# Patient Record
Sex: FEMALE | Race: WHITE | NOT HISPANIC OR LATINO | Employment: OTHER | URBAN - METROPOLITAN AREA
[De-identification: names, ages, dates, MRNs, and addresses within clinical notes are randomized per-mention and may not be internally consistent; named-entity substitution may affect disease eponyms.]

---

## 2017-03-08 ENCOUNTER — TELEPHONE (OUTPATIENT)
Dept: FAMILY MEDICINE | Facility: CLINIC | Age: 82
End: 2017-03-08

## 2017-03-08 NOTE — TELEPHONE ENCOUNTER
"Reason for call: med question   Patient called regarding (reason for call): prescription-Patient is saying [facility] does not carry rostuvastatin calcium-crestor- \"not on their formula?\"  Additional comments: please call so she can still get her crestor    Phone Number Pt can be reached at: Other phone number:  [phone]*  Best Time: am   Can we leave a detailed message on this number? YES  "

## 2017-03-08 NOTE — TELEPHONE ENCOUNTER
MA please contact the pharmacy to get the insurance information. This most likely will be Medicare Part D. This is not same as in the chart.     Remedios Pinto,

## 2017-03-10 ENCOUNTER — TELEPHONE (OUTPATIENT)
Dept: FAMILY MEDICINE | Facility: CLINIC | Age: 82
End: 2017-03-10

## 2017-03-10 NOTE — TELEPHONE ENCOUNTER
See telephone encounter dated 3/8/2017. Patient informed of PA being started. Please update patient when information becomes available.  Janette Worley, CMA

## 2017-03-10 NOTE — TELEPHONE ENCOUNTER
Reason for Call:  Other prescription    Detailed comments: Patient states her insurance informed her that rosuvastatin is not on their medication list. Patient request that provider please let her know next steps. Patient states she's presently in [place].    Phone Number Patient can be reached at: Cell number on file:    Telephone Information:   Mobile [phone]       Best Time: anytime    Can we leave a detailed message on this number? YES    Call taken on 3/10/2017 at 2:58 PM by nAyi Sandy

## 2017-03-10 NOTE — TELEPHONE ENCOUNTER
Received fax from pharmacy stating patient requires Prior Authorization for rosuvastatin (CRESTOR) 40 MG tablet.     Insurance information:   Name: Medicare Part D  Phone number: [phone]  ID number: [id number]    Initiate prior authorization or change medication?    If a prior authorization is to be initiated, please list the following:    -any medications the patient has tried and failed or any contraindications.  Call pharmacy: she needs generic crestor 40. I am sure they carry it.   -is the patient currently on this medication, or has tried before?  Yes, she has been on generic crestor for some time  -What is the diagnosis?  Patient has severe coronary artery disease and high cholesterol and needs a high dose statin   -Justification or other information that may be helpful. Patient needs generic crestor     DESMOND ESPITIA M.D.

## 2017-03-15 NOTE — TELEPHONE ENCOUNTER
Reason for Call:  Other prescription    Detailed comments: Patient called to check status of PA and would like to know result as soon as available.    Phone Number Patient can be reached at: Cell number on file:    Telephone Information:   Mobile [phone]       Best Time: anytime    Can we leave a detailed message on this number? YES    Call taken on 3/15/2017 at 2:48 PM by Anyi Sandy

## 2017-03-16 NOTE — TELEPHONE ENCOUNTER
Called patient. Patient is not available to talk. Patient will call us back.    Janessa Herrera MA

## 2017-03-28 ENCOUNTER — TELEPHONE (OUTPATIENT)
Dept: FAMILY MEDICINE | Facility: CLINIC | Age: 82
End: 2017-03-28

## 2017-03-28 DIAGNOSIS — J45.30 MILD PERSISTENT ASTHMA WITHOUT COMPLICATION: Primary | ICD-10-CM

## 2017-03-28 NOTE — TELEPHONE ENCOUNTER
Received fax from pharmacy stating patient requires Prior Authorization for Advair    Insurance information:  Name: PETE  Phone number: [phone]  ID number: [id number]    * or can change medication to Dulera or Symbicort per insurance    Provider to address. Message route to Dr. Jha. Initiate prior authorization or change medication?  Please advise.      **If a prior authorization is to be initiated, please list the following:    -Any medications the patient has tried and failed or any contraindications. None, she has severe asthma and has had it all her life. She needs that advair to control her asthma     -Is the patient currently on this medication, or has tried before? No, her asthma has been severe at times and advair has kept her asthma under good control     -What is the diagnosis? Severe constant asthma    - Justification or other information that me by helpful. She needs it to keep her out of the hospital.    DESMOND JHA M.D.

## 2017-03-28 NOTE — TELEPHONE ENCOUNTER
Looked at encounter 03/08/2017 and per RN who Spoke with pharmacy, this is covered, please disregard prior auth. Patient would have received this information from the Pharmacy.  Lala Hassan,

## 2017-03-29 NOTE — TELEPHONE ENCOUNTER
Call patient. I am substituting Dulera for her Advair due to cost. Dulera requires 2 puffs twice a day.     DESMOND ESPITIA M.D.

## 2017-03-29 NOTE — TELEPHONE ENCOUNTER
Received message from [employer]eds that PA not required. What was received from Pharmacy that Advair copay cost to much and the Dulera or Symbicort are what insurance preferred.   Lala Hassan,

## 2017-03-30 NOTE — TELEPHONE ENCOUNTER
Patient notified of Provider's message as written.  Patient verbalized understanding.  Raúl Trejo RN

## 2017-07-14 ENCOUNTER — OFFICE VISIT (OUTPATIENT)
Dept: FAMILY MEDICINE | Facility: CLINIC | Age: 82
End: 2017-07-14
Payer: COMMERCIAL

## 2017-07-14 VITALS
DIASTOLIC BLOOD PRESSURE: 86 MMHG | BODY MASS INDEX: 30.43 KG/M2 | HEIGHT: 61 IN | WEIGHT: 161.2 LBS | TEMPERATURE: 97.1 F | OXYGEN SATURATION: 98 % | HEART RATE: 60 BPM | SYSTOLIC BLOOD PRESSURE: 136 MMHG

## 2017-07-14 DIAGNOSIS — M19.079 ARTHRITIS OF FOOT: ICD-10-CM

## 2017-07-14 DIAGNOSIS — K59.00 CONSTIPATION, UNSPECIFIED CONSTIPATION TYPE: ICD-10-CM

## 2017-07-14 DIAGNOSIS — I10 BENIGN ESSENTIAL HYPERTENSION: ICD-10-CM

## 2017-07-14 DIAGNOSIS — N28.9 RENAL INSUFFICIENCY: ICD-10-CM

## 2017-07-14 DIAGNOSIS — K90.0 CELIAC DISEASE: ICD-10-CM

## 2017-07-14 DIAGNOSIS — F32.0 MILD MAJOR DEPRESSION (H): ICD-10-CM

## 2017-07-14 DIAGNOSIS — R53.83 FATIGUE, UNSPECIFIED TYPE: Primary | ICD-10-CM

## 2017-07-14 DIAGNOSIS — E03.9 HYPOTHYROIDISM, UNSPECIFIED TYPE: ICD-10-CM

## 2017-07-14 LAB
ALBUMIN SERPL-MCNC: 3.7 G/DL (ref 3.4–5)
ALP SERPL-CCNC: 54 U/L (ref 40–150)
ALT SERPL W P-5'-P-CCNC: 34 U/L (ref 0–50)
ANION GAP SERPL CALCULATED.3IONS-SCNC: 10 MMOL/L (ref 3–14)
AST SERPL W P-5'-P-CCNC: 21 U/L (ref 0–45)
BASOPHILS # BLD AUTO: 0 10E9/L (ref 0–0.2)
BASOPHILS NFR BLD AUTO: 0.2 %
BILIRUB SERPL-MCNC: 0.4 MG/DL (ref 0.2–1.3)
BUN SERPL-MCNC: 42 MG/DL (ref 7–30)
CALCIUM SERPL-MCNC: 10.9 MG/DL (ref 8.5–10.1)
CHLORIDE SERPL-SCNC: 100 MMOL/L (ref 94–109)
CO2 SERPL-SCNC: 29 MMOL/L (ref 20–32)
CREAT SERPL-MCNC: 1.5 MG/DL (ref 0.52–1.04)
DIFFERENTIAL METHOD BLD: NORMAL
EOSINOPHIL # BLD AUTO: 0.1 10E9/L (ref 0–0.7)
EOSINOPHIL NFR BLD AUTO: 2.1 %
ERYTHROCYTE [DISTWIDTH] IN BLOOD BY AUTOMATED COUNT: 12.1 % (ref 10–15)
GFR SERPL CREATININE-BSD FRML MDRD: 33 ML/MIN/1.7M2
GLUCOSE SERPL-MCNC: 101 MG/DL (ref 70–99)
HCT VFR BLD AUTO: 42.2 % (ref 35–47)
HGB BLD-MCNC: 14.1 G/DL (ref 11.7–15.7)
LYMPHOCYTES # BLD AUTO: 1.5 10E9/L (ref 0.8–5.3)
LYMPHOCYTES NFR BLD AUTO: 28.5 %
MCH RBC QN AUTO: 32.8 PG (ref 26.5–33)
MCHC RBC AUTO-ENTMCNC: 33.4 G/DL (ref 31.5–36.5)
MCV RBC AUTO: 98 FL (ref 78–100)
MONOCYTES # BLD AUTO: 0.5 10E9/L (ref 0–1.3)
MONOCYTES NFR BLD AUTO: 10.5 %
NEUTROPHILS # BLD AUTO: 3 10E9/L (ref 1.6–8.3)
NEUTROPHILS NFR BLD AUTO: 58.7 %
PLATELET # BLD AUTO: 187 10E9/L (ref 150–450)
POTASSIUM SERPL-SCNC: 3.9 MMOL/L (ref 3.4–5.3)
PROT SERPL-MCNC: 7.6 G/DL (ref 6.8–8.8)
RBC # BLD AUTO: 4.3 10E12/L (ref 3.8–5.2)
SODIUM SERPL-SCNC: 139 MMOL/L (ref 133–144)
TSH SERPL DL<=0.005 MIU/L-ACNC: 2.4 MU/L (ref 0.4–4)
WBC # BLD AUTO: 5.1 10E9/L (ref 4–11)

## 2017-07-14 PROCEDURE — 80050 GENERAL HEALTH PANEL: CPT | Performed by: FAMILY MEDICINE

## 2017-07-14 PROCEDURE — 99214 OFFICE O/P EST MOD 30 MIN: CPT | Performed by: FAMILY MEDICINE

## 2017-07-14 PROCEDURE — 36415 COLL VENOUS BLD VENIPUNCTURE: CPT | Performed by: FAMILY MEDICINE

## 2017-07-14 NOTE — PROGRESS NOTES
SUBJECTIVE:                                                    Kamryn Miller is a 81 year old female who presents to clinic today for the following health issues:      Musculoskeletal problem/pain      Duration: few months    Description  Location: Right foot    Intensity:  moderate    Accompanying signs and symptoms: radiation of pain to ankle    History  Previous similar problem: no   Previous evaluation:  none    Precipitating or alleviating factors:  Trauma or overuse: no   Aggravating factors include: standing, walking and climbing stairs    Therapies tried and outcome: rest/inactivity, heat and ice    Bowel movement too firm  2-3 years  Every bowel moevement  Prune juice, prunes, and stool softener          Problem list and histories reviewed & adjusted, as indicated.  Additional history: as documented    Patient Active Problem List   Diagnosis     CAD (coronary artery disease)     Hyperlipidemia with target LDL less than 100     Mild major depression (H)     Pulmonary hypertension (H)     Mild persistent asthma     Seasonal allergic rhinitis     Mitral insufficiency     Tricuspid insufficiency     Bipolar disorder (H)     Renal insufficiency     Tremors     Advanced directives, counseling/discussion     Family history of diabetes mellitus     Family history of celiac disease     Celiac disease     History of colonic polyps     Benign essential hypertension     Hypothyroidism, unspecified type     Past Surgical History:   Procedure Laterality Date     ANGIOGRAM  6/26/2009     ANGIOPLASTY  9/96    for angina     ANGIOPLASTY  8/03 - 9/03    X -2 - with stenst in coronary car.     APPENDECTOMY       BREAST BIOPSY, RT/LT      Breat Biopsy RT/LT, benign     BUNIONECTOMY  11/8/2011    Procedure:BUNIONECTOMY; Left donna bunionectomy; Surgeon:FREDY LITTLEJOHN; Location:[facility]     BUNIONECTOMY RT/LT  5/2007    right bunion and right 2nd hammertoe     C ANESTH,BLEPHAROPLASTY      (R) for drooping eyelid     C  APPENDECTOMY       CATARACT IOL, RT/LT  6/12 and 7/12    bilateral     COLONOSCOPY  2007, 2012    every 3 years for polyps     JOINT REPLACEMENT, HIP RT/LT  4/2008    right hip replaced     JOINT REPLACEMENT, HIP RT/LT  4/2009    left hip replaced     REPAIR HAMMER TOE  11/8/2011    Procedure:REPAIR HAMMER TOE; left 2nd hammertoe repair; Surgeon:FREDY LITTLEJOHN; Location:[facility]     SURGICAL HISTORY OF -   11/11    left bunion and 2nd hammertoe repair     TUBAL LIGATION         Social History   Substance Use Topics     Smoking status: Never Smoker     Smokeless tobacco: Never Used     Alcohol use No     Family History   Problem Relation Age of Onset     Asthma Mother      CEREBROVASCULAR DISEASE Mother      Arthritis Mother      Depression Mother      Lipids Sister      Hypertension Sister      HEART DISEASE Sister      Lipids Sister      Hypertension Sister      Lipids Sister      Breast Cancer Sister          Current Outpatient Prescriptions   Medication Sig Dispense Refill     mometasone-formoterol (DULERA) 200-5 MCG/ACT oral inhaler Inhale 2 puffs into the lungs 2 times daily 39 g 11     metoprolol (LOPRESSOR) 50 MG tablet One in the morning and 1/2 in the evening 135 tablet 4     verapamil (CALAN-SR) 240 MG CR tablet Take 1 tablet (240 mg) by mouth daily 90 tablet 4     losartan (COZAAR) 100 MG tablet Take 1 tablet (100 mg) by mouth daily 90 tablet 4     Azelastine HCl (ASTEPRO) 0.15 % SOLN Spray 1 spray into both nostrils daily 30 mL 11     hydrochlorothiazide (HYDRODIURIL) 25 MG tablet Take 1 tablet (25 mg) by mouth daily 90 tablet 4     rosuvastatin (CRESTOR) 40 MG tablet Take 1 tablet (40 mg) by mouth daily 90 tablet 4     FLUoxetine 20 MG tablet Take 20 mg by mouth daily       levothyroxine (SYNTHROID) 50 MCG tablet Take 1 tablet by mouth daily.       aspirin 81 MG tablet Take 1 tablet by mouth daily.       Omega-3 Fatty Acids (FISH OIL PO) Take 1 tablet by mouth daily.       Multiple Vitamin  (MULTI-VITAMIN) per tablet Take 1 tablet by mouth daily.       Calcium Citrate (CITRACAL OR) Take 1 tablet by mouth daily.       LAMOTRIGINE 200 MG PO TABS 1 TABLET TWICE DAILY       desvenlafaxine (PRISTIQ) 50 MG 24 hr tablet Take 100 mg by mouth daily        CHOLECALCIFEROL 78511 UNIT PO CAPS 1 tablet weekly       Allergies   Allergen Reactions     Ace Inhibitors Cough     Diclofenac Other (See Comments)     Balance problems     BP Readings from Last 3 Encounters:   07/14/17 136/86   12/22/16 128/72   09/22/15 134/76    Wt Readings from Last 3 Encounters:   07/14/17 161 lb 3.2 oz (73.1 kg)   12/22/16 164 lb (74.4 kg)   09/22/15 166 lb (75.3 kg)                  Labs reviewed in EPIC    Reviewed and updated as needed this visit by clinical staff  Tobacco  Allergies  Meds       Reviewed and updated as needed this visit by Provider       Immunization History   Administered Date(s) Administered     Hepatitis A Vac Ped/Adol-2 Dose 01/05/1996, 09/26/1996     Influenza (High Dose) 3 valent vaccine 09/24/2012, 10/03/2016     Influenza (IIV3) 10/20/2010     Pneumococcal (PCV 13) 09/22/2015     Pneumococcal 23 valent 02/26/2001, 07/01/2010     TD (ADULT, 7+) 03/04/2004     TDAP Vaccine (Adacel) 06/19/2012     Zoster vaccine, live 05/17/2007      ROS:  This 81 year old female is here today because she has been feeling more fatigued lately. She sees an endocrinologist for her thyroid but can't get in to see him for awhile. She wonders if her synthroid of 50 mcg should be raised. She has depression that has been known to get severe at times. She wonders if her depression is getting worse. She wakes up in the morning and feels so very tired she can hardly get out of bed. Not sure if she snores or has sleep apnea.   She had toast and OJ this morning and is not fasting.   Her right foot has been hurting off and on for the past 7 months. No known trauma. No pain at rest, but hurts when she walks or if she twists her foot. It  "hurts on either side of her foot. She spends the winter in [place] so she tends to wear sandals a lot year round but tries to get good supportive sandals.   She struggles with hard bowel movements: they come out like small marbles. Will have one solid formed bowel movement daily, but then will have 3-4 more that are just small hard bowel movements. Sometimes a small bowel movement slides right out of her rectum and soils her underwear. It is very embarrassing.   All other review of systems are negative  Personal, family, and social history reviewed with patient and revised.         OBJECTIVE:     /86  Pulse 60  Temp 97.1  F (36.2  C) (Oral)  Ht 5' 0.75\" (1.543 m)  Wt 161 lb 3.2 oz (73.1 kg)  SpO2 98%  BMI 30.71 kg/m2  Body mass index is 30.71 kg/(m^2).  GENERAL: healthy, alert and no distress  NECK: no adenopathy, no asymmetry, masses, or scars and thyroid normal to palpation  RESP: lungs clear to auscultation - no rales, rhonchi or wheezes  CV: regular rate and rhythm, normal S1 S2, no S3 or S4, no murmur, click or rub, no peripheral edema and peripheral pulses strong  ABDOMEN: soft, nontender, but has large truncal obesity   MS: no gross musculoskeletal defects noted, no edema  She has wide based gait with eversion of her feet.   Her right foot appears normal and is not tender when I examine it. Does have pain in metatarsal bones with forced eversion or inversion. Has grossly arthritic changes in her foot.   Well hydrated  Well nourished  Well groomed  Alert and oriented X 3  Good spirits    Diagnostic Test Results:  none     ASSESSMENT/PLAN:              1. Fatigue, unspecified type  As above   - TSH with free T4 reflex  - CBC with platelets differential  - Comprehensive metabolic panel    2. Celiac disease  Good control   - CBC with platelets differential    3. Mild major depression (H)  PHQ-9 score:    PHQ-9 SCORE 12/22/2016   Total Score -   Total Score 0       4. Arthritis of foot  As above, recommend " topical Aspercreme or take Ibuprofen or Aleve prior to any distance walking     5. Constipation, unspecified constipation type  Discussed. Needs to bulk her stools more. Recommend metamucil or citrucil     6. Renal insufficiency  Good control   - Comprehensive metabolic panel    7. Benign essential hypertension  Good control   - Comprehensive metabolic panel    8. Hypothyroidism, unspecified type  As above   - TSH with free T4 reflex    Return to clinic if no improvement   Call me if labs are all normal and she still wakes up tired. Then I will order a sleep study.     DESMOND ESPITIA MD  [facility]

## 2017-07-14 NOTE — LETTER
[address] [place] [address]    July 17, 2017    Kamryn Miller  [address]   [address]  [place] [address]          Dear Kamryn,      Your lab tests are looking OK, but your creatinine has gone up again, which is a measure of your kidney function. I note that you were dehydrated that day, with a urea nitrogen of 42. That could explain some of the rise. Please try hard to drink plenty of water to keep your kidneys healthy    Enclosed is a copy of your results.     Results for orders placed or performed in visit on 07/14/17   TSH with free T4 reflex   Result Value Ref Range    TSH 2.40 0.40 - 4.00 mU/L   CBC with platelets differential   Result Value Ref Range    WBC 5.1 4.0 - 11.0 10e9/L    RBC Count 4.30 3.8 - 5.2 10e12/L    Hemoglobin 14.1 11.7 - 15.7 g/dL    Hematocrit 42.2 35.0 - 47.0 %    MCV 98 78 - 100 fl    MCH 32.8 26.5 - 33.0 pg    MCHC 33.4 31.5 - 36.5 g/dL    RDW 12.1 10.0 - 15.0 %    Platelet Count 187 150 - 450 10e9/L    Diff Method Automated Method     % Neutrophils 58.7 %    % Lymphocytes 28.5 %    % Monocytes 10.5 %    % Eosinophils 2.1 %    % Basophils 0.2 %    Absolute Neutrophil 3.0 1.6 - 8.3 10e9/L    Absolute Lymphocytes 1.5 0.8 - 5.3 10e9/L    Absolute Monocytes 0.5 0.0 - 1.3 10e9/L    Absolute Eosinophils 0.1 0.0 - 0.7 10e9/L    Absolute Basophils 0.0 0.0 - 0.2 10e9/L   Comprehensive metabolic panel   Result Value Ref Range    Sodium 139 133 - 144 mmol/L    Potassium 3.9 3.4 - 5.3 mmol/L    Chloride 100 94 - 109 mmol/L    Carbon Dioxide 29 20 - 32 mmol/L    Anion Gap 10 3 - 14 mmol/L    Glucose 101 (H) 70 - 99 mg/dL    Urea Nitrogen 42 (H) 7 - 30 mg/dL    Creatinine 1.50 (H) 0.52 - 1.04 mg/dL    GFR Estimate 33 (L) >60 mL/min/1.7m2    GFR Estimate If Black 40 (L) >60 mL/min/1.7m2    Calcium 10.9 (H) 8.5 - 10.1 mg/dL    Bilirubin Total 0.4 0.2 - 1.3 mg/dL    Albumin 3.7 3.4 - 5.0 g/dL     Protein Total 7.6 6.8 - 8.8 g/dL    Alkaline Phosphatase 54 40 - 150 U/L    ALT 34 0 - 50 U/L    AST 21 0 - 45 U/L       If you have any questions or concerns, please call myself or my nurse at [phone].      Sincerely,        Kamryn Jha MD/ERIN

## 2017-07-14 NOTE — PATIENT INSTRUCTIONS
[facility]    If you have any questions regarding to your visit please contact your care team:       Team Purple:   Clinic Hours Telephone Number   Dr. Kamryn Maldonado     7am-7pm  Monday - Thursday   7am-5pm  Fridays  [phone]  (Appointment scheduling available 24/7)    Questions about your Visit?   Team Line:  [phone]   Urgent Care - [place] and [facility] - 11am-9pm Monday-Friday Saturday-Sunday- 9am-5pm   [place] - 5pm-9pm Monday-Friday Saturday-Sunday- 9am-5pm  [phone] - [facility]  [phone] - [place]       What options do I have for visits at the clinic other than the traditional office visit?  To expand how we care for you, many of our providers are utilizing electronic visits (e-visits) and telephone visits, when medically appropriate, for interactions with their patients rather than a visit in the clinic.   We also offer nurse visits for many medical concerns. Just like any other service, we will bill your insurance company for this type of visit based on time spent on the phone with your provider. Not all insurance companies cover these visits. Please check with your medical insurance if this type of visit is covered. You will be responsible for any charges that are not paid by your insurance.      E-visits via [employer]:  generally incur a $35.00 fee.  Telephone visits:  Time spent on the phone: *charged based on time that is spent on the phone in increments of 10 minutes. Estimated cost:   5-10 mins $30.00   11-20 mins. $59.00   21-30 mins. $85.00     Use [employer] (secure email communication and access to your chart) to send your primary care provider a message or make an appointment. Ask someone on your Team how to sign up for [employer].  For a Price Quote for your services, please call our Consumer Price Line at [phone].  As always, Thank you for trusting us with your health care needs!

## 2017-07-14 NOTE — MR AVS SNAPSHOT
After Visit Summary   7/14/2017    Kamryn Miller    MRN: [id number]           Patient Information     Date Of Birth          1935        Visit Information        Provider Department      7/14/2017 11:15 AM Kamryn Jha MD [facility]        Today's Diagnoses     Fatigue, unspecified type    -  1    Benign essential hypertension        Celiac disease        Mild major depression (H)        Renal insufficiency        Arthritis of foot        Hypothyroidism, unspecified type          Care Instructions    [facility]    If you have any questions regarding to your visit please contact your care team:       Team Purple:   Clinic Hours Telephone Number   Dr. Kamryn Maldonado     7am-7pm  Monday - Thursday   7am-5pm  Fridays  [phone]  (Appointment scheduling available 24/7)    Questions about your Visit?   Team Line:  [phone]   Urgent Care - [place] and [facility] - 11am-9pm Monday-Friday Saturday-Sunday- 9am-5pm   [place] - 5pm-9pm Monday-Friday Saturday-Sunday- 9am-5pm  [phone] - [facility]  [phone] - [place]       What options do I have for visits at the clinic other than the traditional office visit?  To expand how we care for you, many of our providers are utilizing electronic visits (e-visits) and telephone visits, when medically appropriate, for interactions with their patients rather than a visit in the clinic.   We also offer nurse visits for many medical concerns. Just like any other service, we will bill your insurance company for this type of visit based on time spent on the phone with your provider. Not all insurance companies cover these visits. Please check with your medical insurance if this type of visit is covered. You will be responsible for any charges that are not paid by your insurance.      E-visits via [employer]:  generally incur a $35.00 fee.  Telephone visits:  Time  "spent on the phone: *charged based on time that is spent on the phone in increments of 10 minutes. Estimated cost:   5-10 mins $30.00   11-20 mins. $59.00   21-30 mins. $85.00     Use Rezzcardt (secure email communication and access to your chart) to send your primary care provider a message or make an appointment. Ask someone on your Team how to sign up for Rezzcardt.  For a Price Quote for your services, please call our [employer] Line at [phone].  As always, Thank you for trusting us with your health care needs!              Follow-ups after your visit        Who to contact     If you have questions or need follow up information about today's clinic visit or your schedule please contact [facility] directly at [phone].  Normal or non-critical lab and imaging results will be communicated to you by [employer], letter or phone within 4 business days after the clinic has received the results. If you do not hear from us within 7 days, please contact the clinic through Rezzcardt or phone. If you have a critical or abnormal lab result, we will notify you by phone as soon as possible.  Submit refill requests through [employer] or call your pharmacy and they will forward the refill request to us. Please allow 3 business days for your refill to be completed.          Additional Information About Your Visit        Yatango Information     Yatango lets you send messages to your doctor, view your test results, renew your prescriptions, schedule appointments and more. To sign up, go to [email or web address] . Click on \"Log in\" on the left side of the screen, which will take you to the Welcome page. Then click on \"Sign up Now\" on the right side of the page.     You will be asked to enter the access code listed below, as well as some personal information. Please follow the directions to create your username and password.     Your access code is: [email or web address]  Expires: 10/12/2017 11:40 AM     Your access code will " " in 90 days. If you need help or a new code, please call your [place] clinic or [phone].        Care EveryWhere ID     This is your Care EveryWhere ID. This could be used by other organizations to access your [place] medical records  [id number]        Your Vitals Were     Pulse Temperature Height Pulse Oximetry BMI (Body Mass Index)       60 97.1  F (36.2  C) (Oral) 5' 0.75\" (1.543 m) 98% 30.71 kg/m2        Blood Pressure from Last 3 Encounters:   17 136/86   16 128/72   09/22/15 134/76    Weight from Last 3 Encounters:   17 161 lb 3.2 oz (73.1 kg)   16 164 lb (74.4 kg)   09/22/15 166 lb (75.3 kg)              We Performed the Following     CBC with platelets differential     Comprehensive metabolic panel     TSH with free T4 reflex        Primary Care Provider Office Phone # Fax #    Kamryn Jha [phone]       [address] [address]        Equal Access to Services     TAQUERIA OBANDO AH: Hadii aad ku hadasho Soomaali, waaxda luqadaha, qaybta kaalmada adeegyada, shubham palm . So [facility] [phone].    ATENCIÓN: Si habla español, tiene a corcoran disposición servicios gratuitos de asistencia lingüística. Llame al [phone].    We comply with applicable federal civil rights laws and [place] laws. We do not discriminate on the basis of race, color, national origin, age, disability sex, sexual orientation or gender identity.            Thank you!     Thank you for choosing [facility]  for your care. Our goal is always to provide you with excellent care. Hearing back from our patients is one way we can continue to improve our services. Please take a few minutes to complete the written survey that you may receive in the mail after your visit with us. Thank you!             Your Updated Medication List - Protect others around you: Learn how to safely use, store and throw " away your medicines at www.disposemymeds.org.          This list is accurate as of: 7/14/17 11:40 AM.  Always use your most recent med list.                   Brand Name Dispense Instructions for use Diagnosis    aspirin 81 MG tablet      Take 1 tablet by mouth daily.        Azelastine HCl 0.15 % Soln    ASTEPRO    30 mL    Spray 1 spray into both nostrils daily    Seasonal allergic rhinitis, unspecified allergic rhinitis trigger       cholecalciferol 77880 UNITS capsule    VITAMIN D3     1 tablet weekly        CITRACAL OR      Take 1 tablet by mouth daily.        FISH OIL PO      Take 1 tablet by mouth daily.        FLUoxetine 20 MG tablet      Take 20 mg by mouth daily        hydrochlorothiazide 25 MG tablet    HYDRODIURIL    90 tablet    Take 1 tablet (25 mg) by mouth daily    Benign essential hypertension       lamoTRIgine 200 MG tablet    LaMICtal     1 TABLET TWICE DAILY        losartan 100 MG tablet    COZAAR    90 tablet    Take 1 tablet (100 mg) by mouth daily    Benign essential hypertension       metoprolol 50 MG tablet    LOPRESSOR    135 tablet    One in the morning and 1/2 in the evening    Benign essential hypertension       mometasone-formoterol 200-5 MCG/ACT oral inhaler    DULERA    39 g    Inhale 2 puffs into the lungs 2 times daily    Mild persistent asthma without complication       Multi-vitamin Tabs tablet   Generic drug:  multivitamin, therapeutic with minerals      Take 1 tablet by mouth daily.        PRISTIQ 50 MG 24 hr tablet   Generic drug:  desvenlafaxine succinate      Take 100 mg by mouth daily        rosuvastatin 40 MG tablet    CRESTOR    90 tablet    Take 1 tablet (40 mg) by mouth daily    Hyperlipidemia with target LDL less than 100       SYNTHROID 50 MCG tablet   Generic drug:  levothyroxine      Take 1 tablet by mouth daily.        verapamil 240 MG CR tablet    CALAN-SR    90 tablet    Take 1 tablet (240 mg) by mouth daily    Benign essential hypertension

## 2017-07-14 NOTE — NURSING NOTE
"Chief Complaint   Patient presents with     Musculoskeletal Problem     right foot     other     bowel movement too firm       Initial /86  Pulse 60  Temp 97.1  F (36.2  C) (Oral)  Ht 5' 0.75\" (1.543 m)  Wt 161 lb 3.2 oz (73.1 kg)  SpO2 98%  BMI 30.71 kg/m2 Estimated body mass index is 30.71 kg/(m^2) as calculated from the following:    Height as of this encounter: 5' 0.75\" (1.543 m).    Weight as of this encounter: 161 lb 3.2 oz (73.1 kg).  Medication Reconciliation: complete     Penny Galicia MA    "

## 2017-07-17 ENCOUNTER — TELEPHONE (OUTPATIENT)
Dept: FAMILY MEDICINE | Facility: CLINIC | Age: 82
End: 2017-07-17

## 2017-07-17 NOTE — TELEPHONE ENCOUNTER
Patient contacted to complete PHQ-9 that was missed at office visit on 07/04/17 score of 20.     Dr. Jha do you recommend follow up visit? It appears this was discuss at visit.     Beatriz Celestin MA

## 2017-07-17 NOTE — TELEPHONE ENCOUNTER
Patient was in to see Dr. Jha and has the diagnosis of Depression and was due for a PHQ-9. Please complete, thank you.

## 2017-07-18 ASSESSMENT — PATIENT HEALTH QUESTIONNAIRE - PHQ9: SUM OF ALL RESPONSES TO PHQ QUESTIONS 1-9: 20

## 2017-11-15 DIAGNOSIS — E78.5 HYPERLIPIDEMIA WITH TARGET LDL LESS THAN 100: ICD-10-CM

## 2017-11-15 DIAGNOSIS — I10 BENIGN ESSENTIAL HYPERTENSION: ICD-10-CM

## 2017-11-17 RX ORDER — ROSUVASTATIN CALCIUM 40 MG/1
TABLET, FILM COATED ORAL
Qty: 30 TABLET | Refills: 1 | Status: SHIPPED | OUTPATIENT
Start: 2017-11-17 | End: 2018-01-25

## 2017-11-17 RX ORDER — HYDROCHLOROTHIAZIDE 25 MG/1
TABLET ORAL
Qty: 30 TABLET | Refills: 1 | Status: SHIPPED | OUTPATIENT
Start: 2017-11-17 | End: 2018-01-14

## 2017-11-17 NOTE — TELEPHONE ENCOUNTER
Prescription approved per [facility] Refill Protocol.    Verona Blackwell RN  [facility]

## 2017-12-08 ENCOUNTER — TELEPHONE (OUTPATIENT)
Dept: FAMILY MEDICINE | Facility: CLINIC | Age: 82
End: 2017-12-08

## 2017-12-08 DIAGNOSIS — R30.0 DYSURIA: Primary | ICD-10-CM

## 2017-12-08 DIAGNOSIS — R30.0 DYSURIA: ICD-10-CM

## 2017-12-08 LAB
ALBUMIN UR-MCNC: NEGATIVE MG/DL
APPEARANCE UR: CLEAR
BILIRUB UR QL STRIP: NEGATIVE
COLOR UR AUTO: YELLOW
GLUCOSE UR STRIP-MCNC: NEGATIVE MG/DL
HGB UR QL STRIP: ABNORMAL
KETONES UR STRIP-MCNC: NEGATIVE MG/DL
LEUKOCYTE ESTERASE UR QL STRIP: NEGATIVE
NITRATE UR QL: NEGATIVE
NON-SQ EPI CELLS #/AREA URNS LPF: NORMAL /LPF
PH UR STRIP: 6 PH (ref 5–7)
RBC #/AREA URNS AUTO: NORMAL /HPF
SOURCE: ABNORMAL
SP GR UR STRIP: <=1.005 (ref 1–1.03)
UROBILINOGEN UR STRIP-ACNC: 0.2 EU/DL (ref 0.2–1)
WBC #/AREA URNS AUTO: NORMAL /HPF

## 2017-12-08 PROCEDURE — 81001 URINALYSIS AUTO W/SCOPE: CPT | Performed by: FAMILY MEDICINE

## 2017-12-08 NOTE — TELEPHONE ENCOUNTER
Spoke with patient she was seen on [facility] for UTI on 11/30/17, patient was given prescription for Cipro BID for 3 days.  Patients symptoms returned on 12/7/17, urine is cloudy, slight burning with urination, and she feels pressure/mild cramping in lower abdomen.  Patient wondering if she should have more antibiotics?  please advise   Teresa Tobin RN

## 2017-12-08 NOTE — TELEPHONE ENCOUNTER
Patient notified of providers message as written.  Patient scheduled for lab only appointment today.   Teresa Tobin RN

## 2017-12-08 NOTE — TELEPHONE ENCOUNTER
Call her. It is possible that she has an infection from a bacteria that is resitant to cipro. She needs to make a lab appointment and come in and leave a urine analysis     DESMOND ESPITIA M.D.

## 2017-12-08 NOTE — TELEPHONE ENCOUNTER
Reason for Call:  Other call back    Detailed comments:  Patient calling. She went to a urgent care for a UTI. She took the cipro that was given. She still have the symptoms. Please call and advise.     Phone Number Patient can be reached at: Home number on file [phone] (home)    Best Time:  Any     Can we leave a detailed message on this number? YES    Call taken on 12/8/2017 at 10:59 AM by Jennifer Hendrix

## 2017-12-11 ENCOUNTER — TELEPHONE (OUTPATIENT)
Dept: FAMILY MEDICINE | Facility: CLINIC | Age: 82
End: 2017-12-11

## 2017-12-11 NOTE — TELEPHONE ENCOUNTER
Reason for Call:  Other call back    Detailed comments: patient calling and stating she had a urine culture last week. No one has called her to let her know of the results. Patient is still having symptoms of a UTI such as cloudy urine, burning and frequency. Patient asking for a return call.    Phone Number Patient can be reached at: Home number on file [phone] (home)    Best Time: any time    Can we leave a detailed message on this number? YES    Call taken on 12/11/2017 at 4:53 PM by Jessie Ponce

## 2017-12-11 NOTE — TELEPHONE ENCOUNTER
Gave patient normal urine results.  Patient will f/u in clinic if symptoms continue.   Teresa Tobin RN

## 2017-12-13 ENCOUNTER — OFFICE VISIT (OUTPATIENT)
Dept: FAMILY MEDICINE | Facility: CLINIC | Age: 82
End: 2017-12-13
Payer: COMMERCIAL

## 2017-12-13 VITALS
HEART RATE: 63 BPM | TEMPERATURE: 97.1 F | OXYGEN SATURATION: 94 % | HEIGHT: 61 IN | BODY MASS INDEX: 30.11 KG/M2 | WEIGHT: 159.5 LBS | DIASTOLIC BLOOD PRESSURE: 70 MMHG | SYSTOLIC BLOOD PRESSURE: 126 MMHG

## 2017-12-13 DIAGNOSIS — B07.0 PLANTAR WARTS: ICD-10-CM

## 2017-12-13 DIAGNOSIS — Z91.81 AT RISK FOR FALLING: ICD-10-CM

## 2017-12-13 DIAGNOSIS — M79.671 RIGHT FOOT PAIN: Primary | ICD-10-CM

## 2017-12-13 DIAGNOSIS — F32.0 MILD MAJOR DEPRESSION (H): ICD-10-CM

## 2017-12-13 PROCEDURE — 99214 OFFICE O/P EST MOD 30 MIN: CPT | Performed by: FAMILY MEDICINE

## 2017-12-13 NOTE — PROGRESS NOTES
SUBJECTIVE:   Kamryn Miller is a 82 year old female who presents to clinic today for the following health issues:      Patient presents with:  RECHECK: right  foot pain   Callouses: on the left big toe              Problem list and histories reviewed & adjusted, as indicated.  Additional history: as documented    Patient Active Problem List   Diagnosis     CAD (coronary artery disease)     Hyperlipidemia with target LDL less than 100     Mild major depression (H)     Pulmonary hypertension     Mild persistent asthma     Seasonal allergic rhinitis     Mitral insufficiency     Tricuspid insufficiency     Bipolar disorder (H)     Renal insufficiency     Tremors     Advanced directives, counseling/discussion     Family history of diabetes mellitus     Family history of celiac disease     Celiac disease     History of colonic polyps     Benign essential hypertension     Hypothyroidism, unspecified type     Past Surgical History:   Procedure Laterality Date     ANGIOGRAM  6/26/2009     ANGIOPLASTY  9/96    for angina     ANGIOPLASTY  8/03 - 9/03    X -2 - with stenst in coronary car.     APPENDECTOMY       BREAST BIOPSY, RT/LT      Breat Biopsy RT/LT, benign     BUNIONECTOMY  11/8/2011    Procedure:BUNIONECTOMY; Left donna bunionectomy; Surgeon:FREDY LITTLEJOHN; Location:[facility] OR     BUNIONECTOMY RT/LT  5/2007    right bunion and right 2nd hammertoe     C ANESTH,BLEPHAROPLASTY      (R) for drooping eyelid     C APPENDECTOMY       CATARACT IOL, RT/LT  6/12 and 7/12    bilateral     COLONOSCOPY  2007, 2012    every 3 years for polyps     JOINT REPLACEMENT, HIP RT/LT  4/2008    right hip replaced     JOINT REPLACEMENT, HIP RT/LT  4/2009    left hip replaced     REPAIR HAMMER TOE  11/8/2011    Procedure:REPAIR HAMMER TOE; left 2nd hammertoe repair; Surgeon:FREDY LITTLEJOHN; Location:[facility] OR     SURGICAL HISTORY OF -   11/11    left bunion and 2nd hammertoe repair     TUBAL LIGATION         Social History   Substance Use  Topics     Smoking status: Never Smoker     Smokeless tobacco: Never Used     Alcohol use No     Family History   Problem Relation Age of Onset     Asthma Mother      CEREBROVASCULAR DISEASE Mother      Arthritis Mother      Depression Mother      Lipids Sister      Hypertension Sister      HEART DISEASE Sister      Lipids Sister      Hypertension Sister      Lipids Sister      Breast Cancer Sister          Current Outpatient Prescriptions   Medication Sig Dispense Refill     Vortioxetine HBr (TRINTELLIX PO)        CRESTOR 40 MG tablet TAKE 1 TABLET (40 MG) BY MOUTH DAILY 30 tablet 1     hydrochlorothiazide (HYDRODIURIL) 25 MG tablet TAKE 1 TABLET (25 MG) BY MOUTH DAILY 30 tablet 1     mometasone-formoterol (DULERA) 200-5 MCG/ACT oral inhaler Inhale 2 puffs into the lungs 2 times daily 39 g 11     metoprolol (LOPRESSOR) 50 MG tablet One in the morning and 1/2 in the evening 135 tablet 4     verapamil (CALAN-SR) 240 MG CR tablet Take 1 tablet (240 mg) by mouth daily 90 tablet 4     losartan (COZAAR) 100 MG tablet Take 1 tablet (100 mg) by mouth daily 90 tablet 4     Azelastine HCl (ASTEPRO) 0.15 % SOLN Spray 1 spray into both nostrils daily 30 mL 11     FLUoxetine 20 MG tablet Take 20 mg by mouth daily       levothyroxine (SYNTHROID) 50 MCG tablet Take 1 tablet by mouth daily.       aspirin 81 MG tablet Take 1 tablet by mouth daily.       Omega-3 Fatty Acids (FISH OIL PO) Take 1 tablet by mouth daily.       Multiple Vitamin (MULTI-VITAMIN) per tablet Take 1 tablet by mouth daily.       Calcium Citrate (CITRACAL OR) Take 1 tablet by mouth daily.       LAMOTRIGINE 200 MG PO TABS 1 TABLET TWICE DAILY       desvenlafaxine (PRISTIQ) 50 MG 24 hr tablet Take 100 mg by mouth daily        CHOLECALCIFEROL 71737 UNIT PO CAPS 1 tablet weekly       Allergies   Allergen Reactions     Ace Inhibitors Cough     Diclofenac Other (See Comments)     Balance problems     BP Readings from Last 3 Encounters:   12/13/17 126/70   07/14/17  "136/86   12/22/16 128/72    Wt Readings from Last 3 Encounters:   12/13/17 159 lb 8 oz (72.3 kg)   07/14/17 161 lb 3.2 oz (73.1 kg)   12/22/16 164 lb (74.4 kg)                  Labs reviewed in EPIC        Reviewed and updated as needed this visit by clinical staffTobacco  Allergies  Meds  Problems  Med Hx  Surg Hx  Fam Hx  Soc Hx        Reviewed and updated as needed this visit by Provider  Allergies  Meds  Problems         ROS:  This 82 year old female is here today for 2 reasons:  1. She has developed a thick callus on the outer aspect of her left great toenail. She can't see it. She has been able to file it down and keep is soft with aquaphor such that it at least hasn't gotten worse, but it hasn't gone away.   2. She has a pain in the dorsum of her right foot. It hurts most of the time no matter if she has a good tennis shoes on or not. She wonders if she needs an x-ray. Has no history of trauma.   She is leaving in 3 weeks to spend the winter in [place] again.   All other review of systems are negative  Personal, family, and social history reviewed with patient and revised.         OBJECTIVE:     /70  Pulse 63  Temp 97.1  F (36.2  C) (Oral)  Ht 5' 0.75\" (1.543 m)  Wt 159 lb 8 oz (72.3 kg)  SpO2 94%  BMI 30.39 kg/m2  Body mass index is 30.39 kg/(m^2).  GENERAL: healthy, alert and no distress  NECK: no adenopathy, no asymmetry, masses, or scars and thyroid normal to palpation  RESP: lungs clear to auscultation - no rales, rhonchi or wheezes  CV: regular rate and rhythm, normal S1 S2, no S3 or S4, no murmur, click or rub, no peripheral edema and peripheral pulses strong  MS: no gross musculoskeletal defects noted, no edema  Patient has a large thickened area of plantar warts on the lateral aspect of her left great toenail and is already growing under the nail. It is not tender and there is no open skin exposed.   Patient has arthritic toes on both feet  She has mild tenderness over the dorsum of " her right foot. No evidence for trauma.   Has very good supportive tennis shoes  Well hydrated  Well nourished  Well groomed  Alert and oriented X 3  Good spirits      Diagnostic Test Results:  none     ASSESSMENT/PLAN:              1. Right foot pain  As above, she may benefit from a steroid injection where her pain is   - PODIATRY/FOOT & ANKLE SURGERY REFERRAL    2. Plantar warts  As above, discussed options: the wart is too large for liquid nitrogen treatment. She would need an acid injection by a dermatologist, but she is leaving for [place] in just a few weeks. I would recommend that she continue with her present home remedies until she returns in the spring or she can try occlusion with duct tape     3. At risk for falling  At risk     4. Mild major depression (H)  DEPRESSION ACTION PLAN (DAP)  PHQ-9 score:    PHQ-9 SCORE 7/17/2017   Total Score -   Total Score 20   my MA will call her and do PHQ-9 over the phone.     Return to clinic in the spring for further evaluation.     DESMOND ESPITIA MD  [facility]

## 2017-12-13 NOTE — NURSING NOTE
"Chief Complaint   Patient presents with     RECHECK     right  foot pain      Callouses     on the left big toe        Initial /70  Pulse 63  Temp 97.1  F (36.2  C) (Oral)  Ht 5' 0.75\" (1.543 m)  Wt 159 lb 8 oz (72.3 kg)  SpO2 94%  BMI 30.39 kg/m2 Estimated body mass index is 30.39 kg/(m^2) as calculated from the following:    Height as of this encounter: 5' 0.75\" (1.543 m).    Weight as of this encounter: 159 lb 8 oz (72.3 kg).  Medication Reconciliation: complete     An ROZ Herrera    "

## 2017-12-13 NOTE — MR AVS SNAPSHOT
After Visit Summary   12/13/2017    Kamryn Miller    MRN: [id number]           Patient Information     Date Of Birth          1935        Visit Information        Provider Department      12/13/2017 3:00 PM Kamryn Jha MD [facility]        Today's Diagnoses     Right foot pain    -  1    Plantar warts        At risk for falling          Care Instructions    [facility]    If you have any questions regarding to your visit please contact your care team:       Team Purple:   Clinic Hours Telephone Number   Dr. Kamryn Lopez   7am-7pm  Monday - Thursday   7am-5pm  Fridays  [phone]  (Appointment scheduling available 24/7)    Questions about your Visit?   Team Line:  [phone]   Urgent Care - [place] and [facility] - 11am-9pm Monday-Friday Saturday-Sunday- 9am-5pm   [place] - 5pm-9pm Monday-Friday Saturday-Sunday- 9am-5pm  [phone] - [facility]  [phone] - [place]       What options do I have for visits at the clinic other than the traditional office visit?  To expand how we care for you, many of our providers are utilizing electronic visits (e-visits) and telephone visits, when medically appropriate, for interactions with their patients rather than a visit in the clinic.   We also offer nurse visits for many medical concerns. Just like any other service, we will bill your insurance company for this type of visit based on time spent on the phone with your provider. Not all insurance companies cover these visits. Please check with your medical insurance if this type of visit is covered. You will be responsible for any charges that are not paid by your insurance.      E-visits via [employer]:  generally incur a $35.00 fee.  Telephone visits:  Time spent on the phone: *charged based on time that is spent on the phone in increments of 10 minutes. Estimated cost:   5-10 mins  $30.00   11-20 mins. $59.00   21-30 mins. $85.00     Use VPIsystemshart (secure email communication and access to your chart) to send your primary care provider a message or make an appointment. Ask someone on your Team how to sign up for LiveMinutes.  For a Price Quote for your services, please call our [employer] Price Line at [phone].  As always, Thank you for trusting us with your health care needs!              Follow-ups after your visit        Additional Services     PODIATRY/FOOT & ANKLE SURGERY REFERRAL       Your provider has referred you to: FMG: [facility] [phone]   [email or web address]  FMG: [facility] [phone]   [email or web address]    Please be aware that coverage of these services is subject to the terms and limitations of your health insurance plan.  Call member services at your health plan with any benefit or coverage questions.      Please bring the following to your appointment:  >>   Any x-rays, CTs or MRIs which have been performed.  Contact the facility where they were done to arrange for  prior to your scheduled appointment.    >>   List of current medications   >>   This referral request   >>   Any documents/labs given to you for this referral                  Who to contact     If you have questions or need follow up information about today's clinic visit or your schedule please contact [facility] directly at [phone].  Normal or non-critical lab and imaging results will be communicated to you by VPIsystemshart, letter or phone within 4 business days after the clinic has received the results. If you do not hear from us within 7 days, please contact the clinic through VPIsystemshart or phone. If you have a critical or abnormal lab result, we will notify you by phone as soon as possible.  Submit refill requests through LiveMinutes or call your pharmacy and they will  "forward the refill request to us. Please allow 3 business days for your refill to be completed.          Additional Information About Your Visit        MyChart Information     Edaixi lets you send messages to your doctor, view your test results, renew your prescriptions, schedule appointments and more. To sign up, go to [email or web address] . Click on \"Log in\" on the left side of the screen, which will take you to the Welcome page. Then click on \"Sign up Now\" on the right side of the page.     You will be asked to enter the access code listed below, as well as some personal information. Please follow the directions to create your username and password.     Your access code is: [email or web address]  Expires: 3/13/2018  3:19 PM     Your access code will  in 90 days. If you need help or a new code, please call your [place] clinic or [phone].        Care EveryWhere ID     This is your [facility] EveryWhere ID. This could be used by other organizations to access your [place] medical records  [id number]        Your Vitals Were     Pulse Temperature Height Pulse Oximetry BMI (Body Mass Index)       63 97.1  F (36.2  C) (Oral) 5' 0.75\" (1.543 m) 94% 30.39 kg/m2        Blood Pressure from Last 3 Encounters:   17 126/70   17 136/86   16 128/72    Weight from Last 3 Encounters:   17 159 lb 8 oz (72.3 kg)   17 161 lb 3.2 oz (73.1 kg)   16 164 lb (74.4 kg)              We Performed the Following     DEPRESSION ACTION PLAN (DAP)     PODIATRY/FOOT & ANKLE SURGERY REFERRAL        Primary Care Provider Office Phone # Fax #    Kamryn Jha [phone]       [address] [address]        Equal Access to Services     TAQUERIA OBANDO AH: Filemon Aguirre, priti olmos, shubham alvarado. [facility] [phone].    ATENCIÓN: Si habla espmyrtle, tiene a corcoran disposición " servicios gratuitos de asistencia lingüística. Odilon perera [phone].    We comply with applicable federal civil rights laws and [place] laws. We do not discriminate on the basis of race, color, national origin, age, disability, sex, sexual orientation, or gender identity.            Thank you!     Thank you for choosing [facility]  for your care. Our goal is always to provide you with excellent care. Hearing back from our patients is one way we can continue to improve our services. Please take a few minutes to complete the written survey that you may receive in the mail after your visit with us. Thank you!             Your Updated Medication List - Protect others around you: Learn how to safely use, store and throw away your medicines at www.disposemymeds.org.          This list is accurate as of: 12/13/17  3:19 PM.  Always use your most recent med list.                   Brand Name Dispense Instructions for use Diagnosis    aspirin 81 MG tablet      Take 1 tablet by mouth daily.        Azelastine HCl 0.15 % Soln    ASTEPRO    30 mL    Spray 1 spray into both nostrils daily    Seasonal allergic rhinitis, unspecified allergic rhinitis trigger       cholecalciferol 90410 UNITS capsule    VITAMIN D3     1 tablet weekly        CITRACAL OR      Take 1 tablet by mouth daily.        CRESTOR 40 MG tablet   Generic drug:  rosuvastatin     30 tablet    TAKE 1 TABLET (40 MG) BY MOUTH DAILY    Hyperlipidemia with target LDL less than 100       FISH OIL PO      Take 1 tablet by mouth daily.        FLUoxetine 20 MG tablet      Take 20 mg by mouth daily        hydrochlorothiazide 25 MG tablet    HYDRODIURIL    30 tablet    TAKE 1 TABLET (25 MG) BY MOUTH DAILY    Benign essential hypertension       lamoTRIgine 200 MG tablet    LaMICtal     1 TABLET TWICE DAILY        losartan 100 MG tablet    COZAAR    90 tablet    Take 1 tablet (100 mg) by mouth daily    Benign essential hypertension       metoprolol 50 MG tablet     LOPRESSOR    135 tablet    One in the morning and 1/2 in the evening    Benign essential hypertension       mometasone-formoterol 200-5 MCG/ACT oral inhaler    DULERA    39 g    Inhale 2 puffs into the lungs 2 times daily    Mild persistent asthma without complication       Multi-vitamin Tabs tablet   Generic drug:  multivitamin, therapeutic with minerals      Take 1 tablet by mouth daily.        PRISTIQ 50 MG 24 hr tablet   Generic drug:  desvenlafaxine succinate      Take 100 mg by mouth daily        SYNTHROID 50 MCG tablet   Generic drug:  levothyroxine      Take 1 tablet by mouth daily.        TRINTELLIX PO           verapamil 240 MG CR tablet    CALAN-SR    90 tablet    Take 1 tablet (240 mg) by mouth daily    Benign essential hypertension          Patient/Caregiver provided printed discharge information.

## 2017-12-13 NOTE — PATIENT INSTRUCTIONS
[facility]    If you have any questions regarding to your visit please contact your care team:       Team Purple:   Clinic Hours Telephone Number   Dr. Kamryn Lopez   7am-7pm  Monday - Thursday   7am-5pm  Fridays  [phone]  (Appointment scheduling available 24/7)    Questions about your Visit?   Team Line:  [phone]   Urgent Care - [place] and [facility] - 11am-9pm Monday-Friday Saturday-Sunday- 9am-5pm   [place] - 5pm-9pm Monday-Friday Saturday-Sunday- 9am-5pm  [phone] - [facility]  [phone] - [place]       What options do I have for visits at the clinic other than the traditional office visit?  To expand how we care for you, many of our providers are utilizing electronic visits (e-visits) and telephone visits, when medically appropriate, for interactions with their patients rather than a visit in the clinic.   We also offer nurse visits for many medical concerns. Just like any other service, we will bill your insurance company for this type of visit based on time spent on the phone with your provider. Not all insurance companies cover these visits. Please check with your medical insurance if this type of visit is covered. You will be responsible for any charges that are not paid by your insurance.      E-visits via [email or web address]:  generally incur a $35.00 fee.  Telephone visits:  Time spent on the phone: *charged based on time that is spent on the phone in increments of 10 minutes. Estimated cost:   5-10 mins $30.00   11-20 mins. $59.00   21-30 mins. $85.00     Use [email or web address] (secure email communication and access to your chart) to send your primary care provider a message or make an appointment. Ask someone on your Team how to sign up for [email or web address].  For a Price Quote for your services, please call our Consumer Price Line at [phone].  As always, Thank you for trusting us with your health care needs!

## 2017-12-13 NOTE — LETTER
My Depression Action Plan  Name: Kamryn Miller   Date of Birth 1935  Date: 12/13/2017    My doctor: Kamryn Jha   My clinic: [address]  [place] [address]  [phone]          GREEN    ZONE   Good Control    What it looks like:     Things are going generally well. You have normal up s and down s. You may even feel depressed from time to time, but bad moods usually last less than a day.   What you need to do:  1. Continue to care for yourself (see self care plan)  2. Check your depression survival kit and update it as needed  3. Follow your physician s recommendations including any medication.  4. Do not stop taking medication unless you consult with your physician first.           YELLOW         ZONE Getting Worse    What it looks like:     Depression is starting to interfere with your life.     It may be hard to get out of bed; you may be starting to isolate yourself from others.    Symptoms of depression are starting to last most all day and this has happened for several days.     You may have suicidal thoughts but they are not constant.   What you need to do:     1. Call your care team, your response to treatment will improve if you keep your care team informed of your progress. Yellow periods are signs an adjustment may need to be made.     2. Continue your self-care, even if you have to fake it!    3. Talk to someone in your support network    4. Open up your depression survival kit           RED    ZONE Medical Alert - Get Help    What it looks like:     Depression is seriously interfering with your life.     You may experience these or other symptoms: You can t get out of bed most days, can t work or engage in other necessary activities, you have trouble taking care of basic hygiene, or basic responsibilities, thoughts of suicide or death that will not go away, self-injurious behavior.     What you need to do:  1. Call your care team and  request a same-day appointment. If they are not available (weekends or after hours) call your local crisis line, emergency room or 911.      Electronically signed by: DESMOND ESPITIA, December 13, 2017    Depression Self Care Plan / Survival Kit    Self-Care for Depression  Here s the deal. Your body and mind are really not as separate as most people think.  What you do and think affects how you feel and how you feel influences what you do and think. This means if you do things that people who feel good do, it will help you feel better.  Sometimes this is all it takes.  There is also a place for medication and therapy depending on how severe your depression is, so be sure to consult with your medical provider and/ or Behavioral Health Consultant if your symptoms are worsening or not improving.     In order to better manage my stress, I will:    Exercise  Get some form of exercise, every day. This will help reduce pain and release endorphins, the  feel good  chemicals in your brain. This is almost as good as taking antidepressants!  This is not the same as joining a gym and then never going! (they count on that by the way ) It can be as simple as just going for a walk or doing some gardening, anything that will get you moving.      Hygiene   Maintain good hygiene (Get out of bed in the morning, Make your bed, Brush your teeth, Take a shower, and Get dressed like you were going to work, even if you are unemployed).  If your clothes don't fit try to get ones that do.    Diet  I will strive to eat foods that are good for me, drink plenty of water, and avoid excessive sugar, caffeine, alcohol, and other mood-altering substances.  Some foods that are helpful in depression are: complex carbohydrates, B vitamins, flaxseed, fish or fish oil, fresh fruits and vegetables.    Psychotherapy  I agree to participate in Individual Therapy (if recommended).    Medication  If prescribed medications, I agree to take them.  Missing  doses can result in serious side effects.  I understand that drinking alcohol, or other illicit drug use, may cause potential side effects.  I will not stop my medication abruptly without first discussing it with my provider.    Staying Connected With Others  I will stay in touch with my friends, family members, and my primary care provider/team.    Use your imagination  Be creative.  We all have a creative side; it doesn t matter if it s oil painting, sand castles, or mud pies! This will also kick up the endorphins.    Witness Beauty  (AKA stop and smell the roses) Take a look outside, even in mid-winter. Notice colors, textures. Watch the squirrels and birds.     Service to others  Be of service to others.  There is always someone else in need.  By helping others we can  get out of ourselves  and remember the really important things.  This also provides opportunities for practicing all the other parts of the program.    Humor  Laugh and be silly!  Adjust your TV habits for less news and crime-drama and more comedy.    Control your stress  Try breathing deep, massage therapy, biofeedback, and meditation. Find time to relax each day.     My support system    Clinic Contact:  Phone number:    Contact 1:  Phone number:    Contact 2:  Phone number:    Protestant/:  Phone number:    Therapist:  Phone number:    Local crisis center:    Phone number:    Other community support:  Phone number:

## 2017-12-14 ASSESSMENT — ASTHMA QUESTIONNAIRES: ACT_TOTALSCORE: 22

## 2017-12-15 ENCOUNTER — OFFICE VISIT (OUTPATIENT)
Dept: PODIATRY | Facility: CLINIC | Age: 82
End: 2017-12-15
Payer: COMMERCIAL

## 2017-12-15 ENCOUNTER — RADIANT APPOINTMENT (OUTPATIENT)
Dept: GENERAL RADIOLOGY | Facility: CLINIC | Age: 82
End: 2017-12-15
Attending: PODIATRIST
Payer: COMMERCIAL

## 2017-12-15 VITALS — WEIGHT: 159 LBS | OXYGEN SATURATION: 98 % | BODY MASS INDEX: 30.29 KG/M2 | HEART RATE: 70 BPM

## 2017-12-15 DIAGNOSIS — M79.671 RIGHT FOOT PAIN: Primary | ICD-10-CM

## 2017-12-15 DIAGNOSIS — M79.671 RIGHT FOOT PAIN: ICD-10-CM

## 2017-12-15 DIAGNOSIS — M19.071 PRIMARY LOCALIZED OSTEOARTHROSIS OF RIGHT ANKLE AND FOOT: ICD-10-CM

## 2017-12-15 PROCEDURE — 99203 OFFICE O/P NEW LOW 30 MIN: CPT | Performed by: PODIATRIST

## 2017-12-15 PROCEDURE — 73630 X-RAY EXAM OF FOOT: CPT | Mod: RT

## 2017-12-15 NOTE — PATIENT INSTRUCTIONS
We wish you continued good healing. If you have any questions or concerns, please do not hesitate to contact us at [phone]      Please remember to call and schedule a follow up appointment if one was recommended at your earliest convenience.   PODIATRY CLINIC HOURS  TELEPHONE NUMBER    Dr. Carlos Carlson D.P.M [facility]    Clinics:  [facility]        Stefany Gasca MA  Medical Assistant  Tuesday 1PM-6PM  [facility]  Wednesday 7AM-2PM  [place]  Thursday 10AM-6PM  [place] Friday 7AM-345PM  [place]  Specialty schedulers:   [phone] to make an appointment with any Specialty Provider.        Urgent Care locations:    [facility] Monday-Friday 5 pm - 9 pm. Saturday-Sunday 9 am -5pm    Monday-Friday 11 am - 9 pm Saturday 9 am - 5 pm     Monday-Sunday 12 noon-8PM [phone]     If you need a medication refill, please contact us you may need lab work and/or a follow up visit prior to your refill (i.e. Antifungal medications).    [email or web address] (secure e-mail communication and access to your chart) to send a message or to make an appointment.    If MRI needed please call Tyrese Lopez at [phone]        Weight management plan: Patient was referred to their PCP to discuss a diet and exercise plan.

## 2017-12-15 NOTE — PROGRESS NOTES
S:  Patient seen in consult from Dr. Jha and complains of right foot pain.  Points to dorsum of right lateral tarsometatarsal and midtarsal joint.  Has had this for 1 years.  Describes it as a burning pain.  Aggrevated by activity and relieved by rest.  Positive history of post static dyskinesia.  Pain same if wearing good supportive shoes or not.      ROS:  A 10-point review of systems was performed and is positive for that noted in the HPI and as seen below.  All other areas are negative.        Allergies   Allergen Reactions     Ace Inhibitors Cough     Diclofenac Other (See Comments)     Balance problems       Current Outpatient Prescriptions   Medication Sig Dispense Refill     Vortioxetine HBr (TRINTELLIX PO)        CRESTOR 40 MG tablet TAKE 1 TABLET (40 MG) BY MOUTH DAILY 30 tablet 1     hydrochlorothiazide (HYDRODIURIL) 25 MG tablet TAKE 1 TABLET (25 MG) BY MOUTH DAILY 30 tablet 1     mometasone-formoterol (DULERA) 200-5 MCG/ACT oral inhaler Inhale 2 puffs into the lungs 2 times daily 39 g 11     metoprolol (LOPRESSOR) 50 MG tablet One in the morning and 1/2 in the evening 135 tablet 4     verapamil (CALAN-SR) 240 MG CR tablet Take 1 tablet (240 mg) by mouth daily 90 tablet 4     losartan (COZAAR) 100 MG tablet Take 1 tablet (100 mg) by mouth daily 90 tablet 4     Azelastine HCl (ASTEPRO) 0.15 % SOLN Spray 1 spray into both nostrils daily 30 mL 11     FLUoxetine 20 MG tablet Take 20 mg by mouth daily       levothyroxine (SYNTHROID) 50 MCG tablet Take 1 tablet by mouth daily.       aspirin 81 MG tablet Take 1 tablet by mouth daily.       Omega-3 Fatty Acids (FISH OIL PO) Take 1 tablet by mouth daily.       Multiple Vitamin (MULTI-VITAMIN) per tablet Take 1 tablet by mouth daily.       Calcium Citrate (CITRACAL OR) Take 1 tablet by mouth daily.       LAMOTRIGINE 200 MG PO TABS 1 TABLET TWICE DAILY       desvenlafaxine (PRISTIQ) 50 MG 24 hr tablet Take 100 mg by mouth daily        CHOLECALCIFEROL 21897  UNIT PO CAPS 1 tablet weekly         Patient Active Problem List   Diagnosis     CAD (coronary artery disease)     Hyperlipidemia with target LDL less than 100     Mild major depression (H)     Pulmonary hypertension     Mild persistent asthma     Seasonal allergic rhinitis     Mitral insufficiency     Tricuspid insufficiency     Bipolar disorder (H)     Renal insufficiency     Tremors     Advanced directives, counseling/discussion     Family history of diabetes mellitus     Family history of celiac disease     Celiac disease     History of colonic polyps     Benign essential hypertension     Hypothyroidism, unspecified type       Past Medical History:   Diagnosis Date     Aortic valvar stenosis 7/2010    mild     Asthma      Bipolar disorder (H)      CAD (coronary artery disease)     s/p angioplasty     Celiac disease      Colon polyps      Colon polyps 2012    every 3 year colonoscopy      Depression      High cholesterol      HTN      Mitral insufficiency      Pulmonary HTN     mild     Tremors 7/10    drug induced from antidepressants     Tricuspid insufficiency        Past Surgical History:   Procedure Laterality Date     ANGIOGRAM  6/26/2009     ANGIOPLASTY  9/96    for angina     ANGIOPLASTY  8/03 - 9/03    X -2 - with stenst in coronary car.     APPENDECTOMY       BREAST BIOPSY, RT/LT      Breat Biopsy RT/LT, benign     BUNIONECTOMY  11/8/2011    Procedure:BUNIONECTOMY; Left donna bunionectomy; Surgeon:FREDY LITTLEJOHN; Location:[facility] OR     BUNIONECTOMY RT/LT  5/2007    right bunion and right 2nd hammertoe     C ANESTH,BLEPHAROPLASTY      (R) for drooping eyelid     C APPENDECTOMY       CATARACT IOL, RT/LT  6/12 and 7/12    bilateral     COLONOSCOPY  2007, 2012    every 3 years for polyps     JOINT REPLACEMENT, HIP RT/LT  4/2008    right hip replaced     JOINT REPLACEMENT, HIP RT/LT  4/2009    left hip replaced     REPAIR HAMMER TOE  11/8/2011    Procedure:REPAIR HAMMER TOE; left 2nd hammertoe repair;  Surgeon:FREDY LITTLEJOHN; Location:[facility] OR     SURGICAL HISTORY OF -   11/11    left bunion and 2nd hammertoe repair     TUBAL LIGATION         Family History   Problem Relation Age of Onset     Asthma Mother      CEREBROVASCULAR DISEASE Mother      Arthritis Mother      Depression Mother      Lipids Sister      Hypertension Sister      HEART DISEASE Sister      Lipids Sister      Hypertension Sister      Lipids Sister      Breast Cancer Sister        Social History   Substance Use Topics     Smoking status: Never Smoker     Smokeless tobacco: Never Used     Alcohol use No         O: Pulse 70  Wt 159 lb (72.1 kg)  SpO2 98%  BMI 30.29 kg/m2.      Constitutional/ general:  Pt is in no apparent distress, appears well-nourished.  Cooperative with history and physical exam.     Psych:  The patient answered questions appropriately.  Normal affect.  Seems to have reasonable expectations, in terms of treatment.     Eyes:  Visual scanning/ tracking without deficit.     Ears:  Response to auditory stimuli is normal.  negative hearing aid devices.  Auricles in proper alignment.     Lymphatic:  Popliteal lymph nodes not enlarged.     Lungs:  Non labored breathing, non labored speech. No cough.  No audible wheezing. Even, quiet breathing.       Vascular:  positive pedal pulses bilaterally for both the DP and PT arteries.  CFT < 3 sec.  positive ankle edema.  positive pedal hair growth.    Neuro:  Alert and oriented x 3. Coordinated gait.  Light touch sensation is intact to the L4, L5, S1 distributions. No obvious deficits.  No evidence of neurological-based weakness, spasticity, or contracture in the lower extremities.      Derm: skin thin, shiny atrophic with scant hair growth.     Musculoskeletal:  Pronated arch with weightbearing left>R.  No forefoot or rear foot deformities noted.  MS 5/5 all compartments.  Normal ROM all fore foot and rearfoot joints.  No equinus.  Pain with palpation and ROM of the right lateral  tarsometatarsal and midtarsal joint.  No pain with stressing any muscle compartments.  No erythema or ecchymosis or masses noted.  Xrays show decreased joint and subchondral sclerosis of 2/3/4/5 tarsometatarsal joint, TN joint, and CC joint.  There is dorsal bossing.    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    P:  X-rays taken today.  Explained to patient she has arthritis in numerous rearfoot joints.   Discussed importance of wearing a good stiff shoe at all times to decrease symptoms.  Patient will get good house shoes.  Avoid activities that bother this.  unfortunately injection will not help since arthritis so diffuse.  Discussed orthotics but she declines.  Will try tylenol for pain.  RETURN TO CLINIC PRN.  Thank you for allowing me participate in the care of this patient.        Carlos Carlson DPM, FACFAS

## 2017-12-15 NOTE — LETTER
12/15/2017         RE: Kamryn Miller  [address]   [address]  [place] [address]        Dear Colleague,    Thank you for referring your patient, Kamryn Miller, to the [facility]. Please see a copy of my visit note below.    S:  Patient seen in consult from Dr. Jha and complains of right foot pain.  Points to dorsum of right lateral tarsometatarsal and midtarsal joint.  Has had this for 1 years.  Describes it as a burning pain.  Aggrevated by activity and relieved by rest.  Positive history of post static dyskinesia.  Pain same if wearing good supportive shoes or not.      ROS:  A 10-point review of systems was performed and is positive for that noted in the HPI and as seen below.  All other areas are negative.        Allergies   Allergen Reactions     Ace Inhibitors Cough     Diclofenac Other (See Comments)     Balance problems       Current Outpatient Prescriptions   Medication Sig Dispense Refill     Vortioxetine HBr (TRINTELLIX PO)        CRESTOR 40 MG tablet TAKE 1 TABLET (40 MG) BY MOUTH DAILY 30 tablet 1     hydrochlorothiazide (HYDRODIURIL) 25 MG tablet TAKE 1 TABLET (25 MG) BY MOUTH DAILY 30 tablet 1     mometasone-formoterol (DULERA) 200-5 MCG/ACT oral inhaler Inhale 2 puffs into the lungs 2 times daily 39 g 11     metoprolol (LOPRESSOR) 50 MG tablet One in the morning and 1/2 in the evening 135 tablet 4     verapamil (CALAN-SR) 240 MG CR tablet Take 1 tablet (240 mg) by mouth daily 90 tablet 4     losartan (COZAAR) 100 MG tablet Take 1 tablet (100 mg) by mouth daily 90 tablet 4     Azelastine HCl (ASTEPRO) 0.15 % SOLN Spray 1 spray into both nostrils daily 30 mL 11     FLUoxetine 20 MG tablet Take 20 mg by mouth daily       levothyroxine (SYNTHROID) 50 MCG tablet Take 1 tablet by mouth daily.       aspirin 81 MG tablet Take 1 tablet by mouth daily.       Omega-3 Fatty Acids (FISH OIL PO) Take 1 tablet by mouth daily.       Multiple Vitamin (MULTI-VITAMIN) per  tablet Take 1 tablet by mouth daily.       Calcium Citrate (CITRACAL OR) Take 1 tablet by mouth daily.       LAMOTRIGINE 200 MG PO TABS 1 TABLET TWICE DAILY       desvenlafaxine (PRISTIQ) 50 MG 24 hr tablet Take 100 mg by mouth daily        CHOLECALCIFEROL 47743 UNIT PO CAPS 1 tablet weekly         Patient Active Problem List   Diagnosis     CAD (coronary artery disease)     Hyperlipidemia with target LDL less than 100     Mild major depression (H)     Pulmonary hypertension     Mild persistent asthma     Seasonal allergic rhinitis     Mitral insufficiency     Tricuspid insufficiency     Bipolar disorder (H)     Renal insufficiency     Tremors     Advanced directives, counseling/discussion     Family history of diabetes mellitus     Family history of celiac disease     Celiac disease     History of colonic polyps     Benign essential hypertension     Hypothyroidism, unspecified type       Past Medical History:   Diagnosis Date     Aortic valvar stenosis 7/2010    mild     Asthma      Bipolar disorder (H)      CAD (coronary artery disease)     s/p angioplasty     Celiac disease      Colon polyps      Colon polyps 2012    every 3 year colonoscopy      Depression      High cholesterol      HTN      Mitral insufficiency      Pulmonary HTN     mild     Tremors 7/10    drug induced from antidepressants     Tricuspid insufficiency        Past Surgical History:   Procedure Laterality Date     ANGIOGRAM  6/26/2009     ANGIOPLASTY  9/96    for angina     ANGIOPLASTY  8/03 - 9/03    X -2 - with stenst in coronary car.     APPENDECTOMY       BREAST BIOPSY, RT/LT      Breat Biopsy RT/LT, benign     BUNIONECTOMY  11/8/2011    Procedure:BUNIONECTOMY; Left donna bunionectomy; Surgeon:FREDY LITTLEJOHN; Location:[facility]     BUNIONECTOMY RT/LT  5/2007    right bunion and right 2nd hammertoe     C ANESTH,BLEPHAROPLASTY      (R) for drooping eyelid     C APPENDECTOMY       CATARACT IOL, RT/LT  6/12 and 7/12    bilateral     COLONOSCOPY   2007, 2012    every 3 years for polyps     JOINT REPLACEMENT, HIP RT/LT  4/2008    right hip replaced     JOINT REPLACEMENT, HIP RT/LT  4/2009    left hip replaced     REPAIR HAMMER TOE  11/8/2011    Procedure:REPAIR HAMMER TOE; left 2nd hammertoe repair; Surgeon:FREDY LITTLEJOHN; Location:[facility] OR     SURGICAL HISTORY OF -   11/11    left bunion and 2nd hammertoe repair     TUBAL LIGATION         Family History   Problem Relation Age of Onset     Asthma Mother      CEREBROVASCULAR DISEASE Mother      Arthritis Mother      Depression Mother      Lipids Sister      Hypertension Sister      HEART DISEASE Sister      Lipids Sister      Hypertension Sister      Lipids Sister      Breast Cancer Sister        Social History   Substance Use Topics     Smoking status: Never Smoker     Smokeless tobacco: Never Used     Alcohol use No         O: Pulse 70  Wt 159 lb (72.1 kg)  SpO2 98%  BMI 30.29 kg/m2.      Constitutional/ general:  Pt is in no apparent distress, appears well-nourished.  Cooperative with history and physical exam.     Psych:  The patient answered questions appropriately.  Normal affect.  Seems to have reasonable expectations, in terms of treatment.     Eyes:  Visual scanning/ tracking without deficit.     Ears:  Response to auditory stimuli is normal.  negative hearing aid devices.  Auricles in proper alignment.     Lymphatic:  Popliteal lymph nodes not enlarged.     Lungs:  Non labored breathing, non labored speech. No cough.  No audible wheezing. Even, quiet breathing.       Vascular:  positive pedal pulses bilaterally for both the DP and PT arteries.  CFT < 3 sec.  positive ankle edema.  positive pedal hair growth.    Neuro:  Alert and oriented x 3. Coordinated gait.  Light touch sensation is intact to the L4, L5, S1 distributions. No obvious deficits.  No evidence of neurological-based weakness, spasticity, or contracture in the lower extremities.      Derm: skin thin, shiny atrophic with scant hair  growth.     Musculoskeletal:  Pronated arch with weightbearing left>R.  No forefoot or rear foot deformities noted.  MS 5/5 all compartments.  Normal ROM all fore foot and rearfoot joints.  No equinus.  Pain with palpation and ROM of the right lateral tarsometatarsal and midtarsal joint.  No pain with stressing any muscle compartments.  No erythema or ecchymosis or masses noted.  Xrays show decreased joint and subchondral sclerosis of 2/3/4/5 tarsometatarsal joint, TN joint, and CC joint.  There is dorsal bossing.    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    P:  X-rays taken today.  Explained to patient she has arthritis in numerous rearfoot joints.   Discussed importance of wearing a good stiff shoe at all times to decrease symptoms.  Patient will get good house shoes.  Avoid activities that bother this.  unfortunately injection will not help since arthritis so diffuse.  Discussed orthotics but she declines.  Will try tylenol for pain.  RETURN TO CLINIC PRN.  Thank you for allowing me participate in the care of this patient.        Carlos Carlson DPM, FACFAS         Again, thank you for allowing me to participate in the care of your patient.        Sincerely,        Carlos Carlson DPM

## 2017-12-15 NOTE — MR AVS SNAPSHOT
After Visit Summary   12/15/2017    Kamryn Miller    MRN: [id number]           Patient Information     Date Of Birth          1935        Visit Information        Provider Department      12/15/2017 9:15 AM Carlos Carlson DPM [facility]        Care Instructions    We wish you continued good healing. If you have any questions or concerns, please do not hesitate to contact us at [phone]      Please remember to call and schedule a follow up appointment if one was recommended at your earliest convenience.   PODIATRY CLINIC HOURS  TELEPHONE NUMBER    Dr. Carlos WILLIAMPSOLOMON FAC FAS    Clinics:  [facility]        Stefany Gasca MA  Medical Assistant  Tuesday 1PM-6PM  [facility]  Wednesday 7AM-2PM  [place]  Thursday 10AM-6PM  [place] Friday 7AM-345PM  [place]  Specialty schedulers:   [phone] to make an appointment with any Specialty Provider.        Urgent Care locations:    [facility] Monday-Friday 5 pm - 9 pm. Saturday-Sunday 9 am -5pm    Monday-Friday 11 am - 9 pm Saturday 9 am - 5 pm     Monday-Sunday 12 noon-8PM [phone]     If you need a medication refill, please contact us you may need lab work and/or a follow up visit prior to your refill (i.e. Antifungal medications).    [place] [facility] (secure e-mail communication and access to your chart) to send a message or to make an appointment.    If MRI needed please call Tyrese Lopez at [phone]        Weight management plan: Patient was referred to their PCP to discuss a diet and exercise plan.            Follow-ups after your visit        Your next 10 appointments already scheduled     Dec 15, 2017  9:15 AM CST   New Visit with Carlos Carlson DPM   [facility] ([facility])    [address] [place] [address]  "[facility] [id number] [phone]              Who to contact     If you have questions or need follow up information about today's clinic visit or your schedule please contact [facility] directly at [phone].  Normal or non-critical lab and imaging results will be communicated to you by MyChart, letter or phone within 4 business days after the clinic has received the results. If you do not hear from us within 7 days, please contact the clinic through MyChart or phone. If you have a critical or abnormal lab result, we will notify you by phone as soon as possible.  Submit refill requests through [employer] or call your pharmacy and they will forward the refill request to us. Please allow 3 business days for your refill to be completed.          Additional Information About Your Visit        [employer] Information     [employer] lets you send messages to your doctor, view your test results, renew your prescriptions, schedule appointments and more. To sign up, go to [email or web address] . Click on \"Log in\" on the left side of the screen, which will take you to the Welcome page. Then click on \"Sign up Now\" on the right side of the page.     You will be asked to enter the access code listed below, as well as some personal information. Please follow the directions to create your username and password.     Your access code is: [email or web address]  Expires: 3/13/2018  3:19 PM     Your access code will  in 90 days. If you need help or a new code, please call your [facility] or [phone].        Care EveryWhere ID     This is your Care EveryWhere ID. This could be used by other organizations to access your [place] medical records  [id number]        Your Vitals Were     Pulse Pulse Oximetry BMI (Body Mass Index)             70 98% 30.29 kg/m2          Blood Pressure from Last 3 Encounters:   17 126/70   17 136/86   16 128/72    Weight from Last 3 Encounters:   12/15/17 159 lb " (72.1 kg)   12/13/17 159 lb 8 oz (72.3 kg)   07/14/17 161 lb 3.2 oz (73.1 kg)              Today, you had the following     No orders found for display       Primary Care Provider Office Phone # Fax #    Kamryn Jha [phone]       [address] [address]        Equal Access to Services     TAQUERIA OBANDO : Hadii aad ku hadasho Soomaali, waaxda luqadaha, qaybta kaalmada adeegyada, waxay idiin hayaan adeeg kharash la'aan . So [facility] [phone].    ATENCIÓN: Si habla español, tiene a corcoran disposición servicios gratuitos de asistencia lingüística. [facility] [phone].    We comply with applicable federal civil rights laws and [place] laws. We do not discriminate on the basis of race, color, national origin, age, disability, sex, sexual orientation, or gender identity.            Thank you!     Thank you for choosing [facility]  for your care. Our goal is always to provide you with excellent care. Hearing back from our patients is one way we can continue to improve our services. Please take a few minutes to complete the written survey that you may receive in the mail after your visit with us. Thank you!             Your Updated Medication List - Protect others around you: Learn how to safely use, store and throw away your medicines at www.disposemymeds.org.          This list is accurate as of: 12/15/17  9:14 AM.  Always use your most recent med list.                   Brand Name Dispense Instructions for use Diagnosis    aspirin 81 MG tablet      Take 1 tablet by mouth daily.        Azelastine HCl 0.15 % Soln    ASTEPRO    30 mL    Spray 1 spray into both nostrils daily    Seasonal allergic rhinitis, unspecified allergic rhinitis trigger       cholecalciferol 22074 UNITS capsule    VITAMIN D3     1 tablet weekly        CITRACAL OR      Take 1 tablet by mouth daily.        CRESTOR 40 MG tablet   Generic drug:   rosuvastatin     30 tablet    TAKE 1 TABLET (40 MG) BY MOUTH DAILY    Hyperlipidemia with target LDL less than 100       FISH OIL PO      Take 1 tablet by mouth daily.        FLUoxetine 20 MG tablet      Take 20 mg by mouth daily        hydrochlorothiazide 25 MG tablet    HYDRODIURIL    30 tablet    TAKE 1 TABLET (25 MG) BY MOUTH DAILY    Benign essential hypertension       lamoTRIgine 200 MG tablet    LaMICtal     1 TABLET TWICE DAILY        losartan 100 MG tablet    COZAAR    90 tablet    Take 1 tablet (100 mg) by mouth daily    Benign essential hypertension       metoprolol 50 MG tablet    LOPRESSOR    135 tablet    One in the morning and 1/2 in the evening    Benign essential hypertension       mometasone-formoterol 200-5 MCG/ACT oral inhaler    DULERA    39 g    Inhale 2 puffs into the lungs 2 times daily    Mild persistent asthma without complication       Multi-vitamin Tabs tablet   Generic drug:  multivitamin, therapeutic with minerals      Take 1 tablet by mouth daily.        PRISTIQ 50 MG 24 hr tablet   Generic drug:  desvenlafaxine succinate      Take 100 mg by mouth daily        SYNTHROID 50 MCG tablet   Generic drug:  levothyroxine      Take 1 tablet by mouth daily.        TRINTELLIX PO           verapamil 240 MG CR tablet    CALAN-SR    90 tablet    Take 1 tablet (240 mg) by mouth daily    Benign essential hypertension

## 2017-12-15 NOTE — NURSING NOTE
"Chief Complaint   Patient presents with     Foot Pain     right foot     Plantar Wart     left foot       Initial Pulse 70  Wt 159 lb (72.1 kg)  SpO2 98%  BMI 30.29 kg/m2 Estimated body mass index is 30.29 kg/(m^2) as calculated from the following:    Height as of 12/13/17: 5' 0.75\" (1.543 m).    Weight as of this encounter: 159 lb (72.1 kg).  Medication Reconciliation: complete  "

## 2017-12-21 ENCOUNTER — TELEPHONE (OUTPATIENT)
Dept: FAMILY MEDICINE | Facility: CLINIC | Age: 82
End: 2017-12-21

## 2017-12-21 ASSESSMENT — PATIENT HEALTH QUESTIONNAIRE - PHQ9: SUM OF ALL RESPONSES TO PHQ QUESTIONS 1-9: 0

## 2017-12-21 NOTE — TELEPHONE ENCOUNTER
Called and spoke with patient. Completed PHQ9 over the phone per requested verbally by Dr. Jha.     PHQ-9 SCORE 12/21/2017   Total Score -   Total Score 0     Janessa Herrera MA

## 2018-01-24 DIAGNOSIS — J30.2 SEASONAL ALLERGIC RHINITIS, UNSPECIFIED CHRONICITY, UNSPECIFIED TRIGGER: Primary | ICD-10-CM

## 2018-01-24 DIAGNOSIS — J45.30 MILD PERSISTENT ASTHMA WITHOUT COMPLICATION: ICD-10-CM

## 2018-01-24 DIAGNOSIS — I10 BENIGN ESSENTIAL HYPERTENSION: ICD-10-CM

## 2018-01-24 DIAGNOSIS — E78.5 HYPERLIPIDEMIA WITH TARGET LDL LESS THAN 100: ICD-10-CM

## 2018-01-24 NOTE — TELEPHONE ENCOUNTER
Reason for call:  Medication   If this is a refill request, has the caller requested the refill from the pharmacy already? Yes  Will the patient be using a [facility]? No  Name of the pharmacy and phone number for the current request: [facility]/pharmacy #[id number] - [place], [place] - [address] AT [address]    Name of the medication requested: Please contact to confirm which Rx's are needed.    Other request: Refills in [place].    Phone number to reach patient:  Cell number on file:    Telephone Information:   Mobile [phone]       Best Time:  ASP    Can we leave a detailed message on this number?  YES

## 2018-01-25 RX ORDER — AZELASTINE HCL 205.5 UG/1
1 SPRAY NASAL DAILY
Qty: 30 ML | Refills: 1 | Status: SHIPPED | OUTPATIENT
Start: 2018-01-25 | End: 2018-05-03

## 2018-01-25 RX ORDER — METOPROLOL TARTRATE 50 MG
TABLET ORAL
Qty: 135 TABLET | Refills: 1 | Status: SHIPPED | OUTPATIENT
Start: 2018-01-25 | End: 2018-05-03

## 2018-01-25 RX ORDER — VERAPAMIL HYDROCHLORIDE 240 MG/1
240 TABLET, FILM COATED, EXTENDED RELEASE ORAL DAILY
Qty: 90 TABLET | Refills: 4 | Status: SHIPPED | OUTPATIENT
Start: 2018-01-25 | End: 2018-05-03

## 2018-01-25 RX ORDER — LOSARTAN POTASSIUM 100 MG/1
100 TABLET ORAL DAILY
Qty: 90 TABLET | Refills: 4 | Status: SHIPPED | OUTPATIENT
Start: 2018-01-25 | End: 2018-05-03

## 2018-01-25 RX ORDER — ROSUVASTATIN CALCIUM 40 MG/1
TABLET, COATED ORAL
Qty: 90 TABLET | Refills: 3 | Status: SHIPPED | OUTPATIENT
Start: 2018-01-25 | End: 2018-05-03

## 2018-01-25 NOTE — TELEPHONE ENCOUNTER
Called and spoke to patient. Patient will be out of medications and she is in [place] and will not be returning to [place] until April. Patient needs refills on: Crestor, Dulera, Metoprolol, Verapamil, Losartan, Azelastine.    Janessa Herrera MA

## 2018-01-25 NOTE — TELEPHONE ENCOUNTER
"Routing refill request to provider for review/approval because:  Earlene given for crestor x1 and patient did not follow up, please advise  Failed [facility] refill protocol, see below:        Requested Prescriptions   Pending Prescriptions Disp Refills     rosuvastatin (CRESTOR) 40 MG tablet 30 tablet 1    Statins Protocol Failed    1/25/2018 12:27 PM       Failed - LDL on file in past 12 months    Recent Labs   Lab Test  12/22/16   1236   LDL  102*            Passed - No abnormal creatine kinase in past 12 months    No lab results found.         Passed - Recent or future visit with authorizing provider    Patient had office visit in the last year or has a visit in the next 30 days with authorizing provider.  See \"Patient Info\" tab in inbasket, or \"Choose Columns\" in Meds & Orders section of the refill encounter.            Passed - Patient is age 18 or older       Passed - No active pregnancy on record       Passed - No positive pregnancy test in past 12 months        verapamil (CALAN-SR) 240 MG CR tablet 90 tablet 4     Sig: Take 1 tablet (240 mg) by mouth daily    Calcium Channel Blockers Protocol  Failed    1/25/2018 12:27 PM       Failed - Normal serum creatinine on file in past 12 months    Recent Labs   Lab Test  07/14/17   1148   CR  1.50*            Passed - Blood pressure under 140/90    BP Readings from Last 3 Encounters:   12/13/17 126/70   07/14/17 136/86   12/22/16 128/72                Passed - Normal ALT in past 12 months    Recent Labs   Lab Test  07/14/17   1148   ALT  34            Passed - Recent or future visit with authorizing provider    Patient had office visit in the last year or has a visit in the next 30 days with authorizing provider.  See \"Patient Info\" tab in inbasket, or \"Choose Columns\" in Meds & Orders section of the refill encounter.            Passed - Patient is age 18 or older       Passed - No active pregnancy on record       Passed - No positive pregnancy test in past 12 months        " "losartan (COZAAR) 100 MG tablet 90 tablet 4     Sig: Take 1 tablet (100 mg) by mouth daily    Angiotensin-II Receptors Failed    1/25/2018 12:27 PM       Failed - Normal serum creatinine on file in past 12 months    Recent Labs   Lab Test  07/14/17   1148   CR  1.50*            Passed - Blood pressure under 140/90 in past 12 months.    BP Readings from Last 3 Encounters:   12/13/17 126/70   07/14/17 136/86   12/22/16 128/72                Passed - Recent or future visit with authorizing provider's specialty    Patient had office visit in the last year or has a visit in the next 30 days with authorizing provider.  See \"Patient Info\" tab in inbasket, or \"Choose Columns\" in Meds & Orders section of the refill encounter.            Passed - Patient is age 18 or older       Passed - No active pregnancy on record       Passed - Normal serum potassium on file in past 12 months    Recent Labs   Lab Test  07/14/17   1148   POTASSIUM  3.9                   Passed - No positive pregnancy test in past 12 months     Haley Mills RN - BC      "

## 2018-04-23 DIAGNOSIS — I10 BENIGN ESSENTIAL HYPERTENSION: ICD-10-CM

## 2018-04-25 RX ORDER — HYDROCHLOROTHIAZIDE 25 MG/1
TABLET ORAL
Qty: 90 TABLET | Refills: 1 | Status: SHIPPED | OUTPATIENT
Start: 2018-04-25 | End: 2018-05-03 | Stop reason: DRUGHIGH

## 2018-04-25 NOTE — TELEPHONE ENCOUNTER
"Routing refill request to provider for review/approval because:  Labs out of range:        Requested Prescriptions   Pending Prescriptions Disp Refills     hydrochlorothiazide (HYDRODIURIL) 25 MG tablet [Pharmacy Med Name: HYDROCHLOROTHIAZIDE 25 MG TAB]  Last Written Prescription Date:  1/17/18  Last Fill Quantity: 90,  # refills: 0   Last office visit: 12/13/2017 with prescribing provider:     Future Office Visit:     30 tablet 1     Sig: TAKE 1 TABLET (25 MG) BY MOUTH DAILY *NEED APPT FOR FURTHER REFILLS*    Diuretics (Including Combos) Protocol Failed    4/23/2018  3:25 PM       Failed - Normal serum creatinine on file in past 12 months    Recent Labs   Lab Test  07/14/17   1148   CR  1.50*             Passed - Blood pressure under 140/90 in past 12 months    BP Readings from Last 3 Encounters:   12/13/17 126/70   07/14/17 136/86   12/22/16 128/72                Passed - Recent (12 mo) or future (30 days) visit within the authorizing provider's specialty    Patient had office visit in the last 12 months or has a visit in the next 30 days with authorizing provider or within the authorizing provider's specialty.  See \"Patient Info\" tab in inbasket, or \"Choose Columns\" in Meds & Orders section of the refill encounter.           Passed - Patient is age 18 or older       Passed - No active pregancy on record       Passed - Normal serum potassium on file in past 12 months    Recent Labs   Lab Test  07/14/17   1148   POTASSIUM  3.9                   Passed - Normal serum sodium on file in past 12 months    Recent Labs   Lab Test  07/14/17   1148   NA  139             Passed - No positive pregnancy test in past 12 months        Haley Mills RN - BC      "

## 2018-04-30 NOTE — PATIENT INSTRUCTIONS
Preventive Health Recommendations  Female Ages 65 +    Yearly exam:     See your health care provider every year in order to  o Review health changes.   o Discuss preventive care.    o Review your medicines if your doctor has prescribed any.      You no longer need a yearly Pap test unless you've had an abnormal Pap test in the past 10 years. If you have vaginal symptoms, such as bleeding or discharge, be sure to talk with your provider about a Pap test.      Every 1 to 2 years, have a mammogram.  If you are over 69, talk with your health care provider about whether or not you want to continue having screening mammograms.      Every 10 years, have a colonoscopy. Or, have a yearly FIT test (stool test). These exams will check for colon cancer.       Have a cholesterol test every 5 years, or more often if your doctor advises it.       Have a diabetes test (fasting glucose) every three years. If you are at risk for diabetes, you should have this test more often.       At age 65, have a bone density scan (DEXA) to check for osteoporosis (brittle bone disease).    Shots:    Get a flu shot each year.    Get a tetanus shot every 10 years.    Talk to your doctor about your pneumonia vaccines. There are now two you should receive - Pneumovax (PPSV 23) and Prevnar (PCV 13).    Talk to your doctor about the shingles vaccine.    Talk to your doctor about the hepatitis B vaccine.    Nutrition:     Eat at least 5 servings of fruits and vegetables each day.      Eat whole-grain bread, whole-wheat pasta and brown rice instead of white grains and rice.      Talk to your provider about Calcium and Vitamin D.     Lifestyle    Exercise at least 150 minutes a week (30 minutes a day, 5 days a week). This will help you control your weight and prevent disease.      Limit alcohol to one drink per day.      No smoking.       Wear sunscreen to prevent skin cancer.       See your dentist twice a year for an exam and cleaning.      See your  eye doctor every 1 to 2 years to screen for conditions such as glaucoma, macular degeneration, cataracts, etc     Preventive Health Recommendations    Female Ages 65 +    Yearly exam:     See your health care provider every year in order to  o Review health changes.   o Discuss preventive care.    o Review your medicines if your doctor has prescribed any.      You no longer need a yearly Pap test unless you've had an abnormal Pap test in the past 10 years. If you have vaginal symptoms, such as bleeding or discharge, be sure to talk with your provider about a Pap test.      Every 1 to 2 years, have a mammogram.  If you are over 69, talk with your health care provider about whether or not you want to continue having screening mammograms.      Every 10 years, have a colonoscopy. Or, have a yearly FIT test (stool test). These exams will check for colon cancer.       Have a cholesterol test every 5 years, or more often if your doctor advises it.       Have a diabetes test (fasting glucose) every three years. If you are at risk for diabetes, you should have this test more often.       At age 65, have a bone density scan (DEXA) to check for osteoporosis (brittle bone disease).    Shots:    Get a flu shot each year.    Get a tetanus shot every 10 years.    Talk to your doctor about your pneumonia vaccines. There are now two you should receive - Pneumovax (PPSV 23) and Prevnar (PCV 13).    Talk to your doctor about the shingles vaccine.    Talk to your doctor about the hepatitis B vaccine.    Nutrition:     Eat at least 5 servings of fruits and vegetables each day.      Eat whole-grain bread, whole-wheat pasta and brown rice instead of white grains and rice.      Talk to your provider about Calcium and Vitamin D.     Lifestyle    Exercise at least 150 minutes a week (30 minutes a day, 5 days a week). This will help you control your weight and prevent disease.      Limit alcohol to one drink per day.      No smoking.        Wear sunscreen to prevent skin cancer.       See your dentist twice a year for an exam and cleaning.      See your eye doctor every 1 to 2 years to screen for conditions such as glaucoma, macular degeneration and cataracts.    [facility]    If you have any questions regarding to your visit please contact your care team:       Team Purple:   Clinic Hours Telephone Number   Dr. Kamryn Lopez   7am-7pm  Monday - Thursday   7am-5pm  Fridays  [phone]  (Appointment scheduling available 24/7)    Questions about your recent visit?   Team Line:  [phone]   Urgent Care - [place] and [facility] - 11am-9pm Monday-Friday Saturday-Sunday- 9am-5pm   [place] - 5pm-9pm Monday-Friday Saturday-Sunday- 9am-5pm  [phone] - [place]  [phone] - [place]       What options do I have for a visit other than an office visit? We offer electronic visits (e-visits) and telephone visits, when medically appropriate.  Please check with your medical insurance to see if these types of visits are covered, as you will be responsible for any charges that are not paid by your insurance.      You can use [employer] (secure electronic communication) to access to your chart, send your primary care provider a message, or make an appointment. Ask a team member how to get started.     For a price quote for your services, please call our Consumer Price Line at [phone] or our Imaging Cost estimation line at [phone] (for imaging tests).

## 2018-05-03 ENCOUNTER — OFFICE VISIT (OUTPATIENT)
Dept: FAMILY MEDICINE | Facility: CLINIC | Age: 83
End: 2018-05-03
Payer: COMMERCIAL

## 2018-05-03 ENCOUNTER — RADIANT APPOINTMENT (OUTPATIENT)
Dept: MAMMOGRAPHY | Facility: CLINIC | Age: 83
End: 2018-05-03
Payer: COMMERCIAL

## 2018-05-03 VITALS
HEART RATE: 58 BPM | HEIGHT: 60 IN | BODY MASS INDEX: 31.02 KG/M2 | OXYGEN SATURATION: 98 % | DIASTOLIC BLOOD PRESSURE: 80 MMHG | SYSTOLIC BLOOD PRESSURE: 118 MMHG | RESPIRATION RATE: 22 BRPM | WEIGHT: 158 LBS | TEMPERATURE: 96.5 F

## 2018-05-03 DIAGNOSIS — Z12.31 VISIT FOR SCREENING MAMMOGRAM: ICD-10-CM

## 2018-05-03 DIAGNOSIS — F31.70 BIPOLAR DISORDER IN PARTIAL REMISSION, MOST RECENT EPISODE UNSPECIFIED TYPE (H): ICD-10-CM

## 2018-05-03 DIAGNOSIS — E03.9 HYPOTHYROIDISM, UNSPECIFIED TYPE: ICD-10-CM

## 2018-05-03 DIAGNOSIS — F32.0 MILD MAJOR DEPRESSION (H): ICD-10-CM

## 2018-05-03 DIAGNOSIS — I27.20 PULMONARY HYPERTENSION (H): ICD-10-CM

## 2018-05-03 DIAGNOSIS — J30.2 SEASONAL ALLERGIC RHINITIS, UNSPECIFIED CHRONICITY, UNSPECIFIED TRIGGER: ICD-10-CM

## 2018-05-03 DIAGNOSIS — Z00.00 ENCOUNTER FOR ROUTINE ADULT HEALTH EXAMINATION WITHOUT ABNORMAL FINDINGS: Primary | ICD-10-CM

## 2018-05-03 DIAGNOSIS — I10 BENIGN ESSENTIAL HYPERTENSION: ICD-10-CM

## 2018-05-03 DIAGNOSIS — N28.9 RENAL INSUFFICIENCY: ICD-10-CM

## 2018-05-03 DIAGNOSIS — J45.30 MILD PERSISTENT ASTHMA WITHOUT COMPLICATION: ICD-10-CM

## 2018-05-03 DIAGNOSIS — E78.5 HYPERLIPIDEMIA WITH TARGET LDL LESS THAN 100: ICD-10-CM

## 2018-05-03 PROCEDURE — 99397 PER PM REEVAL EST PAT 65+ YR: CPT | Performed by: FAMILY MEDICINE

## 2018-05-03 PROCEDURE — 77067 SCR MAMMO BI INCL CAD: CPT | Mod: TC

## 2018-05-03 RX ORDER — HYDROCHLOROTHIAZIDE 25 MG/1
TABLET ORAL
Qty: 90 TABLET | Refills: 4 | Status: SHIPPED | OUTPATIENT
Start: 2018-05-03 | End: 2019-06-25

## 2018-05-03 RX ORDER — METOPROLOL TARTRATE 50 MG
TABLET ORAL
Qty: 135 TABLET | Refills: 4 | Status: SHIPPED | OUTPATIENT
Start: 2018-05-03 | End: 2019-05-21

## 2018-05-03 RX ORDER — LOSARTAN POTASSIUM 100 MG/1
100 TABLET ORAL DAILY
Qty: 90 TABLET | Refills: 4 | Status: SHIPPED | OUTPATIENT
Start: 2018-05-03 | End: 2019-05-10

## 2018-05-03 RX ORDER — VERAPAMIL HYDROCHLORIDE 240 MG/1
240 TABLET, FILM COATED, EXTENDED RELEASE ORAL DAILY
Qty: 90 TABLET | Refills: 4 | Status: SHIPPED | OUTPATIENT
Start: 2018-05-03 | End: 2019-06-25

## 2018-05-03 RX ORDER — CLONAZEPAM 0.5 MG/1
TABLET ORAL
COMMUNITY
Start: 2018-01-20 | End: 2018-05-03

## 2018-05-03 RX ORDER — ROSUVASTATIN CALCIUM 40 MG/1
TABLET, COATED ORAL
Qty: 90 TABLET | Refills: 3 | Status: SHIPPED | OUTPATIENT
Start: 2018-05-03 | End: 2019-02-19

## 2018-05-03 RX ORDER — AZELASTINE HCL 205.5 UG/1
1 SPRAY NASAL DAILY
Qty: 30 ML | Refills: 4 | Status: SHIPPED | OUTPATIENT
Start: 2018-05-03 | End: 2018-11-01

## 2018-05-03 ASSESSMENT — PAIN SCALES - GENERAL: PAINLEVEL: NO PAIN (0)

## 2018-05-03 NOTE — PROGRESS NOTES
SUBJECTIVE:   Kamryn Miller is a 82 year old female who presents for Preventive Visit.  Are you in the first 12 months of your Medicare Part B coverage?  No    Healthy Habits:    Do you get at least three servings of calcium containing foods daily (dairy, green leafy vegetables, etc.)? yes    Amount of exercise or daily activities, outside of work: 3 day(s) per week    Problems taking medications regularly No    Medication side effects: No    Have you had an eye exam in the past two years? yes    Do you see a dentist twice per year? yes    Do you have sleep apnea, excessive snoring or daytime drowsiness?no      Ability to successfully perform activities of daily living: Yes, no assistance needed    Home safety:  none identified     Hearing impairment: No    Fall risk:  Fallen 2 or more times in the past year?: No  Any fall with injury in the past year?: No      COGNITIVE SCREEN  1) Repeat 3 items (Banana, Sunrise, Chair)    2) Clock draw: NORMAL  3) 3 item recall: Recalls 2 objects   Results: NORMAL clock, 1-2 items recalled: COGNITIVE IMPAIRMENT LESS LIKELY    Mini-CogTM Copyright TIMOTHY Flores. Licensed by the author for use in [facility]; reprinted with permission ([email or web address]). All rights reserved.                 Reviewed and updated as needed this visit by clinical staff  Tobacco  Allergies  Meds  Problems  Med Hx  Surg Hx  Fam Hx  Soc Hx          Reviewed and updated as needed this visit by Provider  Allergies  Meds  Problems        Social History   Substance Use Topics     Smoking status: Never Smoker     Smokeless tobacco: Never Used     Alcohol use No       If you drink alcohol do you typically have >3 drinks per day or >7 drinks per week? Not Applicable                        Today's PHQ-2 Score:   PHQ-2 ( 1999 Pfizer) 5/3/2018 12/22/2016   Q1: Little interest or pleasure in doing things 0 0   Q2: Feeling down, depressed or hopeless 0 0   PHQ-2 Score 0 0       Do you feel safe in  your environment - Yes    Do you have a Health Care Directive?: Yes: Advance Directive has been received and scanned.    Current providers sharing in care for this patient include:   Patient Care Team:  Kamryn Jha MD as PCP - General (Family Practice)    The following health maintenance items are reviewed in Epic and correct as of today:  Health Maintenance   Topic Date Due     MICROALBUMIN Q1 YEAR  08/27/2014     ASTHMA ACTION PLAN Q1 YR  12/22/2017     LIPID MONITORING Q1 YEAR  12/22/2017     ASTHMA CONTROL TEST Q6 MOS  06/13/2018     PHQ-9 Q6 MONTHS  06/21/2018     CREATININE Q1 YEAR  07/14/2018     BMP Q1 YR  07/14/2018     HEMOGLOBIN Q1 YR  07/14/2018     COLONOSCOPY Q3 YR  12/11/2018     FALL RISK ASSESSMENT  12/13/2018     DEPRESSION ACTION PLAN Q1 YR  12/13/2018     ADVANCE DIRECTIVE PLANNING Q5 YRS  10/06/2020     TETANUS IMMUNIZATION (SYSTEM ASSIGNED)  06/19/2022     DEXA SCAN SCREENING (SYSTEM ASSIGNED)  Completed     PNEUMOCOCCAL  Completed     INFLUENZA VACCINE  Completed     Labs reviewed in EPIC  BP Readings from Last 3 Encounters:   05/03/18 118/80   12/13/17 126/70   07/14/17 136/86    Wt Readings from Last 3 Encounters:   05/03/18 158 lb (71.7 kg)   12/15/17 159 lb (72.1 kg)   12/13/17 159 lb 8 oz (72.3 kg)                  Patient Active Problem List   Diagnosis     CAD (coronary artery disease)     Hyperlipidemia with target LDL less than 100     Mild major depression (H)     Pulmonary hypertension     Mild persistent asthma     Seasonal allergic rhinitis     Mitral insufficiency     Tricuspid insufficiency     Bipolar disorder (H)     Renal insufficiency     Tremors     Advanced directives, counseling/discussion     Family history of diabetes mellitus     Family history of celiac disease     Celiac disease     History of colonic polyps     Benign essential hypertension     Hypothyroidism, unspecified type     Past Surgical History:   Procedure Laterality Date     ANGIOGRAM  6/26/2009      ANGIOPLASTY  9/96    for angina     ANGIOPLASTY  8/03 - 9/03    X -2 - with stenst in coronary car.     APPENDECTOMY       BREAST BIOPSY, RT/LT      Breat Biopsy RT/LT, benign     BUNIONECTOMY  11/8/2011    Procedure:BUNIONECTOMY; Left donna bunionectomy; Surgeon:FREDY LITTLEJOHN; Location:[facility] OR     BUNIONECTOMY RT/LT  5/2007    right bunion and right 2nd hammertoe     C ANESTH,BLEPHAROPLASTY      (R) for drooping eyelid     C APPENDECTOMY       CATARACT IOL, RT/LT  6/12 and 7/12    bilateral     COLONOSCOPY  2007, 2012    every 3 years for polyps     JOINT REPLACEMENT, HIP RT/LT  4/2008    right hip replaced     JOINT REPLACEMENT, HIP RT/LT  4/2009    left hip replaced     REPAIR HAMMER TOE  11/8/2011    Procedure:REPAIR HAMMER TOE; left 2nd hammertoe repair; Surgeon:FREDY LITTLEJOHN; Location:[facility] OR     SURGICAL HISTORY OF -   11/11    left bunion and 2nd hammertoe repair     TUBAL LIGATION         Social History   Substance Use Topics     Smoking status: Never Smoker     Smokeless tobacco: Never Used     Alcohol use No     Family History   Problem Relation Age of Onset     Asthma Mother      CEREBROVASCULAR DISEASE Mother      Arthritis Mother      Depression Mother      Lipids Sister      Hypertension Sister      HEART DISEASE Sister      Lipids Sister      Hypertension Sister      Lipids Sister      Breast Cancer Sister          Current Outpatient Prescriptions   Medication Sig Dispense Refill     aspirin 81 MG tablet Take 1 tablet by mouth daily.       Azelastine HCl (ASTEPRO) 0.15 % SOLN Spray 1 spray into both nostrils daily 30 mL 4     Calcium Citrate (CITRACAL OR) Take 1 tablet by mouth daily.       CHOLECALCIFEROL 56761 UNIT PO CAPS 1 tablet weekly       desvenlafaxine (PRISTIQ) 50 MG 24 hr tablet Take 100 mg by mouth daily        FLUoxetine 20 MG tablet Take 20 mg by mouth daily       hydrochlorothiazide (HYDRODIURIL) 25 MG tablet TAKE 1 TABLET (25 MG) BY MOUTH DAILY 90 tablet 4     LAMOTRIGINE 200  MG PO TABS 1 TABLET TWICE DAILY       levothyroxine (SYNTHROID) 50 MCG tablet Take 1 tablet by mouth daily.       losartan (COZAAR) 100 MG tablet Take 1 tablet (100 mg) by mouth daily 90 tablet 4     metoprolol tartrate (LOPRESSOR) 50 MG tablet One in the morning and 1/2 in the evening 135 tablet 4     mometasone-formoterol (DULERA) 200-5 MCG/ACT oral inhaler Inhale 2 puffs into the lungs 2 times daily 39 g 4     Multiple Vitamin (MULTI-VITAMIN) per tablet Take 1 tablet by mouth daily.       Omega-3 Fatty Acids (FISH OIL PO) Take 1 tablet by mouth daily.       rosuvastatin (CRESTOR) 40 MG tablet TAKE 1 TABLET (40 MG) BY MOUTH DAILY generic rosuvastatin please 90 tablet 3     verapamil (CALAN-SR) 240 MG CR tablet Take 1 tablet (240 mg) by mouth daily 90 tablet 4     Vortioxetine HBr (TRINTELLIX PO)        [DISCONTINUED] hydrochlorothiazide (HYDRODIURIL) 25 MG tablet TAKE 1 TABLET (25 MG) BY MOUTH DAILY 90 tablet 0     [DISCONTINUED] hydrochlorothiazide (HYDRODIURIL) 25 MG tablet TAKE 1 TABLET (25 MG) BY MOUTH DAILY *NEED APPT FOR FURTHER REFILLS* (Patient not taking: Reported on 5/3/2018) 90 tablet 1     [DISCONTINUED] losartan (COZAAR) 100 MG tablet Take 1 tablet (100 mg) by mouth daily 90 tablet 4     [DISCONTINUED] metoprolol tartrate (LOPRESSOR) 50 MG tablet One in the morning and 1/2 in the evening 135 tablet 1     [DISCONTINUED] mometasone-formoterol (DULERA) 200-5 MCG/ACT oral inhaler Inhale 2 puffs into the lungs 2 times daily 39 g 1     [DISCONTINUED] rosuvastatin (CRESTOR) 40 MG tablet TAKE 1 TABLET (40 MG) BY MOUTH DAILY 90 tablet 3     [DISCONTINUED] verapamil (CALAN-SR) 240 MG CR tablet Take 1 tablet (240 mg) by mouth daily 90 tablet 4     Allergies   Allergen Reactions     Ace Inhibitors Cough     Diclofenac Other (See Comments)     Balance problems          Immunization History   Administered Date(s) Administered     HEPA 01/05/1996, 09/26/1996     Influenza (High Dose) 3 valent vaccine 09/24/2012,  "10/03/2016, 11/13/2017     Influenza (IIV3) PF 10/20/2010     Pneumo Conj 13-V (2010&after) 09/22/2015     Pneumococcal 23 valent 02/26/2001, 07/01/2010     TD (ADULT, 7+) 03/04/2004     TDAP Vaccine (Adacel) 06/19/2012     Zoster vaccine, live 05/17/2007        ROS:  This 82 year old female is here today for annual female exam. She and her  winter in [place]. They just drove back via [place] to see her grade school girlfriend who has MS. They were on the road for several weeks. Her asthma and depression are both under good control. She has no new concerns. She ate today. All other review of systems are negative  Personal, family, and social history reviewed with patient and revised.    CONSTITUTIONAL: NEGATIVE for fever, chills, change in weight  INTEGUMENTARY/SKIN: NEGATIVE for worrisome rashes, moles or lesions  EYES: NEGATIVE for vision changes or irritation  ENT/MOUTH: NEGATIVE for ear, mouth and throat problems  RESP: NEGATIVE for significant cough or SOB  BREAST: NEGATIVE for masses, tenderness or discharge  CV: NEGATIVE for chest pain, palpitations or peripheral edema  GI: NEGATIVE for nausea, abdominal pain, heartburn, or change in bowel habits  : NEGATIVE for frequency, dysuria, or hematuria  MUSCULOSKELETAL: NEGATIVE for significant arthralgias or myalgia  NEURO: NEGATIVE for weakness, dizziness or paresthesias  ENDOCRINE: NEGATIVE for temperature intolerance, skin/hair changes  HEME: NEGATIVE for bleeding problems  PSYCHIATRIC: NEGATIVE for changes in mood or affect    OBJECTIVE:   /80  Pulse 58  Temp 96.5  F (35.8  C) (Oral)  Resp 22  Ht 5' 0.24\" (1.53 m)  Wt 158 lb (71.7 kg)  SpO2 98%  BMI 30.62 kg/m2 Estimated body mass index is 30.62 kg/(m^2) as calculated from the following:    Height as of this encounter: 5' 0.24\" (1.53 m).    Weight as of this encounter: 158 lb (71.7 kg).  EXAM:   GENERAL APPEARANCE: healthy, alert and no distress  EYES: Eyes grossly normal to inspection, PERRL and " conjunctivae and sclerae normal  HENT: ear canals and TM's normal, nose and mouth without ulcers or lesions, oropharynx clear and oral mucous membranes moist  NECK: no adenopathy, no asymmetry, masses, or scars and thyroid normal to palpation  RESP: lungs clear to auscultation - no rales, rhonchi or wheezes  BREAST: normal without masses, tenderness or nipple discharge and no palpable axillary masses or adenopathy  CV: regular rate and rhythm, normal S1 S2, no S3 or S4, no murmur, click or rub, no peripheral edema and peripheral pulses strong  ABDOMEN: soft, nontender, no hepatosplenomegaly, no masses and bowel sounds normal  MS: no musculoskeletal defects are noted and gait is age appropriate without ataxia  SKIN: no suspicious lesions or rashes  NEURO: Normal strength and tone, sensory exam grossly normal, mentation intact and speech normal  PSYCH: mentation appears normal and affect normal/bright    ASSESSMENT / PLAN:   1. Encounter for routine adult health examination without abnormal findings  Healthy lady     2. Mild major depression (H)  PHQ-9 = 0     3. Bipolar disorder in partial remission, most recent episode unspecified type (H)  Stable. Sees psychiatry regularly     4. Pulmonary hypertension  Good control     5. Renal insufficiency  Good control   - Comprehensive metabolic panel; Future    6. Hyperlipidemia with target LDL less than 100  Good control   - Lipid panel reflex to direct LDL Fasting; Future  - rosuvastatin (CRESTOR) 40 MG tablet; TAKE 1 TABLET (40 MG) BY MOUTH DAILY generic rosuvastatin please  Dispense: 90 tablet; Refill: 3    7. Hypothyroidism, unspecified type  Good control. Managed by endocrinology   - TSH with free T4 reflex; Future    8. Benign essential hypertension  Good control   - Comprehensive metabolic panel; Future  - hydrochlorothiazide (HYDRODIURIL) 25 MG tablet; TAKE 1 TABLET (25 MG) BY MOUTH DAILY  Dispense: 90 tablet; Refill: 4  - losartan (COZAAR) 100 MG tablet; Take 1  "tablet (100 mg) by mouth daily  Dispense: 90 tablet; Refill: 4  - metoprolol tartrate (LOPRESSOR) 50 MG tablet; One in the morning and 1/2 in the evening  Dispense: 135 tablet; Refill: 4  - verapamil (CALAN-SR) 240 MG CR tablet; Take 1 tablet (240 mg) by mouth daily  Dispense: 90 tablet; Refill: 4    9. Mild persistent asthma without complication  ACT = 22  - mometasone-formoterol (DULERA) 200-5 MCG/ACT oral inhaler; Inhale 2 puffs into the lungs 2 times daily  Dispense: 39 g; Refill: 4    10. Seasonal allergic rhinitis, unspecified chronicity, unspecified trigger  Good control   - Azelastine HCl (ASTEPRO) 0.15 % SOLN; Spray 1 spray into both nostrils daily  Dispense: 30 mL; Refill: 4    End of Life Planning:  Patient currently has an advanced directive: No.  I have verified the patient's ablity to prepare an advanced directive/make health care decisions.  Literature was provided to assist patient in preparing an advanced directive.    COUNSELING:  Reviewed preventive health counseling, as reflected in patient instructions       Regular exercise       Healthy diet/nutrition        Estimated body mass index is 30.62 kg/(m^2) as calculated from the following:    Height as of this encounter: 5' 0.24\" (1.53 m).    Weight as of this encounter: 158 lb (71.7 kg).       reports that she has never smoked. She has never used smokeless tobacco.      Appropriate preventive services were discussed with this patient, including applicable screening as appropriate for cardiovascular disease, diabetes, osteopenia/osteoporosis, and glaucoma.  As appropriate for age/gender, discussed screening for colorectal cancer, prostate cancer, breast cancer, and cervical cancer. Checklist reviewing preventive services available has been given to the patient.    Reviewed patients plan of care and provided an AVS. The Basic Care Plan (routine screening as documented in Health Maintenance) for Kamryn meets the Care Plan requirement. This Care Plan " has been established and reviewed with the Patient.    Counseling Resources:  ATP IV Guidelines  Pooled Cohorts Equation Calculator  Breast Cancer Risk Calculator  FRAX Risk Assessment  ICSI Preventive Guidelines  Dietary Guidelines for Americans, 2010  USDA's MyPlate  ASA Prophylaxis  Lung CA Screening    DESMOND ESPITIA MD  [facility]

## 2018-05-03 NOTE — MR AVS SNAPSHOT
After Visit Summary   5/3/2018    Kamryn Miller    MRN: [id number]           Patient Information     Date Of Birth          1935        Visit Information        Provider Department      5/3/2018 10:00 AM Kamryn Jha MD [facility]        Today's Diagnoses     Encounter for routine adult health examination without abnormal findings    -  1    Mild major depression (H)        Bipolar disorder in partial remission, most recent episode unspecified type (H)        Pulmonary hypertension        Renal insufficiency        Hyperlipidemia with target LDL less than 100        Hypothyroidism, unspecified type        Benign essential hypertension        Mild persistent asthma without complication        Seasonal allergic rhinitis, unspecified chronicity, unspecified trigger          Care Instructions      Preventive Health Recommendations  Female Ages 65 +    Yearly exam:     See your health care provider every year in order to  o Review health changes.   o Discuss preventive care.    o Review your medicines if your doctor has prescribed any.      You no longer need a yearly Pap test unless you've had an abnormal Pap test in the past 10 years. If you have vaginal symptoms, such as bleeding or discharge, be sure to talk with your provider about a Pap test.      Every 1 to 2 years, have a mammogram.  If you are over 69, talk with your health care provider about whether or not you want to continue having screening mammograms.      Every 10 years, have a colonoscopy. Or, have a yearly FIT test (stool test). These exams will check for colon cancer.       Have a cholesterol test every 5 years, or more often if your doctor advises it.       Have a diabetes test (fasting glucose) every three years. If you are at risk for diabetes, you should have this test more often.       At age 65, have a bone density scan (DEXA) to check for osteoporosis (brittle bone disease).    Shots:    Get a flu shot  each year.    Get a tetanus shot every 10 years.    Talk to your doctor about your pneumonia vaccines. There are now two you should receive - Pneumovax (PPSV 23) and Prevnar (PCV 13).    Talk to your doctor about the shingles vaccine.    Talk to your doctor about the hepatitis B vaccine.    Nutrition:     Eat at least 5 servings of fruits and vegetables each day.      Eat whole-grain bread, whole-wheat pasta and brown rice instead of white grains and rice.      Talk to your provider about Calcium and Vitamin D.     Lifestyle    Exercise at least 150 minutes a week (30 minutes a day, 5 days a week). This will help you control your weight and prevent disease.      Limit alcohol to one drink per day.      No smoking.       Wear sunscreen to prevent skin cancer.       See your dentist twice a year for an exam and cleaning.      See your eye doctor every 1 to 2 years to screen for conditions such as glaucoma, macular degeneration, cataracts, etc     Preventive Health Recommendations    Female Ages 65 +    Yearly exam:     See your health care provider every year in order to  o Review health changes.   o Discuss preventive care.    o Review your medicines if your doctor has prescribed any.      You no longer need a yearly Pap test unless you've had an abnormal Pap test in the past 10 years. If you have vaginal symptoms, such as bleeding or discharge, be sure to talk with your provider about a Pap test.      Every 1 to 2 years, have a mammogram.  If you are over 69, talk with your health care provider about whether or not you want to continue having screening mammograms.      Every 10 years, have a colonoscopy. Or, have a yearly FIT test (stool test). These exams will check for colon cancer.       Have a cholesterol test every 5 years, or more often if your doctor advises it.       Have a diabetes test (fasting glucose) every three years. If you are at risk for diabetes, you should have this test more often.       At age  65, have a bone density scan (DEXA) to check for osteoporosis (brittle bone disease).    Shots:    Get a flu shot each year.    Get a tetanus shot every 10 years.    Talk to your doctor about your pneumonia vaccines. There are now two you should receive - Pneumovax (PPSV 23) and Prevnar (PCV 13).    Talk to your doctor about the shingles vaccine.    Talk to your doctor about the hepatitis B vaccine.    Nutrition:     Eat at least 5 servings of fruits and vegetables each day.      Eat whole-grain bread, whole-wheat pasta and brown rice instead of white grains and rice.      Talk to your provider about Calcium and Vitamin D.     Lifestyle    Exercise at least 150 minutes a week (30 minutes a day, 5 days a week). This will help you control your weight and prevent disease.      Limit alcohol to one drink per day.      No smoking.       Wear sunscreen to prevent skin cancer.       See your dentist twice a year for an exam and cleaning.      See your eye doctor every 1 to 2 years to screen for conditions such as glaucoma, macular degeneration and cataracts.    [facility]    If you have any questions regarding to your visit please contact your care team:       Team Purple:   Clinic Hours Telephone Number   Dr. Kamryn Lopez   7am-7pm  Monday - Thursday   7am-5pm  Fridays  [phone]  (Appointment scheduling available 24/7)    Questions about your recent visit?   Team Line:  [phone]   [facility] [place] [facility] - 11am-9pm Monday-Friday Saturday-Sunday- 9am-5pm   [place] - 5pm-9pm Monday-Friday Saturday-Sunday- 9am-5pm  [phone] - [place]  [phone] - [place]       What options do I have for a visit other than an office visit? We offer electronic visits (e-visits) and telephone visits, when medically appropriate.  Please check with your medical insurance to see if these types of visits are covered, as you will be  "responsible for any charges that are not paid by your insurance.      You can use [employer] (secure electronic communication) to access to your chart, send your primary care provider a message, or make an appointment. Ask a team member how to get started.     For a price quote for your services, please call our Consumer Price Line at [phone] or our Imaging Cost estimation line at [phone] (for imaging tests).              Follow-ups after your visit        Future tests that were ordered for you today     Open Future Orders        Priority Expected Expires Ordered    Lipid panel reflex to direct LDL Fasting Routine  6/3/2018 5/3/2018    Comprehensive metabolic panel Routine  6/3/2018 5/3/2018    TSH with free T4 reflex Routine  6/3/2018 5/3/2018            Who to contact     If you have questions or need follow up information about today's clinic visit or your schedule please contact [facility] directly at [phone].  Normal or non-critical lab and imaging results will be communicated to you by [employer], letter or phone within 4 business days after the clinic has received the results. If you do not hear from us within 7 days, please contact the clinic through [employer] or phone. If you have a critical or abnormal lab result, we will notify you by phone as soon as possible.  Submit refill requests through [employer] or call your pharmacy and they will forward the refill request to us. Please allow 3 business days for your refill to be completed.          Additional Information About Your Visit        [employer] Information     [employer] lets you send messages to your doctor, view your test results, renew your prescriptions, schedule appointments and more. To sign up, go to [email or web address] . Click on \"Log in\" on the left side of the screen, which will take you to the Welcome page. Then click on \"Sign up Now\" on the right side of the page.     You will be asked to enter the access code listed " "below, as well as some personal information. Please follow the directions to create your username and password.     Your access code is: [email or web address]  Expires: 2018 10:33 AM     Your access code will  in 90 days. If you need help or a new code, please call your [facility] or [phone].        Care EveryWhere ID     This is your Care EveryWhere ID. This could be used by other organizations to access your [place] medical records  [id number]        Your Vitals Were     Pulse Temperature Respirations Height Pulse Oximetry BMI (Body Mass Index)    58 96.5  F (35.8  C) (Oral) 22 5' 0.24\" (1.53 m) 98% 30.62 kg/m2       Blood Pressure from Last 3 Encounters:   18 118/80   17 126/70   17 136/86    Weight from Last 3 Encounters:   18 158 lb (71.7 kg)   12/15/17 159 lb (72.1 kg)   17 159 lb 8 oz (72.3 kg)                 Today's Medication Changes          These changes are accurate as of 5/3/18 10:33 AM.  If you have any questions, ask your nurse or doctor.               These medicines have changed or have updated prescriptions.        Dose/Directions    hydrochlorothiazide 25 MG tablet   Commonly known as:  HYDRODIURIL   This may have changed:  See the new instructions.   Used for:  Benign essential hypertension   Changed by:  Kamryn Jha MD        TAKE 1 TABLET (25 MG) BY MOUTH DAILY   Quantity:  90 tablet   Refills:  4       rosuvastatin 40 MG tablet   Commonly known as:  CRESTOR   This may have changed:  additional instructions   Used for:  Hyperlipidemia with target LDL less than 100   Changed by:  Kamryn Jha MD        TAKE 1 TABLET (40 MG) BY MOUTH DAILY generic rosuvastatin please   Quantity:  90 tablet   Refills:  3            Where to get your medicines      These medications were sent to [id number] - [place], [place] - [address] [id number] [facility] [address]     Phone:  [phone]     Azelastine HCl 0.15 " % Soln    hydrochlorothiazide 25 MG tablet    losartan 100 MG tablet    metoprolol tartrate 50 MG tablet    mometasone-formoterol 200-5 MCG/ACT oral inhaler    rosuvastatin 40 MG tablet    verapamil 240 MG CR tablet                Primary Care Provider Office Phone # Fax #    Kamryn Jha [phone]       [address] [address]        Equal Access to Services     TAQUERIA OBANDO : Hadii aad ku hadasho Soomaali, waaxda luqadaha, qaybta kaalmada adeegyada, waxay idiin hayaan adeeg kharash la'aan . So [facility] [phone].    ATENCIÓN: Si habla español, tiene a corcoran disposición servicios gratuitos de asistencia lingüística. [facility] [phone].    We comply with applicable federal civil rights laws and [place] laws. We do not discriminate on the basis of race, color, national origin, age, disability, sex, sexual orientation, or gender identity.            Thank you!     Thank you for choosing [facility]  for your care. Our goal is always to provide you with excellent care. Hearing back from our patients is one way we can continue to improve our services. Please take a few minutes to complete the written survey that you may receive in the mail after your visit with us. Thank you!             Your Updated Medication List - Protect others around you: Learn how to safely use, store and throw away your medicines at www.disposemymeds.org.          This list is accurate as of 5/3/18 10:33 AM.  Always use your most recent med list.                   Brand Name Dispense Instructions for use Diagnosis    aspirin 81 MG tablet      Take 1 tablet by mouth daily.        Azelastine HCl 0.15 % Soln    ASTEPRO    30 mL    Spray 1 spray into both nostrils daily    Seasonal allergic rhinitis, unspecified chronicity, unspecified trigger       cholecalciferol 18800 units capsule    VITAMIN D3     1 tablet weekly        CITRACAL OR      Take 1 tablet by  mouth daily.        FISH OIL PO      Take 1 tablet by mouth daily.        FLUoxetine 20 MG tablet      Take 20 mg by mouth daily        hydrochlorothiazide 25 MG tablet    HYDRODIURIL    90 tablet    TAKE 1 TABLET (25 MG) BY MOUTH DAILY    Benign essential hypertension       lamoTRIgine 200 MG tablet    LaMICtal     1 TABLET TWICE DAILY        losartan 100 MG tablet    COZAAR    90 tablet    Take 1 tablet (100 mg) by mouth daily    Benign essential hypertension       metoprolol tartrate 50 MG tablet    LOPRESSOR    135 tablet    One in the morning and 1/2 in the evening    Benign essential hypertension       mometasone-formoterol 200-5 MCG/ACT oral inhaler    DULERA    39 g    Inhale 2 puffs into the lungs 2 times daily    Mild persistent asthma without complication       Multi-vitamin Tabs tablet   Generic drug:  multivitamin, therapeutic with minerals      Take 1 tablet by mouth daily.        PRISTIQ 50 MG 24 hr tablet   Generic drug:  desvenlafaxine succinate      Take 100 mg by mouth daily        rosuvastatin 40 MG tablet    CRESTOR    90 tablet    TAKE 1 TABLET (40 MG) BY MOUTH DAILY generic rosuvastatin please    Hyperlipidemia with target LDL less than 100       SYNTHROID 50 MCG tablet   Generic drug:  levothyroxine      Take 1 tablet by mouth daily.        TRINTELLIX PO           verapamil 240 MG CR tablet    CALAN-SR    90 tablet    Take 1 tablet (240 mg) by mouth daily    Benign essential hypertension

## 2018-07-12 ENCOUNTER — OFFICE VISIT (OUTPATIENT)
Dept: PODIATRY | Facility: CLINIC | Age: 83
End: 2018-07-12
Payer: COMMERCIAL

## 2018-07-12 VITALS
DIASTOLIC BLOOD PRESSURE: 72 MMHG | BODY MASS INDEX: 31.58 KG/M2 | SYSTOLIC BLOOD PRESSURE: 117 MMHG | HEART RATE: 74 BPM | WEIGHT: 163 LBS

## 2018-07-12 DIAGNOSIS — B07.0 VERRUCA PLANTARIS: Primary | ICD-10-CM

## 2018-07-12 PROCEDURE — 17110 DESTRUCTION B9 LES UP TO 14: CPT | Performed by: PODIATRIST

## 2018-07-12 PROCEDURE — 99213 OFFICE O/P EST LOW 20 MIN: CPT | Mod: 25 | Performed by: PODIATRIST

## 2018-07-12 NOTE — PATIENT INSTRUCTIONS
We wish you continued good healing. If you have any questions or concerns, please do not hesitate to contact us at [phone]    Please remember to call and schedule a follow up appointment if one was recommended at your earliest convenience.   PODIATRY CLINIC HOURS  TELEPHONE NUMBER    Dr. Carlos Carlson D.P.M [facility]    Clinics:  [facility]    [facility]   Tuesday 1PM-6PM  [place]  Wednesday 7AM-2PM  [facility]  Thursday 10AM-6PM  [place]  Friday 7AM-3PM  [place]  Specialty schedulers:   [phone] to make an appointment with any Specialty Provider.        Urgent Care locations:    [facility] Monday-Friday 5 pm - 9 pm. Saturday-Sunday 9 am -5pm    Monday-Friday 11 am - 9 pm Saturday 9 am - 5 pm     Monday-Sunday 12 noon-8PM [phone]     If you need a medication refill, please contact us you may need lab work and/or a follow up visit prior to your refill (i.e. Antifungal medications).    [employer] (secure e-mail communication and access to your chart) to send a message or to make an appointment.    If MRI needed please call [place] Lopez at [phone]        Weight management plan: Patient was referred to their PCP to discuss a diet and exercise plan.   Patient to follow up with Primary Care provider regarding elevated blood pressure.

## 2018-07-12 NOTE — LETTER
7/12/2018         RE: Kamryn Miller  [address]   [address]  [place] [address]        Dear Colleague,    Thank you for referring your patient, Kamryn Miller, to the [facility]. Please see a copy of my visit note below.    Subjective:    Pt is seen todaywith the c/c of painful lesions on the left hallux, 4th toe and right third toe.  This has been problematic for 4 month(s).  Pain is aggrevated by activity and relieved by rest.  Describes as burning pain and this is slowly getting worse.  Has tried OTC products without alleviation of lesion/symptoms.  Patient is requesting more definitive treatment.    ROS: denies drainage, denies trauma, denies numbness, denies erythema or edema        Allergies   Allergen Reactions     Ace Inhibitors Cough     Diclofenac Other (See Comments)     Balance problems       Current Outpatient Prescriptions   Medication Sig Dispense Refill     aspirin 81 MG tablet Take 1 tablet by mouth daily.       Azelastine HCl (ASTEPRO) 0.15 % SOLN Spray 1 spray into both nostrils daily 30 mL 4     Calcium Citrate (CITRACAL OR) Take 1 tablet by mouth daily.       CHOLECALCIFEROL 04348 UNIT PO CAPS 1 tablet weekly       desvenlafaxine (PRISTIQ) 50 MG 24 hr tablet Take 100 mg by mouth daily        FLUoxetine 20 MG tablet Take 20 mg by mouth daily       hydrochlorothiazide (HYDRODIURIL) 25 MG tablet TAKE 1 TABLET (25 MG) BY MOUTH DAILY 90 tablet 4     LAMOTRIGINE 200 MG PO TABS 1 TABLET TWICE DAILY       levothyroxine (SYNTHROID) 50 MCG tablet Take 1 tablet by mouth daily.       losartan (COZAAR) 100 MG tablet Take 1 tablet (100 mg) by mouth daily 90 tablet 4     metoprolol tartrate (LOPRESSOR) 50 MG tablet One in the morning and 1/2 in the evening 135 tablet 4     mometasone-formoterol (DULERA) 200-5 MCG/ACT oral inhaler Inhale 2 puffs into the lungs 2 times daily 39 g 4     Multiple Vitamin (MULTI-VITAMIN) per tablet Take 1 tablet by mouth daily.       Omega-3 Fatty  Acids (FISH OIL PO) Take 1 tablet by mouth daily.       rosuvastatin (CRESTOR) 40 MG tablet TAKE 1 TABLET (40 MG) BY MOUTH DAILY generic rosuvastatin please 90 tablet 3     verapamil (CALAN-SR) 240 MG CR tablet Take 1 tablet (240 mg) by mouth daily 90 tablet 4     Vortioxetine HBr (TRINTELLIX PO)          Patient Active Problem List   Diagnosis     CAD (coronary artery disease)     Hyperlipidemia with target LDL less than 100     Mild major depression (H)     Pulmonary hypertension     Mild persistent asthma     Seasonal allergic rhinitis     Mitral insufficiency     Tricuspid insufficiency     Bipolar disorder (H)     Renal insufficiency     Tremors     Advanced directives, counseling/discussion     Family history of diabetes mellitus     Family history of celiac disease     Celiac disease     History of colonic polyps     Benign essential hypertension     Hypothyroidism, unspecified type       Past Medical History:   Diagnosis Date     Aortic valvar stenosis 7/2010    mild     Asthma      Bipolar disorder (H)      CAD (coronary artery disease)     s/p angioplasty     Celiac disease      Colon polyps      Colon polyps 2012    every 3 year colonoscopy      Depression      High cholesterol      HTN      Mitral insufficiency      Pulmonary HTN     mild     Tremors 7/10    drug induced from antidepressants     Tricuspid insufficiency        Past Surgical History:   Procedure Laterality Date     ANGIOGRAM  6/26/2009     ANGIOPLASTY  9/96    for angina     ANGIOPLASTY  8/03 - 9/03    X -2 - with stenst in coronary car.     APPENDECTOMY       BREAST BIOPSY, RT/LT      Breat Biopsy RT/LT, benign     BUNIONECTOMY  11/8/2011    Procedure:BUNIONECTOMY; Left donna bunionectomy; Surgeon:FREDY LITTLEJOHN; Location:[facility] OR     BUNIONECTOMY RT/LT  5/2007    right bunion and right 2nd hammertoe     C ANESTH,BLEPHAROPLASTY      (R) for drooping eyelid     C APPENDECTOMY       CATARACT IOL, RT/LT  6/12 and 7/12    bilateral      COLONOSCOPY  2007, 2012    every 3 years for polyps     JOINT REPLACEMENT, HIP RT/LT  4/2008    right hip replaced     JOINT REPLACEMENT, HIP RT/LT  4/2009    left hip replaced     REPAIR HAMMER TOE  11/8/2011    Procedure:REPAIR HAMMER TOE; left 2nd hammertoe repair; Surgeon:FREDY LITTLEJOHN; Location:[facility] OR     SURGICAL HISTORY OF -   11/11    left bunion and 2nd hammertoe repair     TUBAL LIGATION         Family History   Problem Relation Age of Onset     Asthma Mother      Cerebrovascular Disease Mother      Arthritis Mother      Depression Mother      Lipids Sister      Hypertension Sister      HEART DISEASE Sister      Lipids Sister      Hypertension Sister      Lipids Sister      Breast Cancer Sister        Social History   Substance Use Topics     Smoking status: Never Smoker     Smokeless tobacco: Never Used     Alcohol use No         Exam:    /72  Pulse 74  Wt 163 lb (73.9 kg)  BMI 31.58 kg/m2.  Good historian.  A&O X 3.  Pulses DP, PT 2/4 b/l.  CRT < 3 seconds X 10 digits.  No edema or varicosities noted.  Sensation to light touch intact b/l.  Reflexes 2/4 b/l.  Skin has normal texture and turgor b/l.  pronated arch with weightbearing.  No forefoot or rear foot deformities noted.  MS 5/5 all compartments.  Normal ROM all fore foot and rearfoot joints.  No equinus.   Lesions are located on left toes 1/4 and right third toe and have cauliflower appearance with obliterated skin lines and pain with lateral compression.  No subcutaneus masses noted.  Petechia capillary impingement noted within the lesions.   No sign of infections or open wounds.  No drainage noted.    A/P    Verrucous lesions X 3 right and left feet.    Discussed treatment options and etiology of verrucous lesions at length.  Discussed various treatment options.  Would like to try cryotherapy.  Risks, complications and efficacy were discussed.  Patient gave verbal understanding and knows this could blister up and will be limping for a  few days.  No guarantees for outcome given.  Cryotherapy times 3.  RETURN TO CLINIC in 2-3 weeks.     Carlos Carlson DPM, FACFAS                Again, thank you for allowing me to participate in the care of your patient.        Sincerely,        Carlos Carlson DPM

## 2018-07-12 NOTE — MR AVS SNAPSHOT
After Visit Summary   7/12/2018    Kamryn Miller    MRN: [id number]           Patient Information     Date Of Birth          1935        Visit Information        Provider Department      7/12/2018 12:00 PM Carlos Carlson DPM [facility]        Care Instructions    We wish you continued good healing. If you have any questions or concerns, please do not hesitate to contact us at [phone]    Please remember to call and schedule a follow up appointment if one was recommended at your earliest convenience.   PODIATRY CLINIC HOURS  TELEPHONE NUMBER    Dr. Carlos WILLIAMPSOLOMON FAC FAS    Clinics:  [facility]    Stefany Gasca [facility]   Tuesday 1PM-6PM  [place]  Wednesday 7AM-2PM  [place]  Thursday 10AM-6PM  [place]  Friday 7AM-3PM  [place]  Specialty schedulers:   [phone] to make an appointment with any Specialty Provider.        Urgent Care locations:    [facility] Monday-Friday 5 pm - 9 pm. Saturday-Sunday 9 am -5pm    Monday-Friday 11 am - 9 pm Saturday 9 am - 5 pm     Monday-Sunday 12 noon-8PM [phone]     If you need a medication refill, please contact us you may need lab work and/or a follow up visit prior to your refill (i.e. Antifungal medications).    ENDOGENXhart (secure e-mail communication and access to your chart) to send a message or to make an appointment.    If MRI needed please call Tyrese Lopez at [phone]        Weight management plan: Patient was referred to their PCP to discuss a diet and exercise plan.   Patient to follow up with Primary Care provider regarding elevated blood pressure.                Follow-ups after your visit        Your next 10 appointments already scheduled     Jul 12, 2018 12:00 PM CDT   [facility]     [address] [place] [address]   [phone]            Jul 13, 2018  9:00 AM CDT   LAB with [facility]   [facility] [place] ([facility])    [address]  [place] [address]   [phone]           Please do not eat 10-12 hours before your appointment if you are coming in fasting for labs on lipids, cholesterol, or glucose (sugar). This does not apply to pregnant women. Water, hot tea and black coffee (with nothing added) are okay. Do not drink other fluids, diet soda or chew gum.              Who to contact     If you have questions or need follow up information about today's clinic visit or your schedule please contact [facility] directly at [phone].  Normal or non-critical lab and imaging results will be communicated to you by MyChart, letter or phone within 4 business days after the clinic has received the results. If you do not hear from us within 7 days, please contact the clinic through MyChart or phone. If you have a critical or abnormal lab result, we will notify you by phone as soon as possible.  Submit refill requests through MAYKOR or call your pharmacy and they will forward the refill request to us. Please allow 3 business days for your refill to be completed.          Additional Information About Your Visit        Care EveryWhere ID     This is your Care EveryWhere ID. This could be used by other organizations to access your [facility] medical records  [id number]        Your Vitals Were     Pulse BMI (Body Mass Index)                74 31.58 kg/m2           Blood Pressure from Last 3 Encounters:   07/12/18 117/72   05/03/18 118/80   12/13/17 126/70    Weight from Last 3 Encounters:   07/12/18 163 lb (73.9 kg)   05/03/18 158 lb (71.7 kg)   12/15/17 159 lb (72.1 kg)              Today, you had the following     No orders found for display       Primary Care Provider Office Phone # Fax #    Kamryn Jha [phone]        [address] [address]        Equal Access to Services     CODYSHONNA TOPHER : Hadii aad ku hadaugstinrishi Aguirre, wamarilynda nickolas, qakingta chandachristianoshubham agarwaljenncolleen mcdonough. So [facility] [phone].    ATENCIÓN: Si habla español, tiene a corcoran disposición servicios gratuitos de asistencia lingüística. Llame al [phone].    We comply with applicable federal civil rights laws and [place] laws. We do not discriminate on the basis of race, color, national origin, age, disability, sex, sexual orientation, or gender identity.            Thank you!     Thank you for choosing [facility]  for your care. Our goal is always to provide you with excellent care. Hearing back from our patients is one way we can continue to improve our services. Please take a few minutes to complete the written survey that you may receive in the mail after your visit with us. Thank you!             Your Updated Medication List - Protect others around you: Learn how to safely use, store and throw away your medicines at www.disposemymeds.org.          This list is accurate as of 7/12/18 11:58 AM.  Always use your most recent med list.                   Brand Name Dispense Instructions for use Diagnosis    aspirin 81 MG tablet      Take 1 tablet by mouth daily.        Azelastine HCl 0.15 % Soln    ASTEPRO    30 mL    Spray 1 spray into both nostrils daily    Seasonal allergic rhinitis, unspecified chronicity, unspecified trigger       cholecalciferol 30635 units capsule    VITAMIN D3     1 tablet weekly        CITRACAL OR      Take 1 tablet by mouth daily.        FISH OIL PO      Take 1 tablet by mouth daily.        FLUoxetine 20 MG tablet      Take 20 mg by mouth daily        hydrochlorothiazide 25 MG tablet    HYDRODIURIL    90 tablet    TAKE 1 TABLET (25 MG) BY MOUTH DAILY    Benign essential hypertension       lamoTRIgine 200 MG tablet    LaMICtal     1 TABLET TWICE DAILY        losartan  100 MG tablet    COZAAR    90 tablet    Take 1 tablet (100 mg) by mouth daily    Benign essential hypertension       metoprolol tartrate 50 MG tablet    LOPRESSOR    135 tablet    One in the morning and 1/2 in the evening    Benign essential hypertension       mometasone-formoterol 200-5 MCG/ACT oral inhaler    DULERA    39 g    Inhale 2 puffs into the lungs 2 times daily    Mild persistent asthma without complication       Multi-vitamin Tabs tablet   Generic drug:  multivitamin, therapeutic with minerals      Take 1 tablet by mouth daily.        PRISTIQ 50 MG 24 hr tablet   Generic drug:  desvenlafaxine succinate      Take 100 mg by mouth daily        rosuvastatin 40 MG tablet    CRESTOR    90 tablet    TAKE 1 TABLET (40 MG) BY MOUTH DAILY generic rosuvastatin please    Hyperlipidemia with target LDL less than 100       SYNTHROID 50 MCG tablet   Generic drug:  levothyroxine      Take 1 tablet by mouth daily.        TRINTELLIX PO           verapamil 240 MG CR tablet    CALAN-SR    90 tablet    Take 1 tablet (240 mg) by mouth daily    Benign essential hypertension

## 2018-07-12 NOTE — PROGRESS NOTES
Subjective:    Pt is seen todaywith the c/c of painful lesions on the left hallux, 4th toe and right third toe.  This has been problematic for 4 month(s).  Pain is aggrevated by activity and relieved by rest.  Describes as burning pain and this is slowly getting worse.  Has tried OTC products without alleviation of lesion/symptoms.  Patient is requesting more definitive treatment.    ROS: denies drainage, denies trauma, denies numbness, denies erythema or edema        Allergies   Allergen Reactions     Ace Inhibitors Cough     Diclofenac Other (See Comments)     Balance problems       Current Outpatient Prescriptions   Medication Sig Dispense Refill     aspirin 81 MG tablet Take 1 tablet by mouth daily.       Azelastine HCl (ASTEPRO) 0.15 % SOLN Spray 1 spray into both nostrils daily 30 mL 4     Calcium Citrate (CITRACAL OR) Take 1 tablet by mouth daily.       CHOLECALCIFEROL 63425 UNIT PO CAPS 1 tablet weekly       desvenlafaxine (PRISTIQ) 50 MG 24 hr tablet Take 100 mg by mouth daily        FLUoxetine 20 MG tablet Take 20 mg by mouth daily       hydrochlorothiazide (HYDRODIURIL) 25 MG tablet TAKE 1 TABLET (25 MG) BY MOUTH DAILY 90 tablet 4     LAMOTRIGINE 200 MG PO TABS 1 TABLET TWICE DAILY       levothyroxine (SYNTHROID) 50 MCG tablet Take 1 tablet by mouth daily.       losartan (COZAAR) 100 MG tablet Take 1 tablet (100 mg) by mouth daily 90 tablet 4     metoprolol tartrate (LOPRESSOR) 50 MG tablet One in the morning and 1/2 in the evening 135 tablet 4     mometasone-formoterol (DULERA) 200-5 MCG/ACT oral inhaler Inhale 2 puffs into the lungs 2 times daily 39 g 4     Multiple Vitamin (MULTI-VITAMIN) per tablet Take 1 tablet by mouth daily.       Omega-3 Fatty Acids (FISH OIL PO) Take 1 tablet by mouth daily.       rosuvastatin (CRESTOR) 40 MG tablet TAKE 1 TABLET (40 MG) BY MOUTH DAILY generic rosuvastatin please 90 tablet 3     verapamil (CALAN-SR) 240 MG CR tablet Take 1 tablet (240 mg) by mouth daily 90 tablet  4     Vortioxetine HBr (TRINTELLIX PO)          Patient Active Problem List   Diagnosis     CAD (coronary artery disease)     Hyperlipidemia with target LDL less than 100     Mild major depression (H)     Pulmonary hypertension     Mild persistent asthma     Seasonal allergic rhinitis     Mitral insufficiency     Tricuspid insufficiency     Bipolar disorder (H)     Renal insufficiency     Tremors     Advanced directives, counseling/discussion     Family history of diabetes mellitus     Family history of celiac disease     Celiac disease     History of colonic polyps     Benign essential hypertension     Hypothyroidism, unspecified type       Past Medical History:   Diagnosis Date     Aortic valvar stenosis 7/2010    mild     Asthma      Bipolar disorder (H)      CAD (coronary artery disease)     s/p angioplasty     Celiac disease      Colon polyps      Colon polyps 2012    every 3 year colonoscopy      Depression      High cholesterol      HTN      Mitral insufficiency      Pulmonary HTN     mild     Tremors 7/10    drug induced from antidepressants     Tricuspid insufficiency        Past Surgical History:   Procedure Laterality Date     ANGIOGRAM  6/26/2009     ANGIOPLASTY  9/96    for angina     ANGIOPLASTY  8/03 - 9/03    X -2 - with stenst in coronary car.     APPENDECTOMY       BREAST BIOPSY, RT/LT      Breat Biopsy RT/LT, benign     BUNIONECTOMY  11/8/2011    Procedure:BUNIONECTOMY; Left donna bunionectomy; Surgeon:FREDY LITTLEJOHN; Location:[facility] OR     BUNIONECTOMY RT/LT  5/2007    right bunion and right 2nd hammertoe     C ANESTH,BLEPHAROPLASTY      (R) for drooping eyelid     C APPENDECTOMY       CATARACT IOL, RT/LT  6/12 and 7/12    bilateral     COLONOSCOPY  2007, 2012    every 3 years for polyps     JOINT REPLACEMENT, HIP RT/LT  4/2008    right hip replaced     JOINT REPLACEMENT, HIP RT/LT  4/2009    left hip replaced     REPAIR HAMMER TOE  11/8/2011    Procedure:REPAIR HAMMER TOE; left 2nd hammertoe  repair; Surgeon:FREDY LITTLEJOHN; Location:[facility] OR     SURGICAL HISTORY OF -   11/11    left bunion and 2nd hammertoe repair     TUBAL LIGATION         Family History   Problem Relation Age of Onset     Asthma Mother      Cerebrovascular Disease Mother      Arthritis Mother      Depression Mother      Lipids Sister      Hypertension Sister      HEART DISEASE Sister      Lipids Sister      Hypertension Sister      Lipids Sister      Breast Cancer Sister        Social History   Substance Use Topics     Smoking status: Never Smoker     Smokeless tobacco: Never Used     Alcohol use No         Exam:    /72  Pulse 74  Wt 163 lb (73.9 kg)  BMI 31.58 kg/m2.  Good historian.  A&O X 3.  Pulses DP, PT 2/4 b/l.  CRT < 3 seconds X 10 digits.  No edema or varicosities noted.  Sensation to light touch intact b/l.  Reflexes 2/4 b/l.  Skin has normal texture and turgor b/l.  pronated arch with weightbearing.  No forefoot or rear foot deformities noted.  MS 5/5 all compartments.  Normal ROM all fore foot and rearfoot joints.  No equinus.   Lesions are located on left toes 1/4 and right third toe and have cauliflower appearance with obliterated skin lines and pain with lateral compression.  No subcutaneus masses noted.  Petechia capillary impingement noted within the lesions.   No sign of infections or open wounds.  No drainage noted.    A/P    Verrucous lesions X 3 right and left feet.    Discussed treatment options and etiology of verrucous lesions at length.  Discussed various treatment options.  Would like to try cryotherapy.  Risks, complications and efficacy were discussed.  Patient gave verbal understanding and knows this could blister up and will be limping for a few days.  No guarantees for outcome given.  Cryotherapy times 3.  RETURN TO CLINIC in 2-3 weeks.     Fredy HURTM, FACFAS

## 2018-07-13 DIAGNOSIS — N28.9 RENAL INSUFFICIENCY: ICD-10-CM

## 2018-07-13 DIAGNOSIS — E03.9 HYPOTHYROIDISM, UNSPECIFIED TYPE: ICD-10-CM

## 2018-07-13 DIAGNOSIS — E78.5 HYPERLIPIDEMIA WITH TARGET LDL LESS THAN 100: ICD-10-CM

## 2018-07-13 DIAGNOSIS — I10 BENIGN ESSENTIAL HYPERTENSION: ICD-10-CM

## 2018-07-13 LAB
ALBUMIN SERPL-MCNC: 3.5 G/DL (ref 3.4–5)
ALP SERPL-CCNC: 48 U/L (ref 40–150)
ALT SERPL W P-5'-P-CCNC: 32 U/L (ref 0–50)
ANION GAP SERPL CALCULATED.3IONS-SCNC: 7 MMOL/L (ref 3–14)
AST SERPL W P-5'-P-CCNC: 23 U/L (ref 0–45)
BILIRUB SERPL-MCNC: 0.3 MG/DL (ref 0.2–1.3)
BUN SERPL-MCNC: 38 MG/DL (ref 7–30)
CALCIUM SERPL-MCNC: 8.9 MG/DL (ref 8.5–10.1)
CHLORIDE SERPL-SCNC: 104 MMOL/L (ref 94–109)
CHOLEST SERPL-MCNC: 180 MG/DL
CO2 SERPL-SCNC: 30 MMOL/L (ref 20–32)
CREAT SERPL-MCNC: 1.51 MG/DL (ref 0.52–1.04)
GFR SERPL CREATININE-BSD FRML MDRD: 33 ML/MIN/1.7M2
GLUCOSE SERPL-MCNC: 98 MG/DL (ref 70–99)
HDLC SERPL-MCNC: 56 MG/DL
LDLC SERPL CALC-MCNC: 95 MG/DL
NONHDLC SERPL-MCNC: 124 MG/DL
POTASSIUM SERPL-SCNC: 3.7 MMOL/L (ref 3.4–5.3)
PROT SERPL-MCNC: 7.1 G/DL (ref 6.8–8.8)
SODIUM SERPL-SCNC: 141 MMOL/L (ref 133–144)
TRIGL SERPL-MCNC: 143 MG/DL
TSH SERPL DL<=0.005 MIU/L-ACNC: 3.83 MU/L (ref 0.4–4)

## 2018-07-13 PROCEDURE — 84443 ASSAY THYROID STIM HORMONE: CPT | Performed by: FAMILY MEDICINE

## 2018-07-13 PROCEDURE — 36415 COLL VENOUS BLD VENIPUNCTURE: CPT | Performed by: FAMILY MEDICINE

## 2018-07-13 PROCEDURE — 80061 LIPID PANEL: CPT | Performed by: FAMILY MEDICINE

## 2018-07-13 PROCEDURE — 80053 COMPREHEN METABOLIC PANEL: CPT | Performed by: FAMILY MEDICINE

## 2018-07-13 NOTE — LETTER
[facility]  [address] [place] [address]    July 17, 2018    Kamryn Miller  [address]   [address]  [place] [address]          Dear Kamryn,    Stable renal function   Normal thyroid.   Good cholesterol.     If you have any questions please feel free to contact [phone] or myself via [employer]    Enclosed is a copy of your results.     Results for orders placed or performed in visit on 07/13/18   Lipid panel reflex to direct LDL Fasting   Result Value Ref Range    Cholesterol 180 <200 mg/dL    Triglycerides 143 <150 mg/dL    HDL Cholesterol 56 >49 mg/dL    LDL Cholesterol Calculated 95 <100 mg/dL    Non HDL Cholesterol 124 <130 mg/dL   Comprehensive metabolic panel   Result Value Ref Range    Sodium 141 133 - 144 mmol/L    Potassium 3.7 3.4 - 5.3 mmol/L    Chloride 104 94 - 109 mmol/L    Carbon Dioxide 30 20 - 32 mmol/L    Anion Gap 7 3 - 14 mmol/L    Glucose 98 70 - 99 mg/dL    Urea Nitrogen 38 (H) 7 - 30 mg/dL    Creatinine 1.51 (H) 0.52 - 1.04 mg/dL    GFR Estimate 33 (L) >60 mL/min/1.7m2    GFR Estimate If Black 40 (L) >60 mL/min/1.7m2    Calcium 8.9 8.5 - 10.1 mg/dL    Bilirubin Total 0.3 0.2 - 1.3 mg/dL    Albumin 3.5 3.4 - 5.0 g/dL    Protein Total 7.1 6.8 - 8.8 g/dL    Alkaline Phosphatase 48 40 - 150 U/L    ALT 32 0 - 50 U/L    AST 23 0 - 45 U/L   TSH with free T4 reflex   Result Value Ref Range    TSH 3.83 0.40 - 4.00 mU/L       If you have any questions or concerns, please call myself or my nurse at [phone].      Sincerely,        Tequila Walters, TAMMY/mishra

## 2018-07-17 NOTE — PROGRESS NOTES
Dear Kamryn,    Your recent test results are attached.      Stable renal function  Normal thyroid.  Good cholesterol.    If you have any questions please feel free to contact [phone] or myself via TE[employer]t.    Sincerely,  Tequila Walters, CNP

## 2018-07-26 ENCOUNTER — OFFICE VISIT (OUTPATIENT)
Dept: PODIATRY | Facility: CLINIC | Age: 83
End: 2018-07-26
Payer: COMMERCIAL

## 2018-07-26 VITALS
SYSTOLIC BLOOD PRESSURE: 98 MMHG | WEIGHT: 163 LBS | HEART RATE: 60 BPM | DIASTOLIC BLOOD PRESSURE: 58 MMHG | BODY MASS INDEX: 31.58 KG/M2

## 2018-07-26 DIAGNOSIS — B07.0 VERRUCA PLANTARIS: Primary | ICD-10-CM

## 2018-07-26 PROCEDURE — 17110 DESTRUCTION B9 LES UP TO 14: CPT | Performed by: PODIATRIST

## 2018-07-26 NOTE — PATIENT INSTRUCTIONS
We wish you continued good healing. If you have any questions or concerns, please do not hesitate to contact us at [phone]    Please remember to call and schedule a follow up appointment if one was recommended at your earliest convenience.   PODIATRY CLINIC HOURS  TELEPHONE NUMBER    Dr. Carlos Carlson D.P.M [facility]    Clinics:  [facility]    Stefany Gasca [facility]   Tuesday 1PM-6PM  [place]  Wednesday 7AM-2PM  [facility]  Thursday 10AM-6PM  [place]  Friday 7AM-3PM  [place]  Specialty schedulers:   [phone] to make an appointment with any Specialty Provider.        Urgent Care locations:    [facility] Monday-Friday 5 pm - 9 pm. Saturday-Sunday 9 am -5pm    Monday-Friday 11 am - 9 pm Saturday 9 am - 5 pm     Monday-Sunday 12 noon-8PM [phone]     If you need a medication refill, please contact us you may need lab work and/or a follow up visit prior to your refill (i.e. Antifungal medications).    LinguaNextt (secure e-mail communication and access to your chart) to send a message or to make an appointment.    If MRI needed please call Tyrese Lopez at [phone]        Weight management plan: Patient was referred to their PCP to discuss a diet and exercise plan.

## 2018-07-26 NOTE — MR AVS SNAPSHOT
After Visit Summary   7/26/2018    Kamryn Miller    MRN: [id number]           Patient Information     Date Of Birth          1935        Visit Information        Provider Department      7/26/2018 10:15 AM Carlos Carlson, HYACINTH [facility]        Care Instructions    We wish you continued good healing. If you have any questions or concerns, please do not hesitate to contact us at [phone]    Please remember to call and schedule a follow up appointment if one was recommended at your earliest convenience.   PODIATRY CLINIC HOURS  TELEPHONE NUMBER    Dr. Carlos Carlson D.P.M FAC FAS    Clinics:  [facility]    Stefany Gasca [facility]   Tuesday 1PM-6PM  [facility]  Wednesday 7AM-2PM  [place]  Thursday 10AM-6PM  [place]  Friday 7AM-3PM  [place]  Specialty schedulers:   [phone] to make an appointment with any Specialty Provider.        Urgent Care locations:    [facility] Monday-Friday 5 pm - 9 pm. Saturday-Sunday 9 am -5pm    Monday-Friday 11 am - 9 pm Saturday 9 am - 5 pm     Monday-Sunday 12 noon-8PM [phone]     If you need a medication refill, please contact us you may need lab work and/or a follow up visit prior to your refill (i.e. Antifungal medications).    [employer] (secure e-mail communication and access to your chart) to send a message or to make an appointment.    If MRI needed please call Tyrese Lopez at [phone]        Weight management plan: Patient was referred to their PCP to discuss a diet and exercise plan.               Follow-ups after your visit        Who to contact     If you have questions or need follow up information about today's clinic visit or your schedule please contact [facility] directly at [phone].  Normal or non-critical lab and imaging results will be communicated to you  by MyChart, letter or phone within 4 business days after the clinic has received the results. If you do not hear from us within 7 days, please contact the clinic through MyChart or phone. If you have a critical or abnormal lab result, we will notify you by phone as soon as possible.  Submit refill requests through Vinobo or call your pharmacy and they will forward the refill request to us. Please allow 3 business days for your refill to be completed.          Additional Information About Your Visit        Care EveryWhere ID     This is your Care EveryWhere ID. This could be used by other organizations to access your [facility] medical records  [id number]        Your Vitals Were     Pulse BMI (Body Mass Index)                60 31.58 kg/m2           Blood Pressure from Last 3 Encounters:   07/26/18 98/58   07/12/18 117/72   05/03/18 118/80    Weight from Last 3 Encounters:   07/26/18 163 lb (73.9 kg)   07/12/18 163 lb (73.9 kg)   05/03/18 158 lb (71.7 kg)              Today, you had the following     No orders found for display       Primary Care Provider Office Phone # Fax #    Kamryn Jha [phone] [address] [address]        Equal Access to Services     TAQUERIA OBANDO : Hadii avril ku hadasho Soomaali, waaxda luqadaha, qaybta kaalmada adeegyada, shubham mcdonough. So [facility] [phone].    ATENCIÓN: Si habla español, tiene a corcoran disposición servicios gratuitos de asistencia lingüística. Llame al [phone].    We comply with applicable federal civil rights laws and [place] laws. We do not discriminate on the basis of race, color, national origin, age, disability, sex, sexual orientation, or gender identity.            Thank you!     Thank you for choosing [facility]  for your care. Our goal is always to provide you with excellent care. Hearing back from our patients is one way we can continue to improve our services.  Please take a few minutes to complete the written survey that you may receive in the mail after your visit with us. Thank you!             Your Updated Medication List - Protect others around you: Learn how to safely use, store and throw away your medicines at www.disposemymeds.org.          This list is accurate as of 7/26/18 10:18 AM.  Always use your most recent med list.                   Brand Name Dispense Instructions for use Diagnosis    aspirin 81 MG tablet      Take 1 tablet by mouth daily.        Azelastine HCl 0.15 % Soln    ASTEPRO    30 mL    Spray 1 spray into both nostrils daily    Seasonal allergic rhinitis, unspecified chronicity, unspecified trigger       cholecalciferol 12737 units capsule    VITAMIN D3     1 tablet weekly        CITRACAL OR      Take 1 tablet by mouth daily.        FISH OIL PO      Take 1 tablet by mouth daily.        FLUoxetine 20 MG tablet      Take 20 mg by mouth daily        hydrochlorothiazide 25 MG tablet    HYDRODIURIL    90 tablet    TAKE 1 TABLET (25 MG) BY MOUTH DAILY    Benign essential hypertension       lamoTRIgine 200 MG tablet    LaMICtal     1 TABLET TWICE DAILY        losartan 100 MG tablet    COZAAR    90 tablet    Take 1 tablet (100 mg) by mouth daily    Benign essential hypertension       metoprolol tartrate 50 MG tablet    LOPRESSOR    135 tablet    One in the morning and 1/2 in the evening    Benign essential hypertension       mometasone-formoterol 200-5 MCG/ACT oral inhaler    DULERA    39 g    Inhale 2 puffs into the lungs 2 times daily    Mild persistent asthma without complication       Multi-vitamin Tabs tablet   Generic drug:  multivitamin, therapeutic with minerals      Take 1 tablet by mouth daily.        PRISTIQ 50 MG 24 hr tablet   Generic drug:  desvenlafaxine succinate      Take 100 mg by mouth daily        rosuvastatin 40 MG tablet    CRESTOR    90 tablet    TAKE 1 TABLET (40 MG) BY MOUTH DAILY generic rosuvastatin please    Hyperlipidemia  with target LDL less than 100       SYNTHROID 50 MCG tablet   Generic drug:  levothyroxine      Take 1 tablet by mouth daily.        TRINTELLIX PO           verapamil 240 MG CR tablet    CALAN-SR    90 tablet    Take 1 tablet (240 mg) by mouth daily    Benign essential hypertension

## 2018-07-26 NOTE — LETTER
7/26/2018         RE: Kamryn Miller  [address] [facility] [address]   [address]  [facility] [address]        Dear Colleague,    Thank you for referring your patient, Kamryn Miller, to the [facility]. Please see a copy of my visit note below.    Subjective:    7/12/18  Pt is seen todaywith the c/c of painful lesions on the left hallux, 4th toe and right third toe.  This has been problematic for 4 month(s).  Pain is aggrevated by activity and relieved by rest.  Describes as burning pain and this is slowly getting worse.  Has tried OTC products without alleviation of lesion/symptoms.  Patient is requesting more definitive treatment.    7/26/18 some pain for few days after cryotherapy and now fine.    ROS: denies drainage, denies numbness, denies erythema or edema        Allergies   Allergen Reactions     Ace Inhibitors Cough     Diclofenac Other (See Comments)     Balance problems       Current Outpatient Prescriptions   Medication Sig Dispense Refill     aspirin 81 MG tablet Take 1 tablet by mouth daily.       Azelastine HCl (ASTEPRO) 0.15 % SOLN Spray 1 spray into both nostrils daily 30 mL 4     Calcium Citrate (CITRACAL OR) Take 1 tablet by mouth daily.       CHOLECALCIFEROL 95411 UNIT PO CAPS 1 tablet weekly       desvenlafaxine (PRISTIQ) 50 MG 24 hr tablet Take 100 mg by mouth daily        FLUoxetine 20 MG tablet Take 20 mg by mouth daily       hydrochlorothiazide (HYDRODIURIL) 25 MG tablet TAKE 1 TABLET (25 MG) BY MOUTH DAILY 90 tablet 4     LAMOTRIGINE 200 MG PO TABS 1 TABLET TWICE DAILY       levothyroxine (SYNTHROID) 50 MCG tablet Take 1 tablet by mouth daily.       losartan (COZAAR) 100 MG tablet Take 1 tablet (100 mg) by mouth daily 90 tablet 4     metoprolol tartrate (LOPRESSOR) 50 MG tablet One in the morning and 1/2 in the evening 135 tablet 4     mometasone-formoterol (DULERA) 200-5 MCG/ACT oral inhaler Inhale 2 puffs into the lungs 2 times daily 39 g 4     Multiple Vitamin (MULTI-VITAMIN) per  tablet Take 1 tablet by mouth daily.       Omega-3 Fatty Acids (FISH OIL PO) Take 1 tablet by mouth daily.       rosuvastatin (CRESTOR) 40 MG tablet TAKE 1 TABLET (40 MG) BY MOUTH DAILY generic rosuvastatin please 90 tablet 3     verapamil (CALAN-SR) 240 MG CR tablet Take 1 tablet (240 mg) by mouth daily 90 tablet 4     Vortioxetine HBr (TRINTELLIX PO)          Patient Active Problem List   Diagnosis     CAD (coronary artery disease)     Hyperlipidemia with target LDL less than 100     Mild major depression (H)     Pulmonary hypertension     Mild persistent asthma     Seasonal allergic rhinitis     Mitral insufficiency     Tricuspid insufficiency     Bipolar disorder (H)     Renal insufficiency     Tremors     Advanced directives, counseling/discussion     Family history of diabetes mellitus     Family history of celiac disease     Celiac disease     History of colonic polyps     Benign essential hypertension     Hypothyroidism, unspecified type       Past Medical History:   Diagnosis Date     Aortic valvar stenosis 7/2010    mild     Asthma      Bipolar disorder (H)      CAD (coronary artery disease)     s/p angioplasty     Celiac disease      Colon polyps      Colon polyps 2012    every 3 year colonoscopy      Depression      High cholesterol      HTN      Mitral insufficiency      Pulmonary HTN     mild     Tremors 7/10    drug induced from antidepressants     Tricuspid insufficiency        Past Surgical History:   Procedure Laterality Date     ANGIOGRAM  6/26/2009     ANGIOPLASTY  9/96    for angina     ANGIOPLASTY  8/03 - 9/03    X -2 - with stenst in coronary car.     APPENDECTOMY       BREAST BIOPSY, RT/LT      Breat Biopsy RT/LT, benign     BUNIONECTOMY  11/8/2011    Procedure:BUNIONECTOMY; Left donna bunionectomy; Surgeon:FREDY LITTLEJOHN; Location:[facility]     BUNIONECTOMY RT/LT  5/2007    right bunion and right 2nd hammertoe     C ANESTH,BLEPHAROPLASTY      (R) for drooping eyelid     C APPENDECTOMY        CATARACT IOL, RT/LT  6/12 and 7/12    bilateral     COLONOSCOPY  2007, 2012    every 3 years for polyps     JOINT REPLACEMENT, HIP RT/LT  4/2008    right hip replaced     JOINT REPLACEMENT, HIP RT/LT  4/2009    left hip replaced     REPAIR HAMMER TOE  11/8/2011    Procedure:REPAIR HAMMER TOE; left 2nd hammertoe repair; Surgeon:FREDY CARLSON Location:[facility] OR     SURGICAL HISTORY OF -   11/11    left bunion and 2nd hammertoe repair     TUBAL LIGATION         Family History   Problem Relation Age of Onset     Asthma Mother      Cerebrovascular Disease Mother      Arthritis Mother      Depression Mother      Lipids Sister      Hypertension Sister      HEART DISEASE Sister      Lipids Sister      Hypertension Sister      Lipids Sister      Breast Cancer Sister        Social History   Substance Use Topics     Smoking status: Never Smoker     Smokeless tobacco: Never Used     Alcohol use No         Exam:    BP 98/58  Pulse 60.  Good historian.  A&O X 3.  Pulses DP, PT 2/4 b/l.  CRT < 3 seconds X 10 digits.  No edema or varicosities noted.  Sensation to light touch intact b/l.  Reflexes 2/4 b/l.  Skin has normal texture and turgor b/l.  pronated arch with weightbearing.  No forefoot or rear foot deformities noted.  MS 5/5 all compartments.  Normal ROM all fore foot and rearfoot joints.  No equinus.   Lesions are located on left toes 1/4 and right third toe and have cauliflower appearance with obliterated skin lines and pain with lateral compression.  No subcutaneus masses noted.  Petechia capillary impingement noted within the lesions.   No sign of infections or open wounds.  No drainage noted.  Smaller than last visit.    A/P    Verrucous lesions X 3 right and left feet.    Discussed all are smaller.  Would like to continue cryotherapy.  Risks, complications and efficacy were discussed.   Cryotherapy times 3.  RETURN TO CLINIC in 2-3 weeks.     Fredy Carlson DPM, FACFAS                Again, thank you for allowing me  to participate in the care of your patient.        Sincerely,        Carlos Carlson DPM

## 2018-07-26 NOTE — PROGRESS NOTES
Subjective:    7/12/18  Pt is seen todaywith the c/c of painful lesions on the left hallux, 4th toe and right third toe.  This has been problematic for 4 month(s).  Pain is aggrevated by activity and relieved by rest.  Describes as burning pain and this is slowly getting worse.  Has tried OTC products without alleviation of lesion/symptoms.  Patient is requesting more definitive treatment.    7/26/18 some pain for few days after cryotherapy and now fine.    ROS: denies drainage, denies numbness, denies erythema or edema        Allergies   Allergen Reactions     Ace Inhibitors Cough     Diclofenac Other (See Comments)     Balance problems       Current Outpatient Prescriptions   Medication Sig Dispense Refill     aspirin 81 MG tablet Take 1 tablet by mouth daily.       Azelastine HCl (ASTEPRO) 0.15 % SOLN Spray 1 spray into both nostrils daily 30 mL 4     Calcium Citrate (CITRACAL OR) Take 1 tablet by mouth daily.       CHOLECALCIFEROL 61911 UNIT PO CAPS 1 tablet weekly       desvenlafaxine (PRISTIQ) 50 MG 24 hr tablet Take 100 mg by mouth daily        FLUoxetine 20 MG tablet Take 20 mg by mouth daily       hydrochlorothiazide (HYDRODIURIL) 25 MG tablet TAKE 1 TABLET (25 MG) BY MOUTH DAILY 90 tablet 4     LAMOTRIGINE 200 MG PO TABS 1 TABLET TWICE DAILY       levothyroxine (SYNTHROID) 50 MCG tablet Take 1 tablet by mouth daily.       losartan (COZAAR) 100 MG tablet Take 1 tablet (100 mg) by mouth daily 90 tablet 4     metoprolol tartrate (LOPRESSOR) 50 MG tablet One in the morning and 1/2 in the evening 135 tablet 4     mometasone-formoterol (DULERA) 200-5 MCG/ACT oral inhaler Inhale 2 puffs into the lungs 2 times daily 39 g 4     Multiple Vitamin (MULTI-VITAMIN) per tablet Take 1 tablet by mouth daily.       Omega-3 Fatty Acids (FISH OIL PO) Take 1 tablet by mouth daily.       rosuvastatin (CRESTOR) 40 MG tablet TAKE 1 TABLET (40 MG) BY MOUTH DAILY generic rosuvastatin please 90 tablet 3     verapamil (CALAN-SR) 240  MG CR tablet Take 1 tablet (240 mg) by mouth daily 90 tablet 4     Vortioxetine HBr (TRINTELLIX PO)          Patient Active Problem List   Diagnosis     CAD (coronary artery disease)     Hyperlipidemia with target LDL less than 100     Mild major depression (H)     Pulmonary hypertension     Mild persistent asthma     Seasonal allergic rhinitis     Mitral insufficiency     Tricuspid insufficiency     Bipolar disorder (H)     Renal insufficiency     Tremors     Advanced directives, counseling/discussion     Family history of diabetes mellitus     Family history of celiac disease     Celiac disease     History of colonic polyps     Benign essential hypertension     Hypothyroidism, unspecified type       Past Medical History:   Diagnosis Date     Aortic valvar stenosis 7/2010    mild     Asthma      Bipolar disorder (H)      CAD (coronary artery disease)     s/p angioplasty     Celiac disease      Colon polyps      Colon polyps 2012    every 3 year colonoscopy      Depression      High cholesterol      HTN      Mitral insufficiency      Pulmonary HTN     mild     Tremors 7/10    drug induced from antidepressants     Tricuspid insufficiency        Past Surgical History:   Procedure Laterality Date     ANGIOGRAM  6/26/2009     ANGIOPLASTY  9/96    for angina     ANGIOPLASTY  8/03 - 9/03    X -2 - with stenst in coronary car.     APPENDECTOMY       BREAST BIOPSY, RT/LT      Breat Biopsy RT/LT, benign     BUNIONECTOMY  11/8/2011    Procedure:BUNIONECTOMY; Left donna bunionectomy; Surgeon:FREDY LITTLEJOHN; Location:[facility]     BUNIONECTOMY RT/LT  5/2007    right bunion and right 2nd hammertoe     C ANESTH,BLEPHAROPLASTY      (R) for drooping eyelid     C APPENDECTOMY       CATARACT IOL, RT/LT  6/12 and 7/12    bilateral     COLONOSCOPY  2007, 2012    every 3 years for polyps     JOINT REPLACEMENT, HIP RT/LT  4/2008    right hip replaced     JOINT REPLACEMENT, HIP RT/LT  4/2009    left hip replaced     REPAIR HAMMER TOE   11/8/2011    Procedure:REPAIR HAMMER TOE; left 2nd hammertoe repair; Surgeon:FREDY CARLSON; Location:[facility]     SURGICAL HISTORY OF -   11/11    left bunion and 2nd hammertoe repair     TUBAL LIGATION         Family History   Problem Relation Age of Onset     Asthma Mother      Cerebrovascular Disease Mother      Arthritis Mother      Depression Mother      Lipids Sister      Hypertension Sister      HEART DISEASE Sister      Lipids Sister      Hypertension Sister      Lipids Sister      Breast Cancer Sister        Social History   Substance Use Topics     Smoking status: Never Smoker     Smokeless tobacco: Never Used     Alcohol use No         Exam:    BP 98/58  Pulse 60.  Good historian.  A&O X 3.  Pulses DP, PT 2/4 b/l.  CRT < 3 seconds X 10 digits.  No edema or varicosities noted.  Sensation to light touch intact b/l.  Reflexes 2/4 b/l.  Skin has normal texture and turgor b/l.  pronated arch with weightbearing.  No forefoot or rear foot deformities noted.  MS 5/5 all compartments.  Normal ROM all fore foot and rearfoot joints.  No equinus.   Lesions are located on left toes 1/4 and right third toe and have cauliflower appearance with obliterated skin lines and pain with lateral compression.  No subcutaneus masses noted.  Petechia capillary impingement noted within the lesions.   No sign of infections or open wounds.  No drainage noted.  Smaller than last visit.    A/P    Verrucous lesions X 3 right and left feet.    Discussed all are smaller.  Would like to continue cryotherapy.  Risks, complications and efficacy were discussed.   Cryotherapy times 3.  RETURN TO CLINIC in 2-3 weeks.     Fredy Carlson DPDANILO, FACFAS

## 2018-08-09 ENCOUNTER — OFFICE VISIT (OUTPATIENT)
Dept: PODIATRY | Facility: CLINIC | Age: 83
End: 2018-08-09
Payer: COMMERCIAL

## 2018-08-09 VITALS
WEIGHT: 163 LBS | DIASTOLIC BLOOD PRESSURE: 72 MMHG | BODY MASS INDEX: 31.58 KG/M2 | HEART RATE: 70 BPM | SYSTOLIC BLOOD PRESSURE: 110 MMHG

## 2018-08-09 DIAGNOSIS — B07.0 VERRUCA PLANTARIS: Primary | ICD-10-CM

## 2018-08-09 PROCEDURE — 17110 DESTRUCTION B9 LES UP TO 14: CPT | Performed by: PODIATRIST

## 2018-08-09 RX ORDER — VORTIOXETINE 5 MG/1
5 TABLET, FILM COATED ORAL DAILY
Refills: 2 | COMMUNITY
Start: 2018-07-24 | End: 2019-10-28

## 2018-08-09 NOTE — PATIENT INSTRUCTIONS
We wish you continued good healing. If you have any questions or concerns, please do not hesitate to contact us at [phone]    Please remember to call and schedule a follow up appointment if one was recommended at your earliest convenience.   PODIATRY CLINIC HOURS  TELEPHONE NUMBER    Dr. Carlos Carlson D.P.M [facility]    Clinics:  [facility]    [facility]asc[facility]   Tuesday 1PM-6PM  [place]  Wednesday 7AM-2PM  [facility]  Thursday 10AM-6PM  [place]  Friday 7AM-3PM  [place]  Specialty schedulers:   [phone] to make an appointment with any Specialty Provider.        Urgent Care locations:    [facility] Monday-Friday 5 pm - 9 pm. Saturday-Sunday 9 am -5pm    Monday-Friday 11 am - 9 pm Saturday 9 am - 5 pm     Monday-Sunday 12 noon-8PM [phone]     If you need a medication refill, please contact us you may need lab work and/or a follow up visit prior to your refill (i.e. Antifungal medications).    [employer] (secure e-mail communication and access to your chart) to send a message or to make an appointment.    If MRI needed please call [place] Lopez at [phone]        Weight management plan: Patient was referred to their PCP to discuss a diet and exercise plan.

## 2018-08-09 NOTE — LETTER
8/9/2018         RE: Kamryn Miller  1995 [facility]    [address]  [facility] [address]        Dear Colleague,    Thank you for referring your patient, Kamryn Miller, to the [facility]. Please see a copy of my visit note below.    Subjective:    7/12/18  Pt is seen todaywith the c/c of painful lesions on the left hallux, 4th toe and right third toe.  This has been problematic for 4 month(s).  Pain is aggrevated by activity and relieved by rest.  Describes as burning pain and this is slowly getting worse.  Has tried OTC products without alleviation of lesion/symptoms.  Patient is requesting more definitive treatment.    7/26/18 some pain for few days after cryotherapy and now fine.    8/8/18  Pain for a couple days after last cryotherapy    ROS: denies drainage, denies numbness, denies erythema or edema        Allergies   Allergen Reactions     Ace Inhibitors Cough     Diclofenac Other (See Comments)     Balance problems       Current Outpatient Prescriptions   Medication Sig Dispense Refill     aspirin 81 MG tablet Take 1 tablet by mouth daily.       Azelastine HCl (ASTEPRO) 0.15 % SOLN Spray 1 spray into both nostrils daily 30 mL 4     Calcium Citrate (CITRACAL OR) Take 1 tablet by mouth daily.       CHOLECALCIFEROL 83288 UNIT PO CAPS 1 tablet weekly       desvenlafaxine (PRISTIQ) 50 MG 24 hr tablet Take 100 mg by mouth daily        FLUoxetine 20 MG tablet Take 20 mg by mouth daily       hydrochlorothiazide (HYDRODIURIL) 25 MG tablet TAKE 1 TABLET (25 MG) BY MOUTH DAILY 90 tablet 4     LAMOTRIGINE 200 MG PO TABS 1 TABLET TWICE DAILY       levothyroxine (SYNTHROID) 50 MCG tablet Take 1 tablet by mouth daily.       losartan (COZAAR) 100 MG tablet Take 1 tablet (100 mg) by mouth daily 90 tablet 4     metoprolol tartrate (LOPRESSOR) 50 MG tablet One in the morning and 1/2 in the evening 135 tablet 4     mometasone-formoterol (DULERA) 200-5 MCG/ACT oral inhaler Inhale 2 puffs into the lungs 2 times  daily 39 g 4     Multiple Vitamin (MULTI-VITAMIN) per tablet Take 1 tablet by mouth daily.       Omega-3 Fatty Acids (FISH OIL PO) Take 1 tablet by mouth daily.       rosuvastatin (CRESTOR) 40 MG tablet TAKE 1 TABLET (40 MG) BY MOUTH DAILY generic rosuvastatin please 90 tablet 3     verapamil (CALAN-SR) 240 MG CR tablet Take 1 tablet (240 mg) by mouth daily 90 tablet 4     Vortioxetine HBr (TRINTELLIX PO)          Patient Active Problem List   Diagnosis     CAD (coronary artery disease)     Hyperlipidemia with target LDL less than 100     Mild major depression (H)     Pulmonary hypertension     Mild persistent asthma     Seasonal allergic rhinitis     Mitral insufficiency     Tricuspid insufficiency     Bipolar disorder (H)     Renal insufficiency     Tremors     Advanced directives, counseling/discussion     Family history of diabetes mellitus     Family history of celiac disease     Celiac disease     History of colonic polyps     Benign essential hypertension     Hypothyroidism, unspecified type       Past Medical History:   Diagnosis Date     Aortic valvar stenosis 7/2010    mild     Asthma      Bipolar disorder (H)      CAD (coronary artery disease)     s/p angioplasty     Celiac disease      Colon polyps      Colon polyps 2012    every 3 year colonoscopy      Depression      High cholesterol      HTN      Mitral insufficiency      Pulmonary HTN     mild     Tremors 7/10    drug induced from antidepressants     Tricuspid insufficiency        Past Surgical History:   Procedure Laterality Date     ANGIOGRAM  6/26/2009     ANGIOPLASTY  9/96    for angina     ANGIOPLASTY  8/03 - 9/03    X -2 - with stenst in coronary car.     APPENDECTOMY       BREAST BIOPSY, RT/LT      Breat Biopsy RT/LT, benign     BUNIONECTOMY  11/8/2011    Procedure:BUNIONECTOMY; Left donna bunionectomy; Surgeon:FREDY LITTLEJOHN; Location:[facility]     BUNIONECTOMY RT/LT  5/2007    right bunion and right 2nd hammansone     C ANESTH,BLEPHAROPLASTY       (R) for drooping eyelid     C APPENDECTOMY       CATARACT IOL, RT/LT  6/12 and 7/12    bilateral     COLONOSCOPY  2007, 2012    every 3 years for polyps     JOINT REPLACEMENT, HIP RT/LT  4/2008    right hip replaced     JOINT REPLACEMENT, HIP RT/LT  4/2009    left hip replaced     REPAIR HAMMER TOE  11/8/2011    Procedure:REPAIR HAMMER TOE; left 2nd hammertoe repair; Surgeon:FREDY LITTLEJOHN; Location:[facility]     SURGICAL HISTORY OF -   11/11    left bunion and 2nd hammertoe repair     TUBAL LIGATION         Family History   Problem Relation Age of Onset     Asthma Mother      Cerebrovascular Disease Mother      Arthritis Mother      Depression Mother      Lipids Sister      Hypertension Sister      HEART DISEASE Sister      Lipids Sister      Hypertension Sister      Lipids Sister      Breast Cancer Sister        Social History   Substance Use Topics     Smoking status: Never Smoker     Smokeless tobacco: Never Used     Alcohol use No         Exam:    /72  Pulse 70  Wt 163 lb (73.9 kg)  BMI 31.58 kg/m2.  Good historian.  A&O X 3.  Pulses DP, PT 2/4 b/l.  CRT < 3 seconds X 10 digits.  No edema or varicosities noted.  Sensation to light touch intact b/l.  Reflexes 2/4 b/l.  Skin has normal texture and turgor b/l.  pronated arch with weightbearing.  No forefoot or rear foot deformities noted.  MS 5/5 all compartments.  Normal ROM all fore foot and rearfoot joints.  No equinus.   Lesions are located on left toes 1/4 and right third toe and have cauliflower appearance with obliterated skin lines and pain with lateral compression.  No subcutaneus masses noted.  Petechia capillary impingement noted within the lesions.   No sign of infections or open wounds.  No drainage noted.  Smaller than last visit.    A/P    Verrucous lesions X 3 right and left feet.    Discussed all are smaller.  Would like to continue cryotherapy.  Risks, complications and efficacy were discussed.   Cryotherapy times 3.  RETURN TO CLINIC  in 2-3 weeks.     Carlos Carlson DPM, FACFAS                Again, thank you for allowing me to participate in the care of your patient.        Sincerely,        Carlos Carlson DPM

## 2018-08-09 NOTE — PROGRESS NOTES
Subjective:    7/12/18  Pt is seen todaywith the c/c of painful lesions on the left hallux, 4th toe and right third toe.  This has been problematic for 4 month(s).  Pain is aggrevated by activity and relieved by rest.  Describes as burning pain and this is slowly getting worse.  Has tried OTC products without alleviation of lesion/symptoms.  Patient is requesting more definitive treatment.    7/26/18 some pain for few days after cryotherapy and now fine.    8/8/18  Pain for a couple days after last cryotherapy    ROS: denies drainage, denies numbness, denies erythema or edema        Allergies   Allergen Reactions     Ace Inhibitors Cough     Diclofenac Other (See Comments)     Balance problems       Current Outpatient Prescriptions   Medication Sig Dispense Refill     aspirin 81 MG tablet Take 1 tablet by mouth daily.       Azelastine HCl (ASTEPRO) 0.15 % SOLN Spray 1 spray into both nostrils daily 30 mL 4     Calcium Citrate (CITRACAL OR) Take 1 tablet by mouth daily.       CHOLECALCIFEROL 35699 UNIT PO CAPS 1 tablet weekly       desvenlafaxine (PRISTIQ) 50 MG 24 hr tablet Take 100 mg by mouth daily        FLUoxetine 20 MG tablet Take 20 mg by mouth daily       hydrochlorothiazide (HYDRODIURIL) 25 MG tablet TAKE 1 TABLET (25 MG) BY MOUTH DAILY 90 tablet 4     LAMOTRIGINE 200 MG PO TABS 1 TABLET TWICE DAILY       levothyroxine (SYNTHROID) 50 MCG tablet Take 1 tablet by mouth daily.       losartan (COZAAR) 100 MG tablet Take 1 tablet (100 mg) by mouth daily 90 tablet 4     metoprolol tartrate (LOPRESSOR) 50 MG tablet One in the morning and 1/2 in the evening 135 tablet 4     mometasone-formoterol (DULERA) 200-5 MCG/ACT oral inhaler Inhale 2 puffs into the lungs 2 times daily 39 g 4     Multiple Vitamin (MULTI-VITAMIN) per tablet Take 1 tablet by mouth daily.       Omega-3 Fatty Acids (FISH OIL PO) Take 1 tablet by mouth daily.       rosuvastatin (CRESTOR) 40 MG tablet TAKE 1 TABLET (40 MG) BY MOUTH DAILY generic  rosuvastatin please 90 tablet 3     verapamil (CALAN-SR) 240 MG CR tablet Take 1 tablet (240 mg) by mouth daily 90 tablet 4     Vortioxetine HBr (TRINTELLIX PO)          Patient Active Problem List   Diagnosis     CAD (coronary artery disease)     Hyperlipidemia with target LDL less than 100     Mild major depression (H)     Pulmonary hypertension     Mild persistent asthma     Seasonal allergic rhinitis     Mitral insufficiency     Tricuspid insufficiency     Bipolar disorder (H)     Renal insufficiency     Tremors     Advanced directives, counseling/discussion     Family history of diabetes mellitus     Family history of celiac disease     Celiac disease     History of colonic polyps     Benign essential hypertension     Hypothyroidism, unspecified type       Past Medical History:   Diagnosis Date     Aortic valvar stenosis 7/2010    mild     Asthma      Bipolar disorder (H)      CAD (coronary artery disease)     s/p angioplasty     Celiac disease      Colon polyps      Colon polyps 2012    every 3 year colonoscopy      Depression      High cholesterol      HTN      Mitral insufficiency      Pulmonary HTN     mild     Tremors 7/10    drug induced from antidepressants     Tricuspid insufficiency        Past Surgical History:   Procedure Laterality Date     ANGIOGRAM  6/26/2009     ANGIOPLASTY  9/96    for angina     ANGIOPLASTY  8/03 - 9/03    X -2 - with stenst in coronary car.     APPENDECTOMY       BREAST BIOPSY, RT/LT      Breat Biopsy RT/LT, benign     BUNIONECTOMY  11/8/2011    Procedure:BUNIONECTOMY; Left donna bunionectomy; Surgeon:FREDY LITTLEJOHN; Location:[facility]     BUNIONECTOMY RT/LT  5/2007    right bunion and right 2nd hammertoe     C ANESTH,BLEPHAROPLASTY      (R) for drooping eyelid     C APPENDECTOMY       CATARACT IOL, RT/LT  6/12 and 7/12    bilateral     COLONOSCOPY  2007, 2012    every 3 years for polyps     JOINT REPLACEMENT, HIP RT/LT  4/2008    right hip replaced     JOINT REPLACEMENT, HIP  RT/LT  4/2009    left hip replaced     REPAIR HAMMER TOE  11/8/2011    Procedure:REPAIR HAMMER TOE; left 2nd hammertoe repair; Surgeon:FREDY CARLSON; Location:[facility]     SURGICAL HISTORY OF -   11/11    left bunion and 2nd hammertoe repair     TUBAL LIGATION         Family History   Problem Relation Age of Onset     Asthma Mother      Cerebrovascular Disease Mother      Arthritis Mother      Depression Mother      Lipids Sister      Hypertension Sister      HEART DISEASE Sister      Lipids Sister      Hypertension Sister      Lipids Sister      Breast Cancer Sister        Social History   Substance Use Topics     Smoking status: Never Smoker     Smokeless tobacco: Never Used     Alcohol use No         Exam:    /72  Pulse 70  Wt 163 lb (73.9 kg)  BMI 31.58 kg/m2.  Good historian.  A&O X 3.  Pulses DP, PT 2/4 b/l.  CRT < 3 seconds X 10 digits.  No edema or varicosities noted.  Sensation to light touch intact b/l.  Reflexes 2/4 b/l.  Skin has normal texture and turgor b/l.  pronated arch with weightbearing.  No forefoot or rear foot deformities noted.  MS 5/5 all compartments.  Normal ROM all fore foot and rearfoot joints.  No equinus.   Lesions are located on left toes 1/4 and right third toe and have cauliflower appearance with obliterated skin lines and pain with lateral compression.  No subcutaneus masses noted.  Petechia capillary impingement noted within the lesions.   No sign of infections or open wounds.  No drainage noted.  Smaller than last visit.    A/P    Verrucous lesions X 3 right and left feet.    Discussed all are smaller.  Would like to continue cryotherapy.  Risks, complications and efficacy were discussed.   Cryotherapy times 3.  RETURN TO CLINIC in 2-3 weeks.     Fredy Carlson DPM, FACFAS

## 2018-08-09 NOTE — MR AVS SNAPSHOT
After Visit Summary   8/9/2018    Kamryn Miller    MRN: [id number]           Patient Information     Date Of Birth          1935        Visit Information        Provider Department      8/9/2018 10:30 AM Carlos Carlson, HYACINTH [facility]        Care Instructions    We wish you continued good healing. If you have any questions or concerns, please do not hesitate to contact us at [phone]    Please remember to call and schedule a follow up appointment if one was recommended at your earliest convenience.   PODIATRY CLINIC HOURS  TELEPHONE NUMBER    Dr. Carlos Carlson D.P.M FAC FAS    Clinics:  [facility]    [facility]   Tuesday 1PM-6PM  [place] [facility]  Wednesday 7AM-2PM  [place]  Thursday 10AM-6PM  [place]  Friday 7AM-3PM  [place]  Specialty schedulers:   [phone] to make an appointment with any Specialty Provider.        Urgent Care locations:    [facility] Monday-Friday 5 pm - 9 pm. Saturday-Sunday 9 am -5pm    Monday-Friday 11 am - 9 pm Saturday 9 am - 5 pm     Monday-Sunday 12 noon-8PM [phone]     If you need a medication refill, please contact us you may need lab work and/or a follow up visit prior to your refill (i.e. Antifungal medications).    [employer] (secure e-mail communication and access to your chart) to send a message or to make an appointment.    If MRI needed please call Tyrese Lopez at [phone]        Weight management plan: Patient was referred to their PCP to discuss a diet and exercise plan.            Follow-ups after your visit        Who to contact     If you have questions or need follow up information about today's clinic visit or your schedule please contact [facility] directly at [phone].  Normal or non-critical lab and imaging results will be communicated to you by  MyChart, letter or phone within 4 business days after the clinic has received the results. If you do not hear from us within 7 days, please contact the clinic through MyChart or phone. If you have a critical or abnormal lab result, we will notify you by phone as soon as possible.  Submit refill requests through [employer] or call your pharmacy and they will forward the refill request to us. Please allow 3 business days for your refill to be completed.          Additional Information About Your Visit        Care EveryWhere ID     This is your Care EveryWhere ID. This could be used by other organizations to access your [place] medical records  [id number]        Your Vitals Were     Pulse BMI (Body Mass Index)                70 31.58 kg/m2           Blood Pressure from Last 3 Encounters:   08/09/18 110/72   07/26/18 98/58   07/12/18 117/72    Weight from Last 3 Encounters:   08/09/18 163 lb (73.9 kg)   07/26/18 163 lb (73.9 kg)   07/12/18 163 lb (73.9 kg)              Today, you had the following     No orders found for display       Primary Care Provider Office Phone # Fax #    Kamryn Jha [phone] [address] [facility] [address]        Equal Access to Services     TAQUERIA OBANDO : Hadii aad ku hadasho Soomaali, waaxda luqadaha, qaybta kaalmada adeegyada, waxay kyrie mcdonough. So [facility] [phone].    ATENCIÓN: Si habla español, tiene a corcoran disposición servicios gratuitos de asistencia lingüística. Llame al [phone].    We comply with applicable federal civil rights laws and [place] laws. We do not discriminate on the basis of race, color, national origin, age, disability, sex, sexual orientation, or gender identity.            Thank you!     Thank you for choosing [facility]  for your care. Our goal is always to provide you with excellent care. Hearing back from our patients is one way we can continue to improve our services.  Please take a few minutes to complete the written survey that you may receive in the mail after your visit with us. Thank you!             Your Updated Medication List - Protect others around you: Learn how to safely use, store and throw away your medicines at www.disposemymeds.org.          This list is accurate as of 8/9/18 10:31 AM.  Always use your most recent med list.                   Brand Name Dispense Instructions for use Diagnosis    aspirin 81 MG tablet      Take 1 tablet by mouth daily.        Azelastine HCl 0.15 % Soln    ASTEPRO    30 mL    Spray 1 spray into both nostrils daily    Seasonal allergic rhinitis, unspecified chronicity, unspecified trigger       cholecalciferol 60745 units capsule    VITAMIN D3     1 tablet weekly        CITRACAL OR      Take 1 tablet by mouth daily.        FISH OIL PO      Take 1 tablet by mouth daily.        FLUoxetine 20 MG tablet      Take 20 mg by mouth daily        hydrochlorothiazide 25 MG tablet    HYDRODIURIL    90 tablet    TAKE 1 TABLET (25 MG) BY MOUTH DAILY    Benign essential hypertension       lamoTRIgine 200 MG tablet    LaMICtal     1 TABLET TWICE DAILY        losartan 100 MG tablet    COZAAR    90 tablet    Take 1 tablet (100 mg) by mouth daily    Benign essential hypertension       metoprolol tartrate 50 MG tablet    LOPRESSOR    135 tablet    One in the morning and 1/2 in the evening    Benign essential hypertension       mometasone-formoterol 200-5 MCG/ACT oral inhaler    DULERA    39 g    Inhale 2 puffs into the lungs 2 times daily    Mild persistent asthma without complication       Multi-vitamin Tabs tablet   Generic drug:  multivitamin, therapeutic with minerals      Take 1 tablet by mouth daily.        PRISTIQ 50 MG 24 hr tablet   Generic drug:  desvenlafaxine succinate      Take 100 mg by mouth daily        rosuvastatin 40 MG tablet    CRESTOR    90 tablet    TAKE 1 TABLET (40 MG) BY MOUTH DAILY generic rosuvastatin please    Hyperlipidemia with  target LDL less than 100       SYNTHROID 50 MCG tablet   Generic drug:  levothyroxine      Take 1 tablet by mouth daily.        TRINTELLIX 5 MG tablet   Generic drug:  vortioxetine      Take 5 mg by mouth daily        verapamil 240 MG CR tablet    CALAN-SR    90 tablet    Take 1 tablet (240 mg) by mouth daily    Benign essential hypertension

## 2018-08-30 ENCOUNTER — OFFICE VISIT (OUTPATIENT)
Dept: PODIATRY | Facility: CLINIC | Age: 83
End: 2018-08-30
Payer: COMMERCIAL

## 2018-08-30 VITALS
HEART RATE: 61 BPM | BODY MASS INDEX: 31.41 KG/M2 | WEIGHT: 160 LBS | TEMPERATURE: 98 F | DIASTOLIC BLOOD PRESSURE: 77 MMHG | SYSTOLIC BLOOD PRESSURE: 139 MMHG | RESPIRATION RATE: 13 BRPM | OXYGEN SATURATION: 100 % | HEIGHT: 60 IN

## 2018-08-30 DIAGNOSIS — B07.0 VERRUCA PLANTARIS: Primary | ICD-10-CM

## 2018-08-30 PROCEDURE — 17110 DESTRUCTION B9 LES UP TO 14: CPT | Performed by: PODIATRIST

## 2018-08-30 NOTE — PROGRESS NOTES
Subjective:    7/12/18  Pt is seen todaywith the c/c of painful lesions on the left hallux, 4th toe and right third toe.  This has been problematic for 4 month(s).  Pain is aggrevated by activity and relieved by rest.  Describes as burning pain and this is slowly getting worse.  Has tried OTC products without alleviation of lesion/symptoms.  Patient is requesting more definitive treatment.    7/26/18 some pain for few days after cryotherapy and now fine.    8/8/18  Pain for a couple days after last cryotherapy    8/30/18 patient returns for reevaluation of verruca.  Denies drainage erythema or edema.  Pain for only 1 day after cryotherapy    ROS: See above     Allergies   Allergen Reactions     Ace Inhibitors Cough     Diclofenac Other (See Comments)     Balance problems       Current Outpatient Prescriptions   Medication Sig Dispense Refill     aspirin 81 MG tablet Take 1 tablet by mouth daily.       Azelastine HCl (ASTEPRO) 0.15 % SOLN Spray 1 spray into both nostrils daily 30 mL 4     Calcium Citrate (CITRACAL OR) Take 1 tablet by mouth daily.       CHOLECALCIFEROL 92775 UNIT PO CAPS 1 tablet weekly       desvenlafaxine (PRISTIQ) 50 MG 24 hr tablet Take 100 mg by mouth daily        FLUoxetine 20 MG tablet Take 20 mg by mouth daily       hydrochlorothiazide (HYDRODIURIL) 25 MG tablet TAKE 1 TABLET (25 MG) BY MOUTH DAILY 90 tablet 4     LAMOTRIGINE 200 MG PO TABS 1 TABLET TWICE DAILY       levothyroxine (SYNTHROID) 50 MCG tablet Take 1 tablet by mouth daily.       losartan (COZAAR) 100 MG tablet Take 1 tablet (100 mg) by mouth daily 90 tablet 4     metoprolol tartrate (LOPRESSOR) 50 MG tablet One in the morning and 1/2 in the evening 135 tablet 4     mometasone-formoterol (DULERA) 200-5 MCG/ACT oral inhaler Inhale 2 puffs into the lungs 2 times daily 39 g 4     Multiple Vitamin (MULTI-VITAMIN) per tablet Take 1 tablet by mouth daily.       Omega-3 Fatty Acids (FISH OIL PO) Take 1 tablet by mouth daily.        rosuvastatin (CRESTOR) 40 MG tablet TAKE 1 TABLET (40 MG) BY MOUTH DAILY generic rosuvastatin please 90 tablet 3     TRINTELLIX 5 MG tablet Take 5 mg by mouth daily  2     verapamil (CALAN-SR) 240 MG CR tablet Take 1 tablet (240 mg) by mouth daily 90 tablet 4       Patient Active Problem List   Diagnosis     CAD (coronary artery disease)     Hyperlipidemia with target LDL less than 100     Mild major depression (H)     Pulmonary hypertension     Mild persistent asthma     Seasonal allergic rhinitis     Mitral insufficiency     Tricuspid insufficiency     Bipolar disorder (H)     Renal insufficiency     Tremors     Advanced directives, counseling/discussion     Family history of diabetes mellitus     Family history of celiac disease     Celiac disease     History of colonic polyps     Benign essential hypertension     Hypothyroidism, unspecified type       Past Medical History:   Diagnosis Date     Aortic valvar stenosis 7/2010    mild     Asthma      Bipolar disorder (H)      CAD (coronary artery disease)     s/p angioplasty     Celiac disease      Colon polyps      Colon polyps 2012    every 3 year colonoscopy      Depression      High cholesterol      HTN      Mitral insufficiency      Pulmonary HTN     mild     Tremors 7/10    drug induced from antidepressants     Tricuspid insufficiency        Past Surgical History:   Procedure Laterality Date     ANGIOGRAM  6/26/2009     ANGIOPLASTY  9/96    for angina     ANGIOPLASTY  8/03 - 9/03    X -2 - with stenst in coronary car.     APPENDECTOMY       BREAST BIOPSY, RT/LT      Breat Biopsy RT/LT, benign     BUNIONECTOMY  11/8/2011    Procedure:BUNIONECTOMY; Left donna bunionectomy; Surgeon:FREDY LITTLEJOHN; Location:[facility]     BUNIONECTOMY RT/LT  5/2007    right bunion and right 2nd hammertoe     C ANESTH,BLEPHAROPLASTY      (R) for drooping eyelid     C APPENDECTOMY       CATARACT IOL, RT/LT  6/12 and 7/12    bilateral     COLONOSCOPY  2007, 2012    every 3 years for  polyps     JOINT REPLACEMENT, HIP RT/LT  4/2008    right hip replaced     JOINT REPLACEMENT, HIP RT/LT  4/2009    left hip replaced     REPAIR HAMMER TOE  11/8/2011    Procedure:REPAIR HAMMER TOE; left 2nd hammertoe repair; Surgeon:FREDY CARLSON; Location:[facility]     SURGICAL HISTORY OF -   11/11    left bunion and 2nd hammertoe repair     TUBAL LIGATION         Family History   Problem Relation Age of Onset     Asthma Mother      Cerebrovascular Disease Mother      Arthritis Mother      Depression Mother      Lipids Sister      Hypertension Sister      HEART DISEASE Sister      Lipids Sister      Hypertension Sister      Lipids Sister      Breast Cancer Sister        Social History   Substance Use Topics     Smoking status: Never Smoker     Smokeless tobacco: Never Used     Alcohol use No         Exam:    /77  Pulse 61  Temp 98  F (36.7  C)  Resp 13  Ht 5' (1.524 m)  Wt 160 lb (72.6 kg)  SpO2 100%  BMI 31.25 kg/m2.  Good historian.  A&O X 3.  Pulses DP, PT 2/4 b/l.  CRT < 3 seconds X 10 digits.  No edema or varicosities noted.  Sensation to light touch intact b/l.  Reflexes 2/4 b/l.  Skin has normal texture and turgor b/l.  pronated arch with weightbearing.  No forefoot or rear foot deformities noted.  MS 5/5 all compartments.  Normal ROM all fore foot and rearfoot joints.  No equinus.   Lesions are located on left toes 1/4 and much smaller, 90% resolved.  Right third toe verruca resolved.  No subcutaneus masses noted.    A/P    Verrucous lesions left foot    Discussed all are smaller and right resolved.  Would like to continue cryotherapy.  Risks, complications and efficacy were discussed.   Cryotherapy times 3.  RETURN TO CLINIC in 2-3 weeks.     Fredy Carlson DPM, FACFAS

## 2018-08-30 NOTE — MR AVS SNAPSHOT
After Visit Summary   8/30/2018    Kamryn Miller    MRN: [id number]           Patient Information     Date Of Birth          1935        Visit Information        Provider Department      8/30/2018 10:00 AM Carlos Carlson, HYACINTH [facility]        Today's Diagnoses     Verruca plantaris    -  1      Care Instructions    Patient to follow up with Primary Care provider regarding elevated blood pressure.  Weight management plan: Patient was referred to their PCP to discuss a diet and exercise plan.   We wish you continued good healing. If you have any questions or concerns, please do not hesitate to contact us at [phone]    Please remember to call and schedule a follow up appointment if one was recommended at your earliest convenience.   PODIATRY CLINIC HOURS  TELEPHONE NUMBER    Dr. Carlos WILLIAMPSOLOMON [facility]    Clinics:  [facility]    Stefany Gasca CMA   Tuesday 1PM-6PM  [place]  Wednesday 7AM-2PM  [place]  Thursday 10AM-6PM  [place]  Friday 7AM-3PM  [place]  Specialty schedulers:   [phone] to make an appointment with any Specialty Provider.        Urgent Care locations:    [facility] Monday-Friday 5 pm - 9 pm. Saturday-Sunday 9 am -5pm    Monday-Friday 11 am - 9 pm Saturday 9 am - 5 pm     Monday-Sunday 12 noon-8PM [phone]     If you need a medication refill, please contact us you may need lab work and/or a follow up visit prior to your refill (i.e. Antifungal medications).    TheMarketshart (secure e-mail communication and access to your chart) to send a message or to make an appointment.    If MRI needed please call Tyrese Lopez at [phone]                    Follow-ups after your visit        Who to contact     If you have questions or need follow up information about today's clinic visit or your schedule please  contact [facility] directly at [phone].  Normal or non-critical lab and imaging results will be communicated to you by MyChart, letter or phone within 4 business days after the clinic has received the results. If you do not hear from us within 7 days, please contact the clinic through MyChart or phone. If you have a critical or abnormal lab result, we will notify you by phone as soon as possible.  Submit refill requests through Appydrinkt or call your pharmacy and they will forward the refill request to us. Please allow 3 business days for your refill to be completed.          Additional Information About Your Visit        Care EveryWhere ID     This is your Care EveryWhere ID. This could be used by other organizations to access your [place] medical records  [id number]        Your Vitals Were     Pulse Temperature Respirations Height Pulse Oximetry BMI (Body Mass Index)    61 98  F (36.7  C) 13 5' (1.524 m) 100% 31.25 kg/m2       Blood Pressure from Last 3 Encounters:   08/30/18 139/77   08/09/18 110/72   07/26/18 98/58    Weight from Last 3 Encounters:   08/30/18 160 lb (72.6 kg)   08/09/18 163 lb (73.9 kg)   07/26/18 163 lb (73.9 kg)              We Performed the Following     DESTRUCT BENIGN LESION, UP TO 14        Primary Care Provider Office Phone # Fax #    Kamryn Jha [phone] [address] [facility] [phone]        Equal Access to Services     SHONNA OBANDO : Hadii aad ku hadasho Soomaali, waaxda luqadaha, qaybta kaalmada adeegyada, waxay idiin haymichelle palm . So [facility] [phone].    ATENCIÓN: Si habla español, tiene a corcoran disposición servicios gratuitos de asistencia lingüística. Llame al [phone].    We comply with applicable federal civil rights laws and [place] laws. We do not discriminate on the basis of race, color, national origin, age, disability, sex, sexual orientation, or gender identity.            Thank you!      Thank you for choosing [facility]  for your care. Our goal is always to provide you with excellent care. Hearing back from our patients is one way we can continue to improve our services. Please take a few minutes to complete the written survey that you may receive in the mail after your visit with us. Thank you!             Your Updated Medication List - Protect others around you: Learn how to safely use, store and throw away your medicines at www.disposemymeds.org.          This list is accurate as of 8/30/18 10:17 AM.  Always use your most recent med list.                   Brand Name Dispense Instructions for use Diagnosis    aspirin 81 MG tablet      Take 1 tablet by mouth daily.        azelastine 0.15 % nasal spray    ASTEPRO    30 mL    Spray 1 spray into both nostrils daily    Seasonal allergic rhinitis, unspecified chronicity, unspecified trigger       cholecalciferol 79890 units capsule    VITAMIN D3     1 tablet weekly        CITRACAL OR      Take 1 tablet by mouth daily.        FISH OIL PO      Take 1 tablet by mouth daily.        FLUoxetine 20 MG tablet      Take 20 mg by mouth daily        hydrochlorothiazide 25 MG tablet    HYDRODIURIL    90 tablet    TAKE 1 TABLET (25 MG) BY MOUTH DAILY    Benign essential hypertension       lamoTRIgine 200 MG tablet    LaMICtal     1 TABLET TWICE DAILY        losartan 100 MG tablet    COZAAR    90 tablet    Take 1 tablet (100 mg) by mouth daily    Benign essential hypertension       metoprolol tartrate 50 MG tablet    LOPRESSOR    135 tablet    One in the morning and 1/2 in the evening    Benign essential hypertension       mometasone-formoterol 200-5 MCG/ACT oral inhaler    DULERA    39 g    Inhale 2 puffs into the lungs 2 times daily    Mild persistent asthma without complication       Multi-vitamin Tabs tablet   Generic drug:  multivitamin, therapeutic with minerals      Take 1 tablet by mouth daily.        PRISTIQ 50 MG 24 hr tablet   Generic  drug:  desvenlafaxine succinate      Take 100 mg by mouth daily        rosuvastatin 40 MG tablet    CRESTOR    90 tablet    TAKE 1 TABLET (40 MG) BY MOUTH DAILY generic rosuvastatin please    Hyperlipidemia with target LDL less than 100       SYNTHROID 50 MCG tablet   Generic drug:  levothyroxine      Take 1 tablet by mouth daily.        TRINTELLIX 5 MG tablet   Generic drug:  vortioxetine      Take 5 mg by mouth daily        verapamil 240 MG CR tablet    CALAN-SR    90 tablet    Take 1 tablet (240 mg) by mouth daily    Benign essential hypertension

## 2018-08-30 NOTE — PATIENT INSTRUCTIONS
Patient to follow up with Primary Care provider regarding elevated blood pressure.  Weight management plan: Patient was referred to their PCP to discuss a diet and exercise plan.   We wish you continued good healing. If you have any questions or concerns, please do not hesitate to contact us at [phone]    Please remember to call and schedule a follow up appointment if one was recommended at your earliest convenience.   PODIATRY CLINIC HOURS  TELEPHONE NUMBER    Dr. Carlos Carlson D.P.M FAC FAS    Clinics:  [place]  [facility]    Stefany Gasca [facility]   Tuesday 1PM-6PM  [place]  Wednesday 7AM-2PM  [facility]  Thursday 10AM-6PM  [place]  Friday 7AM-3PM  [place]  Specialty schedulers:   [phone] to make an appointment with any Specialty Provider.        Urgent Care locations:    [facility] Monday-Friday 5 pm - 9 pm. Saturday-Sunday 9 am -5pm    Monday-Friday 11 am - 9 pm Saturday 9 am - 5 pm     Monday-Sunday 12 noon-8PM [phone]     If you need a medication refill, please contact us you may need lab work and/or a follow up visit prior to your refill (i.e. Antifungal medications).    CoinHoldingshart (secure e-mail communication and access to your chart) to send a message or to make an appointment.    If MRI needed please call Tyrese Lopez at [phone]

## 2018-09-20 ENCOUNTER — OFFICE VISIT (OUTPATIENT)
Dept: PODIATRY | Facility: CLINIC | Age: 83
End: 2018-09-20
Payer: COMMERCIAL

## 2018-09-20 VITALS
BODY MASS INDEX: 31.25 KG/M2 | WEIGHT: 160 LBS | SYSTOLIC BLOOD PRESSURE: 100 MMHG | HEART RATE: 60 BPM | DIASTOLIC BLOOD PRESSURE: 54 MMHG

## 2018-09-20 DIAGNOSIS — B07.0 VERRUCA PLANTARIS: Primary | ICD-10-CM

## 2018-09-20 PROCEDURE — 17110 DESTRUCTION B9 LES UP TO 14: CPT | Performed by: PODIATRIST

## 2018-09-20 RX ORDER — LAMOTRIGINE 25 MG/1
25 TABLET ORAL DAILY
COMMUNITY
Start: 2018-09-12

## 2018-09-20 RX ORDER — CLONAZEPAM 0.5 MG/1
TABLET ORAL
COMMUNITY
Start: 2018-08-30 | End: 2019-10-28

## 2018-09-20 RX ORDER — ALENDRONATE SODIUM 70 MG/1
TABLET ORAL
COMMUNITY
Start: 2018-08-28 | End: 2019-06-25

## 2018-09-20 NOTE — LETTER
9/20/2018         RE: Kamryn Miller  [address]    [address]  [place] [address]        Dear Colleague,    Thank you for referring your patient, Kamryn Miller, to the [facility]. Please see a copy of my visit note below.    Subjective:    7/12/18  Pt is seen todaywith the c/c of painful lesions on the left hallux, 4th toe and right third toe.  This has been problematic for 4 month(s).  Pain is aggrevated by activity and relieved by rest.  Describes as burning pain and this is slowly getting worse.  Has tried OTC products without alleviation of lesion/symptoms.  Patient is requesting more definitive treatment.    7/26/18 some pain for few days after cryotherapy and now fine.    8/8/18  Pain for a couple days after last cryotherapy    8/30/18 patient returns for reevaluation of verruca.  Denies drainage erythema or edema.  Pain for only 1 day after cryotherapy    9/20/18 pain for only a few days after cryotherapy last visit.  Denies erythema edema or drainage.    ROS: See above     Allergies   Allergen Reactions     Ace Inhibitors Cough     Diclofenac Other (See Comments)     Balance problems       Current Outpatient Prescriptions   Medication Sig Dispense Refill     alendronate (FOSAMAX) 70 MG tablet        aspirin 81 MG tablet Take 1 tablet by mouth daily.       Azelastine HCl (ASTEPRO) 0.15 % SOLN Spray 1 spray into both nostrils daily 30 mL 4     Calcium Citrate (CITRACAL OR) Take 1 tablet by mouth daily.       CHOLECALCIFEROL 96514 UNIT PO CAPS 1 tablet weekly       clonazePAM (KLONOPIN) 0.5 MG tablet        desvenlafaxine (PRISTIQ) 50 MG 24 hr tablet Take 100 mg by mouth daily        FLUoxetine 20 MG tablet Take 20 mg by mouth daily       hydrochlorothiazide (HYDRODIURIL) 25 MG tablet TAKE 1 TABLET (25 MG) BY MOUTH DAILY 90 tablet 4     lamoTRIgine (LAMICTAL) 25 MG tablet 25 mg       LAMOTRIGINE 200 MG PO TABS 1 TABLET TWICE DAILY       levothyroxine (SYNTHROID) 50 MCG tablet Take 1  tablet by mouth daily.       losartan (COZAAR) 100 MG tablet Take 1 tablet (100 mg) by mouth daily 90 tablet 4     metoprolol tartrate (LOPRESSOR) 50 MG tablet One in the morning and 1/2 in the evening 135 tablet 4     mometasone-formoterol (DULERA) 200-5 MCG/ACT oral inhaler Inhale 2 puffs into the lungs 2 times daily 39 g 4     Multiple Vitamin (MULTI-VITAMIN) per tablet Take 1 tablet by mouth daily.       Omega-3 Fatty Acids (FISH OIL PO) Take 1 tablet by mouth daily.       rosuvastatin (CRESTOR) 40 MG tablet TAKE 1 TABLET (40 MG) BY MOUTH DAILY generic rosuvastatin please 90 tablet 3     TRINTELLIX 5 MG tablet Take 5 mg by mouth daily  2     verapamil (CALAN-SR) 240 MG CR tablet Take 1 tablet (240 mg) by mouth daily 90 tablet 4       Patient Active Problem List   Diagnosis     CAD (coronary artery disease)     Hyperlipidemia with target LDL less than 100     Mild major depression (H)     Pulmonary hypertension     Mild persistent asthma     Seasonal allergic rhinitis     Mitral insufficiency     Tricuspid insufficiency     Bipolar disorder (H)     Renal insufficiency     Tremors     Advanced directives, counseling/discussion     Family history of diabetes mellitus     Family history of celiac disease     Celiac disease     History of colonic polyps     Benign essential hypertension     Hypothyroidism, unspecified type       Past Medical History:   Diagnosis Date     Aortic valvar stenosis 7/2010    mild     Asthma      Bipolar disorder (H)      CAD (coronary artery disease)     s/p angioplasty     Celiac disease      Colon polyps      Colon polyps 2012    every 3 year colonoscopy      Depression      High cholesterol      HTN      Mitral insufficiency      Pulmonary HTN     mild     Tremors 7/10    drug induced from antidepressants     Tricuspid insufficiency        Past Surgical History:   Procedure Laterality Date     ANGIOGRAM  6/26/2009     ANGIOPLASTY  9/96    for angina     ANGIOPLASTY  8/03 - 9/03    X -2  - with stenst in coronary car.     APPENDECTOMY       BREAST BIOPSY, RT/LT      Breat Biopsy RT/LT, benign     BUNIONECTOMY  11/8/2011    Procedure:BUNIONECTOMY; Left donna bunionectomy; Surgeon:FREDY LITTLEJOHN; Location:[facility] OR     BUNIONECTOMY RT/LT  5/2007    right bunion and right 2nd hammertoe     C ANESTH,BLEPHAROPLASTY      (R) for drooping eyelid     C APPENDECTOMY       CATARACT IOL, RT/LT  6/12 and 7/12    bilateral     COLONOSCOPY  2007, 2012    every 3 years for polyps     JOINT REPLACEMENT, HIP RT/LT  4/2008    right hip replaced     JOINT REPLACEMENT, HIP RT/LT  4/2009    left hip replaced     REPAIR HAMMER TOE  11/8/2011    Procedure:REPAIR HAMMER TOE; left 2nd hammertoe repair; Surgeon:FREDY LITTLEJOHN; Location:[facility] OR     SURGICAL HISTORY OF -   11/11    left bunion and 2nd hammertoe repair     TUBAL LIGATION         Family History   Problem Relation Age of Onset     Asthma Mother      Cerebrovascular Disease Mother      Arthritis Mother      Depression Mother      Lipids Sister      Hypertension Sister      HEART DISEASE Sister      Lipids Sister      Hypertension Sister      Lipids Sister      Breast Cancer Sister        Social History   Substance Use Topics     Smoking status: Never Smoker     Smokeless tobacco: Never Used     Alcohol use No         Exam:    /54  Pulse 60  Wt 160 lb (72.6 kg)  BMI 31.25 kg/m2.  Good historian.  A&O X 3.  Pulses DP, PT 2/4 b/l.  CRT < 3 seconds X 10 digits.  No edema or varicosities noted.  Sensation to light touch intact b/l.  Reflexes 2/4 b/l.  Skin has normal texture and turgor b/l.  pronated arch with weightbearing.  No forefoot or rear foot deformities noted.  MS 5/5 all compartments.  Normal ROM all fore foot and rearfoot joints.  No equinus.   Lesions are located on left toes 1/4 and much smaller, 90% resolved.  Fourth toe verruca almost resolved.  Left hallux lateral border verruca smaller.  Nail somewhat loose next of verruca and with trimming  this back some verruca noted in nailbed.  Right third toe verruca resolved.  No subcutaneus masses noted.    A/P    Verrucous lesions left foot    Discussed all are smaller and right resolved.  Trimmed back nail left hallux lateral border to reveal nailbed verruca which is shallow.  Would like to continue cryotherapy.  Risks, complications and efficacy were discussed.   Cryotherapy times 3 to lesions.  RETURN TO CLINIC in 2-3 weeks.     Carlos Carlson DPM, FACFAS                Again, thank you for allowing me to participate in the care of your patient.        Sincerely,        Carlos Carlson DPM

## 2018-09-20 NOTE — MR AVS SNAPSHOT
After Visit Summary   9/20/2018    Kamryn Miller    MRN: [id number]           Patient Information     Date Of Birth          1935        Visit Information        Provider Department      9/20/2018 10:30 AM Carlos Carlson, HYACINTH [facility]        Care Instructions    We wish you continued good healing. If you have any questions or concerns, please do not hesitate to contact us at [phone]    Please remember to call and schedule a follow up appointment if one was recommended at your earliest convenience.   PODIATRY CLINIC HOURS  TELEPHONE NUMBER    Dr. Carlos Carlson D.P.M FAC FAS    Clinics:  [facility]    Stefany Gasca [facility]   Tuesday 1PM-6PM  [facility]  Wednesday 7AM-2PM  [place]  Thursday 10AM-6PM  [place]  Friday 7AM-3PM  [place]  Specialty schedulers:   [phone] to make an appointment with any Specialty Provider.        Urgent Care locations:    [facility] Monday-Friday 5 pm - 9 pm. Saturday-Sunday 9 am -5pm    Monday-Friday 11 am - 9 pm Saturday 9 am - 5 pm     Monday-Sunday 12 noon-8PM [phone]     If you need a medication refill, please contact us you may need lab work and/or a follow up visit prior to your refill (i.e. Antifungal medications).    Ask Ziggyhart (secure e-mail communication and access to your chart) to send a message or to make an appointment.    If MRI needed please call Tyrese Lopez at [phone]        Weight management plan: Patient was referred to their PCP to discuss a diet and exercise plan.     Patient to follow up with Primary Care provider regarding elevated blood pressure.              Follow-ups after your visit        Who to contact     If you have questions or need follow up information about today's clinic visit or your schedule please contact [facility] directly at  [phone].  Normal or non-critical lab and imaging results will be communicated to you by MyChart, letter or phone within 4 business days after the clinic has received the results. If you do not hear from us within 7 days, please contact the clinic through MyChart or phone. If you have a critical or abnormal lab result, we will notify you by phone as soon as possible.  Submit refill requests through Instamediahart or call your pharmacy and they will forward the refill request to us. Please allow 3 business days for your refill to be completed.          Additional Information About Your Visit        Care EveryWhere ID     This is your Care EveryWhere ID. This could be used by other organizations to access your [place] medical records  [id number]        Your Vitals Were     Pulse BMI (Body Mass Index)                60 31.25 kg/m2           Blood Pressure from Last 3 Encounters:   09/20/18 100/54   08/30/18 139/77   08/09/18 110/72    Weight from Last 3 Encounters:   09/20/18 160 lb (72.6 kg)   08/30/18 160 lb (72.6 kg)   08/09/18 163 lb (73.9 kg)              Today, you had the following     No orders found for display       Primary Care Provider Office Phone # Fax #    Kamryn Jha [phone] [address] [phone]        Equal Access to Services     [facility] TOPHER : Hadii aad ku hadasho Soomaali, waaxda luqadaha, qaybta kaalmada aderavindrada, shubham mcdonough. So [facility] [place] [phone].    ATENCIÓN: Si habla español, tiene a corcoran disposición servicios gratuitos de asistencia lingüística. Odilon al [phone].    We comply with applicable federal civil rights laws and [place] laws. We do not discriminate on the basis of race, color, national origin, age, disability, sex, sexual orientation, or gender identity.            Thank you!     Thank you for choosing [facility]  for your care. Our goal is always to provide you with  excellent care. Hearing back from our patients is one way we can continue to improve our services. Please take a few minutes to complete the written survey that you may receive in the mail after your visit with us. Thank you!             Your Updated Medication List - Protect others around you: Learn how to safely use, store and throw away your medicines at www.disposemymeds.org.          This list is accurate as of 9/20/18 10:31 AM.  Always use your most recent med list.                   Brand Name Dispense Instructions for use Diagnosis    alendronate 70 MG tablet    FOSAMAX          aspirin 81 MG tablet      Take 1 tablet by mouth daily.        azelastine 0.15 % nasal spray    ASTEPRO    30 mL    Spray 1 spray into both nostrils daily    Seasonal allergic rhinitis, unspecified chronicity, unspecified trigger       cholecalciferol 72574 units capsule    VITAMIN D3     1 tablet weekly        CITRACAL OR      Take 1 tablet by mouth daily.        clonazePAM 0.5 MG tablet    klonoPIN          FISH OIL PO      Take 1 tablet by mouth daily.        FLUoxetine 20 MG tablet      Take 20 mg by mouth daily        hydrochlorothiazide 25 MG tablet    HYDRODIURIL    90 tablet    TAKE 1 TABLET (25 MG) BY MOUTH DAILY    Benign essential hypertension       * lamoTRIgine 200 MG tablet    LaMICtal     1 TABLET TWICE DAILY        * lamoTRIgine 25 MG tablet    LaMICtal     25 mg        losartan 100 MG tablet    COZAAR    90 tablet    Take 1 tablet (100 mg) by mouth daily    Benign essential hypertension       metoprolol tartrate 50 MG tablet    LOPRESSOR    135 tablet    One in the morning and 1/2 in the evening    Benign essential hypertension       mometasone-formoterol 200-5 MCG/ACT oral inhaler    DULERA    39 g    Inhale 2 puffs into the lungs 2 times daily    Mild persistent asthma without complication       Multi-vitamin Tabs tablet   Generic drug:  multivitamin, therapeutic with minerals      Take 1 tablet by mouth daily.         PRISTIQ 50 MG 24 hr tablet   Generic drug:  desvenlafaxine succinate      Take 100 mg by mouth daily        rosuvastatin 40 MG tablet    CRESTOR    90 tablet    TAKE 1 TABLET (40 MG) BY MOUTH DAILY generic rosuvastatin please    Hyperlipidemia with target LDL less than 100       SYNTHROID 50 MCG tablet   Generic drug:  levothyroxine      Take 1 tablet by mouth daily.        TRINTELLIX 5 MG tablet   Generic drug:  vortioxetine      Take 5 mg by mouth daily        verapamil 240 MG CR tablet    CALAN-SR    90 tablet    Take 1 tablet (240 mg) by mouth daily    Benign essential hypertension       * Notice:  This list has 2 medication(s) that are the same as other medications prescribed for you. Read the directions carefully, and ask your doctor or other care provider to review them with you.

## 2018-09-20 NOTE — PATIENT INSTRUCTIONS
We wish you continued good healing. If you have any questions or concerns, please do not hesitate to contact us at [phone]    Please remember to call and schedule a follow up appointment if one was recommended at your earliest convenience.   PODIATRY CLINIC HOURS  TELEPHONE NUMBER    Dr. Carlos Carlson D.P.M [facility]    Clinics:  [facility]    Stefany Gasca [facility]   Tuesday 1PM-6PM  [place]  Wednesday 7AM-2PM  Health system  Thursday 10AM-6PM  [place]  Friday 7AM-3PM  [place]  Specialty schedulers:   [phone] to make an appointment with any Specialty Provider.        Urgent Care locations:    [facility] Monday-Friday 5 pm - 9 pm. Saturday-Sunday 9 am -5pm    Monday-Friday 11 am - 9 pm Saturday 9 am - 5 pm     Monday-Sunday 12 noon-8PM [phone]     If you need a medication refill, please contact us you may need lab work and/or a follow up visit prior to your refill (i.e. Antifungal medications).    [employer] (secure e-mail communication and access to your chart) to send a message or to make an appointment.    If MRI needed please call Tyrese Lopez at [phone]        Weight management plan: Patient was referred to their PCP to discuss a diet and exercise plan.     Patient to follow up with Primary Care provider regarding elevated blood pressure.

## 2018-09-20 NOTE — PROGRESS NOTES
Subjective:    7/12/18  Pt is seen todaywith the c/c of painful lesions on the left hallux, 4th toe and right third toe.  This has been problematic for 4 month(s).  Pain is aggrevated by activity and relieved by rest.  Describes as burning pain and this is slowly getting worse.  Has tried OTC products without alleviation of lesion/symptoms.  Patient is requesting more definitive treatment.    7/26/18 some pain for few days after cryotherapy and now fine.    8/8/18  Pain for a couple days after last cryotherapy    8/30/18 patient returns for reevaluation of verruca.  Denies drainage erythema or edema.  Pain for only 1 day after cryotherapy    9/20/18 pain for only a few days after cryotherapy last visit.  Denies erythema edema or drainage.    ROS: See above     Allergies   Allergen Reactions     Ace Inhibitors Cough     Diclofenac Other (See Comments)     Balance problems       Current Outpatient Prescriptions   Medication Sig Dispense Refill     alendronate (FOSAMAX) 70 MG tablet        aspirin 81 MG tablet Take 1 tablet by mouth daily.       Azelastine HCl (ASTEPRO) 0.15 % SOLN Spray 1 spray into both nostrils daily 30 mL 4     Calcium Citrate (CITRACAL OR) Take 1 tablet by mouth daily.       CHOLECALCIFEROL 23672 UNIT PO CAPS 1 tablet weekly       clonazePAM (KLONOPIN) 0.5 MG tablet        desvenlafaxine (PRISTIQ) 50 MG 24 hr tablet Take 100 mg by mouth daily        FLUoxetine 20 MG tablet Take 20 mg by mouth daily       hydrochlorothiazide (HYDRODIURIL) 25 MG tablet TAKE 1 TABLET (25 MG) BY MOUTH DAILY 90 tablet 4     lamoTRIgine (LAMICTAL) 25 MG tablet 25 mg       LAMOTRIGINE 200 MG PO TABS 1 TABLET TWICE DAILY       levothyroxine (SYNTHROID) 50 MCG tablet Take 1 tablet by mouth daily.       losartan (COZAAR) 100 MG tablet Take 1 tablet (100 mg) by mouth daily 90 tablet 4     metoprolol tartrate (LOPRESSOR) 50 MG tablet One in the morning and 1/2 in the evening 135 tablet 4     mometasone-formoterol (DULERA)  200-5 MCG/ACT oral inhaler Inhale 2 puffs into the lungs 2 times daily 39 g 4     Multiple Vitamin (MULTI-VITAMIN) per tablet Take 1 tablet by mouth daily.       Omega-3 Fatty Acids (FISH OIL PO) Take 1 tablet by mouth daily.       rosuvastatin (CRESTOR) 40 MG tablet TAKE 1 TABLET (40 MG) BY MOUTH DAILY generic rosuvastatin please 90 tablet 3     TRINTELLIX 5 MG tablet Take 5 mg by mouth daily  2     verapamil (CALAN-SR) 240 MG CR tablet Take 1 tablet (240 mg) by mouth daily 90 tablet 4       Patient Active Problem List   Diagnosis     CAD (coronary artery disease)     Hyperlipidemia with target LDL less than 100     Mild major depression (H)     Pulmonary hypertension     Mild persistent asthma     Seasonal allergic rhinitis     Mitral insufficiency     Tricuspid insufficiency     Bipolar disorder (H)     Renal insufficiency     Tremors     Advanced directives, counseling/discussion     Family history of diabetes mellitus     Family history of celiac disease     Celiac disease     History of colonic polyps     Benign essential hypertension     Hypothyroidism, unspecified type       Past Medical History:   Diagnosis Date     Aortic valvar stenosis 7/2010    mild     Asthma      Bipolar disorder (H)      CAD (coronary artery disease)     s/p angioplasty     Celiac disease      Colon polyps      Colon polyps 2012    every 3 year colonoscopy      Depression      High cholesterol      HTN      Mitral insufficiency      Pulmonary HTN     mild     Tremors 7/10    drug induced from antidepressants     Tricuspid insufficiency        Past Surgical History:   Procedure Laterality Date     ANGIOGRAM  6/26/2009     ANGIOPLASTY  9/96    for angina     ANGIOPLASTY  8/03 - 9/03    X -2 - with stenst in coronary car.     APPENDECTOMY       BREAST BIOPSY, RT/LT      Breat Biopsy RT/LT, benign     BUNIONECTOMY  11/8/2011    Procedure:BUNIONECTOMY; Left donna bunionectomy; Surgeon:FREDY LITTLEJOHN; Location:[facility]     BUNIONECTOMY  RT/LT  5/2007    right bunion and right 2nd hammertoe     C ANESTH,BLEPHAROPLASTY      (R) for drooping eyelid     C APPENDECTOMY       CATARACT IOL, RT/LT  6/12 and 7/12    bilateral     COLONOSCOPY  2007, 2012    every 3 years for polyps     JOINT REPLACEMENT, HIP RT/LT  4/2008    right hip replaced     JOINT REPLACEMENT, HIP RT/LT  4/2009    left hip replaced     REPAIR HAMMER TOE  11/8/2011    Procedure:REPAIR HAMMER TOE; left 2nd hammertoe repair; Surgeon:FREDY LITTLEJOHN; Location:[facility]     SURGICAL HISTORY OF -   11/11    left bunion and 2nd hammertoe repair     TUBAL LIGATION         Family History   Problem Relation Age of Onset     Asthma Mother      Cerebrovascular Disease Mother      Arthritis Mother      Depression Mother      Lipids Sister      Hypertension Sister      HEART DISEASE Sister      Lipids Sister      Hypertension Sister      Lipids Sister      Breast Cancer Sister        Social History   Substance Use Topics     Smoking status: Never Smoker     Smokeless tobacco: Never Used     Alcohol use No         Exam:    /54  Pulse 60  Wt 160 lb (72.6 kg)  BMI 31.25 kg/m2.  Good historian.  A&O X 3.  Pulses DP, PT 2/4 b/l.  CRT < 3 seconds X 10 digits.  No edema or varicosities noted.  Sensation to light touch intact b/l.  Reflexes 2/4 b/l.  Skin has normal texture and turgor b/l.  pronated arch with weightbearing.  No forefoot or rear foot deformities noted.  MS 5/5 all compartments.  Normal ROM all fore foot and rearfoot joints.  No equinus.   Lesions are located on left toes 1/4 and much smaller, 90% resolved.  Fourth toe verruca almost resolved.  Left hallux lateral border verruca smaller.  Nail somewhat loose next of verruca and with trimming this back some verruca noted in nailbed.  Right third toe verruca resolved.  No subcutaneus masses noted.    A/P    Verrucous lesions left foot    Discussed all are smaller and right resolved.  Trimmed back nail left hallux lateral border to reveal  nailbed verruca which is shallow.  Would like to continue cryotherapy.  Risks, complications and efficacy were discussed.   Cryotherapy times 3 to lesions.  RETURN TO CLINIC in 2-3 weeks.     Carlos Carlson DPM, FACFAS

## 2018-10-11 ENCOUNTER — OFFICE VISIT (OUTPATIENT)
Dept: PODIATRY | Facility: CLINIC | Age: 83
End: 2018-10-11
Payer: COMMERCIAL

## 2018-10-11 VITALS
SYSTOLIC BLOOD PRESSURE: 152 MMHG | HEART RATE: 57 BPM | DIASTOLIC BLOOD PRESSURE: 78 MMHG | BODY MASS INDEX: 30.86 KG/M2 | WEIGHT: 158 LBS

## 2018-10-11 DIAGNOSIS — B07.0 VERRUCA PLANTARIS: Primary | ICD-10-CM

## 2018-10-11 PROCEDURE — 17110 DESTRUCTION B9 LES UP TO 14: CPT | Performed by: PODIATRIST

## 2018-10-11 NOTE — PROGRESS NOTES
Subjective:    7/12/18  Pt is seen todaywith the c/c of painful lesions on the left hallux, 4th toe and right third toe.  This has been problematic for 4 month(s).  Pain is aggrevated by activity and relieved by rest.  Describes as burning pain and this is slowly getting worse.  Has tried OTC products without alleviation of lesion/symptoms.  Patient is requesting more definitive treatment.    7/26/18 some pain for few days after cryotherapy and now fine.    8/8/18  Pain for a couple days after last cryotherapy    8/30/18 patient returns for reevaluation of verruca.  Denies drainage erythema or edema.  Pain for only 1 day after cryotherapy    9/20/18 pain for only a few days after cryotherapy last visit.  Denies erythema edema or drainage.    10/11/18 pain for a few days after cryotherapy.  Denies any erythema edema or drainage now.    ROS: See above     Allergies   Allergen Reactions     Ace Inhibitors Cough     Diclofenac Other (See Comments)     Balance problems       Current Outpatient Prescriptions   Medication Sig Dispense Refill     alendronate (FOSAMAX) 70 MG tablet        aspirin 81 MG tablet Take 1 tablet by mouth daily.       Azelastine HCl (ASTEPRO) 0.15 % SOLN Spray 1 spray into both nostrils daily 30 mL 4     Calcium Citrate (CITRACAL OR) Take 1 tablet by mouth daily.       CHOLECALCIFEROL 86758 UNIT PO CAPS 1 tablet weekly       clonazePAM (KLONOPIN) 0.5 MG tablet        desvenlafaxine (PRISTIQ) 50 MG 24 hr tablet Take 100 mg by mouth daily        FLUoxetine 20 MG tablet Take 20 mg by mouth daily       hydrochlorothiazide (HYDRODIURIL) 25 MG tablet TAKE 1 TABLET (25 MG) BY MOUTH DAILY 90 tablet 4     lamoTRIgine (LAMICTAL) 25 MG tablet 25 mg       LAMOTRIGINE 200 MG PO TABS 1 TABLET TWICE DAILY       levothyroxine (SYNTHROID) 50 MCG tablet Take 1 tablet by mouth daily.       losartan (COZAAR) 100 MG tablet Take 1 tablet (100 mg) by mouth daily 90 tablet 4     metoprolol tartrate (LOPRESSOR) 50 MG  tablet One in the morning and 1/2 in the evening 135 tablet 4     mometasone-formoterol (DULERA) 200-5 MCG/ACT oral inhaler Inhale 2 puffs into the lungs 2 times daily 39 g 4     Multiple Vitamin (MULTI-VITAMIN) per tablet Take 1 tablet by mouth daily.       Omega-3 Fatty Acids (FISH OIL PO) Take 1 tablet by mouth daily.       rosuvastatin (CRESTOR) 40 MG tablet TAKE 1 TABLET (40 MG) BY MOUTH DAILY generic rosuvastatin please 90 tablet 3     TRINTELLIX 5 MG tablet Take 5 mg by mouth daily  2     verapamil (CALAN-SR) 240 MG CR tablet Take 1 tablet (240 mg) by mouth daily 90 tablet 4       Patient Active Problem List   Diagnosis     CAD (coronary artery disease)     Hyperlipidemia with target LDL less than 100     Mild major depression (H)     Pulmonary hypertension (H)     Mild persistent asthma     Seasonal allergic rhinitis     Mitral insufficiency     Tricuspid insufficiency     Bipolar disorder (H)     Renal insufficiency     Tremors     Advanced directives, counseling/discussion     Family history of diabetes mellitus     Family history of celiac disease     Celiac disease     History of colonic polyps     Benign essential hypertension     Hypothyroidism, unspecified type       Past Medical History:   Diagnosis Date     Aortic valvar stenosis 7/2010    mild     Asthma      Bipolar disorder (H)      CAD (coronary artery disease)     s/p angioplasty     Celiac disease      Colon polyps      Colon polyps 2012    every 3 year colonoscopy      Depression      High cholesterol      HTN      Mitral insufficiency      Pulmonary HTN     mild     Tremors 7/10    drug induced from antidepressants     Tricuspid insufficiency        Past Surgical History:   Procedure Laterality Date     ANGIOGRAM  6/26/2009     ANGIOPLASTY  9/96    for angina     ANGIOPLASTY  8/03 - 9/03    X -2 - with stenst in coronary car.     APPENDECTOMY       BREAST BIOPSY, RT/LT      Breat Biopsy RT/LT, benign     BUNIONECTOMY  11/8/2011     Procedure:BUNIONECTOMY; Left donna bunionectomy; Surgeon:FREDY CARLSON; Location:[facility]     BUNIONECTOMY RT/LT  5/2007    right bunion and right 2nd hammertoe     C ANESTH,BLEPHAROPLASTY      (R) for drooping eyelid     C APPENDECTOMY       CATARACT IOL, RT/LT  6/12 and 7/12    bilateral     COLONOSCOPY  2007, 2012    every 3 years for polyps     JOINT REPLACEMENT, HIP RT/LT  4/2008    right hip replaced     JOINT REPLACEMENT, HIP RT/LT  4/2009    left hip replaced     REPAIR HAMMER TOE  11/8/2011    Procedure:REPAIR HAMMER TOE; left 2nd hammertoe repair; Surgeon:FREDY CARLSON; Location:[facility]     SURGICAL HISTORY OF -   11/11    left bunion and 2nd hammertoe repair     TUBAL LIGATION         Family History   Problem Relation Age of Onset     Asthma Mother      Cerebrovascular Disease Mother      Arthritis Mother      Depression Mother      Lipids Sister      Hypertension Sister      HEART DISEASE Sister      Lipids Sister      Hypertension Sister      Lipids Sister      Breast Cancer Sister        Social History   Substance Use Topics     Smoking status: Never Smoker     Smokeless tobacco: Never Used     Alcohol use No         Exam:    There were no vitals taken for this visit..  Good historian.  A&O X 3.  Pulses DP, PT 2/4 b/l.  CRT < 3 seconds X 10 digits.  No edema or varicosities noted.  Sensation to light touch intact b/l.  Reflexes 2/4 b/l.  Skin has normal texture and turgor b/l.  pronated arch with weightbearing.  No forefoot or rear foot deformities noted.  MS 5/5 all compartments.  Normal ROM all fore foot and rearfoot joints.  No equinus.   Lesions are located on left toes 1/4  resolved.    Left hallux lateral border verruca smaller, almost resolved.    A/P    Verrucous lesion left hallux    Discussed all lesions resolved except left hallux lateral border.  Debrided this and cryotherapy applied x3. RETURN TO CLINIC in 2-3 weeks.     Fredy Carlson DPM, FACFAS

## 2018-10-11 NOTE — PATIENT INSTRUCTIONS
Patient to follow up with Primary Care provider regarding elevated blood pressure.  Weight management plan: Patient was referred to their PCP to discuss a diet and exercise plan.     We wish you continued good healing. If you have any questions or concerns, please do not hesitate to contact us at [phone]    Please remember to call and schedule a follow up appointment if one was recommended at your earliest convenience.   PODIATRY CLINIC HOURS  TELEPHONE NUMBER    Dr. Carlos Carlson D.P.M FAC FAS    Clinics:  [facility]  [facility]    [facility]   Tuesday 1PM-6PM  [place]  Wednesday 7AM-2PM  [facility]  Thursday 10AM-6PM  [place]  Friday 7AM-3PM  [place]  Specialty schedulers:   [phone] to make an appointment with any Specialty Provider.        Urgent Care locations:    [facility] Monday-Friday 5 pm - 9 pm. Saturday-Sunday 9 am -5pm    Monday-Friday 11 am - 9 pm Saturday 9 am - 5 pm     Monday-Sunday 12 noon-8PM [phone]     If you need a medication refill, please contact us you may need lab work and/or a follow up visit prior to your refill (i.e. Antifungal medications).    [facility] (secure e-mail communication and access to your chart) to send a message or to make an appointment.    If MRI needed please call Tyrese Lopez at [phone]

## 2018-10-11 NOTE — LETTER
10/11/2018         RE: Kamryn Miller  [address]    [address]  [place] [address]        Dear Colleague,    Thank you for referring your patient, Kamryn Miller, to the [facility]. Please see a copy of my visit note below.    Subjective:    7/12/18  Pt is seen todaywith the c/c of painful lesions on the left hallux, 4th toe and right third toe.  This has been problematic for 4 month(s).  Pain is aggrevated by activity and relieved by rest.  Describes as burning pain and this is slowly getting worse.  Has tried OTC products without alleviation of lesion/symptoms.  Patient is requesting more definitive treatment.    7/26/18 some pain for few days after cryotherapy and now fine.    8/8/18  Pain for a couple days after last cryotherapy    8/30/18 patient returns for reevaluation of verruca.  Denies drainage erythema or edema.  Pain for only 1 day after cryotherapy    9/20/18 pain for only a few days after cryotherapy last visit.  Denies erythema edema or drainage.    10/11/18 pain for a few days after cryotherapy.  Denies any erythema edema or drainage now.    ROS: See above     Allergies   Allergen Reactions     Ace Inhibitors Cough     Diclofenac Other (See Comments)     Balance problems       Current Outpatient Prescriptions   Medication Sig Dispense Refill     alendronate (FOSAMAX) 70 MG tablet        aspirin 81 MG tablet Take 1 tablet by mouth daily.       Azelastine HCl (ASTEPRO) 0.15 % SOLN Spray 1 spray into both nostrils daily 30 mL 4     Calcium Citrate (CITRACAL OR) Take 1 tablet by mouth daily.       CHOLECALCIFEROL 20339 UNIT PO CAPS 1 tablet weekly       clonazePAM (KLONOPIN) 0.5 MG tablet        desvenlafaxine (PRISTIQ) 50 MG 24 hr tablet Take 100 mg by mouth daily        FLUoxetine 20 MG tablet Take 20 mg by mouth daily       hydrochlorothiazide (HYDRODIURIL) 25 MG tablet TAKE 1 TABLET (25 MG) BY MOUTH DAILY 90 tablet 4     lamoTRIgine (LAMICTAL) 25 MG tablet 25 mg        LAMOTRIGINE 200 MG PO TABS 1 TABLET TWICE DAILY       levothyroxine (SYNTHROID) 50 MCG tablet Take 1 tablet by mouth daily.       losartan (COZAAR) 100 MG tablet Take 1 tablet (100 mg) by mouth daily 90 tablet 4     metoprolol tartrate (LOPRESSOR) 50 MG tablet One in the morning and 1/2 in the evening 135 tablet 4     mometasone-formoterol (DULERA) 200-5 MCG/ACT oral inhaler Inhale 2 puffs into the lungs 2 times daily 39 g 4     Multiple Vitamin (MULTI-VITAMIN) per tablet Take 1 tablet by mouth daily.       Omega-3 Fatty Acids (FISH OIL PO) Take 1 tablet by mouth daily.       rosuvastatin (CRESTOR) 40 MG tablet TAKE 1 TABLET (40 MG) BY MOUTH DAILY generic rosuvastatin please 90 tablet 3     TRINTELLIX 5 MG tablet Take 5 mg by mouth daily  2     verapamil (CALAN-SR) 240 MG CR tablet Take 1 tablet (240 mg) by mouth daily 90 tablet 4       Patient Active Problem List   Diagnosis     CAD (coronary artery disease)     Hyperlipidemia with target LDL less than 100     Mild major depression (H)     Pulmonary hypertension (H)     Mild persistent asthma     Seasonal allergic rhinitis     Mitral insufficiency     Tricuspid insufficiency     Bipolar disorder (H)     Renal insufficiency     Tremors     Advanced directives, counseling/discussion     Family history of diabetes mellitus     Family history of celiac disease     Celiac disease     History of colonic polyps     Benign essential hypertension     Hypothyroidism, unspecified type       Past Medical History:   Diagnosis Date     Aortic valvar stenosis 7/2010    mild     Asthma      Bipolar disorder (H)      CAD (coronary artery disease)     s/p angioplasty     Celiac disease      Colon polyps      Colon polyps 2012    every 3 year colonoscopy      Depression      High cholesterol      HTN      Mitral insufficiency      Pulmonary HTN     mild     Tremors 7/10    drug induced from antidepressants     Tricuspid insufficiency        Past Surgical History:   Procedure  Laterality Date     ANGIOGRAM  6/26/2009     ANGIOPLASTY  9/96    for angina     ANGIOPLASTY  8/03 - 9/03    X -2 - with stenst in coronary car.     APPENDECTOMY       BREAST BIOPSY, RT/LT      Breat Biopsy RT/LT, benign     BUNIONECTOMY  11/8/2011    Procedure:BUNIONECTOMY; Left donna bunionectomy; Surgeon:FREDY LITTLEJOHN; Location:[facility]     BUNIONECTOMY RT/LT  5/2007    right bunion and right 2nd hammertoe     C ANESTH,BLEPHAROPLASTY      (R) for drooping eyelid     C APPENDECTOMY       CATARACT IOL, RT/LT  6/12 and 7/12    bilateral     COLONOSCOPY  2007, 2012    every 3 years for polyps     JOINT REPLACEMENT, HIP RT/LT  4/2008    right hip replaced     JOINT REPLACEMENT, HIP RT/LT  4/2009    left hip replaced     REPAIR HAMMER TOE  11/8/2011    Procedure:REPAIR HAMMER TOE; left 2nd hammertoe repair; Surgeon:FREDY LITTLEJOHN; Location:[facility]     SURGICAL HISTORY OF -   11/11    left bunion and 2nd hammertoe repair     TUBAL LIGATION         Family History   Problem Relation Age of Onset     Asthma Mother      Cerebrovascular Disease Mother      Arthritis Mother      Depression Mother      Lipids Sister      Hypertension Sister      HEART DISEASE Sister      Lipids Sister      Hypertension Sister      Lipids Sister      Breast Cancer Sister        Social History   Substance Use Topics     Smoking status: Never Smoker     Smokeless tobacco: Never Used     Alcohol use No         Exam:    There were no vitals taken for this visit..  Good historian.  A&O X 3.  Pulses DP, PT 2/4 b/l.  CRT < 3 seconds X 10 digits.  No edema or varicosities noted.  Sensation to light touch intact b/l.  Reflexes 2/4 b/l.  Skin has normal texture and turgor b/l.  pronated arch with weightbearing.  No forefoot or rear foot deformities noted.  MS 5/5 all compartments.  Normal ROM all fore foot and rearfoot joints.  No equinus.   Lesions are located on left toes 1/4  resolved.    Left hallux lateral border verruca smaller, almost resolved.     A/P    Verrucous lesion left hallux    Discussed all lesions resolved except left hallux lateral border.  Debrided this and cryotherapy applied x3. RETURN TO CLINIC in 2-3 weeks.     Carlos Carlson DPM, FACFAS                Again, thank you for allowing me to participate in the care of your patient.        Sincerely,        Carlos Carlson DPM

## 2018-10-11 NOTE — MR AVS SNAPSHOT
After Visit Summary   10/11/2018    Kamryn Miller    MRN: [id number]           Patient Information     Date Of Birth          1935        Visit Information        Provider Department      10/11/2018 1:00 PM Carlos Carlson, HYACINTH [facility]        Care Instructions    Patient to follow up with Primary Care provider regarding elevated blood pressure.  Weight management plan: Patient was referred to their PCP to discuss a diet and exercise plan.     We wish you continued good healing. If you have any questions or concerns, please do not hesitate to contact us at [phone]    Please remember to call and schedule a follow up appointment if one was recommended at your earliest convenience.   PODIATRY CLINIC HOURS  TELEPHONE NUMBER    Dr. Carlos Carlson DOcP.M FAC FAS    Clinics:  [facility]    Stefany Gasca [facility]   Tuesday 1PM-6PM  [place]  Wednesday 7AM-2PM  [place]  Thursday 10AM-6PM  [place]  Friday 7AM-3PM  [place]  Specialty schedulers:   [phone] to make an appointment with any Specialty Provider.        Urgent Care locations:    [facility] Monday-Friday 5 pm - 9 pm. Saturday-Sunday 9 am -5pm    Monday-Friday 11 am - 9 pm Saturday 9 am - 5 pm     Monday-Sunday 12 noon-8PM [phone]     If you need a medication refill, please contact us you may need lab work and/or a follow up visit prior to your refill (i.e. Antifungal medications).    [employer] (secure e-mail communication and access to your chart) to send a message or to make an appointment.    If MRI needed please call Tyrese Lopez at [phone]                      Follow-ups after your visit        Who to contact     If you have questions or need follow up information about today's clinic visit or your schedule please contact [facility] directly at  [phone].  Normal or non-critical lab and imaging results will be communicated to you by MyChart, letter or phone within 4 business days after the clinic has received the results. If you do not hear from us within 7 days, please contact the clinic through MyChart or phone. If you have a critical or abnormal lab result, we will notify you by phone as soon as possible.  Submit refill requests through [employer] or call your pharmacy and they will forward the refill request to us. Please allow 3 business days for your refill to be completed.          Additional Information About Your Visit        Care EveryWhere ID     This is your Care EveryWhere ID. This could be used by other organizations to access your [place] medical records  [id number]        Your Vitals Were     Pulse BMI (Body Mass Index)                57 30.86 kg/m2           Blood Pressure from Last 3 Encounters:   10/11/18 152/78   09/20/18 100/54   08/30/18 139/77    Weight from Last 3 Encounters:   10/11/18 158 lb (71.7 kg)   09/20/18 160 lb (72.6 kg)   08/30/18 160 lb (72.6 kg)              Today, you had the following     No orders found for display       Primary Care Provider Office Phone # Fax #    Kamryn Jha [phone] [address] [phone]        Equal Access to Services     [facility] TOPHER : Hadii aad ku hadasho Soomaali, waaxda luqadaha, qaybta kaalmada adeegyada, shubham mcdonough. So [facility] [phone].    ATENCIÓN: Si habla español, tiene a corcoran disposición servicios gratuitos de asistencia lingüística. Odilon al [phone].    We comply with applicable federal civil rights laws and [place] laws. We do not discriminate on the basis of race, color, national origin, age, disability, sex, sexual orientation, or gender identity.            Thank you!     Thank you for choosing [facility]  for your care. Our goal is always to provide you with  excellent care. Hearing back from our patients is one way we can continue to improve our services. Please take a few minutes to complete the written survey that you may receive in the mail after your visit with us. Thank you!             Your Updated Medication List - Protect others around you: Learn how to safely use, store and throw away your medicines at www.disposemymeds.org.          This list is accurate as of 10/11/18  1:00 PM.  Always use your most recent med list.                   Brand Name Dispense Instructions for use Diagnosis    alendronate 70 MG tablet    FOSAMAX          aspirin 81 MG tablet      Take 1 tablet by mouth daily.        azelastine 0.15 % nasal spray    ASTEPRO    30 mL    Spray 1 spray into both nostrils daily    Seasonal allergic rhinitis, unspecified chronicity, unspecified trigger       cholecalciferol 52827 units capsule    VITAMIN D3     1 tablet weekly        CITRACAL OR      Take 1 tablet by mouth daily.        clonazePAM 0.5 MG tablet    klonoPIN          FISH OIL PO      Take 1 tablet by mouth daily.        FLUoxetine 20 MG tablet      Take 20 mg by mouth daily        hydrochlorothiazide 25 MG tablet    HYDRODIURIL    90 tablet    TAKE 1 TABLET (25 MG) BY MOUTH DAILY    Benign essential hypertension       * lamoTRIgine 200 MG tablet    LaMICtal     1 TABLET TWICE DAILY        * lamoTRIgine 25 MG tablet    LaMICtal     25 mg        losartan 100 MG tablet    COZAAR    90 tablet    Take 1 tablet (100 mg) by mouth daily    Benign essential hypertension       metoprolol tartrate 50 MG tablet    LOPRESSOR    135 tablet    One in the morning and 1/2 in the evening    Benign essential hypertension       mometasone-formoterol 200-5 MCG/ACT oral inhaler    DULERA    39 g    Inhale 2 puffs into the lungs 2 times daily    Mild persistent asthma without complication       Multi-vitamin Tabs tablet   Generic drug:  multivitamin, therapeutic with minerals      Take 1 tablet by mouth daily.         PRISTIQ 50 MG 24 hr tablet   Generic drug:  desvenlafaxine succinate      Take 100 mg by mouth daily        rosuvastatin 40 MG tablet    CRESTOR    90 tablet    TAKE 1 TABLET (40 MG) BY MOUTH DAILY generic rosuvastatin please    Hyperlipidemia with target LDL less than 100       SYNTHROID 50 MCG tablet   Generic drug:  levothyroxine      Take 1 tablet by mouth daily.        TRINTELLIX 5 MG tablet   Generic drug:  vortioxetine      Take 5 mg by mouth daily        verapamil 240 MG CR tablet    CALAN-SR    90 tablet    Take 1 tablet (240 mg) by mouth daily    Benign essential hypertension       * Notice:  This list has 2 medication(s) that are the same as other medications prescribed for you. Read the directions carefully, and ask your doctor or other care provider to review them with you.

## 2018-11-01 ENCOUNTER — OFFICE VISIT (OUTPATIENT)
Dept: PODIATRY | Facility: CLINIC | Age: 83
End: 2018-11-01
Payer: COMMERCIAL

## 2018-11-01 VITALS
SYSTOLIC BLOOD PRESSURE: 94 MMHG | WEIGHT: 159 LBS | DIASTOLIC BLOOD PRESSURE: 52 MMHG | HEART RATE: 66 BPM | BODY MASS INDEX: 31.05 KG/M2

## 2018-11-01 DIAGNOSIS — B07.0 VERRUCA PLANTARIS: Primary | ICD-10-CM

## 2018-11-01 PROCEDURE — 17110 DESTRUCTION B9 LES UP TO 14: CPT | Performed by: PODIATRIST

## 2018-11-01 NOTE — LETTER
11/1/2018         RE: Kamryn Miller  [address]    [address]  [place] [address]        Dear Colleague,    Thank you for referring your patient, Kamryn Miller, to the [facility]. Please see a copy of my visit note below.    Subjective:    7/12/18  Pt is seen todaywith the c/c of painful lesions on the left hallux, 4th toe and right third toe.  This has been problematic for 4 month(s).  Pain is aggrevated by activity and relieved by rest.  Describes as burning pain and this is slowly getting worse.  Has tried OTC products without alleviation of lesion/symptoms.  Patient is requesting more definitive treatment.    7/26/18 some pain for few days after cryotherapy and now fine.    8/8/18  Pain for a couple days after last cryotherapy    8/30/18 patient returns for reevaluation of verruca.  Denies drainage erythema or edema.  Pain for only 1 day after cryotherapy    9/20/18 pain for only a few days after cryotherapy last visit.  Denies erythema edema or drainage.    10/11/18 pain for a few days after cryotherapy.  Denies any erythema edema or drainage now.    11/1/18 patient had pain for approximately a week after last cryotherapy.  Denies any drainage at all.  Denies erythema or edema.  Has no other new complaints    ROS: See above     Allergies   Allergen Reactions     Ace Inhibitors Cough     Diclofenac Other (See Comments)     Balance problems       Current Outpatient Prescriptions   Medication Sig Dispense Refill     alendronate (FOSAMAX) 70 MG tablet        aspirin 81 MG tablet Take 1 tablet by mouth daily.       Azelastine HCl (ASTEPRO) 0.15 % SOLN Spray 1 spray into both nostrils daily 30 mL 4     Calcium Citrate (CITRACAL OR) Take 1 tablet by mouth daily.       CHOLECALCIFEROL 91785 UNIT PO CAPS 1 tablet weekly       clonazePAM (KLONOPIN) 0.5 MG tablet        desvenlafaxine (PRISTIQ) 50 MG 24 hr tablet Take 100 mg by mouth daily        FLUoxetine 20 MG tablet Take 20 mg by mouth  daily       hydrochlorothiazide (HYDRODIURIL) 25 MG tablet TAKE 1 TABLET (25 MG) BY MOUTH DAILY 90 tablet 4     lamoTRIgine (LAMICTAL) 25 MG tablet 25 mg       LAMOTRIGINE 200 MG PO TABS 1 TABLET TWICE DAILY       levothyroxine (SYNTHROID) 50 MCG tablet Take 1 tablet by mouth daily.       losartan (COZAAR) 100 MG tablet Take 1 tablet (100 mg) by mouth daily 90 tablet 4     metoprolol tartrate (LOPRESSOR) 50 MG tablet One in the morning and 1/2 in the evening 135 tablet 4     mometasone-formoterol (DULERA) 200-5 MCG/ACT oral inhaler Inhale 2 puffs into the lungs 2 times daily 39 g 4     Multiple Vitamin (MULTI-VITAMIN) per tablet Take 1 tablet by mouth daily.       Omega-3 Fatty Acids (FISH OIL PO) Take 1 tablet by mouth daily.       rosuvastatin (CRESTOR) 40 MG tablet TAKE 1 TABLET (40 MG) BY MOUTH DAILY generic rosuvastatin please 90 tablet 3     TRINTELLIX 5 MG tablet Take 5 mg by mouth daily  2     verapamil (CALAN-SR) 240 MG CR tablet Take 1 tablet (240 mg) by mouth daily 90 tablet 4       Patient Active Problem List   Diagnosis     CAD (coronary artery disease)     Hyperlipidemia with target LDL less than 100     Mild major depression (H)     Pulmonary hypertension (H)     Mild persistent asthma     Seasonal allergic rhinitis     Mitral insufficiency     Tricuspid insufficiency     Bipolar disorder (H)     Renal insufficiency     Tremors     Advanced directives, counseling/discussion     Family history of diabetes mellitus     Family history of celiac disease     Celiac disease     History of colonic polyps     Benign essential hypertension     Hypothyroidism, unspecified type       Past Medical History:   Diagnosis Date     Aortic valvar stenosis 7/2010    mild     Asthma      Bipolar disorder (H)      CAD (coronary artery disease)     s/p angioplasty     Celiac disease      Colon polyps      Colon polyps 2012    every 3 year colonoscopy      Depression      High cholesterol      HTN      Mitral insufficiency       Pulmonary HTN (H)     mild     Tremors 7/10    drug induced from antidepressants     Tricuspid insufficiency        Past Surgical History:   Procedure Laterality Date     ANGIOGRAM  6/26/2009     ANGIOPLASTY  9/96    for angina     ANGIOPLASTY  8/03 - 9/03    X -2 - with stenst in coronary car.     APPENDECTOMY       BREAST BIOPSY, RT/LT      Breat Biopsy RT/LT, benign     BUNIONECTOMY  11/8/2011    Procedure:BUNIONECTOMY; Left donna bunionectomy; Surgeon:FREDY LITTLEJOHN Location:[facility] OR     BUNIONECTOMY RT/LT  5/2007    right bunion and right 2nd hammertoe     C ANESTH,BLEPHAROPLASTY      (R) for drooping eyelid     C APPENDECTOMY       CATARACT IOL, RT/LT  6/12 and 7/12    bilateral     COLONOSCOPY  2007, 2012    every 3 years for polyps     JOINT REPLACEMENT, HIP RT/LT  4/2008    right hip replaced     JOINT REPLACEMENT, HIP RT/LT  4/2009    left hip replaced     REPAIR HAMMER TOE  11/8/2011    Procedure:REPAIR HAMMER TOE; left 2nd hammertoe repair; Surgeon:FREDY LITTLEJOHN; Location:[facility] OR     SURGICAL HISTORY OF -   11/11    left bunion and 2nd hammertoe repair     TUBAL LIGATION         Family History   Problem Relation Age of Onset     Asthma Mother      Cerebrovascular Disease Mother      Arthritis Mother      Depression Mother      Lipids Sister      Hypertension Sister      HEART DISEASE Sister      Lipids Sister      Hypertension Sister      Lipids Sister      Breast Cancer Sister        Social History   Substance Use Topics     Smoking status: Never Smoker     Smokeless tobacco: Never Used     Alcohol use No         Exam:    BP 94/52  Pulse 66  Wt 159 lb (72.1 kg)  BMI 31.05 kg/m2.  Good historian.  A&O X 3.  Pulses DP, PT 2/4 b/l.  CRT < 3 seconds X 10 digits.  No edema or varicosities noted.  Sensation to light touch intact b/l.  Reflexes 2/4 b/l.  Skin has normal texture and turgor b/l.  pronated arch with weightbearing.  No forefoot or rear foot deformities noted.  MS 5/5 all compartments.   Normal ROM all fore foot and rearfoot joints.  No equinus.  Left hallux lateral border verruca smaller, almost resolved.  No other verruca seen on left foot.  A/P    Verrucous lesion left hallux    Discussed  left hallux lateral border verruca almost totally resolved.  Debrided this and cryotherapy applied x3.   Discussed with patient she can return to see me in 6 weeks to ensure this is resolved or as needed.    Carlos Carlson DPM, FACFAS                Again, thank you for allowing me to participate in the care of your patient.        Sincerely,        Carlos Carlson DPM

## 2018-11-01 NOTE — MR AVS SNAPSHOT
After Visit Summary   11/1/2018    Kamryn Miller    MRN: [id number]           Patient Information     Date Of Birth          1935        Visit Information        Provider Department      11/1/2018 1:00 PM Carlos Carlson, DPDANILO [facility]        Care Instructions    We wish you continued good healing. If you have any questions or concerns, please do not hesitate to contact us at [phone]    Please remember to call and schedule a follow up appointment if one was recommended at your earliest convenience.   PODIATRY CLINIC HOURS  TELEPHONE NUMBER    Dr. Carlos Carlson D.P.M FAC FAS    Clinics:  [facility]    Stefany Gasca [facility]   Tuesday 1PM-6PM  [place]  Wednesday 7AM-2PM  [place]  Thursday 10AM-6PM  [place]  Friday 7AM-3PM  [place]  Specialty schedulers:   [phone] to make an appointment with any Specialty Provider.        Urgent Care locations:    [facility] Monday-Friday 5 pm - 9 pm. Saturday-Sunday 9 am -5pm    Monday-Friday 11 am - 9 pm Saturday 9 am - 5 pm     Monday-Sunday 12 noon-8PM [phone]     If you need a medication refill, please contact us you may need lab work and/or a follow up visit prior to your refill (i.e. Antifungal medications).    [employer] (secure e-mail communication and access to your chart) to send a message or to make an appointment.    If MRI needed please call Tyrese Lopez at [phone]        Weight management plan: Patient was referred to their PCP to discuss a diet and exercise plan.            Follow-ups after your visit        Who to contact     If you have questions or need follow up information about today's clinic visit or your schedule please contact [facility] directly at [phone].  Normal or non-critical lab and imaging results will be communicated to you by  MyChart, letter or phone within 4 business days after the clinic has received the results. If you do not hear from us within 7 days, please contact the clinic through MyChart or phone. If you have a critical or abnormal lab result, we will notify you by phone as soon as possible.  Submit refill requests through [employer] or call your pharmacy and they will forward the refill request to us. Please allow 3 business days for your refill to be completed.          Additional Information About Your Visit        Care EveryWhere ID     This is your Care EveryWhere ID. This could be used by other organizations to access your [place] medical records  [id number]        Your Vitals Were     Pulse BMI (Body Mass Index)                66 31.05 kg/m2           Blood Pressure from Last 3 Encounters:   11/01/18 94/52   10/11/18 152/78   09/20/18 100/54    Weight from Last 3 Encounters:   11/01/18 159 lb (72.1 kg)   10/11/18 158 lb (71.7 kg)   09/20/18 160 lb (72.6 kg)              Today, you had the following     No orders found for display       Primary Care Provider Office Phone # Fax #    Kamryn Jha [phone] [address] [place] [place] [address]        Equal Access to Services     TAQUERIA OBANDO : Hadii aad ku hadasho Soomaali, waaxda luqadaha, qaybta kaalmada adeegyada, waxay kyrie mcdonough. So [facility] [phone].    ATENCIÓN: Si habla español, tiene a corcoran disposición servicios gratuitos de asistencia lingüística. Llame al [phone].    We comply with applicable federal civil rights laws and [place] laws. We do not discriminate on the basis of race, color, national origin, age, disability, sex, sexual orientation, or gender identity.            Thank you!     Thank you for choosing [facility]  for your care. Our goal is always to provide you with excellent care. Hearing back from our patients is one way we can continue to improve our services.  Please take a few minutes to complete the written survey that you may receive in the mail after your visit with us. Thank you!             Your Updated Medication List - Protect others around you: Learn how to safely use, store and throw away your medicines at www.disposemymeds.org.          This list is accurate as of 11/1/18  1:55 PM.  Always use your most recent med list.                   Brand Name Dispense Instructions for use Diagnosis    alendronate 70 MG tablet    FOSAMAX          aspirin 81 MG tablet      Take 1 tablet by mouth daily.        azelastine 0.15 % nasal spray    ASTEPRO    30 mL    Spray 1 spray into both nostrils daily    Seasonal allergic rhinitis, unspecified chronicity, unspecified trigger       cholecalciferol 15161 units capsule    VITAMIN D3     1 tablet weekly        CITRACAL OR      Take 1 tablet by mouth daily.        clonazePAM 0.5 MG tablet    klonoPIN          FISH OIL PO      Take 1 tablet by mouth daily.        FLUoxetine 20 MG tablet      Take 20 mg by mouth daily        hydrochlorothiazide 25 MG tablet    HYDRODIURIL    90 tablet    TAKE 1 TABLET (25 MG) BY MOUTH DAILY    Benign essential hypertension       * lamoTRIgine 200 MG tablet    LaMICtal     1 TABLET TWICE DAILY        * lamoTRIgine 25 MG tablet    LaMICtal     25 mg        losartan 100 MG tablet    COZAAR    90 tablet    Take 1 tablet (100 mg) by mouth daily    Benign essential hypertension       metoprolol tartrate 50 MG tablet    LOPRESSOR    135 tablet    One in the morning and 1/2 in the evening    Benign essential hypertension       mometasone-formoterol 200-5 MCG/ACT oral inhaler    DULERA    39 g    Inhale 2 puffs into the lungs 2 times daily    Mild persistent asthma without complication       Multi-vitamin Tabs tablet   Generic drug:  multivitamin, therapeutic with minerals      Take 1 tablet by mouth daily.        PRISTIQ 50 MG 24 hr tablet   Generic drug:  desvenlafaxine succinate      Take 100 mg by mouth  daily        rosuvastatin 40 MG tablet    CRESTOR    90 tablet    TAKE 1 TABLET (40 MG) BY MOUTH DAILY generic rosuvastatin please    Hyperlipidemia with target LDL less than 100       SYNTHROID 50 MCG tablet   Generic drug:  levothyroxine      Take 1 tablet by mouth daily.        TRINTELLIX 5 MG tablet   Generic drug:  vortioxetine      Take 5 mg by mouth daily        verapamil 240 MG CR tablet    CALAN-SR    90 tablet    Take 1 tablet (240 mg) by mouth daily    Benign essential hypertension       * Notice:  This list has 2 medication(s) that are the same as other medications prescribed for you. Read the directions carefully, and ask your doctor or other care provider to review them with you.

## 2018-11-01 NOTE — PROGRESS NOTES
Subjective:    7/12/18  Pt is seen todaywith the c/c of painful lesions on the left hallux, 4th toe and right third toe.  This has been problematic for 4 month(s).  Pain is aggrevated by activity and relieved by rest.  Describes as burning pain and this is slowly getting worse.  Has tried OTC products without alleviation of lesion/symptoms.  Patient is requesting more definitive treatment.    7/26/18 some pain for few days after cryotherapy and now fine.    8/8/18  Pain for a couple days after last cryotherapy    8/30/18 patient returns for reevaluation of verruca.  Denies drainage erythema or edema.  Pain for only 1 day after cryotherapy    9/20/18 pain for only a few days after cryotherapy last visit.  Denies erythema edema or drainage.    10/11/18 pain for a few days after cryotherapy.  Denies any erythema edema or drainage now.    11/1/18 patient had pain for approximately a week after last cryotherapy.  Denies any drainage at all.  Denies erythema or edema.  Has no other new complaints    ROS: See above     Allergies   Allergen Reactions     Ace Inhibitors Cough     Diclofenac Other (See Comments)     Balance problems       Current Outpatient Prescriptions   Medication Sig Dispense Refill     alendronate (FOSAMAX) 70 MG tablet        aspirin 81 MG tablet Take 1 tablet by mouth daily.       Azelastine HCl (ASTEPRO) 0.15 % SOLN Spray 1 spray into both nostrils daily 30 mL 4     Calcium Citrate (CITRACAL OR) Take 1 tablet by mouth daily.       CHOLECALCIFEROL 46070 UNIT PO CAPS 1 tablet weekly       clonazePAM (KLONOPIN) 0.5 MG tablet        desvenlafaxine (PRISTIQ) 50 MG 24 hr tablet Take 100 mg by mouth daily        FLUoxetine 20 MG tablet Take 20 mg by mouth daily       hydrochlorothiazide (HYDRODIURIL) 25 MG tablet TAKE 1 TABLET (25 MG) BY MOUTH DAILY 90 tablet 4     lamoTRIgine (LAMICTAL) 25 MG tablet 25 mg       LAMOTRIGINE 200 MG PO TABS 1 TABLET TWICE DAILY       levothyroxine (SYNTHROID) 50 MCG tablet  Take 1 tablet by mouth daily.       losartan (COZAAR) 100 MG tablet Take 1 tablet (100 mg) by mouth daily 90 tablet 4     metoprolol tartrate (LOPRESSOR) 50 MG tablet One in the morning and 1/2 in the evening 135 tablet 4     mometasone-formoterol (DULERA) 200-5 MCG/ACT oral inhaler Inhale 2 puffs into the lungs 2 times daily 39 g 4     Multiple Vitamin (MULTI-VITAMIN) per tablet Take 1 tablet by mouth daily.       Omega-3 Fatty Acids (FISH OIL PO) Take 1 tablet by mouth daily.       rosuvastatin (CRESTOR) 40 MG tablet TAKE 1 TABLET (40 MG) BY MOUTH DAILY generic rosuvastatin please 90 tablet 3     TRINTELLIX 5 MG tablet Take 5 mg by mouth daily  2     verapamil (CALAN-SR) 240 MG CR tablet Take 1 tablet (240 mg) by mouth daily 90 tablet 4       Patient Active Problem List   Diagnosis     CAD (coronary artery disease)     Hyperlipidemia with target LDL less than 100     Mild major depression (H)     Pulmonary hypertension (H)     Mild persistent asthma     Seasonal allergic rhinitis     Mitral insufficiency     Tricuspid insufficiency     Bipolar disorder (H)     Renal insufficiency     Tremors     Advanced directives, counseling/discussion     Family history of diabetes mellitus     Family history of celiac disease     Celiac disease     History of colonic polyps     Benign essential hypertension     Hypothyroidism, unspecified type       Past Medical History:   Diagnosis Date     Aortic valvar stenosis 7/2010    mild     Asthma      Bipolar disorder (H)      CAD (coronary artery disease)     s/p angioplasty     Celiac disease      Colon polyps      Colon polyps 2012    every 3 year colonoscopy      Depression      High cholesterol      HTN      Mitral insufficiency      Pulmonary HTN (H)     mild     Tremors 7/10    drug induced from antidepressants     Tricuspid insufficiency        Past Surgical History:   Procedure Laterality Date     ANGIOGRAM  6/26/2009     ANGIOPLASTY  9/96    for angina     ANGIOPLASTY  8/03  - 9/03    X -2 - with stenst in coronary car.     APPENDECTOMY       BREAST BIOPSY, RT/LT      Breat Biopsy RT/LT, benign     BUNIONECTOMY  11/8/2011    Procedure:BUNIONECTOMY; Left donna bunionectomy; Surgeon:FREDY LITTLEJOHN; Location:[facility]     BUNIONECTOMY RT/LT  5/2007    right bunion and right 2nd hammertoe     C ANESTH,BLEPHAROPLASTY      (R) for drooping eyelid     C APPENDECTOMY       CATARACT IOL, RT/LT  6/12 and 7/12    bilateral     COLONOSCOPY  2007, 2012    every 3 years for polyps     JOINT REPLACEMENT, HIP RT/LT  4/2008    right hip replaced     JOINT REPLACEMENT, HIP RT/LT  4/2009    left hip replaced     REPAIR HAMMER TOE  11/8/2011    Procedure:REPAIR HAMMER TOE; left 2nd hammertoe repair; Surgeon:FREDY LITTLEJOHN; Location:[facility]     SURGICAL HISTORY OF -   11/11    left bunion and 2nd hammertoe repair     TUBAL LIGATION         Family History   Problem Relation Age of Onset     Asthma Mother      Cerebrovascular Disease Mother      Arthritis Mother      Depression Mother      Lipids Sister      Hypertension Sister      HEART DISEASE Sister      Lipids Sister      Hypertension Sister      Lipids Sister      Breast Cancer Sister        Social History   Substance Use Topics     Smoking status: Never Smoker     Smokeless tobacco: Never Used     Alcohol use No         Exam:    BP 94/52  Pulse 66  Wt 159 lb (72.1 kg)  BMI 31.05 kg/m2.  Good historian.  A&O X 3.  Pulses DP, PT 2/4 b/l.  CRT < 3 seconds X 10 digits.  No edema or varicosities noted.  Sensation to light touch intact b/l.  Reflexes 2/4 b/l.  Skin has normal texture and turgor b/l.  pronated arch with weightbearing.  No forefoot or rear foot deformities noted.  MS 5/5 all compartments.  Normal ROM all fore foot and rearfoot joints.  No equinus.  Left hallux lateral border verruca smaller, almost resolved.  No other verruca seen on left foot.  A/P    Verrucous lesion left hallux    Discussed  left hallux lateral border verruca almost  totally resolved.  Debrided this and cryotherapy applied x3.   Discussed with patient she can return to see me in 6 weeks to ensure this is resolved or as needed.    Carlos Carlson DPM, FACFAS

## 2018-11-01 NOTE — PATIENT INSTRUCTIONS
We wish you continued good healing. If you have any questions or concerns, please do not hesitate to contact us at [phone]    Please remember to call and schedule a follow up appointment if one was recommended at your earliest convenience.   PODIATRY CLINIC HOURS  TELEPHONE NUMBER    Dr. Carlos Carlson D.P.M [facility]    Clinics:  [facility]    [facility]   Tuesday 1PM-6PM  [place]  Wednesday 7AM-2PM  [facility]  Thursday 10AM-6PM  [place]  Friday 7AM-3PM  [place]  Specialty schedulers:   [phone] to make an appointment with any Specialty Provider.        Urgent Care locations:    [facility] Monday-Friday 5 pm - 9 pm. Saturday-Sunday 9 am -5pm    Monday-Friday 11 am - 9 pm Saturday 9 am - 5 pm     Monday-Sunday 12 noon-8PM [phone]     If you need a medication refill, please contact us you may need lab work and/or a follow up visit prior to your refill (i.e. Antifungal medications).    [facility] (secure e-mail communication and access to your chart) to send a message or to make an appointment.    If MRI needed please call [place] at [phone]        Weight management plan: Patient was referred to their PCP to discuss a diet and exercise plan.

## 2019-02-19 DIAGNOSIS — E78.5 HYPERLIPIDEMIA WITH TARGET LDL LESS THAN 100: ICD-10-CM

## 2019-02-19 RX ORDER — ROSUVASTATIN CALCIUM 40 MG/1
TABLET, COATED ORAL
Qty: 90 TABLET | Refills: 0 | Status: SHIPPED | OUTPATIENT
Start: 2019-02-19 | End: 2019-05-20

## 2019-03-31 NOTE — LETTER
8/30/2018         RE: Kamryn Miller  [address]   [address]  [place] [address]        Dear Colleague,    Thank you for referring your patient, Kamryn Miller, to the [facility]. Please see a copy of my visit note below.    Subjective:    7/12/18  Pt is seen todaywith the c/c of painful lesions on the left hallux, 4th toe and right third toe.  This has been problematic for 4 month(s).  Pain is aggrevated by activity and relieved by rest.  Describes as burning pain and this is slowly getting worse.  Has tried OTC products without alleviation of lesion/symptoms.  Patient is requesting more definitive treatment.    7/26/18 some pain for few days after cryotherapy and now fine.    8/8/18  Pain for a couple days after last cryotherapy    8/30/18 patient returns for reevaluation of verruca.  Denies drainage erythema or edema.  Pain for only 1 day after cryotherapy    ROS: See above     Allergies   Allergen Reactions     Ace Inhibitors Cough     Diclofenac Other (See Comments)     Balance problems       Current Outpatient Prescriptions   Medication Sig Dispense Refill     aspirin 81 MG tablet Take 1 tablet by mouth daily.       Azelastine HCl (ASTEPRO) 0.15 % SOLN Spray 1 spray into both nostrils daily 30 mL 4     Calcium Citrate (CITRACAL OR) Take 1 tablet by mouth daily.       CHOLECALCIFEROL 20926 UNIT PO CAPS 1 tablet weekly       desvenlafaxine (PRISTIQ) 50 MG 24 hr tablet Take 100 mg by mouth daily        FLUoxetine 20 MG tablet Take 20 mg by mouth daily       hydrochlorothiazide (HYDRODIURIL) 25 MG tablet TAKE 1 TABLET (25 MG) BY MOUTH DAILY 90 tablet 4     LAMOTRIGINE 200 MG PO TABS 1 TABLET TWICE DAILY       levothyroxine (SYNTHROID) 50 MCG tablet Take 1 tablet by mouth daily.       losartan (COZAAR) 100 MG tablet Take 1 tablet (100 mg) by mouth daily 90 tablet 4     metoprolol tartrate (LOPRESSOR) 50 MG tablet One in the morning and 1/2 in the evening 135 tablet 4      mometasone-formoterol (DULERA) 200-5 MCG/ACT oral inhaler Inhale 2 puffs into the lungs 2 times daily 39 g 4     Multiple Vitamin (MULTI-VITAMIN) per tablet Take 1 tablet by mouth daily.       Omega-3 Fatty Acids (FISH OIL PO) Take 1 tablet by mouth daily.       rosuvastatin (CRESTOR) 40 MG tablet TAKE 1 TABLET (40 MG) BY MOUTH DAILY generic rosuvastatin please 90 tablet 3     TRINTELLIX 5 MG tablet Take 5 mg by mouth daily  2     verapamil (CALAN-SR) 240 MG CR tablet Take 1 tablet (240 mg) by mouth daily 90 tablet 4       Patient Active Problem List   Diagnosis     CAD (coronary artery disease)     Hyperlipidemia with target LDL less than 100     Mild major depression (H)     Pulmonary hypertension     Mild persistent asthma     Seasonal allergic rhinitis     Mitral insufficiency     Tricuspid insufficiency     Bipolar disorder (H)     Renal insufficiency     Tremors     Advanced directives, counseling/discussion     Family history of diabetes mellitus     Family history of celiac disease     Celiac disease     History of colonic polyps     Benign essential hypertension     Hypothyroidism, unspecified type       Past Medical History:   Diagnosis Date     Aortic valvar stenosis 7/2010    mild     Asthma      Bipolar disorder (H)      CAD (coronary artery disease)     s/p angioplasty     Celiac disease      Colon polyps      Colon polyps 2012    every 3 year colonoscopy      Depression      High cholesterol      HTN      Mitral insufficiency      Pulmonary HTN     mild     Tremors 7/10    drug induced from antidepressants     Tricuspid insufficiency        Past Surgical History:   Procedure Laterality Date     ANGIOGRAM  6/26/2009     ANGIOPLASTY  9/96    for angina     ANGIOPLASTY  8/03 - 9/03    X -2 - with stenst in coronary car.     APPENDECTOMY       BREAST BIOPSY, RT/LT      Breat Biopsy RT/LT, benign     BUNIONECTOMY  11/8/2011    Procedure:BUNIONECTOMY; Left donna bunionectomy; Surgeon:FREDY LITTLEJOHN  Location:[facility]     BUNIONECTOMY RT/LT  5/2007    right bunion and right 2nd hammertoe     C ANESTH,BLEPHAROPLASTY      (R) for drooping eyelid     C APPENDECTOMY       CATARACT IOL, RT/LT  6/12 and 7/12    bilateral     COLONOSCOPY  2007, 2012    every 3 years for polyps     JOINT REPLACEMENT, HIP RT/LT  4/2008    right hip replaced     JOINT REPLACEMENT, HIP RT/LT  4/2009    left hip replaced     REPAIR HAMMER TOE  11/8/2011    Procedure:REPAIR HAMMER TOE; left 2nd hammertoe repair; Surgeon:FREDY LITTLEJOHN; Location:[facility]     SURGICAL HISTORY OF -   11/11    left bunion and 2nd hammertoe repair     TUBAL LIGATION         Family History   Problem Relation Age of Onset     Asthma Mother      Cerebrovascular Disease Mother      Arthritis Mother      Depression Mother      Lipids Sister      Hypertension Sister      HEART DISEASE Sister      Lipids Sister      Hypertension Sister      Lipids Sister      Breast Cancer Sister        Social History   Substance Use Topics     Smoking status: Never Smoker     Smokeless tobacco: Never Used     Alcohol use No         Exam:    /77  Pulse 61  Temp 98  F (36.7  C)  Resp 13  Ht 5' (1.524 m)  Wt 160 lb (72.6 kg)  SpO2 100%  BMI 31.25 kg/m2.  Good historian.  A&O X 3.  Pulses DP, PT 2/4 b/l.  CRT < 3 seconds X 10 digits.  No edema or varicosities noted.  Sensation to light touch intact b/l.  Reflexes 2/4 b/l.  Skin has normal texture and turgor b/l.  pronated arch with weightbearing.  No forefoot or rear foot deformities noted.  MS 5/5 all compartments.  Normal ROM all fore foot and rearfoot joints.  No equinus.   Lesions are located on left toes 1/4 and much smaller, 90% resolved.  Right third toe verruca resolved.  No subcutaneus masses noted.    A/P    Verrucous lesions left foot    Discussed all are smaller and right resolved.  Would like to continue cryotherapy.  Risks, complications and efficacy were discussed.   Cryotherapy times 3.  RETURN TO CLINIC in 2-3  diana.     Carlos Carlson DPM, FACFAS                Again, thank you for allowing me to participate in the care of your patient.        Sincerely,        Carlos Carlson DPM     Statement Selected

## 2019-05-10 DIAGNOSIS — I10 BENIGN ESSENTIAL HYPERTENSION: ICD-10-CM

## 2019-05-10 RX ORDER — LOSARTAN POTASSIUM 100 MG/1
100 TABLET ORAL DAILY
Qty: 30 TABLET | Refills: 0 | Status: SHIPPED | OUTPATIENT
Start: 2019-05-10 | End: 2019-06-25

## 2019-05-10 NOTE — TELEPHONE ENCOUNTER
"Please contact pt to schedule an appt to establish care with a new provider.    Prescription approved per [facility] Refill Protocol.    Requested Prescriptions   Signed Prescriptions Disp Refills    losartan (COZAAR) 100 MG tablet 30 tablet 0     Sig: Take 1 tablet (100 mg) by mouth daily       Angiotensin-II Receptors Failed - 5/10/2019  4:12 PM        Failed - Recent (12 mo) or future (30 days) visit within the authorizing provider's specialty     Patient had office visit in the last 12 months or has a visit in the next 30 days with authorizing provider or within the authorizing provider's specialty.  See \"Patient Info\" tab in inbasket, or \"Choose Columns\" in Meds & Orders section of the refill encounter.              Failed - Normal serum creatinine on file in past 12 months     Recent Labs   Lab Test 07/13/18  0857   CR 1.51*             Passed - Blood pressure under 140/90 in past 12 months     BP Readings from Last 3 Encounters:   11/01/18 94/52   10/11/18 152/78   09/20/18 100/54                 Passed - Medication is active on med list        Passed - Patient is age 18 or older        Passed - No active pregnancy on record        Passed - Normal serum potassium on file in past 12 months     Recent Labs   Lab Test 07/13/18  0857   POTASSIUM 3.7                    Passed - No positive pregnancy test in past 12 months        Verona Blackwell RN  [facility] [place]    "

## 2019-05-10 NOTE — TELEPHONE ENCOUNTER
Reason for Call:  Other prescription    Detailed comments: Pharmacy states they sent a request. Need refill of losartan (COZAAR) 100 MG tablet sent in.     Phone Number Patient can be reached at: Other phone number:    [facility]/PHARMACY #[id number] - [place], [place] - [address] AT CORNER OF [facility] (Pharmacy) [phone]         Best Time: ASAP    Can we leave a detailed message on this number? YES    Call taken on 5/10/2019 at 2:32 PM by Kim Umanzor

## 2019-05-20 DIAGNOSIS — E78.5 HYPERLIPIDEMIA WITH TARGET LDL LESS THAN 100: ICD-10-CM

## 2019-05-20 RX ORDER — ROSUVASTATIN CALCIUM 40 MG/1
TABLET, COATED ORAL
Qty: 30 TABLET | Refills: 0 | Status: SHIPPED | OUTPATIENT
Start: 2019-05-20 | End: 2019-06-17

## 2019-05-21 DIAGNOSIS — I10 BENIGN ESSENTIAL HYPERTENSION: ICD-10-CM

## 2019-05-21 RX ORDER — METOPROLOL TARTRATE 50 MG
TABLET ORAL
Qty: 45 TABLET | Refills: 0 | Status: SHIPPED | OUTPATIENT
Start: 2019-05-21 | End: 2019-06-14

## 2019-05-21 NOTE — TELEPHONE ENCOUNTER
Routing refill request to provider for review/approval because:  Drug interaction warning        Snow Montana RN

## 2019-06-14 DIAGNOSIS — I10 BENIGN ESSENTIAL HYPERTENSION: ICD-10-CM

## 2019-06-14 RX ORDER — METOPROLOL TARTRATE 50 MG
TABLET ORAL
Qty: 45 TABLET | Refills: 0 | Status: SHIPPED | OUTPATIENT
Start: 2019-06-14 | End: 2019-06-25

## 2019-06-14 NOTE — TELEPHONE ENCOUNTER
"Requested Prescriptions   Pending Prescriptions Disp Refills     metoprolol tartrate (LOPRESSOR) 50 MG tablet 45 tablet 0     Sig: One in the morning and 1/2 in the evening       Beta-Blockers Protocol Passed - 6/14/2019  7:48 AM        Passed - Blood pressure under 140/90 in past 12 months     BP Readings from Last 3 Encounters:   11/01/18 94/52   10/11/18 152/78   09/20/18 100/54                 Passed - Patient is age 6 or older        Passed - Recent (12 mo) or future (30 days) visit within the authorizing provider's specialty     Patient had office visit in the last 12 months or has a visit in the next 30 days with authorizing provider or within the authorizing provider's specialty.  See \"Patient Info\" tab in inbasket, or \"Choose Columns\" in Meds & Orders section of the refill encounter.              Passed - Medication is active on med list        Last Written Prescription Date:  5/4/19  Last Fill Quantity: 45,  # refills: 0   Last office visit: 5/3/2018 with prescribing provider:     Future Office Visit:   Next 5 appointments (look out 90 days)    Jun 25, 2019  3:20 PM CDT  PHYSICAL with Rolanda Wilcox MD  [facility] [place] ([facility] [place]) [address]  [place] [id number] [phone]           "

## 2019-06-17 DIAGNOSIS — E78.5 HYPERLIPIDEMIA WITH TARGET LDL LESS THAN 100: ICD-10-CM

## 2019-06-17 RX ORDER — ROSUVASTATIN CALCIUM 40 MG/1
TABLET, COATED ORAL
Qty: 30 TABLET | Refills: 0 | Status: SHIPPED | OUTPATIENT
Start: 2019-06-17 | End: 2019-06-25

## 2019-06-25 ENCOUNTER — OFFICE VISIT (OUTPATIENT)
Dept: FAMILY MEDICINE | Facility: CLINIC | Age: 84
End: 2019-06-25
Payer: MEDICARE

## 2019-06-25 VITALS
HEART RATE: 59 BPM | HEIGHT: 60 IN | SYSTOLIC BLOOD PRESSURE: 102 MMHG | WEIGHT: 158.2 LBS | OXYGEN SATURATION: 96 % | TEMPERATURE: 97 F | RESPIRATION RATE: 14 BRPM | DIASTOLIC BLOOD PRESSURE: 58 MMHG | BODY MASS INDEX: 31.06 KG/M2

## 2019-06-25 DIAGNOSIS — K90.0 CELIAC DISEASE: ICD-10-CM

## 2019-06-25 DIAGNOSIS — J45.30 MILD PERSISTENT ASTHMA WITHOUT COMPLICATION: ICD-10-CM

## 2019-06-25 DIAGNOSIS — I25.10 CORONARY ARTERY DISEASE INVOLVING NATIVE HEART WITHOUT ANGINA PECTORIS, UNSPECIFIED VESSEL OR LESION TYPE: ICD-10-CM

## 2019-06-25 DIAGNOSIS — I27.20 PULMONARY HYPERTENSION (H): ICD-10-CM

## 2019-06-25 DIAGNOSIS — N18.30 CKD (CHRONIC KIDNEY DISEASE) STAGE 3, GFR 30-59 ML/MIN (H): ICD-10-CM

## 2019-06-25 DIAGNOSIS — F32.0 MILD MAJOR DEPRESSION (H): Primary | ICD-10-CM

## 2019-06-25 DIAGNOSIS — E78.5 HYPERLIPIDEMIA WITH TARGET LDL LESS THAN 100: ICD-10-CM

## 2019-06-25 DIAGNOSIS — Z00.00 ENCOUNTER FOR MEDICARE ANNUAL WELLNESS EXAM: ICD-10-CM

## 2019-06-25 DIAGNOSIS — I10 BENIGN ESSENTIAL HYPERTENSION: ICD-10-CM

## 2019-06-25 DIAGNOSIS — F31.70 BIPOLAR DISORDER IN PARTIAL REMISSION, MOST RECENT EPISODE UNSPECIFIED TYPE (H): ICD-10-CM

## 2019-06-25 DIAGNOSIS — I34.0 MITRAL VALVE INSUFFICIENCY, UNSPECIFIED ETIOLOGY: ICD-10-CM

## 2019-06-25 LAB
ALBUMIN SERPL-MCNC: 3.5 G/DL (ref 3.4–5)
ALP SERPL-CCNC: 52 U/L (ref 40–150)
ALT SERPL W P-5'-P-CCNC: 37 U/L (ref 0–50)
ANION GAP SERPL CALCULATED.3IONS-SCNC: 7 MMOL/L (ref 3–14)
AST SERPL W P-5'-P-CCNC: 22 U/L (ref 0–45)
BILIRUB SERPL-MCNC: 0.4 MG/DL (ref 0.2–1.3)
BUN SERPL-MCNC: 44 MG/DL (ref 7–30)
CALCIUM SERPL-MCNC: 10 MG/DL (ref 8.5–10.1)
CHLORIDE SERPL-SCNC: 103 MMOL/L (ref 94–109)
CHOLEST SERPL-MCNC: 198 MG/DL
CO2 SERPL-SCNC: 29 MMOL/L (ref 20–32)
CREAT SERPL-MCNC: 1.69 MG/DL (ref 0.52–1.04)
CREAT UR-MCNC: 76 MG/DL
GFR SERPL CREATININE-BSD FRML MDRD: 28 ML/MIN/{1.73_M2}
GLUCOSE SERPL-MCNC: 80 MG/DL (ref 70–99)
HDLC SERPL-MCNC: 64 MG/DL
LDLC SERPL CALC-MCNC: 107 MG/DL
MICROALBUMIN UR-MCNC: 29 MG/L
MICROALBUMIN/CREAT UR: 38.74 MG/G CR (ref 0–25)
NONHDLC SERPL-MCNC: 134 MG/DL
POTASSIUM SERPL-SCNC: 4.3 MMOL/L (ref 3.4–5.3)
PROT SERPL-MCNC: 7 G/DL (ref 6.8–8.8)
SODIUM SERPL-SCNC: 139 MMOL/L (ref 133–144)
TRIGL SERPL-MCNC: 135 MG/DL

## 2019-06-25 PROCEDURE — 99213 OFFICE O/P EST LOW 20 MIN: CPT | Mod: 25 | Performed by: FAMILY MEDICINE

## 2019-06-25 PROCEDURE — 80053 COMPREHEN METABOLIC PANEL: CPT | Performed by: FAMILY MEDICINE

## 2019-06-25 PROCEDURE — 36415 COLL VENOUS BLD VENIPUNCTURE: CPT | Performed by: FAMILY MEDICINE

## 2019-06-25 PROCEDURE — 99397 PER PM REEVAL EST PAT 65+ YR: CPT | Performed by: FAMILY MEDICINE

## 2019-06-25 PROCEDURE — 80061 LIPID PANEL: CPT | Performed by: FAMILY MEDICINE

## 2019-06-25 PROCEDURE — 82043 UR ALBUMIN QUANTITATIVE: CPT | Performed by: FAMILY MEDICINE

## 2019-06-25 RX ORDER — VERAPAMIL HYDROCHLORIDE 240 MG/1
240 TABLET, FILM COATED, EXTENDED RELEASE ORAL DAILY
Qty: 90 TABLET | Refills: 4 | Status: SHIPPED | OUTPATIENT
Start: 2019-06-25 | End: 2020-08-26

## 2019-06-25 RX ORDER — LOSARTAN POTASSIUM 100 MG/1
100 TABLET ORAL DAILY
Qty: 90 TABLET | Refills: 3 | Status: SHIPPED | OUTPATIENT
Start: 2019-06-25 | End: 2020-07-27

## 2019-06-25 RX ORDER — HYDROCHLOROTHIAZIDE 25 MG/1
TABLET ORAL
Qty: 90 TABLET | Refills: 3 | Status: SHIPPED | OUTPATIENT
Start: 2019-06-25 | End: 2019-09-03

## 2019-06-25 RX ORDER — METOPROLOL TARTRATE 50 MG
TABLET ORAL
Qty: 135 TABLET | Refills: 3 | Status: SHIPPED | OUTPATIENT
Start: 2019-06-25 | End: 2020-04-30

## 2019-06-25 RX ORDER — ROSUVASTATIN CALCIUM 40 MG/1
40 TABLET, COATED ORAL DAILY
Qty: 90 TABLET | Refills: 3 | Status: SHIPPED | OUTPATIENT
Start: 2019-06-25 | End: 2020-07-27

## 2019-06-25 ASSESSMENT — MIFFLIN-ST. JEOR: SCORE: 1099.09

## 2019-06-25 ASSESSMENT — ACTIVITIES OF DAILY LIVING (ADL): CURRENT_FUNCTION: NO ASSISTANCE NEEDED

## 2019-06-25 ASSESSMENT — PATIENT HEALTH QUESTIONNAIRE - PHQ9: SUM OF ALL RESPONSES TO PHQ QUESTIONS 1-9: 0

## 2019-06-25 NOTE — PROGRESS NOTES
"SUBJECTIVE:   Kamryn Miller is a 83 year old female who presents for Preventive Visit.  Are you in the first 12 months of your Medicare coverage?  No    Healthy Habits:    In general, how would you rate your overall health?  Very good    Frequency of exercise:  2-3 days/week    Duration of exercise:  45-60 minutes    Do you usually eat at least 4 servings of fruit and vegetables a day, include whole grains    & fiber and avoid regularly eating high fat or \"junk\" foods?  Yes    Taking medications regularly:  No    Barriers to taking medications:  None    Medication side effects:  None    Ability to successfully perform activities of daily living:  No assistance needed    Home Safety:  No safety concerns identified    Hearing Impairment:  No hearing concerns    In the past 6 months, have you been bothered by leaking of urine?  No    In general, how would you rate your overall mental or emotional health?  Very good      PHQ-2 Total Score:    Additional concerns today:  Yes (pain on right hip and back )    Do you feel safe in your environment? Yes    Do you have a Health Care Directive? Yes: Advance Directive has been received and scanned.    Fall risk       Cognitive Screening   1) Repeat 3 items (Leader, Season, Table)    2) Clock draw:  Normal  3) 3 item recall: Recalls 2 objects   Results: ABNORMAL clock, 1-2 items recalled: PROBABLE COGNITIVE IMPAIRMENT, **INFORM PROVIDER**    Mini-CogTM Copyright TIMOTHY Flores. Licensed by the author for use in [facility]; reprinted with permission ([email or web address]). All rights reserved.      Do you have sleep apnea, excessive snoring or daytime drowsiness?: no    Reviewed and updated as needed this visit by clinical staff  Tobacco  Allergies  Meds  Med Hx  Surg Hx  Fam Hx  Soc Hx        Reviewed and updated as needed this visit by Provider  Hyperlipidemia Follow-Up      Are you having any of the following symptoms? (Select all that apply)  No complaints of shortness " of breath, chest pain or pressure.  No increased sweating or nausea with activity.  No left-sided neck or arm pain.  No complaints of pain in calves when walking 1-2 blocks.    Are you regularly taking any medication or supplement to lower your cholesterol?   Yes- .    Are you having muscle aches or other side effects that you think could be caused by your cholesterol lowering medication?  No      Hypertension Follow-up      Do you check your blood pressure regularly outside of the clinic? No     Are you following a low salt diet? Yes    Are your blood pressures ever more than 140 on the top number (systolic) OR more   than 90 on the bottom number (diastolic), for example 140/90? No  Vascular Disease Follow-up      Are you having any of the following symptoms? (Select all that apply) No complaints of shortness of breath, chest pain or pressure.  No increased sweating or nausea with activity.  No left-sided neck or arm pain.  No complaints of pain in calves when walking 1-2 blocks.    How often do you take nitroglycerin? Never    Do you take an aspirin every day? Yes    Pt has History of Pulmonary Hypertension and wants a referral to Cardiology  She is doing well  No chest pain  She is doing well  No sob  No chest pain    Pt sees psych for her mental health issues and is doing well-see PHQ 9 in epic  Asthma Follow-Up    Was ACT completed today?    Yes    ACT Total Scores 6/25/2019   ACT TOTAL SCORE -   ASTHMA ER VISITS -   ASTHMA HOSPITALIZATIONS -   ACT TOTAL SCORE (Goal Greater than or Equal to 20) 23   In the past 12 months, how many times did you visit the emergency room for your asthma without being admitted to the hospital? 0   In the past 12 months, how many times were you hospitalized overnight because of your asthma? 0       How many days per week do you miss taking your asthma controller medication?  0    Please describe any recent triggers for your asthma: infections    Have you had any Emergency Room  Visits, Urgent Care Visits, or Hospital Admissions since your last office visit?  No            Social History     Tobacco Use     Smoking status: Never Smoker     Smokeless tobacco: Never Used   Substance Use Topics     Alcohol use: No     Alcohol/week: 0.0 oz     Types: 1 Standard drinks or equivalent per week     If you drink alcohol do you typically have >3 drinks per day or >7 drinks per week? No    Alcohol Use 12/22/2016   Prescreen: >3 drinks/day or >7 drinks/week? The patient does not drink >3 drinks per day nor >7 drinks per week.   No flowsheet data found.      Current providers sharing in care for this patient include:   Patient Care Team:  Rolanda Wilcox MD as PCP - General (Family Practice)  Kamryn Jha MD as Assigned PCP    The following health maintenance items are reviewed in Epic and correct as of today:  Health Maintenance   Topic Date Due     ZOSTER IMMUNIZATION (2 of 3) 07/12/2007     MICROALBUMIN  08/27/2014     ASTHMA ACTION PLAN  12/22/2017     ASTHMA CONTROL TEST  06/13/2018     COLONOSCOPY  12/11/2018     MEDICARE ANNUAL WELLNESS VISIT  05/03/2019     FALL RISK ASSESSMENT  05/03/2019     BMP  07/13/2019     LIPID  07/13/2019     CREATININE  07/13/2019     ADVANCE CARE PLANNING  10/06/2020     DTAP/TDAP/TD IMMUNIZATION (3 - Td) 06/19/2022     DEXA  Completed     INFLUENZA VACCINE  Completed     PNEUMOCOCCAL IMMUNIZATION 65+ LOW/MEDIUM RISK  Completed     IPV IMMUNIZATION  Aged Out     MENINGITIS IMMUNIZATION  Aged Out     Lab work is in process  Labs reviewed in EPIC  BP Readings from Last 3 Encounters:   06/25/19 102/58   11/01/18 94/52   10/11/18 152/78    Wt Readings from Last 3 Encounters:   06/25/19 71.8 kg (158 lb 3.2 oz)   11/01/18 72.1 kg (159 lb)   10/11/18 71.7 kg (158 lb)                  Patient Active Problem List   Diagnosis     CAD (coronary artery disease)     Hyperlipidemia with target LDL less than 100     Mild major depression (H)     Pulmonary hypertension (H)      Mild persistent asthma     Seasonal allergic rhinitis     Mitral insufficiency     Tricuspid insufficiency     Bipolar disorder (H)     Renal insufficiency     Tremors     Advanced directives, counseling/discussion     Family history of diabetes mellitus     Family history of celiac disease     Celiac disease     History of colonic polyps     Benign essential hypertension     Hypothyroidism, unspecified type     CKD (chronic kidney disease) stage 3, GFR 30-59 ml/min (H)     Past Surgical History:   Procedure Laterality Date     ANGIOGRAM  6/26/2009     ANGIOPLASTY  9/96    for angina     ANGIOPLASTY  8/03 - 9/03    X -2 - with stenst in coronary car.     APPENDECTOMY       BREAST BIOPSY, RT/LT      Breat Biopsy RT/LT, benign     BUNIONECTOMY  11/8/2011    Procedure:BUNIONECTOMY; Left donna bunionectomy; Surgeon:FREDY LITTLEJOHN; Location:[facility] OR     BUNIONECTOMY RT/LT  5/2007    right bunion and right 2nd hammertoe     C ANESTH,BLEPHAROPLASTY      (R) for drooping eyelid     C APPENDECTOMY       CATARACT IOL, RT/LT  6/12 and 7/12    bilateral     COLONOSCOPY  2007, 2012    every 3 years for polyps     JOINT REPLACEMENT, HIP RT/LT  4/2008    right hip replaced     JOINT REPLACEMENT, HIP RT/LT  4/2009    left hip replaced     REPAIR HAMMER TOE  11/8/2011    Procedure:REPAIR HAMMER TOE; left 2nd hammertoe repair; Surgeon:FREDY LITTLEJOHN; Location:[facility] OR     SURGICAL HISTORY OF -   11/11    left bunion and 2nd hammertoe repair     TUBAL LIGATION         Social History     Tobacco Use     Smoking status: Never Smoker     Smokeless tobacco: Never Used   Substance Use Topics     Alcohol use: No     Alcohol/week: 0.0 oz     Types: 1 Standard drinks or equivalent per week     Family History   Problem Relation Age of Onset     Asthma Mother      Cerebrovascular Disease Mother      Arthritis Mother      Depression Mother      Lipids Sister      Hypertension Sister      Heart Disease Sister      Lipids Sister      Hypertension  Sister      Lipids Sister      Breast Cancer Sister          Current Outpatient Medications   Medication Sig Dispense Refill     aspirin 81 MG tablet Take 1 tablet by mouth daily.       azelastine (ASTEPRO) 0.15 % nasal spray Spray 1 spray into both nostrils daily 90 mL 1     Calcium Citrate (CITRACAL OR) Take 1 tablet by mouth daily.       CHOLECALCIFEROL 45231 UNIT PO CAPS 1 tablet weekly       clonazePAM (KLONOPIN) 0.5 MG tablet        hydrochlorothiazide (HYDRODIURIL) 25 MG tablet TAKE 1 TABLET (25 MG) BY MOUTH DAILY 90 tablet 3     lamoTRIgine (LAMICTAL) 25 MG tablet 25 mg       LAMOTRIGINE 200 MG PO TABS 1 TABLET TWICE DAILY       levothyroxine (SYNTHROID) 50 MCG tablet Take 1 tablet by mouth daily.       losartan (COZAAR) 100 MG tablet Take 1 tablet (100 mg) by mouth daily 90 tablet 3     metoprolol tartrate (LOPRESSOR) 50 MG tablet One in the morning and 1/2 in the evening 135 tablet 3     mometasone-formoterol (DULERA) 200-5 MCG/ACT inhaler Inhale 2 puffs into the lungs 2 times daily 39 g 3     Multiple Vitamin (MULTI-VITAMIN) per tablet Take 1 tablet by mouth daily.       Omega-3 Fatty Acids (FISH OIL PO) Take 1 tablet by mouth daily.       rosuvastatin (CRESTOR) 40 MG tablet Take 1 tablet (40 mg) by mouth daily 90 tablet 3     TRINTELLIX 5 MG tablet Take 5 mg by mouth daily  2     verapamil ER (CALAN-SR) 240 MG CR tablet Take 1 tablet (240 mg) by mouth daily 90 tablet 4     desvenlafaxine (PRISTIQ) 50 MG 24 hr tablet Take 100 mg by mouth daily        FLUoxetine 20 MG tablet Take 20 mg by mouth daily       Allergies   Allergen Reactions     Ace Inhibitors Cough     Diclofenac Other (See Comments)     Balance problems     Recent Labs   Lab Test 07/13/18  0857 07/14/17  1148 12/22/16  1236 09/18/15  1018  07/10/12   LDL 95  --  102* 90   < > 115   HDL 56  --  52 62   < > 51   TRIG 143  --  125 106   < > 145   ALT 32 34  --   --   --  18   CR 1.51* 1.50* 1.33* 1.17*   < >  --    GFRESTIMATED 33* 33* 38* 45*    < >  --    GFRESTBLACK 40* 40* 46* 54*   < >  --    POTASSIUM 3.7 3.9 4.0 4.3   < >  --    TSH 3.83 2.40  --   --   --   --     < > = values in this interval not displayed.          Review of Systems  CONSTITUTIONAL: NEGATIVE for fever, chills, change in weight  INTEGUMENTARY/SKIN: NEGATIVE for worrisome rashes, moles or lesions  EYES: NEGATIVE for vision changes or irritation  ENT/MOUTH: NEGATIVE for ear, mouth and throat problems  RESP: NEGATIVE for significant cough or SOB  BREAST: NEGATIVE for masses, tenderness or discharge  CV: NEGATIVE for chest pain, palpitations or peripheral edema  GI: NEGATIVE for nausea, abdominal pain, heartburn, or change in bowel habits  : NEGATIVE for frequency, dysuria, or hematuria  MUSCULOSKELETAL:has pain Right Lower back and Hip  No trauma  Pain comes and Goes  NEURO: NEGATIVE for weakness, dizziness or paresthesias  ENDOCRINE: NEGATIVE for temperature intolerance, skin/hair changes  HEME: NEGATIVE for bleeding problems  PSYCHIATRIC: NEGATIVE for changes in mood or affect    OBJECTIVE:   /58   Pulse 59   Temp 97  F (36.1  C) (Oral)   Resp 14   Ht 1.524 m (5')   Wt 71.8 kg (158 lb 3.2 oz)   SpO2 96%   BMI 30.90 kg/m   Estimated body mass index is 30.9 kg/m  as calculated from the following:    Height as of this encounter: 1.524 m (5').    Weight as of this encounter: 71.8 kg (158 lb 3.2 oz).  Physical Exam  GENERAL APPEARANCE: healthy, alert and no distress  EYES: Eyes grossly normal to inspection, PERRL and conjunctivae and sclerae normal  HENT: ear canals and TM's normal, nose and mouth without ulcers or lesions, oropharynx clear and oral mucous membranes moist  NECK: no adenopathy, no asymmetry, masses, or scars and thyroid normal to palpation  RESP: lungs clear to auscultation - no rales, rhonchi or wheezes  BREAST: normal without masses, tenderness or nipple discharge and no palpable axillary masses or adenopathy  CV: regular rate and rhythm, normal S1 S2  normal ,grade 2/6 systolic  Murmur LSB   ABDOMEN: soft, nontender, no hepatosplenomegaly, no masses and bowel sounds normal  MS: no musculoskeletal defects are noted and gait is age appropriate without ataxia  SKIN: no suspicious lesions or rashes  NEURO: Normal strength and tone, sensory exam grossly normal, mentation intact and speech normal  PSYCH: mentation appears normal and affect normal/bright  No flank tenderness   Diagnostic Test Results:  Pending     ASSESSMENT / PLAN:   1. Encounter for Medicare annual wellness exam      2. Benign essential hypertension  controlled  - Albumin Random Urine Quantitative with Creat Ratio  - Lipid panel reflex to direct LDL Fasting  - hydrochlorothiazide (HYDRODIURIL) 25 MG tablet; TAKE 1 TABLET (25 MG) BY MOUTH DAILY  Dispense: 90 tablet; Refill: 3  - losartan (COZAAR) 100 MG tablet; Take 1 tablet (100 mg) by mouth daily  Dispense: 90 tablet; Refill: 3  - verapamil ER (CALAN-SR) 240 MG CR tablet; Take 1 tablet (240 mg) by mouth daily  Dispense: 90 tablet; Refill: 4  - Comprehensive metabolic panel  - metoprolol tartrate (LOPRESSOR) 50 MG tablet; One in the morning and 1/2 in the evening  Dispense: 135 tablet; Refill: 3    3. Mitral valve insufficiency, unspecified etiology  Referral done  - CARDIOLOGY EVAL ADULT REFERRAL    4. CKD (chronic kidney disease) stage 3, GFR 30-59 ml/min (H)  Pending labs    5. Pulmonary hypertension (H)  Pt will need echo  - CARDIOLOGY EVAL ADULT REFERRAL    6. Hyperlipidemia with target LDL less than 100  Labs pending   - rosuvastatin (CRESTOR) 40 MG tablet; Take 1 tablet (40 mg) by mouth daily  Dispense: 90 tablet; Refill: 3    7. Coronary artery disease involving native heart without angina pectoris, unspecified vessel or lesion type  Stable   - CARDIOLOGY EVAL ADULT REFERRAL    8. Celiac disease  Gluten free    9. Mild persistent asthma without complication  Stable   - mometasone-formoterol (DULERA) 200-5 MCG/ACT inhaler; Inhale 2 puffs into  the lungs 2 times daily  Dispense: 39 g; Refill: 3  For Bipolar disorder/depression pt sees Psych and stable on her medicines-see PHQ9=0  End of Life Planning:  Patient currently has an advanced directive: pt has copy    COUNSELING:  Reviewed preventive health counseling, as reflected in patient instructions       Regular exercise       Healthy diet/nutrition       Vision screening       Hearing screening       Bladder control       Fall risk prevention       Osteoporosis Prevention/Bone Health       Colon cancer screening       Advanced Planning        Pt declined colonoscopy    Estimated body mass index is 30.9 kg/m  as calculated from the following:    Height as of this encounter: 1.524 m (5').    Weight as of this encounter: 71.8 kg (158 lb 3.2 oz).    Weight management plan: Discussed healthy diet and exercise guidelines     reports that she has never smoked. She has never used smokeless tobacco.      Appropriate preventive services were discussed with this patient, including applicable screening as appropriate for cardiovascular disease, diabetes, osteopenia/osteoporosis, and glaucoma.  As appropriate for age/gender, discussed screening for colorectal cancer, prostate cancer, breast cancer, and cervical cancer. Checklist reviewing preventive services available has been given to the patient.    Reviewed patients plan of care and provided an AVS. The Intermediate Care Plan ( asthma action plan, low back pain action plan, and migraine action plan) for Kamryn meets the Care Plan requirement. This Care Plan has been established and reviewed with the Patient.    Counseling Resources:  ATP IV Guidelines  Pooled Cohorts Equation Calculator  Breast Cancer Risk Calculator  FRAX Risk Assessment  ICSI Preventive Guidelines  Dietary Guidelines for Americans, 2010  USDA's MyPlate  ASA Prophylaxis  Lung CA Screening    Rolanda Wilcox MD  [facility]

## 2019-06-25 NOTE — PATIENT INSTRUCTIONS
Patient Education   Personalized Prevention Plan  You are due for the preventive services outlined below.  Your care team is available to assist you in scheduling these services.  If you have already completed any of these items, please share that information with your care team to update in your medical record.  Health Maintenance Due   Topic Date Due     Zoster (Shingles) Vaccine (2 of 3) 07/12/2007     Kidney Function Urine Test  08/27/2014     Asthma Action Plan - yearly  12/22/2017     Asthma Control Test  06/13/2018     Colonscopy  12/11/2018     Annual Wellness Visit  05/03/2019     FALL RISK ASSESSMENT  05/03/2019     Basic Metabolic Panel  07/13/2019     Cholesterol Lab  07/13/2019     Creatinine Lab  07/13/2019        Patient Education   Personalized Prevention Plan  You are due for the preventive services outlined below.  Your care team is available to assist you in scheduling these services.  If you have already completed any of these items, please share that information with your care team to update in your medical record.  Health Maintenance Due   Topic Date Due     Zoster (Shingles) Vaccine (2 of 3) 07/12/2007     Kidney Function Urine Test  08/27/2014     Asthma Action Plan - yearly  12/22/2017     Asthma Control Test  06/13/2018     Annual Wellness Visit  05/03/2019     FALL RISK ASSESSMENT  05/03/2019     Basic Metabolic Panel  07/13/2019     Cholesterol Lab  07/13/2019     Creatinine Lab  07/13/2019          Preventing Falls in the Home  An adult or child can fall for many reasons. If you are an older adult, you may fall because your reaction time slows down. Your muscles and joints may get stiff, weak, or less flexible because of illness, medicines, or a physical condition. These things can also make a child more likely to fall or be injured in a fall.  Other health problems that make falls more likely include:    Arthritis    Dizziness or lightheadedness when you get out of bed (orthostatic  hypotension)    History of a stroke    Dizziness    Anemia    Certain medicines taken for mental illness    Problems with balance or gait    History of falls with or without an injury    Changes in vision (vision impairment)    Changes in thinking skills and memory (cognitive impairment)  Injuries from a fall can include broken bones, dislocated joints, and cuts. When these injuries are serious enough, they can make it impossible for you or a child who is injured in a fall to live on his or her own.  Prevention tips  To help prevent falls and fall-related injuries, follow the tips below.   Floors  Make floors safer by doing the following:     Put nonskid pads under area rugs.    Remove throw rugs.    Replace worn floor coverings.    Tack carpets firmly to each step on carpeted stairs. Put nonskid strips on the edges of uncarpeted stairs.    Keep floors and stairs free of clutter and cords.    Arrange furniture so there are clear pathways.    Clean up any spills right away.    Wear shoes that fit.  Bathrooms    Make bathrooms safer by doing the following:     Install grab bars in the tub or shower.    Apply nonskid strips or put a nonskid rubber mat in the tub or shower.    Sit on a bath chair to bathe.    Use bathmats with nonskid backing.  Lighting and the environment  Improve lighting in your home by doing the following:     Keep a flashlight in each room. Or put a lamp next to the bed within easy reach.    Put nightlights in the bedrooms, hallways, kitchen, and bathrooms.    Make sure all stairways have good lighting.    Take your time when going up and down stairs.    Put handrails on both sides of stairs and in walkways for more support. To prevent injury to your wrist or arm, don t use handrails to pull yourself up.    Install grab bars to pull yourself up.    Move or rearrange items that you use often. This will make them easier to find or reach.    Look at your home to find any safety hazards. Especially  look at doorways, walkways, and the driveway. Remove or repair any safety problems that you find.  Date Last Reviewed: 8/1/2016 2000-2018 [employer]. [address] [place] [address], [place] [address]. All rights reserved. This information is not intended as a substitute for professional medical care. Always follow your healthcare professional's instructions.

## 2019-06-26 ASSESSMENT — ASTHMA QUESTIONNAIRES: ACT_TOTALSCORE: 23

## 2019-07-01 DIAGNOSIS — N28.9 IMPAIRED RENAL FUNCTION: ICD-10-CM

## 2019-08-30 NOTE — PROGRESS NOTES
Subjective     Kamryn Miller is a 83 year old female who presents to clinic today for the following health issues:    Patient is here to follow up on her kidney function and lab results.       Hyperlipidemia Follow-Up      Are you having any of the following symptoms? (Select all that apply)  No complaints of shortness of breath, chest pain or pressure.  No increased sweating or nausea with activity.  No left-sided neck or arm pain.  No complaints of pain in calves when walking 1-2 blocks.    Are you regularly taking any medication or supplement to lower your cholesterol?   Yes- crestor    Are you having muscle aches or other side effects that you think could be caused by your cholesterol lowering medication?  No      Hypertension Follow-up      Do you check your blood pressure regularly outside of the clinic? No     Are you following a low salt diet? Yes    Are your blood pressures ever more than 140 on the top number (systolic) OR more   than 90 on the bottom number (diastolic), for example 140/90? No  Chronic Kidney Disease Follow-up      Current NSAID use?  No      Patient Active Problem List   Diagnosis     CAD (coronary artery disease)     Hyperlipidemia with target LDL less than 100     Mild major depression (H)     Pulmonary hypertension (H)     Mild persistent asthma     Seasonal allergic rhinitis     Mitral insufficiency     Tricuspid insufficiency     Bipolar disorder (H)     Renal insufficiency     Tremors     Advanced directives, counseling/discussion     Family history of diabetes mellitus     Family history of celiac disease     Celiac disease     History of colonic polyps     Benign essential hypertension     Hypothyroidism, unspecified type     CKD (chronic kidney disease) stage 3, GFR 30-59 ml/min (H)     Past Surgical History:   Procedure Laterality Date     ANGIOGRAM  6/26/2009     ANGIOPLASTY  9/96    for angina     ANGIOPLASTY  8/03 - 9/03    X -2 - with stenst in coronary car.      APPENDECTOMY       BREAST BIOPSY, RT/LT      Breat Biopsy RT/LT, benign     BUNIONECTOMY  11/8/2011    Procedure:BUNIONECTOMY; Left donna bunionectomy; Surgeon:FERDY LITTLEJOHN; Location:[facility]     BUNIONECTOMY RT/LT  5/2007    right bunion and right 2nd hammertoe     C ANESTH,BLEPHAROPLASTY      (R) for drooping eyelid     C APPENDECTOMY       CATARACT IOL, RT/LT  6/12 and 7/12    bilateral     COLONOSCOPY  2007, 2012    every 3 years for polyps     JOINT REPLACEMENT, HIP RT/LT  4/2008    right hip replaced     JOINT REPLACEMENT, HIP RT/LT  4/2009    left hip replaced     REPAIR HAMMER TOE  11/8/2011    Procedure:REPAIR HAMMER TOE; left 2nd hammertoe repair; Surgeon:FREDY LITTLEJOHN; Location:[facility]     SURGICAL HISTORY OF -   11/11    left bunion and 2nd hammertoe repair     TUBAL LIGATION         Social History     Tobacco Use     Smoking status: Never Smoker     Smokeless tobacco: Never Used   Substance Use Topics     Alcohol use: No     Alcohol/week: 0.0 oz     Types: 1 Standard drinks or equivalent per week     Family History   Problem Relation Age of Onset     Asthma Mother      Cerebrovascular Disease Mother      Arthritis Mother      Depression Mother      Lipids Sister      Hypertension Sister      Heart Disease Sister      Lipids Sister      Hypertension Sister      Lipids Sister      Breast Cancer Sister          Current Outpatient Medications   Medication Sig Dispense Refill     aspirin 81 MG tablet Take 1 tablet by mouth daily.       azelastine (ASTEPRO) 0.15 % nasal spray Spray 1 spray into both nostrils daily 90 mL 1     Calcium Citrate (CITRACAL OR) Take 1 tablet by mouth daily.       CHOLECALCIFEROL 34327 UNIT PO CAPS 1 tablet weekly       clonazePAM (KLONOPIN) 0.5 MG tablet        hydrochlorothiazide (HYDRODIURIL) 25 MG tablet Take 0.5 tablets (12.5 mg) by mouth daily 45 tablet 3     lamoTRIgine (LAMICTAL) 25 MG tablet 25 mg       LAMOTRIGINE 200 MG PO TABS 1 TABLET TWICE DAILY       levothyroxine  (SYNTHROID) 50 MCG tablet Take 1 tablet by mouth daily.       losartan (COZAAR) 100 MG tablet Take 1 tablet (100 mg) by mouth daily 90 tablet 3     metoprolol tartrate (LOPRESSOR) 50 MG tablet One in the morning and 1/2 in the evening 135 tablet 3     mometasone-formoterol (DULERA) 200-5 MCG/ACT inhaler Inhale 2 puffs into the lungs 2 times daily 39 g 3     Multiple Vitamin (MULTI-VITAMIN) per tablet Take 1 tablet by mouth daily.       Omega-3 Fatty Acids (FISH OIL PO) Take 1 tablet by mouth daily.       rosuvastatin (CRESTOR) 40 MG tablet Take 1 tablet (40 mg) by mouth daily 90 tablet 3     TRINTELLIX 5 MG tablet Take 5 mg by mouth daily  2     verapamil ER (CALAN-SR) 240 MG CR tablet Take 1 tablet (240 mg) by mouth daily 90 tablet 4     VIIBRYD 10 MG TABS tablet Take 10 mg by mouth daily  2     desvenlafaxine (PRISTIQ) 50 MG 24 hr tablet Take 100 mg by mouth daily        FLUoxetine 20 MG tablet Take 20 mg by mouth daily       Allergies   Allergen Reactions     Ace Inhibitors Cough     Diclofenac Other (See Comments)     Balance problems     Recent Labs   Lab Test 06/25/19  1559 07/13/18  0857 07/14/17  1148 12/22/16  1236   * 95  --  102*   HDL 64 56  --  52   TRIG 135 143  --  125   ALT 37 32 34  --    CR 1.69* 1.51* 1.50* 1.33*   GFRESTIMATED 28* 33* 33* 38*   GFRESTBLACK 32* 40* 40* 46*   POTASSIUM 4.3 3.7 3.9 4.0   TSH  --  3.83 2.40  --       BP Readings from Last 3 Encounters:   09/03/19 118/70   06/25/19 102/58   11/01/18 94/52    Wt Readings from Last 3 Encounters:   09/03/19 70.1 kg (154 lb 8 oz)   06/25/19 71.8 kg (158 lb 3.2 oz)   11/01/18 72.1 kg (159 lb)                    Reviewed and updated as needed this visit by Provider         Review of Systems   ROS COMP: CONSTITUTIONAL: NEGATIVE for fever, chills, change in weight  ENT/MOUTH: NEGATIVE for ear, mouth and throat problems  RESP: NEGATIVE for significant cough or SOB  CV: NEGATIVE for chest pain, palpitations or peripheral edema  GI:  NEGATIVE for nausea, abdominal pain, heartburn, or change in bowel habits  MUSCULOSKELETAL: NEGATIVE for significant arthralgias or myalgia  ROS otherwise negative      Objective    /70   Pulse 56   Temp 98.2  F (36.8  C) (Oral)   Resp 16   Wt 70.1 kg (154 lb 8 oz)   SpO2 97%   BMI 30.17 kg/m    Body mass index is 30.17 kg/m .  Physical Exam   GENERAL: healthy, alert and no distress  NECK: no adenopathy, no asymmetry, masses, or scars and thyroid normal to palpation  RESP: lungs clear to auscultation - no rales, rhonchi or wheezes  CV: regular rate and rhythm, normal S1 S2, no S3 or S4, no murmur, click or rub, no peripheral edema and peripheral pulses strong  ABDOMEN: soft, nontender, no hepatosplenomegaly, no masses and bowel sounds normal  MS: no gross musculoskeletal defects noted, no edema    Diagnostic Test Results:  Labs reviewed in Epic  Pending         Assessment & Plan     1. Renal hypertension, stage 1-4 or unspecified chronic kidney disease  controlled  - hydrochlorothiazide (HYDRODIURIL) 25 MG tablet; Take 0.5 tablets (12.5 mg) by mouth daily  Dispense: 45 tablet; Refill: 3    2. CKD (chronic kidney disease) stage 3, GFR 30-59 ml/min (H)  Labs pending   - Renal panel    3. Hyperlipidemia with target LDL less than 100  Labs pending     - Lipid panel reflex to direct LDL Fasting         Return in about 6 months (around 3/3/2020) for Med check.    Rolanda Wilcox MD  [facility]

## 2019-09-03 ENCOUNTER — OFFICE VISIT (OUTPATIENT)
Dept: FAMILY MEDICINE | Facility: CLINIC | Age: 84
End: 2019-09-03
Payer: MEDICARE

## 2019-09-03 VITALS
HEART RATE: 56 BPM | RESPIRATION RATE: 16 BRPM | TEMPERATURE: 98.2 F | SYSTOLIC BLOOD PRESSURE: 118 MMHG | OXYGEN SATURATION: 97 % | WEIGHT: 154.5 LBS | DIASTOLIC BLOOD PRESSURE: 70 MMHG | BODY MASS INDEX: 30.17 KG/M2

## 2019-09-03 DIAGNOSIS — N28.9 IMPAIRED RENAL FUNCTION: ICD-10-CM

## 2019-09-03 DIAGNOSIS — I12.9 RENAL HYPERTENSION, STAGE 1-4 OR UNSPECIFIED CHRONIC KIDNEY DISEASE: ICD-10-CM

## 2019-09-03 DIAGNOSIS — E78.5 HYPERLIPIDEMIA WITH TARGET LDL LESS THAN 100: ICD-10-CM

## 2019-09-03 DIAGNOSIS — N18.30 CKD (CHRONIC KIDNEY DISEASE) STAGE 3, GFR 30-59 ML/MIN (H): ICD-10-CM

## 2019-09-03 LAB
ALBUMIN SERPL-MCNC: 3.7 G/DL (ref 3.4–5)
ANION GAP SERPL CALCULATED.3IONS-SCNC: 6 MMOL/L (ref 3–14)
BUN SERPL-MCNC: 49 MG/DL (ref 7–30)
CALCIUM SERPL-MCNC: 10.7 MG/DL (ref 8.5–10.1)
CHLORIDE SERPL-SCNC: 102 MMOL/L (ref 94–109)
CHOLEST SERPL-MCNC: 175 MG/DL
CO2 SERPL-SCNC: 31 MMOL/L (ref 20–32)
CREAT SERPL-MCNC: 2.08 MG/DL (ref 0.52–1.04)
GFR SERPL CREATININE-BSD FRML MDRD: 21 ML/MIN/{1.73_M2}
GLUCOSE SERPL-MCNC: 101 MG/DL (ref 70–99)
HDLC SERPL-MCNC: 62 MG/DL
LDLC SERPL CALC-MCNC: 88 MG/DL
NONHDLC SERPL-MCNC: 113 MG/DL
PHOSPHATE SERPL-MCNC: 3.6 MG/DL (ref 2.5–4.5)
POTASSIUM SERPL-SCNC: 3.7 MMOL/L (ref 3.4–5.3)
SODIUM SERPL-SCNC: 139 MMOL/L (ref 133–144)
TRIGL SERPL-MCNC: 125 MG/DL

## 2019-09-03 PROCEDURE — 36415 COLL VENOUS BLD VENIPUNCTURE: CPT | Performed by: FAMILY MEDICINE

## 2019-09-03 PROCEDURE — 99213 OFFICE O/P EST LOW 20 MIN: CPT | Performed by: FAMILY MEDICINE

## 2019-09-03 PROCEDURE — 80061 LIPID PANEL: CPT | Performed by: FAMILY MEDICINE

## 2019-09-03 PROCEDURE — 80069 RENAL FUNCTION PANEL: CPT | Performed by: FAMILY MEDICINE

## 2019-09-03 RX ORDER — VILAZODONE HYDROCHLORIDE 10 MG/1
10 TABLET ORAL DAILY
Refills: 2 | COMMUNITY
Start: 2019-06-05 | End: 2021-12-16

## 2019-09-03 RX ORDER — HYDROCHLOROTHIAZIDE 25 MG/1
12.5 TABLET ORAL DAILY
Qty: 45 TABLET | Refills: 3 | Status: SHIPPED | OUTPATIENT
Start: 2019-09-03 | End: 2019-09-04

## 2019-09-04 RX ORDER — HYDROCHLOROTHIAZIDE 25 MG/1
TABLET ORAL
Refills: 0 | Status: SHIPPED
Start: 2019-09-04 | End: 2019-09-04

## 2019-09-16 ENCOUNTER — TELEPHONE (OUTPATIENT)
Dept: FAMILY MEDICINE | Facility: CLINIC | Age: 84
End: 2019-09-16

## 2019-09-16 NOTE — TELEPHONE ENCOUNTER
Reason for call:  Other   Patient called regarding (reason for call): call back  Additional comments: Patients   is calling because he wants to know which Cardiologist Dr. Wilcox recommends. Please call back      Phone number to reach patient:  Other phone number:  [phone]    Best Time:  any    Can we leave a detailed message on this number?  YES

## 2019-09-17 NOTE — TELEPHONE ENCOUNTER
Huddled with Dr. Wilcox.   Any of the cardiologists next door.  Dr. Madhu Zuniga and Dr. Dharmesh Bustos    Called and notified EC.    Snow Alfaro RN

## 2019-09-18 ENCOUNTER — ALLIED HEALTH/NURSE VISIT (OUTPATIENT)
Dept: NURSING | Facility: CLINIC | Age: 84
End: 2019-09-18
Payer: MEDICARE

## 2019-09-18 VITALS — SYSTOLIC BLOOD PRESSURE: 120 MMHG | DIASTOLIC BLOOD PRESSURE: 76 MMHG

## 2019-09-18 DIAGNOSIS — N18.30 CKD (CHRONIC KIDNEY DISEASE) STAGE 3, GFR 30-59 ML/MIN (H): ICD-10-CM

## 2019-09-18 DIAGNOSIS — I10 BENIGN ESSENTIAL HYPERTENSION: Primary | ICD-10-CM

## 2019-09-18 DIAGNOSIS — I12.9 RENAL HYPERTENSION, STAGE 1-4 OR UNSPECIFIED CHRONIC KIDNEY DISEASE: ICD-10-CM

## 2019-09-18 LAB
ALBUMIN SERPL-MCNC: 3.4 G/DL (ref 3.4–5)
ANION GAP SERPL CALCULATED.3IONS-SCNC: 5 MMOL/L (ref 3–14)
BUN SERPL-MCNC: 19 MG/DL (ref 7–30)
CALCIUM SERPL-MCNC: 9.9 MG/DL (ref 8.5–10.1)
CHLORIDE SERPL-SCNC: 108 MMOL/L (ref 94–109)
CO2 SERPL-SCNC: 29 MMOL/L (ref 20–32)
CREAT SERPL-MCNC: 1.41 MG/DL (ref 0.52–1.04)
GFR SERPL CREATININE-BSD FRML MDRD: 34 ML/MIN/{1.73_M2}
GLUCOSE SERPL-MCNC: 93 MG/DL (ref 70–99)
PHOSPHATE SERPL-MCNC: 3.1 MG/DL (ref 2.5–4.5)
POTASSIUM SERPL-SCNC: 4 MMOL/L (ref 3.4–5.3)
SODIUM SERPL-SCNC: 142 MMOL/L (ref 133–144)

## 2019-09-18 PROCEDURE — 80069 RENAL FUNCTION PANEL: CPT | Performed by: FAMILY MEDICINE

## 2019-09-18 PROCEDURE — 36415 COLL VENOUS BLD VENIPUNCTURE: CPT | Performed by: FAMILY MEDICINE

## 2019-09-18 PROCEDURE — 99207 ZZC NO CHARGE NURSE ONLY: CPT

## 2019-10-07 DIAGNOSIS — J30.2 SEASONAL ALLERGIC RHINITIS, UNSPECIFIED TRIGGER: ICD-10-CM

## 2019-10-08 RX ORDER — AZELASTINE HCL 205.5 UG/1
1 SPRAY NASAL DAILY
Qty: 1 BOTTLE | Refills: 1 | Status: SHIPPED | OUTPATIENT
Start: 2019-10-08 | End: 2020-09-09

## 2019-10-08 NOTE — TELEPHONE ENCOUNTER
"Requested Prescriptions   Pending Prescriptions Disp Refills     azelastine (ASTEPRO) 0.15 % nasal spray [Pharmacy Med Name: AZELASTINE 0.15% NASAL SPRAY]  1     Sig: SPRAY 1 SPRAY INTO BOTH NOSTRILS DAILY       Antihistamines Protocol Failed - 10/7/2019  6:51 AM        Failed - Patient is 3-64 years of age     Apply weight-based dosing for peds patients age 3 - 12 years of age.    Forward request to provider for patients under the age of 3 or over the age of 64.          Passed - Recent (12 mo) or future (30 days) visit within the authorizing provider's specialty     Patient has had an office visit with the authorizing provider or a provider within the authorizing providers department within the previous 12 mos or has a future within next 30 days. See \"Patient Info\" tab in inbasket, or \"Choose Columns\" in Meds & Orders section of the refill encounter.              Passed - Medication is active on med list        Routing refill request to provider for review/approval because:  Patient over the age of 64      Aileen Ohara RN    "

## 2019-10-28 ENCOUNTER — OFFICE VISIT (OUTPATIENT)
Dept: NEPHROLOGY | Facility: CLINIC | Age: 84
End: 2019-10-28
Attending: FAMILY MEDICINE
Payer: MEDICARE

## 2019-10-28 VITALS
SYSTOLIC BLOOD PRESSURE: 126 MMHG | DIASTOLIC BLOOD PRESSURE: 72 MMHG | HEART RATE: 66 BPM | WEIGHT: 159 LBS | BODY MASS INDEX: 31.05 KG/M2

## 2019-10-28 DIAGNOSIS — N18.30 CKD (CHRONIC KIDNEY DISEASE) STAGE 3, GFR 30-59 ML/MIN (H): Primary | ICD-10-CM

## 2019-10-28 DIAGNOSIS — I10 BENIGN ESSENTIAL HYPERTENSION: ICD-10-CM

## 2019-10-28 DIAGNOSIS — I12.9 RENAL HYPERTENSION, STAGE 1-4 OR UNSPECIFIED CHRONIC KIDNEY DISEASE: ICD-10-CM

## 2019-10-28 DIAGNOSIS — R80.9 PROTEINURIA: ICD-10-CM

## 2019-10-28 DIAGNOSIS — I27.20 PULMONARY HYPERTENSION (H): ICD-10-CM

## 2019-10-28 DIAGNOSIS — F31.70 BIPOLAR DISORDER IN PARTIAL REMISSION, MOST RECENT EPISODE UNSPECIFIED TYPE (H): ICD-10-CM

## 2019-10-28 LAB
ALBUMIN SERPL-MCNC: 3.4 G/DL (ref 3.4–5)
ALBUMIN UR-MCNC: NEGATIVE MG/DL
ANION GAP SERPL CALCULATED.3IONS-SCNC: 4 MMOL/L (ref 3–14)
APPEARANCE UR: CLEAR
BILIRUB UR QL STRIP: NEGATIVE
BUN SERPL-MCNC: 31 MG/DL (ref 7–30)
CALCIUM SERPL-MCNC: 9.4 MG/DL (ref 8.5–10.1)
CHLORIDE SERPL-SCNC: 107 MMOL/L (ref 94–109)
CO2 SERPL-SCNC: 30 MMOL/L (ref 20–32)
COLOR UR AUTO: YELLOW
CREAT SERPL-MCNC: 1.29 MG/DL (ref 0.52–1.04)
ERYTHROCYTE [DISTWIDTH] IN BLOOD BY AUTOMATED COUNT: 12.9 % (ref 10–15)
GFR SERPL CREATININE-BSD FRML MDRD: 38 ML/MIN/{1.73_M2}
GLUCOSE SERPL-MCNC: 82 MG/DL (ref 70–99)
GLUCOSE UR STRIP-MCNC: NEGATIVE MG/DL
HCT VFR BLD AUTO: 35.7 % (ref 35–47)
HGB BLD-MCNC: 11.2 G/DL (ref 11.7–15.7)
HGB UR QL STRIP: ABNORMAL
KETONES UR STRIP-MCNC: NEGATIVE MG/DL
LEUKOCYTE ESTERASE UR QL STRIP: NEGATIVE
MAGNESIUM SERPL-MCNC: 2.4 MG/DL (ref 1.6–2.3)
MCH RBC QN AUTO: 32.5 PG (ref 26.5–33)
MCHC RBC AUTO-ENTMCNC: 31.4 G/DL (ref 31.5–36.5)
MCV RBC AUTO: 104 FL (ref 78–100)
NITRATE UR QL: NEGATIVE
PH UR STRIP: 7.5 PH (ref 5–7)
PHOSPHATE SERPL-MCNC: 3.2 MG/DL (ref 2.5–4.5)
PLATELET # BLD AUTO: 222 10E9/L (ref 150–450)
POTASSIUM SERPL-SCNC: 4.3 MMOL/L (ref 3.4–5.3)
PTH-INTACT SERPL-MCNC: 62 PG/ML (ref 18–80)
RBC # BLD AUTO: 3.45 10E12/L (ref 3.8–5.2)
RBC #/AREA URNS AUTO: ABNORMAL /HPF
SODIUM SERPL-SCNC: 141 MMOL/L (ref 133–144)
SOURCE: ABNORMAL
SP GR UR STRIP: 1.01 (ref 1–1.03)
UROBILINOGEN UR STRIP-ACNC: 0.2 EU/DL (ref 0.2–1)
WBC # BLD AUTO: 6 10E9/L (ref 4–11)
WBC #/AREA URNS AUTO: ABNORMAL /HPF

## 2019-10-28 PROCEDURE — 80069 RENAL FUNCTION PANEL: CPT | Performed by: INTERNAL MEDICINE

## 2019-10-28 PROCEDURE — 82043 UR ALBUMIN QUANTITATIVE: CPT | Performed by: INTERNAL MEDICINE

## 2019-10-28 PROCEDURE — 85027 COMPLETE CBC AUTOMATED: CPT | Performed by: INTERNAL MEDICINE

## 2019-10-28 PROCEDURE — 82784 ASSAY IGA/IGD/IGG/IGM EACH: CPT | Performed by: INTERNAL MEDICINE

## 2019-10-28 PROCEDURE — 83735 ASSAY OF MAGNESIUM: CPT | Performed by: INTERNAL MEDICINE

## 2019-10-28 PROCEDURE — 86334 IMMUNOFIX E-PHORESIS SERUM: CPT | Performed by: INTERNAL MEDICINE

## 2019-10-28 PROCEDURE — 83970 ASSAY OF PARATHORMONE: CPT | Performed by: INTERNAL MEDICINE

## 2019-10-28 PROCEDURE — 81001 URINALYSIS AUTO W/SCOPE: CPT | Performed by: INTERNAL MEDICINE

## 2019-10-28 PROCEDURE — 36415 COLL VENOUS BLD VENIPUNCTURE: CPT | Performed by: INTERNAL MEDICINE

## 2019-10-28 PROCEDURE — 82306 VITAMIN D 25 HYDROXY: CPT | Performed by: INTERNAL MEDICINE

## 2019-10-28 RX ORDER — DESVENLAFAXINE 100 MG/1
100 TABLET, EXTENDED RELEASE ORAL
Refills: 1 | COMMUNITY
Start: 2019-08-29 | End: 2020-10-12

## 2019-10-28 RX ORDER — AMOXICILLIN 500 MG/1
TABLET, FILM COATED ORAL
Refills: 0 | COMMUNITY
Start: 2018-12-03 | End: 2021-08-18

## 2019-10-28 RX ORDER — DESVENLAFAXINE 25 MG/1
25 TABLET, EXTENDED RELEASE ORAL DAILY
Refills: 0 | COMMUNITY
Start: 2019-09-09 | End: 2021-07-20 | Stop reason: ALTCHOICE

## 2019-10-28 NOTE — LETTER
November 5, 2019       TO: Kamryn Miller  [address] [facility]    [address]  [facility] [place] [facility] [address]       Dear Ms. Miller,    We are writing to inform you of your test results. Your kidney function is improved. Your electrolytes are fairly good. One test I did showed some mild abnormal proteins in your blood, we will repeat this in 6 months to followup, nothing to be too concerned about for now.   Hydrate well and see you in a few months.         Resulted Orders   Renal panel (Alb, BUN, Ca, Cl, CO2, Creat, Gluc, Phos, K, Na)   Result Value Ref Range    Sodium 141 133 - 144 mmol/L    Potassium 4.3 3.4 - 5.3 mmol/L    Chloride 107 94 - 109 mmol/L    Carbon Dioxide 30 20 - 32 mmol/L    Anion Gap 4 3 - 14 mmol/L    Glucose 82 70 - 99 mg/dL      Comment:      Non Fasting    Urea Nitrogen 31 (H) 7 - 30 mg/dL    Creatinine 1.29 (H) 0.52 - 1.04 mg/dL    GFR Estimate 38 (L) >60 mL/min/[1.73_m2]      Comment:      Non  GFR Calc  Starting 12/18/2018, serum creatinine based estimated GFR (eGFR) will be   calculated using the Chronic Kidney Disease Epidemiology Collaboration   (CKD-EPI) equation.      GFR Estimate If Black 44 (L) >60 mL/min/[1.73_m2]      Comment:       GFR Calc  Starting 12/18/2018, serum creatinine based estimated GFR (eGFR) will be   calculated using the Chronic Kidney Disease Epidemiology Collaboration   (CKD-EPI) equation.      Calcium 9.4 8.5 - 10.1 mg/dL    Phosphorus 3.2 2.5 - 4.5 mg/dL    Albumin 3.4 3.4 - 5.0 g/dL   Magnesium   Result Value Ref Range    Magnesium 2.4 (H) 1.6 - 2.3 mg/dL   CBC with platelets   Result Value Ref Range    WBC 6.0 4.0 - 11.0 10e9/L    RBC Count 3.45 (L) 3.8 - 5.2 10e12/L    Hemoglobin 11.2 (L) 11.7 - 15.7 g/dL    Hematocrit 35.7 35.0 - 47.0 %     (H) 78 - 100 fl    MCH 32.5 26.5 - 33.0 pg    MCHC 31.4 (L) 31.5 - 36.5 g/dL      Comment:      Results confirmed by repeat test    RDW 12.9 10.0 - 15.0 %    Platelet Count 222 150 - 450  10e9/L   UA with Microscopic reflex to Culture   Result Value Ref Range    Color Urine Yellow     Appearance Urine Clear     Glucose Urine Negative NEG^Negative mg/dL    Bilirubin Urine Negative NEG^Negative    Ketones Urine Negative NEG^Negative mg/dL    Specific Gravity Urine 1.015 1.003 - 1.035    pH Urine 7.5 (H) 5.0 - 7.0 pH    Protein Albumin Urine Negative NEG^Negative mg/dL    Urobilinogen Urine 0.2 0.2 - 1.0 EU/dL    Nitrite Urine Negative NEG^Negative    Blood Urine Trace (A) NEG^Negative    Leukocyte Esterase Urine Negative NEG^Negative    Source Midstream Urine     WBC Urine 0 - 5 OTO5^0 - 5 /HPF    RBC Urine O - 2 OTO2^O - 2 /HPF   Protein Immunofixation Serum   Result Value Ref Range    Immunofixation ELP (Note)       Comment:      Two small monoclonal IgG immunoglobulin of kappa light chain type.   Monoclonal antibody therapeutics (e.g. Daratumumab) may appear as   monoclonal proteins on serum electrophoresis and immunofixation.  Results should be interpreted with caution if the patient is   receiving monoclonal antibody therapy.  Pathological significance requires clinical correlation.  Bob Garcia M.D., Ph.D., Pathologist.       695 - 1,620 mg/dL     70 - 380 mg/dL     60 - 265 mg/dL   Albumin Random Urine Quantitative with Creat Ratio   Result Value Ref Range    Creatinine Urine 52 mg/dL    Albumin Urine mg/L 46 mg/L    Albumin Urine mg/g Cr 89.38 (H) 0 - 25 mg/g Cr   Parathyroid Hormone Intact   Result Value Ref Range    Parathyroid Hormone Intact 62 18 - 80 pg/mL   Vitamin D Deficiency   Result Value Ref Range    Vitamin D Deficiency screening 56 20 - 75 ug/L      Comment:      Season, race, dietary intake, and treatment affect the concentration of   25-hydroxy-Vitamin D. Values may decrease during winter months and increase   during summer months. Values 20-29 ug/L may indicate Vitamin D insufficiency   and values <20 ug/L may indicate Vitamin D deficiency.  Vitamin  D determination is routinely performed by an immunoassay specific for   25 hydroxyvitamin D3.  If an individual is on vitamin D2 (ergocalciferol)   supplementation, please specify 25 OH vitamin D2 and D3 level determination by   LCMSMS test [id number].         It was a pleasure to see you at your recent visit. Please contact us at [phone] if you have any questions or concerns. Thank you, we look forward to seeing you at your next visit.       Sincerely,    Ania Johnson MD.   of Medicine  Division of Kidney Disease and Hypertension  [address] [address]

## 2019-10-28 NOTE — LETTER
10/28/2019       RE: Kamryn Miller  [address] [facility]    [address]  [facility] [address]     Dear Colleague,    Thank you for referring your patient, Kam[facility] Miller[facility]. Please see a copy of my visit note below.    Assessment and Plan:  84 year old female with history of CKD, hypertension who presents for evaluation of worsened renal function and hypercalcemia.   # CKD:  Her scr has been 1.1-1.5 in the last 3-4 years, with baseline many years ago at 1 or less.  She has mild proteinuria. She denies systemic signs   - given hypercalcemia, will check immunofixation  # Hypertension: well controlled, currently on metoprolol and losartan, her hydrochlorothiazide was stopped.  She feels well overall.  Agree with holding on hydrochlorothiazide for now    # Not anemic but not checked since 7/2017; check CBC    # Electrolytes:    stable  # Mineral Bone Disorder:   - Calcium; level is:  high in the summer, improved/ normalized now off hctz.  PTH ordered   check vitamin D    Assessment and plan was discussed with patient and she voiced her understanding and agreement.    Consult:  Kamryn Miller was seen in consultation at the request of Dr. Wilcox for elevated creatinine.    Reason for Visit:  Kamryn Miller is a 84 year old female with CKD from hypertension, who presents for evaluation.    HPI:  She is a pleasant female with history of mild rhinitis, asthma, and hypertension who presents for evaluation of elevated creatinine and hypercalcemia.  She presents for evaluation of worsened creatinine. Her serum creatinine has been 1.2-1.5 in recent years, and was up to 2 recently, with eGFR 21ml/min.  She had a UTI in the summer for which she was treated. She was also noted with elevated calcium of 10.7 (correct slightly higher), and her hydrochlorothiazide was stopped.  Her repeat calcium and creatinine did show improvement. She has been feeling fairly  well. She denies any shortness of breath. She has swelling sometimes but usually improves with elevation.  She has been eating well, and denies any urinary issues.  She does try to watch her salt intake.   Her blood pressure is 120s range currently.  She is tolerating her medications.   Renal History:   HTN    ROS:   A comprehensive review of systems was obtained and negative, except as noted in the HPI or PMH.    Active Medical Problems:  Patient Active Problem List   Diagnosis     CAD (coronary artery disease)     Hyperlipidemia with target LDL less than 100     Mild major depression (H)     Pulmonary hypertension (H)     Mild persistent asthma     Seasonal allergic rhinitis     Mitral insufficiency     Tricuspid insufficiency     Bipolar disorder (H)     Renal insufficiency     Tremors     Advanced directives, counseling/discussion     Family history of diabetes mellitus     Family history of celiac disease     Celiac disease     History of colonic polyps     Benign essential hypertension     Hypothyroidism, unspecified type     CKD (chronic kidney disease) stage 3, GFR 30-59 ml/min (H)     PMH:   Medical record was reviewed and PMH was discussed with patient and noted below.  Past Medical History:   Diagnosis Date     Aortic valvar stenosis 7/2010    mild     Asthma      Bipolar disorder (H)      CAD (coronary artery disease)     s/p angioplasty     Celiac disease      Colon polyps      Colon polyps 2012    every 3 year colonoscopy      Depression      High cholesterol      HTN      Mitral insufficiency      Pulmonary HTN (H)     mild     Tremors 7/10    drug induced from antidepressants     Tricuspid insufficiency      PSH:   Past Surgical History:   Procedure Laterality Date     ANGIOGRAM  6/26/2009     ANGIOPLASTY  9/96    for angina     ANGIOPLASTY  8/03 - 9/03    X -2 - with stenst in coronary car.     APPENDECTOMY       BREAST BIOPSY, RT/LT      Breat Biopsy RT/LT, benign     BUNIONECTOMY  11/8/2011     Procedure:BUNIONECTOMY; Left donna bunionectomy; Surgeon:FREDY LITTLEJOHN; Location:[facility] OR     BUNIONECTOMY RT/LT  5/2007    right bunion and right 2nd hammertoe     C ANESTH,BLEPHAROPLASTY      (R) for drooping eyelid     C APPENDECTOMY       CATARACT IOL, RT/LT  6/12 and 7/12    bilateral     COLONOSCOPY  2007, 2012    every 3 years for polyps     JOINT REPLACEMENT, HIP RT/LT  4/2008    right hip replaced     JOINT REPLACEMENT, HIP RT/LT  4/2009    left hip replaced     REPAIR HAMMER TOE  11/8/2011    Procedure:REPAIR HAMMER TOE; left 2nd hammertoe repair; Surgeon:FREDY LITTLEJOHN; Location:[facility] OR     SURGICAL HISTORY OF -   11/11    left bunion and 2nd hammertoe repair     TUBAL LIGATION         Family Hx:   Family History   Problem Relation Age of Onset     Asthma Mother      Cerebrovascular Disease Mother      Arthritis Mother      Depression Mother      Lipids Sister      Hypertension Sister      Heart Disease Sister      Lipids Sister      Hypertension Sister      Lipids Sister      Breast Cancer Sister      Personal Hx:   Social History     Tobacco Use     Smoking status: Never Smoker     Smokeless tobacco: Never Used   Substance Use Topics     Alcohol use: No     Alcohol/week: 0.0 standard drinks     Types: 1 Standard drinks or equivalent per week       Allergies:  Allergies   Allergen Reactions     Ace Inhibitors Cough     Diclofenac Other (See Comments)     Balance problems       Medications:  Current Outpatient Medications   Medication Sig     aspirin 81 MG tablet Take 1 tablet by mouth daily.     azelastine (ASTEPRO) 0.15 % nasal spray SPRAY 1 SPRAY INTO BOTH NOSTRILS DAILY     Calcium Citrate (CITRACAL OR) Take 1 tablet by mouth daily.     CHOLECALCIFEROL 56358 UNIT PO CAPS 1 tablet weekly     desvenlafaxine (PRISTIQ) 100 MG 24 hr tablet 100 mg     desvenlafaxine (PRISTIQ) 50 MG 24 hr tablet Take 100 mg by mouth daily      desvenlafaxine succinate (PRISTIQ) 25 MG 24 hr tablet Take 25 mg by mouth daily      lamoTRIgine (LAMICTAL) 25 MG tablet 25 mg     LAMOTRIGINE 200 MG PO TABS 1 TABLET TWICE DAILY     levothyroxine (SYNTHROID) 50 MCG tablet Take 1 tablet by mouth daily.     losartan (COZAAR) 100 MG tablet Take 1 tablet (100 mg) by mouth daily     metoprolol tartrate (LOPRESSOR) 50 MG tablet One in the morning and 1/2 in the evening     mometasone-formoterol (DULERA) 200-5 MCG/ACT inhaler Inhale 2 puffs into the lungs 2 times daily     Multiple Vitamin (MULTI-VITAMIN) per tablet Take 1 tablet by mouth daily.     Omega-3 Fatty Acids (FISH OIL PO) Take 1 tablet by mouth daily.     rosuvastatin (CRESTOR) 40 MG tablet Take 1 tablet (40 mg) by mouth daily     verapamil ER (CALAN-SR) 240 MG CR tablet Take 1 tablet (240 mg) by mouth daily     VIIBRYD 10 MG TABS tablet Take 10 mg by mouth daily     amoxicillin (AMOXIL) 500 MG tablet TAKE 4 TABLETS BY MOUTH 1 HOUR PRIOR TO APPT     No current facility-administered medications for this visit.       Vitals:  /72 (BP Location: Right arm)   Pulse 66   Wt 72.1 kg (159 lb)   BMI 31.05 kg/m       Exam:  GENERAL APPEARANCE: alert and no distress  HENT: mouth without ulcers or lesions  LYMPHATICS: no cervical or supraclavicular nodes  RESP: lungs clear to auscultation - no rales, rhonchi or wheezes  CV: regular rhythm, normal rate, no rub, no murmur  EDEMA: no LE edema bilaterally  ABDOMEN: soft, nondistended, nontender, bowel sounds normal  MS: extremities normal - no gross deformities noted, no evidence of inflammation in joints, no muscle tenderness  SKIN: no rash    LABS:   CMP  Recent Labs   Lab Test 10/28/19  1310 09/18/19  1014 09/03/19  1217 06/25/19  1559    142 139 139   POTASSIUM 4.3 4.0 3.7 4.3   CHLORIDE 107 108 102 103   CO2 30 29 31 29   ANIONGAP 4 5 6 7   GLC 82 93 101* 80   BUN 31* 19 49* 44*   CR 1.29* 1.41* 2.08* 1.69*   GFRESTIMATED 38* 34* 21* 28*   GFRESTBLACK 44* 40* 25* 32*   PRINCE 9.4 9.9 10.7* 10.0     Recent Labs   Lab Test 06/25/19  1556  07/13/18  0857 07/14/17  1148 07/10/12   BILITOTAL 0.4 0.3 0.4  --    ALKPHOS 52 48 54  --    ALT 37 32 34 18   AST 22 23 21 20     CBC  Recent Labs   Lab Test 10/28/19  1310 07/14/17  1148 12/22/16  1236 09/18/15  1018 09/02/14  0902   HGB 11.2* 14.1 13.0 14.0 13.7   WBC 6.0 5.1 5.3  --  6.1   RBC 3.45* 4.30 4.13  --  4.11   HCT 35.7 42.2 41.2  --  41.5   * 98 100  --  101*   MCH 32.5 32.8 31.5  --  33.3*   MCHC 31.4* 33.4 31.6  --  33.0   RDW 12.9 12.1 12.0  --  12.0    187 197  --  187     URINE STUDIES  Recent Labs   Lab Test 10/28/19  1314 12/08/17  1515   COLOR Yellow Yellow   APPEARANCE Clear Clear   URINEGLC Negative Negative   URINEBILI Negative Negative   URINEKETONE Negative Negative   SG 1.015 <=1.005   UBLD Trace* Trace*   URINEPH 7.5* 6.0   PROTEIN Negative Negative   UROBILINOGEN 0.2 0.2   NITRITE Negative Negative   LEUKEST Negative Negative   RBCU O - 2 O - 2   WBCU 0 - 5 O - 2     No lab results found.  PTH  Recent Labs   Lab Test 10/28/19  1310   PTHI 62     IRON STUDIES  No lab results found.      Ania Johnson MD    Again, thank you for allowing me to participate in the care of your patient.      Sincerely,    Ania Johnson MD

## 2019-10-29 LAB
CREAT UR-MCNC: 52 MG/DL
DEPRECATED CALCIDIOL+CALCIFEROL SERPL-MC: 56 UG/L (ref 20–75)
IGA SERPL-MCNC: 182 MG/DL (ref 70–380)
IGG SERPL-MCNC: 870 MG/DL (ref 695–1620)
IGM SERPL-MCNC: 120 MG/DL (ref 60–265)
MICROALBUMIN UR-MCNC: 46 MG/L
MICROALBUMIN/CREAT UR: 89.38 MG/G CR (ref 0–25)
PROT PATTERN SERPL IFE-IMP: NORMAL

## 2019-11-05 NOTE — PROGRESS NOTES
Assessment and Plan:  84 year old female with history of CKD, hypertension who presents for evaluation of worsened renal function and hypercalcemia.   # CKD:  Her scr has been 1.1-1.5 in the last 3-4 years, with baseline many years ago at 1 or less.  She has mild proteinuria. She denies systemic signs   - given hypercalcemia, will check immunofixation  # Hypertension: well controlled, currently on metoprolol and losartan, her hydrochlorothiazide was stopped.  She feels well overall.  Agree with holding on hydrochlorothiazide for now    # Not anemic but not checked since 7/2017; check CBC    # Electrolytes:    stable  # Mineral Bone Disorder:   - Calcium; level is:  high in the summer, improved/ normalized now off hctz.  PTH ordered   check vitamin D    Assessment and plan was discussed with patient and she voiced her understanding and agreement.    Consult:  Kamryn Miller was seen in consultation at the request of Dr. Wilcox for elevated creatinine.    Reason for Visit:  Kamryn Miller is a 84 year old female with CKD from hypertension, who presents for evaluation.    HPI:  She is a pleasant female with history of mild rhinitis, asthma, and hypertension who presents for evaluation of elevated creatinine and hypercalcemia.  She presents for evaluation of worsened creatinine. Her serum creatinine has been 1.2-1.5 in recent years, and was up to 2 recently, with eGFR 21ml/min.  She had a UTI in the summer for which she was treated. She was also noted with elevated calcium of 10.7 (correct slightly higher), and her hydrochlorothiazide was stopped.  Her repeat calcium and creatinine did show improvement. She has been feeling fairly well. She denies any shortness of breath. She has swelling sometimes but usually improves with elevation.  She has been eating well, and denies any urinary issues.  She does try to watch her salt intake.   Her blood pressure is 120s range currently.  She is tolerating her medications.   Renal  History:   HTN    ROS:   A comprehensive review of systems was obtained and negative, except as noted in the HPI or PMH.    Active Medical Problems:  Patient Active Problem List   Diagnosis     CAD (coronary artery disease)     Hyperlipidemia with target LDL less than 100     Mild major depression (H)     Pulmonary hypertension (H)     Mild persistent asthma     Seasonal allergic rhinitis     Mitral insufficiency     Tricuspid insufficiency     Bipolar disorder (H)     Renal insufficiency     Tremors     Advanced directives, counseling/discussion     Family history of diabetes mellitus     Family history of celiac disease     Celiac disease     History of colonic polyps     Benign essential hypertension     Hypothyroidism, unspecified type     CKD (chronic kidney disease) stage 3, GFR 30-59 ml/min (H)     PMH:   Medical record was reviewed and PMH was discussed with patient and noted below.  Past Medical History:   Diagnosis Date     Aortic valvar stenosis 7/2010    mild     Asthma      Bipolar disorder (H)      CAD (coronary artery disease)     s/p angioplasty     Celiac disease      Colon polyps      Colon polyps 2012    every 3 year colonoscopy      Depression      High cholesterol      HTN      Mitral insufficiency      Pulmonary HTN (H)     mild     Tremors 7/10    drug induced from antidepressants     Tricuspid insufficiency      PSH:   Past Surgical History:   Procedure Laterality Date     ANGIOGRAM  6/26/2009     ANGIOPLASTY  9/96    for angina     ANGIOPLASTY  8/03 - 9/03    X -2 - with stenst in coronary car.     APPENDECTOMY       BREAST BIOPSY, RT/LT      Breat Biopsy RT/LT, benign     BUNIONECTOMY  11/8/2011    Procedure:BUNIONECTOMY; Left donna bunionectomy; Surgeon:FREDY LITTLEJOHN; Location:[facility] OR     BUNIONECTOMY RT/LT  5/2007    right bunion and right 2nd hammertoe     C ANESTH,BLEPHAROPLASTY      (R) for drooping eyelid     C APPENDECTOMY       CATARACT IOL, RT/LT  6/12 and 7/12    bilateral      COLONOSCOPY  2007, 2012    every 3 years for polyps     JOINT REPLACEMENT, HIP RT/LT  4/2008    right hip replaced     JOINT REPLACEMENT, HIP RT/LT  4/2009    left hip replaced     REPAIR HAMMER TOE  11/8/2011    Procedure:REPAIR HAMMER TOE; left 2nd hammertoe repair; Surgeon:FREDY LITTLEJOHN; Location:[facility] OR     SURGICAL HISTORY OF -   11/11    left bunion and 2nd hammertoe repair     TUBAL LIGATION         Family Hx:   Family History   Problem Relation Age of Onset     Asthma Mother      Cerebrovascular Disease Mother      Arthritis Mother      Depression Mother      Lipids Sister      Hypertension Sister      Heart Disease Sister      Lipids Sister      Hypertension Sister      Lipids Sister      Breast Cancer Sister      Personal Hx:   Social History     Tobacco Use     Smoking status: Never Smoker     Smokeless tobacco: Never Used   Substance Use Topics     Alcohol use: No     Alcohol/week: 0.0 standard drinks     Types: 1 Standard drinks or equivalent per week       Allergies:  Allergies   Allergen Reactions     Ace Inhibitors Cough     Diclofenac Other (See Comments)     Balance problems       Medications:  Current Outpatient Medications   Medication Sig     aspirin 81 MG tablet Take 1 tablet by mouth daily.     azelastine (ASTEPRO) 0.15 % nasal spray SPRAY 1 SPRAY INTO BOTH NOSTRILS DAILY     Calcium Citrate (CITRACAL OR) Take 1 tablet by mouth daily.     CHOLECALCIFEROL 69246 UNIT PO CAPS 1 tablet weekly     desvenlafaxine (PRISTIQ) 100 MG 24 hr tablet 100 mg     desvenlafaxine (PRISTIQ) 50 MG 24 hr tablet Take 100 mg by mouth daily      desvenlafaxine succinate (PRISTIQ) 25 MG 24 hr tablet Take 25 mg by mouth daily     lamoTRIgine (LAMICTAL) 25 MG tablet 25 mg     LAMOTRIGINE 200 MG PO TABS 1 TABLET TWICE DAILY     levothyroxine (SYNTHROID) 50 MCG tablet Take 1 tablet by mouth daily.     losartan (COZAAR) 100 MG tablet Take 1 tablet (100 mg) by mouth daily     metoprolol tartrate (LOPRESSOR) 50 MG tablet  One in the morning and 1/2 in the evening     mometasone-formoterol (DULERA) 200-5 MCG/ACT inhaler Inhale 2 puffs into the lungs 2 times daily     Multiple Vitamin (MULTI-VITAMIN) per tablet Take 1 tablet by mouth daily.     Omega-3 Fatty Acids (FISH OIL PO) Take 1 tablet by mouth daily.     rosuvastatin (CRESTOR) 40 MG tablet Take 1 tablet (40 mg) by mouth daily     verapamil ER (CALAN-SR) 240 MG CR tablet Take 1 tablet (240 mg) by mouth daily     VIIBRYD 10 MG TABS tablet Take 10 mg by mouth daily     amoxicillin (AMOXIL) 500 MG tablet TAKE 4 TABLETS BY MOUTH 1 HOUR PRIOR TO APPT     No current facility-administered medications for this visit.       Vitals:  /72 (BP Location: Right arm)   Pulse 66   Wt 72.1 kg (159 lb)   BMI 31.05 kg/m      Exam:  GENERAL APPEARANCE: alert and no distress  HENT: mouth without ulcers or lesions  LYMPHATICS: no cervical or supraclavicular nodes  RESP: lungs clear to auscultation - no rales, rhonchi or wheezes  CV: regular rhythm, normal rate, no rub, no murmur  EDEMA: no LE edema bilaterally  ABDOMEN: soft, nondistended, nontender, bowel sounds normal  MS: extremities normal - no gross deformities noted, no evidence of inflammation in joints, no muscle tenderness  SKIN: no rash    LABS:   CMP  Recent Labs   Lab Test 10/28/19  1310 09/18/19  1014 09/03/19  1217 06/25/19  1559    142 139 139   POTASSIUM 4.3 4.0 3.7 4.3   CHLORIDE 107 108 102 103   CO2 30 29 31 29   ANIONGAP 4 5 6 7   GLC 82 93 101* 80   BUN 31* 19 49* 44*   CR 1.29* 1.41* 2.08* 1.69*   GFRESTIMATED 38* 34* 21* 28*   GFRESTBLACK 44* 40* 25* 32*   PRINCE 9.4 9.9 10.7* 10.0     Recent Labs   Lab Test 06/25/19  1559 07/13/18  0857 07/14/17  1148 07/10/12   BILITOTAL 0.4 0.3 0.4  --    ALKPHOS 52 48 54  --    ALT 37 32 34 18   AST 22 23 21 20     CBC  Recent Labs   Lab Test 10/28/19  1310 07/14/17  1148 12/22/16  1236 09/18/15  1018 09/02/14  0902   HGB 11.2* 14.1 13.0 14.0 13.7   WBC 6.0 5.1 5.3  --  6.1   RBC  3.45* 4.30 4.13  --  4.11   HCT 35.7 42.2 41.2  --  41.5   * 98 100  --  101*   MCH 32.5 32.8 31.5  --  33.3*   MCHC 31.4* 33.4 31.6  --  33.0   RDW 12.9 12.1 12.0  --  12.0    187 197  --  187     URINE STUDIES  Recent Labs   Lab Test 10/28/19  1314 12/08/17  1515   COLOR Yellow Yellow   APPEARANCE Clear Clear   URINEGLC Negative Negative   URINEBILI Negative Negative   URINEKETONE Negative Negative   SG 1.015 <=1.005   UBLD Trace* Trace*   URINEPH 7.5* 6.0   PROTEIN Negative Negative   UROBILINOGEN 0.2 0.2   NITRITE Negative Negative   LEUKEST Negative Negative   RBCU O - 2 O - 2   WBCU 0 - 5 O - 2     No lab results found.  PTH  Recent Labs   Lab Test 10/28/19  1310   PTHI 62     IRON STUDIES  No lab results found.      Ania Johnson MD

## 2019-11-06 ENCOUNTER — ANCILLARY PROCEDURE (OUTPATIENT)
Dept: ULTRASOUND IMAGING | Facility: CLINIC | Age: 84
End: 2019-11-06
Attending: INTERNAL MEDICINE
Payer: MEDICARE

## 2019-11-06 DIAGNOSIS — N18.30 CKD (CHRONIC KIDNEY DISEASE) STAGE 3, GFR 30-59 ML/MIN (H): ICD-10-CM

## 2019-11-06 PROCEDURE — 76770 US EXAM ABDO BACK WALL COMP: CPT

## 2019-11-13 ENCOUNTER — TELEPHONE (OUTPATIENT)
Dept: CARDIOLOGY | Facility: CLINIC | Age: 84
End: 2019-11-13

## 2019-11-13 DIAGNOSIS — I34.0 MITRAL INSUFFICIENCY: Primary | ICD-10-CM

## 2019-11-13 DIAGNOSIS — E78.5 HYPERLIPIDEMIA WITH TARGET LDL LESS THAN 100: ICD-10-CM

## 2019-11-13 DIAGNOSIS — I10 BENIGN ESSENTIAL HYPERTENSION: ICD-10-CM

## 2019-11-13 DIAGNOSIS — N18.30 CKD (CHRONIC KIDNEY DISEASE) STAGE 3, GFR 30-59 ML/MIN (H): ICD-10-CM

## 2019-11-13 NOTE — TELEPHONE ENCOUNTER
Pt scheduled as new pt to see Dr Zuniga:   Hx HLD, HTN, CKD, mitral valve insuff, pulmonary htn, CAD.   Echocardiogram ordered in 2016- not completed  In care everywhere, nuc stress test completed 4/2016:  FINAL CONCLUSIONS   Normal pharmacologic stress perfusion imaging study.   No significant ischemia was suggested by this study.   Normal left ventricular size and systolic function with a calculated LVEF of 71%.    Dr Zuniga recommends echocardiogram prior to visit.   Order placed, will contact pt to schedule.   appt with Dr Efrain leal 11/22/2019

## 2019-11-22 ENCOUNTER — ANCILLARY PROCEDURE (OUTPATIENT)
Dept: CARDIOLOGY | Facility: CLINIC | Age: 84
End: 2019-11-22
Attending: INTERNAL MEDICINE
Payer: MEDICARE

## 2019-11-22 ENCOUNTER — OFFICE VISIT (OUTPATIENT)
Dept: CARDIOLOGY | Facility: CLINIC | Age: 84
End: 2019-11-22
Attending: FAMILY MEDICINE
Payer: MEDICARE

## 2019-11-22 VITALS
BODY MASS INDEX: 31.83 KG/M2 | HEART RATE: 56 BPM | DIASTOLIC BLOOD PRESSURE: 66 MMHG | SYSTOLIC BLOOD PRESSURE: 130 MMHG | WEIGHT: 163 LBS | OXYGEN SATURATION: 96 %

## 2019-11-22 DIAGNOSIS — I35.0 NONRHEUMATIC AORTIC VALVE STENOSIS: ICD-10-CM

## 2019-11-22 DIAGNOSIS — I10 BENIGN ESSENTIAL HYPERTENSION: ICD-10-CM

## 2019-11-22 DIAGNOSIS — E78.5 HYPERLIPIDEMIA WITH TARGET LDL LESS THAN 100: ICD-10-CM

## 2019-11-22 DIAGNOSIS — E78.2 MIXED HYPERLIPIDEMIA: ICD-10-CM

## 2019-11-22 DIAGNOSIS — I25.10 CORONARY ARTERY DISEASE INVOLVING NATIVE CORONARY ARTERY OF NATIVE HEART WITHOUT ANGINA PECTORIS: Primary | ICD-10-CM

## 2019-11-22 DIAGNOSIS — I34.0 MITRAL INSUFFICIENCY: ICD-10-CM

## 2019-11-22 DIAGNOSIS — N18.30 CKD (CHRONIC KIDNEY DISEASE) STAGE 3, GFR 30-59 ML/MIN (H): ICD-10-CM

## 2019-11-22 DIAGNOSIS — I50.30 NYHA CLASS 3 HEART FAILURE WITH PRESERVED EJECTION FRACTION (H): ICD-10-CM

## 2019-11-22 PROCEDURE — 99204 OFFICE O/P NEW MOD 45 MIN: CPT | Mod: 25 | Performed by: INTERNAL MEDICINE

## 2019-11-22 PROCEDURE — 93306 TTE W/DOPPLER COMPLETE: CPT | Performed by: INTERNAL MEDICINE

## 2019-11-22 NOTE — PATIENT INSTRUCTIONS
Thank you for coming to the [facility] @ [facility]; please note the following instructions:    1.         If you have any questions regarding your visit please contact your care team:     Cardiology  Telephone Number   Alisa DIAS, RN  Jennifer VITALE, RN   Daniela DELGADO, FANG BOUCHER, FANG VICKERS, LPN   [phone] (option 1)   For scheduling appts:     [phone] (select option 1)       For the Device Clinic (Pacemakers and ICD's)  RN's :  Delia Vazquez   During business hours: [phone]    *After business hours:  [phone] (select option 4)      Normal test result notifications will be released via [employer] or mailed within 7 business days.  All other test results, will be communicated via telephone once reviewed by your cardiologist.    If you need a medication refill please contact your pharmacy.  Please allow 3 business days for your refill to be completed.    As always, thank you for trusting us with your health care needs!

## 2019-11-22 NOTE — PROGRESS NOTES
November 22, 2019     I had the pleasure of seeing Kamryn Miller  in the [facility] for further evaluation and management. She has a history of Hx CAD, HLD, HTN, mitral and tricuspid insuff, pulmonary hypertension yet no recent cardiology follow up.  She is here to establish care.  She denies any cardiac symptoms and again she has not had any cardiology follow-up for the past several years.  Notes that she does not have shortness of breath especially with exertion or she walks outside.  This is class III symptoms at the moment she can will probably about a block and able to take a flight of stairs.  She denies any recurrent episodes of chest pain and she never had chest pain since the stent placement at [facility] about 20 years ago.  She also denies any dizziness lightheadedness and definitely no episodes of syncope.  Denies any lower extremity edema no PND orthopnea no other symptoms.  No other complaints at this time.  Takes her medications as prescribed no other issues.     PAST MEDICAL HISTORY:  Past Medical History:   Diagnosis Date     Aortic valvar stenosis 7/2010    mild     Asthma      Bipolar disorder (H)      CAD (coronary artery disease)     s/p angioplasty     Celiac disease      Colon polyps      Colon polyps 2012    every 3 year colonoscopy      Depression      High cholesterol      HTN      Mitral insufficiency      Pulmonary HTN (H)     mild     Tremors 7/10    drug induced from antidepressants     Tricuspid insufficiency      FAMILY HISTORY:  Family History   Problem Relation Age of Onset     Asthma Mother      Cerebrovascular Disease Mother      Arthritis Mother      Depression Mother      Lipids Sister      Hypertension Sister      Heart Disease Sister      Lipids Sister      Hypertension Sister      Lipids Sister      Breast Cancer Sister       SOCIAL HISTORY:  Social History     Socioeconomic History     Marital status:      Spouse name: Adrien     Number of children: 2      Years of education: 16     Highest education level: Not on file   Occupational History     Employer: RETIRED     Comment: Retired in 2002.    Social Needs     Financial resource strain: Not on file     Food insecurity:     Worry: Not on file     Inability: Not on file     Transportation needs:     Medical: Not on file     Non-medical: Not on file   Tobacco Use     Smoking status: Never Smoker     Smokeless tobacco: Never Used   Substance and Sexual Activity     Alcohol use: No     Alcohol/week: 0.0 standard drinks     Types: 1 Standard drinks or equivalent per week     Drug use: No     Sexual activity: Not Currently     Partners: Male   Lifestyle     Physical activity:     Days per week: Not on file     Minutes per session: Not on file     Stress: Not on file   Relationships     Social connections:     Talks on phone: Not on file     Gets together: Not on file     Attends Shinto service: Not on file     Active member of club or organization: Not on file     Attends meetings of clubs or organizations: Not on file     Relationship status: Not on file     Intimate partner violence:     Fear of current or ex partner: Not on file     Emotionally abused: Not on file     Physically abused: Not on file     Forced sexual activity: Not on file   Other Topics Concern     Parent/sibling w/ CABG, MI or angioplasty before 65F 55M? Not Asked   Social History Narrative     Not on file     CURRENT MEDICATIONS:  Current Outpatient Medications   Medication     amoxicillin (AMOXIL) 500 MG tablet     aspirin 81 MG tablet     azelastine (ASTEPRO) 0.15 % nasal spray     Calcium Citrate (CITRACAL OR)     CHOLECALCIFEROL 31074 UNIT PO CAPS     desvenlafaxine (PRISTIQ) 100 MG 24 hr tablet     desvenlafaxine (PRISTIQ) 50 MG 24 hr tablet     desvenlafaxine succinate (PRISTIQ) 25 MG 24 hr tablet     lamoTRIgine (LAMICTAL) 25 MG tablet     LAMOTRIGINE 200 MG PO TABS     levothyroxine (SYNTHROID) 50 MCG tablet     losartan (COZAAR) 100 MG  tablet     metoprolol tartrate (LOPRESSOR) 50 MG tablet     mometasone-formoterol (DULERA) 200-5 MCG/ACT inhaler     Multiple Vitamin (MULTI-VITAMIN) per tablet     Omega-3 Fatty Acids (FISH OIL PO)     rosuvastatin (CRESTOR) 40 MG tablet     verapamil ER (CALAN-SR) 240 MG CR tablet     VIIBRYD 10 MG TABS tablet     No current facility-administered medications for this visit.       ROS:   Constitutional: No fever, chills, or sweats. Weight is 0 lbs 0 oz  ENT: No visual disturbance, ear ache, epistaxis, sore throat.   Allergies/Immunologic: Negative.   Respiratory: No cough, hemoptysis.   Cardiovascular: As per HPI.   GI: No nausea, vomiting, hematemesis, melena, or hematochezia.   : No urinary frequency, dysuria, or hematuria.   Integrument: Negative.   Psychiatric: No evidence of major depression  Neuro: No new neurological complaints at this time. Non focal  Endocrinology: Negative.   Musculoskeletal: As per HPI.      EXAM:  BP (!) 161/88 (BP Location: Left arm, Patient Position: Chair, Cuff Size: Adult Regular)   Pulse 56   Wt 73.9 kg (163 lb)   SpO2 96%   BMI 31.83 kg/m    General: appears comfortable, alert and oriented  Head: normocephalic, atraumatic  Eyes: anicteric sclera, EOMI , PERRL  Neck: no adenopathy  Orophyarynx: moist mucosa, no lesions noted  Heart: regular,  3 out of 6 systolic murmur most pronounced over the second right intercostal space, no rubs or gallop. Estimated JVP at 5 cmH2O  Lungs: CTAB, No wheezing.   Abdomen: soft, non-tender, bowel sounds present, no hepatosplenomegaly  Extremities: No LE edema today  Skin: no open lesions noted  Neuro: grossly non-focal  Psych: no evidence of depression noted     Labs:  Lab Results   Component Value Date    WBC 6.0 10/28/2019    HGB 11.2 (L) 10/28/2019    HCT 35.7 10/28/2019     10/28/2019     10/28/2019    POTASSIUM 4.3 10/28/2019    CHLORIDE 107 10/28/2019    CO2 30 10/28/2019    BUN 31 (H) 10/28/2019    CR 1.29 (H) 10/28/2019     GLC 82 10/28/2019    AST 22 06/25/2019    ALT 37 06/25/2019    ALKPHOS 52 06/25/2019    BILITOTAL 0.4 06/25/2019    INR 2.28 (H) 04/23/2009     Echocardiogram reviewed:   Left ventricular function, chamber size, wall motion, and wall thickness are  normal.The EF is 55-60%. No regional wall motion abnormalities are seen.  Right ventricular function, chamber size, wall motion, and thickness are  normal.  Severe left atrial enlargement is present. Moderate mitral annular calcification is present. Mild mitral insufficiency is present. There is likley moderate aortic stenosis. The peak velocity is  3.47m/sec, the peak and mean gradients are 48mmHg and 28mmHg. The aortic valve  area is calculated low at 0.7cm2.  Pulmonary artery systolic pressure is normal. The inferior vena cava was  normal in size with preserved respiratory variability. No pericardial effusion  is present.   Compared to prior study AS has worsened. Otherwise no significant change     ASSESSMENT AND PLAN:  In summary, patient is a 84 year old lady with with coronary artery disease and status post PCI 20 years ago at [facility], we do not have records unfortunately available at this point but did receive 2 stents.  She most complains of shortness of breath denies any dizziness lightheadedness syncope or recurrent episodes of chest pains.  Echocardiogram was performed today and is above.  It does show at least moderate aortic stenosis however aortic valve area is calculated at 0.7 cm .  Again consistent with moderate to severe aortic stenosis.  Her symptoms are hard to elucidate whether this is related to aortic stenosis or related to more asthma however I do believe that there is a good component of aortic stenosis.  At this time we will refer her to the valve team to further evaluate with the TAVR will be beneficial for her.  She is planning to leave for [place] end of December and return in April.  I do think it is reasonable however would like her to be  evaluated prior to leaving.  We will continue other medications no changes at this time.  I will see her back when she is back from [place].    I appreciate the opportunity to participate in the care of Kamryn Miller . Please do not hesitate to contact me with any further questions.    Sincerely,    Madhu Zuniga MD     [facility] Division of Cardiology

## 2019-11-22 NOTE — NURSING NOTE
Chief Complaint   Patient presents with     Coronary Artery Disease     NEW: Dr. Zuniga: Hx CAD, HLD, HTN, mitral and tricuspid insuff, pulmonary hypertension. Pt reports having GREGORY.      Hypertension       Initial BP (!) 161/88 (BP Location: Left arm, Patient Position: Chair, Cuff Size: Adult Regular)   Pulse 56   Wt 73.9 kg (163 lb)   SpO2 96%   BMI 31.83 kg/m   Estimated body mass index is 31.83 kg/m  as calculated from the following:    Height as of 6/25/19: 1.524 m (5').    Weight as of this encounter: 73.9 kg (163 lb)..  BP completed using cuff size: regular    Fidelina Harris MA

## 2019-11-22 NOTE — LETTER
11/22/2019      RE: Kamryn Miller  [address]   [address]  [facility] [address]     Dear Colleague,    Thank you for the opportunity to participate in the care of your patient, Kamryn Miller, at the [facility] HEART AT [facility] at [facility]. Please see a copy of my visit note below.    November 22, 2019     I had the pleasure of seeing Kamryn Miller  in the [facility] for further evaluation and management. She has a history of Hx CAD, HLD, HTN, mitral and tricuspid insuff, pulmonary hypertension yet no recent cardiology follow up.  She is here to establish care.  She denies any cardiac symptoms and again she has not had any cardiology follow-up for the past several years.  Notes that she does not have shortness of breath especially with exertion or she walks outside.  This is class III symptoms at the moment she can will probably about a block and able to take a flight of stairs.  She denies any recurrent episodes of chest pain and she never had chest pain since the stent placement at [facility] about 20 years ago.  She also denies any dizziness lightheadedness and definitely no episodes of syncope.  Denies any lower extremity edema no PND orthopnea no other symptoms.  No other complaints at this time.  Takes her medications as prescribed no other issues.     PAST MEDICAL HISTORY:  Past Medical History:   Diagnosis Date     Aortic valvar stenosis 7/2010    mild     Asthma      Bipolar disorder (H)      CAD (coronary artery disease)     s/p angioplasty     Celiac disease      Colon polyps      Colon polyps 2012    every 3 year colonoscopy      Depression      High cholesterol      HTN      Mitral insufficiency      Pulmonary HTN (H)     mild     Tremors 7/10    drug induced from antidepressants     Tricuspid insufficiency      FAMILY HISTORY:  Family History   Problem Relation Age of Onset     Asthma Mother       Cerebrovascular Disease Mother      Arthritis Mother      Depression Mother      Lipids Sister      Hypertension Sister      Heart Disease Sister      Lipids Sister      Hypertension Sister      Lipids Sister      Breast Cancer Sister       SOCIAL HISTORY:  Social History     Socioeconomic History     Marital status:      Spouse name: Adrien     Number of children: 2     Years of education: 16     Highest education level: Not on file   Occupational History     Employer: RETIRED     Comment: Retired in 2002.    Social Needs     Financial resource strain: Not on file     Food insecurity:     Worry: Not on file     Inability: Not on file     Transportation needs:     Medical: Not on file     Non-medical: Not on file   Tobacco Use     Smoking status: Never Smoker     Smokeless tobacco: Never Used   Substance and Sexual Activity     Alcohol use: No     Alcohol/week: 0.0 standard drinks     Types: 1 Standard drinks or equivalent per week     Drug use: No     Sexual activity: Not Currently     Partners: Male   Lifestyle     Physical activity:     Days per week: Not on file     Minutes per session: Not on file     Stress: Not on file   Relationships     Social connections:     Talks on phone: Not on file     Gets together: Not on file     Attends Spiritism service: Not on file     Active member of club or organization: Not on file     Attends meetings of clubs or organizations: Not on file     Relationship status: Not on file     Intimate partner violence:     Fear of current or ex partner: Not on file     Emotionally abused: Not on file     Physically abused: Not on file     Forced sexual activity: Not on file   Other Topics Concern     Parent/sibling w/ CABG, MI or angioplasty before 65F 55M? Not Asked   Social History Narrative     Not on file     CURRENT MEDICATIONS:  Current Outpatient Medications   Medication     amoxicillin (AMOXIL) 500 MG tablet     aspirin 81 MG tablet     azelastine (ASTEPRO) 0.15 % nasal  spray     Calcium Citrate (CITRACAL OR)     CHOLECALCIFEROL 60328 UNIT PO CAPS     desvenlafaxine (PRISTIQ) 100 MG 24 hr tablet     desvenlafaxine (PRISTIQ) 50 MG 24 hr tablet     desvenlafaxine succinate (PRISTIQ) 25 MG 24 hr tablet     lamoTRIgine (LAMICTAL) 25 MG tablet     LAMOTRIGINE 200 MG PO TABS     levothyroxine (SYNTHROID) 50 MCG tablet     losartan (COZAAR) 100 MG tablet     metoprolol tartrate (LOPRESSOR) 50 MG tablet     mometasone-formoterol (DULERA) 200-5 MCG/ACT inhaler     Multiple Vitamin (MULTI-VITAMIN) per tablet     Omega-3 Fatty Acids (FISH OIL PO)     rosuvastatin (CRESTOR) 40 MG tablet     verapamil ER (CALAN-SR) 240 MG CR tablet     VIIBRYD 10 MG TABS tablet     No current facility-administered medications for this visit.       ROS:   Constitutional: No fever, chills, or sweats. Weight is 0 lbs 0 oz  ENT: No visual disturbance, ear ache, epistaxis, sore throat.   Allergies/Immunologic: Negative.   Respiratory: No cough, hemoptysis.   Cardiovascular: As per HPI.   GI: No nausea, vomiting, hematemesis, melena, or hematochezia.   : No urinary frequency, dysuria, or hematuria.   Integrument: Negative.   Psychiatric: No evidence of major depression  Neuro: No new neurological complaints at this time. Non focal  Endocrinology: Negative.   Musculoskeletal: As per HPI.      EXAM:  BP (!) 161/88 (BP Location: Left arm, Patient Position: Chair, Cuff Size: Adult Regular)   Pulse 56   Wt 73.9 kg (163 lb)   SpO2 96%   BMI 31.83 kg/m     General: appears comfortable, alert and oriented  Head: normocephalic, atraumatic  Eyes: anicteric sclera, EOMI , PERRL  Neck: no adenopathy  Orophyarynx: moist mucosa, no lesions noted  Heart: regular,   3 out of 6 systolic murmur most pronounced over the second right intercostal space, no rubs or gallop. Estimated JVP at 5 cmH2O  Lungs: CTAB, No wheezing.   Abdomen: soft, non-tender, bowel sounds present, no hepatosplenomegaly  Extremities: No LE edema  today  Skin: no open lesions noted  Neuro: grossly non-focal  Psych: no evidence of depression noted     Labs:  Lab Results   Component Value Date    WBC 6.0 10/28/2019    HGB 11.2 (L) 10/28/2019    HCT 35.7 10/28/2019     10/28/2019     10/28/2019    POTASSIUM 4.3 10/28/2019    CHLORIDE 107 10/28/2019    CO2 30 10/28/2019    BUN 31 (H) 10/28/2019    CR 1.29 (H) 10/28/2019    GLC 82 10/28/2019    AST 22 06/25/2019    ALT 37 06/25/2019    ALKPHOS 52 06/25/2019    BILITOTAL 0.4 06/25/2019    INR 2.28 (H) 04/23/2009     Echocardiogram reviewed:   Left ventricular function, chamber size, wall motion, and wall thickness are  normal.The EF is 55-60%. No regional wall motion abnormalities are seen.  Right ventricular function, chamber size, wall motion, and thickness are  normal.  Severe left atrial enlargement is present. Moderate mitral annular calcification is present. Mild mitral insufficiency is present. There is likley moderate aortic stenosis. The peak velocity is  3.47m/sec, the peak and mean gradients are 48mmHg and 28mmHg. The aortic valve  area is calculated low at 0.7cm2.  Pulmonary artery systolic pressure is normal. The inferior vena cava was  normal in size with preserved respiratory variability. No pericardial effusion  is present.   Compared to prior study AS has worsened. Otherwise no significant change     ASSESSMENT AND PLAN:  In summary, patient is a 84 year old lady with with coronary artery disease and status post PCI 20 years ago at [facility], we do not have records unfortunately available at this point but did receive 2 stents.  She most complains of shortness of breath denies any dizziness lightheadedness syncope or recurrent episodes of chest pains.  Echocardiogram was performed today and is above.  It does show at least moderate aortic stenosis however aortic valve area is calculated at 0.7 cm .  Again consistent with moderate to severe aortic stenosis.  Her symptoms are hard to  elucidate whether this is related to aortic stenosis or related to more asthma however I do believe that there is a good component of aortic stenosis.  At this time we will refer her to the valve team to further evaluate with the TAVR will be beneficial for her.  She is planning to leave for [place] end of December and return in April.  I do think it is reasonable however would like her to be evaluated prior to leaving.  We will continue other medications no changes at this time.  I will see her back when she is back from [place].    I appreciate the opportunity to participate in the care of Kamryn Miller . Please do not hesitate to contact me with any further questions.    Sincerely,    Madhu Zuniga MD     [facility] Division of Cardiology

## 2019-12-06 ENCOUNTER — CARE COORDINATION (OUTPATIENT)
Dept: CARDIOLOGY | Facility: CLINIC | Age: 84
End: 2019-12-06

## 2019-12-06 NOTE — PROGRESS NOTES
Mailed clinic note and echo to pt as requested to bring with them as they drive to [place] after [place].     msg sent to TAVR group for scheduling as no appts schd at this time

## 2019-12-13 DIAGNOSIS — J30.2 SEASONAL ALLERGIC RHINITIS, UNSPECIFIED TRIGGER: ICD-10-CM

## 2019-12-16 DIAGNOSIS — I35.0 SEVERE AORTIC STENOSIS: Primary | ICD-10-CM

## 2019-12-16 DIAGNOSIS — R09.89 CAROTID BRUIT: ICD-10-CM

## 2019-12-16 NOTE — PROGRESS NOTES
Date: 12/16/2019    Time of Call: 12:43 PM     Diagnosis:  Severe aortic stenosis     [ TORB ] Ordering provider: Tom Burrows MD  Order:   Labs- CBC and CMP  CT TAVR  Carotid US     Order received by: Keiko Godinez RN     Follow-up/additional notes:

## 2019-12-17 ENCOUNTER — OFFICE VISIT (OUTPATIENT)
Dept: CARDIOLOGY | Facility: CLINIC | Age: 84
End: 2019-12-17
Attending: INTERNAL MEDICINE
Payer: MEDICARE

## 2019-12-17 VITALS
SYSTOLIC BLOOD PRESSURE: 172 MMHG | WEIGHT: 158 LBS | DIASTOLIC BLOOD PRESSURE: 102 MMHG | BODY MASS INDEX: 29.08 KG/M2 | OXYGEN SATURATION: 98 % | HEIGHT: 62 IN | HEART RATE: 53 BPM

## 2019-12-17 DIAGNOSIS — I51.89 OTHER ILL-DEFINED HEART DISEASES: ICD-10-CM

## 2019-12-17 DIAGNOSIS — I50.30 NYHA CLASS 3 HEART FAILURE WITH PRESERVED EJECTION FRACTION (H): ICD-10-CM

## 2019-12-17 DIAGNOSIS — I35.0 SEVERE AORTIC STENOSIS: ICD-10-CM

## 2019-12-17 DIAGNOSIS — I35.0 SEVERE AORTIC STENOSIS: Primary | ICD-10-CM

## 2019-12-17 LAB
ALBUMIN SERPL-MCNC: 3.2 G/DL (ref 3.4–5)
ALP SERPL-CCNC: 56 U/L (ref 40–150)
ALT SERPL W P-5'-P-CCNC: 26 U/L (ref 0–50)
ANION GAP SERPL CALCULATED.3IONS-SCNC: 4 MMOL/L (ref 3–14)
AST SERPL W P-5'-P-CCNC: 19 U/L (ref 0–45)
BILIRUB SERPL-MCNC: 0.2 MG/DL (ref 0.2–1.3)
BUN SERPL-MCNC: 20 MG/DL (ref 7–30)
CALCIUM SERPL-MCNC: 9.4 MG/DL (ref 8.5–10.1)
CHLORIDE SERPL-SCNC: 108 MMOL/L (ref 94–109)
CO2 SERPL-SCNC: 30 MMOL/L (ref 20–32)
CREAT SERPL-MCNC: 1.48 MG/DL (ref 0.52–1.04)
ERYTHROCYTE [DISTWIDTH] IN BLOOD BY AUTOMATED COUNT: 11.9 % (ref 10–15)
GFR SERPL CREATININE-BSD FRML MDRD: 32 ML/MIN/{1.73_M2}
GLUCOSE SERPL-MCNC: 73 MG/DL (ref 70–99)
HCT VFR BLD AUTO: 33.2 % (ref 35–47)
HGB BLD-MCNC: 10.2 G/DL (ref 11.7–15.7)
MCH RBC QN AUTO: 31.7 PG (ref 26.5–33)
MCHC RBC AUTO-ENTMCNC: 30.7 G/DL (ref 31.5–36.5)
MCV RBC AUTO: 103 FL (ref 78–100)
PLATELET # BLD AUTO: 220 10E9/L (ref 150–450)
POTASSIUM SERPL-SCNC: 4.4 MMOL/L (ref 3.4–5.3)
PROT SERPL-MCNC: 6.8 G/DL (ref 6.8–8.8)
RBC # BLD AUTO: 3.22 10E12/L (ref 3.8–5.2)
SODIUM SERPL-SCNC: 141 MMOL/L (ref 133–144)
WBC # BLD AUTO: 4.5 10E9/L (ref 4–11)

## 2019-12-17 PROCEDURE — G0463 HOSPITAL OUTPT CLINIC VISIT: HCPCS | Mod: 25,ZF

## 2019-12-17 PROCEDURE — 85027 COMPLETE CBC AUTOMATED: CPT | Performed by: INTERNAL MEDICINE

## 2019-12-17 PROCEDURE — 99205 OFFICE O/P NEW HI 60 MIN: CPT | Mod: GC | Performed by: INTERNAL MEDICINE

## 2019-12-17 PROCEDURE — 93005 ELECTROCARDIOGRAM TRACING: CPT | Mod: ZF

## 2019-12-17 PROCEDURE — 80053 COMPREHEN METABOLIC PANEL: CPT | Performed by: INTERNAL MEDICINE

## 2019-12-17 PROCEDURE — 36415 COLL VENOUS BLD VENIPUNCTURE: CPT | Performed by: INTERNAL MEDICINE

## 2019-12-17 PROCEDURE — 93010 ELECTROCARDIOGRAM REPORT: CPT | Mod: ZP | Performed by: INTERNAL MEDICINE

## 2019-12-17 RX ORDER — AZELASTINE HCL 205.5 UG/1
1 SPRAY NASAL DAILY
Qty: 30 ML | Refills: 3 | Status: SHIPPED | OUTPATIENT
Start: 2019-12-17 | End: 2021-07-20

## 2019-12-17 ASSESSMENT — MIFFLIN-ST. JEOR: SCORE: 1124.93

## 2019-12-17 ASSESSMENT — PAIN SCALES - GENERAL: PAINLEVEL: NO PAIN (0)

## 2019-12-17 NOTE — NURSING NOTE
Chief Complaint   Patient presents with     New Patient     TAVR consultation-- Dr. Zuniga referral     Vitals were taken and medications were reconciled.     Radha Lomeli CMA    11:55 AM

## 2019-12-17 NOTE — LETTER
12/17/2019      RE: Kamryn Miller  [address]    [address]  [place] [facility] [address]       Dear Colleague,    Thank you for the opportunity to participate in the care of your patient, Kamryn Miller, at the [facility] at [facility]. Please see a copy of my visit note below.        [facility] HEART CARE  CARDIOVASCULAR DIVISION    VALVE CLINIC INITIAL CONSULTATION    PRIMARY CARDIOLOGIST: Dr. Lenug      PERTINENT CLINICAL HISTORY:     Kamryn Miller is a very pleasant 84 year old female with normal-flow, low-gradient aortic valvular stenosis referred to our clinic for evaluation and consideration for potential transcatheter aortic valve replacement (TAVR).  Her aortic stenosis is characterized with an area of 0.7 cm2, mean gradient 28 mmHg and peak velocity of 3.47 m/sec with LVEF 55-60% by echocardiogram on 11/22/19.  Additional notable medical history of CAD s/p 2 stents in 1990's, HTN, HL, asthma and CKD III.    Her symptoms are difficult to elucidate.  She describes some level of dyspnea, primarily with exertion in cold weather, for many years, which has been attributed to asthma.  Her last PFT's were 3 years ago at outside provider, records not available.  Over the past year, her dyspnea may have worsened slightly, but she and her  are not sure about this.  Denies chest pain, LH/syncope, or easy fatigability / frequent napping.  She wishes to go to [place] from late December to early April.         PAST MEDICAL HISTORY:     Past Medical History:   Diagnosis Date     Aortic valvar stenosis 7/2010    mild     Asthma      Bipolar disorder (H)      CAD (coronary artery disease)     s/p angioplasty     Celiac disease      Colon polyps      Colon polyps 2012    every 3 year colonoscopy      Depression      High cholesterol      HTN      Mitral insufficiency      Pulmonary HTN (H)     mild     Tremors 7/10    drug induced from antidepressants     Tricuspid  insufficiency         PAST SURGICAL HISTORY:     Past Surgical History:   Procedure Laterality Date     ANGIOGRAM  6/26/2009     ANGIOPLASTY  9/96    for angina     ANGIOPLASTY  8/03 - 9/03    X -2 - with stenst in coronary car.     APPENDECTOMY       BREAST BIOPSY, RT/LT      Breat Biopsy RT/LT, benign     BUNIONECTOMY  11/8/2011    Procedure:BUNIONECTOMY; Left donna bunionectomy; Surgeon:FREDY LITTLEJOHN; Location:[facility] OR     BUNIONECTOMY RT/LT  5/2007    right bunion and right 2nd hammertoe     C ANESTH,BLEPHAROPLASTY      (R) for drooping eyelid     C APPENDECTOMY       CATARACT IOL, RT/LT  6/12 and 7/12    bilateral     COLONOSCOPY  2007, 2012    every 3 years for polyps     JOINT REPLACEMENT, HIP RT/LT  4/2008    right hip replaced     JOINT REPLACEMENT, HIP RT/LT  4/2009    left hip replaced     REPAIR HAMMER TOE  11/8/2011    Procedure:REPAIR HAMMER TOE; left 2nd hammertoe repair; Surgeon:FREDY LITTLEJOHN; Location:[facility] OR     SURGICAL HISTORY OF -   11/11    left bunion and 2nd hammertoe repair     TUBAL LIGATION          CURRENT MEDICATIONS:     Current Outpatient Medications   Medication Sig Dispense Refill     amoxicillin (AMOXIL) 500 MG tablet TAKE 4 TABLETS BY MOUTH 1 HOUR PRIOR TO APPT  0     aspirin 81 MG tablet Take 1 tablet by mouth daily.       azelastine (ASTEPRO) 0.15 % nasal spray SPRAY 1 SPRAY INTO BOTH NOSTRILS DAILY 30 mL 3     azelastine (ASTEPRO) 0.15 % nasal spray SPRAY 1 SPRAY INTO BOTH NOSTRILS DAILY 1 Bottle 1     Calcium Citrate (CITRACAL OR) Take 1 tablet by mouth daily.       CHOLECALCIFEROL 16701 UNIT PO CAPS 1 tablet weekly       desvenlafaxine (PRISTIQ) 100 MG 24 hr tablet 100 mg  1     desvenlafaxine (PRISTIQ) 50 MG 24 hr tablet Take 100 mg by mouth daily        desvenlafaxine succinate (PRISTIQ) 25 MG 24 hr tablet Take 25 mg by mouth daily  0     lamoTRIgine (LAMICTAL) 25 MG tablet 25 mg       LAMOTRIGINE 200 MG PO TABS 1 TABLET TWICE DAILY       levothyroxine (SYNTHROID) 50 MCG  tablet Take 1 tablet by mouth daily.       losartan (COZAAR) 100 MG tablet Take 1 tablet (100 mg) by mouth daily 90 tablet 3     metoprolol tartrate (LOPRESSOR) 50 MG tablet One in the morning and 1/2 in the evening 135 tablet 3     mometasone-formoterol (DULERA) 200-5 MCG/ACT inhaler Inhale 2 puffs into the lungs 2 times daily 39 g 3     Multiple Vitamin (MULTI-VITAMIN) per tablet Take 1 tablet by mouth daily.       Omega-3 Fatty Acids (FISH OIL PO) Take 1 tablet by mouth daily.       rosuvastatin (CRESTOR) 40 MG tablet Take 1 tablet (40 mg) by mouth daily 90 tablet 3     verapamil ER (CALAN-SR) 240 MG CR tablet Take 1 tablet (240 mg) by mouth daily 90 tablet 4     VIIBRYD 10 MG TABS tablet Take 10 mg by mouth daily  2        ALLERGIES:     Allergies   Allergen Reactions     Ace Inhibitors Cough     Diclofenac Other (See Comments)     Balance problems        FAMILY HISTORY:     Family History   Problem Relation Age of Onset     Asthma Mother      Cerebrovascular Disease Mother      Arthritis Mother      Depression Mother      Lipids Sister      Hypertension Sister      Heart Disease Sister      Lipids Sister      Hypertension Sister      Lipids Sister      Breast Cancer Sister         SOCIAL HISTORY:     Social History     Socioeconomic History     Marital status:      Spouse name: Adrien     Number of children: 2     Years of education: 16     Highest education level: None   Occupational History     Employer: RETIRED     Comment: Retired in 2002.    Social Needs     Financial resource strain: None     Food insecurity:     Worry: None     Inability: None     Transportation needs:     Medical: None     Non-medical: None   Tobacco Use     Smoking status: Never Smoker     Smokeless tobacco: Never Used   Substance and Sexual Activity     Alcohol use: No     Alcohol/week: 0.0 standard drinks     Types: 1 Standard drinks or equivalent per week     Drug use: No     Sexual activity: Not Currently     Partners: Male  "  Lifestyle     Physical activity:     Days per week: None     Minutes per session: None     Stress: None   Relationships     Social connections:     Talks on phone: None     Gets together: None     Attends Taoist service: None     Active member of club or organization: None     Attends meetings of clubs or organizations: None     Relationship status: None     Intimate partner violence:     Fear of current or ex partner: None     Emotionally abused: None     Physically abused: None     Forced sexual activity: None   Other Topics Concern     Parent/sibling w/ CABG, MI or angioplasty before 65F 55M? Not Asked   Social History Narrative     None        REVIEW OF SYSTEMS:     All other systems reviewed with patient and negative except as noted in the HPI        PHYSICAL EXAMINATION:     BP (!) 172/102 (BP Location: Right arm, Patient Position: Chair, Cuff Size: Adult Regular)   Pulse 53   Ht 1.575 m (5' 2\")   Wt 71.7 kg (158 lb)   SpO2 98%   BMI 28.90 kg/m       GENERAL: No acute distress.  HEENT: EOMI. Sclerae white, not injected. Nares clear. Pharynx without erythema or exudate.   Neck: No adenopathy. No thyromegaly. Symmetrical.   Heart: Regular rate and rhythm. Harsh crescendo decrescendo late peaking systolic murmur with radiation to carotids. Delayed carotid pulses.   Lungs: Clear to auscultation. No ronchi, wheezes, rales.   Abdomen: Soft, nontender, nondistended. Bowel sounds present.  Extremities: No clubbing, cyanosis, or edema.   Neurologic: Alert and oriented to person/place/time, normal speech and affect. No focal deficits.  Skin: No petechiae, purpura or rash.     LABORATORY DATA:     LIPID RESULTS:  Lab Results   Component Value Date    CHOL 175 09/03/2019    HDL 62 09/03/2019    LDL 88 09/03/2019    TRIG 125 09/03/2019    CHOLHDLRATIO 2.8 09/18/2015       LIVER ENZYME RESULTS:  Lab Results   Component Value Date    AST 19 12/17/2019    ALT 26 12/17/2019       CBC RESULTS:  Lab Results   Component " Value Date    WBC 4.5 12/17/2019    RBC 3.22 (L) 12/17/2019    HGB 10.2 (L) 12/17/2019    HCT 33.2 (L) 12/17/2019     (H) 12/17/2019    MCH 31.7 12/17/2019    MCHC 30.7 (L) 12/17/2019    RDW 11.9 12/17/2019     12/17/2019       BMP RESULTS:  Lab Results   Component Value Date     12/17/2019    POTASSIUM 4.4 12/17/2019    CHLORIDE 108 12/17/2019    CO2 30 12/17/2019    ANIONGAP 4 12/17/2019    GLC 73 12/17/2019    BUN 20 12/17/2019    CR 1.48 (H) 12/17/2019    GFRESTIMATED 32 (L) 12/17/2019    GFRESTBLACK 38 (L) 12/17/2019    PRINCE 9.4 12/17/2019        A1C RESULTS:  Lab Results   Component Value Date    A1C 5.0 06/30/2009       INR RESULTS:  Lab Results   Component Value Date    INR 2.28 (H) 04/23/2009    INR 2.11 (H) 04/22/2009          PROCEDURES & FURTHER ASSESSMENTS:     ECG 12/17/19:  Sinus jarad, otherwise normal      Echocardiogram 11/22/19:  Left ventricular function, chamber size, wall motion, and wall thickness are  normal.The EF is 55-60%. No regional wall motion abnormalities are seen.  Right ventricular function, chamber size, wall motion, and thickness are  normal.  Severe left atrial enlargement is present.  Moderate mitral annular calcification is present. Mild mitral insufficiency is  present. There is likley moderate aortic stenosis. The peak velocity is  3.47m/sec, the peak and mean gradients are 48mmHg and 28mmHg. The aortic valve  area is calculated low at 0.7cm2  Pulmonary artery systolic pressure is normal. The inferior vena cava was  normal in size with preserved respiratory variability. No pericardial effusion  is present.      CT TAVR:  Ordered, pending      Coronary Angiogram and Right Heart Catheterization:  - Reportedly 2 stents > 20 years ago, no records available  - Repeat pending      PFTs:  Ordered, pending      Carotid Ultrasound:  Ordered, pending      STS RISK SCORE:  Pending  FRAILTY SCORE: 3/5  NYHA CLASS:  II     CLINICAL IMPRESSION:     Kamryn Miller is an  84 year old female with normal-flow low-gradient aortic stenosis who has symptomatic dyspnea that may be attributable to her valve vs her asthma.  We will sort out her symptoms with PFT's, and sort out the severity of her stenosis with the aortic valve calcium score from her CT and by crossing the valve on her RHC/cors.  Will need to minimize contrast dosage for CT and angiogram due to CKD.    Plan Summary:  1) Normal-flow low-gradient aortic stenosis:  -CT TAVR  -RHC/LHC/cors (including valve assessment)  -PFT's  -Carotid Duplex  -Presentation of data to the Heart team to assess the optimal treatment of her severe aortic stenosis  2) CKD III  -Minimize contrast dye      Patient was evaluated in clinic with Dr. Burrows of interventional cardiology.      José Antonio Ferrara MD  Interventional Cardiology Fellow      CC  Patient Care Team:  Rolanda Wilcox MD as PCP - General (Family Practice)  Rolanda Wilcox MD as Assigned PCP  SELF, REFERRED    Patient seen and examined with Cardiovascular fellow and agree with the assessment and plan described above.     Tom Burrows M.D.  Interventional Cardiology  [facility]

## 2019-12-17 NOTE — PATIENT INSTRUCTIONS
You were seen today in the Cardiovascular Clinic at the [facility].      Cardiology Providers you saw during your visit:  Dr. Burrows    You will need to have carotid Ultra Sound and PFT testing    We are going to schedule you for a CT angiogram of the chest, abdomen, and pelvis (CT TAVR):  -No caffeine, alcohol or smoking 12 hours prior to test  -Nothing by mouth 3 hours prior, however it is OK to take your medications with a sip of water.  -If you are on oral diabetes medications:  Do not take on the day of the procedure.  If you take Glucophage or Metformin:  Do not take 48 hours post procedure.    -Report to the [facility] Waiting room in the hospital      [facility], [place] [address]      You will be scheduled for a Coronary [place] [address]. You are to check in at the [facility] Waiting Area.     Pre procedure instructions:  1. Please make arrangements to have a  as you will not be allowed to drive following the procedure.  2. Do not eat or drink after midnight the day of your procedure.  3. You may take your usual morning medications with a sip of water the morning of your procedure.  4. Take a 325 mg aspirin the day before and the day of your procedure.  5. Make arrangements to have someone stay with you the night after your procedure.      Please call me if you have questions regarding these instructions or your scheduled procedure.      Follow-up:  I will present all of your testing information to the valve team. You will need to see a surgeon and the PAC clinic prior to your valve procedure      Thank you for your visit today!     Keiko Godinez RN  Structural Heart Care Coordinator   TAVR, MitraClip and Watchman Programs  [facility]    Polina Moustapha, Lead CMA Office: [phone]      Clinics and Surgery Center  [address]  Cardiology Clinic [address]  [place] [address]

## 2019-12-17 NOTE — PROGRESS NOTES
[facility]  CARDIOVASCULAR DIVISION    VALVE CLINIC INITIAL CONSULTATION    PRIMARY CARDIOLOGIST: Dr. Leung      PERTINENT CLINICAL HISTORY:     Kamryn Miller is a very pleasant 84 year old female with normal-flow, low-gradient aortic valvular stenosis referred to our clinic for evaluation and consideration for potential transcatheter aortic valve replacement (TAVR).  Her aortic stenosis is characterized with an area of 0.7 cm2, mean gradient 28 mmHg and peak velocity of 3.47 m/sec with LVEF 55-60% by echocardiogram on 11/22/19.  Additional notable medical history of CAD s/p 2 stents in 1990's, HTN, HL, asthma and CKD III.    Her symptoms are difficult to elucidate.  She describes some level of dyspnea, primarily with exertion in cold weather, for many years, which has been attributed to asthma.  Her last PFT's were 3 years ago at outside provider, records not available.  Over the past year, her dyspnea may have worsened slightly, but she and her  are not sure about this.  Denies chest pain, LH/syncope, or easy fatigability / frequent napping.  She wishes to go to [place] from late December to early April.         PAST MEDICAL HISTORY:     Past Medical History:   Diagnosis Date     Aortic valvar stenosis 7/2010    mild     Asthma      Bipolar disorder (H)      CAD (coronary artery disease)     s/p angioplasty     Celiac disease      Colon polyps      Colon polyps 2012    every 3 year colonoscopy      Depression      High cholesterol      HTN      Mitral insufficiency      Pulmonary HTN (H)     mild     Tremors 7/10    drug induced from antidepressants     Tricuspid insufficiency         PAST SURGICAL HISTORY:     Past Surgical History:   Procedure Laterality Date     ANGIOGRAM  6/26/2009     ANGIOPLASTY  9/96    for angina     ANGIOPLASTY  8/03 - 9/03    X -2 - with stenst in coronary car.     APPENDECTOMY       BREAST BIOPSY, RT/LT      Breat Biopsy RT/LT, benign     BUNIONECTOMY  11/8/2011     Procedure:BUNIONECTOMY; Left donna bunionectomy; Surgeon:FREDY LITTLEJOHN; Location:[facility] OR     BUNIONECTOMY RT/LT  5/2007    right bunion and right 2nd hammertoe     C ANESTH,BLEPHAROPLASTY      (R) for drooping eyelid     C APPENDECTOMY       CATARACT IOL, RT/LT  6/12 and 7/12    bilateral     COLONOSCOPY  2007, 2012    every 3 years for polyps     JOINT REPLACEMENT, HIP RT/LT  4/2008    right hip replaced     JOINT REPLACEMENT, HIP RT/LT  4/2009    left hip replaced     REPAIR HAMMER TOE  11/8/2011    Procedure:REPAIR HAMMER TOE; left 2nd hammertoe repair; Surgeon:FREDY LITTLEJOHN; Location:[facility] OR     SURGICAL HISTORY OF -   11/11    left bunion and 2nd hammertoe repair     TUBAL LIGATION          CURRENT MEDICATIONS:     Current Outpatient Medications   Medication Sig Dispense Refill     amoxicillin (AMOXIL) 500 MG tablet TAKE 4 TABLETS BY MOUTH 1 HOUR PRIOR TO APPT  0     aspirin 81 MG tablet Take 1 tablet by mouth daily.       azelastine (ASTEPRO) 0.15 % nasal spray SPRAY 1 SPRAY INTO BOTH NOSTRILS DAILY 30 mL 3     azelastine (ASTEPRO) 0.15 % nasal spray SPRAY 1 SPRAY INTO BOTH NOSTRILS DAILY 1 Bottle 1     Calcium Citrate (CITRACAL OR) Take 1 tablet by mouth daily.       CHOLECALCIFEROL 79568 UNIT PO CAPS 1 tablet weekly       desvenlafaxine (PRISTIQ) 100 MG 24 hr tablet 100 mg  1     desvenlafaxine (PRISTIQ) 50 MG 24 hr tablet Take 100 mg by mouth daily        desvenlafaxine succinate (PRISTIQ) 25 MG 24 hr tablet Take 25 mg by mouth daily  0     lamoTRIgine (LAMICTAL) 25 MG tablet 25 mg       LAMOTRIGINE 200 MG PO TABS 1 TABLET TWICE DAILY       levothyroxine (SYNTHROID) 50 MCG tablet Take 1 tablet by mouth daily.       losartan (COZAAR) 100 MG tablet Take 1 tablet (100 mg) by mouth daily 90 tablet 3     metoprolol tartrate (LOPRESSOR) 50 MG tablet One in the morning and 1/2 in the evening 135 tablet 3     mometasone-formoterol (DULERA) 200-5 MCG/ACT inhaler Inhale 2 puffs into the lungs 2 times daily 39 g  3     Multiple Vitamin (MULTI-VITAMIN) per tablet Take 1 tablet by mouth daily.       Omega-3 Fatty Acids (FISH OIL PO) Take 1 tablet by mouth daily.       rosuvastatin (CRESTOR) 40 MG tablet Take 1 tablet (40 mg) by mouth daily 90 tablet 3     verapamil ER (CALAN-SR) 240 MG CR tablet Take 1 tablet (240 mg) by mouth daily 90 tablet 4     VIIBRYD 10 MG TABS tablet Take 10 mg by mouth daily  2        ALLERGIES:     Allergies   Allergen Reactions     Ace Inhibitors Cough     Diclofenac Other (See Comments)     Balance problems        FAMILY HISTORY:     Family History   Problem Relation Age of Onset     Asthma Mother      Cerebrovascular Disease Mother      Arthritis Mother      Depression Mother      Lipids Sister      Hypertension Sister      Heart Disease Sister      Lipids Sister      Hypertension Sister      Lipids Sister      Breast Cancer Sister         SOCIAL HISTORY:     Social History     Socioeconomic History     Marital status:      Spouse name: Adrien     Number of children: 2     Years of education: 16     Highest education level: None   Occupational History     Employer: RETIRED     Comment: Retired in 2002.    Social Needs     Financial resource strain: None     Food insecurity:     Worry: None     Inability: None     Transportation needs:     Medical: None     Non-medical: None   Tobacco Use     Smoking status: Never Smoker     Smokeless tobacco: Never Used   Substance and Sexual Activity     Alcohol use: No     Alcohol/week: 0.0 standard drinks     Types: 1 Standard drinks or equivalent per week     Drug use: No     Sexual activity: Not Currently     Partners: Male   Lifestyle     Physical activity:     Days per week: None     Minutes per session: None     Stress: None   Relationships     Social connections:     Talks on phone: None     Gets together: None     Attends [employer] service: None     Active member of club or organization: None     Attends meetings of clubs or organizations: None      "Relationship status: None     Intimate partner violence:     Fear of current or ex partner: None     Emotionally abused: None     Physically abused: None     Forced sexual activity: None   Other Topics Concern     Parent/sibling w/ CABG, MI or angioplasty before 65F 55M? Not Asked   Social History Narrative     None        REVIEW OF SYSTEMS:     All other systems reviewed with patient and negative except as noted in the HPI        PHYSICAL EXAMINATION:     BP (!) 172/102 (BP Location: Right arm, Patient Position: Chair, Cuff Size: Adult Regular)   Pulse 53   Ht 1.575 m (5' 2\")   Wt 71.7 kg (158 lb)   SpO2 98%   BMI 28.90 kg/m      GENERAL: No acute distress.  HEENT: EOMI. Sclerae white, not injected. Nares clear. Pharynx without erythema or exudate.   Neck: No adenopathy. No thyromegaly. Symmetrical.   Heart: Regular rate and rhythm. Harsh crescendo decrescendo late peaking systolic murmur with radiation to carotids. Delayed carotid pulses.   Lungs: Clear to auscultation. No ronchi, wheezes, rales.   Abdomen: Soft, nontender, nondistended. Bowel sounds present.  Extremities: No clubbing, cyanosis, or edema.   Neurologic: Alert and oriented to person/place/time, normal speech and affect. No focal deficits.  Skin: No petechiae, purpura or rash.     LABORATORY DATA:     LIPID RESULTS:  Lab Results   Component Value Date    CHOL 175 09/03/2019    HDL 62 09/03/2019    LDL 88 09/03/2019    TRIG 125 09/03/2019    CHOLHDLRATIO 2.8 09/18/2015       LIVER ENZYME RESULTS:  Lab Results   Component Value Date    AST 19 12/17/2019    ALT 26 12/17/2019       CBC RESULTS:  Lab Results   Component Value Date    WBC 4.5 12/17/2019    RBC 3.22 (L) 12/17/2019    HGB 10.2 (L) 12/17/2019    HCT 33.2 (L) 12/17/2019     (H) 12/17/2019    MCH 31.7 12/17/2019    MCHC 30.7 (L) 12/17/2019    RDW 11.9 12/17/2019     12/17/2019       BMP RESULTS:  Lab Results   Component Value Date     12/17/2019    POTASSIUM 4.4 " 12/17/2019    CHLORIDE 108 12/17/2019    CO2 30 12/17/2019    ANIONGAP 4 12/17/2019    GLC 73 12/17/2019    BUN 20 12/17/2019    CR 1.48 (H) 12/17/2019    GFRESTIMATED 32 (L) 12/17/2019    GFRESTBLACK 38 (L) 12/17/2019    PRINCE 9.4 12/17/2019        A1C RESULTS:  Lab Results   Component Value Date    A1C 5.0 06/30/2009       INR RESULTS:  Lab Results   Component Value Date    INR 2.28 (H) 04/23/2009    INR 2.11 (H) 04/22/2009          PROCEDURES & FURTHER ASSESSMENTS:     ECG 12/17/19:  Sinus jarad, otherwise normal      Echocardiogram 11/22/19:  Left ventricular function, chamber size, wall motion, and wall thickness are  normal.The EF is 55-60%. No regional wall motion abnormalities are seen.  Right ventricular function, chamber size, wall motion, and thickness are  normal.  Severe left atrial enlargement is present.  Moderate mitral annular calcification is present. Mild mitral insufficiency is  present. There is likley moderate aortic stenosis. The peak velocity is  3.47m/sec, the peak and mean gradients are 48mmHg and 28mmHg. The aortic valve  area is calculated low at 0.7cm2  Pulmonary artery systolic pressure is normal. The inferior vena cava was  normal in size with preserved respiratory variability. No pericardial effusion  is present.      CT TAVR:  Ordered, pending      Coronary Angiogram and Right Heart Catheterization:  - Reportedly 2 stents > 20 years ago, no records available  - Repeat pending      PFTs:  Ordered, pending      Carotid Ultrasound:  Ordered, pending      STS RISK SCORE:  Pending  FRAILTY SCORE: 3/5  NYHA CLASS:  II     CLINICAL IMPRESSION:     Kamryn Miller is an 84 year old female with normal-flow low-gradient aortic stenosis who has symptomatic dyspnea that may be attributable to her valve vs her asthma.  We will sort out her symptoms with PFT's, and sort out the severity of her stenosis with the aortic valve calcium score from her CT and by crossing the valve on her RHC/cors.  Will  need to minimize contrast dosage for CT and angiogram due to CKD.    Plan Summary:  1) Normal-flow low-gradient aortic stenosis:  -CT TAVR  -RHC/LHC/cors (including valve assessment)  -PFT's  -Carotid Duplex  -Presentation of data to the Heart team to assess the optimal treatment of her severe aortic stenosis  2) CKD III  -Minimize contrast dye      Patient was evaluated in clinic with Dr. Burrows of interventional cardiology.      José Antonio Ferrara MD  Interventional Cardiology Fellow      CC  Patient Care Team:  Rolanda Wilcox MD as PCP - General (Family Practice)  Rolanda Wilcox MD as Assigned PCP  SELF, REFERRED    Patient seen and examined with Cardiovascular fellow and agree with the assessment and plan described above.     Tom Burrows M.D.  Interventional Cardiology  [facility]

## 2019-12-18 LAB — INTERPRETATION ECG - MUSE: NORMAL

## 2019-12-20 ENCOUNTER — ANCILLARY PROCEDURE (OUTPATIENT)
Dept: ULTRASOUND IMAGING | Facility: CLINIC | Age: 84
End: 2019-12-20
Attending: INTERNAL MEDICINE
Payer: MEDICARE

## 2019-12-20 DIAGNOSIS — J45.30 MILD PERSISTENT ASTHMA WITHOUT COMPLICATION: Primary | ICD-10-CM

## 2019-12-20 DIAGNOSIS — R09.89 CAROTID BRUIT: ICD-10-CM

## 2019-12-20 DIAGNOSIS — I35.0 SEVERE AORTIC STENOSIS: ICD-10-CM

## 2019-12-27 LAB
DLCOCOR-%PRED-PRE: 100 %
DLCOCOR-PRE: 16.93 ML/MIN/MMHG
DLCOUNC-%PRED-PRE: 89 %
DLCOUNC-PRE: 14.99 ML/MIN/MMHG
DLCOUNC-PRED: 16.77 ML/MIN/MMHG
ERV-%PRED-PRE: 73 %
ERV-PRE: 0.2 L
ERV-PRED: 0.27 L
EXPTIME-PRE: 7.92 SEC
FEF2575-%PRED-POST: 59 %
FEF2575-%PRED-PRE: 53 %
FEF2575-POST: 0.81 L/SEC
FEF2575-PRE: 0.73 L/SEC
FEF2575-PRED: 1.38 L/SEC
FEFMAX-%PRED-PRE: 124 %
FEFMAX-PRE: 5.02 L/SEC
FEFMAX-PRED: 4.05 L/SEC
FEV1-%PRED-PRE: 66 %
FEV1-PRE: 1.11 L
FEV1FEV6-PRE: 74 %
FEV1FEV6-PRED: 77 %
FEV1FVC-PRE: 73 %
FEV1FVC-PRED: 77 %
FEV1SVC-PRE: 63 %
FEV1SVC-PRED: 70 %
FIFMAX-PRE: 5.27 L/SEC
FRCPLETH-%PRED-PRE: 90 %
FRCPLETH-PRE: 2.32 L
FRCPLETH-PRED: 2.55 L
FVC-%PRED-PRE: 68 %
FVC-PRE: 1.52 L
FVC-PRED: 2.21 L
IC-%PRED-PRE: 73 %
IC-PRE: 1.58 L
IC-PRED: 2.14 L
RVPLETH-%PRED-PRE: 99 %
RVPLETH-PRE: 2.12 L
RVPLETH-PRED: 2.13 L
TLCPLETH-%PRED-PRE: 87 %
TLCPLETH-PRE: 3.9 L
TLCPLETH-PRED: 4.44 L
VA-%PRED-PRE: 75 %
VA-PRE: 3.06 L
VC-%PRED-PRE: 73 %
VC-PRE: 1.78 L
VC-PRED: 2.41 L

## 2020-04-20 ENCOUNTER — TELEPHONE (OUTPATIENT)
Dept: FAMILY MEDICINE | Facility: CLINIC | Age: 85
End: 2020-04-20

## 2020-04-20 DIAGNOSIS — J45.30 MILD PERSISTENT ASTHMA WITHOUT COMPLICATION: Primary | ICD-10-CM

## 2020-04-20 NOTE — TELEPHONE ENCOUNTER
Reason for call:  Other   Patient called regarding (reason for call): call back  Additional comments: Patient is calling because she says her insurance will not cover the medication mometasone-formoterol (DULERA) 200-5 MCG/ACT inhaler. Please call back     Phone number to reach patient:  Cell number on file:    Telephone Information:   Mobile [phone]       Best Time:  any    Can we leave a detailed message on this number?  YES    Travel screening: Not Applicable

## 2020-04-20 NOTE — TELEPHONE ENCOUNTER
Pt states that her insurance company indicated that Advair diskus may be covered. Pt stated that she would be okay with a generic.    Per RN therapeutic substitution protocol, alternative to mometasone-formoterol (DULERA) 200-5 MCG/ACT inhaler is Fluticasone/salmeterol (Advair Diskus) /50 mcg. Rx sent to requested pharmacy from patient. Dulera discontinued.

## 2020-04-29 DIAGNOSIS — I10 BENIGN ESSENTIAL HYPERTENSION: ICD-10-CM

## 2020-04-30 RX ORDER — METOPROLOL TARTRATE 50 MG
TABLET ORAL
Qty: 45 TABLET | Refills: 0 | Status: SHIPPED | OUTPATIENT
Start: 2020-04-30 | End: 2020-08-04

## 2020-04-30 NOTE — TELEPHONE ENCOUNTER
Routing refill request to provider for review/approval because:  Last BP was out of range      BP Readings from Last 3 Encounters:   12/17/19 (!) 172/102   11/22/19 130/66   10/28/19 126/72     Raúl Trejo RN

## 2020-07-03 ENCOUNTER — VIRTUAL VISIT (OUTPATIENT)
Dept: CARDIOLOGY | Facility: CLINIC | Age: 85
End: 2020-07-03
Payer: MEDICARE

## 2020-07-03 DIAGNOSIS — I35.0 SEVERE AORTIC STENOSIS: ICD-10-CM

## 2020-07-03 DIAGNOSIS — E78.2 MIXED HYPERLIPIDEMIA: ICD-10-CM

## 2020-07-03 DIAGNOSIS — I10 BENIGN ESSENTIAL HYPERTENSION: ICD-10-CM

## 2020-07-03 DIAGNOSIS — I25.10 CORONARY ARTERY DISEASE INVOLVING NATIVE CORONARY ARTERY OF NATIVE HEART WITHOUT ANGINA PECTORIS: Primary | ICD-10-CM

## 2020-07-03 PROCEDURE — 99443 ZZC PHYSICIAN TELEPHONE EVALUATION 21-30 MIN: CPT | Performed by: INTERNAL MEDICINE

## 2020-07-03 NOTE — PROGRESS NOTES
"Kamryn Miller is a 84 year old female who is being evaluated via a billable telephone visit.      The patient has been notified of following:     \"This telephone visit will be conducted via a call between you and your physician/provider. We have found that certain health care needs can be provided without the need for a physical exam.  This service lets us provide the care you need with a short phone conversation.  If a prescription is necessary we can send it directly to your pharmacy.  If lab work is needed we can place an order for that and you can then stop by our lab to have the test done at a later time.    Telephone visits are billed at different rates depending on your insurance coverage. During this emergency period, for some insurers they may be billed the same as an in-person visit.  Please reach out to your insurance provider with any questions.    If during the course of the call the physician/provider feels a telephone visit is not appropriate, you will not be charged for this service.\"    Patient has given verbal consent for Telephone visit?  Yes    What phone number would you like to be contacted at? home    How would you like to obtain your AVS? [employer]    Phone call duration: 21 minutes    July 3, 2020    I had the pleasure of seeing Kamryn Miller  in the [facility] for further evaluation and management. She has a history of Hx CAD, HLD, HTN, mitral and tricuspid insuff, pulmonary hypertension yet no recent cardiology follow up.  She is here to establish care.  She denies any cardiac symptoms and again she has not had any cardiology follow-up for the past several years.  Notes that she does have shortness of breath especially with exertion or she walks outside.  This is class III symptoms at the moment she can will probably about a block and able to take a flight of stairs.  She denies any recurrent episodes of chest pain and she never had chest pain since the stent " placement at [facility] about 20 years ago.  Overall no other complaints at this point.     PAST MEDICAL HISTORY:  Past Medical History:   Diagnosis Date     Aortic valvar stenosis 7/2010    mild     Asthma      Bipolar disorder (H)      CAD (coronary artery disease)     s/p angioplasty     Celiac disease      Colon polyps      Colon polyps 2012    every 3 year colonoscopy      Depression      High cholesterol      HTN      Mitral insufficiency      Pulmonary HTN (H)     mild     Tremors 7/10    drug induced from antidepressants     Tricuspid insufficiency      FAMILY HISTORY:  Family History   Problem Relation Age of Onset     Asthma Mother      Cerebrovascular Disease Mother      Arthritis Mother      Depression Mother      Lipids Sister      Hypertension Sister      Heart Disease Sister      Lipids Sister      Hypertension Sister      Lipids Sister      Breast Cancer Sister      SOCIAL HISTORY:  Social History     Socioeconomic History     Marital status:      Spouse name: Adrien     Number of children: 2     Years of education: 16     Highest education level: None   Occupational History     Employer: RETIRED     Comment: Retired in 2002.    Social Needs     Financial resource strain: None     Food insecurity     Worry: None     Inability: None     Transportation needs     Medical: None     Non-medical: None   Tobacco Use     Smoking status: Never Smoker     Smokeless tobacco: Never Used   Substance and Sexual Activity     Alcohol use: No     Alcohol/week: 0.0 standard drinks     Types: 1 Standard drinks or equivalent per week     Drug use: No     Sexual activity: Not Currently     Partners: Male   Lifestyle     Physical activity     Days per week: None     Minutes per session: None     Stress: None   Relationships     Social connections     Talks on phone: None     Gets together: None     Attends Bahai service: None     Active member of club or organization: None     Attends meetings of clubs or  organizations: None     Relationship status: None     Intimate partner violence     Fear of current or ex partner: None     Emotionally abused: None     Physically abused: None     Forced sexual activity: None   Other Topics Concern     Parent/sibling w/ CABG, MI or angioplasty before 65F 55M? Not Asked   Social History Narrative     None     CURRENT MEDICATIONS:  Current Outpatient Medications   Medication     aspirin 81 MG tablet     azelastine (ASTEPRO) 0.15 % nasal spray     azelastine (ASTEPRO) 0.15 % nasal spray     Calcium Citrate (CITRACAL OR)     CHOLECALCIFEROL 32389 UNIT PO CAPS     desvenlafaxine (PRISTIQ) 100 MG 24 hr tablet     desvenlafaxine succinate (PRISTIQ) 25 MG 24 hr tablet     fluticasone-salmeterol (ADVAIR DISKUS) 500-50 MCG/DOSE inhaler     lamoTRIgine (LAMICTAL) 25 MG tablet     LAMOTRIGINE 200 MG PO TABS     levothyroxine (SYNTHROID) 50 MCG tablet     losartan (COZAAR) 100 MG tablet     metoprolol tartrate (LOPRESSOR) 50 MG tablet     Multiple Vitamin (MULTI-VITAMIN) per tablet     Omega-3 Fatty Acids (FISH OIL PO)     rosuvastatin (CRESTOR) 40 MG tablet     verapamil ER (CALAN-SR) 240 MG CR tablet     VIIBRYD 10 MG TABS tablet     amoxicillin (AMOXIL) 500 MG tablet     desvenlafaxine (PRISTIQ) 50 MG 24 hr tablet     No current facility-administered medications for this visit.      ROS:   Constitutional: No fever, chills, or sweats. Weight is 0 lbs 0 oz  ENT: No visual disturbance, ear ache, epistaxis, sore throat.   Allergies/Immunologic: Negative.   Respiratory: No cough, hemoptysis.   Cardiovascular: As per HPI.   GI: No nausea, vomiting, hematemesis, melena, or hematochezia.   : No urinary frequency, dysuria, or hematuria.   Integrument: Negative.   Psychiatric: No evidence of major depression  Neuro: No new neurological complaints at this time. Non focal  Endocrinology: Negative.   Musculoskeletal: As per HPI.      EXAM:  Limited due to phone encounter     Labs:  Lab Results    Component Value Date    WBC 4.5 12/17/2019    HGB 10.2 (L) 12/17/2019    HCT 33.2 (L) 12/17/2019     12/17/2019     12/17/2019    POTASSIUM 4.4 12/17/2019    CHLORIDE 108 12/17/2019    CO2 30 12/17/2019    BUN 20 12/17/2019    CR 1.48 (H) 12/17/2019    GLC 73 12/17/2019    AST 19 12/17/2019    ALT 26 12/17/2019    ALKPHOS 56 12/17/2019    BILITOTAL 0.2 12/17/2019    INR 2.28 (H) 04/23/2009     Echocardiogram reviewed:   Left ventricular function, chamber size, wall motion, and wall thickness are  normal.The EF is 55-60%. No regional wall motion abnormalities are seen.  Right ventricular function, chamber size, wall motion, and thickness are  normal.  Severe left atrial enlargement is present. Moderate mitral annular calcification is present. Mild mitral insufficiency is present. There is likley moderate aortic stenosis. The peak velocity is  3.47m/sec, the peak and mean gradients are 48mmHg and 28mmHg. The aortic valve  area is calculated low at 0.7cm2.  Pulmonary artery systolic pressure is normal. The inferior vena cava was  normal in size with preserved respiratory variability. No pericardial effusion  is present.   Compared to prior study AS has worsened. Otherwise no significant change     ASSESSMENT AND PLAN:  In summary, patient is a 84 year old lady with with coronary artery disease and status post PCI 20 years ago at [facility], we do not have records unfortunately available at this point but did receive 2 stents.  She most complains of shortness of breath denies any dizziness lightheadedness syncope or recurrent episodes of chest pains.  We will previous echo images reviewed this on this new echo that was done few days ago reviewed.  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  Now that they are back from [place] we will refer patient to valve clinic to further evaluate.  Team has been notified and will see patient as soon as possible.  I would consider TAVR  in this patient if she was found to be an appropriate candidate.  We will see her back in about 3 months or after the valve clinic appointment.    I appreciate the opportunity to participate in the care of Kamryn Miller . Please do not hesitate to contact me with any further questions.     Sincerely,    Madhu Zuniga MD     [facility] Division of Cardiology

## 2020-07-03 NOTE — PATIENT INSTRUCTIONS
Thank you for coming to the [facility] @ [facility]; please note the following instructions:    1.  Please continue all medications as you are taking at the moment  2.  We did place a referral to the valve clinic and they should be contacting you early next week  3.  I will see you back in about 3 months or sooner.  Please let us know any time if there is any new questions or concerns.         If you have any questions regarding your visit please contact your care team:     Cardiology  Telephone Number   Alisa DIAS, RN  Jennifer VITALE, RN   Daniela DELGADO, FANG BOUCHER, FANG VICKERS, LPN   [phone] (option 1)   For scheduling appts:     [phone] (select option 1)       For the Device Clinic (Pacemakers and ICD's)  RN's :  Delia Vazquez   During business hours: [phone]    *After business hours:  [phone] (select option 4)      Normal test result notifications will be released via [employer] or mailed within 7 business days.  All other test results, will be communicated via telephone once reviewed by your cardiologist.    If you need a medication refill please contact your pharmacy.  Please allow 3 business days for your refill to be completed.    As always, thank you for trusting us with your health care needs!

## 2020-07-03 NOTE — LETTER
7/3/2020    RE: Kamryn Miller  [address]   [address]  [facility] [address]       Dear Colleague,    Thank you for the opportunity to participate in the care of your patient, Kamryn Miller, at the [facility] HEART AT [facility] at [facility]. Please see a copy of my visit note below.    Kamryn Miller is a 84 year old female who is being evaluated via a billable telephone visit.      Phone call duration: 21 minutes    July 3, 2020    I had the pleasure of seeing Kamryn Miller  in the [facility] for further evaluation and management. She has a history of Hx CAD, HLD, HTN, mitral and tricuspid insuff, pulmonary hypertension yet no recent cardiology follow up.  She is here to establish care.  She denies any cardiac symptoms and again she has not had any cardiology follow-up for the past several years.  Notes that she does have shortness of breath especially with exertion or she walks outside.  This is class III symptoms at the moment she can will probably about a block and able to take a flight of stairs.  She denies any recurrent episodes of chest pain and she never had chest pain since the stent placement at [facility] about 20 years ago.  Overall no other complaints at this point.     PAST MEDICAL HISTORY:  Past Medical History:   Diagnosis Date     Aortic valvar stenosis 7/2010    mild     Asthma      Bipolar disorder (H)      CAD (coronary artery disease)     s/p angioplasty     Celiac disease      Colon polyps      Colon polyps 2012    every 3 year colonoscopy      Depression      High cholesterol      HTN      Mitral insufficiency      Pulmonary HTN (H)     mild     Tremors 7/10    drug induced from antidepressants     Tricuspid insufficiency      FAMILY HISTORY:  Family History   Problem Relation Age of Onset     Asthma Mother      Cerebrovascular Disease Mother      Arthritis Mother      Depression Mother      Lipids  Sister      Hypertension Sister      Heart Disease Sister      Lipids Sister      Hypertension Sister      Lipids Sister      Breast Cancer Sister      SOCIAL HISTORY:  Social History     Socioeconomic History     Marital status:      Spouse name: Adrien     Number of children: 2     Years of education: 16     Highest education level: None   Occupational History     Employer: RETIRED     Comment: Retired in 2002.    Social Needs     Financial resource strain: None     Food insecurity     Worry: None     Inability: None     Transportation needs     Medical: None     Non-medical: None   Tobacco Use     Smoking status: Never Smoker     Smokeless tobacco: Never Used   Substance and Sexual Activity     Alcohol use: No     Alcohol/week: 0.0 standard drinks     Types: 1 Standard drinks or equivalent per week     Drug use: No     Sexual activity: Not Currently     Partners: Male   Lifestyle     Physical activity     Days per week: None     Minutes per session: None     Stress: None   Relationships     Social connections     Talks on phone: None     Gets together: None     Attends [employer] service: None     Active member of club or organization: None     Attends meetings of clubs or organizations: None     Relationship status: None     Intimate partner violence     Fear of current or ex partner: None     Emotionally abused: None     Physically abused: None     Forced sexual activity: None   Other Topics Concern     Parent/sibling w/ CABG, MI or angioplasty before 65F 55M? Not Asked   Social History Narrative     None     CURRENT MEDICATIONS:  Current Outpatient Medications   Medication     aspirin 81 MG tablet     azelastine (ASTEPRO) 0.15 % nasal spray     azelastine (ASTEPRO) 0.15 % nasal spray     Calcium Citrate (CITRACAL OR)     CHOLECALCIFEROL 39358 UNIT PO CAPS     desvenlafaxine (PRISTIQ) 100 MG 24 hr tablet     desvenlafaxine succinate (PRISTIQ) 25 MG 24 hr tablet     fluticasone-salmeterol (ADVAIR DISKUS)  500-50 MCG/DOSE inhaler     lamoTRIgine (LAMICTAL) 25 MG tablet     LAMOTRIGINE 200 MG PO TABS     levothyroxine (SYNTHROID) 50 MCG tablet     losartan (COZAAR) 100 MG tablet     metoprolol tartrate (LOPRESSOR) 50 MG tablet     Multiple Vitamin (MULTI-VITAMIN) per tablet     Omega-3 Fatty Acids (FISH OIL PO)     rosuvastatin (CRESTOR) 40 MG tablet     verapamil ER (CALAN-SR) 240 MG CR tablet     VIIBRYD 10 MG TABS tablet     amoxicillin (AMOXIL) 500 MG tablet     desvenlafaxine (PRISTIQ) 50 MG 24 hr tablet     No current facility-administered medications for this visit.      ROS:   Constitutional: No fever, chills, or sweats. Weight is 0 lbs 0 oz  ENT: No visual disturbance, ear ache, epistaxis, sore throat.   Allergies/Immunologic: Negative.   Respiratory: No cough, hemoptysis.   Cardiovascular: As per HPI.   GI: No nausea, vomiting, hematemesis, melena, or hematochezia.   : No urinary frequency, dysuria, or hematuria.   Integrument: Negative.   Psychiatric: No evidence of major depression  Neuro: No new neurological complaints at this time. Non focal  Endocrinology: Negative.   Musculoskeletal: As per HPI.      EXAM:  Limited due to phone encounter     Labs:  Lab Results   Component Value Date    WBC 4.5 12/17/2019    HGB 10.2 (L) 12/17/2019    HCT 33.2 (L) 12/17/2019     12/17/2019     12/17/2019    POTASSIUM 4.4 12/17/2019    CHLORIDE 108 12/17/2019    CO2 30 12/17/2019    BUN 20 12/17/2019    CR 1.48 (H) 12/17/2019    GLC 73 12/17/2019    AST 19 12/17/2019    ALT 26 12/17/2019    ALKPHOS 56 12/17/2019    BILITOTAL 0.2 12/17/2019    INR 2.28 (H) 04/23/2009     Echocardiogram reviewed:   Left ventricular function, chamber size, wall motion, and wall thickness are  normal.The EF is 55-60%. No regional wall motion abnormalities are seen.  Right ventricular function, chamber size, wall motion, and thickness are  normal.  Severe left atrial enlargement is present. Moderate mitral annular calcification  is present. Mild mitral insufficiency is present. There is likley moderate aortic stenosis. The peak velocity is  3.47m/sec, the peak and mean gradients are 48mmHg and 28mmHg. The aortic valve  area is calculated low at 0.7cm2.  Pulmonary artery systolic pressure is normal. The inferior vena cava was  normal in size with preserved respiratory variability. No pericardial effusion  is present.   Compared to prior study AS has worsened. Otherwise no significant change     ASSESSMENT AND PLAN:  In summary, patient is a 84 year old lady with with coronary artery disease and status post PCI 20 years ago at [facility], we do not have records unfortunately available at this point but did receive 2 stents.  She most complains of shortness of breath denies any dizziness lightheadedness syncope or recurrent episodes of chest pains.  We will previous echo images reviewed this on this new echo that was done few days ago reviewed.  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  Now that they are back from [place] we will refer patient to valve clinic to further evaluate.  Team has been notified and will see patient as soon as possible.  I would consider TAVR in this patient if she was found to be an appropriate candidate.  We will see her back in about 3 months or after the valve clinic appointment.    I appreciate the opportunity to participate in the care of Kamryn Miller . Please do not hesitate to contact me with any further questions.     Sincerely,    Madhu Zuniga MD     [facility] Division of Cardiology

## 2020-07-06 DIAGNOSIS — I35.0 SEVERE AORTIC STENOSIS: Primary | ICD-10-CM

## 2020-07-07 ENCOUNTER — TELEPHONE (OUTPATIENT)
Dept: CARDIOLOGY | Facility: CLINIC | Age: 85
End: 2020-07-07

## 2020-07-07 NOTE — TELEPHONE ENCOUNTER
Called to schedule appointment with Dr. Pradhan for 7/16/20 but patient was sleeping per pt . He would like me to call her back around 3:30pm.

## 2020-07-13 ENCOUNTER — VIRTUAL VISIT (OUTPATIENT)
Dept: NEPHROLOGY | Facility: CLINIC | Age: 85
End: 2020-07-13
Payer: MEDICARE

## 2020-07-13 DIAGNOSIS — E83.52 HYPERCALCEMIA: Primary | ICD-10-CM

## 2020-07-13 DIAGNOSIS — I10 ESSENTIAL HYPERTENSION: ICD-10-CM

## 2020-07-13 DIAGNOSIS — I35.0 SEVERE AORTIC STENOSIS: ICD-10-CM

## 2020-07-13 DIAGNOSIS — R79.89 ABNORMAL CBC: ICD-10-CM

## 2020-07-13 DIAGNOSIS — N18.30 CKD (CHRONIC KIDNEY DISEASE) STAGE 3, GFR 30-59 ML/MIN (H): ICD-10-CM

## 2020-07-13 DIAGNOSIS — N18.30 CKD (CHRONIC KIDNEY DISEASE) STAGE 3, GFR 30-59 ML/MIN (H): Primary | ICD-10-CM

## 2020-07-13 DIAGNOSIS — R39.198 DECREASED URINE STREAM: ICD-10-CM

## 2020-07-13 PROBLEM — J45.40 MODERATE PERSISTENT ASTHMA: Status: ACTIVE | Noted: 2020-07-13

## 2020-07-13 PROBLEM — M85.80 OSTEOPENIA: Status: ACTIVE | Noted: 2020-07-13

## 2020-07-13 PROBLEM — F41.1 GENERALIZED ANXIETY DISORDER: Status: ACTIVE | Noted: 2020-07-13

## 2020-07-13 NOTE — PROGRESS NOTES
"Kamryn Miller is a 84 year old female who is being evaluated via a billable telephone visit.      The patient has been notified of following:     \"This telephone visit will be conducted via a call between you and your physician/provider. We have found that certain health care needs can be provided without the need for a physical exam.  This service lets us provide the care you need with a short phone conversation.  If a prescription is necessary we can send it directly to your pharmacy.  If lab work is needed we can place an order for that and you can then stop by our lab to have the test done at a later time.    Telephone visits are billed at different rates depending on your insurance coverage. During this emergency period, for some insurers they may be billed the same as an in-person visit.  Please reach out to your insurance provider with any questions.    If during the course of the call the physician/provider feels a telephone visit is not appropriate, you will not be charged for this service.\"    Patient has given verbal consent for Telephone visit?  Yes    What phone number would you like to be contacted at? [phone]  How would you like to obtain your AVS? Mail a copy    Phone call duration: 18 minutes    Ania Salty Johnson MD      Assessment and Plan:  84 year old female with history of CKD, hypertension who presents for followup of worsened renal function and hypercalcemia.  She was first seen October 2019.     # CKD:  Her scr has been 1.1-1.5 in the last 3-4 years, with baseline many years ago at 1 or less.  She has mild proteinuria. She denies systemic signs   - given hypercalcemia, will check immunofixation  # Hypertension: well controlled, currently on metoprolol and losartan, her hydrochlorothiazide was stopped.  She feels well overall.  Agree with holding on hydrochlorothiazide for now  - concern that her BP is more elevated, it was noted in 170s in December in the office in Cardiology- " she will buy a monitor and let us know how it is running   - if >145, would start low dose furosemide (given hypercalcemia, would avoid hydrochlorothiazide)    # Not anemic but not checked since 7/2017; check CBC    # Electrolytes:    stable  # Mineral Bone Disorder:   - Calcium; level is:  high in the summer, improved/ normalized now off hctz.  PTH ordered   check vitamin D again, she in cholecalciferol 01121 weekly which may be too much    Assessment and plan was discussed with patient and she voiced her understanding and agreement.    Reason for Visit:  Kamryn Miller is a 84 year old female with CKD from hypertension, who presents for followup of elevated creatinine and hypercalcemia    HPI:  She is a pleasant female with history of mild rhinitis, asthma, and hypertension who presents for evaluation of elevated creatinine and hypercalcemia.  She presents for evaluation of worsened creatinine. Her serum creatinine has been 1.2-1.5 in recent years, and was up to 2 in September 2019, with eGFR 21ml/min.  She had a UTI in the summer for which she was treated. She was also noted with elevated calcium of 10.7 (correct slightly higher), and her hydrochlorothiazide was stopped.  Her repeat calcium and creatinine did show improvement. She has been feeling fairly well. She denies any shortness of breath. She has swelling sometimes but usually improves with elevation.  She has been eating wel.  Since her visit October 2019, she denies any major issues or hospitalizations. They did go to [place] for a few months and came back. She is seeing cardiology for her aortic stenosis followup later this week.  She states she has noticed her urine stream being slower and noted this a few months ago. She denies issues with stooling.  She has not been checking her blood pressure at home, does not have a cuff  She had an elevated BP noted in December. She denies headache.  As far as her calcium the repeat in December was again normal.  I do note that she takes cholecalciferol 41550 units, which is quite high, so we discussed rechecking her vitamin D level.  She does try to watch her salt intake.    Renal History:   HTN    ROS:   A comprehensive review of systems was obtained and negative, except as noted in the HPI or PMH.    Active Medical Problems:  Patient Active Problem List   Diagnosis     CAD (coronary artery disease)     Hyperlipidemia with target LDL less than 100     Mild major depression (H)     Pulmonary hypertension (H)     Mild persistent asthma     Seasonal allergic rhinitis     Mitral insufficiency     Tricuspid insufficiency     Bipolar disorder (H)     Renal insufficiency     Tremors     Advanced directives, counseling/discussion     Family history of diabetes mellitus     Family history of celiac disease     Celiac disease     History of colonic polyps     Benign essential hypertension     Hypothyroidism, unspecified type     CKD (chronic kidney disease) stage 3, GFR 30-59 ml/min (H)     Severe aortic stenosis     Other ill-defined heart diseases     Anemia     Angina pectoris (H)     Disorder of kidney and ureter     Generalized anxiety disorder     Moderate persistent asthma     Osteopenia     PMH:   Medical record was reviewed and PMH was discussed with patient and noted below.  Past Medical History:   Diagnosis Date     Aortic valvar stenosis 7/2010    mild     Asthma      Bipolar disorder (H)      CAD (coronary artery disease)     s/p angioplasty     Celiac disease      Colon polyps      Colon polyps 2012    every 3 year colonoscopy      Depression      High cholesterol      HTN      Mitral insufficiency      Pulmonary HTN (H)     mild     Tremors 7/10    drug induced from antidepressants     Tricuspid insufficiency      PSH:   Past Surgical History:   Procedure Laterality Date     ANGIOGRAM  6/26/2009     ANGIOPLASTY  9/96    for angina     ANGIOPLASTY  8/03 - 9/03    X -2 - with stenst in coronary car.     APPENDECTOMY        BREAST BIOPSY, RT/LT      Breat Biopsy RT/LT, benign     BUNIONECTOMY  11/8/2011    Procedure:BUNIONECTOMY; Left donna bunionectomy; Surgeon:FREDY LITTLEJOHN; Location:[facility] OR     BUNIONECTOMY RT/LT  5/2007    right bunion and right 2nd hammertoe     C ANESTH,BLEPHAROPLASTY      (R) for drooping eyelid     C APPENDECTOMY       CATARACT IOL, RT/LT  6/12 and 7/12    bilateral     COLONOSCOPY  2007, 2012    every 3 years for polyps     JOINT REPLACEMENT, HIP RT/LT  4/2008    right hip replaced     JOINT REPLACEMENT, HIP RT/LT  4/2009    left hip replaced     REPAIR HAMMER TOE  11/8/2011    Procedure:REPAIR HAMMER TOE; left 2nd hammertoe repair; Surgeon:FREDY LITTLEJOHN; Location:[facility] OR     SURGICAL HISTORY OF -   11/11    left bunion and 2nd hammertoe repair     TUBAL LIGATION         Family Hx:   Family History   Problem Relation Age of Onset     Asthma Mother      Cerebrovascular Disease Mother      Arthritis Mother      Depression Mother      Lipids Sister      Hypertension Sister      Heart Disease Sister      Lipids Sister      Hypertension Sister      Lipids Sister      Breast Cancer Sister      Personal Hx:   Social History     Tobacco Use     Smoking status: Never Smoker     Smokeless tobacco: Never Used   Substance Use Topics     Alcohol use: No     Alcohol/week: 0.0 standard drinks     Types: 1 Standard drinks or equivalent per week       Allergies:  Allergies   Allergen Reactions     Ace Inhibitors Cough     Diclofenac Other (See Comments)     Balance problems       Medications:  Current Outpatient Medications   Medication Sig     amoxicillin (AMOXIL) 500 MG tablet TAKE 4 TABLETS BY MOUTH 1 HOUR PRIOR TO APPT     aspirin 81 MG tablet Take 1 tablet by mouth daily.     azelastine (ASTEPRO) 0.15 % nasal spray SPRAY 1 SPRAY INTO BOTH NOSTRILS DAILY     azelastine (ASTEPRO) 0.15 % nasal spray SPRAY 1 SPRAY INTO BOTH NOSTRILS DAILY     Calcium Citrate (CITRACAL OR) Take 1 tablet by mouth daily.      CHOLECALCIFEROL 23728 UNIT PO CAPS 1 tablet weekly     desvenlafaxine (PRISTIQ) 100 MG 24 hr tablet 100 mg     desvenlafaxine (PRISTIQ) 50 MG 24 hr tablet Take 100 mg by mouth daily      desvenlafaxine succinate (PRISTIQ) 25 MG 24 hr tablet Take 25 mg by mouth daily     fluticasone-salmeterol (ADVAIR DISKUS) 500-50 MCG/DOSE inhaler Inhale 1 puff into the lungs every 12 hours     lamoTRIgine (LAMICTAL) 25 MG tablet 25 mg     LAMOTRIGINE 200 MG PO TABS 1 TABLET TWICE DAILY     levothyroxine (SYNTHROID) 50 MCG tablet Take 1 tablet by mouth daily.     losartan (COZAAR) 100 MG tablet Take 1 tablet (100 mg) by mouth daily     metoprolol tartrate (LOPRESSOR) 50 MG tablet TAKE 1 TAB BY MOUTH IN THE MORNING AND 1/2 IN THE EVENING     Multiple Vitamin (MULTI-VITAMIN) per tablet Take 1 tablet by mouth daily.     Omega-3 Fatty Acids (FISH OIL PO) Take 1 tablet by mouth daily.     rosuvastatin (CRESTOR) 40 MG tablet Take 1 tablet (40 mg) by mouth daily     verapamil ER (CALAN-SR) 240 MG CR tablet Take 1 tablet (240 mg) by mouth daily     VIIBRYD 10 MG TABS tablet Take 10 mg by mouth daily     No current facility-administered medications for this visit.       Vitals:  There were no vitals taken for this visit.    Exam:  GENERAL APPEARANCE: alert and no distress  HENT: mouth without ulcers or lesions  LYMPHATICS: no cervical or supraclavicular nodes  RESP: lungs clear to auscultation - no rales, rhonchi or wheezes  CV: regular rhythm, normal rate, no rub, no murmur  EDEMA: no LE edema bilaterally  ABDOMEN: soft, nondistended, nontender, bowel sounds normal  MS: extremities normal - no gross deformities noted, no evidence of inflammation in joints, no muscle tenderness  SKIN: no rash    LABS:   CMP  Recent Labs   Lab Test 12/17/19  1113 10/28/19  1310 09/18/19  1014 09/03/19  1217    141 142 139   POTASSIUM 4.4 4.3 4.0 3.7   CHLORIDE 108 107 108 102   CO2 30 30 29 31   ANIONGAP 4 4 5 6   GLC 73 82 93 101*   BUN 20 31* 19 49*    CR 1.48* 1.29* 1.41* 2.08*   GFRESTIMATED 32* 38* 34* 21*   GFRESTBLACK 38* 44* 40* 25*   RPINCE 9.4 9.4 9.9 10.7*     Recent Labs   Lab Test 12/17/19  1113 06/25/19  1559 07/13/18  0857 07/14/17  1148   BILITOTAL 0.2 0.4 0.3 0.4   ALKPHOS 56 52 48 54   ALT 26 37 32 34   AST 19 22 23 21     CBC  Recent Labs   Lab Test 12/17/19  1113 10/28/19  1310 07/14/17  1148 12/22/16  1236   HGB 10.2* 11.2* 14.1 13.0   WBC 4.5 6.0 5.1 5.3   RBC 3.22* 3.45* 4.30 4.13   HCT 33.2* 35.7 42.2 41.2   * 104* 98 100   MCH 31.7 32.5 32.8 31.5   MCHC 30.7* 31.4* 33.4 31.6   RDW 11.9 12.9 12.1 12.0    222 187 197     URINE STUDIES  Recent Labs   Lab Test 10/28/19  1314 12/08/17  1515   COLOR Yellow Yellow   APPEARANCE Clear Clear   URINEGLC Negative Negative   URINEBILI Negative Negative   URINEKETONE Negative Negative   SG 1.015 <=1.005   UBLD Trace* Trace*   URINEPH 7.5* 6.0   PROTEIN Negative Negative   UROBILINOGEN 0.2 0.2   NITRITE Negative Negative   LEUKEST Negative Negative   RBCU O - 2 O - 2   WBCU 0 - 5 O - 2     No lab results found.  PTH  Recent Labs   Lab Test 10/28/19  1310   PTHI 62     IRON STUDIES  No lab results found.      Ania Johnson MD

## 2020-07-13 NOTE — LETTER
7/13/2020     RE: Kamryn Miller  [address]   [address]  [facility] [address]     Dear Colleague,    Thank you for referring your patient, Kamryn Miller, to the [facility] at [facility]. Please see a copy of my visit note below.    Kamryn Miller is a 84 year old female who is being evaluated via a billable telephone visit.      Phone call duration: 18 minutes  Ania Salty Johnson MD      Assessment and Plan:  84 year old female with history of CKD, hypertension who presents for followup of worsened renal function and hypercalcemia.  She was first seen October 2019.     # CKD:  Her scr has been 1.1-1.5 in the last 3-4 years, with baseline many years ago at 1 or less.  She has mild proteinuria. She denies systemic signs   - given hypercalcemia, will check immunofixation  # Hypertension: well controlled, currently on metoprolol and losartan, her hydrochlorothiazide was stopped.  She feels well overall.  Agree with holding on hydrochlorothiazide for now  - concern that her BP is more elevated, it was noted in 170s in December in the office in Cardiology- she will buy a monitor and let us know how it is running   - if >145, would start low dose furosemide (given hypercalcemia, would avoid hydrochlorothiazide)    # Not anemic but not checked since 7/2017; check CBC    # Electrolytes:    stable  # Mineral Bone Disorder:   - Calcium; level is:  high in the summer, improved/ normalized now off hctz.  PTH ordered   check vitamin D again, she in cholecalciferol 94658 weekly which may be too much    Assessment and plan was discussed with patient and she voiced her understanding and agreement.    Reason for Visit:  Kamryn Miller is a 84 year old female with CKD from hypertension, who presents for followup of elevated creatinine and hypercalcemia    HPI:  She is a pleasant female with history of mild rhinitis, asthma, and hypertension who presents for evaluation of  elevated creatinine and hypercalcemia.  She presents for evaluation of worsened creatinine. Her serum creatinine has been 1.2-1.5 in recent years, and was up to 2 in September 2019, with eGFR 21ml/min.  She had a UTI in the summer for which she was treated. She was also noted with elevated calcium of 10.7 (correct slightly higher), and her hydrochlorothiazide was stopped.  Her repeat calcium and creatinine did show improvement. She has been feeling fairly well. She denies any shortness of breath. She has swelling sometimes but usually improves with elevation.  She has been eating wel.  Since her visit October 2019, she denies any major issues or hospitalizations. They did go to [place] for a few months and came back. She is seeing cardiology for her aortic stenosis followup later this week.  She states she has noticed her urine stream being slower and noted this a few months ago. She denies issues with stooling.  She has not been checking her blood pressure at home, does not have a cuff  She had an elevated BP noted in December. She denies headache.  As far as her calcium the repeat in December was again normal. I do note that she takes cholecalciferol 84913 units, which is quite high, so we discussed rechecking her vitamin D level.  She does try to watch her salt intake.    Renal History:   HTN    ROS:   A comprehensive review of systems was obtained and negative, except as noted in the HPI or PMH.    Active Medical Problems:  Patient Active Problem List   Diagnosis     CAD (coronary artery disease)     Hyperlipidemia with target LDL less than 100     Mild major depression (H)     Pulmonary hypertension (H)     Mild persistent asthma     Seasonal allergic rhinitis     Mitral insufficiency     Tricuspid insufficiency     Bipolar disorder (H)     Renal insufficiency     Tremors     Advanced directives, counseling/discussion     Family history of diabetes mellitus     Family history of celiac disease     Celiac  disease     History of colonic polyps     Benign essential hypertension     Hypothyroidism, unspecified type     CKD (chronic kidney disease) stage 3, GFR 30-59 ml/min (H)     Severe aortic stenosis     Other ill-defined heart diseases     Anemia     Angina pectoris (H)     Disorder of kidney and ureter     Generalized anxiety disorder     Moderate persistent asthma     Osteopenia     PMH:   Medical record was reviewed and PMH was discussed with patient and noted below.  Past Medical History:   Diagnosis Date     Aortic valvar stenosis 7/2010    mild     Asthma      Bipolar disorder (H)      CAD (coronary artery disease)     s/p angioplasty     Celiac disease      Colon polyps      Colon polyps 2012    every 3 year colonoscopy      Depression      High cholesterol      HTN      Mitral insufficiency      Pulmonary HTN (H)     mild     Tremors 7/10    drug induced from antidepressants     Tricuspid insufficiency      PSH:   Past Surgical History:   Procedure Laterality Date     ANGIOGRAM  6/26/2009     ANGIOPLASTY  9/96    for angina     ANGIOPLASTY  8/03 - 9/03    X -2 - with stenst in coronary car.     APPENDECTOMY       BREAST BIOPSY, RT/LT      Breat Biopsy RT/LT, benign     BUNIONECTOMY  11/8/2011    Procedure:BUNIONECTOMY; Left donna bunionectomy; Surgeon:FREDY LITTLEJOHN; Location:[facility]     BUNIONECTOMY RT/LT  5/2007    right bunion and right 2nd hammertoe     C ANESTH,BLEPHAROPLASTY      (R) for drooping eyelid     C APPENDECTOMY       CATARACT IOL, RT/LT  6/12 and 7/12    bilateral     COLONOSCOPY  2007, 2012    every 3 years for polyps     JOINT REPLACEMENT, HIP RT/LT  4/2008    right hip replaced     JOINT REPLACEMENT, HIP RT/LT  4/2009    left hip replaced     REPAIR HAMMER TOE  11/8/2011    Procedure:REPAIR HAMMER TOE; left 2nd hammertoe repair; Surgeon:FREDY LITTLEJOHN; Location:[facility]     SURGICAL HISTORY OF -   11/11    left bunion and 2nd hammertoe repair     TUBAL LIGATION         Family Hx:   Family  History   Problem Relation Age of Onset     Asthma Mother      Cerebrovascular Disease Mother      Arthritis Mother      Depression Mother      Lipids Sister      Hypertension Sister      Heart Disease Sister      Lipids Sister      Hypertension Sister      Lipids Sister      Breast Cancer Sister      Personal Hx:   Social History     Tobacco Use     Smoking status: Never Smoker     Smokeless tobacco: Never Used   Substance Use Topics     Alcohol use: No     Alcohol/week: 0.0 standard drinks     Types: 1 Standard drinks or equivalent per week       Allergies:  Allergies   Allergen Reactions     Ace Inhibitors Cough     Diclofenac Other (See Comments)     Balance problems       Medications:  Current Outpatient Medications   Medication Sig     amoxicillin (AMOXIL) 500 MG tablet TAKE 4 TABLETS BY MOUTH 1 HOUR PRIOR TO APPT     aspirin 81 MG tablet Take 1 tablet by mouth daily.     azelastine (ASTEPRO) 0.15 % nasal spray SPRAY 1 SPRAY INTO BOTH NOSTRILS DAILY     azelastine (ASTEPRO) 0.15 % nasal spray SPRAY 1 SPRAY INTO BOTH NOSTRILS DAILY     Calcium Citrate (CITRACAL OR) Take 1 tablet by mouth daily.     CHOLECALCIFEROL 04676 UNIT PO CAPS 1 tablet weekly     desvenlafaxine (PRISTIQ) 100 MG 24 hr tablet 100 mg     desvenlafaxine (PRISTIQ) 50 MG 24 hr tablet Take 100 mg by mouth daily      desvenlafaxine succinate (PRISTIQ) 25 MG 24 hr tablet Take 25 mg by mouth daily     fluticasone-salmeterol (ADVAIR DISKUS) 500-50 MCG/DOSE inhaler Inhale 1 puff into the lungs every 12 hours     lamoTRIgine (LAMICTAL) 25 MG tablet 25 mg     LAMOTRIGINE 200 MG PO TABS 1 TABLET TWICE DAILY     levothyroxine (SYNTHROID) 50 MCG tablet Take 1 tablet by mouth daily.     losartan (COZAAR) 100 MG tablet Take 1 tablet (100 mg) by mouth daily     metoprolol tartrate (LOPRESSOR) 50 MG tablet TAKE 1 TAB BY MOUTH IN THE MORNING AND 1/2 IN THE EVENING     Multiple Vitamin (MULTI-VITAMIN) per tablet Take 1 tablet by mouth daily.     Omega-3 Fatty  Acids (FISH OIL PO) Take 1 tablet by mouth daily.     rosuvastatin (CRESTOR) 40 MG tablet Take 1 tablet (40 mg) by mouth daily     verapamil ER (CALAN-SR) 240 MG CR tablet Take 1 tablet (240 mg) by mouth daily     VIIBRYD 10 MG TABS tablet Take 10 mg by mouth daily     No current facility-administered medications for this visit.       Vitals:  There were no vitals taken for this visit.    Exam:  GENERAL APPEARANCE: alert and no distress  HENT: mouth without ulcers or lesions  LYMPHATICS: no cervical or supraclavicular nodes  RESP: lungs clear to auscultation - no rales, rhonchi or wheezes  CV: regular rhythm, normal rate, no rub, no murmur  EDEMA: no LE edema bilaterally  ABDOMEN: soft, nondistended, nontender, bowel sounds normal  MS: extremities normal - no gross deformities noted, no evidence of inflammation in joints, no muscle tenderness  SKIN: no rash    LABS:   CMP  Recent Labs   Lab Test 12/17/19  1113 10/28/19  1310 09/18/19  1014 09/03/19  1217    141 142 139   POTASSIUM 4.4 4.3 4.0 3.7   CHLORIDE 108 107 108 102   CO2 30 30 29 31   ANIONGAP 4 4 5 6   GLC 73 82 93 101*   BUN 20 31* 19 49*   CR 1.48* 1.29* 1.41* 2.08*   GFRESTIMATED 32* 38* 34* 21*   GFRESTBLACK 38* 44* 40* 25*   PRINCE 9.4 9.4 9.9 10.7*     Recent Labs   Lab Test 12/17/19  1113 06/25/19  1559 07/13/18  0857 07/14/17  1148   BILITOTAL 0.2 0.4 0.3 0.4   ALKPHOS 56 52 48 54   ALT 26 37 32 34   AST 19 22 23 21     CBC  Recent Labs   Lab Test 12/17/19  1113 10/28/19  1310 07/14/17  1148 12/22/16  1236   HGB 10.2* 11.2* 14.1 13.0   WBC 4.5 6.0 5.1 5.3   RBC 3.22* 3.45* 4.30 4.13   HCT 33.2* 35.7 42.2 41.2   * 104* 98 100   MCH 31.7 32.5 32.8 31.5   MCHC 30.7* 31.4* 33.4 31.6   RDW 11.9 12.9 12.1 12.0    222 187 197     URINE STUDIES  Recent Labs   Lab Test 10/28/19  1314 12/08/17  1515   COLOR Yellow Yellow   APPEARANCE Clear Clear   URINEGLC Negative Negative   URINEBILI Negative Negative   URINEKETONE Negative Negative   SG  1.015 <=1.005   UBLD Trace* Trace*   URINEPH 7.5* 6.0   PROTEIN Negative Negative   UROBILINOGEN 0.2 0.2   NITRITE Negative Negative   LEUKEST Negative Negative   RBCU O - 2 O - 2   WBCU 0 - 5 O - 2     No lab results found.  PTH  Recent Labs   Lab Test 10/28/19  1310   PTHI 62     IRON STUDIES  No lab results found.      Ania Johnson MD

## 2020-07-16 ENCOUNTER — OFFICE VISIT (OUTPATIENT)
Dept: CARDIOLOGY | Facility: CLINIC | Age: 85
End: 2020-07-16
Attending: INTERNAL MEDICINE
Payer: MEDICARE

## 2020-07-16 VITALS
WEIGHT: 154 LBS | DIASTOLIC BLOOD PRESSURE: 94 MMHG | HEART RATE: 57 BPM | OXYGEN SATURATION: 97 % | BODY MASS INDEX: 28.34 KG/M2 | HEIGHT: 62 IN | SYSTOLIC BLOOD PRESSURE: 178 MMHG

## 2020-07-16 DIAGNOSIS — I51.89 OTHER ILL-DEFINED HEART DISEASES: ICD-10-CM

## 2020-07-16 DIAGNOSIS — N18.30 CKD (CHRONIC KIDNEY DISEASE) STAGE 3, GFR 30-59 ML/MIN (H): ICD-10-CM

## 2020-07-16 DIAGNOSIS — E83.52 HYPERCALCEMIA: ICD-10-CM

## 2020-07-16 DIAGNOSIS — I35.0 SEVERE AORTIC STENOSIS: Primary | ICD-10-CM

## 2020-07-16 DIAGNOSIS — R39.198 DECREASED URINE STREAM: ICD-10-CM

## 2020-07-16 LAB
ALBUMIN UR-MCNC: NEGATIVE MG/DL
APPEARANCE UR: CLEAR
BACTERIA #/AREA URNS HPF: ABNORMAL /HPF
BILIRUB UR QL STRIP: NEGATIVE
COLOR UR AUTO: ABNORMAL
CREAT UR-MCNC: 20 MG/DL
GLUCOSE UR STRIP-MCNC: NEGATIVE MG/DL
HGB UR QL STRIP: NEGATIVE
KETONES UR STRIP-MCNC: NEGATIVE MG/DL
LEUKOCYTE ESTERASE UR QL STRIP: NEGATIVE
MICROALBUMIN UR-MCNC: 38 MG/L
MICROALBUMIN/CREAT UR: 187.56 MG/G CR (ref 0–25)
MUCOUS THREADS #/AREA URNS LPF: PRESENT /LPF
NITRATE UR QL: NEGATIVE
PH UR STRIP: 8 PH (ref 5–7)
RBC #/AREA URNS AUTO: 1 /HPF (ref 0–2)
SOURCE: ABNORMAL
SP GR UR STRIP: 1 (ref 1–1.03)
UROBILINOGEN UR STRIP-MCNC: 0 MG/DL (ref 0–2)
WBC #/AREA URNS AUTO: 1 /HPF (ref 0–5)

## 2020-07-16 PROCEDURE — 87086 URINE CULTURE/COLONY COUNT: CPT | Performed by: INTERNAL MEDICINE

## 2020-07-16 PROCEDURE — 82043 UR ALBUMIN QUANTITATIVE: CPT | Performed by: INTERNAL MEDICINE

## 2020-07-16 PROCEDURE — 99214 OFFICE O/P EST MOD 30 MIN: CPT | Mod: GC

## 2020-07-16 PROCEDURE — G0463 HOSPITAL OUTPT CLINIC VISIT: HCPCS | Mod: ZF

## 2020-07-16 PROCEDURE — 81001 URINALYSIS AUTO W/SCOPE: CPT | Performed by: INTERNAL MEDICINE

## 2020-07-16 ASSESSMENT — MIFFLIN-ST. JEOR: SCORE: 1106.79

## 2020-07-16 ASSESSMENT — PAIN SCALES - GENERAL: PAINLEVEL: NO PAIN (0)

## 2020-07-16 NOTE — LETTER
7/16/2020      RE: Kamryn Miller  [address]    [address]  [facility] [address]       Dear Colleague,    Thank you for the opportunity to participate in the care of your patient, Kamryn Miller, at the [facility] at [facility]. Please see a copy of my visit note below.      [facility] HEART CARE  CARDIOVASCULAR DIVISION    VALVE CLINIC FOLLOW-UP VISIT    PRIMARY CARDIOLOGIST: Dr. Madhu Zuniga      PERTINENT CLINICAL HISTORY:     Kamryn Miller is a very pleasant 84 year old female with normal-flow, low-gradient aortic valvular stenosis referred to our clinic for evaluation and consideration for potential transcatheter aortic valve replacement (TAVR). She was last seen by Sid Ferrara and Stormy in December 2019.    Her aortic stenosis is characterized with an area of 0.7 cm2, mean gradient 28 mmHg and peak velocity of 3.47 m/sec with LVEF 55-60% by echocardiogram on 11/22/19.  Additional notable medical history of CAD s/p 2 stents in 1990's, HTN, HL, asthma and CKD III.     Ms Miller describes exertional dyspnea while carrying out household tasks. Most days, she is able to continue activity and persevere through symptoms. On occasion, she does stop to rest. Ms Miller does find it difficult to tease out if her symptoms are related to asthma or a heart condition. PFTs demonstrated an FEV1/FVC of 0.73.  Over the last six months, she is uncertain if her symptoms are any worse; her  thinks that they are relatively stable.  She denies chest pain, LH/syncope, or easy fatigability / frequent napping.        PAST MEDICAL HISTORY:     Past Medical History:   Diagnosis Date     Aortic valvar stenosis 7/2010    mild     Asthma      Bipolar disorder (H)      CAD (coronary artery disease)     s/p angioplasty     Celiac disease      Colon polyps      Colon polyps 2012    every 3 year colonoscopy      Depression      High cholesterol      HTN      Mitral insufficiency       Pulmonary HTN (H)     mild     Tremors 7/10    drug induced from antidepressants     Tricuspid insufficiency         PAST SURGICAL HISTORY:     Past Surgical History:   Procedure Laterality Date     ANGIOGRAM  6/26/2009     ANGIOPLASTY  9/96    for angina     ANGIOPLASTY  8/03 - 9/03    X -2 - with stenst in coronary car.     APPENDECTOMY       BREAST BIOPSY, RT/LT      Breat Biopsy RT/LT, benign     BUNIONECTOMY  11/8/2011    Procedure:BUNIONECTOMY; Left donna bunionectomy; Surgeon:FREDY LITTLEJOHN; Location:[facility] OR     BUNIONECTOMY RT/LT  5/2007    right bunion and right 2nd hammertoe     C ANESTH,BLEPHAROPLASTY      (R) for drooping eyelid     C APPENDECTOMY       CATARACT IOL, RT/LT  6/12 and 7/12    bilateral     COLONOSCOPY  2007, 2012    every 3 years for polyps     JOINT REPLACEMENT, HIP RT/LT  4/2008    right hip replaced     JOINT REPLACEMENT, HIP RT/LT  4/2009    left hip replaced     REPAIR HAMMER TOE  11/8/2011    Procedure:REPAIR HAMMER TOE; left 2nd hammertoe repair; Surgeon:FREDY LITTLEJOHN; Location:[facility]     SURGICAL HISTORY OF -   11/11    left bunion and 2nd hammertoe repair     TUBAL LIGATION          CURRENT MEDICATIONS:     Current Outpatient Medications   Medication Sig Dispense Refill     amoxicillin (AMOXIL) 500 MG tablet TAKE 4 TABLETS BY MOUTH 1 HOUR PRIOR TO APPT  0     aspirin 81 MG tablet Take 1 tablet by mouth daily.       azelastine (ASTEPRO) 0.15 % nasal spray SPRAY 1 SPRAY INTO BOTH NOSTRILS DAILY 30 mL 3     azelastine (ASTEPRO) 0.15 % nasal spray SPRAY 1 SPRAY INTO BOTH NOSTRILS DAILY 1 Bottle 1     Blood Pressure Monitor KIT Automatic Blood Pressure Monitor 1 kit 0     Calcium Citrate (CITRACAL OR) Take 1 tablet by mouth daily.       CHOLECALCIFEROL 36812 UNIT PO CAPS 1 tablet weekly       desvenlafaxine (PRISTIQ) 100 MG 24 hr tablet 100 mg  1     desvenlafaxine (PRISTIQ) 50 MG 24 hr tablet Take 100 mg by mouth daily        desvenlafaxine succinate (PRISTIQ) 25 MG 24 hr  tablet Take 25 mg by mouth daily  0     fluticasone-salmeterol (ADVAIR DISKUS) 500-50 MCG/DOSE inhaler Inhale 1 puff into the lungs every 12 hours 3 Inhaler 0     lamoTRIgine (LAMICTAL) 25 MG tablet 25 mg       LAMOTRIGINE 200 MG PO TABS 1 TABLET TWICE DAILY       levothyroxine (SYNTHROID) 50 MCG tablet Take 1 tablet by mouth daily.       losartan (COZAAR) 100 MG tablet Take 1 tablet (100 mg) by mouth daily 90 tablet 3     metoprolol tartrate (LOPRESSOR) 50 MG tablet TAKE 1 TAB BY MOUTH IN THE MORNING AND 1/2 IN THE EVENING 45 tablet 0     Multiple Vitamin (MULTI-VITAMIN) per tablet Take 1 tablet by mouth daily.       Omega-3 Fatty Acids (FISH OIL PO) Take 1 tablet by mouth daily.       rosuvastatin (CRESTOR) 40 MG tablet Take 1 tablet (40 mg) by mouth daily 90 tablet 3     verapamil ER (CALAN-SR) 240 MG CR tablet Take 1 tablet (240 mg) by mouth daily 90 tablet 4     VIIBRYD 10 MG TABS tablet Take 10 mg by mouth daily  2        ALLERGIES:     Allergies   Allergen Reactions     Ace Inhibitors Cough     Diclofenac Other (See Comments)     Balance problems        FAMILY HISTORY:     Family History   Problem Relation Age of Onset     Asthma Mother      Cerebrovascular Disease Mother      Arthritis Mother      Depression Mother      Lipids Sister      Hypertension Sister      Heart Disease Sister      Lipids Sister      Hypertension Sister      Lipids Sister      Breast Cancer Sister         SOCIAL HISTORY:     Social History     Socioeconomic History     Marital status:      Spouse name: Adrien     Number of children: 2     Years of education: 16     Highest education level: None   Occupational History     Employer: RETIRED     Comment: Retired in 2002.    Social Needs     Financial resource strain: None     Food insecurity     Worry: None     Inability: None     Transportation needs     Medical: None     Non-medical: None   Tobacco Use     Smoking status: Never Smoker     Smokeless tobacco: Never Used   Substance  "and Sexual Activity     Alcohol use: No     Alcohol/week: 0.0 standard drinks     Types: 1 Standard drinks or equivalent per week     Drug use: No     Sexual activity: Not Currently     Partners: Male   Lifestyle     Physical activity     Days per week: None     Minutes per session: None     Stress: None   Relationships     Social connections     Talks on phone: None     Gets together: None     Attends [employer] service: None     Active member of club or organization: None     Attends meetings of clubs or organizations: None     Relationship status: None     Intimate partner violence     Fear of current or ex partner: None     Emotionally abused: None     Physically abused: None     Forced sexual activity: None   Other Topics Concern     Parent/sibling w/ CABG, MI or angioplasty before 65F 55M? Not Asked   Social History Narrative     None        REVIEW OF SYSTEMS:     Constitutional: No fevers or chills  Skin: No new rash or itching  Eyes: No acute change in vision  Ears/Nose/Throat: No purulent rhinorrhea, new hearing loss, or new vertigo  Respiratory: No cough or hemoptysis  Cardiovascular: See HPI  Gastrointestinal: No change in appetite, vomiting, hematemesis or diarrhea  Genitourinary: No dysuria or hematuria  Musculoskeletal: No new back pain, neck pain or muscle pain  Neurologic: No new headaches, focal weakness or behavior changes  Psychiatric: No hallucinations, excessive alcohol consumption or illegal drug usage  Hematologic/Lymphatic/Immunologic: No bleeding, chills, fever, night sweats or weight loss  Endocrine: No new cold intolerance, heat intolerance, polyphagia, polydipsia or polyuria      PHYSICAL EXAMINATION:     BP (!) 178/94 (BP Location: Right arm, Patient Position: Chair, Cuff Size: Adult Regular)   Pulse 57   Ht 1.575 m (5' 2\")   Wt 69.9 kg (154 lb)   SpO2 97%   BMI 28.17 kg/m      GENERAL: No acute distress.  HEENT: EOMI. Sclerae white, not injected. Pharynx without erythema or exudate. "   Neck: No adenopathy. No thyromegaly. Symmetrical.   Heart: Regular rate and rhythm. Normal S1 with diminished S2. Crescendo decrescendo late peaking systolic murmur with radiation to carotids. Delayed carotid pulses.    Lungs: Clear to auscultation. No ronchi, wheezes, rales.   Abdomen: Soft, nontender, nondistended. Bowel sounds present.  Extremities: No clubbing, cyanosis, or edema.   Neurologic: Alert and oriented to person/place/time, normal speech and affect. No focal deficits.  Skin: No petechiae, purpura or rash.     LABORATORY DATA:     LIPID RESULTS:  Lab Results   Component Value Date    CHOL 175 09/03/2019    HDL 62 09/03/2019    LDL 88 09/03/2019    TRIG 125 09/03/2019    CHOLHDLRATIO 2.8 09/18/2015       LIVER ENZYME RESULTS:  Lab Results   Component Value Date    AST 19 12/17/2019    ALT 26 12/17/2019       CBC RESULTS:  Lab Results   Component Value Date    WBC 4.5 12/17/2019    RBC 3.22 (L) 12/17/2019    HGB 10.2 (L) 12/17/2019    HCT 33.2 (L) 12/17/2019     (H) 12/17/2019    MCH 31.7 12/17/2019    MCHC 30.7 (L) 12/17/2019    RDW 11.9 12/17/2019     12/17/2019       BMP RESULTS:  Lab Results   Component Value Date     12/17/2019    POTASSIUM 4.4 12/17/2019    CHLORIDE 108 12/17/2019    CO2 30 12/17/2019    ANIONGAP 4 12/17/2019    GLC 73 12/17/2019    BUN 20 12/17/2019    CR 1.48 (H) 12/17/2019    GFRESTIMATED 32 (L) 12/17/2019    GFRESTBLACK 38 (L) 12/17/2019    PRINCE 9.4 12/17/2019        A1C RESULTS:  Lab Results   Component Value Date    A1C 5.0 06/30/2009       INR RESULTS:  Lab Results   Component Value Date    INR 2.28 (H) 04/23/2009    INR 2.11 (H) 04/22/2009          PROCEDURES & FURTHER ASSESSMENTS:     ECG: December 2019 - sinus bradycardia nonspecific ST-T abnormalities    Echocardiogram: 11/22/19  Left ventricular function, chamber size, wall motion, and wall thickness are  normal.The EF is 55-60%. No regional wall motion abnormalities are seen.  Right ventricular  function, chamber size, wall motion, and thickness are  normal.  Severe left atrial enlargement is present.  Moderate mitral annular calcification is present. Mild mitral insufficiency is  present. There is likley moderate aortic stenosis. The peak velocity is  3.47m/sec, the peak and mean gradients are 48mmHg and 28mmHg. The aortic valve  area is calculated low at 0.7cm2  Pulmonary artery systolic pressure is normal. The inferior vena cava was  normal in size with preserved respiratory variability. No pericardial effusion  is present.  Compared to prior study AS has worsened. Otherwise no significant change    PFTs: 12/20/19  Nonspecific pulmonary function testing with decreased FEV1 and FEV but normal ratio and TLC, could be seen in obesity or neuromuscular weakness   There is no significant bronchodilator response.   Normal diffusion capacity   Lung volumes are normal   No prior testing for comparison     Carotid Ultrasound:  1. Right side:    Degree of stenosis of the internal carotid artery: Less than 50%  due to presence of plaque.      2. Left side:     Degree of stenosis of the internal carotid artery: Less than 50%  due to presence of plaque.     STS PROM RISK SCORE: 3.04%  FRAILTY SCORE:3/5  NYHA CLASS: II     CLINICAL IMPRESSION:     Kamryn Miller is a 84 year old female with normal flow low-gradient severe aortic stenosis. Her echocardiographic findings are somewhat discordant and warrant further evaluation. We will proceed with repeat echocardiogram, coronary angiogram, valve study, and then a CT TAVR protocol. Depending on the results of these tests, we will be able to considerr her candidacy for AVR.      Plan Summary:  1) Aortic Stenosis:  - Repeat echocardiogram  - Coronary angiogram and valve study   - Pending the results of the above, proceed with CT TAVR if appropriate.   - Presentation of data to the Heart team to assess the optimal treatment of her aortic stenosis once evaluation is  complete    Patient was evaluated in clinic with Dr. Toscano of interventional cardiology.      Luiza Mcdonald MD  Structural Heart/Interventional Cardiology Fellow    CC  Patient Care Team:  Rolanda Wilcox MD as PCP - General (Family Practice)  Rolanda Wilcox MD as Assigned PCP  SELF, REFERRED      I have seen and examined the patient with the TAVR team. I agree with the assessment and plan of the note above.I have reviewed pertinent labs.     Gianluca Toscano MD  Interventional Cardiology  Pager: [phone]      Please do not hesitate to contact me if you have any questions/concerns.     Sincerely,     [facility]

## 2020-07-16 NOTE — PATIENT INSTRUCTIONS
You were seen today in the Cardiovascular Clinic at the [facility].      Cardiology Providers you saw during your visit:  Dr. Toscano    Recommendations:  Have your TAVR CT and angiogram    We are going to schedule you for a CT angiogram of the chest, abdomen, and pelvis (CT TAVR):  -No caffeine, alcohol or smoking 12 hours prior to test  -Nothing by mouth 3 hours prior, however it is OK to take your medications with a sip of water.  -If you are on oral diabetes medications:  Do not take on the day of the procedure.  If you take Glucophage or Metformin:  Do not take 48 hours post procedure.    -Report to the [facility] Waiting room in the hospital      [facility], [place] [address]      You will be scheduled for a Coronary [place] [address]. You are to check in at the [facility] Waiting Area.     Pre procedure instructions:  1. Please make arrangements to have a  as you will not be allowed to drive following the procedure.  2. Do not eat or drink after midnight the day of your procedure.  3. You may take your usual morning medications with a sip of water the morning of your procedure.  4. Take a 325 mg aspirin the day before and the day of your procedure.  5. Make arrangements to have someone stay with you the night after your procedure.  6. You will need to have a Covid test prior to your angiogram      Please call me if you have questions regarding these instructions or your scheduled procedure.      Follow-up:  After all testing is completed we will present your testing information to the valve team.      Thank you for your visit today!     Keiko Godinez RN  Structural Heart Care Coordinator   TAVR, MitraClip and Watchman Programs  [facility]    Polina Moustapha, Lead CMA Office: [phone]      [facility]  [address]  [facility]  Cardiology Clinic [address]  [place] [id number]                    You were seen today in the Cardiovascular Clinic at the [facility].      Cardiology Providers you saw during your visit:  Dr. Toscano    Recommendations:  You will need an angiogram, ECHO and TAVR CT    You will be scheduled for a Coronary Angiogram at the [facility], [address] [place] [address], [place] [address]. You are to check in at the [facility] Waiting Area.     Pre procedure instructions:  1. Please make arrangements to have a  as you will not be allowed to drive following the procedure.  2. Do not eat or drink after midnight the day of your procedure.  3. You may take your usual morning medications with a sip of water the morning of your procedure.  4. Take a 325 mg aspirin the day before and the day of your procedure.  5. Make arrangements to have someone stay with you the night after your procedure.  6. You will need to have a Covid test prior to your angiogram.      Please call me if you have questions regarding these instructions or your scheduled procedure.  We are going to schedule you for a CT angiogram of the chest, abdomen, and pelvis (CT TAVR):  -No caffeine, alcohol or smoking 12 hours prior to test  -Nothing by mouth 3 hours prior, however it is OK to take your medications with a sip of water.  -If you are on oral diabetes medications:  Do not take on the day of the procedure.  If you take Glucophage or Metformin:  Do not take 48 hours post procedure.    -Report to the [facility] Waiting room in the hospital      [facility], [place] [address]      Follow-up:  After all testing is complete we will present your testing information to the valve team for their recommendation      Thank you for your visit today!     Keiko Godinez RN  Structural Heart Care Coordinator   TAVR, MitArt and Santana  Programs  [facility]    Polina Moustapha, Lead CMA Office: [phone]      Clinics and Surgery Center  [address] [facility]  Cardiology Clinic [address]  [place] [address]

## 2020-07-16 NOTE — NURSING NOTE
TAVR testing that was performed:    KCCQ-12 questionnaire collected: Yes, gave patient a questionnaire, did not have one.   Test: Yes  5M walk: Yes  Picture taken: YES     Edilson Veras CMA    4:07 PM

## 2020-07-16 NOTE — PROGRESS NOTES
[facility] HEART CARE  CARDIOVASCULAR DIVISION    VALVE CLINIC FOLLOW-UP VISIT    PRIMARY CARDIOLOGIST: Dr. Madhu Zuniga      PERTINENT CLINICAL HISTORY:     Kamryn Miller is a very pleasant 84 year old female with normal-flow, low-gradient aortic valvular stenosis referred to our clinic for evaluation and consideration for potential transcatheter aortic valve replacement (TAVR). She was last seen by Sid Ferrara and Stormy in December 2019.    Her aortic stenosis is characterized with an area of 0.7 cm2, mean gradient 28 mmHg and peak velocity of 3.47 m/sec with LVEF 55-60% by echocardiogram on 11/22/19.  Additional notable medical history of CAD s/p 2 stents in 1990's, HTN, HL, asthma and CKD III.     Ms Miller describes exertional dyspnea while carrying out household tasks. Most days, she is able to continue activity and persevere through symptoms. On occasion, she does stop to rest. Ms Miller does find it difficult to tease out if her symptoms are related to asthma or a heart condition. PFTs demonstrated an FEV1/FVC of 0.73.  Over the last six months, she is uncertain if her symptoms are any worse; her  thinks that they are relatively stable.  She denies chest pain, LH/syncope, or easy fatigability / frequent napping.        PAST MEDICAL HISTORY:     Past Medical History:   Diagnosis Date     Aortic valvar stenosis 7/2010    mild     Asthma      Bipolar disorder (H)      CAD (coronary artery disease)     s/p angioplasty     Celiac disease      Colon polyps      Colon polyps 2012    every 3 year colonoscopy      Depression      High cholesterol      HTN      Mitral insufficiency      Pulmonary HTN (H)     mild     Tremors 7/10    drug induced from antidepressants     Tricuspid insufficiency         PAST SURGICAL HISTORY:     Past Surgical History:   Procedure Laterality Date     ANGIOGRAM  6/26/2009     ANGIOPLASTY  9/96    for angina     ANGIOPLASTY  8/03 - 9/03    X -2 - with stenst in coronary  car.     APPENDECTOMY       BREAST BIOPSY, RT/LT      Breat Biopsy RT/LT, benign     BUNIONECTOMY  11/8/2011    Procedure:BUNIONECTOMY; Left donna bunionectomy; Surgeon:FREDY LITTLEJOHN; Location:[facility] OR     BUNIONECTOMY RT/LT  5/2007    right bunion and right 2nd hammertoe     C ANESTH,BLEPHAROPLASTY      (R) for drooping eyelid     C APPENDECTOMY       CATARACT IOL, RT/LT  6/12 and 7/12    bilateral     COLONOSCOPY  2007, 2012    every 3 years for polyps     JOINT REPLACEMENT, HIP RT/LT  4/2008    right hip replaced     JOINT REPLACEMENT, HIP RT/LT  4/2009    left hip replaced     REPAIR HAMMER TOE  11/8/2011    Procedure:REPAIR HAMMER TOE; left 2nd hammertoe repair; Surgeon:FREDY LITTLEJOHN; Location:[facility] OR     SURGICAL HISTORY OF -   11/11    left bunion and 2nd hammertoe repair     TUBAL LIGATION          CURRENT MEDICATIONS:     Current Outpatient Medications   Medication Sig Dispense Refill     amoxicillin (AMOXIL) 500 MG tablet TAKE 4 TABLETS BY MOUTH 1 HOUR PRIOR TO APPT  0     aspirin 81 MG tablet Take 1 tablet by mouth daily.       azelastine (ASTEPRO) 0.15 % nasal spray SPRAY 1 SPRAY INTO BOTH NOSTRILS DAILY 30 mL 3     azelastine (ASTEPRO) 0.15 % nasal spray SPRAY 1 SPRAY INTO BOTH NOSTRILS DAILY 1 Bottle 1     Blood Pressure Monitor KIT Automatic Blood Pressure Monitor 1 kit 0     Calcium Citrate (CITRACAL OR) Take 1 tablet by mouth daily.       CHOLECALCIFEROL 21995 UNIT PO CAPS 1 tablet weekly       desvenlafaxine (PRISTIQ) 100 MG 24 hr tablet 100 mg  1     desvenlafaxine (PRISTIQ) 50 MG 24 hr tablet Take 100 mg by mouth daily        desvenlafaxine succinate (PRISTIQ) 25 MG 24 hr tablet Take 25 mg by mouth daily  0     fluticasone-salmeterol (ADVAIR DISKUS) 500-50 MCG/DOSE inhaler Inhale 1 puff into the lungs every 12 hours 3 Inhaler 0     lamoTRIgine (LAMICTAL) 25 MG tablet 25 mg       LAMOTRIGINE 200 MG PO TABS 1 TABLET TWICE DAILY       levothyroxine (SYNTHROID) 50 MCG tablet Take 1 tablet by mouth  daily.       losartan (COZAAR) 100 MG tablet Take 1 tablet (100 mg) by mouth daily 90 tablet 3     metoprolol tartrate (LOPRESSOR) 50 MG tablet TAKE 1 TAB BY MOUTH IN THE MORNING AND 1/2 IN THE EVENING 45 tablet 0     Multiple Vitamin (MULTI-VITAMIN) per tablet Take 1 tablet by mouth daily.       Omega-3 Fatty Acids (FISH OIL PO) Take 1 tablet by mouth daily.       rosuvastatin (CRESTOR) 40 MG tablet Take 1 tablet (40 mg) by mouth daily 90 tablet 3     verapamil ER (CALAN-SR) 240 MG CR tablet Take 1 tablet (240 mg) by mouth daily 90 tablet 4     VIIBRYD 10 MG TABS tablet Take 10 mg by mouth daily  2        ALLERGIES:     Allergies   Allergen Reactions     Ace Inhibitors Cough     Diclofenac Other (See Comments)     Balance problems        FAMILY HISTORY:     Family History   Problem Relation Age of Onset     Asthma Mother      Cerebrovascular Disease Mother      Arthritis Mother      Depression Mother      Lipids Sister      Hypertension Sister      Heart Disease Sister      Lipids Sister      Hypertension Sister      Lipids Sister      Breast Cancer Sister         SOCIAL HISTORY:     Social History     Socioeconomic History     Marital status:      Spouse name: Adrien     Number of children: 2     Years of education: 16     Highest education level: None   Occupational History     Employer: RETIRED     Comment: Retired in 2002.    Social Needs     Financial resource strain: None     Food insecurity     Worry: None     Inability: None     Transportation needs     Medical: None     Non-medical: None   Tobacco Use     Smoking status: Never Smoker     Smokeless tobacco: Never Used   Substance and Sexual Activity     Alcohol use: No     Alcohol/week: 0.0 standard drinks     Types: 1 Standard drinks or equivalent per week     Drug use: No     Sexual activity: Not Currently     Partners: Male   Lifestyle     Physical activity     Days per week: None     Minutes per session: None     Stress: None   Relationships      "Social connections     Talks on phone: None     Gets together: None     Attends Voodoo service: None     Active member of club or organization: None     Attends meetings of clubs or organizations: None     Relationship status: None     Intimate partner violence     Fear of current or ex partner: None     Emotionally abused: None     Physically abused: None     Forced sexual activity: None   Other Topics Concern     Parent/sibling w/ CABG, MI or angioplasty before 65F 55M? Not Asked   Social History Narrative     None        REVIEW OF SYSTEMS:     Constitutional: No fevers or chills  Skin: No new rash or itching  Eyes: No acute change in vision  Ears/Nose/Throat: No purulent rhinorrhea, new hearing loss, or new vertigo  Respiratory: No cough or hemoptysis  Cardiovascular: See HPI  Gastrointestinal: No change in appetite, vomiting, hematemesis or diarrhea  Genitourinary: No dysuria or hematuria  Musculoskeletal: No new back pain, neck pain or muscle pain  Neurologic: No new headaches, focal weakness or behavior changes  Psychiatric: No hallucinations, excessive alcohol consumption or illegal drug usage  Hematologic/Lymphatic/Immunologic: No bleeding, chills, fever, night sweats or weight loss  Endocrine: No new cold intolerance, heat intolerance, polyphagia, polydipsia or polyuria      PHYSICAL EXAMINATION:     BP (!) 178/94 (BP Location: Right arm, Patient Position: Chair, Cuff Size: Adult Regular)   Pulse 57   Ht 1.575 m (5' 2\")   Wt 69.9 kg (154 lb)   SpO2 97%   BMI 28.17 kg/m      GENERAL: No acute distress.  HEENT: EOMI. Sclerae white, not injected. Pharynx without erythema or exudate.   Neck: No adenopathy. No thyromegaly. Symmetrical.   Heart: Regular rate and rhythm. Normal S1 with diminished S2. Crescendo decrescendo late peaking systolic murmur with radiation to carotids. Delayed carotid pulses.    Lungs: Clear to auscultation. No ronchi, wheezes, rales.   Abdomen: Soft, nontender, nondistended. " Bowel sounds present.  Extremities: No clubbing, cyanosis, or edema.   Neurologic: Alert and oriented to person/place/time, normal speech and affect. No focal deficits.  Skin: No petechiae, purpura or rash.     LABORATORY DATA:     LIPID RESULTS:  Lab Results   Component Value Date    CHOL 175 09/03/2019    HDL 62 09/03/2019    LDL 88 09/03/2019    TRIG 125 09/03/2019    CHOLHDLRATIO 2.8 09/18/2015       LIVER ENZYME RESULTS:  Lab Results   Component Value Date    AST 19 12/17/2019    ALT 26 12/17/2019       CBC RESULTS:  Lab Results   Component Value Date    WBC 4.5 12/17/2019    RBC 3.22 (L) 12/17/2019    HGB 10.2 (L) 12/17/2019    HCT 33.2 (L) 12/17/2019     (H) 12/17/2019    MCH 31.7 12/17/2019    MCHC 30.7 (L) 12/17/2019    RDW 11.9 12/17/2019     12/17/2019       BMP RESULTS:  Lab Results   Component Value Date     12/17/2019    POTASSIUM 4.4 12/17/2019    CHLORIDE 108 12/17/2019    CO2 30 12/17/2019    ANIONGAP 4 12/17/2019    GLC 73 12/17/2019    BUN 20 12/17/2019    CR 1.48 (H) 12/17/2019    GFRESTIMATED 32 (L) 12/17/2019    GFRESTBLACK 38 (L) 12/17/2019    PRINCE 9.4 12/17/2019        A1C RESULTS:  Lab Results   Component Value Date    A1C 5.0 06/30/2009       INR RESULTS:  Lab Results   Component Value Date    INR 2.28 (H) 04/23/2009    INR 2.11 (H) 04/22/2009          PROCEDURES & FURTHER ASSESSMENTS:     ECG: December 2019 - sinus bradycardia nonspecific ST-T abnormalities    Echocardiogram: 11/22/19  Left ventricular function, chamber size, wall motion, and wall thickness are  normal.The EF is 55-60%. No regional wall motion abnormalities are seen.  Right ventricular function, chamber size, wall motion, and thickness are  normal.  Severe left atrial enlargement is present.  Moderate mitral annular calcification is present. Mild mitral insufficiency is  present. There is likley moderate aortic stenosis. The peak velocity is  3.47m/sec, the peak and mean gradients are 48mmHg and 28mmHg.  The aortic valve  area is calculated low at 0.7cm2  Pulmonary artery systolic pressure is normal. The inferior vena cava was  normal in size with preserved respiratory variability. No pericardial effusion  is present.  Compared to prior study AS has worsened. Otherwise no significant change    PFTs: 12/20/19  Nonspecific pulmonary function testing with decreased FEV1 and FEV but normal ratio and TLC, could be seen in obesity or neuromuscular weakness   There is no significant bronchodilator response.   Normal diffusion capacity   Lung volumes are normal   No prior testing for comparison     Carotid Ultrasound:  1. Right side:    Degree of stenosis of the internal carotid artery: Less than 50%  due to presence of plaque.      2. Left side:     Degree of stenosis of the internal carotid artery: Less than 50%  due to presence of plaque.     STS PROM RISK SCORE: 3.04%  FRAILTY SCORE:3/5  NYHA CLASS: II     CLINICAL IMPRESSION:     Kamryn Miller is a 84 year old female with normal flow low-gradient severe aortic stenosis. Her echocardiographic findings are somewhat discordant and warrant further evaluation. We will proceed with repeat echocardiogram, coronary angiogram, valve study, and then a CT TAVR protocol. Depending on the results of these tests, we will be able to considerr her candidacy for AVR.      Plan Summary:  1) Aortic Stenosis:  - Repeat echocardiogram  - Coronary angiogram and valve study   - Pending the results of the above, proceed with CT TAVR if appropriate.   - Presentation of data to the Heart team to assess the optimal treatment of her aortic stenosis once evaluation is complete    Patient was evaluated in clinic with Dr. Toscano of interventional cardiology.      Luzia Mcdonald MD  Structural Heart/Interventional Cardiology Fellow    CC  Patient Care Team:  Rolanda iWlcox MD as PCP - General (Family Practice)  Rolanda Wilcox MD as Assigned PCP  SELF, REFERRED      I have seen and examined the  patient with the TAVR team. I agree with the assessment and plan of the note above.I have reviewed pertinent labs.     Gianluca Toscano MD  Interventional Cardiology  Pager: [phone]

## 2020-07-17 LAB
BACTERIA SPEC CULT: NORMAL
Lab: NORMAL
SPECIMEN SOURCE: NORMAL

## 2020-07-22 DIAGNOSIS — E83.52 HYPERCALCEMIA: ICD-10-CM

## 2020-07-22 DIAGNOSIS — R39.198 DECREASED URINE STREAM: ICD-10-CM

## 2020-07-22 DIAGNOSIS — N18.30 CKD (CHRONIC KIDNEY DISEASE) STAGE 3, GFR 30-59 ML/MIN (H): ICD-10-CM

## 2020-07-22 DIAGNOSIS — I35.0 SEVERE AORTIC STENOSIS: ICD-10-CM

## 2020-07-22 LAB
ALBUMIN SERPL-MCNC: 3.5 G/DL (ref 3.4–5)
ALP SERPL-CCNC: 62 U/L (ref 40–150)
ALT SERPL W P-5'-P-CCNC: 31 U/L (ref 0–50)
ANION GAP SERPL CALCULATED.3IONS-SCNC: 6 MMOL/L (ref 3–14)
AST SERPL W P-5'-P-CCNC: 22 U/L (ref 0–45)
BILIRUB SERPL-MCNC: 0.6 MG/DL (ref 0.2–1.3)
BUN SERPL-MCNC: 22 MG/DL (ref 7–30)
CALCIUM SERPL-MCNC: 9.2 MG/DL (ref 8.5–10.1)
CHLORIDE SERPL-SCNC: 103 MMOL/L (ref 94–109)
CO2 SERPL-SCNC: 29 MMOL/L (ref 20–32)
CREAT SERPL-MCNC: 1.47 MG/DL (ref 0.52–1.04)
ERYTHROCYTE [DISTWIDTH] IN BLOOD BY AUTOMATED COUNT: 12.6 % (ref 10–15)
GFR SERPL CREATININE-BSD FRML MDRD: 32 ML/MIN/{1.73_M2}
GLUCOSE SERPL-MCNC: 87 MG/DL (ref 70–99)
HCT VFR BLD AUTO: 42.8 % (ref 35–47)
HGB BLD-MCNC: 13.7 G/DL (ref 11.7–15.7)
MCH RBC QN AUTO: 32.3 PG (ref 26.5–33)
MCHC RBC AUTO-ENTMCNC: 32 G/DL (ref 31.5–36.5)
MCV RBC AUTO: 101 FL (ref 78–100)
PLATELET # BLD AUTO: 190 10E9/L (ref 150–450)
POTASSIUM SERPL-SCNC: 4.4 MMOL/L (ref 3.4–5.3)
PROT SERPL-MCNC: 7.2 G/DL (ref 6.8–8.8)
RBC # BLD AUTO: 4.24 10E12/L (ref 3.8–5.2)
SODIUM SERPL-SCNC: 138 MMOL/L (ref 133–144)
WBC # BLD AUTO: 5.3 10E9/L (ref 4–11)

## 2020-07-22 PROCEDURE — 80053 COMPREHEN METABOLIC PANEL: CPT | Performed by: INTERNAL MEDICINE

## 2020-07-22 PROCEDURE — 36415 COLL VENOUS BLD VENIPUNCTURE: CPT | Performed by: INTERNAL MEDICINE

## 2020-07-22 PROCEDURE — 85027 COMPLETE CBC AUTOMATED: CPT | Performed by: INTERNAL MEDICINE

## 2020-07-25 DIAGNOSIS — I10 BENIGN ESSENTIAL HYPERTENSION: ICD-10-CM

## 2020-07-25 NOTE — LETTER
July 27, 2020          Kamryn Miller,  [address]   [address]  [facility] [facility] [place] [address]          Dear Kamryn Miller      Your provider has sent a  shara refill of losartan (COZAAR) 100 MG tablet . You are due for an appointment for further refills. We are currently only able to see select in person visits. We ask that you schedule a telephone visit, video visit or evisit (through [employer]) with your provider.  Please contact the clinic to schedule an appointment for further refills.     Sincerely,         Your [place] Care Team/HV

## 2020-07-26 DIAGNOSIS — E78.5 HYPERLIPIDEMIA WITH TARGET LDL LESS THAN 100: ICD-10-CM

## 2020-07-26 DIAGNOSIS — J45.30 MILD PERSISTENT ASTHMA WITHOUT COMPLICATION: ICD-10-CM

## 2020-07-26 NOTE — TELEPHONE ENCOUNTER
Routing refill request to provider for review/approval because:  Labs out of range:    Creatinine   Date Value Ref Range Status   07/22/2020 1.47 (H) 0.52 - 1.04 mg/dL Final     BP Readings from Last 3 Encounters:   07/16/20 (!) 178/94   07/16/20 (!) 160/92   12/17/19 (!) 172/102

## 2020-07-26 NOTE — LETTER
July 27, 2020          Kamryn Miller,  [address]   [address]  [facility] [address]          Dear Kamryn Miller      Your provider has sent a  shara refill of rosuvastatin (CRESTOR) 40 MG tablet and ADVAIR DISKUS 500-50 MCG/DOSE inhaler . You are due for an appointment for further refills. We are currently only able to see select in person visits. We ask that you schedule a telephone visit, video visit or evisit (through [email or web address]) with your provider.  Please contact the clinic to schedule an appointment for further refills.     Sincerely,       Your [place] Care Team/HV

## 2020-07-27 RX ORDER — LOSARTAN POTASSIUM 100 MG/1
TABLET ORAL
Qty: 30 TABLET | Refills: 0 | Status: SHIPPED | OUTPATIENT
Start: 2020-07-27 | End: 2020-08-12

## 2020-07-27 RX ORDER — ROSUVASTATIN CALCIUM 40 MG/1
TABLET, COATED ORAL
Qty: 90 TABLET | Refills: 0 | Status: SHIPPED | OUTPATIENT
Start: 2020-07-27 | End: 2020-09-09

## 2020-07-27 NOTE — TELEPHONE ENCOUNTER
Rolanda Wilcox MD  Fz Team Red 46 minutes ago (10:59 AM)      Schedule follow up vist   Do ACT   Routing comment

## 2020-07-27 NOTE — TELEPHONE ENCOUNTER
"Routing refill request to provider for review/approval because:  Labs not current:  ACT    Requested Prescriptions   Pending Prescriptions Disp Refills     ADVAIR DISKUS 500-50 MCG/DOSE inhaler [Pharmacy Med Name: ADVAIR 500-50 DISKUS]  0     Sig: INHALE 1 PUFF INTO THE LUNGS EVERY 12 HOURS       Inhaled Steroids Protocol Failed - 7/27/2020  8:12 AM        Failed - Asthma control assessment score within normal limits in last 6 months     Please review ACT score.           Failed - Recent (6 mo) or future (30 days) visit within the authorizing provider's specialty     Patient had office visit in the last 6 months or has a visit in the next 30 days with authorizing provider or within the authorizing provider's specialty.  See \"Patient Info\" tab in inbasket, or \"Choose Columns\" in Meds & Orders section of the refill encounter.            Passed - Patient is age 12 or older        Passed - Medication is active on med list       Long-Acting Beta Agonist Inhalers Protocol  Failed - 7/27/2020  8:12 AM        Failed - Asthma control assessment score within normal limits in last 6 months     Please review ACT score.           Failed - Order for Serevent, Striverdi, or Foradil and pt has steroid inhaler        Failed - Recent (6 mo) or future (30 days) visit within the authorizing provider's specialty     Patient had office visit in the last 6 months or has a visit in the next 30 days with authorizing provider or within the authorizing provider's specialty.  See \"Patient Info\" tab in inbasket, or \"Choose Columns\" in Meds & Orders section of the refill encounter.            Passed - Patient is age 12 or older        Passed - Medication is active on med list         Signed Prescriptions Disp Refills    rosuvastatin (CRESTOR) 40 MG tablet 90 tablet 0     Sig: TAKE 1 TABLET BY MOUTH EVERY DAY       Statins Protocol Passed - 7/27/2020  8:12 AM        Passed - LDL on file in past 12 months     Recent Labs   Lab Test 09/03/19  1217 " "  LDL 88             Passed - No abnormal creatine kinase in past 12 months     No lab results found.             Passed - Recent (12 mo) or future (30 days) visit within the authorizing provider's specialty     Patient has had an office visit with the authorizing provider or a provider within the authorizing providers department within the previous 12 mos or has a future within next 30 days. See \"Patient Info\" tab in inbasket, or \"Choose Columns\" in Meds & Orders section of the refill encounter.              Passed - Medication is active on med list        Passed - Patient is age 18 or older        Passed - No active pregnancy on record        Passed - No positive pregnancy test in past 12 months           Snow Alfaro RN  "

## 2020-07-28 ENCOUNTER — TELEPHONE (OUTPATIENT)
Dept: CARDIOLOGY | Facility: CLINIC | Age: 85
End: 2020-07-28

## 2020-07-28 DIAGNOSIS — I35.0 SEVERE AORTIC STENOSIS: Primary | ICD-10-CM

## 2020-07-28 DIAGNOSIS — Z11.59 ENCOUNTER FOR SCREENING FOR OTHER VIRAL DISEASES: Primary | ICD-10-CM

## 2020-07-28 DIAGNOSIS — I51.89 OTHER ILL-DEFINED HEART DISEASES: ICD-10-CM

## 2020-07-28 NOTE — PROGRESS NOTES
Date: 7/28/2020    Time of Call: 12:13 PM     Diagnosis:  Severe aortic stenosis     [ TORB ] Ordering provider: Tom Burrows MD  Order: CT TAVR, angiogram, Labs     Order received by: Keiko Godinez RN     Follow-up/additional notes:  You will be scheduled for a Coronary Angiogram at the [facility], [address] [place] [address]. You are to check in at the [facility] Waiting Area.     Pre procedure instructions:  1. Please make arrangements to have a  as you will not be allowed to drive following the procedure.  2. Do not eat or drink after midnight the day of your procedure.  3. You may take your usual morning medications with a sip of water the morning of your procedure.  4. Take a 325 mg aspirin the day before and the day of your procedure.  5. Make arrangements to have someone stay with you the night after your procedure.  6.  You will need COVID testing prior to your procedure.  You will be contacted by the COVID testing team.      Please call me if you have questions regarding these instructions or your scheduled procedure.

## 2020-07-30 ENCOUNTER — TELEPHONE (OUTPATIENT)
Dept: CARDIOLOGY | Facility: CLINIC | Age: 85
End: 2020-07-30

## 2020-07-30 NOTE — TELEPHONE ENCOUNTER
Spoke with Kamryn and gave her all instructions on her angiogram. where to go,date and time. I have also explained to her that she will be receiving a call from the Covid team 5-6 days prior to her procedure.I told her if she has not heard from the covid team by noon on 8/7/20. To please let us know so we can trouble shoot.   Our call disconnected right after I said that. I called patient back and left vm that if she had any other questions to please not hesitate to call me.   
5000cc LR

## 2020-07-31 DIAGNOSIS — I10 BENIGN ESSENTIAL HYPERTENSION: ICD-10-CM

## 2020-07-31 RX ORDER — LOSARTAN POTASSIUM 100 MG/1
100 TABLET ORAL DAILY
Qty: 30 TABLET | Refills: 0 | Status: CANCELLED | OUTPATIENT
Start: 2020-07-31

## 2020-07-31 NOTE — TELEPHONE ENCOUNTER
90 day supply requested for refill    Outpatient Medication Detail      Disp  Refills  Start  End  LAZARO    losartan (COZAAR) 100 MG tablet  30 tablet  0  7/27/2020   No    Sig: TAKE 1 TABLET BY MOUTH EVERY DAY    Sent to pharmacy as: Losartan Potassium 100 MG Oral Tablet (COZAAR)    Class: E-Prescribe    Notes to Pharmacy: Needs appointment    Order: [id number]    E-Prescribing Status: Receipt confirmed by pharmacy (7/27/2020 11:00 AM CDT)

## 2020-08-03 DIAGNOSIS — I10 BENIGN ESSENTIAL HYPERTENSION: ICD-10-CM

## 2020-08-03 NOTE — TELEPHONE ENCOUNTER
Left a message for Kamryn to call the clinic to schedule an appointment. Please help the patient schedule for (Virtual visit Video/ Phone) Medication follow up appointment. Remedios Pinto,

## 2020-08-04 RX ORDER — METOPROLOL TARTRATE 50 MG
TABLET ORAL
Qty: 45 TABLET | Refills: 0 | Status: SHIPPED | OUTPATIENT
Start: 2020-08-04 | End: 2020-08-26

## 2020-08-04 NOTE — TELEPHONE ENCOUNTER
"Routing refill request to provider for review/approval because:  Labs out of range:  BP    Requested Prescriptions   Pending Prescriptions Disp Refills    metoprolol tartrate (LOPRESSOR) 50 MG tablet 45 tablet 0     Sig: TAKE 1 TAB BY MOUTH IN THE MORNING AND 1/2 IN THE EVENING       Beta-Blockers Protocol Failed - 8/3/2020  3:07 PM        Failed - Blood pressure under 140/90 in past 12 months     BP Readings from Last 3 Encounters:   07/16/20 (!) 178/94   07/16/20 (!) 160/92   12/17/19 (!) 172/102                 Passed - Patient is age 6 or older        Passed - Recent (12 mo) or future (30 days) visit within the authorizing provider's specialty     Patient has had an office visit with the authorizing provider or a provider within the authorizing providers department within the previous 12 mos or has a future within next 30 days. See \"Patient Info\" tab in inbasket, or \"Choose Columns\" in Meds & Orders section of the refill encounter.              Passed - Medication is active on med list           Snow Alfaro RN  "

## 2020-08-06 ENCOUNTER — OFFICE VISIT (OUTPATIENT)
Dept: CARDIOLOGY | Facility: CLINIC | Age: 85
End: 2020-08-06
Attending: THORACIC SURGERY (CARDIOTHORACIC VASCULAR SURGERY)
Payer: MEDICARE

## 2020-08-06 DIAGNOSIS — I35.0 SEVERE AORTIC STENOSIS: Primary | ICD-10-CM

## 2020-08-06 NOTE — LETTER
8/6/2020      RE: Kamryn Miller  [address]   [address]  [facility] [address]       Dear Colleague,    Thank you for the opportunity to participate in the care of your patient, Kamryn Miller, at the [facility]. Please see a copy of my visit note below.    Kamryn Miller is a 84 year old female who is being evaluated via a billable telephone visit.          [facility] CARDIOTHORACIC SURGERY CONSULT  Patient Name: Kamryn Miller  Medical Record Number: [id number]  YOB: 1935  Room Number: Room/bed info not found  Referring Physician: Dr. Burrows    CC: Severe aortic stenosis - evaluate for TAVR    History of Present Illness: Kamryn Miller is a 84 year old female with known history of severe aortic stenosis.  She has been referred after evaluation by our TAVR team.  She is symptomatic with dyspnea on exertion, and fatigue.  She is not interested in open heart surgery.    Assessment and Plan:  Kamryn Miller is a 84 year old female with severe aortic stenosis  1) Agree with evaluation for TAVR - High risk for surgical intervention  2) Surgical bailout - yes   3) Please call with questions or concerns.     Thank you for the opportunity to participate in the care of this patient.    Arthur Markham MD  Cardiothoracic Surgery  [phone]    Past Medical History:  Past Medical History:   Diagnosis Date     Aortic valvar stenosis 7/2010    mild     Asthma      Bipolar disorder (H)      CAD (coronary artery disease)     s/p angioplasty     Celiac disease      Colon polyps      Colon polyps 2012    every 3 year colonoscopy      Depression      High cholesterol      HTN      Mitral insufficiency      Pulmonary HTN (H)     mild     Tremors 7/10    drug induced from antidepressants     Tricuspid insufficiency        Past Surgical History:  Past Surgical History:   Procedure Laterality Date     ANGIOGRAM  6/26/2009     ANGIOPLASTY  9/96    for  angina     ANGIOPLASTY  8/03 - 9/03    X -2 - with stenst in coronary car.     APPENDECTOMY       BREAST BIOPSY, RT/LT      Breat Biopsy RT/LT, benign     BUNIONECTOMY  11/8/2011    Procedure:BUNIONECTOMY; Left donna bunionectomy; Surgeon:FREDY LITTLEJOHN; Location:[facility] OR     BUNIONECTOMY RT/LT  5/2007    right bunion and right 2nd hammertoe     C ANESTH,BLEPHAROPLASTY      (R) for drooping eyelid     C APPENDECTOMY       CATARACT IOL, RT/LT  6/12 and 7/12    bilateral     COLONOSCOPY  2007, 2012    every 3 years for polyps     JOINT REPLACEMENT, HIP RT/LT  4/2008    right hip replaced     JOINT REPLACEMENT, HIP RT/LT  4/2009    left hip replaced     REPAIR HAMMER TOE  11/8/2011    Procedure:REPAIR HAMMER TOE; left 2nd hammertoe repair; Surgeon:FREDY LITTLEJOHN; Location:[facility] OR     SURGICAL HISTORY OF -   11/11    left bunion and 2nd hammertoe repair     TUBAL LIGATION          Family History:   Family History   Problem Relation Age of Onset     Asthma Mother      Cerebrovascular Disease Mother      Arthritis Mother      Depression Mother      Lipids Sister      Hypertension Sister      Heart Disease Sister      Lipids Sister      Hypertension Sister      Lipids Sister      Breast Cancer Sister        Social History:  Social History     Socioeconomic History     Marital status:      Spouse name: Adrien     Number of children: 2     Years of education: 16     Highest education level: Not on file   Occupational History     Employer: RETIRED     Comment: Retired in 2002.    Social Needs     Financial resource strain: Not on file     Food insecurity     Worry: Not on file     Inability: Not on file     Transportation needs     Medical: Not on file     Non-medical: Not on file   Tobacco Use     Smoking status: Never Smoker     Smokeless tobacco: Never Used   Substance and Sexual Activity     Alcohol use: No     Alcohol/week: 0.0 standard drinks     Types: 1 Standard drinks or equivalent per week     Drug use: No      Sexual activity: Not Currently     Partners: Male   Lifestyle     Physical activity     Days per week: Not on file     Minutes per session: Not on file     Stress: Not on file   Relationships     Social connections     Talks on phone: Not on file     Gets together: Not on file     Attends Cheondo[employer] service: Not on file     Active member of club or organization: Not on file     Attends meetings of clubs or organizations: Not on file     Relationship status: Not on file     Intimate partner violence     Fear of current or ex partner: Not on file     Emotionally abused: Not on file     Physically abused: Not on file     Forced sexual activity: Not on file   Other Topics Concern     Parent/sibling w/ CABG, MI or angioplasty before 65F 55M? Not Asked   Social History Narrative     Not on file       Allergies:   Allergies   Allergen Reactions     Ace Inhibitors Cough     Diclofenac Other (See Comments)     Balance problems       Medications:  Current Outpatient Medications   Medication     ADVAIR DISKUS 500-50 MCG/DOSE inhaler     amoxicillin (AMOXIL) 500 MG tablet     aspirin 81 MG tablet     azelastine (ASTEPRO) 0.15 % nasal spray     azelastine (ASTEPRO) 0.15 % nasal spray     Blood Pressure Monitor KIT     Calcium Citrate (CITRACAL OR)     CHOLECALCIFEROL 19638 UNIT PO CAPS     desvenlafaxine (PRISTIQ) 100 MG 24 hr tablet     desvenlafaxine (PRISTIQ) 50 MG 24 hr tablet     desvenlafaxine succinate (PRISTIQ) 25 MG 24 hr tablet     lamoTRIgine (LAMICTAL) 25 MG tablet     LAMOTRIGINE 200 MG PO TABS     levothyroxine (SYNTHROID) 50 MCG tablet     losartan (COZAAR) 100 MG tablet     metoprolol tartrate (LOPRESSOR) 50 MG tablet     Multiple Vitamin (MULTI-VITAMIN) per tablet     Omega-3 Fatty Acids (FISH OIL PO)     rosuvastatin (CRESTOR) 40 MG tablet     verapamil ER (CALAN-SR) 240 MG CR tablet     VIIBRYD 10 MG TABS tablet     No current facility-administered medications for this visit.        Review of Systems:   A 10  point ROS was performed and is negative other than HPI.    Physical Exam:  Unable to perform    Labs:  Lab Results   Component Value Date    WBC 5.3 07/22/2020     Lab Results   Component Value Date    RBC 4.24 07/22/2020     Lab Results   Component Value Date    HGB 13.7 07/22/2020     Lab Results   Component Value Date    HCT 42.8 07/22/2020     No components found for: MCT  Lab Results   Component Value Date     07/22/2020     Lab Results   Component Value Date    MCH 32.3 07/22/2020     Lab Results   Component Value Date    MCHC 32.0 07/22/2020     Lab Results   Component Value Date    RDW 12.6 07/22/2020     Lab Results   Component Value Date     07/22/2020     Last Comprehensive Metabolic Panel:  Sodium   Date Value Ref Range Status   07/22/2020 138 133 - 144 mmol/L Final     Potassium   Date Value Ref Range Status   07/22/2020 4.4 3.4 - 5.3 mmol/L Final     Chloride   Date Value Ref Range Status   07/22/2020 103 94 - 109 mmol/L Final     Carbon Dioxide   Date Value Ref Range Status   07/22/2020 29 20 - 32 mmol/L Final     Anion Gap   Date Value Ref Range Status   07/22/2020 6 3 - 14 mmol/L Final     Glucose   Date Value Ref Range Status   07/22/2020 87 70 - 99 mg/dL Final     Comment:     Non Fasting     Urea Nitrogen   Date Value Ref Range Status   07/22/2020 22 7 - 30 mg/dL Final     Creatinine   Date Value Ref Range Status   07/22/2020 1.47 (H) 0.52 - 1.04 mg/dL Final     GFR Estimate   Date Value Ref Range Status   07/22/2020 32 (L) >60 mL/min/[1.73_m2] Final     Comment:     Non  GFR Calc  Starting 12/18/2018, serum creatinine based estimated GFR (eGFR) will be   calculated using the Chronic Kidney Disease Epidemiology Collaboration   (CKD-EPI) equation.       Calcium   Date Value Ref Range Status   07/22/2020 9.2 8.5 - 10.1 mg/dL Final       Imaging:  Transthoracic echocardiogram (11/22/19):  Echocardiogram Complete   Order: [id number]   Status:  Edited Result - FINAL    Visible to patient:  No (inaccessible in MyChart) Next appt:  2020 at 10:00 AM in Family Practice (Rolanda Wilcox MD) Dx:  Benign essential hypertension; Mitral...   Details     Reading Physician  Reading Date  Result Priority    Radha Harris MD  [phone]  2019        Narrative & Impression      [id number]  [id number]  [id number]  [id number]^STEVEN^RADHA           [facility], Echocardiography Laboratory  [address] [place] [address]        Name: DESMOND WHEELER  MRN: [id number]  : 1935  Study Date: 2019 08:55 AM  Age: 84 yrs  Gender: Female  Patient Location: [facility]  Reason For Study: Mitral insufficiency, Hyperlipidemia with target LDL less  than 1  Ordering Physician: RADHA HARRIS  Referring Physician: RADHA HARRIS  Performed By: Migdalia Rivera DARNELL     BSA: 1.7 m2  Height: 60 in  Weight: 158 lb  BP: 126/72 mmHg  _____________________________________________________________________________  __        Procedure  Echocardiogram with two-dimensional, color and spectral Doppler performed.  _____________________________________________________________________________  __        Interpretation Summary     Left ventricular function, chamber size, wall motion, and wall thickness are  normal.The EF is 55-60%. No regional wall motion abnormalities are seen.  Right ventricular function, chamber size, wall motion, and thickness are  normal.  Severe left atrial enlargement is present.  Moderate mitral annular calcification is present. Mild mitral insufficiency is  present. There is likley moderate aortic stenosis. The peak velocity is  3.47m/sec, the peak and mean gradients are 48mmHg and 28mmHg. The aortic valve  area is calculated low at 0.7cm2  Pulmonary artery systolic pressure is normal. The inferior vena cava was  normal in size with preserved respiratory variability. No pericardial effusion  is present.     Compared to prior study AS has worsened. Otherwise no  significant change  _____________________________________________________________________________  __        Left Ventricle  Left ventricular function, chamber size, wall motion, and wall thickness are  normal.The EF is 55-60%. Grade II or moderate diastolic dysfunction. No  regional wall motion abnormalities are seen.     Right Ventricle  Right ventricular function, chamber size, wall motion, and thickness are  normal.     Atria  The right atria appears normal. Severe left atrial enlargement is present.     Mitral Valve  Moderate mitral annular calcification is present. Mild mitral insufficiency is  present.        Aortic Valve  There is likley moderate aortic stenosis. The peak velocity is 3.47m/sec, the  peak and mean gradients are 48mmHg and 28mmHg. The aortic valve area is  calculated low at 0.7cm2. The aortic valve is tricuspid.     Tricuspid Valve  The tricuspid valve is normal. Trace tricuspid insufficiency is present. The  right ventricular systolic pressure is approximated at 27.7 mmHg plus the  right atrial pressure. Pulmonary artery systolic pressure is normal.     Pulmonic Valve  The pulmonic valve is normal. Trace pulmonic insufficiency is present.     Vessels  The aorta root is normal. The inferior vena cava was normal in size with  preserved respiratory variability.     Pericardium  No pericardial effusion is present.        Compared to Previous Study  Compared to prior study AS has worsened. Otherwise no significant change.  _____________________________________________________________________________  __  MMode/2D Measurements & Calculations     IVSd: 1.1 cm  LVIDd: 4.4 cm  LVIDs: 3.3 cm  LVPWd: 0.93 cm  FS: 24.2 %  LV mass(C)d: 151.6 grams  LV mass(C)dI: 89.8 grams/m2  MV Diam: 3.3 cm  Ao root diam: 3.1 cm  asc Aorta Diam: 3.4 cm  LVOT diam: 2.1 cm  LVOT area: 3.5 cm2  LA Volume (BP): 90.0 ml  LA Volume Index (BP): 53.3 ml/m2     RWT: 0.43        Doppler Measurements & Calculations  MV E max angie:  128.9 cm/sec  MV A max skinny: 76.1 cm/sec  MV E/A: 1.7  MV max P.0 mmHg  MV mean PG: 3.0 mmHg  MV V2 VTI: 49.7 cm  MVA(VTI): 1.2 cm2  MV Flow area(1diam): 8.6 cm2  MV dec time: 0.19 sec  Ao V2 max: 345.4 cm/sec  Ao max P.0 mmHg  Ao V2 mean: 253.7 cm/sec  Ao mean P.4 mmHg  Ao V2 VTI: 87.5 cm  LUCY(I,D): 0.68 cm2  LUCY(V,D): 0.71 cm2  LV V1 max P.0 mmHg  LV V1 max: 71.3 cm/sec  LV V1 VTI: 17.3 cm  SV(MV 1 diam): 425.2 ml  SI(MV 1 diam): 251.8 ml/m2  SV(LVOT): 60.0 ml  SI(LVOT): 35.5 ml/m2  PA acc time: 0.15 sec  TR max skinny: 263.0 cm/sec  TR max P.7 mmHg  RF(MV,Ao)(1 diam): -0.53  AV Skinny Ratio (DI): 0.21  LUCY Index (cm2/m2): 0.41  E/E' av.0  Lateral E/e': 15.0     Medial E/e': 25.0           _____________________________________________________________________________  __           Report approved by: RADHA Shepherd 2019 09:59 AM                  Coronary angiogram - pending      Please do not hesitate to contact me if you have any questions/concerns.     Sincerely,     Arthur Markham MD

## 2020-08-06 NOTE — PROGRESS NOTES
"Kamryn Miller is a 84 year old female who is being evaluated via a billable telephone visit.      The patient has been notified of following:     \"This telephone visit will be conducted via a call between you and your physician/provider. We have found that certain health care needs can be provided without the need for a physical exam.  This service lets us provide the care you need with a short phone conversation.  If a prescription is necessary we can send it directly to your pharmacy.  If lab work is needed we can place an order for that and you can then stop by our lab to have the test done at a later time.    Telephone visits are billed at different rates depending on your insurance coverage. During this emergency period, for some insurers they may be billed the same as an in-person visit.  Please reach out to your insurance provider with any questions.    If during the course of the call the physician/provider feels a telephone visit is not appropriate, you will not be charged for this service.\"    Patient has given verbal consent for Telephone visit?  Yes    What phone number would you like to be contacted at? [phone]    How would you like to obtain your AVS? Intoohart    Phone call duration: 15 minutes    Arthur Markham MD          [facility] CARDIOTHORACIC SURGERY CONSULT  Patient Name: Kamryn Miller  Medical Record Number: [id number]  YOB: 1935  Room Number: Room/bed info not found  Referring Physician: Dr. Burrows    CC: Severe aortic stenosis - evaluate for TAVR    History of Present Illness: Kamryn Miller is a 84 year old female with known history of severe aortic stenosis.  She has been referred after evaluation by our TAVR team.  She is symptomatic with dyspnea on exertion, and fatigue.  She is not interested in open heart surgery.    Assessment and Plan:  Kamryn Miller is a 84 year old female with severe aortic stenosis  1) Agree with evaluation for TAVR - High risk for " surgical intervention  2) Surgical bailout - yes   3) Please call with questions or concerns.     Thank you for the opportunity to participate in the care of this patient.    Arthur Markham MD  Cardiothoracic Surgery  [phone]    Past Medical History:  Past Medical History:   Diagnosis Date     Aortic valvar stenosis 7/2010    mild     Asthma      Bipolar disorder (H)      CAD (coronary artery disease)     s/p angioplasty     Celiac disease      Colon polyps      Colon polyps 2012    every 3 year colonoscopy      Depression      High cholesterol      HTN      Mitral insufficiency      Pulmonary HTN (H)     mild     Tremors 7/10    drug induced from antidepressants     Tricuspid insufficiency        Past Surgical History:  Past Surgical History:   Procedure Laterality Date     ANGIOGRAM  6/26/2009     ANGIOPLASTY  9/96    for angina     ANGIOPLASTY  8/03 - 9/03    X -2 - with stenst in coronary car.     APPENDECTOMY       BREAST BIOPSY, RT/LT      Breat Biopsy RT/LT, benign     BUNIONECTOMY  11/8/2011    Procedure:BUNIONECTOMY; Left donna bunionectomy; Surgeon:FREDY LITTLEJOHN; Location:[facility]     BUNIONECTOMY RT/LT  5/2007    right bunion and right 2nd hammertoe     C ANESTH,BLEPHAROPLASTY      (R) for drooping eyelid     C APPENDECTOMY       CATARACT IOL, RT/LT  6/12 and 7/12    bilateral     COLONOSCOPY  2007, 2012    every 3 years for polyps     JOINT REPLACEMENT, HIP RT/LT  4/2008    right hip replaced     JOINT REPLACEMENT, HIP RT/LT  4/2009    left hip replaced     REPAIR HAMMER TOE  11/8/2011    Procedure:REPAIR HAMMER TOE; left 2nd hammertoe repair; Surgeon:FREDY LITTLEJOHN; Location:[facility] OR     SURGICAL HISTORY OF -   11/11    left bunion and 2nd hammertoe repair     TUBAL LIGATION          Family History:   Family History   Problem Relation Age of Onset     Asthma Mother      Cerebrovascular Disease Mother      Arthritis Mother      Depression Mother      Lipids Sister      Hypertension Sister       Heart Disease Sister      Lipids Sister      Hypertension Sister      Lipids Sister      Breast Cancer Sister        Social History:  Social History     Socioeconomic History     Marital status:      Spouse name: Adrien     Number of children: 2     Years of education: 16     Highest education level: Not on file   Occupational History     Employer: RETIRED     Comment: Retired in 2002.    Social Needs     Financial resource strain: Not on file     Food insecurity     Worry: Not on file     Inability: Not on file     Transportation needs     Medical: Not on file     Non-medical: Not on file   Tobacco Use     Smoking status: Never Smoker     Smokeless tobacco: Never Used   Substance and Sexual Activity     Alcohol use: No     Alcohol/week: 0.0 standard drinks     Types: 1 Standard drinks or equivalent per week     Drug use: No     Sexual activity: Not Currently     Partners: Male   Lifestyle     Physical activity     Days per week: Not on file     Minutes per session: Not on file     Stress: Not on file   Relationships     Social connections     Talks on phone: Not on file     Gets together: Not on file     Attends Yarsanism service: Not on file     Active member of club or organization: Not on file     Attends meetings of clubs or organizations: Not on file     Relationship status: Not on file     Intimate partner violence     Fear of current or ex partner: Not on file     Emotionally abused: Not on file     Physically abused: Not on file     Forced sexual activity: Not on file   Other Topics Concern     Parent/sibling w/ CABG, MI or angioplasty before 65F 55M? Not Asked   Social History Narrative     Not on file       Allergies:   Allergies   Allergen Reactions     Ace Inhibitors Cough     Diclofenac Other (See Comments)     Balance problems       Medications:  Current Outpatient Medications   Medication     ADVAIR DISKUS 500-50 MCG/DOSE inhaler     amoxicillin (AMOXIL) 500 MG tablet     aspirin 81 MG tablet      azelastine (ASTEPRO) 0.15 % nasal spray     azelastine (ASTEPRO) 0.15 % nasal spray     Blood Pressure Monitor KIT     Calcium Citrate (CITRACAL OR)     CHOLECALCIFEROL 62769 UNIT PO CAPS     desvenlafaxine (PRISTIQ) 100 MG 24 hr tablet     desvenlafaxine (PRISTIQ) 50 MG 24 hr tablet     desvenlafaxine succinate (PRISTIQ) 25 MG 24 hr tablet     lamoTRIgine (LAMICTAL) 25 MG tablet     LAMOTRIGINE 200 MG PO TABS     levothyroxine (SYNTHROID) 50 MCG tablet     losartan (COZAAR) 100 MG tablet     metoprolol tartrate (LOPRESSOR) 50 MG tablet     Multiple Vitamin (MULTI-VITAMIN) per tablet     Omega-3 Fatty Acids (FISH OIL PO)     rosuvastatin (CRESTOR) 40 MG tablet     verapamil ER (CALAN-SR) 240 MG CR tablet     VIIBRYD 10 MG TABS tablet     No current facility-administered medications for this visit.        Review of Systems:   A 10 point ROS was performed and is negative other than HPI.    Physical Exam:  Unable to perform    Labs:  Lab Results   Component Value Date    WBC 5.3 07/22/2020     Lab Results   Component Value Date    RBC 4.24 07/22/2020     Lab Results   Component Value Date    HGB 13.7 07/22/2020     Lab Results   Component Value Date    HCT 42.8 07/22/2020     No components found for: MCT  Lab Results   Component Value Date     07/22/2020     Lab Results   Component Value Date    MCH 32.3 07/22/2020     Lab Results   Component Value Date    MCHC 32.0 07/22/2020     Lab Results   Component Value Date    RDW 12.6 07/22/2020     Lab Results   Component Value Date     07/22/2020     Last Comprehensive Metabolic Panel:  Sodium   Date Value Ref Range Status   07/22/2020 138 133 - 144 mmol/L Final     Potassium   Date Value Ref Range Status   07/22/2020 4.4 3.4 - 5.3 mmol/L Final     Chloride   Date Value Ref Range Status   07/22/2020 103 94 - 109 mmol/L Final     Carbon Dioxide   Date Value Ref Range Status   07/22/2020 29 20 - 32 mmol/L Final     Anion Gap   Date Value Ref Range Status    2020 6 3 - 14 mmol/L Final     Glucose   Date Value Ref Range Status   2020 87 70 - 99 mg/dL Final     Comment:     Non Fasting     Urea Nitrogen   Date Value Ref Range Status   2020 22 7 - 30 mg/dL Final     Creatinine   Date Value Ref Range Status   2020 1.47 (H) 0.52 - 1.04 mg/dL Final     GFR Estimate   Date Value Ref Range Status   2020 32 (L) >60 mL/min/[1.73_m2] Final     Comment:     Non  GFR Calc  Starting 2018, serum creatinine based estimated GFR (eGFR) will be   calculated using the Chronic Kidney Disease Epidemiology Collaboration   (CKD-EPI) equation.       Calcium   Date Value Ref Range Status   2020 9.2 8.5 - 10.1 mg/dL Final       Imaging:  Transthoracic echocardiogram (19):  Echocardiogram Complete   Order: [id number]   Status:  Edited Result - FINAL   Visible to patient:  No (inaccessible in MyChart) Next appt:  2020 at 10:00 AM in Family Practice (Rolanda Wilcox MD) Dx:  Benign essential hypertension; Mitral...   Details     Reading Physician  Reading Date  Result Priority    Radha Harris MD  [phone]  2019        Narrative & Impression      [id number]  [id number]  [id number]  [id number]^STEVEN^RADHA           [facility], Echocardiography Laboratory  [address] [place] [address]        Name: DESMOND WHEELER  MRN: [id number]  : 1935  Study Date: 2019 08:55 AM  Age: 84 yrs  Gender: Female  Patient Location: [facility]  Reason For Study: Mitral insufficiency, Hyperlipidemia with target LDL less  than 1  Ordering Physician: RADHA HARRIS  Referring Physician: RADHA HARRIS  Performed By: Migdalia Rivera RDCS     BSA: 1.7 m2  Height: 60 in  Weight: 158 lb  BP: 126/72 mmHg  _____________________________________________________________________________  __        Procedure  Echocardiogram with two-dimensional, color and spectral Doppler  performed.  _____________________________________________________________________________  __        Interpretation Summary     Left ventricular function, chamber size, wall motion, and wall thickness are  normal.The EF is 55-60%. No regional wall motion abnormalities are seen.  Right ventricular function, chamber size, wall motion, and thickness are  normal.  Severe left atrial enlargement is present.  Moderate mitral annular calcification is present. Mild mitral insufficiency is  present. There is likley moderate aortic stenosis. The peak velocity is  3.47m/sec, the peak and mean gradients are 48mmHg and 28mmHg. The aortic valve  area is calculated low at 0.7cm2  Pulmonary artery systolic pressure is normal. The inferior vena cava was  normal in size with preserved respiratory variability. No pericardial effusion  is present.     Compared to prior study AS has worsened. Otherwise no significant change  _____________________________________________________________________________  __        Left Ventricle  Left ventricular function, chamber size, wall motion, and wall thickness are  normal.The EF is 55-60%. Grade II or moderate diastolic dysfunction. No  regional wall motion abnormalities are seen.     Right Ventricle  Right ventricular function, chamber size, wall motion, and thickness are  normal.     Atria  The right atria appears normal. Severe left atrial enlargement is present.     Mitral Valve  Moderate mitral annular calcification is present. Mild mitral insufficiency is  present.        Aortic Valve  There is likley moderate aortic stenosis. The peak velocity is 3.47m/sec, the  peak and mean gradients are 48mmHg and 28mmHg. The aortic valve area is  calculated low at 0.7cm2. The aortic valve is tricuspid.     Tricuspid Valve  The tricuspid valve is normal. Trace tricuspid insufficiency is present. The  right ventricular systolic pressure is approximated at 27.7 mmHg plus the  right atrial pressure.  Pulmonary artery systolic pressure is normal.     Pulmonic Valve  The pulmonic valve is normal. Trace pulmonic insufficiency is present.     Vessels  The aorta root is normal. The inferior vena cava was normal in size with  preserved respiratory variability.     Pericardium  No pericardial effusion is present.        Compared to Previous Study  Compared to prior study AS has worsened. Otherwise no significant change.  _____________________________________________________________________________  __  MMode/2D Measurements & Calculations     IVSd: 1.1 cm  LVIDd: 4.4 cm  LVIDs: 3.3 cm  LVPWd: 0.93 cm  FS: 24.2 %  LV mass(C)d: 151.6 grams  LV mass(C)dI: 89.8 grams/m2  MV Diam: 3.3 cm  Ao root diam: 3.1 cm  asc Aorta Diam: 3.4 cm  LVOT diam: 2.1 cm  LVOT area: 3.5 cm2  LA Volume (BP): 90.0 ml  LA Volume Index (BP): 53.3 ml/m2     RWT: 0.43        Doppler Measurements & Calculations  MV E max skinny: 128.9 cm/sec  MV A max skinny: 76.1 cm/sec  MV E/A: 1.7  MV max P.0 mmHg  MV mean PG: 3.0 mmHg  MV V2 VTI: 49.7 cm  MVA(VTI): 1.2 cm2  MV Flow area(1diam): 8.6 cm2  MV dec time: 0.19 sec  Ao V2 max: 345.4 cm/sec  Ao max P.0 mmHg  Ao V2 mean: 253.7 cm/sec  Ao mean P.4 mmHg  Ao V2 VTI: 87.5 cm  LUCY(I,D): 0.68 cm2  LUCY(V,D): 0.71 cm2  LV V1 max P.0 mmHg  LV V1 max: 71.3 cm/sec  LV V1 VTI: 17.3 cm  SV(MV 1 diam): 425.2 ml  SI(MV 1 diam): 251.8 ml/m2  SV(LVOT): 60.0 ml  SI(LVOT): 35.5 ml/m2  PA acc time: 0.15 sec  TR max skinny: 263.0 cm/sec  TR max P.7 mmHg  RF(MV,Ao)(1 diam): -0.53  AV Skinny Ratio (DI): 0.21  LUCY Index (cm2/m2): 0.41  E/E' av.0  Lateral E/e': 15.0     Medial E/e': 25.0           _____________________________________________________________________________  __           Report approved by: RDAHA Shepherd 2019 09:59 AM                  Coronary angiogram - pending

## 2020-08-10 DIAGNOSIS — Z11.59 ENCOUNTER FOR SCREENING FOR OTHER VIRAL DISEASES: Primary | ICD-10-CM

## 2020-08-11 NOTE — PROGRESS NOTES
"Kamryn Miller is a 84 year old female who is being evaluated via a billable telephone visit.      The patient has been notified of following:     \"This telephone visit will be conducted via a call between you and your physician/provider. We have found that certain health care needs can be provided without the need for a physical exam.  This service lets us provide the care you need with a short phone conversation.  If a prescription is necessary we can send it directly to your pharmacy.  If lab work is needed we can place an order for that and you can then stop by our lab to have the test done at a later time.    Telephone visits are billed at different rates depending on your insurance coverage. During this emergency period, for some insurers they may be billed the same as an in-person visit.  Please reach out to your insurance provider with any questions.    If during the course of the call the physician/provider feels a telephone visit is not appropriate, you will not be charged for this service.\"    Patient has given verbal consent for Telephone visit?  Yes    What phone number would you like to be contacted at? [phone]    How would you like to obtain your AVS? Mail a copy  10:04 AM -start visit  Subjective     Kamryn Miller is a 84 year old female who presents via phone visit today for the following health issues:    HPI    Hyperlipidemia Follow-Up      Are you regularly taking any medication or supplement to lower your cholesterol?   Yes- rousavastatin    Are you having muscle aches or other side effects that you think could be caused by your cholesterol lowering medication?  No    Hypertension Follow-up      Do you check your blood pressure regularly outside of the clinic? No     Are you following a low salt diet? Yes    Are your blood pressures ever more than 140 on the top number (systolic) OR more   than 90 on the bottom number (diastolic), for example 140/90? No      How many servings of fruits " and vegetables do you eat daily?  4 or more    On average, how many sweetened beverages do you drink each day (Examples: soda, juice, sweet tea, etc.  Do NOT count diet or artificially sweetened beverages)?   0    How many days per week do you exercise enough to make your heart beat faster? 5    How many minutes a day do you exercise enough to make your heart beat faster? 30 - 60    How many days per week do you miss taking your medication? 0   Vascular Disease Follow-up      How often do you take nitroglycerin? Never    Do you take an aspirin every day? Yes    Asthma Follow-Up    Was ACT completed today?  No      Do you have a cough?  No    Are you experiencing any wheezing in your chest?  No    Do you have any shortness of breath?  No     How often are you using a short-acting (rescue) inhaler or nebulizer, such as Albuterol?  A few times a month    How many days per week do you miss taking your asthma controller medication?  See medicines list    Please describe any recent triggers for your asthma: walking    Have you had any Emergency Room Visits, Urgent Care Visits, or Hospital Admissions since your last office visit?  No    Chronic Kidney Disease Follow-up      Do you take any over the counter pain medicine?: No    Pt has CHF  Weight is stable   NO PND    Patient Active Problem List   Diagnosis     CAD (coronary artery disease)     Hyperlipidemia with target LDL less than 100     Mild major depression (H)     Pulmonary hypertension (H)     Mild persistent asthma     Seasonal allergic rhinitis     Mitral insufficiency     Tricuspid insufficiency     Bipolar disorder (H)     Renal insufficiency     Tremors     Advanced directives, counseling/discussion     Family history of diabetes mellitus     Family history of celiac disease     Celiac disease     History of colonic polyps     Benign essential hypertension     Hypothyroidism, unspecified type     CKD (chronic kidney disease) stage 3, GFR 30-59 ml/min (H)      Severe aortic stenosis     Other ill-defined heart diseases     Anemia     Angina pectoris (H)     Disorder of kidney and ureter     Generalized anxiety disorder     Osteopenia     Past Surgical History:   Procedure Laterality Date     ANGIOGRAM  6/26/2009     ANGIOPLASTY  9/96    for angina     ANGIOPLASTY  8/03 - 9/03    X -2 - with stenst in coronary car.     APPENDECTOMY       BREAST BIOPSY, RT/LT      Breat Biopsy RT/LT, benign     BUNIONECTOMY  11/8/2011    Procedure:BUNIONECTOMY; Left donna bunionectomy; Surgeon:FREDY LITTLEJOHN; Location:[facility]     BUNIONECTOMY RT/LT  5/2007    right bunion and right 2nd hammertoe     C ANESTH,BLEPHAROPLASTY      (R) for drooping eyelid     C APPENDECTOMY       CATARACT IOL, RT/LT  6/12 and 7/12    bilateral     COLONOSCOPY  2007, 2012    every 3 years for polyps     JOINT REPLACEMENT, HIP RT/LT  4/2008    right hip replaced     JOINT REPLACEMENT, HIP RT/LT  4/2009    left hip replaced     REPAIR HAMMER TOE  11/8/2011    Procedure:REPAIR HAMMER TOE; left 2nd hammertoe repair; Surgeon:FREDY LITTLEJOHN; Location:[facility]     SURGICAL HISTORY OF -   11/11    left bunion and 2nd hammertoe repair     TUBAL LIGATION         Social History     Tobacco Use     Smoking status: Never Smoker     Smokeless tobacco: Never Used   Substance Use Topics     Alcohol use: No     Alcohol/week: 0.0 standard drinks     Types: 1 Standard drinks or equivalent per week     Family History   Problem Relation Age of Onset     Asthma Mother      Cerebrovascular Disease Mother      Arthritis Mother      Depression Mother      Lipids Sister      Hypertension Sister      Heart Disease Sister      Lipids Sister      Hypertension Sister      Lipids Sister      Breast Cancer Sister          Current Outpatient Medications   Medication Sig Dispense Refill     ADVAIR DISKUS 500-50 MCG/DOSE inhaler INHALE 1 PUFF INTO THE LUNGS EVERY 12 HOURS 1 Inhaler 0     amoxicillin (AMOXIL) 500 MG tablet TAKE 4 TABLETS BY MOUTH  1 HOUR PRIOR TO APPT  0     aspirin 81 MG tablet Take 1 tablet by mouth daily.       azelastine (ASTEPRO) 0.15 % nasal spray SPRAY 1 SPRAY INTO BOTH NOSTRILS DAILY 30 mL 3     azelastine (ASTEPRO) 0.15 % nasal spray SPRAY 1 SPRAY INTO BOTH NOSTRILS DAILY 1 Bottle 1     Blood Pressure Monitor KIT Automatic Blood Pressure Monitor 1 kit 0     Calcium Citrate (CITRACAL OR) Take 1 tablet by mouth daily.       CHOLECALCIFEROL 19445 UNIT PO CAPS 1 tablet weekly       lamoTRIgine (LAMICTAL) 25 MG tablet 25 mg       LAMOTRIGINE 200 MG PO TABS 1 TABLET TWICE DAILY       levothyroxine (SYNTHROID) 50 MCG tablet Take 1 tablet by mouth daily.       losartan (COZAAR) 100 MG tablet Take 1 tablet (100 mg) by mouth daily 90 tablet 0     metoprolol tartrate (LOPRESSOR) 50 MG tablet TAKE 1 TAB BY MOUTH IN THE MORNING AND 1/2 IN THE EVENING 45 tablet 0     Multiple Vitamin (MULTI-VITAMIN) per tablet Take 1 tablet by mouth daily.       Omega-3 Fatty Acids (FISH OIL PO) Take 1 tablet by mouth daily.       rosuvastatin (CRESTOR) 40 MG tablet TAKE 1 TABLET BY MOUTH EVERY DAY 90 tablet 0     verapamil ER (CALAN-SR) 240 MG CR tablet Take 1 tablet (240 mg) by mouth daily 90 tablet 4     VIIBRYD 10 MG TABS tablet Take 10 mg by mouth daily  2     desvenlafaxine (PRISTIQ) 100 MG 24 hr tablet 100 mg  1     desvenlafaxine (PRISTIQ) 50 MG 24 hr tablet Take 100 mg by mouth daily        desvenlafaxine succinate (PRISTIQ) 25 MG 24 hr tablet Take 25 mg by mouth daily  0     Allergies   Allergen Reactions     Ace Inhibitors Cough     Diclofenac Other (See Comments)     Balance problems     BP Readings from Last 3 Encounters:   07/16/20 (!) 178/94   07/16/20 (!) 160/92   12/17/19 (!) 172/102    Wt Readings from Last 3 Encounters:   07/16/20 69.9 kg (154 lb)   07/16/20 69.9 kg (154 lb)   12/17/19 71.7 kg (158 lb)                    Reviewed and updated as needed this visit by Provider  Tobacco  Allergies  Meds  Problems  Med Hx  Surg Hx  Fam Hx          Review of Systems   CONSTITUTIONAL: NEGATIVE for fever, chills, change in weight  INTEGUMENTARY/SKIN: NEGATIVE for worrisome rashes, moles or lesions  ENT/MOUTH: NEGATIVE for ear, mouth and throat problems  RESP: NEGATIVE for significant cough or SOB  CV: NEGATIVE for chest pain, palpitations or peripheral edema  GI: NEGATIVE for nausea, abdominal pain, heartburn, or change in bowel habits  PSYCHIATRIC: NEGATIVE for changes in mood or affect  ROS otherwise negative       Objective   Reported vitals:  There were no vitals taken for this visit.   alert and no distress  PSYCH: Alert and oriented times 3; coherent speech, normal   rate and volume, able to articulate logical thoughts, able   to abstract reason, no tangential thoughts, no hallucinations   or delusions  Her affect is normal  RESP: No cough, no audible wheezing, able to talk in full sentences  Remainder of exam unable to be completed due to telephone visits    Diagnostic Test Results:  Labs reviewed in Epic        Assessment/Plan:    1. Bipolar disorder in partial remission, most recent episode unspecified type (H)  Stable     2. Pulmonary hypertension (H)  Sees Cardiology    3. Mild persistent asthma without complication  Stable     4. Mild major depression (H)  Stable     5. CKD (chronic kidney disease) stage 3, GFR 30-59 ml/min (H)  Labs reviewed     6. Coronary artery disease involving native heart without angina pectoris, unspecified vessel or lesion type  Stable     7. Severe aortic stenosis  Is getting scheduled for TAVR  Hyperlipidemia-due for labs    10:20 AM -end visit  Return in about 2 weeks (around 8/26/2020) for Physical Exam.      Phone call duration:  As above/ minutes    Rolanda Wilcox MD

## 2020-08-12 ENCOUNTER — VIRTUAL VISIT (OUTPATIENT)
Dept: FAMILY MEDICINE | Facility: CLINIC | Age: 85
End: 2020-08-12
Payer: MEDICARE

## 2020-08-12 DIAGNOSIS — I27.20 PULMONARY HYPERTENSION (H): ICD-10-CM

## 2020-08-12 DIAGNOSIS — F32.0 MILD MAJOR DEPRESSION (H): ICD-10-CM

## 2020-08-12 DIAGNOSIS — I35.0 SEVERE AORTIC STENOSIS: ICD-10-CM

## 2020-08-12 DIAGNOSIS — E78.5 HYPERLIPIDEMIA LDL GOAL <70: Primary | ICD-10-CM

## 2020-08-12 DIAGNOSIS — J45.30 MILD PERSISTENT ASTHMA WITHOUT COMPLICATION: ICD-10-CM

## 2020-08-12 DIAGNOSIS — I10 BENIGN ESSENTIAL HYPERTENSION: ICD-10-CM

## 2020-08-12 DIAGNOSIS — N18.30 CKD (CHRONIC KIDNEY DISEASE) STAGE 3, GFR 30-59 ML/MIN (H): ICD-10-CM

## 2020-08-12 DIAGNOSIS — I25.10 CORONARY ARTERY DISEASE INVOLVING NATIVE HEART WITHOUT ANGINA PECTORIS, UNSPECIFIED VESSEL OR LESION TYPE: ICD-10-CM

## 2020-08-12 DIAGNOSIS — F31.70 BIPOLAR DISORDER IN PARTIAL REMISSION, MOST RECENT EPISODE UNSPECIFIED TYPE (H): ICD-10-CM

## 2020-08-12 PROBLEM — J45.40 MODERATE PERSISTENT ASTHMA: Status: RESOLVED | Noted: 2020-07-13 | Resolved: 2020-08-12

## 2020-08-12 PROCEDURE — 99442 ZZC PHYSICIAN TELEPHONE EVALUATION 11-20 MIN: CPT | Performed by: FAMILY MEDICINE

## 2020-08-12 RX ORDER — LOSARTAN POTASSIUM 100 MG/1
100 TABLET ORAL DAILY
Qty: 90 TABLET | Refills: 0 | Status: SHIPPED | OUTPATIENT
Start: 2020-08-12 | End: 2020-09-09

## 2020-08-12 ASSESSMENT — PATIENT HEALTH QUESTIONNAIRE - PHQ9: SUM OF ALL RESPONSES TO PHQ QUESTIONS 1-9: 0

## 2020-08-12 NOTE — PROGRESS NOTES
Called patient and left VM to call clinic to set up physical.  Verona QUIJANO CMA ([facility])

## 2020-08-13 ASSESSMENT — ASTHMA QUESTIONNAIRES: ACT_TOTALSCORE: 21

## 2020-08-17 DIAGNOSIS — Z11.59 ENCOUNTER FOR SCREENING FOR OTHER VIRAL DISEASES: ICD-10-CM

## 2020-08-17 PROCEDURE — U0003 INFECTIOUS AGENT DETECTION BY NUCLEIC ACID (DNA OR RNA); SEVERE ACUTE RESPIRATORY SYNDROME CORONAVIRUS 2 (SARS-COV-2) (CORONAVIRUS DISEASE [COVID-19]), AMPLIFIED PROBE TECHNIQUE, MAKING USE OF HIGH THROUGHPUT TECHNOLOGIES AS DESCRIBED BY CMS-2020-01-R: HCPCS | Performed by: INTERNAL MEDICINE

## 2020-08-18 LAB
SARS-COV-2 RNA SPEC QL NAA+PROBE: NOT DETECTED
SPECIMEN SOURCE: NORMAL

## 2020-08-20 ENCOUNTER — TELEPHONE (OUTPATIENT)
Dept: CARDIOLOGY | Facility: CLINIC | Age: 85
End: 2020-08-20

## 2020-08-20 DIAGNOSIS — I35.0 SEVERE AORTIC STENOSIS: Primary | ICD-10-CM

## 2020-08-20 RX ORDER — SODIUM CHLORIDE 9 MG/ML
INJECTION, SOLUTION INTRAVENOUS CONTINUOUS
Status: CANCELLED | OUTPATIENT
Start: 2020-08-20

## 2020-08-20 RX ORDER — POTASSIUM CHLORIDE 1500 MG/1
40 TABLET, EXTENDED RELEASE ORAL
Status: CANCELLED | OUTPATIENT
Start: 2020-08-20

## 2020-08-20 RX ORDER — POTASSIUM CHLORIDE 1500 MG/1
20 TABLET, EXTENDED RELEASE ORAL
Status: CANCELLED | OUTPATIENT
Start: 2020-08-20

## 2020-08-20 RX ORDER — LIDOCAINE 40 MG/G
CREAM TOPICAL
Status: CANCELLED | OUTPATIENT
Start: 2020-08-20

## 2020-08-21 ENCOUNTER — APPOINTMENT (OUTPATIENT)
Dept: MEDSURG UNIT | Facility: CLINIC | Age: 85
End: 2020-08-21
Attending: INTERNAL MEDICINE
Payer: MEDICARE

## 2020-08-21 ENCOUNTER — HOSPITAL ENCOUNTER (OUTPATIENT)
Facility: CLINIC | Age: 85
Discharge: HOME | End: 2020-08-21
Attending: INTERNAL MEDICINE | Admitting: INTERNAL MEDICINE
Payer: MEDICARE

## 2020-08-21 ENCOUNTER — APPOINTMENT (OUTPATIENT)
Dept: LAB | Facility: CLINIC | Age: 85
End: 2020-08-21
Attending: INTERNAL MEDICINE
Payer: MEDICARE

## 2020-08-21 VITALS
DIASTOLIC BLOOD PRESSURE: 89 MMHG | HEART RATE: 52 BPM | TEMPERATURE: 98.2 F | BODY MASS INDEX: 29.44 KG/M2 | HEIGHT: 62 IN | WEIGHT: 160 LBS | RESPIRATION RATE: 16 BRPM | OXYGEN SATURATION: 98 % | SYSTOLIC BLOOD PRESSURE: 167 MMHG

## 2020-08-21 DIAGNOSIS — I51.89 OTHER ILL-DEFINED HEART DISEASES: ICD-10-CM

## 2020-08-21 DIAGNOSIS — I35.0 SEVERE AORTIC STENOSIS: ICD-10-CM

## 2020-08-21 DIAGNOSIS — Z98.61 POSTSURGICAL PERCUTANEOUS TRANSLUMINAL CORONARY ANGIOPLASTY STATUS: ICD-10-CM

## 2020-08-21 DIAGNOSIS — I25.83 CORONARY ARTERY DISEASE DUE TO LIPID RICH PLAQUE: ICD-10-CM

## 2020-08-21 DIAGNOSIS — I25.10 CORONARY ARTERY DISEASE DUE TO LIPID RICH PLAQUE: ICD-10-CM

## 2020-08-21 LAB
ANION GAP SERPL CALCULATED.3IONS-SCNC: 2 MMOL/L (ref 3–14)
BUN SERPL-MCNC: 24 MG/DL (ref 7–30)
CALCIUM SERPL-MCNC: 9.7 MG/DL (ref 8.5–10.1)
CHLORIDE SERPL-SCNC: 107 MMOL/L (ref 94–109)
CO2 SERPL-SCNC: 31 MMOL/L (ref 20–32)
CREAT SERPL-MCNC: 1.29 MG/DL (ref 0.52–1.04)
ERYTHROCYTE [DISTWIDTH] IN BLOOD BY AUTOMATED COUNT: 12.2 % (ref 10–15)
GFR SERPL CREATININE-BSD FRML MDRD: 38 ML/MIN/{1.73_M2}
GLUCOSE SERPL-MCNC: 94 MG/DL (ref 70–99)
HCT VFR BLD AUTO: 45 % (ref 35–47)
HGB BLD-MCNC: 14.1 G/DL (ref 11.7–15.7)
KCT BLD-ACNC: 171 SEC (ref 75–150)
KCT BLD-ACNC: 200 SEC (ref 75–150)
KCT BLD-ACNC: 234 SEC (ref 75–150)
MCH RBC QN AUTO: 32.1 PG (ref 26.5–33)
MCHC RBC AUTO-ENTMCNC: 31.3 G/DL (ref 31.5–36.5)
MCV RBC AUTO: 103 FL (ref 78–100)
PLATELET # BLD AUTO: 226 10E9/L (ref 150–450)
POTASSIUM SERPL-SCNC: 3.8 MMOL/L (ref 3.4–5.3)
RBC # BLD AUTO: 4.39 10E12/L (ref 3.8–5.2)
SODIUM SERPL-SCNC: 140 MMOL/L (ref 133–144)
WBC # BLD AUTO: 5.3 10E9/L (ref 4–11)

## 2020-08-21 PROCEDURE — 25000128 H RX IP 250 OP 636: Performed by: NURSE PRACTITIONER

## 2020-08-21 PROCEDURE — 25000128 H RX IP 250 OP 636: Performed by: INTERNAL MEDICINE

## 2020-08-21 PROCEDURE — C1887 CATHETER, GUIDING: HCPCS | Performed by: INTERNAL MEDICINE

## 2020-08-21 PROCEDURE — 99152 MOD SED SAME PHYS/QHP 5/>YRS: CPT | Performed by: INTERNAL MEDICINE

## 2020-08-21 PROCEDURE — 99153 MOD SED SAME PHYS/QHP EA: CPT | Performed by: INTERNAL MEDICINE

## 2020-08-21 PROCEDURE — 93010 ELECTROCARDIOGRAM REPORT: CPT | Mod: 76 | Performed by: INTERNAL MEDICINE

## 2020-08-21 PROCEDURE — 40000172 ZZH STATISTIC PROCEDURE PREP ONLY

## 2020-08-21 PROCEDURE — 40000065 ZZH STATISTIC EKG NON-CHARGEABLE

## 2020-08-21 PROCEDURE — C9600 PERC DRUG-EL COR STENT SING: HCPCS | Mod: LD | Performed by: INTERNAL MEDICINE

## 2020-08-21 PROCEDURE — 25000132 ZZH RX MED GY IP 250 OP 250 PS 637: Mod: GY | Performed by: INTERNAL MEDICINE

## 2020-08-21 PROCEDURE — 36415 COLL VENOUS BLD VENIPUNCTURE: CPT | Performed by: INTERNAL MEDICINE

## 2020-08-21 PROCEDURE — C1769 GUIDE WIRE: HCPCS | Performed by: INTERNAL MEDICINE

## 2020-08-21 PROCEDURE — 80048 BASIC METABOLIC PNL TOTAL CA: CPT | Performed by: INTERNAL MEDICINE

## 2020-08-21 PROCEDURE — C1874 STENT, COATED/COV W/DEL SYS: HCPCS | Performed by: INTERNAL MEDICINE

## 2020-08-21 PROCEDURE — 85027 COMPLETE CBC AUTOMATED: CPT | Performed by: INTERNAL MEDICINE

## 2020-08-21 PROCEDURE — 85347 COAGULATION TIME ACTIVATED: CPT

## 2020-08-21 PROCEDURE — C1894 INTRO/SHEATH, NON-LASER: HCPCS | Performed by: INTERNAL MEDICINE

## 2020-08-21 PROCEDURE — 92928 PRQ TCAT PLMT NTRAC ST 1 LES: CPT | Mod: LD | Performed by: INTERNAL MEDICINE

## 2020-08-21 PROCEDURE — 25000125 ZZHC RX 250: Performed by: INTERNAL MEDICINE

## 2020-08-21 PROCEDURE — C1760 CLOSURE DEV, VASC: HCPCS | Performed by: INTERNAL MEDICINE

## 2020-08-21 PROCEDURE — 99152 MOD SED SAME PHYS/QHP 5/>YRS: CPT | Mod: 59 | Performed by: INTERNAL MEDICINE

## 2020-08-21 PROCEDURE — 27210794 ZZH OR GENERAL SUPPLY STERILE: Performed by: INTERNAL MEDICINE

## 2020-08-21 PROCEDURE — 93456 R HRT CORONARY ARTERY ANGIO: CPT | Performed by: INTERNAL MEDICINE

## 2020-08-21 PROCEDURE — 93456 R HRT CORONARY ARTERY ANGIO: CPT | Mod: 26 | Performed by: INTERNAL MEDICINE

## 2020-08-21 DEVICE — STENT SYNERGY DRUG ELUTING 3.50X12MM  H7493926012350: Type: IMPLANTABLE DEVICE | Status: FUNCTIONAL

## 2020-08-21 DEVICE — STENT SYNERGY DRUG ELUTING 3.00X08MM  H7493926008300: Type: IMPLANTABLE DEVICE | Status: FUNCTIONAL

## 2020-08-21 DEVICE — STENT SYNERGY DRUG ELUTING 2.50X28MM  H7493926028250: Type: IMPLANTABLE DEVICE | Status: FUNCTIONAL

## 2020-08-21 RX ORDER — HYDRALAZINE HYDROCHLORIDE 20 MG/ML
10 INJECTION INTRAMUSCULAR; INTRAVENOUS EVERY 4 HOURS PRN
Status: DISCONTINUED | OUTPATIENT
Start: 2020-08-21 | End: 2020-08-21 | Stop reason: HOSPADM

## 2020-08-21 RX ORDER — HEPARIN SODIUM 1000 [USP'U]/ML
INJECTION, SOLUTION INTRAVENOUS; SUBCUTANEOUS
Status: DISCONTINUED | OUTPATIENT
Start: 2020-08-21 | End: 2020-08-21 | Stop reason: HOSPADM

## 2020-08-21 RX ORDER — ATROPINE SULFATE 0.1 MG/ML
0.5 INJECTION INTRAVENOUS
Status: DISCONTINUED | OUTPATIENT
Start: 2020-08-21 | End: 2020-08-21 | Stop reason: HOSPADM

## 2020-08-21 RX ORDER — FLUMAZENIL 0.1 MG/ML
0.2 INJECTION, SOLUTION INTRAVENOUS
Status: DISCONTINUED | OUTPATIENT
Start: 2020-08-21 | End: 2020-08-21 | Stop reason: HOSPADM

## 2020-08-21 RX ORDER — FENTANYL CITRATE 50 UG/ML
INJECTION, SOLUTION INTRAMUSCULAR; INTRAVENOUS
Status: DISCONTINUED | OUTPATIENT
Start: 2020-08-21 | End: 2020-08-21 | Stop reason: HOSPADM

## 2020-08-21 RX ORDER — NALOXONE HYDROCHLORIDE 0.4 MG/ML
.2-.4 INJECTION, SOLUTION INTRAMUSCULAR; INTRAVENOUS; SUBCUTANEOUS
Status: DISCONTINUED | OUTPATIENT
Start: 2020-08-21 | End: 2020-08-21

## 2020-08-21 RX ORDER — ACETAMINOPHEN 325 MG/1
650 TABLET ORAL EVERY 4 HOURS PRN
Status: DISCONTINUED | OUTPATIENT
Start: 2020-08-21 | End: 2020-08-21 | Stop reason: HOSPADM

## 2020-08-21 RX ORDER — IOPAMIDOL 755 MG/ML
INJECTION, SOLUTION INTRAVASCULAR
Status: DISCONTINUED | OUTPATIENT
Start: 2020-08-21 | End: 2020-08-21 | Stop reason: HOSPADM

## 2020-08-21 RX ORDER — SODIUM CHLORIDE 9 MG/ML
INJECTION, SOLUTION INTRAVENOUS CONTINUOUS
Status: ACTIVE | OUTPATIENT
Start: 2020-08-21 | End: 2020-08-21

## 2020-08-21 RX ORDER — ONDANSETRON 2 MG/ML
4 INJECTION INTRAMUSCULAR; INTRAVENOUS EVERY 6 HOURS PRN
Status: DISCONTINUED | OUTPATIENT
Start: 2020-08-21 | End: 2020-08-21 | Stop reason: HOSPADM

## 2020-08-21 RX ORDER — FLUMAZENIL 0.1 MG/ML
0.2 INJECTION, SOLUTION INTRAVENOUS
Status: DISCONTINUED | OUTPATIENT
Start: 2020-08-21 | End: 2020-08-21

## 2020-08-21 RX ORDER — POTASSIUM CHLORIDE 750 MG/1
40 TABLET, EXTENDED RELEASE ORAL
Status: DISCONTINUED | OUTPATIENT
Start: 2020-08-21 | End: 2020-08-21 | Stop reason: HOSPADM

## 2020-08-21 RX ORDER — NALOXONE HYDROCHLORIDE 0.4 MG/ML
.1-.4 INJECTION, SOLUTION INTRAMUSCULAR; INTRAVENOUS; SUBCUTANEOUS
Status: DISCONTINUED | OUTPATIENT
Start: 2020-08-21 | End: 2020-08-21 | Stop reason: HOSPADM

## 2020-08-21 RX ORDER — SODIUM CHLORIDE 9 MG/ML
INJECTION, SOLUTION INTRAVENOUS CONTINUOUS
Status: DISCONTINUED | OUTPATIENT
Start: 2020-08-21 | End: 2020-08-21 | Stop reason: HOSPADM

## 2020-08-21 RX ORDER — FENTANYL CITRATE 50 UG/ML
25-50 INJECTION, SOLUTION INTRAMUSCULAR; INTRAVENOUS
Status: ACTIVE | OUTPATIENT
Start: 2020-08-21 | End: 2020-08-21

## 2020-08-21 RX ORDER — ATROPINE SULFATE 0.1 MG/ML
0.5 INJECTION INTRAVENOUS
Status: DISCONTINUED | OUTPATIENT
Start: 2020-08-21 | End: 2020-08-21

## 2020-08-21 RX ORDER — POTASSIUM CHLORIDE 750 MG/1
20 TABLET, EXTENDED RELEASE ORAL
Status: COMPLETED | OUTPATIENT
Start: 2020-08-21 | End: 2020-08-21

## 2020-08-21 RX ORDER — LIDOCAINE 40 MG/G
CREAM TOPICAL
Status: DISCONTINUED | OUTPATIENT
Start: 2020-08-21 | End: 2020-08-21 | Stop reason: HOSPADM

## 2020-08-21 RX ORDER — ONDANSETRON 4 MG/1
4 TABLET, ORALLY DISINTEGRATING ORAL EVERY 6 HOURS PRN
Status: DISCONTINUED | OUTPATIENT
Start: 2020-08-21 | End: 2020-08-21 | Stop reason: HOSPADM

## 2020-08-21 RX ADMIN — POTASSIUM CHLORIDE 20 MEQ: 750 TABLET, EXTENDED RELEASE ORAL at 10:03

## 2020-08-21 RX ADMIN — HYDRALAZINE HYDROCHLORIDE 10 MG: 20 INJECTION INTRAMUSCULAR; INTRAVENOUS at 15:53

## 2020-08-21 RX ADMIN — ASPIRIN 325 MG: 325 TABLET, DELAYED RELEASE ORAL at 10:03

## 2020-08-21 RX ADMIN — FENTANYL CITRATE 25 MCG: 50 INJECTION, SOLUTION INTRAMUSCULAR; INTRAVENOUS at 16:45

## 2020-08-21 ASSESSMENT — MIFFLIN-ST. JEOR: SCORE: 1134.01

## 2020-08-21 NOTE — Clinical Note
Single balloon inflation. The first balloon was inserted into the left anterior descending and proximal left anterior descending. Stent Balloon  .

## 2020-08-21 NOTE — PROGRESS NOTES
Saw pt post-procedure to discuss procedure results and new medications. RFV sheath still in place; lab had just checked an ACT which was 171 thus sheath will be removed by bedside RN. There is a right groin hematoma which developed intra-procedure from attempted RFA access. Pt reported soreness at right groin site but otherwise asymptomatic and VSS. LFV site was Angiosealed and has small hematoma.   Will sign out to procedural Cardiology Fellow to see pt post-sheath removal, prior to discharge.     Aleshia Fox DNP APRN CNP

## 2020-08-21 NOTE — Clinical Note
Stent deployed in the proximal left anterior descending. Max pressure = 12 elicia. Total duration = 3 seconds.

## 2020-08-21 NOTE — DISCHARGE INSTRUCTIONS
Take aspirin 81 mg once daily and take Brilinta (ticagrelor) 90 mg twice per day. These medications keep the stents open - do not miss doses of these medications.     If you have any questions, please contact the Cardiology Clinic at [phone]. If you have an urgent question after hours, please call [phone] and ask to speak with the Cardiology Fellow on-call. If it is an emergency, call [phone].       Discharge Instructions for Coronary Angioplasty and Stenting  During your angioplasty, a doctor inserts a thin tube called a catheter into a blood vessel in your groin or wrist. The catheter is pushed through your blood vessel to a blocked area in one of your heart s arteries. The doctor inflates a tiny balloon at the tip of the catheter and stretches the blocked vessel so blood can flow freely. The balloon is then deflated and removed with the catheter. The doctor may also insert a metal mesh tube called a stent in the blocked vessel. The stent helps the vessel stay open. You may get several stents if you have blockages in more than one of your arteries.  Home care    Ask someone to drive you to your appointments for the next few days.    Rest for 2 to 3 days after the procedure. Most people are able to go back to normal activity within a few days.    Take your temperature and check your incision for signs of infection every day for a week. Signs of infection include redness, swelling, drainage, or warmth. It is normal to have a small bruise or bump where the catheter was inserted.    Take your medicines exactly as directed. Don t skip doses. It is important to take aspirin or other similar medicines for as long as your doctor advises. If you were also prescribed clopidogrel, prasugrel, or ticagrelor, it is very important to take these medicines, as well. These medicines prevent clots that could cause a heart attack. If you have a problem with any of your medicines, call healthcare provider right away. Call your  provider right away if you have extra bleeding, but go to the emergency room if the bleeding can't be controlled.    Unless told otherwise, drink plenty of fluids to help flush your body of the dye that was used during your angioplasty. Let your healthcare provider know if the color of your urine changes and doesn't return to normal color.    Eat a healthy diet that is low in fat, salt, and cholesterol. Ask your healthcare team for menus and other diet information.    Exercise according to your healthcare team's recommendation. Depending on your case, your team may recommend you start a cardiac rehabilitation program. Cardiac rehab is an exercise program in which trained healthcare staff monitor your progress and stress on your heart while you exercise. Ask how to enroll if your team recommends this program.    Don't swim or take a bath for 5 to 7 days. You may shower the day after the procedure. This keeps the incision site from getting wet and infected until the skin and artery can heal.  Follow-up care    Make a follow-up appointment as directed by our staff. Follow-up appointments are usually scheduled for 2 to 4 weeks after an angioplasty or coronary stent procedure.    Have a yearly checkup to make sure you are still doing well and not having any new symptoms.    Don't wait for a follow-up appointment if your medicines are not working or you are having heart-related symptoms.     When to call your healthcare provider  Call your healthcare provider right away if you have any of the following:    Chest pain or a return of the symptoms you had prior to the angioplasty    Constant or increasing pain or numbness in your leg, or if your leg looks blue or feels cold    Fever above 100.4 F (38.0 C) or other signs of infection (redness, swelling, drainage, or warmth at the incision site of the leg or wrist)    Shortness of breath    Bleeding, bruising, or a large swelling where the catheter (tube) was inserted    Blood  in your urine    Black or tarry stools    Feeling faint    Difficulty speaking or weakness in any muscle   Date Last Reviewed: 10/1/2016    7041-2526 [employer]. [address] [place] [address], [place] [address]. All rights reserved. This information is not intended as a substitute for professional medical care. Always follow your healthcare professional's instructions.

## 2020-08-21 NOTE — PROGRESS NOTES
Pt arrived to unit 3C at 1320. A&O x4.. BP elevated, SB. Denied pain. Bedrest until 1500. R groin sheath in/secure. BLE CMS intact.

## 2020-08-21 NOTE — Clinical Note
Stent deployed in the middle left anterior descending. Max pressure = 14 elicia. Total duration = 8 seconds.

## 2020-08-21 NOTE — PROGRESS NOTES
Manual pressure held for 20 minutes to right groin site.  Sheath, size 6.  Site CDI, no hematoma; superficial bruising around groin area; small hematoma, dime sized below sheath insertion site also superficial and unchanged.  BP decreased to 90s/60s with mild vasovagal episode of sweating; NS bolus initiated and SBP increased to 110-130s with DBP 60-70s.  Fentanyl given pre removal.  Patient A+O x4 and making needs known.  VSSA. Bilateral groin sites stable, flat with drsgs C/D/I.  Pt's  updated on patient status.  P: Continue to monitor status.  Bedrest and then discharge to home.

## 2020-08-21 NOTE — PRE-PROCEDURE
GENERAL PRE-PROCEDURE:   Procedure:  Coronary angiogram with possible percutaneous coronary intervention, right heart catheterization, and left heart catheterization   Date/Time:  8/21/2020 10:48 AM    Written consent obtained?: Yes    Risks and benefits: Risks, benefits and alternatives were discussed    DC Plan: Appropriate discharge home plan in place for patients who are going home after procedure   Consent given by:  Patient  Patient states understanding of procedure being performed: Yes    Patient's understanding of procedure matches consent: Yes    Procedure consent matches procedure scheduled: Yes    Expected level of sedation:  Moderate  Appropriately NPO:  Yes  ASA Class:  Class 2- mild systemic disease, no acute problems, no functional limitations  Mallampati  :  Grade 2- soft palate, base of uvula, tonsillar pillars, and portion of posterior pharyngeal wall visible  Lungs:  Lungs clear with good breath sounds bilaterally  Heart:  Normal heart sounds and rate  History & Physical reviewed:  History and physical reviewed and no updates needed  Statement of review:  I have reviewed the lab findings, diagnostic data, medications, and the plan for sedation    Aleshia LOPEZ CNP

## 2020-08-22 NOTE — PROGRESS NOTES
DISCHARGE   Discharged to: Home  Via: Automobile  Accompanied by:   Discharge Instructions: diet, activity, medications, follow up appointments, when to call the MD, and what to watchout for (i.e. s/s of infection, increasing SOB, palpitations, chest pain,)  Prescriptions: Filled by [facility]; medication list reviewed & sent with pt  Follow Up Appointments: arranged; information given  Belongings: All sent with pt  IV: out  Telemetry: off  Pt exhibits understanding of above discharge instructions; all questions answered; reviewed with patient's  via telephone.  A+O x4 and making needs known.  VSSA.  Groin sites C/D/I and free from bleeding or hematoma; CMS intact.  Able to void without difficulty.  Tolerating oral intake of food and fluids.  Denies pain or sob.  Ambulated x2 with minimal SBA of one and tolerating well. MD stopped to assess patient groin sites prior to discharge.    P: Discharged to self care and accompanied by patient's , Adrien.

## 2020-08-24 LAB
INTERPRETATION ECG - MUSE: NORMAL
INTERPRETATION ECG - MUSE: NORMAL

## 2020-08-25 DIAGNOSIS — I25.10 CAD S/P PERCUTANEOUS CORONARY ANGIOPLASTY: Primary | ICD-10-CM

## 2020-08-25 DIAGNOSIS — Z98.61 CAD S/P PERCUTANEOUS CORONARY ANGIOPLASTY: Primary | ICD-10-CM

## 2020-08-27 ENCOUNTER — HOSPITAL ENCOUNTER (OUTPATIENT)
Dept: CARDIAC REHAB | Facility: CLINIC | Age: 85
End: 2020-08-27
Attending: NURSE PRACTITIONER
Payer: MEDICARE

## 2020-08-27 DIAGNOSIS — I25.10 CAD S/P PERCUTANEOUS CORONARY ANGIOPLASTY: ICD-10-CM

## 2020-08-27 DIAGNOSIS — Z98.61 CAD S/P PERCUTANEOUS CORONARY ANGIOPLASTY: ICD-10-CM

## 2020-08-27 PROCEDURE — 93798 PHYS/QHP OP CAR RHAB W/ECG: CPT

## 2020-08-27 PROCEDURE — 40000575 ZZH STATISTIC OP CARDIAC VISIT #2

## 2020-08-27 PROCEDURE — 40000116 ZZH STATISTIC OP CR VISIT

## 2020-08-27 PROCEDURE — 93797 PHYS/QHP OP CAR RHAB WO ECG: CPT | Mod: 59

## 2020-09-01 ENCOUNTER — HOSPITAL ENCOUNTER (OUTPATIENT)
Dept: CARDIAC REHAB | Facility: CLINIC | Age: 85
End: 2020-09-01
Attending: HOSPITALIST
Payer: MEDICARE

## 2020-09-01 ENCOUNTER — TELEPHONE (OUTPATIENT)
Dept: CARDIOLOGY | Facility: CLINIC | Age: 85
End: 2020-09-01

## 2020-09-01 DIAGNOSIS — I25.10 CORONARY ARTERY DISEASE INVOLVING NATIVE CORONARY ARTERY OF NATIVE HEART WITHOUT ANGINA PECTORIS: Primary | ICD-10-CM

## 2020-09-01 PROCEDURE — 93798 PHYS/QHP OP CAR RHAB W/ECG: CPT

## 2020-09-01 PROCEDURE — 40000116 ZZH STATISTIC OP CR VISIT

## 2020-09-01 RX ORDER — CLOPIDOGREL BISULFATE 75 MG/1
75 TABLET ORAL DAILY
Qty: 90 TABLET | Refills: 3 | Status: SHIPPED | OUTPATIENT
Start: 2020-09-01 | End: 2021-07-20

## 2020-09-01 NOTE — TELEPHONE ENCOUNTER
[facility] Call Center    Phone Message    May a detailed message be left on voicemail: yes     Reason for Call: Appointment Intake    Referring Provider Name: Dr. Toscano  Diagnosis and/or Symptoms: Hospital follow up 8/21    Pt had Coronary Angiogram with Dr. Toscano on 8/21. Pt called to schedule follow up appt but no template was available. Please call back pt to discuss scheduling options. Thank you    Action Taken: Message routed to:  [facility]    Travel Screening: Not Applicable

## 2020-09-01 NOTE — TELEPHONE ENCOUNTER
"Spoke to patient-pt had a PCI 8/21/20 x3.  Calling to schedule a follow up with Dr. Pradhan-advised 2-4 week follow up.  Pt has severe aortic stenosis.  Patient is uncertain of when to follow up with TAVR.  Started cardiac rehab today and it went well.      Patient complaining of shortness of breath with exertion.  States \"its not extreme\" but when she does her chores at home she notices she is a degree of short of breath that was not there before her stent placement.  Patient is on Brilinta 90 mg BID.    Denies chest pain, lightheadedness, dizziness, palpitations.  Reports incision site is healed with no drainage, redness, pain , fever or chills.    Writer discussed situation with Dr. Toscano over the phone.  Writer received orders per Dr. Toscano to STOP Brilinta and start Plavix 75 mg daily.    Will route to TAVR RN Care Coordinator regarding valve follow up.    Alisa Shook, RN  Cardiology Care Coordinator  [facility]  [phone] option 1      "

## 2020-09-02 DIAGNOSIS — I35.0 SEVERE AORTIC STENOSIS: Primary | ICD-10-CM

## 2020-09-02 DIAGNOSIS — N18.30 CKD (CHRONIC KIDNEY DISEASE) STAGE 3, GFR 30-59 ML/MIN (H): ICD-10-CM

## 2020-09-02 RX ORDER — SODIUM CHLORIDE 9 MG/ML
INJECTION, SOLUTION INTRAVENOUS CONTINUOUS
Status: DISCONTINUED | OUTPATIENT
Start: 2020-09-02 | End: 2020-09-02

## 2020-09-03 ENCOUNTER — HOSPITAL ENCOUNTER (OUTPATIENT)
Dept: CARDIAC REHAB | Facility: CLINIC | Age: 85
End: 2020-09-03
Attending: HOSPITALIST
Payer: MEDICARE

## 2020-09-03 PROCEDURE — 93798 PHYS/QHP OP CAR RHAB W/ECG: CPT

## 2020-09-03 PROCEDURE — 40000116 ZZH STATISTIC OP CR VISIT

## 2020-09-03 NOTE — PROGRESS NOTES
Date: 9/3/2020    Time of Call: 12:35 PM     Diagnosis:  CKD stage III, severe aortic stenosis     [ TORB ] Ordering provider: Gianluca Toscano MD  Order:   Low GFR Low contrast TAVR CT protocol  1. Arrange with nursing in ECHO/CT, for 2 hours of time prior to TAVR CT for IV fluid administration  2.0.9 NS at 200cc/hr, IV, for two hours pior to TAVR CT.  3. 40-6- ml contrast, lower injection rate, mix with saline.  4. 100 kv for the spiral heart scan, 80 kv for the vascular FLASH scan.     Order received by: Keiko Godinez RN     Follow-up/additional notes:

## 2020-09-08 ENCOUNTER — HOSPITAL ENCOUNTER (OUTPATIENT)
Dept: CARDIAC REHAB | Facility: CLINIC | Age: 85
End: 2020-09-08
Attending: HOSPITALIST
Payer: MEDICARE

## 2020-09-08 PROCEDURE — 40000116 ZZH STATISTIC OP CR VISIT: Performed by: OCCUPATIONAL THERAPIST

## 2020-09-08 PROCEDURE — 93798 PHYS/QHP OP CAR RHAB W/ECG: CPT | Performed by: OCCUPATIONAL THERAPIST

## 2020-09-09 ENCOUNTER — OFFICE VISIT (OUTPATIENT)
Dept: FAMILY MEDICINE | Facility: CLINIC | Age: 85
End: 2020-09-09
Payer: MEDICARE

## 2020-09-09 VITALS
BODY MASS INDEX: 28.89 KG/M2 | TEMPERATURE: 98 F | OXYGEN SATURATION: 98 % | DIASTOLIC BLOOD PRESSURE: 82 MMHG | RESPIRATION RATE: 18 BRPM | WEIGHT: 157 LBS | SYSTOLIC BLOOD PRESSURE: 148 MMHG | HEIGHT: 62 IN | HEART RATE: 63 BPM

## 2020-09-09 DIAGNOSIS — H91.90 DECREASED HEARING, UNSPECIFIED LATERALITY: ICD-10-CM

## 2020-09-09 DIAGNOSIS — I25.10 CAD S/P PERCUTANEOUS CORONARY ANGIOPLASTY: ICD-10-CM

## 2020-09-09 DIAGNOSIS — I10 BENIGN ESSENTIAL HYPERTENSION: ICD-10-CM

## 2020-09-09 DIAGNOSIS — J30.2 SEASONAL ALLERGIC RHINITIS, UNSPECIFIED TRIGGER: ICD-10-CM

## 2020-09-09 DIAGNOSIS — Z23 NEED FOR PROPHYLACTIC VACCINATION AND INOCULATION AGAINST INFLUENZA: ICD-10-CM

## 2020-09-09 DIAGNOSIS — Z00.00 MEDICARE ANNUAL WELLNESS VISIT, SUBSEQUENT: Primary | ICD-10-CM

## 2020-09-09 DIAGNOSIS — F32.0 MILD MAJOR DEPRESSION (H): ICD-10-CM

## 2020-09-09 DIAGNOSIS — Z98.61 CAD S/P PERCUTANEOUS CORONARY ANGIOPLASTY: ICD-10-CM

## 2020-09-09 DIAGNOSIS — I35.0 SEVERE AORTIC STENOSIS: ICD-10-CM

## 2020-09-09 DIAGNOSIS — N18.30 CKD (CHRONIC KIDNEY DISEASE) STAGE 3, GFR 30-59 ML/MIN (H): ICD-10-CM

## 2020-09-09 DIAGNOSIS — J45.40 MODERATE PERSISTENT ASTHMA WITHOUT COMPLICATION: ICD-10-CM

## 2020-09-09 DIAGNOSIS — E78.5 HYPERLIPIDEMIA WITH TARGET LDL LESS THAN 70: ICD-10-CM

## 2020-09-09 DIAGNOSIS — I27.20 PULMONARY HYPERTENSION (H): ICD-10-CM

## 2020-09-09 LAB
CHOLEST SERPL-MCNC: 323 MG/DL
HDLC SERPL-MCNC: 59 MG/DL
LDLC SERPL CALC-MCNC: 226 MG/DL
NONHDLC SERPL-MCNC: 264 MG/DL
TRIGL SERPL-MCNC: 192 MG/DL

## 2020-09-09 PROCEDURE — G0008 ADMIN INFLUENZA VIRUS VAC: HCPCS | Performed by: FAMILY MEDICINE

## 2020-09-09 PROCEDURE — 90662 IIV NO PRSV INCREASED AG IM: CPT | Performed by: FAMILY MEDICINE

## 2020-09-09 PROCEDURE — G0439 PPPS, SUBSEQ VISIT: HCPCS | Performed by: FAMILY MEDICINE

## 2020-09-09 PROCEDURE — 99213 OFFICE O/P EST LOW 20 MIN: CPT | Mod: 25 | Performed by: FAMILY MEDICINE

## 2020-09-09 PROCEDURE — 36415 COLL VENOUS BLD VENIPUNCTURE: CPT | Performed by: FAMILY MEDICINE

## 2020-09-09 PROCEDURE — 80061 LIPID PANEL: CPT | Performed by: FAMILY MEDICINE

## 2020-09-09 RX ORDER — AZELASTINE HCL 205.5 UG/1
1 SPRAY NASAL DAILY
Qty: 1 BOTTLE | Refills: 1 | Status: SHIPPED | OUTPATIENT
Start: 2020-09-09 | End: 2020-10-12

## 2020-09-09 RX ORDER — LOSARTAN POTASSIUM 100 MG/1
100 TABLET ORAL DAILY
Qty: 90 TABLET | Refills: 0 | Status: SHIPPED | OUTPATIENT
Start: 2020-09-09 | End: 2020-10-12

## 2020-09-09 RX ORDER — ROSUVASTATIN CALCIUM 40 MG/1
40 TABLET, COATED ORAL DAILY
Qty: 90 TABLET | Refills: 3 | Status: SHIPPED | OUTPATIENT
Start: 2020-09-09 | End: 2021-06-18

## 2020-09-09 RX ORDER — METOPROLOL TARTRATE 50 MG
TABLET ORAL
Qty: 45 TABLET | Refills: 0 | Status: SHIPPED | OUTPATIENT
Start: 2020-09-09 | End: 2020-10-12

## 2020-09-09 RX ORDER — VERAPAMIL HYDROCHLORIDE 240 MG/1
240 TABLET, FILM COATED, EXTENDED RELEASE ORAL DAILY
Qty: 90 TABLET | Refills: 0 | Status: SHIPPED | OUTPATIENT
Start: 2020-09-09 | End: 2020-10-12

## 2020-09-09 ASSESSMENT — PAIN SCALES - GENERAL: PAINLEVEL: NO PAIN (0)

## 2020-09-09 ASSESSMENT — ACTIVITIES OF DAILY LIVING (ADL): CURRENT_FUNCTION: NO ASSISTANCE NEEDED

## 2020-09-09 ASSESSMENT — MIFFLIN-ST. JEOR: SCORE: 1120.4

## 2020-09-09 NOTE — LETTER
My Asthma Action Plan    Name: Kamryn Miller   YOB: 1935  Date: 9/9/2020   My doctor: Rolanda Wilcox MD   My clinic: [facility]        My Control Medicine: advair  My Rescue Medicine: Albuterol (Proair/Ventolin/Proventil HFA) 2-4 puffs EVERY 4 HOURS as needed. Use a spacer if recommended by your provider.  My Oral Steroid Medicine: none My Asthma Severity:   Moderate Persistent  Know your asthma triggers: upper respiratory infections  infections            GREEN ZONE   Good Control    I feel good    No cough or wheeze    Can work, sleep and play without asthma symptoms       Take your asthma control medicine every day.     1. If exercise triggers your asthma, take your rescue medication    15 minutes before exercise or sports, and    During exercise if you have asthma symptoms  2. Spacer to use with inhaler: If you have a spacer, make sure to use it with your inhaler             YELLOW ZONE Getting Worse  I have ANY of these:    I do not feel good    Cough or wheeze    Chest feels tight    Wake up at night   1. Keep taking your Green Zone medications  2. Start taking your rescue medicine:    every 20 minutes for up to 1 hour. Then every 4 hours for 24-48 hours.  3. If you stay in the Yellow Zone for more than 12-24 hours, contact your doctor.  4. If you do not return to the Green Zone in 12-24 hours or you get worse, start taking your oral steroid medicine if prescribed by your provider.           RED ZONE Medical Alert - Get Help  I have ANY of these:    I feel awful    Medicine is not helping    Breathing getting harder    Trouble walking or talking    Nose opens wide to breathe       1. Take your rescue medicine NOW  2. If your provider has prescribed an oral steroid medicine, start taking it NOW  3. Call your doctor NOW  4. If you are still in the Red Zone after 20 minutes and you have not reached your doctor:    Take your rescue medicine again and    Call 911 or go to the emergency  room right away    See your regular doctor within 2 weeks of an Emergency Room or Urgent Care visit for follow-up treatment.          Annual Reminders:  Meet with Asthma Educator,  Flu Shot in the Fall, consider Pneumonia Vaccination for patients with asthma (aged 19 and older).    Pharmacy:    [facility] - NON-EPRESCRIBE  [id number] - [place], [place] - [address]  [id number] - [place], [place] - [address] AT [facility] [address]    Electronically signed by Rolanda Wilcox MD   Date: 09/09/20                      Asthma Triggers  How To Control Things That Make Your Asthma Worse    Triggers are things that make your asthma worse.  Look at the list below to help you find your triggers and what you can do about them.  You can help prevent asthma flare-ups by staying away from your triggers.      Trigger                                                          What you can do   Cigarette Smoke  Tobacco smoke can make asthma worse. Do not allow smoking in your home, car or around you.  Be sure no one smokes at a child s day care or school.  If you smoke, ask your health care provider for ways to help you quit.  Ask family members to quit too.  Ask your health care provider for a referral to Quit Plan to help you quit smoking, or call 6-625-579-PLAN.     Colds, Flu, Bronchitis  These are common triggers of asthma. Wash your hands often.  Don t touch your eyes, nose or mouth.  Get a flu shot every year.     Dust Mites  These are tiny bugs that live in cloth or carpet. They are too small to see. Wash sheets and blankets in hot water every week.   Encase pillows and mattress in dust mite proof covers.  Avoid having carpet if you can. If you have carpet, vacuum weekly.   Use a dust mask and HEPA vacuum.   Pollen and Outdoor Mold  Some people are allergic to trees, grass, or weed pollen, or molds. Try to keep your windows closed.  Limit time out doors when pollen count  is high.   Ask you health care provider about taking medicine during allergy season.     Animal Dander  Some people are allergic to skin flakes, urine or saliva from pets with fur or feathers. Keep pets with fur or feathers out of your home.    If you can t keep the pet outdoors, then keep the pet out of your bedroom.  Keep the bedroom door closed.  Keep pets off cloth furniture and away from stuffed toys.     Mice, Rats, and Cockroaches   Some people are allergic to the waste from these pests.   Cover food and garbage.  Clean up spills and food crumbs.  Store grease in the refrigerator.   Keep food out of the bedroom.   Indoor Mold  This can be a trigger if your home has high moisture. Fix leaking faucets, pipes, or other sources of water.   Clean moldy surfaces.  Dehumidify basement if it is damp and smelly.   Smoke, Strong Odors, and Sprays  These can reduce air quality. Stay away from strong odors and sprays, such as perfume, powder, hair spray, paints, smoke incense, paint, cleaning products, candles and new carpet.   Exercise or Sports  Some people with asthma have this trigger. Be active!  Ask your doctor about taking medicine before sports or exercise to prevent symptoms.    Warm up for 5-10 minutes before and after sports or exercise.     Other Triggers of Asthma  Cold air:  Cover your nose and mouth with a scarf.  Sometimes laughing or crying can be a trigger.  Some medicines and food can trigger asthma.

## 2020-09-09 NOTE — PROGRESS NOTES
"SUBJECTIVE:   Kamryn Miller is a 84 year old female who presents for Preventive Visit.    Are you in the first 12 months of your Medicare coverage?  No    Healthy Habits:     In general, how would you rate your overall health?  Good    Frequency of exercise:  4-5 days/week    Duration of exercise:  45-60 minutes    Do you usually eat at least 4 servings of fruit and vegetables a day, include whole grains    & fiber and avoid regularly eating high fat or \"junk\" foods?  Yes    Taking medications regularly:  0    Barriers to taking medications:  Not applicable    Medication side effects:  Not applicable    Ability to successfully perform activities of daily living:  No assistance needed    Home Safety:  No safety concerns identified    Hearing Impairment:  Difficulty following a conversation in a noisy restaurant or crowded room    In the past 6 months, have you been bothered by leaking of urine? Yes    In general, how would you rate your overall mental or emotional health?  Very good      PHQ-2 Total Score: 0    Additional concerns today:  No  History of Present Illness      Asthma:  She presents for follow up of asthma.  She has no cough, no wheezing, and no shortness of breath. She is not using a relief medication. She typically misses taking her controller medication 7 time(s) per week.Patient is aware of the following triggers: same as previous visit and cold air. The patient has not had a visit to the Emergency Room, Urgent Care or Hospital due to asthma since the last clinic visit. She has been to the Emergency Room or Urgent Care 0 times.She has had a Hospitalization 0 times.    Mental Health Follow-up:  Patient presents to follow-up on Depression.Patient's depression since last visit has been:  Better  The patient is not having other symptoms associated with depression.      Any significant life events: No  Patient is not feeling anxious or having panic attacks.  Patient has no concerns about alcohol or drug " use.     Social History  Tobacco Use    Smoking status: Never Smoker    Smokeless tobacco: Never Used  Alcohol use: No    Alcohol/week: 0.0 standard drinks    Types: 1 Standard drinks or equivalent per week  Drug use: No      Today's PHQ-9         PHQ-9 Total Score:         PHQ-9 Q9 Thoughts of better off dead/self-harm past 2 weeks :       Thoughts of suicide or self harm:      Self-harm Plan:        Self-harm Action:          Safety concerns for self or others:           Hyperlipidemia:  She presents for follow up of hyperlipidemia.  She is taking medication to lower cholesterol. She is not having myalgia or other side effects to statin medications.    Hypertension: She presents for follow up of hypertension.  She does not check blood pressure  regularly outside of the clinic. Outpatient blood pressures have not been over 140/90. She follows a low salt diet.     Hypothyroidism:     Since last visit, patient describes the following symptoms::  None    She eats 4 or more servings of fruits and vegetables daily.She consumes 0 sweetened beverage(s) daily.She exercises with enough effort to increase her heart rate 30 to 60 minutes per day.  She exercises with enough effort to increase her heart rate 5 days per week.   She is taking medications regularly.  She is not taking prescribed medications regularly due to Not applicable.  Pt has coronary stents Put in recenty  She is doing well  No chest pain  No sob  Feels better  She will follow up with Cardiology about AVR  Do you feel safe in your environment? Yes  No chest pain,no sob,no dizziness-In cardiac rehab    Have you ever done Advance Care Planning? (For example, a Health Directive, POLST, or a discussion with a medical provider or your loved ones about your wishes): Yes, advance care planning is on file.      Fall risk  Fallen 2 or more times in the past year?: No  Any fall with injury in the past year?: No    Cognitive Screening   1) Repeat 3 items (Leader,  Season, Table)    2) Clock draw: NORMAL  3) 3 item recall: Recalls 2 objects   Results: NORMAL clock, 1-2 items recalled: COGNITIVE IMPAIRMENT LESS LIKELY    Mini-CogTM Copyright TIMOTHY Flores. Licensed by the author for use in [facility]; reprinted with permission (ermelinda@[facility]). All rights reserved.      Do you have sleep apnea, excessive snoring or daytime drowsiness?: no    Reviewed and updated as needed this visit by clinical staff  Tobacco  Meds           Vascular Disease Follow-up      How often do you take nitroglycerin? Occasionally    Do you take an aspirin every day? Yes     Has stents done recently    Cardiology notes reviewed     Asthma Follow-Up    Was ACT completed today?    Yes    ACT Total Scores 8/12/2020   ACT TOTAL SCORE -   ASTHMA ER VISITS -   ASTHMA HOSPITALIZATIONS -   ACT TOTAL SCORE (Goal Greater than or Equal to 20) 21   In the past 12 months, how many times did you visit the emergency room for your asthma without being admitted to the hospital? 0   In the past 12 months, how many times were you hospitalized overnight because of your asthma? 0         How many days per week do you miss taking your asthma controller medication?  0    Please describe any recent triggers for your asthma: upper respiratory infections    Have you had any Emergency Room Visits, Urgent Care Visits, or Hospital Admissions since your last office visit?  No    Chronic Kidney Disease Follow-up      Do you take any over the counter pain medicine?: No    Reviewed and updated as needed this visit by Provider        Social History     Tobacco Use     Smoking status: Never Smoker     Smokeless tobacco: Never Used   Substance Use Topics     Alcohol use: No     Alcohol/week: 0.0 standard drinks     Types: 1 Standard drinks or equivalent per week     If you drink alcohol do you typically have >3 drinks per day or >7 drinks per week? No    No flowsheet data found.            Current providers sharing in care for  this patient include:   Patient Care Team:  Rolanda Wilcox MD as PCP - General (Family Practice)  Rolanda Wilcox MD as Assigned PCP    The following health maintenance items are reviewed in Epic and correct as of today:  Health Maintenance   Topic Date Due     MEDICARE ANNUAL WELLNESS VISIT  06/25/2020     ASTHMA ACTION PLAN  06/25/2020     INFLUENZA VACCINE (1) 09/01/2020     LIPID  09/03/2020     ADVANCE CARE PLANNING  10/06/2020     ASTHMA CONTROL TEST  02/12/2021     MICROALBUMIN  07/16/2021     FALL RISK ASSESSMENT  08/12/2021     BMP  08/21/2021     CREATININE  08/21/2021     DTAP/TDAP/TD IMMUNIZATION (3 - Td) 06/19/2022     DEXA  Completed     PNEUMOCOCCAL IMMUNIZATION 65+ LOW/MEDIUM RISK  Completed     ZOSTER IMMUNIZATION  Completed     IPV IMMUNIZATION  Aged Out     MENINGITIS IMMUNIZATION  Aged Out     HEPATITIS B IMMUNIZATION  Aged Out     COLORECTAL CANCER SCREENING  Discontinued     Lab work is in process  Labs reviewed in EPIC  BP Readings from Last 3 Encounters:   09/09/20 (!) 148/82   08/21/20 (!) 167/89   07/16/20 (!) 178/94    Wt Readings from Last 3 Encounters:   09/09/20 71.2 kg (157 lb)   08/21/20 72.6 kg (160 lb)   07/16/20 69.9 kg (154 lb)                  Patient Active Problem List   Diagnosis     Hyperlipidemia LDL goal <70     Mild major depression (H)     Pulmonary hypertension (H)     Mild persistent asthma     Seasonal allergic rhinitis     Mitral insufficiency     Tricuspid insufficiency     Bipolar disorder (H)     Renal insufficiency     Tremors     Advanced directives, counseling/discussion     Family history of diabetes mellitus     Family history of celiac disease     Celiac disease     History of colonic polyps     Benign essential hypertension     Hypothyroidism, unspecified type     CKD (chronic kidney disease) stage 3, GFR 30-59 ml/min (H)     Severe aortic stenosis     Other ill-defined heart diseases     Anemia     Disorder of kidney and ureter     Generalized anxiety disorder      Osteopenia     CAD S/P percutaneous coronary angioplasty     Past Surgical History:   Procedure Laterality Date     ANGIOGRAM  6/26/2009     ANGIOPLASTY  9/96    for angina     ANGIOPLASTY  8/03 - 9/03    X -2 - with stenst in coronary car.     APPENDECTOMY       BREAST BIOPSY, RT/LT      Breat Biopsy RT/LT, benign     BUNIONECTOMY  11/8/2011    Procedure:BUNIONECTOMY; Left donna bunionectomy; Surgeon:FREDY LITTLEJOHN; Location:[facility]     BUNIONECTOMY RT/LT  5/2007    right bunion and right 2nd hammertoe     C ANESTH,BLEPHAROPLASTY      (R) for drooping eyelid     C APPENDECTOMY       CATARACT IOL, RT/LT  6/12 and 7/12    bilateral     COLONOSCOPY  2007, 2012    every 3 years for polyps     CV CORONARY ANGIOGRAM N/A 8/21/2020    Procedure: CV CORONARY ANGIOGRAM;  Surgeon: Gianluca Toscano MD;  [facility]     CV LEFT HEART CATH N/A 8/21/2020    Procedure: CV LEFT HEART CATH;  Surgeon: Gianluca Toscano MD;  [facility]     CV PCI STENT DRUG ELUTING N/A 8/21/2020    Procedure: Percutaneous Coronary Intervention Stent Drug Eluting;  Surgeon: Gianluca Toscano MD;  [facility]     CV RIGHT HEART CATH N/A 8/21/2020    Procedure: CV RIGHT HEART CATH;  Surgeon: Gianluca Toscano MD;  [facility]     JOINT REPLACEMENT, HIP RT/LT  4/2008    right hip replaced     JOINT REPLACEMENT, HIP RT/LT  4/2009    left hip replaced     REPAIR HAMMER TOE  11/8/2011    Procedure:REPAIR HAMMER TOE; left 2nd hammertoe repair; Surgeon:FREDY LITTLEJOHN; Location:[facility]     SURGICAL HISTORY OF -   11/11    left bunion and 2nd hammertoe repair     TUBAL LIGATION         Social History     Tobacco Use     Smoking status: Never Smoker     Smokeless tobacco: Never Used   Substance Use Topics     Alcohol use: No     Alcohol/week: 0.0 standard drinks     Types: 1 Standard drinks or equivalent per week     Family History   Problem  Relation Age of Onset     Asthma Mother      Cerebrovascular Disease Mother      Arthritis Mother      Depression Mother      Lipids Sister      Hypertension Sister      Heart Disease Sister      Lipids Sister      Hypertension Sister      Lipids Sister      Breast Cancer Sister          Current Outpatient Medications   Medication Sig Dispense Refill     aspirin 81 MG tablet Take 1 tablet by mouth daily.       azelastine (ASTEPRO) 0.15 % nasal spray Spray 1 spray into both nostrils daily 1 Bottle 1     Blood Pressure Monitor KIT Automatic Blood Pressure Monitor 1 kit 0     Calcium Citrate (CITRACAL OR) Take 1 tablet by mouth daily.       CHOLECALCIFEROL 65075 UNIT PO CAPS 1 tablet weekly       desvenlafaxine succinate (PRISTIQ) 25 MG 24 hr tablet Take 25 mg by mouth daily  0     fluticasone-salmeterol (ADVAIR DISKUS) 500-50 MCG/DOSE inhaler INHALE 1 PUFF INTO THE LUNGS EVERY 12 HOURS 1 Inhaler 6     lamoTRIgine (LAMICTAL) 25 MG tablet 25 mg       LAMOTRIGINE 200 MG PO TABS 1 TABLET TWICE DAILY       levothyroxine (SYNTHROID) 50 MCG tablet Take 1 tablet by mouth daily.       losartan (COZAAR) 100 MG tablet Take 1 tablet (100 mg) by mouth daily 90 tablet 0     metoprolol tartrate (LOPRESSOR) 50 MG tablet TAKE 1 TAB BY MOUTH IN THE MORNING AND 1/2 IN THE EVENING-needs follow up appointment 45 tablet 0     Multiple Vitamin (MULTI-VITAMIN) per tablet Take 1 tablet by mouth daily.       Omega-3 Fatty Acids (FISH OIL PO) Take 1 tablet by mouth daily.       rosuvastatin (CRESTOR) 40 MG tablet Take 1 tablet (40 mg) by mouth daily 90 tablet 3     verapamil ER (CALAN-SR) 240 MG CR tablet Take 1 tablet (240 mg) by mouth daily Needs follow up appointment 90 tablet 0     VIIBRYD 10 MG TABS tablet Take 10 mg by mouth daily  2     amoxicillin (AMOXIL) 500 MG tablet TAKE 4 TABLETS BY MOUTH 1 HOUR PRIOR TO APPT  0     azelastine (ASTEPRO) 0.15 % nasal spray SPRAY 1 SPRAY INTO BOTH NOSTRILS DAILY (Patient not taking: Reported on  9/9/2020) 30 mL 3     clopidogrel (PLAVIX) 75 MG tablet Take 1 tablet (75 mg) by mouth daily 90 tablet 3     desvenlafaxine (PRISTIQ) 100 MG 24 hr tablet 100 mg  1     desvenlafaxine (PRISTIQ) 50 MG 24 hr tablet Take 100 mg by mouth daily        ticagrelor (BRILINTA) 90 MG tablet Take 1 tablet (90 mg) by mouth 2 times daily Start this evening. (Patient not taking: Reported on 9/9/2020) 180 tablet 3     Allergies   Allergen Reactions     Ace Inhibitors Cough     Diclofenac Other (See Comments)     Balance problems     Recent Labs   Lab Test 08/21/20  0851 07/22/20  1358 12/17/19  1113  09/03/19  1217 06/25/19  1559 07/13/18  0857 07/14/17  1148   LDL  --   --   --   --  88 107* 95  --    HDL  --   --   --   --  62 64 56  --    TRIG  --   --   --   --  125 135 143  --    ALT  --  31 26  --   --  37 32 34   CR 1.29* 1.47* 1.48*   < > 2.08* 1.69* 1.51* 1.50*   GFRESTIMATED 38* 32* 32*   < > 21* 28* 33* 33*   GFRESTBLACK 44* 38* 38*   < > 25* 32* 40* 40*   POTASSIUM 3.8 4.4 4.4   < > 3.7 4.3 3.7 3.9   TSH  --   --   --   --   --   --  3.83 2.40    < > = values in this interval not displayed.          Review of Systems  CONSTITUTIONAL: NEGATIVE for fever, chills, change in weight  INTEGUMENTARY/SKIN: NEGATIVE for worrisome rashes, moles or lesions  EYES: NEGATIVE for vision changes or irritation  ENT/MOUTH: NEGATIVE for , mouth and throat problems  Has Decreased Hearing  RESP: NEGATIVE for significant cough or SOB  BREAST: NEGATIVE for masses, tenderness or discharge  CV: NEGATIVE for chest pain, palpitations or peripheral edema  GI: NEGATIVE for nausea, abdominal pain, heartburn, or change in bowel habits   female: frequency  MUSCULOSKELETAL:arthritis  NEURO: NEGATIVE for weakness, dizziness or paresthesias  ENDOCRINE: NEGATIVE for temperature intolerance, skin/hair changes  HEME: NEGATIVE for bleeding problems  PSYCHIATRIC: NEGATIVE for changes in mood or affect    OBJECTIVE:   BP (!) 148/82   Pulse 63   Temp 98  F  "(36.7  C) (Oral)   Resp 18   Ht 1.575 m (5' 2\")   Wt 71.2 kg (157 lb)   SpO2 98%   BMI 28.72 kg/m   Estimated body mass index is 28.72 kg/m  as calculated from the following:    Height as of this encounter: 1.575 m (5' 2\").    Weight as of this encounter: 71.2 kg (157 lb).  Physical Exam  GENERAL APPEARANCE: alert and no distress  EYES: Eyes grossly normal to inspection, PERRL and conjunctivae and sclerae normal  HENT: ear canals and TM's normal, nose and mouth without ulcers or lesions, oropharynx clear and oral mucous membranes moist  NECK: no adenopathy, no asymmetry, masses, or scars and thyroid normal to palpation  RESP: lungs clear to auscultation - no rales, rhonchi or wheezes  BREAST: normal without masses, tenderness or nipple discharge and no palpable axillary masses or adenopathy  CVGrade 4/6 ARI Lefts SB murmur, click or rub, no peripheral edema and peripheral pulses strong  ABDOMEN: soft, nontender, no hepatosplenomegaly, no masses and bowel sounds normal  MS: no musculoskeletal defects are noted and gait is age appropriate without ataxia  SKIN: no suspicious lesions or rashes  NEURO: Normal strength and tone, sensory exam grossly normal, mentation intact and speech normal  PSYCH: mentation appears normal and affect normal/bright    Diagnostic Test Results:  Labs reviewed in Epic    ASSESSMENT / PLAN:   1. Medicare annual wellness visit, subsequent      2. CKD (chronic kidney disease) stage 3, GFR 30-59 ml/min (H)  Stable     3. Severe aortic stenosis  Pt will be having a appointment with cardiology to  Talk about TAVR    4. Pulmonary hypertension (H)  Stable     5. Mild major depression (H)  Stable     6. Benign essential hypertension  Follow up BP check 1 month  - verapamil ER (CALAN-SR) 240 MG CR tablet; Take 1 tablet (240 mg) by mouth daily Needs follow up appointment  Dispense: 90 tablet; Refill: 0  - losartan (COZAAR) 100 MG tablet; Take 1 tablet (100 mg) by mouth daily  Dispense: 90 tablet; " "Refill: 0  - metoprolol tartrate (LOPRESSOR) 50 MG tablet; TAKE 1 TAB BY MOUTH IN THE MORNING AND 1/2 IN THE EVENING-needs follow up appointment  Dispense: 45 tablet; Refill: 0    7. CAD S/P percutaneous coronary angioplasty  Had stents recently and stable     8. Hyperlipidemia with target LDL less than 70  Labs pending   - Lipid panel reflex to direct LDL Non-fasting    9. Decreased hearing, unspecified laterality  Advised   - AUDIOLOGY ADULT REFERRAL; Future    10. Need for prophylactic vaccination and inoculation against influenza  Advised   - FLUZONE HIGH DOSE 65+  [16969]  - Vaccine Administration, Initial [66904]    11. Mild persistent asthma without complication  Stable   - fluticasone-salmeterol (ADVAIR DISKUS) 500-50 MCG/DOSE inhaler; INHALE 1 PUFF INTO THE LUNGS EVERY 12 HOURS  Dispense: 1 Inhaler; Refill: 6        12. Seasonal allergic rhinitis, unspecified trigger    - azelastine (ASTEPRO) 0.15 % nasal spray; Spray 1 spray into both nostrils daily  Dispense: 1 Bottle; Refill: 1    COUNSELING:  Reviewed preventive health counseling, as reflected in patient instructions       Regular exercise       Healthy diet/nutrition       Vision screening       Hearing screening       Dental care       Bladder control       Fall risk prevention       Osteoporosis Prevention/Bone Health       Advanced Planning     Estimated body mass index is 28.72 kg/m  as calculated from the following:    Height as of this encounter: 1.575 m (5' 2\").    Weight as of this encounter: 71.2 kg (157 lb).        She reports that she has never smoked. She has never used smokeless tobacco.      Appropriate preventive services were discussed with this patient, including applicable screening as appropriate for cardiovascular disease, diabetes, osteopenia/osteoporosis, and glaucoma.  As appropriate for age/gender, discussed screening for colorectal cancer, prostate cancer, breast cancer, and cervical cancer. Checklist reviewing preventive " services available has been given to the patient.    Reviewed patients plan of care and provided an AVS. The Complex Care Plan (for patients with higher acuity and needing more deliberate coordination of services) for Kamryn meets the Care Plan requirement. This Care Plan has been established and reviewed with the Patient.    Counseling Resources:  ATP IV Guidelines  Pooled Cohorts Equation Calculator  Breast Cancer Risk Calculator  Breast Cancer: Medication to Reduce Risk  FRAX Risk Assessment  ICSI Preventive Guidelines  Dietary Guidelines for Americans, 2010  USDA's MyPlate  ASA Prophylaxis  Lung CA Screening    Rolanda Wilcox MD  [facility]    Identified Health Risks:

## 2020-09-10 ENCOUNTER — TELEPHONE (OUTPATIENT)
Dept: FAMILY MEDICINE | Facility: CLINIC | Age: 85
End: 2020-09-10

## 2020-09-10 ENCOUNTER — HOSPITAL ENCOUNTER (OUTPATIENT)
Dept: CARDIAC REHAB | Facility: CLINIC | Age: 85
End: 2020-09-10
Attending: HOSPITALIST
Payer: MEDICARE

## 2020-09-10 PROCEDURE — 40000116 ZZH STATISTIC OP CR VISIT: Performed by: OCCUPATIONAL THERAPIST

## 2020-09-10 PROCEDURE — 93798 PHYS/QHP OP CAR RHAB W/ECG: CPT | Performed by: OCCUPATIONAL THERAPIST

## 2020-09-10 NOTE — TELEPHONE ENCOUNTER
Pt should see Other Provider and Please have [facility] send The EKG to her Cardiologist Today and   Please Inform her Cardiology OFFICE today

## 2020-09-10 NOTE — TELEPHONE ENCOUNTER
Please have pt come back and be seen for this tomorrow (9/10/2020)-she diid not tell me about this   Rolanda Wilcox MD    Previous Messages     ----- Message -----   From: Francesca Groves EP   Sent: 9/1/2020  12:16 PM CDT   To: Rolanda Wilcox MD   Subject: Cardiac Rehab Patient                             Hi Dr. Wilcox,     Mrs. Miller started cardiac rehab last week.  Today her EKG made me skeptical of A-fib or a block more serious than the AVB1 listed in her most recent 12-lead.  She felt well and completed a small amount of exercise with no issues.  She has had dyspnea since PCI but does still have a pending TAVR.  I'd be happy to e-mail over her EKGs from our rehab telemetry system if you'd like to take a look yourself.  It looks like you will see her next week.     Thank you,   Francesca Groves   Cardiac Rehab

## 2020-09-10 NOTE — TELEPHONE ENCOUNTER
Dr. Wilcox has no openings today  Are you able to add on or is next week ok or does she need to see another provider sooner?    Raúl Haywood RN

## 2020-09-10 NOTE — TELEPHONE ENCOUNTER
Left message for cardiac rehab therapist with detailed message from provider.  Call back [phone] to confirm message was received and that Cardiologist will be notified.   Teresa Tobin RN

## 2020-09-15 ENCOUNTER — HOSPITAL ENCOUNTER (OUTPATIENT)
Dept: CARDIAC REHAB | Facility: CLINIC | Age: 85
End: 2020-09-15
Attending: HOSPITALIST
Payer: MEDICARE

## 2020-09-15 PROCEDURE — 93798 PHYS/QHP OP CAR RHAB W/ECG: CPT | Performed by: OCCUPATIONAL THERAPIST

## 2020-09-15 PROCEDURE — 40000116 ZZH STATISTIC OP CR VISIT: Performed by: OCCUPATIONAL THERAPIST

## 2020-09-16 ENCOUNTER — HOSPITAL ENCOUNTER (OUTPATIENT)
Dept: CARDIOLOGY | Facility: CLINIC | Age: 85
End: 2020-09-16
Attending: INTERNAL MEDICINE
Payer: MEDICARE

## 2020-09-16 ENCOUNTER — HOSPITAL ENCOUNTER (OUTPATIENT)
Dept: CT IMAGING | Facility: CLINIC | Age: 85
End: 2020-09-16
Attending: INTERNAL MEDICINE
Payer: MEDICARE

## 2020-09-16 VITALS
OXYGEN SATURATION: 98 % | DIASTOLIC BLOOD PRESSURE: 81 MMHG | SYSTOLIC BLOOD PRESSURE: 157 MMHG | HEART RATE: 58 BPM | RESPIRATION RATE: 16 BRPM

## 2020-09-16 DIAGNOSIS — N18.30 CKD (CHRONIC KIDNEY DISEASE) STAGE 3, GFR 30-59 ML/MIN (H): ICD-10-CM

## 2020-09-16 DIAGNOSIS — I35.0 SEVERE AORTIC STENOSIS: ICD-10-CM

## 2020-09-16 DIAGNOSIS — I35.0 SEVERE AORTIC STENOSIS: Primary | ICD-10-CM

## 2020-09-16 LAB
ANION GAP SERPL CALCULATED.3IONS-SCNC: 5 MMOL/L (ref 3–14)
BUN SERPL-MCNC: 29 MG/DL (ref 7–30)
CALCIUM SERPL-MCNC: 9.1 MG/DL (ref 8.5–10.1)
CHLORIDE SERPL-SCNC: 106 MMOL/L (ref 94–109)
CO2 SERPL-SCNC: 28 MMOL/L (ref 20–32)
CREAT SERPL-MCNC: 1.54 MG/DL (ref 0.52–1.04)
GFR SERPL CREATININE-BSD FRML MDRD: 31 ML/MIN/{1.73_M2}
GLUCOSE SERPL-MCNC: 74 MG/DL (ref 70–99)
POTASSIUM SERPL-SCNC: 4.1 MMOL/L (ref 3.4–5.3)
SODIUM SERPL-SCNC: 139 MMOL/L (ref 133–144)

## 2020-09-16 PROCEDURE — 74174 CTA ABD&PLVS W/CONTRAST: CPT | Mod: 26 | Performed by: INTERNAL MEDICINE

## 2020-09-16 PROCEDURE — 80048 BASIC METABOLIC PNL TOTAL CA: CPT | Performed by: STUDENT IN AN ORGANIZED HEALTH CARE EDUCATION/TRAINING PROGRAM

## 2020-09-16 PROCEDURE — 74174 CTA ABD&PLVS W/CONTRAST: CPT

## 2020-09-16 PROCEDURE — 93306 TTE W/DOPPLER COMPLETE: CPT

## 2020-09-16 PROCEDURE — 93306 TTE W/DOPPLER COMPLETE: CPT | Mod: 26 | Performed by: INTERNAL MEDICINE

## 2020-09-16 PROCEDURE — 25000128 H RX IP 250 OP 636: Performed by: INTERNAL MEDICINE

## 2020-09-16 PROCEDURE — 25800030 ZZH RX IP 258 OP 636: Performed by: INTERNAL MEDICINE

## 2020-09-16 PROCEDURE — 71275 CT ANGIOGRAPHY CHEST: CPT | Mod: 26 | Performed by: INTERNAL MEDICINE

## 2020-09-16 RX ORDER — IOPAMIDOL 755 MG/ML
59 INJECTION, SOLUTION INTRAVASCULAR ONCE
Status: COMPLETED | OUTPATIENT
Start: 2020-09-16 | End: 2020-09-16

## 2020-09-16 RX ADMIN — IOPAMIDOL 59 ML: 755 INJECTION, SOLUTION INTRAVENOUS at 12:32

## 2020-09-16 RX ADMIN — SODIUM CHLORIDE 400 ML: 9 INJECTION, SOLUTION INTRAVENOUS at 10:26

## 2020-09-22 ENCOUNTER — HOSPITAL ENCOUNTER (OUTPATIENT)
Dept: CARDIAC REHAB | Facility: CLINIC | Age: 85
End: 2020-09-22
Attending: HOSPITALIST
Payer: MEDICARE

## 2020-09-22 PROCEDURE — 93798 PHYS/QHP OP CAR RHAB W/ECG: CPT | Performed by: OCCUPATIONAL THERAPIST

## 2020-09-22 PROCEDURE — 40000116 ZZH STATISTIC OP CR VISIT: Performed by: OCCUPATIONAL THERAPIST

## 2020-09-24 ENCOUNTER — HOSPITAL ENCOUNTER (OUTPATIENT)
Dept: CARDIAC REHAB | Facility: CLINIC | Age: 85
End: 2020-09-24
Attending: HOSPITALIST
Payer: MEDICARE

## 2020-09-24 PROCEDURE — 40000116 ZZH STATISTIC OP CR VISIT: Performed by: OCCUPATIONAL THERAPIST

## 2020-09-24 PROCEDURE — 93798 PHYS/QHP OP CAR RHAB W/ECG: CPT | Performed by: OCCUPATIONAL THERAPIST

## 2020-09-25 ENCOUNTER — CARE COORDINATION (OUTPATIENT)
Dept: CARDIOLOGY | Facility: CLINIC | Age: 85
End: 2020-09-25

## 2020-09-25 NOTE — PROGRESS NOTES
Kamryn and spouse mae were called results of recent CT TAVR were reviewed with them.  Some of the images were sub-optimal because of artifact from metallic hardware.  The images will be reviewed with MD's as well as at the Valve conference and recommendations will be made.  My name and telephone number were shared with they had any questions.

## 2020-09-29 ENCOUNTER — HOSPITAL ENCOUNTER (OUTPATIENT)
Dept: CARDIAC REHAB | Facility: CLINIC | Age: 85
End: 2020-09-29
Attending: HOSPITALIST
Payer: MEDICARE

## 2020-09-29 PROCEDURE — 93798 PHYS/QHP OP CAR RHAB W/ECG: CPT | Performed by: OCCUPATIONAL THERAPIST

## 2020-09-29 PROCEDURE — 40000116 ZZH STATISTIC OP CR VISIT: Performed by: OCCUPATIONAL THERAPIST

## 2020-10-01 ENCOUNTER — HOSPITAL ENCOUNTER (OUTPATIENT)
Dept: CARDIAC REHAB | Facility: CLINIC | Age: 85
End: 2020-10-01
Attending: HOSPITALIST
Payer: MEDICARE

## 2020-10-01 PROCEDURE — 93798 PHYS/QHP OP CAR RHAB W/ECG: CPT

## 2020-10-01 PROCEDURE — 999N000109 HC STATISTIC OP CR VISIT

## 2020-10-06 ENCOUNTER — HOSPITAL ENCOUNTER (OUTPATIENT)
Dept: CARDIAC REHAB | Facility: CLINIC | Age: 85
End: 2020-10-06
Attending: HOSPITALIST
Payer: MEDICARE

## 2020-10-06 PROCEDURE — 999N000109 HC STATISTIC OP CR VISIT: Performed by: OCCUPATIONAL THERAPIST

## 2020-10-06 PROCEDURE — 93798 PHYS/QHP OP CAR RHAB W/ECG: CPT | Performed by: OCCUPATIONAL THERAPIST

## 2020-10-07 DIAGNOSIS — I51.89 OTHER ILL-DEFINED HEART DISEASES: ICD-10-CM

## 2020-10-07 DIAGNOSIS — I35.0 SEVERE AORTIC STENOSIS: Primary | ICD-10-CM

## 2020-10-07 NOTE — PROGRESS NOTES
Date: 10/7/2020    Time of Call: 2:01 PM     Diagnosis:  Severe aortic stenosis     [ TORB ] Ordering provider: Tom Burrows MD  Order:   CASE REQUEST:  RHC/LHC/Peripheral angiogram     Order received by: Dayna Ray RN     Follow-up/additional notes:    Kamryn's case and images were reviewed at Valve conference.  -- There were Aortic valve numbers that were reported on the echo, that did not seem correlate with severe aortic stenosis.  Numbers reported on the echo suggest only moderate aortic stenosis.    -- Pictures on the leg arteries were very blurry and the MD's were unable to measure the size of the vessels, so the team was unable to say what the access point would be, if Kamryn did need to have a TAVR.    Dr. Burrows would like Kamryn scheduled for a RHC/LHC/ and peripheral angiogram.  During the RHC and LHC Dr. Burrows is able to calculate the exact severity of the aortic stenosis.   He will use the peripheral angiogram to measure the leg vessels so the team will be able to determine what the access would be should Kamryn need a TAVR.        Pre procedure instructions:  1. Please make arrangements to have a  as you will not be allowed to drive following the procedure.  2. Do not eat or drink after midnight the day of your procedure.  3. You may take your usual morning medications with a sip of water the morning of your procedure.  4. Continue to take your Aspirin 81 mg, including the morning of the procedure.    5. Make arrangements to have someone stay with you the night after your procedure.  6.  You will need COVID testing prior to your procedure.  You will be contacted by the COVID testing team.

## 2020-10-08 ENCOUNTER — HOSPITAL ENCOUNTER (OUTPATIENT)
Dept: CARDIAC REHAB | Facility: CLINIC | Age: 85
End: 2020-10-08
Attending: HOSPITALIST
Payer: MEDICARE

## 2020-10-08 PROCEDURE — 999N000109 HC STATISTIC OP CR VISIT: Performed by: OCCUPATIONAL THERAPIST

## 2020-10-08 PROCEDURE — 93798 PHYS/QHP OP CAR RHAB W/ECG: CPT | Performed by: OCCUPATIONAL THERAPIST

## 2020-10-12 ENCOUNTER — OFFICE VISIT (OUTPATIENT)
Dept: FAMILY MEDICINE | Facility: CLINIC | Age: 85
End: 2020-10-12
Payer: MEDICARE

## 2020-10-12 VITALS
BODY MASS INDEX: 28.71 KG/M2 | HEART RATE: 74 BPM | HEIGHT: 62 IN | DIASTOLIC BLOOD PRESSURE: 86 MMHG | TEMPERATURE: 97.7 F | SYSTOLIC BLOOD PRESSURE: 134 MMHG | RESPIRATION RATE: 20 BRPM | OXYGEN SATURATION: 97 % | WEIGHT: 156 LBS

## 2020-10-12 DIAGNOSIS — J30.2 SEASONAL ALLERGIC RHINITIS, UNSPECIFIED TRIGGER: ICD-10-CM

## 2020-10-12 DIAGNOSIS — I10 BENIGN ESSENTIAL HYPERTENSION: ICD-10-CM

## 2020-10-12 PROCEDURE — 99213 OFFICE O/P EST LOW 20 MIN: CPT | Performed by: FAMILY MEDICINE

## 2020-10-12 RX ORDER — LOSARTAN POTASSIUM 100 MG/1
100 TABLET ORAL DAILY
Qty: 90 TABLET | Refills: 0 | Status: SHIPPED | OUTPATIENT
Start: 2020-10-12 | End: 2021-01-18

## 2020-10-12 RX ORDER — VERAPAMIL HYDROCHLORIDE 240 MG/1
240 TABLET, FILM COATED, EXTENDED RELEASE ORAL DAILY
Qty: 90 TABLET | Refills: 0 | Status: SHIPPED | OUTPATIENT
Start: 2020-10-12 | End: 2021-01-18

## 2020-10-12 RX ORDER — METOPROLOL TARTRATE 50 MG
TABLET ORAL
Qty: 45 TABLET | Refills: 0 | Status: SHIPPED | OUTPATIENT
Start: 2020-10-12 | End: 2021-01-05

## 2020-10-12 RX ORDER — AZELASTINE HCL 205.5 UG/1
1 SPRAY NASAL DAILY
Qty: 1 BOTTLE | Refills: 1 | Status: SHIPPED | OUTPATIENT
Start: 2020-10-12 | End: 2021-05-24

## 2020-10-12 RX ORDER — DESVENLAFAXINE 100 MG/1
100 TABLET, EXTENDED RELEASE ORAL DAILY
COMMUNITY

## 2020-10-12 RX ORDER — DESVENLAFAXINE 25 MG/1
25 TABLET, EXTENDED RELEASE ORAL DAILY
Refills: 0 | Status: CANCELLED | OUTPATIENT
Start: 2020-10-12

## 2020-10-12 ASSESSMENT — MIFFLIN-ST. JEOR: SCORE: 1110.86

## 2020-10-12 ASSESSMENT — PAIN SCALES - GENERAL: PAINLEVEL: NO PAIN (0)

## 2020-10-12 NOTE — PATIENT INSTRUCTIONS
Please call your Cardiologist and confirm if you need to be on Brillinta?  Also Take your Crestor daily  Check Cholesterol in  6 weeks  Sincerely,  Rolanda Wilcox MD

## 2020-10-12 NOTE — PROGRESS NOTES
"Subjective     Kamryn Miller is a 85 year old female who presents to clinic today for the following health issues:    History of Present Illness       Hypertension: She presents for follow up of hypertension.  She does check blood pressure  regularly outside of the clinic. Outside blood pressures have been over 140/90. She follows a low salt diet.     She eats 2-3 servings of fruits and vegetables daily.She consumes 0 sweetened beverage(s) daily.She exercises with enough effort to increase her heart rate 9 or less minutes per day.  She exercises with enough effort to increase her heart rate 3 or less days per week.   She is taking medications regularly.      Review of Systems   CONSTITUTIONAL: NEGATIVE for fever, chills, change in weight  ENT/MOUTH: NEGATIVE for ear, mouth and throat problems  RESP: NEGATIVE for significant cough or SOB  CV: NEGATIVE for chest pain, palpitations or peripheral edema  GI: NEGATIVE for nausea, abdominal pain, heartburn, or change in bowel habits  PSYCHIATRIC: NEGATIVE for changes in mood or affect      Objective    /86   Pulse 74   Temp 97.7  F (36.5  C) (Oral)   Resp 20   Ht 1.575 m (5' 2\")   Wt 70.8 kg (156 lb)   SpO2 97%   BMI 28.53 kg/m    Body mass index is 28.53 kg/m .  Physical Exam   GENERAL: healthy, alert and no distress  NECK: no adenopathy, no asymmetry, masses, or scars and thyroid normal to palpation  RESP: lungs clear to auscultation - no rales, rhonchi or wheezes  CV: regular rates and rhythm, normal S1 S2, no S3 or S4, no murmur, click or rub, peripheral pulses strong and no peripheral edema  ABDOMEN: soft, nontender, no hepatosplenomegaly, no masses and bowel sounds normal  MS: no gross musculoskeletal defects noted, no edema    No results found for this or any previous visit (from the past 24 hour(s)).        Assessment & Plan     Benign essential hypertension  Stable   - metoprolol tartrate (LOPRESSOR) 50 MG tablet; TAKE 1 TAB BY MOUTH IN THE MORNING " AND 1/2 IN THE EVENING-needs follow up appointment  - losartan (COZAAR) 100 MG tablet; Take 1 tablet (100 mg) by mouth daily  - verapamil ER (CALAN-SR) 240 MG CR tablet; Take 1 tablet (240 mg) by mouth daily Needs follow up appointment    Seasonal allergic rhinitis, unspecified trigger  refilled  - azelastine (ASTEPRO) 0.15 % nasal spray; Spray 1 spray into both nostrils daily      Please see MARK ANTHONY Wilcox MD  [facility]

## 2020-10-13 ENCOUNTER — HOSPITAL ENCOUNTER (OUTPATIENT)
Dept: CARDIAC REHAB | Facility: CLINIC | Age: 85
End: 2020-10-13
Attending: HOSPITALIST
Payer: MEDICARE

## 2020-10-13 PROCEDURE — 93798 PHYS/QHP OP CAR RHAB W/ECG: CPT

## 2020-10-13 PROCEDURE — 999N000109 HC STATISTIC OP CR VISIT

## 2020-10-14 DIAGNOSIS — I35.0 SEVERE AORTIC STENOSIS: ICD-10-CM

## 2020-10-14 DIAGNOSIS — I25.10 CORONARY ARTERY DISEASE INVOLVING NATIVE CORONARY ARTERY OF NATIVE HEART WITHOUT ANGINA PECTORIS: ICD-10-CM

## 2020-10-14 DIAGNOSIS — Z11.59 ENCOUNTER FOR SCREENING FOR OTHER VIRAL DISEASES: Primary | ICD-10-CM

## 2020-10-20 ENCOUNTER — HOSPITAL ENCOUNTER (OUTPATIENT)
Dept: CARDIAC REHAB | Facility: CLINIC | Age: 85
End: 2020-10-20
Attending: HOSPITALIST
Payer: MEDICARE

## 2020-10-20 PROCEDURE — 93798 PHYS/QHP OP CAR RHAB W/ECG: CPT

## 2020-10-20 PROCEDURE — 999N000109 HC STATISTIC OP CR VISIT

## 2020-10-23 DIAGNOSIS — I12.9 RENAL HYPERTENSION, STAGE 1-4 OR UNSPECIFIED CHRONIC KIDNEY DISEASE: ICD-10-CM

## 2020-10-23 DIAGNOSIS — E78.5 HYPERLIPIDEMIA LDL GOAL <70: ICD-10-CM

## 2020-10-23 DIAGNOSIS — N18.30 CKD (CHRONIC KIDNEY DISEASE) STAGE 3, GFR 30-59 ML/MIN (H): ICD-10-CM

## 2020-10-23 DIAGNOSIS — R80.9 PROTEINURIA: ICD-10-CM

## 2020-10-23 LAB
ALBUMIN SERPL-MCNC: 3.6 G/DL (ref 3.4–5)
ANION GAP SERPL CALCULATED.3IONS-SCNC: 4 MMOL/L (ref 3–14)
BUN SERPL-MCNC: 21 MG/DL (ref 7–30)
CALCIUM SERPL-MCNC: 9.9 MG/DL (ref 8.5–10.1)
CHLORIDE SERPL-SCNC: 105 MMOL/L (ref 94–109)
CHOLEST SERPL-MCNC: 175 MG/DL
CO2 SERPL-SCNC: 32 MMOL/L (ref 20–32)
CREAT SERPL-MCNC: 1.52 MG/DL (ref 0.52–1.04)
CREAT UR-MCNC: 93 MG/DL
ERYTHROCYTE [DISTWIDTH] IN BLOOD BY AUTOMATED COUNT: 12.4 % (ref 10–15)
GFR SERPL CREATININE-BSD FRML MDRD: 31 ML/MIN/{1.73_M2}
GLUCOSE SERPL-MCNC: 88 MG/DL (ref 70–99)
HCT VFR BLD AUTO: 41.2 % (ref 35–47)
HDLC SERPL-MCNC: 71 MG/DL
HGB BLD-MCNC: 13.1 G/DL (ref 11.7–15.7)
LDLC SERPL CALC-MCNC: 83 MG/DL
MCH RBC QN AUTO: 32.8 PG (ref 26.5–33)
MCHC RBC AUTO-ENTMCNC: 31.8 G/DL (ref 31.5–36.5)
MCV RBC AUTO: 103 FL (ref 78–100)
MICROALBUMIN UR-MCNC: 110 MG/L
MICROALBUMIN/CREAT UR: 118.41 MG/G CR (ref 0–25)
NONHDLC SERPL-MCNC: 104 MG/DL
PHOSPHATE SERPL-MCNC: 3.9 MG/DL (ref 2.5–4.5)
PLATELET # BLD AUTO: 206 10E9/L (ref 150–450)
POTASSIUM SERPL-SCNC: 3.8 MMOL/L (ref 3.4–5.3)
PTH-INTACT SERPL-MCNC: 34 PG/ML (ref 18–80)
RBC # BLD AUTO: 3.99 10E12/L (ref 3.8–5.2)
SODIUM SERPL-SCNC: 141 MMOL/L (ref 133–144)
TRIGL SERPL-MCNC: 103 MG/DL
WBC # BLD AUTO: 5 10E9/L (ref 4–11)

## 2020-10-23 PROCEDURE — 80069 RENAL FUNCTION PANEL: CPT | Performed by: INTERNAL MEDICINE

## 2020-10-23 PROCEDURE — 82043 UR ALBUMIN QUANTITATIVE: CPT | Performed by: INTERNAL MEDICINE

## 2020-10-23 PROCEDURE — 80061 LIPID PANEL: CPT | Performed by: FAMILY MEDICINE

## 2020-10-23 PROCEDURE — 36415 COLL VENOUS BLD VENIPUNCTURE: CPT | Performed by: INTERNAL MEDICINE

## 2020-10-23 PROCEDURE — 85027 COMPLETE CBC AUTOMATED: CPT | Performed by: INTERNAL MEDICINE

## 2020-10-23 PROCEDURE — 83970 ASSAY OF PARATHORMONE: CPT | Performed by: INTERNAL MEDICINE

## 2020-10-23 NOTE — LETTER
October 29, 2020      Kamryn Miller  [address]   [address]  [place] [address]        Dear ,    We are writing to inform you of your test results. Your kidney function is stable at 1.5.   Let us know if your blood pressure is running high or any other issues.       Resulted Orders   Renal panel (Alb, BUN, Ca, Cl, CO2, Creat, Gluc, Phos, K, Na)   Result Value Ref Range    Sodium 141 133 - 144 mmol/L    Potassium 3.8 3.4 - 5.3 mmol/L    Chloride 105 94 - 109 mmol/L    Carbon Dioxide 32 20 - 32 mmol/L    Anion Gap 4 3 - 14 mmol/L    Glucose 88 70 - 99 mg/dL    Urea Nitrogen 21 7 - 30 mg/dL    Creatinine 1.52 (H) 0.52 - 1.04 mg/dL    GFR Estimate 31 (L) >60 mL/min/[1.73_m2]      Comment:      Non  GFR Calc  Starting 12/18/2018, serum creatinine based estimated GFR (eGFR) will be   calculated using the Chronic Kidney Disease Epidemiology Collaboration   (CKD-EPI) equation.      GFR Estimate If Black 36 (L) >60 mL/min/[1.73_m2]      Comment:       GFR Calc  Starting 12/18/2018, serum creatinine based estimated GFR (eGFR) will be   calculated using the Chronic Kidney Disease Epidemiology Collaboration   (CKD-EPI) equation.      Calcium 9.9 8.5 - 10.1 mg/dL    Phosphorus 3.9 2.5 - 4.5 mg/dL    Albumin 3.6 3.4 - 5.0 g/dL   CBC with platelets   Result Value Ref Range    WBC 5.0 4.0 - 11.0 10e9/L    RBC Count 3.99 3.8 - 5.2 10e12/L    Hemoglobin 13.1 11.7 - 15.7 g/dL    Hematocrit 41.2 35.0 - 47.0 %     (H) 78 - 100 fl    MCH 32.8 26.5 - 33.0 pg    MCHC 31.8 31.5 - 36.5 g/dL    RDW 12.4 10.0 - 15.0 %    Platelet Count 206 150 - 450 10e9/L   Albumin Random Urine Quantitative with Creat Ratio   Result Value Ref Range    Creatinine Urine 93 mg/dL    Albumin Urine mg/L 110 mg/L    Albumin Urine mg/g Cr 118.41 (H) 0 - 25 mg/g Cr   Parathyroid Hormone Intact   Result Value Ref Range    Parathyroid Hormone Intact 34 18 - 80 pg/mL   Lipid panel reflex to direct LDL Fasting    Result Value Ref Range    Cholesterol 175 <200 mg/dL    Triglycerides 103 <150 mg/dL    HDL Cholesterol 71 >49 mg/dL    LDL Cholesterol Calculated 83 <100 mg/dL      Comment:      Desirable:       <100 mg/dl    Non HDL Cholesterol 104 <130 mg/dL       It was a pleasure to see you at your recent visit. Please contact us at [phone] if you have any questions or concerns. Thank you, we look forward to seeing you at your next visit.       Sincerely,    Ania Johnson MD.   of Medicine  Division of Kidney Disease and Hypertension  [address] [address]

## 2020-10-23 NOTE — LETTER
October 26, 2020      Kamryn Miller  [address]   [address]  [place] [address]        Dear ,    We are writing to inform you of your test results.    Cholesterol is Good       Resulted Orders   Lipid panel reflex to direct LDL Fasting   Result Value Ref Range    Cholesterol 175 <200 mg/dL    Triglycerides 103 <150 mg/dL    HDL Cholesterol 71 >49 mg/dL    LDL Cholesterol Calculated 83 <100 mg/dL      Comment:      Desirable:       <100 mg/dl    Non HDL Cholesterol 104 <130 mg/dL       If you have any questions or concerns, please call the clinic at the number listed above.       Sincerely,      Rolanda Wilcox MD /[email or web address]

## 2020-10-27 ENCOUNTER — HOSPITAL ENCOUNTER (OUTPATIENT)
Dept: CARDIAC REHAB | Facility: CLINIC | Age: 85
End: 2020-10-27
Attending: HOSPITALIST
Payer: MEDICARE

## 2020-10-27 PROCEDURE — 999N000109 HC STATISTIC OP CR VISIT

## 2020-10-27 PROCEDURE — 93798 PHYS/QHP OP CAR RHAB W/ECG: CPT

## 2020-10-30 DIAGNOSIS — Z11.59 ENCOUNTER FOR SCREENING FOR OTHER VIRAL DISEASES: ICD-10-CM

## 2020-10-30 PROCEDURE — U0003 INFECTIOUS AGENT DETECTION BY NUCLEIC ACID (DNA OR RNA); SEVERE ACUTE RESPIRATORY SYNDROME CORONAVIRUS 2 (SARS-COV-2) (CORONAVIRUS DISEASE [COVID-19]), AMPLIFIED PROBE TECHNIQUE, MAKING USE OF HIGH THROUGHPUT TECHNOLOGIES AS DESCRIBED BY CMS-2020-01-R: HCPCS | Performed by: INTERNAL MEDICINE

## 2020-10-31 LAB
SARS-COV-2 RNA SPEC QL NAA+PROBE: NOT DETECTED
SPECIMEN SOURCE: NORMAL

## 2020-11-02 ENCOUNTER — TELEPHONE (OUTPATIENT)
Dept: CARDIOLOGY | Facility: CLINIC | Age: 85
End: 2020-11-02

## 2020-11-02 RX ORDER — SODIUM CHLORIDE 9 MG/ML
INJECTION, SOLUTION INTRAVENOUS CONTINUOUS
Status: CANCELLED | OUTPATIENT
Start: 2020-11-02

## 2020-11-02 RX ORDER — POTASSIUM CHLORIDE 750 MG/1
20 TABLET, EXTENDED RELEASE ORAL
Status: CANCELLED | OUTPATIENT
Start: 2020-11-02

## 2020-11-02 RX ORDER — LIDOCAINE 40 MG/G
CREAM TOPICAL
Status: CANCELLED | OUTPATIENT
Start: 2020-11-02

## 2020-11-02 RX ORDER — POTASSIUM CHLORIDE 750 MG/1
40 TABLET, EXTENDED RELEASE ORAL
Status: CANCELLED | OUTPATIENT
Start: 2020-11-02

## 2020-11-03 ENCOUNTER — APPOINTMENT (OUTPATIENT)
Dept: LAB | Facility: CLINIC | Age: 85
End: 2020-11-03
Attending: INTERNAL MEDICINE
Payer: MEDICARE

## 2020-11-03 ENCOUNTER — HOSPITAL ENCOUNTER (INPATIENT)
Facility: CLINIC | Age: 85
Setting detail: SURGERY ADMIT
End: 2020-11-03
Attending: INTERNAL MEDICINE | Admitting: INTERNAL MEDICINE
Payer: MEDICARE

## 2020-11-03 ENCOUNTER — HOSPITAL ENCOUNTER (OUTPATIENT)
Facility: CLINIC | Age: 85
Discharge: HOME | End: 2020-11-03
Attending: INTERNAL MEDICINE | Admitting: INTERNAL MEDICINE
Payer: MEDICARE

## 2020-11-03 ENCOUNTER — CARE COORDINATION (OUTPATIENT)
Dept: CARDIOLOGY | Facility: CLINIC | Age: 85
End: 2020-11-03

## 2020-11-03 ENCOUNTER — APPOINTMENT (OUTPATIENT)
Dept: MEDSURG UNIT | Facility: CLINIC | Age: 85
End: 2020-11-03
Attending: INTERNAL MEDICINE
Payer: MEDICARE

## 2020-11-03 VITALS
HEIGHT: 62 IN | OXYGEN SATURATION: 93 % | WEIGHT: 156 LBS | BODY MASS INDEX: 28.71 KG/M2 | SYSTOLIC BLOOD PRESSURE: 126 MMHG | RESPIRATION RATE: 18 BRPM | TEMPERATURE: 98.2 F | HEART RATE: 55 BPM | DIASTOLIC BLOOD PRESSURE: 74 MMHG

## 2020-11-03 DIAGNOSIS — I51.89 OTHER ILL-DEFINED HEART DISEASES: ICD-10-CM

## 2020-11-03 DIAGNOSIS — I35.0 SEVERE AORTIC STENOSIS: Primary | ICD-10-CM

## 2020-11-03 DIAGNOSIS — I35.0 SEVERE AORTIC STENOSIS: ICD-10-CM

## 2020-11-03 DIAGNOSIS — Z11.59 ENCOUNTER FOR SCREENING FOR OTHER VIRAL DISEASES: Primary | ICD-10-CM

## 2020-11-03 DIAGNOSIS — I25.10 CORONARY ARTERY DISEASE INVOLVING NATIVE CORONARY ARTERY OF NATIVE HEART WITHOUT ANGINA PECTORIS: ICD-10-CM

## 2020-11-03 PROBLEM — Z98.890 STATUS POST CORONARY ANGIOGRAM: Status: ACTIVE | Noted: 2020-11-03

## 2020-11-03 LAB
ANION GAP SERPL CALCULATED.3IONS-SCNC: 5 MMOL/L (ref 3–14)
APTT PPP: 28 SEC (ref 22–37)
BUN SERPL-MCNC: 23 MG/DL (ref 7–30)
CALCIUM SERPL-MCNC: 9.5 MG/DL (ref 8.5–10.1)
CHLORIDE SERPL-SCNC: 111 MMOL/L (ref 94–109)
CO2 SERPL-SCNC: 28 MMOL/L (ref 20–32)
CREAT SERPL-MCNC: 1.38 MG/DL (ref 0.52–1.04)
ERYTHROCYTE [DISTWIDTH] IN BLOOD BY AUTOMATED COUNT: 11.9 % (ref 10–15)
GFR SERPL CREATININE-BSD FRML MDRD: 35 ML/MIN/{1.73_M2}
GLUCOSE SERPL-MCNC: 100 MG/DL (ref 70–99)
HCT VFR BLD AUTO: 41.6 % (ref 35–47)
HGB BLD-MCNC: 13.5 G/DL (ref 11.7–15.7)
INR PPP: 1.05 (ref 0.86–1.14)
MCH RBC QN AUTO: 32.8 PG (ref 26.5–33)
MCHC RBC AUTO-ENTMCNC: 32.5 G/DL (ref 31.5–36.5)
MCV RBC AUTO: 101 FL (ref 78–100)
PLATELET # BLD AUTO: 199 10E9/L (ref 150–450)
POTASSIUM SERPL-SCNC: 4.3 MMOL/L (ref 3.4–5.3)
RBC # BLD AUTO: 4.12 10E12/L (ref 3.8–5.2)
SODIUM SERPL-SCNC: 144 MMOL/L (ref 133–144)
WBC # BLD AUTO: 5.3 10E9/L (ref 4–11)

## 2020-11-03 PROCEDURE — 99153 MOD SED SAME PHYS/QHP EA: CPT | Performed by: INTERNAL MEDICINE

## 2020-11-03 PROCEDURE — 250N000011 HC RX IP 250 OP 636: Performed by: INTERNAL MEDICINE

## 2020-11-03 PROCEDURE — 75625 CONTRAST EXAM ABDOMINL AORTA: CPT | Performed by: INTERNAL MEDICINE

## 2020-11-03 PROCEDURE — 99152 MOD SED SAME PHYS/QHP 5/>YRS: CPT | Performed by: INTERNAL MEDICINE

## 2020-11-03 PROCEDURE — 258N000003 HC RX IP 258 OP 636: Performed by: INTERNAL MEDICINE

## 2020-11-03 PROCEDURE — C1760 CLOSURE DEV, VASC: HCPCS | Performed by: INTERNAL MEDICINE

## 2020-11-03 PROCEDURE — 999N000054 HC STATISTIC EKG NON-CHARGEABLE

## 2020-11-03 PROCEDURE — C1769 GUIDE WIRE: HCPCS | Performed by: INTERNAL MEDICINE

## 2020-11-03 PROCEDURE — 36415 COLL VENOUS BLD VENIPUNCTURE: CPT | Performed by: INTERNAL MEDICINE

## 2020-11-03 PROCEDURE — 80048 BASIC METABOLIC PNL TOTAL CA: CPT | Performed by: INTERNAL MEDICINE

## 2020-11-03 PROCEDURE — 75716 ARTERY X-RAYS ARMS/LEGS: CPT | Performed by: INTERNAL MEDICINE

## 2020-11-03 PROCEDURE — C1753 CATH, INTRAVAS ULTRASOUND: HCPCS | Performed by: INTERNAL MEDICINE

## 2020-11-03 PROCEDURE — 85610 PROTHROMBIN TIME: CPT | Performed by: INTERNAL MEDICINE

## 2020-11-03 PROCEDURE — 999N000142 HC STATISTIC PROCEDURE PREP ONLY

## 2020-11-03 PROCEDURE — 85730 THROMBOPLASTIN TIME PARTIAL: CPT | Performed by: INTERNAL MEDICINE

## 2020-11-03 PROCEDURE — C1894 INTRO/SHEATH, NON-LASER: HCPCS | Performed by: INTERNAL MEDICINE

## 2020-11-03 PROCEDURE — 93453 R&L HRT CATH W/VENTRICLGRPHY: CPT | Performed by: INTERNAL MEDICINE

## 2020-11-03 PROCEDURE — G0278 ILIAC ART ANGIO,CARDIAC CATH: HCPCS | Mod: GC | Performed by: INTERNAL MEDICINE

## 2020-11-03 PROCEDURE — 37252 INTRVASC US NONCORONARY 1ST: CPT | Performed by: INTERNAL MEDICINE

## 2020-11-03 PROCEDURE — 93010 ELECTROCARDIOGRAM REPORT: CPT | Mod: 59 | Performed by: INTERNAL MEDICINE

## 2020-11-03 PROCEDURE — 272N000001 HC OR GENERAL SUPPLY STERILE: Performed by: INTERNAL MEDICINE

## 2020-11-03 PROCEDURE — 93653 COMPRE EP EVAL TX SVT: CPT | Mod: 26 | Performed by: INTERNAL MEDICINE

## 2020-11-03 PROCEDURE — 99152 MOD SED SAME PHYS/QHP 5/>YRS: CPT | Mod: 59 | Performed by: INTERNAL MEDICINE

## 2020-11-03 PROCEDURE — 85027 COMPLETE CBC AUTOMATED: CPT | Performed by: INTERNAL MEDICINE

## 2020-11-03 PROCEDURE — 250N000009 HC RX 250: Performed by: INTERNAL MEDICINE

## 2020-11-03 PROCEDURE — 93005 ELECTROCARDIOGRAM TRACING: CPT

## 2020-11-03 RX ORDER — ATROPINE SULFATE 0.1 MG/ML
0.5 INJECTION INTRAVENOUS
Status: DISCONTINUED | OUTPATIENT
Start: 2020-11-03 | End: 2020-11-04 | Stop reason: HOSPADM

## 2020-11-03 RX ORDER — POTASSIUM CHLORIDE 750 MG/1
40 TABLET, EXTENDED RELEASE ORAL
Status: DISCONTINUED | OUTPATIENT
Start: 2020-11-03 | End: 2020-11-03 | Stop reason: HOSPADM

## 2020-11-03 RX ORDER — SODIUM CHLORIDE 9 MG/ML
INJECTION, SOLUTION INTRAVENOUS CONTINUOUS
Status: DISCONTINUED | OUTPATIENT
Start: 2020-11-03 | End: 2020-11-03 | Stop reason: HOSPADM

## 2020-11-03 RX ORDER — NALOXONE HYDROCHLORIDE 0.4 MG/ML
.1-.4 INJECTION, SOLUTION INTRAMUSCULAR; INTRAVENOUS; SUBCUTANEOUS
Status: DISCONTINUED | OUTPATIENT
Start: 2020-11-03 | End: 2020-11-04 | Stop reason: HOSPADM

## 2020-11-03 RX ORDER — FENTANYL CITRATE 50 UG/ML
25-50 INJECTION, SOLUTION INTRAMUSCULAR; INTRAVENOUS
Status: ACTIVE | OUTPATIENT
Start: 2020-11-03 | End: 2020-11-03

## 2020-11-03 RX ORDER — LIDOCAINE 40 MG/G
CREAM TOPICAL
Status: DISCONTINUED | OUTPATIENT
Start: 2020-11-03 | End: 2020-11-04 | Stop reason: HOSPADM

## 2020-11-03 RX ORDER — NALOXONE HYDROCHLORIDE 0.4 MG/ML
.2-.4 INJECTION, SOLUTION INTRAMUSCULAR; INTRAVENOUS; SUBCUTANEOUS
Status: DISCONTINUED | OUTPATIENT
Start: 2020-11-03 | End: 2020-11-04 | Stop reason: HOSPADM

## 2020-11-03 RX ORDER — FLUMAZENIL 0.1 MG/ML
0.2 INJECTION, SOLUTION INTRAVENOUS
Status: DISCONTINUED | OUTPATIENT
Start: 2020-11-03 | End: 2020-11-04 | Stop reason: HOSPADM

## 2020-11-03 RX ORDER — LIDOCAINE 40 MG/G
CREAM TOPICAL
Status: DISCONTINUED | OUTPATIENT
Start: 2020-11-03 | End: 2020-11-03 | Stop reason: HOSPADM

## 2020-11-03 RX ORDER — SODIUM CHLORIDE 9 MG/ML
INJECTION, SOLUTION INTRAVENOUS CONTINUOUS
Status: DISCONTINUED | OUTPATIENT
Start: 2020-11-03 | End: 2020-11-04 | Stop reason: HOSPADM

## 2020-11-03 RX ORDER — IOPAMIDOL 755 MG/ML
INJECTION, SOLUTION INTRAVASCULAR
Status: DISCONTINUED | OUTPATIENT
Start: 2020-11-03 | End: 2020-11-03 | Stop reason: HOSPADM

## 2020-11-03 RX ORDER — FENTANYL CITRATE 50 UG/ML
INJECTION, SOLUTION INTRAMUSCULAR; INTRAVENOUS
Status: DISCONTINUED | OUTPATIENT
Start: 2020-11-03 | End: 2020-11-03 | Stop reason: HOSPADM

## 2020-11-03 RX ORDER — POTASSIUM CHLORIDE 750 MG/1
20 TABLET, EXTENDED RELEASE ORAL
Status: DISCONTINUED | OUTPATIENT
Start: 2020-11-03 | End: 2020-11-03 | Stop reason: HOSPADM

## 2020-11-03 RX ADMIN — SODIUM CHLORIDE: 9 INJECTION, SOLUTION INTRAVENOUS at 13:42

## 2020-11-03 RX ADMIN — LIDOCAINE: 40 CREAM TOPICAL at 13:37

## 2020-11-03 ASSESSMENT — MIFFLIN-ST. JEOR: SCORE: 1110.99

## 2020-11-03 NOTE — PROGRESS NOTES
Pt prepped for right heart cath,CORS and peripheral angiogram.PIV placed and labs reviewed.IV contrast discharge instructions reviewed and signed and copy given to pt.Groins were shaved and pulses marked and charted.Consent signed and questons answered with  at bedside.

## 2020-11-03 NOTE — PRE-PROCEDURE
GENERAL PRE-PROCEDURE:   Procedure:  Right heart catheterization, left heart catheterization, and peripheral angiogram     Written consent obtained?: Yes    Risks and benefits: Risks, benefits and alternatives were discussed    Consent given by:  Patient  Patient states understanding of procedure being performed: Yes    Patient's understanding of procedure matches consent: Yes    Procedure consent matches procedure scheduled: Yes    Expected level of sedation:  Moderate  Appropriately NPO:  Yes  ASA Class:  Class 2- mild systemic disease, no acute problems, no functional limitations  Mallampati  :  Grade 1- soft palate, uvula, tonsillar pillars, and posterior pharyngeal wall visible  Lungs:  Lungs clear with good breath sounds bilaterally  Heart:  Normal heart sounds and rate  History & Physical reviewed:  History and physical reviewed and no updates needed  Statement of review:  I have reviewed the lab findings, diagnostic data, medications, and the plan for sedation    JESSICA Waters CNP

## 2020-11-03 NOTE — PROGRESS NOTES
Date: 11/3/2020    Time of Call: 3:47 PM     Diagnosis:  Severe aortic stenosis     [ TORB ] Ordering provider: Tom Burrows MD  Order:   TAVR Case request  Echo     Order received by: Dayna Ray RN     Follow-up/additional notes:   Orders entered for possible upcoming TAVR.

## 2020-11-04 LAB — INTERPRETATION ECG - MUSE: NORMAL

## 2020-11-04 NOTE — PROGRESS NOTES
D/I/A: Pt roomed on 3C in bay 34.  Arrived via litter and accompanied by cath lab RN On/Off: Off monitor.  VSSA.  Rhythm upon arrival SB on monitor.  Denies pain or sob.  Reviewed activity restrictions and when to notify RN, ie-changes to breathing or increased chest pressure or chest pain.  CCL access:  R groin angioseal (7 Fr Art previous removed), no hematoma, CMS intact. Denies pain.   Tolerating fluids. Bedrest until 2030.   P: Continue to monitor status.  Discharge to home once meeting criteria.    Consuelo Ramos RN

## 2020-11-04 NOTE — PROGRESS NOTES
Discharge  D:Pt had LE  angiogram and left and right heart cath for pre TAVR procedure.  A/I: Pt tolerated procedure well. Right groin CDI, no bleeding no hematoma. CMS intact to right leg. Pt has remained in SR, other VSS. Pt has eaten and ambulated and voided. Pt instructed on all discharge medications,diet,follow up appointments and post procedure site care. Reinforced activity restriction. Pt's PIV removed. Pt assisted getting dressed and escorted to front door via WC with all belongings safely without complaints or questions.

## 2020-11-04 NOTE — DISCHARGE INSTRUCTIONS
Going Home after an Angioplasty or Stent Placement (Cardiac)  ______________________________________________      After you go home:    Have an adult stay with you for 24 hours.    Drink plenty of fluids.    You may eat your normal diet, unless your doctor tells you otherwise.    For 24 hours:    Relax and take it easy.    Do NOT smoke.    Do NOT make any important or legal decisions.    Do NOT drive or operate machines at home or at work.    Do NOT drink alcohol.    Remove the Band-Aid after 24 hours. If there is minor oozing, apply another Band-aid and remove it after 12 hours.    For 2 days, do NOT have sex or do any heavy exercise.    Do NOT take a bath, or use a hot tub or pool for at least 3 days. You may shower.    Care of groin site  It is normal to have a small bruise or lump at the site.    Do not scrub the site.    For the first 2 days: Do not stoop or squat. When you cough, sneeze or move your bowels, hold your hand over the puncture site and press gently.    Do not lift more than 10 pounds for at least 3 to 5 days.    Do not use lotion or powder near the puncture site for 3 days.    If you start bleeding from the site in your groin, lie down flat and press firmly  on the site. Call your doctor as soon as you can.      ).          Medicines    If you have started taking Plavix or Effient, do not stop taking it until you talk to your heart doctor (cardiologist).    If you are on metformin (Glucophage), do not restart it until you have blood tests (within 2 to 3 days after discharge). When your doctor tells you it is safe, you may restart the metformin.    If you have stopped any other medicines, check with your nurse or provider about when to restart them.    Call 911 right away if you have bleeding that is heavy or does not stop.    Call your doctor if:    You have a large or growing hard lump around the site.    The site is red, swollen, hot or tender.    Blood or fluid is draining from the  site.    You have chills or a fever greater than 101 F (38 C).    Your leg or arm feels numb or cool.    You have hives, a rash or unusual itching.      [facility] [place]:  [phone] (7 days a week)

## 2020-11-06 ENCOUNTER — OFFICE VISIT (OUTPATIENT)
Dept: CARDIOLOGY | Facility: CLINIC | Age: 85
End: 2020-11-06
Attending: INTERNAL MEDICINE
Payer: MEDICARE

## 2020-11-06 ENCOUNTER — CARE COORDINATION (OUTPATIENT)
Dept: CARDIOLOGY | Facility: CLINIC | Age: 85
End: 2020-11-06

## 2020-11-06 VITALS
SYSTOLIC BLOOD PRESSURE: 149 MMHG | OXYGEN SATURATION: 97 % | BODY MASS INDEX: 28.89 KG/M2 | WEIGHT: 158 LBS | HEART RATE: 73 BPM | DIASTOLIC BLOOD PRESSURE: 75 MMHG

## 2020-11-06 DIAGNOSIS — I25.5 ISCHEMIC CARDIOMYOPATHY: ICD-10-CM

## 2020-11-06 DIAGNOSIS — E78.2 MIXED HYPERLIPIDEMIA: ICD-10-CM

## 2020-11-06 DIAGNOSIS — I35.0 MODERATE AORTIC STENOSIS: ICD-10-CM

## 2020-11-06 DIAGNOSIS — I25.10 CORONARY ARTERY DISEASE INVOLVING NATIVE CORONARY ARTERY OF NATIVE HEART WITHOUT ANGINA PECTORIS: ICD-10-CM

## 2020-11-06 DIAGNOSIS — I35.0 SEVERE AORTIC STENOSIS: ICD-10-CM

## 2020-11-06 DIAGNOSIS — I10 BENIGN ESSENTIAL HYPERTENSION: Primary | ICD-10-CM

## 2020-11-06 PROCEDURE — 99214 OFFICE O/P EST MOD 30 MIN: CPT | Performed by: INTERNAL MEDICINE

## 2020-11-06 RX ORDER — AMLODIPINE BESYLATE 2.5 MG/1
2.5 TABLET ORAL DAILY
Qty: 90 TABLET | Refills: 3 | Status: SHIPPED | OUTPATIENT
Start: 2020-11-06 | End: 2021-02-26

## 2020-11-06 NOTE — LETTER
11/6/2020      RE: Kamryn Miller  [address]   [address]  [place] [address]       Dear Colleague,    Thank you for the opportunity to participate in the care of your patient, Kamryn Miller, at the [facility]. Please see a copy of my visit note below.    November 6, 2020     I had the pleasure of seeing Kamryn Miller  in the [facility] for further evaluation and management. She has a history of Hx CAD, HLD, HTN, mitral and tricuspid insuff, pulmonary hypertension yet no recent cardiology follow up.  She is here to establish care.  She denies any cardiac symptoms and again she has not had any cardiology follow-up for the past several years.  Notes that she does have shortness of breath especially with exertion or she walks outside.  This is class III symptoms at the moment she can will probably about a block and able to take a flight of stairs.  We did start possible TAVR evaluation and he does not undergo right heart catheterization as well as invasive hemodynamic measurement and measurement of the valve area.  This seem to be on the higher end of moderate at this time.  She continues to deny any chest pain dizziness lightheadedness no syncope or no worsening shortness of breath.  No other complaints today.  She remains hypertensive today with systolic pressure in the 160s.     PAST MEDICAL HISTORY:  Past Medical History:   Diagnosis Date     Aortic valvar stenosis 7/2010    mild     Asthma      Bipolar disorder (H)      CAD (coronary artery disease)     s/p angioplasty     Celiac disease      Colon polyps      Colon polyps 2012    every 3 year colonoscopy      Depression      High cholesterol      HTN      Mitral insufficiency      Pulmonary HTN (H)     mild     Tremors 7/10    drug induced from antidepressants     Tricuspid insufficiency      FAMILY HISTORY:  Family History   Problem Relation Age of  Onset     Asthma Mother      Cerebrovascular Disease Mother      Arthritis Mother      Depression Mother      Lipids Sister      Hypertension Sister      Heart Disease Sister      Lipids Sister      Hypertension Sister      Lipids Sister      Breast Cancer Sister      SOCIAL HISTORY:  Social History     Socioeconomic History     Marital status:      Spouse name: Adrien     Number of children: 2     Years of education: 16     Highest education level: None   Occupational History     Employer: RETIRED     Comment: Retired in 2002.    Social Needs     Financial resource strain: None     Food insecurity     Worry: None     Inability: None     Transportation needs     Medical: None     Non-medical: None   Tobacco Use     Smoking status: Never Smoker     Smokeless tobacco: Never Used   Substance and Sexual Activity     Alcohol use: No     Alcohol/week: 0.0 standard drinks     Types: 1 Standard drinks or equivalent per week     Drug use: No     Sexual activity: Not Currently     Partners: Male   Lifestyle     Physical activity     Days per week: None     Minutes per session: None     Stress: None   Relationships     Social connections     Talks on phone: None     Gets together: None     Attends Denominational service: None     Active member of club or organization: None     Attends meetings of clubs or organizations: None     Relationship status: None     Intimate partner violence     Fear of current or ex partner: None     Emotionally abused: None     Physically abused: None     Forced sexual activity: None   Other Topics Concern     Parent/sibling w/ CABG, MI or angioplasty before 65F 55M? Not Asked   Social History Narrative     None     CURRENT MEDICATIONS:  Current Outpatient Medications   Medication     aspirin 81 MG tablet     azelastine (ASTEPRO) 0.15 % nasal spray     Calcium Citrate (CITRACAL OR)     CHOLECALCIFEROL 84539 UNIT PO CAPS     clopidogrel (PLAVIX) 75 MG tablet     desvenlafaxine (PRISTIQ) 100 MG 24 hr  tablet     desvenlafaxine succinate (PRISTIQ) 25 MG 24 hr tablet     fluticasone-salmeterol (ADVAIR DISKUS) 500-50 MCG/DOSE inhaler     lamoTRIgine (LAMICTAL) 25 MG tablet     LAMOTRIGINE 200 MG PO TABS     levothyroxine (SYNTHROID) 50 MCG tablet     losartan (COZAAR) 100 MG tablet     metoprolol tartrate (LOPRESSOR) 50 MG tablet     Multiple Vitamin (MULTI-VITAMIN) per tablet     Omega-3 Fatty Acids (FISH OIL PO)     rosuvastatin (CRESTOR) 40 MG tablet     verapamil ER (CALAN-SR) 240 MG CR tablet     VIIBRYD 10 MG TABS tablet     amoxicillin (AMOXIL) 500 MG tablet     azelastine (ASTEPRO) 0.15 % nasal spray     Blood Pressure Monitor KIT     ticagrelor (BRILINTA) 90 MG tablet     No current facility-administered medications for this visit.       ROS:   Constitutional: No fever, chills, or sweats. Weight is 158 lbs 0 oz  ENT: No visual disturbance, ear ache, epistaxis, sore throat.   Allergies/Immunologic: Negative.   Respiratory: No cough, hemoptysis.   Cardiovascular: As per HPI.   GI: No nausea, vomiting, hematemesis, melena, or hematochezia.   : No urinary frequency, dysuria, or hematuria.   Integrument: Negative.   Psychiatric: No evidence of major depression  Neuro: No new neurological complaints at this time. Non focal  Endocrinology: Negative.   Musculoskeletal: As per HPI.      EXAM:  BP (!) 163/78 (BP Location: Right arm, Patient Position: Chair, Cuff Size: Adult Regular)   Pulse 55   Wt 71.7 kg (158 lb)   SpO2 97%   BMI 28.89 kg/m    General: appears comfortable, alert and oriented  Head: normocephalic, atraumatic  Eyes: anicteric sclera, EOMI , PERRL  Neck: no adenopathy  Orophyarynx: moist mucosa, no lesions noted  Heart: regular, S1/S2, 3 out of 6 systolic murmur appreciated at the right second sternal border with radiation to the carotids.  Estimated JVP at 5 cmH2O  Lungs: CTAB, No wheezing.   Abdomen: soft, non-tender, bowel sounds present, no hepatosplenomegaly  Extremities: No LE edema  today  Skin: no open lesions noted  Neuro: grossly non-focal  Psych: no evidence of depression noted     Labs:  Lab Results   Component Value Date    WBC 5.3 11/03/2020    HGB 13.5 11/03/2020    HCT 41.6 11/03/2020     11/03/2020     11/03/2020    POTASSIUM 4.3 11/03/2020    CHLORIDE 111 (H) 11/03/2020    CO2 28 11/03/2020    BUN 23 11/03/2020    CR 1.38 (H) 11/03/2020     (H) 11/03/2020    AST 22 07/22/2020    ALT 31 07/22/2020    ALKPHOS 62 07/22/2020    BILITOTAL 0.6 07/22/2020    INR 1.05 11/03/2020     Echocardiogram reviewed:   Left ventricular function, chamber size, wall motion, and wall thickness are  normal.The EF is 55-60%. No regional wall motion abnormalities are seen.  Right ventricular function, chamber size, wall motion, and thickness are  normal.  Severe left atrial enlargement is present. Moderate mitral annular calcification is present. Mild mitral insufficiency is present. There is likley moderate aortic stenosis. The peak velocity is  3.47m/sec, the peak and mean gradients are 48mmHg and 28mmHg. The aortic valve  area is calculated low at 0.7cm2. Pulmonary artery systolic pressure is normal. The inferior vena cava was normal in size with preserved respiratory variability. No pericardial effusion is present.   Compared to prior study AS has worsened. Otherwise no significant change     RHC/coronary angiogram 11/3/20:    Moderate aortic stenosis -valve area 1.2 cm2,Mean gradient 24 mm Hg    Right sided filling pressures are normal.    Normal PA pressures.    Left sided filling pressures are normal.    Mildly reduced cardiac output level.    Peripheral angiogram done and IVUS used to assess the vessel lumens of the descending thoracic aorta,right and left external iliac arteries.      ASSESSMENT AND PLAN:  In summary, patient is a 85 year old lady with with coronary artery disease and status post PCI 20 years ago at [facility], we do not have records unfortunately available at this  point but did receive 2 stents.  She most complains of shortness of breath denies any dizziness lightheadedness syncope or recurrent episodes of chest pains.  We will previous echo images reviewed this on this new echo that was done few days ago reviewed.  This was concerning for paradoxical low flow low gradient aortic stenosis.  We did perform invasive evaluation with right heart catheterization and invasive valve measurements which was moderate with a valve area of 1.2 at this point.  Cardiac index was mildly decreased around 2 L/min/m .     At this time she was recommended to follow-up with the valve in about 6 months and reevaluate.  I will see her back before in about 4 months and reevaluate with an echocardiogram and see if there was any progression of her aortic stenosis.  The same time we will increase her blood pressure control and add amlodipine 2.5 mg once a day in order to control her blood pressure little bit better.  All side effect discussed including the possible worsening hypotension however with such a low dose I do not expect this to happen.     I appreciate the opportunity to participate in the care of Kamryn Miller . Please do not hesitate to contact me with any further questions.     Sincerely,    Madhu Zuniga MD     [facility] Division of Cardiology

## 2020-11-06 NOTE — PROGRESS NOTES
November 6, 2020     I had the pleasure of seeing Kamryn Miller  in the [facility] for further evaluation and management. She has a history of Hx CAD, HLD, HTN, mitral and tricuspid insuff, pulmonary hypertension yet no recent cardiology follow up.  She is here to establish care.  She denies any cardiac symptoms and again she has not had any cardiology follow-up for the past several years.  Notes that she does have shortness of breath especially with exertion or she walks outside.  This is class III symptoms at the moment she can will probably about a block and able to take a flight of stairs.  We did start possible TAVR evaluation and he does not undergo right heart catheterization as well as invasive hemodynamic measurement and measurement of the valve area.  This seem to be on the higher end of moderate at this time.  She continues to deny any chest pain dizziness lightheadedness no syncope or no worsening shortness of breath.  No other complaints today.  She remains hypertensive today with systolic pressure in the 160s.     PAST MEDICAL HISTORY:  Past Medical History:   Diagnosis Date     Aortic valvar stenosis 7/2010    mild     Asthma      Bipolar disorder (H)      CAD (coronary artery disease)     s/p angioplasty     Celiac disease      Colon polyps      Colon polyps 2012    every 3 year colonoscopy      Depression      High cholesterol      HTN      Mitral insufficiency      Pulmonary HTN (H)     mild     Tremors 7/10    drug induced from antidepressants     Tricuspid insufficiency      FAMILY HISTORY:  Family History   Problem Relation Age of Onset     Asthma Mother      Cerebrovascular Disease Mother      Arthritis Mother      Depression Mother      Lipids Sister      Hypertension Sister      Heart Disease Sister      Lipids Sister      Hypertension Sister      Lipids Sister      Breast Cancer Sister      SOCIAL HISTORY:  Social History     Socioeconomic History     Marital status:       Spouse name: Adrien     Number of children: 2     Years of education: 16     Highest education level: None   Occupational History     Employer: RETIRED     Comment: Retired in 2002.    Social Needs     Financial resource strain: None     Food insecurity     Worry: None     Inability: None     Transportation needs     Medical: None     Non-medical: None   Tobacco Use     Smoking status: Never Smoker     Smokeless tobacco: Never Used   Substance and Sexual Activity     Alcohol use: No     Alcohol/week: 0.0 standard drinks     Types: 1 Standard drinks or equivalent per week     Drug use: No     Sexual activity: Not Currently     Partners: Male   Lifestyle     Physical activity     Days per week: None     Minutes per session: None     Stress: None   Relationships     Social connections     Talks on phone: None     Gets together: None     Attends Scientologist service: None     Active member of club or organization: None     Attends meetings of clubs or organizations: None     Relationship status: None     Intimate partner violence     Fear of current or ex partner: None     Emotionally abused: None     Physically abused: None     Forced sexual activity: None   Other Topics Concern     Parent/sibling w/ CABG, MI or angioplasty before 65F 55M? Not Asked   Social History Narrative     None     CURRENT MEDICATIONS:  Current Outpatient Medications   Medication     aspirin 81 MG tablet     azelastine (ASTEPRO) 0.15 % nasal spray     Calcium Citrate (CITRACAL OR)     CHOLECALCIFEROL 60763 UNIT PO CAPS     clopidogrel (PLAVIX) 75 MG tablet     desvenlafaxine (PRISTIQ) 100 MG 24 hr tablet     desvenlafaxine succinate (PRISTIQ) 25 MG 24 hr tablet     fluticasone-salmeterol (ADVAIR DISKUS) 500-50 MCG/DOSE inhaler     lamoTRIgine (LAMICTAL) 25 MG tablet     LAMOTRIGINE 200 MG PO TABS     levothyroxine (SYNTHROID) 50 MCG tablet     losartan (COZAAR) 100 MG tablet     metoprolol tartrate (LOPRESSOR) 50 MG tablet     Multiple  Vitamin (MULTI-VITAMIN) per tablet     Omega-3 Fatty Acids (FISH OIL PO)     rosuvastatin (CRESTOR) 40 MG tablet     verapamil ER (CALAN-SR) 240 MG CR tablet     VIIBRYD 10 MG TABS tablet     amoxicillin (AMOXIL) 500 MG tablet     azelastine (ASTEPRO) 0.15 % nasal spray     Blood Pressure Monitor KIT     ticagrelor (BRILINTA) 90 MG tablet     No current facility-administered medications for this visit.       ROS:   Constitutional: No fever, chills, or sweats. Weight is 158 lbs 0 oz  ENT: No visual disturbance, ear ache, epistaxis, sore throat.   Allergies/Immunologic: Negative.   Respiratory: No cough, hemoptysis.   Cardiovascular: As per HPI.   GI: No nausea, vomiting, hematemesis, melena, or hematochezia.   : No urinary frequency, dysuria, or hematuria.   Integrument: Negative.   Psychiatric: No evidence of major depression  Neuro: No new neurological complaints at this time. Non focal  Endocrinology: Negative.   Musculoskeletal: As per HPI.      EXAM:  BP (!) 163/78 (BP Location: Right arm, Patient Position: Chair, Cuff Size: Adult Regular)   Pulse 55   Wt 71.7 kg (158 lb)   SpO2 97%   BMI 28.89 kg/m    General: appears comfortable, alert and oriented  Head: normocephalic, atraumatic  Eyes: anicteric sclera, EOMI , PERRL  Neck: no adenopathy  Orophyarynx: moist mucosa, no lesions noted  Heart: regular, S1/S2, 3 out of 6 systolic murmur appreciated at the right second sternal border with radiation to the carotids.  Estimated JVP at 5 cmH2O  Lungs: CTAB, No wheezing.   Abdomen: soft, non-tender, bowel sounds present, no hepatosplenomegaly  Extremities: No LE edema today  Skin: no open lesions noted  Neuro: grossly non-focal  Psych: no evidence of depression noted     Labs:  Lab Results   Component Value Date    WBC 5.3 11/03/2020    HGB 13.5 11/03/2020    HCT 41.6 11/03/2020     11/03/2020     11/03/2020    POTASSIUM 4.3 11/03/2020    CHLORIDE 111 (H) 11/03/2020    CO2 28 11/03/2020    BUN 23  11/03/2020    CR 1.38 (H) 11/03/2020     (H) 11/03/2020    AST 22 07/22/2020    ALT 31 07/22/2020    ALKPHOS 62 07/22/2020    BILITOTAL 0.6 07/22/2020    INR 1.05 11/03/2020     Echocardiogram reviewed:   Left ventricular function, chamber size, wall motion, and wall thickness are  normal.The EF is 55-60%. No regional wall motion abnormalities are seen.  Right ventricular function, chamber size, wall motion, and thickness are  normal.  Severe left atrial enlargement is present. Moderate mitral annular calcification is present. Mild mitral insufficiency is present. There is likley moderate aortic stenosis. The peak velocity is  3.47m/sec, the peak and mean gradients are 48mmHg and 28mmHg. The aortic valve  area is calculated low at 0.7cm2. Pulmonary artery systolic pressure is normal. The inferior vena cava was normal in size with preserved respiratory variability. No pericardial effusion is present.   Compared to prior study AS has worsened. Otherwise no significant change     RHC/coronary angiogram 11/3/20:    Moderate aortic stenosis -valve area 1.2 cm2,Mean gradient 24 mm Hg    Right sided filling pressures are normal.    Normal PA pressures.    Left sided filling pressures are normal.    Mildly reduced cardiac output level.    Peripheral angiogram done and IVUS used to assess the vessel lumens of the descending thoracic aorta,right and left external iliac arteries.      ASSESSMENT AND PLAN:  In summary, patient is a 85 year old lady with with coronary artery disease and status post PCI 20 years ago at [facility], we do not have records unfortunately available at this point but did receive 2 stents.  She most complains of shortness of breath denies any dizziness lightheadedness syncope or recurrent episodes of chest pains.  We will previous echo images reviewed this on this new echo that was done few days ago reviewed.  This was concerning for paradoxical low flow low gradient aortic stenosis.  We did perform  invasive evaluation with right heart catheterization and invasive valve measurements which was moderate with a valve area of 1.2 at this point.  Cardiac index was mildly decreased around 2 L/min/m .     At this time she was recommended to follow-up with the valve in about 6 months and reevaluate.  I will see her back before in about 4 months and reevaluate with an echocardiogram and see if there was any progression of her aortic stenosis.  The same time we will increase her blood pressure control and add amlodipine 2.5 mg once a day in order to control her blood pressure little bit better.  All side effect discussed including the possible worsening hypotension however with such a low dose I do not expect this to happen.     I appreciate the opportunity to participate in the care of Kamryn Miller . Please do not hesitate to contact me with any further questions.     Sincerely,    Madhu Zuniga MD     [facility] Division of Cardiology

## 2020-11-06 NOTE — PROGRESS NOTES
Results from recent RHC/LHC/ and peripheral angio demonstrate moderate aortic stenosis.    Follow up in 6 months time in Valve clinic as there is no current indication for TAVR in lieu of the moderate severity of the aortic stenosis..      Date: 11/6/2020    Time of Call: 12:35 PM     Diagnosis:  Severe aortic stenosis     [ TORB ] Ordering provider: Pati De Santiago NP  Order:   Echo     Order received by: Dayna Ray RN     Follow-up/additional notes:   Echo ordered for 6 month f/u Valve clinic visit.

## 2020-11-06 NOTE — NURSING NOTE
"Chief Complaint   Patient presents with     RECHECK     3 month follow up. S/P right heart cath. Pt reports no cardiac symptoms.        Initial BP (!) 163/78 (BP Location: Right arm, Patient Position: Chair, Cuff Size: Adult Regular)   Pulse 55   Wt 71.7 kg (158 lb)   SpO2 97%   BMI 28.89 kg/m   Estimated body mass index is 28.89 kg/m  as calculated from the following:    Height as of 11/3/20: 1.575 m (5' 2.01\").    Weight as of this encounter: 71.7 kg (158 lb)..  BP completed using cuff size: regular    Fidelina Harris MA  "

## 2020-11-06 NOTE — PATIENT INSTRUCTIONS
Thank you for coming to the [facility]; please note the following instructions:    1. Follow up with Dr Zuniga in 3-4 months.     2. Start amlodipine 2.5  Mg once daily for blood pressure      If you have any questions regarding your visit please contact your care team:     Cardiology  Telephone Number   Alisa DIAS, RN  Jennifer VITALE, RN   Daniela DELGADO, RMA  Fidelina BOUCHER, RMA  Héctor VICKERS, LPN   [phone] (option 1)   For scheduling appts:     [phone] (select option 1)       For the Device Clinic (Pacemakers and ICD's)  RN's :  Delia Vazquez   During business hours: [phone]    *After business hours:  [phone] (select option 4)      Normal test result notifications will be released via [email or web address] or mailed within 7 business days.  All other test results, will be communicated via telephone once reviewed by your cardiologist.    If you need a medication refill please contact your pharmacy.  Please allow 3 business days for your refill to be completed.    As always, thank you for trusting us with your health care needs!

## 2020-11-10 ENCOUNTER — TELEPHONE (OUTPATIENT)
Dept: CARDIOLOGY | Facility: CLINIC | Age: 85
End: 2020-11-10

## 2020-11-10 NOTE — TELEPHONE ENCOUNTER
Left vm that we have scheduled her for 5/2021 requested by jerome for gary hines. Scheduled her for 5/3/2021 with an echo prior. Left all details on her VM and left my contact information if she has any questions.

## 2021-01-04 DIAGNOSIS — I10 BENIGN ESSENTIAL HYPERTENSION: ICD-10-CM

## 2021-01-04 NOTE — LETTER
Dear Kamryn,       Your provider has sent a  shara refill of Metopolol. You are due for an appointment for further refills. We ask that you schedule a appointment  with your provider.  Please contact the clinic to schedule an appointment for further refills.     Sincerely,     Zari LINO MA

## 2021-01-05 RX ORDER — METOPROLOL TARTRATE 50 MG
TABLET ORAL
Qty: 45 TABLET | Refills: 0 | Status: SHIPPED | OUTPATIENT
Start: 2021-01-05 | End: 2021-01-18

## 2021-01-16 NOTE — PROGRESS NOTES
Assessment & Plan     Coronary artery disease involving native coronary artery of native heart without angina pectoris  Sees Cardiolgy   Had stents    Stage 3b chronic kidney disease  Stable     Severe aortic stenosis  Has follow up scheduled with Cardiolgy  Echo reviewed     Benign essential hypertension  Stable   - metoprolol tartrate (LOPRESSOR) 50 MG tablet; TAKE 1 TAB BY MOUTH IN THE MORNING AND 1/2 IN THE EVENING-needs follow up appointment  - losartan (COZAAR) 100 MG tablet; Take 1 tablet (100 mg) by mouth daily  - verapamil ER (CALAN-SR) 240 MG CR tablet; Take 1 tablet (240 mg) by mouth daily Needs follow up appointment        Ischemic cardiomyopathy  Sees Cardiology    Mild major depression (H)  Stable     Pulmonary hypertension (H)  Stable     Celiac disease  Stable     Generalized anxiety disorder  Doing well    Hypothyroidism, unspecified type  Labs reviewed     Hyperlipidemia LDL goal <70  Stable     Mild persistent asthma without complication  controlled    Mitral valve insufficiency, unspecified etiology      Hair loss  Will check  - TSH with free T4 reflex    Review of external notes as documented above   Review of the result(s) of each unique test - lipid and BMP                   Return in about 6 months (around 7/18/2021) for Physical Exam.    Rolanda Wilcox MD  [facility] PIERCE Harry is a 85 year old who presents to clinic today for the following health issues     HPI       Hyperlipidemia Follow-Up      Are you regularly taking any medication or supplement to lower your cholesterol?   Yes- Rosuvastatin 40 mg    Are you having muscle aches or other side effects that you think could be caused by your cholesterol lowering medication?  No    Hypertension Follow-up      Do you check your blood pressure regularly outside of the clinic? Yes     Are you following a low salt diet? Yes    Are your blood pressures ever more than 140 on the top number (systolic) OR  more   than 90 on the bottom number (diastolic), for example 140/90? No    Depression and Anxiety Follow-Up    How are you doing with your depression since your last visit? Improved     How are you doing with your anxiety since your last visit?  Improved     Are you having other symptoms that might be associated with depression or anxiety? No    Have you had a significant life event? No     Do you have any concerns with your use of alcohol or other drugs? No    Social History     Tobacco Use     Smoking status: Never Smoker     Smokeless tobacco: Never Used   Substance Use Topics     Alcohol use: No     Alcohol/week: 0.0 standard drinks     Types: 1 Standard drinks or equivalent per week     Drug use: No     PHQ 6/25/2019 8/12/2020 1/18/2021   PHQ-9 Total Score 0 0 0   Q9: Thoughts of better off dead/self-harm past 2 weeks Not at all Not at all Not at all     ESTHER-7 SCORE 1/18/2021   Total Score 0     Last PHQ-9 1/18/2021   1.  Little interest or pleasure in doing things 0   2.  Feeling down, depressed, or hopeless 0   3.  Trouble falling or staying asleep, or sleeping too much 0   4.  Feeling tired or having little energy 0   5.  Poor appetite or overeating 0   6.  Feeling bad about yourself 0   7.  Trouble concentrating 0   8.  Moving slowly or restless 0   Q9: Thoughts of better off dead/self-harm past 2 weeks 0   PHQ-9 Total Score 0   Difficulty at work, home, or with people Not difficult at all     ESTHER-7  1/18/2021   1. Feeling nervous, anxious, or on edge 0   2. Not being able to stop or control worrying 0   3. Worrying too much about different things 0   4. Trouble relaxing 0   5. Being so restless that it is hard to sit still 0   6. Becoming easily annoyed or irritable 0   7. Feeling afraid, as if something awful might happen 0   ESTHER-7 Total Score 0   If you checked any problems, how difficult have they made it for you to do your work, take care of things at home, or get along with other people? Not difficult  at all       Suicide Assessment Five-step Evaluation and Treatment (SAFE-T)      How many servings of fruits and vegetables do you eat daily?  4 or more    On average, how many sweetened beverages do you drink each day (Examples: soda, juice, sweet tea, etc.  Do NOT count diet or artificially sweetened beverages)?   0    How many days per week do you exercise enough to make your heart beat faster? 3 or less    How many minutes a day do you exercise enough to make your heart beat faster? 30 - 60    How many days per week do you miss taking your medication? 0    Asthma Follow-Up    Was ACT completed today?    Yes    ACT Total Scores 1/18/2021   ACT TOTAL SCORE -   ASTHMA ER VISITS -   ASTHMA HOSPITALIZATIONS -   ACT TOTAL SCORE (Goal Greater than or Equal to 20) 22   In the past 12 months, how many times did you visit the emergency room for your asthma without being admitted to the hospital? 0   In the past 12 months, how many times were you hospitalized overnight because of your asthma? 0      pt has Pulmonary Hypertension and does have sob with activity  No chest pain  Can walk about 1 block  No pedal edema  No wt gain per pt    How many days per week do you miss taking your asthma controller medication?  0    Please describe any recent triggers for your asthma: upper respiratory infections    Have you had any Emergency Room Visits, Urgent Care Visits, or Hospital Admissions since your last office visit?  No  Review of Systems   CONSTITUTIONAL: NEGATIVE for fever, chills, change in weight  ENT/MOUTH: NEGATIVE for ear, mouth and throat problems  RESP: NEGATIVE for significant cough or SOB  CV: NEGATIVE for chest pain, palpitations or peripheral edema  GI: NEGATIVE for nausea, abdominal pain, heartburn, or change in bowel habits  MUSCULOSKELETAL: NEGATIVE for significant arthralgias or myalgia  Pt states she has had hair loss last 1 month  Every time she riley her hair hair falls  No stress  She may get AVR  Has a  "follow up scheduled with Cardiolgy  ROS otherwise negative      Objective    /73   Pulse 61   Temp 97.9  F (36.6  C) (Oral)   Resp 20   Ht 1.575 m (5' 2.01\")   Wt 68.9 kg (152 lb)   SpO2 98%   BMI 27.79 kg/m    Body mass index is 27.79 kg/m .  Physical Exam   GENERAL: healthy, alert and no distress  NECK: no adenopathy, no asymmetry, masses, or scars and thyroid normal to palpation  RESP: lungs clear to auscultation - no rales, rhonchi or wheezes  CV: regular rates and rhythm, normal S1 S2, no S3 or S4, grade 3/6 systolic  murmur heard best over the LSB, peripheral pulses strong and no peripheral edema  ABDOMEN: soft, nontender, no hepatosplenomegaly, no masses and bowel sounds normal  MS: no gross musculoskeletal defects noted, no edema    Reviewed   Admission on 11/03/2020, Discharged on 11/03/2020   Component Date Value Ref Range Status     Sodium 11/03/2020 144  133 - 144 mmol/L Final     Potassium 11/03/2020 4.3  3.4 - 5.3 mmol/L Final     Chloride 11/03/2020 111* 94 - 109 mmol/L Final     Carbon Dioxide 11/03/2020 28  20 - 32 mmol/L Final     Anion Gap 11/03/2020 5  3 - 14 mmol/L Final     Glucose 11/03/2020 100* 70 - 99 mg/dL Final     Urea Nitrogen 11/03/2020 23  7 - 30 mg/dL Final     Creatinine 11/03/2020 1.38* 0.52 - 1.04 mg/dL Final     GFR Estimate 11/03/2020 35* >60 mL/min/[1.73_m2] Final    Comment: Non  GFR Calc  Starting 12/18/2018, serum creatinine based estimated GFR (eGFR) will be   calculated using the Chronic Kidney Disease Epidemiology Collaboration   (CKD-EPI) equation.       GFR Estimate If Black 11/03/2020 41* >60 mL/min/[1.73_m2] Final    Comment:  GFR Calc  Starting 12/18/2018, serum creatinine based estimated GFR (eGFR) will be   calculated using the Chronic Kidney Disease Epidemiology Collaboration   (CKD-EPI) equation.       Calcium 11/03/2020 9.5  8.5 - 10.1 mg/dL Final     WBC 11/03/2020 5.3  4.0 - 11.0 10e9/L Final     RBC Count " 11/03/2020 4.12  3.8 - 5.2 10e12/L Final     Hemoglobin 11/03/2020 13.5  11.7 - 15.7 g/dL Final     Hematocrit 11/03/2020 41.6  35.0 - 47.0 % Final     MCV 11/03/2020 101* 78 - 100 fl Final     MCH 11/03/2020 32.8  26.5 - 33.0 pg Final     MCHC 11/03/2020 32.5  31.5 - 36.5 g/dL Final     RDW 11/03/2020 11.9  10.0 - 15.0 % Final     Platelet Count 11/03/2020 199  150 - 450 10e9/L Final     PTT 11/03/2020 28  22 - 37 sec Final     INR 11/03/2020 1.05  0.86 - 1.14 Final     Interpretation ECG 11/03/2020 Click View Image link to view waveform and result   Final

## 2021-01-18 ENCOUNTER — OFFICE VISIT (OUTPATIENT)
Dept: FAMILY MEDICINE | Facility: CLINIC | Age: 86
End: 2021-01-18
Payer: MEDICARE

## 2021-01-18 VITALS
SYSTOLIC BLOOD PRESSURE: 131 MMHG | HEIGHT: 62 IN | WEIGHT: 152 LBS | BODY MASS INDEX: 27.97 KG/M2 | HEART RATE: 61 BPM | DIASTOLIC BLOOD PRESSURE: 73 MMHG | OXYGEN SATURATION: 98 % | RESPIRATION RATE: 20 BRPM | TEMPERATURE: 97.9 F

## 2021-01-18 DIAGNOSIS — I25.10 CORONARY ARTERY DISEASE INVOLVING NATIVE CORONARY ARTERY OF NATIVE HEART WITHOUT ANGINA PECTORIS: ICD-10-CM

## 2021-01-18 DIAGNOSIS — I34.0 MITRAL VALVE INSUFFICIENCY, UNSPECIFIED ETIOLOGY: ICD-10-CM

## 2021-01-18 DIAGNOSIS — I27.20 PULMONARY HYPERTENSION (H): ICD-10-CM

## 2021-01-18 DIAGNOSIS — I25.5 ISCHEMIC CARDIOMYOPATHY: ICD-10-CM

## 2021-01-18 DIAGNOSIS — E78.5 HYPERLIPIDEMIA LDL GOAL <70: ICD-10-CM

## 2021-01-18 DIAGNOSIS — F41.1 GENERALIZED ANXIETY DISORDER: ICD-10-CM

## 2021-01-18 DIAGNOSIS — K90.0 CELIAC DISEASE: ICD-10-CM

## 2021-01-18 DIAGNOSIS — I35.0 SEVERE AORTIC STENOSIS: ICD-10-CM

## 2021-01-18 DIAGNOSIS — L65.9 HAIR LOSS: ICD-10-CM

## 2021-01-18 DIAGNOSIS — N18.32 STAGE 3B CHRONIC KIDNEY DISEASE (H): Primary | ICD-10-CM

## 2021-01-18 DIAGNOSIS — E03.9 HYPOTHYROIDISM, UNSPECIFIED TYPE: ICD-10-CM

## 2021-01-18 DIAGNOSIS — F32.0 MILD MAJOR DEPRESSION (H): ICD-10-CM

## 2021-01-18 DIAGNOSIS — I10 BENIGN ESSENTIAL HYPERTENSION: ICD-10-CM

## 2021-01-18 DIAGNOSIS — J45.30 MILD PERSISTENT ASTHMA WITHOUT COMPLICATION: ICD-10-CM

## 2021-01-18 DIAGNOSIS — E78.2 MIXED HYPERLIPIDEMIA: ICD-10-CM

## 2021-01-18 LAB
T4 FREE SERPL-MCNC: 0.72 NG/DL (ref 0.76–1.46)
TSH SERPL DL<=0.005 MIU/L-ACNC: 6.11 MU/L (ref 0.4–4)

## 2021-01-18 PROCEDURE — 99214 OFFICE O/P EST MOD 30 MIN: CPT | Performed by: FAMILY MEDICINE

## 2021-01-18 PROCEDURE — 84439 ASSAY OF FREE THYROXINE: CPT | Performed by: FAMILY MEDICINE

## 2021-01-18 PROCEDURE — 84443 ASSAY THYROID STIM HORMONE: CPT | Performed by: FAMILY MEDICINE

## 2021-01-18 PROCEDURE — 36415 COLL VENOUS BLD VENIPUNCTURE: CPT | Performed by: FAMILY MEDICINE

## 2021-01-18 RX ORDER — LOSARTAN POTASSIUM 100 MG/1
100 TABLET ORAL DAILY
Qty: 90 TABLET | Refills: 0 | Status: SHIPPED | OUTPATIENT
Start: 2021-01-18 | End: 2021-05-11

## 2021-01-18 RX ORDER — AMLODIPINE BESYLATE 2.5 MG/1
2.5 TABLET ORAL DAILY
Qty: 90 TABLET | Refills: 3 | Status: CANCELLED | OUTPATIENT
Start: 2021-01-18

## 2021-01-18 RX ORDER — METOPROLOL TARTRATE 50 MG
TABLET ORAL
Qty: 135 TABLET | Refills: 3 | Status: SHIPPED | OUTPATIENT
Start: 2021-01-18 | End: 2021-12-31

## 2021-01-18 RX ORDER — VERAPAMIL HYDROCHLORIDE 240 MG/1
240 TABLET, FILM COATED, EXTENDED RELEASE ORAL DAILY
Qty: 90 TABLET | Refills: 0 | Status: SHIPPED | OUTPATIENT
Start: 2021-01-18 | End: 2021-05-24

## 2021-01-18 ASSESSMENT — ANXIETY QUESTIONNAIRES
2. NOT BEING ABLE TO STOP OR CONTROL WORRYING: NOT AT ALL
6. BECOMING EASILY ANNOYED OR IRRITABLE: NOT AT ALL
3. WORRYING TOO MUCH ABOUT DIFFERENT THINGS: NOT AT ALL
IF YOU CHECKED OFF ANY PROBLEMS ON THIS QUESTIONNAIRE, HOW DIFFICULT HAVE THESE PROBLEMS MADE IT FOR YOU TO DO YOUR WORK, TAKE CARE OF THINGS AT HOME, OR GET ALONG WITH OTHER PEOPLE: NOT DIFFICULT AT ALL
7. FEELING AFRAID AS IF SOMETHING AWFUL MIGHT HAPPEN: NOT AT ALL
5. BEING SO RESTLESS THAT IT IS HARD TO SIT STILL: NOT AT ALL
1. FEELING NERVOUS, ANXIOUS, OR ON EDGE: NOT AT ALL
GAD7 TOTAL SCORE: 0

## 2021-01-18 ASSESSMENT — PATIENT HEALTH QUESTIONNAIRE - PHQ9
5. POOR APPETITE OR OVEREATING: NOT AT ALL
SUM OF ALL RESPONSES TO PHQ QUESTIONS 1-9: 0

## 2021-01-18 ASSESSMENT — PAIN SCALES - GENERAL: PAINLEVEL: NO PAIN (0)

## 2021-01-18 ASSESSMENT — MIFFLIN-ST. JEOR: SCORE: 1092.85

## 2021-01-19 ASSESSMENT — ANXIETY QUESTIONNAIRES: GAD7 TOTAL SCORE: 0

## 2021-01-19 ASSESSMENT — ASTHMA QUESTIONNAIRES: ACT_TOTALSCORE: 22

## 2021-01-20 DIAGNOSIS — E03.9 HYPOTHYROIDISM: Primary | ICD-10-CM

## 2021-01-20 RX ORDER — LEVOTHYROXINE SODIUM 75 UG/1
75 TABLET ORAL DAILY
Qty: 90 TABLET | Refills: 0 | Status: SHIPPED | OUTPATIENT
Start: 2021-01-20 | End: 2021-07-20

## 2021-02-09 ENCOUNTER — IMMUNIZATION (OUTPATIENT)
Dept: NURSING | Facility: CLINIC | Age: 86
End: 2021-02-09
Payer: MEDICARE

## 2021-02-09 PROCEDURE — 91300 PR COVID VAC PFIZER DIL RECON 30 MCG/0.3 ML IM: CPT

## 2021-02-09 PROCEDURE — 0001A PR COVID VAC PFIZER DIL RECON 30 MCG/0.3 ML IM: CPT

## 2021-02-25 NOTE — PROGRESS NOTES
February 26, 2021     I had the pleasure of seeing Kamryn Miller  in the [facility] for further evaluation and management. She has a history of Hx CAD, HLD, HTN, mitral and tricuspid insuff, pulmonary hypertension yet no recent cardiology follow up.  She does have CAD and did receive 3 stents in 8/2020 as below.  She denies any cardiomyopathy and she has not had any cardiology follow-up for several years.  Notes that she does have shortness of breath especially with exertion or she walks outside.  This is class III symptoms at the moment she can will probably about a block and able to take a flight of stairs.  We did start possible TAVR evaluation and he does not undergo right heart catheterization as well as invasive hemodynamic measurement and measurement of the valve area.  This seem to be on the higher end of moderate at the hilda (November 2020) and 6 months follow up was recommended.   Continues to do relatively well or stable at least with no new complaints.  She denies any episodes of chest pain dizziness lightheadedness and no syncope.  She does have some baseline shortness of breath with exertion however notes that this has not changed with.  She takes all her medications as prescribed including the Plavix for her stents that she received in August 2020 and she does not have any bleeding or any other issues from it.  She continues to baby aspirin.  She takes her blood pressure at home which is running in the higher end range usually in the 140 to 150 mmHg systolic value.  Other than these notes no issues no new complaints.  She did receive the first dose of the Covid vaccine and she is very happy about this she is awaiting the second dose at this time.     PAST MEDICAL HISTORY:  Past Medical History:   Diagnosis Date     Aortic valvar stenosis 7/2010    mild     Asthma      Bipolar disorder (H)      CAD (coronary artery disease)     s/p angioplasty     Celiac disease      Colon polyps       Colon polyps 2012    every 3 year colonoscopy      Depression      High cholesterol      HTN      Mitral insufficiency      Pulmonary HTN (H)     mild     Tremors 7/10    drug induced from antidepressants     Tricuspid insufficiency      FAMILY HISTORY:  Family History   Problem Relation Age of Onset     Asthma Mother      Cerebrovascular Disease Mother      Arthritis Mother      Depression Mother      Lipids Sister      Hypertension Sister      Heart Disease Sister      Lipids Sister      Hypertension Sister      Lipids Sister      Breast Cancer Sister      SOCIAL HISTORY:  Social History     Socioeconomic History     Marital status:      Spouse name: Adrien     Number of children: 2     Years of education: 16     Highest education level: Not on file   Occupational History     Employer: RETIRED     Comment: Retired in 2002.    Social Needs     Financial resource strain: Not on file     Food insecurity     Worry: Not on file     Inability: Not on file     Transportation needs     Medical: Not on file     Non-medical: Not on file   Tobacco Use     Smoking status: Never Smoker     Smokeless tobacco: Never Used   Substance and Sexual Activity     Alcohol use: No     Alcohol/week: 0.0 standard drinks     Types: 1 Standard drinks or equivalent per week     Drug use: No     Sexual activity: Not Currently     Partners: Male   Lifestyle     Physical activity     Days per week: Not on file     Minutes per session: Not on file     Stress: Not on file   Relationships     Social connections     Talks on phone: Not on file     Gets together: Not on file     Attends Zoroastrian service: Not on file     Active member of club or organization: Not on file     Attends meetings of clubs or organizations: Not on file     Relationship status: Not on file     Intimate partner violence     Fear of current or ex partner: Not on file     Emotionally abused: Not on file     Physically abused: Not on file     Forced sexual activity:  Not on file   Other Topics Concern     Parent/sibling w/ CABG, MI or angioplasty before 65F 55M? Not Asked   Social History Narrative     Not on file     CURRENT MEDICATIONS:  Current Outpatient Medications   Medication     amLODIPine (NORVASC) 2.5 MG tablet     amoxicillin (AMOXIL) 500 MG tablet     aspirin 81 MG tablet     azelastine (ASTEPRO) 0.15 % nasal spray     azelastine (ASTEPRO) 0.15 % nasal spray     Blood Pressure Monitor KIT     Calcium Citrate (CITRACAL OR)     CHOLECALCIFEROL 67404 UNIT PO CAPS     clopidogrel (PLAVIX) 75 MG tablet     desvenlafaxine (PRISTIQ) 100 MG 24 hr tablet     desvenlafaxine succinate (PRISTIQ) 25 MG 24 hr tablet     fluticasone-salmeterol (ADVAIR DISKUS) 500-50 MCG/DOSE inhaler     lamoTRIgine (LAMICTAL) 25 MG tablet     LAMOTRIGINE 200 MG PO TABS     levothyroxine (SYNTHROID/LEVOTHROID) 75 MCG tablet     losartan (COZAAR) 100 MG tablet     metoprolol tartrate (LOPRESSOR) 50 MG tablet     Multiple Vitamin (MULTI-VITAMIN) per tablet     Omega-3 Fatty Acids (FISH OIL PO)     rosuvastatin (CRESTOR) 40 MG tablet     ticagrelor (BRILINTA) 90 MG tablet     verapamil ER (CALAN-SR) 240 MG CR tablet     VIIBRYD 10 MG TABS tablet     No current facility-administered medications for this visit.      ROS:   Constitutional: No fever, chills, or sweats.  ENT: No visual disturbance, ear ache, epistaxis, sore throat.   Allergies/Immunologic: Negative.   Respiratory: No cough, hemoptysis.   Cardiovascular: As per HPI.   GI: No nausea, vomiting, hematemesis, melena, or hematochezia.   : No urinary frequency, dysuria, or hematuria.   Integrument: Negative.   Psychiatric: No evidence of major depression  Neuro: No new neurological complaints at this time. Non focal  Endocrinology: Negative.   Musculoskeletal: As per HPI.      EXAM:  Limited evaluated due to phone encounter.  Blood pressure remains elevated with 140mm systolic     Labs:  Lab Results   Component Value Date    WBC 5.3 11/03/2020     HGB 13.5 11/03/2020    HCT 41.6 11/03/2020     11/03/2020     11/03/2020    POTASSIUM 4.3 11/03/2020    CHLORIDE 111 (H) 11/03/2020    CO2 28 11/03/2020    BUN 23 11/03/2020    CR 1.38 (H) 11/03/2020     (H) 11/03/2020    AST 22 07/22/2020    ALT 31 07/22/2020    ALKPHOS 62 07/22/2020    BILITOTAL 0.6 07/22/2020    INR 1.05 11/03/2020     Echocardiogram reviewed:   Left ventricular function, chamber size, wall motion, and wall thickness are normal.The EF is 55-60%. No regional wall motion abnormalities are seen.  Right ventricular function, chamber size, wall motion, and thickness are normal.  Severe left atrial enlargement is present. Moderate mitral annular calcification is present. Mild mitral insufficiency is present. There is likley moderate aortic stenosis. The peak velocity is  3.47m/sec, the peak and mean gradients are 48mmHg and 28mmHg. The aortic valve  area is calculated low at 0.7cm2. Pulmonary artery systolic pressure is normal. The inferior vena cava was normal in size with preserved respiratory variability. No pericardial effusion is present.   Compared to prior study AS has worsened. Otherwise no significant change    Coronary angiogram 8/21/2020:    Dist LAD lesion is 70% stenosed.    Mid LAD-1 lesion is 80% stenosed.    Mid LAD-2 lesion is 60% stenosed.    Mid RCA lesion is 40% stenosed.    Prox RCA lesion is 40% stenosed.    Right sided filling pressures are normal.    Normal PA pressures.    Left sided filling pressures are normal.    Reduced cardiac output.  1. LAD - 3 EDDIE were placed in the mid to distal LAD (2.5 x 28 mm, 3.5 x 12 mm, and 3.0 x 8 mm)  2. RHC showed normal biventricular filling pressures. Normal PA pressure. Reduced cardiac output.     RHC/coronary angiogram 11/3/20:    Moderate aortic stenosis -valve area 1.2 cm2,Mean gradient 24 mm Hg    Right sided filling pressures are normal.    Normal PA pressures.    Left sided filling pressures are  normal.    Mildly reduced cardiac output level.    Peripheral angiogram done and IVUS used to assess the vessel lumens of the descending thoracic aorta,right and left external iliac arteries.        ASSESSMENT AND PLAN:  In summary, patient is a 85 year old lady with with coronary artery disease and status post PCI 20 years ago at [facility], we do not have records unfortunately available at this point but did receive 2 stents.  She most complains of shortness of breath denies any dizziness lightheadedness syncope or recurrent episodes of chest pains.  Overall there has been minimal change in her condition since my last visit and since the invasive evaluation.  At this time we will continue to hold her blood pressure well and increase the amlodipine to 5 mg daily.  We discussed to watch for dizziness lightheadedness however I think she will tolerate this.  Her systolic pressure is significantly elevated.  Otherwise he is not make any changes in her medications currently she will continue the Plavix and aspirin at this point.  We discussed warning signs for worsening aortic stenosis which would include chest pain worsening shortness of breath or exercise intolerance as well as syncope or lightheadedness.  She will watch for these and let us know if any of these would happen.  I will see her back in about 3 months with a repeat echocardiogram to reevaluate the valve area as well as labs.  At that time we will discuss whether she should follow-up with Dr. Burrows of interventional cardiology [place] or we can wait a little bit more and see him down the road if aortic valve area worsens.     I appreciate the opportunity to participate in the care of Kamryn Miller . Please do not hesitate to contact me with any further questions.     Sincerely,   Madhu Zuniga MD  [facility] Division of Cardiology

## 2021-02-26 ENCOUNTER — VIRTUAL VISIT (OUTPATIENT)
Dept: CARDIOLOGY | Facility: CLINIC | Age: 86
End: 2021-02-26
Payer: MEDICARE

## 2021-02-26 DIAGNOSIS — I25.10 CORONARY ARTERY DISEASE INVOLVING NATIVE CORONARY ARTERY OF NATIVE HEART WITHOUT ANGINA PECTORIS: ICD-10-CM

## 2021-02-26 DIAGNOSIS — E78.2 MIXED HYPERLIPIDEMIA: ICD-10-CM

## 2021-02-26 DIAGNOSIS — I25.5 ISCHEMIC CARDIOMYOPATHY: ICD-10-CM

## 2021-02-26 DIAGNOSIS — I35.0 SEVERE AORTIC STENOSIS: ICD-10-CM

## 2021-02-26 DIAGNOSIS — I50.20 HEART FAILURE WITH REDUCED EJECTION FRACTION, NYHA CLASS III (H): Primary | ICD-10-CM

## 2021-02-26 DIAGNOSIS — I10 BENIGN ESSENTIAL HYPERTENSION: ICD-10-CM

## 2021-02-26 DIAGNOSIS — I35.0 MODERATE AORTIC STENOSIS: ICD-10-CM

## 2021-02-26 PROCEDURE — 99443 PR PHYSICIAN TELEPHONE EVALUATION 21-30 MIN: CPT | Mod: 95 | Performed by: INTERNAL MEDICINE

## 2021-02-26 RX ORDER — AMLODIPINE BESYLATE 2.5 MG/1
5 TABLET ORAL DAILY
Qty: 90 TABLET | Refills: 3 | Status: SHIPPED | OUTPATIENT
Start: 2021-02-26 | End: 2021-04-06

## 2021-02-26 NOTE — LETTER
2/26/2021    RE: Kamryn Miller  [address]   [address]  [place] [address]       Dear Colleague,    Thank you for the opportunity to participate in the care of your patient, Kamryn Miller, at the [facility]. Please see a copy of my visit note below.    February 26, 2021     I had the pleasure of seeing Kamryn Miller  in the [facility] for further evaluation and management. She has a history of Hx CAD, HLD, HTN, mitral and tricuspid insuff, pulmonary hypertension yet no recent cardiology follow up.  She does have CAD and did receive 3 stents in 8/2020 as below.  She denies any cardiomyopathy and she has not had any cardiology follow-up for several years.  Notes that she does have shortness of breath especially with exertion or she walks outside.  This is class III symptoms at the moment she can will probably about a block and able to take a flight of stairs.  We did start possible TAVR evaluation and he does not undergo right heart catheterization as well as invasive hemodynamic measurement and measurement of the valve area.  This seem to be on the higher end of moderate at the hilda (November 2020) and 6 months follow up was recommended.   Continues to do relatively well or stable at least with no new complaints.  She denies any episodes of chest pain dizziness lightheadedness and no syncope.  She does have some baseline shortness of breath with exertion however notes that this has not changed with.  She takes all her medications as prescribed including the Plavix for her stents that she received in August 2020 and she does not have any bleeding or any other issues from it.  She continues to baby aspirin.  She takes her blood pressure at home which is running in the higher end range usually in the 140 to 150 mmHg systolic value.  Other than these notes no issues no new complaints.  She did  receive the first dose of the Covid vaccine and she is very happy about this she is awaiting the second dose at this time.     PAST MEDICAL HISTORY:  Past Medical History:   Diagnosis Date     Aortic valvar stenosis 7/2010    mild     Asthma      Bipolar disorder (H)      CAD (coronary artery disease)     s/p angioplasty     Celiac disease      Colon polyps      Colon polyps 2012    every 3 year colonoscopy      Depression      High cholesterol      HTN      Mitral insufficiency      Pulmonary HTN (H)     mild     Tremors 7/10    drug induced from antidepressants     Tricuspid insufficiency      FAMILY HISTORY:  Family History   Problem Relation Age of Onset     Asthma Mother      Cerebrovascular Disease Mother      Arthritis Mother      Depression Mother      Lipids Sister      Hypertension Sister      Heart Disease Sister      Lipids Sister      Hypertension Sister      Lipids Sister      Breast Cancer Sister      SOCIAL HISTORY:  Social History     Socioeconomic History     Marital status:      Spouse name: Adrien     Number of children: 2     Years of education: 16     Highest education level: Not on file   Occupational History     Employer: RETIRED     Comment: Retired in 2002.    Social Needs     Financial resource strain: Not on file     Food insecurity     Worry: Not on file     Inability: Not on file     Transportation needs     Medical: Not on file     Non-medical: Not on file   Tobacco Use     Smoking status: Never Smoker     Smokeless tobacco: Never Used   Substance and Sexual Activity     Alcohol use: No     Alcohol/week: 0.0 standard drinks     Types: 1 Standard drinks or equivalent per week     Drug use: No     Sexual activity: Not Currently     Partners: Male   Lifestyle     Physical activity     Days per week: Not on file     Minutes per session: Not on file     Stress: Not on file   Relationships     Social connections     Talks on phone: Not on file     Gets together: Not on file     Attends  Jain service: Not on file     Active member of club or organization: Not on file     Attends meetings of clubs or organizations: Not on file     Relationship status: Not on file     Intimate partner violence     Fear of current or ex partner: Not on file     Emotionally abused: Not on file     Physically abused: Not on file     Forced sexual activity: Not on file   Other Topics Concern     Parent/sibling w/ CABG, MI or angioplasty before 65F 55M? Not Asked   Social History Narrative     Not on file     CURRENT MEDICATIONS:  Current Outpatient Medications   Medication     amLODIPine (NORVASC) 2.5 MG tablet     amoxicillin (AMOXIL) 500 MG tablet     aspirin 81 MG tablet     azelastine (ASTEPRO) 0.15 % nasal spray     azelastine (ASTEPRO) 0.15 % nasal spray     Blood Pressure Monitor KIT     Calcium Citrate (CITRACAL OR)     CHOLECALCIFEROL 01798 UNIT PO CAPS     clopidogrel (PLAVIX) 75 MG tablet     desvenlafaxine (PRISTIQ) 100 MG 24 hr tablet     desvenlafaxine succinate (PRISTIQ) 25 MG 24 hr tablet     fluticasone-salmeterol (ADVAIR DISKUS) 500-50 MCG/DOSE inhaler     lamoTRIgine (LAMICTAL) 25 MG tablet     LAMOTRIGINE 200 MG PO TABS     levothyroxine (SYNTHROID/LEVOTHROID) 75 MCG tablet     losartan (COZAAR) 100 MG tablet     metoprolol tartrate (LOPRESSOR) 50 MG tablet     Multiple Vitamin (MULTI-VITAMIN) per tablet     Omega-3 Fatty Acids (FISH OIL PO)     rosuvastatin (CRESTOR) 40 MG tablet     ticagrelor (BRILINTA) 90 MG tablet     verapamil ER (CALAN-SR) 240 MG CR tablet     VIIBRYD 10 MG TABS tablet     No current facility-administered medications for this visit.      ROS:   Constitutional: No fever, chills, or sweats.  ENT: No visual disturbance, ear ache, epistaxis, sore throat.   Allergies/Immunologic: Negative.   Respiratory: No cough, hemoptysis.   Cardiovascular: As per HPI.   GI: No nausea, vomiting, hematemesis, melena, or hematochezia.   : No urinary frequency, dysuria, or hematuria.    Integrument: Negative.   Psychiatric: No evidence of major depression  Neuro: No new neurological complaints at this time. Non focal  Endocrinology: Negative.   Musculoskeletal: As per HPI.      EXAM:  Limited evaluated due to phone encounter.  Blood pressure remains elevated with 140mm systolic     Labs:  Lab Results   Component Value Date    WBC 5.3 11/03/2020    HGB 13.5 11/03/2020    HCT 41.6 11/03/2020     11/03/2020     11/03/2020    POTASSIUM 4.3 11/03/2020    CHLORIDE 111 (H) 11/03/2020    CO2 28 11/03/2020    BUN 23 11/03/2020    CR 1.38 (H) 11/03/2020     (H) 11/03/2020    AST 22 07/22/2020    ALT 31 07/22/2020    ALKPHOS 62 07/22/2020    BILITOTAL 0.6 07/22/2020    INR 1.05 11/03/2020     Echocardiogram reviewed:   Left ventricular function, chamber size, wall motion, and wall thickness are normal.The EF is 55-60%. No regional wall motion abnormalities are seen.  Right ventricular function, chamber size, wall motion, and thickness are normal.  Severe left atrial enlargement is present. Moderate mitral annular calcification is present. Mild mitral insufficiency is present. There is likley moderate aortic stenosis. The peak velocity is  3.47m/sec, the peak and mean gradients are 48mmHg and 28mmHg. The aortic valve  area is calculated low at 0.7cm2. Pulmonary artery systolic pressure is normal. The inferior vena cava was normal in size with preserved respiratory variability. No pericardial effusion is present.   Compared to prior study AS has worsened. Otherwise no significant change    Coronary angiogram 8/21/2020:    Dist LAD lesion is 70% stenosed.    Mid LAD-1 lesion is 80% stenosed.    Mid LAD-2 lesion is 60% stenosed.    Mid RCA lesion is 40% stenosed.    Prox RCA lesion is 40% stenosed.    Right sided filling pressures are normal.    Normal PA pressures.    Left sided filling pressures are normal.    Reduced cardiac output.  1. LAD - 3 EDDIE were placed in the mid to distal LAD  (2.5 x 28 mm, 3.5 x 12 mm, and 3.0 x 8 mm)  2. RHC showed normal biventricular filling pressures. Normal PA pressure. Reduced cardiac output.     RHC/coronary angiogram 11/3/20:    Moderate aortic stenosis -valve area 1.2 cm2,Mean gradient 24 mm Hg    Right sided filling pressures are normal.    Normal PA pressures.    Left sided filling pressures are normal.    Mildly reduced cardiac output level.    Peripheral angiogram done and IVUS used to assess the vessel lumens of the descending thoracic aorta,right and left external iliac arteries.        ASSESSMENT AND PLAN:  In summary, patient is a 85 year old lady with with coronary artery disease and status post PCI 20 years ago at [facility], we do not have records unfortunately available at this point but did receive 2 stents.  She most complains of shortness of breath denies any dizziness lightheadedness syncope or recurrent episodes of chest pains.  Overall there has been minimal change in her condition since my last visit and since the invasive evaluation.  At this time we will continue to hold her blood pressure well and increase the amlodipine to 5 mg daily.  We discussed to watch for dizziness lightheadedness however I think she will tolerate this.  Her systolic pressure is significantly elevated.  Otherwise he is not make any changes in her medications currently she will continue the Plavix and aspirin at this point.  We discussed warning signs for worsening aortic stenosis which would include chest pain worsening shortness of breath or exercise intolerance as well as syncope or lightheadedness.  She will watch for these and let us know if any of these would happen.  I will see her back in about 3 months with a repeat echocardiogram to reevaluate the valve area as well as labs.  At that time we will discuss whether she should follow-up with Dr. Burrows of interventional cardiology [place] or we can wait a little bit more and see him down the road if aortic  valve area worsens.     I appreciate the opportunity to participate in the care of Kamryn Miller . Please do not hesitate to contact me with any further questions.     Sincerely,   Madhu Zuniga MD  [facility] Division of Cardiology     Kamryn is a 85 year old who is being evaluated via a billable telephone visit.      What phone number would you like to be contacted at? [phone]  How would you like to obtain your AVS? Causest  Phone call duration: 22 minutes

## 2021-02-26 NOTE — PATIENT INSTRUCTIONS
Thank you for coming to the [facility] @ [facility]; please note the following instructions:    1. Please increase amlodipine to 5mg once a day  2. Please continue to monitor you blood pressure  3. We will repeat echocardiogram in 3 months  4. Will see you back in 3 months with labs and echo  5. Please call anytime with question or concerns      If you have any questions regarding your visit please contact your care team:     Cardiology  Telephone Number   Alisa BROWNLEE., RN  Jennifer VITALE, RN   Daniela DELGADO, RMA  Fidelina BOUCHER, FANG VICKERS, LPN   [phone] (option 1)   For scheduling appts:     [phone] (select option 1)       For the Device Clinic (Pacemakers and ICD's)  RN's :  Delia Vazqeuz   During business hours: [phone]    *After business hours:  [phone] (select option 4)      Normal test result notifications will be released via SWEEPiO or mailed within 7 business days.  All other test results, will be communicated via telephone once reviewed by your cardiologist.    If you need a medication refill please contact your pharmacy.  Please allow 3 business days for your refill to be completed.    As always, thank you for trusting us with your health care needs!

## 2021-02-26 NOTE — PROGRESS NOTES
Kamryn is a 85 year old who is being evaluated via a billable telephone visit.      What phone number would you like to be contacted at? [phone]  How would you like to obtain your AVS? Jacky  Phone call duration: 22 minutes

## 2021-02-26 NOTE — NURSING NOTE
Vitals - Patient Reported  Systolic (Patient Reported): 121  Diastolic (Patient Reported): 74  Weight (Patient Reported): 79.4 kg (175 lb)  Pulse (Patient Reported): 59    Fidelina Harris MA

## 2021-03-02 ENCOUNTER — IMMUNIZATION (OUTPATIENT)
Dept: NURSING | Facility: CLINIC | Age: 86
End: 2021-03-02
Attending: FAMILY MEDICINE
Payer: MEDICARE

## 2021-03-02 PROCEDURE — 0002A PR COVID VAC PFIZER DIL RECON 30 MCG/0.3 ML IM: CPT

## 2021-03-02 PROCEDURE — 91300 PR COVID VAC PFIZER DIL RECON 30 MCG/0.3 ML IM: CPT

## 2021-04-04 DIAGNOSIS — I35.0 MODERATE AORTIC STENOSIS: ICD-10-CM

## 2021-04-04 DIAGNOSIS — I25.5 ISCHEMIC CARDIOMYOPATHY: ICD-10-CM

## 2021-04-04 DIAGNOSIS — E78.2 MIXED HYPERLIPIDEMIA: ICD-10-CM

## 2021-04-04 DIAGNOSIS — I25.10 CORONARY ARTERY DISEASE INVOLVING NATIVE CORONARY ARTERY OF NATIVE HEART WITHOUT ANGINA PECTORIS: ICD-10-CM

## 2021-04-04 DIAGNOSIS — I10 BENIGN ESSENTIAL HYPERTENSION: ICD-10-CM

## 2021-04-04 DIAGNOSIS — I35.0 SEVERE AORTIC STENOSIS: ICD-10-CM

## 2021-04-06 RX ORDER — AMLODIPINE BESYLATE 2.5 MG/1
TABLET ORAL
Qty: 180 TABLET | Refills: 3 | Status: SHIPPED | OUTPATIENT
Start: 2021-04-06 | End: 2021-05-21

## 2021-05-02 NOTE — PROGRESS NOTES
[facility] HEART CARE  CARDIOVASCULAR DIVISION    VALVE CLINIC FOLLOW-UP VISIT    PRIMARY CARDIOLOGIST: Dr. Madhu Zuniga      PERTINENT CLINICAL HISTORY:     Kamryn Miller is a very pleasant 84 year old female with HTN, HLD, CAD s/p LAD PCI 8/2020, asthma and CKD III who presents for ongoing monitoring of her moderate aortic stenosis. She was last evaluated by Dr. Toscano in July 2020 for possible TAVR at which time her echo showed an LUCY 0.8 cm2, peak aortic velocity 3.4m/sec, mean aortic gradient 27mmHg, SVI 45ml/m2, EF 60-65% and DI 0.23 suggestive of moderate to severe aortic stenosis. She underwent TAVR CT and LHC/RHC which revealed moderate aortic stenosis with AV calcium score of 1263, LUCY 1.2 cm2, and mean PG of 24 mmHg. Coronary angiogram showed LAD disease s/p EDDIE x 3 in 8/2020. Given findings of moderate aortic stenosis, TAVR was not pursued and ongoing surveillance was recommended.     Ms. Miller says after her stents her breathing improved a bit; however, over the past month her breathing has returned to baseline. She is able to carry out household tasks without difficulty. She walks 1/4 mile around her apartment complex most days. She is able to do this without shortness of breath, but if she tried to walk further she would feel short of breath. She and her  both feel as though her symptoms are stable when compared to last clinic visit July 2020  She denies chest pain, palpitations, LH/syncope, or easy fatigability/frequent napping. She feels as though her weight has been stable, no orthopnea/PND, she does note mild ankle swelling.      PAST MEDICAL HISTORY:     Past Medical History:   Diagnosis Date     Aortic valvar stenosis 7/2010    mild     Asthma      Bipolar disorder (H)      CAD (coronary artery disease)     s/p angioplasty     Celiac disease      Colon polyps      Colon polyps 2012    every 3 year colonoscopy      Depression      High cholesterol      HTN      Mitral  insufficiency      Pulmonary HTN (H)     mild     Tremors 7/10    drug induced from antidepressants     Tricuspid insufficiency         PAST SURGICAL HISTORY:     Past Surgical History:   Procedure Laterality Date     ANGIOGRAM  6/26/2009     ANGIOPLASTY  9/96    for angina     ANGIOPLASTY  8/03 - 9/03    X -2 - with stenst in coronary car.     APPENDECTOMY       BREAST BIOPSY, RT/LT      Breat Biopsy RT/LT, benign     BUNIONECTOMY  11/8/2011    Procedure:BUNIONECTOMY; Left donna bunionectomy; Surgeon:FREDY LITTLEJOHN; Location:[facility] OR     BUNIONECTOMY RT/LT  5/2007    right bunion and right 2nd hammertoe     C ANESTH,BLEPHAROPLASTY      (R) for drooping eyelid     C APPENDECTOMY       CATARACT IOL, RT/LT  6/12 and 7/12    bilateral     COLONOSCOPY  2007, 2012    every 3 years for polyps     CV ANGIOGRAM LOWER EXTREMITY N/A 11/3/2020    Procedure: CV ANGIOGRAM LOWER EXTREMITY BILATERAL;  Surgeon: Tom Burrows MD;  Location:  HEART CARDIAC CATH LAB     CV CORONARY ANGIOGRAM N/A 8/21/2020    Procedure: CV CORONARY ANGIOGRAM;  Surgeon: Gianluca Toscano MD;  Location:  HEART CARDIAC CATH LAB     CV LEFT HEART CATH N/A 8/21/2020    Procedure: CV LEFT HEART CATH;  Surgeon: Gianluca Toscano MD;  [facility]     CV LEFT HEART CATH N/A 11/3/2020    Procedure: CV LEFT HEART CATH;  Surgeon: Tom Burrows MD;  [facility]     CV PCI STENT DRUG ELUTING N/A 8/21/2020    Procedure: Percutaneous Coronary Intervention Stent Drug Eluting;  Surgeon: Gianluca Toscano MD;  Location:  HEART CARDIAC CATH LAB     CV RIGHT HEART CATH MEASUREMENTS RECORDED N/A 8/21/2020    Procedure: CV RIGHT HEART CATH;  Surgeon: Gianluca Toscano MD;  [facility]     CV RIGHT HEART CATH MEASUREMENTS RECORDED N/A 11/3/2020    Procedure: CV RIGHT HEART CATH;  Surgeon: Tom Burrows MD;  [facility]     JOINT  REPLACEMENT, HIP RT/LT  4/2008    right hip replaced     JOINT REPLACEMENT, HIP RT/LT  4/2009    left hip replaced     REPAIR HAMMER TOE  11/8/2011    Procedure:REPAIR HAMMER TOE; left 2nd hammertoe repair; Surgeon:FREDY LITTLEJOHN; Location:[facility]     SURGICAL HISTORY OF -   11/11    left bunion and 2nd hammertoe repair     TUBAL LIGATION          CURRENT MEDICATIONS:     Current Outpatient Medications   Medication Sig Dispense Refill     amLODIPine (NORVASC) 2.5 MG tablet TAKE 2 TABLETS BY MOUTH EVERY  tablet 3     amoxicillin (AMOXIL) 500 MG tablet TAKE 4 TABLETS BY MOUTH 1 HOUR PRIOR TO APPT  0     aspirin 81 MG tablet Take 1 tablet by mouth daily.       azelastine (ASTEPRO) 0.15 % nasal spray Spray 1 spray into both nostrils daily 1 Bottle 1     azelastine (ASTEPRO) 0.15 % nasal spray SPRAY 1 SPRAY INTO BOTH NOSTRILS DAILY 30 mL 3     Blood Pressure Monitor KIT Automatic Blood Pressure Monitor 1 kit 0     Calcium Citrate (CITRACAL OR) Take 1 tablet by mouth daily.       CHOLECALCIFEROL 34528 UNIT PO CAPS 1 tablet weekly       clopidogrel (PLAVIX) 75 MG tablet Take 1 tablet (75 mg) by mouth daily 90 tablet 3     desvenlafaxine (PRISTIQ) 100 MG 24 hr tablet Take 100 mg by mouth daily       desvenlafaxine succinate (PRISTIQ) 25 MG 24 hr tablet Take 25 mg by mouth daily  0     fluticasone-salmeterol (ADVAIR DISKUS) 500-50 MCG/DOSE inhaler INHALE 1 PUFF INTO THE LUNGS EVERY 12 HOURS 1 Inhaler 6     lamoTRIgine (LAMICTAL) 25 MG tablet 25 mg       LAMOTRIGINE 200 MG PO TABS Take by mouth daily        levothyroxine (SYNTHROID/LEVOTHROID) 75 MCG tablet Take 1 tablet (75 mcg) by mouth daily 90 tablet 0     losartan (COZAAR) 100 MG tablet Take 1 tablet (100 mg) by mouth daily 90 tablet 0     metoprolol tartrate (LOPRESSOR) 50 MG tablet TAKE 1 TAB BY MOUTH IN THE MORNING AND 1/2 IN THE EVENING-needs follow up appointment 135 tablet 3     Multiple Vitamin (MULTI-VITAMIN) per tablet Take 1 tablet by mouth daily.        Omega-3 Fatty Acids (FISH OIL PO) Take 1 tablet by mouth daily.       rosuvastatin (CRESTOR) 40 MG tablet Take 1 tablet (40 mg) by mouth daily 90 tablet 3     verapamil ER (CALAN-SR) 240 MG CR tablet Take 1 tablet (240 mg) by mouth daily Needs follow up appointment 90 tablet 0     VIIBRYD 10 MG TABS tablet Take 10 mg by mouth daily  2        ALLERGIES:     Allergies   Allergen Reactions     Ace Inhibitors Cough     Diclofenac Other (See Comments)     Balance problems        FAMILY HISTORY:     Family History   Problem Relation Age of Onset     Asthma Mother      Cerebrovascular Disease Mother      Arthritis Mother      Depression Mother      Lipids Sister      Hypertension Sister      Heart Disease Sister      Lipids Sister      Hypertension Sister      Lipids Sister      Breast Cancer Sister         SOCIAL HISTORY:     Social History     Socioeconomic History     Marital status:      Spouse name: Adrien     Number of children: 2     Years of education: 16     Highest education level: None   Occupational History     Employer: RETIRED     Comment: Retired in 2002.    Social Needs     Financial resource strain: None     Food insecurity     Worry: None     Inability: None     Transportation needs     Medical: None     Non-medical: None   Tobacco Use     Smoking status: Never Smoker     Smokeless tobacco: Never Used   Substance and Sexual Activity     Alcohol use: No     Alcohol/week: 0.0 standard drinks     Types: 1 Standard drinks or equivalent per week     Drug use: No     Sexual activity: Not Currently     Partners: Male   Lifestyle     Physical activity     Days per week: None     Minutes per session: None     Stress: None   Relationships     Social connections     Talks on phone: None     Gets together: None     Attends [employer] service: None     Active member of club or organization: None     Attends meetings of clubs or organizations: None     Relationship status: None     Intimate partner violence      "Fear of current or ex partner: None     Emotionally abused: None     Physically abused: None     Forced sexual activity: None   Other Topics Concern     Parent/sibling w/ CABG, MI or angioplasty before 65F 55M? Not Asked   Social History Narrative     None        REVIEW OF SYSTEMS:     Constitutional: No fevers or chills  Skin: No new rash or itching  Eyes: No acute change in vision  Ears/Nose/Throat: No purulent rhinorrhea, new hearing loss, or new vertigo  Respiratory: No cough or hemoptysis  Cardiovascular: See HPI  Gastrointestinal: No change in appetite, vomiting, hematemesis or diarrhea  Genitourinary: No dysuria or hematuria  Musculoskeletal: No new back pain, neck pain or muscle pain  Neurologic: No new headaches, focal weakness or behavior changes  Psychiatric: No hallucinations, excessive alcohol consumption or illegal drug usage  Hematologic/Lymphatic/Immunologic: No bleeding, chills, fever, night sweats or weight loss  Endocrine: No new cold intolerance, heat intolerance, polyphagia, polydipsia or polyuria      PHYSICAL EXAMINATION:     /65 (BP Location: Right arm, Patient Position: Chair, Cuff Size: Adult Regular)   Pulse 56   Ht 1.537 m (5' 0.5\")   Wt 71.4 kg (157 lb 4.8 oz)   SpO2 97%   BMI 30.21 kg/m      GENERAL: No acute distress.  HEENT: EOMI. Sclerae white, not injected. Pharynx without erythema or exudate.   Neck: No adenopathy. No thyromegaly. Symmetrical.   Heart: Regular rate and rhythm. Normal S1 with diminished S2. Crescendo decrescendo late peaking systolic murmur with radiation to carotids.    Lungs: Clear to auscultation. No ronchi, wheezes, rales.   Abdomen: Soft, nontender, nondistended. Bowel sounds present.  Extremities: No clubbing, cyanosis, or edema.   Neurologic: Alert and oriented to person/place/time, normal speech and affect. No focal deficits.  Skin: No petechiae, purpura or rash.     LABORATORY DATA:   Personally reviewed and interpreted labs.   CBC RESULTS:  Lab " Results   Component Value Date    WBC 5.3 11/03/2020    RBC 4.12 11/03/2020    HGB 13.5 11/03/2020    HCT 41.6 11/03/2020     (H) 11/03/2020    MCH 32.8 11/03/2020    MCHC 32.5 11/03/2020    RDW 11.9 11/03/2020     11/03/2020       BMP RESULTS:  Lab Results   Component Value Date     11/03/2020    POTASSIUM 4.3 11/03/2020    CHLORIDE 111 (H) 11/03/2020    CO2 28 11/03/2020    ANIONGAP 5 11/03/2020     (H) 11/03/2020    BUN 23 11/03/2020    CR 1.38 (H) 11/03/2020    GFRESTIMATED 35 (L) 11/03/2020    GFRESTBLACK 41 (L) 11/03/2020    PRINCE 9.5 11/03/2020      PROCEDURES & FURTHER ASSESSMENTS:     ECHO today:   Interpretation Summary  Global and regional left ventricular function is normal with an EF of 55-60%.  The right ventricle is normal size. Global right ventricular function is  normal.  Moderate to severe aortic stenosis, aortic valve area 1.02 cm2, peak velocity  3.3 m/s, mean gradient 25 mmHg, stroke volume index 40 ml/m2, DVI 0.29.     This study was compared with the study from 9/16/2020. The aortic valve peak  velocity and mean gradient are similar.  ______________________________________________________________________________  Left Ventricle  Left ventricular size is normal. Global and regional left ventricular function  is normal with an EF of 55-60%. Mild concentric wall thickening consistent  with left ventricular hypertrophy is present. Diastolic function not assessed  due to significant mitral annular calcification.     Right Ventricle  The right ventricle is normal size. Global right ventricular function is  normal.     Atria  The right atria appears normal. Mild left atrial enlargement is present.     Mitral Valve  Moderate mitral annular calcification is present.     Aortic Valve  Moderate to severe aortic stenosis, aortic valve area 1.02 cm2, peak velocity  3.3 m/s, mean gradient 25 mmHg, stroke volume index 40 ml/m2, DVI 0.29.     Tricuspid Valve  The tricuspid valve is  normal. Trace tricuspid insufficiency is present.  Pulmonary artery systolic pressure cannot be assessed.     Pulmonic Valve  The pulmonic valve is normal. Mild pulmonic insufficiency is present.     Vessels  The thoracic aorta is normal. The inferior vena cava was normal in size with  preserved respiratory variability.     Pericardium  No pericardial effusion is present.     Compared to Previous Study  This study was compared with the study from 9/16/2020 . The aortic valve peak  velocity and mean gradient are similar.      [facility] 11/2020:  85 year old lady,brought in for a left and right heart catheterization to assess the actual severity of her aortic stenosis.Kamryn has been complaining of dyspnea on moderate exertion over the last few months.She denies chest pains,syncope or presyncope.There are no other features to suggest heart failure.Her transthoracic echocardiogram showed an aortic  valve area of 0.8 cm2,mean gradient of 27 mm Hg and a peak velocity of 3.7 m/sec.It was suspected that she has a paradoxical low flow,low gradient aortic stenosis.Kamryn  has known coronary artery disease with recent PCIs with 3 EDDIE to the mid-distal LAD  in August 2020.   Pre Procedure Diagnosis    \Heart valve condition    Post Procedure Diagnosis    Moderate aortic stenosis      Conclusion      Moderate aortic stenosis -valve area 1.2 cm2,Mean gradient 24 mm Hg    Right sided filling pressures are normal.    Normal PA pressures.    Left sided filling pressures are normal.    Mildly reduced cardiac output level.    Peripheral angiogram done and IVUS used to assess the vessel lumens of the descending thoracic aorta,right and left external iliac arteries.    Prox R VARUN lesion is 40% stenosed.    Dist R VARUN to Mid R CFA lesion is 70% stenosed.    Mid L EIA to Dist L EIA lesion is 30% stenosed.    Ultrasound (IVUS) was performed.          Plan      Follow bedrest per protocol    Continued medical management and lifestyle  modifications for cardiovascular risk factor optimizations.    Follow up with Dr. Tom Burrows in 6 months time in clinic as there is no current indication for TAVR in lieu of the moderate severity of the aortic stenosis..    Arterial sheath removed from femoral artery with closure device.    Discharge today per protocol     TAVR CT 9/2020:  FINDINGS:     1.  Moderately calcified tricuspidaortic valve. Aortic valve calcium  score is 1263. The LVOT is mildly calcified. The ascending aorta is  mildly calcified, aortic arch is  mildly calcified, the descending  thoracic aorta is mildly calcified. There is moderate mitral annular  calcification.  2.  There are no adverse aortic root features, ie coronary heights are  adequate and there is no significant aortic root calcification.  3.  There are significant native coronary calcifications.   4.  The arch vessel branching pattern is normal.   5.  The suprarenal abdominal aorta is moderately calcified; infrarenal  abdominal aorta is severely calcified  6.  Widely patent origins of celiac trunk, superior mesenteric artery  and inferior mesenteric artery.  Single bilateral renal arteries with  widely patent origins.   7.  There is no acute aortic pathology, such as dissection, intramural  hematoma, or contained rupture.   8.  Suboptimal imaging of the lower extremity vasculature due to  low-contrast protocol and significant metallic artifacts related to  bilateral hip arthroplasty hardware. Consider an additional study for  repeat assessment of iliofemoral dimensions, particularly in the  bilateral external iliac and common femoral arteries.     ECHO 9/2020:    Interpretation Summary  Peak aortic velocity 3.4m/sec. Mean aortic gradient 27mmHg. Calculated valve  area 0.8cm2. SVI 45ml/m2. DI 0.23. Valve area index 0.47cm2/m2.  Some of these values are in the severe AS range,but measured gradients are in  mild to moderate AS range. Need additional evaluation like CT to  assess actual  severity of AS.  No change from previous study.    ECG: December 2019 - sinus bradycardia nonspecific ST-T abnormalities    Echocardiogram: 11/22/19  Left ventricular function, chamber size, wall motion, and wall thickness are  normal.The EF is 55-60%. No regional wall motion abnormalities are seen.  Right ventricular function, chamber size, wall motion, and thickness are  normal.  Severe left atrial enlargement is present.  Moderate mitral annular calcification is present. Mild mitral insufficiency is  present. There is likley moderate aortic stenosis. The peak velocity is  3.47m/sec, the peak and mean gradients are 48mmHg and 28mmHg. The aortic valve  area is calculated low at 0.7cm2  Pulmonary artery systolic pressure is normal. The inferior vena cava was  normal in size with preserved respiratory variability. No pericardial effusion  is present.  Compared to prior study AS has worsened. Otherwise no significant change    PFTs: 12/20/19  Nonspecific pulmonary function testing with decreased FEV1 and FEV but normal ratio and TLC, could be seen in obesity or neuromuscular weakness   There is no significant bronchodilator response.   Normal diffusion capacity   Lung volumes are normal   No prior testing for comparison     Carotid Ultrasound:  1. Right side:    Degree of stenosis of the internal carotid artery: Less than 50%  due to presence of plaque.      2. Left side:     Degree of stenosis of the internal carotid artery: Less than 50%  due to presence of plaque.     STS PROM RISK SCORE: 3.04%  FRAILTY SCORE:3/5  NYHA CLASS: II     CLINICAL IMPRESSION:     Kamryn Miller is a 84 year old female with HTN, HLD, CAD s/p LAD PCI 8/2020, asthma and CKD III who presents for ongoing monitoring of her moderate aortic stenosis. She was last evaluated by Dr. Toscano in July 2020 for possible TAVR at which time her echo showed an LUCY 0.8 cm2, peak aortic velocity 3.4m/sec, mean aortic gradient 27mmHg, SVI  45ml/m2, EF 60-65% and DI 0.23 suggestive of moderate to severe aortic stenosis. She underwent TAVR CT and LHC/RHC which revealed moderate aortic stenosis with AV calcium score of 1263, LUCY 1.2 cm2, and mean PG of 24 mmHg. Coronary angiogram showed LAD disease s/p EDDIE x 3 in 8/2020. Given findings of moderate aortic stenosis, TAVR was not pursued and ongoing surveillance was recommended.    Today Kayla reports stable exertional dyspnea which is unchanged since she was last evaluated. She otherwise has no chest pain, lightheadedness or syncope. Her ECHO today shows moderate aortic valve stenosis with aortic valve area 1.02 cm2, peak velocity 3.3 m/s, mean gradient 25 mmHg, stroke volume index 40 ml/m2, DVI 0.29 which is unchanged when compared to prior. Plan to continue Q 6 months echo surveillance. In the meantime, I have advised her to contact us if she develops worsening shortness of breath, chest pain, lightheadedness or syncope which could indicate progression of her valve disease and need for TAVR. She and her  verbalized understanding and agree with the plan.     CC  Patient Care Team:  Rolanda Wilcox MD as PCP - General (Family Practice)  Rolanda Wilcox MD as Assigned PCP  Arthur Markham MD as Assigned Heart and Vascular Provider  Ania Johnson MD as Assigned Nephrology Provider  SELF, REFERRED

## 2021-05-03 ENCOUNTER — OFFICE VISIT (OUTPATIENT)
Dept: CARDIOLOGY | Facility: CLINIC | Age: 86
End: 2021-05-03
Attending: NURSE PRACTITIONER
Payer: MEDICARE

## 2021-05-03 ENCOUNTER — TELEPHONE (OUTPATIENT)
Dept: CARDIOLOGY | Facility: CLINIC | Age: 86
End: 2021-05-03

## 2021-05-03 ENCOUNTER — ANCILLARY PROCEDURE (OUTPATIENT)
Dept: CARDIOLOGY | Facility: CLINIC | Age: 86
End: 2021-05-03
Attending: INTERNAL MEDICINE
Payer: MEDICARE

## 2021-05-03 VITALS
SYSTOLIC BLOOD PRESSURE: 115 MMHG | HEART RATE: 56 BPM | DIASTOLIC BLOOD PRESSURE: 65 MMHG | HEIGHT: 61 IN | WEIGHT: 157.3 LBS | OXYGEN SATURATION: 97 % | BODY MASS INDEX: 29.7 KG/M2

## 2021-05-03 DIAGNOSIS — I35.0 SEVERE AORTIC STENOSIS: ICD-10-CM

## 2021-05-03 DIAGNOSIS — I35.0 MODERATE AORTIC STENOSIS: Primary | ICD-10-CM

## 2021-05-03 PROCEDURE — 99214 OFFICE O/P EST MOD 30 MIN: CPT | Mod: 25 | Performed by: NURSE PRACTITIONER

## 2021-05-03 PROCEDURE — G0463 HOSPITAL OUTPT CLINIC VISIT: HCPCS | Mod: 25

## 2021-05-03 PROCEDURE — 93306 TTE W/DOPPLER COMPLETE: CPT | Performed by: STUDENT IN AN ORGANIZED HEALTH CARE EDUCATION/TRAINING PROGRAM

## 2021-05-03 RX ORDER — ALENDRONATE SODIUM 70 MG/1
70 TABLET ORAL
COMMUNITY
Start: 2021-02-28 | End: 2022-05-05

## 2021-05-03 ASSESSMENT — PAIN SCALES - GENERAL: PAINLEVEL: NO PAIN (0)

## 2021-05-03 ASSESSMENT — MIFFLIN-ST. JEOR: SCORE: 1092.95

## 2021-05-03 NOTE — LETTER
5/3/2021      RE: Kamryn Miller  [address]   [address]  [facility] [address]       Dear Colleague,    Thank you for the opportunity to participate in the care of your patient, Kamryn Miller, at the [facility]. Please see a copy of my visit note below.        [facility] HEART CARE  CARDIOVASCULAR DIVISION    VALVE CLINIC FOLLOW-UP VISIT    PRIMARY CARDIOLOGIST: Dr. Madhu Zuniga      PERTINENT CLINICAL HISTORY:     Kamryn Miller is a very pleasant 84 year old female with HTN, HLD, CAD s/p LAD PCI 8/2020, asthma and CKD III who presents for ongoing monitoring of her moderate aortic stenosis. She was last evaluated by Dr. Toscano in July 2020 for possible TAVR at which time her echo showed an LUCY 0.8 cm2, peak aortic velocity 3.4m/sec, mean aortic gradient 27mmHg, SVI 45ml/m2, EF 60-65% and DI 0.23 suggestive of moderate to severe aortic stenosis. She underwent TAVR CT and LHC/RHC which revealed moderate aortic stenosis with AV calcium score of 1263, LUCY 1.2 cm2, and mean PG of 24 mmHg. Coronary angiogram showed LAD disease s/p EDDIE x 3 in 8/2020. Given findings of moderate aortic stenosis, TAVR was not pursued and ongoing surveillance was recommended.     Ms. Miller says after her stents her breathing improved a bit; however, over the past month her breathing has returned to baseline. She is able to carry out household tasks without difficulty. She walks 1/4 mile around her apartment complex most days. She is able to do this without shortness of breath, but if she tried to walk further she would feel short of breath. She and her  both feel as though her symptoms are stable when compared to last clinic visit July 2020  She denies chest pain, palpitations, LH/syncope, or easy fatigability/frequent napping. She feels as though her weight has been stable, no orthopnea/PND, she does note mild ankle swelling.       PAST MEDICAL HISTORY:     Past Medical History:   Diagnosis Date     Aortic valvar stenosis 7/2010    mild     Asthma      Bipolar disorder (H)      CAD (coronary artery disease)     s/p angioplasty     Celiac disease      Colon polyps      Colon polyps 2012    every 3 year colonoscopy      Depression      High cholesterol      HTN      Mitral insufficiency      Pulmonary HTN (H)     mild     Tremors 7/10    drug induced from antidepressants     Tricuspid insufficiency         PAST SURGICAL HISTORY:     Past Surgical History:   Procedure Laterality Date     ANGIOGRAM  6/26/2009     ANGIOPLASTY  9/96    for angina     ANGIOPLASTY  8/03 - 9/03    X -2 - with stenst in coronary car.     APPENDECTOMY       BREAST BIOPSY, RT/LT      Breat Biopsy RT/LT, benign     BUNIONECTOMY  11/8/2011    Procedure:BUNIONECTOMY; Left donna bunionectomy; Surgeon:FREDY LITTLEJOHN; Location:[facility] OR     BUNIONECTOMY RT/LT  5/2007    right bunion and right 2nd hammertoe     C ANESTH,BLEPHAROPLASTY      (R) for drooping eyelid     C APPENDECTOMY       CATARACT IOL, RT/LT  6/12 and 7/12    bilateral     COLONOSCOPY  2007, 2012    every 3 years for polyps     CV ANGIOGRAM LOWER EXTREMITY N/A 11/3/2020    Procedure: CV ANGIOGRAM LOWER EXTREMITY BILATERAL;  Surgeon: Tom Burrows MD;  Location:  HEART CARDIAC CATH LAB     CV CORONARY ANGIOGRAM N/A 8/21/2020    Procedure: CV CORONARY ANGIOGRAM;  Surgeon: Gianluca Toscano MD;  Location:  HEART CARDIAC CATH LAB     CV LEFT HEART CATH N/A 8/21/2020    Procedure: CV LEFT HEART CATH;  Surgeon: Gianluca Toscano MD;  [facility]     CV LEFT HEART CATH N/A 11/3/2020    Procedure: CV LEFT HEART CATH;  Surgeon: Tom Burrows MD;  [facility]     CV PCI STENT DRUG ELUTING N/A 8/21/2020    Procedure: Percutaneous Coronary Intervention Stent Drug Eluting;  Surgeon: Gianluca Toscano MD;  [facility]     CV  RIGHT HEART CATH MEASUREMENTS RECORDED N/A 8/21/2020    Procedure: CV RIGHT HEART CATH;  Surgeon: Gianluca Toscano MD;  Location:  HEART CARDIAC CATH LAB     CV RIGHT HEART CATH MEASUREMENTS RECORDED N/A 11/3/2020    Procedure: CV RIGHT HEART CATH;  Surgeon: Tom Burrows MD;  Location:  HEART CARDIAC CATH LAB     JOINT REPLACEMENT, HIP RT/LT  4/2008    right hip replaced     JOINT REPLACEMENT, HIP RT/LT  4/2009    left hip replaced     REPAIR HAMMER TOE  11/8/2011    Procedure:REPAIR HAMMER TOE; left 2nd hammertoe repair; Surgeon:FREDY LITTLEJOHN; Location: OR     SURGICAL HISTORY OF -   11/11    left bunion and 2nd hammertoe repair     TUBAL LIGATION          CURRENT MEDICATIONS:     Current Outpatient Medications   Medication Sig Dispense Refill     amLODIPine (NORVASC) 2.5 MG tablet TAKE 2 TABLETS BY MOUTH EVERY  tablet 3     amoxicillin (AMOXIL) 500 MG tablet TAKE 4 TABLETS BY MOUTH 1 HOUR PRIOR TO APPT  0     aspirin 81 MG tablet Take 1 tablet by mouth daily.       azelastine (ASTEPRO) 0.15 % nasal spray Spray 1 spray into both nostrils daily 1 Bottle 1     azelastine (ASTEPRO) 0.15 % nasal spray SPRAY 1 SPRAY INTO BOTH NOSTRILS DAILY 30 mL 3     Blood Pressure Monitor KIT Automatic Blood Pressure Monitor 1 kit 0     Calcium Citrate (CITRACAL OR) Take 1 tablet by mouth daily.       CHOLECALCIFEROL 13902 UNIT PO CAPS 1 tablet weekly       clopidogrel (PLAVIX) 75 MG tablet Take 1 tablet (75 mg) by mouth daily 90 tablet 3     desvenlafaxine (PRISTIQ) 100 MG 24 hr tablet Take 100 mg by mouth daily       desvenlafaxine succinate (PRISTIQ) 25 MG 24 hr tablet Take 25 mg by mouth daily  0     fluticasone-salmeterol (ADVAIR DISKUS) 500-50 MCG/DOSE inhaler INHALE 1 PUFF INTO THE LUNGS EVERY 12 HOURS 1 Inhaler 6     lamoTRIgine (LAMICTAL) 25 MG tablet 25 mg       LAMOTRIGINE 200 MG PO TABS Take by mouth daily        levothyroxine (SYNTHROID/LEVOTHROID) 75 MCG tablet Take 1 tablet (75 mcg) by  mouth daily 90 tablet 0     losartan (COZAAR) 100 MG tablet Take 1 tablet (100 mg) by mouth daily 90 tablet 0     metoprolol tartrate (LOPRESSOR) 50 MG tablet TAKE 1 TAB BY MOUTH IN THE MORNING AND 1/2 IN THE EVENING-needs follow up appointment 135 tablet 3     Multiple Vitamin (MULTI-VITAMIN) per tablet Take 1 tablet by mouth daily.       Omega-3 Fatty Acids (FISH OIL PO) Take 1 tablet by mouth daily.       rosuvastatin (CRESTOR) 40 MG tablet Take 1 tablet (40 mg) by mouth daily 90 tablet 3     verapamil ER (CALAN-SR) 240 MG CR tablet Take 1 tablet (240 mg) by mouth daily Needs follow up appointment 90 tablet 0     VIIBRYD 10 MG TABS tablet Take 10 mg by mouth daily  2        ALLERGIES:     Allergies   Allergen Reactions     Ace Inhibitors Cough     Diclofenac Other (See Comments)     Balance problems        FAMILY HISTORY:     Family History   Problem Relation Age of Onset     Asthma Mother      Cerebrovascular Disease Mother      Arthritis Mother      Depression Mother      Lipids Sister      Hypertension Sister      Heart Disease Sister      Lipids Sister      Hypertension Sister      Lipids Sister      Breast Cancer Sister         SOCIAL HISTORY:     Social History     Socioeconomic History     Marital status:      Spouse name: Adrien     Number of children: 2     Years of education: 16     Highest education level: None   Occupational History     Employer: RETIRED     Comment: Retired in 2002.    Social Needs     Financial resource strain: None     Food insecurity     Worry: None     Inability: None     Transportation needs     Medical: None     Non-medical: None   Tobacco Use     Smoking status: Never Smoker     Smokeless tobacco: Never Used   Substance and Sexual Activity     Alcohol use: No     Alcohol/week: 0.0 standard drinks     Types: 1 Standard drinks or equivalent per week     Drug use: No     Sexual activity: Not Currently     Partners: Male   Lifestyle     Physical activity     Days per week:  "None     Minutes per session: None     Stress: None   Relationships     Social connections     Talks on phone: None     Gets together: None     Attends Qu[employer] service: None     Active member of club or organization: None     Attends meetings of clubs or organizations: None     Relationship status: None     Intimate partner violence     Fear of current or ex partner: None     Emotionally abused: None     Physically abused: None     Forced sexual activity: None   Other Topics Concern     Parent/sibling w/ CABG, MI or angioplasty before 65F 55M? Not Asked   Social History Narrative     None        REVIEW OF SYSTEMS:     Constitutional: No fevers or chills  Skin: No new rash or itching  Eyes: No acute change in vision  Ears/Nose/Throat: No purulent rhinorrhea, new hearing loss, or new vertigo  Respiratory: No cough or hemoptysis  Cardiovascular: See HPI  Gastrointestinal: No change in appetite, vomiting, hematemesis or diarrhea  Genitourinary: No dysuria or hematuria  Musculoskeletal: No new back pain, neck pain or muscle pain  Neurologic: No new headaches, focal weakness or behavior changes  Psychiatric: No hallucinations, excessive alcohol consumption or illegal drug usage  Hematologic/Lymphatic/Immunologic: No bleeding, chills, fever, night sweats or weight loss  Endocrine: No new cold intolerance, heat intolerance, polyphagia, polydipsia or polyuria      PHYSICAL EXAMINATION:     /65 (BP Location: Right arm, Patient Position: Chair, Cuff Size: Adult Regular)   Pulse 56   Ht 1.537 m (5' 0.5\")   Wt 71.4 kg (157 lb 4.8 oz)   SpO2 97%   BMI 30.21 kg/m      GENERAL: No acute distress.  HEENT: EOMI. Sclerae white, not injected. Pharynx without erythema or exudate.   Neck: No adenopathy. No thyromegaly. Symmetrical.   Heart: Regular rate and rhythm. Normal S1 with diminished S2. Crescendo decrescendo late peaking systolic murmur with radiation to carotids.    Lungs: Clear to auscultation. No ronchi, wheezes, " rales.   Abdomen: Soft, nontender, nondistended. Bowel sounds present.  Extremities: No clubbing, cyanosis, or edema.   Neurologic: Alert and oriented to person/place/time, normal speech and affect. No focal deficits.  Skin: No petechiae, purpura or rash.     LABORATORY DATA:   Personally reviewed and interpreted labs.   CBC RESULTS:  Lab Results   Component Value Date    WBC 5.3 11/03/2020    RBC 4.12 11/03/2020    HGB 13.5 11/03/2020    HCT 41.6 11/03/2020     (H) 11/03/2020    MCH 32.8 11/03/2020    MCHC 32.5 11/03/2020    RDW 11.9 11/03/2020     11/03/2020       BMP RESULTS:  Lab Results   Component Value Date     11/03/2020    POTASSIUM 4.3 11/03/2020    CHLORIDE 111 (H) 11/03/2020    CO2 28 11/03/2020    ANIONGAP 5 11/03/2020     (H) 11/03/2020    BUN 23 11/03/2020    CR 1.38 (H) 11/03/2020    GFRESTIMATED 35 (L) 11/03/2020    GFRESTBLACK 41 (L) 11/03/2020    PRINCE 9.5 11/03/2020      PROCEDURES & FURTHER ASSESSMENTS:     ECHO today:   Interpretation Summary  Global and regional left ventricular function is normal with an EF of 55-60%.  The right ventricle is normal size. Global right ventricular function is  normal.  Moderate to severe aortic stenosis, aortic valve area 1.02 cm2, peak velocity  3.3 m/s, mean gradient 25 mmHg, stroke volume index 40 ml/m2, DVI 0.29.     This study was compared with the study from 9/16/2020. The aortic valve peak  velocity and mean gradient are similar.  ______________________________________________________________________________  Left Ventricle  Left ventricular size is normal. Global and regional left ventricular function  is normal with an EF of 55-60%. Mild concentric wall thickening consistent  with left ventricular hypertrophy is present. Diastolic function not assessed  due to significant mitral annular calcification.     Right Ventricle  The right ventricle is normal size. Global right ventricular function is  normal.     Atria  The right  atria appears normal. Mild left atrial enlargement is present.     Mitral Valve  Moderate mitral annular calcification is present.     Aortic Valve  Moderate to severe aortic stenosis, aortic valve area 1.02 cm2, peak velocity  3.3 m/s, mean gradient 25 mmHg, stroke volume index 40 ml/m2, DVI 0.29.     Tricuspid Valve  The tricuspid valve is normal. Trace tricuspid insufficiency is present.  Pulmonary artery systolic pressure cannot be assessed.     Pulmonic Valve  The pulmonic valve is normal. Mild pulmonic insufficiency is present.     Vessels  The thoracic aorta is normal. The inferior vena cava was normal in size with  preserved respiratory variability.     Pericardium  No pericardial effusion is present.     Compared to Previous Study  This study was compared with the study from 9/16/2020 . The aortic valve peak  velocity and mean gradient are similar.      [facility]/C 11/2020:  85 year old lady,brought in for a left and right heart catheterization to assess the actual severity of her aortic stenosis.Kamryn has been complaining of dyspnea on moderate exertion over the last few months.She denies chest pains,syncope or presyncope.There are no other features to suggest heart failure.Her transthoracic echocardiogram showed an aortic  valve area of 0.8 cm2,mean gradient of 27 mm Hg and a peak velocity of 3.7 m/sec.It was suspected that she has a paradoxical low flow,low gradient aortic stenosis.Kamryn  has known coronary artery disease with recent PCIs with 3 EDDIE to the mid-distal LAD  in August 2020.   Pre Procedure Diagnosis    \Heart valve condition    Post Procedure Diagnosis    Moderate aortic stenosis      Conclusion      Moderate aortic stenosis -valve area 1.2 cm2,Mean gradient 24 mm Hg    Right sided filling pressures are normal.    Normal PA pressures.    Left sided filling pressures are normal.    Mildly reduced cardiac output level.    Peripheral angiogram done and IVUS used to assess the vessel lumens of  the descending thoracic aorta,right and left external iliac arteries.    Prox R VARUN lesion is 40% stenosed.    Dist R VARUN to Mid R CFA lesion is 70% stenosed.    Mid L EIA to Dist L EIA lesion is 30% stenosed.    Ultrasound (IVUS) was performed.          Plan      Follow bedrest per protocol    Continued medical management and lifestyle modifications for cardiovascular risk factor optimizations.    Follow up with Dr. Tom Burrows in 6 months time in clinic as there is no current indication for TAVR in lieu of the moderate severity of the aortic stenosis..    Arterial sheath removed from femoral artery with closure device.    Discharge today per protocol     TAVR CT 9/2020:  FINDINGS:     1.  Moderately calcified tricuspidaortic valve. Aortic valve calcium  score is 1263. The LVOT is mildly calcified. The ascending aorta is  mildly calcified, aortic arch is  mildly calcified, the descending  thoracic aorta is mildly calcified. There is moderate mitral annular  calcification.  2.  There are no adverse aortic root features, ie coronary heights are  adequate and there is no significant aortic root calcification.  3.  There are significant native coronary calcifications.   4.  The arch vessel branching pattern is normal.   5.  The suprarenal abdominal aorta is moderately calcified; infrarenal  abdominal aorta is severely calcified  6.  Widely patent origins of celiac trunk, superior mesenteric artery  and inferior mesenteric artery.  Single bilateral renal arteries with  widely patent origins.   7.  There is no acute aortic pathology, such as dissection, intramural  hematoma, or contained rupture.   8.  Suboptimal imaging of the lower extremity vasculature due to  low-contrast protocol and significant metallic artifacts related to  bilateral hip arthroplasty hardware. Consider an additional study for  repeat assessment of iliofemoral dimensions, particularly in the  bilateral external iliac and common femoral  arteries.     ECHO 9/2020:    Interpretation Summary  Peak aortic velocity 3.4m/sec. Mean aortic gradient 27mmHg. Calculated valve  area 0.8cm2. SVI 45ml/m2. DI 0.23. Valve area index 0.47cm2/m2.  Some of these values are in the severe AS range,but measured gradients are in  mild to moderate AS range. Need additional evaluation like CT to assess actual  severity of AS.  No change from previous study.    ECG: December 2019 - sinus bradycardia nonspecific ST-T abnormalities    Echocardiogram: 11/22/19  Left ventricular function, chamber size, wall motion, and wall thickness are  normal.The EF is 55-60%. No regional wall motion abnormalities are seen.  Right ventricular function, chamber size, wall motion, and thickness are  normal.  Severe left atrial enlargement is present.  Moderate mitral annular calcification is present. Mild mitral insufficiency is  present. There is likley moderate aortic stenosis. The peak velocity is  3.47m/sec, the peak and mean gradients are 48mmHg and 28mmHg. The aortic valve  area is calculated low at 0.7cm2  Pulmonary artery systolic pressure is normal. The inferior vena cava was  normal in size with preserved respiratory variability. No pericardial effusion  is present.  Compared to prior study AS has worsened. Otherwise no significant change    PFTs: 12/20/19  Nonspecific pulmonary function testing with decreased FEV1 and FEV but normal ratio and TLC, could be seen in obesity or neuromuscular weakness   There is no significant bronchodilator response.   Normal diffusion capacity   Lung volumes are normal   No prior testing for comparison     Carotid Ultrasound:  1. Right side:    Degree of stenosis of the internal carotid artery: Less than 50%  due to presence of plaque.      2. Left side:     Degree of stenosis of the internal carotid artery: Less than 50%  due to presence of plaque.     STS PROM RISK SCORE: 3.04%  FRAILTY SCORE:3/5  NYHA CLASS: II     CLINICAL IMPRESSION:     Kamryn CARRENO  Angela is a 84 year old female with HTN, HLD, CAD s/p LAD PCI 8/2020, asthma and CKD III who presents for ongoing monitoring of her moderate aortic stenosis. She was last evaluated by Dr. Toscano in July 2020 for possible TAVR at which time her echo showed an LUCY 0.8 cm2, peak aortic velocity 3.4m/sec, mean aortic gradient 27mmHg, SVI 45ml/m2, EF 60-65% and DI 0.23 suggestive of moderate to severe aortic stenosis. She underwent TAVR CT and LHC/RHC which revealed moderate aortic stenosis with AV calcium score of 1263, LUCY 1.2 cm2, and mean PG of 24 mmHg. Coronary angiogram showed LAD disease s/p EDDIE x 3 in 8/2020. Given findings of moderate aortic stenosis, TAVR was not pursued and ongoing surveillance was recommended.    Today Kayla reports stable exertional dyspnea which is unchanged since she was last evaluated. She otherwise has no chest pain, lightheadedness or syncope. Her ECHO today shows moderate aortic valve stenosis with aortic valve area 1.02 cm2, peak velocity 3.3 m/s, mean gradient 25 mmHg, stroke volume index 40 ml/m2, DVI 0.29 which is unchanged when compared to prior. Plan to continue Q 6 months echo surveillance. In the meantime, I have advised her to contact us if she develops worsening shortness of breath, chest pain, lightheadedness or syncope which could indicate progression of her valve disease and need for TAVR. She and her  verbalized understanding and agree with the plan.     CC  Patient Care Team:  Rolanda Wilcox MD as PCP - General (Family Practice)  Arthur Markham MD as Assigned Heart and Vascular Provider  Ania Johnson MD as Assigned Nephrology Provider          Please do not hesitate to contact me if you have any questions/concerns.     Sincerely,     Pati De Santiago NP

## 2021-05-03 NOTE — TELEPHONE ENCOUNTER
Left patient a VM that we scheduled her 6 month follow up with an echo. Left my contact information if she had a question.

## 2021-05-03 NOTE — NURSING NOTE
Chief Complaint   Patient presents with     Follow Up     reason for visit: Monitor aortic valve TAVR Return     Vitals were taken and medication reconciled.    ROZ Solomon  9:35 AM

## 2021-05-03 NOTE — PATIENT INSTRUCTIONS
You were seen today in the Cardiovascular Clinic at the [facility].    Cardiology provider you saw during your visit Pati De Santiago NP.     1. You echo shows moderate aortic stenosis which is stable when compared to your prior echo.   2. Given symptoms are also stable, we will continue to monitor you for now.  3. I will have our  contact you to arrange an appt with me in 6 months with echo prior.  4. In the meantime, please contact us if you develop worsening shortness of breath, chest pain, palpitations, lightheadedness or syncope which could indicate progression of your aortic stenosis and need for aortic valve replacement.     Questions or scheduling:  Please contact Maria L Gerard at [phone]    On Call Cardiologist for after hours or on weekends: [phone], press option #4 and ask to speak to the on-call Cardiologist.     JESSICA Rousseau, CNP  Structural Heart Nurse Practitioner  [facility]

## 2021-05-10 DIAGNOSIS — I10 BENIGN ESSENTIAL HYPERTENSION: ICD-10-CM

## 2021-05-11 RX ORDER — LOSARTAN POTASSIUM 100 MG/1
TABLET ORAL
Qty: 90 TABLET | Refills: 0 | Status: SHIPPED | OUTPATIENT
Start: 2021-05-11 | End: 2021-07-20

## 2021-05-17 DIAGNOSIS — I50.20 HEART FAILURE WITH REDUCED EJECTION FRACTION, NYHA CLASS III (H): ICD-10-CM

## 2021-05-17 DIAGNOSIS — I25.10 CORONARY ARTERY DISEASE INVOLVING NATIVE CORONARY ARTERY OF NATIVE HEART WITHOUT ANGINA PECTORIS: ICD-10-CM

## 2021-05-17 LAB
ALBUMIN SERPL-MCNC: 3.4 G/DL (ref 3.4–5)
ALP SERPL-CCNC: 57 U/L (ref 40–150)
ALT SERPL W P-5'-P-CCNC: 38 U/L (ref 0–50)
ANION GAP SERPL CALCULATED.3IONS-SCNC: 1 MMOL/L (ref 3–14)
AST SERPL W P-5'-P-CCNC: 27 U/L (ref 0–45)
BASOPHILS # BLD AUTO: 0 10E9/L (ref 0–0.2)
BASOPHILS NFR BLD AUTO: 0.2 %
BILIRUB SERPL-MCNC: 0.3 MG/DL (ref 0.2–1.3)
BUN SERPL-MCNC: 23 MG/DL (ref 7–30)
CALCIUM SERPL-MCNC: 9.3 MG/DL (ref 8.5–10.1)
CHLORIDE SERPL-SCNC: 106 MMOL/L (ref 94–109)
CO2 SERPL-SCNC: 33 MMOL/L (ref 20–32)
CREAT SERPL-MCNC: 1.39 MG/DL (ref 0.52–1.04)
DIFFERENTIAL METHOD BLD: ABNORMAL
EOSINOPHIL # BLD AUTO: 0.3 10E9/L (ref 0–0.7)
EOSINOPHIL NFR BLD AUTO: 5.9 %
ERYTHROCYTE [DISTWIDTH] IN BLOOD BY AUTOMATED COUNT: 12.7 % (ref 10–15)
GFR SERPL CREATININE-BSD FRML MDRD: 35 ML/MIN/{1.73_M2}
GLUCOSE SERPL-MCNC: 92 MG/DL (ref 70–99)
HCT VFR BLD AUTO: 40.3 % (ref 35–47)
HGB BLD-MCNC: 12.9 G/DL (ref 11.7–15.7)
LYMPHOCYTES # BLD AUTO: 1.3 10E9/L (ref 0.8–5.3)
LYMPHOCYTES NFR BLD AUTO: 29.5 %
MCH RBC QN AUTO: 32.5 PG (ref 26.5–33)
MCHC RBC AUTO-ENTMCNC: 32 G/DL (ref 31.5–36.5)
MCV RBC AUTO: 102 FL (ref 78–100)
MONOCYTES # BLD AUTO: 0.6 10E9/L (ref 0–1.3)
MONOCYTES NFR BLD AUTO: 12.7 %
NEUTROPHILS # BLD AUTO: 2.4 10E9/L (ref 1.6–8.3)
NEUTROPHILS NFR BLD AUTO: 51.7 %
NT-PROBNP SERPL-MCNC: 462 PG/ML (ref 0–450)
PLATELET # BLD AUTO: 191 10E9/L (ref 150–450)
POTASSIUM SERPL-SCNC: 4.1 MMOL/L (ref 3.4–5.3)
PROT SERPL-MCNC: 6.8 G/DL (ref 6.8–8.8)
RBC # BLD AUTO: 3.97 10E12/L (ref 3.8–5.2)
SODIUM SERPL-SCNC: 140 MMOL/L (ref 133–144)
WBC # BLD AUTO: 4.6 10E9/L (ref 4–11)

## 2021-05-17 PROCEDURE — 80053 COMPREHEN METABOLIC PANEL: CPT | Performed by: INTERNAL MEDICINE

## 2021-05-17 PROCEDURE — 83880 ASSAY OF NATRIURETIC PEPTIDE: CPT | Performed by: INTERNAL MEDICINE

## 2021-05-17 PROCEDURE — 36415 COLL VENOUS BLD VENIPUNCTURE: CPT | Performed by: INTERNAL MEDICINE

## 2021-05-17 PROCEDURE — 85025 COMPLETE CBC W/AUTO DIFF WBC: CPT | Performed by: INTERNAL MEDICINE

## 2021-05-21 ENCOUNTER — OFFICE VISIT (OUTPATIENT)
Dept: CARDIOLOGY | Facility: CLINIC | Age: 86
End: 2021-05-21
Payer: MEDICARE

## 2021-05-21 VITALS
WEIGHT: 160 LBS | OXYGEN SATURATION: 97 % | BODY MASS INDEX: 30.73 KG/M2 | DIASTOLIC BLOOD PRESSURE: 80 MMHG | HEART RATE: 47 BPM | SYSTOLIC BLOOD PRESSURE: 131 MMHG

## 2021-05-21 DIAGNOSIS — I35.0 SEVERE AORTIC STENOSIS: ICD-10-CM

## 2021-05-21 DIAGNOSIS — I25.10 CORONARY ARTERY DISEASE INVOLVING NATIVE CORONARY ARTERY OF NATIVE HEART WITHOUT ANGINA PECTORIS: ICD-10-CM

## 2021-05-21 DIAGNOSIS — I35.0 MODERATE AORTIC STENOSIS: ICD-10-CM

## 2021-05-21 DIAGNOSIS — E78.2 MIXED HYPERLIPIDEMIA: ICD-10-CM

## 2021-05-21 DIAGNOSIS — I35.0 NONRHEUMATIC AORTIC VALVE STENOSIS: ICD-10-CM

## 2021-05-21 DIAGNOSIS — I50.30 NYHA CLASS 3 HEART FAILURE WITH PRESERVED EJECTION FRACTION (H): Primary | ICD-10-CM

## 2021-05-21 DIAGNOSIS — I10 BENIGN ESSENTIAL HYPERTENSION: ICD-10-CM

## 2021-05-21 DIAGNOSIS — I25.5 ISCHEMIC CARDIOMYOPATHY: ICD-10-CM

## 2021-05-21 PROCEDURE — 99215 OFFICE O/P EST HI 40 MIN: CPT | Performed by: INTERNAL MEDICINE

## 2021-05-21 RX ORDER — AMLODIPINE BESYLATE 2.5 MG/1
7.5 TABLET ORAL DAILY
Qty: 270 TABLET | Refills: 1 | Status: SHIPPED | OUTPATIENT
Start: 2021-05-21 | End: 2021-11-26

## 2021-05-21 RX ORDER — FUROSEMIDE 20 MG
20 TABLET ORAL DAILY
Qty: 90 TABLET | Refills: 1 | Status: SHIPPED | OUTPATIENT
Start: 2021-05-21 | End: 2021-07-20

## 2021-05-21 NOTE — LETTER
5/21/2021      RE: Kamryn Miller  [address]   [address]  [facility] [address]       Dear Colleague,    Thank you for the opportunity to participate in the care of your patient, Kamryn Miller, at the [facility]. Please see a copy of my visit note below.    May 21, 2021  I had the pleasure of seeing Kamryn Miller  in the [facility] for further evaluation and management. She has a history of Hx CAD, HLD, HTN, mitral and tricuspid insuff, pulmonary hypertension, lost to follow up in the past.  She does have CAD and did receive 3 stents in 8/2020 as below.  She denies any cardiomyopathy and she has not had any cardiology follow-up for several years.  Notes that she does have shortness of breath especially with exertion or she walks outside.  This is class III symptoms at the moment she can will probably about a block and able to take a flight of stairs.  We did start TAVR evaluation and she did undergo right heart catheterization as well as invasive hemodynamic measurement and measurement of the valve area.  Given findings of moderate aortic stenosis, TAVR was not yet pursued and ongoing surveillance was recommended. Had been hypertensive in the recent past. Today she us here for follow up.  Notes that she is doing relatively well however does have some lower extremity edema and some shortness of breath that is a little bit worse than it was before.  Also blood pressure is less well controlled but that has been in the past.  Otherwise no chest pain no dizziness lightheadedness no syncopal episodes.  He did have an echo recently as below.  Noted no other complaints.    PAST MEDICAL HISTORY:  Past Medical History:   Diagnosis Date     Aortic valvar stenosis 7/2010    mild     Asthma      Bipolar disorder (H)      CAD (coronary artery disease)     s/p angioplasty     Celiac disease      Colon polyps       Colon polyps 2012    every 3 year colonoscopy      Depression      High cholesterol      HTN      Mitral insufficiency      Pulmonary HTN (H)     mild     Tremors 7/10    drug induced from antidepressants     Tricuspid insufficiency      FAMILY HISTORY:  Family History   Problem Relation Age of Onset     Asthma Mother      Cerebrovascular Disease Mother      Arthritis Mother      Depression Mother      Lipids Sister      Hypertension Sister      Heart Disease Sister      Lipids Sister      Hypertension Sister      Lipids Sister      Breast Cancer Sister      SOCIAL HISTORY:  Social History     Socioeconomic History     Marital status:      Spouse name: Adrien     Number of children: 2     Years of education: 16     Highest education level: Not on file   Occupational History     Employer: RETIRED     Comment: Retired in 2002.    Social Needs     Financial resource strain: Not on file     Food insecurity     Worry: Not on file     Inability: Not on file     Transportation needs     Medical: Not on file     Non-medical: Not on file   Tobacco Use     Smoking status: Never Smoker     Smokeless tobacco: Never Used   Substance and Sexual Activity     Alcohol use: No     Alcohol/week: 0.0 standard drinks     Types: 1 Standard drinks or equivalent per week     Drug use: No     Sexual activity: Not Currently     Partners: Male   Lifestyle     Physical activity     Days per week: Not on file     Minutes per session: Not on file     Stress: Not on file   Relationships     Social connections     Talks on phone: Not on file     Gets together: Not on file     Attends Yazdanism service: Not on file     Active member of club or organization: Not on file     Attends meetings of clubs or organizations: Not on file     Relationship status: Not on file     Intimate partner violence     Fear of current or ex partner: Not on file     Emotionally abused: Not on file     Physically abused: Not on file     Forced sexual activity: Not on  file   Other Topics Concern     Parent/sibling w/ CABG, MI or angioplasty before 65F 55M? Not Asked   Social History Narrative     Not on file     CURRENT MEDICATIONS:  Current Outpatient Medications   Medication     alendronate (FOSAMAX) 70 MG tablet     amLODIPine (NORVASC) 2.5 MG tablet     amoxicillin (AMOXIL) 500 MG tablet     aspirin 81 MG tablet     azelastine (ASTEPRO) 0.15 % nasal spray     azelastine (ASTEPRO) 0.15 % nasal spray     Blood Pressure Monitor KIT     Calcium Citrate (CITRACAL OR)     CHOLECALCIFEROL 80971 UNIT PO CAPS     clopidogrel (PLAVIX) 75 MG tablet     desvenlafaxine (PRISTIQ) 100 MG 24 hr tablet     desvenlafaxine succinate (PRISTIQ) 25 MG 24 hr tablet     fluticasone-salmeterol (ADVAIR DISKUS) 500-50 MCG/DOSE inhaler     lamoTRIgine (LAMICTAL) 25 MG tablet     LAMOTRIGINE 200 MG PO TABS     levothyroxine (SYNTHROID/LEVOTHROID) 75 MCG tablet     losartan (COZAAR) 100 MG tablet     metoprolol tartrate (LOPRESSOR) 50 MG tablet     Multiple Vitamin (MULTI-VITAMIN) per tablet     Omega-3 Fatty Acids (FISH OIL PO)     rosuvastatin (CRESTOR) 40 MG tablet     verapamil ER (CALAN-SR) 240 MG CR tablet     VIIBRYD 10 MG TABS tablet     No current facility-administered medications for this visit.      ROS:   Constitutional: No fever, chills, or sweats.   ENT: No visual disturbance, ear ache, epistaxis, sore throat.   Allergies/Immunologic: Negative.   Respiratory: No cough, hemoptysis.   Cardiovascular: As per HPI.   GI: No nausea, vomiting, hematemesis, melena, or hematochezia.   : No urinary frequency, dysuria, or hematuria.   Integrument: Negative.   Psychiatric: No evidence of major depression  Neuro: No new neurological complaints at this time. Non focal  Endocrinology: Negative.   Musculoskeletal: As per HPI.      EXAM:  BP (!) 146/69 (BP Location: Right arm, Patient Position: Chair, Cuff Size: Adult Regular)   Pulse 50   Wt 72.6 kg (160 lb)   SpO2 97%   BMI 30.73 kg/m    General:  appears comfortable, alert and oriented  Head: normocephalic, atraumatic  Eyes: anicteric sclera, EOMI , PERRL  Neck: no adenopathy  Orophyarynx: moist mucosa, no lesions noted  Heart: regular, S1/S2, no murmurs, rubs or gallop. Estimated JVP at 5 cmH2O  Lungs: CTAB, No wheezing.   Abdomen: soft, non-tender, bowel sounds present, no hepatosplenomegaly  Extremities: No LE edema today  Skin: no open lesions noted  Neuro: grossly non-focal  Psych: no evidence of depression noted     Labs:  Lab Results   Component Value Date    WBC 4.6 05/17/2021    HGB 12.9 05/17/2021    HCT 40.3 05/17/2021     05/17/2021     05/17/2021    POTASSIUM 4.1 05/17/2021    CHLORIDE 106 05/17/2021    CO2 33 (H) 05/17/2021    BUN 23 05/17/2021    CR 1.39 (H) 05/17/2021    GLC 92 05/17/2021    NTBNP 462 (H) 05/17/2021    AST 27 05/17/2021    ALT 38 05/17/2021    ALKPHOS 57 05/17/2021    BILITOTAL 0.3 05/17/2021    INR 1.05 11/03/2020   Echocardiogram reviewed:   Left ventricular function, chamber size, wall motion, and wall thickness are normal.The EF is 55-60%. No regional wall motion abnormalities are seen.  Right ventricular function, chamber size, wall motion, and thickness are normal.  Severe left atrial enlargement is present. Moderate mitral annular calcification is present. Mild mitral insufficiency is present. There is likley moderate aortic stenosis. The peak velocity is  3.47m/sec, the peak and mean gradients are 48mmHg and 28mmHg. The aortic valve  area is calculated low at 0.7cm2. Pulmonary artery systolic pressure is normal. The inferior vena cava was normal in size with preserved respiratory variability. No pericardial effusion is present.   Compared to prior study AS has worsened. Otherwise no significant change     Coronary angiogram 8/21/2020:    Dist LAD lesion is 70% stenosed.    Mid LAD-1 lesion is 80% stenosed.    Mid LAD-2 lesion is 60% stenosed.    Mid RCA lesion is 40% stenosed.    Prox RCA lesion is 40%  stenosed.    Right sided filling pressures are normal.    Normal PA pressures.    Left sided filling pressures are normal.    Reduced cardiac output.  1. LAD - 3 EDDIE were placed in the mid to distal LAD (2.5 x 28 mm, 3.5 x 12 mm, and 3.0 x 8 mm)  2. RHC showed normal biventricular filling pressures. Normal PA pressure. Reduced cardiac output.     RHC/coronary angiogram 11/3/20:    Moderate aortic stenosis -valve area 1.2 cm2,Mean gradient 24 mm Hg    Right sided filling pressures are normal.    Normal PA pressures.    Left sided filling pressures are normal.    Mildly reduced cardiac output level.    Peripheral angiogram done and IVUS used to assess the vessel lumens of the descending thoracic aorta,right and left external iliac arteries.    TTE 5/3/21:  Global and regional left ventricular function is normal with an EF of 55-60%.  The right ventricle is normal size. Global right ventricular function is normal.  Moderate to severe aortic stenosis, aortic valve area 1.02 cm2, peak velocity  3.3 m/s, mean gradient 25 mmHg, stroke volume index 40 ml/m2, DVI 0.29.  This study was compared with the study from 9/16/2020. The aortic valve peak velocity and mean gradient are similar      ASSESSMENT AND PLAN:  In summary, patient is a 85 year old lady with with coronary artery disease and status post PCI 20 years ago at [facility], we do not have records unfortunately available at this point but did receive 2 stents.  She most complains of shortness of breath denies any dizziness lightheadedness syncope or recurrent episodes of chest pains.  Overall there has been minimal change in her condition since my last visit and since the invasive evaluation.  I have reviewed old imaging and blood work in person including visualizing the echocardiogram from earlier this month.  Grossly I believe the echo is pretty much unchanged from the prior one.  She still has moderate to severe aortic stenosis.  However she does not have any concerning  symptoms such as chest pain dizziness lightheadedness or syncope.  As such we will continue to monitor her with more aggressive blood pressure control.  For the blood pressure we will increase the amlodipine to 7.5 mg once a day and also add Lasix 20 mg once a day with a lab follow-up in a week or 2 to ensure she does not take potassium.  He will also repeat an echocardiogram in 6 months and I will see her afterwards to see if there is any worsening in her aortic valve gradient.  If there is then we will refer her back to the valve clinic for possible valve replacement with TAVR.  No other med changes today.    I appreciate the opportunity to participate in the care of Kamryn Miller . Please do not hesitate to contact me with any further questions.     Sincerely,   Madhu Zuniga MD  [facility] Division of Cardiology

## 2021-05-21 NOTE — PROGRESS NOTES
May 21, 2021  I had the pleasure of seeing Kamryn Miller  in the [facility] for further evaluation and management. She has a history of Hx CAD, HLD, HTN, mitral and tricuspid insuff, pulmonary hypertension, lost to follow up in the past.  She does have CAD and did receive 3 stents in 8/2020 as below.  She denies any cardiomyopathy and she has not had any cardiology follow-up for several years.  Notes that she does have shortness of breath especially with exertion or she walks outside.  This is class III symptoms at the moment she can will probably about a block and able to take a flight of stairs.  We did start TAVR evaluation and she did undergo right heart catheterization as well as invasive hemodynamic measurement and measurement of the valve area.  Given findings of moderate aortic stenosis, TAVR was not yet pursued and ongoing surveillance was recommended. Had been hypertensive in the recent past. Today she us here for follow up.  Notes that she is doing relatively well however does have some lower extremity edema and some shortness of breath that is a little bit worse than it was before.  Also blood pressure is less well controlled but that has been in the past.  Otherwise no chest pain no dizziness lightheadedness no syncopal episodes.  He did have an echo recently as below.  Noted no other complaints.    PAST MEDICAL HISTORY:  Past Medical History:   Diagnosis Date     Aortic valvar stenosis 7/2010    mild     Asthma      Bipolar disorder (H)      CAD (coronary artery disease)     s/p angioplasty     Celiac disease      Colon polyps      Colon polyps 2012    every 3 year colonoscopy      Depression      High cholesterol      HTN      Mitral insufficiency      Pulmonary HTN (H)     mild     Tremors 7/10    drug induced from antidepressants     Tricuspid insufficiency      FAMILY HISTORY:  Family History   Problem Relation Age of Onset     Asthma Mother      Cerebrovascular Disease Mother       Arthritis Mother      Depression Mother      Lipids Sister      Hypertension Sister      Heart Disease Sister      Lipids Sister      Hypertension Sister      Lipids Sister      Breast Cancer Sister      SOCIAL HISTORY:  Social History     Socioeconomic History     Marital status:      Spouse name: Adrien     Number of children: 2     Years of education: 16     Highest education level: Not on file   Occupational History     Employer: RETIRED     Comment: Retired in 2002.    Social Needs     Financial resource strain: Not on file     Food insecurity     Worry: Not on file     Inability: Not on file     Transportation needs     Medical: Not on file     Non-medical: Not on file   Tobacco Use     Smoking status: Never Smoker     Smokeless tobacco: Never Used   Substance and Sexual Activity     Alcohol use: No     Alcohol/week: 0.0 standard drinks     Types: 1 Standard drinks or equivalent per week     Drug use: No     Sexual activity: Not Currently     Partners: Male   Lifestyle     Physical activity     Days per week: Not on file     Minutes per session: Not on file     Stress: Not on file   Relationships     Social connections     Talks on phone: Not on file     Gets together: Not on file     Attends Latter-day service: Not on file     Active member of club or organization: Not on file     Attends meetings of clubs or organizations: Not on file     Relationship status: Not on file     Intimate partner violence     Fear of current or ex partner: Not on file     Emotionally abused: Not on file     Physically abused: Not on file     Forced sexual activity: Not on file   Other Topics Concern     Parent/sibling w/ CABG, MI or angioplasty before 65F 55M? Not Asked   Social History Narrative     Not on file     CURRENT MEDICATIONS:  Current Outpatient Medications   Medication     alendronate (FOSAMAX) 70 MG tablet     amLODIPine (NORVASC) 2.5 MG tablet     amoxicillin (AMOXIL) 500 MG tablet     aspirin 81 MG tablet      azelastine (ASTEPRO) 0.15 % nasal spray     azelastine (ASTEPRO) 0.15 % nasal spray     Blood Pressure Monitor KIT     Calcium Citrate (CITRACAL OR)     CHOLECALCIFEROL 02450 UNIT PO CAPS     clopidogrel (PLAVIX) 75 MG tablet     desvenlafaxine (PRISTIQ) 100 MG 24 hr tablet     desvenlafaxine succinate (PRISTIQ) 25 MG 24 hr tablet     fluticasone-salmeterol (ADVAIR DISKUS) 500-50 MCG/DOSE inhaler     lamoTRIgine (LAMICTAL) 25 MG tablet     LAMOTRIGINE 200 MG PO TABS     levothyroxine (SYNTHROID/LEVOTHROID) 75 MCG tablet     losartan (COZAAR) 100 MG tablet     metoprolol tartrate (LOPRESSOR) 50 MG tablet     Multiple Vitamin (MULTI-VITAMIN) per tablet     Omega-3 Fatty Acids (FISH OIL PO)     rosuvastatin (CRESTOR) 40 MG tablet     verapamil ER (CALAN-SR) 240 MG CR tablet     VIIBRYD 10 MG TABS tablet     No current facility-administered medications for this visit.      ROS:   Constitutional: No fever, chills, or sweats.   ENT: No visual disturbance, ear ache, epistaxis, sore throat.   Allergies/Immunologic: Negative.   Respiratory: No cough, hemoptysis.   Cardiovascular: As per HPI.   GI: No nausea, vomiting, hematemesis, melena, or hematochezia.   : No urinary frequency, dysuria, or hematuria.   Integrument: Negative.   Psychiatric: No evidence of major depression  Neuro: No new neurological complaints at this time. Non focal  Endocrinology: Negative.   Musculoskeletal: As per HPI.      EXAM:  BP (!) 146/69 (BP Location: Right arm, Patient Position: Chair, Cuff Size: Adult Regular)   Pulse 50   Wt 72.6 kg (160 lb)   SpO2 97%   BMI 30.73 kg/m    General: appears comfortable, alert and oriented  Head: normocephalic, atraumatic  Eyes: anicteric sclera, EOMI , PERRL  Neck: no adenopathy  Orophyarynx: moist mucosa, no lesions noted  Heart: regular, S1/S2, no murmurs, rubs or gallop. Estimated JVP at 5 cmH2O  Lungs: CTAB, No wheezing.   Abdomen: soft, non-tender, bowel sounds present, no  hepatosplenomegaly  Extremities: No LE edema today  Skin: no open lesions noted  Neuro: grossly non-focal  Psych: no evidence of depression noted     Labs:  Lab Results   Component Value Date    WBC 4.6 05/17/2021    HGB 12.9 05/17/2021    HCT 40.3 05/17/2021     05/17/2021     05/17/2021    POTASSIUM 4.1 05/17/2021    CHLORIDE 106 05/17/2021    CO2 33 (H) 05/17/2021    BUN 23 05/17/2021    CR 1.39 (H) 05/17/2021    GLC 92 05/17/2021    NTBNP 462 (H) 05/17/2021    AST 27 05/17/2021    ALT 38 05/17/2021    ALKPHOS 57 05/17/2021    BILITOTAL 0.3 05/17/2021    INR 1.05 11/03/2020   Echocardiogram reviewed:   Left ventricular function, chamber size, wall motion, and wall thickness are normal.The EF is 55-60%. No regional wall motion abnormalities are seen.  Right ventricular function, chamber size, wall motion, and thickness are normal.  Severe left atrial enlargement is present. Moderate mitral annular calcification is present. Mild mitral insufficiency is present. There is likley moderate aortic stenosis. The peak velocity is  3.47m/sec, the peak and mean gradients are 48mmHg and 28mmHg. The aortic valve  area is calculated low at 0.7cm2. Pulmonary artery systolic pressure is normal. The inferior vena cava was normal in size with preserved respiratory variability. No pericardial effusion is present.   Compared to prior study AS has worsened. Otherwise no significant change     Coronary angiogram 8/21/2020:    Dist LAD lesion is 70% stenosed.    Mid LAD-1 lesion is 80% stenosed.    Mid LAD-2 lesion is 60% stenosed.    Mid RCA lesion is 40% stenosed.    Prox RCA lesion is 40% stenosed.    Right sided filling pressures are normal.    Normal PA pressures.    Left sided filling pressures are normal.    Reduced cardiac output.  1. LAD - 3 EDDIE were placed in the mid to distal LAD (2.5 x 28 mm, 3.5 x 12 mm, and 3.0 x 8 mm)  2. RHC showed normal biventricular filling pressures. Normal PA pressure. Reduced cardiac  output.     RHC/coronary angiogram 11/3/20:    Moderate aortic stenosis -valve area 1.2 cm2,Mean gradient 24 mm Hg    Right sided filling pressures are normal.    Normal PA pressures.    Left sided filling pressures are normal.    Mildly reduced cardiac output level.    Peripheral angiogram done and IVUS used to assess the vessel lumens of the descending thoracic aorta,right and left external iliac arteries.    TTE 5/3/21:  Global and regional left ventricular function is normal with an EF of 55-60%.  The right ventricle is normal size. Global right ventricular function is normal.  Moderate to severe aortic stenosis, aortic valve area 1.02 cm2, peak velocity  3.3 m/s, mean gradient 25 mmHg, stroke volume index 40 ml/m2, DVI 0.29.  This study was compared with the study from 9/16/2020. The aortic valve peak velocity and mean gradient are similar      ASSESSMENT AND PLAN:  In summary, patient is a 85 year old lady with with coronary artery disease and status post PCI 20 years ago at [facility], we do not have records unfortunately available at this point but did receive 2 stents.  She most complains of shortness of breath denies any dizziness lightheadedness syncope or recurrent episodes of chest pains.  Overall there has been minimal change in her condition since my last visit and since the invasive evaluation.  I have reviewed old imaging and blood work in person including visualizing the echocardiogram from earlier this month.  Grossly I believe the echo is pretty much unchanged from the prior one.  She still has moderate to severe aortic stenosis.  However she does not have any concerning symptoms such as chest pain dizziness lightheadedness or syncope.  As such we will continue to monitor her with more aggressive blood pressure control.  For the blood pressure we will increase the amlodipine to 7.5 mg once a day and also add Lasix 20 mg once a day with a lab follow-up in a week or 2 to ensure she does not take  potassium.  He will also repeat an echocardiogram in 6 months and I will see her afterwards to see if there is any worsening in her aortic valve gradient.  If there is then we will refer her back to the valve clinic for possible valve replacement with TAVR.  No other med changes today.    I appreciate the opportunity to participate in the care of Kamryn Miller . Please do not hesitate to contact me with any further questions.     Sincerely,   Madhu Zuniga MD  [facility] Division of Cardiology

## 2021-05-21 NOTE — NURSING NOTE
"Chief Complaint   Patient presents with     RECHECK     3 month follow up. Echo and labs done. Pt reports ongoing GREGORY. LE edema.        Initial BP (!) 146/69 (BP Location: Right arm, Patient Position: Chair, Cuff Size: Adult Regular)   Pulse 50   Wt 72.6 kg (160 lb)   SpO2 97%   BMI 30.73 kg/m   Estimated body mass index is 30.73 kg/m  as calculated from the following:    Height as of 5/3/21: 1.537 m (5' 0.5\").    Weight as of this encounter: 72.6 kg (160 lb)..  BP completed using cuff size: regular    Fidelina Harris MA  "

## 2021-05-21 NOTE — PATIENT INSTRUCTIONS
Thank you for coming to the [facility] @ [facility]; please note the following instructions:    1. Increase amlodipine to 7.5 mg daily    2. Start furosemide 20 mg once daily    3. Blood work in 2 weeks.    4. Echocardiogram and follow up in 6 months         If you have any questions regarding your visit please contact your care team:     Cardiology  Telephone Number   Alisa BROWNLEE., RN  Jennifer VITALE, RN   Daniela DELGADO, RMA  Fidelina BOUCHER, RMA  Héctor VICKERS, LPN   [phone] (option 1)   For scheduling appts:     [phone] (select option 1)       For the Device Clinic (Pacemakers and ICD's)  RN's :  Delia Vazquez   During business hours: [phone]    *After business hours:  [phone] (select option 4)      Normal test result notifications will be released via [employer] or mailed within 7 business days.  All other test results, will be communicated via telephone once reviewed by your cardiologist.    If you need a medication refill please contact your pharmacy.  Please allow 3 business days for your refill to be completed.    As always, thank you for trusting us with your health care needs!

## 2021-05-23 DIAGNOSIS — J30.2 SEASONAL ALLERGIC RHINITIS, UNSPECIFIED TRIGGER: ICD-10-CM

## 2021-05-24 RX ORDER — AZELASTINE HCL 205.5 UG/1
SPRAY NASAL
Qty: 5 ML | Refills: 1 | Status: SHIPPED | OUTPATIENT
Start: 2021-05-24 | End: 2021-06-17

## 2021-05-24 NOTE — TELEPHONE ENCOUNTER
Routing refill request to provider for review/approval because:  AGE          Pending Prescriptions:                       Disp   Refills    azelastine (ASTEPRO) 0.15 % nasal spray [P*       1        Sig: INSTILL 1 SPRAY INTO BOTH NOSTRILS DAILY        Phillip Pinon RN

## 2021-06-04 DIAGNOSIS — I35.0 MODERATE AORTIC STENOSIS: ICD-10-CM

## 2021-06-04 DIAGNOSIS — I25.5 ISCHEMIC CARDIOMYOPATHY: ICD-10-CM

## 2021-06-04 DIAGNOSIS — I10 BENIGN ESSENTIAL HYPERTENSION: ICD-10-CM

## 2021-06-04 DIAGNOSIS — E78.2 MIXED HYPERLIPIDEMIA: ICD-10-CM

## 2021-06-04 DIAGNOSIS — I35.0 NONRHEUMATIC AORTIC VALVE STENOSIS: ICD-10-CM

## 2021-06-04 DIAGNOSIS — I25.10 CORONARY ARTERY DISEASE INVOLVING NATIVE CORONARY ARTERY OF NATIVE HEART WITHOUT ANGINA PECTORIS: ICD-10-CM

## 2021-06-04 DIAGNOSIS — I35.0 SEVERE AORTIC STENOSIS: ICD-10-CM

## 2021-06-04 DIAGNOSIS — I50.30 NYHA CLASS 3 HEART FAILURE WITH PRESERVED EJECTION FRACTION (H): ICD-10-CM

## 2021-06-04 LAB
ANION GAP SERPL CALCULATED.3IONS-SCNC: 6 MMOL/L (ref 3–14)
BUN SERPL-MCNC: 27 MG/DL (ref 7–30)
CALCIUM SERPL-MCNC: 9.4 MG/DL (ref 8.5–10.1)
CHLORIDE SERPL-SCNC: 106 MMOL/L (ref 94–109)
CO2 SERPL-SCNC: 30 MMOL/L (ref 20–32)
CREAT SERPL-MCNC: 1.5 MG/DL (ref 0.52–1.04)
GFR SERPL CREATININE-BSD FRML MDRD: 32 ML/MIN/{1.73_M2}
GLUCOSE SERPL-MCNC: 103 MG/DL (ref 70–99)
POTASSIUM SERPL-SCNC: 3.4 MMOL/L (ref 3.4–5.3)
SODIUM SERPL-SCNC: 142 MMOL/L (ref 133–144)

## 2021-06-04 PROCEDURE — 36415 COLL VENOUS BLD VENIPUNCTURE: CPT | Performed by: INTERNAL MEDICINE

## 2021-06-04 PROCEDURE — 80048 BASIC METABOLIC PNL TOTAL CA: CPT | Performed by: INTERNAL MEDICINE

## 2021-06-16 DIAGNOSIS — J30.2 SEASONAL ALLERGIC RHINITIS, UNSPECIFIED TRIGGER: ICD-10-CM

## 2021-06-17 DIAGNOSIS — E78.5 HYPERLIPIDEMIA WITH TARGET LDL LESS THAN 70: ICD-10-CM

## 2021-06-17 RX ORDER — AZELASTINE HCL 205.5 UG/1
SPRAY NASAL
Qty: 30 ML | Refills: 1 | Status: SHIPPED | OUTPATIENT
Start: 2021-06-17 | End: 2021-08-23

## 2021-06-17 NOTE — TELEPHONE ENCOUNTER
Routing refill request to provider for review/approval because:  Due to age          Pending Prescriptions:                       Disp   Refills    azelastine (ASTEPRO) 0.15 % nasal spray [P*       1        Sig: INSTILL 1 SPRAY INTO BOTH NOSTRILS DAILY        Phillip Pinon RN

## 2021-06-18 ENCOUNTER — TELEPHONE (OUTPATIENT)
Dept: CARDIOLOGY | Facility: CLINIC | Age: 86
End: 2021-06-18

## 2021-06-18 RX ORDER — ROSUVASTATIN CALCIUM 40 MG/1
TABLET, COATED ORAL
Qty: 90 TABLET | Refills: 3 | Status: SHIPPED | OUTPATIENT
Start: 2021-06-18 | End: 2022-04-01

## 2021-06-18 NOTE — TELEPHONE ENCOUNTER
[facility] Call Center    Phone Message    May a detailed message be left on voicemail: yes     Reason for Call: Other: Kamryn calling to report that her feet and ankles have been very swollen lately & she is getting black and blue burises very easily. She believes this could be from the new medication that Dr. Zuniga prescribed. Please give her a call to discuss. Thank you!      Action Taken: Message routed to:  Other: [facility] HEART    Travel Screening: Not Applicable

## 2021-06-21 NOTE — TELEPHONE ENCOUNTER
"Spoke to patient while on speaker and son was present.  States that today her feel do not seen as swollen.  Her shoes are a little tight but otherwise her son also said \"she does not have much swelling\"    Last visit with dr. Zuniga her amlodipine was increased to 7.5 mg daily and lasix 20 mg daily was initiated.    Patient states that her  is in the hospital right now and had surgery to remove a brain tumor.  States the surgery went well and he is supposed to be there till Sunday.      Denies any worsening shortness of breath.  Denies lightheadedness or dizziness.      Weight:   154 lbs    BP:   143/79    Has not been eating and decreased sleep due to the stress of her  being in the hospital.     Writer advised for patient to monitor her swelling, elevate her legs when able, watch salt in diet.  If swelling worsens to call clinic to discuss.  Patient verbalized understanding.    Alisa Shook RN  Cardiology Care Coordinator  [facility]  [phone] option 1          "

## 2021-07-06 ENCOUNTER — TELEPHONE (OUTPATIENT)
Dept: FAMILY MEDICINE | Facility: CLINIC | Age: 86
End: 2021-07-06

## 2021-07-06 NOTE — TELEPHONE ENCOUNTER
Patient needs her Updated Medlist signed by provider and last Physical faxed to [facility] today Attn: Ankur [phone].  Needs done Today.  Lala Hassan,

## 2021-07-20 ENCOUNTER — OFFICE VISIT (OUTPATIENT)
Dept: FAMILY MEDICINE | Facility: CLINIC | Age: 86
End: 2021-07-20
Payer: MEDICARE

## 2021-07-20 VITALS
HEIGHT: 61 IN | HEART RATE: 56 BPM | BODY MASS INDEX: 28.51 KG/M2 | SYSTOLIC BLOOD PRESSURE: 91 MMHG | DIASTOLIC BLOOD PRESSURE: 58 MMHG | TEMPERATURE: 98.1 F | WEIGHT: 151 LBS | RESPIRATION RATE: 20 BRPM | OXYGEN SATURATION: 95 %

## 2021-07-20 DIAGNOSIS — I35.0 SEVERE AORTIC STENOSIS: ICD-10-CM

## 2021-07-20 DIAGNOSIS — J45.40 MODERATE PERSISTENT ASTHMA WITHOUT COMPLICATION: ICD-10-CM

## 2021-07-20 DIAGNOSIS — I10 BENIGN ESSENTIAL HYPERTENSION: ICD-10-CM

## 2021-07-20 DIAGNOSIS — Z98.61 CAD S/P PERCUTANEOUS CORONARY ANGIOPLASTY: ICD-10-CM

## 2021-07-20 DIAGNOSIS — H91.90 HEARING DISORDER, UNSPECIFIED LATERALITY: ICD-10-CM

## 2021-07-20 DIAGNOSIS — N18.32 STAGE 3B CHRONIC KIDNEY DISEASE (H): ICD-10-CM

## 2021-07-20 DIAGNOSIS — E03.9 ACQUIRED HYPOTHYROIDISM: ICD-10-CM

## 2021-07-20 DIAGNOSIS — I34.0 NONRHEUMATIC MITRAL VALVE REGURGITATION: ICD-10-CM

## 2021-07-20 DIAGNOSIS — E78.2 MIXED HYPERLIPIDEMIA: ICD-10-CM

## 2021-07-20 DIAGNOSIS — I25.5 ISCHEMIC CARDIOMYOPATHY: ICD-10-CM

## 2021-07-20 DIAGNOSIS — Z00.00 ENCOUNTER FOR MEDICARE ANNUAL WELLNESS EXAM: Primary | ICD-10-CM

## 2021-07-20 DIAGNOSIS — I27.20 PULMONARY HYPERTENSION (H): ICD-10-CM

## 2021-07-20 DIAGNOSIS — F31.70 BIPOLAR DISORDER IN PARTIAL REMISSION, MOST RECENT EPISODE UNSPECIFIED TYPE (H): ICD-10-CM

## 2021-07-20 DIAGNOSIS — E78.5 HYPERLIPIDEMIA LDL GOAL <70: ICD-10-CM

## 2021-07-20 DIAGNOSIS — I50.30 NYHA CLASS 3 HEART FAILURE WITH PRESERVED EJECTION FRACTION (H): ICD-10-CM

## 2021-07-20 DIAGNOSIS — I25.10 CAD S/P PERCUTANEOUS CORONARY ANGIOPLASTY: ICD-10-CM

## 2021-07-20 DIAGNOSIS — I25.10 CORONARY ARTERY DISEASE INVOLVING NATIVE HEART WITHOUT ANGINA PECTORIS, UNSPECIFIED VESSEL OR LESION TYPE: ICD-10-CM

## 2021-07-20 PROCEDURE — G0439 PPPS, SUBSEQ VISIT: HCPCS | Performed by: FAMILY MEDICINE

## 2021-07-20 PROCEDURE — 99214 OFFICE O/P EST MOD 30 MIN: CPT | Mod: 25 | Performed by: FAMILY MEDICINE

## 2021-07-20 RX ORDER — VERAPAMIL HYDROCHLORIDE 240 MG/1
240 TABLET, FILM COATED, EXTENDED RELEASE ORAL DAILY
Qty: 90 TABLET | Refills: 3 | Status: ON HOLD | OUTPATIENT
Start: 2021-07-20 | End: 2021-12-22

## 2021-07-20 RX ORDER — FUROSEMIDE 20 MG
20 TABLET ORAL DAILY
Qty: 90 TABLET | Refills: 1 | Status: SHIPPED | OUTPATIENT
Start: 2021-07-20 | End: 2021-11-26

## 2021-07-20 RX ORDER — CLOPIDOGREL BISULFATE 75 MG/1
75 TABLET ORAL DAILY
Qty: 90 TABLET | Refills: 3 | Status: SHIPPED | OUTPATIENT
Start: 2021-07-20 | End: 2022-03-16

## 2021-07-20 RX ORDER — LEVOTHYROXINE SODIUM 75 UG/1
75 TABLET ORAL DAILY
Qty: 90 TABLET | Refills: 0 | Status: SHIPPED | OUTPATIENT
Start: 2021-07-20 | End: 2021-07-28 | Stop reason: ALTCHOICE

## 2021-07-20 RX ORDER — LOSARTAN POTASSIUM 100 MG/1
100 TABLET ORAL DAILY
Qty: 90 TABLET | Refills: 3 | Status: ON HOLD | OUTPATIENT
Start: 2021-07-20 | End: 2021-12-22

## 2021-07-20 ASSESSMENT — ACTIVITIES OF DAILY LIVING (ADL): CURRENT_FUNCTION: NO ASSISTANCE NEEDED

## 2021-07-20 ASSESSMENT — MIFFLIN-ST. JEOR: SCORE: 1064.37

## 2021-07-20 NOTE — PROGRESS NOTES
"SUBJECTIVE:   Kamryn Miller is a 85 year old female who presents for Preventive Visit.      Patient has been advised of split billing requirements and indicates understanding: Yes   Are you in the first 12 months of your Medicare coverage?  No    Healthy Habits:    In general, how would you rate your overall health?  Good    Frequency of exercise:  2-3 days/week    Duration of exercise:  15-30 minutes    Do you usually eat at least 4 servings of fruit and vegetables a day, include whole grains    & fiber and avoid regularly eating high fat or \"junk\" foods?  Yes    Taking medications regularly:  Yes    Barriers to taking medications:  Not applicable    Medication side effects:  Not applicable    Ability to successfully perform activities of daily living:  No assistance needed    Home Safety:  No safety concerns identified    Hearing Impairment:  Difficulty following a conversation in a noisy restaurant or crowded room    In the past 6 months, have you been bothered by leaking of urine? Yes    In general, how would you rate your overall mental or emotional health?  Good      PHQ-2 Total Score:    Additional concerns today:  Yes (dry skin, rt leg, bowel problem, hearing referral)    Do you feel safe in your environment? Yes    Have you ever done Advance Care Planning? (For example, a Health Directive, POLST, or a discussion with a medical provider or your loved ones about your wishes): Yes, advance care planning is on file.       Fall risk  Fallen 2 or more times in the past year?: Yes  Any fall with injury in the past year?: No    Cognitive Screening   1) Repeat 3 items (Leader, Season, Table)    2) Clock draw: NORMAL  3) 3 item recall: Recalls 2 objects   Results: NORMAL clock, 1-2 items recalled: COGNITIVE IMPAIRMENT LESS LIKELY    Mini-CogTM Copyright TIMOTHY Flores. Licensed by the author for use in [facility]; reprinted with permission (ermelinda@.[facility]). All rights reserved.      Do you have sleep apnea, " excessive snoring or daytime drowsiness?: no    Reviewed and updated as needed this visit by clinical staff  Tobacco  Allergies  Meds  Problems  Med Hx  Surg Hx  Fam Hx          Reviewed and updated as needed this visit by Provider  Tobacco  Allergies  Meds  Problems  Med Hx  Surg Hx  Fam Hx         Social History     Tobacco Use     Smoking status: Never Smoker     Smokeless tobacco: Never Used   Substance Use Topics     Alcohol use: No     Alcohol/week: 0.0 standard drinks     Types: 1 Standard drinks or equivalent per week     If you drink alcohol do you typically have >3 drinks per day or >7 drinks per week? No    No flowsheet data found.    Hyperlipidemia Follow-Up      Are you regularly taking any medication or supplement to lower your cholesterol?   Yes- statins    Are you having muscle aches or other side effects that you think could be caused by your cholesterol lowering medication?  No    Hypertension Follow-up      Do you check your blood pressure regularly outside of the clinic? No     Are you following a low salt diet? No    Are your blood pressures ever more than 140 on the top number (systolic) OR more   than 90 on the bottom number (diastolic), for example 140/90? Yes    Vascular Disease Follow-up      How often do you take nitroglycerin? Never    Do you take an aspirin every day? Yes    Asthma Follow-Up    Was ACT completed today?    Yes    ACT Total Scores 7/20/2021   ACT TOTAL SCORE -   ASTHMA ER VISITS -   ASTHMA HOSPITALIZATIONS -   ACT TOTAL SCORE (Goal Greater than or Equal to 20) 20   In the past 12 months, how many times did you visit the emergency room for your asthma without being admitted to the hospital? 0   In the past 12 months, how many times were you hospitalized overnight because of your asthma? 0          How many days per week do you miss taking your asthma controller medication?  0    Please describe any recent triggers for your asthma: upper respiratory  infections    Have you had any Emergency Room Visits, Urgent Care Visits, or Hospital Admissions since your last office visit?  No  Doing well on Thyroid meds    Current providers sharing in care for this patient include:   Patient Care Team:  Rolanda Wilcox MD as PCP - General (Family Practice)  Rolanda Wilcox MD as Assigned PCP  Ania Johnson MD as Assigned Nephrology Provider  Pati De Santiago NP as Assigned Heart and Vascular Provider    The following health maintenance items are reviewed in Epic and correct as of today:  Health Maintenance Due   Topic Date Due     ASTHMA CONTROL TEST  07/18/2021     Lab work is in process  Labs reviewed in EPIC  BP Readings from Last 3 Encounters:   07/20/21 91/58   05/21/21 131/80   05/03/21 115/65    Wt Readings from Last 3 Encounters:   07/20/21 68.5 kg (151 lb)   05/21/21 72.6 kg (160 lb)   05/03/21 71.4 kg (157 lb 4.8 oz)             CHF is stable   No sob  No PND or Orthopnea  Sees Cardiology  Notes reviewed      Patient Active Problem List   Diagnosis     Hyperlipidemia LDL goal <70     Mild major depression (H)     Pulmonary hypertension (H)     Mild persistent asthma     Seasonal allergic rhinitis     Mitral insufficiency     Tricuspid insufficiency     Bipolar disorder (H)     Renal insufficiency     Tremors     Advanced directives, counseling/discussion     Family history of diabetes mellitus     Family history of celiac disease     Celiac disease     History of colonic polyps     Benign essential hypertension     Hypothyroidism, unspecified type     CKD (chronic kidney disease) stage 3, GFR 30-59 ml/min     Severe aortic stenosis     Other ill-defined heart diseases     Anemia     Disorder of kidney and ureter     Generalized anxiety disorder     Osteopenia     CAD S/P percutaneous coronary angioplasty     Status post coronary angiogram     Past Surgical History:   Procedure Laterality Date     ANGIOGRAM  6/26/2009     ANGIOPLASTY  9/96    for angina      ANGIOPLASTY  8/03 - 9/03    X -2 - with stenst in coronary car.     APPENDECTOMY       BREAST BIOPSY, RT/LT      Breat Biopsy RT/LT, benign     BUNIONECTOMY  11/8/2011    Procedure:BUNIONECTOMY; Left donna bunionectomy; Surgeon:FREDY LITTLEJOHN; Location:[facility] OR     BUNIONECTOMY RT/LT  5/2007    right bunion and right 2nd hammertoe     C ANESTH,BLEPHAROPLASTY      (R) for drooping eyelid     C APPENDECTOMY       CATARACT IOL, RT/LT  6/12 and 7/12    bilateral     COLONOSCOPY  2007, 2012    every 3 years for polyps     CV CORONARY ANGIOGRAM N/A 8/21/2020    Procedure: CV CORONARY ANGIOGRAM;  Surgeon: Gianluca Toscano MD;  Location: [facility]     CV LEFT HEART CATH N/A 8/21/2020    Procedure: CV LEFT HEART CATH;  Surgeon: Gianluca Toscano MD;  Location: [facility]     CV LEFT HEART CATH N/A 11/3/2020    Procedure: CV LEFT HEART CATH;  Surgeon: Tom Burrows MD;  Location: [facility]     CV LOWER EXTREMITY ANGIOGRAM BILATERAL N/A 11/3/2020    Procedure: CV ANGIOGRAM LOWER EXTREMITY BILATERAL;  Surgeon: Tom Burrows MD;  Location: [facility]     CV PCI STENT DRUG ELUTING N/A 8/21/2020    Procedure: Percutaneous Coronary Intervention Stent Drug Eluting;  Surgeon: Gianluca Tosacno MD;  Location: [facility]     CV RIGHT HEART CATH MEASUREMENTS RECORDED N/A 8/21/2020    Procedure: CV RIGHT HEART CATH;  Surgeon: Gianluca Toscano MD;  Location: [facility]     CV RIGHT HEART CATH MEASUREMENTS RECORDED N/A 11/3/2020    Procedure: CV RIGHT HEART CATH;  Surgeon: Tom Burrows MD;  Location: [facility]     JOINT REPLACEMENT, HIP RT/LT  4/2008    right hip replaced     JOINT REPLACEMENT, HIP RT/LT  4/2009    left hip replaced     REPAIR HAMMER TOE  11/8/2011    Procedure:REPAIR HAMMER TOE; left 2nd hammertoe repair; Surgeon:FREDY LITTLEJOHN; Location:[facility] OR     SURGICAL HISTORY OF -    11/11    left bunion and 2nd hammertoe repair     TUBAL LIGATION         Social History     Tobacco Use     Smoking status: Never Smoker     Smokeless tobacco: Never Used   Substance Use Topics     Alcohol use: No     Alcohol/week: 0.0 standard drinks     Types: 1 Standard drinks or equivalent per week     Family History   Problem Relation Age of Onset     Asthma Mother      Cerebrovascular Disease Mother      Arthritis Mother      Depression Mother      Lipids Sister      Hypertension Sister      Heart Disease Sister      Lipids Sister      Hypertension Sister      Lipids Sister      Breast Cancer Sister          Current Outpatient Medications   Medication Sig Dispense Refill     alendronate (FOSAMAX) 70 MG tablet TAKE 1 (ONE) TABLET(S) BY MOUTH ONCE A WEEK       amLODIPine (NORVASC) 2.5 MG tablet Take 3 tablets (7.5 mg) by mouth daily 270 tablet 1     amoxicillin (AMOXIL) 500 MG tablet TAKE 4 TABLETS BY MOUTH 1 HOUR PRIOR TO APPT  0     aspirin 81 MG tablet Take 1 tablet by mouth daily.       azelastine (ASTEPRO) 0.15 % nasal spray INSTILL 1 SPRAY INTO BOTH NOSTRILS DAILY 30 mL 1     Blood Pressure Monitor KIT Automatic Blood Pressure Monitor 1 kit 0     Calcium Citrate (CITRACAL OR) Take 1 tablet by mouth daily.       CHOLECALCIFEROL 49630 UNIT PO CAPS 1 tablet weekly       clopidogrel (PLAVIX) 75 MG tablet Take 1 tablet (75 mg) by mouth daily 90 tablet 3     desvenlafaxine (PRISTIQ) 100 MG 24 hr tablet Take 100 mg by mouth daily       fluticasone-salmeterol (ADVAIR DISKUS) 500-50 MCG/DOSE inhaler INHALE 1 PUFF INTO THE LUNGS EVERY 12 HOURS 1 each 6     furosemide (LASIX) 20 MG tablet Take 1 tablet (20 mg) by mouth daily 90 tablet 1     lamoTRIgine (LAMICTAL) 25 MG tablet 25 mg       LAMOTRIGINE 200 MG PO TABS Take by mouth daily        levothyroxine (SYNTHROID/LEVOTHROID) 75 MCG tablet Take 1 tablet (75 mcg) by mouth daily 90 tablet 0     losartan (COZAAR) 100 MG tablet Take 1 tablet (100 mg) by mouth daily 90  "tablet 3     metoprolol tartrate (LOPRESSOR) 50 MG tablet TAKE 1 TAB BY MOUTH IN THE MORNING AND 1/2 IN THE EVENING-needs follow up appointment 135 tablet 3     Multiple Vitamin (MULTI-VITAMIN) per tablet Take 1 tablet by mouth daily.       Omega-3 Fatty Acids (FISH OIL PO) Take 1 tablet by mouth daily.       rosuvastatin (CRESTOR) 40 MG tablet TAKE 1 TABLET BY MOUTH EVERY DAY 90 tablet 3     verapamil ER (CALAN-SR) 240 MG CR tablet Take 1 tablet (240 mg) by mouth daily Needs follow up appointment 90 tablet 3     VIIBRYD 10 MG TABS tablet Take 10 mg by mouth daily  2     Allergies   Allergen Reactions     Ace Inhibitors Cough     Diclofenac Other (See Comments)     Balance problems         pt declined mammograms  Pertinent mammograms are reviewed under the imaging tab.    Review of Systems  CONSTITUTIONAL: NEGATIVE for fever, chills, change in weight  INTEGUMENTARY/SKIN: NEGATIVE for worrisome rashes, moles or lesions  EYES: NEGATIVE for vision changes or irritation  ENT/MOUTH: NEGATIVE for ear, mouth and throat problems  RESP: NEGATIVE for significant cough or SOB  BREAST: NEGATIVE for masses, tenderness or discharge  CV: NEGATIVE for chest pain, palpitations or peripheral edema  GI: NEGATIVE for nausea, abdominal pain, heartburn, or change in bowel habits  : NEGATIVE for frequency, dysuria, or hematuria  MUSCULOSKELETAL: NEGATIVE for significant arthralgias or myalgia  NEURO: NEGATIVE for weakness, dizziness or paresthesias  ENDOCRINE: NEGATIVE for temperature intolerance, skin/hair changes  HEME: NEGATIVE for bleeding problems  PSYCHIATRIC: NEGATIVE for changes in mood or affect    OBJECTIVE:   BP 91/58   Pulse 56   Temp 98.1  F (36.7  C) (Oral)   Resp 20   Ht 1.537 m (5' 0.5\")   Wt 68.5 kg (151 lb)   SpO2 95%   BMI 29.00 kg/m   Estimated body mass index is 29 kg/m  as calculated from the following:    Height as of this encounter: 1.537 m (5' 0.5\").    Weight as of this encounter: 68.5 kg (151 " lb).  Physical Exam  GENERAL APPEARANCE: alert and no distress  EYES: Eyes grossly normal to inspection, PERRL and conjunctivae and sclerae normal  HENT: ear canals and TM's normal, nose and mouth without ulcers or lesions, oropharynx clear and oral mucous membranes moist  NECK: no adenopathy, no asymmetry, masses, or scars and thyroid normal to palpation  RESP: lungs clear to auscultation - no rales, rhonchi or wheezes  BREAST: normal without masses, tenderness or nipple discharge and no palpable axillary masses or adenopathy  CV: regular rate and rhythm,3/6 ejection systolic Murmur LSB, click or rub, no peripheral edema and peripheral pulses strong  ABDOMEN: soft, nontender, no hepatosplenomegaly, no masses and bowel sounds normal  MS: no musculoskeletal defects are noted and gait is age appropriate without ataxia  SKIN: no suspicious lesions or rashes  NEURO: Normal strength and tone, sensory exam grossly normal, mentation intact and speech normal  PSYCH: mentation appears normal and affect normal/bright    Diagnostic Test Results:  Labs reviewed in Epic  Pending     ASSESSMENT / PLAN:   1. Encounter for Medicare annual wellness exam      2. Nonrheumatic aortic valve stenosis-moderate to severe  Cardiology Notes reviewed   - furosemide (LASIX) 20 MG tablet; Take 1 tablet (20 mg) by mouth daily  Dispense: 90 tablet; Refill: 1    3. Nonrheumatic mitral valve regurgitation-mild      4. Coronary artery disease involving native heart without angina pectoris, unspecified vessel or lesion type  Stable     5. Hyperlipidemia LDL goal <70  Pending   - Lipid panel reflex to direct LDL Fasting; Future    6. Pulmonary hypertension (H)  Sees Cardiology        8. Bipolar disorder in partial remission, most recent episode unspecified type (H)  Sees psych    9. Stage 3b chronic kidney disease  Labs pending   - CBC with platelets; Future  - Albumin Random Urine Quantitative with Creat Ratio; Future  - UA Macro with Reflex to Micro  and Culture - lab collect; Future    10. CAD S/P percutaneous coronary angioplasty  Doing well  - CBC with platelets; Future    11. Hearing disorder, unspecified laterality  Referral done  - Adult Audiology Referral; Future    12. Coronary artery disease involving native coronary artery of native heart without angina pectoris  Stable   - clopidogrel (PLAVIX) 75 MG tablet; Take 1 tablet (75 mg) by mouth daily  Dispense: 90 tablet; Refill: 3  - furosemide (LASIX) 20 MG tablet; Take 1 tablet (20 mg) by mouth daily  Dispense: 90 tablet; Refill: 1    13. Moderate persistent asthma without complication  Stable   - fluticasone-salmeterol (ADVAIR DISKUS) 500-50 MCG/DOSE inhaler; INHALE 1 PUFF INTO THE LUNGS EVERY 12 HOURS  Dispense: 1 each; Refill: 6    14. Benign essential hypertension  Stable   - furosemide (LASIX) 20 MG tablet; Take 1 tablet (20 mg) by mouth daily  Dispense: 90 tablet; Refill: 1  - losartan (COZAAR) 100 MG tablet; Take 1 tablet (100 mg) by mouth daily  Dispense: 90 tablet; Refill: 3  - verapamil ER (CALAN-SR) 240 MG CR tablet; Take 1 tablet (240 mg) by mouth daily Needs follow up appointment  Dispense: 90 tablet; Refill: 3    15. Mixed hyperlipidemia  Labs       16. Ischemic cardiomyopathy  Stable -sees Cardiology  - furosemide (LASIX) 20 MG tablet; Take 1 tablet (20 mg) by mouth daily  Dispense: 90 tablet; Refill: 1    17. NYHA class 3 heart failure with preserved ejection fraction (H)  Stable   - furosemide (LASIX) 20 MG tablet; Take 1 tablet (20 mg) by mouth daily  Dispense: 90 tablet; Refill: 1    119. Severe aortic stenosis  Seeing Cardiology  Discussed follow up 2 months repeat echo  - furosemide (LASIX) 20 MG tablet; Take 1 tablet (20 mg) by mouth daily  Dispense: 90 tablet; Refill: 1  21. Acquired hypothyroidism  Pending   - levothyroxine (SYNTHROID/LEVOTHROID) 75 MCG tablet; Take 1 tablet (75 mcg) by mouth daily  Dispense: 90 tablet; Refill: 0    Patient has been advised of split billing  "requirements and indicates understanding: Yes  COUNSELING:  Reviewed preventive health counseling, as reflected in patient instructions       Regular exercise       Healthy diet/nutrition       Vision screening       Hearing screening       Dental care       Bladder control       Fall risk prevention       Osteoporosis prevention/bone health       Advanced Planning     Estimated body mass index is 29 kg/m  as calculated from the following:    Height as of this encounter: 1.537 m (5' 0.5\").    Weight as of this encounter: 68.5 kg (151 lb).        She reports that she has never smoked. She has never used smokeless tobacco.      Appropriate preventive services were discussed with this patient, including applicable screening as appropriate for cardiovascular disease, diabetes, osteopenia/osteoporosis, and glaucoma.  As appropriate for age/gender, discussed screening for colorectal cancer, prostate cancer, breast cancer, and cervical cancer. Checklist reviewing preventive services available has been given to the patient.    Reviewed patients plan of care and provided an AVS. The Complex Care Plan (for patients with higher acuity and needing more deliberate coordination of services) for Kamryn meets the Care Plan requirement. This Care Plan has been established and reviewed with the Patient.  Daughter concerned about memory issues-will make a follow up appointment   Counseling Resources:  ATP IV Guidelines  Pooled Cohorts Equation Calculator  Breast Cancer Risk Calculator  Breast Cancer: Medication to Reduce Risk  FRAX Risk Assessment  ICSI Preventive Guidelines  Dietary Guidelines for Americans, 2010  [employer]'s MyPlate  ASA Prophylaxis  Lung CA Screening    Rolanda Wilcox MD  [facility]    Identified Health Risks:  "

## 2021-07-20 NOTE — PROGRESS NOTES
The patient was provided with written information regarding signs of hearing loss.  Information on urinary incontinence and treatment options given to patient.  She is at risk for falling and has been provided with information to reduce the risk of falling at home.

## 2021-07-20 NOTE — PATIENT INSTRUCTIONS
Patient Education   Personalized Prevention Plan  You are due for the preventive services outlined below.  Your care team is available to assist you in scheduling these services.  If you have already completed any of these items, please share that information with your care team to update in your medical record.  Health Maintenance Due   Topic Date Due     ANNUAL REVIEW OF  ORDERS  Never done     Asthma Control Test  07/18/2021       Signs of Hearing Loss      Hearing much better with one ear can be a sign of hearing loss.   Hearing loss is a problem shared by many people. In fact, it is one of the most common health problems, particularly as people age. Most people age 65 and older have some hearing loss. By age 80, almost everyone does. Hearing loss often occurs slowly over the years. So you may not realize your hearing has gotten worse.  Have your hearing checked  Call your healthcare provider if you:    Have to strain to hear normal conversation    Have to watch other people s faces very carefully to follow what they re saying    Need to ask people to repeat what they ve said    Often misunderstand what people are saying    Turn the volume of the television or radio up so high that others complain    Feel that people are mumbling when they re talking to you    Find that the effort to hear leaves you feeling tired and irritated    Notice, when using the phone, that you hear better with one ear than the other  [employer] last reviewed this educational content on 1/1/2020 2000-2021 [employer]. All rights reserved. This information is not intended as a substitute for professional medical care. Always follow your healthcare professional's instructions.          Urinary Incontinence, Female (Adult)   Urinary incontinence means loss of bladder control. This problem affects many women, especially as they get older. If you have incontinence, you may be embarrassed to ask for help. But know that this  problem can be treated.   Types of Incontinence  There are different types of incontinence. Two of the main types are described here. You can have more than one type.     Stress incontinence. With this type, urine leaks when pressure (stress) is put on the bladder. This may happen when you cough, sneeze, or laugh. Stress incontinence most often occurs because the pelvic floor muscles that support the bladder and urethra are weak. This can happen after pregnancy and vaginal childbirth or a hysterectomy. It can also be due to excess body weight or hormone changes.    Urge incontinence (also called overactive bladder). With this type, a sudden urge to urinate is felt often. This may happen even though there may not be much urine in the bladder. The need to urinate often during the night is common. Urge incontinence most often occurs because of bladder spasms. This may be due to bladder irritation or infection. Damage to bladder nerves or pelvic muscles, constipation, and certain medicines can also lead to urge incontinence.  Treatment depends on the cause. Further evaluation is needed to find the type you have. This will likely include an exam and certain tests. Based on the results, you and your healthcare provider can then plan treatment. Until a diagnosis is made, the home care tips below can help ease symptoms.   Home care    Do pelvic floor muscle exercises, if they are prescribed. The pelvic floor muscles help support the bladder and urethra. Many women find that their symptoms improve when doing special exercises that strengthen these muscles. To do the exercises, contract the muscles you would use to stop your stream of urine. But do this when you re not urinating. Hold for 10 seconds, then relax. Repeat 10 to 20 times in a row, at least 3 times a day. Your healthcare provider may give you other instructions for how to do the exercises and how often.    Keep a bladder diary. This helps track how often and how  much you urinate over a set period of time. Bring this diary with you to your next visit with the provider. The information can help your provider learn more about your bladder problem.    Lose weight, if advised to by your provider. Extra weight puts pressure on the bladder. Your provider can help you create a weight-loss plan that s right for you. This may include exercising more and making certain diet changes.    Don't have foods and drinks that may irritate the bladder. These can include alcohol and caffeinated drinks.    Quit smoking. Smoking and other tobacco use can lead to a long-term (chronic) cough that strains the pelvic floor muscles. Smoking may also damage the bladder and urethra. Talk with your provider about treatments or methods you can use to quit smoking.    If drinking large amounts of fluid makes you have symptoms, you may be advised to limit your fluid intake. You may also be advised to drink most of your fluids during the day and to limit fluids at night.    If you re worried about urine leakage or accidents, you may wear absorbent pads to catch urine. Change the pads often. This helps reduce discomfort. It may also reduce the risk of skin or bladder infections.    Follow-up care  Follow up with your healthcare provider, or as directed. It may take some to find the right treatment for your problem. But healthy lifestyle changes can be made right away. These include such things as exercising on a regular basis, eating a healthy diet, losing weight (if needed), and quitting smoking. Your treatment plan may include special therapies or medicines. Certain procedures or surgery may also be options. Talk about any questions you have with your provider.   When to seek medical advice  Call the healthcare provider right away if any of these occur:    Fever of 100.4 F (38 C) or higher, or as directed by your provider    Bladder pain or fullness    Belly swelling    Nausea or vomiting    Back  pain    Weakness, dizziness, or fainting  [employer] last reviewed this educational content on 1/1/2020 2000-2021 [employer]. All rights reserved. This information is not intended as a substitute for professional medical care. Always follow your healthcare professional's instructions.          Preventing Falls at Home  A person can fall for many reasons. Older adults may fall because reaction time slows down as we age. Your muscles and joints may get stiff, weak, or less flexible because of illness, medicines, or a physical condition.   Other health problems that make falls more likely include:     Arthritis    Dizziness or lightheadedness when you stand up (orthostatic hypotension)    History of a stroke    Dizziness    Anemia    Certain medicines taken for mental illness or to control blood pressure.    Problems with balance or gait    Bladder or urinary problems    History of falling    Changes in vision (vision impairment)    Changes in thinking skills and memory (cognitive impairment)  Injuries from a fall can include serious injuries such as broken bones, dislocated joints, internal bleeding and cuts. Injuries like these can limit your independence.   Prevention tips  To help prevent falls and fall-related injuries, follow the tips below.    Floors  To make floors safer:     Put nonskid pads under area rugs.    Remove small rugs.    Replace worn floor coverings.    Tack carpets firmly to each step on carpeted stairs. Put nonskid strips on the edges of uncarpeted stairs.    Keep floors and stairs free of clutter and cords.    Arrange furniture so there are clear pathways.    Clean up any spills right away.  Bathrooms    To make bathrooms safer:     Install grab bars in the tub or shower.    Apply nonskid strips or put a nonskid rubber mat in the tub or shower.    Sit on a bath chair to bathe.    Use bathmats with nonskid backing.  Lighting  To improve visibility in your home:      Keep a  flashlight in each room. Or put a lamp next to the bed within easy reach.    Put nightlights in the bedrooms, hallways, kitchen, and bathrooms.    Make sure all stairways have good lighting.    Take your time when going up and down stairs.    Put handrails on both sides of stairs and in walkways for more support. To prevent injury to your wrist or arm, don t use handrails to pull yourself up.    Install grab bars to pull yourself up.    Move or rearrange items that you use often. This will make them easier to find or reach.    Look at your home to find any safety hazards. Especially look at doorways, walkways, and the driveway. Remove or repair any safety problems that you find.  Other changes to make    Look around to find any safety hazards. Look closely at doorways, walkways, and the driveway. Remove or repair any safety problems that you find.    Wear shoes that fit well.    Take your time when going up and down stairs.    Put handrails on both sides of stairs and in walkways for more support. To prevent injury to your wrist or arm, don t use handrails to pull yourself up.    Install grab bars wherever needed to pull yourself up.    Arrange items that you use often. This will make them easier to find or reach.    [employer] last reviewed this educational content on 3/1/2020    3166-4654 [employer]. All rights reserved. This information is not intended as a substitute for professional medical care. Always follow your healthcare professional's instructions.

## 2021-07-21 PROBLEM — I25.5 ISCHEMIC CARDIOMYOPATHY: Status: ACTIVE | Noted: 2021-07-21

## 2021-07-21 PROBLEM — J45.40 MODERATE PERSISTENT ASTHMA WITHOUT COMPLICATION: Status: ACTIVE | Noted: 2021-07-21

## 2021-07-21 PROBLEM — H91.90 HEARING DISORDER, UNSPECIFIED LATERALITY: Status: ACTIVE | Noted: 2021-07-21

## 2021-07-21 PROBLEM — E78.2 MIXED HYPERLIPIDEMIA: Status: ACTIVE | Noted: 2021-07-21

## 2021-07-21 PROBLEM — I50.30 NYHA CLASS 3 HEART FAILURE WITH PRESERVED EJECTION FRACTION (H): Status: ACTIVE | Noted: 2021-07-21

## 2021-07-21 ASSESSMENT — ASTHMA QUESTIONNAIRES: ACT_TOTALSCORE: 20

## 2021-07-28 ENCOUNTER — OFFICE VISIT (OUTPATIENT)
Dept: FAMILY MEDICINE | Facility: CLINIC | Age: 86
End: 2021-07-28
Payer: MEDICARE

## 2021-07-28 ENCOUNTER — TELEPHONE (OUTPATIENT)
Dept: FAMILY MEDICINE | Facility: CLINIC | Age: 86
End: 2021-07-28

## 2021-07-28 VITALS
OXYGEN SATURATION: 96 % | RESPIRATION RATE: 18 BRPM | DIASTOLIC BLOOD PRESSURE: 67 MMHG | HEART RATE: 71 BPM | BODY MASS INDEX: 28.32 KG/M2 | WEIGHT: 150 LBS | TEMPERATURE: 98.3 F | SYSTOLIC BLOOD PRESSURE: 104 MMHG | HEIGHT: 61 IN

## 2021-07-28 DIAGNOSIS — Z98.61 CAD S/P PERCUTANEOUS CORONARY ANGIOPLASTY: ICD-10-CM

## 2021-07-28 DIAGNOSIS — H91.90 HEARING DISORDER, UNSPECIFIED LATERALITY: ICD-10-CM

## 2021-07-28 DIAGNOSIS — R41.3 MEMORY PROBLEM: Primary | ICD-10-CM

## 2021-07-28 DIAGNOSIS — I25.10 CAD S/P PERCUTANEOUS CORONARY ANGIOPLASTY: ICD-10-CM

## 2021-07-28 DIAGNOSIS — E78.5 HYPERLIPIDEMIA LDL GOAL <70: ICD-10-CM

## 2021-07-28 DIAGNOSIS — N18.32 STAGE 3B CHRONIC KIDNEY DISEASE (H): ICD-10-CM

## 2021-07-28 LAB
ALBUMIN UR-MCNC: 30 MG/DL
APPEARANCE UR: CLEAR
BILIRUB UR QL STRIP: NEGATIVE
CHOLEST SERPL-MCNC: 182 MG/DL
COLOR UR AUTO: YELLOW
ERYTHROCYTE [DISTWIDTH] IN BLOOD BY AUTOMATED COUNT: 12.5 % (ref 10–15)
ERYTHROCYTE [SEDIMENTATION RATE] IN BLOOD BY WESTERGREN METHOD: 11 MM/HR (ref 0–30)
FASTING STATUS PATIENT QL REPORTED: YES
GLUCOSE UR STRIP-MCNC: NEGATIVE MG/DL
HCT VFR BLD AUTO: 43.8 % (ref 35–47)
HDLC SERPL-MCNC: 61 MG/DL
HGB BLD-MCNC: 14.5 G/DL (ref 11.7–15.7)
HGB UR QL STRIP: ABNORMAL
KETONES UR STRIP-MCNC: NEGATIVE MG/DL
LDLC SERPL CALC-MCNC: 99 MG/DL
LEUKOCYTE ESTERASE UR QL STRIP: ABNORMAL
MCH RBC QN AUTO: 33.1 PG (ref 26.5–33)
MCHC RBC AUTO-ENTMCNC: 33.1 G/DL (ref 31.5–36.5)
MCV RBC AUTO: 100 FL (ref 78–100)
NITRATE UR QL: NEGATIVE
NONHDLC SERPL-MCNC: 121 MG/DL
PH UR STRIP: 7.5 [PH] (ref 5–7)
PLATELET # BLD AUTO: 188 10E3/UL (ref 150–450)
RBC # BLD AUTO: 4.38 10E6/UL (ref 3.8–5.2)
RBC #/AREA URNS AUTO: ABNORMAL /HPF
SP GR UR STRIP: 1.01 (ref 1–1.03)
SQUAMOUS #/AREA URNS AUTO: ABNORMAL /LPF
TRIGL SERPL-MCNC: 111 MG/DL
TSH SERPL DL<=0.005 MIU/L-ACNC: 1.77 MU/L (ref 0.4–4)
UROBILINOGEN UR STRIP-ACNC: 0.2 E.U./DL
WBC # BLD AUTO: 5.5 10E3/UL (ref 4–11)
WBC #/AREA URNS AUTO: ABNORMAL /HPF

## 2021-07-28 PROCEDURE — 85652 RBC SED RATE AUTOMATED: CPT | Performed by: FAMILY MEDICINE

## 2021-07-28 PROCEDURE — 36415 COLL VENOUS BLD VENIPUNCTURE: CPT | Performed by: FAMILY MEDICINE

## 2021-07-28 PROCEDURE — 85027 COMPLETE CBC AUTOMATED: CPT | Performed by: FAMILY MEDICINE

## 2021-07-28 PROCEDURE — 80061 LIPID PANEL: CPT | Performed by: FAMILY MEDICINE

## 2021-07-28 PROCEDURE — 99214 OFFICE O/P EST MOD 30 MIN: CPT | Performed by: FAMILY MEDICINE

## 2021-07-28 PROCEDURE — 82043 UR ALBUMIN QUANTITATIVE: CPT | Performed by: FAMILY MEDICINE

## 2021-07-28 PROCEDURE — 81001 URINALYSIS AUTO W/SCOPE: CPT | Performed by: FAMILY MEDICINE

## 2021-07-28 PROCEDURE — 84443 ASSAY THYROID STIM HORMONE: CPT | Performed by: FAMILY MEDICINE

## 2021-07-28 RX ORDER — LEVOTHYROXINE SODIUM 50 UG/1
50 TABLET ORAL DAILY
COMMUNITY
Start: 2021-07-24 | End: 2022-04-05

## 2021-07-28 ASSESSMENT — MIFFLIN-ST. JEOR: SCORE: 1059.84

## 2021-07-28 NOTE — PROGRESS NOTES
Assessment & Plan     Memory problem  Pt did  fine on slums questionnaire  Her issues could be stress with her spouse being sick and she moving to assisted Living  Right now she Lives with her children  - NEUROPSYCHOLOGY REFERRAL; Future  - TSH with free T4 reflex; Future  - ESR: Erythrocyte sedimentation rate; Future  - CBC with platelets; Future  - ESR: Erythrocyte sedimentation rate  - TSH with free T4 reflex    Stage 3b chronic kidney disease  Pending   - UA Macro with Reflex to Micro and Culture - lab collect  - Albumin Random Urine Quantitative with Creat Ratio  - CBC with platelets  - Urine Microscopic    Hearing disorder, unspecified laterality  Referral  done  - Adult Audiology Referral    CAD S/P percutaneous coronary angioplasty  Stable   - CBC with platelets  Pt has No Problems   Doing fine  Omn stati and asa  Hyperlipidemia LDL goal <70  Pending   - Lipid panel reflex to direct LDL Fasting  823}    No follow-ups on file.    Rolanda Wilcox MD  [facility] PIERCE Harry is a 85 year old who presents for the following health issues  accompanied by her daughter:    HPI   Chief Complaint   Patient presents with     COGNITIVE ASSESSMENT     SLUM done   Pt has difficulty finding Things.  Forgets stuff   is getting Moved to Nursing Home due to declining medical condition  This has been Stressful for PT  Pt does not Drive  Family is helping pt Move to assisted Living  Pt feels she does not have Memory Problems  Pt says she is just stressed about her  and Now Moving from her Home  Pt is able to take care of her ADL  Bathes herself  Takes hr own meds  No new change with Bowel or Bladder        Review of Systems   CONSTITUTIONAL: NEGATIVE for fever, chills, change in weight  ENT/MOUTH: NEGATIVE for ear, mouth and throat problems  RESP: NEGATIVE for significant cough or SOB  CV: NEGATIVE for chest pain, palpitations or peripheral edema  GI: NEGATIVE for nausea, abdominal  "pain, heartburn, or change in bowel habits  NEURO: NEGATIVE for weakness, dizziness or paresthesias  Pt ahs stress due to her spouse being in Hospital      Objective    /67   Pulse 71   Temp 98.3  F (36.8  C) (Oral)   Resp 18   Ht 1.537 m (5' 0.5\")   Wt 68 kg (150 lb)   SpO2 96%   BMI 28.81 kg/m    Body mass index is 28.81 kg/m .  Physical Exam   GENERAL: healthy, alert and no distress  NECK: no adenopathy, no asymmetry, masses, or scars and thyroid normal to palpation  RESP: lungs clear to auscultation - no rales, rhonchi or wheezes  CV: regular rate and rhythm, normal S1 S2, no S3 or S4, no murmur, click or rub, no peripheral edema and peripheral pulses strong  ABDOMEN: soft, nontender, no hepatosplenomegaly, no masses and bowel sounds normal  MS: no gross musculoskeletal defects noted, no edema  NEURO: Normal strength and tone, sensory exam grossly normal, mentation intact, speech normal, cranial nerves 2-12 intact and normal     PSYCH: anxious  LYMPH: no cervical, supraclavicular, axillary, or inguinal adenopathy    Pending             "

## 2021-07-28 NOTE — TELEPHONE ENCOUNTER
FMLA forms came in with patient's appointment 07/28/21.    The forms have been completed and confirmed faxed to [phone]    These are to cover Jonathan Yee patient's son to care for her.     These can not be scanned into the chart. The copy is at the TC's desk in the FMLA form file.     Phone number on the form is [phone].    Remedios Pinto,

## 2021-07-29 ENCOUNTER — NURSE TRIAGE (OUTPATIENT)
Dept: FAMILY MEDICINE | Facility: CLINIC | Age: 86
End: 2021-07-29

## 2021-07-30 LAB
CREAT UR-MCNC: 100 MG/DL
MICROALBUMIN UR-MCNC: 135 MG/DL
MICROALBUMIN/CREAT UR: 135 MG/G CR (ref 0–25)

## 2021-08-12 ENCOUNTER — OFFICE VISIT (OUTPATIENT)
Dept: URGENT CARE | Facility: URGENT CARE | Age: 86
End: 2021-08-12
Payer: MEDICARE

## 2021-08-12 ENCOUNTER — NURSE TRIAGE (OUTPATIENT)
Dept: NURSING | Facility: CLINIC | Age: 86
End: 2021-08-12

## 2021-08-12 ENCOUNTER — OFFICE VISIT (OUTPATIENT)
Dept: PEDIATRICS | Facility: CLINIC | Age: 86
End: 2021-08-12
Attending: PHYSICIAN ASSISTANT
Payer: MEDICARE

## 2021-08-12 ENCOUNTER — HOSPITAL ENCOUNTER (OUTPATIENT)
Dept: ULTRASOUND IMAGING | Facility: CLINIC | Age: 86
Discharge: HOME | End: 2021-08-12
Attending: PHYSICIAN ASSISTANT | Admitting: PHYSICIAN ASSISTANT
Payer: MEDICARE

## 2021-08-12 VITALS
OXYGEN SATURATION: 98 % | HEART RATE: 61 BPM | DIASTOLIC BLOOD PRESSURE: 88 MMHG | SYSTOLIC BLOOD PRESSURE: 134 MMHG | RESPIRATION RATE: 18 BRPM

## 2021-08-12 VITALS
OXYGEN SATURATION: 98 % | HEART RATE: 78 BPM | DIASTOLIC BLOOD PRESSURE: 78 MMHG | RESPIRATION RATE: 20 BRPM | SYSTOLIC BLOOD PRESSURE: 120 MMHG | TEMPERATURE: 98 F

## 2021-08-12 DIAGNOSIS — R35.0 URINARY FREQUENCY: ICD-10-CM

## 2021-08-12 DIAGNOSIS — N95.0 POSTMENOPAUSAL VAGINAL BLEEDING: Primary | ICD-10-CM

## 2021-08-12 LAB
ALBUMIN UR-MCNC: NEGATIVE MG/DL
APPEARANCE UR: CLEAR
BILIRUB UR QL STRIP: NEGATIVE
COLOR UR AUTO: YELLOW
GLUCOSE UR STRIP-MCNC: NEGATIVE MG/DL
HGB UR QL STRIP: ABNORMAL
KETONES UR STRIP-MCNC: NEGATIVE MG/DL
LEUKOCYTE ESTERASE UR QL STRIP: NEGATIVE
NITRATE UR QL: NEGATIVE
PH UR STRIP: 6 [PH] (ref 5–7)
RBC #/AREA URNS AUTO: ABNORMAL /HPF
SP GR UR STRIP: <=1.005 (ref 1–1.03)
SQUAMOUS #/AREA URNS AUTO: ABNORMAL /LPF
UROBILINOGEN UR STRIP-ACNC: 0.2 E.U./DL
WBC #/AREA URNS AUTO: ABNORMAL /HPF

## 2021-08-12 PROCEDURE — 76830 TRANSVAGINAL US NON-OB: CPT

## 2021-08-12 PROCEDURE — 99215 OFFICE O/P EST HI 40 MIN: CPT | Performed by: PHYSICIAN ASSISTANT

## 2021-08-12 PROCEDURE — 81001 URINALYSIS AUTO W/SCOPE: CPT | Performed by: PHYSICIAN ASSISTANT

## 2021-08-12 PROCEDURE — 87086 URINE CULTURE/COLONY COUNT: CPT | Performed by: PHYSICIAN ASSISTANT

## 2021-08-12 PROCEDURE — 99207 PR FIRST ORDER ACUTE REFERRAL: CPT | Performed by: PHYSICIAN ASSISTANT

## 2021-08-12 NOTE — PROGRESS NOTES
Assessment/Plan:    No external source of bleeding seen on  exam. No hematuria on microscopic UA.  I have referred pt to the [facility] for pelvic ultrasound to evaluate her vaginal bleeding further. Discussed she may need gynecology f/u if this is indeterminate.    See patient instructions below.    At the end of the encounter, I discussed results, diagnosis, medications. Discussed red flags for immediate return to clinic/ER, as well as indications for follow up if no improvement. Patient understood and agreed to plan. Patient was stable for discharge.      ICD-10-CM    1. Postmenopausal vaginal bleeding  N95.0 UA macro with reflex to Microscopic and Culture - Clinc Collect     Urine Microscopic     Referral to Acute and Diagnostic Services (Day of diagnostic / First order acute)   2. Urinary frequency  R35.0 Urine Culture Aerobic Bacterial - lab collect         Return in about 1 day (around 8/13/2021) for Go to [facility] now.    PRECIOUS Sheldon, TITUS  [facility]  -----------------------------------------------------------------------------------------------------------------------------------------------------    HPI:  Kamryn Miller is a 85 year old female who presents for evaluation of vaginal bleeding onset this morning. She states she noticed a large amount of blood on her pad when she went to use the bathroom and when she wiped, it seemed to be coming from the vagina. Bleeding continued for several hours but has now subsided. She also notes some mild lower abdominal cramping and low back pain over the last few days. She denies any recent falls or trauma. She takes Plavix. She is not on any hormone therapy. Patient reports no urinary symptoms, constipation, blood in stool, fever/chills, nausea, vomiting, diarrhea, rash, or any other symptoms.     She has hx of tubal ligation.     Past Medical History:   Diagnosis Date     Aortic valvar stenosis 7/2010    mild     Asthma       Bipolar disorder (H)      CAD (coronary artery disease)     s/p angioplasty     Celiac disease      Colon polyps      Colon polyps 2012    every 3 year colonoscopy      Depression      High cholesterol      HTN      Mitral insufficiency      Pulmonary HTN (H)     mild     Tremors 7/10    drug induced from antidepressants     Tricuspid insufficiency        Vitals:    08/12/21 1302   BP: 120/78   Pulse: 78   Resp: 20   Temp: 98  F (36.7  C)   TempSrc: Tympanic   SpO2: 98%       Physical Exam  Vitals and nursing note reviewed.   Pulmonary:      Effort: Pulmonary effort is normal.   Abdominal:      Palpations: Abdomen is soft.      Tenderness: There is no abdominal tenderness.   Genitourinary:     Labia:         Right: No lesion or injury.         Left: No lesion or injury.       Vagina: Normal.      Cervix: Normal.      Rectum: Normal.   Neurological:      Mental Status: She is alert.         Labs/Imaging:  Results for orders placed or performed in visit on 08/12/21 (from the past 24 hour(s))   UA macro with reflex to Microscopic and Culture - Clinc Collect    Specimen: Urine, Clean Catch   Result Value Ref Range    Color Urine Yellow Colorless, Straw, Light Yellow, Yellow    Appearance Urine Clear Clear    Glucose Urine Negative Negative mg/dL    Bilirubin Urine Negative Negative    Ketones Urine Negative Negative mg/dL    Specific Gravity Urine <=1.005 1.003 - 1.035    Blood Urine Trace (A) Negative    pH Urine 6.0 5.0 - 7.0    Protein Albumin Urine Negative Negative mg/dL    Urobilinogen Urine 0.2 0.2, 1.0 E.U./dL    Nitrite Urine Negative Negative    Leukocyte Esterase Urine Negative Negative   Urine Microscopic   Result Value Ref Range    RBC Urine 0-2 0-2 /HPF /HPF    WBC Urine 0-5 0-5 /HPF /HPF    Squamous Epithelials Urine Few (A) None Seen /LPF    Narrative    Urine Culture not indicated         There are no Patient Instructions on file for this visit.

## 2021-08-12 NOTE — PATIENT INSTRUCTIONS
(N95.0) Postmenopausal vaginal bleeding  (primary encounter diagnosis)  Comment: with borderline endometrial thickening on ultrasound at 4mm  Plan: US Pelvic Complete with Transvaginal        Follow up with Gynecology next week to determine if further evaluation is necessary as the thickening is borderline.      If bleeding worsens (heavy) in meanwhilegiven your anticoagulated status, I would then advise evaluation in the ED.  If bleeding is intermittent and spotty as it has been today, no need to be seen prior to your appointment on 8/18/21 with gynecology.

## 2021-08-12 NOTE — PROGRESS NOTES
Assessment & Plan     Postmenopausal vaginal bleeding  Borderline (4mm) endometrial thickening.  Mild vaginal symptoms.   - Referral to Acute and Diagnostic Services (Day of diagnostic / First order acute)  - US Pelvic Complete with Transvaginal    Appt made for patient to follow up with Gynecology next week for further evaluation/to determine if any biopsy needed.      If bleeding worsens (heavy) in meanwhilegiven your anticoagulated status, I would then advise evaluation in the ED.  If bleeding is intermittent and spotty as it has been today, no need to be seen prior to your appointment on 8/18/21 with gynecology.    60 minutes spent on the date of the encounter doing chart review, review of test results, interpretation of tests, patient visit, documentation and discussion with family        Return in about 6 days (around 8/18/2021) for with gynecology. 8/18/21    Delia Vides PA-C  [facility] ZORA Harry is a 85 year old who was referred to [facility] by Brittani GORDON at [facility] for post menopausal bleeding.      None currently in clinic, but did have small amount at previous clinic when she provided a urine specimen.      She takes Plavix,for CAD.  Has aortic stenosis.    Menstrual Concern  Onset/Duration: this am  Description:   Duration of bleeding episodes: 1 days  Frequency of periods: (1st day of one to 1st day of next):  In menopause  Describe bleeding/flow:   Clots: no  Number of pads/day: 1        Cramping: after-middle lower abdomen  Accompanying Signs & Symptoms:  Lightheadedness: no  Temperature intolerance: no  Nosebleeds/Easy bruising: YES  Vaginal Discharge: no  Acne: no  Change in body hair: no  History:  No LMP recorded. Patient is postmenopausal.  Previous normal periods: YES  Contraceptive use: NO  Sexually active: no  Any bleeding after intercourse: NA  Abnormal PAP Smears: no  Precipitating or alleviating factors: has  not taken anything for pain  Therapies tried and outcome: None          Review of Systems   Constitutional, HEENT, cardiovascular, pulmonary, GI, , musculoskeletal, neuro, skin, endocrine and psych systems are negative, except as otherwise noted.      Objective    /88 (BP Location: Right arm, Patient Position: Chair, Cuff Size: Adult Regular)   Pulse 61   Resp 18   SpO2 98%        Physical Exam   GENERAL: healthy, alert and no distress  ABDOMEN: soft, nontender, no hepatosplenomegaly, no masses and bowel sounds normal   (female): deferred      Results for orders placed or performed in visit on 08/12/21 (from the past 24 hour(s))   US Pelvic Complete with Transvaginal    Narrative    US PELVIC COMPLETE WITH TRANSVAGINAL 8/12/2021 4:07 PM    CLINICAL HISTORY: vaginal bleeding and cramping today.    TECHNIQUE: Transabdominal scans were performed. Endovaginal ultrasound  was performed to better visualize the adnexa.    COMPARISON: 9/16/2020    FINDINGS:    UTERUS: 5.7 x 2.3 x 3.6 cm.    ENDOMETRIUM: 4 mm. Smooth endometrium.    Both ovaries are not visualized, likely obscured by bowel gas.    Trace amount of free fluid.      Impression    IMPRESSION:  1.  Borderline thickened endometrium at 4 mm.  2.  The ovaries are obscured by bowel gas.      ISSA QUEVEDO MD         SYSTEM ID:  [id number]

## 2021-08-12 NOTE — TELEPHONE ENCOUNTER
Triage call:   Patient and her daughter is on the line  Blood in her underwear appears to be towards the back part of her pad  Blood thinner - plavix   When she wiped she notes the Blood came from her vagina   Darker red to bright red in color  Denies clots  States she was wearing a pad but if she had not the blood would have bled through to her pants   First time she has noticed bleeding  Also has noted some lower back pain   No stomach pain   Denies urinary symptoms    Advised to be seen within the next 24 hours- reviewed additional care advice with patient and she verbalizes understanding. Assisted in transferring to scheduling.     Shannan Mcnulty RN BSN 8/12/2021 11:53 AM    COVID 19 Nurse Triage Plan/Patient Instructions    Please be aware that novel coronavirus (COVID-19) may be circulating in the community. If you develop symptoms such as fever, cough, or SOB or if you have concerns about the presence of another infection including coronavirus (COVID-19), please contact your health care provider or visit [email or web address].     Disposition/Instructions    In-Person Visit with provider recommended. Reference Visit Selection Guide.    Thank you for taking steps to prevent the spread of this virus.  o Limit your contact with others.  o Wear a simple mask to cover your cough.  o Wash your hands well and often.    Resources    [facility]: About COVID-19: [email or web address]    CDC: What to Do If You're Sick: www.cdc.gov/coronavirus/2019-ncov/about/steps-when-sick.html    CDC: Ending Home Isolation: www.cdc.gov/coronavirus/2019-ncov/hcp/disposition-in-home-patients.html     CDC: Caring for Someone: www.cdc.gov/coronavirus/2019-ncov/if-you-are-sick/care-for-someone.html     [facility]: Interim Guidance for Hospital Discharge to Home: www.health.[facility].[place]/diseases/coronavirus/hcp/hospdischarge.pdf    [facility] clinical trials (COVID-19 research studies):  [email or web address]-clinical-trials     Below are the COVID-19 hotlines at the [place] [facility] of [facility] ([facility]). Interpreters are available.   o For health questions: Call 107-099-3960 or 1-818.643.4833 (7 a.m. to 7 p.m.)  o For questions about schools and childcare: Call 001-040-6327 or 1-608.690.8761 (7 a.m. to 7 p.m.)       Reason for Disposition    Taking Coumadin (warfarin) or other strong blood thinner, or known bleeding disorder (e.g., thrombocytopenia)    Additional Information    Negative: Shock suspected (e.g., cold/pale/clammy skin, too weak to stand, low BP, rapid pulse)    Negative: Difficult to awaken or acting confused (e.g., disoriented, slurred speech)    Negative: Passed out (i.e., lost consciousness, collapsed and was not responding)    Negative: Sounds like a life-threatening emergency to the triager    Negative: Followed a genital area injury    Negative: [1] Vaginal discharge is main symptom AND [2] small amount of blood    Negative: SEVERE abdominal pain    Negative: SEVERE dizziness (e.g., unable to stand, requires support to walk, feels like passing out now)    Negative: Sexual assault or rape    Negative: SEVERE vaginal bleeding (e.g., soaking 2 pads or tampons per hour and present 2 or more hours)    Negative: Patient sounds very sick or weak to the triager    Negative: MODERATE vaginal bleeding (i.e., soaking 1 pad or tampon per hour and present > 6 hours)    Negative: Pale skin (pallor) of new onset or worsening    Negative: [1] Constant abdominal pain AND [2] present > 2 hours    Protocols used: VAGINAL BLEEDING - [id number]

## 2021-08-13 LAB — BACTERIA UR CULT: NORMAL

## 2021-08-17 DIAGNOSIS — J30.2 SEASONAL ALLERGIC RHINITIS, UNSPECIFIED TRIGGER: ICD-10-CM

## 2021-08-18 ENCOUNTER — OFFICE VISIT (OUTPATIENT)
Dept: OBGYN | Facility: CLINIC | Age: 86
End: 2021-08-18
Payer: MEDICARE

## 2021-08-18 VITALS
OXYGEN SATURATION: 97 % | DIASTOLIC BLOOD PRESSURE: 76 MMHG | BODY MASS INDEX: 27.62 KG/M2 | WEIGHT: 143.8 LBS | SYSTOLIC BLOOD PRESSURE: 145 MMHG | HEART RATE: 60 BPM

## 2021-08-18 DIAGNOSIS — Z79.2 NEED FOR PROPHYLACTIC ANTIBIOTIC: Primary | ICD-10-CM

## 2021-08-18 PROCEDURE — 99203 OFFICE O/P NEW LOW 30 MIN: CPT | Performed by: OBSTETRICS & GYNECOLOGY

## 2021-08-18 RX ORDER — AMOXICILLIN 500 MG/1
TABLET, FILM COATED ORAL
Qty: 4 TABLET | Refills: 1 | Status: SHIPPED | OUTPATIENT
Start: 2021-08-18 | End: 2021-12-14

## 2021-08-18 NOTE — PROGRESS NOTES
Kamryn is a 85 year old  referred here by LDEY Sutherland, with complaint of abnormal uterine bleeding.    When she got up in the morning, 2 to 3 hours later, she noted old looking blood on the aydin-pad. the note on  with the triage nurse, notes it to be at the back of the pad.  Today she notes it was in the mid back position.   There was a little red on the end.  She has not had any bleeding prior or since.  She has not had any clots, no pain, no cramping.  She knows of no instigating factors:  She did not strain with BM, no pelvic activity.    She is taking Plavix for the heart and the cardiac stints placed.      She had an ultrasound and the following results and the films are reviewed with her.  The endometrial echo measurement on the films reads 0.349 cm.  The tech wrote 0.35 cm.  It appears the Radiologist rounded the measurement upward to 4 mm.      US PELVIC COMPLETE WITH TRANSVAGINAL 2021 4:07 PM  CLINICAL HISTORY: vaginal bleeding and cramping today.    TECHNIQUE: Transabdominal scans were performed. Endovaginal ultrasound was performed to better visualize the adnexa.  COMPARISON: 2020  FINDINGS:  UTERUS: 5.7 x 2.3 x 3.6 cm.  ENDOMETRIUM: 4 mm. Smooth endometrium.  Both ovaries are not visualized, likely obscured by bowel gas.  Trace amount of free fluid.                                                              IMPRESSION:  1.  Borderline thickened endometrium at 4 mm.  2.  The ovaries are obscured by bowel gas.        Past Medical History:   Diagnosis Date     Aortic valvar stenosis 2010    mild     Asthma      Bipolar disorder (H)      CAD (coronary artery disease)     s/p angioplasty     Celiac disease      Colon polyps      Colon polyps     every 3 year colonoscopy      Depression      High cholesterol      HTN      Mitral insufficiency      Pulmonary HTN (H)     mild     Tremors 7/10    drug induced from antidepressants     Tricuspid insufficiency        Past Surgical  History:   Procedure Laterality Date     ANGIOGRAM  6/26/2009     ANGIOPLASTY  9/96    for angina     ANGIOPLASTY  8/03 - 9/03    X -2 - with stenst in coronary car.     APPENDECTOMY       BREAST BIOPSY, RT/LT      Breat Biopsy RT/LT, benign     BUNIONECTOMY  11/8/2011    Procedure:BUNIONECTOMY; Left donna bunionectomy; Surgeon:FREDY LITTLEJOHN; Location:[facility]     BUNIONECTOMY RT/LT  5/2007    right bunion and right 2nd hammertoe     C ANESTH,BLEPHAROPLASTY      (R) for drooping eyelid     C APPENDECTOMY       CATARACT IOL, RT/LT  6/12 and 7/12    bilateral     COLONOSCOPY  2007, 2012    every 3 years for polyps     CV CORONARY ANGIOGRAM N/A 8/21/2020    Procedure: CV CORONARY ANGIOGRAM;  Surgeon: Gianluca Toscano MD;  Location: [facility]     CV LEFT HEART CATH N/A 8/21/2020    Procedure: CV LEFT HEART CATH;  Surgeon: Gianluca Toscano MD;  Location: [facility]     CV LEFT HEART CATH N/A 11/3/2020    Procedure: CV LEFT HEART CATH;  Surgeon: Tom Burrows MD;  Location: [facility]     CV LOWER EXTREMITY ANGIOGRAM BILATERAL N/A 11/3/2020    Procedure: CV ANGIOGRAM LOWER EXTREMITY BILATERAL;  Surgeon: Tom Burrows MD;  [facility]     CV PCI STENT DRUG ELUTING N/A 8/21/2020    Procedure: Percutaneous Coronary Intervention Stent Drug Eluting;  Surgeon: Gianluca Toscano MD;  Location: [facility]     CV RIGHT HEART CATH MEASUREMENTS RECORDED N/A 8/21/2020    Procedure: CV RIGHT HEART CATH;  Surgeon: Gianluca Toscano MD;  Location: [facility]     CV RIGHT HEART CATH MEASUREMENTS RECORDED N/A 11/3/2020    Procedure: CV RIGHT HEART CATH;  Surgeon: Tom Burrows MD;  Location: [facility]     JOINT REPLACEMENT, HIP RT/LT  4/2008    right hip replaced     JOINT REPLACEMENT, HIP RT/LT  4/2009    left hip replaced     REPAIR HAMMER TOE  11/8/2011    Procedure:REPAIR HAMMER  TOE; left 2nd hammertoe repair; Surgeon:FREDY LITTLEJOHN Location:[facility]     SURGICAL HISTORY OF -       left bunion and 2nd hammertoe repair     TUBAL LIGATION         OB History    Para Term  AB Living   2 0 0 0 0 2   SAB TAB Ectopic Multiple Live Births   0 0 0 0 2      # Outcome Date GA Lbr João/2nd Weight Sex Delivery Anes PTL Lv   2             1                 Gynecological History         No LMP recorded. Patient is postmenopausal.    No STD/no PID/No IUD      see above HPI        Allergies   Allergen Reactions     Ace Inhibitors Cough     Diclofenac Other (See Comments)     Balance problems       Current Outpatient Medications   Medication Sig Dispense Refill     alendronate (FOSAMAX) 70 MG tablet TAKE 1 (ONE) TABLET(S) BY MOUTH ONCE A WEEK       amLODIPine (NORVASC) 2.5 MG tablet Take 3 tablets (7.5 mg) by mouth daily 270 tablet 1     amoxicillin (AMOXIL) 500 MG tablet TAKE 4 TABLETS BY MOUTH 1 HOUR PRIOR TO APPT  0     aspirin 81 MG tablet Take 1 tablet by mouth daily.       azelastine (ASTEPRO) 0.15 % nasal spray INSTILL 1 SPRAY INTO BOTH NOSTRILS DAILY 30 mL 1     Blood Pressure Monitor KIT Automatic Blood Pressure Monitor 1 kit 0     Calcium Citrate (CITRACAL OR) Take 1 tablet by mouth daily.       CHOLECALCIFEROL 06187 UNIT PO CAPS 1 tablet weekly       clopidogrel (PLAVIX) 75 MG tablet Take 1 tablet (75 mg) by mouth daily 90 tablet 3     desvenlafaxine (PRISTIQ) 100 MG 24 hr tablet Take 100 mg by mouth daily       fluticasone-salmeterol (ADVAIR DISKUS) 500-50 MCG/DOSE inhaler INHALE 1 PUFF INTO THE LUNGS EVERY 12 HOURS 1 each 6     furosemide (LASIX) 20 MG tablet Take 1 tablet (20 mg) by mouth daily 90 tablet 1     lamoTRIgine (LAMICTAL) 25 MG tablet 25 mg       LAMOTRIGINE 200 MG PO TABS Take by mouth daily        levothyroxine (SYNTHROID/LEVOTHROID) 50 MCG tablet Take 50 mcg by mouth daily       losartan (COZAAR) 100 MG tablet Take 1 tablet (100 mg) by mouth  daily 90 tablet 3     metoprolol tartrate (LOPRESSOR) 50 MG tablet TAKE 1 TAB BY MOUTH IN THE MORNING AND 1/2 IN THE EVENING-needs follow up appointment 135 tablet 3     Multiple Vitamin (MULTI-VITAMIN) per tablet Take 1 tablet by mouth daily.       Omega-3 Fatty Acids (FISH OIL PO) Take 1 tablet by mouth daily.       rosuvastatin (CRESTOR) 40 MG tablet TAKE 1 TABLET BY MOUTH EVERY DAY 90 tablet 3     verapamil ER (CALAN-SR) 240 MG CR tablet Take 1 tablet (240 mg) by mouth daily Needs follow up appointment 90 tablet 3     VIIBRYD 10 MG TABS tablet Take 10 mg by mouth daily  2       Social History     Socioeconomic History     Marital status:      Spouse name: Adrien     Number of children: 2     Years of education: 16     Highest education level: Not on file   Occupational History     Employer: RETIRED     Comment: Retired in 2002.    Tobacco Use     Smoking status: Never Smoker     Smokeless tobacco: Never Used   Substance and Sexual Activity     Alcohol use: No     Alcohol/week: 0.0 standard drinks     Types: 1 Standard drinks or equivalent per week     Drug use: No     Sexual activity: Not Currently     Partners: Male   Other Topics Concern     Parent/sibling w/ CABG, MI or angioplasty before 65F 55M? Not Asked   Social History Narrative     Not on file     Social Determinants of Health     Financial Resource Strain:      Difficulty of Paying Living Expenses:    Food Insecurity:      Worried About Running Out of Food in the Last Year:      Ran Out of Food in the Last Year:    Transportation Needs:      Lack of Transportation (Medical):      Lack of Transportation (Non-Medical):    Physical Activity:      Days of Exercise per Week:      Minutes of Exercise per Session:    Stress:      Feeling of Stress :    Social Connections:      Frequency of Communication with Friends and Family:      Frequency of Social Gatherings with Friends and Family:      Attends [employer] Services:      Active Member of Clubs or  Organizations:      Attends Club or Organization Meetings:      Marital Status:    Intimate Partner Violence:      Fear of Current or Ex-Partner:      Emotionally Abused:      Physically Abused:      Sexually Abused:        Family History   Problem Relation Age of Onset     Asthma Mother      Cerebrovascular Disease Mother      Arthritis Mother      Depression Mother      Lipids Sister      Hypertension Sister      Heart Disease Sister      Lipids Sister      Hypertension Sister      Lipids Sister      Breast Cancer Sister          Review of Systems:  10 point ROS of systems including Constitutional, Eyes, Respiratory, Cardiovascular, Gastroenterology, Genitourinary, Integumentary, Muscularskeletal, Psychiatric were all negative except for pertinent positives noted in my HPI and in the PMH.          EXAM:  BP (!) 145/76 (BP Location: Right arm, Cuff Size: Adult Regular)   Pulse 60   Wt 65.2 kg (143 lb 12.8 oz)   SpO2 97%   BMI 27.62 kg/m    Body mass index is 27.62 kg/m .  General Appearance:  healthy, alert, active, no distress  Skin:  Normal skin turgor  Neuro:  Alert, cranial nerves grossly intact  HEENT: NCAT  Neck:  No masses or lesions carotids are +2/4. No bruits heard  Lungs:  Good respiratory effort   Pelvic exam:  Not performed, deferred to next visit  Extremities:  No clubbing, cyanosis or edema.        ASSESSMENT:  Postmenopausal bleeding  Endometrial thickening on ultrasound   History of angioplasty with stents.    Need for Prophylactic antibiotics.       PLAN:  The patient and I reviewed the films and the initial measurement of the endometrial echo is 0.349 cm and with the rounding up by the tech and then again by the Radiologist, the measurement of the echo is at 4 mm.   In addition, it appears the one cursor might be shorting the measurement a bit.  I suggest to have further endometrial evaluation due to these factors.   The patient and I discussed the options for evaluation.  The EMB, SIS and  the D&C were reviewed with her.  The EMB will not remove a polyp, but will give a tissue sample.  The SIS will further evaluate the lining, but no tissue sample, and should she have a suspected polyp, it would not be removed.  The D&C is more expensive but is currently the gold standard.    Patient is considering the EMB.  Due to the need for antibiotic prophylaxis, I sent the prescription to the pharmacy and she will be scheduled for the EMB.    Total time preparing to see patient with reviewing prior encounter and labs, face to face time,  and coordinating care on the same calendar date: 35 minutes.       Javier Landa MD

## 2021-08-23 RX ORDER — AZELASTINE HCL 205.5 UG/1
SPRAY NASAL
Qty: 5 ML | Refills: 1 | Status: SHIPPED | OUTPATIENT
Start: 2021-08-23 | End: 2022-06-26

## 2021-08-31 ENCOUNTER — TELEPHONE (OUTPATIENT)
Dept: FAMILY MEDICINE | Facility: CLINIC | Age: 86
End: 2021-08-31

## 2021-08-31 NOTE — TELEPHONE ENCOUNTER
Called and spoke with Daughter     Order has been signed, A copy has been sent to be added to the chart, and confirmed faxed to [phone]    Remedios Pinto,

## 2021-08-31 NOTE — TELEPHONE ENCOUNTER
Reason for Call:  Form, our goal is to have forms completed with 72 hours, however, some forms may require a visit or additional information.    Type of letter, form or note:  [facility] Nursing Request    Who is the form from?: daughter (if other please explain)    Where did the form come from: Patient or family brought in       What clinic location was the form placed at?: [facility]    Where the form was placed: Walked down to [facility]    What number is listed as a contact on the form?: [phone]       Additional comments: Patient needs form completed today so that she can move into assisted living at [facility].  If any questions, please call daughter, Ana.  When form is completed, Ana will come back to clinic to pick it up.  Thank you.      Call taken on 8/31/2021 at 12:59 PM by Soniya Gilliland

## 2021-09-02 NOTE — TELEPHONE ENCOUNTER
POLST form came in to be signed by the provider.     Rolanda Wilcox MD is out of office. Form will be given to Dr. Hoang.    Remedios Pinto,

## 2021-09-03 ENCOUNTER — DOCUMENTATION ONLY (OUTPATIENT)
Dept: OTHER | Facility: CLINIC | Age: 86
End: 2021-09-03

## 2021-09-03 NOTE — TELEPHONE ENCOUNTER
The Form has been completed by the provider, confirmed faxed to the fax number on the form and listed below and a copy has been sent to be added to the chart. Remedios Pinto,

## 2021-09-12 ENCOUNTER — HEALTH MAINTENANCE LETTER (OUTPATIENT)
Age: 86
End: 2021-09-12

## 2021-10-31 NOTE — PROGRESS NOTES
[facility] HEART CARE  CARDIOVASCULAR DIVISION    VALVE CLINIC FOLLOW-UP VISIT    PRIMARY CARDIOLOGIST: Dr. Madhu Zuniga      PERTINENT CLINICAL HISTORY:     Kamryn Miller is a very pleasant 86 year old female with HTN, HLD, HFpEF, CAD s/p LAD PCI 8/2020, asthma and CKD III who presents for ongoing monitoring of her moderate aortic stenosis. She was last evaluated by Dr. Toscano in July 2020 for possible TAVR at which time her echo showed an LUCY 0.8 cm2, peak aortic velocity 3.4m/sec, mean aortic gradient 27mmHg, SVI 45ml/m2, EF 60-65% and DI 0.23 suggestive of moderate to severe aortic stenosis. She underwent TAVR CT demonstrating moderately calcified aortic valve (CA score 1263) and invasive valve assessment revealed LUCY 1.2 cm2 and mean PG of 24 mmHg consistent with moderate aortic stenosis. RHC showed mildly reduced cardiac index 1.87 by DAVID. Coronary angiogram showed LAD disease s/p EDDIE x 3 in 8/2020. Given findings of moderate aortic stenosis ongoing surveillance was recommended.     Ms. Miller presents to the visit accompanied by her daughter. Since she was last evaluated she has been feeling pretty much the same. She sometimes thinks she is a little more tired than she should be but knows this could be related to age. She is able to carry out household tasks without difficulty and walks 1/4 mile around her apartment complex most days without shortness of breath. She says that if she tried to walk further she would likely feel short of breath. She has recently been participating in exercise classes at her new apartment for 30 minutes 5 days a week without cardiac symptoms. Specifically she denies any chest pain, SOB, palpitations, LH/syncope, or easy fatigability/frequent napping. Her weight has been stable, no orthopnea/PND, she does note occasional ankle swelling comes and goes. Her daughter agrees that her symptoms are stable - she says when Kamryn's  was recently sick in the hospital she  did a lot of walking and was able to keep up.      PAST MEDICAL HISTORY:     Past Medical History:   Diagnosis Date     Aortic valvar stenosis 7/2010    mild     Asthma      Bipolar disorder (H)      CAD (coronary artery disease)     s/p angioplasty     Celiac disease      Colon polyps      Colon polyps 2012    every 3 year colonoscopy      Depression      High cholesterol      HTN      Mitral insufficiency      Pulmonary HTN (H)     mild     Tremors 7/10    drug induced from antidepressants     Tricuspid insufficiency         PAST SURGICAL HISTORY:     Past Surgical History:   Procedure Laterality Date     ANGIOGRAM  6/26/2009     ANGIOPLASTY  9/96    for angina     ANGIOPLASTY  8/03 - 9/03    X -2 - with stenst in coronary car.     APPENDECTOMY       BREAST BIOPSY, RT/LT      Breat Biopsy RT/LT, benign     BUNIONECTOMY  11/8/2011    Procedure:BUNIONECTOMY; Left donna bunionectomy; Surgeon:FREDY LITTLEJOHN; Location:[facility] OR     BUNIONECTOMY RT/LT  5/2007    right bunion and right 2nd hammertoe     C ANESTH,BLEPHAROPLASTY      (R) for drooping eyelid     C APPENDECTOMY       CATARACT IOL, RT/LT  6/12 and 7/12    bilateral     COLONOSCOPY  2007, 2012    every 3 years for polyps     CV CORONARY ANGIOGRAM N/A 8/21/2020    Procedure: CV CORONARY ANGIOGRAM;  Surgeon: Gianluca Toscano MD;  Location: [facility] HE[facility] CARDIAC CATH LAB     CV LEFT HEART CATH N/A 8/21/2020    Procedure: CV LEFT HEART CATH;  Surgeon: Gianluca Toscano MD;  Location: [facility]ARD[facility] CATH [facility]     CV LEFT HEART CATH N/A 11/3/2020    Procedure: CV LEFT HEART CATH;  Surgeon: Tom Burrows MD;  Location: [facility]ARD[facility] CATH [facility]     CV LOWER EXTREMITY ANGIOGRAM BILATERAL N/A 11/3/2020    Procedure: CV ANGIOGRAM LOWER EXTREMITY BILATERAL;  Surgeon: Tom Burrows MD;  Location: [facility]ARD[facility] CATH [facility]     CV PCI STENT DRUG ELUTING N/A 8/21/2020    Procedure: Percutaneous Coronary Intervention Stent Drug Eluting;  Surgeon:  Gianluca Toscano MD;  Location:  HEART CARDIAC CATH LAB     CV RIGHT HEART CATH MEASUREMENTS RECORDED N/A 8/21/2020    Procedure: CV RIGHT HEART CATH;  Surgeon: Gianluca Toscano MD;  Location:  HEART CARDIAC CATH LAB     CV RIGHT HEART CATH MEASUREMENTS RECORDED N/A 11/3/2020    Procedure: CV RIGHT HEART CATH;  Surgeon: Tom Burrows MD;  Location:  HEART CARDIAC CATH LAB     JOINT REPLACEMENT, HIP RT/LT  4/2008    right hip replaced     JOINT REPLACEMENT, HIP RT/LT  4/2009    left hip replaced     REPAIR HAMMER TOE  11/8/2011    Procedure:REPAIR HAMMER TOE; left 2nd hammertoe repair; Surgeon:FREDY LITTLEJOHN; Location:[facility] OR     SURGICAL HISTORY OF -   11/11    left bunion and 2nd hammertoe repair     TUBAL LIGATION          CURRENT MEDICATIONS:     Current Outpatient Medications   Medication Sig Dispense Refill     alendronate (FOSAMAX) 70 MG tablet TAKE 1 (ONE) TABLET(S) BY MOUTH ONCE A WEEK       amLODIPine (NORVASC) 2.5 MG tablet Take 3 tablets (7.5 mg) by mouth daily 270 tablet 1     amoxicillin (AMOXIL) 500 MG tablet TAKE 4 TABLETS BY MOUTH 1 HOUR PRIOR TO APPT 4 tablet 1     aspirin 81 MG tablet Take 1 tablet by mouth daily.       azelastine (ASTEPRO) 0.15 % nasal spray INSTILL 1 SPRAY INTO BOTH NOSTRILS DAILY 5 mL 1     Blood Pressure Monitor KIT Automatic Blood Pressure Monitor 1 kit 0     Calcium Citrate (CITRACAL OR) Take 1 tablet by mouth daily.       CHOLECALCIFEROL 81103 UNIT PO CAPS 1 tablet weekly       clopidogrel (PLAVIX) 75 MG tablet Take 1 tablet (75 mg) by mouth daily 90 tablet 3     desvenlafaxine (PRISTIQ) 100 MG 24 hr tablet Take 100 mg by mouth daily       fluticasone-salmeterol (ADVAIR DISKUS) 500-50 MCG/DOSE inhaler INHALE 1 PUFF INTO THE LUNGS EVERY 12 HOURS 1 each 6     furosemide (LASIX) 20 MG tablet Take 1 tablet (20 mg) by mouth daily 90 tablet 1     lamoTRIgine (LAMICTAL) 25 MG tablet 25 mg       LAMOTRIGINE 200 MG PO TABS Take by mouth daily         levothyroxine (SYNTHROID/LEVOTHROID) 50 MCG tablet Take 50 mcg by mouth daily       losartan (COZAAR) 100 MG tablet Take 1 tablet (100 mg) by mouth daily 90 tablet 3     metoprolol tartrate (LOPRESSOR) 50 MG tablet TAKE 1 TAB BY MOUTH IN THE MORNING AND 1/2 IN THE EVENING-needs follow up appointment 135 tablet 3     Multiple Vitamin (MULTI-VITAMIN) per tablet Take 1 tablet by mouth daily.       Omega-3 Fatty Acids (FISH OIL PO) Take 1 tablet by mouth daily.       rosuvastatin (CRESTOR) 40 MG tablet TAKE 1 TABLET BY MOUTH EVERY DAY 90 tablet 3     verapamil ER (CALAN-SR) 240 MG CR tablet Take 1 tablet (240 mg) by mouth daily Needs follow up appointment 90 tablet 3     VIIBRYD 10 MG TABS tablet Take 10 mg by mouth daily  2        ALLERGIES:     Allergies   Allergen Reactions     Ace Inhibitors Cough     Diclofenac Other (See Comments)     Balance problems        FAMILY HISTORY:     Family History   Problem Relation Age of Onset     Asthma Mother      Cerebrovascular Disease Mother      Arthritis Mother      Depression Mother      Lipids Sister      Hypertension Sister      Heart Disease Sister      Lipids Sister      Hypertension Sister      Lipids Sister      Breast Cancer Sister         SOCIAL HISTORY:     Social History     Socioeconomic History     Marital status:      Spouse name: Adrien     Number of children: 2     Years of education: 16     Highest education level: None   Occupational History     Employer: RETIRED     Comment: Retired in 2002.    Social Needs     Financial resource strain: None     Food insecurity     Worry: None     Inability: None     Transportation needs     Medical: None     Non-medical: None   Tobacco Use     Smoking status: Never Smoker     Smokeless tobacco: Never Used   Substance and Sexual Activity     Alcohol use: No     Alcohol/week: 0.0 standard drinks     Types: 1 Standard drinks or equivalent per week     Drug use: No     Sexual activity: Not Currently     Partners: Male    Lifestyle     Physical activity     Days per week: None     Minutes per session: None     Stress: None   Relationships     Social connections     Talks on phone: None     Gets together: None     Attends Taoism service: None     Active member of club or organization: None     Attends meetings of clubs or organizations: None     Relationship status: None     Intimate partner violence     Fear of current or ex partner: None     Emotionally abused: None     Physically abused: None     Forced sexual activity: None   Other Topics Concern     Parent/sibling w/ CABG, MI or angioplasty before 65F 55M? Not Asked   Social History Narrative     None        REVIEW OF SYSTEMS:     Constitutional: No fevers or chills  Skin: No new rash or itching  Eyes: No acute change in vision  Ears/Nose/Throat: No purulent rhinorrhea, new hearing loss, or new vertigo  Respiratory: No cough or hemoptysis  Cardiovascular: See HPI  Gastrointestinal: No change in appetite, vomiting, hematemesis or diarrhea  Genitourinary: No dysuria or hematuria  Musculoskeletal: No new back pain, neck pain or muscle pain  Neurologic: No new headaches, focal weakness or behavior changes  Psychiatric: No hallucinations, excessive alcohol consumption or illegal drug usage  Hematologic/Lymphatic/Immunologic: No bleeding, chills, fever, night sweats or weight loss  Endocrine: No new cold intolerance, heat intolerance, polyphagia, polydipsia or polyuria      PHYSICAL EXAMINATION:     BP (!) 149/79 (BP Location: Right arm, Patient Position: Chair, Cuff Size: Adult Regular)   Pulse 54   Wt 64.6 kg (142 lb 6.4 oz)   SpO2 97%   BMI 27.35 kg/m      GENERAL: No acute distress.  HEENT: EOMI. Sclerae white, not injected. Pharynx without erythema or exudate.   Neck: No adenopathy. No thyromegaly. Symmetrical.   Heart: Regular rate and rhythm. Normal S1 with diminished S2. Crescendo decrescendo late peaking systolic murmur with radiation to carotids.    Lungs: Clear to  auscultation. No ronchi, wheezes, rales.   Abdomen: Soft, nontender, nondistended. Bowel sounds present.  Extremities: No clubbing, cyanosis, or edema.   Neurologic: Alert and oriented to person/place/time, normal speech and affect. No focal deficits.  Skin: No petechiae, purpura or rash.     LABORATORY DATA:   Personally reviewed and interpreted labs.   CBC RESULTS:  Lab Results   Component Value Date    WBC 5.5 07/28/2021    WBC 4.6 05/17/2021    RBC 4.38 07/28/2021    RBC 3.97 05/17/2021    HGB 14.5 07/28/2021    HGB 12.9 05/17/2021    HCT 43.8 07/28/2021    HCT 40.3 05/17/2021     07/28/2021     (H) 05/17/2021    MCH 33.1 (H) 07/28/2021    MCH 32.5 05/17/2021    MCHC 33.1 07/28/2021    MCHC 32.0 05/17/2021    RDW 12.5 07/28/2021    RDW 12.7 05/17/2021     07/28/2021     05/17/2021       BMP RESULTS:  Lab Results   Component Value Date     06/04/2021    POTASSIUM 3.4 06/04/2021    CHLORIDE 106 06/04/2021    CO2 30 06/04/2021    ANIONGAP 6 06/04/2021     (H) 06/04/2021    BUN 27 06/04/2021    CR 1.50 (H) 06/04/2021    GFRESTIMATED 32 (L) 06/04/2021    GFRESTBLACK 37 (L) 06/04/2021    PRINCE 9.4 06/04/2021      PROCEDURES & FURTHER ASSESSMENTS:     ECHO 11/1/21:  Interpretation Summary  Global and regional left ventricular function is normal with an EF of 55-60%.  Right ventricular function, chamber size, wall motion, and thickness are  normal.  The mean gradient across the aortic valve is23 mmHg.  Moderate aortic stenosis is present.  No significant changes noted.  ______________________________________________________________________________  Left Ventricle  Global and regional left ventricular function is normal with an EF of 55-60%.  Left ventricular wall thickness is normal. Left ventricular size is normal.  Diastolic function not assessed due to significant mitral annular  calcification. No regional wall motion abnormalities are seen.     Right Ventricle  Right  ventricular function, chamber size, wall motion, and thickness are  normal.     Atria  Both atria appear normal.     Mitral Valve  Moderate mitral annular calcification is present. Trace to mild mitral  insufficiency is present.     Aortic Valve  Moderate aortic valve calcification is present. The mean gradient across the  aortic valve is23 mmHg. The peak aortic velocity is 3.1 m/sec. Moderate aortic  stenosis is present. Calculated valve area lower than previous study due to  LVOT diameter measurement.     Tricuspid Valve  The tricuspid valve is normal. Trace to mild tricuspid insufficiency is  present. Pulmonary artery systolic pressure cannot be assessed.     Pulmonic Valve  The pulmonic valve is normal.     Vessels  The thoracic aorta is normal. The pulmonary artery and bifurcation cannot be  assessed. The inferior vena cava is normal.     Pericardium  No pericardial effusion is present.     Compared to Previous Study  No significant changes noted.      ECHO 5/3/21:   Interpretation Summary  Global and regional left ventricular function is normal with an EF of 55-60%.  The right ventricle is normal size. Global right ventricular function is  normal.  Moderate to severe aortic stenosis, aortic valve area 1.02 cm2, peak velocity  3.3 m/s, mean gradient 25 mmHg, stroke volume index 40 ml/m2, DVI 0.29.     This study was compared with the study from 9/16/2020. The aortic valve peak  velocity and mean gradient are similar.  ______________________________________________________________________________  Left Ventricle  Left ventricular size is normal. Global and regional left ventricular function  is normal with an EF of 55-60%. Mild concentric wall thickening consistent  with left ventricular hypertrophy is present. Diastolic function not assessed  due to significant mitral annular calcification.     Right Ventricle  The right ventricle is normal size. Global right ventricular function is  normal.     Atria  The  right atria appears normal. Mild left atrial enlargement is present.     Mitral Valve  Moderate mitral annular calcification is present.     Aortic Valve  Moderate to severe aortic stenosis, aortic valve area 1.02 cm2, peak velocity  3.3 m/s, mean gradient 25 mmHg, stroke volume index 40 ml/m2, DVI 0.29.     Tricuspid Valve  The tricuspid valve is normal. Trace tricuspid insufficiency is present.  Pulmonary artery systolic pressure cannot be assessed.     Pulmonic Valve  The pulmonic valve is normal. Mild pulmonic insufficiency is present.     Vessels  The thoracic aorta is normal. The inferior vena cava was normal in size with  preserved respiratory variability.     Pericardium  No pericardial effusion is present.     Compared to Previous Study  This study was compared with the study from 9/16/2020 . The aortic valve peak  velocity and mean gradient are similar.      [facility] 11/2020:  85 year old lady,brought in for a left and right heart catheterization to assess the actual severity of her aortic stenosis.Kamryn has been complaining of dyspnea on moderate exertion over the last few months.She denies chest pains,syncope or presyncope.There are no other features to suggest heart failure.Her transthoracic echocardiogram showed an aortic  valve area of 0.8 cm2,mean gradient of 27 mm Hg and a peak velocity of 3.7 m/sec.It was suspected that she has a paradoxical low flow,low gradient aortic stenosis.Kamryn  has known coronary artery disease with recent PCIs with 3 EDDIE to the mid-distal LAD  in August 2020.   Pre Procedure Diagnosis    \Heart valve condition    Post Procedure Diagnosis    Moderate aortic stenosis      Conclusion      Moderate aortic stenosis -valve area 1.2 cm2,Mean gradient 24 mm Hg    Right sided filling pressures are normal.    Normal PA pressures.    Left sided filling pressures are normal.    Mildly reduced cardiac output level.    Peripheral angiogram done and IVUS used to assess the vessel lumens  of the descending thoracic aorta,right and left external iliac arteries.    Prox R VARUN lesion is 40% stenosed.    Dist R VARUN to Mid R CFA lesion is 70% stenosed.    Mid L EIA to Dist L EIA lesion is 30% stenosed.    Ultrasound (IVUS) was performed.          Plan      Follow bedrest per protocol    Continued medical management and lifestyle modifications for cardiovascular risk factor optimizations.    Follow up with Dr. Tom Burrows in 6 months time in clinic as there is no current indication for TAVR in lieu of the moderate severity of the aortic stenosis..    Arterial sheath removed from femoral artery with closure device.    Discharge today per protocol     TAVR CT 9/2020:  FINDINGS:     1.  Moderately calcified tricuspidaortic valve. Aortic valve calcium  score is 1263. The LVOT is mildly calcified. The ascending aorta is  mildly calcified, aortic arch is  mildly calcified, the descending  thoracic aorta is mildly calcified. There is moderate mitral annular  calcification.  2.  There are no adverse aortic root features, ie coronary heights are  adequate and there is no significant aortic root calcification.  3.  There are significant native coronary calcifications.   4.  The arch vessel branching pattern is normal.   5.  The suprarenal abdominal aorta is moderately calcified; infrarenal  abdominal aorta is severely calcified  6.  Widely patent origins of celiac trunk, superior mesenteric artery  and inferior mesenteric artery.  Single bilateral renal arteries with  widely patent origins.   7.  There is no acute aortic pathology, such as dissection, intramural  hematoma, or contained rupture.   8.  Suboptimal imaging of the lower extremity vasculature due to  low-contrast protocol and significant metallic artifacts related to  bilateral hip arthroplasty hardware. Consider an additional study for  repeat assessment of iliofemoral dimensions, particularly in the  bilateral external iliac and common femoral  arteries.     ECHO 9/2020:    Interpretation Summary  Peak aortic velocity 3.4m/sec. Mean aortic gradient 27mmHg. Calculated valve  area 0.8cm2. SVI 45ml/m2. DI 0.23. Valve area index 0.47cm2/m2.  Some of these values are in the severe AS range,but measured gradients are in  mild to moderate AS range. Need additional evaluation like CT to assess actual  severity of AS.  No change from previous study.    ECG: December 2019 - sinus bradycardia nonspecific ST-T abnormalities    Echocardiogram: 11/22/19  Left ventricular function, chamber size, wall motion, and wall thickness are  normal.The EF is 55-60%. No regional wall motion abnormalities are seen.  Right ventricular function, chamber size, wall motion, and thickness are  normal.  Severe left atrial enlargement is present.  Moderate mitral annular calcification is present. Mild mitral insufficiency is  present. There is likley moderate aortic stenosis. The peak velocity is  3.47m/sec, the peak and mean gradients are 48mmHg and 28mmHg. The aortic valve  area is calculated low at 0.7cm2  Pulmonary artery systolic pressure is normal. The inferior vena cava was  normal in size with preserved respiratory variability. No pericardial effusion  is present.  Compared to prior study AS has worsened. Otherwise no significant change    PFTs: 12/20/19  Nonspecific pulmonary function testing with decreased FEV1 and FEV but normal ratio and TLC, could be seen in obesity or neuromuscular weakness   There is no significant bronchodilator response.   Normal diffusion capacity   Lung volumes are normal   No prior testing for comparison     Carotid Ultrasound:  1. Right side:    Degree of stenosis of the internal carotid artery: Less than 50%  due to presence of plaque.      2. Left side:     Degree of stenosis of the internal carotid artery: Less than 50%  due to presence of plaque.     STS PROM RISK SCORE: 3.04%  FRAILTY SCORE:3/5  NYHA CLASS: II     CLINICAL IMPRESSION:   Kamryn CARRENO  Angela is a very pleasant 86 year old female with HTN, HLD, HFpEF, CAD s/p LAD PCI 8/2020, asthma and CKD III who presents for ongoing monitoring of her moderate aortic stenosis. She was last evaluated by Dr. Toscano in July 2020 for possible TAVR at which time her echo showed an LUCY 0.8 cm2, peak aortic velocity 3.4m/sec, mean aortic gradient 27mmHg, SVI 45ml/m2, EF 60-65% and DI 0.23 suggestive of moderate to severe aortic stenosis. She underwent TAVR CT demonstrating moderately calcified aortic valve (CA score 1263) and invasive valve assessment revealed LUCY 1.2 cm2 and mean PG of 24 mmHg consistent with moderate aortic stenosis. RHC showed mildly reduced cardiac index 1.87 by DAVID. Coronary angiogram showed LAD disease s/p EDDIE x 3 in 8/2020. Given findings of moderate aortic stenosis ongoing surveillance was recommended.    Today Kamryn reports feeling fairly well and reports no change is symptoms since she was last evaluated. She notes some mild fatigue and dyspnea with overexertion, but is able to walk 1/4 mile, perform ADLs and exercise daily without dyspnea, chest pain, lightheadedness or syncope. Her ECHO today shows moderate to severe aortic valve stenosis with aortic valve area 0.78 cm2, peak velocity 3.1 m/s, mean gradient 23 mmHg, stroke volume index 36 ml/m2, DVI 0.28 which is essentially unchanged when compared to prior. Plan to continue Q 6 months echo surveillance. In the meantime, I have advised her and her daughter to contact us if she develops worsening shortness of breath, chest pain, lightheadedness or syncope which would likely indicate progression of her aortic valve disease and need for TAVR. She verbalized understanding and agrees with the plan.     RTC: Dr. Zuniga Q 6 months with echo prior, please refer back to valve clinic if she develops worsening symptoms related to aortic stenosis or has progression of valve disease by ECHO.      CC  Patient Care Team:  Rolanda Wilcox MD as PCP -  General (Family Practice)  Rolanda Wilcox MD as Assigned PCP  Ania Johnson MD as Assigned Nephrology Provider  Pati De Santiago NP as Assigned Heart and Vascular Provider  Javier Landa MD as Assigned OBGYN Provider  SELF, REFERRED

## 2021-11-01 ENCOUNTER — OFFICE VISIT (OUTPATIENT)
Dept: CARDIOLOGY | Facility: CLINIC | Age: 86
End: 2021-11-01
Attending: NURSE PRACTITIONER
Payer: MEDICARE

## 2021-11-01 VITALS
BODY MASS INDEX: 27.35 KG/M2 | DIASTOLIC BLOOD PRESSURE: 79 MMHG | OXYGEN SATURATION: 97 % | WEIGHT: 142.4 LBS | HEART RATE: 54 BPM | SYSTOLIC BLOOD PRESSURE: 149 MMHG

## 2021-11-01 DIAGNOSIS — I35.0 MODERATE AORTIC STENOSIS: ICD-10-CM

## 2021-11-01 DIAGNOSIS — I35.0 MODERATE AORTIC STENOSIS: Primary | ICD-10-CM

## 2021-11-01 LAB — LVEF ECHO: NORMAL

## 2021-11-01 PROCEDURE — 93306 TTE W/DOPPLER COMPLETE: CPT | Performed by: INTERNAL MEDICINE

## 2021-11-01 PROCEDURE — G0463 HOSPITAL OUTPT CLINIC VISIT: HCPCS

## 2021-11-01 PROCEDURE — 99214 OFFICE O/P EST MOD 30 MIN: CPT | Mod: 25 | Performed by: NURSE PRACTITIONER

## 2021-11-01 ASSESSMENT — PAIN SCALES - GENERAL: PAINLEVEL: NO PAIN (0)

## 2021-11-01 NOTE — NURSING NOTE
Chief Complaint   Patient presents with     Follow Up     6 month f/u     Vitals were taken and medications reconciled.    Preston Durán, EMT  9:47 AM

## 2021-11-01 NOTE — LETTER
11/1/2021      RE: Kamryn Miller  [address]   [address]  [place] [address]       Dear Colleague,    Thank you for the opportunity to participate in the care of your patient, Kamryn Miller, at the [facility]ART [facility]IN[facility]. Please see a copy of my visit note below.        [facility] HEART CARE  CARDIOVASCULAR DIVISION    VALVE CLINIC FOLLOW-UP VISIT    PRIMARY CARDIOLOGIST: Dr. Madhu Zuniga      PERTINENT CLINICAL HISTORY:     Kamryn Miller is a very pleasant 86 year old female with HTN, HLD, HFpEF, CAD s/p LAD PCI 8/2020, asthma and CKD III who presents for ongoing monitoring of her moderate aortic stenosis. She was last evaluated by Dr. Toscano in July 2020 for possible TAVR at which time her echo showed an LUCY 0.8 cm2, peak aortic velocity 3.4m/sec, mean aortic gradient 27mmHg, SVI 45ml/m2, EF 60-65% and DI 0.23 suggestive of moderate to severe aortic stenosis. She underwent TAVR CT demonstrating moderately calcified aortic valve (CA score 1263) and invasive valve assessment revealed LUCY 1.2 cm2 and mean PG of 24 mmHg consistent with moderate aortic stenosis. RHC showed mildly reduced cardiac index 1.87 by DAVID. Coronary angiogram showed LAD disease s/p EDDIE x 3 in 8/2020. Given findings of moderate aortic stenosis ongoing surveillance was recommended.     Ms. Miller presents to the visit accompanied by her daughter. Since she was last evaluated she has been feeling pretty much the same. She sometimes thinks she is a little more tired than she should be but knows this could be related to age. She is able to carry out household tasks without difficulty and walks 1/4 mile around her apartment complex most days without shortness of breath. She says that if she tried to walk further she would likely feel short of breath. She has recently been participating in exercise classes at her new apartment for 30 minutes 5 days a week  without cardiac symptoms. Specifically she denies any chest pain, SOB, palpitations, LH/syncope, or easy fatigability/frequent napping. Her weight has been stable, no orthopnea/PND, she does note occasional ankle swelling comes and goes. Her daughter agrees that her symptoms are stable - she says when Kamryn's  was recently sick in the hospital she did a lot of walking and was able to keep up.      PAST MEDICAL HISTORY:     Past Medical History:   Diagnosis Date     Aortic valvar stenosis 7/2010    mild     Asthma      Bipolar disorder (H)      CAD (coronary artery disease)     s/p angioplasty     Celiac disease      Colon polyps      Colon polyps 2012    every 3 year colonoscopy      Depression      High cholesterol      HTN      Mitral insufficiency      Pulmonary HTN (H)     mild     Tremors 7/10    drug induced from antidepressants     Tricuspid insufficiency         PAST SURGICAL HISTORY:     Past Surgical History:   Procedure Laterality Date     ANGIOGRAM  6/26/2009     ANGIOPLASTY  9/96    for angina     ANGIOPLASTY  8/03 - 9/03    X -2 - with stenst in coronary car.     APPENDECTOMY       BREAST BIOPSY, RT/LT      Breat Biopsy RT/LT, benign     BUNIONECTOMY  11/8/2011    Procedure:BUNIONECTOMY; Left donna bunionectomy; Surgeon:FREDY LITTLEJOHN; Location:[facility] OR     BUNIONECTOMY RT/LT  5/2007    right bunion and right 2nd hammertoe     C ANESTH,BLEPHAROPLASTY      (R) for drooping eyelid     C APPENDECTOMY       CATARACT IOL, RT/LT  6/12 and 7/12    bilateral     COLONOSCOPY  2007, 2012    every 3 years for polyps     CV CORONARY ANGIOGRAM N/A 8/21/2020    Procedure: CV CORONARY ANGIOGRAM;  Surgeon: Gianluca Toscano MD;  [facility]     CV LEFT HEART CATH N/A 8/21/2020    Procedure: CV LEFT HEART CATH;  Surgeon: Gianluca Toscano MD;  [facility]     CV LEFT HEART CATH N/A 11/3/2020    Procedure: CV LEFT HEART CATH;  Surgeon: Tom Burrows,  MD;  Location:  HEART CARDIAC CATH LAB     CV LOWER EXTREMITY ANGIOGRAM BILATERAL N/A 11/3/2020    Procedure: CV ANGIOGRAM LOWER EXTREMITY BILATERAL;  Surgeon: Tom Burrows MD;  [facility]     CV PCI STENT DRUG ELUTING N/A 8/21/2020    Procedure: Percutaneous Coronary Intervention Stent Drug Eluting;  Surgeon: Gianluca Toscano MD;  [facility]     CV RIGHT HEART CATH MEASUREMENTS RECORDED N/A 8/21/2020    Procedure: CV RIGHT HEART CATH;  Surgeon: Gianluca Toscano MD;  [facility]     CV RIGHT HEART CATH MEASUREMENTS RECORDED N/A 11/3/2020    Procedure: CV RIGHT HEART CATH;  Surgeon: Tom Burrows MD;  Location:  HEART CARDIAC CATH LAB     JOINT REPLACEMENT, HIP RT/LT  4/2008    right hip replaced     JOINT REPLACEMENT, HIP RT/LT  4/2009    left hip replaced     REPAIR HAMMER TOE  11/8/2011    Procedure:REPAIR HAMMER TOE; left 2nd hammertoe repair; Surgeon:FREDY LITTLEJOHN; Location: OR     SURGICAL HISTORY OF -   11/11    left bunion and 2nd hammertoe repair     TUBAL LIGATION          CURRENT MEDICATIONS:     Current Outpatient Medications   Medication Sig Dispense Refill     alendronate (FOSAMAX) 70 MG tablet TAKE 1 (ONE) TABLET(S) BY MOUTH ONCE A WEEK       amLODIPine (NORVASC) 2.5 MG tablet Take 3 tablets (7.5 mg) by mouth daily 270 tablet 1     amoxicillin (AMOXIL) 500 MG tablet TAKE 4 TABLETS BY MOUTH 1 HOUR PRIOR TO APPT 4 tablet 1     aspirin 81 MG tablet Take 1 tablet by mouth daily.       azelastine (ASTEPRO) 0.15 % nasal spray INSTILL 1 SPRAY INTO BOTH NOSTRILS DAILY 5 mL 1     Blood Pressure Monitor KIT Automatic Blood Pressure Monitor 1 kit 0     Calcium Citrate (CITRACAL OR) Take 1 tablet by mouth daily.       CHOLECALCIFEROL 67846 UNIT PO CAPS 1 tablet weekly       clopidogrel (PLAVIX) 75 MG tablet Take 1 tablet (75 mg) by mouth daily 90 tablet 3     desvenlafaxine (PRISTIQ) 100 MG 24 hr tablet Take  100 mg by mouth daily       fluticasone-salmeterol (ADVAIR DISKUS) 500-50 MCG/DOSE inhaler INHALE 1 PUFF INTO THE LUNGS EVERY 12 HOURS 1 each 6     furosemide (LASIX) 20 MG tablet Take 1 tablet (20 mg) by mouth daily 90 tablet 1     lamoTRIgine (LAMICTAL) 25 MG tablet 25 mg       LAMOTRIGINE 200 MG PO TABS Take by mouth daily        levothyroxine (SYNTHROID/LEVOTHROID) 50 MCG tablet Take 50 mcg by mouth daily       losartan (COZAAR) 100 MG tablet Take 1 tablet (100 mg) by mouth daily 90 tablet 3     metoprolol tartrate (LOPRESSOR) 50 MG tablet TAKE 1 TAB BY MOUTH IN THE MORNING AND 1/2 IN THE EVENING-needs follow up appointment 135 tablet 3     Multiple Vitamin (MULTI-VITAMIN) per tablet Take 1 tablet by mouth daily.       Omega-3 Fatty Acids (FISH OIL PO) Take 1 tablet by mouth daily.       rosuvastatin (CRESTOR) 40 MG tablet TAKE 1 TABLET BY MOUTH EVERY DAY 90 tablet 3     verapamil ER (CALAN-SR) 240 MG CR tablet Take 1 tablet (240 mg) by mouth daily Needs follow up appointment 90 tablet 3     VIIBRYD 10 MG TABS tablet Take 10 mg by mouth daily  2        ALLERGIES:     Allergies   Allergen Reactions     Ace Inhibitors Cough     Diclofenac Other (See Comments)     Balance problems        FAMILY HISTORY:     Family History   Problem Relation Age of Onset     Asthma Mother      Cerebrovascular Disease Mother      Arthritis Mother      Depression Mother      Lipids Sister      Hypertension Sister      Heart Disease Sister      Lipids Sister      Hypertension Sister      Lipids Sister      Breast Cancer Sister         SOCIAL HISTORY:     Social History     Socioeconomic History     Marital status:      Spouse name: Adrien     Number of children: 2     Years of education: 16     Highest education level: None   Occupational History     Employer: RETIRED     Comment: Retired in 2002.    Social Needs     Financial resource strain: None     Food insecurity     Worry: None     Inability: None     Transportation needs      Medical: None     Non-medical: None   Tobacco Use     Smoking status: Never Smoker     Smokeless tobacco: Never Used   Substance and Sexual Activity     Alcohol use: No     Alcohol/week: 0.0 standard drinks     Types: 1 Standard drinks or equivalent per week     Drug use: No     Sexual activity: Not Currently     Partners: Male   Lifestyle     Physical activity     Days per week: None     Minutes per session: None     Stress: None   Relationships     Social connections     Talks on phone: None     Gets together: None     Attends Sabianism service: None     Active member of club or organization: None     Attends meetings of clubs or organizations: None     Relationship status: None     Intimate partner violence     Fear of current or ex partner: None     Emotionally abused: None     Physically abused: None     Forced sexual activity: None   Other Topics Concern     Parent/sibling w/ CABG, MI or angioplasty before 65F 55M? Not Asked   Social History Narrative     None        REVIEW OF SYSTEMS:     Constitutional: No fevers or chills  Skin: No new rash or itching  Eyes: No acute change in vision  Ears/Nose/Throat: No purulent rhinorrhea, new hearing loss, or new vertigo  Respiratory: No cough or hemoptysis  Cardiovascular: See HPI  Gastrointestinal: No change in appetite, vomiting, hematemesis or diarrhea  Genitourinary: No dysuria or hematuria  Musculoskeletal: No new back pain, neck pain or muscle pain  Neurologic: No new headaches, focal weakness or behavior changes  Psychiatric: No hallucinations, excessive alcohol consumption or illegal drug usage  Hematologic/Lymphatic/Immunologic: No bleeding, chills, fever, night sweats or weight loss  Endocrine: No new cold intolerance, heat intolerance, polyphagia, polydipsia or polyuria      PHYSICAL EXAMINATION:     BP (!) 149/79 (BP Location: Right arm, Patient Position: Chair, Cuff Size: Adult Regular)   Pulse 54   Wt 64.6 kg (142 lb 6.4 oz)   SpO2 97%   BMI 27.35  kg/m      GENERAL: No acute distress.  HEENT: EOMI. Sclerae white, not injected. Pharynx without erythema or exudate.   Neck: No adenopathy. No thyromegaly. Symmetrical.   Heart: Regular rate and rhythm. Normal S1 with diminished S2. Crescendo decrescendo late peaking systolic murmur with radiation to carotids.    Lungs: Clear to auscultation. No ronchi, wheezes, rales.   Abdomen: Soft, nontender, nondistended. Bowel sounds present.  Extremities: No clubbing, cyanosis, or edema.   Neurologic: Alert and oriented to person/place/time, normal speech and affect. No focal deficits.  Skin: No petechiae, purpura or rash.     LABORATORY DATA:   Personally reviewed and interpreted labs.   CBC RESULTS:  Lab Results   Component Value Date    WBC 5.5 07/28/2021    WBC 4.6 05/17/2021    RBC 4.38 07/28/2021    RBC 3.97 05/17/2021    HGB 14.5 07/28/2021    HGB 12.9 05/17/2021    HCT 43.8 07/28/2021    HCT 40.3 05/17/2021     07/28/2021     (H) 05/17/2021    MCH 33.1 (H) 07/28/2021    MCH 32.5 05/17/2021    MCHC 33.1 07/28/2021    MCHC 32.0 05/17/2021    RDW 12.5 07/28/2021    RDW 12.7 05/17/2021     07/28/2021     05/17/2021       BMP RESULTS:  Lab Results   Component Value Date     06/04/2021    POTASSIUM 3.4 06/04/2021    CHLORIDE 106 06/04/2021    CO2 30 06/04/2021    ANIONGAP 6 06/04/2021     (H) 06/04/2021    BUN 27 06/04/2021    CR 1.50 (H) 06/04/2021    GFRESTIMATED 32 (L) 06/04/2021    GFRESTBLACK 37 (L) 06/04/2021    PRINCE 9.4 06/04/2021      PROCEDURES & FURTHER ASSESSMENTS:     ECHO 11/1/21:  Interpretation Summary  Global and regional left ventricular function is normal with an EF of 55-60%.  Right ventricular function, chamber size, wall motion, and thickness are  normal.  The mean gradient across the aortic valve is23 mmHg.  Moderate aortic stenosis is present.  No significant changes noted.  ______________________________________________________________________________  Left  Ventricle  Global and regional left ventricular function is normal with an EF of 55-60%.  Left ventricular wall thickness is normal. Left ventricular size is normal.  Diastolic function not assessed due to significant mitral annular  calcification. No regional wall motion abnormalities are seen.     Right Ventricle  Right ventricular function, chamber size, wall motion, and thickness are  normal.     Atria  Both atria appear normal.     Mitral Valve  Moderate mitral annular calcification is present. Trace to mild mitral  insufficiency is present.     Aortic Valve  Moderate aortic valve calcification is present. The mean gradient across the  aortic valve is23 mmHg. The peak aortic velocity is 3.1 m/sec. Moderate aortic  stenosis is present. Calculated valve area lower than previous study due to  LVOT diameter measurement.     Tricuspid Valve  The tricuspid valve is normal. Trace to mild tricuspid insufficiency is  present. Pulmonary artery systolic pressure cannot be assessed.     Pulmonic Valve  The pulmonic valve is normal.     Vessels  The thoracic aorta is normal. The pulmonary artery and bifurcation cannot be  assessed. The inferior vena cava is normal.     Pericardium  No pericardial effusion is present.     Compared to Previous Study  No significant changes noted.      ECHO 5/3/21:   Interpretation Summary  Global and regional left ventricular function is normal with an EF of 55-60%.  The right ventricle is normal size. Global right ventricular function is  normal.  Moderate to severe aortic stenosis, aortic valve area 1.02 cm2, peak velocity  3.3 m/s, mean gradient 25 mmHg, stroke volume index 40 ml/m2, DVI 0.29.     This study was compared with the study from 9/16/2020. The aortic valve peak  velocity and mean gradient are similar.  ______________________________________________________________________________  Left Ventricle  Left ventricular size is normal. Global and regional left ventricular function  is  normal with an EF of 55-60%. Mild concentric wall thickening consistent  with left ventricular hypertrophy is present. Diastolic function not assessed  due to significant mitral annular calcification.     Right Ventricle  The right ventricle is normal size. Global right ventricular function is  normal.     Atria  The right atria appears normal. Mild left atrial enlargement is present.     Mitral Valve  Moderate mitral annular calcification is present.     Aortic Valve  Moderate to severe aortic stenosis, aortic valve area 1.02 cm2, peak velocity  3.3 m/s, mean gradient 25 mmHg, stroke volume index 40 ml/m2, DVI 0.29.     Tricuspid Valve  The tricuspid valve is normal. Trace tricuspid insufficiency is present.  Pulmonary artery systolic pressure cannot be assessed.     Pulmonic Valve  The pulmonic valve is normal. Mild pulmonic insufficiency is present.     Vessels  The thoracic aorta is normal. The inferior vena cava was normal in size with  preserved respiratory variability.     Pericardium  No pericardial effusion is present.     Compared to Previous Study  This study was compared with the study from 9/16/2020 . The aortic valve peak  velocity and mean gradient are similar.      RH/C 11/2020:  85 year old lady,brought in for a left and right heart catheterization to assess the actual severity of her aortic stenosis.Kamryn has been complaining of dyspnea on moderate exertion over the last few months.She denies chest pains,syncope or presyncope.There are no other features to suggest heart failure.Her transthoracic echocardiogram showed an aortic  valve area of 0.8 cm2,mean gradient of 27 mm Hg and a peak velocity of 3.7 m/sec.It was suspected that she has a paradoxical low flow,low gradient aortic stenosis.Kamryn  has known coronary artery disease with recent PCIs with 3 EDDIE to the mid-distal LAD  in August 2020.   Pre Procedure Diagnosis    \Heart valve condition    Post Procedure Diagnosis    Moderate aortic  stenosis      Conclusion      Moderate aortic stenosis -valve area 1.2 cm2,Mean gradient 24 mm Hg    Right sided filling pressures are normal.    Normal PA pressures.    Left sided filling pressures are normal.    Mildly reduced cardiac output level.    Peripheral angiogram done and IVUS used to assess the vessel lumens of the descending thoracic aorta,right and left external iliac arteries.    Prox R VARUN lesion is 40% stenosed.    Dist R VARUN to Mid R CFA lesion is 70% stenosed.    Mid L EIA to Dist L EIA lesion is 30% stenosed.    Ultrasound (IVUS) was performed.          Plan      Follow bedrest per protocol    Continued medical management and lifestyle modifications for cardiovascular risk factor optimizations.    Follow up with Dr. Tom Burrows in 6 months time in clinic as there is no current indication for TAVR in lieu of the moderate severity of the aortic stenosis..    Arterial sheath removed from femoral artery with closure device.    Discharge today per protocol     TAVR CT 9/2020:  FINDINGS:     1.  Moderately calcified tricuspidaortic valve. Aortic valve calcium  score is 1263. The LVOT is mildly calcified. The ascending aorta is  mildly calcified, aortic arch is  mildly calcified, the descending  thoracic aorta is mildly calcified. There is moderate mitral annular  calcification.  2.  There are no adverse aortic root features, ie coronary heights are  adequate and there is no significant aortic root calcification.  3.  There are significant native coronary calcifications.   4.  The arch vessel branching pattern is normal.   5.  The suprarenal abdominal aorta is moderately calcified; infrarenal  abdominal aorta is severely calcified  6.  Widely patent origins of celiac trunk, superior mesenteric artery  and inferior mesenteric artery.  Single bilateral renal arteries with  widely patent origins.   7.  There is no acute aortic pathology, such as dissection, intramural  hematoma, or contained rupture.    8.  Suboptimal imaging of the lower extremity vasculature due to  low-contrast protocol and significant metallic artifacts related to  bilateral hip arthroplasty hardware. Consider an additional study for  repeat assessment of iliofemoral dimensions, particularly in the  bilateral external iliac and common femoral arteries.     ECHO 9/2020:    Interpretation Summary  Peak aortic velocity 3.4m/sec. Mean aortic gradient 27mmHg. Calculated valve  area 0.8cm2. SVI 45ml/m2. DI 0.23. Valve area index 0.47cm2/m2.  Some of these values are in the severe AS range,but measured gradients are in  mild to moderate AS range. Need additional evaluation like CT to assess actual  severity of AS.  No change from previous study.    ECG: December 2019 - sinus bradycardia nonspecific ST-T abnormalities    Echocardiogram: 11/22/19  Left ventricular function, chamber size, wall motion, and wall thickness are  normal.The EF is 55-60%. No regional wall motion abnormalities are seen.  Right ventricular function, chamber size, wall motion, and thickness are  normal.  Severe left atrial enlargement is present.  Moderate mitral annular calcification is present. Mild mitral insufficiency is  present. There is likley moderate aortic stenosis. The peak velocity is  3.47m/sec, the peak and mean gradients are 48mmHg and 28mmHg. The aortic valve  area is calculated low at 0.7cm2  Pulmonary artery systolic pressure is normal. The inferior vena cava was  normal in size with preserved respiratory variability. No pericardial effusion  is present.  Compared to prior study AS has worsened. Otherwise no significant change    PFTs: 12/20/19  Nonspecific pulmonary function testing with decreased FEV1 and FEV but normal ratio and TLC, could be seen in obesity or neuromuscular weakness   There is no significant bronchodilator response.   Normal diffusion capacity   Lung volumes are normal   No prior testing for comparison     Carotid Ultrasound:  1. Right  side:    Degree of stenosis of the internal carotid artery: Less than 50%  due to presence of plaque.      2. Left side:     Degree of stenosis of the internal carotid artery: Less than 50%  due to presence of plaque.     STS PROM RISK SCORE: 3.04%  FRAILTY SCORE:3/5  NYHA CLASS: II     CLINICAL IMPRESSION:   Kamryn Miller is a very pleasant 86 year old female with HTN, HLD, HFpEF, CAD s/p LAD PCI 8/2020, asthma and CKD III who presents for ongoing monitoring of her moderate aortic stenosis. She was last evaluated by Dr. Toscano in July 2020 for possible TAVR at which time her echo showed an LUCY 0.8 cm2, peak aortic velocity 3.4m/sec, mean aortic gradient 27mmHg, SVI 45ml/m2, EF 60-65% and DI 0.23 suggestive of moderate to severe aortic stenosis. She underwent TAVR CT demonstrating moderately calcified aortic valve (CA score 1263) and invasive valve assessment revealed LUCY 1.2 cm2 and mean PG of 24 mmHg consistent with moderate aortic stenosis. RHC showed mildly reduced cardiac index 1.87 by DAVID. Coronary angiogram showed LAD disease s/p EDDIE x 3 in 8/2020. Given findings of moderate aortic stenosis ongoing surveillance was recommended.    Today Kamryn reports feeling fairly well and reports no change is symptoms since she was last evaluated. She notes some mild fatigue and dyspnea with overexertion, but is able to walk 1/4 mile, perform ADLs and exercise daily without dyspnea, chest pain, lightheadedness or syncope. Her ECHO today shows moderate to severe aortic valve stenosis with aortic valve area 0.78 cm2, peak velocity 3.1 m/s, mean gradient 23 mmHg, stroke volume index 36 ml/m2, DVI 0.28 which is essentially unchanged when compared to prior. Plan to continue Q 6 months echo surveillance. In the meantime, I have advised her and her daughter to contact us if she develops worsening shortness of breath, chest pain, lightheadedness or syncope which would likely indicate progression of her aortic valve disease  and need for TAVR. She verbalized understanding and agrees with the plan.     RTC: Dr. Zuniga Q 6 months with echo prior, please refer back to valve clinic if she develops worsening symptoms related to aortic stenosis or has progression of valve disease by ECHO.      CC  Patient Care Team:  Rolanda Wilcox MD as PCP - General (Family Practice)  Ania Johnson MD as Assigned Nephrology Provider  Javier Landa MD as Assigned OBGYN Provider

## 2021-11-01 NOTE — PATIENT INSTRUCTIONS
You were seen today in the Cardiovascular Clinic at the [facility].    Cardiology provider you saw during your visit Pati De Santiago NP.     1. You echo shows moderate aortic stenosis which is stable when compared to your prior echo.   2. Given symptoms are also stable, we will continue to monitor you for now.  3. Follow-up with Dr. Zuniga Nov 26th with labs prior on the 24th.   4. I will have you follow with Dr. Zuniga Q 6 months and he can refer you back to valve clinic when aortic stenosis progresses to severe.   5. In the meantime, contact us if you experience any worsening shortness of breath, chest pain, lightheadedness or passing out.    Questions or scheduling:  Please contact Maria L Gerard at [phone]    On Call Cardiologist for after hours or on weekends: [phone], press option #4 and ask to speak to the on-call Cardiologist.     JESSICA Rousseau, CNP  Structural Heart Nurse Practitioner  [facility]

## 2021-11-13 NOTE — LETTER
My Asthma Action Plan  Name: Kamryn Miller   YOB: 1935  Date: 6/25/2019   My doctor: Rolanda Wilcox MD   My clinic: [facility]        My Control Medicine: Mometasone + formoterol (Dulera) -  200/5 mcg see med sheet  My Rescue Medicine: Albuterol (Proair/Ventolin/Proventil) inhaler see med sheet   My Asthma Severity: mild persistent  Avoid your asthma triggers: .               GREEN ZONE   Good Control    I feel good    No cough or wheeze    Can work, sleep and play without asthma symptoms       Take your asthma control medicine every day.     1. If exercise triggers your asthma, take your rescue medication    15 minutes before exercise or sports, and    During exercise if you have asthma symptoms  2. Spacer to use with inhaler: If you have a spacer, make sure to use it with your inhaler             YELLOW ZONE Getting Worse  I have ANY of these:    I do not feel good    Cough or wheeze    Chest feels tight    Wake up at night   1. Keep taking your Green Zone medications  2. Start taking your rescue medicine:    every 20 minutes for up to 1 hour. Then every 4 hours for 24-48 hours.  3. If you stay in the Yellow Zone for more than 12-24 hours, contact your doctor.  4. If you do not return to the Green Zone in 12-24 hours or you get worse, start taking your oral steroid medicine if prescribed by your provider.           RED ZONE Medical Alert - Get Help  I have ANY of these:    I feel awful    Medicine is not helping    Breathing getting harder    Trouble walking or talking    Nose opens wide to breathe       1. Take your rescue medicine NOW  2. If your provider has prescribed an oral steroid medicine, start taking it NOW  3. Call your doctor NOW  4. If you are still in the Red Zone after 20 minutes and you have not reached your doctor:    Take your rescue medicine again and    Call 911 or go to the emergency room right away    See your regular doctor within 2 weeks of an Emergency Room or  Pt arrives via ems for c/o left flank pain x2 hours. Pt also c/o difficulty urinating.    Urgent Care visit for follow-up treatment.          Annual Reminders:  Meet with Asthma Educator,  Flu Shot in the Fall, consider Pneumonia Vaccination for patients with asthma (aged 19 and older).    Pharmacy:    [facility] - NON-EPRESCRIBE  [id number] - [place], [place] - [address]  [id number] - [place], [place] - [address] AT [facility] [address]                      Asthma Triggers  How To Control Things That Make Your Asthma Worse    Triggers are things that make your asthma worse.  Look at the list below to help you find your triggers and what you can do about them.  You can help prevent asthma flare-ups by staying away from your triggers.      Trigger                                                          What you can do   Cigarette Smoke  Tobacco smoke can make asthma worse. Do not allow smoking in your home, car or around you.  Be sure no one smokes at a child s day care or school.  If you smoke, ask your health care provider for ways to help you quit.  Ask family members to quit too.  Ask your health care provider for a referral to Quit Plan to help you quit smoking, or call 0-907-398-PLAN.     Colds, Flu, Bronchitis  These are common triggers of asthma. Wash your hands often.  Don t touch your eyes, nose or mouth.  Get a flu shot every year.     Dust Mites  These are tiny bugs that live in cloth or carpet. They are too small to see. Wash sheets and blankets in hot water every week.   Encase pillows and mattress in dust mite proof covers.  Avoid having carpet if you can. If you have carpet, vacuum weekly.   Use a dust mask and HEPA vacuum.   Pollen and Outdoor Mold  Some people are allergic to trees, grass, or weed pollen, or molds. Try to keep your windows closed.  Limit time out doors when pollen count is high.   Ask you health care provider about taking medicine during allergy season.     Animal Dander  Some people are allergic to skin  flakes, urine or saliva from pets with fur or feathers. Keep pets with fur or feathers out of your home.    If you can t keep the pet outdoors, then keep the pet out of your bedroom.  Keep the bedroom door closed.  Keep pets off cloth furniture and away from stuffed toys.     Mice, Rats, and Cockroaches  Some people are allergic to the waste from these pests.   Cover food and garbage.  Clean up spills and food crumbs.  Store grease in the refrigerator.   Keep food out of the bedroom.   Indoor Mold  This can be a trigger if your home has high moisture. Fix leaking faucets, pipes, or other sources of water.   Clean moldy surfaces.  Dehumidify basement if it is damp and smelly.   Smoke, Strong Odors, and Sprays  These can reduce air quality. Stay away from strong odors and sprays, such as perfume, powder, hair spray, paints, smoke incense, paint, cleaning products, candles and new carpet.   Exercise or Sports  Some people with asthma have this trigger. Be active!  Ask your doctor about taking medicine before sports or exercise to prevent symptoms.    Warm up for 5-10 minutes before and after sports or exercise.     Other Triggers of Asthma  Cold air:  Cover your nose and mouth with a scarf.  Sometimes laughing or crying can be a trigger.  Some medicines and food can trigger asthma.

## 2021-11-24 ENCOUNTER — LAB (OUTPATIENT)
Dept: LAB | Facility: CLINIC | Age: 86
End: 2021-11-24
Payer: MEDICARE

## 2021-11-24 DIAGNOSIS — E78.2 MIXED HYPERLIPIDEMIA: ICD-10-CM

## 2021-11-24 DIAGNOSIS — I25.10 CORONARY ARTERY DISEASE INVOLVING NATIVE CORONARY ARTERY OF NATIVE HEART WITHOUT ANGINA PECTORIS: ICD-10-CM

## 2021-11-24 DIAGNOSIS — I10 BENIGN ESSENTIAL HYPERTENSION: ICD-10-CM

## 2021-11-24 DIAGNOSIS — I35.0 MODERATE AORTIC STENOSIS: ICD-10-CM

## 2021-11-24 DIAGNOSIS — I35.0 NONRHEUMATIC AORTIC VALVE STENOSIS: ICD-10-CM

## 2021-11-24 DIAGNOSIS — I35.0 SEVERE AORTIC STENOSIS: ICD-10-CM

## 2021-11-24 DIAGNOSIS — I25.5 ISCHEMIC CARDIOMYOPATHY: ICD-10-CM

## 2021-11-24 DIAGNOSIS — I50.30 NYHA CLASS 3 HEART FAILURE WITH PRESERVED EJECTION FRACTION (H): ICD-10-CM

## 2021-11-24 LAB
ALBUMIN SERPL-MCNC: 3.7 G/DL (ref 3.4–5)
ALP SERPL-CCNC: 66 U/L (ref 40–150)
ALT SERPL W P-5'-P-CCNC: 29 U/L (ref 0–50)
ANION GAP SERPL CALCULATED.3IONS-SCNC: 4 MMOL/L (ref 3–14)
AST SERPL W P-5'-P-CCNC: 25 U/L (ref 0–45)
BILIRUB SERPL-MCNC: 0.5 MG/DL (ref 0.2–1.3)
BUN SERPL-MCNC: 40 MG/DL (ref 7–30)
CALCIUM SERPL-MCNC: 9.5 MG/DL (ref 8.5–10.1)
CHLORIDE BLD-SCNC: 102 MMOL/L (ref 94–109)
CO2 SERPL-SCNC: 30 MMOL/L (ref 20–32)
CREAT SERPL-MCNC: 1.82 MG/DL (ref 0.52–1.04)
ERYTHROCYTE [DISTWIDTH] IN BLOOD BY AUTOMATED COUNT: 12.6 % (ref 10–15)
GFR SERPL CREATININE-BSD FRML MDRD: 25 ML/MIN/1.73M2
GLUCOSE BLD-MCNC: 95 MG/DL (ref 70–99)
HCT VFR BLD AUTO: 39.7 % (ref 35–47)
HGB BLD-MCNC: 13.3 G/DL (ref 11.7–15.7)
MCH RBC QN AUTO: 33.7 PG (ref 26.5–33)
MCHC RBC AUTO-ENTMCNC: 33.5 G/DL (ref 31.5–36.5)
MCV RBC AUTO: 101 FL (ref 78–100)
NT-PROBNP SERPL-MCNC: 398 PG/ML (ref 0–450)
PLATELET # BLD AUTO: 223 10E3/UL (ref 150–450)
POTASSIUM BLD-SCNC: 4.1 MMOL/L (ref 3.4–5.3)
PROT SERPL-MCNC: 7.4 G/DL (ref 6.8–8.8)
RBC # BLD AUTO: 3.95 10E6/UL (ref 3.8–5.2)
SODIUM SERPL-SCNC: 136 MMOL/L (ref 133–144)
WBC # BLD AUTO: 6.3 10E3/UL (ref 4–11)

## 2021-11-24 PROCEDURE — 80053 COMPREHEN METABOLIC PANEL: CPT

## 2021-11-24 PROCEDURE — 83880 ASSAY OF NATRIURETIC PEPTIDE: CPT

## 2021-11-24 PROCEDURE — 85027 COMPLETE CBC AUTOMATED: CPT

## 2021-11-24 PROCEDURE — 36415 COLL VENOUS BLD VENIPUNCTURE: CPT

## 2021-11-26 ENCOUNTER — MYC MEDICAL ADVICE (OUTPATIENT)
Dept: NEPHROLOGY | Facility: CLINIC | Age: 86
End: 2021-11-26

## 2021-11-26 ENCOUNTER — MYC MEDICAL ADVICE (OUTPATIENT)
Dept: FAMILY MEDICINE | Facility: CLINIC | Age: 86
End: 2021-11-26

## 2021-11-26 ENCOUNTER — OFFICE VISIT (OUTPATIENT)
Dept: CARDIOLOGY | Facility: CLINIC | Age: 86
End: 2021-11-26
Attending: NURSE PRACTITIONER
Payer: MEDICARE

## 2021-11-26 VITALS
DIASTOLIC BLOOD PRESSURE: 65 MMHG | WEIGHT: 143 LBS | BODY MASS INDEX: 27.47 KG/M2 | HEART RATE: 58 BPM | SYSTOLIC BLOOD PRESSURE: 111 MMHG | OXYGEN SATURATION: 99 %

## 2021-11-26 DIAGNOSIS — I25.5 ISCHEMIC CARDIOMYOPATHY: ICD-10-CM

## 2021-11-26 DIAGNOSIS — I50.30 NYHA CLASS 3 HEART FAILURE WITH PRESERVED EJECTION FRACTION (H): ICD-10-CM

## 2021-11-26 DIAGNOSIS — E78.2 MIXED HYPERLIPIDEMIA: ICD-10-CM

## 2021-11-26 DIAGNOSIS — I10 BENIGN ESSENTIAL HYPERTENSION: Primary | ICD-10-CM

## 2021-11-26 DIAGNOSIS — I35.0 MODERATE AORTIC STENOSIS: ICD-10-CM

## 2021-11-26 DIAGNOSIS — I25.10 CORONARY ARTERY DISEASE INVOLVING NATIVE CORONARY ARTERY OF NATIVE HEART WITHOUT ANGINA PECTORIS: ICD-10-CM

## 2021-11-26 DIAGNOSIS — I35.0 SEVERE AORTIC STENOSIS: ICD-10-CM

## 2021-11-26 PROCEDURE — 99215 OFFICE O/P EST HI 40 MIN: CPT | Performed by: INTERNAL MEDICINE

## 2021-11-26 RX ORDER — AMLODIPINE BESYLATE 10 MG/1
10 TABLET ORAL DAILY
Qty: 90 TABLET | Refills: 1 | Status: SHIPPED | OUTPATIENT
Start: 2021-11-26 | End: 2021-12-09

## 2021-11-26 NOTE — TELEPHONE ENCOUNTER
APPLICATION FOR DISABILITY PARKING CERTIFICATE Form    Started and placed at a covering provider's desk. Remedios Pinto,

## 2021-11-26 NOTE — NURSING NOTE
"Chief Complaint   Patient presents with     RECHECK     6 month follow up w/Echo.        Initial BP (!) 147/77 (BP Location: Right arm, Patient Position: Chair, Cuff Size: Adult Regular)   Pulse 58   Wt 64.9 kg (143 lb)   SpO2 99%   BMI 27.47 kg/m   Estimated body mass index is 27.47 kg/m  as calculated from the following:    Height as of 7/28/21: 1.537 m (5' 0.5\").    Weight as of this encounter: 64.9 kg (143 lb)..  BP completed using cuff size: regular    Fidelina Harris MA  "

## 2021-11-26 NOTE — LETTER
11/26/2021      RE: Kamryn Miller  [address]   [address]  [facility] [address]       Dear Colleague,    Thank you for the opportunity to participate in the care of your patient, Kamryn Miller, at the [facility]. Please see a copy of my visit note below.    November 26, 2021    I had the pleasure of seeing Kamryn Miller  in the [facility] for further evaluation and management. She has a history of Hx CAD, HLD, HTN, mitral and tricuspid insuff, pulmonary hypertension, lost to follow up in the past.  She does have CAD and did receive 3 stents in 8/2020 as below.  She denies any cardiomyopathy and she has not had any cardiology follow-up for several years.  Notes that she does have shortness of breath especially with exertion or she walks outside.  This is class III symptoms at the moment she can will probably about a block and able to take a flight of stairs.  We did start TAVR evaluation and she did undergo right heart catheterization as well as invasive hemodynamic measurement and measurement of the valve area.  Given findings of moderate aortic stenosis, TAVR was not yet pursued and ongoing surveillance was recommended. Had been hypertensive in the recent past. She did see the structural team just recently and repeat TTE was performed that again showed moderate to severe aortic stenosis, essentially unchanged from prior TTE. Given that she had no concerning symptoms ongoing surveillance was recommended.  Today she us here for follow up.  Overall from the cardiac standpoint she feels about unchanged.  Unfortunately she lost her  in October after 3 months of hospitalization for glioblastoma multiform a.  No surgery was offered.  In addition to her depression related to this she also has seasonal depression that has been ongoing for the past years.  However denies any chest pain dizziness  lightheadedness no worsening shortness of breath and in fact her exercise tolerance is a little bit better.  Takes her medications and no other complaints today.    PAST MEDICAL HISTORY:  Past Medical History:   Diagnosis Date     Aortic valvar stenosis 7/2010    mild     Asthma      Bipolar disorder (H)      CAD (coronary artery disease)     s/p angioplasty     Celiac disease      Colon polyps      Colon polyps 2012    every 3 year colonoscopy      Depression      High cholesterol      HTN      Mitral insufficiency      Pulmonary HTN (H)     mild     Tremors 7/10    drug induced from antidepressants     Tricuspid insufficiency      FAMILY HISTORY:  Family History   Problem Relation Age of Onset     Asthma Mother      Cerebrovascular Disease Mother      Arthritis Mother      Depression Mother      Lipids Sister      Hypertension Sister      Heart Disease Sister      Lipids Sister      Hypertension Sister      Lipids Sister      Breast Cancer Sister      SOCIAL HISTORY:  Social History     Socioeconomic History     Marital status:      Spouse name: Adrien     Number of children: 2     Years of education: 16     Highest education level: Not on file   Occupational History     Employer: RETIRED     Comment: Retired in 2002.    Tobacco Use     Smoking status: Never Smoker     Smokeless tobacco: Never Used   Substance and Sexual Activity     Alcohol use: No     Alcohol/week: 0.0 standard drinks     Types: 1 Standard drinks or equivalent per week     Drug use: No     Sexual activity: Not Currently     Partners: Male   Other Topics Concern     Parent/sibling w/ CABG, MI or angioplasty before 65F 55M? Not Asked   Social History Narrative     Not on file     Social Determinants of Health     Financial Resource Strain: Not on file   Food Insecurity: Not on file   Transportation Needs: Not on file   Physical Activity: Not on file   Stress: Not on file   Social Connections: Not on file   Intimate Partner Violence: Not on  file   Housing Stability: Not on file     CURRENT MEDICATIONS:  Current Outpatient Medications   Medication     alendronate (FOSAMAX) 70 MG tablet     amLODIPine (NORVASC) 2.5 MG tablet     amoxicillin (AMOXIL) 500 MG tablet     aspirin 81 MG tablet     azelastine (ASTEPRO) 0.15 % nasal spray     Blood Pressure Monitor KIT     Calcium Citrate (CITRACAL OR)     CHOLECALCIFEROL 85569 UNIT PO CAPS     clopidogrel (PLAVIX) 75 MG tablet     desvenlafaxine (PRISTIQ) 100 MG 24 hr tablet     fluticasone-salmeterol (ADVAIR DISKUS) 500-50 MCG/DOSE inhaler     furosemide (LASIX) 20 MG tablet     lamoTRIgine (LAMICTAL) 25 MG tablet     LAMOTRIGINE 200 MG PO TABS     levothyroxine (SYNTHROID/LEVOTHROID) 50 MCG tablet     losartan (COZAAR) 100 MG tablet     metoprolol tartrate (LOPRESSOR) 50 MG tablet     Multiple Vitamin (MULTI-VITAMIN) per tablet     Omega-3 Fatty Acids (FISH OIL PO)     rosuvastatin (CRESTOR) 40 MG tablet     verapamil ER (CALAN-SR) 240 MG CR tablet     VIIBRYD 10 MG TABS tablet     No current facility-administered medications for this visit.     ROS:   Constitutional: No fever, chills, or sweats.   ENT: No visual disturbance, ear ache, epistaxis, sore throat.   Allergies/Immunologic: Negative.   Respiratory: No cough, hemoptysis.   Cardiovascular: As per HPI.   GI: No nausea, vomiting, hematemesis, melena, or hematochezia.   : No urinary frequency, dysuria, or hematuria.   Integrument: Negative.   Psychiatric: No evidence of major depression  Neuro: No new neurological complaints at this time. Non focal  Endocrinology: Negative.   Musculoskeletal: As per HPI.      EXAM:  /65   Pulse 58   Wt 64.9 kg (143 lb)   SpO2 99%   BMI 27.47 kg/m    General: appears comfortable, alert and oriented  Head: normocephalic, atraumatic  Eyes: anicteric sclera, EOMI , PERRL  Neck: no adenopathy  Orophyarynx: moist mucosa, no lesions noted  Heart: regular, S1/S2, no murmurs, rubs or gallop. Estimated JVP at 5  cmH2O  Lungs: CTAB, No wheezing.   Abdomen: soft, non-tender, bowel sounds present, no hepatosplenomegaly  Extremities: No LE edema today  Skin: no open lesions noted  Neuro: grossly non-focal  Psych: no evidence of depression noted     Labs:  Lab Results   Component Value Date    WBC 6.3 11/24/2021    HGB 13.3 11/24/2021    HCT 39.7 11/24/2021     11/24/2021     11/24/2021    POTASSIUM 4.1 11/24/2021    CHLORIDE 102 11/24/2021    CO2 30 11/24/2021    BUN 40 (H) 11/24/2021    CR 1.82 (H) 11/24/2021    GLC 95 11/24/2021    SED 11 07/28/2021    NTBNP 398 11/24/2021    AST 25 11/24/2021    ALT 29 11/24/2021    ALKPHOS 66 11/24/2021    BILITOTAL 0.5 11/24/2021    INR 1.05 11/03/2020     Echocardiogram reviewed:   Left ventricular function, chamber size, wall motion, and wall thickness are normal.The EF is 55-60%. No regional wall motion abnormalities are seen.  Right ventricular function, chamber size, wall motion, and thickness are normal.  Severe left atrial enlargement is present. Moderate mitral annular calcification is present. Mild mitral insufficiency is present. There is likley moderate aortic stenosis. The peak velocity is  3.47m/sec, the peak and mean gradients are 48mmHg and 28mmHg. The aortic valve  area is calculated low at 0.7cm2. Pulmonary artery systolic pressure is normal. The inferior vena cava was normal in size with preserved respiratory variability. No pericardial effusion is present.   Compared to prior study AS has worsened. Otherwise no significant change     Coronary angiogram 8/21/2020:    Dist LAD lesion is 70% stenosed.    Mid LAD-1 lesion is 80% stenosed.    Mid LAD-2 lesion is 60% stenosed.    Mid RCA lesion is 40% stenosed.    Prox RCA lesion is 40% stenosed.    Right sided filling pressures are normal.    Normal PA pressures.    Left sided filling pressures are normal.    Reduced cardiac output.  1. LAD - 3 EDDIE were placed in the mid to distal LAD (2.5 x 28 mm, 3.5 x 12 mm,  and 3.0 x 8 mm)  2. RHC showed normal biventricular filling pressures. Normal PA pressure. Reduced cardiac output.     RHC/coronary angiogram 11/3/20:    Moderate aortic stenosis -valve area 1.2 cm2,Mean gradient 24 mm Hg    Right sided filling pressures are normal.    Normal PA pressures.    Left sided filling pressures are normal.    Mildly reduced cardiac output level.    Peripheral angiogram done and IVUS used to assess the vessel lumens of the descending thoracic aorta,right and left external iliac arteries.     TTE 5/3/21:  Global and regional left ventricular function is normal with an EF of 55-60%.  The right ventricle is normal size. Global right ventricular function is normal.  Moderate to severe aortic stenosis, aortic valve area 1.02 cm2, peak velocity  3.3 m/s, mean gradient 25 mmHg, stroke volume index 40 ml/m2, DVI 0.29.  This study was compared with the study from 9/16/2020. The aortic valve peak velocity and mean gradient are similar    TTE 11/1/21:  Global and regional left ventricular function is normal with an EF of 55-60%.  Right ventricular function, chamber size, wall motion, and thickness are normal.  Moderate aortic valve calcification is present. The mean gradient across the aortic valve is23 mmHg. The peak aortic velocity is 3.1 m/sec. Moderate aortic stenosis is present. Calculated valve area lower than previous study due to LVOT diameter measurement.  Moderate aortic stenosis is present.  No significant changes noted.      ASSESSMENT AND PLAN:  In summary, patient is a 86 year old lady with with coronary artery disease and status post PCI 20 years ago at [facility], we do not have records unfortunately available at this point but did receive 2 stents.  She most complains of shortness of breath denies any dizziness lightheadedness syncope or recurrent episodes of chest pains.  Overall there has been minimal change in her condition since my last visit and since the invasive evaluation. I have  reviewed old imaging and blood work in person including visualizing the echocardiograms. Grossly I believe the echo is pretty much unchanged from the prior one.  She still has moderate to severe aortic stenosis.  However she does not have any concerning symptoms such as chest pain dizziness lightheadedness or syncope.  As such we will continue to monitor her with more aggressive blood pressure control.  Appreciate structural teams assistance as well.   Overall from the cardiac standpoint however she remains hypertensive and we will increase the amlodipine to 10 mg once a day dosing.  In addition I do believe she is a little bit dry and her creatinine on the most recent check was little bit elevated to 1.82 and as such we will discontinue the Lasix.  We will continue to monitor her cardiac status and repeat an echocardiogram in 6 months and I will see her back afterwards.  She will let us know immediately if there is any new symptoms that are concerning for worsening aortic stenosis including chest pain dizziness lightheadedness syncope or worsening shortness of breath.  With regards to her depression this is not necessarily new for her at least the seasonal depression.  She is still mourning her 's death and hopefully she will get better in the next couple of months.  There is no suicidal ideation or thoughts.    I appreciate the opportunity to participate in the care of Kamryn Miller . Please do not hesitate to contact me with any further questions.     Sincerely,   Madhu Zuniga MD  [facility] Division of Cardiology

## 2021-11-26 NOTE — PATIENT INSTRUCTIONS
Thank you for coming to the [facility] @ [facility]; please note the following instructions:    1. Stop lasix    2. Increase amlodipine to 10 mg daily- new script sent for 10 mg tablet. You will take ONE tablet daily    3. Follow up with labs after 5/5/2022    Echocardiogram is scheduled 5/5/2022        If you have any questions regarding your visit please contact your care team:     Cardiology  Telephone Number   Alisa BROWNLEE., RN  Jennifer VITALE, RN   Daniela DELGADO, RMYOSEF BOUCHER, FANG VICKERS, LPN   [phone] (option 1)   For scheduling appts:     [phone] (select option 1)       For the Device Clinic (Pacemakers and ICD's)  RN's :  Delia Vazquez   During business hours: [phone]    *After business hours:  [phone] (select option 4)      Normal test result notifications will be released via [employer] or mailed within 7 business days.  All other test results, will be communicated via telephone once reviewed by your cardiologist.    If you need a medication refill please contact your pharmacy.  Please allow 3 business days for your refill to be completed.    As always, thank you for trusting us with your health care needs!

## 2021-11-26 NOTE — PROGRESS NOTES
November 26, 2021    I had the pleasure of seeing Kamryn Miller  in the [facility] for further evaluation and management. She has a history of Hx CAD, HLD, HTN, mitral and tricuspid insuff, pulmonary hypertension, lost to follow up in the past.  She does have CAD and did receive 3 stents in 8/2020 as below.  She denies any cardiomyopathy and she has not had any cardiology follow-up for several years.  Notes that she does have shortness of breath especially with exertion or she walks outside.  This is class III symptoms at the moment she can will probably about a block and able to take a flight of stairs.  We did start TAVR evaluation and she did undergo right heart catheterization as well as invasive hemodynamic measurement and measurement of the valve area.  Given findings of moderate aortic stenosis, TAVR was not yet pursued and ongoing surveillance was recommended. Had been hypertensive in the recent past. She did see the structural team just recently and repeat TTE was performed that again showed moderate to severe aortic stenosis, essentially unchanged from prior TTE. Given that she had no concerning symptoms ongoing surveillance was recommended.  Today she us here for follow up.  Overall from the cardiac standpoint she feels about unchanged.  Unfortunately she lost her  in October after 3 months of hospitalization for glioblastoma multiform a.  No surgery was offered.  In addition to her depression related to this she also has seasonal depression that has been ongoing for the past years.  However denies any chest pain dizziness lightheadedness no worsening shortness of breath and in fact her exercise tolerance is a little bit better.  Takes her medications and no other complaints today.    PAST MEDICAL HISTORY:  Past Medical History:   Diagnosis Date     Aortic valvar stenosis 7/2010    mild     Asthma      Bipolar disorder (H)      CAD (coronary artery disease)     s/p angioplasty      Celiac disease      Colon polyps      Colon polyps 2012    every 3 year colonoscopy      Depression      High cholesterol      HTN      Mitral insufficiency      Pulmonary HTN (H)     mild     Tremors 7/10    drug induced from antidepressants     Tricuspid insufficiency      FAMILY HISTORY:  Family History   Problem Relation Age of Onset     Asthma Mother      Cerebrovascular Disease Mother      Arthritis Mother      Depression Mother      Lipids Sister      Hypertension Sister      Heart Disease Sister      Lipids Sister      Hypertension Sister      Lipids Sister      Breast Cancer Sister      SOCIAL HISTORY:  Social History     Socioeconomic History     Marital status:      Spouse name: Adrien     Number of children: 2     Years of education: 16     Highest education level: Not on file   Occupational History     Employer: RETIRED     Comment: Retired in 2002.    Tobacco Use     Smoking status: Never Smoker     Smokeless tobacco: Never Used   Substance and Sexual Activity     Alcohol use: No     Alcohol/week: 0.0 standard drinks     Types: 1 Standard drinks or equivalent per week     Drug use: No     Sexual activity: Not Currently     Partners: Male   Other Topics Concern     Parent/sibling w/ CABG, MI or angioplasty before 65F 55M? Not Asked   Social History Narrative     Not on file     Social Determinants of Health     Financial Resource Strain: Not on file   Food Insecurity: Not on file   Transportation Needs: Not on file   Physical Activity: Not on file   Stress: Not on file   Social Connections: Not on file   Intimate Partner Violence: Not on file   Housing Stability: Not on file     CURRENT MEDICATIONS:  Current Outpatient Medications   Medication     alendronate (FOSAMAX) 70 MG tablet     amLODIPine (NORVASC) 2.5 MG tablet     amoxicillin (AMOXIL) 500 MG tablet     aspirin 81 MG tablet     azelastine (ASTEPRO) 0.15 % nasal spray     Blood Pressure Monitor KIT     Calcium Citrate (CITRACAL OR)      CHOLECALCIFEROL 90932 UNIT PO CAPS     clopidogrel (PLAVIX) 75 MG tablet     desvenlafaxine (PRISTIQ) 100 MG 24 hr tablet     fluticasone-salmeterol (ADVAIR DISKUS) 500-50 MCG/DOSE inhaler     furosemide (LASIX) 20 MG tablet     lamoTRIgine (LAMICTAL) 25 MG tablet     LAMOTRIGINE 200 MG PO TABS     levothyroxine (SYNTHROID/LEVOTHROID) 50 MCG tablet     losartan (COZAAR) 100 MG tablet     metoprolol tartrate (LOPRESSOR) 50 MG tablet     Multiple Vitamin (MULTI-VITAMIN) per tablet     Omega-3 Fatty Acids (FISH OIL PO)     rosuvastatin (CRESTOR) 40 MG tablet     verapamil ER (CALAN-SR) 240 MG CR tablet     VIIBRYD 10 MG TABS tablet     No current facility-administered medications for this visit.     ROS:   Constitutional: No fever, chills, or sweats.   ENT: No visual disturbance, ear ache, epistaxis, sore throat.   Allergies/Immunologic: Negative.   Respiratory: No cough, hemoptysis.   Cardiovascular: As per HPI.   GI: No nausea, vomiting, hematemesis, melena, or hematochezia.   : No urinary frequency, dysuria, or hematuria.   Integrument: Negative.   Psychiatric: No evidence of major depression  Neuro: No new neurological complaints at this time. Non focal  Endocrinology: Negative.   Musculoskeletal: As per HPI.      EXAM:  /65   Pulse 58   Wt 64.9 kg (143 lb)   SpO2 99%   BMI 27.47 kg/m    General: appears comfortable, alert and oriented  Head: normocephalic, atraumatic  Eyes: anicteric sclera, EOMI , PERRL  Neck: no adenopathy  Orophyarynx: moist mucosa, no lesions noted  Heart: regular, S1/S2, no murmurs, rubs or gallop. Estimated JVP at 5 cmH2O  Lungs: CTAB, No wheezing.   Abdomen: soft, non-tender, bowel sounds present, no hepatosplenomegaly  Extremities: No LE edema today  Skin: no open lesions noted  Neuro: grossly non-focal  Psych: no evidence of depression noted     Labs:  Lab Results   Component Value Date    WBC 6.3 11/24/2021    HGB 13.3 11/24/2021    HCT 39.7 11/24/2021     11/24/2021      11/24/2021    POTASSIUM 4.1 11/24/2021    CHLORIDE 102 11/24/2021    CO2 30 11/24/2021    BUN 40 (H) 11/24/2021    CR 1.82 (H) 11/24/2021    GLC 95 11/24/2021    SED 11 07/28/2021    NTBNP 398 11/24/2021    AST 25 11/24/2021    ALT 29 11/24/2021    ALKPHOS 66 11/24/2021    BILITOTAL 0.5 11/24/2021    INR 1.05 11/03/2020     Echocardiogram reviewed:   Left ventricular function, chamber size, wall motion, and wall thickness are normal.The EF is 55-60%. No regional wall motion abnormalities are seen.  Right ventricular function, chamber size, wall motion, and thickness are normal.  Severe left atrial enlargement is present. Moderate mitral annular calcification is present. Mild mitral insufficiency is present. There is likley moderate aortic stenosis. The peak velocity is  3.47m/sec, the peak and mean gradients are 48mmHg and 28mmHg. The aortic valve  area is calculated low at 0.7cm2. Pulmonary artery systolic pressure is normal. The inferior vena cava was normal in size with preserved respiratory variability. No pericardial effusion is present.   Compared to prior study AS has worsened. Otherwise no significant change     Coronary angiogram 8/21/2020:    Dist LAD lesion is 70% stenosed.    Mid LAD-1 lesion is 80% stenosed.    Mid LAD-2 lesion is 60% stenosed.    Mid RCA lesion is 40% stenosed.    Prox RCA lesion is 40% stenosed.    Right sided filling pressures are normal.    Normal PA pressures.    Left sided filling pressures are normal.    Reduced cardiac output.  1. LAD - 3 EDDIE were placed in the mid to distal LAD (2.5 x 28 mm, 3.5 x 12 mm, and 3.0 x 8 mm)  2. RHC showed normal biventricular filling pressures. Normal PA pressure. Reduced cardiac output.     RHC/coronary angiogram 11/3/20:    Moderate aortic stenosis -valve area 1.2 cm2,Mean gradient 24 mm Hg    Right sided filling pressures are normal.    Normal PA pressures.    Left sided filling pressures are normal.    Mildly reduced cardiac output  level.    Peripheral angiogram done and IVUS used to assess the vessel lumens of the descending thoracic aorta,right and left external iliac arteries.     TTE 5/3/21:  Global and regional left ventricular function is normal with an EF of 55-60%.  The right ventricle is normal size. Global right ventricular function is normal.  Moderate to severe aortic stenosis, aortic valve area 1.02 cm2, peak velocity  3.3 m/s, mean gradient 25 mmHg, stroke volume index 40 ml/m2, DVI 0.29.  This study was compared with the study from 9/16/2020. The aortic valve peak velocity and mean gradient are similar    TTE 11/1/21:  Global and regional left ventricular function is normal with an EF of 55-60%.  Right ventricular function, chamber size, wall motion, and thickness are normal.  Moderate aortic valve calcification is present. The mean gradient across the aortic valve is23 mmHg. The peak aortic velocity is 3.1 m/sec. Moderate aortic stenosis is present. Calculated valve area lower than previous study due to LVOT diameter measurement.  Moderate aortic stenosis is present.  No significant changes noted.      ASSESSMENT AND PLAN:  In summary, patient is a 86 year old lady with with coronary artery disease and status post PCI 20 years ago at [facility], we do not have records unfortunately available at this point but did receive 2 stents.  She most complains of shortness of breath denies any dizziness lightheadedness syncope or recurrent episodes of chest pains.  Overall there has been minimal change in her condition since my last visit and since the invasive evaluation. I have reviewed old imaging and blood work in person including visualizing the echocardiograms. Grossly I believe the echo is pretty much unchanged from the prior one.  She still has moderate to severe aortic stenosis.  However she does not have any concerning symptoms such as chest pain dizziness lightheadedness or syncope.  As such we will continue to monitor her with  more aggressive blood pressure control.  Appreciate structural teams assistance as well.   Overall from the cardiac standpoint however she remains hypertensive and we will increase the amlodipine to 10 mg once a day dosing.  In addition I do believe she is a little bit dry and her creatinine on the most recent check was little bit elevated to 1.82 and as such we will discontinue the Lasix.  We will continue to monitor her cardiac status and repeat an echocardiogram in 6 months and I will see her back afterwards.  She will let us know immediately if there is any new symptoms that are concerning for worsening aortic stenosis including chest pain dizziness lightheadedness syncope or worsening shortness of breath.  With regards to her depression this is not necessarily new for her at least the seasonal depression.  She is still mourning her 's death and hopefully she will get better in the next couple of months.  There is no suicidal ideation or thoughts.    I appreciate the opportunity to participate in the care of Kamryn Miller . Please do not hesitate to contact me with any further questions.     Sincerely,   Madhu Zuniga MD  [facility] Division of Cardiology

## 2021-11-29 PROBLEM — R26.89 IMPAIRMENT OF BALANCE: Status: ACTIVE | Noted: 2021-11-29

## 2021-11-29 NOTE — TELEPHONE ENCOUNTER
A copy of the completed form has been sent to be added to the chart.     Mailed the completed form to [id number]    [place] [address]    Remedios Pinto,

## 2021-12-02 DIAGNOSIS — N18.30 STAGE 3 CHRONIC KIDNEY DISEASE, UNSPECIFIED WHETHER STAGE 3A OR 3B CKD (H): Primary | ICD-10-CM

## 2021-12-09 DIAGNOSIS — I25.5 ISCHEMIC CARDIOMYOPATHY: ICD-10-CM

## 2021-12-09 DIAGNOSIS — I35.0 SEVERE AORTIC STENOSIS: ICD-10-CM

## 2021-12-09 DIAGNOSIS — E78.2 MIXED HYPERLIPIDEMIA: ICD-10-CM

## 2021-12-09 DIAGNOSIS — I35.0 MODERATE AORTIC STENOSIS: ICD-10-CM

## 2021-12-09 DIAGNOSIS — I10 BENIGN ESSENTIAL HYPERTENSION: ICD-10-CM

## 2021-12-09 DIAGNOSIS — I25.10 CORONARY ARTERY DISEASE INVOLVING NATIVE CORONARY ARTERY OF NATIVE HEART WITHOUT ANGINA PECTORIS: ICD-10-CM

## 2021-12-09 RX ORDER — AMLODIPINE BESYLATE 10 MG/1
10 TABLET ORAL DAILY
Qty: 90 TABLET | Refills: 1 | Status: SHIPPED | OUTPATIENT
Start: 2021-12-09 | End: 2021-12-23

## 2021-12-09 NOTE — CONFIDENTIAL NOTE
Signed Prescriptions:                        Disp   Refills    amLODIPine (NORVASC) 10 MG tablet          90 tab*1        Sig: Take 1 tablet (10 mg) by mouth daily NOTE TABLET MG           CHANGE  Authorizing Provider: RADHA HARRIS  Ordering User: ALISA SHOOK    Rx filled per refill protocol.    Patient is on verapamil.  According to [employer], there is no drug to drug interaction.    Alisa Shook RN  Cardiology Care Coordinator  [facility]  [phone] option 1

## 2021-12-14 ENCOUNTER — OFFICE VISIT (OUTPATIENT)
Dept: OBGYN | Facility: CLINIC | Age: 86
End: 2021-12-14
Payer: MEDICARE

## 2021-12-14 VITALS
SYSTOLIC BLOOD PRESSURE: 120 MMHG | DIASTOLIC BLOOD PRESSURE: 73 MMHG | HEART RATE: 62 BPM | WEIGHT: 142.6 LBS | BODY MASS INDEX: 27.39 KG/M2 | OXYGEN SATURATION: 98 %

## 2021-12-14 DIAGNOSIS — Z79.2 NEED FOR PROPHYLACTIC ANTIBIOTIC: ICD-10-CM

## 2021-12-14 DIAGNOSIS — Z95.5 H/O HEART ARTERY STENT: ICD-10-CM

## 2021-12-14 DIAGNOSIS — Z96.643 HISTORY OF BILATERAL HIP REPLACEMENTS: ICD-10-CM

## 2021-12-14 DIAGNOSIS — R93.89 ENDOMETRIAL THICKENING ON ULTRASOUND: ICD-10-CM

## 2021-12-14 DIAGNOSIS — N95.0 POSTMENOPAUSAL BLEEDING: Primary | ICD-10-CM

## 2021-12-14 PROCEDURE — 88342 IMHCHEM/IMCYTCHM 1ST ANTB: CPT | Performed by: PATHOLOGY

## 2021-12-14 PROCEDURE — 58100 BIOPSY OF UTERUS LINING: CPT | Performed by: OBSTETRICS & GYNECOLOGY

## 2021-12-14 PROCEDURE — 88305 TISSUE EXAM BY PATHOLOGIST: CPT | Performed by: PATHOLOGY

## 2021-12-14 PROCEDURE — 99213 OFFICE O/P EST LOW 20 MIN: CPT | Mod: 25 | Performed by: OBSTETRICS & GYNECOLOGY

## 2021-12-14 RX ORDER — AMOXICILLIN 500 MG/1
TABLET, FILM COATED ORAL
Qty: 4 TABLET | Refills: 1 | Status: ON HOLD | OUTPATIENT
Start: 2021-12-14 | End: 2021-12-22

## 2021-12-14 NOTE — PROGRESS NOTES
Kamryn is an 85 year old female, .  She was referred for postmenopausal bleeding in .  We discussed the bleeding and she reports that it was old looking blood on the aydin pad.  She did not have any cramping, no clots.  She had an ultrasound and I reviewed this again with her and her daughter.  The radiologist seems to have rounded up the endometrial echo to 4 mm, and normal is considered less than 4 mm.  Due to the need for antibiotics (cardiac and hip), she was to return for the EMB.  Since that time, her   and she has been depressed (information from daughter).  The patient does not remember the visit from August.    She does remember the bleeding in August.  She reports no further episodes of bleeding.      The patient's medical history, social history and family history are reviewed and no further updates at this time.     Review of Systems:  10 point ROS of systems including Constitutional, Eyes, Respiratory, Cardiovascular, Gastroenterology, Genitourinary, Integumentary, Muscularskeletal, Psychiatric were all negative except for pertinent positives noted in my HPI and in the PMH.      Exam  /73 (BP Location: Right arm, Cuff Size: Adult Regular)   Pulse 62   Wt 64.7 kg (142 lb 9.6 oz)   SpO2 98%   BMI 27.39 kg/m    General:  WNWD female, NAD  Alert  Oriented x 3  Gait:  Normal  Skin:  Normal skin turgor  HEENT:  NC/AT, EOMI  Abdomen:  Non-tender, non-distended.  Vulva: No external lesions, normal hair distribution, no adenopathy  BUS:  Normal, no masses noted  Urethra:  No hypermobility seen  Urethral meatus:  No masses noted  Vagina: Atrophic, pink, no abnormal discharge, no lesions  Cervix: Smooth, pink, no visible lesions  Uterus: Normal size, anteverted, non-tender, mobile  Ovaries: No mass, non-tender, mobile  Perianal:  No masses noted  Extremities:  No clubbing, no cyanosis and no edema.        Assessment  Postmenopausal bleeding   Endometrial thickening on  ultrasound.  Hip replacement history  Cardiac stints      Plan  We reviewed the options for evaluation and I offered the ultrasound again, but I think that might prolong the evaluation.  She did not take the antibiotics that I prescribed previously and she does not think she picked them up.  She does not plan to go home right after this visit, but to go to her daughter's home.  I discussed the antibiotics with the daughter and sent the prescription to [facility] pharmacy and the daughter will take her mother there for the antibiotics.  Questions from patient and daughter seem to be answered.      Javier Landa MD        PROCEDURE NOTE:  The procedure was reviewed with patient.  After consenting to the procedure she was placed into the dorsal lithotomy position.  The examination was performed.  The speculum was placed into the vagina.  The cervix was prepped with Betadine.  The anterior lip of the cervix was grasped with the Allis tenaculum.  The uterus was sounded to 6.5 cm.  The Pipelle was used to sample the endometrium with 3 passes.  Minimal tissue was obtained.    Instruments were removed and the specimen was sent to pathology.  Results to the patient in 1-2 weeks when they are returned.    Javier Landa MD

## 2021-12-16 ENCOUNTER — TELEPHONE (OUTPATIENT)
Dept: BEHAVIORAL HEALTH | Facility: CLINIC | Age: 86
End: 2021-12-16

## 2021-12-16 ENCOUNTER — HOSPITAL ENCOUNTER (INPATIENT)
Facility: CLINIC | Age: 86
LOS: 7 days | Discharge: HOME | DRG: 885 | End: 2021-12-23
Attending: FAMILY MEDICINE | Admitting: PSYCHIATRY & NEUROLOGY
Payer: MEDICARE

## 2021-12-16 DIAGNOSIS — I10 BENIGN ESSENTIAL HYPERTENSION: Primary | ICD-10-CM

## 2021-12-16 DIAGNOSIS — Z11.52 ENCOUNTER FOR SCREENING LABORATORY TESTING FOR SEVERE ACUTE RESPIRATORY SYNDROME CORONAVIRUS 2 (SARS-COV-2): ICD-10-CM

## 2021-12-16 DIAGNOSIS — F31.4 BIPOLAR DISORDER, CURRENT EPISODE DEPRESSED, SEVERE, WITHOUT PSYCHOTIC FEATURES (H): ICD-10-CM

## 2021-12-16 DIAGNOSIS — F31.4 BIPOLAR 1 DISORDER, DEPRESSED, SEVERE (H): ICD-10-CM

## 2021-12-16 DIAGNOSIS — F03.90 MAJOR NEUROCOGNITIVE DISORDER (H): ICD-10-CM

## 2021-12-16 DIAGNOSIS — J45.40 MODERATE PERSISTENT ASTHMA WITHOUT COMPLICATION: ICD-10-CM

## 2021-12-16 LAB
ALBUMIN SERPL-MCNC: 3.3 G/DL (ref 3.4–5)
ALBUMIN UR-MCNC: 10 MG/DL
ALP SERPL-CCNC: 60 U/L (ref 40–150)
ALT SERPL W P-5'-P-CCNC: 29 U/L (ref 0–50)
ANION GAP SERPL CALCULATED.3IONS-SCNC: 3 MMOL/L (ref 3–14)
APPEARANCE UR: CLEAR
AST SERPL W P-5'-P-CCNC: 23 U/L (ref 0–45)
BASOPHILS # BLD AUTO: 0.1 10E3/UL (ref 0–0.2)
BASOPHILS NFR BLD AUTO: 1 %
BILIRUB SERPL-MCNC: 0.3 MG/DL (ref 0.2–1.3)
BILIRUB UR QL STRIP: NEGATIVE
BUN SERPL-MCNC: 28 MG/DL (ref 7–30)
CALCIUM SERPL-MCNC: 9.9 MG/DL (ref 8.5–10.1)
CHLORIDE BLD-SCNC: 106 MMOL/L (ref 94–109)
CO2 SERPL-SCNC: 29 MMOL/L (ref 20–32)
COLOR UR AUTO: ABNORMAL
CREAT SERPL-MCNC: 1.37 MG/DL (ref 0.52–1.04)
EOSINOPHIL # BLD AUTO: 0.1 10E3/UL (ref 0–0.7)
EOSINOPHIL NFR BLD AUTO: 2 %
ERYTHROCYTE [DISTWIDTH] IN BLOOD BY AUTOMATED COUNT: 12.1 % (ref 10–15)
GFR SERPL CREATININE-BSD FRML MDRD: 35 ML/MIN/1.73M2
GLUCOSE BLD-MCNC: 85 MG/DL (ref 70–99)
GLUCOSE UR STRIP-MCNC: NEGATIVE MG/DL
HCT VFR BLD AUTO: 42.1 % (ref 35–47)
HGB BLD-MCNC: 13.9 G/DL (ref 11.7–15.7)
HGB UR QL STRIP: ABNORMAL
IMM GRANULOCYTES # BLD: 0 10E3/UL
IMM GRANULOCYTES NFR BLD: 0 %
KETONES UR STRIP-MCNC: NEGATIVE MG/DL
LEUKOCYTE ESTERASE UR QL STRIP: ABNORMAL
LYMPHOCYTES # BLD AUTO: 1.7 10E3/UL (ref 0.8–5.3)
LYMPHOCYTES NFR BLD AUTO: 28 %
MCH RBC QN AUTO: 32.5 PG (ref 26.5–33)
MCHC RBC AUTO-ENTMCNC: 33 G/DL (ref 31.5–36.5)
MCV RBC AUTO: 98 FL (ref 78–100)
MONOCYTES # BLD AUTO: 0.6 10E3/UL (ref 0–1.3)
MONOCYTES NFR BLD AUTO: 10 %
NEUTROPHILS # BLD AUTO: 3.5 10E3/UL (ref 1.6–8.3)
NEUTROPHILS NFR BLD AUTO: 59 %
NITRATE UR QL: NEGATIVE
NRBC # BLD AUTO: 0 10E3/UL
NRBC BLD AUTO-RTO: 0 /100
PATH REPORT.COMMENTS IMP SPEC: NORMAL
PATH REPORT.FINAL DX SPEC: NORMAL
PATH REPORT.GROSS SPEC: NORMAL
PATH REPORT.MICROSCOPIC SPEC OTHER STN: NORMAL
PATH REPORT.RELEVANT HX SPEC: NORMAL
PH UR STRIP: 7 [PH] (ref 5–7)
PHOTO IMAGE: NORMAL
PLATELET # BLD AUTO: 203 10E3/UL (ref 150–450)
POTASSIUM BLD-SCNC: 3.8 MMOL/L (ref 3.4–5.3)
PROT SERPL-MCNC: 7.1 G/DL (ref 6.8–8.8)
RBC # BLD AUTO: 4.28 10E6/UL (ref 3.8–5.2)
RBC URINE: 3 /HPF
SARS-COV-2 RNA RESP QL NAA+PROBE: NEGATIVE
SODIUM SERPL-SCNC: 138 MMOL/L (ref 133–144)
SP GR UR STRIP: 1.01 (ref 1–1.03)
SQUAMOUS EPITHELIAL: 1 /HPF
UROBILINOGEN UR STRIP-MCNC: NORMAL MG/DL
WBC # BLD AUTO: 6 10E3/UL (ref 4–11)
WBC URINE: 13 /HPF

## 2021-12-16 PROCEDURE — 99285 EMERGENCY DEPT VISIT HI MDM: CPT | Mod: 25 | Performed by: FAMILY MEDICINE

## 2021-12-16 PROCEDURE — C9803 HOPD COVID-19 SPEC COLLECT: HCPCS | Performed by: FAMILY MEDICINE

## 2021-12-16 PROCEDURE — 99285 EMERGENCY DEPT VISIT HI MDM: CPT | Performed by: FAMILY MEDICINE

## 2021-12-16 PROCEDURE — 90791 PSYCH DIAGNOSTIC EVALUATION: CPT

## 2021-12-16 PROCEDURE — 80053 COMPREHEN METABOLIC PANEL: CPT | Performed by: FAMILY MEDICINE

## 2021-12-16 PROCEDURE — 124N000003 HC R&B MH SENIOR/ADOLESCENT

## 2021-12-16 PROCEDURE — 36415 COLL VENOUS BLD VENIPUNCTURE: CPT | Performed by: FAMILY MEDICINE

## 2021-12-16 PROCEDURE — U0003 INFECTIOUS AGENT DETECTION BY NUCLEIC ACID (DNA OR RNA); SEVERE ACUTE RESPIRATORY SYNDROME CORONAVIRUS 2 (SARS-COV-2) (CORONAVIRUS DISEASE [COVID-19]), AMPLIFIED PROBE TECHNIQUE, MAKING USE OF HIGH THROUGHPUT TECHNOLOGIES AS DESCRIBED BY CMS-2020-01-R: HCPCS | Performed by: FAMILY MEDICINE

## 2021-12-16 PROCEDURE — 250N000013 HC RX MED GY IP 250 OP 250 PS 637: Performed by: FAMILY MEDICINE

## 2021-12-16 PROCEDURE — 87086 URINE CULTURE/COLONY COUNT: CPT | Performed by: FAMILY MEDICINE

## 2021-12-16 PROCEDURE — 81001 URINALYSIS AUTO W/SCOPE: CPT | Performed by: FAMILY MEDICINE

## 2021-12-16 PROCEDURE — 85025 COMPLETE CBC W/AUTO DIFF WBC: CPT | Performed by: FAMILY MEDICINE

## 2021-12-16 RX ORDER — MULTIPLE VITAMINS W/ MINERALS TAB 9MG-400MCG
1 TAB ORAL DAILY
Status: DISCONTINUED | OUTPATIENT
Start: 2021-12-17 | End: 2021-12-23 | Stop reason: HOSPADM

## 2021-12-16 RX ORDER — ROSUVASTATIN CALCIUM 20 MG/1
40 TABLET, COATED ORAL DAILY
Status: DISCONTINUED | OUTPATIENT
Start: 2021-12-17 | End: 2021-12-23 | Stop reason: HOSPADM

## 2021-12-16 RX ORDER — LEVOTHYROXINE SODIUM 50 UG/1
50 TABLET ORAL DAILY
Status: DISCONTINUED | OUTPATIENT
Start: 2021-12-17 | End: 2021-12-17

## 2021-12-16 RX ORDER — METOPROLOL TARTRATE 25 MG/1
50 TABLET, FILM COATED ORAL DAILY
Status: DISCONTINUED | OUTPATIENT
Start: 2021-12-17 | End: 2021-12-22

## 2021-12-16 RX ORDER — AMOXICILLIN 250 MG
1 CAPSULE ORAL 2 TIMES DAILY PRN
Status: DISCONTINUED | OUTPATIENT
Start: 2021-12-16 | End: 2021-12-23 | Stop reason: HOSPADM

## 2021-12-16 RX ORDER — LANOLIN ALCOHOL/MO/W.PET/CERES
3 CREAM (GRAM) TOPICAL
Status: DISCONTINUED | OUTPATIENT
Start: 2021-12-16 | End: 2021-12-23 | Stop reason: HOSPADM

## 2021-12-16 RX ORDER — DESVENLAFAXINE 50 MG/1
100 TABLET, FILM COATED, EXTENDED RELEASE ORAL DAILY
Status: DISCONTINUED | OUTPATIENT
Start: 2021-12-17 | End: 2021-12-23 | Stop reason: HOSPADM

## 2021-12-16 RX ORDER — ALENDRONATE SODIUM 70 MG/1
70 TABLET ORAL
Status: DISCONTINUED | OUTPATIENT
Start: 2021-12-18 | End: 2021-12-23 | Stop reason: HOSPADM

## 2021-12-16 RX ORDER — ACETAMINOPHEN 325 MG/1
650 TABLET ORAL EVERY 4 HOURS PRN
Status: DISCONTINUED | OUTPATIENT
Start: 2021-12-16 | End: 2021-12-23 | Stop reason: HOSPADM

## 2021-12-16 RX ORDER — AMLODIPINE BESYLATE 10 MG/1
10 TABLET ORAL DAILY
Status: DISCONTINUED | OUTPATIENT
Start: 2021-12-17 | End: 2021-12-23 | Stop reason: HOSPADM

## 2021-12-16 RX ORDER — LAMOTRIGINE 25 MG/1
25 TABLET ORAL DAILY
Status: DISCONTINUED | OUTPATIENT
Start: 2021-12-17 | End: 2021-12-23 | Stop reason: HOSPADM

## 2021-12-16 RX ORDER — METOPROLOL TARTRATE 25 MG/1
25 TABLET, FILM COATED ORAL AT BEDTIME
Status: DISCONTINUED | OUTPATIENT
Start: 2021-12-16 | End: 2021-12-23 | Stop reason: HOSPADM

## 2021-12-16 RX ORDER — VERAPAMIL HYDROCHLORIDE 240 MG/1
240 TABLET, FILM COATED, EXTENDED RELEASE ORAL DAILY
Status: DISCONTINUED | OUTPATIENT
Start: 2021-12-17 | End: 2021-12-22

## 2021-12-16 RX ORDER — NITROFURANTOIN 25; 75 MG/1; MG/1
100 CAPSULE ORAL EVERY 12 HOURS SCHEDULED
Status: DISCONTINUED | OUTPATIENT
Start: 2021-12-16 | End: 2021-12-16

## 2021-12-16 RX ORDER — OLANZAPINE 2.5 MG/1
2.5 TABLET, FILM COATED ORAL 3 TIMES DAILY PRN
Status: DISCONTINUED | OUTPATIENT
Start: 2021-12-16 | End: 2021-12-23 | Stop reason: HOSPADM

## 2021-12-16 RX ORDER — HYDROXYZINE HYDROCHLORIDE 25 MG/1
25 TABLET, FILM COATED ORAL EVERY 4 HOURS PRN
Status: DISCONTINUED | OUTPATIENT
Start: 2021-12-16 | End: 2021-12-23 | Stop reason: HOSPADM

## 2021-12-16 RX ORDER — LAMOTRIGINE 200 MG/1
200 TABLET ORAL DAILY
Status: DISCONTINUED | OUTPATIENT
Start: 2021-12-17 | End: 2021-12-23 | Stop reason: HOSPADM

## 2021-12-16 RX ORDER — CLOPIDOGREL BISULFATE 75 MG/1
75 TABLET ORAL DAILY
Status: DISCONTINUED | OUTPATIENT
Start: 2021-12-17 | End: 2021-12-23 | Stop reason: HOSPADM

## 2021-12-16 RX ORDER — OLANZAPINE 10 MG/2ML
2.5 INJECTION, POWDER, FOR SOLUTION INTRAMUSCULAR 3 TIMES DAILY PRN
Status: DISCONTINUED | OUTPATIENT
Start: 2021-12-16 | End: 2021-12-23 | Stop reason: HOSPADM

## 2021-12-16 RX ORDER — AZELASTINE 1 MG/ML
1 SPRAY, METERED NASAL DAILY
Status: DISCONTINUED | OUTPATIENT
Start: 2021-12-17 | End: 2021-12-23 | Stop reason: HOSPADM

## 2021-12-16 RX ORDER — LOSARTAN POTASSIUM 100 MG/1
100 TABLET ORAL DAILY
Status: DISCONTINUED | OUTPATIENT
Start: 2021-12-17 | End: 2021-12-19

## 2021-12-16 RX ORDER — CHOLECALCIFEROL (VITAMIN D3) 1250 MCG
50000 CAPSULE ORAL WEEKLY
Status: DISCONTINUED | OUTPATIENT
Start: 2021-12-17 | End: 2021-12-23 | Stop reason: HOSPADM

## 2021-12-16 RX ORDER — MAGNESIUM HYDROXIDE/ALUMINUM HYDROXICE/SIMETHICONE 120; 1200; 1200 MG/30ML; MG/30ML; MG/30ML
30 SUSPENSION ORAL EVERY 4 HOURS PRN
Status: DISCONTINUED | OUTPATIENT
Start: 2021-12-16 | End: 2021-12-23 | Stop reason: HOSPADM

## 2021-12-16 RX ORDER — ASPIRIN 81 MG/1
81 TABLET, CHEWABLE ORAL DAILY
Status: DISCONTINUED | OUTPATIENT
Start: 2021-12-17 | End: 2021-12-17

## 2021-12-16 RX ORDER — IBUPROFEN 200 MG
950 CAPSULE ORAL DAILY
Status: DISCONTINUED | OUTPATIENT
Start: 2021-12-17 | End: 2021-12-17

## 2021-12-16 RX ADMIN — CEPHALEXIN 250 MG: 250 CAPSULE ORAL at 20:33

## 2021-12-16 ASSESSMENT — ACTIVITIES OF DAILY LIVING (ADL)
HEARING_DIFFICULTY_OR_DEAF: NO
TOILETING_ISSUES: NO
FALL_HISTORY_WITHIN_LAST_SIX_MONTHS: NO
DIFFICULTY_COMMUNICATING: NO
DOING_ERRANDS_INDEPENDENTLY_DIFFICULTY: NO
WEAR_GLASSES_OR_BLIND: YES
VISION_MANAGEMENT: GLASSES
CONCENTRATING,_REMEMBERING_OR_MAKING_DECISIONS_DIFFICULTY: NO
DIFFICULTY_EATING/SWALLOWING: NO
DRESSING/BATHING_DIFFICULTY: NO
WALKING_OR_CLIMBING_STAIRS_DIFFICULTY: NO
EQUIPMENT_CURRENTLY_USED_AT_HOME: GRAB BAR, TOILET;GRAB BAR, TUB/SHOWER

## 2021-12-16 NOTE — ED PROVIDER NOTES
ED Provider Note  [facility]      History     Chief Complaint   Patient presents with     Depression     HPI  Kamryn Miller is a 86 year old female who has a history of major depression, and bipolar disorder, which manifests mainly in a depressive phase.  Patient reports she has a longstanding history of becoming more depressed in the fall and early winter.  Currently is very depressed for several months.  Unfortunately her  of 63 years  of complications related to a brain tumor 2 months ago.  She also moved to just prior to his death from her regular home to an assisted living situation. She feels isolated there, does know a few other residents, but is not comfortable engaging with them socially. At her previous residence there was a nurse who helped her set up her medications, at the new residence she does not have that service. Patient states she has been sleeping poorly crying, feeling irritable, feeling hopeless, isolating herself.  Denies any suicidal thoughts.  She does have a therapist whom she has seen twice, and a medication provider through [facility].  Has not seen a grief counselor.  She is prescribed antidepressants, no recent changes does not use alcohol or drugs.  She has some support from her daughter who accompanies her today.  Her family encouraged her to come here for an evaluation today. Family does not feel she is functioning appropriately, she is missing meals because she stays in her room, they do not think she is taking her meds correctly, they have noticed a significant deterioration in the last month. She denies any suicidal thoughts, denies paranoia, denies any symptoms of thought disorder.  She has no acute medical complaints despite her extensive medical history which includes valvular heart disease, coronary artery disease, pulmonary hypertension, and asthma.    Past Medical History  Past Medical History:   Diagnosis Date     Aortic  valvar stenosis 7/2010    mild     Asthma      Bipolar disorder (H)      CAD (coronary artery disease)     s/p angioplasty     Celiac disease      Colon polyps      Colon polyps 2012    every 3 year colonoscopy      Depression      High cholesterol      HTN      Mitral insufficiency      Pulmonary HTN (H)     mild     Tremors 7/10    drug induced from antidepressants     Tricuspid insufficiency      Past Surgical History:   Procedure Laterality Date     ANGIOGRAM  6/26/2009     ANGIOPLASTY  9/96    for angina     ANGIOPLASTY  8/03 - 9/03    X -2 - with stenst in coronary car.     APPENDECTOMY       BREAST BIOPSY, RT/LT      Breat Biopsy RT/LT, benign     BUNIONECTOMY  11/8/2011    Procedure:BUNIONECTOMY; Left donna bunionectomy; Surgeon:FREDY LITTLEJOHN; Location:[facility] OR     BUNIONECTOMY RT/LT  5/2007    right bunion and right 2nd hammertoe     C ANESTH,BLEPHAROPLASTY      (R) for drooping eyelid     C APPENDECTOMY       CATARACT IOL, RT/LT  6/12 and 7/12    bilateral     COLONOSCOPY  2007, 2012    every 3 years for polyps     CV CORONARY ANGIOGRAM N/A 8/21/2020    Procedure: CV CORONARY ANGIOGRAM;  Surgeon: Gianluca Toscano MD;  Location: [facility]     CV LEFT HEART CATH N/A 8/21/2020    Procedure: CV LEFT HEART CATH;  Surgeon: Gianluca Toscano MD;  Location: [facility]     CV LEFT HEART CATH N/A 11/3/2020    Procedure: CV LEFT HEART CATH;  Surgeon: Tom Burrows MD;  Location: [facility]     CV LOWER EXTREMITY ANGIOGRAM BILATERAL N/A 11/3/2020    Procedure: CV ANGIOGRAM LOWER EXTREMITY BILATERAL;  Surgeon: Tom Burrows MD;  Location: [facility]     CV PCI STENT DRUG ELUTING N/A 8/21/2020    Procedure: Percutaneous Coronary Intervention Stent Drug Eluting;  Surgeon: Gianluca Toscano MD;  Location: [facility]     CV RIGHT HEART CATH MEASUREMENTS RECORDED N/A 8/21/2020    Procedure: CV RIGHT HEART CATH;  Surgeon:  Gianluca Toscano MD;  Location:  [facility]ARD[facility] CATH LAB     CV RIGHT HEART CATH MEASUREMENTS RECORDED N/A 11/3/2020    Procedure: CV RIGHT HEART CATH;  Surgeon: Tom Burrows MD;  Location:  HEART CARDIAC CATH LAB     JOINT REPLACEMENT, HIP RT/LT  4/2008    right hip replaced     JOINT REPLACEMENT, HIP RT/LT  4/2009    left hip replaced     REPAIR HAMMER TOE  11/8/2011    Procedure:REPAIR HAMMER TOE; left 2nd hammertoe repair; Surgeon:FREDY LITTLEJOHN; Location: OR     SURGICAL HISTORY OF -   11/11    left bunion and 2nd hammertoe repair     TUBAL LIGATION       alendronate (FOSAMAX) 70 MG tablet  amLODIPine (NORVASC) 10 MG tablet  amoxicillin (AMOXIL) 500 MG tablet  aspirin 81 MG tablet  azelastine (ASTEPRO) 0.15 % nasal spray  Blood Pressure Monitor KIT  Calcium Citrate (CITRACAL OR)  CHOLECALCIFEROL 47313 UNIT PO CAPS  clopidogrel (PLAVIX) 75 MG tablet  desvenlafaxine (PRISTIQ) 100 MG 24 hr tablet  fluticasone-salmeterol (ADVAIR DISKUS) 500-50 MCG/DOSE inhaler  lamoTRIgine (LAMICTAL) 25 MG tablet  LAMOTRIGINE 200 MG PO TABS  levothyroxine (SYNTHROID/LEVOTHROID) 50 MCG tablet  losartan (COZAAR) 100 MG tablet  metoprolol tartrate (LOPRESSOR) 50 MG tablet  Multiple Vitamin (MULTI-VITAMIN) per tablet  Omega-3 Fatty Acids (FISH OIL PO)  rosuvastatin (CRESTOR) 40 MG tablet  verapamil ER (CALAN-SR) 240 MG CR tablet  VIIBRYD 10 MG TABS tablet      Allergies   Allergen Reactions     Ace Inhibitors Cough     Diclofenac Other (See Comments)     Balance problems     Family History  Family History   Problem Relation Age of Onset     Asthma Mother      Cerebrovascular Disease Mother      Arthritis Mother      Depression Mother      Lipids Sister      Hypertension Sister      Heart Disease Sister      Lipids Sister      Hypertension Sister      Lipids Sister      Breast Cancer Sister      Social History   Social History     Tobacco Use     Smoking status: Never Smoker     Smokeless tobacco: Never Used    Substance Use Topics     Alcohol use: No     Alcohol/week: 0.0 standard drinks     Types: 1 Standard drinks or equivalent per week     Drug use: No      Past medical history, past surgical history, medications, allergies, family history, and social history were reviewed with the patient. No additional pertinent items.       Review of Systems  A complete review of systems was performed with pertinent positives and negatives noted in the HPI, and all other systems negative.    Physical Exam      Physical Exam  Vitals and nursing note reviewed.   Constitutional:       General: She is not in acute distress.     Appearance: She is not diaphoretic.   HENT:      Head: Atraumatic.      Mouth/Throat:      Pharynx: No oropharyngeal exudate.   Eyes:      General: No scleral icterus.     Pupils: Pupils are equal, round, and reactive to light.   Cardiovascular:      Heart sounds: Murmur (2 out of 6 systolic murmur at right upper sternal border most prominently) heard.       Pulmonary:      Effort: No respiratory distress.      Breath sounds: Normal breath sounds.   Abdominal:      General: Bowel sounds are normal.      Palpations: Abdomen is soft.      Tenderness: There is no abdominal tenderness.   Musculoskeletal:         General: No tenderness.   Skin:     General: Skin is warm.      Findings: No rash.   Psychiatric:         Attention and Perception: Attention normal.         Mood and Affect: Mood is depressed. Affect is tearful.         Speech: Speech normal.         Behavior: Behavior normal.         Thought Content: Thought content normal.         Cognition and Memory: Cognition normal.         Judgment: Judgment normal.         ED Course      Procedures       The medical record was reviewed and interpreted.  Current labs reviewed and interpreted.  Previous labs reviewed and interpreted.              No results found for any visits on 12/16/21.  Medications - No data to display     Assessments & Plan (with Medical  Decision Making)   86-year-old woman with a history of bipolar disorder and depression presenting with worsening depressive symptoms, recent context has lost her  of 64 years 2 months ago and moved to a new residence at assisted living.  Endorses sleep disturbance irritability, low energy, feelings of hopelessness, difficulty concentrating, bouts of crying, isolative behavior, difficulty functioning but denies suicidal thoughts, homicidal thoughts or symptoms of psychosis.  Per the patient her medical issues are chronic and stable.  The patient was also seen by the [facility] , please refer to their extensive note/evaluation which was reviewed with me and is documented in EPIC on 12/16/2021 for further details.  Patient appears to have severe vegetative symptoms of depression exacerbated by significant and explainable grief, change in life circumstances, some loss of social and physical supports. Patient does not feel she is able to go home as she cannot function independently. Family is concerned because she is not eating and not always taking her medications appropriately some of which are essential for her medical health. We will plan for voluntary admission. Will obtain baseline labs, labs were last obtained on November 24 and show baseline chronic kidney disease but no other significant metabolic abnormalities.    I have reviewed the nursing notes. I have reviewed the findings, diagnosis, plan and need for follow up with the patient.    New Prescriptions    No medications on file       Final diagnoses:   Bipolar 1 disorder, depressed, severe (H)       --  Paxton Rene MD  [facility] EMERGENCY DEPARTMENT  12/16/2021     Paxton Rene MD  12/16/21 [id number]

## 2021-12-16 NOTE — TELEPHONE ENCOUNTER
TIMOTHY Gandhi with DEC called to give clinical on 86/F in [facility]    B Feeling more depressed, anxious, find it hard to function at [facility] facility. Lost  recently. Going through grief. Hard time sleeping, not eating well loss of appetite, psych meds not working well. Can't connect with residents and staff at facility. No thoughts of SI or HI. But still having a difficult time functioning. No prev [facility]. No SIB. No aggression. No joseph or psychosis. Patient not getting better despite having staff in place. Light therapy in place for SAD but also not helping. RX Lamictal, all at maximum dose. Ambulatory, eating, drinking. No substance concerns. Medically clear.    A Vol     R In [facility] awaiting placement.   Patient cleared and ready for behavioral bed placement: Yes     7:45pm: Paged oncall provider to present for 3b/[facility]  8:15pm: On call provider accepts for 3B/[facility]  8:25pm: Intake called unit charge nurse for patient placement, charge nurse unavailable, will call back.   8:56pm: Intake informed unit charge nurse of patient placement and report time. Charge nurse reports ER has called the unit and will call again for report.

## 2021-12-16 NOTE — ED NOTES
"12/16/2021  Kamryn Miller 1935     [facility] Crisis Assessment    Patient was assessed: remote  Patient location: [facility]    Referral Data and Chief Complaint  Kamryn Miller is a 86 year old who uses she/her pronouns. Patient presented to the ED with family/friends and was referred to the ED by self. Patient is presenting to the ED for the following concerns: worsening of depression, anxiety, isolation and declining in daily functioning.  Pt has a history of depression and anxiety.  Pt also has a history of Seasonal Affective Disorder as her depression get worst in late fall and early winter time.  Pt was brought to the ER today by her adult daughter due to worsening of depression, anxiety, isolation and declining in daily functioning.  Pt remarked, \"I felt I needed help, I have been very depressed and with other things.\" as her reason for visiting the ER today.  Pt endorsed increased depression, anhedonia, lethargy and apathy.  Pt noted her depression and anxiety were particularly worse for the past month.  Pt reported having poor sleep and disrupted appetite.  Pt denied having acute psychosis and joseph.  Pt denied having suicidal and homicidal ideations.  Pt identified losing her  who passed away 2.5 months ago due to medical issues, having recent medical concern, being lonely, and struggling with her SAD as stressors leading to her current mental health crisis.      Informed Consent and Assessment Methods    Patient is her own guardian. Writer met with patient and explained the crisis assessment process, including applicable information disclosures and limits to confidentiality, assessed understanding of the process, and obtained consent to proceed with the assessment. Patient was observed to be able to participate in the assessment as evidenced by alert, calm, oriented, engaged and cooperative. Assessment methods included conducting a formal interview with patient, review of medical records, " collaboration with medical staff, and obtaining relevant collateral information from family and community providers when available.    Narrative Summary of Presenting Problem and Current Functioning  What led to the patient presenting for crisis services, factors that make the crisis life threatening or complex, stressors, how is this disrupting the patient's life, and how current functioning is in comparison to baseline.  How is patient presenting during the assessment.     [facility].  Pt has a history of depression and anxiety for many years.  Pt came to the ER today due to worsening of depression, anxiety, isolation and declining in daily functioning for the past month.  Pt had reached out to her adult daughter today asking for help.  Pt reported her  passed away 2.5 months ago due to medical issues, difficulty adjusting at her assisted living facility, feel more lonely and depressed as she could not manage her mental health symptoms.    History of the Crisis  Duration of the current crisis, coping skills attempted to reduce the crisis, community resources used, and past presentations.    Pt reported her depression and anxiety have been worst for the past month.  Pt has a history of SAD as her depression get worse during winter time.  Pt said she has been using light therapy but felt it was not effective.  Pt has been living at [facility] for the past 3 months but felt lonely and disengaged from the program due to worsening of her depression, anxiety, grief and loss issues.  Pt identified herself as Scientologist as she had reached out to the [facility]  for support.  Pt also has her new outpatient therapist but has seen her 2x so far.  Pt has been taking her psychiatric medications consistently but felt they were not effective.  Pt has limited coping skills and felt her current support system was not enough to help her as she felt nothing  "was working for her at this time.    Collateral Information  Writer met and spoke with Pt's adult daughter, Sarah Lundberg [phone] who was present in the [facility].  Sarah reported Pt called her this morning to say she could not go on anymore as her depression was getting worse.  Pt told Sarah she did not make it to the dining room to eat, did not know what to do and nothing was working.  Pt remarked, \"I feel like end of life.\"    Risk Assessment    Risk of Harm to Self     ESS-6  1.a. Over the past 2 weeks, have you had thoughts of killing yourself? No  1.b. Have you ever attempted to kill yourself and, if yes, when did this last happen? No   2. Recent or current suicide plan? No   3. Recent or current intent to act on ideation? No  4. Lifetime psychiatric hospitalization? No  5. Pattern of excessive substance use? No  6. Current irritability, agitation, or aggression? No  Scoring note: BOTH 1a and 1b must be yes for it to score 1 point, if both are not yes it is zero. All others are 1 point per number. If all questions 1a/1b - 6 are no, risk is negligible. If one of 1a/1b is yes, then risk is mild. If either question 2 or 3, but not both, is yes, then risk is automatically moderate regardless of total score. If both 2 and 3 are yes, risk is automatically high regardless of total score.     Score: 0, negligible risk    The patient has the following risk factors for suicide: depressive symptoms, health stressors, isolation, poor decision making, significant behavioral changes, recent loss and restless/agitated    Is the patient experiencing current suicidal ideation: No    Is the patient engaging in preparatory suicide behaviors (formulating how to act on plan, giving away possessions, saying goodbye, displaying dramatic behavior changes, etc)? No    Does the patient have access to firearms or other lethal means? no    The patient has the following protective factors: voluntarily seeking mental health support, " displays resiliency , established relationship community mental health provider(s), karel system, expresses desire to engage in treatment and safe/stable housing    Support system information: Pt identified her adult children, family, friends and Confucianism  as positive supports.    Patient strengths: motivation to seek help, established outpatient mental health service providers, [facility] staff support and resiliency.    Does the patient engage in non-suicidal self-injurious behavior (NSSI/SIB)? no    Is the patient vulnerable to sexual exploitation?  No    Is the patient experiencing abuse or neglect? no    Is the patient a vulnerable adult? Yes      Risk of Harm to Others  The patient has to following risk factors of harm to others: no risk factors identified    Does the patient have thoughts of harming others? No    Is the patient engaging in sexually inappropriate behavior?  no       Current Substance Abuse    Is there recent substance abuse? no    Was a urine drug screen or blood alcohol level obtained: No      Current Symptoms/Concerns    Symptoms  Attention, hyperactivity, and impulsivity symptoms present: Yes: Restless    Anxiety symptoms present: Yes: Generalized Symptoms: Cognitive anxiety - feelings of doom, racing thoughts, difficulty concentrating  and Excessive worry      Appetite symptoms present: Yes: Loss of Appetite     Behavioral difficulties present: Yes: Apathy and Withdrawal/Isolation     Cognitive impairment symptoms present: Yes: Decision-Making and Judgment/Insight    Depressive symptoms present: Yes Appetite change/weight change , Crying or feels like crying, Depressed mood, Feelings of helplessness , Feelings of hopelessness , Feelings of worthlessness , Impaired decision making , Isolative , Loss of interest / Anhedonia  and Sleep disturbance      Eating disorder symptoms present: No    Learning disabilities, cognitive challenges, and/or developmental disorder symptoms present: No  "    Manic/hypomanic symptoms present: No    Personality and interpersonal functioning difficulties present : Yes: Emotional Deregulation and Impaired Interpersonal Functioning    Psychosis symptoms present: No      Sleep difficulties present: Yes: Difficulty falling asleep  and Difficulty staying sleep     Substance abuse disorder symptoms present: No     Trauma and stressor related symptoms present: No     Mental Status Exam   Affect: Constricted   Appearance: Appropriate    Attention Span/Concentration: Attentive?    Eye Contact: Engaged and Variable   Fund of Knowledge: Appropriate    Language /Speech Content: Fluent   Language /Speech Volume: Normal    Language /Speech Rate/Productions: Normal    Recent Memory: Variable   Remote Memory: Variable   Mood: Anxious, Apathetic, Depressed and Sad    Orientation to Person: Yes    Orientation to Place: Yes   Orientation to Time of Day: Yes    Orientation to Date: Yes    Situation (Do they understand why they are here?): Yes and Answer: Pt remarked, \"I felt I needed help, I have been very depressed and with other things.\" as her reason for visiting the ER today.     Psychomotor Behavior: Normal    Thought Content: Clear   Thought Form: Intact       Mini-Cog Assessment  Instructions:  1. Ask the patient to listen carefully and remember three unrelated words (ex. Table, apple, harrisno).  2. Ask the patient to draw a clock: first the Grayling, then the numbers, then the hands at a specific time (ex. 11:10am).  3. Ask the patient to repeat the three words.    Number of Words Recalled: 2  Clock-Drawing Test: 0 (Abnormal)   Mini-Cog Total Score: 2  Details: Pt was not able to recall the 3rd word.  Pt was able to draw a clock, but missed number 10 and 11 on the clock.        Mental Health and Substance Abuse History    History  Current and historical diagnoses or mental health concerns: Pt has a history of depression, anxiety and SAD.  Pt's current mental health symptom " presentation seemed consistent with her mental health diagnosis history.    Prior MH services (inpatient, programmatic care, outpatient, etc) : No    History of substance abuse: No    Prior ZUHAIR services (inpatient, programmatic care, detox, outpatient, etc) : No    History of commitment: No    Family history of MH/ZUHAIR: Yes Pt identified her mom had depression.    Trauma history: No    Medication  Psychotropic medications: Yes. Pt is currently taking Pristiq, Lamictal and Vibryd for psychiatric medications.. Medication compliant: Yes. Recent medication changes: No    Current Care Team  Primary Care Provider: Yes. Name: Rolanda Wilcox MD. Location: [facility]. Date of last visit: Unknown. Frequency: Unknown. Perceived helpfulness: Unknown.    Psychiatrist: Yes. Name: Lizeth Pat. Location: [facility]. Date of last visit: Unknown. Frequency: Unknown. Perceived helpfulness: Unknown.    Therapist: Yes. Name: Anusha. Location: [facility]. Date of last visit: 2 weeks ago. Frequency: 1x weekly. Perceived helpfulness: not helpful .    : No    CTSS or ARMHS: No    ACT Team: No    Other: Yes. Name: Pt has been living at [facility] for the past 3 months.. Location: Unknown. Date of last visit: N/A. Frequency: N/A. Perceived helpfulness: not helpful.    Release of Information  Was a release of information signed: Yes. Providers included on the release: outpatient service providers.      Biopsychosocial Information    Socioeconomic Information  Current living situation: Pt has been living at [facility] for the past 3 months.    Employment/income source: disability    Relevant legal issues: none reported.    Cultural, Congregational, or spiritual influences on mental health care: Pt identified herself as Hoahaoism as she attended [employer].    Is the patient active in the  or a : No      Relevant Medical Concerns   Patient identifies concerns with completing  ADLs? No     Patient can ambulate independently? Yes     Other medical concerns? Yes and Pt reported a history of heart and kidney issues.  Pt reported she has been having spotting issues as she had completed her biopsy 2 days ago but did not get the results yet.     History of concussion or TBI? No        Diagnosis    296.33 (F33.2) Major Depressive Disorder, Recurrent Episode, Severe _, With mixed features and With seasonal pattern - provisional      300.02 (F41.1) Generalized Anxiety Disorder - provisional      Other Unspecified and Specified Bipolar and Related Disorder 296.80 (F31.9) Unspecified Bipolar and Related Disorder - by history       Therapeutic Intervention  The following therapeutic methodologies were employed when working with the patient: establishing rapport, active listening, assessing dimensions of crisis, solution focused brief therapy, identifying additional supports and alternative coping skills, psychoeducation, motivational interviewing, brief supportive therapy and trauma informed care. Patient response to intervention: receptive.      Disposition  Recommended disposition: Inpatient Mental Health      Reviewed case and recommendations with attending provider. Attending Name: Paxton Rene MD    Attending concurs with disposition: Yes      Patient concurs with disposition: Yes      Guardian concurs with disposition: NA     Final disposition: Inpatient mental health .     Clinical substantiation and rationale for the disposition: Pt has worsening of her depression, anxiety, isolation and impaired daily functioning.  Despite Pt having her professional outpatient mental health service providers in place and [facility] staff support, Pt was not able to demonstrate her ability to use coping skills and current support system to manage her mental health crisis.  Pt has limited coping skills, feeling overwhelmed and helpless for the past month.  Pt also has complicated grief and loss issues as her   passed away 2.5 months ago.  Pt was not able to develop her DEC Aftercare plan as she was appropriate for geriatric psychiatric inpatient service for further stabilization and medication management.      Inpatient Details (if applicable):  Is patient admitted voluntarily:Yes    Patient aware of potential for transfer if there is not appropriate placement? Yes     Patient is willing to travel outside of the [facility] for placement? No      Behavioral Intake Notified? Yes: Date: 12/16/2021 Time: 5:30pm.       Clinical Substantiation of Recommendations   Rationale with supporting factors for disposition and diagnosis.     Pt has significant decline in her daily functioning at her assisted living facility as she has not been able to participate in any program or activities due to worsening of her mental health symptoms.  Despite having professional support system in place, Pt was not able to develop her DEC Aftercare plan.  Pt reported feeling more depressed, anxious and overwhelmed as nothing was helping her and she did not know what to do to get better.  Pt reported her current psychiatric medications, coping skills, light therapy, and outpatient therapy were not working for her.  Pt was appropriate for geriatric psychiatric inpatient service for further stabilization.         Assessment Details  Patient interview started at: 4pm and completed at: 4:45pm.    Total duration spent on the patient case in minutes: 2.75 hrs     CPT code(s) utilized: 59299 - Psychotherapy for Crisis - 60 (30-74*) min         ANTHONY Flores

## 2021-12-16 NOTE — ED NOTES
Pt states that she has seasonal depression since her 30's and has recently lost her , they were close. Denies SI

## 2021-12-16 NOTE — ED NOTES
Kamryn Miller  December 16, 2021    SAFE Note    Critical Safety Issues: Pt is a 86 year old White female with a history of depression and anxiety.  Pt also has a history of Seasonal Affective Disorder as her depression get worst in late fall and early winter time.  Pt was brought to the ER today by her adult daughter due to worsening of depression, anxiety, isolation and declining in daily functioning.  Pt endorsed increased depression, anhedonia, lethargy and apathy.  Pt reported having poor sleep and disrupted appetite.  Pt denied having acute psychosis and joseph.  Pt denied having suicidal and homicidal ideations.  Pt identified losing her  who passed away 2.5 months ago due to medical issues, having recent medical concern, being lonely, and struggling with her SAD as stressors leading to her current mental health crisis.  Pt has significant decline in her daily functioning at her assisted living facility as she has not been able to participate in any program or activities due to worsening of her mental health symptoms.  Despite having professional support system in place, Pt was not able to develop her DEC Aftercare plan.  Pt was appropriate for geriatric psychiatric inpatient service for further stabilization.      Current Suicidal Ideation/Self-Injurious Concerns/Methods: None - N/A      Current or Historical Inappropriate Sexual Behavior: Unknown      Current or Historical Aggression/Homicidal Ideation: None - N/A      Triggers: Pt identified losing her  who passed away 2.5 months ago due to medical issues, having recent medical concern, being lonely, and struggling with her SAD as stressors leading to her current mental health crisis.     Updated care team: Yes: Paxton Rene MD and Central intake service.    For additional details see full LM assessment.       ANTHONY Flores

## 2021-12-17 LAB
BACTERIA UR CULT: NORMAL
CHOLEST SERPL-MCNC: 172 MG/DL
DEPRECATED CALCIDIOL+CALCIFEROL SERPL-MC: 57 UG/L (ref 20–75)
FOLATE SERPL-MCNC: 60.5 NG/ML
HDLC SERPL-MCNC: 61 MG/DL
LDLC SERPL CALC-MCNC: 84 MG/DL
NONHDLC SERPL-MCNC: 111 MG/DL
TRIGL SERPL-MCNC: 137 MG/DL
TSH SERPL DL<=0.005 MIU/L-ACNC: 3.73 MU/L (ref 0.4–4)
VIT B12 SERPL-MCNC: 966 PG/ML (ref 193–986)

## 2021-12-17 PROCEDURE — 36415 COLL VENOUS BLD VENIPUNCTURE: CPT | Performed by: NURSE PRACTITIONER

## 2021-12-17 PROCEDURE — 82306 VITAMIN D 25 HYDROXY: CPT | Performed by: PSYCHIATRY & NEUROLOGY

## 2021-12-17 PROCEDURE — 90853 GROUP PSYCHOTHERAPY: CPT

## 2021-12-17 PROCEDURE — 124N000003 HC R&B MH SENIOR/ADOLESCENT

## 2021-12-17 PROCEDURE — 250N000013 HC RX MED GY IP 250 OP 250 PS 637: Performed by: NURSE PRACTITIONER

## 2021-12-17 PROCEDURE — 99207 PR CONSULT E&M CHANGED TO INITIAL LEVEL: CPT | Performed by: PHYSICIAN ASSISTANT

## 2021-12-17 PROCEDURE — 80061 LIPID PANEL: CPT | Performed by: NURSE PRACTITIONER

## 2021-12-17 PROCEDURE — 82607 VITAMIN B-12: CPT | Performed by: NURSE PRACTITIONER

## 2021-12-17 PROCEDURE — 84443 ASSAY THYROID STIM HORMONE: CPT | Performed by: NURSE PRACTITIONER

## 2021-12-17 PROCEDURE — 250N000013 HC RX MED GY IP 250 OP 250 PS 637: Performed by: PSYCHIATRY & NEUROLOGY

## 2021-12-17 PROCEDURE — 99221 1ST HOSP IP/OBS SF/LOW 40: CPT | Performed by: PHYSICIAN ASSISTANT

## 2021-12-17 PROCEDURE — H2032 ACTIVITY THERAPY, PER 15 MIN: HCPCS

## 2021-12-17 PROCEDURE — 82746 ASSAY OF FOLIC ACID SERUM: CPT | Performed by: NURSE PRACTITIONER

## 2021-12-17 RX ORDER — IBUPROFEN 200 MG
950 CAPSULE ORAL AT BEDTIME
Status: DISCONTINUED | OUTPATIENT
Start: 2021-12-18 | End: 2021-12-23 | Stop reason: HOSPADM

## 2021-12-17 RX ORDER — LIOTHYRONINE SODIUM 5 UG/1
10 TABLET ORAL DAILY
Status: DISCONTINUED | OUTPATIENT
Start: 2021-12-17 | End: 2021-12-23 | Stop reason: HOSPADM

## 2021-12-17 RX ORDER — LEVOTHYROXINE SODIUM 50 UG/1
50 TABLET ORAL
Status: DISCONTINUED | OUTPATIENT
Start: 2021-12-18 | End: 2021-12-23 | Stop reason: HOSPADM

## 2021-12-17 RX ORDER — ALBUTEROL SULFATE 90 UG/1
2 AEROSOL, METERED RESPIRATORY (INHALATION) EVERY 6 HOURS PRN
Status: DISCONTINUED | OUTPATIENT
Start: 2021-12-17 | End: 2021-12-23 | Stop reason: HOSPADM

## 2021-12-17 RX ORDER — MIRTAZAPINE 7.5 MG/1
7.5 TABLET, FILM COATED ORAL AT BEDTIME
Status: DISCONTINUED | OUTPATIENT
Start: 2021-12-17 | End: 2021-12-23 | Stop reason: HOSPADM

## 2021-12-17 RX ORDER — ASPIRIN 81 MG/1
81 TABLET, CHEWABLE ORAL AT BEDTIME
Status: DISCONTINUED | OUTPATIENT
Start: 2021-12-18 | End: 2021-12-23 | Stop reason: HOSPADM

## 2021-12-17 RX ADMIN — MULTIPLE VITAMINS W/ MINERALS TAB 1 TABLET: TAB at 08:53

## 2021-12-17 RX ADMIN — METOPROLOL TARTRATE 25 MG: 25 TABLET, FILM COATED ORAL at 20:16

## 2021-12-17 RX ADMIN — LIOTHYRONINE SODIUM 10 MCG: 5 TABLET ORAL at 15:00

## 2021-12-17 RX ADMIN — ROSUVASTATIN CALCIUM 40 MG: 20 TABLET, FILM COATED ORAL at 08:52

## 2021-12-17 RX ADMIN — AZELASTINE HYDROCHLORIDE 1 SPRAY: 137 SPRAY, METERED NASAL at 08:54

## 2021-12-17 RX ADMIN — METOPROLOL TARTRATE 25 MG: 25 TABLET, FILM COATED ORAL at 00:33

## 2021-12-17 RX ADMIN — DESVENLAFAXINE SUCCINATE 100 MG: 50 TABLET, EXTENDED RELEASE ORAL at 08:53

## 2021-12-17 RX ADMIN — LAMOTRIGINE 200 MG: 200 TABLET ORAL at 08:52

## 2021-12-17 RX ADMIN — CHOLECALCIFEROL CAP 1.25 MG (50000 UNIT) 50000 UNITS: 1.25 CAP at 08:53

## 2021-12-17 RX ADMIN — Medication 950 MG: at 08:53

## 2021-12-17 RX ADMIN — CEPHALEXIN 250 MG: 250 CAPSULE ORAL at 20:16

## 2021-12-17 RX ADMIN — FLUTICASONE FUROATE AND VILANTEROL TRIFENATATE 1 PUFF: 200; 25 POWDER RESPIRATORY (INHALATION) at 08:54

## 2021-12-17 RX ADMIN — METOPROLOL TARTRATE 50 MG: 25 TABLET, FILM COATED ORAL at 08:53

## 2021-12-17 RX ADMIN — CEPHALEXIN 250 MG: 250 CAPSULE ORAL at 08:54

## 2021-12-17 RX ADMIN — AMLODIPINE BESYLATE 10 MG: 10 TABLET ORAL at 08:52

## 2021-12-17 RX ADMIN — CLOPIDOGREL BISULFATE 75 MG: 75 TABLET, FILM COATED ORAL at 08:53

## 2021-12-17 RX ADMIN — LOSARTAN POTASSIUM 100 MG: 100 TABLET, FILM COATED ORAL at 08:53

## 2021-12-17 RX ADMIN — LAMOTRIGINE 25 MG: 25 TABLET ORAL at 08:53

## 2021-12-17 RX ADMIN — ASPIRIN 81 MG CHEWABLE TABLET 81 MG: 81 TABLET CHEWABLE at 08:53

## 2021-12-17 RX ADMIN — MIRTAZAPINE 7.5 MG: 7.5 TABLET, FILM COATED ORAL at 20:16

## 2021-12-17 RX ADMIN — LEVOTHYROXINE SODIUM 50 MCG: 50 TABLET ORAL at 08:53

## 2021-12-17 RX ADMIN — VERAPAMIL HYDROCHLORIDE 240 MG: 240 TABLET ORAL at 08:51

## 2021-12-17 ASSESSMENT — ACTIVITIES OF DAILY LIVING (ADL)
LAUNDRY: WITH SUPERVISION
ORAL_HYGIENE: INDEPENDENT
DRESS: SCRUBS (BEHAVIORAL HEALTH);STREET CLOTHES
DRESS: INDEPENDENT
HYGIENE/GROOMING: INDEPENDENT
ORAL_HYGIENE: INDEPENDENT
HYGIENE/GROOMING: INDEPENDENT

## 2021-12-17 NOTE — ED NOTES
Patient was seen and evaluated by Dr. Allan Rene please refer to his extensive documentation it was noted that her urinalysis did come back positive for suspicion of UTI at this time after further discussion with the pharmacist in light of patient's chronic kidney disease we opted not to use Macrobid or Cipro and will treat with a reduced dose of Keflex 250 mg twice daily and await culture results within the next 2 days.     Fredis Blum MD  12/16/21 1917

## 2021-12-17 NOTE — PROGRESS NOTES
12/16/21 2062   Patient Belongings   Did you bring any home meds/supplements to the hospital?  No   Patient Belongings locker   Patient Belongings Put in Hospital Secure Location (Security or Locker, etc.) shoes;clothing   Belongings Search Yes   Clothing Search Yes   Second Staff Maggy     1- Winter jacket, 1- pair of gloves, 1- Red long sleeve shirt, 1-sweatshirt, 1-pair jeans,1-pair of socks, 1-bra, 1-pair of sandles, 1- pair of tennis shoes and 1-water bottle and 1 pack of gum.     Nothing in Security                      Admission:  I am responsible for any personal items that are not sent to the safe or pharmacy.  [facility] is not responsible for loss, theft or damage of any property in my possession.    Signature:  _________________________________ Date: _______  Time: _____                                              Staff Signature:  ____________________________ Date: ________  Time: _____      2nd Staff person, if patient is unable/unwilling to sign:    Signature: ________________________________ Date: ________  Time: _____     Discharge:  [facility] has returned all of my personal belongings:    Signature: _________________________________ Date: ________  Time: _____                                          Staff Signature:  ____________________________ Date: ________  Time: _____

## 2021-12-17 NOTE — PROGRESS NOTES
"  ADMISSION SUMMARY    Kamryn Miller is a 86 year old female admitted from [place] ED to [facility] due to being \"very depressed\". Pt is voluntary.     Pt states this is her first inpatient hospitalization. Pt has out patient provider for medication management. history of major depression, and bipolar disorder, which manifests mainly in a depressive phase. Medical Hx significant for valvular heart disease, coronary artery disease, pulmonary hypertension, and asthma.  VS: /76, P 60, R 16, O2 96%, T 97.7  Pt is alert and oriented to person, place and (year/month). Pt endorsed being very depressed and \"a little anxious\". Affect is sad, mood depressed. Pt ambulates independently with no assistive device with a slow but steady gait. Pt denies SI/SIB/HI or any forms of hallucinations at this time.     "

## 2021-12-17 NOTE — PLAN OF CARE
Problem: Sleep Disturbance (Anxiety Signs/Symptoms)  Goal: Improved Sleep (Anxiety Signs/Symptoms)  Outcome: No Change     Patient with uninterrupted sleep except for her medication due close to midnight. She slept a total of 7.5 hours throughout the night. Nothing unusual noted.

## 2021-12-17 NOTE — PLAN OF CARE
Initial Psychosocial Assessment    I have reviewed the chart, met with the patient, and developed Care Plan.  Information for assessment was obtained from patient and chart notes.     Presenting Problem:  Patient was admitted on a voluntary basis with worsening depression in the context of Seasonal Affective Disorder, recent death of spouse and recent move to Assisted Living.      History of Mental Health and Chemical Dependency:  Patient has a history of MDD; anxiety; bipolar; SAD.  This is her first mental health admission.    Family Description (Constellation, Family Psychiatric History):  Patient is  with 2 adult children and grandchildren.  Mother with depression.    Significant Life Events (Illness, Abuse, Trauma, Death):  No childhood abuse or trauma.    Recent death of spouse    Living Situation:  [facility]    Educational Background:  Bachelor's degree in Home Economics education    Occupational History:  Patient worked at [employer] and also held other jobs in her career.  She is now retired.    Financial Status:  Stable     Legal Issues:  None     Ethnic/Cultural Issues:  Patient is     Spiritual Orientation:  Patient is Confucianism and attends [employer].     Service History:  None     Social Functioning (organization, interests):  Daughter and son involved and supportive    Current Treatment Providers are:  PCP is Rolanda Wilcox at [facility]   Medication Mgmt is with Lizeth Pat at [facility]  Therapist is Naomi, also at [facility].  Next appointment 12/29.    Social Service Assessment/Plan:  Patient has been seen by the psychiatric provider.  Medication changes are in process. Patient will meet with the treatment team on Monday to further coordinate plan of care. CTC available to assist as needed to ensure that appropriate aftercare is in place prior to discharge.

## 2021-12-17 NOTE — PLAN OF CARE
Problem: Depression  Goal: Improved Mood  Outcome: Improving   Nursing Assessment    Bipolar 1 disorder, depressed, severe (H) [F31.4]    Admit Date: 12/16/2021    Length of Stay: 1    Patient evaluation continues. Assessed mood,anxiety,thoughts and behavior. Patient is  progressing towards goals. Feels more relaxed and glad she is getting help. Patient is encouraged to participate in groups and assisted to develop healthy coping skills.  Patient denies auditory or visual hallucinations. /74   Pulse 54   Temp 98.5  F (36.9  C) (Oral)   Resp 16   SpO2 100%     Mood: ok    Patient reports depression 6/10  and reports anxiety 4/10    Affect:flat but brightens upon approach      Sleep: 7 hours last night    Appetite: good    SI: denies    HI: denies    SIB: denies      Medication Compliance medication complaint    Group participation: attends and participates    ADL's: independent    Fall risk interventions: proper foot wear    Mkie Score Interventions:none    Discharge planning none yet    Refer to daily team meeting notes for individualized plan of care. Nursing will continue to assess.    *Scale is 1-10 and 10 is the worst.

## 2021-12-17 NOTE — H&P
86 year old woman admitted for increased bipolar depression  From the ER:  Chief Complaint   Patient presents with     Depression      HPI  Kamryn Miller is a 86 year old female who has a history of major depression, and bipolar disorder, which manifests mainly in a depressive phase.  Patient reports she has a longstanding history of becoming more depressed in the fall and early winter.  Currently is very depressed for several months.  Unfortunately her  of 63 years  of complications related to a brain tumor 2 months ago.  She also moved to just prior to his death from her regular home to an assisted living situation. She feels isolated there, does know a few other residents, but is not comfortable engaging with them socially. At her previous residence there was a nurse who helped her set up her medications, at the new residence she does not have that service. Patient states she has been sleeping poorly crying, feeling irritable, feeling hopeless, isolating herself.  Denies any suicidal thoughts.  She does have a therapist whom she has seen twice, and a medication provider through [facility].  Has not seen a grief counselor.  She is prescribed antidepressants, no recent changes does not use alcohol or drugs.  She has some support from her daughter who accompanies her today.  Her family encouraged her to come here for an evaluation today. Family does not feel she is functioning appropriately, she is missing meals because she stays in her room, they do not think she is taking her meds correctly, they have noticed a significant deterioration in the last month. She denies any suicidal thoughts, denies paranoia, denies any symptoms of thought disorder.  She has no acute medical complaints despite her extensive medical history which includes valvular heart disease, coronary artery disease, pulmonary hypertension, and asthma.     : Blood pressure 131/74, pulse 54, temperature 98.5  F (36.9   C), temperature source Oral, resp. rate 16, SpO2 100 %.    12/17: General appearance: fair.  She describes loss of motivation.  Alert.   Affect: sad  Mood:depressed  Speech:  Some latency  Eye contact:  good.    Psychomotor behavior: normal  Gait: slow but steady  Abnormal movements: none  Delusions: none  Hallucinations:  none  Thoughts: logical  Associations: intact  Judgement: good  Insight: good  Cognitions: intact in conversation; on the Minicog she remembered two out of 3 words, and could describe how the clock hands should be but not draw it correctly.  Memory:  intact in conversation  Orientation: normal    Not suicidal.    No alcohol or drug use    Past meds include Prozac, Viibryd, Zoloft, Paxil, Lithium, wellbutrin      Imp: Bipolar depressed, and  Patient Active Problem List   Diagnosis     Hyperlipidemia LDL goal <70     Mild major depression (H)     Pulmonary hypertension (H)     Mild persistent asthma     Seasonal allergic rhinitis     Mitral insufficiency     Tricuspid insufficiency     Bipolar disorder (H)     Renal insufficiency     Tremors     Advanced directives, counseling/discussion     Family history of diabetes mellitus     Family history of celiac disease     Celiac disease     History of colonic polyps     Benign essential hypertension     Hypothyroidism, unspecified type     CKD (chronic kidney disease) stage 3, GFR 30-59 ml/min (H)     Severe aortic stenosis     Other ill-defined heart diseases     Anemia     Disorder of kidney and ureter     Generalized anxiety disorder     Osteopenia     CAD S/P percutaneous coronary angioplasty     Status post coronary angiogram     NYHA class 3 heart failure with preserved ejection fraction (H)     Ischemic cardiomyopathy     Mixed hyperlipidemia     Moderate persistent asthma without complication     Hearing disorder, unspecified laterality     Impairment of balance     Bipolar 1 disorder, depressed, severe (H)     Plan: augment with Cytomel, and  add Apple    Also from the ER:  Physical Exam   Physical Exam  Vitals and nursing note reviewed.   Constitutional:       General: She is not in acute distress.     Appearance: She is not diaphoretic.   HENT:      Head: Atraumatic.      Mouth/Throat:      Pharynx: No oropharyngeal exudate.   Eyes:      General: No scleral icterus.     Pupils: Pupils are equal, round, and reactive to light.   Cardiovascular:      Heart sounds: Murmur (2 out of 6 systolic murmur at right upper sternal border most prominently) heard.        Pulmonary:      Effort: No respiratory distress.      Breath sounds: Normal breath sounds.   Abdominal:      General: Bowel sounds are normal.      Palpations: Abdomen is soft.      Tenderness: There is no abdominal tenderness.   Musculoskeletal:         General: No tenderness.   Skin:     General: Skin is warm.      Findings: No rash.   Psychiatric:         Attention and Perception: Attention normal.         Mood and Affect: Mood is depressed. Affect is tearful.         Speech: Speech normal.         Behavior: Behavior normal.         Thought Content: Thought content normal.         Cognition and Memory: Cognition normal.         Judgment: Judgment normal.            ED Course   Procedures       The medical record was reviewed and interpreted.  Current labs reviewed and interpreted.  Previous labs reviewed and interpreted.           No results found for any visits on 12/16/21.  Medications - No data to display  Assessments & Plan (with Medical Decision Making)   86-year-old woman with a history of bipolar disorder and depression presenting with worsening depressive symptoms, recent context has lost her  of 64 years 2 months ago and moved to a new residence at assisted living.  Endorses sleep disturbance irritability, low energy, feelings of hopelessness, difficulty concentrating, bouts of crying, isolative behavior, difficulty functioning but denies suicidal thoughts, homicidal thoughts or  symptoms of psychosis.  Per the patient her medical issues are chronic and stable.  The patient was also seen by the [facility] , please refer to their extensive note/evaluation which was reviewed with me and is documented in EPIC on 12/16/2021 for further details.  Patient appears to have severe vegetative symptoms of depression exacerbated by significant and explainable grief, change in life circumstances, some loss of social and physical supports. Patient does not feel she is able to go home as she cannot function independently. Family is concerned because she is not eating and not always taking her medications appropriately some of which are essential for her medical health. We will plan for voluntary admission. Will obtain baseline labs, labs were last obtained on November 24 and show baseline chronic kidney disease but no other significant metabolic abnormalities.     I have reviewed the nursing notes. I have reviewed the findings, diagnosis, plan and need for follow up with the patient.     Recent Results (from the past 168 hour(s))   Surgical Pathology Exam    Collection Time: 12/14/21 10:54 AM   Result Value Ref Range    Case Report       Surgical Pathology Report                         Case: [id number]                                  Authorizing Provider:  Javier Landa MD Collected:           12/14/2021 10:54 AM          Ordering Location:     [facility]   Received:            12/14/2021 10:54 AM                                 [place]                                                                      Pathologist:           Analy Dueñas MD                                                                           Specimen:    Endometrium                                                                                Final Diagnosis       Endometrium, curettage  -Scant fragments of superficial,  "inactive endometrium  -Fragments of squamous mucosa negative for dysplasia and malignancy      Comment       Sections demonstrate scant, superficial strips of inactive endometrium.  There is insufficient intact fragments of endometrium showing glands and stroma to evaluate for hyperplasia or malignancy.  However, there is no cytologic atypia.  Additional sampling may be considered if clinical concern persists.      Clinical Information       postmenopausal bleeding, endometrial thickening on ultrasound      Gross Description       A(A). Endometrium, :  The specimen is received in formalin labeled with the patient's name, medical record number and other identifying information and designated \"endometrium\". It consists of multiple tan-white soft tissue fragments that range from less than 0.1-0.1 cm in greatest dimension.  The formalin is filtered and the specimen is wrapped and entirely submitted in 1 cassette.    Note: The specimen is scant and may not survive processing.   (LEDY Byers)        Microscopic Description       The microscopic findings support the diagnosis.  There is scant, inactive endometrium and fragments of squamous mucosa with atrophic changes.  An immunohistochemical stain for p16 was performed, with appropriately reactive controls, and is negative in the squamous mucosa, providing no support for dysplasia.      Performing Labs       The technical component of this testing was completed at [facility]      Case Images     Comprehensive metabolic panel    Collection Time: 12/16/21  5:51 PM   Result Value Ref Range    Sodium 138 133 - 144 mmol/L    Potassium 3.8 3.4 - 5.3 mmol/L    Chloride 106 94 - 109 mmol/L    Carbon Dioxide (CO2) 29 20 - 32 mmol/L    Anion Gap 3 3 - 14 mmol/L    Urea Nitrogen 28 7 - 30 mg/dL    Creatinine 1.37 (H) 0.52 - 1.04 mg/dL    Calcium 9.9 8.5 - 10.1 mg/dL    Glucose 85 70 - 99 mg/dL    Alkaline " Phosphatase 60 40 - 150 U/L    AST 23 0 - 45 U/L    ALT 29 0 - 50 U/L    Protein Total 7.1 6.8 - 8.8 g/dL    Albumin 3.3 (L) 3.4 - 5.0 g/dL    Bilirubin Total 0.3 0.2 - 1.3 mg/dL    GFR Estimate 35 (L) >60 mL/min/1.73m2   Asymptomatic COVID-19 Virus (Coronavirus) by PCR Nasopharyngeal    Collection Time: 12/16/21  5:51 PM    Specimen: Nasopharyngeal; Swab   Result Value Ref Range    SARS CoV2 PCR Negative Negative, Testing sent to reference lab. Results will be returned via unsolicited result   CBC with platelets and differential    Collection Time: 12/16/21  5:51 PM   Result Value Ref Range    WBC Count 6.0 4.0 - 11.0 10e3/uL    RBC Count 4.28 3.80 - 5.20 10e6/uL    Hemoglobin 13.9 11.7 - 15.7 g/dL    Hematocrit 42.1 35.0 - 47.0 %    MCV 98 78 - 100 fL    MCH 32.5 26.5 - 33.0 pg    MCHC 33.0 31.5 - 36.5 g/dL    RDW 12.1 10.0 - 15.0 %    Platelet Count 203 150 - 450 10e3/uL    % Neutrophils 59 %    % Lymphocytes 28 %    % Monocytes 10 %    % Eosinophils 2 %    % Basophils 1 %    % Immature Granulocytes 0 %    NRBCs per 100 WBC 0 <1 /100    Absolute Neutrophils 3.5 1.6 - 8.3 10e3/uL    Absolute Lymphocytes 1.7 0.8 - 5.3 10e3/uL    Absolute Monocytes 0.6 0.0 - 1.3 10e3/uL    Absolute Eosinophils 0.1 0.0 - 0.7 10e3/uL    Absolute Basophils 0.1 0.0 - 0.2 10e3/uL    Absolute Immature Granulocytes 0.0 <=0.4 10e3/uL    Absolute NRBCs 0.0 10e3/uL   UA with Microscopic reflex to Culture    Collection Time: 12/16/21  5:58 PM    Specimen: Urine, Midstream   Result Value Ref Range    Color Urine Straw Colorless, Straw, Light Yellow, Yellow    Appearance Urine Clear Clear    Glucose Urine Negative Negative mg/dL    Bilirubin Urine Negative Negative    Ketones Urine Negative Negative mg/dL    Specific Gravity Urine 1.006 1.003 - 1.035    Blood Urine Large (A) Negative    pH Urine 7.0 5.0 - 7.0    Protein Albumin Urine 10  (A) Negative mg/dL    Urobilinogen Urine Normal Normal, 2.0 mg/dL    Nitrite Urine Negative Negative     Leukocyte Esterase Urine Trace (A) Negative    RBC Urine 3 (H) <=2 /HPF    WBC Urine 13 (H) <=5 /HPF    Squamous Epithelials Urine 1 <=1 /HPF   Urine Culture    Collection Time: 12/16/21  5:58 PM    Specimen: Urine, Midstream   Result Value Ref Range    Culture Culture in progress    Lipid panel    Collection Time: 12/17/21  8:35 AM   Result Value Ref Range    Cholesterol 172 <200 mg/dL    Triglycerides 137 <150 mg/dL    Direct Measure HDL 61 >=50 mg/dL    LDL Cholesterol Calculated 84 <=100 mg/dL    Non HDL Cholesterol 111 <130 mg/dL   TSH with free T4 reflex and/or T3 as indicated    Collection Time: 12/17/21  8:35 AM   Result Value Ref Range    TSH 3.73 0.40 - 4.00 mU/L   Vitamin B12    Collection Time: 12/17/21  8:35 AM   Result Value Ref Range    Vitamin B12 966 193 - 986 pg/mL   Folate    Collection Time: 12/17/21  8:35 AM   Result Value Ref Range    Folic Acid 60.5 >=5.4 ng/mL   Vitamin D Deficiency    Collection Time: 12/17/21  8:35 AM   Result Value Ref Range    Vitamin D, Total (25-Hydroxy) 57 20 - 75 ug/L

## 2021-12-17 NOTE — CONSULTS
Internal Medicine Initial Visit      Kamryn Miller MRN# [id number]   YOB: 1935 Age: 86 year old   Date of Admission: 12/16/2021  PCP: Rolanda Wilcox    Referring Provider: Behavioral Health - Gomez Christopher MD  Reason for Visit: General Medical Evaluation     Assessment and Recommendations:   Kamryn Miller is a very pleasant 86 year old year old woman with a history of CAD s/p angioplasty, celiac disease, HLD, HTN, mild pulmonary hypertension, moderate aortic valve stenosis, bipolar disorder and depression who was admitted to station 3[facility] for worsening depression and grief after her  of 63 years unfortunately passed away from brain cancer two months ago.    Depression  Bipolar Disorder  Grief- Recent Loss of    - Management per primary Psychiatry team   - Current regimen consists of Pristiq 100mg daily, Lamictal 225mg daily   - Emotional support given to patient. We discussed that it is normal to grieve and that we are here if she ever needs to talk or has questions.    Moderate Aortic Valve Stenosis  Most recent Echocardiogram from 11/1/21 revealed mean gradient aortic valve 23 mmHg, EF 55-60%. She is not a candidate for TAVR yet, her disease is not yet severe. Lasix was stopped 11/26/21 per PCP due to worsening creatinine. Appears euvolemic on exam.   - Monitor volume status. Placed on I&O and daily weights. Notify medicine if weight increases by more than 3 pounds or pt with worsening shortness of breath or orthopnea (shortness of breath with lying flat).    CAD and HTN and HLD  PCIs with 3 EDDIE to the mid-distal LAD in August 2020. Cardiac Cath done 11/3/21 to evaluate disease and revealed Prox R VARUN lesion is 40% stenosed, Dist R VARUN to Mid R CFA lesion is 70% stenosed, Mid L EIA to Dist L EIA lesion is 30% stenosed. Last Saw cardiology 11/1/21 with no medication adjustments made.   - Continue PTA ASA 81mg daily, Plavix 75mg daily, Cozaar 100mg daily, Lopressor 50mg in am and 25mg  in pm, Norvasc 10mg daily, Crestor 40mg daily, Verapamil ED 240mg daily    Mild Persistent Asthma  No current complaints. Breathing at baseline.   - Continue PTA Azelastine nasal, Advair BID   - Albuterol prn ordered    Hypothyroidism  TSH 3.73.   - Continue PTA Synthroid 50mcg daily    CKD Stage III  Baseline creatinine 1.3-1.4 range normally. Admission creatinine 1.37.   - Avoid nephrotoxic agents, monitor volume status carefully especially in light of aortic stenosis    Post-Menopausal Bleeding  Saw OBGYN 12/14/21 in the clinic and underwent endometrial biopsy due to US with uterine thickness 4cm. Biopsy pathology revealed: Scant fragments of superficial, inactive endometrium: Fragments of squamous mucosa negative for dysplasia and malignancy.   - Monitor, recommend outpatient OBGYN follow up    Osteoporosis   - Continue PTA Fosamax 70mg every 7 days, calcium supplement, Vitamin D supplement    UTI  UA with large blood and trace leukocyte esterase, negative nitrites. No symptoms but treatment initiated with Keflex. Given change in her mental state (worsening depression) seems reasonable to continue Keflex while awaiting culture. If no growth, can discontinue.   - Continue Keflex 250mg Q12H for now     Medicine will follow in the periphery while awaiting culture results. Please do not hesitate to contact if new questions or concerns arise.     Viet Angela PA-C  [facility], [facility]  Hospitalist Service  Pager: [phone]       Chief Complaint:   Grief after Loss of      History of Present Illness:     History is obtained from the patient and medical record.     Kamryn Miller is a 86 year old year old woman with a history of CAD s/p angioplasty, celiac disease, HLD, HTN, mild pulmonary hypertension, moderate aortic valve stenosis, bipolar disorder and depression who was admitted to station 3[facility] for worsening depression after her  of 63 years unfortunately passed away from  brain cancer two months ago.    She is otherwise healthy with no ongoing medical complaints. Not diabetic. Denies any chest pain, palpitations, dyspnea, cough, cold symptoms, fever/chills, abdominal pain, nausea/vomiting, rashes, urinary complaints.          Review of Systems:   The 10 point Review of Systems is negative other than noted in the HPI or here.           Past Medical History:   Reviewed and updated in Epic.  Past Medical History:   Diagnosis Date     Aortic valvar stenosis 7/2010    mild     Asthma      Bipolar disorder (H)      CAD (coronary artery disease)     s/p angioplasty     Celiac disease      Colon polyps      Colon polyps 2012    every 3 year colonoscopy      Depression      High cholesterol      HTN      Mitral insufficiency      Pulmonary HTN (H)     mild     Tremors 7/10    drug induced from antidepressants     Tricuspid insufficiency              Past Surgical History:   Reviewed and updated in Epic.  Past Surgical History:   Procedure Laterality Date     ANGIOGRAM  6/26/2009     ANGIOPLASTY  9/96    for angina     ANGIOPLASTY  8/03 - 9/03    X -2 - with stenst in coronary car.     APPENDECTOMY       BREAST BIOPSY, RT/LT      Breat Biopsy RT/LT, benign     BUNIONECTOMY  11/8/2011    Procedure:BUNIONECTOMY; Left donna bunionectomy; Surgeon:FREDY LITTLEJOHN; Location:[facility] OR     BUNIONECTOMY RT/LT  5/2007    right bunion and right 2nd hammertoe     C ANESTH,BLEPHAROPLASTY      (R) for drooping eyelid     C APPENDECTOMY       CATARACT IOL, RT/LT  6/12 and 7/12    bilateral     COLONOSCOPY  2007, 2012    every 3 years for polyps     CV CORONARY ANGIOGRAM N/A 8/21/2020    Procedure: CV CORONARY ANGIOGRAM;  Surgeon: Gianluca Toscano MD;  [facility]     CV LEFT HEART CATH N/A 8/21/2020    Procedure: CV LEFT HEART CATH;  Surgeon: Gianluca Toscano MD;  [facility]     CV LEFT HEART CATH N/A 11/3/2020    Procedure: CV LEFT HEART CATH;   Surgeon: Tom Burrows MD;  Location:  [facility]     CV LOWER EXTREMITY ANGIOGRAM BILATERAL N/A 11/3/2020    Procedure: CV ANGIOGRAM LOWER EXTREMITY BILATERAL;  Surgeon: Tom Burrows MD;  Location:  [facility]ARD[facility] CATH LAB     CV PCI STENT DRUG ELUTING N/A 8/21/2020    Procedure: Percutaneous Coronary Intervention Stent Drug Eluting;  Surgeon: Gianluca Toscano MD;  [facility]     CV RIGHT HEART CATH MEASUREMENTS RECORDED N/A 8/21/2020    Procedure: CV RIGHT HEART CATH;  Surgeon: Gianluca Toscano MD;  [facility]     CV RIGHT HEART CATH MEASUREMENTS RECORDED N/A 11/3/2020    Procedure: CV RIGHT HEART CATH;  Surgeon: Tom Burrows MD;  Location:  [facility] CATH LAB     JOINT REPLACEMENT, HIP RT/LT  4/2008    right hip replaced     JOINT REPLACEMENT, HIP RT/LT  4/2009    left hip replaced     REPAIR HAMMER TOE  11/8/2011    Procedure:REPAIR HAMMER TOE; left 2nd hammertoe repair; Surgeon:FREDY LITTLEJOHN; Location: OR     SURGICAL HISTORY OF -   11/11    left bunion and 2nd hammertoe repair     TUBAL LIGATION               Social History:     Social History     Tobacco Use     Smoking status: Never Smoker     Smokeless tobacco: Never Used   Substance Use Topics     Alcohol use: No     Alcohol/week: 0.0 standard drinks     Types: 1 Standard drinks or equivalent per week     Drug use: No             Family History:   Reviewed and updated in Epic.  Family History   Problem Relation Age of Onset     Asthma Mother      Cerebrovascular Disease Mother      Arthritis Mother      Depression Mother      Lipids Sister      Hypertension Sister      Heart Disease Sister      Lipids Sister      Hypertension Sister      Lipids Sister      Breast Cancer Sister              Allergies:     Allergies   Allergen Reactions     Ace Inhibitors Cough     Diclofenac Other (See Comments)     Balance problems     Gluten Meal Diarrhea              Medications:     Medications Prior to Admission   Medication Sig Dispense Refill Last Dose     alendronate (FOSAMAX) 70 MG tablet Take 70 mg by mouth every 7 days    12/11/2021     amLODIPine (NORVASC) 10 MG tablet Take 1 tablet (10 mg) by mouth daily NOTE TABLET MG CHANGE 90 tablet 1 12/16/2021     aspirin 81 MG tablet Take 81 mg by mouth daily    12/15/2021     azelastine (ASTEPRO) 0.15 % nasal spray INSTILL 1 SPRAY INTO BOTH NOSTRILS DAILY 5 mL 1 12/16/2021     Calcium Citrate (CITRACAL OR) Take 1 tablet by mouth daily.   12/16/2021     CHOLECALCIFEROL 67646 UNIT PO CAPS Take 50,000 Units by mouth once a week         clopidogrel (PLAVIX) 75 MG tablet Take 1 tablet (75 mg) by mouth daily 90 tablet 3 12/16/2021     desvenlafaxine (PRISTIQ) 100 MG 24 hr tablet Take 100 mg by mouth daily   12/16/2021     fluticasone-salmeterol (ADVAIR DISKUS) 500-50 MCG/DOSE inhaler INHALE 1 PUFF INTO THE LUNGS EVERY 12 HOURS 1 each 6 12/16/2021     lamoTRIgine (LAMICTAL) 25 MG tablet Take 25 mg by mouth daily with 200 mg tablet for a total dose of 225 mg   12/16/2021     LAMOTRIGINE 200 MG PO TABS Take 200 mg by mouth daily with 25 mg tablet for a total dose of 225 mg   12/16/2021     levothyroxine (SYNTHROID/LEVOTHROID) 50 MCG tablet Take 50 mcg by mouth daily   12/16/2021     losartan (COZAAR) 100 MG tablet Take 1 tablet (100 mg) by mouth daily 90 tablet 3 12/16/2021     metoprolol tartrate (LOPRESSOR) 50 MG tablet TAKE 1 TAB BY MOUTH IN THE MORNING AND 1/2 IN THE EVENING-needs follow up appointment (Patient taking differently: TAKE 1 TAB (50 mg) BY MOUTH IN THE MORNING AND 1/2 TAB (25 mg) IN THE EVENING) 135 tablet 3 12/16/2021     Multiple Vitamin (MULTI-VITAMIN) per tablet Take 1 tablet by mouth daily.   12/16/2021     rosuvastatin (CRESTOR) 40 MG tablet TAKE 1 TABLET BY MOUTH EVERY DAY (Patient taking differently: Take 40 mg by mouth daily ) 90 tablet 3 12/16/2021     verapamil ER (CALAN-SR) 240 MG CR tablet Take 1 tablet  (240 mg) by mouth daily Needs follow up appointment 90 tablet 3 12/16/2021     amoxicillin (AMOXIL) 500 MG tablet TAKE 4 TABLETS BY MOUTH 1 HOUR PRIOR TO APPT 4 tablet 1 12/14/2021     Blood Pressure Monitor KIT Automatic Blood Pressure Monitor 1 kit 0         Current Facility-Administered Medications   Medication     acetaminophen (TYLENOL) tablet 650 mg     [START ON 12/18/2021] alendronate (FOSAMAX) tablet 70 mg     alum & mag hydroxide-simethicone (MAALOX) suspension 30 mL     amLODIPine (NORVASC) tablet 10 mg     aspirin (ASA) chewable tablet 81 mg     azelastine (ASTELIN) nasal spray 1 spray     calcium citrate (CITRACAL) tablet 950 mg     cephALEXin (KEFLEX) capsule 250 mg     cholecalciferol (VITAMIN D3) 1250 mcg (29445 units) capsule 50,000 Units     clopidogrel (PLAVIX) tablet 75 mg     desvenlafaxine (PRISTIQ) 24 hr tablet 100 mg     fluticasone-vilanterol (BREO ELLIPTA) 200-25 MCG/INH inhaler 1 puff     hydrOXYzine (ATARAX) tablet 25 mg     lamoTRIgine (LaMICtal) tablet 200 mg     lamoTRIgine (LaMICtal) tablet 25 mg     levothyroxine (SYNTHROID/LEVOTHROID) tablet 50 mcg     losartan (COZAAR) tablet 100 mg     melatonin tablet 3 mg     metoprolol tartrate (LOPRESSOR) tablet 25 mg     metoprolol tartrate (LOPRESSOR) tablet 50 mg     multivitamin w/minerals (THERA-VIT-M) tablet 1 tablet     OLANZapine (zyPREXA) tablet 2.5 mg    Or     OLANZapine (zyPREXA) injection 2.5 mg     rosuvastatin (CRESTOR) tablet 40 mg     senna-docusate (SENOKOT-S/PERICOLACE) 8.6-50 MG per tablet 1 tablet     verapamil ER (CALAN-SR) CR tablet 240 mg            Physical Exam:   Blood pressure (!) 163/82, pulse 68, temperature 97  F (36.1  C), temperature source Temporal, resp. rate 16, SpO2 96 %.  There is no height or weight on file to calculate BMI.  Constitutional: Awake and alert, in no apparent distress.  Eyes: Sclera clear, anicteric   ENT: Mucous membranes moist.   Hematologic / Lymphatic: No cervical or supraclavicular  lymphadenopathy.   Respiratory: Breathing comfortably on room air. Clear to auscultation bilaterally with no crackles, wheezing, or rhonchi. Good air entry throughout.   Cardiovascular: RRR, normal S1/S2. No rubs. 2/6 murmur best heard left sternal border. Intact bilateral pedal pulses. No peripheral edema.   GI: Soft, non-tender, non-distended. No palpable masses or hepatomegaly. Normoactive bowel sounds.   Skin: Good color. No jaundice. No visible rashes, lesions, or bruising of concern.   Neurologic: Alert and oriented to person, place, and time. No focal deficits. Moves all extremities. No tremor.   Neuropsychiatric: Calm and cooperative. Good eye contact.             Data:   CBC:  Recent Labs   Lab Test 12/16/21  1751   WBC 6.0   RBC 4.28   HGB 13.9   HCT 42.1   MCV 98   MCH 32.5   MCHC 33.0   RDW 12.1          CMP:  Recent Labs   Lab Test 12/16/21  1751      POTASSIUM 3.8   CHLORIDE 106   PRINCE 9.9   CO2 29   BUN 28   CR 1.37*   GLC 85   AST 23   ALT 29   BILITOTAL 0.3   ALBUMIN 3.3*   PROTTOTAL 7.1   ALKPHOS 60       TSH:  TSH   Date Value Ref Range Status   07/28/2021 1.77 0.40 - 4.00 mU/L Final   01/18/2021 6.11 (H) 0.40 - 4.00 mU/L Final       Unresulted Labs Ordered in the Past 30 Days of this Admission     Date and Time Order Name Status Description    12/16/2021  6:16 PM Urine Culture In process

## 2021-12-17 NOTE — PLAN OF CARE
BEHAVIORAL TEAM DISCUSSION    Participants: Gomez Christopher MD; Rita Massey RN; Bess Canela [facility]  Progress: minimal  Anticipated length of stay: 3-7 days  Continued Stay Criteria/Rationale: Patient is newly admitted with worsening depression and lack of ability to care for self.  Evaluation in process.  Medical/Physical: no acute medical issues  Precautions:   Behavioral Orders   Procedures    Code 1 - Restrict to Unit    Occupational Therapy on the Unit     Order Specific Question:   Reason for Consult     Answer:   Eval of thought process, functional skills and behavior     Order Specific Question:   Course of Action:     Answer:   Eval & Treat as indicated     Order Specific Question:   Treatment Prescription:     Answer:   cognitive eval please    Routine Programming     As clinically indicated    Status 15     Every 15 minutes.    Suicide precautions     Patients on Suicide Precautions should have a Combination Diet ordered that includes a Diet selection(s) AND a Behavioral Tray selection for Safe Tray - with utensils, or Safe Tray - NO utensils       Plan: Psychiatric evaluation; medication evaluation and adjustments as appropriate; referrals for aftercare as appropriate  Rationale for change in precautions or plan: initial plan.

## 2021-12-17 NOTE — PLAN OF CARE
Number of patients attending the group:  7  Group Length: 0.5 Hour     Group Therapy      Summary of Group / Topics Discussed:     The  Psychotherapy group goal is to promote insight to positive choice and change. Group processing is within a supportive and safe environment. Patients will process emotions using verbal group and expressive psychotherapy interventions.     Assessment: Group discussed the importance of sleep in mental health maintenance. Group members reported on specific sleep enhancing practices they are used to.  Reviewed ways to self enhance sleep; strategies for falling and staying asleep. Reviewed muscle relaxing techniques to aid with sleep.  Reviewed specific strategies for self assessment for sleep effectiveness following behavioral modifications.      Patient Response: Pt sat in group, quietly. Reported that she recently got to the unit and is getting acquainted with the programing.

## 2021-12-17 NOTE — PROGRESS NOTES
" 21 1500   Groups   Details    (Psychotherapy)   Number of patients attending the group:  6  Group Length:  1.5 Hours     Group Therapy Type: Psychotherapy     Summary of Group / Topics Discussed:        The  Psychotherapy group goal is to promote insight to positive choice and change. Group processing is within a supportive and safe environment. Patients will process emotions using verbal group and expressive psychotherapy interventions including visual art/writing interventions.     Group interventions support patients by: communication/social skills and supports and hope/optimism     Modalities to reach these goals include: positive/solution focused psychology , Narrative psychology and Expressive Arts Therapies     Subjective -patient report of mood today-\"pretty good, just trying to figure out  what these groups are about\"     Objective/ Intervention- Goal of group and Therapeutic modality utilized- Art Therapy and verbal group about hope, wishes, dreams, karel and making \" hope \" trees with collage materials     Group Response- engaged both with art and with verbal group. Almost every member's hope was \" that I get better.\"     Patient Response-Pt made her tree about memories of Christmases with her . He  a couple of months ago, she was  64 years.     Florin Bryan, LMFT, ATR-BC                      "

## 2021-12-17 NOTE — PLAN OF CARE
Problem: Behavioral Health Plan of Care  Goal: Patient-Specific Goal (Individualization)  Flowsheets (Taken 12/17/2021 3235)  Patient Vulnerabilities: recent loss  Patient Personal Strengths:   appropriate judgment/decision-making   community support   coping skills   expressive of needs   expressive of emotions   independent living skills   insight into illness/situation   intellectual cognitive skills   family/social support   good impulse control   positive vocational history   positive educational history   positive attitude   stable living environment   self-awareness   resilient   socioeconomic stability     The Patient completed the Personal Plan of Care today.     They identified the following as Reasons for hospitalization:  1. Worsening mood  2.   3.   4.     They identified the following as Goals for Discharge:  1. Medication changes  2.   3.

## 2021-12-17 NOTE — PROGRESS NOTES
"CLINICAL NUTRITION SERVICES - ASSESSMENT NOTE     Nutrition Prescription    RECOMMENDATIONS FOR MDs/PROVIDERS TO ORDER:  None today     Malnutrition Status:    Patient does not meet two of the established criteria necessary for diagnosing malnutrition    Recommendations already ordered by Registered Dietitian (RD):  Nutrition education  Continue gluten free diet     Future/Additional Recommendations:  Monitor intakes and wt trends  Supplements as needed pending PO      REASON FOR ASSESSMENT  Kamryn Miller is a 86 year old female assessed by the dietitian for MST score of 3: positive  for weight loss of unsure and positive  for poor oral intake related to decreased appetite    PMH significant for depression, CAD, celiac disease, HTN and bipolar disorder admitted for worsening depression.   NUTRITION HISTORY  Pt stated she has been eating fairly well PTA however she stated the recent loss of her  has made it difficult to eat. Stated she typically eats 2 meals/day and a \"small amount\" @ noon. Reports a UBW \"in the upper 40s (140s)\".  Pt follows a gluten free diet at home d/t celiac disease.     CURRENT NUTRITION ORDERS  Diet: Regular  Allergies: gluten  Intake/Tolerance: Pt reports no changes in appetite since admit. Denied N/V/C/D or difficulty chewing/swallowing. Pt stated she did not eat lunch today d/t being worried the gravy was not gluten free. Verified that pts diet is ordered as gluten free in health touch. All items on the pts menu will meet gluten free requirements. Offered supplements between meals however pt believes she will eat well while admitted.      LABS  Labs reviewed    MEDICATIONS  Medications reviewed:  Calcium citrate  Vit D3  thera-vit-m  PRN: maalox, senna-docusate    ANTHROPOMETRICS  Height: 153.7 cm (5'0.5\")  Most Recent Weight: 64.7 kg (142 lb 9.6 oz)     IBW: 46.6 kg  BMI: 27.39 kg/m^2, Overweight BMI 25-29.9  Weight History: 16# (10.1%) wt loss in 1 year, wt stable x1 month  Wt " Readings from Last 10 Encounters:   12/14/21 64.7 kg (142 lb 9.6 oz)   11/26/21 64.9 kg (143 lb)   11/01/21 64.6 kg (142 lb 6.4 oz)   08/18/21 65.2 kg (143 lb 12.8 oz)   07/28/21 68 kg (150 lb)   07/20/21 68.5 kg (151 lb)   05/21/21 72.6 kg (160 lb)   05/03/21 71.4 kg (157 lb 4.8 oz)   01/18/21 68.9 kg (152 lb)   11/06/20 71.7 kg (158 lb)     Dosing Weight: 51.1 kg (adjsuted using current wt of 64.7 kg and ideal wt of 46.6 kg)    ASSESSED NUTRITION NEEDS  Estimated Energy Needs: 7619-5255 kcals/day (25 - 30 kcals/kg)  Justification: Maintenance  Estimated Protein Needs: 51-61 grams protein/day (1 - 1.2 grams of pro/kg)  Justification: Increased needs and Maintenance  Estimated Fluid Needs: 1 mL/kcal  Justification: Maintenance or Per provider pending fluid status    PHYSICAL FINDINGS  See malnutrition section below.    MALNUTRITION  % Intake: Decreased intake does not meet criteria  % Weight Loss: Weight loss does not meet criteria  Subcutaneous Fat Loss: None observed  Muscle Loss: None observed  Fluid Accumulation/Edema: None noted  Malnutrition Diagnosis: Patient does not meet two of the established criteria necessary for diagnosing malnutrition    NUTRITION DIAGNOSIS  Predicted inadequate protein-energy intake related to variable appetite as evidenced by pt reliant on PO intakes to meet 100% of nutritional needs with potential for variation        INTERVENTIONS  Implementation  Nutrition Education: Discussed role of RD, menu ordering and available snacks/supplements.  Discussed current diet order (regular/gluten free) and menu options that are gluten free. Encouraged PO     Goals  Patient to consume % of nutritionally adequate meal trays TID, or the equivalent with supplements/snacks.     Monitoring/Evaluation  Progress toward goals will be monitored and evaluated per protocol.    Kaye Pool MS, RD, LDN  Unit Pager [phone]

## 2021-12-18 PROCEDURE — 250N000013 HC RX MED GY IP 250 OP 250 PS 637: Performed by: NURSE PRACTITIONER

## 2021-12-18 PROCEDURE — 250N000013 HC RX MED GY IP 250 OP 250 PS 637: Performed by: PHYSICIAN ASSISTANT

## 2021-12-18 PROCEDURE — 93010 ELECTROCARDIOGRAM REPORT: CPT | Performed by: INTERNAL MEDICINE

## 2021-12-18 PROCEDURE — 250N000013 HC RX MED GY IP 250 OP 250 PS 637: Performed by: PSYCHIATRY & NEUROLOGY

## 2021-12-18 PROCEDURE — 99231 SBSQ HOSP IP/OBS SF/LOW 25: CPT | Performed by: PHYSICIAN ASSISTANT

## 2021-12-18 PROCEDURE — 124N000003 HC R&B MH SENIOR/ADOLESCENT

## 2021-12-18 PROCEDURE — 93005 ELECTROCARDIOGRAM TRACING: CPT

## 2021-12-18 RX ADMIN — MIRTAZAPINE 7.5 MG: 7.5 TABLET, FILM COATED ORAL at 20:43

## 2021-12-18 RX ADMIN — ASPIRIN 81 MG CHEWABLE TABLET 81 MG: 81 TABLET CHEWABLE at 20:43

## 2021-12-18 RX ADMIN — CLOPIDOGREL BISULFATE 75 MG: 75 TABLET, FILM COATED ORAL at 08:13

## 2021-12-18 RX ADMIN — AMLODIPINE BESYLATE 10 MG: 10 TABLET ORAL at 08:13

## 2021-12-18 RX ADMIN — LIOTHYRONINE SODIUM 10 MCG: 5 TABLET ORAL at 08:14

## 2021-12-18 RX ADMIN — CEPHALEXIN 250 MG: 250 CAPSULE ORAL at 08:13

## 2021-12-18 RX ADMIN — LEVOTHYROXINE SODIUM 50 MCG: 50 TABLET ORAL at 06:13

## 2021-12-18 RX ADMIN — LAMOTRIGINE 200 MG: 200 TABLET ORAL at 08:14

## 2021-12-18 RX ADMIN — LOSARTAN POTASSIUM 100 MG: 100 TABLET, FILM COATED ORAL at 08:14

## 2021-12-18 RX ADMIN — VERAPAMIL HYDROCHLORIDE 240 MG: 240 TABLET ORAL at 08:13

## 2021-12-18 RX ADMIN — DESVENLAFAXINE SUCCINATE 100 MG: 50 TABLET, EXTENDED RELEASE ORAL at 08:13

## 2021-12-18 RX ADMIN — Medication 950 MG: at 20:43

## 2021-12-18 RX ADMIN — MULTIPLE VITAMINS W/ MINERALS TAB 1 TABLET: TAB at 08:13

## 2021-12-18 RX ADMIN — AZELASTINE HYDROCHLORIDE 1 SPRAY: 137 SPRAY, METERED NASAL at 08:15

## 2021-12-18 RX ADMIN — METOPROLOL TARTRATE 50 MG: 25 TABLET, FILM COATED ORAL at 08:13

## 2021-12-18 RX ADMIN — LAMOTRIGINE 25 MG: 25 TABLET ORAL at 08:14

## 2021-12-18 RX ADMIN — ALENDRONATE SODIUM 70 MG: 70 TABLET ORAL at 08:15

## 2021-12-18 RX ADMIN — FLUTICASONE FUROATE AND VILANTEROL TRIFENATATE 1 PUFF: 200; 25 POWDER RESPIRATORY (INHALATION) at 08:14

## 2021-12-18 RX ADMIN — ROSUVASTATIN CALCIUM 40 MG: 20 TABLET, FILM COATED ORAL at 08:13

## 2021-12-18 ASSESSMENT — ACTIVITIES OF DAILY LIVING (ADL)
HYGIENE/GROOMING: INDEPENDENT
ORAL_HYGIENE: INDEPENDENT
ORAL_HYGIENE: INDEPENDENT
DRESS: SCRUBS (BEHAVIORAL HEALTH)
HYGIENE/GROOMING: INDEPENDENT
LAUNDRY: WITH SUPERVISION
LAUNDRY: WITH SUPERVISION
DRESS: SCRUBS (BEHAVIORAL HEALTH)

## 2021-12-18 NOTE — PLAN OF CARE
Music Therapy Group note    Clinical Hours in session: 1.0    Number of patients in group: 4    Scope of service: psychodynamic     Intervention: Evening Relaxation     Goal of group: anxiety reduction     Patient response/reaction to treatment intervention(s): Cooperatively engaged in Evening Music Relaxation group designed to decrease anxiety.  Kamryn appears to be adjusting to the unit, but overall did well in session tonight.     Audrey Arndt, MT-BC  Board-Certified Music Therapist

## 2021-12-18 NOTE — PLAN OF CARE
Pt presents with full range affect and calm mood. Denies SI/SIB and hallucinations. When asked about depression and anxiety she denies this, states that she is working on adjusting to being on the unit. This evening pt was present in the lounge and social with another group of women on the unit. Pt also worked on word finds. Attended groups. VSS. Appetite and fluid intake adequate. Total intake = 900 output = did not track as pt did not have a hat in her toilet at the time, she also shares a room with another pt but pt denied any issues with voiding this evening. Denies pain. Medication compliant. Pt did complain about her roommate snoring a lot which made it difficult for her to sleep, pt was provided with ear plugs to help with this. No other concerns or complaints noted.

## 2021-12-18 NOTE — PLAN OF CARE
Problem: Sleep Disturbance (Anxiety Signs/Symptoms)  Goal: Improved Sleep (Anxiety Signs/Symptoms)  Outcome: Improving     Patient slept comfortably the whole night for a total of 9 hours. No concerns raised. Nothing unusual noted.

## 2021-12-18 NOTE — PROGRESS NOTES
Brief Medicine Note:    Medicine following for possible urinary tract infection. Urine culture growing only uro-genital mitchell and pt with no symptoms. Keflex discontinued.    Medicine will sign off. Please page if new concerns arise.     Viet Angela PA-C  Hospitalist Service  Pager: [phone]

## 2021-12-18 NOTE — PLAN OF CARE
Pt presents with full range affect and calm mood. Denies SI/SIB and hallucinations. Denies being depressed or anxious here in the hospital, feels like she is distracted here from those things. Reports that she has this issue with getting depressed around the fall/winter each year, it stays for a couple months and then goes away. No specific reason for onset. Pt does state that this time around it is complicated by her husbands recent passing and the life changes that came with it. Pt reports that having to plan a  was new for her and a time consuming task. Also learning about the finances and how to manage them as her  was the one who took care of all that throughout their marriage. Pt was  to her  for 63 years and talked about fond memories of him. Pt also talked about her son, daughter, and her three grandchildren, pt was very bright when discussing this. 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 Pt went to school at the  for home economics. She denies pain today. She was present in the lounge and social with a group of women on the unit. Reports that she slept better last evening. Appetite and fluid intake adequate, pt is very conscious of making sure she gets enough fluids and carries around a water bottle to help with that. Intake and output was d/c'ed by provider, will continue to monitor daily weights and alert internal medicine if there is a >3 lb weight gain, increased SOB, or orthopnea. VSS. Medication compliant, no PRNs administered. Pt is excited about her son coming to visit her this evening. Offered pt assistance with a shower but she declined. No other concerns or complaints noted this evening.

## 2021-12-19 PROCEDURE — 250N000013 HC RX MED GY IP 250 OP 250 PS 637: Performed by: PHYSICIAN ASSISTANT

## 2021-12-19 PROCEDURE — 124N000003 HC R&B MH SENIOR/ADOLESCENT

## 2021-12-19 PROCEDURE — 250N000013 HC RX MED GY IP 250 OP 250 PS 637: Performed by: NURSE PRACTITIONER

## 2021-12-19 PROCEDURE — 250N000013 HC RX MED GY IP 250 OP 250 PS 637: Performed by: PSYCHIATRY & NEUROLOGY

## 2021-12-19 RX ORDER — LOSARTAN POTASSIUM 25 MG/1
50 TABLET ORAL DAILY
Status: DISCONTINUED | OUTPATIENT
Start: 2021-12-20 | End: 2021-12-23 | Stop reason: HOSPADM

## 2021-12-19 RX ADMIN — LEVOTHYROXINE SODIUM 50 MCG: 50 TABLET ORAL at 06:41

## 2021-12-19 RX ADMIN — AZELASTINE HYDROCHLORIDE 1 SPRAY: 137 SPRAY, METERED NASAL at 08:41

## 2021-12-19 RX ADMIN — FLUTICASONE FUROATE AND VILANTEROL TRIFENATATE 1 PUFF: 200; 25 POWDER RESPIRATORY (INHALATION) at 08:40

## 2021-12-19 RX ADMIN — ROSUVASTATIN CALCIUM 40 MG: 20 TABLET, FILM COATED ORAL at 08:27

## 2021-12-19 RX ADMIN — Medication 950 MG: at 20:16

## 2021-12-19 RX ADMIN — CLOPIDOGREL BISULFATE 75 MG: 75 TABLET, FILM COATED ORAL at 08:40

## 2021-12-19 RX ADMIN — DESVENLAFAXINE SUCCINATE 100 MG: 50 TABLET, EXTENDED RELEASE ORAL at 08:35

## 2021-12-19 RX ADMIN — LIOTHYRONINE SODIUM 10 MCG: 5 TABLET ORAL at 08:35

## 2021-12-19 RX ADMIN — LAMOTRIGINE 25 MG: 25 TABLET ORAL at 08:30

## 2021-12-19 RX ADMIN — ASPIRIN 81 MG CHEWABLE TABLET 81 MG: 81 TABLET CHEWABLE at 20:16

## 2021-12-19 RX ADMIN — LAMOTRIGINE 200 MG: 200 TABLET ORAL at 08:35

## 2021-12-19 RX ADMIN — MIRTAZAPINE 7.5 MG: 7.5 TABLET, FILM COATED ORAL at 20:16

## 2021-12-19 RX ADMIN — MULTIPLE VITAMINS W/ MINERALS TAB 1 TABLET: TAB at 08:30

## 2021-12-19 ASSESSMENT — ACTIVITIES OF DAILY LIVING (ADL)
ORAL_HYGIENE: INDEPENDENT
LAUNDRY: WITH SUPERVISION
DRESS: SCRUBS (BEHAVIORAL HEALTH)
HYGIENE/GROOMING: INDEPENDENT
LAUNDRY: WITH SUPERVISION
HYGIENE/GROOMING: INDEPENDENT
ORAL_HYGIENE: INDEPENDENT
DRESS: SCRUBS (BEHAVIORAL HEALTH)

## 2021-12-19 NOTE — PLAN OF CARE
"Pt presents with full range affect and calm mood. Denies SI/SIB and hallucinations. She is bright and pleasant this evening. Pt reports having a tough time with her life situation but states she is not necessarily depressed nor anxious. She was present in the lounge the entire shift, social with staff and peers. Did not attend groups, stating that she was \"grouped out\" for the day. Appetite and fluid intake adequate. Pt is very conscious about consuming water throughout the day and utilizes a water bottle (she drink two full water bottles this evening, equalling 1400 mL). VS at the beginning of the shift were WNL, but at 2000 check pt was noted to be significantly bradycardic but asymptomatic. Internal medicine was notified - see previous notes. Pt is currently stable but we are to notify internal medicine if pt develops any dizziness, SOB, chest pain - parameters were placed on all BP medications and metoprolol was held this evening. Pt was also educated on call light buttons in her room and to notify staff before getting up out of bed or toilet if she feels unsafe, pt reported understanding of this information. Pt was otherwise medication compliant. Pt son did visit this evening - he came from [place]. Writer advised son that he could come and visit at 1100 since he was coming from out of town and had to return later in the day. No other concerns or complaints noted this shift.   "

## 2021-12-19 NOTE — PROVIDER NOTIFICATION
"Pt BP on routine check by PA 87/52 P 44 - writer alerted.     Nurse check - 101/64 L arm, P 42 (checked via radial pulse).     Pt currently not having any lightheadedness, SOB, chest pain. Pt reports feeling fine, she reports that she has a hx of her VS being \"all over the place\". Pt does not appear in any obvious distress, respirations even and unlabored.     Paged on-call provider to alert them of findings.   "

## 2021-12-19 NOTE — PLAN OF CARE
Problem: Cognitive Impairment (Anxiety Signs/Symptoms)  Goal: Optimized Cognitive Function (Anxiety Signs/Symptoms)  Outcome: Improving  Flowsheets (Taken 12/19/2021 1401)  Mutually Determined Action Steps (Optimized Cognitive Function):    contributes to treatment plan    participates in one-to-one sessions    identifies thought that is not reality     Patient alert and oriented x4. Denies pain. Calm and cooperative. Full range affect. Visible in the milieu the entire shift. Attended group activities. Social with staff and peers. Appetite and fluid intake adequate.  Patient had approximately 1l of oral fluid intake for the whole shift.   Patient was noted to have orthostatic PB changes with morning and noon checks. Her standing BP was less than 100 for systolic, IM was notified and morning BP medications were held. IM recommended encouraging oral fluids and rechecking orthostatic BP prior to evening BP medications.

## 2021-12-19 NOTE — PROGRESS NOTES
Brief Medicine Note    Contacted by nursing regarding HR 42, patient is otherwise asymptomatic, bp soft but normotensive.     Patient reports feeling well and denies N/V, chest pain, sob, palpitations, dizziness, lightheadedness or other changes and notes a history of rapid heart rate. She has been ambulatory and reports she felt well while ambulating.     Today's vital signs, medications, and nursing notes were reviewed.     /64 (BP Location: Left arm, Patient Position: Sitting)   Pulse (!) 42   Temp 98.5  F (36.9  C) (Oral)   Resp 16   Wt 63.7 kg (140 lb 8 oz)   SpO2 96%   BMI 26.99 kg/m    Gen: A&O and appears comfortable and pleasant, NAD, sitting in a chair    A/P:  Bradycardia  Euthyroid. Pt with several rate control medications.   - hold metoprolol for sbp <100 or HR<55  - hold verapamil for HR <55  - conditional EKG added if recheck HR <50, obtain EKG tonight    EKG reviewed (scanned to media tab as was not imported to chart)  1st degree AVB, VR 42, no ST-T wave abnormality  - given asymptomatic will continue to monitor    Rola Martinez PA-C  [facility]  Contact information available via [facility] Paging/Directory

## 2021-12-20 PROCEDURE — G0177 OPPS/PHP; TRAIN & EDUC SERV: HCPCS

## 2021-12-20 PROCEDURE — 250N000013 HC RX MED GY IP 250 OP 250 PS 637: Performed by: PHYSICIAN ASSISTANT

## 2021-12-20 PROCEDURE — 250N000013 HC RX MED GY IP 250 OP 250 PS 637: Performed by: NURSE PRACTITIONER

## 2021-12-20 PROCEDURE — 250N000013 HC RX MED GY IP 250 OP 250 PS 637: Performed by: PSYCHIATRY & NEUROLOGY

## 2021-12-20 PROCEDURE — H2032 ACTIVITY THERAPY, PER 15 MIN: HCPCS

## 2021-12-20 PROCEDURE — 124N000003 HC R&B MH SENIOR/ADOLESCENT

## 2021-12-20 PROCEDURE — 96125 COGNITIVE TEST BY HC PRO: CPT | Mod: GO

## 2021-12-20 RX ADMIN — Medication 950 MG: at 20:38

## 2021-12-20 RX ADMIN — DESVENLAFAXINE SUCCINATE 100 MG: 50 TABLET, EXTENDED RELEASE ORAL at 08:02

## 2021-12-20 RX ADMIN — MIRTAZAPINE 7.5 MG: 7.5 TABLET, FILM COATED ORAL at 20:38

## 2021-12-20 RX ADMIN — FLUTICASONE FUROATE AND VILANTEROL TRIFENATATE 1 PUFF: 200; 25 POWDER RESPIRATORY (INHALATION) at 08:04

## 2021-12-20 RX ADMIN — METOPROLOL TARTRATE 50 MG: 25 TABLET, FILM COATED ORAL at 08:02

## 2021-12-20 RX ADMIN — VERAPAMIL HYDROCHLORIDE 240 MG: 240 TABLET ORAL at 08:02

## 2021-12-20 RX ADMIN — MULTIPLE VITAMINS W/ MINERALS TAB 1 TABLET: TAB at 08:02

## 2021-12-20 RX ADMIN — ROSUVASTATIN CALCIUM 40 MG: 20 TABLET, FILM COATED ORAL at 08:01

## 2021-12-20 RX ADMIN — LOSARTAN POTASSIUM 50 MG: 25 TABLET, FILM COATED ORAL at 08:03

## 2021-12-20 RX ADMIN — LIOTHYRONINE SODIUM 10 MCG: 5 TABLET ORAL at 08:00

## 2021-12-20 RX ADMIN — CLOPIDOGREL BISULFATE 75 MG: 75 TABLET, FILM COATED ORAL at 08:01

## 2021-12-20 RX ADMIN — LEVOTHYROXINE SODIUM 50 MCG: 50 TABLET ORAL at 06:37

## 2021-12-20 RX ADMIN — AZELASTINE HYDROCHLORIDE 1 SPRAY: 137 SPRAY, METERED NASAL at 08:04

## 2021-12-20 RX ADMIN — LAMOTRIGINE 200 MG: 200 TABLET ORAL at 08:01

## 2021-12-20 RX ADMIN — ASPIRIN 81 MG CHEWABLE TABLET 81 MG: 81 TABLET CHEWABLE at 20:38

## 2021-12-20 RX ADMIN — LAMOTRIGINE 25 MG: 25 TABLET ORAL at 08:01

## 2021-12-20 ASSESSMENT — ACTIVITIES OF DAILY LIVING (ADL)
HYGIENE/GROOMING: INDEPENDENT
DRESS: INDEPENDENT
ORAL_HYGIENE: INDEPENDENT
DRESS: SCRUBS (BEHAVIORAL HEALTH)
LAUNDRY: UNABLE TO COMPLETE
HYGIENE/GROOMING: INDEPENDENT
ORAL_HYGIENE: INDEPENDENT
LAUNDRY: WITH SUPERVISION

## 2021-12-20 NOTE — PROGRESS NOTES
"   12/20/21 1200   General Information   Date Initially Attended OT 12/20/21   Clinical Impression   Affect Appropriate to situation;Flat   Orientation Oriented to person, place and time   Appearance and ADLs General cleanliness observed in most areas   Attention to Internal Stimuli No observed signs   Interaction Skills Interacts appropriately with staff;Interacts appropriately with peers   Ability to Communicate Needs Independent   Verbal Content Clear;Appropriate to topic   Ability to Maintain Boundaries Maintains appropriate physical boundaries;Maintains appropriate verbal boundaries   Participation Independently participates   Concentration Concentrates 50 minutes;Needs further assessment   Ability to Concentrate With structure;Needs further assessment   Follows and Comprehends Directions Independently follows 1 step verbal directions;Needs further assessment   Memory Needs further assessment   Organization Independently organizes simple tasks;Needs further assessment   Decision Making Independent   Planning and Problem Solving Occasionally needs assist/feedback   Ability to Apply and Learn Concepts Applies within group structure   Frustrations / Stress Tolerance Independently identifies sources of frustration/stress   Level of Insight Insightful into needs, issues, goals   Self Esteem Can identify positives   Social Supports Identifies utilizing supports   General Observation/Plan   General Observations/Plan See Comments   Attended 2 of 2 OT groups. Attended 1 of 2 OT groups.  In the am session, she participated for 50 minutes with 8 pts in a goal oriented task group.  She was pleasant and social on approach. Conversation was in context. She stated the reason for admission as \"depression\". She identified a self positive as \"I've been told I'm interesting\". She explained the difficulty of losing her hu recently and working on \"getting used to where I live living without my \".  She also talked about her " SAD.   In the afternoon group, she participated for 50 minutes with 8 pts in an activity focused on positive thinking and identifying a positive perspective. She offered answers in context, seemed to take time in considering her answers. She talked about the frustration of another peer who addressed the group in an angry tone with accusations of everyone to be difficult to understand.  She chose the following goals to focus on at this time to incude: improve concentration, problem solving and dealing with frustration. Order was received for OT Cognitive Assessment. See separate note with results. Plan:  Encourage attendance. Offer opportunity for success oriented, more structured task work, grade complexity as tolerated, provide the opportunity to decrease anxiety with attendance and reassurance with task focus. Provide structured assistance as difficulties are noted according to cognitive needs with the plan to decrease frustrations. Assess and document.

## 2021-12-20 NOTE — PROGRESS NOTES
Patient seen, chart reviewed, care discussed with staff.  Blood pressure (!) 151/92, pulse 80, temperature 96.9  F (36.1  C), temperature source Temporal, resp. rate 16, weight 63.5 kg (139 lb 14.4 oz), SpO2 95 %.    By report, slept ok.  Mood improving.  Grief with 's passing 2 months ago discussed    General appearance: good  Alert.   Affect: fair  Mood: fair    Speech:  normal.   Eye contact:  good.    Psychomotor behavior: normal  Gait: normal.    Abnormal movements: none  Delusions: none  Hallucinations:  none  Thoughts: logical  Associations: intact  Judgement: good  Insight: good  Cognitions: intact in conversation  Memory:  intact in conversation  Orientation: normal    Not suicidal    Plan: 1.  Same meds  2.  OT cognitive eval  3.  Likely discharge later this week    Imp: Bipolar depressed, and:  Patient Active Problem List   Diagnosis     Hyperlipidemia LDL goal <70     Mild major depression (H)     Pulmonary hypertension (H)     Mild persistent asthma     Seasonal allergic rhinitis     Mitral insufficiency     Tricuspid insufficiency     Bipolar disorder (H)     Renal insufficiency     Tremors     Advanced directives, counseling/discussion     Family history of diabetes mellitus     Family history of celiac disease     Celiac disease     History of colonic polyps     Benign essential hypertension     Hypothyroidism, unspecified type     CKD (chronic kidney disease) stage 3, GFR 30-59 ml/min (H)     Severe aortic stenosis     Other ill-defined heart diseases     Anemia     Disorder of kidney and ureter     Generalized anxiety disorder     Osteopenia     CAD S/P percutaneous coronary angioplasty     Status post coronary angiogram     NYHA class 3 heart failure with preserved ejection fraction (H)     Ischemic cardiomyopathy     Mixed hyperlipidemia     Moderate persistent asthma without complication     Hearing disorder, unspecified laterality     Impairment of balance     Bipolar 1 disorder,  depressed, severe (H)     Current Facility-Administered Medications   Medication     acetaminophen (TYLENOL) tablet 650 mg     albuterol (PROVENTIL HFA/VENTOLIN HFA) inhaler     alendronate (FOSAMAX) tablet 70 mg     alum & mag hydroxide-simethicone (MAALOX) suspension 30 mL     [Held by provider] amLODIPine (NORVASC) tablet 10 mg     aspirin (ASA) chewable tablet 81 mg     azelastine (ASTELIN) nasal spray 1 spray     calcium citrate (CITRACAL) tablet 950 mg     cholecalciferol (VITAMIN D3) 1250 mcg (38980 units) capsule 50,000 Units     clopidogrel (PLAVIX) tablet 75 mg     desvenlafaxine (PRISTIQ) 24 hr tablet 100 mg     fluticasone-vilanterol (BREO ELLIPTA) 200-25 MCG/INH inhaler 1 puff     hydrOXYzine (ATARAX) tablet 25 mg     lamoTRIgine (LaMICtal) tablet 200 mg     lamoTRIgine (LaMICtal) tablet 25 mg     levothyroxine (SYNTHROID/LEVOTHROID) tablet 50 mcg     liothyronine (CYTOMEL) tablet 10 mcg     losartan (COZAAR) tablet 50 mg     melatonin tablet 3 mg     metoprolol tartrate (LOPRESSOR) tablet 25 mg     metoprolol tartrate (LOPRESSOR) tablet 50 mg     mirtazapine (REMERON) tablet TABS 7.5 mg     multivitamin w/minerals (THERA-VIT-M) tablet 1 tablet     OLANZapine (zyPREXA) tablet 2.5 mg    Or     OLANZapine (zyPREXA) injection 2.5 mg     rosuvastatin (CRESTOR) tablet 40 mg     senna-docusate (SENOKOT-S/PERICOLACE) 8.6-50 MG per tablet 1 tablet     verapamil ER (CALAN-SR) CR tablet 240 mg     Recent Results (from the past 168 hour(s))   Surgical Pathology Exam    Collection Time: 12/14/21 10:54 AM   Result Value Ref Range    Case Report       Surgical Pathology Report                         Case: [id number]                                  Authorizing Provider:  Javier Landa MD Collected:           12/14/2021 10:54 AM          Ordering Location:     [facility]   Received:            12/14/2021 10:54 AM                                 Ricardo                                            "                           Pathologist:           Analy Dueñas MD                                                                           Specimen:    Endometrium                                                                                Final Diagnosis       Endometrium, curettage  -Scant fragments of superficial, inactive endometrium  -Fragments of squamous mucosa negative for dysplasia and malignancy      Comment       Sections demonstrate scant, superficial strips of inactive endometrium.  There is insufficient intact fragments of endometrium showing glands and stroma to evaluate for hyperplasia or malignancy.  However, there is no cytologic atypia.  Additional sampling may be considered if clinical concern persists.      Clinical Information       postmenopausal bleeding, endometrial thickening on ultrasound      Gross Description       A(A). Endometrium, :  The specimen is received in formalin labeled with the patient's name, medical record number and other identifying information and designated \"endometrium\". It consists of multiple tan-white soft tissue fragments that range from less than 0.1-0.1 cm in greatest dimension.  The formalin is filtered and the specimen is wrapped and entirely submitted in 1 cassette.    Note: The specimen is scant and may not survive processing.   (LEDY Byers)        Microscopic Description       The microscopic findings support the diagnosis.  There is scant, inactive endometrium and fragments of squamous mucosa with atrophic changes.  An immunohistochemical stain for p16 was performed, with appropriately reactive controls, and is negative in the squamous mucosa, providing no support for dysplasia.      Performing Labs       The technical component of this testing was completed at [facility]      Case Images   "   Comprehensive metabolic panel    Collection Time: 12/16/21  5:51 PM   Result Value Ref Range    Sodium 138 133 - 144 mmol/L    Potassium 3.8 3.4 - 5.3 mmol/L    Chloride 106 94 - 109 mmol/L    Carbon Dioxide (CO2) 29 20 - 32 mmol/L    Anion Gap 3 3 - 14 mmol/L    Urea Nitrogen 28 7 - 30 mg/dL    Creatinine 1.37 (H) 0.52 - 1.04 mg/dL    Calcium 9.9 8.5 - 10.1 mg/dL    Glucose 85 70 - 99 mg/dL    Alkaline Phosphatase 60 40 - 150 U/L    AST 23 0 - 45 U/L    ALT 29 0 - 50 U/L    Protein Total 7.1 6.8 - 8.8 g/dL    Albumin 3.3 (L) 3.4 - 5.0 g/dL    Bilirubin Total 0.3 0.2 - 1.3 mg/dL    GFR Estimate 35 (L) >60 mL/min/1.73m2   Asymptomatic COVID-19 Virus (Coronavirus) by PCR Nasopharyngeal    Collection Time: 12/16/21  5:51 PM    Specimen: Nasopharyngeal; Swab   Result Value Ref Range    SARS CoV2 PCR Negative Negative, Testing sent to reference lab. Results will be returned via unsolicited result   CBC with platelets and differential    Collection Time: 12/16/21  5:51 PM   Result Value Ref Range    WBC Count 6.0 4.0 - 11.0 10e3/uL    RBC Count 4.28 3.80 - 5.20 10e6/uL    Hemoglobin 13.9 11.7 - 15.7 g/dL    Hematocrit 42.1 35.0 - 47.0 %    MCV 98 78 - 100 fL    MCH 32.5 26.5 - 33.0 pg    MCHC 33.0 31.5 - 36.5 g/dL    RDW 12.1 10.0 - 15.0 %    Platelet Count 203 150 - 450 10e3/uL    % Neutrophils 59 %    % Lymphocytes 28 %    % Monocytes 10 %    % Eosinophils 2 %    % Basophils 1 %    % Immature Granulocytes 0 %    NRBCs per 100 WBC 0 <1 /100    Absolute Neutrophils 3.5 1.6 - 8.3 10e3/uL    Absolute Lymphocytes 1.7 0.8 - 5.3 10e3/uL    Absolute Monocytes 0.6 0.0 - 1.3 10e3/uL    Absolute Eosinophils 0.1 0.0 - 0.7 10e3/uL    Absolute Basophils 0.1 0.0 - 0.2 10e3/uL    Absolute Immature Granulocytes 0.0 <=0.4 10e3/uL    Absolute NRBCs 0.0 10e3/uL   UA with Microscopic reflex to Culture    Collection Time: 12/16/21  5:58 PM    Specimen: Urine, Midstream   Result Value Ref Range    Color Urine Straw Colorless, Straw, Light  Yellow, Yellow    Appearance Urine Clear Clear    Glucose Urine Negative Negative mg/dL    Bilirubin Urine Negative Negative    Ketones Urine Negative Negative mg/dL    Specific Gravity Urine 1.006 1.003 - 1.035    Blood Urine Large (A) Negative    pH Urine 7.0 5.0 - 7.0    Protein Albumin Urine 10  (A) Negative mg/dL    Urobilinogen Urine Normal Normal, 2.0 mg/dL    Nitrite Urine Negative Negative    Leukocyte Esterase Urine Trace (A) Negative    RBC Urine 3 (H) <=2 /HPF    WBC Urine 13 (H) <=5 /HPF    Squamous Epithelials Urine 1 <=1 /HPF   Urine Culture    Collection Time: 12/16/21  5:58 PM    Specimen: Urine, Midstream   Result Value Ref Range    Culture 10,000-50,000 CFU/mL Mixture of urogenital mitchell    Lipid panel    Collection Time: 12/17/21  8:35 AM   Result Value Ref Range    Cholesterol 172 <200 mg/dL    Triglycerides 137 <150 mg/dL    Direct Measure HDL 61 >=50 mg/dL    LDL Cholesterol Calculated 84 <=100 mg/dL    Non HDL Cholesterol 111 <130 mg/dL   TSH with free T4 reflex and/or T3 as indicated    Collection Time: 12/17/21  8:35 AM   Result Value Ref Range    TSH 3.73 0.40 - 4.00 mU/L   Vitamin B12    Collection Time: 12/17/21  8:35 AM   Result Value Ref Range    Vitamin B12 966 193 - 986 pg/mL   Folate    Collection Time: 12/17/21  8:35 AM   Result Value Ref Range    Folic Acid 60.5 >=5.4 ng/mL   Vitamin D Deficiency    Collection Time: 12/17/21  8:35 AM   Result Value Ref Range    Vitamin D, Total (25-Hydroxy) 57 20 - 75 ug/L

## 2021-12-20 NOTE — PLAN OF CARE
Problem: Depression  Goal: Improved Mood  Outcome: Improving     Problem: Activity and Energy Impairment (Anxiety Signs/Symptoms)  Goal: Optimized Energy Level (Anxiety Signs/Symptoms)  Outcome: Improving  Flowsheets (Taken 12/20/2021 1211)  Mutually Determined Action Steps (Optimized Energy Level):    participates in exercise activity    dresses/ready for morning activity    grooms self without prompting    Patient alert and oriented x4. Denies pain.   Calm and cooperative. Full range affect. Visible in the milieu the entire shift. Attended group activities. Social with staff and peers.  Denies anxiety, depression, SI, SIB and hallucinations.   Appetite and fluid intake adequate.

## 2021-12-20 NOTE — PLAN OF CARE
"Pt presents with full range affect and calm mood. Denies SI/SIB and hallucinations. Reports \"feeling good\" and having a good day. Pt was present in the lounge and social with staff and peers. Appetite and fluid intake adequate. Denies pain. VSS. No significant drops in BP when orthostatic vitals were assessed. HR WNL this evening. Metoprolol was held this evening as standing systolic BP < 100 and did not want to risk further dropping pt BP. Medication compliant otherwise, no PRNs given. Pt watched movie with peers this evening as well. No other concerns or complaints noted.   "

## 2021-12-20 NOTE — PLAN OF CARE
Assessment/Intervention/Current Symptoms and Care Coordination  Patient is pleasant, visible on the unit, engaging with staff and patients, going to groups.  Cognitive testing in process.      Discharge Plan or Goal  Plan to return to [facility], likely with increased level of support there.    Barriers to Discharge   Evaluation in process.  Patient not able to care for self at this time.  Cognitive testing indicating need for additional supports    Referral Status  None    Legal Status  Voluntary

## 2021-12-21 LAB
ANION GAP SERPL CALCULATED.3IONS-SCNC: 8 MMOL/L (ref 3–14)
ATRIAL RATE - MUSE: 72 BPM
BUN SERPL-MCNC: 35 MG/DL (ref 7–30)
CALCIUM SERPL-MCNC: 9.6 MG/DL (ref 8.5–10.1)
CHLORIDE BLD-SCNC: 108 MMOL/L (ref 94–109)
CO2 SERPL-SCNC: 28 MMOL/L (ref 20–32)
CREAT SERPL-MCNC: 1.24 MG/DL (ref 0.52–1.04)
DIASTOLIC BLOOD PRESSURE - MUSE: NORMAL MMHG
ERYTHROCYTE [DISTWIDTH] IN BLOOD BY AUTOMATED COUNT: 12.1 % (ref 10–15)
GFR SERPL CREATININE-BSD FRML MDRD: 40 ML/MIN/1.73M2
GLUCOSE BLD-MCNC: 140 MG/DL (ref 70–99)
HCT VFR BLD AUTO: 43 % (ref 35–47)
HGB BLD-MCNC: 14.2 G/DL (ref 11.7–15.7)
HOLD SPECIMEN: NORMAL
INTERPRETATION ECG - MUSE: NORMAL
MAGNESIUM SERPL-MCNC: 2.4 MG/DL (ref 1.6–2.3)
MCH RBC QN AUTO: 32.6 PG (ref 26.5–33)
MCHC RBC AUTO-ENTMCNC: 33 G/DL (ref 31.5–36.5)
MCV RBC AUTO: 99 FL (ref 78–100)
P AXIS - MUSE: 56 DEGREES
PLATELET # BLD AUTO: 190 10E3/UL (ref 150–450)
POTASSIUM BLD-SCNC: 3.8 MMOL/L (ref 3.4–5.3)
PR INTERVAL - MUSE: 230 MS
QRS DURATION - MUSE: 96 MS
QT - MUSE: 412 MS
QTC - MUSE: 451 MS
R AXIS - MUSE: 13 DEGREES
RBC # BLD AUTO: 4.36 10E6/UL (ref 3.8–5.2)
SODIUM SERPL-SCNC: 144 MMOL/L (ref 133–144)
SYSTOLIC BLOOD PRESSURE - MUSE: NORMAL MMHG
T AXIS - MUSE: 195 DEGREES
TROPONIN I SERPL HS-MCNC: 16 NG/L
VENTRICULAR RATE- MUSE: 72 BPM
WBC # BLD AUTO: 5 10E3/UL (ref 4–11)

## 2021-12-21 PROCEDURE — H2032 ACTIVITY THERAPY, PER 15 MIN: HCPCS

## 2021-12-21 PROCEDURE — 83735 ASSAY OF MAGNESIUM: CPT | Performed by: PHYSICIAN ASSISTANT

## 2021-12-21 PROCEDURE — 250N000013 HC RX MED GY IP 250 OP 250 PS 637: Performed by: PHYSICIAN ASSISTANT

## 2021-12-21 PROCEDURE — 93005 ELECTROCARDIOGRAM TRACING: CPT

## 2021-12-21 PROCEDURE — 84484 ASSAY OF TROPONIN QUANT: CPT | Performed by: PHYSICIAN ASSISTANT

## 2021-12-21 PROCEDURE — 250N000013 HC RX MED GY IP 250 OP 250 PS 637: Performed by: PSYCHIATRY & NEUROLOGY

## 2021-12-21 PROCEDURE — 93010 ELECTROCARDIOGRAM REPORT: CPT | Performed by: INTERNAL MEDICINE

## 2021-12-21 PROCEDURE — 85027 COMPLETE CBC AUTOMATED: CPT | Performed by: PHYSICIAN ASSISTANT

## 2021-12-21 PROCEDURE — 250N000013 HC RX MED GY IP 250 OP 250 PS 637: Performed by: NURSE PRACTITIONER

## 2021-12-21 PROCEDURE — 80048 BASIC METABOLIC PNL TOTAL CA: CPT | Performed by: PHYSICIAN ASSISTANT

## 2021-12-21 PROCEDURE — G0177 OPPS/PHP; TRAIN & EDUC SERV: HCPCS

## 2021-12-21 PROCEDURE — 36415 COLL VENOUS BLD VENIPUNCTURE: CPT | Performed by: PHYSICIAN ASSISTANT

## 2021-12-21 PROCEDURE — 124N000003 HC R&B MH SENIOR/ADOLESCENT

## 2021-12-21 RX ORDER — MEMANTINE HYDROCHLORIDE 5 MG/1
5 TABLET ORAL DAILY
Status: DISCONTINUED | OUTPATIENT
Start: 2021-12-21 | End: 2021-12-22

## 2021-12-21 RX ADMIN — Medication 950 MG: at 20:03

## 2021-12-21 RX ADMIN — LEVOTHYROXINE SODIUM 50 MCG: 50 TABLET ORAL at 06:32

## 2021-12-21 RX ADMIN — FLUTICASONE FUROATE AND VILANTEROL TRIFENATATE 1 PUFF: 200; 25 POWDER RESPIRATORY (INHALATION) at 08:22

## 2021-12-21 RX ADMIN — LOSARTAN POTASSIUM 50 MG: 25 TABLET, FILM COATED ORAL at 08:21

## 2021-12-21 RX ADMIN — LAMOTRIGINE 200 MG: 200 TABLET ORAL at 08:20

## 2021-12-21 RX ADMIN — MEMANTINE 5 MG: 5 TABLET ORAL at 12:32

## 2021-12-21 RX ADMIN — LIOTHYRONINE SODIUM 10 MCG: 5 TABLET ORAL at 08:22

## 2021-12-21 RX ADMIN — DESVENLAFAXINE SUCCINATE 100 MG: 50 TABLET, EXTENDED RELEASE ORAL at 08:21

## 2021-12-21 RX ADMIN — CLOPIDOGREL BISULFATE 75 MG: 75 TABLET, FILM COATED ORAL at 08:22

## 2021-12-21 RX ADMIN — AZELASTINE HYDROCHLORIDE 1 SPRAY: 137 SPRAY, METERED NASAL at 08:22

## 2021-12-21 RX ADMIN — LAMOTRIGINE 25 MG: 25 TABLET ORAL at 08:20

## 2021-12-21 RX ADMIN — ROSUVASTATIN CALCIUM 40 MG: 20 TABLET, FILM COATED ORAL at 08:21

## 2021-12-21 RX ADMIN — MIRTAZAPINE 7.5 MG: 7.5 TABLET, FILM COATED ORAL at 20:03

## 2021-12-21 RX ADMIN — VERAPAMIL HYDROCHLORIDE 240 MG: 240 TABLET ORAL at 08:20

## 2021-12-21 RX ADMIN — ASPIRIN 81 MG CHEWABLE TABLET 81 MG: 81 TABLET CHEWABLE at 20:03

## 2021-12-21 RX ADMIN — MULTIPLE VITAMINS W/ MINERALS TAB 1 TABLET: TAB at 08:22

## 2021-12-21 ASSESSMENT — ACTIVITIES OF DAILY LIVING (ADL)
LAUNDRY: WITH SUPERVISION
ORAL_HYGIENE: INDEPENDENT
HYGIENE/GROOMING: INDEPENDENT
HYGIENE/GROOMING: INDEPENDENT
DRESS: SCRUBS (BEHAVIORAL HEALTH)
DRESS: SCRUBS (BEHAVIORAL HEALTH)
ORAL_HYGIENE: INDEPENDENT
LAUNDRY: WITH SUPERVISION

## 2021-12-21 NOTE — PLAN OF CARE
"Music Therapy Group note    Clinical Hours in session: 1.0    Number of patients in group: 4    Scope of service: psychodynamic     Intervention: Evening Relaxation     Goal of group: anxiety reduction     Patient response/reaction to treatment intervention(s): Cooperatively engaged in Evening Music Relaxation group designed to decrease anxiety.  Calm affect, appropriately engaged in session, responding well to the music.  \"Jan\" stayed after group, wanting to know more about Music Therapy and talk about her experiences working as a  and for [employer].  Presents as fairly high-functioning.     Audrey Arndt, [facility]  Board-Certified Music Therapist             "

## 2021-12-21 NOTE — PLAN OF CARE
Problem: Sleep Disturbance (Anxiety Signs/Symptoms)  Goal: Improved Sleep (Anxiety Signs/Symptoms)  Outcome: Improving     Patient slept well the whole night for 7.5 hours. No complaints made.

## 2021-12-21 NOTE — PLAN OF CARE
Problem: Cognition  Goal: Demo Compensatory Strategy  Description: Demonstrate use of appropriate compensatory/adaptive strategy to complete functional task  Note: Attended 1 of 1 OT groups, participating for 50 minutes with 5 pts in a goal oriented task group. She was successful in completing a task using problem skills with visuospatial concepts. She asked for assistance as needed. She also began a task unfamiliar to her and was attentive to details. She was pleasant and social on approach. Affect brightened some with interactions.

## 2021-12-21 NOTE — PLAN OF CARE
"Pt presents with full range affect and calm mood. Denies SI/SIB and hallucinations. Denies depression and anxiety, reports her mood as \"fine\" and describes her day as \"good\". Pt present in the lounge all shift, social with peers, spent a lot of time reading a book. She denies pain. Appetite and fluid intake adequate. VS initially WNL at 1600, then check after 2000, HR 47, recheck via radial was 48. Pt asymptomatic - no dizziness, no chest pain, no SOB - reports feeling \"fine\". Metoprolol held this evening. Internal medicine alerted of this - see note. Internal medicine will follow up with pt in the AM. Educated pt on alerting staff if she starts to experience any symptoms so that we may intervene. She is appreciative of the concern and help regarding this situation. Pt was compliant with all other medications. Pt daughter visited this evening which she reported went well. No other concerns or complaints noted this evening.   "

## 2021-12-21 NOTE — PROGRESS NOTES
Brief Medicine Note    Follow up regarding bradycardia and 1st degree AVB    Pt continues to be completely asymptomatic and denies chest pain, sob, palpitations, dizziness/lightheadedness/ fainting or other complaints. Other vitals remain stable     Today's vital signs, medications, and nursing notes were reviewed. Labs reviewed.     BP (!) 175/89 (Patient Position: Sitting)   Pulse 75   Temp 97.6  F (36.4  C) (Tympanic)   Resp 17   Wt 63.5 kg (139 lb 14.4 oz)   SpO2 95%   BMI 26.87 kg/m    General: A&O. NAD.   Chest: nonlabored breathing    A/P:  Bradycardia  Euthyroid. Pt previously with several rate control medications (verapamil and metoprolol) with metoprolol now held.   - hold metoprolol  - hold verapamil for HR <55     EKG reviewed  1st degree AVB, no t wave inversions V4-V6 were seen on prior EKGs, stable  - given asymptomatic will continue to monitor    Rola Martinez PA-C  [facility]  Contact information available via [facility] Paging/Directory

## 2021-12-21 NOTE — PROGRESS NOTES
Cross Cover Note    Cross cover provider contacted by nursing regarding patient having asymptomatic bradycardia. Patient is currently reading a book in a chair in the lobby. She denies any symptoms. Current HR 48bpm.    Per chart review this occurred on 12/18 as well in which Verapamil and Metoprolol were held on 12/19 with improvement of HR. ECG at that time with 1st AVB, CR 42 and no ST/T wave abnormality.     She has a history of moderate AV stenosis, HFpEF (EF 55-60%), CAD s/p PCI with 3 stents in 8/2020 and HTN.  She is on Verapamil 240mg, Metoprolol tartrate 50mg qAM and 25mg at bedtime, Amlodipine 10mg daily (being held) and Losartan 50mg daily. She follows with Dr. Zuniga, Cardiology. Last seen on 11/26/21.    Today's vital signs, medications, and nursing notes were reviewed.      /67 (BP Location: Left arm, Patient Position: Sitting)   Pulse (!) 48   Temp 98.4  F (36.9  C) (Temporal)   Resp 16   Wt 63.5 kg (139 lb 14.4 oz)   SpO2 96%   BMI 26.87 kg/m         A/P:  # Asymptomatic bradycardia  # HFpEF:  - Will hold Metoprolol this evening and in AM pending trends   - Did not put Verapamil on hold at this time but with hold parameters in place   - Recommend day team to call Dr. Zuniga, Cardiology in AM to discuss dose titration of Verapamil or Metoprolol   - Monitor, please notify if symptomatic including chest pain, dizziness, lightheadedness, SOB, or hypotension <90/60     Please contact Medicine provider on call overnight with any additional questions or concerns.     Ana Luisa Horne PA-C  Hospitalist Service  Pager [phone]

## 2021-12-21 NOTE — PROGRESS NOTES
"Behavioral Health  Note    Behavioral Health  Spirituality Group Note    UNIT [address]    Name: Kamryn Miller YOB: 1935   MRN: [id number] Age: 86 year old      Patient attended -led group, which included discussion of spirituality, coping with illness and mindfulness.    Patient attended group for 0.75 hrs.    The patient actively participated in group discussion and patient demonstrated an appreciation of topic's application for their personal circumstances. Pt participated in brief mindfulness activity.  Toby checked in as \"feeling good\" and spoke of getting used to things on the unit.    Nettie De Oliveira MD[facility]  Associate   Pager [phone]  Office [phone]    "

## 2021-12-21 NOTE — CONSULTS
OCCUPATIONAL THERAPY:  Independent Living Skills Evaluation / CPT / MoCA   [facility],   [address]  Madeline Lopez, OTR/L    Patient Name      Kamryn Miller (Jan)             Date of Assessment:     12-      THE RESULTS OF THIS ASSESSMENT PROVIDE RECOMMENDATIONS BASED UPON FUNCTION AT THE TIME OF ADMINISTRATION ONLY, MAY NOT REFLECT BASELINE FUNCTION.    COGNITIVE PERFORMANCE TEST: The CPT is a standardized, performance-based assessment used to assess cognitive-functional information processing and executive processing.  This test is based on performance of some common activities. The level of performance may help to describe how information is being processed, potential safety risks and recommendations for supervision needs in the individual s living environment.  Updated with 2018 guidelines    [place] COGNITIVE ASSESSMENT (MoCA)    VERSION  ____8.1   The [place] Cognitive Assessment (MoCA) was designed as a rapid screening instrument for mild cognitive dysfunction. It assesses different cognitive domains: attention and concentration, executive functions, memory, language, visuoconstructional skills, conceptual thinking, calculations, and orientation. The total possible score is 30 points; a score of 26 or above is considered normal.    [place] COGNITIVE ASSESSMENT SCORE: _  19 / 30 which is  below the normal range.     Draw a Clock Subtest: Score:   3  / 3     Details of scores:    SUBTASK SCORE SUBTASK SCORE   Visuospatial/Executive    3  /5 Abstraction    1  /2   Naming    3  /3 Delayed Recall    0  /5   Attention    4  /6 Orientation    5  /6   Language    2  /3         CPT RESULTS:    SUBTASK SCORE COMMENTS   Phone   4.5 /6 Asked for incorrect information. Did not obtain information requested.   Shop   4.5 /6 Did not pay attention to size. Initially stated the need for 1 size to fit then found another size item for the price she had the funds for. Stated it would not fit however    Med Box   4.5  /6 Was unsuccessful in correcting errors with specific cues. Fluidia was correct, corrected w specific cues.   Wash   5  /5 Completed concrete task correctly.   Toast   5  /5 Completed concrete task correctly.       TOTAL CPT TASK 5 AVERAGE SCORE:   4.5  / 5.6  which is well below the normal range.    GENERAL RECOMMENDATIONS BASED UPON THE COGNITIVE PERFORMANCE TEST (CPT) SCORE.  Note these are ranges and there may be fluctuations in abilities for an individual at different times of days                                                                                                             LEVEL 4.5  Mild to moderate functional decline due to significant deficit in executive functions. Areas of difficulty may be seen with dividing attention and problem solving.  Memory, judgment, reasoning and planning show obvious impairment, slowed processing.  Complex daily tasks are performed with inconsistency, obvious difficulty or error. Some concrete thinking and difficulty dividing attention. Independent living presents significant risks in the areas of managing meals, medications, finances, complex organizational tasks and details. Assistance or in home assistance or Assisted Living settings may provide a safer and more comfortable environment.  1. Safety:    Medications typically should be set up and adherence monitored, stove use may require supervision, and driving ability is affected, Driving poses a significant risk due to the difficulty in dividing attention.     2. IADL:  Managing of details may be difficult. Mild difficulty with simple everyday self-care tasks, but may have significant deficits in reading, writing, shopping, and managing finances and medications. May need assistance and reminders. The ability to self initiate ADLs may decline.  3. Do well with a regular routine, but will likely need reminders to complete anything outside of that routine.   4. May have self centered behavior or  inability to consider the needs of others. May see an inability to predict problems that may occur.  5. May have strong feelings of personal rights or exhibit self-centered behavior related to trouble seeing the  whole picture , and can appear disorganized or uninhibited.    ASSESSMENT/RECOMMENDATIONS (based upon assessment/group observation)    Toby was pleasant and cooperative during this session. She stated the difficulty of a combination of the loss of her , a recent move to an Assisted Living and her Seasonal Affective Disorder which is a pattern for her this time of year. She explained she is managing her own medications and feels to have no difficulty with this task. She is relying on her daughter to help with managing finances as this was a task typically her  managed in the past. She is presently receiving 2 meals a day provided at the [facility] and is not preparing any meals on her own, feeling 2 meals daily to be adequate and comfortable for her.    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     Toby scored on the CPT __4.5___ which is below the normal range. She scored on the MoCA __19/30___ which is also below the normal range. Note the MoCA score on the Visualspatial/Executive question of ___3/5__. Note the score on Delayed Recall of __0/5___. The Memory Index Score was 8/15 with spontaneous recall of __0__ words, recalling __4__ words with general cues and __0__ words with multiple choice cues. The scores on these assessments may indicate mild to moderate functional decline.    If Toby continues to perform at the level demonstrated during this assessment this author supports her continued living at her Assisted Living Facility to offer a safe place of living. This author recommends continuing with all meals provided, which will also provide the opportunity to meet other peers and receive support from a peer environment. Even though Toby feels she was having no difficulty with managing her  medications, this author recommends some changes in this task. It would be most accurate possibly to have her medications administered. If this is not an option that is suitable for her and her family, another option would be for family to take an active role in helping. Toby said her daughter visits more than 1X a week. Another option if desired, would be for family to set up a couple days of medications as a trial, and call her to remind her of when to take the medications that were set up for her along with a discussion of the pills to take at the time of the call. If desired, this option would be easily assessed to see if it is successul in a short period of time and to reassess alternatives of medication administration as needed. Recommend her active involvement in structured activities to provide assistance not only in structuring her time but also in building relationships with peers. If this score is accurate at this time, Toby may experience increased difficulties with new learning, memory, problem solving, judgment, organization and planning. She at some point may benefit with cues in performing familiar tasks and need assistance in initiating tasks. Even though Toby stated feeling no need for any changes at home, she also seemed willing to consider that at this time the added assistance may be helpful for her, considering the stress of changes she is going through.      Additional recommendations discussed included general brain health tips of utilizing brain stimulating activities on a daily basis, such as cross word puzzles, word searches, reading, etc. Additional information was also discussed such as following as best possible to eat  heart healthy , sleeping adequate number of hours, getting physical activity within range of comfort ability, and managing stress levels as best possible.

## 2021-12-21 NOTE — PLAN OF CARE
Assessment/Intervention/Current Symptoms and Care Coordination  CTC spoke with daughter Ana today regarding discharge planning.  Ana explains that patient can get the necessary additional services at her Assisted Living and that there are no financial concerns regarding this.  Results of the cognitive testing had been shared with Ana by Dr. Christopher in an earlier phone call.  We are planning for a discharge Thursday if patient continues to improve.  Writer has left a message with [facility] to coordinate adding services.  Ana plans to schedule a medication management appointment and let us know date and time.  Patient has a therapy appointment in place for next Wednesday.      Discharge Plan or Goal  To home with appropriate aftercare appointments and supports in place.    Barriers to Discharge   Patient stabilization continues with medication monitoring, evaluation of medical concerns.     Referral Status  Additional services at AL, referral in process.    Legal Status  Voluntary

## 2021-12-21 NOTE — PROGRESS NOTES
Patient seen, chart reviewed, care discussed with staff.  Call to Sarah daughter.  Blood pressure (!) 175/89, pulse 75, temperature 97.6  F (36.4  C), temperature source Tympanic, resp. rate 17, weight 63.5 kg (139 lb 14.4 oz), SpO2 95 %.  19 on MoCA, and CPT 4.5/5.6  General appearance: good  Alert.   Affect: good  Mood: fair   good  Speech:  normal.   Eye contact:  good.    Psychomotor behavior: normal  Gait: normal.    Abnormal movements: none  Delusions: none  Hallucinations:  none  Thoughts: logical  Associations: intact  Judgement: poor  Insight: poor  Cognitions: intact in conversation, has major neurocognitive  Memory:  intact in conversation, has major neurocognitive  Orientation: normal    Not suicidal.    Imp:  Bipolar 1 depressed  2.  Major neurocognitive  And:  Patient Active Problem List   Diagnosis     Hyperlipidemia LDL goal <70     Mild major depression (H)     Pulmonary hypertension (H)     Mild persistent asthma     Seasonal allergic rhinitis     Mitral insufficiency     Tricuspid insufficiency     Bipolar disorder (H)     Renal insufficiency     Tremors     Advanced directives, counseling/discussion     Family history of diabetes mellitus     Family history of celiac disease     Celiac disease     History of colonic polyps     Benign essential hypertension     Hypothyroidism, unspecified type     CKD (chronic kidney disease) stage 3, GFR 30-59 ml/min (H)     Severe aortic stenosis     Other ill-defined heart diseases     Anemia     Disorder of kidney and ureter     Generalized anxiety disorder     Osteopenia     CAD S/P percutaneous coronary angioplasty     Status post coronary angiogram     NYHA class 3 heart failure with preserved ejection fraction (H)     Ischemic cardiomyopathy     Mixed hyperlipidemia     Moderate persistent asthma without complication     Hearing disorder, unspecified laterality     Impairment of balance     Bipolar 1 disorder, depressed, severe (H)     Plan:  Add  Namenda  2.  DISCHARGE LIKELY Thursday 12/23    Current Facility-Administered Medications   Medication     acetaminophen (TYLENOL) tablet 650 mg     albuterol (PROVENTIL HFA/VENTOLIN HFA) inhaler     alendronate (FOSAMAX) tablet 70 mg     alum & mag hydroxide-simethicone (MAALOX) suspension 30 mL     [Held by provider] amLODIPine (NORVASC) tablet 10 mg     aspirin (ASA) chewable tablet 81 mg     azelastine (ASTELIN) nasal spray 1 spray     calcium citrate (CITRACAL) tablet 950 mg     cholecalciferol (VITAMIN D3) 1250 mcg (43604 units) capsule 50,000 Units     clopidogrel (PLAVIX) tablet 75 mg     desvenlafaxine (PRISTIQ) 24 hr tablet 100 mg     fluticasone-vilanterol (BREO ELLIPTA) 200-25 MCG/INH inhaler 1 puff     hydrOXYzine (ATARAX) tablet 25 mg     lamoTRIgine (LaMICtal) tablet 200 mg     lamoTRIgine (LaMICtal) tablet 25 mg     levothyroxine (SYNTHROID/LEVOTHROID) tablet 50 mcg     liothyronine (CYTOMEL) tablet 10 mcg     losartan (COZAAR) tablet 50 mg     melatonin tablet 3 mg     memantine (NAMENDA) tablet 5 mg     [Held by provider] metoprolol tartrate (LOPRESSOR) tablet 25 mg     [Held by provider] metoprolol tartrate (LOPRESSOR) tablet 50 mg     mirtazapine (REMERON) tablet TABS 7.5 mg     multivitamin w/minerals (THERA-VIT-M) tablet 1 tablet     OLANZapine (zyPREXA) tablet 2.5 mg    Or     OLANZapine (zyPREXA) injection 2.5 mg     rosuvastatin (CRESTOR) tablet 40 mg     senna-docusate (SENOKOT-S/PERICOLACE) 8.6-50 MG per tablet 1 tablet     verapamil ER (CALAN-SR) CR tablet 240 mg     Recent Results (from the past 168 hour(s))   Comprehensive metabolic panel    Collection Time: 12/16/21  5:51 PM   Result Value Ref Range    Sodium 138 133 - 144 mmol/L    Potassium 3.8 3.4 - 5.3 mmol/L    Chloride 106 94 - 109 mmol/L    Carbon Dioxide (CO2) 29 20 - 32 mmol/L    Anion Gap 3 3 - 14 mmol/L    Urea Nitrogen 28 7 - 30 mg/dL    Creatinine 1.37 (H) 0.52 - 1.04 mg/dL    Calcium 9.9 8.5 - 10.1 mg/dL    Glucose 85 70  - 99 mg/dL    Alkaline Phosphatase 60 40 - 150 U/L    AST 23 0 - 45 U/L    ALT 29 0 - 50 U/L    Protein Total 7.1 6.8 - 8.8 g/dL    Albumin 3.3 (L) 3.4 - 5.0 g/dL    Bilirubin Total 0.3 0.2 - 1.3 mg/dL    GFR Estimate 35 (L) >60 mL/min/1.73m2   Asymptomatic COVID-19 Virus (Coronavirus) by PCR Nasopharyngeal    Collection Time: 12/16/21  5:51 PM    Specimen: Nasopharyngeal; Swab   Result Value Ref Range    SARS CoV2 PCR Negative Negative, Testing sent to reference lab. Results will be returned via unsolicited result   CBC with platelets and differential    Collection Time: 12/16/21  5:51 PM   Result Value Ref Range    WBC Count 6.0 4.0 - 11.0 10e3/uL    RBC Count 4.28 3.80 - 5.20 10e6/uL    Hemoglobin 13.9 11.7 - 15.7 g/dL    Hematocrit 42.1 35.0 - 47.0 %    MCV 98 78 - 100 fL    MCH 32.5 26.5 - 33.0 pg    MCHC 33.0 31.5 - 36.5 g/dL    RDW 12.1 10.0 - 15.0 %    Platelet Count 203 150 - 450 10e3/uL    % Neutrophils 59 %    % Lymphocytes 28 %    % Monocytes 10 %    % Eosinophils 2 %    % Basophils 1 %    % Immature Granulocytes 0 %    NRBCs per 100 WBC 0 <1 /100    Absolute Neutrophils 3.5 1.6 - 8.3 10e3/uL    Absolute Lymphocytes 1.7 0.8 - 5.3 10e3/uL    Absolute Monocytes 0.6 0.0 - 1.3 10e3/uL    Absolute Eosinophils 0.1 0.0 - 0.7 10e3/uL    Absolute Basophils 0.1 0.0 - 0.2 10e3/uL    Absolute Immature Granulocytes 0.0 <=0.4 10e3/uL    Absolute NRBCs 0.0 10e3/uL   UA with Microscopic reflex to Culture    Collection Time: 12/16/21  5:58 PM    Specimen: Urine, Midstream   Result Value Ref Range    Color Urine Straw Colorless, Straw, Light Yellow, Yellow    Appearance Urine Clear Clear    Glucose Urine Negative Negative mg/dL    Bilirubin Urine Negative Negative    Ketones Urine Negative Negative mg/dL    Specific Gravity Urine 1.006 1.003 - 1.035    Blood Urine Large (A) Negative    pH Urine 7.0 5.0 - 7.0    Protein Albumin Urine 10  (A) Negative mg/dL    Urobilinogen Urine Normal Normal, 2.0 mg/dL    Nitrite Urine  Negative Negative    Leukocyte Esterase Urine Trace (A) Negative    RBC Urine 3 (H) <=2 /HPF    WBC Urine 13 (H) <=5 /HPF    Squamous Epithelials Urine 1 <=1 /HPF   Urine Culture    Collection Time: 12/16/21  5:58 PM    Specimen: Urine, Midstream   Result Value Ref Range    Culture 10,000-50,000 CFU/mL Mixture of urogenital mitchell    Lipid panel    Collection Time: 12/17/21  8:35 AM   Result Value Ref Range    Cholesterol 172 <200 mg/dL    Triglycerides 137 <150 mg/dL    Direct Measure HDL 61 >=50 mg/dL    LDL Cholesterol Calculated 84 <=100 mg/dL    Non HDL Cholesterol 111 <130 mg/dL   TSH with free T4 reflex and/or T3 as indicated    Collection Time: 12/17/21  8:35 AM   Result Value Ref Range    TSH 3.73 0.40 - 4.00 mU/L   Vitamin B12    Collection Time: 12/17/21  8:35 AM   Result Value Ref Range    Vitamin B12 966 193 - 986 pg/mL   Folate    Collection Time: 12/17/21  8:35 AM   Result Value Ref Range    Folic Acid 60.5 >=5.4 ng/mL   Vitamin D Deficiency    Collection Time: 12/17/21  8:35 AM   Result Value Ref Range    Vitamin D, Total (25-Hydroxy) 57 20 - 75 ug/L   EKG 12-lead, tracing only    Collection Time: 12/21/21  9:16 AM   Result Value Ref Range    Systolic Blood Pressure  mmHg    Diastolic Blood Pressure  mmHg    Ventricular Rate 72 BPM    Atrial Rate 72 BPM    HI Interval 230 ms    QRS Duration 96 ms     ms    QTc 451 ms    P Axis 56 degrees    R AXIS 13 degrees    T Axis 195 degrees    Interpretation ECG       Sinus rhythm with 1st degree A-V block  ST & T wave abnormality, consider inferior ischemia  Abnormal ECG  When compared with ECG of 18-DEC-2021 20:15,  Sinus rhythm is no longer with 2nd degree SA block (Mobitz I)  HI interval has decreased  Vent. rate has increased BY  28 BPM  T wave inversion now evident in Inferior leads  Nonspecific T wave abnormality, worse in Lateral leads     CBC with platelets    Collection Time: 12/21/21  9:34 AM   Result Value Ref Range    WBC Count 5.0 4.0 - 11.0  10e3/uL    RBC Count 4.36 3.80 - 5.20 10e6/uL    Hemoglobin 14.2 11.7 - 15.7 g/dL    Hematocrit 43.0 35.0 - 47.0 %    MCV 99 78 - 100 fL    MCH 32.6 26.5 - 33.0 pg    MCHC 33.0 31.5 - 36.5 g/dL    RDW 12.1 10.0 - 15.0 %    Platelet Count 190 150 - 450 10e3/uL   Basic metabolic panel    Collection Time: 12/21/21  9:35 AM   Result Value Ref Range    Sodium 144 133 - 144 mmol/L    Potassium 3.8 3.4 - 5.3 mmol/L    Chloride 108 94 - 109 mmol/L    Carbon Dioxide (CO2) 28 20 - 32 mmol/L    Anion Gap 8 3 - 14 mmol/L    Urea Nitrogen 35 (H) 7 - 30 mg/dL    Creatinine 1.24 (H) 0.52 - 1.04 mg/dL    Calcium 9.6 8.5 - 10.1 mg/dL    Glucose 140 (H) 70 - 99 mg/dL    GFR Estimate 40 (L) >60 mL/min/1.73m2   Magnesium    Collection Time: 12/21/21  9:35 AM   Result Value Ref Range    Magnesium 2.4 (H) 1.6 - 2.3 mg/dL   Troponin I    Collection Time: 12/21/21  9:35 AM   Result Value Ref Range    Troponin I High Sensitivity 16 <54 ng/L   Extra Purple Top Tube    Collection Time: 12/21/21 10:22 AM   Result Value Ref Range    Hold Specimen JI

## 2021-12-21 NOTE — PROGRESS NOTES
"Pt participated in dance/movement therapy (D/MT) using creative self-expression to be \"in the same place\" with their body and mind. The group goal was to practice moving with the events of life rather than being a \"passive listener\" or a \"passive participant.\"  Themes of hope and letting go were interspersed with the complexities of life.  For example, one therapeutic story we danced was how we can get ourselves into challenging life situations and then get stuck, having a hard time finding our way out again.  We certainly can't go back to the way things were before our significant traumas and life losses.      Moving through grief and loss was another important theme noting that pain- both emotional and physical- is experienced in the body, and therefore moving in the body gets us in touch with that pain in order to heal it. She smiled often throughout the session.  She initially requested \"relaxation\" but then became more vitalized and engaged, noting blood flow increased and she felt good.       12/21/21 0505   Dance Movement Therapy   Type of Intervention structured groups   Response participates with encouragement   Hours 1     "

## 2021-12-21 NOTE — PLAN OF CARE
Problem: Depression  Goal: Improved Mood  Outcome: Improving     Problem: Activity and Energy Impairment (Anxiety Signs/Symptoms)  Goal: Optimized Energy Level (Anxiety Signs/Symptoms)  Outcome: Improving  Flowsheets (Taken 12/20/2021 1211)  Mutually Determined Action Steps (Optimized Energy Level):    participates in exercise activity    dresses/ready for morning activity    grooms self without prompting     Patient alert and oriented x4. Denies pain.   Calm and cooperative. Full range affect. Visible in the milieu the entire shift. Attended group activities. Social with staff and peers. Denies anxiety, depression, SI, SIB and hallucinations. Appetite and fluid intake adequate.   Due to patients low HR previous night blood work and repeated EKG were ordered. Results in the chart. IM notified about the results.

## 2021-12-22 PROCEDURE — 250N000013 HC RX MED GY IP 250 OP 250 PS 637: Performed by: PHYSICIAN ASSISTANT

## 2021-12-22 PROCEDURE — 250N000013 HC RX MED GY IP 250 OP 250 PS 637: Performed by: PSYCHIATRY & NEUROLOGY

## 2021-12-22 PROCEDURE — 90853 GROUP PSYCHOTHERAPY: CPT

## 2021-12-22 PROCEDURE — 124N000003 HC R&B MH SENIOR/ADOLESCENT

## 2021-12-22 PROCEDURE — G0177 OPPS/PHP; TRAIN & EDUC SERV: HCPCS

## 2021-12-22 PROCEDURE — 250N000013 HC RX MED GY IP 250 OP 250 PS 637: Performed by: NURSE PRACTITIONER

## 2021-12-22 RX ORDER — MEMANTINE HYDROCHLORIDE 10 MG/1
10 TABLET ORAL DAILY
Status: DISCONTINUED | OUTPATIENT
Start: 2021-12-23 | End: 2021-12-23 | Stop reason: HOSPADM

## 2021-12-22 RX ORDER — MIRTAZAPINE 7.5 MG/1
7.5 TABLET, FILM COATED ORAL AT BEDTIME
Qty: 30 TABLET | Refills: 3 | Status: SHIPPED | OUTPATIENT
Start: 2021-12-22 | End: 2021-12-22

## 2021-12-22 RX ORDER — LIOTHYRONINE SODIUM 5 UG/1
10 TABLET ORAL DAILY
Qty: 30 TABLET | Refills: 3 | Status: SHIPPED | OUTPATIENT
Start: 2021-12-23 | End: 2021-12-23

## 2021-12-22 RX ORDER — ALBUTEROL SULFATE 90 UG/1
2 AEROSOL, METERED RESPIRATORY (INHALATION) EVERY 6 HOURS PRN
Qty: 18 G | Refills: 3 | Status: SHIPPED | OUTPATIENT
Start: 2021-12-22 | End: 2021-12-23

## 2021-12-22 RX ORDER — MIRTAZAPINE 7.5 MG/1
7.5 TABLET, FILM COATED ORAL AT BEDTIME
Qty: 30 TABLET | Refills: 3 | Status: SHIPPED | OUTPATIENT
Start: 2021-12-22 | End: 2021-12-23

## 2021-12-22 RX ORDER — MEMANTINE HYDROCHLORIDE 10 MG/1
10 TABLET ORAL DAILY
Qty: 30 TABLET | Refills: 3 | Status: SHIPPED | OUTPATIENT
Start: 2021-12-23 | End: 2021-12-23

## 2021-12-22 RX ORDER — LOSARTAN POTASSIUM 50 MG/1
50 TABLET ORAL DAILY
Qty: 30 TABLET | Refills: 1 | Status: SHIPPED | OUTPATIENT
Start: 2021-12-23 | End: 2021-12-23

## 2021-12-22 RX ADMIN — LIOTHYRONINE SODIUM 10 MCG: 5 TABLET ORAL at 08:18

## 2021-12-22 RX ADMIN — MULTIPLE VITAMINS W/ MINERALS TAB 1 TABLET: TAB at 08:19

## 2021-12-22 RX ADMIN — Medication 950 MG: at 20:49

## 2021-12-22 RX ADMIN — AZELASTINE HYDROCHLORIDE 1 SPRAY: 137 SPRAY, METERED NASAL at 08:20

## 2021-12-22 RX ADMIN — LOSARTAN POTASSIUM 50 MG: 25 TABLET, FILM COATED ORAL at 08:19

## 2021-12-22 RX ADMIN — LAMOTRIGINE 25 MG: 25 TABLET ORAL at 08:18

## 2021-12-22 RX ADMIN — FLUTICASONE FUROATE AND VILANTEROL TRIFENATATE 1 PUFF: 200; 25 POWDER RESPIRATORY (INHALATION) at 08:20

## 2021-12-22 RX ADMIN — ASPIRIN 81 MG CHEWABLE TABLET 81 MG: 81 TABLET CHEWABLE at 20:49

## 2021-12-22 RX ADMIN — DESVENLAFAXINE SUCCINATE 100 MG: 50 TABLET, EXTENDED RELEASE ORAL at 08:19

## 2021-12-22 RX ADMIN — LAMOTRIGINE 200 MG: 200 TABLET ORAL at 08:19

## 2021-12-22 RX ADMIN — LEVOTHYROXINE SODIUM 50 MCG: 50 TABLET ORAL at 06:41

## 2021-12-22 RX ADMIN — METOPROLOL TARTRATE 25 MG: 25 TABLET, FILM COATED ORAL at 20:49

## 2021-12-22 RX ADMIN — ROSUVASTATIN CALCIUM 40 MG: 20 TABLET, FILM COATED ORAL at 08:19

## 2021-12-22 RX ADMIN — MEMANTINE 5 MG: 5 TABLET ORAL at 08:19

## 2021-12-22 RX ADMIN — CLOPIDOGREL BISULFATE 75 MG: 75 TABLET, FILM COATED ORAL at 08:19

## 2021-12-22 RX ADMIN — VERAPAMIL HYDROCHLORIDE 240 MG: 240 TABLET ORAL at 08:18

## 2021-12-22 RX ADMIN — MIRTAZAPINE 7.5 MG: 7.5 TABLET, FILM COATED ORAL at 20:49

## 2021-12-22 ASSESSMENT — ACTIVITIES OF DAILY LIVING (ADL)
DRESS: INDEPENDENT
HYGIENE/GROOMING: INDEPENDENT
ORAL_HYGIENE: INDEPENDENT
LAUNDRY: WITH SUPERVISION
DRESS: INDEPENDENT
HYGIENE/GROOMING: INDEPENDENT
LAUNDRY: WITH SUPERVISION
ORAL_HYGIENE: INDEPENDENT

## 2021-12-22 NOTE — PROGRESS NOTES
RN called as patient was thought to be discharging tomorrow but is now not discharging until likely next week    -- resumed metoprolol today  - stopped verapamil  - meeting criteria to have doses of amlodipine held, may need to discontinue  - currently on lower dose losartan vs admission (was on 100mg on admission and now 50mg)    Medicine will monitor bp peripherally    Rola Martinez PA-C  [facility]  Contact information available via [facility] Paging/Directory

## 2021-12-22 NOTE — PLAN OF CARE
Problem: Sleep Disturbance (Anxiety Signs/Symptoms)  Goal: Improved Sleep (Anxiety Signs/Symptoms)  Outcome: No Change      Patient slept a total of 7,5 hours. No complaints presented the whole shift.

## 2021-12-22 NOTE — PLAN OF CARE
Case management note    - RN Manager Sophie at [facility] called and left message that pt is unable to return home to [facility] until next week due to staffing.  She reports there is no flexibility in this.  They are short staffed and she is leaving town in tow hours.  Pt may return to the facility Monday morning, when she returns to the office..  Asked facility at the request of the hospital RN     - Spoke with pt's daughter Ana ([phone]) and informed her of facility's inability to accept pt back until Monday.  She reports the family can accommodate taking care of mom over the weekend and will return her to the facility on Monday Morning.  Ana will  the pt for discharge at 4:00pm onThursday December 23.  Pt uses pharmacy at [facility] and [address] Phone: [phone].  Rapid Access Appointment is scheduled for 12/28 @ 9:35am with Aileen Garcia at [facility].  AVS was updated    -Received call from Ericka CURIEL nurse line at [facility] re: scheduling a Rapid Access appointment.  She asked for a callback for scheduling.  Provided this number to pt'd daughter who prefers to schedule and who will inform us of the time/date of the appointment    -Informed [facility] of plan for discharge tomorrow at 4pm

## 2021-12-22 NOTE — PLAN OF CARE
"  Problem: Depression  Goal: Improved Mood  Outcome: Improving     Problem: Activity and Energy Impairment (Anxiety Signs/Symptoms)  Goal: Optimized Energy Level (Anxiety Signs/Symptoms)  Outcome: Improving  Flowsheets (Taken 12/20/2021 1211)  Mutually Determined Action Steps (Optimized Energy Level):    participates in exercise activity    dresses/ready for morning activity    grooms self without prompting     Patient calm and cooperative. Visible in the milieu for all meals. Attended group activities. Social with staff and peers.   Full range affect. States feeling \"very good\". Denies anxiety, depression, SI, SIB and hallucinations.  Appetite and fluid intake adequate.   Anticipated discharge to home tomorrow Dec. 23rd at 4pm with family . IM notified about patient's discharge.     Writer received a call from discharge pharmacy and was informed that patient medications won't be covered by patient's insurance if refilled by them. Discharge pharmacy recommended patient's medications to be refilled by her outside pharmacy- [facility] at [facility] and 37. Provider and internal medicine notified. Prescriptions will be send to [facility] and patient medications will be refilled there.   Writer called patient's farhana Smith and gave updates.   "

## 2021-12-22 NOTE — PLAN OF CARE
Music Therapy Group note    Clinical Hours in session: 1.0    Number of patients in group: 6    Scope of service: behavioral     Intervention: Live Inspirational Songs      Goal of group: to motivate, enliven, and encourage     Patient response/reaction to treatment intervention(s): Music Therapist offered a series of 8 original, live songs for the group tonight to reflect on.  Themes of songs shared were: Identity and Perseverance.  Pt was receptive and appreciative of songs and written lyrics that were handed out for clarity.  A group check-in time was offered at the end of session.    Audrey Arndt, [facility]  Board-Certified Music Therapist

## 2021-12-22 NOTE — PLAN OF CARE
"Pt presents with full range affect and calm mood. Denies SI/SIB and hallucinations. Reports that she is doing \"good\". Excited about going home on Thursday and glad that she will be home for [place]. Pt is pleasant and cooperative. Denies pain. Appetite and fluid intake adequate. VSS. Medication compliant. She attended groups. Social with peers and staff. Daughter visited this evening and was given copy of OT testing per Dr. Small request. Spent most of the evening in the lounge reading a book. No other concerns or complaints noted.   "

## 2021-12-22 NOTE — PLAN OF CARE
"  Problem: Cognition  Goal: Demo Compensatory Strategy  Description: Demonstrate use of appropriate compensatory/adaptive strategy to complete functional task    Pt participated in occupational therapy group with 5 patients x total 45 minutes. Group focus was learning and practice of use of sensorimotor techniques for calming and self regulation. Pt was engaged and practiced techniques offered. She easily followed the directions. Noted benefits of calm at end of session and shared that \"it was the best group\" she had attended so far.        "

## 2021-12-22 NOTE — DISCHARGE INSTRUCTIONS
Behavioral Discharge Planning and Instructions    Summary: You were admitted on 12/16/2021  due to Depression.  You were treated by Gomez Christopher MD and discharged on 12/23/2021 from [facility] to Home    Main Diagnosis: Bipolar Disorder, Current episode depressed    Health Care Follow-up:   Rapid Access Medication/Hospitalization Follow up  Appointment date/time: Tuesday December 28 @ 9:35am  Provider: Aileen Garcia  Address: [facility],  [address] [place] [address]     Phone Number: [phone]    Medication Management  Appointment Date/Time: Tuesday February 1st @ 12:30pm    Chelo Benson CNP       Address: [facility],  [address] [place] [address]     Phone Number: [phone]   Fax [phone]   This will be an in office appointment    Therapist:  Appointment Date/Time: Wednesday December 29 @ 10:00am   Ирина Flores [facility]   Address: [facility],  [address] [place] [address]     Phone Number: [phone]   Fax [phone]     Your discharge medications can be picked up at your regular pharmacy at:  [facility] and [address]   Phone: [phone].     Assisted Living   [facility]  Address: [facility] [address] [place], [place] [address] [phone]  Contact: RN Manager Sophie    Attend all scheduled appointments with your outpatient providers. Call at least 24 hours in advance if you need to reschedule an appointment to ensure continued access to your outpatient providers.     Major Treatments, Procedures and Findings:  You were provided with: a psychiatric assessment, assessed for medical stability, medication evaluation and/or management and cognitive testing.    Symptoms to Report: feeling more aggressive, increased confusion, losing more sleep, mood getting worse or thoughts of suicide    Early warning signs can include: increased depression or anxiety sleep disturbances increased thoughts or behaviors of suicide or self-harm   increased unusual thinking, such as paranoia or hearing voices    Safety and Wellness:  Take all medicines as directed.  Make no changes unless your doctor suggests them.      Follow treatment recommendations.  Refrain from alcohol and non-prescribed drugs.  If there is a concern for safety, call 911.    Resources:   Crisis Intervention: 374.779.7935 or 127-608-2701 (TTY: 374.208.4190).  Call anytime for help.  [place] [employer] [place]ness (www.mn.hernan.org): 116.541.3226 or 209-122-2189.  [facility] Crisis (COPE) Response - Adult [phone]    General Medication Instructions:   See your medication sheet(s) for instructions.   Take all medicines as directed.  Make no changes unless your doctor suggests them.   Go to all your doctor visits.  Be sure to have all your required lab tests. This way, your medicines can be refilled on time.  Do not use any drugs not prescribed by your doctor.  Avoid alcohol.    Advance Directives:   Scanned document on file with [employer]? No scanned doc  Is document scanned? Pt states no documents  Honoring Choices Your Rights Handout: Informed and given  Was more information offered? Pt declined    The Treatment team has appreciated the opportunity to work with you. If you have any questions or concerns about your recent admission, you can contact the unit which can receive your call 24 hours a day, 7 days a week. They will be able to get in touch with a Provider if needed. The unit number is [phone].

## 2021-12-22 NOTE — PLAN OF CARE
Assessment/Intervention/Current Symtoms and Care Coordination  -Chart review  -Team meeting  -Spoke with [facility] re: scheduling medication mgmt.  They were able to schedule the following appointment and then transferred me to an RN line that would be able to schedule an interim appointment (since the provider did not have availability in the next month).  The appointment was added to the AVS and there is an RN named Ericka that is supposed to callback re: scheduling the rapid access/bridging medication [facility] was updated    Medication Management  Appointment Date/Time: Tuesday February 1st @ 12:30pm    Chelo Benson CNP       Address: [facility],  [address] [place] [address]     Phone Number: [phone]   Fax [phone]   This will be an in office appointment    [facility] to [facility] [phone]/Fax [phone] to advise them of pt's expected discharge tomorrow 12/23 at 4pm and hospital recommendation for increased supervision of pt in medication administration.  Katherine reported that her supervisor Sarah (Sophie) would need to callback to discuss.  Katherine provided fax number for sending Cog testing.      Placed call to pt's daughter Ana ([phone]) to coordinate care/disposition planning.  She asked that COG testing results be sent to the assisted living facility.  She reports she will be able to provide transport and will arrive at 4pm    SALVADOR Contrerasa informed on Pt's discharged date/time by phone 10:29am     Discharge Plan or Goal  Discharge home to Assisted Living Facility  With follow up appointments scheduled for medication management and therapy and recommendations for increased supervision around medication administration     Barriers to Discharge   Sx stabilization, medication management and development of safe discharge plan     Referral Status  Medication mgmt     Legal Status  Voluntary

## 2021-12-22 NOTE — PLAN OF CARE
"Number of patients attending the group:  4  Group Length: 1 Hour     Group Therapy      Summary of Group / Topics Discussed:     The  Psychotherapy group goal is to promote insight to positive choice and change. Group processing was within a supportive and safe environment. Patients will process emotions using verbal group and expressive psychotherapy interventions.     Assessment: Group used Александр's 'tip of the icebag' analogy to relate to the hidden and unhidden emotions and feelings related to mental health. Discussed how mental health symptoms could be both hidden and unhidden, and how those that are hidden are usually within below the tip of the ice bag and related to the unconscious. Using this approach, patients discussed how they feel about opening up about some of their feelings including ambivalence towards discharge. Group reviewed how to bring those ambivalence to the \"conscious' or open so as to use staff help to address them. Group reviewed use of socratic questions as a way to open up about subjects that could be difficult to present directly, including reasons for hesitation towards discharge. Practiced ways to apply questioning in tackling the challenges.     Patient Response: Patient participated fully in the group. Sat for the entire session. Was thankful for the session. Asked questions.  "

## 2021-12-22 NOTE — PLAN OF CARE
Number of patients attending the group:  5  Group Length: 1 Hour     Group Therapy      Summary of Group / Topics Discussed:     Topic: Acceptance.  Function is to promote insight to positive choice and change. Group processing was within a supportive and safe environment. Patients will process emotions using verbal group and expressive psychotherapy interventions.     Assessment: Patients were given a worksheet on the topic of Acceptance which explored identifying something they are having a hard time accepting, what needs to be addressed if not accepted, beliefs around this, what makes it difficult, feelings surrounding it and an exploration of possible sources of help. challenges.     Patient Response: Patient participated in group by writing and verbally with prompting.  Reported feeling loss associated with 's death and identified family as a source of support

## 2021-12-22 NOTE — PROGRESS NOTES
Patient seen, chart reviewed, care discussed with staff.  Blood pressure 122/60, pulse 75, temperature 97.7  F (36.5  C), temperature source Oral, resp. rate 16, weight 63.6 kg (140 lb 4.8 oz), SpO2 96 %.    By report, mood good, pulses better    General appearance: good  Alert.   Affect: good  Mood: good  Speech:  normal.   Eye contact:  good.    Psychomotor behavior: normal  Gait: normal.    Abnormal movements: none  Delusions: none  Hallucinations:  none  Thoughts: logical  Associations: intact  Judgement: fair  Insight:fair  Cognitions: intact in conversation, has neurocognitive  Memory:  intact in conversation, has neurocognitive  Orientation: normal    Not suicidal.    Imp:  Bipolar depressed  Major neurocognitive, and:  Patient Active Problem List   Diagnosis     Hyperlipidemia LDL goal <70     Mild major depression (H)     Pulmonary hypertension (H)     Mild persistent asthma     Seasonal allergic rhinitis     Mitral insufficiency     Tricuspid insufficiency     Bipolar disorder (H)     Renal insufficiency     Tremors     Advanced directives, counseling/discussion     Family history of diabetes mellitus     Family history of celiac disease     Celiac disease     History of colonic polyps     Benign essential hypertension     Hypothyroidism, unspecified type     CKD (chronic kidney disease) stage 3, GFR 30-59 ml/min (H)     Severe aortic stenosis     Other ill-defined heart diseases     Anemia     Disorder of kidney and ureter     Generalized anxiety disorder     Osteopenia     CAD S/P percutaneous coronary angioplasty     Status post coronary angiogram     NYHA class 3 heart failure with preserved ejection fraction (H)     Ischemic cardiomyopathy     Mixed hyperlipidemia     Moderate persistent asthma without complication     Hearing disorder, unspecified laterality     Impairment of balance     Bipolar 1 disorder, depressed, severe (H)     Plan: Discharge tomorrow pm  2.  Increase Namenda    Current  Facility-Administered Medications   Medication     acetaminophen (TYLENOL) tablet 650 mg     albuterol (PROVENTIL HFA/VENTOLIN HFA) inhaler     alendronate (FOSAMAX) tablet 70 mg     alum & mag hydroxide-simethicone (MAALOX) suspension 30 mL     [Held by provider] amLODIPine (NORVASC) tablet 10 mg     aspirin (ASA) chewable tablet 81 mg     azelastine (ASTELIN) nasal spray 1 spray     calcium citrate (CITRACAL) tablet 950 mg     cholecalciferol (VITAMIN D3) 1250 mcg (73550 units) capsule 50,000 Units     clopidogrel (PLAVIX) tablet 75 mg     desvenlafaxine (PRISTIQ) 24 hr tablet 100 mg     fluticasone-vilanterol (BREO ELLIPTA) 200-25 MCG/INH inhaler 1 puff     hydrOXYzine (ATARAX) tablet 25 mg     lamoTRIgine (LaMICtal) tablet 200 mg     lamoTRIgine (LaMICtal) tablet 25 mg     levothyroxine (SYNTHROID/LEVOTHROID) tablet 50 mcg     liothyronine (CYTOMEL) tablet 10 mcg     losartan (COZAAR) tablet 50 mg     melatonin tablet 3 mg     [START ON 12/23/2021] memantine (NAMENDA) tablet 10 mg     metoprolol tartrate (LOPRESSOR) tablet 25 mg     mirtazapine (REMERON) tablet TABS 7.5 mg     multivitamin w/minerals (THERA-VIT-M) tablet 1 tablet     OLANZapine (zyPREXA) tablet 2.5 mg    Or     OLANZapine (zyPREXA) injection 2.5 mg     rosuvastatin (CRESTOR) tablet 40 mg     senna-docusate (SENOKOT-S/PERICOLACE) 8.6-50 MG per tablet 1 tablet     Recent Results (from the past 168 hour(s))   Comprehensive metabolic panel    Collection Time: 12/16/21  5:51 PM   Result Value Ref Range    Sodium 138 133 - 144 mmol/L    Potassium 3.8 3.4 - 5.3 mmol/L    Chloride 106 94 - 109 mmol/L    Carbon Dioxide (CO2) 29 20 - 32 mmol/L    Anion Gap 3 3 - 14 mmol/L    Urea Nitrogen 28 7 - 30 mg/dL    Creatinine 1.37 (H) 0.52 - 1.04 mg/dL    Calcium 9.9 8.5 - 10.1 mg/dL    Glucose 85 70 - 99 mg/dL    Alkaline Phosphatase 60 40 - 150 U/L    AST 23 0 - 45 U/L    ALT 29 0 - 50 U/L    Protein Total 7.1 6.8 - 8.8 g/dL    Albumin 3.3 (L) 3.4 - 5.0 g/dL     Bilirubin Total 0.3 0.2 - 1.3 mg/dL    GFR Estimate 35 (L) >60 mL/min/1.73m2   Asymptomatic COVID-19 Virus (Coronavirus) by PCR Nasopharyngeal    Collection Time: 12/16/21  5:51 PM    Specimen: Nasopharyngeal; Swab   Result Value Ref Range    SARS CoV2 PCR Negative Negative, Testing sent to reference lab. Results will be returned via unsolicited result   CBC with platelets and differential    Collection Time: 12/16/21  5:51 PM   Result Value Ref Range    WBC Count 6.0 4.0 - 11.0 10e3/uL    RBC Count 4.28 3.80 - 5.20 10e6/uL    Hemoglobin 13.9 11.7 - 15.7 g/dL    Hematocrit 42.1 35.0 - 47.0 %    MCV 98 78 - 100 fL    MCH 32.5 26.5 - 33.0 pg    MCHC 33.0 31.5 - 36.5 g/dL    RDW 12.1 10.0 - 15.0 %    Platelet Count 203 150 - 450 10e3/uL    % Neutrophils 59 %    % Lymphocytes 28 %    % Monocytes 10 %    % Eosinophils 2 %    % Basophils 1 %    % Immature Granulocytes 0 %    NRBCs per 100 WBC 0 <1 /100    Absolute Neutrophils 3.5 1.6 - 8.3 10e3/uL    Absolute Lymphocytes 1.7 0.8 - 5.3 10e3/uL    Absolute Monocytes 0.6 0.0 - 1.3 10e3/uL    Absolute Eosinophils 0.1 0.0 - 0.7 10e3/uL    Absolute Basophils 0.1 0.0 - 0.2 10e3/uL    Absolute Immature Granulocytes 0.0 <=0.4 10e3/uL    Absolute NRBCs 0.0 10e3/uL   UA with Microscopic reflex to Culture    Collection Time: 12/16/21  5:58 PM    Specimen: Urine, Midstream   Result Value Ref Range    Color Urine Straw Colorless, Straw, Light Yellow, Yellow    Appearance Urine Clear Clear    Glucose Urine Negative Negative mg/dL    Bilirubin Urine Negative Negative    Ketones Urine Negative Negative mg/dL    Specific Gravity Urine 1.006 1.003 - 1.035    Blood Urine Large (A) Negative    pH Urine 7.0 5.0 - 7.0    Protein Albumin Urine 10  (A) Negative mg/dL    Urobilinogen Urine Normal Normal, 2.0 mg/dL    Nitrite Urine Negative Negative    Leukocyte Esterase Urine Trace (A) Negative    RBC Urine 3 (H) <=2 /HPF    WBC Urine 13 (H) <=5 /HPF    Squamous Epithelials Urine 1 <=1 /HPF    Urine Culture    Collection Time: 12/16/21  5:58 PM    Specimen: Urine, Midstream   Result Value Ref Range    Culture 10,000-50,000 CFU/mL Mixture of urogenital mitchell    Lipid panel    Collection Time: 12/17/21  8:35 AM   Result Value Ref Range    Cholesterol 172 <200 mg/dL    Triglycerides 137 <150 mg/dL    Direct Measure HDL 61 >=50 mg/dL    LDL Cholesterol Calculated 84 <=100 mg/dL    Non HDL Cholesterol 111 <130 mg/dL   TSH with free T4 reflex and/or T3 as indicated    Collection Time: 12/17/21  8:35 AM   Result Value Ref Range    TSH 3.73 0.40 - 4.00 mU/L   Vitamin B12    Collection Time: 12/17/21  8:35 AM   Result Value Ref Range    Vitamin B12 966 193 - 986 pg/mL   Folate    Collection Time: 12/17/21  8:35 AM   Result Value Ref Range    Folic Acid 60.5 >=5.4 ng/mL   Vitamin D Deficiency    Collection Time: 12/17/21  8:35 AM   Result Value Ref Range    Vitamin D, Total (25-Hydroxy) 57 20 - 75 ug/L   EKG 12-lead, tracing only    Collection Time: 12/21/21  9:16 AM   Result Value Ref Range    Systolic Blood Pressure  mmHg    Diastolic Blood Pressure  mmHg    Ventricular Rate 72 BPM    Atrial Rate 72 BPM    MD Interval 230 ms    QRS Duration 96 ms     ms    QTc 451 ms    P Axis 56 degrees    R AXIS 13 degrees    T Axis 195 degrees    Interpretation ECG       Sinus rhythm with 1st degree A-V block  ST & T wave abnormality, consider inferior ischemia  Abnormal ECG  Confirmed by MD AYDEE, KARL ([id number]) on 12/21/2021 11:12:56 PM     CBC with platelets    Collection Time: 12/21/21  9:34 AM   Result Value Ref Range    WBC Count 5.0 4.0 - 11.0 10e3/uL    RBC Count 4.36 3.80 - 5.20 10e6/uL    Hemoglobin 14.2 11.7 - 15.7 g/dL    Hematocrit 43.0 35.0 - 47.0 %    MCV 99 78 - 100 fL    MCH 32.6 26.5 - 33.0 pg    MCHC 33.0 31.5 - 36.5 g/dL    RDW 12.1 10.0 - 15.0 %    Platelet Count 190 150 - 450 10e3/uL   Basic metabolic panel    Collection Time: 12/21/21  9:35 AM   Result Value Ref Range    Sodium 144 133 - 144 mmol/L     Potassium 3.8 3.4 - 5.3 mmol/L    Chloride 108 94 - 109 mmol/L    Carbon Dioxide (CO2) 28 20 - 32 mmol/L    Anion Gap 8 3 - 14 mmol/L    Urea Nitrogen 35 (H) 7 - 30 mg/dL    Creatinine 1.24 (H) 0.52 - 1.04 mg/dL    Calcium 9.6 8.5 - 10.1 mg/dL    Glucose 140 (H) 70 - 99 mg/dL    GFR Estimate 40 (L) >60 mL/min/1.73m2   Magnesium    Collection Time: 12/21/21  9:35 AM   Result Value Ref Range    Magnesium 2.4 (H) 1.6 - 2.3 mg/dL   Troponin I    Collection Time: 12/21/21  9:35 AM   Result Value Ref Range    Troponin I High Sensitivity 16 <54 ng/L   Extra Purple Top Tube    Collection Time: 12/21/21 10:22 AM   Result Value Ref Range    Hold Specimen [facility]

## 2021-12-23 VITALS
TEMPERATURE: 98 F | HEART RATE: 102 BPM | RESPIRATION RATE: 16 BRPM | OXYGEN SATURATION: 96 % | SYSTOLIC BLOOD PRESSURE: 168 MMHG | BODY MASS INDEX: 26.8 KG/M2 | DIASTOLIC BLOOD PRESSURE: 92 MMHG | WEIGHT: 139.5 LBS

## 2021-12-23 PROCEDURE — 250N000013 HC RX MED GY IP 250 OP 250 PS 637: Performed by: NURSE PRACTITIONER

## 2021-12-23 PROCEDURE — 250N000013 HC RX MED GY IP 250 OP 250 PS 637: Performed by: PHYSICIAN ASSISTANT

## 2021-12-23 PROCEDURE — H2032 ACTIVITY THERAPY, PER 15 MIN: HCPCS

## 2021-12-23 PROCEDURE — 250N000013 HC RX MED GY IP 250 OP 250 PS 637: Performed by: PSYCHIATRY & NEUROLOGY

## 2021-12-23 PROCEDURE — 90853 GROUP PSYCHOTHERAPY: CPT

## 2021-12-23 RX ORDER — LOSARTAN POTASSIUM 50 MG/1
50 TABLET ORAL DAILY
Qty: 30 TABLET | Refills: 1 | Status: ON HOLD | OUTPATIENT
Start: 2021-12-23 | End: 2022-01-10

## 2021-12-23 RX ORDER — MIRTAZAPINE 7.5 MG/1
7.5 TABLET, FILM COATED ORAL AT BEDTIME
Qty: 30 TABLET | Refills: 3 | Status: SHIPPED | OUTPATIENT
Start: 2021-12-23 | End: 2024-02-29

## 2021-12-23 RX ORDER — ALBUTEROL SULFATE 90 UG/1
2 AEROSOL, METERED RESPIRATORY (INHALATION) EVERY 6 HOURS PRN
Qty: 18 G | Refills: 3 | Status: SHIPPED | OUTPATIENT
Start: 2021-12-23 | End: 2024-02-29

## 2021-12-23 RX ORDER — LIOTHYRONINE SODIUM 5 UG/1
10 TABLET ORAL DAILY
Qty: 30 TABLET | Refills: 3 | Status: SHIPPED | OUTPATIENT
Start: 2021-12-23

## 2021-12-23 RX ORDER — MEMANTINE HYDROCHLORIDE 10 MG/1
10 TABLET ORAL DAILY
Qty: 30 TABLET | Refills: 3 | Status: SHIPPED | OUTPATIENT
Start: 2021-12-23

## 2021-12-23 RX ADMIN — LIOTHYRONINE SODIUM 10 MCG: 5 TABLET ORAL at 08:03

## 2021-12-23 RX ADMIN — FLUTICASONE FUROATE AND VILANTEROL TRIFENATATE 1 PUFF: 200; 25 POWDER RESPIRATORY (INHALATION) at 08:03

## 2021-12-23 RX ADMIN — CLOPIDOGREL BISULFATE 75 MG: 75 TABLET, FILM COATED ORAL at 08:03

## 2021-12-23 RX ADMIN — LAMOTRIGINE 200 MG: 200 TABLET ORAL at 08:04

## 2021-12-23 RX ADMIN — ROSUVASTATIN CALCIUM 40 MG: 20 TABLET, FILM COATED ORAL at 08:03

## 2021-12-23 RX ADMIN — AZELASTINE HYDROCHLORIDE 1 SPRAY: 137 SPRAY, METERED NASAL at 08:03

## 2021-12-23 RX ADMIN — LOSARTAN POTASSIUM 50 MG: 25 TABLET, FILM COATED ORAL at 08:03

## 2021-12-23 RX ADMIN — LAMOTRIGINE 25 MG: 25 TABLET ORAL at 08:04

## 2021-12-23 RX ADMIN — DESVENLAFAXINE SUCCINATE 100 MG: 50 TABLET, EXTENDED RELEASE ORAL at 08:03

## 2021-12-23 RX ADMIN — MULTIPLE VITAMINS W/ MINERALS TAB 1 TABLET: TAB at 08:04

## 2021-12-23 RX ADMIN — MEMANTINE 10 MG: 10 TABLET ORAL at 08:03

## 2021-12-23 RX ADMIN — LEVOTHYROXINE SODIUM 50 MCG: 50 TABLET ORAL at 06:40

## 2021-12-23 ASSESSMENT — ACTIVITIES OF DAILY LIVING (ADL)
HYGIENE/GROOMING: INDEPENDENT
DRESS: INDEPENDENT
LAUNDRY: WITH SUPERVISION
ORAL_HYGIENE: INDEPENDENT

## 2021-12-23 NOTE — PLAN OF CARE
Discharge Notes:    Patient is ready for discharge. Seen briefly by Dr. Christopher. Discharge instructions given to patient. She verbalized understanding of the instructions. She denied SI/SIB nor hallucinations. She feels safe when she gets home as claimed. Patient has all her belongings. She will pick-up her medications at her outside Pharmacy-[facility].     4:30 p.m. Patient is discharged ambulatory accompanied by this writer to the lobby where her daughter was waiting.

## 2021-12-23 NOTE — PLAN OF CARE
"  Problem: Depression  Goal: Improved Mood  Outcome: Improving     Problem: Activity and Energy Impairment (Anxiety Signs/Symptoms)  Goal: Optimized Energy Level (Anxiety Signs/Symptoms)  Outcome: Improving  Flowsheets (Taken 12/22/2021 1808)  Mutually Determined Action Steps (Optimized Energy Level):    grooms self without prompting    dresses/ready for morning activity    participates in exercise activity    Patient is visible in the milieu. She is calm with a full-range of affect. Patient says she is excited to go home. She plans to stay in her daughter's place to spend Simone with her family then after the holidays, she will go back to the [place] came from. Patient shared her happy moments as well as the struggle of her  with his medical issue before he passed away. Patient states that she is happy that her  is no longer suffering but she misses him. Patient has good organization of thoughts as well as eye contact. She smiles while sharing her plans for the Simone celebration with lots of optimism. Patient denies signs and symptoms of depression nor anxiety. She feels good. Her goal is to \"get through the day\".  Patient denies SI/SIB nor hallucinations. Her appetite is good. She is attending groups. She is med compliant.      Patient will be discharging tomorrow at 4:00 p.m. She will be picked up by her daughter per report of the RN who has spoken with the daughter. Prescription too will be sent to the patient's outside pharmacy since it will not be covered by patient's insurance  if these medications are refilled in the hospital's discharge pharmacy.  "

## 2021-12-23 NOTE — PROGRESS NOTES
Brief Internal Medicine Note    Medicine following on vitals (blood pressure and HR). Following recently for bradycardia. Please see initial consult note on 12/17/21, and subsequent IM follow up notes on 12/20- 12/22.     # Bradycardia, resolved   # HFpEF (EF 55-60%), CAD s/p 3 EDDIE 8/2020, HTN, HLD, and moderate aortic valve stenosis   PTA regimen with Verapamil 240mg, Metoprolol tartrate 50mg QAM and 25mg QHS, Amlodipine 10mg daily and Losartan 100mg daily. She follows with Dr. Zuniga, Cardiology. Last seen on 11/26/21. Patient developed bradycardia (EKG in 1st degree AV block) and verapamil and metoprolol were discontinued and amlodipine held for softer blood pressures. HR stabilized. Blood pressure appear well controlled with one exception with elevated reading this morning at 168/96.    - Trend vitals  - Continue decreased dose of metoprolol 25 at bedtime with holding parameters   - Continue decreased dose of losartan 50 mg daily with holding parameters   - Hold PTA verapamil 240 mg daily and amlodipine 10 mg daily   - Continue plavix 75 mg daily, ASA 81 mg daily and rosuvastatin 40 mg daily     Medicine will continue to follow vitals peripherally. No further recommendations at this time. Please page the on-call [facility] for any intercurrent medical issues which arise.     Philly Hester PA-C  Hospitalist Service  Contact information available via [facility] Paging/Directory

## 2021-12-23 NOTE — PLAN OF CARE
Problem: Depression  Goal: Improved Mood  Outcome: Improving  Intervention: Monitor and Manage Depressive Symptoms  Recent Flowsheet Documentation  Taken 12/23/2021 1019 by Nabila Rubi RN  Supportive Measures:    active listening utilized    positive reinforcement provided    relaxation techniques promoted    verbalization of feelings encouraged  Problem: Activity and Energy Impairment (Anxiety Signs/Symptoms)  Goal: Optimized Energy Level (Anxiety Signs/Symptoms)  Outcome: Improving  Flowsheets (Taken 12/22/2021 1808)  Mutually Determined Action Steps (Optimized Energy Level):    grooms self without prompting    dresses/ready for morning activity    participates in exercise activity  Intervention: Optimize Energy Level  Recent Flowsheet Documentation  Taken 12/23/2021 1019 by Nabila Rubi RN  Patient Performed Hygiene: (by patient) teeth brushed  Diversional Activity: reading  Activity (Behavioral Health): up ad carsia    Kamryn is excited to go home this afternoon at 4:00. She will be picked up by her daughter. She is calm with  a bright affect. She is pleasant and cooperative. No delusional thoughts reported. She denies SI/SIB nor hallucinations. No evidence of forgetfulness noted nor observed today. She is med compliant. Her appetite is excellent. She attended and actively participated in group activities this morning. She is social with both staff and peers.

## 2021-12-23 NOTE — PLAN OF CARE
Problem: Sleep Disturbance (Anxiety Signs/Symptoms)  Goal: Improved Sleep (Anxiety Signs/Symptoms)  Outcome: No Change      Patient slept well the whole night, a total of 9.75 hours. Did not make any complaints the whole night.

## 2021-12-24 ENCOUNTER — PATIENT OUTREACH (OUTPATIENT)
Dept: CARE COORDINATION | Facility: CLINIC | Age: 86
End: 2021-12-24
Payer: MEDICARE

## 2021-12-24 DIAGNOSIS — Z71.89 OTHER SPECIFIED COUNSELING: ICD-10-CM

## 2021-12-24 NOTE — PROGRESS NOTES
Clinic Care Coordination Contact  [facility]/Voicemail       Clinical Data: Care Coordinator Outreach  Outreach attempted x 1.  Left message on patient's voicemail with call back information and requested return call.  Plan: Care Coordinator will try to reach patient again in 1-2 business days.    BERNABE Coffey  [phone]  [facility]

## 2021-12-24 NOTE — PROGRESS NOTES
" 12/23/21 1900   Groups   Details Psychotherapy   Number of patients attending the group:  7 participants for 2 groups  Group Length:  2 Hours attended about 30 min     Group Therapy Type: Psychotherapy     Summary of Group / Topics Discussed:        The  Psychotherapy group goal is to promote insight to positive choice and change. Group processing is within a supportive and safe environment. Patients will process emotions using verbal group and expressive psychotherapy interventions including visual art/writing interventions.     Group interventions support patients by: fostering self awareness, creative self expression and communication/social skills and supports     Modalities to reach these goals include: Narrative psychology and Expressive Arts Therapies     Subjective -patient report of mood today- doing well, discharging today     Objective/ Intervention- Goal of group and Therapeutic modality utilized- Verbal processing, writing \" I am from\" poems     Group Response- a couple fully engaged and a lot of restlessness in the group,in and out of group     Patient Response-Pt said she learned a lot of coping skills here and was looking forward to discharge today to spend [place] with her family. She is grieving her  of 63 years but is making the best of moving forward.     Florin Bryan, KEITHFT, ATR-BC                        "

## 2021-12-27 NOTE — PROGRESS NOTES
Clinic Care Coordination Contact  [facility]/Voicemail       Clinical Data: Care Coordinator Outreach  Outreach attempted x 2.  Left message on patient's voicemail with call back information and requested return call.    Plan: Care Coordinator will do no further outreaches at this time.    BERNABE Coffey  [phone]  [facility]

## 2021-12-28 ENCOUNTER — TELEPHONE (OUTPATIENT)
Dept: FAMILY MEDICINE | Facility: CLINIC | Age: 86
End: 2021-12-28
Payer: MEDICARE

## 2021-12-28 NOTE — TELEPHONE ENCOUNTER
"Katherine with [facility] calling.  They will start managing patient's meds (setting up for patient to administer)    Patient was recently in [facility] from 12/16/2021-12/23/2021  There are 2 meds they have listed that need clarification as there is a note to contact provider to verify whether or not patient should be taking-    1. Amlodipine 10 mg daily- patient last took this on 12/18/2021.  We do not have this listed. Was removed on 12/23/2021 (\"do not resume at discharge\")    2. Metoprolol tartrate (LOPRESSOR) 50 MG tablet-   TAKE 1 TAB BY MOUTH IN THE MORNING AND 1/2 IN THE EVENING  Last taken by patient on 12/22/2021    Please clarify whether or not patient should be on these 2 meds so that they can set up patient's meds      Raúl Haywood RN  [facility]    "

## 2021-12-31 ENCOUNTER — TELEPHONE (OUTPATIENT)
Dept: CARDIOLOGY | Facility: CLINIC | Age: 86
End: 2021-12-31

## 2021-12-31 ENCOUNTER — OFFICE VISIT (OUTPATIENT)
Dept: FAMILY MEDICINE | Facility: CLINIC | Age: 86
End: 2021-12-31
Payer: MEDICARE

## 2021-12-31 VITALS
WEIGHT: 139 LBS | TEMPERATURE: 98.8 F | HEART RATE: 84 BPM | OXYGEN SATURATION: 96 % | RESPIRATION RATE: 16 BRPM | SYSTOLIC BLOOD PRESSURE: 148 MMHG | HEIGHT: 61 IN | BODY MASS INDEX: 26.24 KG/M2 | DIASTOLIC BLOOD PRESSURE: 80 MMHG

## 2021-12-31 DIAGNOSIS — F31.4 BIPOLAR 1 DISORDER, DEPRESSED, SEVERE (H): ICD-10-CM

## 2021-12-31 DIAGNOSIS — I10 BENIGN ESSENTIAL HYPERTENSION: ICD-10-CM

## 2021-12-31 DIAGNOSIS — Z98.61 CAD S/P PERCUTANEOUS CORONARY ANGIOPLASTY: ICD-10-CM

## 2021-12-31 DIAGNOSIS — Z09 HOSPITAL DISCHARGE FOLLOW-UP: Primary | ICD-10-CM

## 2021-12-31 DIAGNOSIS — I50.30 NYHA CLASS 3 HEART FAILURE WITH PRESERVED EJECTION FRACTION (H): ICD-10-CM

## 2021-12-31 DIAGNOSIS — I25.10 CAD S/P PERCUTANEOUS CORONARY ANGIOPLASTY: ICD-10-CM

## 2021-12-31 PROCEDURE — 99495 TRANSJ CARE MGMT MOD F2F 14D: CPT | Performed by: NURSE PRACTITIONER

## 2021-12-31 RX ORDER — METOPROLOL TARTRATE 25 MG/1
25 TABLET, FILM COATED ORAL 2 TIMES DAILY
Qty: 180 TABLET | Refills: 1 | Status: ON HOLD | OUTPATIENT
Start: 2021-12-31 | End: 2022-01-10

## 2021-12-31 ASSESSMENT — MIFFLIN-ST. JEOR: SCORE: 1004.94

## 2021-12-31 ASSESSMENT — PAIN SCALES - GENERAL: PAINLEVEL: NO PAIN (0)

## 2021-12-31 NOTE — TELEPHONE ENCOUNTER
Pati De Santiago NP is patients Cardiology provider . Héctor Garcia L.P.N.,Ricardo figueroa. Dept.

## 2021-12-31 NOTE — PROGRESS NOTES
Assessment & Plan     Hospital discharge follow-up  Admitted to hospital 12/16-12/23 with bipolar depression. Had soft BPs and bradycardia so amlodipine, metoprolol, and verapamil all discontinued. Losartan continued.     Bipolar 1 disorder, depressed, severe (H)  Patient reports improved symptoms since hospitalization.     NYHA class 3 heart failure with preserved ejection fraction (H)  Recommended that patient schedule follow up with her cardiologist ASAP as multiple medication were adjusted during hospitalization. Euvolemic on exam. Denies dyspnea, PND, orthopnea, weight gain. Continue losartan. BP is elevated today without bradycardia and home BPs have been elevated this past week so will restart low-dose metoprolol.   - metoprolol tartrate (LOPRESSOR) 25 MG tablet; Take 1 tablet (25 mg) by mouth 2 times daily    CAD S/P percutaneous coronary angioplasty  See above.  - metoprolol tartrate (LOPRESSOR) 25 MG tablet; Take 1 tablet (25 mg) by mouth 2 times daily    Benign essential hypertension  See above.   - metoprolol tartrate (LOPRESSOR) 25 MG tablet; Take 1 tablet (25 mg) by mouth 2 times daily    Review of the result(s) of each unique test - as noted below  Prescription drug management    Return in about 2 weeks (around 1/14/2022) for Follow up clinic visit with cardiologist.    JESSICA Baez [facility] PIERCE Harry is a 86 year old who presents for the following health issues  accompanied by her daughter.    [facility]       Hospital Follow-up Visit:    Hospital/Nursing Home/ Rehab Facility: [facility]   Date of Admission: 12/16/2021  Date of Discharge: 12/23/2021  Reason(s) for Admission: Essential Hypertension and depression.       Was your hospitalization related to COVID-19? No   Problems taking medications regularly:  None  Medication changes since discharge:   Started Albuterol, Liothyronine, Mamantine, Mirtazapine   Stopped Amoxicillin, Amlodipine,  "metoprolol, and Verapamil ER.   Problems adhering to non-medication therapy:  None    Summary of hospitalization:  [facility] discharge summary reviewed  Diagnostic Tests/Treatments reviewed.  Follow up needed: cardiology, mental health  Other Healthcare Providers Involved in Patient s Care:         Homecare  Update since discharge: improved.     Post Discharge Medication Reconciliation: discharge medications reconciled and changed, per note/orders.  Plan of care communicated with patient and family          Hypertension Follow-up      Do you check your blood pressure regularly outside of the clinic? Yes     Are you following a low salt diet? Yes    Are your blood pressures ever more than 140 on the top number (systolic) OR more   than 90 on the bottom number (diastolic), for example 140/90? Yes  Home BPs this week have been 130s-160s/80-90s.     Nurse staff at the assisted living are going to start setting up her medications due to memory issues. Started yesterday.     Review of Systems   Constitutional, HEENT, cardiovascular, pulmonary, gi and gu systems are negative, except as otherwise noted.      Objective    BP (!) 148/80   Pulse 84   Temp 98.8  F (37.1  C) (Oral)   Resp 16   Ht 1.537 m (5' 0.5\")   Wt 63 kg (139 lb)   SpO2 96%   BMI 26.70 kg/m    Body mass index is 26.7 kg/m .  Physical Exam   GENERAL: healthy, alert and no distress  EYES: Eyes grossly normal to inspection, PERRL and conjunctivae and sclerae normal  RESP: lungs clear to auscultation - no rales, rhonchi or wheezes  CV: regular rate and rhythm, normal S1 S2, no S3 or S4, no murmur, click or rub, no peripheral edema and peripheral pulses strong  PSYCH: mentation appears normal, affect normal/bright    Admission on 12/16/2021, Discharged on 12/23/2021   Component Date Value Ref Range Status     Color Urine 12/16/2021 Straw  Colorless, Straw, Light Yellow, Yellow Final     Appearance Urine 12/16/2021 Clear  Clear Final     " Glucose Urine 12/16/2021 Negative  Negative mg/dL Final     Bilirubin Urine 12/16/2021 Negative  Negative Final     Ketones Urine 12/16/2021 Negative  Negative mg/dL Final     Specific Gravity Urine 12/16/2021 1.006  1.003 - 1.035 Final     Blood Urine 12/16/2021 Large* Negative Final     pH Urine 12/16/2021 7.0  5.0 - 7.0 Final     Protein Albumin Urine 12/16/2021 10 * Negative mg/dL Final     Urobilinogen Urine 12/16/2021 Normal  Normal, 2.0 mg/dL Final     Nitrite Urine 12/16/2021 Negative  Negative Final     Leukocyte Esterase Urine 12/16/2021 Trace* Negative Final     RBC Urine 12/16/2021 3* <=2 /HPF Final     WBC Urine 12/16/2021 13* <=5 /HPF Final     Squamous Epithelials Urine 12/16/2021 1  <=1 /HPF Final     Sodium 12/16/2021 138  133 - 144 mmol/L Final     Potassium 12/16/2021 3.8  3.4 - 5.3 mmol/L Final     Chloride 12/16/2021 106  94 - 109 mmol/L Final     Carbon Dioxide (CO2) 12/16/2021 29  20 - 32 mmol/L Final     Anion Gap 12/16/2021 3  3 - 14 mmol/L Final     Urea Nitrogen 12/16/2021 28  7 - 30 mg/dL Final     Creatinine 12/16/2021 1.37* 0.52 - 1.04 mg/dL Final     Calcium 12/16/2021 9.9  8.5 - 10.1 mg/dL Final     Glucose 12/16/2021 85  70 - 99 mg/dL Final     Alkaline Phosphatase 12/16/2021 60  40 - 150 U/L Final     AST 12/16/2021 23  0 - 45 U/L Final     ALT 12/16/2021 29  0 - 50 U/L Final     Protein Total 12/16/2021 7.1  6.8 - 8.8 g/dL Final     Albumin 12/16/2021 3.3* 3.4 - 5.0 g/dL Final     Bilirubin Total 12/16/2021 0.3  0.2 - 1.3 mg/dL Final     GFR Estimate 12/16/2021 35* >60 mL/min/1.73m2 Final    As of July 11, 2021, eGFR is calculated by the CKD-EPI creatinine equation, without race adjustment. eGFR can be influenced by muscle mass, exercise, and diet. The reported eGFR is an estimation only and is only applicable if the renal function is stable.     SARS CoV2 PCR 12/16/2021 Negative  Negative, Testing sent to reference lab. Results will be returned via unsolicited result Final     NEGATIVE: SARS-CoV-2 (COVID-19) RNA not detected, presumed negative.     WBC Count 12/16/2021 6.0  4.0 - 11.0 10e3/uL Final     RBC Count 12/16/2021 4.28  3.80 - 5.20 10e6/uL Final     Hemoglobin 12/16/2021 13.9  11.7 - 15.7 g/dL Final     Hematocrit 12/16/2021 42.1  35.0 - 47.0 % Final     MCV 12/16/2021 98  78 - 100 fL Final     MCH 12/16/2021 32.5  26.5 - 33.0 pg Final     MCHC 12/16/2021 33.0  31.5 - 36.5 g/dL Final     RDW 12/16/2021 12.1  10.0 - 15.0 % Final     Platelet Count 12/16/2021 203  150 - 450 10e3/uL Final     % Neutrophils 12/16/2021 59  % Final     % Lymphocytes 12/16/2021 28  % Final     % Monocytes 12/16/2021 10  % Final     % Eosinophils 12/16/2021 2  % Final     % Basophils 12/16/2021 1  % Final     % Immature Granulocytes 12/16/2021 0  % Final     NRBCs per 100 WBC 12/16/2021 0  <1 /100 Final     Absolute Neutrophils 12/16/2021 3.5  1.6 - 8.3 10e3/uL Final     Absolute Lymphocytes 12/16/2021 1.7  0.8 - 5.3 10e3/uL Final     Absolute Monocytes 12/16/2021 0.6  0.0 - 1.3 10e3/uL Final     Absolute Eosinophils 12/16/2021 0.1  0.0 - 0.7 10e3/uL Final     Absolute Basophils 12/16/2021 0.1  0.0 - 0.2 10e3/uL Final     Absolute Immature Granulocytes 12/16/2021 0.0  <=0.4 10e3/uL Final     Absolute NRBCs 12/16/2021 0.0  10e3/uL Final     Culture 12/16/2021 10,000-50,000 CFU/mL Mixture of urogenital mitchell   Final     Cholesterol 12/17/2021 172  <200 mg/dL Final     Triglycerides 12/17/2021 137  <150 mg/dL Final     Direct Measure HDL 12/17/2021 61  >=50 mg/dL Final     LDL Cholesterol Calculated 12/17/2021 84  <=100 mg/dL Final     Non HDL Cholesterol 12/17/2021 111  <130 mg/dL Final     TSH 12/17/2021 3.73  0.40 - 4.00 mU/L Final     Vitamin B12 12/17/2021 966  193 - 986 pg/mL Final     Folic Acid 12/17/2021 60.5  >=5.4 ng/mL Final    Deficient: <3.4 ng/mL  Indeterminate: 3.4-5.4 ng/mL  Normal: > 5.4 ng/mL     Vitamin D, Total (25-Hydroxy) 12/17/2021 57  20 - 75 ug/L Final     WBC Count 12/21/2021 5.0   4.0 - 11.0 10e3/uL Final     RBC Count 12/21/2021 4.36  3.80 - 5.20 10e6/uL Final     Hemoglobin 12/21/2021 14.2  11.7 - 15.7 g/dL Final     Hematocrit 12/21/2021 43.0  35.0 - 47.0 % Final     MCV 12/21/2021 99  78 - 100 fL Final     MCH 12/21/2021 32.6  26.5 - 33.0 pg Final     MCHC 12/21/2021 33.0  31.5 - 36.5 g/dL Final     RDW 12/21/2021 12.1  10.0 - 15.0 % Final     Platelet Count 12/21/2021 190  150 - 450 10e3/uL Final     Sodium 12/21/2021 144  133 - 144 mmol/L Final     Potassium 12/21/2021 3.8  3.4 - 5.3 mmol/L Final     Chloride 12/21/2021 108  94 - 109 mmol/L Final     Carbon Dioxide (CO2) 12/21/2021 28  20 - 32 mmol/L Final     Anion Gap 12/21/2021 8  3 - 14 mmol/L Final     Urea Nitrogen 12/21/2021 35* 7 - 30 mg/dL Final     Creatinine 12/21/2021 1.24* 0.52 - 1.04 mg/dL Final     Calcium 12/21/2021 9.6  8.5 - 10.1 mg/dL Final     Glucose 12/21/2021 140* 70 - 99 mg/dL Final     GFR Estimate 12/21/2021 40* >60 mL/min/1.73m2 Final    As of July 11, 2021, eGFR is calculated by the CKD-EPI creatinine equation, without race adjustment. eGFR can be influenced by muscle mass, exercise, and diet. The reported eGFR is an estimation only and is only applicable if the renal function is stable.     Magnesium 12/21/2021 2.4* 1.6 - 2.3 mg/dL Final     Troponin I High Sensitivity 12/21/2021 16  <54 ng/L Final    This Troponin-I result was obtained using a Siemens Dimension Vista High Sensitivity Troponin-I assay (TNIH). Effective 11/23/21, nine labs/sites in the [facility] switched from a Siemens [employer] Contemporary Troponin I assay (CTNI) to a Siemens Creighton High-Sensitivity Troponin I assay (TNIH).     Ventricular Rate 12/21/2021 72  BPM Final     Atrial Rate 12/21/2021 72  BPM Final     GA Interval 12/21/2021 230  ms Final     QRS Duration 12/21/2021 96  ms Final     QT 12/21/2021 412  ms Final     QTc 12/21/2021 451  ms Final     P Axis 12/21/2021 56  degrees Final     R AXIS 12/21/2021 13  degrees Final     T  Axis 12/21/2021 195  degrees Final     Interpretation ECG 12/21/2021    Final                    Value:Sinus rhythm with 1st degree A-V block  ST & T wave abnormality, consider inferior ischemia  Abnormal ECG  Confirmed by MD AYDEE, KARL ([id number]) on 12/21/2021 11:12:56 PM       Hold Specimen 12/21/2021 [facility]   Final

## 2021-12-31 NOTE — LETTER
My Asthma Action Plan    Name: Kamryn Miller   YOB: 1935  Date: 12/31/2021   My doctor: JESSICA Baez CNP   My clinic: [facility]        My Control Medicine: Advair  My Rescue Medicine: Albuterol (Proair/Ventolin/Proventil HFA) 2-4 puffs EVERY 4 HOURS as needed. Use a spacer if recommended by your provider.   My Asthma Severity:   Moderate Persistent  Know your asthma triggers: exercise or sports and cold air  upper respiratory infections            GREEN ZONE   Good Control    I feel good    No cough or wheeze    Can work, sleep and play without asthma symptoms       Take your asthma control medicine every day.     1. If exercise triggers your asthma, take your rescue medication    15 minutes before exercise or sports, and    During exercise if you have asthma symptoms  2. Spacer to use with inhaler: If you have a spacer, make sure to use it with your inhaler             YELLOW ZONE Getting Worse  I have ANY of these:    I do not feel good    Cough or wheeze    Chest feels tight    Wake up at night   1. Keep taking your Green Zone medications  2. Start taking your rescue medicine:    every 20 minutes for up to 1 hour. Then every 4 hours for 24-48 hours.  3. If you stay in the Yellow Zone for more than 12-24 hours, contact your doctor.  4. If you do not return to the Green Zone in 12-24 hours or you get worse, start taking your oral steroid medicine if prescribed by your provider.           RED ZONE Medical Alert - Get Help  I have ANY of these:    I feel awful    Medicine is not helping    Breathing getting harder    Trouble walking or talking    Nose opens wide to breathe       1. Take your rescue medicine NOW  2. If your provider has prescribed an oral steroid medicine, start taking it NOW  3. Call your doctor NOW  4. If you are still in the Red Zone after 20 minutes and you have not reached your doctor:    Take your rescue medicine again and    Call 911 or go  to the emergency room right away    See your regular doctor within 2 weeks of an Emergency Room or Urgent Care visit for follow-up treatment.          Annual Reminders:  Meet with Asthma Educator,  Flu Shot in the Fall, consider Pneumonia Vaccination for patients with asthma (aged 19 and older).    Pharmacy:    [facility] - [facility] ORDER [facility]INT MED[facility] - NON-EPRESCRIBE  [id number] - [place], [place] - [address]  [id number] - [place], [place] - [address]    Electronically signed by JESSICA Baez CNP   Date: 12/31/21                      Asthma Triggers  How To Control Things That Make Your Asthma Worse    Triggers are things that make your asthma worse.  Look at the list below to help you find your triggers and what you can do about them.  You can help prevent asthma flare-ups by staying away from your triggers.      Trigger                                                          What you can do   Cigarette Smoke  Tobacco smoke can make asthma worse. Do not allow smoking in your home, car or around you.  Be sure no one smokes at a child s day care or school.  If you smoke, ask your health care provider for ways to help you quit.  Ask family members to quit too.  Ask your health care provider for a referral to Quit Plan to help you quit smoking, or call 4-086-761-PLAN.     Colds, Flu, Bronchitis  These are common triggers of asthma. Wash your hands often.  Don t touch your eyes, nose or mouth.  Get a flu shot every year.     Dust Mites  These are tiny bugs that live in cloth or carpet. They are too small to see. Wash sheets and blankets in hot water every week.   Encase pillows and mattress in dust mite proof covers.  Avoid having carpet if you can. If you have carpet, vacuum weekly.   Use a dust mask and HEPA vacuum.   Pollen and Outdoor Mold  Some people are allergic to trees, grass, or weed pollen, or molds. Try to keep your windows closed.  Limit  time out doors when pollen count is high.   Ask you health care provider about taking medicine during allergy season.     Animal Dander  Some people are allergic to skin flakes, urine or saliva from pets with fur or feathers. Keep pets with fur or feathers out of your home.    If you can t keep the pet outdoors, then keep the pet out of your bedroom.  Keep the bedroom door closed.  Keep pets off cloth furniture and away from stuffed toys.     Mice, Rats, and Cockroaches   Some people are allergic to the waste from these pests.   Cover food and garbage.  Clean up spills and food crumbs.  Store grease in the refrigerator.   Keep food out of the bedroom.   Indoor Mold  This can be a trigger if your home has high moisture. Fix leaking faucets, pipes, or other sources of water.   Clean moldy surfaces.  Dehumidify basement if it is damp and smelly.   Smoke, Strong Odors, and Sprays  These can reduce air quality. Stay away from strong odors and sprays, such as perfume, powder, hair spray, paints, smoke incense, paint, cleaning products, candles and new carpet.   Exercise or Sports  Some people with asthma have this trigger. Be active!  Ask your doctor about taking medicine before sports or exercise to prevent symptoms.    Warm up for 5-10 minutes before and after sports or exercise.     Other Triggers of Asthma  Cold air:  Cover your nose and mouth with a scarf.  Sometimes laughing or crying can be a trigger.  Some medicines and food can trigger asthma.

## 2021-12-31 NOTE — TELEPHONE ENCOUNTER
[facility] Call Center    Phone Message    May a detailed message be left on voicemail: yes     Reason for Call: Other: Pt primary wants her seen within 2 to 4 weeks as she was put on different medication and her BP went up and she needs to be seen asap. Please reach out to pt to discuss     Action Taken: Message routed to:  [facility] ([facility] Cardio    Travel Screening: Not Applicable

## 2022-01-01 ASSESSMENT — ASTHMA QUESTIONNAIRES: ACT_TOTALSCORE: 24

## 2022-01-03 NOTE — TELEPHONE ENCOUNTER
Spoke with VEENA Quesada at [facility]. Provider note given as written.     Hermila King RN

## 2022-01-05 NOTE — DISCHARGE SUMMARY
From the H&P:  HPI  Kamryn Miller is a 86 year old female who has a history of major depression, and bipolar disorder, which manifests mainly in a depressive phase.  Patient reports she has a longstanding history of becoming more depressed in the fall and early winter.  Currently is very depressed for several months.  Unfortunately her  of 63 years  of complications related to a brain tumor 2 months ago.  She also moved to just prior to his death from her regular home to an assisted living situation. She feels isolated there, does know a few other residents, but is not comfortable engaging with them socially. At her previous residence there was a nurse who helped her set up her medications, at the new residence she does not have that service. Patient states she has been sleeping poorly crying, feeling irritable, feeling hopeless, isolating herself.  Denies any suicidal thoughts.  She does have a therapist whom she has seen twice, and a medication provider through [facility].  Has not seen a grief counselor.  She is prescribed antidepressants, no recent changes does not use alcohol or drugs.  She has some support from her daughter who accompanies her today.  Her family encouraged her to come here for an evaluation today. Family does not feel she is functioning appropriately, she is missing meals because she stays in her room, they do not think she is taking her meds correctly, they have noticed a significant deterioration in the last month. She denies any suicidal thoughts, denies paranoia, denies any symptoms of thought disorder.  She has no acute medical complaints despite her extensive medical history which includes valvular heart disease, coronary artery disease, pulmonary hypertension, and asthma.     : Blood pressure 131/74, pulse 54, temperature 98.5  F (36.9  C), temperature source Oral, resp. rate 16, SpO2 100 %.     : General appearance: fair.  She describes loss of  motivation.  Alert.   Affect: sad  Mood:depressed  Speech:  Some latency  Eye contact:  good.    Psychomotor behavior: normal  Gait: slow but steady  Abnormal movements: none  Delusions: none  Hallucinations:  none  Thoughts: logical  Associations: intact  Judgement: good  Insight: good  Cognitions: intact in conversation; on the Minicog she remembered two out of 3 words, and could describe how the clock hands should be but not draw it correctly.  Memory:  intact in conversation  Orientation: normal    Not suicidal.     No alcohol or drug use     Past meds include Prozac, Viibryd, Zoloft, Paxil, Lithium, wellbutri      Hospital course:  OT cognitive eval occurred, CPT score was 4.5/5.6, and MoCA was 19/30 indicating she would need help setting up meds.  Aricept and namenda were added, medications were changed.  Cytomel was added to boost mood.    Diagnoses: Bipolar one depressed, sevcere, not psychotic  2.  Major neurocognitive disorder    Mental status one day before discharge:  General appearance: good  Alert.   Affect: good  Mood: good  Speech:  normal.   Eye contact:  good.    Psychomotor behavior: normal  Gait: normal.    Abnormal movements: none  Delusions: none  Hallucinations:  none  Thoughts: logical  Associations: intact  Judgement: fair  Insight:fair  Cognitions: intact in conversation, has neurocognitive  Memory:  intact in conversation, has neurocognitive  Orientation: normal    Not suicidal.    Recent Results (from the past 1344 hour(s))   N terminal pro BNP outpatient    Collection Time: 11/24/21  3:25 PM   Result Value Ref Range    N Terminal Pro BNP Outpatient 398 0 - 450 pg/mL   CBC with platelets    Collection Time: 11/24/21  3:25 PM   Result Value Ref Range    WBC Count 6.3 4.0 - 11.0 10e3/uL    RBC Count 3.95 3.80 - 5.20 10e6/uL    Hemoglobin 13.3 11.7 - 15.7 g/dL    Hematocrit 39.7 35.0 - 47.0 %     (H) 78 - 100 fL    MCH 33.7 (H) 26.5 - 33.0 pg    MCHC 33.5 31.5 - 36.5 g/dL    RDW 12.6  10.0 - 15.0 %    Platelet Count 223 150 - 450 10e3/uL   Comprehensive metabolic panel    Collection Time: 11/24/21  3:25 PM   Result Value Ref Range    Sodium 136 133 - 144 mmol/L    Potassium 4.1 3.4 - 5.3 mmol/L    Chloride 102 94 - 109 mmol/L    Carbon Dioxide (CO2) 30 20 - 32 mmol/L    Anion Gap 4 3 - 14 mmol/L    Urea Nitrogen 40 (H) 7 - 30 mg/dL    Creatinine 1.82 (H) 0.52 - 1.04 mg/dL    Calcium 9.5 8.5 - 10.1 mg/dL    Glucose 95 70 - 99 mg/dL    Alkaline Phosphatase 66 40 - 150 U/L    AST 25 0 - 45 U/L    ALT 29 0 - 50 U/L    Protein Total 7.4 6.8 - 8.8 g/dL    Albumin 3.7 3.4 - 5.0 g/dL    Bilirubin Total 0.5 0.2 - 1.3 mg/dL    GFR Estimate 25 (L) >60 mL/min/1.73m2   Surgical Pathology Exam    Collection Time: 12/14/21 10:54 AM   Result Value Ref Range    Case Report       Surgical Pathology Report                         Case: [id number]                                  Authorizing Provider:  Javier Landa MD Collected:           12/14/2021 10:54 AM          Ordering Location:     [facility]   Received:            12/14/2021 10:54 AM                                 [place]                                                                      Pathologist:           Analy Dueñas MD                                                                           Specimen:    Endometrium                                                                                Final Diagnosis       Endometrium, curettage  -Scant fragments of superficial, inactive endometrium  -Fragments of squamous mucosa negative for dysplasia and malignancy      Comment       Sections demonstrate scant, superficial strips of inactive endometrium.  There is insufficient intact fragments of endometrium showing glands and stroma to evaluate for hyperplasia or malignancy.  However, there is no cytologic atypia.  Additional sampling may be  "considered if clinical concern persists.      Clinical Information       postmenopausal bleeding, endometrial thickening on ultrasound      Gross Description       A(A). Endometrium, :  The specimen is received in formalin labeled with the patient's name, medical record number and other identifying information and designated \"endometrium\". It consists of multiple tan-white soft tissue fragments that range from less than 0.1-0.1 cm in greatest dimension.  The formalin is filtered and the specimen is wrapped and entirely submitted in 1 cassette.    Note: The specimen is scant and may not survive processing.   (LEDY Byers)        Microscopic Description       The microscopic findings support the diagnosis.  There is scant, inactive endometrium and fragments of squamous mucosa with atrophic changes.  An immunohistochemical stain for p16 was performed, with appropriately reactive controls, and is negative in the squamous mucosa, providing no support for dysplasia.      Performing Labs       The technical component of this testing was completed at [facility]      Case Images     Comprehensive metabolic panel    Collection Time: 12/16/21  5:51 PM   Result Value Ref Range    Sodium 138 133 - 144 mmol/L    Potassium 3.8 3.4 - 5.3 mmol/L    Chloride 106 94 - 109 mmol/L    Carbon Dioxide (CO2) 29 20 - 32 mmol/L    Anion Gap 3 3 - 14 mmol/L    Urea Nitrogen 28 7 - 30 mg/dL    Creatinine 1.37 (H) 0.52 - 1.04 mg/dL    Calcium 9.9 8.5 - 10.1 mg/dL    Glucose 85 70 - 99 mg/dL    Alkaline Phosphatase 60 40 - 150 U/L    AST 23 0 - 45 U/L    ALT 29 0 - 50 U/L    Protein Total 7.1 6.8 - 8.8 g/dL    Albumin 3.3 (L) 3.4 - 5.0 g/dL    Bilirubin Total 0.3 0.2 - 1.3 mg/dL    GFR Estimate 35 (L) >60 mL/min/1.73m2   Asymptomatic COVID-19 Virus (Coronavirus) by PCR Nasopharyngeal    Collection Time: 12/16/21  5:51 PM    Specimen: Nasopharyngeal; Swab   Result Value Ref " Range    SARS CoV2 PCR Negative Negative, Testing sent to reference lab. Results will be returned via unsolicited result   CBC with platelets and differential    Collection Time: 12/16/21  5:51 PM   Result Value Ref Range    WBC Count 6.0 4.0 - 11.0 10e3/uL    RBC Count 4.28 3.80 - 5.20 10e6/uL    Hemoglobin 13.9 11.7 - 15.7 g/dL    Hematocrit 42.1 35.0 - 47.0 %    MCV 98 78 - 100 fL    MCH 32.5 26.5 - 33.0 pg    MCHC 33.0 31.5 - 36.5 g/dL    RDW 12.1 10.0 - 15.0 %    Platelet Count 203 150 - 450 10e3/uL    % Neutrophils 59 %    % Lymphocytes 28 %    % Monocytes 10 %    % Eosinophils 2 %    % Basophils 1 %    % Immature Granulocytes 0 %    NRBCs per 100 WBC 0 <1 /100    Absolute Neutrophils 3.5 1.6 - 8.3 10e3/uL    Absolute Lymphocytes 1.7 0.8 - 5.3 10e3/uL    Absolute Monocytes 0.6 0.0 - 1.3 10e3/uL    Absolute Eosinophils 0.1 0.0 - 0.7 10e3/uL    Absolute Basophils 0.1 0.0 - 0.2 10e3/uL    Absolute Immature Granulocytes 0.0 <=0.4 10e3/uL    Absolute NRBCs 0.0 10e3/uL   UA with Microscopic reflex to Culture    Collection Time: 12/16/21  5:58 PM    Specimen: Urine, Midstream   Result Value Ref Range    Color Urine Straw Colorless, Straw, Light Yellow, Yellow    Appearance Urine Clear Clear    Glucose Urine Negative Negative mg/dL    Bilirubin Urine Negative Negative    Ketones Urine Negative Negative mg/dL    Specific Gravity Urine 1.006 1.003 - 1.035    Blood Urine Large (A) Negative    pH Urine 7.0 5.0 - 7.0    Protein Albumin Urine 10  (A) Negative mg/dL    Urobilinogen Urine Normal Normal, 2.0 mg/dL    Nitrite Urine Negative Negative    Leukocyte Esterase Urine Trace (A) Negative    RBC Urine 3 (H) <=2 /HPF    WBC Urine 13 (H) <=5 /HPF    Squamous Epithelials Urine 1 <=1 /HPF   Urine Culture    Collection Time: 12/16/21  5:58 PM    Specimen: Urine, Midstream   Result Value Ref Range    Culture 10,000-50,000 CFU/mL Mixture of urogenital mitchell    Lipid panel    Collection Time: 12/17/21  8:35 AM   Result Value Ref  Range    Cholesterol 172 <200 mg/dL    Triglycerides 137 <150 mg/dL    Direct Measure HDL 61 >=50 mg/dL    LDL Cholesterol Calculated 84 <=100 mg/dL    Non HDL Cholesterol 111 <130 mg/dL   TSH with free T4 reflex and/or T3 as indicated    Collection Time: 12/17/21  8:35 AM   Result Value Ref Range    TSH 3.73 0.40 - 4.00 mU/L   Vitamin B12    Collection Time: 12/17/21  8:35 AM   Result Value Ref Range    Vitamin B12 966 193 - 986 pg/mL   Folate    Collection Time: 12/17/21  8:35 AM   Result Value Ref Range    Folic Acid 60.5 >=5.4 ng/mL   Vitamin D Deficiency    Collection Time: 12/17/21  8:35 AM   Result Value Ref Range    Vitamin D, Total (25-Hydroxy) 57 20 - 75 ug/L   EKG 12-lead, tracing only    Collection Time: 12/21/21  9:16 AM   Result Value Ref Range    Systolic Blood Pressure  mmHg    Diastolic Blood Pressure  mmHg    Ventricular Rate 72 BPM    Atrial Rate 72 BPM    GA Interval 230 ms    QRS Duration 96 ms     ms    QTc 451 ms    P Axis 56 degrees    R AXIS 13 degrees    T Axis 195 degrees    Interpretation ECG       Sinus rhythm with 1st degree A-V block  ST & T wave abnormality, consider inferior ischemia  Abnormal ECG  Confirmed by MD AYDEE, KARL ([id number]) on 12/21/2021 11:12:56 PM     CBC with platelets    Collection Time: 12/21/21  9:34 AM   Result Value Ref Range    WBC Count 5.0 4.0 - 11.0 10e3/uL    RBC Count 4.36 3.80 - 5.20 10e6/uL    Hemoglobin 14.2 11.7 - 15.7 g/dL    Hematocrit 43.0 35.0 - 47.0 %    MCV 99 78 - 100 fL    MCH 32.6 26.5 - 33.0 pg    MCHC 33.0 31.5 - 36.5 g/dL    RDW 12.1 10.0 - 15.0 %    Platelet Count 190 150 - 450 10e3/uL   Basic metabolic panel    Collection Time: 12/21/21  9:35 AM   Result Value Ref Range    Sodium 144 133 - 144 mmol/L    Potassium 3.8 3.4 - 5.3 mmol/L    Chloride 108 94 - 109 mmol/L    Carbon Dioxide (CO2) 28 20 - 32 mmol/L    Anion Gap 8 3 - 14 mmol/L    Urea Nitrogen 35 (H) 7 - 30 mg/dL    Creatinine 1.24 (H) 0.52 - 1.04 mg/dL    Calcium 9.6 8.5 - 10.1  mg/dL    Glucose 140 (H) 70 - 99 mg/dL    GFR Estimate 40 (L) >60 mL/min/1.73m2   Magnesium    Collection Time: 12/21/21  9:35 AM   Result Value Ref Range    Magnesium 2.4 (H) 1.6 - 2.3 mg/dL   Troponin I    Collection Time: 12/21/21  9:35 AM   Result Value Ref Range    Troponin I High Sensitivity 16 <54 ng/L   Extra Purple Top Tube    Collection Time: 12/21/21 10:22 AM   Result Value Ref Range    Hold Specimen JI         Review of your medicines      START taking      Dose / Directions   albuterol 108 (90 Base) MCG/ACT inhaler  Commonly known as: PROAIR HFA/PROVENTIL HFA/VENTOLIN HFA  Used for: Moderate persistent asthma without complication      Dose: 2 puff  Inhale 2 puffs into the lungs every 6 hours as needed for wheezing or shortness of breath / dyspnea  Quantity: 18 g  Refills: 3     liothyronine 5 MCG tablet  Commonly known as: CYTOMEL  Used for: Bipolar disorder, current episode depressed, severe, without psychotic features (H)      Dose: 10 mcg  Take 2 tablets (10 mcg) by mouth daily  Quantity: 30 tablet  Refills: 3     memantine 10 MG tablet  Commonly known as: NAMENDA  Used for: Major neurocognitive disorder (H)      Dose: 10 mg  Take 1 tablet (10 mg) by mouth daily  Quantity: 30 tablet  Refills: 3     mirtazapine 7.5 MG tablet  Commonly known as: REMERON  Used for: Bipolar disorder, current episode depressed, severe, without psychotic features (H)      Dose: 7.5 mg  Take 1 tablet (7.5 mg) by mouth At Bedtime  Quantity: 30 tablet  Refills: 3        CONTINUE these medicines which may have CHANGED, or have new prescriptions. If we are uncertain of the size of tablets/capsules you have at home, strength may be listed as something that might have changed.      Dose / Directions   losartan 50 MG tablet  Commonly known as: COZAAR  This may have changed:     medication strength    how much to take  Used for: Benign essential hypertension      Dose: 50 mg  Take 1 tablet (50 mg) by mouth daily  Quantity: 30  tablet  Refills: 1        CONTINUE these medicines which have NOT CHANGED      Dose / Directions   alendronate 70 MG tablet  Commonly known as: FOSAMAX      Dose: 70 mg  Take 70 mg by mouth every 7 days  Refills: 0     aspirin 81 MG tablet  Commonly known as: ASA      Dose: 81 mg  Take 81 mg by mouth daily  Refills: 0     azelastine 0.15 % nasal spray  Commonly known as: ASTEPRO  Used for: Seasonal allergic rhinitis, unspecified trigger      INSTILL 1 SPRAY INTO BOTH NOSTRILS DAILY  Quantity: 5 mL  Refills: 1     CITRACAL OR      Dose: 1 tablet  Take 1 tablet by mouth daily.  Refills: 0     clopidogrel 75 MG tablet  Commonly known as: Plavix      Dose: 75 mg  Take 1 tablet (75 mg) by mouth daily  Quantity: 90 tablet  Refills: 3     desvenlafaxine 100 MG 24 hr tablet  Commonly known as: PRISTIQ      Dose: 100 mg  Take 100 mg by mouth daily  Refills: 0     fluticasone-salmeterol 500-50 MCG/DOSE inhaler  Commonly known as: Advair Diskus  Used for: Moderate persistent asthma without complication      INHALE 1 PUFF INTO THE LUNGS EVERY 12 HOURS  Quantity: 1 each  Refills: 6     * lamoTRIgine 200 MG tablet  Commonly known as: LaMICtal      Dose: 200 mg  Take 200 mg by mouth daily with 25 mg tablet for a total dose of 225 mg  Refills: 0     * lamoTRIgine 25 MG tablet  Commonly known as: LaMICtal      Dose: 25 mg  Take 25 mg by mouth daily with 200 mg tablet for a total dose of 225 mg  Refills: 0     levothyroxine 50 MCG tablet  Commonly known as: SYNTHROID/LEVOTHROID      Dose: 50 mcg  Take 50 mcg by mouth daily  Refills: 0     Multi-vitamin tablet  Generic drug: multivitamin w/minerals      Dose: 1 tablet  Take 1 tablet by mouth daily.  Refills: 0     rosuvastatin 40 MG tablet  Commonly known as: CRESTOR  Used for: Hyperlipidemia with target LDL less than 70      TAKE 1 TABLET BY MOUTH EVERY DAY  Quantity: 90 tablet  Refills: 3     vitamin D3 1.25 MG (91110 UT) capsule  Commonly known as: CHOLECALCIFEROL      Dose: 50,000  Units  Take 50,000 Units by mouth once a week  Refills: 0         * This list has 2 medication(s) that are the same as other medications prescribed for you. Read the directions carefully, and ask your doctor or other care provider to review them with you.            STOP taking    amLODIPine 10 MG tablet  Commonly known as: NORVASC        amoxicillin 500 MG tablet  Commonly known as: AMOXIL        Blood Pressure Monitor Kit        verapamil  MG CR tablet  Commonly known as: CALAN-SR              Where to get your medicines      These medications were sent to [facility]/pharmacy #[id number] - [facility] [address] AT COR[address] OF [address] [place] [id number]    Phone: [phone]     albuterol 108 (90 Base) MCG/ACT inhaler    liothyronine 5 MCG tablet    losartan 50 MG tablet    memantine 10 MG tablet    mirtazapine 7.5 MG tablet

## 2022-01-06 ENCOUNTER — MEDICAL CORRESPONDENCE (OUTPATIENT)
Dept: HEALTH INFORMATION MANAGEMENT | Facility: CLINIC | Age: 87
End: 2022-01-06
Payer: MEDICARE

## 2022-01-07 ENCOUNTER — HOSPITAL ENCOUNTER (OUTPATIENT)
Facility: CLINIC | Age: 87
Setting detail: OBSERVATION
Discharge: HOME | End: 2022-01-10
Attending: EMERGENCY MEDICINE | Admitting: ORTHOPAEDIC SURGERY
Payer: MEDICARE

## 2022-01-07 ENCOUNTER — MEDICAL CORRESPONDENCE (OUTPATIENT)
Dept: HEALTH INFORMATION MANAGEMENT | Facility: CLINIC | Age: 87
End: 2022-01-07

## 2022-01-07 DIAGNOSIS — Z11.52 ENCOUNTER FOR SCREENING LABORATORY TESTING FOR SEVERE ACUTE RESPIRATORY SYNDROME CORONAVIRUS 2 (SARS-COV-2): ICD-10-CM

## 2022-01-07 DIAGNOSIS — I10 ESSENTIAL HYPERTENSION: ICD-10-CM

## 2022-01-07 DIAGNOSIS — I25.10 ATHEROSCLEROSIS OF NATIVE CORONARY ARTERY OF NATIVE HEART WITHOUT ANGINA PECTORIS: ICD-10-CM

## 2022-01-07 DIAGNOSIS — Z95.5 STENTED CORONARY ARTERY: ICD-10-CM

## 2022-01-07 LAB
ANION GAP SERPL CALCULATED.3IONS-SCNC: 4 MMOL/L (ref 3–14)
BASOPHILS # BLD AUTO: 0.1 10E3/UL (ref 0–0.2)
BASOPHILS NFR BLD AUTO: 1 %
BUN SERPL-MCNC: 28 MG/DL (ref 7–30)
CALCIUM SERPL-MCNC: 10 MG/DL (ref 8.5–10.1)
CHLORIDE BLD-SCNC: 106 MMOL/L (ref 94–109)
CO2 SERPL-SCNC: 30 MMOL/L (ref 20–32)
CREAT SERPL-MCNC: 1.12 MG/DL (ref 0.52–1.04)
EOSINOPHIL # BLD AUTO: 0.2 10E3/UL (ref 0–0.7)
EOSINOPHIL NFR BLD AUTO: 3 %
ERYTHROCYTE [DISTWIDTH] IN BLOOD BY AUTOMATED COUNT: 12.2 % (ref 10–15)
GFR SERPL CREATININE-BSD FRML MDRD: 48 ML/MIN/1.73M2
GLUCOSE BLD-MCNC: 82 MG/DL (ref 70–99)
HCT VFR BLD AUTO: 41.5 % (ref 35–47)
HGB BLD-MCNC: 13.3 G/DL (ref 11.7–15.7)
IMM GRANULOCYTES # BLD: 0 10E3/UL
IMM GRANULOCYTES NFR BLD: 0 %
LYMPHOCYTES # BLD AUTO: 1.5 10E3/UL (ref 0.8–5.3)
LYMPHOCYTES NFR BLD AUTO: 31 %
MCH RBC QN AUTO: 32.3 PG (ref 26.5–33)
MCHC RBC AUTO-ENTMCNC: 32 G/DL (ref 31.5–36.5)
MCV RBC AUTO: 101 FL (ref 78–100)
MONOCYTES # BLD AUTO: 0.5 10E3/UL (ref 0–1.3)
MONOCYTES NFR BLD AUTO: 10 %
NEUTROPHILS # BLD AUTO: 2.8 10E3/UL (ref 1.6–8.3)
NEUTROPHILS NFR BLD AUTO: 55 %
NRBC # BLD AUTO: 0 10E3/UL
NRBC BLD AUTO-RTO: 0 /100
PLATELET # BLD AUTO: 218 10E3/UL (ref 150–450)
POTASSIUM BLD-SCNC: 3.6 MMOL/L (ref 3.4–5.3)
RBC # BLD AUTO: 4.12 10E6/UL (ref 3.8–5.2)
SARS-COV-2 RNA RESP QL NAA+PROBE: NEGATIVE
SODIUM SERPL-SCNC: 140 MMOL/L (ref 133–144)
TROPONIN I SERPL HS-MCNC: 31 NG/L
WBC # BLD AUTO: 5 10E3/UL (ref 4–11)

## 2022-01-07 PROCEDURE — 99207 PR CDG-MDM COMPONENT: MEETS HIGH - UP CODED: CPT | Performed by: STUDENT IN AN ORGANIZED HEALTH CARE EDUCATION/TRAINING PROGRAM

## 2022-01-07 PROCEDURE — U0003 INFECTIOUS AGENT DETECTION BY NUCLEIC ACID (DNA OR RNA); SEVERE ACUTE RESPIRATORY SYNDROME CORONAVIRUS 2 (SARS-COV-2) (CORONAVIRUS DISEASE [COVID-19]), AMPLIFIED PROBE TECHNIQUE, MAKING USE OF HIGH THROUGHPUT TECHNOLOGIES AS DESCRIBED BY CMS-2020-01-R: HCPCS | Performed by: EMERGENCY MEDICINE

## 2022-01-07 PROCEDURE — 250N000013 HC RX MED GY IP 250 OP 250 PS 637: Performed by: NURSE PRACTITIONER

## 2022-01-07 PROCEDURE — 84484 ASSAY OF TROPONIN QUANT: CPT | Performed by: EMERGENCY MEDICINE

## 2022-01-07 PROCEDURE — 99207 PR APP CREDIT; MD BILLING SHARED VISIT: CPT | Performed by: NURSE PRACTITIONER

## 2022-01-07 PROCEDURE — C9803 HOPD COVID-19 SPEC COLLECT: HCPCS | Performed by: EMERGENCY MEDICINE

## 2022-01-07 PROCEDURE — 250N000011 HC RX IP 250 OP 636: Performed by: EMERGENCY MEDICINE

## 2022-01-07 PROCEDURE — 85014 HEMATOCRIT: CPT | Performed by: EMERGENCY MEDICINE

## 2022-01-07 PROCEDURE — 99223 1ST HOSP IP/OBS HIGH 75: CPT | Mod: AI | Performed by: STUDENT IN AN ORGANIZED HEALTH CARE EDUCATION/TRAINING PROGRAM

## 2022-01-07 PROCEDURE — 93005 ELECTROCARDIOGRAM TRACING: CPT | Performed by: EMERGENCY MEDICINE

## 2022-01-07 PROCEDURE — 93010 ELECTROCARDIOGRAM REPORT: CPT | Performed by: EMERGENCY MEDICINE

## 2022-01-07 PROCEDURE — 36415 COLL VENOUS BLD VENIPUNCTURE: CPT | Performed by: EMERGENCY MEDICINE

## 2022-01-07 PROCEDURE — 96374 THER/PROPH/DIAG INJ IV PUSH: CPT | Performed by: EMERGENCY MEDICINE

## 2022-01-07 PROCEDURE — 80048 BASIC METABOLIC PNL TOTAL CA: CPT | Performed by: EMERGENCY MEDICINE

## 2022-01-07 PROCEDURE — 99285 EMERGENCY DEPT VISIT HI MDM: CPT | Mod: 25 | Performed by: EMERGENCY MEDICINE

## 2022-01-07 RX ORDER — HYDRALAZINE HYDROCHLORIDE 20 MG/ML
10 INJECTION INTRAMUSCULAR; INTRAVENOUS ONCE
Status: COMPLETED | OUTPATIENT
Start: 2022-01-07 | End: 2022-01-07

## 2022-01-07 RX ORDER — ROSUVASTATIN CALCIUM 10 MG/1
40 TABLET, COATED ORAL DAILY
Status: DISCONTINUED | OUTPATIENT
Start: 2022-01-08 | End: 2022-01-10 | Stop reason: HOSPADM

## 2022-01-07 RX ORDER — HYDRALAZINE HYDROCHLORIDE 20 MG/ML
10 INJECTION INTRAMUSCULAR; INTRAVENOUS EVERY 6 HOURS PRN
Status: DISCONTINUED | OUTPATIENT
Start: 2022-01-07 | End: 2022-01-10 | Stop reason: HOSPADM

## 2022-01-07 RX ORDER — ALBUTEROL SULFATE 90 UG/1
2 AEROSOL, METERED RESPIRATORY (INHALATION) EVERY 6 HOURS PRN
Status: DISCONTINUED | OUTPATIENT
Start: 2022-01-07 | End: 2022-01-10 | Stop reason: HOSPADM

## 2022-01-07 RX ORDER — CLOPIDOGREL BISULFATE 75 MG/1
75 TABLET ORAL DAILY
Status: DISCONTINUED | OUTPATIENT
Start: 2022-01-08 | End: 2022-01-10 | Stop reason: HOSPADM

## 2022-01-07 RX ORDER — LEVOTHYROXINE SODIUM 50 UG/1
50 TABLET ORAL DAILY
Status: DISCONTINUED | OUTPATIENT
Start: 2022-01-08 | End: 2022-01-10 | Stop reason: HOSPADM

## 2022-01-07 RX ORDER — METOPROLOL TARTRATE 25 MG/1
25 TABLET, FILM COATED ORAL 2 TIMES DAILY
Status: DISCONTINUED | OUTPATIENT
Start: 2022-01-07 | End: 2022-01-09

## 2022-01-07 RX ORDER — AMLODIPINE BESYLATE AND ATORVASTATIN CALCIUM 10; 10 MG/1; MG/1
1 TABLET, FILM COATED ORAL DAILY
Status: ON HOLD | COMMUNITY
End: 2022-01-10

## 2022-01-07 RX ORDER — AMLODIPINE BESYLATE 10 MG/1
10 TABLET ORAL DAILY
Status: DISCONTINUED | OUTPATIENT
Start: 2022-01-08 | End: 2022-01-10 | Stop reason: HOSPADM

## 2022-01-07 RX ORDER — LIDOCAINE 40 MG/G
CREAM TOPICAL
Status: DISCONTINUED | OUTPATIENT
Start: 2022-01-07 | End: 2022-01-10 | Stop reason: HOSPADM

## 2022-01-07 RX ORDER — BISACODYL 10 MG
10 SUPPOSITORY, RECTAL RECTAL DAILY PRN
Status: DISCONTINUED | OUTPATIENT
Start: 2022-01-07 | End: 2022-01-10 | Stop reason: HOSPADM

## 2022-01-07 RX ORDER — MIRTAZAPINE 7.5 MG/1
7.5 TABLET, FILM COATED ORAL AT BEDTIME
Status: DISCONTINUED | OUTPATIENT
Start: 2022-01-07 | End: 2022-01-10 | Stop reason: HOSPADM

## 2022-01-07 RX ORDER — DESVENLAFAXINE 100 MG/1
100 TABLET, EXTENDED RELEASE ORAL DAILY
Status: DISCONTINUED | OUTPATIENT
Start: 2022-01-08 | End: 2022-01-10 | Stop reason: HOSPADM

## 2022-01-07 RX ORDER — LOSARTAN POTASSIUM 50 MG/1
50 TABLET ORAL DAILY
Status: DISCONTINUED | OUTPATIENT
Start: 2022-01-08 | End: 2022-01-10 | Stop reason: HOSPADM

## 2022-01-07 RX ORDER — LIOTHYRONINE SODIUM 5 UG/1
10 TABLET ORAL DAILY
Status: DISCONTINUED | OUTPATIENT
Start: 2022-01-08 | End: 2022-01-10 | Stop reason: HOSPADM

## 2022-01-07 RX ORDER — MULTIPLE VITAMINS W/ MINERALS TAB 9MG-400MCG
1 TAB ORAL DAILY
Status: DISCONTINUED | OUTPATIENT
Start: 2022-01-08 | End: 2022-01-10 | Stop reason: HOSPADM

## 2022-01-07 RX ORDER — ASPIRIN 81 MG/1
81 TABLET, CHEWABLE ORAL DAILY
Status: DISCONTINUED | OUTPATIENT
Start: 2022-01-08 | End: 2022-01-10 | Stop reason: HOSPADM

## 2022-01-07 RX ORDER — MEMANTINE HYDROCHLORIDE 10 MG/1
10 TABLET ORAL DAILY
Status: DISCONTINUED | OUTPATIENT
Start: 2022-01-08 | End: 2022-01-10 | Stop reason: HOSPADM

## 2022-01-07 RX ADMIN — MIRTAZAPINE 7.5 MG: 7.5 TABLET, FILM COATED ORAL at 22:57

## 2022-01-07 RX ADMIN — METOPROLOL TARTRATE 25 MG: 25 TABLET, FILM COATED ORAL at 22:57

## 2022-01-07 RX ADMIN — HYDRALAZINE HYDROCHLORIDE 10 MG: 20 INJECTION INTRAMUSCULAR; INTRAVENOUS at 17:58

## 2022-01-07 ASSESSMENT — ACTIVITIES OF DAILY LIVING (ADL)
ADLS_ACUITY_SCORE: 14

## 2022-01-07 ASSESSMENT — MIFFLIN-ST. JEOR: SCORE: 1028.75

## 2022-01-07 NOTE — ED TRIAGE NOTES
Ambulatory to triage with daughter c/o phil blood pressure 200s systolic. Was seen at urgent care yesterday for hypertension and they prescribed amlodipine 5mg. Denies pain. Patient bp 171/77 alert and oriented. Denies nausea, balance issues, any symptoms at all.

## 2022-01-07 NOTE — ED PROVIDER NOTES
[place] EMERGENCY DEPARTMENT ([facility])  1/07/22  History     Chief Complaint   Patient presents with     Hypertension     HPI  Kamryn Miller is a 86 year old female who has a significant medical history of hypertension, CAD hyperlipidemia, depression, bipolar disorder, and asthma who presents to the Emergency Department with c/o high blood pressure, in the 200s systolic.     Pt and her daughter note that she was admitted to the hospital for mental health stabilization in mid December and several of her blood pressure medications were discontinued due to low pressures while she was getting started on new mental health medications. Now for the last few days she has had severe hypertension. She was seen in an urgent care 2 days ago and restarted on amlodipine 5mg daily. She took this yesterday and this morning. It does bring her pressure down temporarily but then she had rebound of pressures in 221/90-110s range prior to coming to the ED. She had head pressure at the Urgent Care visit, but currently no headache, vision changes, chest pain, abdominal pain.   She is also taking losartan 50mg daily and metoprolol 25mg BID in addition to the new amlodipine.     Past Medical History  Past Medical History:   Diagnosis Date     Aortic valvar stenosis 7/2010    mild     Asthma      Bipolar disorder (H)      CAD (coronary artery disease)     s/p angioplasty     Celiac disease      Colon polyps      Colon polyps 2012    every 3 year colonoscopy      Depression      High cholesterol      HTN      Mitral insufficiency      Pulmonary HTN (H)     mild     Tremors 7/10    drug induced from antidepressants     Tricuspid insufficiency      Past Surgical History:   Procedure Laterality Date     ANGIOGRAM  6/26/2009     ANGIOPLASTY  9/96    for angina     ANGIOPLASTY  8/03 - 9/03    X -2 - with stenst in coronary car.     APPENDECTOMY       BREAST BIOPSY, RT/LT      Breat Biopsy RT/LT, benign     BUNIONECTOMY  11/8/2011     Procedure:BUNIONECTOMY; Left donna bunionectomy; Surgeon:FREDY LITTLEJOHN; Location:[facility] OR     BUNIONECTOMY RT/LT  5/2007    right bunion and right 2nd hammertoe     CATARACT IOL, RT/LT  6/12 and 7/12    bilateral     COLONOSCOPY  2007, 2012    every 3 years for polyps     CV CORONARY ANGIOGRAM N/A 8/21/2020    Procedure: CV CORONARY ANGIOGRAM;  Surgeon: Gianluca Toscano MD;  Location: [facility]     CV LEFT HEART CATH N/A 8/21/2020    Procedure: CV LEFT HEART CATH;  Surgeon: Gianluca Toscano MD;  Location: [facility]     CV LEFT HEART CATH N/A 11/3/2020    Procedure: CV LEFT HEART CATH;  Surgeon: Tom Burrows MD;  Location: [facility]     CV LOWER EXTREMITY ANGIOGRAM BILATERAL N/A 11/3/2020    Procedure: CV ANGIOGRAM LOWER EXTREMITY BILATERAL;  Surgeon: Tom Burrows MD;  Location: [facility]     CV PCI STENT DRUG ELUTING N/A 8/21/2020    Procedure: Percutaneous Coronary Intervention Stent Drug Eluting;  Surgeon: Gianluca Toscano MD;  Location: [facility]     CV RIGHT HEART CATH MEASUREMENTS RECORDED N/A 8/21/2020    Procedure: CV RIGHT HEART CATH;  Surgeon: Gianluca Toscano MD;  Location: [facility]     CV RIGHT HEART CATH MEASUREMENTS RECORDED N/A 11/3/2020    Procedure: CV RIGHT HEART CATH;  Surgeon: Tom Burrows MD;  Location: [facility]     JOINT REPLACEMENT, HIP RT/LT  4/2008    right hip replaced     JOINT REPLACEMENT, HIP RT/LT  4/2009    left hip replaced     REPAIR HAMMER TOE  11/8/2011    Procedure:REPAIR HAMMER TOE; left 2nd hammertoe repair; Surgeon:FREDY LITTLEJOHN; Location:[facility] OR     SURGICAL HISTORY OF -   11/11    left bunion and 2nd hammertoe repair     TUBAL LIGATION       ZZC ANESTH,BLEPHAROPLASTY      (R) for drooping eyelid     ZZC APPENDECTOMY       amLODIPine-atorvastatin (CADUET) 10-10 MG tablet  aspirin 81 MG tablet  losartan (COZAAR) 50 MG  tablet  metoprolol tartrate (LOPRESSOR) 25 MG tablet  albuterol (PROAIR HFA/PROVENTIL HFA/VENTOLIN HFA) 108 (90 Base) MCG/ACT inhaler  alendronate (FOSAMAX) 70 MG tablet  azelastine (ASTEPRO) 0.15 % nasal spray  Calcium Citrate (CITRACAL OR)  CHOLECALCIFEROL 68890 UNIT PO CAPS  clopidogrel (PLAVIX) 75 MG tablet  desvenlafaxine (PRISTIQ) 100 MG 24 hr tablet  fluticasone-salmeterol (ADVAIR DISKUS) 500-50 MCG/DOSE inhaler  lamoTRIgine (LAMICTAL) 25 MG tablet  LAMOTRIGINE 200 MG PO TABS  levothyroxine (SYNTHROID/LEVOTHROID) 50 MCG tablet  liothyronine (CYTOMEL) 5 MCG tablet  memantine (NAMENDA) 10 MG tablet  mirtazapine (REMERON) 7.5 MG tablet  Multiple Vitamin (MULTI-VITAMIN) per tablet  rosuvastatin (CRESTOR) 40 MG tablet      Allergies   Allergen Reactions     Ace Inhibitors Cough     Diclofenac Other (See Comments)     Balance problems     Gluten Meal Diarrhea     Family History  Family History   Problem Relation Age of Onset     Asthma Mother      Cerebrovascular Disease Mother      Arthritis Mother      Depression Mother      Lipids Sister      Hypertension Sister      Heart Disease Sister      Lipids Sister      Hypertension Sister      Lipids Sister      Breast Cancer Sister      Social History   Social History     Tobacco Use     Smoking status: Never Smoker     Smokeless tobacco: Never Used   Substance Use Topics     Alcohol use: No     Alcohol/week: 0.0 standard drinks     Types: 1 Standard drinks or equivalent per week     Drug use: No      Past medical history, past surgical history, medications, allergies, family history, and social history were reviewed with the patient. No additional pertinent items.     I have reviewed the Medications, Allergies, Past Medical and Surgical History, and Social History in the Epic system.    Review of Systems  A complete review of systems was performed with pertinent positives and negatives noted in the HPI, and all other systems negative.    Physical Exam   BP: (!)  "171/77  Pulse: 62  Temp: 97.9  F (36.6  C)  Resp: 20  Height: 157.5 cm (5' 2\")  Weight: 63 kg (139 lb)  SpO2: 98 %      Physical Exam  Gen:A&Ox3, no acute distress  HEENT:PERRL, no facial tenderness or wounds, head atraumatic  CV:RRR without murmurs  PULM:Clear to auscultation bilaterally  Abd:soft, nontender, nondistended. Bowel sounds present and normal  UE:No traumatic injuries, skin normal  LE:no traumatic injuries, skin normal, no LE edema  Neuro:CN II-XII intact, strength 5/5 throughout, coordination grossly normal, gait stable.   Skin: no rashes or ecchymoses    ED Course     At 4:34 PM the patient was seen and examined by Soila Serrano MD in Room HWX.        Procedures              EKG Interpretation:      Interpreted by Soila Serrano MD  Time reviewed: 19:04  Symptoms at time of EKG: hypertension  Rhythm: normal sinus   Rate:   Axis: normal  Ectopy: none  Conduction: normal  ST Segments/ T Waves: LVH  Q Waves: none  Comparison to prior: No old EKG available    Clinical Impression: normal EKG        Results for orders placed or performed during the hospital encounter of 01/07/22 (from the past 24 hour(s))   CBC with platelets differential    Narrative    The following orders were created for panel order CBC with platelets differential.  Procedure                               Abnormality         Status                     ---------                               -----------         ------                     CBC with platelets and d...[[id number]]  Abnormal            Final result                 Please view results for these tests on the individual orders.   Basic metabolic panel   Result Value Ref Range    Sodium 140 133 - 144 mmol/L    Potassium 3.6 3.4 - 5.3 mmol/L    Chloride 106 94 - 109 mmol/L    Carbon Dioxide (CO2) 30 20 - 32 mmol/L    Anion Gap 4 3 - 14 mmol/L    Urea Nitrogen 28 7 - 30 mg/dL    Creatinine 1.12 (H) 0.52 - 1.04 mg/dL    Calcium 10.0 8.5 - 10.1 mg/dL    Glucose 82 70 - 99 " mg/dL    GFR Estimate 48 (L) >60 mL/min/1.73m2   Troponin I   Result Value Ref Range    Troponin I High Sensitivity 31 <54 ng/L   CBC with platelets and differential   Result Value Ref Range    WBC Count 5.0 4.0 - 11.0 10e3/uL    RBC Count 4.12 3.80 - 5.20 10e6/uL    Hemoglobin 13.3 11.7 - 15.7 g/dL    Hematocrit 41.5 35.0 - 47.0 %     (H) 78 - 100 fL    MCH 32.3 26.5 - 33.0 pg    MCHC 32.0 31.5 - 36.5 g/dL    RDW 12.2 10.0 - 15.0 %    Platelet Count 218 150 - 450 10e3/uL    % Neutrophils 55 %    % Lymphocytes 31 %    % Monocytes 10 %    % Eosinophils 3 %    % Basophils 1 %    % Immature Granulocytes 0 %    NRBCs per 100 WBC 0 <1 /100    Absolute Neutrophils 2.8 1.6 - 8.3 10e3/uL    Absolute Lymphocytes 1.5 0.8 - 5.3 10e3/uL    Absolute Monocytes 0.5 0.0 - 1.3 10e3/uL    Absolute Eosinophils 0.2 0.0 - 0.7 10e3/uL    Absolute Basophils 0.1 0.0 - 0.2 10e3/uL    Absolute Immature Granulocytes 0.0 <=0.4 10e3/uL    Absolute NRBCs 0.0 10e3/uL   EKG 12 lead   Result Value Ref Range    Systolic Blood Pressure  mmHg    Diastolic Blood Pressure  mmHg    Ventricular Rate 64 BPM    Atrial Rate 64 BPM    ME Interval 202 ms    QRS Duration 98 ms     ms    QTc 466 ms    P Axis 74 degrees    R AXIS 7 degrees    T Axis -42 degrees    Interpretation ECG       Sinus rhythm  Left ventricular hypertrophy with repolarization abnormality  Abnormal ECG       Medications   lidocaine 1 % 0.1-1 mL (has no administration in time range)   lidocaine (LMX4) cream (has no administration in time range)   sodium chloride (PF) 0.9% PF flush 3 mL (has no administration in time range)   sodium chloride (PF) 0.9% PF flush 3 mL (has no administration in time range)   melatonin tablet 1 mg (has no administration in time range)   bisacodyl (DULCOLAX) Suppository 10 mg (has no administration in time range)   clopidogrel (PLAVIX) tablet 75 mg (has no administration in time range)   lamoTRIgine (LaMICtal) tablet 225 mg (has no administration in  time range)   levothyroxine (SYNTHROID/LEVOTHROID) tablet 50 mcg (has no administration in time range)   liothyronine (CYTOMEL) tablet 10 mcg (has no administration in time range)   memantine (NAMENDA) tablet 10 mg (has no administration in time range)   mirtazapine (REMERON) tablet TABS 7.5 mg (has no administration in time range)   albuterol (PROVENTIL HFA/VENTOLIN HFA) inhaler (has no administration in time range)   multivitamin w/minerals (THERA-VIT-M) tablet 1 tablet (has no administration in time range)   rosuvastatin (CRESTOR) tablet 40 mg (has no administration in time range)   desvenlafaxine (PRISTIQ) 24 hr tablet 100 mg (has no administration in time range)   aspirin (ASA) chewable tablet 81 mg (has no administration in time range)   fluticasone-vilanterol (BREO ELLIPTA) 200-25 MCG/INH inhaler 1 puff (has no administration in time range)   hydrALAZINE (APRESOLINE) injection 10 mg (10 mg Intravenous Given 1/7/22 1758)             Assessments & Plan (with Medical Decision Making)   Kamryn Miller is a 86 year old female presenting with severe hypertension in the setting of recent medication adjustments.   Arrives with /77 but increased to 203/96.   IV access obtained and she was treated with IV hydralazine.   CBC is unremarkable and BMP notable for chronic renal insufficiency with Cr 1.12 today.   Troponin high sensitivity is 31   Admitted for further BP management.     I have reviewed the nursing notes.    I have reviewed the findings, diagnosis, plan and need for follow up with the patient.    New Prescriptions    No medications on file       Final diagnoses:   Essential hypertension       I, Jordin Chaves am serving as a trained medical scribe to document services personally performed by Soila Serrano MD based on the provider's statements to me.      I, Soila Serrano MD, was physically present and have reviewed and verified the accuracy of this note documented by Jordin Chaves.      Soila Serrano MD FACEP  1/7/2022   [facility] EMERGENCY DEPARTMENT     Soila Serrano MD  01/07/22 1953

## 2022-01-07 NOTE — LETTER
[facility] [address] [address]  [phone]    FACSIMILE TRANSMITTAL SHEET    TO: [facility]    _____URGENT _____REVIEW ONLY _____PLEASE COMMENT____PLEASE REPLY    NOTES/COMMENTS: Attached please find final discharge orders for Kamryn Lomelidamien Pantoja, RN BSN, PHN, ACM-RN  7A RN Care Coordinator  Phone: [phone]  Pager [phone]  To contact the weekend RNCC, Page: [phone]    1/10/2022 2:41 PM                                        IF YOU DID NOT RECEIVE THE CORRECT NUMBER OF PAGES OR THE FAX DID NOT COME THROUGH CLEARLY, PLEASE CALL THE SENDER     CONFIDENTIALITY STATEMENT: Confidential information that may accompany this transmission contains protected health information under state and federal law and is legally privileged. This information is intended only for the use of the individual or entity named above and may be used only for carrying out treatment, payment or other healthcare operations. The recipient or person responsible for delivering this information is prohibited by law from disclosing this information without proper authorization to any other party, unless required to do so by law or regulation. If you are not the intended recipient, you are hereby notified that any review, dissemination, distribution, or copying of this message is strictly prohibited. If you have received this communication in error, please destroy the materials and contact us immediately by calling the number listed above. No response indicates that the information was received by the appropriate authorized party

## 2022-01-08 ENCOUNTER — APPOINTMENT (OUTPATIENT)
Dept: PHYSICAL THERAPY | Facility: CLINIC | Age: 87
End: 2022-01-08
Attending: NURSE PRACTITIONER
Payer: MEDICARE

## 2022-01-08 PROBLEM — Z95.5 STENTED CORONARY ARTERY: Status: ACTIVE | Noted: 2022-01-08

## 2022-01-08 PROBLEM — Z11.52 ENCOUNTER FOR SCREENING LABORATORY TESTING FOR SEVERE ACUTE RESPIRATORY SYNDROME CORONAVIRUS 2 (SARS-COV-2): Status: ACTIVE | Noted: 2022-01-08

## 2022-01-08 PROBLEM — I25.10 ATHEROSCLEROSIS OF NATIVE CORONARY ARTERY OF NATIVE HEART WITHOUT ANGINA PECTORIS: Status: ACTIVE | Noted: 2022-01-08

## 2022-01-08 PROBLEM — I10 ESSENTIAL HYPERTENSION: Status: ACTIVE | Noted: 2022-01-08

## 2022-01-08 LAB
ALBUMIN SERPL-MCNC: 3.1 G/DL (ref 3.4–5)
ALP SERPL-CCNC: 54 U/L (ref 40–150)
ALT SERPL W P-5'-P-CCNC: 24 U/L (ref 0–50)
ANION GAP SERPL CALCULATED.3IONS-SCNC: 6 MMOL/L (ref 3–14)
AST SERPL W P-5'-P-CCNC: 22 U/L (ref 0–45)
BILIRUB SERPL-MCNC: 0.4 MG/DL (ref 0.2–1.3)
BUN SERPL-MCNC: 22 MG/DL (ref 7–30)
CALCIUM SERPL-MCNC: 9.5 MG/DL (ref 8.5–10.1)
CHLORIDE BLD-SCNC: 110 MMOL/L (ref 94–109)
CO2 SERPL-SCNC: 28 MMOL/L (ref 20–32)
CREAT SERPL-MCNC: 1.08 MG/DL (ref 0.52–1.04)
ERYTHROCYTE [DISTWIDTH] IN BLOOD BY AUTOMATED COUNT: 12.5 % (ref 10–15)
GFR SERPL CREATININE-BSD FRML MDRD: 50 ML/MIN/1.73M2
GLUCOSE BLD-MCNC: 93 MG/DL (ref 70–99)
HCT VFR BLD AUTO: 39.4 % (ref 35–47)
HGB BLD-MCNC: 12.5 G/DL (ref 11.7–15.7)
MCH RBC QN AUTO: 32.1 PG (ref 26.5–33)
MCHC RBC AUTO-ENTMCNC: 31.7 G/DL (ref 31.5–36.5)
MCV RBC AUTO: 101 FL (ref 78–100)
PLATELET # BLD AUTO: 207 10E3/UL (ref 150–450)
POTASSIUM BLD-SCNC: 3.6 MMOL/L (ref 3.4–5.3)
PROT SERPL-MCNC: 6.7 G/DL (ref 6.8–8.8)
RBC # BLD AUTO: 3.9 10E6/UL (ref 3.8–5.2)
SODIUM SERPL-SCNC: 144 MMOL/L (ref 133–144)
WBC # BLD AUTO: 4.5 10E3/UL (ref 4–11)

## 2022-01-08 PROCEDURE — 36415 COLL VENOUS BLD VENIPUNCTURE: CPT | Performed by: NURSE PRACTITIONER

## 2022-01-08 PROCEDURE — 85027 COMPLETE CBC AUTOMATED: CPT | Performed by: NURSE PRACTITIONER

## 2022-01-08 PROCEDURE — G0378 HOSPITAL OBSERVATION PER HR: HCPCS

## 2022-01-08 PROCEDURE — 80053 COMPREHEN METABOLIC PANEL: CPT | Performed by: NURSE PRACTITIONER

## 2022-01-08 PROCEDURE — 250N000013 HC RX MED GY IP 250 OP 250 PS 637: Performed by: NURSE PRACTITIONER

## 2022-01-08 PROCEDURE — 999N000111 HC STATISTIC OT IP EVAL DEFER: Performed by: OCCUPATIONAL THERAPIST

## 2022-01-08 PROCEDURE — 97116 GAIT TRAINING THERAPY: CPT | Mod: GP

## 2022-01-08 PROCEDURE — 99225 PR SUBSEQUENT OBSERVATION CARE,LEVEL II: CPT | Mod: FS | Performed by: ORTHOPAEDIC SURGERY

## 2022-01-08 PROCEDURE — 96376 TX/PRO/DX INJ SAME DRUG ADON: CPT

## 2022-01-08 PROCEDURE — 97161 PT EVAL LOW COMPLEX 20 MIN: CPT | Mod: GP

## 2022-01-08 PROCEDURE — 97530 THERAPEUTIC ACTIVITIES: CPT | Mod: GP

## 2022-01-08 PROCEDURE — 99207 PR APP CREDIT; MD BILLING SHARED VISIT: CPT | Performed by: PHYSICIAN ASSISTANT

## 2022-01-08 PROCEDURE — 250N000011 HC RX IP 250 OP 636: Performed by: NURSE PRACTITIONER

## 2022-01-08 PROCEDURE — 250N000013 HC RX MED GY IP 250 OP 250 PS 637: Performed by: STUDENT IN AN ORGANIZED HEALTH CARE EDUCATION/TRAINING PROGRAM

## 2022-01-08 RX ADMIN — AMLODIPINE BESYLATE 10 MG: 10 TABLET ORAL at 08:50

## 2022-01-08 RX ADMIN — LEVOTHYROXINE SODIUM 50 MCG: 0.05 TABLET ORAL at 08:50

## 2022-01-08 RX ADMIN — LIOTHYRONINE SODIUM 10 MCG: 5 TABLET ORAL at 08:51

## 2022-01-08 RX ADMIN — METOPROLOL TARTRATE 25 MG: 25 TABLET, FILM COATED ORAL at 19:48

## 2022-01-08 RX ADMIN — DESVENLAFAXINE SUCCINATE 100 MG: 100 TABLET, EXTENDED RELEASE ORAL at 08:51

## 2022-01-08 RX ADMIN — MIRTAZAPINE 7.5 MG: 7.5 TABLET, FILM COATED ORAL at 21:55

## 2022-01-08 RX ADMIN — Medication 1 TABLET: at 08:49

## 2022-01-08 RX ADMIN — METOPROLOL TARTRATE 25 MG: 25 TABLET, FILM COATED ORAL at 08:49

## 2022-01-08 RX ADMIN — CLOPIDOGREL BISULFATE 75 MG: 75 TABLET ORAL at 08:50

## 2022-01-08 RX ADMIN — HYDRALAZINE HYDROCHLORIDE 10 MG: 20 INJECTION INTRAMUSCULAR; INTRAVENOUS at 05:30

## 2022-01-08 RX ADMIN — LOSARTAN POTASSIUM 50 MG: 50 TABLET, FILM COATED ORAL at 08:50

## 2022-01-08 RX ADMIN — FLUTICASONE FUROATE AND VILANTEROL TRIFENATATE 1 PUFF: 200; 25 POWDER RESPIRATORY (INHALATION) at 08:51

## 2022-01-08 RX ADMIN — ASPIRIN 81 MG CHEWABLE TABLET 81 MG: 81 TABLET CHEWABLE at 08:49

## 2022-01-08 RX ADMIN — LAMOTRIGINE 225 MG: 25 TABLET ORAL at 08:51

## 2022-01-08 RX ADMIN — MEMANTINE 10 MG: 10 TABLET ORAL at 08:50

## 2022-01-08 RX ADMIN — ROSUVASTATIN CALCIUM 40 MG: 10 TABLET, FILM COATED ORAL at 08:49

## 2022-01-08 ASSESSMENT — ACTIVITIES OF DAILY LIVING (ADL)
ADLS_ACUITY_SCORE: 10
WALKING_OR_CLIMBING_STAIRS_DIFFICULTY: NO
DOING_ERRANDS_INDEPENDENTLY_DIFFICULTY: YES
HEARING_DIFFICULTY_OR_DEAF: YES
DIFFICULTY_COMMUNICATING: NO
DIFFICULTY_EATING/SWALLOWING: NO
ADLS_ACUITY_SCORE: 10
VISION_MANAGEMENT: GLASSES
ADLS_ACUITY_SCORE: 10
WEAR_GLASSES_OR_BLIND: YES
ADLS_ACUITY_SCORE: 9
ADLS_ACUITY_SCORE: 14
DRESSING/BATHING_DIFFICULTY: NO
ADLS_ACUITY_SCORE: 10
FALL_HISTORY_WITHIN_LAST_SIX_MONTHS: NO
WERE_AUXILIARY_AIDS_OFFERED?: NO
ADLS_ACUITY_SCORE: 9
ADLS_ACUITY_SCORE: 10
CONCENTRATING,_REMEMBERING_OR_MAKING_DECISIONS_DIFFICULTY: NO
ADLS_ACUITY_SCORE: 14
ADLS_ACUITY_SCORE: 10
ADLS_ACUITY_SCORE: 14
TOILETING_ISSUES: NO
ADLS_ACUITY_SCORE: 10
ADLS_ACUITY_SCORE: 14
ADLS_ACUITY_SCORE: 14

## 2022-01-08 ASSESSMENT — MIFFLIN-ST. JEOR: SCORE: 1027.39

## 2022-01-08 NOTE — UTILIZATION REVIEW
Concurrent stay review; Secondary Review Determination    Under the authority of the Utilization Management Committee, the utilization review process indicated a secondary review on the above patient. The review outcome is based on review of the medical records, discussions with staff, and applying clinical experience noted on the date of the review.    (x) Observation Status Appropriate - Concurrent stay review        RATIONALE FOR DETERMINATION:86-year-old female with history of coronary artery disease, hypertension, mild pulmonary hypertension, persistent mild asthma, moderate aortic valve stenosis, mental health disease who was recently hospitalized in inpatient psychiatry and had some reduction in antihypertensive agents.  Patient now presents with significant hypertension with blood pressures as high as 210/110 at home and 175-203/90-96 in the emergency room.  Observation care appropriate for initial management of severe hypertension uncomplicated by myocardial ischemia, acute cerebrovascular symptoms or heart failure.  Case reviewed with treating team.  Patient is clinically improving and there is no clear indication to change patient's status to inpatient. The severity of illness, intensity of service provided, expected LOS and risk for adverse outcome make the care appropriate for observation.    This document was produced using voice recognition software    The information on this document is developed by the utilization review team in order for the business office to ensure compliance. This only denotes the appropriateness of proper admission status and does not reflect the quality of care rendered.    The definitions of Inpatient Status and Observation Status used in making the determination above are those provided in the CMS Coverage Manual, Chapter 1 and Chapter 6, section 70.4.    Sincerely,    Suman Langley MD  Utilization Review  Physician Advisor  [facility].

## 2022-01-08 NOTE — PROGRESS NOTES
Admitted/transferred from: ED   Time of arrival on unit 0430  2 RN full  skin assessment completed by Brigida BROWNLEE and Becky ENCARNACION  Skin assessment finding: Bruise on forehead  Interventions/actions: skin interventions None needed at this time     Will continue to monitor.

## 2022-01-08 NOTE — CONSULTS
Update to plan below:     Care Management Discharge Note    Discharge Date: 1/10/22       Discharge Disposition:  [facility] [address] [place]  RN Line: [phone]  Fax: [phone]    Discharge Services:  none    Discharge DME:  none    Discharge Transportation:  Daughter- Sarah ([phone])    Private pay costs discussed: Not applicable    PAS Confirmation Code:  N/A  Patient/family educated on Medicare website which has current facility and service quality ratings:  N/A    -Education Provided on the Discharge Plan:  yes  Persons Notified of Discharge Plans: provider, bedside RN, daughterCole  Patient/Family in Agreement with the Plan:  yes    Handoff Referral Completed: Yes    Additional Information:    Update to plan as of Sunday 1/9: Patients BP is still high. Medical team plans to adjust medications today and will plan to discharge pt on Monday 1/10/22, back to her [place].       PA spoke with son and daughter, who expressed concerns about her BP and discharging too soon. They decided to discharge tomorrow AM (Sunday 1/9/22).  WAN paged by PA stating patient plans to discharge back to [facility] tomorrow (1/9/22).    Her daughter, Sarah, will provide transportation. WAN spoke with Sarah to ask what time she plans to  patient and she said mid morning between 1000 and 1100. She asked if RN could call in the morning to confirm time. WAN will ask bedside RN to have patient ready by 1000. Sarah asked that patient be brought to the main entrance so she does not have to park the car. WAN relayed request to bedside RN and they will plan to do that as well as call Sarah in the morning to confirm time.    WAN spoke with [facility] nurse ([phone]) and they will accept her back tomorrow and asked that orders be faxed to [phone] and sent with patient. WAN informed that she will be back mid to late morning.    WAN will continue to follow up.    Salud Aguirre [facility]  Weekend WAN   [phone]          SOFIA Palma,  [facility] Center[facility]  Social Work Services/Care Management, Casual staff  [facility]      For weekend social work needs, contact information below and avail on [employer]:  4A, 4C, 4E, 5A, 5B pager [phone]  6A, 6B, 6C, 6D  pager [phone]  7A, 7B, 7C, 7D, 5C pager [phone]  on-call/after hours pager [phone]    weekend RN care coordinator pager [phone] (ID: [phone])  (home d/c with needs incl home care, assisted living facility returns, durable medical equip, IV antibiotics)

## 2022-01-08 NOTE — PROGRESS NOTES
[facility]    Medicine Progress Note - Hospitalist Service, Gold 6       Date of Admission:  1/7/2022    Assessment and Plan   Kamryn Miller is a 86 year old female with a history of CAD s/p stenting x 3 (8/2020), HTN, HLD, mild pulmonary HTN, mild persistent asthma, moderate aortic valve stenosis, CKD stage III, hypothyroidism, osteoporosis, celiac disease, bipolar disorder, and depression admitted for hypertensive urgency.       #Hypertensive Urgency, Resolved  #Hx of HTN  Likely 2/2 recent medication changes while admitted to inpatient psych. Previously on Amlodipine 10 mg daily, Lasix 20 mg daily, Losartan 100 mg daily, and Metoprolol 25-50mg BID, however, a few of these were adjusted or stopped due to recent TED and addition of psychiatric medications. /110 at home. Improved w/ IV Hydralazine in ED. EKG, trop reassuring. BP normotensive currently.     - Amlodipine 10 mg daily  - Losartan 50 mg daily  - Metoprolol 25 mg BID   - Hydralazine PRN for SBP > 180 if not improved on scheduled meds   - BP checks q 4h     #Moderate Aortic Valve Stenosis  #Pulmonary HTN   Echocardiogram 11/1/21 with EF 55-60%, normal RV function, no WMAs. Moderate aortic stenosis. Follows w/ Dr. Zuniga (Cardiology). Previously on Lasix 20 mg, stopped in 11/2021 due to TED. Appears euvolemic.  - Daily weights  - Follow up with Cardiology as scheduled on 1/21    #CAD, HLD - Hx PCI s/p stenting x 3 to mid-distal LAD in 8/2020.    - Continue ASA, Plavix, Rosuvastatin      #CKD III - BL Cr ~1.1-1.3. Cr stable at 1.08 (1.12).   - Daily BMP     #Depression   #Bipolar disorder   #Grief ( recently passed away in 2021)   Mood stable, very pleasant and in good spirits.   - Continue Lamictal 225 mg daily, Remeron 7.5 mg daily, and Pristiq 100 mg daily      #Cognitive Changes - MoCA 19/30, receives help with medications at [facility]. Continue PTA Namenda.     #Mild Persistent Asthma - Stable.  Continue Breo Ellipta and Albuterol PRN.    #Osteoporosis - Hold PTA Fosamax, Calcium, and Vitamin D while admitted. Resume at discharge.     #Hypothyroidism - TSH 3.73 (wnl) on 12/17/21. Continue PTA Levothyroxine and Liothyronine.     Diet: Regular Diet Adult    DVT Prophylaxis: Mechanical/Ambulation  Orellana Catheter: Not present  Central Lines: None  Code Status: Full Code      Disposition Plan   Expected Discharge: 01/09/2022     Anticipated discharge location: Return to [facility]  Delays: N/A    The patient's care was discussed with Dr. Dotson, bedside RN, and the patient.    LEDY Younger  Hospitalist Service, Gold 6  Securely message with the Vocera Web Console (learn more here)  Text page via [employer] Paging/Directory    Please see sign in/sign out for up to date coverage information  ______________________________________________________________________    Interval History  Received IV Hydralazine early this AM. /80 currently. Feeling tired today. Mild HA. Chronic constipation, no BM since admission. Denies chest pain, SOB, n/v/d, abdominal pain, or dysuria.    Data reviewed today: I reviewed all medications, new labs and imaging results over the last 24 hours.     Physical Exam  Vital Signs: Temp: 98.2  F (36.8  C) Temp src: Oral BP: 138/78 Pulse: 71   Resp: 16 SpO2: 98 % O2 Device: None (Room air)    Weight: 138 lbs 11.2 oz  General: Pleasant and cooperative. Sitting up in bed. NAD.  HEENT: Anicteric sclera. MMM.  CV: RRR. III/VI murmur.  Respiratory: Normal effort on RA. Lungs CTA anterolaterally.  GI: Abdomen is soft, non-tender, and non-distended. Bowel sounds present.  Extremities: No peripheral edema. Warm and well perfused.  Neuro: A&O x 3. Moves all extremities.  Skin: No visible rashes or jaundice.

## 2022-01-08 NOTE — H&P
[facility]    History and Physical - Hospitalist Service, Gold Night        Date of Admission:  1/7/2022    Assessment & Plan      Kamryn Miller is a 86 year old female with past medical history significant for CAD s/p stenting x 3 (8/2020), HTN, HLD, mild pulmonary HTN, mild persistent asthma, moderate aortic valve stenosis, CKD stage III, hypothyroidism, osteoporosis, celiac disease, bipolar disorder, and depression admitted for hypertensive urgency.      # Hypertensive urgency   # Hx HTN  Likely 2/2 recent medication changes while admitted to inpatient psych.  Had previously been on Amlodipine 10mg, Lasix 20mg daily, Losartan 100mg, and Metoprolol 25-50mg BID, however a few of these were adjusted or stopped due to recent TED and addition of psychiatric medications.  On chart review, Losartan had been resumed at 50% dosing.  Lasix stopped on 11/26 due to TED (which is now resolved).  She presented to urgent care on 1/6 with elevated BP and c/o head pressure.  Readings at home were ~ 210/110.  Started PTA Amlodipine at 50% dosing per urgent care provider, but BPs remained elevated today.  Improved w/ IV Hydralazine in ED. EKG, trop reassuring.     - Resume Amlodipine 10mg daily in AM   - Resume Losartan 50mg in AM, would uptitrate in coming days if renal function stable    - Resume Metoprolol 25mg BID tonight, uptitrate as able if HR tolerates   - Continue Hydralazine PRN for SBP > 180 if not improved on scheduled meds   - BP checks Q4H for now      # CAD, HLD, HTN   Hx PCI s/p stenting x 3 to mid-distal LAD in 8/2020.    - Continue antihypertensives as above   - Continue ASA and Plavix   - Continue PTA rosuvastatin     # Moderate aortic valve stenosis  # Pulmonary HTN   Last echocardiogram 11/1/21 with EF 55-60%, normal RV function, chamber size, and thickness, no WMAs.  Moderate aortic stenosis.  Follows w/ Dr. Zuniga (Cardiology) outpatient.  Previously on Lasix  "20mg, stopped in 11/2021 due to TED.   - Would d/w Cardiology when to resume Lasix   - Cardiology follow up within one week of discharge     # CKD stage III - Cr 1.12 this admission, c/w recent baseline.  BUN 28.    - Renally dose medications  - Avoid nephrotoxic agents   - Repeat CMP in AM    # Hypothyroidism - On Levothyroxine 50mcg and Liothyronine 10mcg PTA.  No recent dose adjustments.  Reports feeling cold in ED.  Last TSH 3.73 on 12/17.    - Repeat TSH, free T4  - Continue PTA Levothyroxine and Liothyronine for now     # Osteoporosis - Continue PTA Fosamax 70mg q 7 days, calcium supplementation, and Vitamin D supplementation.      # Depression   # Bipolar disorder   # Grief ( recently passed away in 2021)   Mood stable, very pleasant and in good spirits.   - Continue Lamictal 225mg daily, Remeron 7.5mg daily, and Pristiq 100mg daily     # Cognitive changes, ?dementia symptoms - Continue PTA Namenda.            Diet:   Regular   DVT Prophylaxis: Pneumatic Compression Devices  Orellana Catheter: Not present  Central Lines: None  Code Status:   FULL    Clinically Significant Risk Factors Present on Admission              # Platelet Defect: home medication list includes an antiplatelet medication   # Overweight: last Body mass index is 25.42 kg/m .      Disposition Plan   Expected Discharge: 01/09/2022   Anticipated discharge location:  Awaiting care coordination huddle  Delays:           The patient's care was discussed with the Attending Physician, Dr. Chandan Giraldo.    Aleshia Figueroa [facility]  Securely message with the Vocera Web Console (learn more here)  Text page via [facility] Paging/Directory    Please see sign in/sign out for up to date coverage information    ______________________________________________________________________    Chief Complaint   \"Elevated blood pressure at home\"     History is obtained from the patient, family, and chart review.  "     History of Present Illness   Kamryn Miller is a 86 year old female with past medical history significant for CAD s/p stenting x 3 (8/2020), HTN, HLD, mild pulmonary HTN, mild persistent asthma, moderate aortic valve stenosis, CKD stage III, hypothyroidism, osteoporosis, celiac disease, bipolar disorder, and depression admitted for hypertensive urgency.      Kamryn is sitting in the hallway with her daughter Ana present.  She tells me that she felt some pressure in her head yesterday but that has resolved today.  Currently she denies chest pain, vision changes, dizziness, syncope, nausea, and vomiting.  She is eating well and has a good appetite.  She is breathing comfortably on room air.  She says that she has been on the same blood pressure medications for many years, but some changes were made when she was hospitalized on the psych unit last month.    Review of Systems    The 10 point Review of Systems is negative other than noted in the HPI or here.     Past Medical History    I have reviewed this patient's medical history and updated it with pertinent information if needed.   Past Medical History:   Diagnosis Date     Aortic valvar stenosis 7/2010    mild     Asthma      Bipolar disorder (H)      CAD (coronary artery disease)     s/p angioplasty     Celiac disease      Colon polyps      Colon polyps 2012    every 3 year colonoscopy      Depression      High cholesterol      HTN      Mitral insufficiency      Pulmonary HTN (H)     mild     Tremors 7/10    drug induced from antidepressants     Tricuspid insufficiency        Past Surgical History   I have reviewed this patient's surgical history and updated it with pertinent information if needed.  Past Surgical History:   Procedure Laterality Date     ANGIOGRAM  6/26/2009     ANGIOPLASTY  9/96    for angina     ANGIOPLASTY  8/03 - 9/03    X -2 - with stenst in coronary car.     APPENDECTOMY       BREAST BIOPSY, RT/LT      Breat Biopsy RT/LT, benign      BUNIONECTOMY  11/8/2011    Procedure:BUNIONECTOMY; Left donna bunionectomy; Surgeon:FREDY LITTLEJOHN; Location:[facility] OR     BUNIONECTOMY RT/LT  5/2007    right bunion and right 2nd hammertoe     CATARACT IOL, RT/LT  6/12 and 7/12    bilateral     COLONOSCOPY  2007, 2012    every 3 years for polyps     CV CORONARY ANGIOGRAM N/A 8/21/2020    Procedure: CV CORONARY ANGIOGRAM;  Surgeon: Gianluca Toscano MD;  Location: [facility]     CV LEFT HEART CATH N/A 8/21/2020    Procedure: CV LEFT HEART CATH;  Surgeon: Gianluca Toscano MD;  Location: [facility]     CV LEFT HEART CATH N/A 11/3/2020    Procedure: CV LEFT HEART CATH;  Surgeon: Tom Burrows MD;  Location: [facility]     CV LOWER EXTREMITY ANGIOGRAM BILATERAL N/A 11/3/2020    Procedure: CV ANGIOGRAM LOWER EXTREMITY BILATERAL;  Surgeon: Tom Burrows MD;  Location: [facility]     CV PCI STENT DRUG ELUTING N/A 8/21/2020    Procedure: Percutaneous Coronary Intervention Stent Drug Eluting;  Surgeon: Gianluca Toscano MD;  Location: [facility]     CV RIGHT HEART CATH MEASUREMENTS RECORDED N/A 8/21/2020    Procedure: CV RIGHT HEART CATH;  Surgeon: Gianluca Toscano MD;  Location: [facility]     CV RIGHT HEART CATH MEASUREMENTS RECORDED N/A 11/3/2020    Procedure: CV RIGHT HEART CATH;  Surgeon: Tom Burrows MD;  [facility]     JOINT REPLACEMENT, HIP RT/LT  4/2008    right hip replaced     JOINT REPLACEMENT, HIP RT/LT  4/2009    left hip replaced     REPAIR HAMMER TOE  11/8/2011    Procedure:REPAIR HAMMER TOE; left 2nd hammertoe repair; Surgeon:FREDY LITTLEJOHN; Location:[facility]     SURGICAL HISTORY OF -   11/11    left bunion and 2nd hammertoe repair     TUBAL LIGATION       ZZC ANESTH,BLEPHAROPLASTY      (R) for drooping eyelid     ZZC APPENDECTOMY         Social History   I have reviewed this patient's social history and  updated it with pertinent information if needed.  Social History     Tobacco Use     Smoking status: Never Smoker     Smokeless tobacco: Never Used   Substance Use Topics     Alcohol use: No     Alcohol/week: 0.0 standard drinks     Types: 1 Standard drinks or equivalent per week     Drug use: No       Family History   I have reviewed this patient's family history and updated it with pertinent information if needed.  Family History   Problem Relation Age of Onset     Asthma Mother      Cerebrovascular Disease Mother      Arthritis Mother      Depression Mother      Lipids Sister      Hypertension Sister      Heart Disease Sister      Lipids Sister      Hypertension Sister      Lipids Sister      Breast Cancer Sister        Prior to Admission Medications   Prior to Admission Medications   Prescriptions Last Dose Informant Patient Reported? Taking?   CHOLECALCIFEROL 04375 UNIT PO CAPS  Self Yes No   Sig: Take 50,000 Units by mouth once a week    Calcium Citrate (CITRACAL OR)  Self Yes No   Sig: Take 1 tablet by mouth daily.   LAMOTRIGINE 200 MG PO TABS  Self Yes No   Sig: Take 200 mg by mouth daily with 25 mg tablet for a total dose of 225 mg   Multiple Vitamin (MULTI-VITAMIN) per tablet  Self Yes No   Sig: Take 1 tablet by mouth daily.   albuterol (PROAIR HFA/PROVENTIL HFA/VENTOLIN HFA) 108 (90 Base) MCG/ACT inhaler   No No   Sig: Inhale 2 puffs into the lungs every 6 hours as needed for wheezing or shortness of breath / dyspnea   alendronate (FOSAMAX) 70 MG tablet   Yes No   Sig: Take 70 mg by mouth every 7 days    amLODIPine-atorvastatin (CADUET) 10-10 MG tablet   Yes Yes   Sig: Take 1 tablet by mouth daily   aspirin 81 MG tablet 1/7/2022 at Unknown time Self Yes Yes   Sig: Take 81 mg by mouth daily    azelastine (ASTEPRO) 0.15 % nasal spray   No No   Sig: INSTILL 1 SPRAY INTO BOTH NOSTRILS DAILY   clopidogrel (PLAVIX) 75 MG tablet   No No   Sig: Take 1 tablet (75 mg) by mouth daily   desvenlafaxine (PRISTIQ) 100  MG 24 hr tablet  Self Yes No   Sig: Take 100 mg by mouth daily   fluticasone-salmeterol (ADVAIR DISKUS) 500-50 MCG/DOSE inhaler   No No   Sig: INHALE 1 PUFF INTO THE LUNGS EVERY 12 HOURS   lamoTRIgine (LAMICTAL) 25 MG tablet  Self Yes No   Sig: Take 25 mg by mouth daily with 200 mg tablet for a total dose of 225 mg   levothyroxine (SYNTHROID/LEVOTHROID) 50 MCG tablet   Yes No   Sig: Take 50 mcg by mouth daily   liothyronine (CYTOMEL) 5 MCG tablet   No No   Sig: Take 2 tablets (10 mcg) by mouth daily   losartan (COZAAR) 50 MG tablet 1/7/2022 at Unknown time  No Yes   Sig: Take 1 tablet (50 mg) by mouth daily   memantine (NAMENDA) 10 MG tablet   No No   Sig: Take 1 tablet (10 mg) by mouth daily   metoprolol tartrate (LOPRESSOR) 25 MG tablet 1/7/2022 at Unknown time  No Yes   Sig: Take 1 tablet (25 mg) by mouth 2 times daily   mirtazapine (REMERON) 7.5 MG tablet   No No   Sig: Take 1 tablet (7.5 mg) by mouth At Bedtime   rosuvastatin (CRESTOR) 40 MG tablet   No No   Sig: TAKE 1 TABLET BY MOUTH EVERY DAY   Patient taking differently: Take 40 mg by mouth daily       Facility-Administered Medications: None     Allergies   Allergies   Allergen Reactions     Ace Inhibitors Cough     Diclofenac Other (See Comments)     Balance problems     Gluten Meal Diarrhea       Physical Exam   Vital Signs: Temp: 97.9  F (36.6  C) Temp src: Oral BP: (!) 203/96 Pulse: 62   Resp: 20 SpO2: 98 % O2 Device: None (Room air)    Weight: 139 lbs 0 oz    GENERAL: Alert and oriented x 3. Well nourished, well developed.  No acute distress.    HEENT: Normocephalic.  Small abrasion on R side forehead from curling iron.  Anicteric sclera. Mucous membranes moist.   CV: RRR. S1, S2. Systolic murmur.   RESPIRATORY: Effort normal on room air. Lungs CTAB with no wheezing, rales, or rhonchi.   GI: Abdomen soft and non distended, bowel sounds present x all 4 quadrants. No tenderness, rebound, or guarding.   NEUROLOGICAL: No focal deficits. Follows commands.   Strength equal in upper and lower extremities.   MUSCULOSKELETAL: No joint swelling or tenderness. Moves all extremities.   EXTREMITIES: No gross deformities. No peripheral edema.   SKIN: Grossly warm, dry, and intact. No jaundice. No rashes.       Data   Data reviewed today: I reviewed all medications, new labs and imaging results over the last 24 hours.     Recent Labs   Lab 01/07/22  1745   WBC 5.0   HGB 13.3   *         POTASSIUM 3.6   CHLORIDE 106   CO2 30   BUN 28   CR 1.12*   ANIONGAP 4   PRINCE 10.0   GLC 82     Most Recent 3 CBC's:Recent Labs   Lab Test 01/07/22  1745 12/21/21  0934 12/16/21  1751   WBC 5.0 5.0 6.0   HGB 13.3 14.2 13.9   * 99 98    190 203     Most Recent 3 BMP's:Recent Labs   Lab Test 01/07/22  1745 12/21/21  0935 12/16/21  1751    144 138   POTASSIUM 3.6 3.8 3.8   CHLORIDE 106 108 106   CO2 30 28 29   BUN 28 35* 28   CR 1.12* 1.24* 1.37*   ANIONGAP 4 8 3   PRINCE 10.0 9.6 9.9   GLC 82 140* 85     Most Recent 2 LFT's:Recent Labs   Lab Test 12/16/21  1751 11/24/21  1525   AST 23 25   ALT 29 29   ALKPHOS 60 66   BILITOTAL 0.3 0.5     Most Recent 3 INR's:Recent Labs   Lab Test 11/03/20  1225   INR 1.05     Anticoagulation Dose History     Recent Dosing and Labs Latest Ref Rng & Units 4/25/2008 4/26/2008 4/20/2009 4/21/2009 4/22/2009 4/23/2009 11/3/2020    INR 0.86 - 1.14 2.04(H) 2.01(H) 1.09 1.57(H) 2.11(H) 2.28(H) 1.05          Most Recent 3 Creatinines:Recent Labs   Lab Test 01/07/22  1745 12/21/21  0935 12/16/21  1751   CR 1.12* 1.24* 1.37*     Most Recent 3 Hemoglobins:Recent Labs   Lab Test 01/07/22  1745 12/21/21  0934 12/16/21  1751   HGB 13.3 14.2 13.9     Most Recent 3 Troponin's:No lab results found.  Most Recent 3 BNP's:Recent Labs   Lab Test 11/24/21  1525 05/17/21  1401   NTBNP 398 462*     Most Recent D-dimer:No lab results found.  Most Recent Cholesterol Panel:Recent Labs   Lab Test 12/17/21  0835   CHOL 172   LDL 84   HDL 61   TRIG 137      Most Recent 6 Bacteria Isolates From Any Culture (See EPIC Reports for Culture Details):Recent Labs   Lab Test 07/16/20  1615   CULT <10,000 colonies/mL  mixed urogenital mitchell  Susceptibility testing not routinely done       Most Recent TSH and T4:Recent Labs   Lab Test 12/17/21  0835 07/28/21  1149 01/18/21  1230   TSH 3.73   < > 6.11*   T4  --   --  0.72*    < > = values in this interval not displayed.     Most Recent Hemoglobin A1c:No lab results found.  Most Recent 6 glucoses:Recent Labs   Lab Test 01/07/22  1745 12/21/21  0935 12/16/21  1751 11/24/21  1525 06/04/21  1033 05/17/21  1401   GLC 82 140* 85 95 103* 92     Most Recent Urinalysis:Recent Labs   Lab Test 12/16/21  1758 08/12/21  1334   COLOR Straw Yellow   APPEARANCE Clear Clear   URINEGLC Negative Negative   URINEBILI Negative Negative   URINEKETONE Negative Negative   SG 1.006 <=1.005   UBLD Large* Trace*   URINEPH 7.0 6.0   PROTEIN 10 * Negative   UROBILINOGEN  --  0.2   NITRITE Negative Negative   LEUKEST Trace* Negative   RBCU 3* 0-2   WBCU 13* 0-5     Most Recent ABG:No lab results found.  Most Recent ESR & CRP:Recent Labs   Lab Test 07/28/21  1149   SED 11     Most Recent Anemia Panel:Recent Labs   Lab Test 01/07/22  1745 12/21/21  0934 12/17/21  0835   WBC 5.0   < >  --    HGB 13.3   < >  --    HCT 41.5   < >  --    *   < >  --       < >  --    B12  --   --  966   FOLIC  --   --  60.5    < > = values in this interval not displayed.     Most Recent CPK:No lab results found.  No results found for this or any previous visit (from the past 24 hour(s)).

## 2022-01-08 NOTE — PROGRESS NOTES
01/08/22 0942   Quick Adds   Type of Visit Initial PT Evaluation       Present no   Language English   Living Environment   People in home alone   Current Living Arrangements independent living facility   Home Accessibility no concerns;wheelchair accessible   Living Environment Comments Patient lives in a senior living facility; she receives nursing assistance for iADLs and meals from [facility].    Self-Care   Usual Activity Tolerance good   Current Activity Tolerance good   Regular Exercise Yes   Activity/Exercise Type   (Participates is exercise classes from [facility])   Exercise Amount/Frequency 3-5 times/wk   Equipment Currently Used at Home none   Disability/Function   Hearing Difficulty or Deaf no   Wear Glasses or Blind yes   Vision Management glasses   Concentrating, Remembering or Making Decisions Difficulty no   Difficulty Communicating no   Difficulty Eating/Swallowing no   Walking or Climbing Stairs Difficulty no   Dressing/Bathing Difficulty no   Toileting issues no   Doing Errands Independently Difficulty (such as shopping) yes   Fall history within last six months no   Change in Functional Status Since Onset of Current Illness/Injury no   General Information   Onset of Illness/Injury or Date of Surgery 01/07/22   Referring Physician Aleshia Figueroa, CNP   Patient/Family Therapy Goals Statement (PT) Return home    Pertinent History of Current Problem (include personal factors and/or comorbidities that impact the POC) admitted for hypertensive urgency   Weight-Bearing Status - LUE full weight-bearing   Weight-Bearing Status - RUE full weight-bearing   Weight-Bearing Status - LLE full weight-bearing   Weight-Bearing Status - RLE full weight-bearing   Cognition   Orientation Status (Cognition) oriented x 4   Affect/Mental Status (Cognition) WNL;WFL   Follows Commands (Cognition) WNL;WFL;follows two-step commands   Safety Deficit (Cognition)  ability to follow commands   Pain Assessment   Patient Currently in Pain No   Integumentary/Edema   Integumentary/Edema no deficits were identifed   Posture    Posture Forward head position;Protracted shoulders;Kyphosis   Range of Motion (ROM)   ROM Quick Adds ROM WNL;ROM WFL   Strength   Manual Muscle Testing Quick Adds Strength WNL;Strength WFL   Bed Mobility   Bed Mobility No deficits identified   Comment (Bed Mobility) MOD I   Transfers   Transfers No deficits identified   Transfer Safety Comments MOD I   Gait/Stairs (Locomotion)   [place] Level (Gait) modified independence;contact guard   Assistive Device (Gait)   (None)   Distance in Feet (Required for LE Total Joints) 250' x 2   Pattern (Gait) step-through   Negotiation (Stairs) stairs independence;number of steps;ascending technique;descending technique   [place] Level (Stairs) modified independence   Number of Steps (Stairs) 4   Ascending Technique (Stairs) step-over-step   Descending Technique (Stairs) step-over-step   Comment (Gait/Stairs) Demonstrates ability to navigate busy hallway with mild pathway deviations and no overt LOB; endorses some balance deficits at baseline.    Balance   Balance Comments Endorses mild balance deficits at baseline; no overt LOB noted with mobility    Sensory Examination   Sensory Perception WNL   Coordination   Coordination no deficits were identified   Muscle Tone   Muscle Tone no deficits were identified   Clinical Impression   Criteria for Skilled Therapeutic Intervention yes, treatment indicated   PT Diagnosis (PT) Impaired functional mobility   Influenced by the following impairments Impaired balance, strength, endurance   Functional limitations due to impairments Impaired gait, stairs   Clinical Presentation Stable/Uncomplicated   Clinical Presentation Rationale Clinical judgement; level of mobility   Clinical Decision Making (Complexity) low complexity   Therapy Frequency (PT) One time eval and treatment  only   Predicted Duration of Therapy Intervention (days/wks) 1/8/22   Risk & Benefits of therapy have been explained evaluation/treatment results reviewed;care plan/treatment goals reviewed;participants included;patient;participants voiced agreement with care plan   Clinical Impression Comments Kamryn demonstrates all functional mobility safely and without additional physical assistance.  Patient will continue to mobilize with nursing staff or family while admitted.    PT Discharge Planning    PT Discharge Recommendation (DC Rec) home with assist   PT Rationale for DC Rec At baseline functional mobility   PT Brief overview of current status  SBA for all mobility   Total Evaluation Time   Total Evaluation Time (Minutes) 7

## 2022-01-08 NOTE — PLAN OF CARE
"VS: /72   Pulse 60   Temp 98.6  F (37  C) (Oral)   Resp 16   Ht 1.575 m (5' 2\")   Wt 62.9 kg (138 lb 11.2 oz)   SpO2 98%   BMI 25.37 kg/m      Cares: 9979-3584    Current condition: Hypertensive treated with hydralazine OVSS on RA  Neuro: A/Ox4  Cardio: WDL  Respiratory: WDL  GI/: Voiding without issue, LBM 1/7  Skin: Small bruise on forehead   Diet: Regular diet  BG: None   LDA: R PIV SL  Mobility: SBA  Pain: Denied pain/nausea  Plan of Care: Continue to monitor     "

## 2022-01-08 NOTE — PLAN OF CARE
7A - OT - Defer    Per PT, patient lives in senior living, is IND at baseline w/ ADLs, has home health for IADLs assist. Today pt is completing ADLs and functional mobility w/ SBA, is at baseline. No IP OT needs identified. OT will complete orders, defer to PT for any ongoing IP therapy needs.

## 2022-01-08 NOTE — PLAN OF CARE
Observation goals:   BP controlled <140/90: in progress;  B/P 144/80 this morning;115/67, 115/61 later on the day.   No use of IV BP meds: in progress; pt received  hydralazine 10 mg IV overnight.

## 2022-01-09 LAB
ANION GAP SERPL CALCULATED.3IONS-SCNC: 5 MMOL/L (ref 3–14)
BUN SERPL-MCNC: 32 MG/DL (ref 7–30)
CALCIUM SERPL-MCNC: 9 MG/DL (ref 8.5–10.1)
CHLORIDE BLD-SCNC: 111 MMOL/L (ref 94–109)
CO2 SERPL-SCNC: 26 MMOL/L (ref 20–32)
CREAT SERPL-MCNC: 1.41 MG/DL (ref 0.52–1.04)
GFR SERPL CREATININE-BSD FRML MDRD: 36 ML/MIN/1.73M2
GLUCOSE BLD-MCNC: 97 MG/DL (ref 70–99)
HOLD SPECIMEN: NORMAL
POTASSIUM BLD-SCNC: 3.9 MMOL/L (ref 3.4–5.3)
SODIUM SERPL-SCNC: 142 MMOL/L (ref 133–144)

## 2022-01-09 PROCEDURE — 250N000013 HC RX MED GY IP 250 OP 250 PS 637: Performed by: NURSE PRACTITIONER

## 2022-01-09 PROCEDURE — 80048 BASIC METABOLIC PNL TOTAL CA: CPT | Performed by: PHYSICIAN ASSISTANT

## 2022-01-09 PROCEDURE — 250N000013 HC RX MED GY IP 250 OP 250 PS 637: Performed by: PHYSICIAN ASSISTANT

## 2022-01-09 PROCEDURE — 36415 COLL VENOUS BLD VENIPUNCTURE: CPT | Performed by: PHYSICIAN ASSISTANT

## 2022-01-09 PROCEDURE — G0378 HOSPITAL OBSERVATION PER HR: HCPCS

## 2022-01-09 PROCEDURE — 99207 PR APP CREDIT; MD BILLING SHARED VISIT: CPT | Performed by: PHYSICIAN ASSISTANT

## 2022-01-09 PROCEDURE — 99226 PR SUBSEQUENT OBSERVATION CARE,LEVEL III: CPT | Mod: FS | Performed by: ORTHOPAEDIC SURGERY

## 2022-01-09 RX ORDER — CARVEDILOL 12.5 MG/1
12.5 TABLET ORAL 2 TIMES DAILY
Status: DISCONTINUED | OUTPATIENT
Start: 2022-01-09 | End: 2022-01-10 | Stop reason: HOSPADM

## 2022-01-09 RX ADMIN — MIRTAZAPINE 7.5 MG: 7.5 TABLET, FILM COATED ORAL at 21:40

## 2022-01-09 RX ADMIN — ROSUVASTATIN CALCIUM 40 MG: 10 TABLET, FILM COATED ORAL at 08:26

## 2022-01-09 RX ADMIN — Medication 1 TABLET: at 08:26

## 2022-01-09 RX ADMIN — AMLODIPINE BESYLATE 10 MG: 10 TABLET ORAL at 08:26

## 2022-01-09 RX ADMIN — MEMANTINE 10 MG: 10 TABLET ORAL at 08:26

## 2022-01-09 RX ADMIN — CLOPIDOGREL BISULFATE 75 MG: 75 TABLET ORAL at 08:26

## 2022-01-09 RX ADMIN — FLUTICASONE FUROATE AND VILANTEROL TRIFENATATE 1 PUFF: 200; 25 POWDER RESPIRATORY (INHALATION) at 08:27

## 2022-01-09 RX ADMIN — DESVENLAFAXINE SUCCINATE 100 MG: 100 TABLET, EXTENDED RELEASE ORAL at 08:29

## 2022-01-09 RX ADMIN — CARVEDILOL 12.5 MG: 12.5 TABLET, FILM COATED ORAL at 20:30

## 2022-01-09 RX ADMIN — LIOTHYRONINE SODIUM 10 MCG: 5 TABLET ORAL at 08:24

## 2022-01-09 RX ADMIN — ASPIRIN 81 MG CHEWABLE TABLET 81 MG: 81 TABLET CHEWABLE at 08:24

## 2022-01-09 RX ADMIN — LAMOTRIGINE 225 MG: 25 TABLET ORAL at 08:27

## 2022-01-09 RX ADMIN — CARVEDILOL 12.5 MG: 12.5 TABLET, FILM COATED ORAL at 08:29

## 2022-01-09 RX ADMIN — LEVOTHYROXINE SODIUM 50 MCG: 0.05 TABLET ORAL at 08:25

## 2022-01-09 NOTE — PLAN OF CARE
Observation goals:   BP controlled <140/90: Not met yet. Recent B/P 154/74, Hr 52 148/85 Hr 57  No use of IV BP meds: Met

## 2022-01-09 NOTE — PROGRESS NOTES
Observation goals:   BP controlled <140/90: in progress;  B/P 144/80 this morning;115/67, 115/61 later on the day and  138/78, 141/65, 154/74  No use of IV BP meds:

## 2022-01-09 NOTE — PROGRESS NOTES
[facility]    Medicine Progress Note - Hospitalist Service, Gold 6       Date of Admission:  1/7/2022    Assessment and Plan   Kamryn Miller is a 86 year old female with a history of CAD s/p stenting x 3 (8/2020), HTN, HLD, mild pulmonary HTN, mild persistent asthma, moderate aortic valve stenosis, CKD stage III, hypothyroidism, osteoporosis, celiac disease, bipolar disorder, and depression admitted for hypertensive urgency.       #Hypertensive Urgency, Resolved  #Hx of HTN  Likely 2/2 recent medication changes while admitted to inpatient psych. Previously on Amlodipine 10 mg daily, Lasix 20 mg daily, Losartan 100 mg daily, Verapamil 240 mg daily, and Metoprolol 50-25 mg BID. Lasix discontinued 11/2021 d/t TED. Verapamil and Amlodipine discontinued, Metoprolol and Losartan decreased on IP Psych. /110 at home. Improved w/ IV Hydralazine in ED. EKG, trop reassuring. BP normotensive currently.     - Amlodipine 10 mg daily  - Change from Metoprolol to Carvedilol 12.5 mg BID for better BP control  - Discontinue Losartan in setting of TED  - Hydralazine PRN for SBP > 180 if not improved on scheduled meds   - BP checks q 4h    #TED on CKD III - BL Cr ~1.1-1.3 with frequent fluctuations. Cr 1.08 on 1/8 --> 1.41 today.  - Hold Losartan  - Daily BMP     #Moderate Aortic Valve Stenosis  #Pulmonary HTN   Echocardiogram 11/1/21 with EF 55-60%, normal RV function, no WMAs. Moderate aortic stenosis. Follows w/ Dr. Zuniga (Cardiology). Previously on Lasix 20 mg, stopped in 11/2021 due to TED. Appears euvolemic. Weight stable ~63 kg.  - Daily weights  - Follow up with Cardiology as scheduled on 1/21    #CAD, HLD - Hx PCI s/p stenting x 3 to mid-distal LAD in 8/2020.    - Continue ASA, Plavix, Rosuvastatin      #Depression   #Bipolar disorder   #Grief ( recently passed away in 2021)   Recent hospitalization on geriatric psychiatry in 12/2021. Mood stable this admission.   -  Continue Lamictal 225 mg daily, Remeron 7.5 mg daily, and Pristiq 100 mg daily      #Cognitive Changes - MoCA 19/30, receives help with medications at [facility]. Continue PTA Namenda.     #Mild Persistent Asthma - Stable. Continue Breo Ellipta and Albuterol PRN.    #Osteoporosis - Hold PTA Fosamax, Calcium, and Vitamin D while admitted. Resume at discharge.     #Hypothyroidism - TSH wnl on 12/17/21. Continue PTA Levothyroxine and Liothyronine.     Diet: Regular Diet Adult    DVT Prophylaxis: Mechanical/Ambulation  Orellana Catheter: Not present  Central Lines: None  Code Status: Full Code      Disposition Plan   Expected Discharge: 01/10/2022 pending stable/improved Cr and BP  Anticipated discharge location: Return to [facility]  Delays: N/A    The patient's care was discussed with Dr. Dotson, bedside RN, patient, and patient's daughter (Sarah).    LEDY Younger  Hospitalist Service, Gold 6  Securely message with the Vocera Web Console (learn more here)  Text page via [facility] Paging/Directory    Please see sign in/sign out for up to date coverage information  ______________________________________________________________________    Interval History  Feeling tired. No IV Hydralazine requirements over past 24 hours. Cr increased today. Denies HA, dizziness, chest pain, SOB, n/v/c/d, abdominal pain, or dysuria.    Data reviewed today: I reviewed all medications, new labs and imaging results over the last 24 hours.     Physical Exam  Vital Signs: Temp: 98.5  F (36.9  C) Temp src: Oral BP: (!) 148/85 Pulse: 57   Resp: 18 SpO2: 93 % O2 Device: None (Room air)    Weight: 138 lbs 11.2 oz  General: Pleasant and cooperative. Lying in bed. NAD.  HEENT: Anicteric sclera. MMM.  CV: Slightly bradycardic. Regular rhythm. III/VI murmur.  Respiratory: Normal effort on RA. Lungs CTA anterolaterally.  GI: Abdomen is soft, non-tender, and non-distended. Bowel sounds present.  Extremities: No peripheral edema. Warm and well perfused.  Neuro: Alert  & awake. Answers questions appropriately. Moves all extremities.  Skin: No visible rashes or jaundice.

## 2022-01-09 NOTE — PLAN OF CARE
Physical Therapy Discharge Summary    Reason for therapy discharge:    All goals and outcomes met, no further needs identified.    Progress towards therapy goal(s). See goals on Care Plan in [facility] electronic health record for goal details.  Goals met    Therapy recommendation(s):    Continue home exercise program.

## 2022-01-09 NOTE — PLAN OF CARE
"BP (!) 154/74 (BP Location: Left arm)   Pulse 52   Temp 97.8  F (36.6  C) (Oral)   Resp 16   Ht 1.575 m (5' 2\")   Wt 62.9 kg (138 lb 11.2 oz)   SpO2 96%   BMI 25.37 kg/m      VS: Hypertensive, otherwise vitally stable on room air. No PRN IV hypertensives given.  Pain/Nausea/PRN: Denies  Diet: Regular  LDA: PIV SL  GI: No BM this shift  : Voiding  Skin: Scab on forehead  Mobility: SBA with walker and GB    "

## 2022-01-09 NOTE — PLAN OF CARE
Neuro: A/Ox4  Cardio: WDL hx of HTN, B/P intermittently runs high.  Respiratory: WDL  GI/: Voiding without issue, LBM 1/7  Skin: Small bruise on forehead   Diet: Regular diet  BG: None   LDA: R PIV SL  Mobility: SBA  Pain: Denied pain/nausea  Plan of Care: Continue to monitor      Observation goals:   BP controlled <140/90: in progress;  B/P 144/80 this morning;115/67, 115/61 later on the day and  138/78 this afternoon.  No use of IV BP meds: in progress; pt received  hydralazine 10 mg IV overnight.      Pt will be discharged back to her assisted living  - [facility] tomorrow if pt meets her obs goals.Pt's daughter, Sarah, will provide transportation.WAN spoke with Sarah to ask what time she plans to  patient and she said mid morning between 1000 and 1100. Sarah asked if RN could call in the morning to confirm time.Sarah asked that patient be brought to the main entrance so she does not have to park the car per  update to writer.

## 2022-01-09 NOTE — PLAN OF CARE
Neuro: A/Ox4  Cardio: WDL hx of HTN, B/P intermittently runs high.  Respiratory: WDL  GI/: Voiding without issue, LBM 1/9  Skin: Small bruise on forehead   Diet: Regular diet; good appetite  BG: None   LDA: R PIV SL  Mobility: SBA  Pain: Denied pain/nausea  Plan of Care: Continue to monitor      Observation goals:   BP controlled <140/90: in progress;  B/P  154/74, Hr 52 148/85 Hr 57 106/58 HR 56  No use of IV BP meds: Met  Pt will stay tonight per Md update.Metoprolol stopped and pt started coreg.

## 2022-01-09 NOTE — PLAN OF CARE
"BP (!) 141/65 (BP Location: Left arm)   Pulse 55   Temp 98.2  F (36.8  C) (Oral)   Resp 16   Ht 1.575 m (5' 2\")   Wt 62.9 kg (138 lb 11.2 oz)   SpO2 94%   BMI 25.37 kg/m      VS: VSS  Pain/Nausea/PRN: Denies  Diet: Regular  LDA: PIV saline locked  GI: No BM this shift  : voiding  Skin: Scrape on forehead.  Mobility: SBA  Plan: Hope for discharge today, pending controlled BP without IV meds. Daughter to  between 10-11am.  "

## 2022-01-09 NOTE — PROGRESS NOTES
Observation goals:   BP controlled <140/90: in progress;  B/P 144/80 this morning;115/67, 115/61, 138/78, 161/90, 136/60   No use of IV BP meds: goal met.

## 2022-01-10 VITALS
OXYGEN SATURATION: 93 % | SYSTOLIC BLOOD PRESSURE: 102 MMHG | TEMPERATURE: 97.4 F | HEART RATE: 64 BPM | WEIGHT: 140.3 LBS | RESPIRATION RATE: 18 BRPM | HEIGHT: 62 IN | DIASTOLIC BLOOD PRESSURE: 55 MMHG | BODY MASS INDEX: 25.82 KG/M2

## 2022-01-10 LAB
ANION GAP SERPL CALCULATED.3IONS-SCNC: 5 MMOL/L (ref 3–14)
BUN SERPL-MCNC: 35 MG/DL (ref 7–30)
CALCIUM SERPL-MCNC: 9.4 MG/DL (ref 8.5–10.1)
CHLORIDE BLD-SCNC: 110 MMOL/L (ref 94–109)
CO2 SERPL-SCNC: 27 MMOL/L (ref 20–32)
CREAT SERPL-MCNC: 1.29 MG/DL (ref 0.52–1.04)
GFR SERPL CREATININE-BSD FRML MDRD: 40 ML/MIN/1.73M2
GLUCOSE BLD-MCNC: 102 MG/DL (ref 70–99)
POTASSIUM BLD-SCNC: 3.8 MMOL/L (ref 3.4–5.3)
SODIUM SERPL-SCNC: 142 MMOL/L (ref 133–144)

## 2022-01-10 PROCEDURE — 99217 PR OBSERVATION CARE DISCHARGE: CPT | Performed by: ORTHOPAEDIC SURGERY

## 2022-01-10 PROCEDURE — 99217 PR OBSERVATION CARE DISCHARGE: CPT | Performed by: PHYSICIAN ASSISTANT

## 2022-01-10 PROCEDURE — G0378 HOSPITAL OBSERVATION PER HR: HCPCS

## 2022-01-10 PROCEDURE — 99207 PR CDG-CODE CATEGORY CHANGED: CPT | Performed by: PHYSICIAN ASSISTANT

## 2022-01-10 PROCEDURE — 250N000013 HC RX MED GY IP 250 OP 250 PS 637: Performed by: PHYSICIAN ASSISTANT

## 2022-01-10 PROCEDURE — 80048 BASIC METABOLIC PNL TOTAL CA: CPT | Performed by: PHYSICIAN ASSISTANT

## 2022-01-10 PROCEDURE — 250N000013 HC RX MED GY IP 250 OP 250 PS 637: Performed by: NURSE PRACTITIONER

## 2022-01-10 PROCEDURE — 36415 COLL VENOUS BLD VENIPUNCTURE: CPT | Performed by: PHYSICIAN ASSISTANT

## 2022-01-10 RX ORDER — AMLODIPINE BESYLATE 10 MG/1
10 TABLET ORAL DAILY
Qty: 30 TABLET | Refills: 0 | Status: SHIPPED | OUTPATIENT
Start: 2022-01-11 | End: 2022-01-10

## 2022-01-10 RX ORDER — CARVEDILOL 12.5 MG/1
12.5 TABLET ORAL 2 TIMES DAILY
Qty: 60 TABLET | Refills: 0 | Status: SHIPPED | OUTPATIENT
Start: 2022-01-10 | End: 2022-01-10

## 2022-01-10 RX ORDER — CARVEDILOL 12.5 MG/1
12.5 TABLET ORAL 2 TIMES DAILY
Qty: 60 TABLET | Refills: 0 | Status: SHIPPED | OUTPATIENT
Start: 2022-01-10 | End: 2022-03-04

## 2022-01-10 RX ORDER — AMLODIPINE BESYLATE 10 MG/1
10 TABLET ORAL DAILY
Qty: 30 TABLET | Refills: 0 | Status: SHIPPED | OUTPATIENT
Start: 2022-01-11 | End: 2022-02-24

## 2022-01-10 RX ADMIN — ROSUVASTATIN CALCIUM 40 MG: 10 TABLET, FILM COATED ORAL at 07:53

## 2022-01-10 RX ADMIN — LIOTHYRONINE SODIUM 10 MCG: 5 TABLET ORAL at 07:53

## 2022-01-10 RX ADMIN — AMLODIPINE BESYLATE 10 MG: 10 TABLET ORAL at 07:53

## 2022-01-10 RX ADMIN — CLOPIDOGREL BISULFATE 75 MG: 75 TABLET ORAL at 07:53

## 2022-01-10 RX ADMIN — LEVOTHYROXINE SODIUM 50 MCG: 0.05 TABLET ORAL at 07:53

## 2022-01-10 RX ADMIN — DESVENLAFAXINE SUCCINATE 100 MG: 100 TABLET, EXTENDED RELEASE ORAL at 07:53

## 2022-01-10 RX ADMIN — CARVEDILOL 12.5 MG: 12.5 TABLET, FILM COATED ORAL at 07:53

## 2022-01-10 RX ADMIN — MEMANTINE 10 MG: 10 TABLET ORAL at 07:53

## 2022-01-10 RX ADMIN — ASPIRIN 81 MG CHEWABLE TABLET 81 MG: 81 TABLET CHEWABLE at 07:53

## 2022-01-10 RX ADMIN — LAMOTRIGINE 225 MG: 25 TABLET ORAL at 07:54

## 2022-01-10 RX ADMIN — Medication 1 TABLET: at 07:55

## 2022-01-10 RX ADMIN — FLUTICASONE FUROATE AND VILANTEROL TRIFENATATE 1 PUFF: 200; 25 POWDER RESPIRATORY (INHALATION) at 07:54

## 2022-01-10 ASSESSMENT — MIFFLIN-ST. JEOR: SCORE: 1034.65

## 2022-01-10 NOTE — PLAN OF CARE
"/67 (BP Location: Left arm)   Pulse 66   Temp 98.2  F (36.8  C) (Oral)   Resp 15   Ht 1.575 m (5' 2\")   Wt 62.9 kg (138 lb 11.2 oz)   SpO2 93%   BMI 25.37 kg/m      VS: VSS  Neuros: A&O x4, forgetful  Pain/Nausea/PRN: Denies  LDA: PIV SL  GI: No BM this shift  : No voids overnight  Mobility: Assist of 1 with walker/gb  Plan: discharge today    Observation goal met. No IV meds needed to control BP  "

## 2022-01-10 NOTE — DISCHARGE SUMMARY
[facility]  Hospitalist Discharge Summary      Date of Admission:  1/7/2022  Date of Discharge:  1/10/2022  Discharging Provider: Manohar Stahl PA-C  Discharge Team: Hospitalist Service, Gold 6    Discharge Diagnoses   1. Hypertensive urgency  2. TED on CKD stage III  3. Moderate aortic stenosis  4. Pulmonary hypertension  5. CAD, Hx PCI in 2020  6. Hyperlipidemia  7. Hypothyroidism   8. Bipolar disorder, Depression  9. Cognitive impairment    Follow-ups Needed After Discharge   Follow-up Appointments     Adult [facility] Follow-up and recommended labs and tests      Follow up with primary care provider, Rolanda Wilcox, within 7 days for   hospital follow- up.  The following labs/tests are recommended: BMP on   1/13/22 and 1/17/22 to monitor kidney function.      Appointments on [place] and/or [facility] (with [facility] or [facility]   provider or service). Call [phone] if you haven't heard regarding   these appointments within 7 days of discharge.           Discharge Disposition   Discharged to assisted living  Condition at discharge: Stable    Hospital Course   Kamryn Miller is a 86 year old female with history of CAD s/p stenting x3 in 8/2020, HTN, HLD, mild pulmonary HTN, aortic stenosis, asthma, CKD stage III, hypothyroidism, bipolar disorder, and depression who was admitted 1/7/2022 with hypertensive urgency.    Patient was recently admitted to inpatient psychiatry with worsening depression following the death of her . During her stay she developed bradycardia with 1st degree AVB on EKG and soft blood pressures. She was previously on Verapamil 240mg daily, Metoprolol tartrate 50mg/25mg, Amlodipine 10mg daily, and Losartan 100mg daily. Verapamil and Amlodipine were discontinued, and Metoprolol and Losartan were reduced.     She presented to the ED with blood pressures in the 220/110's despite resumption of Amlodipine 5mg daily as outpatient. She did not  have any symptoms or evidence of end organ damage. BP improved with IV hydralazine. She was admitted to the hospitalist service for ongoing management. Amlodipine was increased to 10mg daily and PTA Metoprolol 25mg BID and Losartan 50mg daily were resumed on admission. BP improved on this regimen but she was once again noted to be mildly bradycardic with HR in upper 50's. Metoprolol was changed to Carvedilol 12.5mg BID. BP remained adequately controlled on this regimen.    She developed mild TED during her admission with Cr up to 1.41, although this is within her baseline range (1.1-1.5) in setting of CKD stage III. Cr improved to 1.2 prior to discharge.     Her hospital stay was otherwise uncomplicated. She will discharge back to [facility] with plans to follow up with primary care for ongoing management of hypertension. Recommend repeat BMP on 1/13 and 1/17 to monitor renal function.     Consultations This Hospital Stay   PHYSICAL THERAPY ADULT IP CONSULT  OCCUPATIONAL THERAPY ADULT IP CONSULT  CARE MANAGEMENT / SOCIAL WORK IP CONSULT    Code Status   Full Code    Time Spent on this Encounter   I, Manohar Stahl PA-C, personally saw the patient today and spent greater than 30 minutes discharging this patient.       Manohar Stahl PA-C  [facility] [address] [address]  Phone: [phone]  ______________________________________________________________________    Physical Exam   Vital Signs: Temp: 98.2  F (36.8  C) Temp src: Oral BP: 129/70 Pulse: 61   Resp: 16 SpO2: 98 % O2 Device: None (Room air)    Weight: 140 lbs 4.8 oz  GENERAL:  Awake. Alert. NAD.    HEENT:  Moist mucous membranes. Otherwise unremarkable.  CV:  RRR. S1, S2. No murmur.   PULM:  Normal effort. CTAB.   GI:  Abdomen soft, non-distended. Active bowel sounds. No tenderness.    NEURO:  Grossly non-focal. Moves all extremities.    EXTREMITIES:  No peripheral edema. No calf tenderness.   SKIN:   Dry. No visible rash.        Primary Care Physician   Rolanda Wilcox    Discharge Orders      Care Coordination Referral      Reason for your hospital stay    Admitted with elevated blood pressure and concern for hypertensive urgency. Your blood pressure was treated and medications were adjusted, which resulted in adequate control prior to discharge. Your hospital stay was complicated by mild renal insufficiency in setting of chronic kidney disease. Your labs are improving at the time of discharge.     You are medically stable to discharge back to assisted living. Recommend repeat labs 22 and 22 to monitor renal function, and primary care follow up in 7-10 days for ongoing management of hypertension.     Activity    Your activity upon discharge: activity as tolerated     Adult [facility] Follow-up and recommended labs and tests    Follow up with primary care provider, Rolanda Wilcox, within 7 days for hospital follow- up.  The following labs/tests are recommended: BMP on 22 and 22 to monitor kidney function.      Appointments on [place] and/or [facility] (with [facility] or [facility] provider or service). Call [phone] if you haven't heard regarding these appointments within 7 days of discharge.     Diet    Follow this diet upon discharge:   Regular Diet Adult       Significant Results and Procedures   Results for orders placed or performed in visit on 21   Echocardiogram Complete     Value    LVEF  55-60%    Narrative    [id number]  [id number]  [id number]  [id number]^LEONIDES^JERRY^DOMINIC     [facility]  Diagnostic and [facility]-3rd Floor  [address] [place] [address]     Name: DESMOND WHEELER  MRN: [id number]  : 1935  Study Date: 2021 09:05 AM  Age: 86 yrs  Gender: Female  Patient Location: [facility]  Reason For Study: Moderate aortic stenosis  Ordering Physician: JERRY COYLE  Referring Physician: JERRY COYLE  Performed  By: Ирина Matt     BSA: 1.6 m2  Height: 60 in  Weight: 143 lb  HR: 60  ______________________________________________________________________________  Procedure  Echocardiogram with two-dimensional, color and spectral Doppler performed.  ______________________________________________________________________________  Interpretation Summary  Global and regional left ventricular function is normal with an EF of 55-60%.  Right ventricular function, chamber size, wall motion, and thickness are  normal.  The mean gradient across the aortic valve is23 mmHg.  Moderate aortic stenosis is present.  No significant changes noted.  ______________________________________________________________________________  Left Ventricle  Global and regional left ventricular function is normal with an EF of 55-60%.  Left ventricular wall thickness is normal. Left ventricular size is normal.  Diastolic function not assessed due to significant mitral annular  calcification. No regional wall motion abnormalities are seen.     Right Ventricle  Right ventricular function, chamber size, wall motion, and thickness are  normal.     Atria  Both atria appear normal.     Mitral Valve  Moderate mitral annular calcification is present. Trace to mild mitral  insufficiency is present.     Aortic Valve  Moderate aortic valve calcification is present. The mean gradient across the  aortic valve is23 mmHg. The peak aortic velocity is 3.1 m/sec. Moderate aortic  stenosis is present. Calculated valve area lower than previous study due to  LVOT diameter measurement.     Tricuspid Valve  The tricuspid valve is normal. Trace to mild tricuspid insufficiency is  present. Pulmonary artery systolic pressure cannot be assessed.     Pulmonic Valve  The pulmonic valve is normal.     Vessels  The thoracic aorta is normal. The pulmonary artery and bifurcation cannot be  assessed. The inferior vena cava is normal.     Pericardium  No pericardial effusion is present.      Compared to Previous Study  No significant changes noted.  ______________________________________________________________________________  MMode/2D Measurements & Calculations     IVSd: 0.92 cm  LVIDd: 3.9 cm  LVIDs: 2.8 cm  LVPWd: 0.88 cm  FS: 27.7 %  LV mass(C)d: 105.3 grams  LV mass(C)dI: 65.0 grams/m2  Ao root diam: 3.0 cm  LVOT diam: 1.8 cm  LVOT area: 2.7 cm2  LA Volume (BP): 56.0 ml  LA Volume Index (BP): 34.6 ml/m2  RWT: 0.45     Doppler Measurements & Calculations  MV E max skinny: 136.0 cm/sec  MV A max skinny: 112.6 cm/sec  MV E/A: 1.2  MV dec slope: 570.1 cm/sec2  MV dec time: 0.24 sec  Ao V2 max: 301.3 cm/sec  Ao max P.0 mmHg  Ao V2 mean: 213.6 cm/sec  Ao mean P.0 mmHg  Ao V2 VTI: 75.0 cm  LUCY(I,D): 0.78 cm2  LUCY(V,D): 0.74 cm2  LV V1 max P.8 mmHg  LV V1 max: 83.9 cm/sec  LV V1 VTI: 22.0 cm  SV(LVOT): 58.6 ml  SI(LVOT): 36.2 ml/m2  PI end-d skinny: 111.3 cm/sec  AV Skinny Ratio (DI): 0.28  LUCY Index (cm2/m2): 0.48  E/E' av.7  Lateral E/e': 16.7  Medial E/e': 36.8     ______________________________________________________________________________  Report approved by: Everardo Amaral 2021 10:16 AM               Recent Labs   Lab 01/10/22  0716 22  0622  0545 22  1745    142 144 140   POTASSIUM 3.8 3.9 3.6 3.6   CHLORIDE 110* 111* 110* 106   CO2 27 26 28 30   ANIONGAP 5 5 6 4   BUN 35* 32* 22 28   CR 1.29* 1.41* 1.08* 1.12*   PRINCE 9.4 9.0 9.5 10.0   PROTTOTAL  --   --  6.7*  --    ALBUMIN  --   --  3.1*  --    BILITOTAL  --   --  0.4  --    ALKPHOS  --   --  54  --    AST  --   --  22  --    ALT  --   --  24  --      Recent Labs   Lab 22  0545 22  1745   WBC 4.5 5.0   RBC 3.90 4.12   HGB 12.5 13.3   HCT 39.4 41.5   * 101*   MCH 32.1 32.3   MCHC 31.7 32.0   RDW 12.5 12.2    218     No lab results found in last 7 days.  No lab results found in last 7 days.   No lab results found in last 7 days.  No lab results found in last 7 days.  Recent  Labs   Lab 01/10/22  0716 01/09/22  0623 01/08/22  0545 01/07/22  1745   * 97 93 82            Discharge Medications   Current Discharge Medication List      START taking these medications    Details   amLODIPine (NORVASC) 10 MG tablet Take 1 tablet (10 mg) by mouth daily  Qty: 30 tablet, Refills: 0    Associated Diagnoses: Essential hypertension      carvedilol (COREG) 12.5 MG tablet Take 1 tablet (12.5 mg) by mouth 2 times daily  Qty: 60 tablet, Refills: 0    Associated Diagnoses: Essential hypertension         CONTINUE these medications which have NOT CHANGED    Details   aspirin 81 MG tablet Take 81 mg by mouth daily       albuterol (PROAIR HFA/PROVENTIL HFA/VENTOLIN HFA) 108 (90 Base) MCG/ACT inhaler Inhale 2 puffs into the lungs every 6 hours as needed for wheezing or shortness of breath / dyspnea  Qty: 18 g, Refills: 3    Comments: Pharmacy may dispense brand covered by insurance (Proair, or proventil or ventolin or generic albuterol inhaler)  Associated Diagnoses: Moderate persistent asthma without complication      alendronate (FOSAMAX) 70 MG tablet Take 70 mg by mouth every 7 days       azelastine (ASTEPRO) 0.15 % nasal spray INSTILL 1 SPRAY INTO BOTH NOSTRILS DAILY  Qty: 5 mL, Refills: 1    Associated Diagnoses: Seasonal allergic rhinitis, unspecified trigger      Calcium Citrate (CITRACAL OR) Take 1 tablet by mouth daily.      CHOLECALCIFEROL 40698 UNIT PO CAPS Take 50,000 Units by mouth once a week       clopidogrel (PLAVIX) 75 MG tablet Take 1 tablet (75 mg) by mouth daily  Qty: 90 tablet, Refills: 3      desvenlafaxine (PRISTIQ) 100 MG 24 hr tablet Take 100 mg by mouth daily      fluticasone-salmeterol (ADVAIR DISKUS) 500-50 MCG/DOSE inhaler INHALE 1 PUFF INTO THE LUNGS EVERY 12 HOURS  Qty: 1 each, Refills: 6    Associated Diagnoses: Moderate persistent asthma without complication      !! lamoTRIgine (LAMICTAL) 25 MG tablet Take 25 mg by mouth daily with 200 mg tablet for a total dose of 225 mg       !! LAMOTRIGINE 200 MG PO TABS Take 200 mg by mouth daily with 25 mg tablet for a total dose of 225 mg      levothyroxine (SYNTHROID/LEVOTHROID) 50 MCG tablet Take 50 mcg by mouth daily      liothyronine (CYTOMEL) 5 MCG tablet Take 2 tablets (10 mcg) by mouth daily  Qty: 30 tablet, Refills: 3    Associated Diagnoses: Bipolar disorder, current episode depressed, severe, without psychotic features (H)      memantine (NAMENDA) 10 MG tablet Take 1 tablet (10 mg) by mouth daily  Qty: 30 tablet, Refills: 3    Associated Diagnoses: Major neurocognitive disorder (H)      mirtazapine (REMERON) 7.5 MG tablet Take 1 tablet (7.5 mg) by mouth At Bedtime  Qty: 30 tablet, Refills: 3    Associated Diagnoses: Bipolar disorder, current episode depressed, severe, without psychotic features (H)      Multiple Vitamin (MULTI-VITAMIN) per tablet Take 1 tablet by mouth daily.      rosuvastatin (CRESTOR) 40 MG tablet TAKE 1 TABLET BY MOUTH EVERY DAY  Qty: 90 tablet, Refills: 3    Associated Diagnoses: Hyperlipidemia with target LDL less than 70       !! - Potential duplicate medications found. Please discuss with provider.      STOP taking these medications       amLODIPine-atorvastatin (CADUET) 10-10 MG tablet Comments:   Reason for Stopping:         losartan (COZAAR) 50 MG tablet Comments:   Reason for Stopping:         metoprolol tartrate (LOPRESSOR) 25 MG tablet Comments:   Reason for Stopping:             Allergies   Allergies   Allergen Reactions     Ace Inhibitors Cough     Diclofenac Other (See Comments)     Balance problems     Gluten Meal Diarrhea

## 2022-01-10 NOTE — PLAN OF CARE
Patient is alert and oriented x 4. Vital signs stable. On room air. Denied pain. Independently in her room. Reviewed discharge instruction with patient. Removed iv line. Discharged.Called daughter Sarah to pick patient up.

## 2022-01-10 NOTE — PLAN OF CARE
Observation goals:   BP controlled <140/90: Met. Recent B/P 120/74, HR 58.  No use of IV BP meds: Met

## 2022-01-10 NOTE — PLAN OF CARE
Evening /70, HR 60, Coreg given per parameters. Patient denies pain or nausea. Good appetite on regular diet. Voiding, not saving. Daughter Ana updated by phone. Possible discharge tomorrow.

## 2022-01-10 NOTE — PROGRESS NOTES
Care Management Discharge Note    Discharge Date: 01/10/2022       Discharge Disposition:  Return to nursing home    Discharge Services:      Discharge DME:      Discharge Transportation:  Daughter    Patient/family educated on Medicare website which has current facility and service quality ratings:  No    Education Provided on the Discharge Plan:  Yes  Persons Notified of Discharge Plans: Patients daughter and nursing home RN  Patient/Family in Agreement with the Plan:  Yes    Handoff Referral Completed: Yes    Additional Information:  Pt cleared to return to nursing home today.  VM left for nursing home RN requesting return call.      Spoke with pt daughter who confirmed she is able to transport pt back to her nursing home but would like confirmation that nursing home has no issues with being able to accept the patient back this evening.      Spoke with Alireza Murphy RN.  Agreed to have final discharge orders faxed.    Katherine will review orders and if she has any questions she will call..  No concerns with pt returning this evening.  RN will not be on site. nursing home staff have been managing patients medications and will resume based on discharge orders this evening.     Orders faxed via [place] to [facility] nursing home RN to review.    Spoke with pt daughter regarding discharge today.  Pt daughter concerned about [facility] accepting patient back without RN on site.    Conference call with [facility] Murphy RN  And [facility] Mosquera pt daughter.  nursing home and pt daughter discussed medication administration for this evening.  nursing home and daughter in agreement with pt returning this evening..  Reviewed cost of medications if pt were to fill medications at [facility].  Pt  Does not have her purse with her and would not be able to fill medications here.  Pt daughter would like prescriptions sent to [facility] on [address]  Paged provider with pharmacy information and family request.   Pt lorenaugher will  prescriptions from [facility] pharmacy.     Reviewed with VEENA Chakraborty  who will call pt daughter when pt is ready for discharge.     Twyla Pantoja, RN BSN, PHN, ACM-RN  7A RN Care Coordinator  Phone: [phone]  Pager [phone]  To contact the weekend RNCC, Page: [phone]    1/10/2022 2:21 PM

## 2022-01-11 LAB
ATRIAL RATE - MUSE: 64 BPM
DIASTOLIC BLOOD PRESSURE - MUSE: NORMAL MMHG
INTERPRETATION ECG - MUSE: NORMAL
P AXIS - MUSE: 74 DEGREES
PR INTERVAL - MUSE: 202 MS
QRS DURATION - MUSE: 98 MS
QT - MUSE: 452 MS
QTC - MUSE: 466 MS
R AXIS - MUSE: 7 DEGREES
SYSTOLIC BLOOD PRESSURE - MUSE: NORMAL MMHG
T AXIS - MUSE: -42 DEGREES
VENTRICULAR RATE- MUSE: 64 BPM

## 2022-01-12 ENCOUNTER — PATIENT OUTREACH (OUTPATIENT)
Dept: CARE COORDINATION | Facility: CLINIC | Age: 87
End: 2022-01-12
Payer: MEDICARE

## 2022-01-12 NOTE — PROGRESS NOTES
Clinic Care Coordination Contact  [facility]/Voicemail    Referral Source: IP Handoff  Clinical Data: CHW Outreach  Outreach attempted x 1.  Left message on patient's voicemail with call back information and requested return call.    Plan: CHW will try to reach patient again in 1-2 business days.    Cinda GALINDO CHW  Clinic Care Coordination  [facility] Clinics: [place], [place] & [place]  Phone: [phone]    Notes:  - Discharged from hospital 01/10  - Schedule follow with PCP 7-10 days.  - Any questions or concerns  - Schedule with CC RN    Inpatient to outpatient

## 2022-01-13 ENCOUNTER — PATIENT OUTREACH (OUTPATIENT)
Dept: NURSING | Facility: CLINIC | Age: 87
End: 2022-01-13
Payer: MEDICARE

## 2022-01-13 NOTE — PROGRESS NOTES
Clinic Care Coordination Contact  Community Health Worker Initial Outreach     CHW Additional Questions  If ED/Hospital discharge, follow-up appointment scheduled as recommended?: Yes  Medication changes made following ED/Hospital discharge?: Yes, patient to be scheduled with CC RN/SW within approx 1 business day  [place] active?: Yes  Patient sent Social Determinants of Health questionnaire?: No    Patient accepts CC: No, Patent declined CC. She has no new questions or concerns.. Patient will be sent Care Coordination introduction letter for future reference.     [facility]: Post-Discharge Note  SITUATION                                                      Admission:    Admission Date: 01/07/22   Reason for Admission: elevated blood pressure and concern forhypertensive urgency.  Discharge:   Discharge Date: 01/10/22  Discharge Diagnosis: Hypertensive urgency    BACKGROUND                                                      Kamryn Miller is a 86 year old female with history of CAD s/p stenting x3 in 8/2020, HTN, HLD, mild pulmonary HTN, aortic stenosis, asthma, CKD stage III, hypothyroidism, bipolar disorder, and depression who was admitted 1/7/2022 with hypertensive urgency.    ASSESSMENT      Enrollment  Primary Care Care Coordination Status: Potential    Discharge Assessment  How are you doing now that you are home?: good  Do you feel your condition is stable enough to be safe at home until your provider visit?: Yes  Does the patient have their discharge instructions? : Yes  Does the patient have questions regarding their discharge instructions? : No  Were you started on any new medications or were there changes to any of your previous medications? : Yes  Does the patient have all of their medications?: Yes  Do you have questions regarding any of your medications? : No  Do you have all of your needed medical supplies or equipment (DME)?  (i.e. oxygen tank, CPAP, cane, etc.):  (N/A)  Discharge follow-up  appointment scheduled within 14 calendar days? : Yes  Discharge Follow Up Appointment Date: 01/18/22  Discharge Follow Up Appointment Scheduled with?: Primary Care Provider       Cinda GALINDO SHASHANK  Clinic Care Coordination  [facility]: [place], [place] & [place]  Phone: [phone]       PLAN                                                      Outpatient Plan:  Discharged to assisted living    Future Appointments   Date Time Provider Department Center   1/18/2022  5:00 PM Rolanda Wilcox MD [facility]   1/21/2022 12:30 PM Madhu Zuniga MD [facility]   1/24/2022 10:30 AM FZ LAB [facility]   1/31/2022 12:30 PM Ania Johnson MD [facility]   2/7/2022 12:30 PM Dimas Palacios, PhD [facility]   5/5/2022 10:30 AM FK LAB [facility]   5/5/2022 11:00 AM [id number] [facility]   5/6/2022 12:30 PM Madhu Zuniga MD [facility]         For any urgent concerns, please contact our 24 hour nurse triage line: [phone] ([phone])

## 2022-01-13 NOTE — LETTER
[facility] CARE COORDINATION  [address]  [place] [address]    January 13, 2022    Kamryn Miller  [address]    [place] [address]      Dear Kamryn,    I am a clinic community health worker who works with Rolanda Wilcox MD at [facility]. I wanted to thank you for spending the time to talk with me.  Below is a description of clinic care coordination and how I can further assist you.      The clinic care coordination team is made up of a registered nurse,  and community health worker who understand the health care system. The goal of clinic care coordination is to help you manage your health and improve access to the health care system in the most efficient manner. The team can assist you in meeting your health care goals by providing education, coordinating services, strengthening the communication among your providers and supporting you with any resource needs.    Please feel free to contact me at [phone] with any questions or concerns. We are focused on providing you with the highest-quality healthcare experience possible and that all starts with you.     Sincerely,     GABO Mercer  Clinic Care Coordination  [facility]: [place], [place] & [place]  Phone: [phone]

## 2022-01-14 ENCOUNTER — TELEPHONE (OUTPATIENT)
Dept: FAMILY MEDICINE | Facility: CLINIC | Age: 87
End: 2022-01-14
Payer: MEDICARE

## 2022-01-14 DIAGNOSIS — I10 ESSENTIAL HYPERTENSION: Primary | ICD-10-CM

## 2022-01-18 ENCOUNTER — VIRTUAL VISIT (OUTPATIENT)
Dept: FAMILY MEDICINE | Facility: CLINIC | Age: 87
End: 2022-01-18
Payer: MEDICARE

## 2022-01-18 DIAGNOSIS — I25.5 ISCHEMIC CARDIOMYOPATHY: ICD-10-CM

## 2022-01-18 DIAGNOSIS — I50.30 NYHA CLASS 3 HEART FAILURE WITH PRESERVED EJECTION FRACTION (H): ICD-10-CM

## 2022-01-18 DIAGNOSIS — N18.30 STAGE 3 CHRONIC KIDNEY DISEASE, UNSPECIFIED WHETHER STAGE 3A OR 3B CKD (H): ICD-10-CM

## 2022-01-18 DIAGNOSIS — F31.4 BIPOLAR 1 DISORDER, DEPRESSED, SEVERE (H): ICD-10-CM

## 2022-01-18 DIAGNOSIS — I13.0: ICD-10-CM

## 2022-01-18 PROCEDURE — 99442 PR PHYSICIAN TELEPHONE EVALUATION 11-20 MIN: CPT | Performed by: FAMILY MEDICINE

## 2022-01-18 NOTE — PROGRESS NOTES
"Kamryn is a 86 year old who is being evaluated via a billable telephone visit.    5:12 PM -start visit  5:24 end visit    What phone number would you like to be contacted at? [phone]  How would you like to obtain your AVS? 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    Weight as of 1/10/22: 63.6 kg (140 lb 4.8 oz).           No follow-ups on file.    Rolanda Wilcox MD  [facility]    Jennifer Harry is a 86 year old who presents for the following health issues {    HPI     Post Discharge Outreach 1/13/2022   Admission Date 1/7/2022   Reason for Admission elevated blood pressure and concern forhypertensive urgency.   Discharge Date 1/10/2022   Discharge Diagnosis Hypertensive urgency   How are you doing now that you are home? good   Do you feel your condition is stable enough to be safe at home until your provider visit? Yes   Does the patient have their discharge instructions?  Yes   Does the patient have questions regarding their discharge instructions?  No   Were you started on any new medications or were there changes to any of your previous medications?  Yes   Does the patient have all of their medications? Yes   Do you have questions regarding any of your medications?  No   Do you have all of your needed medical supplies or equipment (DME)?  (i.e. oxygen tank, CPAP, cane, " etc.) (No Data)   Discharge follow-up appointment scheduled within 14 calendar days?  Yes   Discharge Follow Up Appointment Date 1/18/2022   Discharge Follow Up Appointment Scheduled with? Primary Care Provider     Hospital Follow-up Visit:    Hospital/Nursing Home/IP Rehab Facility:   Date of Admission: 1/7/22  Date of Discharge: 1/10/22  Reason(s) for Admission: Hypertension   1.   148/75, 139/75 Hypertensive urgency  2. TED on CKD stage III  3. Moderate aortic stenosis  4. Pulmonary hypertension  5. CAD, Hx PCI in 2020  6. Hyperlipidemia  7. Hypothyroidism   8. Bipolar disorder, Depression  9. Cognitive impairment    Was your hospitalization related to COVID-19? No   Problems taking medications regularly:  None  Medication changes since discharge:   Problems adhering to non-medication therapy:  None    Summary of hospitalization:  [facility] hospital discharge summary reviewed  Diagnostic Tests/Treatments reviewed.  Follow up needed: none  Other Healthcare Providers Involved in Patient s Care:         None  Update since discharge: improved.       Post Discharge Medication Reconciliation: discharge medications reconciled, continue medications without change.  Plan of care communicated with patient              Pt feels Fine  Mental Health issues are stable   Pt checks Blood Pressure daily    Review of Systems   CONSTITUTIONAL: NEGATIVE for fever, chills, change in weight  ENT/MOUTH: NEGATIVE for ear, mouth and throat problems  RESP: NEGATIVE for significant cough or SOB  CV: NEGATIVE for chest pain, palpitations or peripheral edema  GI: NEGATIVE for nausea, abdominal pain, heartburn, or change in bowel habits  ENDOCRINE: NEGATIVE for temperature intolerance, skin/hair changes  ROS otherwise negative      Objective           Vitals:  No vitals were obtained today due to virtual visit.    Physical Exam   healthy, alert and no distress  PSYCH: Alert and oriented times 3; coherent speech, normal   rate and  volume, able to articulate logical thoughts, able   to abstract reason, no tangential thoughts, no hallucinations   or delusions  Her affect is normal  RESP: No cough, no audible wheezing, able to talk in full sentences  Remainder of exam unable to be completed due to telephone visits    Pending         Phone call duration: as above minutes

## 2022-01-21 ENCOUNTER — OFFICE VISIT (OUTPATIENT)
Dept: CARDIOLOGY | Facility: CLINIC | Age: 87
End: 2022-01-21
Payer: MEDICARE

## 2022-01-21 VITALS
DIASTOLIC BLOOD PRESSURE: 80 MMHG | OXYGEN SATURATION: 98 % | HEART RATE: 60 BPM | SYSTOLIC BLOOD PRESSURE: 136 MMHG | BODY MASS INDEX: 26.34 KG/M2 | WEIGHT: 144 LBS

## 2022-01-21 DIAGNOSIS — I10 BENIGN ESSENTIAL HYPERTENSION: ICD-10-CM

## 2022-01-21 DIAGNOSIS — I50.30 NYHA CLASS 3 HEART FAILURE WITH PRESERVED EJECTION FRACTION (H): ICD-10-CM

## 2022-01-21 DIAGNOSIS — I25.5 ISCHEMIC CARDIOMYOPATHY: ICD-10-CM

## 2022-01-21 DIAGNOSIS — I35.0 MODERATE AORTIC STENOSIS: Primary | ICD-10-CM

## 2022-01-21 DIAGNOSIS — E78.2 MIXED HYPERLIPIDEMIA: ICD-10-CM

## 2022-01-21 PROCEDURE — 99215 OFFICE O/P EST HI 40 MIN: CPT | Performed by: INTERNAL MEDICINE

## 2022-01-21 RX ORDER — HYDRALAZINE HYDROCHLORIDE 25 MG/1
25 TABLET, FILM COATED ORAL 3 TIMES DAILY
Qty: 270 TABLET | Refills: 1 | Status: SHIPPED | OUTPATIENT
Start: 2022-01-21 | End: 2022-01-21

## 2022-01-21 RX ORDER — HYDRALAZINE HYDROCHLORIDE 25 MG/1
25 TABLET, FILM COATED ORAL 3 TIMES DAILY
Qty: 270 TABLET | Refills: 3 | Status: SHIPPED | OUTPATIENT
Start: 2022-01-21 | End: 2022-02-18

## 2022-01-21 NOTE — NURSING NOTE
"Chief Complaint   Patient presents with     RECHECK     ED 1/7/2022.        Initial BP (!) 162/97 (BP Location: Left arm, Patient Position: Chair, Cuff Size: Adult Regular)   Pulse 64   Wt 65.3 kg (144 lb)   SpO2 98%   BMI 26.34 kg/m   Estimated body mass index is 26.34 kg/m  as calculated from the following:    Height as of 1/7/22: 1.575 m (5' 2\").    Weight as of this encounter: 65.3 kg (144 lb)..  BP completed using cuff size: regular    Fidelina Harris MA  "

## 2022-01-21 NOTE — LETTER
1/21/2022      RE: Kamryn Miller  [address]  [address]  [place] [address]       Dear Colleague,    Thank you for the opportunity to participate in the care of your patient, Kamryn Miller, at the [facility]. Please see a copy of my visit note below.    January 21, 2022     I had the pleasure of seeing Kamryn Miller  in the [facility] for further evaluation and management. She has a history of Hx CAD, HLD, HTN, mitral and tricuspid insuff, pulmonary hypertension, lost to follow up in the past.  She does have CAD and did receive 3 stents in 8/2020 as below.  She denies any cardiomyopathy and she has not had any cardiology follow-up for several years.  Notes that she does have shortness of breath especially with exertion or she walks outside.  This is class III symptoms at the moment she can will probably about a block and able to take a flight of stairs.  We did start TAVR evaluation and she did undergo right heart catheterization as well as invasive hemodynamic measurement and measurement of the valve area.  Given findings of moderate aortic stenosis, TAVR was not yet pursued and ongoing surveillance was recommended. Had been hypertensive in the recent past. She did see the structural team just recently and repeat TTE was performed that again showed moderate to severe aortic stenosis, essentially unchanged from prior TTE. Given that she had no concerning symptoms ongoing surveillance was recommended.  On last visit I suggested increasing amlodipine to 10mg daily due to persistent hypertension. However, she did have an admission 12/16/21 and multiple medication changes made, including stopping amlodipine. Accordingly, she developed hypertension and 5mg was restarted in UC. However, she developed hypertensive emergency requiring admission for this and amlodipine was increased to 10mg daily with  good response. Today she is here for follow up.  Overall she denies any issues since medication adjustments.  She is little bit still hypertensive as measured at home as well as here in the clinic.  Otherwise no issues.     PAST MEDICAL HISTORY:  Past Medical History:   Diagnosis Date     Aortic valvar stenosis 7/2010    mild     Asthma      Bipolar disorder (H)      CAD (coronary artery disease)     s/p angioplasty     Celiac disease      Colon polyps      Colon polyps 2012    every 3 year colonoscopy      Depression      High cholesterol      HTN      Mitral insufficiency      Pulmonary HTN (H)     mild     Tremors 7/10    drug induced from antidepressants     Tricuspid insufficiency      FAMILY HISTORY:  Family History   Problem Relation Age of Onset     Asthma Mother      Cerebrovascular Disease Mother      Arthritis Mother      Depression Mother      Lipids Sister      Hypertension Sister      Heart Disease Sister      Lipids Sister      Hypertension Sister      Lipids Sister      Breast Cancer Sister      SOCIAL HISTORY:  Social History     Socioeconomic History     Marital status:      Spouse name: Adrien     Number of children: 2     Years of education: 16     Highest education level: Not on file   Occupational History     Employer: RETIRED     Comment: Retired in 2002.    Tobacco Use     Smoking status: Never Smoker     Smokeless tobacco: Never Used   Substance and Sexual Activity     Alcohol use: No     Alcohol/week: 0.0 standard drinks     Types: 1 Standard drinks or equivalent per week     Drug use: No     Sexual activity: Not Currently     Partners: Male   Other Topics Concern     Parent/sibling w/ CABG, MI or angioplasty before 65F 55M? Not Asked   Social History Narrative     Not on file     Social Determinants of Health     Financial Resource Strain: Not on file   Food Insecurity: Not on file   Transportation Needs: Not on file   Physical Activity: Not on file   Stress: Not on file   Social  Connections: Not on file   Intimate Partner Violence: Not on file   Housing Stability: Not on file     CURRENT MEDICATIONS:  Current Outpatient Medications   Medication     albuterol (PROAIR HFA/PROVENTIL HFA/VENTOLIN HFA) 108 (90 Base) MCG/ACT inhaler     alendronate (FOSAMAX) 70 MG tablet     amLODIPine (NORVASC) 10 MG tablet     aspirin 81 MG tablet     azelastine (ASTEPRO) 0.15 % nasal spray     Calcium Citrate (CITRACAL OR)     carvedilol (COREG) 12.5 MG tablet     CHOLECALCIFEROL 38875 UNIT PO CAPS     clopidogrel (PLAVIX) 75 MG tablet     desvenlafaxine (PRISTIQ) 100 MG 24 hr tablet     fluticasone-salmeterol (ADVAIR DISKUS) 500-50 MCG/DOSE inhaler     lamoTRIgine (LAMICTAL) 25 MG tablet     LAMOTRIGINE 200 MG PO TABS     levothyroxine (SYNTHROID/LEVOTHROID) 50 MCG tablet     liothyronine (CYTOMEL) 5 MCG tablet     memantine (NAMENDA) 10 MG tablet     mirtazapine (REMERON) 7.5 MG tablet     Multiple Vitamin (MULTI-VITAMIN) per tablet     rosuvastatin (CRESTOR) 40 MG tablet     No current facility-administered medications for this visit.     ROS:   Constitutional: No fever, chills, or sweats.  ENT: No visual disturbance, ear ache, epistaxis, sore throat.   Allergies/Immunologic: Negative.   Respiratory: No cough, hemoptysis.   Cardiovascular: As per HPI.   GI: No nausea, vomiting, hematemesis, melena, or hematochezia.   : No urinary frequency, dysuria, or hematuria.   Integrument: Negative.   Psychiatric: No evidence of major depression  Neuro: No new neurological complaints at this time. Non focal  Endocrinology: Negative.   Musculoskeletal: As per HPI.      EXAM:  BP (!) 162/97 (BP Location: Left arm, Patient Position: Chair, Cuff Size: Adult Regular)   Pulse 64   Wt 65.3 kg (144 lb)   SpO2 98%   BMI 26.34 kg/m    General: appears comfortable, alert and oriented  Head: normocephalic, atraumatic  Eyes: anicteric sclera, EOMI , PERRL  Neck: no adenopathy  Orophyarynx: moist mucosa, no lesions  noted  Heart: regular, S1/S2, no murmurs, rubs or gallop. Estimated JVP at 5 cmH2O  Lungs: CTAB, No wheezing.   Abdomen: soft, non-tender, bowel sounds present, no hepatosplenomegaly  Extremities: No LE edema today  Skin: no open lesions noted  Neuro: grossly non-focal  Psych: no evidence of depression noted     Labs:  Lab Results   Component Value Date    WBC 4.5 01/08/2022    HGB 12.5 01/08/2022    HCT 39.4 01/08/2022     01/08/2022     01/10/2022    POTASSIUM 3.8 01/10/2022    CHLORIDE 110 (H) 01/10/2022    CO2 27 01/10/2022    BUN 35 (H) 01/10/2022    CR 1.29 (H) 01/10/2022     (H) 01/10/2022    SED 11 07/28/2021    NTBNP 398 11/24/2021    AST 22 01/08/2022    ALT 24 01/08/2022    ALKPHOS 54 01/08/2022    BILITOTAL 0.4 01/08/2022    INR 1.05 11/03/2020     Echocardiogram reviewed:   Left ventricular function, chamber size, wall motion, and wall thickness are normal.The EF is 55-60%. No regional wall motion abnormalities are seen.  Right ventricular function, chamber size, wall motion, and thickness are normal.  Severe left atrial enlargement is present. Moderate mitral annular calcification is present. Mild mitral insufficiency is present. There is likley moderate aortic stenosis. The peak velocity is  3.47m/sec, the peak and mean gradients are 48mmHg and 28mmHg. The aortic valve  area is calculated low at 0.7cm2. Pulmonary artery systolic pressure is normal. The inferior vena cava was normal in size with preserved respiratory variability. No pericardial effusion is present.   Compared to prior study AS has worsened. Otherwise no significant change     Coronary angiogram 8/21/2020:    Dist LAD lesion is 70% stenosed.    Mid LAD-1 lesion is 80% stenosed.    Mid LAD-2 lesion is 60% stenosed.    Mid RCA lesion is 40% stenosed.    Prox RCA lesion is 40% stenosed.    Right sided filling pressures are normal.    Normal PA pressures.    Left sided filling pressures are normal.    Reduced cardiac  output.  1. LAD - 3 EDDIE were placed in the mid to distal LAD (2.5 x 28 mm, 3.5 x 12 mm, and 3.0 x 8 mm)  2. RHC showed normal biventricular filling pressures. Normal PA pressure. Reduced cardiac output.     RHC/coronary angiogram 11/3/20:    Moderate aortic stenosis -valve area 1.2 cm2,Mean gradient 24 mm Hg    Right sided filling pressures are normal.    Normal PA pressures.    Left sided filling pressures are normal.    Mildly reduced cardiac output level.    Peripheral angiogram done and IVUS used to assess the vessel lumens of the descending thoracic aorta,right and left external iliac arteries    TTE 5/3/21:  Global and regional left ventricular function is normal with an EF of 55-60%.  The right ventricle is normal size. Global right ventricular function is normal.  Moderate to severe aortic stenosis, aortic valve area 1.02 cm2, peak velocity  3.3 m/s, mean gradient 25 mmHg, stroke volume index 40 ml/m2, DVI 0.29.  This study was compared with the study from 9/16/2020. The aortic valve peak velocity and mean gradient are similar     TTE 11/1/21:  Global and regional left ventricular function is normal with an EF of 55-60%.  Right ventricular function, chamber size, wall motion, and thickness are normal.  Moderate aortic valve calcification is present. The mean gradient across the aortic valve is 23 mmHg. The peak aortic velocity is 3.1 m/sec. Moderate aortic stenosis is present. Calculated valve area lower than previous study due to LVOT diameter measurement.  Moderate aortic stenosis is present.  No significant changes noted.      ASSESSMENT AND PLAN:  In summary, patient is a 86 year old lady with with coronary artery disease and status post PCI 20 years ago at [facility], we do not have records unfortunately available but did receive 2 stents.  She most complains of shortness of breath denies any dizziness lightheadedness syncope or recurrent episodes of chest pains.  Overall there has been minimal change in her  condition since my last visit and since the invasive evaluation. I have reviewed old imaging and blood work in person including visualizing the echocardiograms. Grossly I believe the echo is pretty much unchanged from the prior one.  She still has moderate to severe aortic stenosis.  However she does not have any concerning symptoms such as chest pain dizziness lightheadedness or syncope.  As such we will continue to monitor her with more aggressive blood pressure control.  Appreciate structural teams assistance as well. She did have an admission 12/16/21 and multiple medication changes have been made, including stopping amlodipine (on her visit with me in 11/2021 we actually increased to 10mg due to persistent hypertension). Accordingly, she was hypertensive and 5mg was restarted in . However, she developed hypertensive emergency requiring admission for this and amlodipine was increased to 10mg daily with good response. Today she is here for follow up.  Today she is doing well from the cardiac standpoint however blood pressure still remains somewhat elevated.  As such we will add hydralazine 25 mg once a day to try to control her pressure a little bit better.  All side effects discussed she will let us know her blood pressure values in about a week or so and we will make further adjustments as needed.  No other changes I will see her back in 6 months with an echo at that time.     I appreciate the opportunity to participate in the care of Kamryn Miller . Please do not hesitate to contact me with any further questions.     Sincerely,   Madhu Zuniga MD  [facility] Division of Cardiology

## 2022-01-21 NOTE — PATIENT INSTRUCTIONS
Thank you for coming to the [facility] @ [facility]; please note the following instructions:    1. Start hydralazine 25 mg three times daily    2. Monitor blood pressure at home, send reading by my chart.    3. Follow up in 6 months with an echocardiogram prior        If you have any questions regarding your visit please contact your care team:     Cardiology  Telephone Number   Alisa BROWNLEE., RN  Jennifer VITALE, RN   Daniela DELGADO, RMA  Fidelina BOUCHER, RMA  Héctor VICKERS, LPN   [phone] (option 1)   For scheduling appts:     [phone] (select option 1)       For the Device Clinic (Pacemakers and ICD's)  RN's :  Delia Vazquez   During business hours: [phone]    *After business hours:  [phone] (select option 4)      Normal test result notifications will be released via [employer] or mailed within 7 business days.  All other test results, will be communicated via telephone once reviewed by your cardiologist.    If you need a medication refill please contact your pharmacy.  Please allow 3 business days for your refill to be completed.    As always, thank you for trusting us with your health care needs!    Patient Education     Hydralazine Oral Tablet 25 mg  Uses  This medicine is used for the following purposes:    heart failure    high blood pressure  Instructions  This medicine may be taken with or without food.  Keep the medicine at room temperature. Avoid heat and direct light.  It is important that you keep taking each dose of this medicine on time even if you are feeling well.  If you forget to take a dose on time, take it as soon as you remember. If it is almost time for the next dose, do not take the missed dose. Return to your normal dosing schedule. Do not take 2 doses of this medicine at one time.  Please tell your doctor and pharmacist about all the medicines you take. Include both prescription and over-the-counter medicines. Also tell them about any vitamins, herbal medicines, or anything  else you take for your health.  Do not suddenly stop taking this medicine. Check with your doctor before stopping.  Cautions  Tell your doctor and pharmacist if you ever had an allergic reaction to a medicine. Symptoms of an allergic reaction can include trouble breathing, skin rash, itching, swelling, or severe dizziness.  Do not use the medication any more than instructed.  This medicine may cause dizziness or fainting, especially after exercising or in hot weather. Be very careful when standing or sitting up quickly.  Your ability to stay alert or to react quickly may be impaired by this medicine. Do not drive or operate machinery until you know how this medicine will affect you.  Please check with your doctor before drinking alcohol while on this medicine.  Contact your doctor if you notice a change in the amount or darkening of your urine.  Tell the doctor or pharmacist if you are pregnant, planning to be pregnant, or breastfeeding.  Ask your pharmacist if this medicine can interact with any of your other medicines. Be sure to tell them about all the medicines you take.  Do not start or stop any other medicines without first speaking to your doctor or pharmacist.  Do not share this medicine with anyone who has not been prescribed this medicine.  Side Effects  The following is a list of some common side effects from this medicine. Please speak with your doctor about what you should do if you experience these or other side effects.    decreased appetite    diarrhea    dizziness    headaches    rapid heartbeat    nausea    vomiting  Call your doctor or get medical help right away if you notice any of these more serious side effects:    chest pain    fainting    feeling of numbness or tingling in your hands and feet    unusual or unexplained tiredness or weakness    blood in urine  A few people may have an allergic reactions to this medicine. Symptoms can include difficulty breathing, skin rash, itching, swelling,  or severe dizziness. If you notice any of these symptoms, seek medical help quickly.  Extra  Please speak with your doctor, nurse, or pharmacist if you have any questions about this medicine.  [email or web address]  IMPORTANT NOTE: This document tells you briefly how to take your medicine, but it does not tell you all there is to know about it.Your doctor or pharmacist may give you other documents about your medicine. Please talk to them if you have any questions.Always follow their advice. There is a more complete description of this medicine available in English.Scan this code on your smartphone or tablet or use the web address below. You can also ask your pharmacist for a printout. If you have any questions, please ask your pharmacist.     2021 [employer].

## 2022-01-21 NOTE — NURSING NOTE
Spoke with [facility]ire[facility]: Katherine: [phone]:   Confirmed pharmacy is [facility] in [place], [place]  - informed of new script. Asked that orders, plan be faxed to nurses office: [phone]    Information faxed.

## 2022-01-21 NOTE — PROGRESS NOTES
January 21, 2022     I had the pleasure of seeing Kamryn Miller  in the [facility] for further evaluation and management. She has a history of Hx CAD, HLD, HTN, mitral and tricuspid insuff, pulmonary hypertension, lost to follow up in the past.  She does have CAD and did receive 3 stents in 8/2020 as below.  She denies any cardiomyopathy and she has not had any cardiology follow-up for several years.  Notes that she does have shortness of breath especially with exertion or she walks outside.  This is class III symptoms at the moment she can will probably about a block and able to take a flight of stairs.  We did start TAVR evaluation and she did undergo right heart catheterization as well as invasive hemodynamic measurement and measurement of the valve area.  Given findings of moderate aortic stenosis, TAVR was not yet pursued and ongoing surveillance was recommended. Had been hypertensive in the recent past. She did see the structural team just recently and repeat TTE was performed that again showed moderate to severe aortic stenosis, essentially unchanged from prior TTE. Given that she had no concerning symptoms ongoing surveillance was recommended.  On last visit I suggested increasing amlodipine to 10mg daily due to persistent hypertension. However, she did have an admission 12/16/21 and multiple medication changes made, including stopping amlodipine. Accordingly, she developed hypertension and 5mg was restarted in UC. However, she developed hypertensive emergency requiring admission for this and amlodipine was increased to 10mg daily with good response. Today she is here for follow up.  Overall she denies any issues since medication adjustments.  She is little bit still hypertensive as measured at home as well as here in the clinic.  Otherwise no issues.     PAST MEDICAL HISTORY:  Past Medical History:   Diagnosis Date     Aortic valvar stenosis 7/2010    mild     Asthma      Bipolar  disorder (H)      CAD (coronary artery disease)     s/p angioplasty     Celiac disease      Colon polyps      Colon polyps 2012    every 3 year colonoscopy      Depression      High cholesterol      HTN      Mitral insufficiency      Pulmonary HTN (H)     mild     Tremors 7/10    drug induced from antidepressants     Tricuspid insufficiency      FAMILY HISTORY:  Family History   Problem Relation Age of Onset     Asthma Mother      Cerebrovascular Disease Mother      Arthritis Mother      Depression Mother      Lipids Sister      Hypertension Sister      Heart Disease Sister      Lipids Sister      Hypertension Sister      Lipids Sister      Breast Cancer Sister      SOCIAL HISTORY:  Social History     Socioeconomic History     Marital status:      Spouse name: Adrien     Number of children: 2     Years of education: 16     Highest education level: Not on file   Occupational History     Employer: RETIRED     Comment: Retired in 2002.    Tobacco Use     Smoking status: Never Smoker     Smokeless tobacco: Never Used   Substance and Sexual Activity     Alcohol use: No     Alcohol/week: 0.0 standard drinks     Types: 1 Standard drinks or equivalent per week     Drug use: No     Sexual activity: Not Currently     Partners: Male   Other Topics Concern     Parent/sibling w/ CABG, MI or angioplasty before 65F 55M? Not Asked   Social History Narrative     Not on file     Social Determinants of Health     Financial Resource Strain: Not on file   Food Insecurity: Not on file   Transportation Needs: Not on file   Physical Activity: Not on file   Stress: Not on file   Social Connections: Not on file   Intimate Partner Violence: Not on file   Housing Stability: Not on file     CURRENT MEDICATIONS:  Current Outpatient Medications   Medication     albuterol (PROAIR HFA/PROVENTIL HFA/VENTOLIN HFA) 108 (90 Base) MCG/ACT inhaler     alendronate (FOSAMAX) 70 MG tablet     amLODIPine (NORVASC) 10 MG tablet     aspirin 81 MG tablet      azelastine (ASTEPRO) 0.15 % nasal spray     Calcium Citrate (CITRACAL OR)     carvedilol (COREG) 12.5 MG tablet     CHOLECALCIFEROL 04274 UNIT PO CAPS     clopidogrel (PLAVIX) 75 MG tablet     desvenlafaxine (PRISTIQ) 100 MG 24 hr tablet     fluticasone-salmeterol (ADVAIR DISKUS) 500-50 MCG/DOSE inhaler     lamoTRIgine (LAMICTAL) 25 MG tablet     LAMOTRIGINE 200 MG PO TABS     levothyroxine (SYNTHROID/LEVOTHROID) 50 MCG tablet     liothyronine (CYTOMEL) 5 MCG tablet     memantine (NAMENDA) 10 MG tablet     mirtazapine (REMERON) 7.5 MG tablet     Multiple Vitamin (MULTI-VITAMIN) per tablet     rosuvastatin (CRESTOR) 40 MG tablet     No current facility-administered medications for this visit.     ROS:   Constitutional: No fever, chills, or sweats.  ENT: No visual disturbance, ear ache, epistaxis, sore throat.   Allergies/Immunologic: Negative.   Respiratory: No cough, hemoptysis.   Cardiovascular: As per HPI.   GI: No nausea, vomiting, hematemesis, melena, or hematochezia.   : No urinary frequency, dysuria, or hematuria.   Integrument: Negative.   Psychiatric: No evidence of major depression  Neuro: No new neurological complaints at this time. Non focal  Endocrinology: Negative.   Musculoskeletal: As per HPI.      EXAM:  BP (!) 162/97 (BP Location: Left arm, Patient Position: Chair, Cuff Size: Adult Regular)   Pulse 64   Wt 65.3 kg (144 lb)   SpO2 98%   BMI 26.34 kg/m    General: appears comfortable, alert and oriented  Head: normocephalic, atraumatic  Eyes: anicteric sclera, EOMI , PERRL  Neck: no adenopathy  Orophyarynx: moist mucosa, no lesions noted  Heart: regular, S1/S2, no murmurs, rubs or gallop. Estimated JVP at 5 cmH2O  Lungs: CTAB, No wheezing.   Abdomen: soft, non-tender, bowel sounds present, no hepatosplenomegaly  Extremities: No LE edema today  Skin: no open lesions noted  Neuro: grossly non-focal  Psych: no evidence of depression noted     Labs:  Lab Results   Component Value Date    WBC 4.5  01/08/2022    HGB 12.5 01/08/2022    HCT 39.4 01/08/2022     01/08/2022     01/10/2022    POTASSIUM 3.8 01/10/2022    CHLORIDE 110 (H) 01/10/2022    CO2 27 01/10/2022    BUN 35 (H) 01/10/2022    CR 1.29 (H) 01/10/2022     (H) 01/10/2022    SED 11 07/28/2021    NTBNP 398 11/24/2021    AST 22 01/08/2022    ALT 24 01/08/2022    ALKPHOS 54 01/08/2022    BILITOTAL 0.4 01/08/2022    INR 1.05 11/03/2020     Echocardiogram reviewed:   Left ventricular function, chamber size, wall motion, and wall thickness are normal.The EF is 55-60%. No regional wall motion abnormalities are seen.  Right ventricular function, chamber size, wall motion, and thickness are normal.  Severe left atrial enlargement is present. Moderate mitral annular calcification is present. Mild mitral insufficiency is present. There is likley moderate aortic stenosis. The peak velocity is  3.47m/sec, the peak and mean gradients are 48mmHg and 28mmHg. The aortic valve  area is calculated low at 0.7cm2. Pulmonary artery systolic pressure is normal. The inferior vena cava was normal in size with preserved respiratory variability. No pericardial effusion is present.   Compared to prior study AS has worsened. Otherwise no significant change     Coronary angiogram 8/21/2020:    Dist LAD lesion is 70% stenosed.    Mid LAD-1 lesion is 80% stenosed.    Mid LAD-2 lesion is 60% stenosed.    Mid RCA lesion is 40% stenosed.    Prox RCA lesion is 40% stenosed.    Right sided filling pressures are normal.    Normal PA pressures.    Left sided filling pressures are normal.    Reduced cardiac output.  1. LAD - 3 EDDIE were placed in the mid to distal LAD (2.5 x 28 mm, 3.5 x 12 mm, and 3.0 x 8 mm)  2. RHC showed normal biventricular filling pressures. Normal PA pressure. Reduced cardiac output.     RHC/coronary angiogram 11/3/20:    Moderate aortic stenosis -valve area 1.2 cm2,Mean gradient 24 mm Hg    Right sided filling pressures are normal.    Normal PA  pressures.    Left sided filling pressures are normal.    Mildly reduced cardiac output level.    Peripheral angiogram done and IVUS used to assess the vessel lumens of the descending thoracic aorta,right and left external iliac arteries    TTE 5/3/21:  Global and regional left ventricular function is normal with an EF of 55-60%.  The right ventricle is normal size. Global right ventricular function is normal.  Moderate to severe aortic stenosis, aortic valve area 1.02 cm2, peak velocity  3.3 m/s, mean gradient 25 mmHg, stroke volume index 40 ml/m2, DVI 0.29.  This study was compared with the study from 9/16/2020. The aortic valve peak velocity and mean gradient are similar     TTE 11/1/21:  Global and regional left ventricular function is normal with an EF of 55-60%.  Right ventricular function, chamber size, wall motion, and thickness are normal.  Moderate aortic valve calcification is present. The mean gradient across the aortic valve is 23 mmHg. The peak aortic velocity is 3.1 m/sec. Moderate aortic stenosis is present. Calculated valve area lower than previous study due to LVOT diameter measurement.  Moderate aortic stenosis is present.  No significant changes noted.      ASSESSMENT AND PLAN:  In summary, patient is a 86 year old lady with with coronary artery disease and status post PCI 20 years ago at [facility], we do not have records unfortunately available but did receive 2 stents.  She most complains of shortness of breath denies any dizziness lightheadedness syncope or recurrent episodes of chest pains.  Overall there has been minimal change in her condition since my last visit and since the invasive evaluation. I have reviewed old imaging and blood work in person including visualizing the echocardiograms. Grossly I believe the echo is pretty much unchanged from the prior one.  She still has moderate to severe aortic stenosis.  However she does not have any concerning symptoms such as chest pain dizziness  lightheadedness or syncope.  As such we will continue to monitor her with more aggressive blood pressure control.  Appreciate structural teams assistance as well. She did have an admission 12/16/21 and multiple medication changes have been made, including stopping amlodipine (on her visit with me in 11/2021 we actually increased to 10mg due to persistent hypertension). Accordingly, she was hypertensive and 5mg was restarted in . However, she developed hypertensive emergency requiring admission for this and amlodipine was increased to 10mg daily with good response. Today she is here for follow up.  Today she is doing well from the cardiac standpoint however blood pressure still remains somewhat elevated.  As such we will add hydralazine 25 mg once a day to try to control her pressure a little bit better.  All side effects discussed she will let us know her blood pressure values in about a week or so and we will make further adjustments as needed.  No other changes I will see her back in 6 months with an echo at that time.     I appreciate the opportunity to participate in the care of Kamryn Miller . Please do not hesitate to contact me with any further questions.     Sincerely,   Madhu Zuniga MD  [facility] Division of Cardiology

## 2022-01-24 ENCOUNTER — LAB (OUTPATIENT)
Dept: LAB | Facility: CLINIC | Age: 87
End: 2022-01-24
Payer: MEDICARE

## 2022-01-24 DIAGNOSIS — N18.30 STAGE 3 CHRONIC KIDNEY DISEASE, UNSPECIFIED WHETHER STAGE 3A OR 3B CKD (H): ICD-10-CM

## 2022-01-24 LAB
ALBUMIN SERPL-MCNC: 3.5 G/DL (ref 3.4–5)
ALBUMIN UR-MCNC: NEGATIVE MG/DL
ANION GAP SERPL CALCULATED.3IONS-SCNC: 5 MMOL/L (ref 3–14)
APPEARANCE UR: CLEAR
BILIRUB UR QL STRIP: NEGATIVE
BUN SERPL-MCNC: 33 MG/DL (ref 7–30)
CALCIUM SERPL-MCNC: 9.3 MG/DL (ref 8.5–10.1)
CHLORIDE BLD-SCNC: 103 MMOL/L (ref 94–109)
CO2 SERPL-SCNC: 30 MMOL/L (ref 20–32)
COLOR UR AUTO: YELLOW
CREAT SERPL-MCNC: 1.53 MG/DL (ref 0.52–1.04)
CREAT UR-MCNC: 48 MG/DL
GFR SERPL CREATININE-BSD FRML MDRD: 33 ML/MIN/1.73M2
GLUCOSE BLD-MCNC: 86 MG/DL (ref 70–99)
GLUCOSE UR STRIP-MCNC: NEGATIVE MG/DL
HGB BLD-MCNC: 12.7 G/DL (ref 11.7–15.7)
HGB UR QL STRIP: NEGATIVE
KETONES UR STRIP-MCNC: NEGATIVE MG/DL
LEUKOCYTE ESTERASE UR QL STRIP: NEGATIVE
NITRATE UR QL: NEGATIVE
PH UR STRIP: 7 [PH] (ref 5–7)
PHOSPHATE SERPL-MCNC: 3.5 MG/DL (ref 2.5–4.5)
POTASSIUM BLD-SCNC: 4.1 MMOL/L (ref 3.4–5.3)
PROT UR-MCNC: 0.14 G/L
PROT/CREAT 24H UR: 0.29 G/G CR (ref 0–0.2)
RBC #/AREA URNS AUTO: NORMAL /HPF
SODIUM SERPL-SCNC: 138 MMOL/L (ref 133–144)
SP GR UR STRIP: <=1.005 (ref 1–1.03)
UROBILINOGEN UR STRIP-ACNC: 0.2 E.U./DL
WBC #/AREA URNS AUTO: NORMAL /HPF

## 2022-01-24 PROCEDURE — 84156 ASSAY OF PROTEIN URINE: CPT

## 2022-01-24 PROCEDURE — 85018 HEMOGLOBIN: CPT

## 2022-01-24 PROCEDURE — 81001 URINALYSIS AUTO W/SCOPE: CPT

## 2022-01-24 PROCEDURE — 36415 COLL VENOUS BLD VENIPUNCTURE: CPT

## 2022-01-24 PROCEDURE — 80069 RENAL FUNCTION PANEL: CPT

## 2022-01-31 ENCOUNTER — VIRTUAL VISIT (OUTPATIENT)
Dept: NEPHROLOGY | Facility: CLINIC | Age: 87
End: 2022-01-31
Payer: MEDICARE

## 2022-01-31 DIAGNOSIS — N18.32 STAGE 3B CHRONIC KIDNEY DISEASE (H): Primary | ICD-10-CM

## 2022-01-31 DIAGNOSIS — I35.0 SEVERE AORTIC STENOSIS: ICD-10-CM

## 2022-01-31 DIAGNOSIS — I16.0 HYPERTENSIVE URGENCY: ICD-10-CM

## 2022-01-31 PROCEDURE — 99442 PR PHYSICIAN TELEPHONE EVALUATION 11-20 MIN: CPT | Mod: 95 | Performed by: INTERNAL MEDICINE

## 2022-01-31 NOTE — PROGRESS NOTES
Kamryn is a 86 year old who is being evaluated via a billable telephone visit.      What phone number would you like to be contacted at? phone  How would you like to obtain your AVS? Jacky  Phone call duration: 14 minutes      Assessment and Plan:  86 year old female with history of CKD, hypertension who presents for followup of CKD and hypercalcemia.  She was first seen October 2019, and hydrochlorothiazide was stopped. Her Scr has been 1.2-1.5 in recent years.  # CKD:  Her scr has been 1.1-1.5 in the last few years, with baseline many years ago at 1 or less.  She has mild proteinuria. She denies systemic signs   - she has HFpeF and this along with her CAD and hypertension is the cause of her CKD  - she has a couple RBC and few WBCs- repeat next visit.  -low grade proteinuria, off ACE/ARB- can discuss with cardiology in the future.  # Hypertension: well controlled, currently on carvedilol, hydralazine and amlodipine. Her hydrochlorothiazide was stopped due to hypercalcemia. The other medications were stopped in the last year by other providers.  .  She feels well overall. Her BP is 130-140 range and she will continue same medications, has followup with Dr Zuniga in July    # Not anemic - hgb 12s    # Electrolytes:    stable  # Mineral Bone Disorder:   - Calcium; level is:  Normal range, PTH normal  - vitamin D high normal, no change made.    30 minutes spent on day of service in direct patient care and in coordination of care and reviewing records.    Assessment and plan was discussed with patient and she voiced her understanding and agreement.    Reason for Visit:  Kamryn Miller is a 86 year old female with CKD from hypertension, who presents for followup of CKD    HPI:  She is a pleasant female with history of mild rhinitis, asthma, and hypertension who presents for evaluation of elevated creatinine and hypercalcemia.  She presents for evaluation of worsened creatinine. Her serum creatinine has been 1.2-1.5 in  recent years, and was up to 2 in 2019, with eGFR 21ml/min.  She had hypercalcemia, which improved with stopping hydrochlorothiazide. She has history of CAD and HFpEF with significant aortic stenosis and follows with Dr Zuniga in cardiology.  She had hypertensive urgency in jn 2022 and  Many of her blood pressure medications have been changed. She also had AV block which affected her BP regimen. She also had inpatient psych stay for deprssion after her  .  Her creatinine was 1.1-1.2 on discharge (with stopping her ARB).  Her recent BP is 130-140 after addition of hydralazine. She is mobilizing fairly well without cane or walker.she denies significant dyspnea on exertion or swelling. She is in assisted living and they manage medications and provide meals.  She tries to eat healthy.  She avoids using NSAIDs.     Renal History:   HTN    ROS:   A comprehensive review of systems was obtained and negative, except as noted in the HPI or PMH.    Active Medical Problems:  Patient Active Problem List   Diagnosis     Hyperlipidemia LDL goal <70     Mild major depression (H)     Pulmonary hypertension (H)     Mild persistent asthma     Seasonal allergic rhinitis     Mitral insufficiency     Tricuspid insufficiency     Bipolar disorder (H)     Renal insufficiency     Tremors     Advanced directives, counseling/discussion     Family history of diabetes mellitus     Family history of celiac disease     Celiac disease     History of colonic polyps     Benign essential hypertension     Hypothyroidism, unspecified type     CKD (chronic kidney disease) stage 3, GFR 30-59 ml/min (H)     Severe aortic stenosis     Other ill-defined heart diseases     Anemia     Disorder of kidney and ureter     Generalized anxiety disorder     Osteopenia     CAD S/P percutaneous coronary angioplasty     Status post coronary angiogram     NYHA class 3 heart failure with preserved ejection fraction (H)     Ischemic  cardiomyopathy     Mixed hyperlipidemia     Moderate persistent asthma without complication     Hearing disorder, unspecified laterality     Impairment of balance     Bipolar 1 disorder, depressed, severe (H)     Essential hypertension     Stented coronary artery     Atherosclerosis of native coronary artery of native heart without angina pectoris     Encounter for screening laboratory testing for severe acute respiratory syndrome coronavirus 2 (SARS-CoV-2)     PMH:   Medical record was reviewed and PMH was discussed with patient and noted below.  Past Medical History:   Diagnosis Date     Aortic valvar stenosis 7/2010    mild     Asthma      Bipolar disorder (H)      CAD (coronary artery disease)     s/p angioplasty     Celiac disease      Colon polyps      Colon polyps 2012    every 3 year colonoscopy      Depression      High cholesterol      HTN      Mitral insufficiency      Pulmonary HTN (H)     mild     Tremors 7/10    drug induced from antidepressants     Tricuspid insufficiency      PSH:   Past Surgical History:   Procedure Laterality Date     ANGIOGRAM  6/26/2009     ANGIOPLASTY  9/96    for angina     ANGIOPLASTY  8/03 - 9/03    X -2 - with stenst in coronary car.     APPENDECTOMY       BREAST BIOPSY, RT/LT      Breat Biopsy RT/LT, benign     BUNIONECTOMY  11/8/2011    Procedure:BUNIONECTOMY; Left donna bunionectomy; Surgeon:FREDY LITTLEJOHN; Location:[facility]     BUNIONECTOMY RT/LT  5/2007    right bunion and right 2nd hammertoe     CATARACT IOL, RT/LT  6/12 and 7/12    bilateral     COLONOSCOPY  2007, 2012    every 3 years for polyps     CV CORONARY ANGIOGRAM N/A 8/21/2020    Procedure: CV CORONARY ANGIOGRAM;  Surgeon: Gianluca Toscano MD;  [facility]     CV LEFT HEART CATH N/A 8/21/2020    Procedure: CV LEFT HEART CATH;  Surgeon: Gianluca Toscano MD;  [facility]     CV LEFT HEART CATH N/A 11/3/2020    Procedure: CV LEFT HEART CATH;  Surgeon:  Tom Burrows MD;  [facility]     CV LOWER EXTREMITY ANGIOGRAM BILATERAL N/A 11/3/2020    Procedure: CV ANGIOGRAM LOWER EXTREMITY BILATERAL;  Surgeon: oTm Burrows MD;  [facility]     CV PCI STENT DRUG ELUTING N/A 8/21/2020    Procedure: Percutaneous Coronary Intervention Stent Drug Eluting;  Surgeon: Gianluca Toscano MD;  [facility]     CV RIGHT HEART CATH MEASUREMENTS RECORDED N/A 8/21/2020    Procedure: CV RIGHT HEART CATH;  Surgeon: Gianluca Toscano MD;  [facility]     CV RIGHT HEART CATH MEASUREMENTS RECORDED N/A 11/3/2020    Procedure: CV RIGHT HEART CATH;  Surgeon: Tom Burrows MD;  [facility]     JOINT REPLACEMENT, HIP RT/LT  4/2008    right hip replaced     JOINT REPLACEMENT, HIP RT/LT  4/2009    left hip replaced     REPAIR HAMMER TOE  11/8/2011    Procedure:REPAIR HAMMER TOE; left 2nd hammertoe repair; Surgeon:FREDY LITTLEJOHN; Location:[facility]     SURGICAL HISTORY OF -   11/11    left bunion and 2nd hammertoe repair     TUBAL LIGATION       ZZC ANESTH,BLEPHAROPLASTY      (R) for drooping eyelid     ZZC APPENDECTOMY         Family Hx:   Family History   Problem Relation Age of Onset     Asthma Mother      Cerebrovascular Disease Mother      Arthritis Mother      Depression Mother      Lipids Sister      Hypertension Sister      Heart Disease Sister      Lipids Sister      Hypertension Sister      Lipids Sister      Breast Cancer Sister      Personal Hx:   Social History     Tobacco Use     Smoking status: Never Smoker     Smokeless tobacco: Never Used   Substance Use Topics     Alcohol use: No     Alcohol/week: 0.0 standard drinks     Types: 1 Standard drinks or equivalent per week       Allergies:  Allergies   Allergen Reactions     Ace Inhibitors Cough     Diclofenac Other (See Comments)     Balance problems     Gluten Meal Diarrhea        Medications:  Current Outpatient Medications   Medication Sig     albuterol (PROAIR HFA/PROVENTIL HFA/VENTOLIN HFA) 108 (90 Base) MCG/ACT inhaler Inhale 2 puffs into the lungs every 6 hours as needed for wheezing or shortness of breath / dyspnea     alendronate (FOSAMAX) 70 MG tablet Take 70 mg by mouth every 7 days      amLODIPine (NORVASC) 10 MG tablet Take 1 tablet (10 mg) by mouth daily     aspirin 81 MG tablet Take 81 mg by mouth daily      azelastine (ASTEPRO) 0.15 % nasal spray INSTILL 1 SPRAY INTO BOTH NOSTRILS DAILY     Calcium Citrate (CITRACAL OR) Take 1 tablet by mouth daily.     carvedilol (COREG) 12.5 MG tablet Take 1 tablet (12.5 mg) by mouth 2 times daily     CHOLECALCIFEROL 74236 UNIT PO CAPS Take 50,000 Units by mouth once a week      clopidogrel (PLAVIX) 75 MG tablet Take 1 tablet (75 mg) by mouth daily     desvenlafaxine (PRISTIQ) 100 MG 24 hr tablet Take 100 mg by mouth daily     fluticasone-salmeterol (ADVAIR DISKUS) 500-50 MCG/DOSE inhaler INHALE 1 PUFF INTO THE LUNGS EVERY 12 HOURS     hydrALAZINE (APRESOLINE) 25 MG tablet Take 1 tablet (25 mg) by mouth 3 times daily     lamoTRIgine (LAMICTAL) 25 MG tablet Take 25 mg by mouth daily with 200 mg tablet for a total dose of 225 mg     LAMOTRIGINE 200 MG PO TABS Take 200 mg by mouth daily with 25 mg tablet for a total dose of 225 mg     levothyroxine (SYNTHROID/LEVOTHROID) 50 MCG tablet Take 50 mcg by mouth daily     liothyronine (CYTOMEL) 5 MCG tablet Take 2 tablets (10 mcg) by mouth daily     memantine (NAMENDA) 10 MG tablet Take 1 tablet (10 mg) by mouth daily     mirtazapine (REMERON) 7.5 MG tablet Take 1 tablet (7.5 mg) by mouth At Bedtime     Multiple Vitamin (MULTI-VITAMIN) per tablet Take 1 tablet by mouth daily.     rosuvastatin (CRESTOR) 40 MG tablet TAKE 1 TABLET BY MOUTH EVERY DAY (Patient taking differently: Take 40 mg by mouth daily )     No current facility-administered medications for this visit.      Vitals:  There were no  vitals taken for this visit.    Exam:  GENERAL APPEARANCE: alert and no distress  HENT: mouth without ulcers or lesions  LYMPHATICS: no cervical or supraclavicular nodes  RESP: lungs clear to auscultation - no rales, rhonchi or wheezes  CV: regular rhythm, normal rate, no rub, no murmur  EDEMA: no LE edema bilaterally  ABDOMEN: soft, nondistended, nontender, bowel sounds normal  MS: extremities normal - no gross deformities noted, no evidence of inflammation in joints, no muscle tenderness  SKIN: no rash    LABS:   CMP  Recent Labs   Lab Test 01/24/22  1051 01/10/22  0716 01/09/22  0623 01/08/22  0545 11/24/21  1525 06/04/21  1033 05/17/21  1401 11/03/20  1225 10/23/20  0834    142 142 144   < > 142 140 144 141   POTASSIUM 4.1 3.8 3.9 3.6   < > 3.4 4.1 4.3 3.8   CHLORIDE 103 110* 111* 110*   < > 106 106 111* 105   CO2 30 27 26 28   < > 30 33* 28 32   ANIONGAP 5 5 5 6   < > 6 1* 5 4   GLC 86 102* 97 93   < > 103* 92 100* 88   BUN 33* 35* 32* 22   < > 27 23 23 21   CR 1.53* 1.29* 1.41* 1.08*   < > 1.50* 1.39* 1.38* 1.52*   GFRESTIMATED 33* 40* 36* 50*   < > 32* 35* 35* 31*   GFRESTBLACK  --   --   --   --   --  37* 40* 41* 36*   PRINCE 9.3 9.4 9.0 9.5   < > 9.4 9.3 9.5 9.9    < > = values in this interval not displayed.     Recent Labs   Lab Test 01/08/22  0545 12/16/21  1751 11/24/21  1525 05/17/21  1401   BILITOTAL 0.4 0.3 0.5 0.3   ALKPHOS 54 60 66 57   ALT 24 29 29 38   AST 22 23 25 27     CBC  Recent Labs   Lab Test 01/24/22  1051 01/08/22  0545 01/07/22  1745 12/21/21  0934 12/16/21  1751   HGB 12.7 12.5 13.3 14.2 13.9   WBC  --  4.5 5.0 5.0 6.0   RBC  --  3.90 4.12 4.36 4.28   HCT  --  39.4 41.5 43.0 42.1   MCV  --  101* 101* 99 98   MCH  --  32.1 32.3 32.6 32.5   MCHC  --  31.7 32.0 33.0 33.0   RDW  --  12.5 12.2 12.1 12.1   PLT  --  207 218 190 203     URINE STUDIES  Recent Labs   Lab Test 01/24/22  1051 12/16/21  1758 08/12/21  1334 07/28/21  1150 07/16/20  1615 10/28/19  1314   COLOR Yellow Straw Yellow  Yellow   < > Yellow   APPEARANCE Clear Clear Clear Clear   < > Clear   URINEGLC Negative Negative Negative Negative   < > Negative   URINEBILI Negative Negative Negative Negative   < > Negative   URINEKETONE Negative Negative Negative Negative   < > Negative   SG <=1.005 1.006 <=1.005 1.010   < > 1.015   UBLD Negative Large* Trace* Trace*   < > Trace*   URINEPH 7.0 7.0 6.0 7.5*   < > 7.5*   PROTEIN Negative 10 * Negative 30 *   < > Negative   UROBILINOGEN 0.2  --  0.2 0.2  --  0.2   NITRITE Negative Negative Negative Negative   < > Negative   LEUKEST Negative Trace* Negative Small*   < > Negative   RBCU 0-2 3* 0-2 0-2   < > O - 2   WBCU 0-5 13* 0-5 5-10*   < > 0 - 5    < > = values in this interval not displayed.     Recent Labs   Lab Test 01/24/22  1051   [facility] 0.29*     PTH  Recent Labs   Lab Test 10/23/20  0834 10/28/19  1310   PTHI 34 62     IRON STUDIES  No lab results found.      Ania Johnson MD

## 2022-01-31 NOTE — LETTER
1/31/2022     RE: Kamryn Miller  [address]  [address]  [place] [address]     Dear Colleague,    Thank you for referring your patient, Kamryn Miller, to the [facility]. Please see a copy of my visit note below.    Stefany Harry is a 86 year old who is being evaluated via a billable telephone visit.      What phone number would you like to be contacted at? phone  How would you like to obtain your AVS? MyChart  Phone call duration: 14 minutes    Assessment and Plan:  86 year old female with history of CKD, hypertension who presents for followup of CKD and hypercalcemia.  She was first seen October 2019, and hydrochlorothiazide was stopped. Her Scr has been 1.2-1.5 in recent years.  # CKD:  Her scr has been 1.1-1.5 in the last few years, with baseline many years ago at 1 or less.  She has mild proteinuria. She denies systemic signs   - she has HFpeF and this along with her CAD and hypertension is the cause of her CKD  - she has a couple RBC and few WBCs- repeat next visit.  -low grade proteinuria, off ACE/ARB- can discuss with cardiology in the future.  # Hypertension: well controlled, currently on carvedilol, hydralazine and amlodipine. Her hydrochlorothiazide was stopped due to hypercalcemia. The other medications were stopped in the last year by other providers.  .  She feels well overall. Her BP is 130-140 range and she will continue same medications, has followup with Dr Zuniga in July    # Not anemic - hgb 12s    # Electrolytes:    stable    # Mineral Bone Disorder:   - Calcium; level is:  Normal range, PTH normal  - vitamin D high normal, no change made.    30 minutes spent on day of service in direct patient care and in coordination of care and reviewing records.    Assessment and plan was discussed with patient and she voiced her understanding and agreement.    Reason for Visit:  Kamryn Miller is a  86 year old female with CKD from hypertension, who presents for followup of CKD    HPI:  She is a pleasant female with history of mild rhinitis, asthma, and hypertension who presents for evaluation of elevated creatinine and hypercalcemia.  She presents for evaluation of worsened creatinine. Her serum creatinine has been 1.2-1.5 in recent years, and was up to 2 in 2019, with eGFR 21ml/min.  She had hypercalcemia, which improved with stopping hydrochlorothiazide. She has history of CAD and HFpEF with significant aortic stenosis and follows with Dr Zuniga in cardiology.  She had hypertensive urgency in jn 2022 and  Many of her blood pressure medications have been changed. She also had AV block which affected her BP regimen. She also had inpatient psych stay for deprssion after her  .  Her creatinine was 1.1-1.2 on discharge (with stopping her ARB).  Her recent BP is 130-140 after addition of hydralazine. She is mobilizing fairly well without cane or walker.she denies significant dyspnea on exertion or swelling. She is in assisted living and they manage medications and provide meals.  She tries to eat healthy.  She avoids using NSAIDs.     Renal History:   HTN    ROS:   A comprehensive review of systems was obtained and negative, except as noted in the HPI or PMH.    Active Medical Problems:  Patient Active Problem List   Diagnosis     Hyperlipidemia LDL goal <70     Mild major depression (H)     Pulmonary hypertension (H)     Mild persistent asthma     Seasonal allergic rhinitis     Mitral insufficiency     Tricuspid insufficiency     Bipolar disorder (H)     Renal insufficiency     Tremors     Advanced directives, counseling/discussion     Family history of diabetes mellitus     Family history of celiac disease     Celiac disease     History of colonic polyps     Benign essential hypertension     Hypothyroidism, unspecified type     CKD (chronic kidney disease) stage 3, GFR 30-59 ml/min  (H)     Severe aortic stenosis     Other ill-defined heart diseases     Anemia     Disorder of kidney and ureter     Generalized anxiety disorder     Osteopenia     CAD S/P percutaneous coronary angioplasty     Status post coronary angiogram     NYHA class 3 heart failure with preserved ejection fraction (H)     Ischemic cardiomyopathy     Mixed hyperlipidemia     Moderate persistent asthma without complication     Hearing disorder, unspecified laterality     Impairment of balance     Bipolar 1 disorder, depressed, severe (H)     Essential hypertension     Stented coronary artery     Atherosclerosis of native coronary artery of native heart without angina pectoris     Encounter for screening laboratory testing for severe acute respiratory syndrome coronavirus 2 (SARS-CoV-2)     PMH:   Medical record was reviewed and PMH was discussed with patient and noted below.  Past Medical History:   Diagnosis Date     Aortic valvar stenosis 7/2010    mild     Asthma      Bipolar disorder (H)      CAD (coronary artery disease)     s/p angioplasty     Celiac disease      Colon polyps      Colon polyps 2012    every 3 year colonoscopy      Depression      High cholesterol      HTN      Mitral insufficiency      Pulmonary HTN (H)     mild     Tremors 7/10    drug induced from antidepressants     Tricuspid insufficiency      PSH:   Past Surgical History:   Procedure Laterality Date     ANGIOGRAM  6/26/2009     ANGIOPLASTY  9/96    for angina     ANGIOPLASTY  8/03 - 9/03    X -2 - with stenst in coronary car.     APPENDECTOMY       BREAST BIOPSY, RT/LT      Breat Biopsy RT/LT, benign     BUNIONECTOMY  11/8/2011    Procedure:BUNIONECTOMY; Left donna bunionectomy; Surgeon:FREDY LITTLEJOHN; Location:[facility]     BUNIONECTOMY RT/LT  5/2007    right bunion and right 2nd hammertoe     CATARACT IOL, RT/LT  6/12 and 7/12    bilateral     COLONOSCOPY  2007, 2012    every 3 years for polyps     CV CORONARY ANGIOGRAM N/A 8/21/2020    Procedure:  CV CORONARY ANGIOGRAM;  Surgeon: Gianluca Toscano MD;  [facility]     CV LEFT HEART CATH N/A 8/21/2020    Procedure: CV LEFT HEART CATH;  Surgeon: Gianluca Toscano MD;  [facility]     CV LEFT HEART CATH N/A 11/3/2020    Procedure: CV LEFT HEART CATH;  Surgeon: Tom Burrows MD;  [facility]     CV LOWER EXTREMITY ANGIOGRAM BILATERAL N/A 11/3/2020    Procedure: CV ANGIOGRAM LOWER EXTREMITY BILATERAL;  Surgeon: Tom Burrows MD;  [facility]     CV PCI STENT DRUG ELUTING N/A 8/21/2020    Procedure: Percutaneous Coronary Intervention Stent Drug Eluting;  Surgeon: Gianluca Toscano MD;  [facility]     CV RIGHT HEART CATH MEASUREMENTS RECORDED N/A 8/21/2020    Procedure: CV RIGHT HEART CATH;  Surgeon: Gianluca Toscano MD;  [facility]     CV RIGHT HEART CATH MEASUREMENTS RECORDED N/A 11/3/2020    Procedure: CV RIGHT HEART CATH;  Surgeon: Tom Burrows MD;  [facility]     JOINT REPLACEMENT, HIP RT/LT  4/2008    right hip replaced     JOINT REPLACEMENT, HIP RT/LT  4/2009    left hip replaced     REPAIR HAMMER TOE  11/8/2011    Procedure:REPAIR HAMMER TOE; left 2nd hammertoe repair; Surgeon:FREDY LITTLEJOHN; Location:[facility]     SURGICAL HISTORY OF -   11/11    left bunion and 2nd hammertoe repair     TUBAL LIGATION       ZZC ANESTH,BLEPHAROPLASTY      (R) for drooping eyelid     ZZC APPENDECTOMY         Family Hx:   Family History   Problem Relation Age of Onset     Asthma Mother      Cerebrovascular Disease Mother      Arthritis Mother      Depression Mother      Lipids Sister      Hypertension Sister      Heart Disease Sister      Lipids Sister      Hypertension Sister      Lipids Sister      Breast Cancer Sister      Personal Hx:   Social History     Tobacco Use     Smoking status: Never Smoker     Smokeless  tobacco: Never Used   Substance Use Topics     Alcohol use: No     Alcohol/week: 0.0 standard drinks     Types: 1 Standard drinks or equivalent per week       Allergies:  Allergies   Allergen Reactions     Ace Inhibitors Cough     Diclofenac Other (See Comments)     Balance problems     Gluten Meal Diarrhea       Medications:  Current Outpatient Medications   Medication Sig     albuterol (PROAIR HFA/PROVENTIL HFA/VENTOLIN HFA) 108 (90 Base) MCG/ACT inhaler Inhale 2 puffs into the lungs every 6 hours as needed for wheezing or shortness of breath / dyspnea     alendronate (FOSAMAX) 70 MG tablet Take 70 mg by mouth every 7 days      amLODIPine (NORVASC) 10 MG tablet Take 1 tablet (10 mg) by mouth daily     aspirin 81 MG tablet Take 81 mg by mouth daily      azelastine (ASTEPRO) 0.15 % nasal spray INSTILL 1 SPRAY INTO BOTH NOSTRILS DAILY     Calcium Citrate (CITRACAL OR) Take 1 tablet by mouth daily.     carvedilol (COREG) 12.5 MG tablet Take 1 tablet (12.5 mg) by mouth 2 times daily     CHOLECALCIFEROL 25100 UNIT PO CAPS Take 50,000 Units by mouth once a week      clopidogrel (PLAVIX) 75 MG tablet Take 1 tablet (75 mg) by mouth daily     desvenlafaxine (PRISTIQ) 100 MG 24 hr tablet Take 100 mg by mouth daily     fluticasone-salmeterol (ADVAIR DISKUS) 500-50 MCG/DOSE inhaler INHALE 1 PUFF INTO THE LUNGS EVERY 12 HOURS     hydrALAZINE (APRESOLINE) 25 MG tablet Take 1 tablet (25 mg) by mouth 3 times daily     lamoTRIgine (LAMICTAL) 25 MG tablet Take 25 mg by mouth daily with 200 mg tablet for a total dose of 225 mg     LAMOTRIGINE 200 MG PO TABS Take 200 mg by mouth daily with 25 mg tablet for a total dose of 225 mg     levothyroxine (SYNTHROID/LEVOTHROID) 50 MCG tablet Take 50 mcg by mouth daily     liothyronine (CYTOMEL) 5 MCG tablet Take 2 tablets (10 mcg) by mouth daily     memantine (NAMENDA) 10 MG tablet Take 1 tablet (10 mg) by mouth daily     mirtazapine (REMERON) 7.5 MG tablet Take 1 tablet (7.5 mg) by mouth At  Bedtime     Multiple Vitamin (MULTI-VITAMIN) per tablet Take 1 tablet by mouth daily.     rosuvastatin (CRESTOR) 40 MG tablet TAKE 1 TABLET BY MOUTH EVERY DAY (Patient taking differently: Take 40 mg by mouth daily )     No current facility-administered medications for this visit.      Vitals:  There were no vitals taken for this visit.    Exam:  GENERAL APPEARANCE: alert and no distress  HENT: mouth without ulcers or lesions  LYMPHATICS: no cervical or supraclavicular nodes  RESP: lungs clear to auscultation - no rales, rhonchi or wheezes  CV: regular rhythm, normal rate, no rub, no murmur  EDEMA: no LE edema bilaterally  ABDOMEN: soft, nondistended, nontender, bowel sounds normal  MS: extremities normal - no gross deformities noted, no evidence of inflammation in joints, no muscle tenderness  SKIN: no rash    LABS:   CMP  Recent Labs   Lab Test 01/24/22  1051 01/10/22  0716 01/09/22  0623 01/08/22  0545 11/24/21  1525 06/04/21  1033 05/17/21  1401 11/03/20  1225 10/23/20  0834    142 142 144   < > 142 140 144 141   POTASSIUM 4.1 3.8 3.9 3.6   < > 3.4 4.1 4.3 3.8   CHLORIDE 103 110* 111* 110*   < > 106 106 111* 105   CO2 30 27 26 28   < > 30 33* 28 32   ANIONGAP 5 5 5 6   < > 6 1* 5 4   GLC 86 102* 97 93   < > 103* 92 100* 88   BUN 33* 35* 32* 22   < > 27 23 23 21   CR 1.53* 1.29* 1.41* 1.08*   < > 1.50* 1.39* 1.38* 1.52*   GFRESTIMATED 33* 40* 36* 50*   < > 32* 35* 35* 31*   GFRESTBLACK  --   --   --   --   --  37* 40* 41* 36*   PRINCE 9.3 9.4 9.0 9.5   < > 9.4 9.3 9.5 9.9    < > = values in this interval not displayed.     Recent Labs   Lab Test 01/08/22  0545 12/16/21  1751 11/24/21  1525 05/17/21  1401   BILITOTAL 0.4 0.3 0.5 0.3   ALKPHOS 54 60 66 57   ALT 24 29 29 38   AST 22 23 25 27     CBC  Recent Labs   Lab Test 01/24/22  1051 01/08/22  0545 01/07/22  1745 12/21/21  0934 12/16/21  1751   HGB 12.7 12.5 13.3 14.2 13.9   WBC  --  4.5 5.0 5.0 6.0   RBC  --  3.90 4.12 4.36 4.28   HCT  --  39.4 41.5 43.0 42.1    MCV  --  101* 101* 99 98   MCH  --  32.1 32.3 32.6 32.5   MCHC  --  31.7 32.0 33.0 33.0   RDW  --  12.5 12.2 12.1 12.1   PLT  --  207 218 190 203     URINE STUDIES  Recent Labs   Lab Test 01/24/22  1051 12/16/21  1758 08/12/21  1334 07/28/21  1150 07/16/20  1615 10/28/19  1314   COLOR Yellow Straw Yellow Yellow   < > Yellow   APPEARANCE Clear Clear Clear Clear   < > Clear   URINEGLC Negative Negative Negative Negative   < > Negative   URINEBILI Negative Negative Negative Negative   < > Negative   URINEKETONE Negative Negative Negative Negative   < > Negative   SG <=1.005 1.006 <=1.005 1.010   < > 1.015   UBLD Negative Large* Trace* Trace*   < > Trace*   URINEPH 7.0 7.0 6.0 7.5*   < > 7.5*   PROTEIN Negative 10 * Negative 30 *   < > Negative   UROBILINOGEN 0.2  --  0.2 0.2  --  0.2   NITRITE Negative Negative Negative Negative   < > Negative   LEUKEST Negative Trace* Negative Small*   < > Negative   RBCU 0-2 3* 0-2 0-2   < > O - 2   WBCU 0-5 13* 0-5 5-10*   < > 0 - 5    < > = values in this interval not displayed.     Recent Labs   Lab Test 01/24/22  1051   UTPG 0.29*     PTH  Recent Labs   Lab Test 10/23/20  0834 10/28/19  1310   PTHI 34 62     IRON STUDIES  No lab results found.      Ania Johnson MD

## 2022-02-07 ENCOUNTER — OFFICE VISIT (OUTPATIENT)
Dept: NEUROPSYCHOLOGY | Facility: CLINIC | Age: 87
End: 2022-02-07
Attending: FAMILY MEDICINE
Payer: MEDICARE

## 2022-02-07 DIAGNOSIS — G31.84 MCI (MILD COGNITIVE IMPAIRMENT): ICD-10-CM

## 2022-02-07 DIAGNOSIS — F32.A DEPRESSION, UNSPECIFIED DEPRESSION TYPE: ICD-10-CM

## 2022-02-07 DIAGNOSIS — R41.3 MEMORY LOSS: Primary | ICD-10-CM

## 2022-02-07 PROCEDURE — 96138 PSYCL/NRPSYC TECH 1ST: CPT | Performed by: PSYCHOLOGIST

## 2022-02-07 PROCEDURE — 96139 PSYCL/NRPSYC TST TECH EA: CPT | Performed by: PSYCHOLOGIST

## 2022-02-07 PROCEDURE — 96133 NRPSYC TST EVAL PHYS/QHP EA: CPT | Performed by: PSYCHOLOGIST

## 2022-02-07 PROCEDURE — 96132 NRPSYC TST EVAL PHYS/QHP 1ST: CPT | Performed by: PSYCHOLOGIST

## 2022-02-07 PROCEDURE — 90791 PSYCH DIAGNOSTIC EVALUATION: CPT | Performed by: PSYCHOLOGIST

## 2022-02-07 NOTE — LETTER
2022      RE: Kamryn Miller  [address]  [address]  [place] [address]     RE: Kamryn Miller  MR#: [id number]  : 1935  DOS: 2022  VITALIY:  2022      NEUROPSYCHOLOGICAL CONSULTATION      REASON FOR REFERRAL:  Kamryn Miller is a 86 year old female with 16 years of education who was referred for a neuropsychological evaluation by Dr. Wilcox  to assess her cognitive and emotional functioning secondary to memory concerns. The evaluation was requested in order to document her current level of functioning, assist with the differential diagnosis and provide appropriate recommendations to the patient, family and treatment team. The patient was informed that the evaluation included multiple measures of performance and symptom validity, and she was encouraged to provide her best effort at all times.  The nature of the neuropsychological evaluation was also discussed, including limits of confidentiality (for suspected child or elder abuse, potential homicide or suicide, and court orders). The patient was also informed that the report would be placed on the electronic medical records system. The patient was also given an opportunity to ask questions. The patient indicated that she understood the information and consented to participate in the evaluation.    PROCEDURES:   Review of records and clinical interview,  Mental Status-orientation, Wide Range Achievement Test-4, Wechsler Adult Intelligence Scale-IV, Jimenez Verbal Learning Test-Revised, Clock Drawing Test, Verbal Fluency Test, Geriatric Depression Scale, Ohara Anxiety Inventory, Shola Complex Figure Test, Trailmaking Test, NAB Naming Test, TOMM, Stroop Test and Wechsler Memory Scale-IV (selected subtests)    REVIEW OF RECORDS: Records from 21 noted that the patient had memory concerns and she  Pt did fine on slums questionnaire. Her issues could be stress with her spouse being sick and she moving to assisted Living. Right now  she Lives with her children.  The records noted that she is forgetful but the patient feels she does not have memory problems.  Records noted other medical issues included stage 3 chronic kidney disease, hearing disorder, coronary artery disease s/p percutaneous coronary angioplasty, and hyperlipidemia. No neuroimaging scan of the brain reports were found in the records. Records noted the patient is being treated with  Hydralazine, Norvasc, Coreg, albuterol, Cytomel, Namenda, mirtazapine, azelastine, levothyroxine, Plavix, Advair, Crestor, Fosamax, Lamictal, aspirin, calcium citrate, vitamin D, Pristiq, lamotrigine, and multivitamin. Records from 1/21/2022 noted a history of hypertension as well.  The records noted she had a history of 3 stents placed in August, 2020. No neuroimaging scan of the brain reports were found in the records.    CLINICAL INTERVIEW: The patient was interviewed via a video link to the [facility] ([facility] [place] and she was interviewed with her daughter, Ana.  On interview, the patient reported that she has more difficulty remembering names over the past 1-2 years, but otherwise denied difficulty with her memory.  However, her daughter noted that the family has noticed increased memory difficulties.  Additionally, the patient and her daughter noted the patient has been having difficulty with decision-making.  Functionally, the patient reported that she does not drive, and her daughter noted that she has not driven for the past 5 years.  The patient said she stopped driving because her  would usually drive and they had a new car.  The patient denied difficulty with managing finances, and the daughter noted the son works with the patient on bill paying and balancing the checkbook.  The patient denied difficulty with managing her medications, but she reported that she has the nurse set up her weekly medications.  She also noted that she does not cook and meals are provided to her.   "However, she stated that she does her own cleaning and other basic household chores.    The patient reported that she has experienced considerable stress recently and felt depressed in the fall.  She said this was related to her 's extended hospitalization and then passing away in October, 2021 which occurred while she was moving into the assisted living.  She reported that her mood has significantly improved since that time, and she takes medication for this issue.  The patient and her daughter noted she works with a psychiatric nurse practitioner to manage her psychotropic medications.  The patient also reported that she did not sleep well while her  was hospitalized and after he passed away, but this has improved significantly recently.  She reported she sleeps about 8 hours per night.  In terms of appetite, she stated that it was \"more than I would like.\"  She reported that her appetite was stable.  The patient reported that she saw a psychotherapist for about 3-4 sessions in December, 2021-January, 2022.  However, the patient and therapist agreed that she did not need the sessions.  The patient denied any suicidal or homicidal ideation, denied any history of suicide attempts.  She also denied any hallucinations or delusions.  The patient denied any use of alcohol, tobacco, or illicit substances.    Medically, the patient confirmed her history of hyperlipidemia, hypertension, heart disease, kidney disease, and hypothyroidism.  She noted that her thyroid is stable on medication at this time.  The patient also noted she has experienced decreased hearing, but has not had an audiology evaluation and does not have hearing aids.  The patient reported she wears eyeglasses all the time.  She also noted that the medications listed in the records were up-to-date.  The patient denied any history of traumatic brain injury with loss of consciousness.  She also denied any history of multiple sclerosis, stroke, " seizures, movement disorder, Covid-19 infection, sleep apnea, diabetes, cancer, or liver disease.    Academically, the patient reported that she has a bachelor's degree from [employer] in home economics.  She reported that she was an average to above average student in school and was never in special education classes or had to repeat a grade.  The patient reported that she has 2 children, including a son and a daughter.  As noted above, she lives alone in her own apartment at the assisted living facility.  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    BEHAVIORAL OBSERVATIONS:  On examination, the patient was casually but appropriately dressed and groomed. The patient was cooperative with the evaluation and was talkative. The patient appeared to put forth her best effort on all tasks. Thus, the results of this evaluation are a reasonably valid reflection of the patient s current level of functioning. There were no indications of hallucinations, delusions or unusual thought processes. The patient was generally oriented and her affect was appropriate.     RESULTS: Formal performance validity measures were within expected limits and consistent with the above-noted observations.  Psychometric estimates of the patient's premorbid global cognitive functioning based upon single word reading ability were in the average range, which is consistent with her educational and occupational history.    Measures of attention/concentration were significantly poorer than expected.  For example, a digit span task was in the below average range.  Further, a visual scanning task that integrates attention was in the exceptionally low range.  Speed of information processing was variable.  For example, psychomotor speed was in the  average range, but motor-free processing speed ranged from below average to low average.    Measures of the patient's memory functioning were mildly variable.  For example, immediate and delayed verbal recall on a story memory task and a word list learning task were in the average range.  Verbal recognition memory was also within expected limits.  However, on visual memory, immediate and delayed visual recall the complex figure drawing task were in the low average range.  Visual recognition memory was consistent with her initial encoding.  Thus, there was evidence for mild variability with encoding, but no evidence for rapid forgetting previously learned information.    Expressive language functioning was also variable.  For example, confrontation naming was in the average range.  Likewise, verbal abstract reasoning was in the average range.  However, semantic and phonemic fluency were in the exceptionally low and below average range, respectively.    Visual-spatial and visual constructional abilities were poorer than expected. Specifically, there was evidence for visual spatial distortions and organizational difficulties on clock drawing and complex form copying tasks.     Measures of the patient's executive functioning were poorer than expected generally.  For example, a complex visual scanning task that integrates cognitive flexibility was in the extremely low range and could not be completed by the patient.  Further, a measure of response inhibition that integrates processing speed was in the low average range. There was also evidence for organizational difficulties on a complex figure drawing task.     Assessment of the patient's emotional functioning was completed utilizing the clinical interview and self-report measures of depression and anxiety.  On the self-report measures, the patient did not endorse any symptoms of either depression or anxiety.  However, during the interview the patient acknowledged  experiencing depressed mood recently, related to several psychosocial stressors.    SUMMARY: In summary, the neuropsychological assessment results indicated no evidence for change or decline in global cognitive functioning.  However, there was evidence for impaired attention/concentration and variable processing speed.  This likely resulted in mild variability with encoding of new information.  However, there was no evidence for rapid forgetting of previously learned information, and recognition formats were consistent with her initial encoding.  There was evidence for mild variability with expressive language, but confrontation naming was within expected limits.  In terms of executive functioning, there was evidence for deficits in cognitive flexibility, organizational ability, and response inhibition. Visual spatial abilities were also poorer than expected. The patient did not endorse clinically significant depressive or anxiety symptoms at this time, but during the interview noted she experienced depressed mood recently but this had improved on antidepressant medication.  The specific etiology of the patient's cognitive difficulties could not be completely determined at this time, but the pattern of results indicated that an Alzheimer's type process was unlikely.  However, cognitive changes related to vascular processes or other causes of cognitive decline could not be ruled out based on this evaluation alone.  Further, hypothyroidism could contribute to cognitive difficulties, if not well managed. However, she did not endorse significant emotional distress at this time (although she noted a recent history of depression), thus this is less likely to be contributing to her cognitive difficulties. The results indicated the patient met criteria for mild cognitive impairment (MCI).    RECOMMENDATIONS:   1.  Given these results, continued psychiatric consultation to continue managing her mood is recommended.  2.   Psychotherapeutic intervention might also be considered.  The patient noted that she had approximately 4 sessions earlier, but felt she did not need them.  However, if the patient experiences further anxiety or depression future, psychotherapeutic intervention may be helpful.  A highly structured cognitive behavioral intervention focused on coping and stress management likely would be the most beneficial.  3.  Given these results, referral for neurological consultation is strongly recommended to assist with clarifying the differential diagnosis and with treatment planning.  4.  Related, referral for neuroimaging scan, such as an MRI scan of the brain, might be considered to assist with clarifying the differential diagnosis.  5. A referral for cognitive rehabilitation therapy, such as with a speech therapist, is recommended. One option for this service is  [facility] at [email or web address]-therapy.  6. Given the patient's reported hearing difficulties, a referral for an audiology evaluation is recommended.  7. A full medical evaluation of any treatable causes of cognitive decline is also recommended, if this has not already been accomplished.  Evaluation of any infectious processes, vitamin deficiencies or other metabolic abnormalities is recommended, to rule out these as possible contributors.   8. The patient may find the following attention and organizational strategies helpful:     Use cell phone reminders to help monitor upcoming appointments and due dates as well as other important tasks (e.g., when to take medications). Set the reminder to go off several times prior to the event with advanced notice (e.g., one week before, two days before, the day before, and the morning of the event).     She should attempt to reduce distractions at home. Having a clutter-free work environment and removing or at least making it harder to  access potential distractions would help optimize her attention. She might also consider facing away from high traffic areas and using earplugs or noise cancellation headphones when desiring a quiet work environment.    Taking short breaks during her day may help optimize and maintain her concentration.     Make to-do lists and prioritize tasks. Break down large tasks into smaller steps and check off each small step when completed.   9. The patient is encouraged to work closely with her treating healthcare providers to closely manage her vascular risk factors and hypothyroidism.  10. The results of the evaluation now constitute a baseline of the patient s cognitive functioning.  Given the evidence for deficits in some aspects of cognitive functioning, a referral for a neuropsychological reevaluation in approximately one year is recommended in order to clarify the differential diagnosis, document any changes in cognitive functioning, and provide further recommendations and prognosis to the patient, family and treatment team.    I attempted to contact the patient via telephone to discuss my initial impressions and recommendations. However, the phone went to voicemail each time. I then sent the patient a brief message via [employer] describing my initial impressions and recommendations, and indicated I would follow-up with a full report. I encouraged the patient to follow-up with her primary care physician to get the recommendations going. Thank you for referring this interesting individual. If you have any questions, please feel free to contact me.      Dimas Palacios, PhD, ABPP, LP  Professor and Licensed Psychologist [id number]  Board Certified Clinical Neuropsychologist    Time Spent:      Minutes Date Code Units   Total Time-Neuropsychologist Professional 213 2/7/22 67733 1     2/7/22 91922 3   Neuropsychologist Admin/scoring 0 2/7/22 89314 0     2/7/22 21111 0   Diagnostic Interview  19 2/7/22 88227 1    Psychometrician Time-Test Administration/Score 157 2/7/22 61613 1     2/7/22 03022 4   Diagnosis R41.3 F32.A G31.84

## 2022-02-07 NOTE — PROGRESS NOTES
NAME  Kamryn Miller    MRN  [id number]      [birth year]     AGE  86     SEX  Female     HANDEDNESS Right     EDUCATION 16     GREGORY  22     PROVIDER       [employer]       STATION  OP            ORIENTATION      Time  0     Personal Info.     Place  1.5     Presidents             TOMM       Trial 1  50     Trial 2  50            WAIS-IV       Digit Span RDS 7            WRAT-4 READING      SS  102     %ile  55     Grade Equivalence 10.2            WAIS-IV         Raw SSa    Digit Span  12 4    Similarities 17 9    Coding  29 9           TRAIL MAKING TEST       Time Errors SSa T   A 104 2 1 20   B 301 DC 4 - -           HVLT   1     Raw T    Trial 1  7     Trial 2  7     Trial 3  8     Learning  1     Total Recall 22 49    Delayed Recall 7 48    Percent Retention 88% 53    True Positives 12     False Positives 0     Disc. Index  12 61           JESSICA-O COMPLEX FIGURE       Raw T %ile   Time to Copy 395  >16   Copy  12.5  <1   Short Delay Recall 5 41 18   Long Delay Recall 4 36 8   Recognition Total 18 39 14           WMS-IV LOGICAL MEMORY OA     Raw SSa/%ile    LM I  24 10    LM II  8 9    Recognition 17 26-50            NAB NAMING  1   Raw 29 /31     z -0.41      T 51             COWAT FAS       Raw 20      SS 5      T 30             ANIMAL FLUENCY      Raw 6      SS 2      T 18              AMDY   0   Raw DNA      %ile 0      *Did not meet sample criteria*     CLOCK DRAWING      Command  Borderline     Copy  Borderline            STROOP        Raw T     Word 71 28     Color 45 27     C/W 17 34     Intf. -11 39            MIRIAN       Raw  0     Interpretation Minimal            GDS       Raw  0     Interpretation Minimal

## 2022-02-07 NOTE — PROGRESS NOTES
Pt was seen for neuropsychological evaluation at the request of Rolanda Wilcox MD for the purposes of diagnostic clarification and treatment planning. 157 minutes of test administration and scoring were provided by this writer. Please see Dr. Dimas Palacios's report for a full interpretation of the findings.    Karen Bowman  Psychometrist

## 2022-02-07 NOTE — PROGRESS NOTES
RE: Kamryn Miller  MR#: [id number]  : 1935  DOS: 2022  VITALIY:  2022      NEUROPSYCHOLOGICAL CONSULTATION      REASON FOR REFERRAL:  Kamryn Miller is a 86 year old female with 16 years of education who was referred for a neuropsychological evaluation by Dr. Wilcox  to assess her cognitive and emotional functioning secondary to memory concerns. The evaluation was requested in order to document her current level of functioning, assist with the differential diagnosis and provide appropriate recommendations to the patient, family and treatment team. The patient was informed that the evaluation included multiple measures of performance and symptom validity, and she was encouraged to provide her best effort at all times.  The nature of the neuropsychological evaluation was also discussed, including limits of confidentiality (for suspected child or elder abuse, potential homicide or suicide, and court orders). The patient was also informed that the report would be placed on the electronic medical records system. The patient was also given an opportunity to ask questions. The patient indicated that she understood the information and consented to participate in the evaluation.    PROCEDURES:   Review of records and clinical interview,  Mental Status-orientation, Wide Range Achievement Test-4, Wechsler Adult Intelligence Scale-IV, Jimenez Verbal Learning Test-Revised, Clock Drawing Test, Verbal Fluency Test, Geriatric Depression Scale, Ohara Anxiety Inventory, Shola Complex Figure Test, Trailmaking Test, NAB Naming Test, TOMM, Stroop Test and Wechsler Memory Scale-IV (selected subtests)    REVIEW OF RECORDS: Records from 21 noted that the patient had memory concerns and she  Pt did fine on slums questionnaire. Her issues could be stress with her spouse being sick and she moving to assisted Living. Right now she Lives with her children.  The records noted that she is forgetful but the patient feels  she does not have memory problems.  Records noted other medical issues included stage 3 chronic kidney disease, hearing disorder, coronary artery disease s/p percutaneous coronary angioplasty, and hyperlipidemia. No neuroimaging scan of the brain reports were found in the records. Records noted the patient is being treated with  Hydralazine, Norvasc, Coreg, albuterol, Cytomel, Namenda, mirtazapine, azelastine, levothyroxine, Plavix, Advair, Crestor, Fosamax, Lamictal, aspirin, calcium citrate, vitamin D, Pristiq, lamotrigine, and multivitamin. Records from 1/21/2022 noted a history of hypertension as well.  The records noted she had a history of 3 stents placed in August, 2020. No neuroimaging scan of the brain reports were found in the records.    CLINICAL INTERVIEW: The patient was interviewed via a video link to the [facility] ([facility] [place]), and she was interviewed with her daughter, Ana.  On interview, the patient reported that she has more difficulty remembering names over the past 1-2 years, but otherwise denied difficulty with her memory.  However, her daughter noted that the family has noticed increased memory difficulties.  Additionally, the patient and her daughter noted the patient has been having difficulty with decision-making.  Functionally, the patient reported that she does not drive, and her daughter noted that she has not driven for the past 5 years.  The patient said she stopped driving because her  would usually drive and they had a new car.  The patient denied difficulty with managing finances, and the daughter noted the son works with the patient on bill paying and balancing the checkbook.  The patient denied difficulty with managing her medications, but she reported that she has the nurse set up her weekly medications.  She also noted that she does not cook and meals are provided to her.  However, she stated that she does her own cleaning and other basic household chores.    The  "patient reported that she has experienced considerable stress recently and felt depressed in the fall.  She said this was related to her 's extended hospitalization and then passing away in October, 2021 which occurred while she was moving into the assisted living.  She reported that her mood has significantly improved since that time, and she takes medication for this issue.  The patient and her daughter noted she works with a psychiatric nurse practitioner to manage her psychotropic medications.  The patient also reported that she did not sleep well while her  was hospitalized and after he passed away, but this has improved significantly recently.  She reported she sleeps about 8 hours per night.  In terms of appetite, she stated that it was \"more than I would like.\"  She reported that her appetite was stable.  The patient reported that she saw a psychotherapist for about 3-4 sessions in December, 2021-January, 2022.  However, the patient and therapist agreed that she did not need the sessions.  The patient denied any suicidal or homicidal ideation, denied any history of suicide attempts.  She also denied any hallucinations or delusions.  The patient denied any use of alcohol, tobacco, or illicit substances.    Medically, the patient confirmed her history of hyperlipidemia, hypertension, heart disease, kidney disease, and hypothyroidism.  She noted that her thyroid is stable on medication at this time.  The patient also noted she has experienced decreased hearing, but has not had an audiology evaluation and does not have hearing aids.  The patient reported she wears eyeglasses all the time.  She also noted that the medications listed in the records were up-to-date.  The patient denied any history of traumatic brain injury with loss of consciousness.  She also denied any history of multiple sclerosis, stroke, seizures, movement disorder, Covid-19 infection, sleep apnea, diabetes, cancer, or liver " disease.    Academically, the patient reported that she has a bachelor's degree from [employer] in home economics.  She reported that she was an average to above average student in school and was never in special education classes or had to repeat a grade.  The patient reported that she has 2 children, including a son and a daughter.  As noted above, she lives alone in her own apartment at the assisted living facility.  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    BEHAVIORAL OBSERVATIONS:  On examination, the patient was casually but appropriately dressed and groomed. The patient was cooperative with the evaluation and was talkative. The patient appeared to put forth her best effort on all tasks. Thus, the results of this evaluation are a reasonably valid reflection of the patient s current level of functioning. There were no indications of hallucinations, delusions or unusual thought processes. The patient was generally oriented and her affect was appropriate.     RESULTS: Formal performance validity measures were within expected limits and consistent with the above-noted observations.  Psychometric estimates of the patient's premorbid global cognitive functioning based upon single word reading ability were in the average range, which is consistent with her educational and occupational history.    Measures of attention/concentration were significantly poorer than expected.  For example, a digit span task was in the below average range.  Further, a visual scanning task that integrates attention was in the exceptionally low range.  Speed of information processing was variable.  For example, psychomotor speed was in the average range, but motor-free processing speed ranged from below average to low  average.    Measures of the patient's memory functioning were mildly variable.  For example, immediate and delayed verbal recall on a story memory task and a word list learning task were in the average range.  Verbal recognition memory was also within expected limits.  However, on visual memory, immediate and delayed visual recall the complex figure drawing task were in the low average range.  Visual recognition memory was consistent with her initial encoding.  Thus, there was evidence for mild variability with encoding, but no evidence for rapid forgetting previously learned information.    Expressive language functioning was also variable.  For example, confrontation naming was in the average range.  Likewise, verbal abstract reasoning was in the average range.  However, semantic and phonemic fluency were in the exceptionally low and below average range, respectively.    Visual-spatial and visual constructional abilities were poorer than expected. Specifically, there was evidence for visual spatial distortions and organizational difficulties on clock drawing and complex form copying tasks.     Measures of the patient's executive functioning were poorer than expected generally.  For example, a complex visual scanning task that integrates cognitive flexibility was in the extremely low range and could not be completed by the patient.  Further, a measure of response inhibition that integrates processing speed was in the low average range. There was also evidence for organizational difficulties on a complex figure drawing task.     Assessment of the patient's emotional functioning was completed utilizing the clinical interview and self-report measures of depression and anxiety.  On the self-report measures, the patient did not endorse any symptoms of either depression or anxiety.  However, during the interview the patient acknowledged experiencing depressed mood recently, related to several psychosocial  stressors.    SUMMARY: In summary, the neuropsychological assessment results indicated no evidence for change or decline in global cognitive functioning.  However, there was evidence for impaired attention/concentration and variable processing speed.  This likely resulted in mild variability with encoding of new information.  However, there was no evidence for rapid forgetting of previously learned information, and recognition formats were consistent with her initial encoding.  There was evidence for mild variability with expressive language, but confrontation naming was within expected limits.  In terms of executive functioning, there was evidence for deficits in cognitive flexibility, organizational ability, and response inhibition. Visual spatial abilities were also poorer than expected. The patient did not endorse clinically significant depressive or anxiety symptoms at this time, but during the interview noted she experienced depressed mood recently but this had improved on antidepressant medication.  The specific etiology of the patient's cognitive difficulties could not be completely determined at this time, but the pattern of results indicated that an Alzheimer's type process was unlikely.  However, cognitive changes related to vascular processes or other causes of cognitive decline could not be ruled out based on this evaluation alone.  Further, hypothyroidism could contribute to cognitive difficulties, if not well managed. However, she did not endorse significant emotional distress at this time (although she noted a recent history of depression), thus this is less likely to be contributing to her cognitive difficulties. The results indicated the patient met criteria for mild cognitive impairment (MCI).    RECOMMENDATIONS:   1.  Given these results, continued psychiatric consultation to continue managing her mood is recommended.  2.  Psychotherapeutic intervention might also be considered.  The patient  noted that she had approximately 4 sessions earlier, but felt she did not need them.  However, if the patient experiences further anxiety or depression future, psychotherapeutic intervention may be helpful.  A highly structured cognitive behavioral intervention focused on coping and stress management likely would be the most beneficial.  3.  Given these results, referral for neurological consultation is strongly recommended to assist with clarifying the differential diagnosis and with treatment planning.  4.  Related, referral for neuroimaging scan, such as an MRI scan of the brain, might be considered to assist with clarifying the differential diagnosis.  5. A referral for cognitive rehabilitation therapy, such as with a speech therapist, is recommended. One option for this service is  [facility] at [email or web address].  6. Given the patient's reported hearing difficulties, a referral for an audiology evaluation is recommended.  7. A full medical evaluation of any treatable causes of cognitive decline is also recommended, if this has not already been accomplished.  Evaluation of any infectious processes, vitamin deficiencies or other metabolic abnormalities is recommended, to rule out these as possible contributors.   8. The patient may find the following attention and organizational strategies helpful:     Use cell phone reminders to help monitor upcoming appointments and due dates as well as other important tasks (e.g., when to take medications). Set the reminder to go off several times prior to the event with advanced notice (e.g., one week before, two days before, the day before, and the morning of the event).     She should attempt to reduce distractions at home. Having a clutter-free work environment and removing or at least making it harder to access potential distractions would help optimize her attention. She might  also consider facing away from high traffic areas and using earplugs or noise cancellation headphones when desiring a quiet work environment.    Taking short breaks during her day may help optimize and maintain her concentration.     Make to-do lists and prioritize tasks. Break down large tasks into smaller steps and check off each small step when completed.   9. The patient is encouraged to work closely with her treating healthcare providers to closely manage her vascular risk factors and hypothyroidism.  10. The results of the evaluation now constitute a baseline of the patient s cognitive functioning.  Given the evidence for deficits in some aspects of cognitive functioning, a referral for a neuropsychological reevaluation in approximately one year is recommended in order to clarify the differential diagnosis, document any changes in cognitive functioning, and provide further recommendations and prognosis to the patient, family and treatment team.    I attempted to contact the patient via telephone to discuss my initial impressions and recommendations. However, the phone went to voicemail each time. I then sent the patient a brief message via [employer] describing my initial impressions and recommendations, and indicated I would follow-up with a full report. I encouraged the patient to follow-up with her primary care physician to get the recommendations going. Thank you for referring this interesting individual. If you have any questions, please feel free to contact me.      Dimas Palacios, PhD, ABPP, LP  Professor and Licensed Psychologist [id number]  Board Certified Clinical Neuropsychologist    Time Spent:      Minutes Date Code Units   Total Time-Neuropsychologist Professional 213 2/7/22 99939 1     2/7/22 17918 3   Neuropsychologist Admin/scoring 0 2/7/22 35244 0     2/7/22 67010 0   Diagnostic Interview  19 2/7/22 12966 1   Psychometrician Time-Test Administration/Score 157 2/7/22 31234 1     2/7/22 96033 4    Diagnosis R41.3 F32.A G31.84

## 2022-02-15 ENCOUNTER — TELEPHONE (OUTPATIENT)
Dept: CARDIOLOGY | Facility: CLINIC | Age: 87
End: 2022-02-15
Payer: MEDICARE

## 2022-02-15 DIAGNOSIS — I10 BENIGN ESSENTIAL HYPERTENSION: ICD-10-CM

## 2022-02-15 DIAGNOSIS — I25.5 ISCHEMIC CARDIOMYOPATHY: ICD-10-CM

## 2022-02-15 NOTE — TELEPHONE ENCOUNTER
[facility] Call Center    Phone Message    May a detailed message be left on voicemail: yes     Reason for Call: Symptoms or Concerns     If patient has red-flag symptoms, warm transfer to triage line    Current symptom or concern: Pt called in to report that her BP has been high and getting higher. With this she has also noticed a headache and getting plugged ears when taking her meds in the morning, this doesn't dissipate until evening usually. Pt also reports having very swollen feet and ankles, going up her legs a bit as well    Symptoms have been present for:  1 week(s)    Has patient previously been seen for this? No    By :     Date:     Are there any new or worsening symptoms? Yes: Pt states headache, bp, and edema are gradually getting worse      Action Taken: Message routed to:  Other: [facility]    Travel Screening: Not Applicable

## 2022-02-15 NOTE — TELEPHONE ENCOUNTER
Call to pt. Pt saw dr zuniga 1/21/22.     For past week or so pt has noticed increase in LE edema, bilateral feet and ankles- up her calves a little.    Complaining of headache and ears feel full, plugged.     Blood pressures running:   Week of 1/24: 143/74, 140/70. 148/74  Week 1/31:  151/72,155/ 74, 147/70  2/9 - 183/91  2/10 150/90  2/11 134/69  2/12 158/70  2/13 183/91  2/14 155/70    Pt stated that assisted living sets up her medications.   Call to RN at assisted living: confirmed medications are set up as ordered.    Will review with Dr Zuniga.

## 2022-02-18 RX ORDER — HYDRALAZINE HYDROCHLORIDE 50 MG/1
50 TABLET, FILM COATED ORAL 3 TIMES DAILY
Qty: 270 TABLET | Refills: 3 | Status: SHIPPED | OUTPATIENT
Start: 2022-02-18 | End: 2022-02-24

## 2022-02-18 NOTE — CONFIDENTIAL NOTE
Date: 2/18/2022    Time of Call: 11:22 AM     Diagnosis:  HTN     [ VORB ] Ordering provider: Dr. Zuniga  Order: increase hydralazine to 50 mg TID     Order received by: Alisa Shook RN       Follow-up/additional notes: writer informed patient of medication change.  Patient is agreeable to the plan.  Rx sent to preferred pharmacy.  Writer also left her nurse Katherine a voicemail [phone] and to call back with questions.      Alisa Shook RN  Cardiology Care Coordinator  [facility]  [phone] option 1

## 2022-02-22 ENCOUNTER — TELEPHONE (OUTPATIENT)
Dept: CARDIOLOGY | Facility: CLINIC | Age: 87
End: 2022-02-22
Payer: MEDICARE

## 2022-02-22 DIAGNOSIS — N18.32 STAGE 3B CHRONIC KIDNEY DISEASE (H): Primary | ICD-10-CM

## 2022-02-22 DIAGNOSIS — I25.5 ISCHEMIC CARDIOMYOPATHY: ICD-10-CM

## 2022-02-22 DIAGNOSIS — I10 BENIGN ESSENTIAL HYPERTENSION: ICD-10-CM

## 2022-02-22 RX ORDER — MULTIVITAMIN WITH FOLIC ACID 400 MCG
TABLET ORAL
Qty: 30 TABLET | Status: SHIPPED | OUTPATIENT
Start: 2022-02-22 | End: 2023-01-20

## 2022-02-22 NOTE — TELEPHONE ENCOUNTER
[facility] Call Center    Phone Message    May a detailed message be left on voicemail: yes     Reason for Call: Other: Medication concern    The patient lives in an [facility] facility and today the patient removed all of her hydrALAZINE (APRESOLINE) 50 MG tablet from her medication boxes because she said that she had a reaction to this. The patient c/o of severe Headaches and swelling in her lower extremities.  On Jan 8th the patient was taken to ER hor high BP and after this visit the patient return back to [facility].  ON Jan 21st pt started taking 25 MG and on 2/18 [facility] received a faxed order to increase this medication to 50 MG TID.  Kamryn called her PMD to report the symptoms she has c/o. Please call Sangita at [facility] to discuss further Ph # [phone]    Action Taken: Message routed to:  Other: cardiology    Travel Screening: Not Applicable

## 2022-02-22 NOTE — CONFIDENTIAL NOTE
Spoke to Sangita, who reports that patient STOPPED hydralazine due to severe head ache and lower bilateral edema.  Sangita states that the assisted living nursing staff set up patients medications on a weekly basis.  Sangita does not know when she stopped hydralazine but states there was at least 15 hydralazine tablets in the Sunday pill spot.  Sangita also reports that patient was to check her own blood pressure and would report her readings to Dr Zuniga's team but it does not seem like she is doing this.    Sangita reports patient just left to go to her hair appointment and is not available to talk and Sangita is unable to answer some questions regarding patient's symptoms.    Writer advised that if patient was tolerating hydralazine at 25 mg TID, patient to return to original dose.  Sangita states she will communicate this with the patient.    Writer also attempted to contact patient, patient not available, message left to call clinic and/or [facility] with an update on how she is doing and what her blood pressures have been running.    Will try again at a later time.    Alisa Shook, VEENA  Cardiology Care Coordinator  [facility]  [phone] option 1

## 2022-02-23 NOTE — TELEPHONE ENCOUNTER
"Writer spoke with patient who states that she is back to taking hydralazine 25 mg three times daily.  Reports that she was only on the increased dose of 50 mg TID she believes 2 days then stopped due to side effects (sever headache, ears feeling full, ankle and mid calve swelling) but has not restarted the hydralazine 25 mg three times daily.  Her BP at home has been 140's systolic.  Her swelling improved but it still present-does not feel tight anymore but \"I have never had any swelling like this\" and \"my ankles have always been skinny\".    She is on amlodipine 10 mg daily, hydralazine 25 mg TID, coreg 12.5 mg BID.      Denies worsening SOB, chest pain, lightheadedness.      New symptom-lower extremity edema.      Dr. Zuniga, should we make any changes or continue to monitor blood pressure for a week and then reassess?      Alisa Shook, RN  Cardiology Care Coordinator  [facility]  [phone] option 1          "

## 2022-02-23 NOTE — TELEPHONE ENCOUNTER
Scheduled a follow up with [facility] 4/6 at 330 (arrive 315)   Will update pt on appt once recommendation from Dr Zuniga received. Pt could also see PCP if appropriate

## 2022-02-23 NOTE — TELEPHONE ENCOUNTER
Per Dr. Zuniga:    Date: 2/23/2022    Time of Call: 1:38 PM     Diagnosis:  HTN     [ VORB ] Ordering provider: Dr. Zuniga  Order: STOP amlodipine    Attempt to increase hydralazine to 50 mg three times daily again     Order received by: Alisa Shook RN       Follow-up/additional notes: patient informed and is agreeable with the plan.  She will give an update in 1 week with BP readings via telephone (pt does not use mychart but daughter does when she visits her) and will call sooner if she can not tolerate the medication change.      Writer attempted to contact nurse Sangita to discuss orders in detail.  No answer, message left to call clinic back.  Writer will also attempt to call again today.    Alisa Shook RN  Cardiology Care Coordinator  [facility]  [phone] option 1            :

## 2022-02-23 NOTE — CONFIDENTIAL NOTE
Attempted to contact nurse at assisted Living.  Female voice answered stating that the nurse has left for the day.  Writer will attempt to call tomorrow.    Alisa Shook RN  Cardiology Care Coordinator  [facility]  [phone] option 1

## 2022-02-24 ENCOUNTER — TELEPHONE (OUTPATIENT)
Dept: CARDIOLOGY | Facility: CLINIC | Age: 87
End: 2022-02-24
Payer: MEDICARE

## 2022-02-24 ENCOUNTER — MEDICAL CORRESPONDENCE (OUTPATIENT)
Dept: HEALTH INFORMATION MANAGEMENT | Facility: CLINIC | Age: 87
End: 2022-02-24
Payer: MEDICARE

## 2022-02-24 RX ORDER — HYDRALAZINE HYDROCHLORIDE 25 MG/1
TABLET, FILM COATED ORAL
Qty: 91 TABLET | Refills: 23 | Status: SHIPPED | OUTPATIENT
Start: 2022-02-24 | End: 2022-03-14

## 2022-02-24 RX ORDER — HYDRALAZINE HYDROCHLORIDE 50 MG/1
50 TABLET, FILM COATED ORAL 3 TIMES DAILY
Qty: 270 TABLET | Refills: 3 | Status: SHIPPED | OUTPATIENT
Start: 2022-02-24 | End: 2022-03-14

## 2022-02-24 NOTE — TELEPHONE ENCOUNTER
[facility] calling to check on medication change. Call transferred to cardiology nurse at [phone]  Analy PITTS RN on 2/24/2022 at 11:40 AM

## 2022-02-24 NOTE — TELEPHONE ENCOUNTER
Nurse Sumaya called from [facility],at number [phone]. Nurse Sumaya requests clarification of why [facility] called patient .Héctor Garcia L.P.N.,Ricardo figueroa. Dept.

## 2022-02-24 NOTE — CONFIDENTIAL NOTE
Attempted to contact assisted living, was able to talk to the nurse, Sandi DURAN orders given to her-orders taken over the phone.  No need to fax orders.    Med list updated.    Alisa Shook RN  Cardiology Care Coordinator  [facility]  [phone] option 1

## 2022-02-28 ENCOUNTER — TELEPHONE (OUTPATIENT)
Dept: CARDIOLOGY | Facility: CLINIC | Age: 87
End: 2022-02-28

## 2022-02-28 NOTE — TELEPHONE ENCOUNTER
Patient has an appointment with Tianna Luevano on 4/6/22. This day is flagged to be rescheduled. Left a voicemail for patient to call back to reschedule her appointment with Tianna Luevano or Dr. Mcguire.     Fidelina Harris, [facility]

## 2022-03-01 NOTE — TELEPHONE ENCOUNTER
Spoke with patient. Was able to reschedule appointment to a different time. I offered to send patient a letter in the mail with her upcoming appointments as well.    Becky AGUIRRE, Visit Facilitator

## 2022-03-03 DIAGNOSIS — I10 ESSENTIAL HYPERTENSION: ICD-10-CM

## 2022-03-04 RX ORDER — CARVEDILOL 12.5 MG/1
12.5 TABLET ORAL 2 TIMES DAILY
Qty: 60 TABLET | Refills: 0 | Status: SHIPPED | OUTPATIENT
Start: 2022-03-04 | End: 2022-03-16

## 2022-03-04 NOTE — TELEPHONE ENCOUNTER
Routing refill request to provider for review/approval because:  Med was prescribed by the hospital    Raúl Haywood RN  [facility]

## 2022-03-08 ENCOUNTER — TELEPHONE (OUTPATIENT)
Dept: FAMILY MEDICINE | Facility: CLINIC | Age: 87
End: 2022-03-08
Payer: MEDICARE

## 2022-03-08 NOTE — TELEPHONE ENCOUNTER
Katherine RN calling from Assisted living facility wanting to give updates to PCP    Pt does her own BP- record was sent through [employer] fax    Pt believed hydralazine 50mg TID is causing some swelling in feet and ankles.  Ever since she started this she has experiencing headaches.    Katherine RN states she did not notice much swelling, pt is active and moving around- no other symptoms noted  This morning BP was  198/88        .VEENA Mandel    Triage Nurse  [facility]  Appointment line: [phone]  [facility], 24 hour nurse line, available by calling clinic at [phone] and following prompts.

## 2022-03-09 NOTE — Clinical Note
03/09/22 1400   RN Monitoring of Pain, Safety, and Relapse   Safety Concerns Suicidal ideation   Types of Safety Concerns Patient reports that she had \"passing SI,\" last night. When asked to describe thoughts, patient states, \"I guess they are more like a comforting thought. It's hard to explain, but now that we are talking about it, I don't have any of those thoughts.\" Patient denies safety concerns and verbalizes ability to maintain safety.    Physical Concerns Patient reports that she slept from 1-7:40am. Patient reports decreased appetite.    Pain Concerns Patient denies pain   Use of Street Drugs or Alcohol Yes  (pt reports delta 8 use last night)   Taking Medications as Prescribed No  (has not yet started mirtazapine)   Patient Response Appropriate feedback;Attentive;Good eye contact;Interactive   Nursing Comments Patient is tearful, states that her \"negative emotions are stronger today.\" Patient reports that she feels overwhelmed, anxious, and worried about her time in PHP ending. Writer provided support. No further concerns. Will continue to monitor daily while in PHP.       2 images have been sent and verified in PACs

## 2022-03-09 NOTE — TELEPHONE ENCOUNTER
"Spoke to pt.     Stated she is \"miserable\" on the hydralazine. She continues to have lower extremity edema, headaches on daily basis and her blood pressures are running 151/74- 170/98.     This am, facility recorded blood pressure of 198/88.   Confirmed pt is taking hydralazine 50 mg TID,   carvedilol 12.5 mg twice daily.     Will discuss with Dr Zuniga and call back to pt.   Pt stated, \"if Dr Zuniga is not available, I may just go see Dr Wilcox\"\"- agreed with patients plan.   "

## 2022-03-09 NOTE — TELEPHONE ENCOUNTER
2/23/22: Dr Zuniga stopped amlodipine (probable cause of edema) , increased hydralazine to 50 mg tid.    2/22/23- pt had stopped hydralazine on own for 3-4 days- due to headache and edema    2/15/22- Dr Zuniga increased hydralazine to 50 mg TID (pt had called c/o hypertension, edema and headache)     1/31/22 appt with Dr Johnson:     # Hypertension: well controlled, currently on carvedilol, hydralazine and amlodipine. Her hydrochlorothiazide was stopped due to hypercalcemia. The other medications were stopped in the last year by other providers.  .  She feels well overall. Her BP is 130-140 range and she will continue same medications, has followup with Dr Zuniga in July

## 2022-03-11 ENCOUNTER — TELEPHONE (OUTPATIENT)
Dept: CARDIOLOGY | Facility: CLINIC | Age: 87
End: 2022-03-11
Payer: MEDICARE

## 2022-03-11 NOTE — TELEPHONE ENCOUNTER
[facility] Call Center    Phone Message    May a detailed message be left on voicemail: yes     Reason for Call: Other: patient daugther called stating that the nirse from patient TCU called with concerns about patient high BP and they are concern and wants a call back from the care team.      Action Taken: Message routed to:  [facility] clinical pool    Travel Screening: Not Applicable

## 2022-03-14 ENCOUNTER — TELEPHONE (OUTPATIENT)
Dept: CARDIOLOGY | Facility: CLINIC | Age: 87
End: 2022-03-14
Payer: MEDICARE

## 2022-03-14 DIAGNOSIS — I10 BENIGN ESSENTIAL HYPERTENSION: ICD-10-CM

## 2022-03-14 DIAGNOSIS — I10 BENIGN ESSENTIAL HYPERTENSION: Primary | ICD-10-CM

## 2022-03-14 RX ORDER — AMLODIPINE BESYLATE 5 MG/1
5 TABLET ORAL DAILY
Qty: 30 TABLET | Refills: 0 | Status: SHIPPED | OUTPATIENT
Start: 2022-03-14 | End: 2022-03-15

## 2022-03-14 NOTE — TELEPHONE ENCOUNTER
Noted - dtr called also.  Dr Zuniga has been unavailable. Will call pt/dtr tomorrow and have her schedule follow up with PCP to manage blood pressures

## 2022-03-14 NOTE — TELEPHONE ENCOUNTER
[facility] Call [facility]    Phone Message    May a detailed message be left on voicemail: yes     Reason for Call: Other: Katherine from TCU nurse called regarding pt medication hydrALAZINE . Per the nurse patient hasn't been taking medication for 3 days per patient the medication is making the patient ear plugged and swelling in the ankles. And is asking if there is something else to sub out for the medication . per Katherine they will start giving patient medication now vs having the patient take it herself.     Action Taken: Message routed to:  [facility] clinical pool    Travel Screening: Not Applicable

## 2022-03-14 NOTE — CONFIDENTIAL NOTE
Spoke to nurse Katherine from assisted living who reports patient is not taking hydralazine, patient refuses to take it due to headache, mild bilat edema and general not feeling well .  Wants something else.    Time line:    12/16/21 admission due to hypotension and and 1st degree av block during hospitalization amlodipine and verapamil STOPPED and losartan decreased from 100 mg daily to 50 mg daily.  Continue metoprolol.    1/7-1/10 2nd admission   restarted amlodipine 10 mg daily  metoprolol was switched to coreg 12.5 mg BID.  Losartan was STOPPED    1/21/22 Dr. Zuniga clinic visit continue amlodipine 10 mg daily, coreg 12.5 mg BID  START hydralazine 25 mg TID    2/15/22 hydralazine increased to 50 mg TID due to high BP readings per Dr. Zuniga but did complain of HA, ears feel full and plugged      2/23/22 reports that she was having increased ankle edema.  Dr. Zuniga stopped amlodipine and increased hydralazine to 50 mg TID    3/11 CURRENT-patient has not been taking hydralazine as instructed-missing doses because she does not feel well when taking it but worried her blood pressure will be high.  The only blood pressure medication she is currently taking is coreg 12.5 mg BID.  BP today 194/92    Writer called Dr. Zuniga:  Per Dr. Zuniga,    Date: 3/14/2022    Time of Call: 3:24 PM     Diagnosis:  HTN     [ VORB ] Ordering provider: Dr. Zuniga  Order:   STOP hydralazine   RESTART amlodipine 5 mg daily then increase to 10 mg daily 4 days after starting.     Order received by: Alisa Shook RN     Follow-up/additional notes: Writer spoke with nurse Katherine and discussed order.  Katherine verbalized understanding.  Rx sent to preferred pharmacy.  Medication list updated.    Daughter informed of orders.  Daughter is agreeable with the plan.  Daughter is also concerned because patient's experiencing memory loss and she feels she is not taking this too serious and feels the staff should administer the medications.  Requesting   Efrain's recommendation regarding having the assisted living administer medications and monitor her blood pressure.  According to Katherine, nurse at assisted living, patient is reluctant to give up the independence.  At this time patient wants to continue to monitor her own blood pressure and medication administration.      Due to multiple medication changes with elevated BP, writer also offered an appointment in clinic with Dr. Cornelius to review medication and reassessment 3/16 at 8:30 am and daughter accepted-she will be driving the patient.    Alisa Shook RN  Cardiology Care Coordinator  [facility]  [phone] option 1

## 2022-03-14 NOTE — TELEPHONE ENCOUNTER
Colette Herrera routed conversation to [facility] 2 days ago     Colette Herrera 2 days ago     TV       [facility] Call [facility]     Phone Message     May a detailed message be left on voicemail: yes      Reason for Call: Other: patient daugther called stating that the nirse from patient TCU called with concerns about patient high BP and they are concern and wants a call back from the care team.       Action Taken: Message routed to:  [facility] clinical pool     Travel Screening: Not Applicable                                                                                                                                                                                                                                                                                                                                                                                                                                                                                                                                                                                                                                                                                             Documentation      Sarah Lundberg [phone]

## 2022-03-15 RX ORDER — AMLODIPINE BESYLATE 5 MG/1
TABLET ORAL
Qty: 1 TABLET | Refills: 0
Start: 2022-03-15 | End: 2022-03-16

## 2022-03-16 ENCOUNTER — OFFICE VISIT (OUTPATIENT)
Dept: CARDIOLOGY | Facility: CLINIC | Age: 87
End: 2022-03-16
Payer: MEDICARE

## 2022-03-16 ENCOUNTER — MEDICAL CORRESPONDENCE (OUTPATIENT)
Dept: HEALTH INFORMATION MANAGEMENT | Facility: CLINIC | Age: 87
End: 2022-03-16

## 2022-03-16 VITALS
HEART RATE: 59 BPM | SYSTOLIC BLOOD PRESSURE: 167 MMHG | WEIGHT: 148 LBS | OXYGEN SATURATION: 97 % | BODY MASS INDEX: 27.07 KG/M2 | DIASTOLIC BLOOD PRESSURE: 91 MMHG

## 2022-03-16 DIAGNOSIS — I10 BENIGN ESSENTIAL HYPERTENSION: ICD-10-CM

## 2022-03-16 DIAGNOSIS — I35.0 MODERATE AORTIC STENOSIS: Primary | ICD-10-CM

## 2022-03-16 DIAGNOSIS — I10 ESSENTIAL HYPERTENSION: ICD-10-CM

## 2022-03-16 DIAGNOSIS — I50.33 ACUTE ON CHRONIC DIASTOLIC HEART FAILURE (H): ICD-10-CM

## 2022-03-16 LAB
ANION GAP SERPL CALCULATED.3IONS-SCNC: 8 MMOL/L (ref 3–14)
BUN SERPL-MCNC: 35 MG/DL (ref 7–30)
CALCIUM SERPL-MCNC: 9 MG/DL (ref 8.5–10.1)
CHLORIDE BLD-SCNC: 111 MMOL/L (ref 94–109)
CO2 SERPL-SCNC: 25 MMOL/L (ref 20–32)
CREAT SERPL-MCNC: 1.36 MG/DL (ref 0.52–1.04)
GFR SERPL CREATININE-BSD FRML MDRD: 38 ML/MIN/1.73M2
GLUCOSE BLD-MCNC: 96 MG/DL (ref 70–99)
NT-PROBNP SERPL-MCNC: 809 PG/ML (ref 0–450)
POTASSIUM BLD-SCNC: 3.9 MMOL/L (ref 3.4–5.3)
SODIUM SERPL-SCNC: 144 MMOL/L (ref 133–144)

## 2022-03-16 PROCEDURE — 83880 ASSAY OF NATRIURETIC PEPTIDE: CPT | Performed by: STUDENT IN AN ORGANIZED HEALTH CARE EDUCATION/TRAINING PROGRAM

## 2022-03-16 PROCEDURE — 36415 COLL VENOUS BLD VENIPUNCTURE: CPT | Performed by: STUDENT IN AN ORGANIZED HEALTH CARE EDUCATION/TRAINING PROGRAM

## 2022-03-16 PROCEDURE — 80048 BASIC METABOLIC PNL TOTAL CA: CPT | Performed by: STUDENT IN AN ORGANIZED HEALTH CARE EDUCATION/TRAINING PROGRAM

## 2022-03-16 PROCEDURE — 99215 OFFICE O/P EST HI 40 MIN: CPT | Performed by: STUDENT IN AN ORGANIZED HEALTH CARE EDUCATION/TRAINING PROGRAM

## 2022-03-16 RX ORDER — AMLODIPINE BESYLATE 5 MG/1
10 TABLET ORAL DAILY
Qty: 180 TABLET | Refills: 3 | Status: SHIPPED | OUTPATIENT
Start: 2022-03-16 | End: 2022-07-29

## 2022-03-16 RX ORDER — CARVEDILOL 12.5 MG/1
12.5 TABLET ORAL 2 TIMES DAILY
Qty: 60 TABLET | Refills: 6 | Status: SHIPPED | OUTPATIENT
Start: 2022-03-16 | End: 2022-04-06

## 2022-03-16 NOTE — CONFIDENTIAL NOTE
Spoke to Katherine, nurse at assisted living and reviewed medication change from office visit today.    Per Dr. Mg:    Increase amlodipine to 10 mg daily and STOP Plavix    Caller verbalized understanding.  Patient is due for a refill of coreg.  Rx also sent.    Alisa Shook, RN  Cardiology Care Coordinator  [facility]  [phone] option 1

## 2022-03-16 NOTE — PROGRESS NOTES
[facility]  CARDIOVASCULAR MEDICINE CLINIC NOTE    Referring Provider: No ref. provider found   Primary Care Provider: Rolanda Wilcox     Patient Name: Kamryn Miller   MRN: [id number]       Chief complaint: follow up    HPI:   58 yo F who was last seen by Dr Zuniga in Jan of this year (2022) with a history of CAD s/p PCI X2 at [facility] and more recently x3 to mid-distal LAD 8/2021, HLP HTN, MR and TR and moderate aortics tenosis here for follow up.     She has been lost to follow up in the past and has missed her last appointment. She was worked up for tavr but was found to have moderate AS repeat structural evaluation found unchanged gradients (5/2021->11/2021). She had a RHC (11/3/2020)showing normal right ventricular filling pressures, mPAP of 20mmHg with PCWP borderline at 13 and CI of 2.4 by TD with a mean gradient of 24 across her AV and an area of 1.2cm2. She was doing well though slightly hypertensive and hydralazine 25mg was added once a day. Plan was to see her in 6m with an echo. She has been struggling with hypertension and her meds. In feb 23 her hydralazine was increased to 50mgTID and amlodipine discontinued (thought to be causing her edema) a few days later she stopped her hydralazine because of headache and hypotension? She has had the hydrochlorothiazide discontinued due to hypercalcemia.     She thinks the swelling started when she started taking the hydralazine. When it went up to 50mg she states she developed a headache plugged ears. Currently she is taking carvedilol 12.5mg BID and amlodipine 5mg/d.     Current cardiac meds  Asa  Amlodipine 5mg d  Carvedilol 12.5mg BID  Rosuvastatin 40mg d  Clopidogrel 75mg d    CURRENT MEDICATIONS:  Current Outpatient Medications   Medication Sig Dispense Refill     albuterol (PROAIR HFA/PROVENTIL HFA/VENTOLIN HFA) 108 (90 Base) MCG/ACT inhaler Inhale 2 puffs into the lungs every 6 hours as needed for wheezing or shortness of breath / dyspnea 18 g 3      alendronate (FOSAMAX) 70 MG tablet Take 70 mg by mouth every 7 days        amLODIPine (NORVASC) 5 MG tablet TAKE 1 TABLET BY MOUTH ONCE DAILY 1 tablet 0     aspirin 81 MG tablet Take 81 mg by mouth daily        azelastine (ASTEPRO) 0.15 % nasal spray INSTILL 1 SPRAY INTO BOTH NOSTRILS DAILY 5 mL 1     Calcium Citrate (CITRACAL OR) Take 1 tablet by mouth daily.       carvedilol (COREG) 12.5 MG tablet Take 1 tablet (12.5 mg) by mouth 2 times daily 60 tablet 0     CHOLECALCIFEROL 13729 UNIT PO CAPS Take 50,000 Units by mouth once a week        clopidogrel (PLAVIX) 75 MG tablet Take 1 tablet (75 mg) by mouth daily 90 tablet 3     desvenlafaxine (PRISTIQ) 100 MG 24 hr tablet Take 100 mg by mouth daily       fluticasone-salmeterol (ADVAIR DISKUS) 500-50 MCG/DOSE inhaler INHALE 1 PUFF INTO THE LUNGS EVERY 12 HOURS 1 each 6     lamoTRIgine (LAMICTAL) 25 MG tablet Take 25 mg by mouth daily with 200 mg tablet for a total dose of 225 mg       LAMOTRIGINE 200 MG PO TABS Take 200 mg by mouth daily with 25 mg tablet for a total dose of 225 mg       levothyroxine (SYNTHROID/LEVOTHROID) 50 MCG tablet Take 50 mcg by mouth daily       liothyronine (CYTOMEL) 5 MCG tablet Take 2 tablets (10 mcg) by mouth daily 30 tablet 3     memantine (NAMENDA) 10 MG tablet Take 1 tablet (10 mg) by mouth daily 30 tablet 3     mirtazapine (REMERON) 7.5 MG tablet Take 1 tablet (7.5 mg) by mouth At Bedtime 30 tablet 3     Multiple Vitamin (TAB-A-JENNIFER) TABS TAKE 1 TABLET BY MOUTH ONCE DAILY 30 tablet PRN     rosuvastatin (CRESTOR) 40 MG tablet TAKE 1 TABLET BY MOUTH EVERY DAY (Patient taking differently: Take 40 mg by mouth daily ) 90 tablet 3       PAST MEDICAL HISTORY:  Past Medical History:   Diagnosis Date     Aortic valvar stenosis 7/2010    mild     Asthma      Bipolar disorder (H)      CAD (coronary artery disease)     s/p angioplasty     Celiac disease      Colon polyps      Colon polyps 2012    every 3 year colonoscopy      Depression       High cholesterol      HTN      Mitral insufficiency      Pulmonary HTN (H)     mild     Tremors 7/10    drug induced from antidepressants     Tricuspid insufficiency        PAST SURGICAL HISTORY:  Past Surgical History:   Procedure Laterality Date     ANGIOGRAM  6/26/2009     ANGIOPLASTY  9/96    for angina     ANGIOPLASTY  8/03 - 9/03    X -2 - with stenst in coronary car.     APPENDECTOMY       BREAST BIOPSY, RT/LT      Breat Biopsy RT/LT, benign     BUNIONECTOMY  11/8/2011    Procedure:BUNIONECTOMY; Left donna bunionectomy; Surgeon:FREDY LITTLEJOHN; Location:[facility]     BUNIONECTOMY RT/LT  5/2007    right bunion and right 2nd hammertoe     CATARACT IOL, RT/LT  6/12 and 7/12    bilateral     COLONOSCOPY  2007, 2012    every 3 years for polyps     CV CORONARY ANGIOGRAM N/A 8/21/2020    Procedure: CV CORONARY ANGIOGRAM;  Surgeon: Gianluca Toscano MD;  Location: [facility]     CV LEFT HEART CATH N/A 8/21/2020    Procedure: CV LEFT HEART CATH;  Surgeon: Gianluca Toscano MD;  Location: [facility]     CV LEFT HEART CATH N/A 11/3/2020    Procedure: CV LEFT HEART CATH;  Surgeon: Tom Burrows MD;  Location: [facility]     CV LOWER EXTREMITY ANGIOGRAM BILATERAL N/A 11/3/2020    Procedure: CV ANGIOGRAM LOWER EXTREMITY BILATERAL;  Surgeon: Tom Burrows MD;  Location: [facility]     CV PCI STENT DRUG ELUTING N/A 8/21/2020    Procedure: Percutaneous Coronary Intervention Stent Drug Eluting;  Surgeon: Gianluca Toscano MD;  Location: [facility]     CV RIGHT HEART CATH MEASUREMENTS RECORDED N/A 8/21/2020    Procedure: CV RIGHT HEART CATH;  Surgeon: Gianluca Toscano MD;  Location: [facility]     CV RIGHT HEART CATH MEASUREMENTS RECORDED N/A 11/3/2020    Procedure: CV RIGHT HEART CATH;  Surgeon: Tom Burrows MD;  Location: [facility]     JOINT REPLACEMENT, HIP RT/LT  4/2008    right hip  replaced     JOINT REPLACEMENT, HIP RT/LT  4/2009    left hip replaced     REPAIR HAMMER TOE  11/8/2011    Procedure:REPAIR HAMMER TOE; left 2nd hammertoe repair; Surgeon:FREDY LITTLEJOHN; Location:[facility]     SURGICAL HISTORY OF -   11/11    left bunion and 2nd hammertoe repair     TUBAL LIGATION       ZZC ANESTH,BLEPHAROPLASTY      (R) for drooping eyelid     ZZC APPENDECTOMY         ALLERGIES:     Allergies   Allergen Reactions     Ace Inhibitors Cough     Diclofenac Other (See Comments)     Balance problems     Gluten Meal Diarrhea       FAMILY HISTORY:  Family History   Problem Relation Age of Onset     Asthma Mother      Cerebrovascular Disease Mother      Arthritis Mother      Depression Mother      Lipids Sister      Hypertension Sister      Heart Disease Sister      Lipids Sister      Hypertension Sister      Lipids Sister      Breast Cancer Sister        SOCIAL HISTORY:  Social History     Tobacco Use     Smoking status: Never Smoker     Smokeless tobacco: Never Used   Substance Use Topics     Alcohol use: No     Alcohol/week: 0.0 standard drinks     Types: 1 Standard drinks or equivalent per week     Drug use: No       ROS:   A comprehensive 14 point review of systems is negative other than as mentioned in HPI.    Exam:  There were no vitals taken for this visit.  There is no height or weight on file to calculate BMI.  Wt Readings from Last 2 Encounters:   01/21/22 65.3 kg (144 lb)   01/10/22 63.6 kg (140 lb 4.8 oz)     Constitutional: no acute distress, pleasant and cooperative, appears overall well.  Eyes:sclera white, conjunctiva clear, without icterus or pallor   Ears/Nose/Mouth/Throat: mucosa moist, no central cyanosis, Nares patent b/l, moist mucous membranes  Cardiovascular: RRR ARI at RUSB, JVP mid neck, extremities with mild pitting edema no cyanosis  Respiratory: clear to auscultation bilaterally -no rales, rhonchi or wheezes, no use of accessory muscles, no retractions, respirations are  unlabored, normal respiratory rate  Gastrointestinal: soft, nontender, non distended, no hepatosplenomegaly or masses  Musculoskeletal: normal muscle bulk and tone, joints   Skin: normal skin appearance without worrisome lesions.   Neurologic: Alert and oriented, face symmetric, normal gait  Psychiatric: appropriate affect, cooperative        Labs:  Reviewed.   Recent Labs   Lab Test 01/24/22  1051 01/10/22  0716    142   POTASSIUM 4.1 3.8   CHLORIDE 103 110*   CO2 30 27   BUN 33* 35*   CR 1.53* 1.29*     CBC RESULTS:   Recent Labs   Lab Test 01/24/22  1051 01/08/22  0545   WBC  --  4.5   RBC  --  3.90   HGB 12.7 12.5   HCT  --  39.4   MCV  --  101*   MCH  --  32.1   MCHC  --  31.7   RDW  --  12.5   PLT  --  207     Cholesterol   Date Value Ref Range Status   12/17/2021 172 <200 mg/dL Final   07/28/2021 182 <200 mg/dL Final     Comment:     Age 0-19 years  Desirable: <170 mg/dL  Borderline high:  170-199 mg/dl  High:            >199 mg/dl    Age 20 years and older  Desirable: <200 mg/dL   10/23/2020 175 <200 mg/dL Final   09/09/2020 323 (H) <200 mg/dL Final     Comment:     Desirable:       <200 mg/dl     HDL Cholesterol   Date Value Ref Range Status   10/23/2020 71 >49 mg/dL Final   09/09/2020 59 >49 mg/dL Final     Direct Measure HDL   Date Value Ref Range Status   12/17/2021 61 >=50 mg/dL Final   07/28/2021 61 >=50 mg/dL Final     Comment:     0-19 years:       Greater than or equal to 45 mg/dL   Low: Less than 40 mg/dL   Borderline low: 40-44 mg/dL     20 years and older:   Female: Greater than or equal to 50 mg/dL   Male:   Greater than or equal to 40 mg/dL          LDL Cholesterol Calculated   Date Value Ref Range Status   12/17/2021 84 <=100 mg/dL Final   07/28/2021 99 <=100 mg/dL Final     Comment:     Age 0-19 years:  Desirable: 0-110 mg/dL   Borderline high: 110-129 mg/dL   High: >= 130 mg/dL    Age 20 years and older:  Desirable: <100mg/dL  Above desirable: 100-129 mg/dL   Borderline high: 130-159  mg/dL   High: 160-189 mg/dL   Very high: >= 190 mg/dL   10/23/2020 83 <100 mg/dL Final     Comment:     Desirable:       <100 mg/dl   09/09/2020 226 (H) <100 mg/dL Final     Comment:     Above desirable:  100-129 mg/dl  Borderline High:  130-159 mg/dL  High:             160-189 mg/dL  Very high:       >189 mg/dl       Triglycerides   Date Value Ref Range Status   12/17/2021 137 <150 mg/dL Final   07/28/2021 111 <150 mg/dL Final     Comment:     0-9 years:  Normal:    Less than 75 mg/dL  Borderline high:  75-99 mg/dL  High:             Greater than or equal to 100 mg/dL    0-19 years:  Normal:    Less than 90 mg/dL  Borderline high:   mg/dL  High:             Greater than or equal to 130 mg/dL    20 years and older:  Normal:    Less than 150 mg/dL  Borderline high:  150-199 mg/dL  High:             200-499 mg/dL  Very high:   Greater than or equal to 500 mg/dL   10/23/2020 103 <150 mg/dL Final   09/09/2020 192 (H) <150 mg/dL Final     Comment:     Borderline high:  150-199 mg/dl  High:             200-499 mg/dl  Very high:       >499 mg/dl  Non Fasting       Cholesterol/HDL Ratio   Date Value Ref Range Status   09/18/2015 2.8 0.0 - 5.0 Final   09/02/2014 3.1 0.0 - 5.0 Final     INR   Date Value Ref Range Status   11/03/2020 1.05 0.86 - 1.14 Final       TSH   Date Value Ref Range Status   12/17/2021 3.73 0.40 - 4.00 mU/L Final   01/18/2021 6.11 (H) 0.40 - 4.00 mU/L Final     No results for input(s): A1C in the last [id number] hours.    Testing/Procedures:  I personally reviewed all the following information:       Coronary angiogram 8/21/2020:    Dist LAD lesion is 70% stenosed.    Mid LAD-1 lesion is 80% stenosed.    Mid LAD-2 lesion is 60% stenosed.    Mid RCA lesion is 40% stenosed.    Prox RCA lesion is 40% stenosed.    Right sided filling pressures are normal.    Normal PA pressures.    Left sided filling pressures are normal.    Reduced cardiac output.  1. LAD - 3 EDDIE were placed in the mid to distal LAD (2.5 x  28 mm, 3.5 x 12 mm, and 3.0 x 8 mm)  2. RHC showed normal biventricular filling pressures. Normal PA pressure. Reduced cardiac output.     RHC/coronary angiogram 11/3/20:    Moderate aortic stenosis -valve area 1.2 cm2,Mean gradient 24 mm Hg    Right sided filling pressures are normal.    Normal PA pressures.    Left sided filling pressures are normal.    Mildly reduced cardiac output level.    Peripheral angiogram done and IVUS used to assess the vessel lumens of the descending thoracic aorta,right and left external iliac arteries     TTE 5/3/21:  Global and regional left ventricular function is normal with an EF of 55-60%.  The right ventricle is normal size. Global right ventricular function is normal.  Moderate to severe aortic stenosis, aortic valve area 1.02 cm2, peak velocity  3.3 m/s, mean gradient 25 mmHg, stroke volume index 40 ml/m2, DVI 0.29.  This study was compared with the study from 9/16/2020. The aortic valve peak velocity and mean gradient are similar     TTE 11/1/21:  Global and regional left ventricular function is normal with an EF of 55-60%.  Right ventricular function, chamber size, wall motion, and thickness are normal.  Moderate aortic valve calcification is present. The mean gradient across the aortic valve is 23 mmHg. The peak aortic velocity is 3.1 m/sec. Moderate aortic stenosis is present. Calculated valve area lower than previous study due to LVOT diameter measurement.  Moderate aortic stenosis is present.  No significant changes noted.    Assessment and Plan:   Kamryn Miller is a 86 year old female with history of  history of CAD s/p [facility] at [facility], P HTN, MR and TR, mod AS  who presents to cardiology clinic today for further management of hypertension.     HTN  -currently on amlodipine 5mg d- increase to 10mg d  - carvedilol 12.5mg BID  - not on hydralazine given symptoms ?swelling  -she seems hypervolemic on my exam understand not on diuretics before given hypercalcemia/TED  will get updated NTproBNP if elevated and bp high consider loop diuretic    Moderate aortic stenosis  Following with structural clinic and Dr Zuniga- plan was to follow up with echo which we will keep    CAD s/p PCI x3 mid distal LAD 8/21/2020  - on asa plavix and statin  - symptom free  -given it has been more than a year post stent ok to stop plavix    -we discussed recommendation of 150 min moderate intensity exercise /week   - discussed the need for heart healthy diet including a diet rich in fruits, vegetables and fiber and low on carbonated beverages sugar  -discussed recommendation of moderation of alcohol, salt and NSAIDs      >50% of this 45 minute visit were spent with the patient and  family on in-person counseling and discussion regarding the above issues, the remainder of the time was spent in chart review, documentation, ordering and communication with other providers.    The patient states understanding and is agreeable with plan.     Larissa Epps MD  Cardiology    CC

## 2022-03-16 NOTE — TELEPHONE ENCOUNTER
[facility] Call Center    Phone Message    May a detailed message be left on voicemail: yes     Reason for Call: Other: Katherine called in again. Got the AVS from pt's visit today and just wants to confirm the changes made so they have it right. Please c/b to discuss [phone]     Action Taken: Message routed to:  Other: [facility]    Travel Screening: Not Applicable

## 2022-03-16 NOTE — NURSING NOTE
"Chief Complaint   Patient presents with     Follow Up     pt reports SOB all the time, lack of stamina Dr. Zuniga pt. Please see 3/14/22 telephone encounter       Initial BP (!) 176/90 (BP Location: Left arm, Patient Position: Sitting, Cuff Size: Adult Regular)   Pulse 63   Wt 67.1 kg (148 lb)   SpO2 97%   BMI 27.07 kg/m   Estimated body mass index is 27.07 kg/m  as calculated from the following:    Height as of 1/7/22: 1.575 m (5' 2\").    Weight as of this encounter: 67.1 kg (148 lb)..  BP completed using cuff size: regular    Becky Osullivan, Visit Facilitator    "

## 2022-03-16 NOTE — PATIENT INSTRUCTIONS
Thank you for coming to the [facility] at [place]; please note the following instructions:    1 If bp is sustained above 140 systolic please call the office to see if we have to make changes  If the systolic blood pressure is >180 she needs to go to urgent care, if there are any associated symptoms she should be taken to the hospital.  Continue the current bp monitoring as you are    2. We will increase the amlodipine to 10mg d  3.We will stop plavix  4. We will do labs today    5. Follow up with Dr. Zuniga as planned        If you have any questions regarding your visit, please contact your care team:     CARDIOLOGY  TELEPHONE NUMBER   Alisa BROWNLEE., Registered Nurse  Jennifer VITALE, Registered Nurse  Fidelina BOUCHER, Registered Medical Assistant  Héctor VICKERS, Licensed Practical Nurse  Becky MORGAN, Visit Facilitator [phone] (select option 1)    *After hours: [phone]   For Scheduling Appts:     [phone] (select option 1)    *After hours: [phone]   For the Device Clinic (Pacemakers and ICD's)  Delia VARGHESE, Registered Nurse   During business hours: [phone]    *After business hours:  [phone] (select option 4)      Normal test result notifications will be released via Jongla or mailed within 7 business days.  All other test results, will be communicated via telephone once reviewed by your cardiologist.    If you need a medication refill, please contact your pharmacy.  Please allow 3 business days for your refill to be completed.    As always, thank you for trusting us with your health care needs!

## 2022-03-16 NOTE — LETTER
3/16/2022      RE: Kamryn Miller  [address]  [address]  [place] [address]       Dear Colleague,    Thank you for the opportunity to participate in the care of your patient, Kamryn Miller, at the [facility]. Please see a copy of my visit note below.    [facility]  CARDIOVASCULAR MEDICINE CLINIC NOTE    Referring Provider: No ref. provider found   Primary Care Provider: Rolanda Wilcox     Patient Name: Kamryn Miller   MRN: [id number]       Chief complaint: follow up    HPI:   58 yo F who was last seen by Dr Zuniga in Jan of this year (2022) with a history of CAD s/p PCI X2 at [facility] and more recently x3 to mid-distal LAD 8/2021, HLP HTN, MR and TR and moderate aortics tenosis here for follow up.     She has been lost to follow up in the past and has missed her last appointment. She was worked up for tavr but was found to have moderate AS repeat structural evaluation found unchanged gradients (5/2021->11/2021). She had a RHC (11/3/2020)showing normal right ventricular filling pressures, mPAP of 20mmHg with PCWP borderline at 13 and CI of 2.4 by TD with a mean gradient of 24 across her AV and an area of 1.2cm2. She was doing well though slightly hypertensive and hydralazine 25mg was added once a day. Plan was to see her in 6m with an echo. She has been struggling with hypertension and her meds. In feb 23 her hydralazine was increased to 50mgTID and amlodipine discontinued (thought to be causing her edema) a few days later she stopped her hydralazine because of headache and hypotension? She has had the hydrochlorothiazide discontinued due to hypercalcemia.     She thinks the swelling started when she started taking the hydralazine. When it went up to 50mg she states she developed a headache plugged ears. Currently she is taking carvedilol 12.5mg BID and amlodipine 5mg/d.     Current cardiac  meds  Asa  Amlodipine 5mg d  Carvedilol 12.5mg BID  Rosuvastatin 40mg d  Clopidogrel 75mg d    CURRENT MEDICATIONS:  Current Outpatient Medications   Medication Sig Dispense Refill     albuterol (PROAIR HFA/PROVENTIL HFA/VENTOLIN HFA) 108 (90 Base) MCG/ACT inhaler Inhale 2 puffs into the lungs every 6 hours as needed for wheezing or shortness of breath / dyspnea 18 g 3     alendronate (FOSAMAX) 70 MG tablet Take 70 mg by mouth every 7 days        amLODIPine (NORVASC) 5 MG tablet TAKE 1 TABLET BY MOUTH ONCE DAILY 1 tablet 0     aspirin 81 MG tablet Take 81 mg by mouth daily        azelastine (ASTEPRO) 0.15 % nasal spray INSTILL 1 SPRAY INTO BOTH NOSTRILS DAILY 5 mL 1     Calcium Citrate (CITRACAL OR) Take 1 tablet by mouth daily.       carvedilol (COREG) 12.5 MG tablet Take 1 tablet (12.5 mg) by mouth 2 times daily 60 tablet 0     CHOLECALCIFEROL 98036 UNIT PO CAPS Take 50,000 Units by mouth once a week        clopidogrel (PLAVIX) 75 MG tablet Take 1 tablet (75 mg) by mouth daily 90 tablet 3     desvenlafaxine (PRISTIQ) 100 MG 24 hr tablet Take 100 mg by mouth daily       fluticasone-salmeterol (ADVAIR DISKUS) 500-50 MCG/DOSE inhaler INHALE 1 PUFF INTO THE LUNGS EVERY 12 HOURS 1 each 6     lamoTRIgine (LAMICTAL) 25 MG tablet Take 25 mg by mouth daily with 200 mg tablet for a total dose of 225 mg       LAMOTRIGINE 200 MG PO TABS Take 200 mg by mouth daily with 25 mg tablet for a total dose of 225 mg       levothyroxine (SYNTHROID/LEVOTHROID) 50 MCG tablet Take 50 mcg by mouth daily       liothyronine (CYTOMEL) 5 MCG tablet Take 2 tablets (10 mcg) by mouth daily 30 tablet 3     memantine (NAMENDA) 10 MG tablet Take 1 tablet (10 mg) by mouth daily 30 tablet 3     mirtazapine (REMERON) 7.5 MG tablet Take 1 tablet (7.5 mg) by mouth At Bedtime 30 tablet 3     Multiple Vitamin (TAB-A-JENNIFER) TABS TAKE 1 TABLET BY MOUTH ONCE DAILY 30 tablet PRN     rosuvastatin (CRESTOR) 40 MG tablet TAKE 1 TABLET BY MOUTH EVERY DAY (Patient  taking differently: Take 40 mg by mouth daily ) 90 tablet 3       PAST MEDICAL HISTORY:  Past Medical History:   Diagnosis Date     Aortic valvar stenosis 7/2010    mild     Asthma      Bipolar disorder (H)      CAD (coronary artery disease)     s/p angioplasty     Celiac disease      Colon polyps      Colon polyps 2012    every 3 year colonoscopy      Depression      High cholesterol      HTN      Mitral insufficiency      Pulmonary HTN (H)     mild     Tremors 7/10    drug induced from antidepressants     Tricuspid insufficiency        PAST SURGICAL HISTORY:  Past Surgical History:   Procedure Laterality Date     ANGIOGRAM  6/26/2009     ANGIOPLASTY  9/96    for angina     ANGIOPLASTY  8/03 - 9/03    X -2 - with stenst in coronary car.     APPENDECTOMY       BREAST BIOPSY, RT/LT      Breat Biopsy RT/LT, benign     BUNIONECTOMY  11/8/2011    Procedure:BUNIONECTOMY; Left donna bunionectomy; Surgeon:FREDY LITTLEJOHN; Location:[facility] OR     BUNIONECTOMY RT/LT  5/2007    right bunion and right 2nd hammertoe     CATARACT IOL, RT/LT  6/12 and 7/12    bilateral     COLONOSCOPY  2007, 2012    every 3 years for polyps     CV CORONARY ANGIOGRAM N/A 8/21/2020    Procedure: CV CORONARY ANGIOGRAM;  Surgeon: Gianluca Toscano MD;  Location: [facility]     CV LEFT HEART CATH N/A 8/21/2020    Procedure: CV LEFT HEART CATH;  Surgeon: Gianluca Toscano MD;  Location: [facility]     CV LEFT HEART CATH N/A 11/3/2020    Procedure: CV LEFT HEART CATH;  Surgeon: Tom Burrows MD;  Location: [facility]     CV LOWER EXTREMITY ANGIOGRAM BILATERAL N/A 11/3/2020    Procedure: CV ANGIOGRAM LOWER EXTREMITY BILATERAL;  Surgeon: Tom Burrows MD;  Location: [facility]     CV PCI STENT DRUG ELUTING N/A 8/21/2020    Procedure: Percutaneous Coronary Intervention Stent Drug Eluting;  Surgeon: Gianluca Toscano MD;  Location: [facility]     CV RIGHT  HEART CATH MEASUREMENTS RECORDED N/A 8/21/2020    Procedure: CV RIGHT HEART CATH;  Surgeon: Gianluca Toscano MD;  Location:  HEART CARDIAC CATH LAB     CV RIGHT HEART CATH MEASUREMENTS RECORDED N/A 11/3/2020    Procedure: CV RIGHT HEART CATH;  Surgeon: Tom Burrows MD;  Location:  HEART CARDIAC CATH LAB     JOINT REPLACEMENT, HIP RT/LT  4/2008    right hip replaced     JOINT REPLACEMENT, HIP RT/LT  4/2009    left hip replaced     REPAIR HAMMER TOE  11/8/2011    Procedure:REPAIR HAMMER TOE; left 2nd hammertoe repair; Surgeon:FREDY LITTLEJOHN; Location:[facility] OR     SURGICAL HISTORY OF -   11/11    left bunion and 2nd hammertoe repair     TUBAL LIGATION       ZZC ANESTH,BLEPHAROPLASTY      (R) for drooping eyelid     ZZC APPENDECTOMY         ALLERGIES:     Allergies   Allergen Reactions     Ace Inhibitors Cough     Diclofenac Other (See Comments)     Balance problems     Gluten Meal Diarrhea       FAMILY HISTORY:  Family History   Problem Relation Age of Onset     Asthma Mother      Cerebrovascular Disease Mother      Arthritis Mother      Depression Mother      Lipids Sister      Hypertension Sister      Heart Disease Sister      Lipids Sister      Hypertension Sister      Lipids Sister      Breast Cancer Sister        SOCIAL HISTORY:  Social History     Tobacco Use     Smoking status: Never Smoker     Smokeless tobacco: Never Used   Substance Use Topics     Alcohol use: No     Alcohol/week: 0.0 standard drinks     Types: 1 Standard drinks or equivalent per week     Drug use: No       ROS:   A comprehensive 14 point review of systems is negative other than as mentioned in HPI.    Exam:  There were no vitals taken for this visit.  There is no height or weight on file to calculate BMI.  Wt Readings from Last 2 Encounters:   01/21/22 65.3 kg (144 lb)   01/10/22 63.6 kg (140 lb 4.8 oz)     Constitutional: no acute distress, pleasant and cooperative, appears overall well.  Eyes:sclera white, conjunctiva  clear, without icterus or pallor   Ears/Nose/Mouth/Throat: mucosa moist, no central cyanosis, Nares patent b/l, moist mucous membranes  Cardiovascular: RRR ARI at RUSB, JVP mid neck, extremities with mild pitting edema no cyanosis  Respiratory: clear to auscultation bilaterally -no rales, rhonchi or wheezes, no use of accessory muscles, no retractions, respirations are unlabored, normal respiratory rate  Gastrointestinal: soft, nontender, non distended, no hepatosplenomegaly or masses  Musculoskeletal: normal muscle bulk and tone, joints   Skin: normal skin appearance without worrisome lesions.   Neurologic: Alert and oriented, face symmetric, normal gait  Psychiatric: appropriate affect, cooperative        Labs:  Reviewed.   Recent Labs   Lab Test 01/24/22  1051 01/10/22  0716    142   POTASSIUM 4.1 3.8   CHLORIDE 103 110*   CO2 30 27   BUN 33* 35*   CR 1.53* 1.29*     CBC RESULTS:   Recent Labs   Lab Test 01/24/22  1051 01/08/22  0545   WBC  --  4.5   RBC  --  3.90   HGB 12.7 12.5   HCT  --  39.4   MCV  --  101*   MCH  --  32.1   MCHC  --  31.7   RDW  --  12.5   PLT  --  207     Cholesterol   Date Value Ref Range Status   12/17/2021 172 <200 mg/dL Final   07/28/2021 182 <200 mg/dL Final     Comment:     Age 0-19 years  Desirable: <170 mg/dL  Borderline high:  170-199 mg/dl  High:            >199 mg/dl    Age 20 years and older  Desirable: <200 mg/dL   10/23/2020 175 <200 mg/dL Final   09/09/2020 323 (H) <200 mg/dL Final     Comment:     Desirable:       <200 mg/dl     HDL Cholesterol   Date Value Ref Range Status   10/23/2020 71 >49 mg/dL Final   09/09/2020 59 >49 mg/dL Final     Direct Measure HDL   Date Value Ref Range Status   12/17/2021 61 >=50 mg/dL Final   07/28/2021 61 >=50 mg/dL Final     Comment:     0-19 years:       Greater than or equal to 45 mg/dL   Low: Less than 40 mg/dL   Borderline low: 40-44 mg/dL     20 years and older:   Female: Greater than or equal to 50 mg/dL   Male:   Greater than or  equal to 40 mg/dL          LDL Cholesterol Calculated   Date Value Ref Range Status   12/17/2021 84 <=100 mg/dL Final   07/28/2021 99 <=100 mg/dL Final     Comment:     Age 0-19 years:  Desirable: 0-110 mg/dL   Borderline high: 110-129 mg/dL   High: >= 130 mg/dL    Age 20 years and older:  Desirable: <100mg/dL  Above desirable: 100-129 mg/dL   Borderline high: 130-159 mg/dL   High: 160-189 mg/dL   Very high: >= 190 mg/dL   10/23/2020 83 <100 mg/dL Final     Comment:     Desirable:       <100 mg/dl   09/09/2020 226 (H) <100 mg/dL Final     Comment:     Above desirable:  100-129 mg/dl  Borderline High:  130-159 mg/dL  High:             160-189 mg/dL  Very high:       >189 mg/dl       Triglycerides   Date Value Ref Range Status   12/17/2021 137 <150 mg/dL Final   07/28/2021 111 <150 mg/dL Final     Comment:     0-9 years:  Normal:    Less than 75 mg/dL  Borderline high:  75-99 mg/dL  High:             Greater than or equal to 100 mg/dL    0-19 years:  Normal:    Less than 90 mg/dL  Borderline high:   mg/dL  High:             Greater than or equal to 130 mg/dL    20 years and older:  Normal:    Less than 150 mg/dL  Borderline high:  150-199 mg/dL  High:             200-499 mg/dL  Very high:   Greater than or equal to 500 mg/dL   10/23/2020 103 <150 mg/dL Final   09/09/2020 192 (H) <150 mg/dL Final     Comment:     Borderline high:  150-199 mg/dl  High:             200-499 mg/dl  Very high:       >499 mg/dl  Non Fasting       Cholesterol/HDL Ratio   Date Value Ref Range Status   09/18/2015 2.8 0.0 - 5.0 Final   09/02/2014 3.1 0.0 - 5.0 Final     INR   Date Value Ref Range Status   11/03/2020 1.05 0.86 - 1.14 Final       TSH   Date Value Ref Range Status   12/17/2021 3.73 0.40 - 4.00 mU/L Final   01/18/2021 6.11 (H) 0.40 - 4.00 mU/L Final     No results for input(s): A1C in the last 91380 hours.    Testing/Procedures:  I personally reviewed all the following information:       Coronary angiogram 8/21/2020:    Dist  LAD lesion is 70% stenosed.    Mid LAD-1 lesion is 80% stenosed.    Mid LAD-2 lesion is 60% stenosed.    Mid RCA lesion is 40% stenosed.    Prox RCA lesion is 40% stenosed.    Right sided filling pressures are normal.    Normal PA pressures.    Left sided filling pressures are normal.    Reduced cardiac output.  1. LAD - 3 EDDIE were placed in the mid to distal LAD (2.5 x 28 mm, 3.5 x 12 mm, and 3.0 x 8 mm)  2. RHC showed normal biventricular filling pressures. Normal PA pressure. Reduced cardiac output.     RHC/coronary angiogram 11/3/20:    Moderate aortic stenosis -valve area 1.2 cm2,Mean gradient 24 mm Hg    Right sided filling pressures are normal.    Normal PA pressures.    Left sided filling pressures are normal.    Mildly reduced cardiac output level.    Peripheral angiogram done and IVUS used to assess the vessel lumens of the descending thoracic aorta,right and left external iliac arteries     TTE 5/3/21:  Global and regional left ventricular function is normal with an EF of 55-60%.  The right ventricle is normal size. Global right ventricular function is normal.  Moderate to severe aortic stenosis, aortic valve area 1.02 cm2, peak velocity  3.3 m/s, mean gradient 25 mmHg, stroke volume index 40 ml/m2, DVI 0.29.  This study was compared with the study from 9/16/2020. The aortic valve peak velocity and mean gradient are similar     TTE 11/1/21:  Global and regional left ventricular function is normal with an EF of 55-60%.  Right ventricular function, chamber size, wall motion, and thickness are normal.  Moderate aortic valve calcification is present. The mean gradient across the aortic valve is 23 mmHg. The peak aortic velocity is 3.1 m/sec. Moderate aortic stenosis is present. Calculated valve area lower than previous study due to LVOT diameter measurement.  Moderate aortic stenosis is present.  No significant changes noted.    Assessment and Plan:   Kamryn Miller is a 86 year old female with history of   history of CAD s/p [facility] HTN, MR and TR, mod AS  who presents to cardiology clinic today for further management of hypertension.     HTN  -currently on amlodipine 5mg d  - carvedilol 12.5mg BID  - hydralazine?  -    Moderate aortic stenosis  Following with structural clinic and Dr Zuniga- plan was to follow up with echo which we will keep    CAD s/p PCI x3 mid distal LAD 8/21/2020  - on asa plavix and statin  - symptom free  -given it has been more than a year post stent ok to stop plavix    -we discussed recommendation of 150 min moderate intensity exercise /week   - discussed the need for heart healthy diet including a diet rich in fruits, vegetables and fiber and low on carbonated beverages sugar  -discussed recommendation of moderation of alcohol, salt and NSAIDs      >50% of this 45 minute visit were spent with the patient and  family on in-person counseling and discussion regarding the above issues, the remainder of the time was spent in chart review, documentation, ordering and communication with other providers.    The patient states understanding and is agreeable with plan.         Please do not hesitate to contact me if you have any questions/concerns.     Sincerely,     Larissa Epps MD

## 2022-03-17 ENCOUNTER — HOSPITAL ENCOUNTER (EMERGENCY)
Facility: CLINIC | Age: 87
Discharge: HOME | End: 2022-03-18
Attending: EMERGENCY MEDICINE | Admitting: EMERGENCY MEDICINE
Payer: MEDICARE

## 2022-03-17 ENCOUNTER — TELEPHONE (OUTPATIENT)
Dept: CARDIOLOGY | Facility: CLINIC | Age: 87
End: 2022-03-17
Payer: MEDICARE

## 2022-03-17 ENCOUNTER — TELEPHONE (OUTPATIENT)
Dept: FAMILY MEDICINE | Facility: CLINIC | Age: 87
End: 2022-03-17
Payer: MEDICARE

## 2022-03-17 DIAGNOSIS — I10 BENIGN ESSENTIAL HYPERTENSION: ICD-10-CM

## 2022-03-17 PROCEDURE — 99283 EMERGENCY DEPT VISIT LOW MDM: CPT

## 2022-03-17 PROCEDURE — 99284 EMERGENCY DEPT VISIT MOD MDM: CPT | Performed by: EMERGENCY MEDICINE

## 2022-03-17 NOTE — CONFIDENTIAL NOTE
Spoke to Katherine, who states that her BP today was 176/94.  Patient feels fine.  Has only had 1 dose of the increased amlodipine 10 mg (seen by Dr. Mg 3/18/22)      Advised caller to continue to monitor BP at this time.  If readings are consistently >180 to go the the urgent Care and if symptomatic to go to the hospital.    Caller verbalized understanding.  Alisa Shook RN  Cardiology Care Coordinator  [facility]  [phone] option 1

## 2022-03-17 NOTE — TELEPHONE ENCOUNTER
Katherine from [facility] will be faxing over information.  [facility] will be starting to administer medications to patient, however to save patient from needing another medication pass, Patient would like self administer levothyroxine and fosamax after set up. Katherine reports that patient has been taking levothyroxine and fosamax early in the morning before breakfast. [facility] will set up these medications and also check to see that she is taking them as prescribed. Patient would also like to be able to self administer inhalers. Katherine verified fax number.  Analy PITTS RN on 3/17/2022 at 11:56 AM

## 2022-03-17 NOTE — TELEPHONE ENCOUNTER
[facility]    Phone Message    May a detailed message be left on voicemail: yes     Reason for Call: Symptoms or Concerns     If patient has red-flag symptoms, warm transfer to triage line    Current symptom or concern: BP issue 176/94     Symptoms have been present for:  hour(s)    Has patient previously been seen for this? Yes    By : Yoshi    Date: 3/16/22    Are there any new or worsening symptoms? Yes: Per Katherine wants to know if they should give PRN? Very high BP issue unsure if pt should go to ER. Katherine is leaving work soon please call back ASAP.      Action Taken: Message routed to:  [facility] Cardiology    Travel Screening: Not Applicable

## 2022-03-18 VITALS
SYSTOLIC BLOOD PRESSURE: 132 MMHG | RESPIRATION RATE: 18 BRPM | HEIGHT: 62 IN | OXYGEN SATURATION: 95 % | DIASTOLIC BLOOD PRESSURE: 75 MMHG | TEMPERATURE: 98.5 F | WEIGHT: 140 LBS | BODY MASS INDEX: 25.76 KG/M2 | HEART RATE: 59 BPM

## 2022-03-18 LAB
ALBUMIN SERPL-MCNC: 3.4 G/DL (ref 3.4–5)
ALP SERPL-CCNC: 58 U/L (ref 40–150)
ALT SERPL W P-5'-P-CCNC: 53 U/L (ref 0–50)
ANION GAP SERPL CALCULATED.3IONS-SCNC: 2 MMOL/L (ref 3–14)
AST SERPL W P-5'-P-CCNC: 34 U/L (ref 0–45)
BASOPHILS # BLD AUTO: 0.1 10E3/UL (ref 0–0.2)
BASOPHILS NFR BLD AUTO: 1 %
BILIRUB SERPL-MCNC: 0.3 MG/DL (ref 0.2–1.3)
BUN SERPL-MCNC: 24 MG/DL (ref 7–30)
CALCIUM SERPL-MCNC: 9.2 MG/DL (ref 8.5–10.1)
CHLORIDE BLD-SCNC: 110 MMOL/L (ref 94–109)
CO2 SERPL-SCNC: 31 MMOL/L (ref 20–32)
CREAT SERPL-MCNC: 1.08 MG/DL (ref 0.52–1.04)
EOSINOPHIL # BLD AUTO: 0.2 10E3/UL (ref 0–0.7)
EOSINOPHIL NFR BLD AUTO: 4 %
ERYTHROCYTE [DISTWIDTH] IN BLOOD BY AUTOMATED COUNT: 12.3 % (ref 10–15)
GFR SERPL CREATININE-BSD FRML MDRD: 50 ML/MIN/1.73M2
GLUCOSE BLD-MCNC: 93 MG/DL (ref 70–99)
HCT VFR BLD AUTO: 36.1 % (ref 35–47)
HGB BLD-MCNC: 11.4 G/DL (ref 11.7–15.7)
HOLD SPECIMEN: NORMAL
HOLD SPECIMEN: NORMAL
IMM GRANULOCYTES # BLD: 0 10E3/UL
IMM GRANULOCYTES NFR BLD: 0 %
LYMPHOCYTES # BLD AUTO: 1.5 10E3/UL (ref 0.8–5.3)
LYMPHOCYTES NFR BLD AUTO: 28 %
MCH RBC QN AUTO: 31.3 PG (ref 26.5–33)
MCHC RBC AUTO-ENTMCNC: 31.6 G/DL (ref 31.5–36.5)
MCV RBC AUTO: 99 FL (ref 78–100)
MONOCYTES # BLD AUTO: 0.8 10E3/UL (ref 0–1.3)
MONOCYTES NFR BLD AUTO: 14 %
NEUTROPHILS # BLD AUTO: 2.8 10E3/UL (ref 1.6–8.3)
NEUTROPHILS NFR BLD AUTO: 53 %
NRBC # BLD AUTO: 0 10E3/UL
NRBC BLD AUTO-RTO: 0 /100
PLATELET # BLD AUTO: 194 10E3/UL (ref 150–450)
POTASSIUM BLD-SCNC: 3.3 MMOL/L (ref 3.4–5.3)
PROT SERPL-MCNC: 7.1 G/DL (ref 6.8–8.8)
RBC # BLD AUTO: 3.64 10E6/UL (ref 3.8–5.2)
SODIUM SERPL-SCNC: 143 MMOL/L (ref 133–144)
WBC # BLD AUTO: 5.5 10E3/UL (ref 4–11)

## 2022-03-18 PROCEDURE — 36415 COLL VENOUS BLD VENIPUNCTURE: CPT | Performed by: EMERGENCY MEDICINE

## 2022-03-18 PROCEDURE — 80053 COMPREHEN METABOLIC PANEL: CPT | Performed by: EMERGENCY MEDICINE

## 2022-03-18 PROCEDURE — 85025 COMPLETE CBC W/AUTO DIFF WBC: CPT | Performed by: EMERGENCY MEDICINE

## 2022-03-18 PROCEDURE — 82040 ASSAY OF SERUM ALBUMIN: CPT | Performed by: EMERGENCY MEDICINE

## 2022-03-18 PROCEDURE — 250N000013 HC RX MED GY IP 250 OP 250 PS 637: Performed by: EMERGENCY MEDICINE

## 2022-03-18 RX ORDER — CLONIDINE HYDROCHLORIDE 0.1 MG/1
0.1 TABLET ORAL ONCE
Status: COMPLETED | OUTPATIENT
Start: 2022-03-18 | End: 2022-03-18

## 2022-03-18 RX ORDER — CARVEDILOL 12.5 MG/1
12.5 TABLET ORAL ONCE
Status: COMPLETED | OUTPATIENT
Start: 2022-03-18 | End: 2022-03-18

## 2022-03-18 RX ORDER — CLONIDINE HYDROCHLORIDE 0.1 MG/1
0.1 TABLET ORAL DAILY PRN
Qty: 20 TABLET | Refills: 0 | Status: SHIPPED | OUTPATIENT
Start: 2022-03-18 | End: 2023-01-09

## 2022-03-18 RX ADMIN — CLONIDINE HYDROCHLORIDE 0.1 MG: 0.1 TABLET ORAL at 04:35

## 2022-03-18 RX ADMIN — CARVEDILOL 12.5 MG: 12.5 TABLET, FILM COATED ORAL at 05:32

## 2022-03-18 NOTE — ED TRIAGE NOTES
"ED Triage Provider Note  [facility]  Encounter Date: Mar 17, 2022    History:  Chief Complaint   Patient presents with     Hypertension     Kamryn Miller is a 86 year old female who presents to the ED with concern for high blood pressure today. Follows with cardiology, has had medication changes this week due to medications making her feel like her head and ears are \"full\".  Patient saw her cardiologist yesterday, her dose of amlodipine was increased from 5 mg to 10 mg because her blood pressures were consistently systolic in the 170s.  Patient states that today the nurse in her care facility checked her blood pressure and it again was systolically in the 170s despite the fact that she took her higher dose of amlodipine this morning.  Patient then said she developed a headache and felt even more fullness in her ears so took her blood pressure on her cuff in her room and it was 200/100.  Patient denies any vision changes, reports headache has improved to a 2-3/10, denies chest pain or pressure, denies new or worsening shortness of breath (does note she has had ongoing feeling of shortness of breath for several months but that has not changed today), denies abdominal pain, nausea, vomiting, diarrhea.  In triage her blood pressure is 155/71 with heart rate of 68 normal respirations and O2 sats.  Patient is afebrile.      Review of Systems:  HTN    Exam:  BP (!) 155/71   Pulse 68   Temp 98.5  F (36.9  C) (Oral)   Resp 19   Ht 1.575 m (5' 2\")   Wt 63.5 kg (140 lb)   SpO2 96%   BMI 25.61 kg/m    General: No acute distress. Appears stated age.   Cardio: Regular rate, extremities well perfused  Resp: Normal work of breathing, grossly normal respiratory rate  Neuro: Alert. CN II-XII grossly intact. Grossly intact strength.       Medical Decision Making:  Patient arriving to the ED with problem as above. A medical screening exam was performed. no orders initiated from " Triage. The patient is appropriate to wait in triage.      JESSICA Jacobs CNP on 3/17/2022 at 9:04 PM

## 2022-03-18 NOTE — ED TRIAGE NOTES
"Patient arrives ambulatory to triage with daughter coming in from an assisted living.  Patient states she is her for high blood pressure that it is \"very high.\" Patient stated it was 200/100 this afternoon. Her cardiologist has been changing her blood pressure medication. They took away hydrochlourothiazide and started amlodipine, 5mg on Monday and 10mg yesterday. They saw a cardiologist yesterday.   179/95 this afternoon by the nurse then after dinner she checked it in her room and it was 200/100.   Patient states she has very plugged ears and pressure in her head. Denies chest pain and shortness of breath.   /71  Pulse 68  O2 96  "

## 2022-03-18 NOTE — DISCHARGE INSTRUCTIONS
Your laboratory work-up did not suggest any significant medical problems related to your high blood pressure.  Make sure to take your original blood pressure medications as prescribed.  I have added an additional medication (clonidine) to be taken as needed for any blood pressure over 180.  Make sure to call your cardiology team today to schedule follow-up appointment and for further recommendations.  Return to the ED with any new or worsening symptoms.

## 2022-03-19 ASSESSMENT — ENCOUNTER SYMPTOMS
NECK PAIN: 0
FREQUENCY: 0
HEADACHES: 1
ABDOMINAL PAIN: 0
CONSTIPATION: 0
COLOR CHANGE: 0
DIFFICULTY URINATING: 0
SHORTNESS OF BREATH: 0
BACK PAIN: 0
ARTHRALGIAS: 0
MYALGIAS: 0
FATIGUE: 0
WEAKNESS: 0
CONFUSION: 0
NAUSEA: 0
VOMITING: 0
DIARRHEA: 0
COUGH: 0
DYSURIA: 0
CHILLS: 0
DIZZINESS: 0
SORE THROAT: 0
PALPITATIONS: 0
EYE PAIN: 0
FEVER: 0
ABDOMINAL DISTENTION: 0
CHEST TIGHTNESS: 0

## 2022-03-19 NOTE — ED PROVIDER NOTES
ED Provider Note  [facility]      History     Chief Complaint   Patient presents with     Hypertension     HPI  Kamryn Miller is a 86 year old female with history of hypertension, mitral/tricuspid insufficiency, asthma, aortic stenosis, celiac disease, coronary artery disease, pulmonary hypertension, presents to the ED from a nursing home for evaluation of elevated blood pressure.  She follows with the cardiology service and had medications earlier this week.  Had recently started on hydrochlorothiazide but discontinued that because it made her head and ears feel full.  She was switched from hydrochlorothiazide to amlodipine.  She continued to have elevated blood pressures in the 170s and saw her cardiologist yesterday who increased her amlodipine from 5 to 10 mg.  Today, she notes generalized headache and continued fullness in her ears.  She took her blood pressure at nursing home and noted to be 200/100.  She thinks that she needs other blood pressure medication to be taken only as needed for when she has elevated blood pressure issues like this.  Denies vision change, motor weakness, sensory deficit, chest pain, shortness of breath, and has no other medical complaints at this time.    Past Medical History  Past Medical History:   Diagnosis Date     Aortic valvar stenosis 7/2010    mild     Asthma      Bipolar disorder (H)      CAD (coronary artery disease)     s/p angioplasty     Celiac disease      Colon polyps      Colon polyps 2012    every 3 year colonoscopy      Depression      High cholesterol      HTN      Mitral insufficiency      Pulmonary HTN (H)     mild     Tremors 7/10    drug induced from antidepressants     Tricuspid insufficiency      Past Surgical History:   Procedure Laterality Date     ANGIOGRAM  6/26/2009     ANGIOPLASTY  9/96    for angina     ANGIOPLASTY  8/03 - 9/03    X -2 - with stenst in coronary car.     APPENDECTOMY       BREAST BIOPSY, RT/LT      Breat  Biopsy RT/LT, benign     BUNIONECTOMY  11/8/2011    Procedure:BUNIONECTOMY; Left donna bunionectomy; Surgeon:FREDY LITTLEJOHN; Location:[facility]     BUNIONECTOMY RT/LT  5/2007    right bunion and right 2nd hammertoe     CATARACT IOL, RT/LT  6/12 and 7/12    bilateral     COLONOSCOPY  2007, 2012    every 3 years for polyps     CV CORONARY ANGIOGRAM N/A 8/21/2020    Procedure: CV CORONARY ANGIOGRAM;  Surgeon: Gianluca Toscano MD;  Location: [facility]     CV LEFT HEART CATH N/A 8/21/2020    Procedure: CV LEFT HEART CATH;  Surgeon: Gianluca Toscano MD;  Location: [facility]     CV LEFT HEART CATH N/A 11/3/2020    Procedure: CV LEFT HEART CATH;  Surgeon: Tom Burrows MD;  Location: [facility]     CV LOWER EXTREMITY ANGIOGRAM BILATERAL N/A 11/3/2020    Procedure: CV ANGIOGRAM LOWER EXTREMITY BILATERAL;  Surgeon: Tom Burrows MD;  Location: [facility]     CV PCI STENT DRUG ELUTING N/A 8/21/2020    Procedure: Percutaneous Coronary Intervention Stent Drug Eluting;  Surgeon: Gianluca Toscano MD;  Location: [facility]     CV RIGHT HEART CATH MEASUREMENTS RECORDED N/A 8/21/2020    Procedure: CV RIGHT HEART CATH;  Surgeon: Gianluca Tocsano MD;  Location: [facility]     CV RIGHT HEART CATH MEASUREMENTS RECORDED N/A 11/3/2020    Procedure: CV RIGHT HEART CATH;  Surgeon: Tom Burrows MD;  [facility]     JOINT REPLACEMENT, HIP RT/LT  4/2008    right hip replaced     JOINT REPLACEMENT, HIP RT/LT  4/2009    left hip replaced     REPAIR HAMMER TOE  11/8/2011    Procedure:REPAIR HAMMER TOE; left 2nd hammertoe repair; Surgeon:FREDY LITTLEJOHN; Location:[facility]     SURGICAL HISTORY OF -   11/11    left bunion and 2nd hammertoe repair     TUBAL LIGATION       ZZC ANESTH,BLEPHAROPLASTY      (R) for drooping eyelid     ZZC APPENDECTOMY       cloNIDine (CATAPRES) 0.1 MG tablet  albuterol  (PROAIR HFA/PROVENTIL HFA/VENTOLIN HFA) 108 (90 Base) MCG/ACT inhaler  alendronate (FOSAMAX) 70 MG tablet  amLODIPine (NORVASC) 5 MG tablet  aspirin 81 MG tablet  azelastine (ASTEPRO) 0.15 % nasal spray  Calcium Citrate (CITRACAL OR)  carvedilol (COREG) 12.5 MG tablet  CHOLECALCIFEROL 26929 UNIT PO CAPS  desvenlafaxine (PRISTIQ) 100 MG 24 hr tablet  fluticasone-salmeterol (ADVAIR DISKUS) 500-50 MCG/DOSE inhaler  lamoTRIgine (LAMICTAL) 25 MG tablet  LAMOTRIGINE 200 MG PO TABS  levothyroxine (SYNTHROID/LEVOTHROID) 50 MCG tablet  liothyronine (CYTOMEL) 5 MCG tablet  memantine (NAMENDA) 10 MG tablet  mirtazapine (REMERON) 7.5 MG tablet  Multiple Vitamin (TAB-A-JENNIFER) TABS  rosuvastatin (CRESTOR) 40 MG tablet      Allergies   Allergen Reactions     Ace Inhibitors Cough     Diclofenac Other (See Comments)     Balance problems     Gluten Meal Diarrhea     Family History  Family History   Problem Relation Age of Onset     Asthma Mother      Cerebrovascular Disease Mother      Arthritis Mother      Depression Mother      Lipids Sister      Hypertension Sister      Heart Disease Sister      Lipids Sister      Hypertension Sister      Lipids Sister      Breast Cancer Sister      Social History   Social History     Tobacco Use     Smoking status: Never Smoker     Smokeless tobacco: Never Used   Substance Use Topics     Alcohol use: No     Alcohol/week: 0.0 standard drinks     Types: 1 Standard drinks or equivalent per week     Drug use: No      Past medical history, past surgical history, medications, allergies, family history, and social history were reviewed with the patient. No additional pertinent items.       Review of Systems   Constitutional: Negative for chills, fatigue and fever.   HENT: Negative for congestion and sore throat.         Ear fullness     Eyes: Negative for pain and visual disturbance.   Respiratory: Negative for cough, chest tightness and shortness of breath.    Cardiovascular: Negative for chest pain and  "palpitations.   Gastrointestinal: Negative for abdominal distention, abdominal pain, constipation, diarrhea, nausea and vomiting.   Genitourinary: Negative for difficulty urinating, dysuria, frequency and urgency.   Musculoskeletal: Negative for arthralgias, back pain, myalgias and neck pain.   Skin: Negative for color change and rash.   Neurological: Positive for headaches. Negative for dizziness and weakness.   Psychiatric/Behavioral: Negative for confusion.     A complete review of systems was performed with pertinent positives and negatives noted in the HPI, and all other systems negative.    Physical Exam   BP: (!) 155/71  Pulse: 68  Temp: 98.5  F (36.9  C)  Resp: 19  Height: 157.5 cm (5' 2\")  Weight: 63.5 kg (140 lb)  SpO2: 96 %  Physical Exam  Vitals and nursing note reviewed.   Constitutional:       General: She is not in acute distress.     Appearance: Normal appearance. She is not ill-appearing or toxic-appearing.   HENT:      Head: Normocephalic and atraumatic.      Nose: Nose normal.      Mouth/Throat:      Mouth: Mucous membranes are moist.   Eyes:      Pupils: Pupils are equal, round, and reactive to light.   Cardiovascular:      Rate and Rhythm: Normal rate.      Pulses: Normal pulses.      Heart sounds: Normal heart sounds.   Pulmonary:      Effort: Pulmonary effort is normal. No respiratory distress.      Breath sounds: Normal breath sounds.   Abdominal:      General: Abdomen is flat. There is no distension.   Musculoskeletal:         General: No swelling or deformity. Normal range of motion.      Cervical back: Normal range of motion. No rigidity.   Skin:     General: Skin is warm.      Capillary Refill: Capillary refill takes less than 2 seconds.   Neurological:      General: No focal deficit present.      Mental Status: She is alert and oriented to person, place, and time.      Cranial Nerves: No cranial nerve deficit.      Sensory: No sensory deficit.      Motor: No weakness.      Coordination: " Coordination normal.      Gait: Gait normal.   Psychiatric:         Mood and Affect: Mood normal.           ED Course      Procedures       The medical record was reviewed and interpreted.  Current labs reviewed and interpreted.  Previous labs reviewed and interpreted.  Managed outpatient prescription medications.              Results for orders placed or performed during the hospital encounter of 03/17/22   Comprehensive metabolic panel     Status: Abnormal   Result Value Ref Range    Sodium 143 133 - 144 mmol/L    Potassium 3.3 (L) 3.4 - 5.3 mmol/L    Chloride 110 (H) 94 - 109 mmol/L    Carbon Dioxide (CO2) 31 20 - 32 mmol/L    Anion Gap 2 (L) 3 - 14 mmol/L    Urea Nitrogen 24 7 - 30 mg/dL    Creatinine 1.08 (H) 0.52 - 1.04 mg/dL    Calcium 9.2 8.5 - 10.1 mg/dL    Glucose 93 70 - 99 mg/dL    Alkaline Phosphatase 58 40 - 150 U/L    AST 34 0 - 45 U/L    ALT 53 (H) 0 - 50 U/L    Protein Total 7.1 6.8 - 8.8 g/dL    Albumin 3.4 3.4 - 5.0 g/dL    Bilirubin Total 0.3 0.2 - 1.3 mg/dL    GFR Estimate 50 (L) >60 mL/min/1.73m2   CBC with platelets and differential     Status: Abnormal   Result Value Ref Range    WBC Count 5.5 4.0 - 11.0 10e3/uL    RBC Count 3.64 (L) 3.80 - 5.20 10e6/uL    Hemoglobin 11.4 (L) 11.7 - 15.7 g/dL    Hematocrit 36.1 35.0 - 47.0 %    MCV 99 78 - 100 fL    MCH 31.3 26.5 - 33.0 pg    MCHC 31.6 31.5 - 36.5 g/dL    RDW 12.3 10.0 - 15.0 %    Platelet Count 194 150 - 450 10e3/uL    % Neutrophils 53 %    % Lymphocytes 28 %    % Monocytes 14 %    % Eosinophils 4 %    % Basophils 1 %    % Immature Granulocytes 0 %    NRBCs per 100 WBC 0 <1 /100    Absolute Neutrophils 2.8 1.6 - 8.3 10e3/uL    Absolute Lymphocytes 1.5 0.8 - 5.3 10e3/uL    Absolute Monocytes 0.8 0.0 - 1.3 10e3/uL    Absolute Eosinophils 0.2 0.0 - 0.7 10e3/uL    Absolute Basophils 0.1 0.0 - 0.2 10e3/uL    Absolute Immature Granulocytes 0.0 <=0.4 10e3/uL    Absolute NRBCs 0.0 10e3/uL   [place] Draw     Status: None    Narrative    The following  orders were created for panel order [place] Draw.  Procedure                               Abnormality         Status                     ---------                               -----------         ------                     Extra Blue Top Tube[[id number]]                              Final result               Extra Red Top Tube[[id number]]                               Final result                 Please view results for these tests on the individual orders.   Extra Blue Top Tube     Status: None   Result Value Ref Range    Hold Specimen JIC    Extra Red Top Tube     Status: None   Result Value Ref Range    Hold Specimen JIC    CBC with platelets differential     Status: Abnormal    Narrative    The following orders were created for panel order CBC with platelets differential.  Procedure                               Abnormality         Status                     ---------                               -----------         ------                     CBC with platelets and d...[[id number]]  Abnormal            Final result                 Please view results for these tests on the individual orders.     Medications   cloNIDine (CATAPRES) tablet 0.1 mg (0.1 mg Oral Given 3/18/22 0435)   carvedilol (COREG) tablet 12.5 mg (12.5 mg Oral Given 3/18/22 0532)        Assessments & Plan (with Medical Decision Making)   Patient presents to the ED for evaluation of elevated blood pressure.  Has a generalized headache and fullness in her ears.    Differential diagnosis includes hypertensive emergency, benign essential hypertension, electrolyte abnormality, TED.  On arrival, patient has normal mental status, do not suspect hypertensive encephalopathy.  Normal neuro exam and mild symptoms, low suspicion for intracranial hemorrhage.  Ear examination is unremarkable.    On arrival, patient's blood pressure is 155/71.  She was given her nighttime dose of carvedilol as well as an additional 0.1 mg of clonidine.  Patient's blood pressure  is now 132/75.  On reassessment, she is symptom-free.  Her headache and ear pain is resolved    Laboratory work-up is unremarkable.  No evidence of endorgan damage to suggest hypertensive emergency.  Normal renal function.  No leukocytosis.  Normal electrolytes.    Patient discharged in good condition.  I have provided a prescription for as needed clonidine to be used for blood pressure over 180.  I instructed patient and daughter to call cardiology team in the morning to schedule follow-up appointment for continued management of her chronic hypertension.  Invited to return to the ED with new or worsening symptoms.            I have reviewed the nursing notes. I have reviewed the findings, diagnosis, plan and need for follow up with the patient.    Discharge Medication List as of 3/18/2022  6:10 AM      START taking these medications    Details   cloNIDine (CATAPRES) 0.1 MG tablet Take 1 tablet (0.1 mg) by mouth daily as needed (For SBP > 180), Disp-20 tablet, R-0, Local Print             Final diagnoses:   Benign essential hypertension       --  Jamie Toro DO  [facility] EMERGENCY DEPARTMENT  3/17/2022     Jamie Toro DO  03/19/22 0228

## 2022-03-22 RX ORDER — LEVOTHYROXINE SODIUM 50 UG/1
TABLET ORAL
Qty: 28 TABLET | Refills: 11 | OUTPATIENT
Start: 2022-03-22

## 2022-03-23 ENCOUNTER — TELEPHONE (OUTPATIENT)
Dept: CARDIOLOGY | Facility: CLINIC | Age: 87
End: 2022-03-23
Payer: MEDICARE

## 2022-03-23 DIAGNOSIS — I10 ESSENTIAL HYPERTENSION: Primary | ICD-10-CM

## 2022-03-23 RX ORDER — CLONIDINE HYDROCHLORIDE 0.1 MG/1
0.1 TABLET ORAL PRN
Start: 2022-03-23 | End: 2022-03-23

## 2022-03-23 NOTE — TELEPHONE ENCOUNTER
Spoke with Kamryn. She is aware of 4/6 appointment and confirmed she has it written on her calendar. I notified her that all orders were faxed to her living facility this morning.    Becky MORGAN, Visit Facilitator

## 2022-03-23 NOTE — TELEPHONE ENCOUNTER
Pt in ED 3/17/22 for hypertension.   Follow up visit is scheduled with Tianna Luevano 4/6/22      ED note:  On arrival, patient's blood pressure is 155/71.  She was given her nighttime dose of carvedilol as well as an additional 0.1 mg of clonidine.  Patient's blood pressure is now 132/75.  On reassessment, she is symptom-free.  Her headache and ear pain is resolved    Laboratory work-up is unremarkable.  No evidence of endorgan damage to suggest hypertensive emergency.  Normal renal function.  No leukocytosis.  Normal electrolytes.    Patient discharged in good condition.  I have provided a prescription for as needed clonidine to be used for blood pressure over 180.  I instructed patient and daughter to call cardiology team in the morning to schedule follow-up appointment for continued management of her chronic hypertension.  Invited to return to the ED with new or worsening symptoms.

## 2022-03-23 NOTE — TELEPHONE ENCOUNTER
LVM for patient's daughter, Sarah, notifying her of the 4/6 appointment with Tianna Luevano in [place] and also mentioned that the RN faxed all orders to [facility]. Told Sarah to return call to clinic scheduler with any questions.     Becky MORGAN, Visit Facilitator

## 2022-03-29 DIAGNOSIS — J45.40 MODERATE PERSISTENT ASTHMA WITHOUT COMPLICATION: ICD-10-CM

## 2022-04-01 DIAGNOSIS — E78.5 HYPERLIPIDEMIA WITH TARGET LDL LESS THAN 70: ICD-10-CM

## 2022-04-01 RX ORDER — ROSUVASTATIN CALCIUM 40 MG/1
TABLET, COATED ORAL
Qty: 28 TABLET | Refills: 11 | Status: ON HOLD | OUTPATIENT
Start: 2022-04-01 | End: 2023-02-28

## 2022-04-05 DIAGNOSIS — N18.32 STAGE 3B CHRONIC KIDNEY DISEASE (H): Primary | ICD-10-CM

## 2022-04-05 RX ORDER — LEVOTHYROXINE SODIUM 50 UG/1
TABLET ORAL
Qty: 28 TABLET | Refills: 11 | Status: SHIPPED | OUTPATIENT
Start: 2022-04-05

## 2022-04-06 ENCOUNTER — OFFICE VISIT (OUTPATIENT)
Dept: CARDIOLOGY | Facility: CLINIC | Age: 87
End: 2022-04-06
Payer: MEDICARE

## 2022-04-06 VITALS
SYSTOLIC BLOOD PRESSURE: 132 MMHG | OXYGEN SATURATION: 99 % | BODY MASS INDEX: 26.3 KG/M2 | HEART RATE: 61 BPM | DIASTOLIC BLOOD PRESSURE: 72 MMHG | WEIGHT: 143.8 LBS

## 2022-04-06 DIAGNOSIS — I10 ESSENTIAL HYPERTENSION: Primary | ICD-10-CM

## 2022-04-06 DIAGNOSIS — I13.0: ICD-10-CM

## 2022-04-06 DIAGNOSIS — I50.31 ACUTE DIASTOLIC (CONGESTIVE) HEART FAILURE (H): ICD-10-CM

## 2022-04-06 DIAGNOSIS — E83.52 HYPERCALCEMIA: ICD-10-CM

## 2022-04-06 DIAGNOSIS — N18.32 STAGE 3B CHRONIC KIDNEY DISEASE (H): ICD-10-CM

## 2022-04-06 LAB
ANION GAP SERPL CALCULATED.3IONS-SCNC: 5 MMOL/L (ref 3–14)
BUN SERPL-MCNC: 31 MG/DL (ref 7–30)
CALCIUM SERPL-MCNC: 9.3 MG/DL (ref 8.5–10.1)
CHLORIDE BLD-SCNC: 109 MMOL/L (ref 94–109)
CO2 SERPL-SCNC: 28 MMOL/L (ref 20–32)
CREAT SERPL-MCNC: 1.23 MG/DL (ref 0.52–1.04)
GFR SERPL CREATININE-BSD FRML MDRD: 43 ML/MIN/1.73M2
GLUCOSE BLD-MCNC: 82 MG/DL (ref 70–99)
MAGNESIUM SERPL-MCNC: 2.4 MG/DL (ref 1.6–2.3)
NT-PROBNP SERPL-MCNC: 547 PG/ML (ref 0–450)
POTASSIUM BLD-SCNC: 4.2 MMOL/L (ref 3.4–5.3)
SODIUM SERPL-SCNC: 142 MMOL/L (ref 133–144)

## 2022-04-06 PROCEDURE — 84100 ASSAY OF PHOSPHORUS: CPT | Performed by: NURSE PRACTITIONER

## 2022-04-06 PROCEDURE — 82306 VITAMIN D 25 HYDROXY: CPT | Performed by: NURSE PRACTITIONER

## 2022-04-06 PROCEDURE — 80048 BASIC METABOLIC PNL TOTAL CA: CPT | Performed by: NURSE PRACTITIONER

## 2022-04-06 PROCEDURE — 82610 CYSTATIN C: CPT | Mod: 90 | Performed by: NURSE PRACTITIONER

## 2022-04-06 PROCEDURE — 99000 SPECIMEN HANDLING OFFICE-LAB: CPT | Performed by: NURSE PRACTITIONER

## 2022-04-06 PROCEDURE — 36415 COLL VENOUS BLD VENIPUNCTURE: CPT | Performed by: NURSE PRACTITIONER

## 2022-04-06 PROCEDURE — 83735 ASSAY OF MAGNESIUM: CPT | Performed by: STUDENT IN AN ORGANIZED HEALTH CARE EDUCATION/TRAINING PROGRAM

## 2022-04-06 PROCEDURE — 99213 OFFICE O/P EST LOW 20 MIN: CPT | Performed by: NURSE PRACTITIONER

## 2022-04-06 PROCEDURE — 83880 ASSAY OF NATRIURETIC PEPTIDE: CPT | Performed by: STUDENT IN AN ORGANIZED HEALTH CARE EDUCATION/TRAINING PROGRAM

## 2022-04-06 PROCEDURE — 83970 ASSAY OF PARATHORMONE: CPT | Performed by: NURSE PRACTITIONER

## 2022-04-06 RX ORDER — FUROSEMIDE 20 MG
20 TABLET ORAL DAILY PRN
Qty: 30 TABLET | Refills: 0 | Status: SHIPPED | OUTPATIENT
Start: 2022-04-06 | End: 2022-04-21

## 2022-04-06 RX ORDER — CARVEDILOL 6.25 MG/1
18.75 TABLET ORAL 2 TIMES DAILY
Qty: 90 TABLET | Refills: 3 | Status: SHIPPED | OUTPATIENT
Start: 2022-04-06 | End: 2022-06-14

## 2022-04-06 NOTE — PROGRESS NOTES
Cardiology Clinic Note      HPI: 87 yo F with PMHx of CAD s/p PCI X2 at [facility] and more recently x3 to mid-distal LAD, HLD, HTN, MR and TR and moderate aortic stenosis who presents here for follow up. Most recently seen 3/16/22 by Dr. Mg at which time her Norvasc was increased form 5-10 mg for HTN. Patient was also seen in the ED recently for ear pain and noted to have BP in the 150's SBP for which she was started on Clonodine 0.1 mg PRN, which she has taken a few times.     In brief review, patient was worked up for tavr but was found to have moderate AS repeat structural evaluation found unchanged gradients (5/2021->11/2021) and RHC (11/3/2020) revealed normal right ventricular filling pressures, mPAP of 20mmHg with PCWP borderline at 13 and CI of 2.4 by TD with a mean gradient of 24 across her AV and an area of 1.2cm2. She was doing well though slightly hypertensive and hydralazine 25mg was added once a day. Plan was to see her in 6 months with a follow up echo, however, seen sooner as she has been struggling with hypertension and her meds. In feb 23 her hydralazine was increased to 50mg TID and amlodipine discontinued (thought to be causing her edema) a few days later she stopped her hydralazine because of headache and hypotension. She has had the hydrochlorothiazide discontinued due to hypercalcemia and Lasix discontinued for an TED in the past.      Since her last visit, she has been feeling well. Her BP is still quite elevated and even higher at her home in the 170's SBP. She is noticing occasional swelling of her legs and SOB. Her weight is roughly the same.  Not very active but her residence has very long hallways which cause her to walk often. She eats healthy and does not salt her food. She denies any other symptoms such as light headedness, palpitations, chest pain, orthopnea. She lays flat without any pillows. She is able to walk without difficulty most days, only getting SOB and LE swelling  on occasion. Her recent BNP was elevated. She has not had recent kidney function or electrolytes checked. No other concerns voiced.     Current cardiac meds  Asa  Amlodipine 10mg d  Carvedilol 12.5mg BID  Rosuvastatin 40mg d       Current Outpatient Medications   Medication Sig Dispense Refill     albuterol (PROAIR HFA/PROVENTIL HFA/VENTOLIN HFA) 108 (90 Base) MCG/ACT inhaler Inhale 2 puffs into the lungs every 6 hours as needed for wheezing or shortness of breath / dyspnea 18 g 3     alendronate (FOSAMAX) 70 MG tablet Take 70 mg by mouth every 7 days        amLODIPine (NORVASC) 5 MG tablet Take 2 tablets (10 mg) by mouth daily 180 tablet 3     aspirin 81 MG tablet Take 81 mg by mouth daily        azelastine (ASTEPRO) 0.15 % nasal spray INSTILL 1 SPRAY INTO BOTH NOSTRILS DAILY 5 mL 1     Calcium Citrate (CITRACAL OR) Take 1 tablet by mouth daily.       carvedilol (COREG) 12.5 MG tablet Take 1 tablet (12.5 mg) by mouth 2 times daily 60 tablet 6     CHOLECALCIFEROL 52926 UNIT PO CAPS Take 50,000 Units by mouth once a week        cloNIDine (CATAPRES) 0.1 MG tablet Take 1 tablet (0.1 mg) by mouth daily as needed (For SBP > 180) 20 tablet 0     desvenlafaxine (PRISTIQ) 100 MG 24 hr tablet Take 100 mg by mouth daily       fluticasone-salmeterol (ADVAIR DISKUS) 500-50 MCG/DOSE inhaler INHALE 1 PUFF INTO THE LUNGS EVERY 12 HOURS 1 each 6     lamoTRIgine (LAMICTAL) 25 MG tablet Take 25 mg by mouth daily with 200 mg tablet for a total dose of 225 mg       LAMOTRIGINE 200 MG PO TABS Take 200 mg by mouth daily with 25 mg tablet for a total dose of 225 mg       levothyroxine (SYNTHROID/LEVOTHROID) 50 MCG tablet TAKE 1 TABLET BY MOUTH ONCE DAILY 28 tablet 11     liothyronine (CYTOMEL) 5 MCG tablet Take 2 tablets (10 mcg) by mouth daily 30 tablet 3     memantine (NAMENDA) 10 MG tablet Take 1 tablet (10 mg) by mouth daily 30 tablet 3     mirtazapine (REMERON) 7.5 MG tablet Take 1 tablet (7.5 mg) by mouth At Bedtime 30 tablet 3      Multiple Vitamin (TAB-A-JENNIFER) TABS TAKE 1 TABLET BY MOUTH ONCE DAILY 30 tablet PRN     rosuvastatin (CRESTOR) 40 MG tablet TAKE 1 TABLET BY MOUTH ONCE DAILY 28 tablet 11       Past Medical History:   Diagnosis Date     Aortic valvar stenosis 7/2010    mild     Asthma      Bipolar disorder (H)      CAD (coronary artery disease)     s/p angioplasty     Celiac disease      Colon polyps      Colon polyps 2012    every 3 year colonoscopy      Depression      High cholesterol      HTN      Mitral insufficiency      Pulmonary HTN (H)     mild     Tremors 7/10    drug induced from antidepressants     Tricuspid insufficiency        Past Surgical History:   Procedure Laterality Date     ANGIOGRAM  6/26/2009     ANGIOPLASTY  9/96    for angina     ANGIOPLASTY  8/03 - 9/03    X -2 - with stenst in coronary car.     APPENDECTOMY       BREAST BIOPSY, RT/LT      Breat Biopsy RT/LT, benign     BUNIONECTOMY  11/8/2011    Procedure:BUNIONECTOMY; Left donna bunionectomy; Surgeon:FREDY LITTLEJOHN; Location:[facility]     BUNIONECTOMY RT/LT  5/2007    right bunion and right 2nd hammertoe     CATARACT IOL, RT/LT  6/12 and 7/12    bilateral     COLONOSCOPY  2007, 2012    every 3 years for polyps     CV CORONARY ANGIOGRAM N/A 8/21/2020    Procedure: CV CORONARY ANGIOGRAM;  Surgeon: Gianluca Toscano MD;  Location: [facility]     CV LEFT HEART CATH N/A 8/21/2020    Procedure: CV LEFT HEART CATH;  Surgeon: Gianluca Toscano MD;  Location: [facility]     CV LEFT HEART CATH N/A 11/3/2020    Procedure: CV LEFT HEART CATH;  Surgeon: Tom Burrows MD;  Location: [facility]     CV LOWER EXTREMITY ANGIOGRAM BILATERAL N/A 11/3/2020    Procedure: CV ANGIOGRAM LOWER EXTREMITY BILATERAL;  Surgeon: Tom Burrows MD;  Location: [facility]     CV PCI STENT DRUG ELUTING N/A 8/21/2020    Procedure: Percutaneous Coronary Intervention Stent Drug Eluting;  Surgeon: Dorcas  MD Gianluca;  [facility]     CV RIGHT HEART CATH MEASUREMENTS RECORDED N/A 8/21/2020    Procedure: CV RIGHT HEART CATH;  Surgeon: Gianluca Toscano MD;  [facility]     CV RIGHT HEART CATH MEASUREMENTS RECORDED N/A 11/3/2020    Procedure: CV RIGHT HEART CATH;  Surgeon: Tom Burrows MD;  Location: [facility]     JOINT REPLACEMENT, HIP RT/LT  4/2008    right hip replaced     JOINT REPLACEMENT, HIP RT/LT  4/2009    left hip replaced     REPAIR HAMMER TOE  11/8/2011    Procedure:REPAIR HAMMER TOE; left 2nd hammertoe repair; Surgeon:FREDY LITTLEJOHN; Location:[facility]     SURGICAL HISTORY OF -   11/11    left bunion and 2nd hammertoe repair     TUBAL LIGATION       ZZC ANESTH,BLEPHAROPLASTY      (R) for drooping eyelid     ZZC APPENDECTOMY         Family History   Problem Relation Age of Onset     Asthma Mother      Cerebrovascular Disease Mother      Arthritis Mother      Depression Mother      Lipids Sister      Hypertension Sister      Heart Disease Sister      Lipids Sister      Hypertension Sister      Lipids Sister      Breast Cancer Sister        Social History     Tobacco Use     Smoking status: Never Smoker     Smokeless tobacco: Never Used   Substance Use Topics     Alcohol use: No     Alcohol/week: 0.0 standard drinks     Types: 1 Standard drinks or equivalent per week       Allergies   Allergen Reactions     Ace Inhibitors Cough     Diclofenac Other (See Comments)     Balance problems     Gluten Meal Diarrhea         ROS:   Negative besides that noted in HPI      Physical Examination:  Vitals: BP (!) 159/78 (BP Location: Right arm, Patient Position: Sitting, Cuff Size: Adult Regular)   Pulse 61   Wt 65.2 kg (143 lb 12.8 oz)   SpO2 99%   BMI 26.30 kg/m    BMI= Body mass index is 26.3 kg/m .    GENERAL APPEARANCE: healthy, alert and no distress  HEENT: no icterus  NECK: JVP is not visible  CHEST: lungs clear to auscultation - no  rales, rhonchi or wheezes  CARDIOVASCULAR: regular rhythm, normal S1, S2, 1+ edema  EXTREMITIES: no clubbing, cyanosis or edema  NEURO: alert and oriented to person/place/time, normal speech  VASC: Peripheral pulses are normal in volumes and symmetric bilaterally.   SKIN: no ecchymoses, no rashes    Laboratory/Imaging:  Testing/Procedures:  I personally reviewed all the following information:        Coronary angiogram 8/21/2020:    Dist LAD lesion is 70% stenosed.    Mid LAD-1 lesion is 80% stenosed.    Mid LAD-2 lesion is 60% stenosed.    Mid RCA lesion is 40% stenosed.    Prox RCA lesion is 40% stenosed.    Right sided filling pressures are normal.    Normal PA pressures.    Left sided filling pressures are normal.    Reduced cardiac output.  1. LAD - 3 EDDIE were placed in the mid to distal LAD (2.5 x 28 mm, 3.5 x 12 mm, and 3.0 x 8 mm)  2. RHC showed normal biventricular filling pressures. Normal PA pressure. Reduced cardiac output.     RHC/coronary angiogram 11/3/20:    Moderate aortic stenosis -valve area 1.2 cm2,Mean gradient 24 mm Hg    Right sided filling pressures are normal.    Normal PA pressures.    Left sided filling pressures are normal.    Mildly reduced cardiac output level.    Peripheral angiogram done and IVUS used to assess the vessel lumens of the descending thoracic aorta,right and left external iliac arteries     TTE 5/3/21:  Global and regional left ventricular function is normal with an EF of 55-60%.  The right ventricle is normal size. Global right ventricular function is normal.  Moderate to severe aortic stenosis, aortic valve area 1.02 cm2, peak velocity  3.3 m/s, mean gradient 25 mmHg, stroke volume index 40 ml/m2, DVI 0.29.  This study was compared with the study from 9/16/2020. The aortic valve peak velocity and mean gradient are similar     TTE 11/1/21:  Global and regional left ventricular function is normal with an EF of 55-60%.  Right ventricular function, chamber size, wall motion,  and thickness are normal.  Moderate aortic valve calcification is present. The mean gradient across the aortic valve is 23 mmHg. The peak aortic velocity is 3.1 m/sec. Moderate aortic stenosis is present. Calculated valve area lower than previous study due to LVOT diameter measurement.  Moderate aortic stenosis is present.  No significant changes noted.      Assessment:  Kamryn Miller is a 86 year old female with history of  history of CAD s/p [facility], P HTN, MR and TR, mod AS  who presents to cardiology clinic today for further management of hypertension.      # HTN  -currently on amlodipine 10mg daily  - carvedilol 12.5mg BID-- increase to 18.75 mg BID  - she seems hypervolemic on my exam understand not on diuretics before given hypercalcemia/TED will get updated BMP today and start PRN diuretic    # HFpEF?  - Echo upcoming. Now with SOB, LE swelling on occasion. BNP elevated previously, add on now. If found to have HFpEF, may want to stop BB, consder Aldactone if kidney function improves.      # Moderate aortic stenosis  Following with structural clinic and Dr Zuniga- plan was to follow up with echo in July which is scheduled     # CAD s/p PCI x3 mid distal LAD 8/21/2020  - on asa and statin  - symptom free     Recommendations:  - Increase Coreg to 18.75 mg BID  - Start PRN Lasix with understanding caution given hx of TED after daily lasix and hypercalcemia with hydrochlorothiazide.   - BMP, BNP and Mag today  - Recommendation increasing amount ofexercise /week   - discussed the need for heart healthy diet including a diet rich in fruits, vegetables and fiber and low on carbonated beverages sugar  - Follow up 1 month if symptoms ongoing. Already has July follow up scheduled with echo.   - Compression stockings    Time Spent on this Encounter   I spent 22 minutes on managing the care of Kamryn Miller. Over 50% of my time was spent on the following:   - Counseling the patient and/or family regarding:  diagnostic results, prognosis, risks and benefits of treatment options, recommended follow-up, medical compliance and prevention of disease  - Coordination of care with the: care coordinator/ and nurse    JESSICA Bang CNP, APRN, CNP  [facility]  [phone]

## 2022-04-06 NOTE — NURSING NOTE
"Chief Complaint   Patient presents with     [facility] follow up ED 3/17/22,Patient reports having little GREGORY       Initial /72   Pulse 61   Wt 65.2 kg (143 lb 12.8 oz)   SpO2 99%   BMI 26.30 kg/m   Estimated body mass index is 26.3 kg/m  as calculated from the following:    Height as of 3/17/22: 1.575 m (5' 2\").    Weight as of this encounter: 65.2 kg (143 lb 12.8 oz)..  BP completed using cuff size: zi Whitman CMA        "

## 2022-04-06 NOTE — LETTER
4/6/2022      RE: Kamryn Miller  [address]  [address]  [place] [address]       Dear Colleague,    Thank you for the opportunity to participate in the care of your patient, Kamryn Miller, at the [facility] HEART CLINIC [facility]. Please see a copy of my visit note below.          Cardiology Clinic Note      HPI: 85 yo F with PMHx of CAD s/p PCI X2 at [facility] and more recently x3 to mid-distal LAD, HLD, HTN, MR and TR and moderate aortic stenosis who presents here for follow up. Most recently seen 3/16/22 by Dr. Mg at which time her Norvasc was increased form 5-10 mg for HTN. Patient was also seen in the ED recently for ear pain and noted to have BP in the 150's SBP for which she was started on Clonodine 0.1 mg PRN, which she has taken a few times.     In brief review, patient was worked up for tavr but was found to have moderate AS repeat structural evaluation found unchanged gradients (5/2021->11/2021) and RHC (11/3/2020) revealed normal right ventricular filling pressures, mPAP of 20mmHg with PCWP borderline at 13 and CI of 2.4 by TD with a mean gradient of 24 across her AV and an area of 1.2cm2. She was doing well though slightly hypertensive and hydralazine 25mg was added once a day. Plan was to see her in 6 months with a follow up echo, however, seen sooner as she has been struggling with hypertension and her meds. In feb 23 her hydralazine was increased to 50mg TID and amlodipine discontinued (thought to be causing her edema) a few days later she stopped her hydralazine because of headache and hypotension. She has had the hydrochlorothiazide discontinued due to hypercalcemia and Lasix discontinued for an TED in the past.      Since her last visit, she has been feeling well. Her BP is still quite elevated and even higher at her home in the 170's SBP. She is noticing occasional swelling of her legs and SOB. Her weight is  roughly the same.  Not very active but her residence has very long hallways which cause her to walk often. She eats healthy and does not salt her food. She denies any other symptoms such as light headedness, palpitations, chest pain, orthopnea. She lays flat without any pillows. She is able to walk without difficulty most days, only getting SOB and LE swelling on occasion. Her recent BNP was elevated. She has not had recent kidney function or electrolytes checked. No other concerns voiced.     Current cardiac meds  Asa  Amlodipine 10mg d  Carvedilol 12.5mg BID  Rosuvastatin 40mg d       Current Outpatient Medications   Medication Sig Dispense Refill     albuterol (PROAIR HFA/PROVENTIL HFA/VENTOLIN HFA) 108 (90 Base) MCG/ACT inhaler Inhale 2 puffs into the lungs every 6 hours as needed for wheezing or shortness of breath / dyspnea 18 g 3     alendronate (FOSAMAX) 70 MG tablet Take 70 mg by mouth every 7 days        amLODIPine (NORVASC) 5 MG tablet Take 2 tablets (10 mg) by mouth daily 180 tablet 3     aspirin 81 MG tablet Take 81 mg by mouth daily        azelastine (ASTEPRO) 0.15 % nasal spray INSTILL 1 SPRAY INTO BOTH NOSTRILS DAILY 5 mL 1     Calcium Citrate (CITRACAL OR) Take 1 tablet by mouth daily.       carvedilol (COREG) 12.5 MG tablet Take 1 tablet (12.5 mg) by mouth 2 times daily 60 tablet 6     CHOLECALCIFEROL 14548 UNIT PO CAPS Take 50,000 Units by mouth once a week        cloNIDine (CATAPRES) 0.1 MG tablet Take 1 tablet (0.1 mg) by mouth daily as needed (For SBP > 180) 20 tablet 0     desvenlafaxine (PRISTIQ) 100 MG 24 hr tablet Take 100 mg by mouth daily       fluticasone-salmeterol (ADVAIR DISKUS) 500-50 MCG/DOSE inhaler INHALE 1 PUFF INTO THE LUNGS EVERY 12 HOURS 1 each 6     lamoTRIgine (LAMICTAL) 25 MG tablet Take 25 mg by mouth daily with 200 mg tablet for a total dose of 225 mg       LAMOTRIGINE 200 MG PO TABS Take 200 mg by mouth daily with 25 mg tablet for a total dose of 225 mg        levothyroxine (SYNTHROID/LEVOTHROID) 50 MCG tablet TAKE 1 TABLET BY MOUTH ONCE DAILY 28 tablet 11     liothyronine (CYTOMEL) 5 MCG tablet Take 2 tablets (10 mcg) by mouth daily 30 tablet 3     memantine (NAMENDA) 10 MG tablet Take 1 tablet (10 mg) by mouth daily 30 tablet 3     mirtazapine (REMERON) 7.5 MG tablet Take 1 tablet (7.5 mg) by mouth At Bedtime 30 tablet 3     Multiple Vitamin (TAB-A-JENNIFER) TABS TAKE 1 TABLET BY MOUTH ONCE DAILY 30 tablet PRN     rosuvastatin (CRESTOR) 40 MG tablet TAKE 1 TABLET BY MOUTH ONCE DAILY 28 tablet 11       Past Medical History:   Diagnosis Date     Aortic valvar stenosis 7/2010    mild     Asthma      Bipolar disorder (H)      CAD (coronary artery disease)     s/p angioplasty     Celiac disease      Colon polyps      Colon polyps 2012    every 3 year colonoscopy      Depression      High cholesterol      HTN      Mitral insufficiency      Pulmonary HTN (H)     mild     Tremors 7/10    drug induced from antidepressants     Tricuspid insufficiency        Past Surgical History:   Procedure Laterality Date     ANGIOGRAM  6/26/2009     ANGIOPLASTY  9/96    for angina     ANGIOPLASTY  8/03 - 9/03    X -2 - with stenst in coronary car.     APPENDECTOMY       BREAST BIOPSY, RT/LT      Breat Biopsy RT/LT, benign     BUNIONECTOMY  11/8/2011    Procedure:BUNIONECTOMY; Left donna bunionectomy; Surgeon:FREDY LITTLEJOHN; Location:[facility] OR     BUNIONECTOMY RT/LT  5/2007    right bunion and right 2nd hammertoe     CATARACT IOL, RT/LT  6/12 and 7/12    bilateral     COLONOSCOPY  2007, 2012    every 3 years for polyps     CV CORONARY ANGIOGRAM N/A 8/21/2020    Procedure: CV CORONARY ANGIOGRAM;  Surgeon: Gianluca Toscano MD;  Location:  HEART CARDIAC CATH LAB     CV LEFT HEART CATH N/A 8/21/2020    Procedure: CV LEFT HEART CATH;  Surgeon: Gianluca Toscano MD;  [facility]     CV LEFT HEART CATH N/A 11/3/2020    Procedure: CV LEFT HEART CATH;  Surgeon:  Tom Burrows MD;  Location:  HEART CARDIAC CATH LAB     CV LOWER EXTREMITY ANGIOGRAM BILATERAL N/A 11/3/2020    Procedure: CV ANGIOGRAM LOWER EXTREMITY BILATERAL;  Surgeon: Tom Burrows MD;  Location:  HEART CARDIAC CATH LAB     CV PCI STENT DRUG ELUTING N/A 8/21/2020    Procedure: Percutaneous Coronary Intervention Stent Drug Eluting;  Surgeon: Gianluca Toscano MD;  Location:  HEART CARDIAC CATH LAB     CV RIGHT HEART CATH MEASUREMENTS RECORDED N/A 8/21/2020    Procedure: CV RIGHT HEART CATH;  Surgeon: Gianluca Toscano MD;  [facility]     CV RIGHT HEART CATH MEASUREMENTS RECORDED N/A 11/3/2020    Procedure: CV RIGHT HEART CATH;  Surgeon: Tom Burrows MD;  Location:  HEART CARDIAC CATH LAB     JOINT REPLACEMENT, HIP RT/LT  4/2008    right hip replaced     JOINT REPLACEMENT, HIP RT/LT  4/2009    left hip replaced     REPAIR HAMMER TOE  11/8/2011    Procedure:REPAIR HAMMER TOE; left 2nd hammertoe repair; Surgeon:FREDY LITTLEJOHN; Location:[facility] OR     SURGICAL HISTORY OF -   11/11    left bunion and 2nd hammertoe repair     TUBAL LIGATION       ZZC ANESTH,BLEPHAROPLASTY      (R) for drooping eyelid     ZZC APPENDECTOMY         Family History   Problem Relation Age of Onset     Asthma Mother      Cerebrovascular Disease Mother      Arthritis Mother      Depression Mother      Lipids Sister      Hypertension Sister      Heart Disease Sister      Lipids Sister      Hypertension Sister      Lipids Sister      Breast Cancer Sister        Social History     Tobacco Use     Smoking status: Never Smoker     Smokeless tobacco: Never Used   Substance Use Topics     Alcohol use: No     Alcohol/week: 0.0 standard drinks     Types: 1 Standard drinks or equivalent per week       Allergies   Allergen Reactions     Ace Inhibitors Cough     Diclofenac Other (See Comments)     Balance problems     Gluten Meal Diarrhea         ROS:   Negative besides that noted in  HPI      Physical Examination:  Vitals: BP (!) 159/78 (BP Location: Right arm, Patient Position: Sitting, Cuff Size: Adult Regular)   Pulse 61   Wt 65.2 kg (143 lb 12.8 oz)   SpO2 99%   BMI 26.30 kg/m    BMI= Body mass index is 26.3 kg/m .    GENERAL APPEARANCE: healthy, alert and no distress  HEENT: no icterus  NECK: JVP is not visible  CHEST: lungs clear to auscultation - no rales, rhonchi or wheezes  CARDIOVASCULAR: regular rhythm, normal S1, S2, 1+ edema  EXTREMITIES: no clubbing, cyanosis or edema  NEURO: alert and oriented to person/place/time, normal speech  VASC: Peripheral pulses are normal in volumes and symmetric bilaterally.   SKIN: no ecchymoses, no rashes    Laboratory/Imaging:  Testing/Procedures:  I personally reviewed all the following information:        Coronary angiogram 8/21/2020:    Dist LAD lesion is 70% stenosed.    Mid LAD-1 lesion is 80% stenosed.    Mid LAD-2 lesion is 60% stenosed.    Mid RCA lesion is 40% stenosed.    Prox RCA lesion is 40% stenosed.    Right sided filling pressures are normal.    Normal PA pressures.    Left sided filling pressures are normal.    Reduced cardiac output.  1. LAD - 3 EDDIE were placed in the mid to distal LAD (2.5 x 28 mm, 3.5 x 12 mm, and 3.0 x 8 mm)  2. RHC showed normal biventricular filling pressures. Normal PA pressure. Reduced cardiac output.     RHC/coronary angiogram 11/3/20:    Moderate aortic stenosis -valve area 1.2 cm2,Mean gradient 24 mm Hg    Right sided filling pressures are normal.    Normal PA pressures.    Left sided filling pressures are normal.    Mildly reduced cardiac output level.    Peripheral angiogram done and IVUS used to assess the vessel lumens of the descending thoracic aorta,right and left external iliac arteries     TTE 5/3/21:  Global and regional left ventricular function is normal with an EF of 55-60%.  The right ventricle is normal size. Global right ventricular function is normal.  Moderate to severe aortic stenosis,  aortic valve area 1.02 cm2, peak velocity  3.3 m/s, mean gradient 25 mmHg, stroke volume index 40 ml/m2, DVI 0.29.  This study was compared with the study from 9/16/2020. The aortic valve peak velocity and mean gradient are similar     TTE 11/1/21:  Global and regional left ventricular function is normal with an EF of 55-60%.  Right ventricular function, chamber size, wall motion, and thickness are normal.  Moderate aortic valve calcification is present. The mean gradient across the aortic valve is 23 mmHg. The peak aortic velocity is 3.1 m/sec. Moderate aortic stenosis is present. Calculated valve area lower than previous study due to LVOT diameter measurement.  Moderate aortic stenosis is present.  No significant changes noted.      Assessment:  Kamryn Miller is a 86 year old female with history of  history of CAD s/p PCI [facility] at [facility], P HTN, MR and TR, mod AS  who presents to cardiology clinic today for further management of hypertension.      # HTN  -currently on amlodipine 10mg daily  - carvedilol 12.5mg BID-- increase to 18.75 mg BID  - she seems hypervolemic on my exam understand not on diuretics before given hypercalcemia/TED will get updated BMP today and start PRN diuretic    # HFpEF?  - Echo upcoming. Now with SOB, LE swelling on occasion. BNP elevated previously, add on now. If found to have HFpEF, may want to stop BB, consder Aldactone if kidney function improves.      # Moderate aortic stenosis  Following with structural clinic and Dr Zuniga- plan was to follow up with echo in July which is scheduled     # CAD s/p PCI x3 mid distal LAD 8/21/2020  - on asa and statin  - symptom free     Recommendations:  - Increase Coreg to 18.75 mg BID  - Start PRN Lasix with understanding caution given hx of ETD after daily lasix and hypercalcemia with hydrochlorothiazide.   - BMP, BNP and Mag today  - Recommendation increasing amount ofexercise /week   - discussed the need for heart healthy diet including a diet  rich in fruits, vegetables and fiber and low on carbonated beverages sugar  - Follow up 1 month if symptoms ongoing. Already has July follow up scheduled with echo.   - Compression stockings    Time Spent on this Encounter   I spent 22 minutes on managing the care of Kamryn Miller. Over 50% of my time was spent on the following:   - Counseling the patient and/or family regarding: diagnostic results, prognosis, risks and benefits of treatment options, recommended follow-up, medical compliance and prevention of disease  - Coordination of care with the: care coordinator/ and nurse    JESSICA Bang CNP, APRN, CNP  [facility]  [phone]

## 2022-04-06 NOTE — PATIENT INSTRUCTIONS
Thank you for coming to the [facility]; please note the following instructions:    1. BMP, mag, BNP labs now  2. Echo already scheduled  3. Increased Coreg to 18.75 mg twice daily  4. Added as needed lasix for when lower extremity swelling, shortness of breath are bothering patient. Please call if using more than 2 x per week  5. Follow up one month        If you have any questions regarding your visit please contact your care team:     Cardiology  Telephone Number   Alisa BROWNLEE., RN  Jennifer VITALE, RN   Daniela DELGADO, RMA  Fidelina BOUCHER, RMA  Earlene Whitman., [facility]  Becky MORGAN, Visit Facilitator   [phone] (option 1)   For scheduling appts:     [phone] (select option 1)       For the Device Clinic (Pacemakers and ICD's)  RN's :  Delia Vazquez   During business hours: [phone]    *After business hours:  [phone] (select option 4)      Normal test result notifications will be released via [employer] or mailed within 7 business days.  All other test results, will be communicated via telephone once reviewed by your cardiologist.    If you need a medication refill please contact your pharmacy.  Please allow 3 business days for your refill to be completed.    As always, thank you for trusting us with your health care needs!    Patient Education     Furosemide Oral Tablet 20 mg  Uses  This medicine is used for the following purposes:    high blood pressure    swelling  Instructions  This medicine may be taken with or without food.  It is very important that you take the medicine at about the same time every day. It will work best if you do this.  Keep the medicine at room temperature. Avoid heat and direct light.  This medicine will make you urinate more. If you have difficulty passing urine, please tell your doctor.  This medicine may cause you to become more sensitive to the sun. Use sunscreen or wear protective clothing when you are exposed to the sun.  It is important that you keep  taking each dose of this medicine on time even if you are feeling well.  If you forget to take a dose on time, take it as soon as you remember. If it is almost time for the next dose, do not take the missed dose. Return to your normal dosing schedule. Do not take 2 doses of this medicine at one time.  Please tell your doctor and pharmacist about all the medicines you take. Include both prescription and over-the-counter medicines. Also tell them about any vitamins, herbal medicines, or anything else you take for your health.  Do not suddenly stop taking this medicine. Check with your doctor before stopping.  It is very important that you follow your doctor's instructions for all blood tests.  This medicine may interfere with some lab test results. Be sure to tell all your healthcare providers that you are taking this medicine.  Cautions  Tell your doctor and pharmacist if you ever had an allergic reaction to a medicine. Symptoms of an allergic reaction can include trouble breathing, skin rash, itching, swelling, or severe dizziness.  Some patients taking this medicine have experienced serious side effects. Please speak with your doctor to understand the risks and benefits associated with this medicine.  Do not use the medication any more than instructed.  This medicine may cause dizziness or fainting, especially after exercising or in hot weather. Be very careful when standing or sitting up quickly.  Your ability to stay alert or to react quickly may be impaired by this medicine. Do not drive or operate machinery until you know how this medicine will affect you.  Please check with your doctor before drinking alcohol while on this medicine.  Tell the doctor or pharmacist if you are pregnant, planning to be pregnant, or breastfeeding.  Ask your pharmacist if this medicine can interact with any of your other medicines. Be sure to tell them about all the medicines you take.  Do not start or stop any other medicines  without first speaking to your doctor or pharmacist.  Do not share this medicine with anyone who has not been prescribed this medicine.  Side Effects  The following is a list of some common side effects from this medicine. Please speak with your doctor about what you should do if you experience these or other side effects.    constipation    dizziness    dry mouth    lack of energy and tiredness    headaches    high blood sugar    low blood pressure    liver problems    skin irritation such as redness, itching, rash, or burning    stomach upset or abdominal pain    increased risk of sunburn    increased urinary frequency    blurring or changes of vision  If you have any of the following side effects, you may be getting too much medicine. Please contact your doctor to let them know about these side effects.    confusion    drowsiness or sedation    fainting    irritability    muscle cramps    muscle pain    tight or rigid muscles    muscle weakness    feeling of numbness or tingling in your hands and feet    thirst    unsteadiness while walking    urinating less often    dark urine  Call your doctor or get medical help right away if you notice any of these more serious side effects:    shallow, irregular breathing    unusual bruising or discoloration on skin    changes in memory, mood, or thinking    ear problems (ringing in the ears, hearing loss)    fast or irregular heart beats    kidney problems    kidney stones    symptoms of liver damage (such as yellowing of skin or eyes, dark urine, unusual tiredness or weakness; severe stomach or back pain)    seizures    light colored stool    unusual or unexplained tiredness or weakness    severe or persistent vomiting  A few people may have an allergic reactions to this medicine. Symptoms can include difficulty breathing, skin rash, itching, swelling, or severe dizziness. If you notice any of these symptoms, seek medical help quickly.  Extra  Please speak with your  doctor, nurse, or pharmacist if you have any questions about this medicine.  [email or web address]  IMPORTANT NOTE: This document tells you briefly how to take your medicine, but it does not tell you all there is to know about it.Your doctor or pharmacist may give you other documents about your medicine. Please talk to them if you have any questions.Always follow their advice. There is a more complete description of this medicine available in English.Scan this code on your smartphone or tablet or use the web address below. You can also ask your pharmacist for a printout. If you have any questions, please ask your pharmacist.     2021 [employer].

## 2022-04-07 DIAGNOSIS — E83.52 HYPERCALCEMIA: Primary | ICD-10-CM

## 2022-04-07 LAB
CYSTATIN C SERPL-MCNC: 1.6 MG/L
GFR/BSA.PRED SERPLBLD CYS-BASED-ARV: 35 ML/MIN/BSA
PHOSPHATE SERPL-MCNC: 3.5 MG/DL (ref 2.5–4.5)
PTH-INTACT SERPL-MCNC: 75 PG/ML

## 2022-04-08 LAB — DEPRECATED CALCIDIOL+CALCIFEROL SERPL-MC: 57 UG/L (ref 20–75)

## 2022-04-20 ENCOUNTER — TELEPHONE (OUTPATIENT)
Dept: CARDIOLOGY | Facility: CLINIC | Age: 87
End: 2022-04-20
Payer: MEDICARE

## 2022-04-20 ENCOUNTER — MEDICAL CORRESPONDENCE (OUTPATIENT)
Dept: HEALTH INFORMATION MANAGEMENT | Facility: CLINIC | Age: 87
End: 2022-04-20
Payer: MEDICARE

## 2022-04-20 DIAGNOSIS — I10 ESSENTIAL HYPERTENSION: ICD-10-CM

## 2022-04-20 DIAGNOSIS — R60.0 BILATERAL LOWER EXTREMITY EDEMA: Primary | ICD-10-CM

## 2022-04-20 DIAGNOSIS — N18.32 STAGE 3B CHRONIC KIDNEY DISEASE (H): Primary | ICD-10-CM

## 2022-04-20 NOTE — TELEPHONE ENCOUNTER
Spoke to Katherine nurse, who reports that patient is complaining of increased swelling bilat ankles and feet.  Making it harder for her to get her shoes on.  She did have a 2 pounds weight gain over the weekend, was given the PRN dose of Lasix 20 mg and she is back to baseline today at 146.7 pounds.    Denies worsening shortness of breath, chest pain, dizziness    Recently seen by Tianna Luevano, MAGALY and carvedilol was increased to 18.25 mg twice daily.  She is also on amlodipine 10 mg daily and lasix 20 mg daily as needed for increased weight gain.    Caller is wanting orders for TEDS stockings or any change in medication regimen.  BP and weight chart faxed to clinic.  Will review fax and then discuss with Tianna Luevano recommendations.    Alisa Shook RN  Cardiology Care Coordinator  [facility]  [phone] option 1

## 2022-04-20 NOTE — TELEPHONE ENCOUNTER
[facility] Call Center    Phone Message    May a detailed message be left on voicemail: yes     Reason for Call: Symptoms or Concerns     If patient has red-flag symptoms, warm transfer to triage line    Current symptom or concern: bilateral ankle swelling.  Pt has noticed this for a little while and got worse when her medication was increased.  Katherine will be faxing over current weight and Bp.    Symptoms have been present for: unknown - reporting a little while    Has patient previously been seen for this? No    By :    Date:     Are there any new or worsening symptoms? No      Action Taken: Other: Cardiology    Travel Screening: Not Applicable

## 2022-04-21 DIAGNOSIS — I10 ESSENTIAL HYPERTENSION: ICD-10-CM

## 2022-04-21 RX ORDER — FUROSEMIDE 20 MG
20 TABLET ORAL DAILY
Qty: 30 TABLET | Refills: 0 | Status: SHIPPED | OUTPATIENT
Start: 2022-04-21 | End: 2022-06-14

## 2022-04-21 RX ORDER — POTASSIUM CHLORIDE 1500 MG/1
20 TABLET, EXTENDED RELEASE ORAL DAILY
Qty: 30 TABLET | Refills: 4 | Status: SHIPPED | OUTPATIENT
Start: 2022-04-21 | End: 2022-04-21

## 2022-04-21 RX ORDER — FUROSEMIDE 20 MG
20 TABLET ORAL DAILY
Qty: 30 TABLET | Refills: 4 | Status: SHIPPED | OUTPATIENT
Start: 2022-04-21 | End: 2022-04-21

## 2022-04-21 RX ORDER — POTASSIUM CHLORIDE 1500 MG/1
20 TABLET, EXTENDED RELEASE ORAL DAILY
Qty: 90 TABLET | Refills: 3 | Status: ON HOLD | OUTPATIENT
Start: 2022-04-21 | End: 2022-10-26

## 2022-04-21 RX ORDER — CHOLECALCIFEROL (VITAMIN D3) 1250 MCG
CAPSULE ORAL
Qty: 4 CAPSULE | Refills: 11 | Status: SHIPPED | OUTPATIENT
Start: 2022-04-21 | End: 2024-04-08

## 2022-04-21 NOTE — TELEPHONE ENCOUNTER
Last 5 readings of BP    4/20 152/88 wt 146.7 lb  4/18  140/82  Wt 151 lb  4/17 156/82         147 lb  4/16 150/82         148.6  4/15 162/80          148.2  4/13      148/84       146  4/12    124/72         146.8      Alisa Shook RN  Cardiology Care Coordinator  [facility]  [phone] option 1

## 2022-04-21 NOTE — TELEPHONE ENCOUNTER
Date: 4/21/2022    Time of Call: 10:03 AM     Diagnosis:  HTN     [ VORB ] Ordering provider: Tianna Luevano NP  start daily Lasix 20 mg with additional PRN as needed for weight gain LE swelling as well as add 20 meq daily K+ and check a BMP next week? I will order it now. She should also have scheduled follow up if she doesn't already. Many thanks!        Order received by: Alisa Shook RN     Follow-up/additional notes: attempted to contact Katherine, she is not available at the time and writer will attempt to call her back in 30 minutes

## 2022-04-27 ENCOUNTER — LAB REQUISITION (OUTPATIENT)
Dept: LAB | Facility: CLINIC | Age: 87
End: 2022-04-27
Payer: MEDICARE

## 2022-04-27 DIAGNOSIS — R60.0 LOCALIZED EDEMA: ICD-10-CM

## 2022-04-27 DIAGNOSIS — N18.30 CHRONIC KIDNEY DISEASE, STAGE 3 UNSPECIFIED (H): ICD-10-CM

## 2022-04-27 DIAGNOSIS — D64.9 ANEMIA, UNSPECIFIED: ICD-10-CM

## 2022-04-27 RX ORDER — ALENDRONATE SODIUM 70 MG/1
TABLET ORAL
Qty: 4 TABLET | Refills: 11 | OUTPATIENT
Start: 2022-04-27

## 2022-04-28 ENCOUNTER — TRANSFERRED RECORDS (OUTPATIENT)
Dept: HEALTH INFORMATION MANAGEMENT | Facility: CLINIC | Age: 87
End: 2022-04-28

## 2022-04-28 ENCOUNTER — TELEPHONE (OUTPATIENT)
Dept: CARDIOLOGY | Facility: CLINIC | Age: 87
End: 2022-04-28

## 2022-04-28 LAB
ALBUMIN SERPL-MCNC: 3.1 G/DL (ref 3.5–5)
ALP SERPL-CCNC: 47 U/L (ref 45–120)
ALT SERPL W P-5'-P-CCNC: 13 U/L (ref 0–45)
ANION GAP SERPL CALCULATED.3IONS-SCNC: 8 MMOL/L (ref 5–18)
AST SERPL W P-5'-P-CCNC: 22 U/L (ref 0–40)
BILIRUB SERPL-MCNC: 0.3 MG/DL (ref 0–1)
BUN SERPL-MCNC: 22 MG/DL (ref 8–28)
CALCIUM SERPL-MCNC: 9.2 MG/DL (ref 8.5–10.5)
CHLORIDE BLD-SCNC: 106 MMOL/L (ref 98–107)
CO2 SERPL-SCNC: 32 MMOL/L (ref 22–31)
CREAT SERPL-MCNC: 1.09 MG/DL (ref 0.6–1.1)
GFR SERPL CREATININE-BSD FRML MDRD: 50 ML/MIN/1.73M2
GLUCOSE BLD-MCNC: 94 MG/DL (ref 70–125)
POTASSIUM BLD-SCNC: 3.5 MMOL/L (ref 3.5–5)
PROT SERPL-MCNC: 6.1 G/DL (ref 6–8)
SODIUM SERPL-SCNC: 146 MMOL/L (ref 136–145)

## 2022-04-28 PROCEDURE — 80053 COMPREHEN METABOLIC PANEL: CPT | Mod: ORL | Performed by: NURSE PRACTITIONER

## 2022-04-28 PROCEDURE — 36415 COLL VENOUS BLD VENIPUNCTURE: CPT | Mod: ORL | Performed by: NURSE PRACTITIONER

## 2022-04-28 PROCEDURE — P9604 ONE-WAY ALLOW PRORATED TRIP: HCPCS | Mod: ORL | Performed by: NURSE PRACTITIONER

## 2022-04-29 NOTE — TELEPHONE ENCOUNTER
rec'd fax from [facility] asking for physician signature noting lab results.   No daily blood pressures, weights or frequency of PRN medications on fax.     Returned fax asking for blood pressure readings,   Daily weights  If PRN medications have been given and what is frequency.

## 2022-05-02 ENCOUNTER — MEDICAL CORRESPONDENCE (OUTPATIENT)
Dept: HEALTH INFORMATION MANAGEMENT | Facility: CLINIC | Age: 87
End: 2022-05-02
Payer: MEDICARE

## 2022-05-03 RX ORDER — ALENDRONATE SODIUM 70 MG/1
TABLET ORAL
Qty: 4 TABLET | Refills: 11 | OUTPATIENT
Start: 2022-05-03

## 2022-05-04 NOTE — TELEPHONE ENCOUNTER
Tianna,    Please review recent BMP in system.  Writer called [facility], spoke with nurse who was able to give me the patients weights and blood pressure.  Patient is going well.  No complaints at this time.  Lasix was increased to 20 mg daily on 4/21.  She also has a 1 mo follow-up scheduled with Dr. Mcguire on 5/17.  Please advise if patient is to continue to take lasix 20 mg daily.    Alisa Shook RN  Cardiology Care Coordinator  [facility]  [phone] option 1        5/1 149.1 lb  5/2  149.0 lb  5/3   149.6 lb      BP   4/29 138/76  4/30 132/76  5/1  140/76  5/2 100/50  5/3 118/68

## 2022-05-04 NOTE — TELEPHONE ENCOUNTER
Meg Wilcox  Would you be prescribing / managing this? It is historical and I was not sure who is managing.  Thanks  Ania

## 2022-05-04 NOTE — TELEPHONE ENCOUNTER
Writer communicated with nurse for patient to continue lasix 20 mg daily per Dr. Mcguire.      Alisa Shook, RN  Cardiology Care Coordinator  [facility]  [phone] option 1

## 2022-05-04 NOTE — TELEPHONE ENCOUNTER
Will route to Dr. Mcguire to advise.    Alisa Shook, RN  Cardiology Care Coordinator  [facility]  [phone] option 1

## 2022-05-04 NOTE — TELEPHONE ENCOUNTER
Kidney function appear improved.  She can continue as needed Lasix which was recommended when she was seen last time in cardiology.

## 2022-05-05 DIAGNOSIS — M81.0 OSTEOPOROSIS, UNSPECIFIED OSTEOPOROSIS TYPE, UNSPECIFIED PATHOLOGICAL FRACTURE PRESENCE: Primary | ICD-10-CM

## 2022-05-05 RX ORDER — ALENDRONATE SODIUM 70 MG/1
70 TABLET ORAL
Qty: 4 TABLET | Refills: 1 | Status: SHIPPED | OUTPATIENT
Start: 2022-05-05 | End: 2022-05-26

## 2022-05-05 NOTE — TELEPHONE ENCOUNTER
Nurse calling from assisted living. Patient is out of Fosamax and due to take 5/7/22    Med listed as historical.     Routing to PCP to advise.    Hermila LINO RN  [facility]

## 2022-05-10 DIAGNOSIS — J45.40 MODERATE PERSISTENT ASTHMA WITHOUT COMPLICATION: ICD-10-CM

## 2022-05-11 RX ORDER — FLUTICASONE PROPIONATE AND SALMETEROL 500; 50 UG/1; UG/1
POWDER RESPIRATORY (INHALATION)
Qty: 60 EACH | Refills: 0 | Status: SHIPPED | OUTPATIENT
Start: 2022-05-11 | End: 2022-06-22

## 2022-05-11 NOTE — TELEPHONE ENCOUNTER
Medication is being filled for 1 time refill only due to:  Patient needs to be seen because due for ashtma follow up.

## 2022-05-17 ENCOUNTER — OFFICE VISIT (OUTPATIENT)
Dept: CARDIOLOGY | Facility: CLINIC | Age: 87
End: 2022-05-17
Payer: MEDICARE

## 2022-05-17 ENCOUNTER — MEDICAL CORRESPONDENCE (OUTPATIENT)
Dept: HEALTH INFORMATION MANAGEMENT | Facility: CLINIC | Age: 87
End: 2022-05-17

## 2022-05-17 VITALS
BODY MASS INDEX: 27.44 KG/M2 | OXYGEN SATURATION: 97 % | DIASTOLIC BLOOD PRESSURE: 92 MMHG | SYSTOLIC BLOOD PRESSURE: 150 MMHG | WEIGHT: 150 LBS | HEART RATE: 62 BPM

## 2022-05-17 DIAGNOSIS — I10 PRIMARY HYPERTENSION: Primary | ICD-10-CM

## 2022-05-17 DIAGNOSIS — I35.0 MODERATE AORTIC STENOSIS: ICD-10-CM

## 2022-05-17 DIAGNOSIS — I25.10 CORONARY ARTERY DISEASE INVOLVING NATIVE CORONARY ARTERY OF NATIVE HEART WITHOUT ANGINA PECTORIS: ICD-10-CM

## 2022-05-17 LAB
ANION GAP SERPL CALCULATED.3IONS-SCNC: 6 MMOL/L (ref 3–14)
BUN SERPL-MCNC: 29 MG/DL (ref 7–30)
CALCIUM SERPL-MCNC: 9.4 MG/DL (ref 8.5–10.1)
CHLORIDE BLD-SCNC: 106 MMOL/L (ref 94–109)
CO2 SERPL-SCNC: 29 MMOL/L (ref 20–32)
CREAT SERPL-MCNC: 1.39 MG/DL (ref 0.52–1.04)
GFR SERPL CREATININE-BSD FRML MDRD: 37 ML/MIN/1.73M2
GLUCOSE BLD-MCNC: 97 MG/DL (ref 70–99)
POTASSIUM BLD-SCNC: 3.5 MMOL/L (ref 3.4–5.3)
SODIUM SERPL-SCNC: 141 MMOL/L (ref 133–144)

## 2022-05-17 PROCEDURE — 99215 OFFICE O/P EST HI 40 MIN: CPT | Performed by: INTERNAL MEDICINE

## 2022-05-17 PROCEDURE — 80048 BASIC METABOLIC PNL TOTAL CA: CPT | Performed by: INTERNAL MEDICINE

## 2022-05-17 PROCEDURE — 36415 COLL VENOUS BLD VENIPUNCTURE: CPT | Performed by: INTERNAL MEDICINE

## 2022-05-17 NOTE — PATIENT INSTRUCTIONS
Thank you for coming to the [facility] at [place]; please note the following instructions:    1. BMP today    2. Continue your current medications    3. Follow up with Dr. Zuniga in July 4. Follow up with Dr. Mcguire as needed        If you have any questions regarding your visit, please contact your care team:     CARDIOLOGY  TELEPHONE NUMBER   Alisa BROWNLEEOc, Registered Nurse  Jennifer VITALE, Registered Nurse  Fidelina BOUCHER, Registered Medical Assistant  Becky MORGAN, Visit Facilitator [phone] (select option 1)    *After hours: [phone]   For Scheduling Appts:     [phone] (select option 1)    *After hours: [phone]   For the Device Clinic (Pacemakers and ICD's)  Delia VARGHESE, Registered Nurse   During business hours: [phone]    *After business hours:  [phone] (select option 4)      Normal test result notifications will be released via [employer] or mailed within 7 business days.  All other test results, will be communicated via telephone once reviewed by your cardiologist.    If you need a medication refill, please contact your pharmacy.  Please allow 3 business days for your refill to be completed.    As always, thank you for trusting us with your health care needs!

## 2022-05-17 NOTE — PROGRESS NOTES
[facility]      Referring provider: Tianna LOPEZ    HPI: Ms. Kamryn Miller is a 86 year old  female with PMH significant for    -CAD s/p PCI to LAD 8/21/2020 ([facility])   -HTN, MR and TR   -moderate aortic stenosis     She was seen in cardiology clinic in March and April of this year for hypertension management and follow-up of moderate aortic stenosis.  So far blood pressure control has been challenging due to side effects from multiple medications.  It appears hydralazine was stopped due to headache and hydrochlorothiazide was discontinued hypercalcemia. A month ago Coreg dose was increased to 18.75 mg twice daily and she was recommended to start Lasix 20 mg daily. She is following up with me today to recheck blood pressure control.  She is currently on amlodipine 10 mg, carvedilol 18.75, clonidine as needed (so far used only once), Lasix 20 mg (started over the last 1 week) and Crestor.    Patient tells me that since she started Lasix for about a week or so leg swelling has improved.  She brought a log of her blood pressure and weight from her assisted living which shows unchanged weight and well controlled blood pressure.  Blood pressure readings are in the range of 130-150/80/90 mmHg.  She denies chest pain, dizziness, or palpitations.  She feels short of breath if she walks long distance which has not changed over the last several years.  Patient tells me that her breathing is the same since she started Lasix.    With regards to aortic stenosis she underwent TAVR work-up in 11/2020 and and deemed to be in the moderate aortic stenosis range (invasive cardiac cath found AV valve area at 1.2 cm with mean gradient at 24 mmHg).  Patient underwent LAD stent to the mid to distal LAD in 8/2020.    Medications, personal, family, and social history reviewed with patient and revised.    PAST MEDICAL  HISTORY:  Past Medical History:   Diagnosis Date     Aortic valvar stenosis 7/2010    mild     Asthma      Bipolar disorder (H)      CAD (coronary artery disease)     s/p angioplasty     Celiac disease      Colon polyps      Colon polyps 2012    every 3 year colonoscopy      Depression      High cholesterol      HTN      Mitral insufficiency      Pulmonary HTN (H)     mild     Tremors 7/10    drug induced from antidepressants     Tricuspid insufficiency        CURRENT MEDICATIONS:  Current Outpatient Medications   Medication Sig Dispense Refill     albuterol (PROAIR HFA/PROVENTIL HFA/VENTOLIN HFA) 108 (90 Base) MCG/ACT inhaler Inhale 2 puffs into the lungs every 6 hours as needed for wheezing or shortness of breath / dyspnea 18 g 3     alendronate (FOSAMAX) 70 MG tablet Take 1 tablet (70 mg) by mouth every 7 days 4 tablet 1     amLODIPine (NORVASC) 5 MG tablet Take 2 tablets (10 mg) by mouth daily 180 tablet 3     aspirin 81 MG tablet Take 81 mg by mouth daily        azelastine (ASTEPRO) 0.15 % nasal spray INSTILL 1 SPRAY INTO BOTH NOSTRILS DAILY 5 mL 1     Calcium Citrate (CITRACAL OR) Take 1 tablet by mouth daily.       carvedilol (COREG) 6.25 MG tablet Take 3 tablets (18.75 mg) by mouth 2 times daily 90 tablet 3     cholecalciferol (VITAMIN D3) 1250 mcg (35713 units) capsule TAKE 1 CAPSULE BY MOUTH ONCE WEEKLY 4 capsule 11     cloNIDine (CATAPRES) 0.1 MG tablet Take 1 tablet (0.1 mg) by mouth daily as needed (For SBP > 180) 20 tablet 0     COMPRESSION STOCKINGS 1 each daily 1 each 1     desvenlafaxine (PRISTIQ) 100 MG 24 hr tablet Take 100 mg by mouth daily       fluticasone-salmeterol (ADVAIR DISKUS) 500-50 MCG/DOSE inhaler INHALE 1 PUFF BY MOUTH TWICE DAILY 60 each 0     furosemide (LASIX) 20 MG tablet Take 1 tablet (20 mg) by mouth daily 30 tablet 0     lamoTRIgine (LAMICTAL) 25 MG tablet Take 25 mg by mouth daily with 200 mg tablet for a total dose of 225 mg       LAMOTRIGINE 200 MG PO TABS Take 200 mg by  mouth daily with 25 mg tablet for a total dose of 225 mg       levothyroxine (SYNTHROID/LEVOTHROID) 50 MCG tablet TAKE 1 TABLET BY MOUTH ONCE DAILY 28 tablet 11     liothyronine (CYTOMEL) 5 MCG tablet Take 2 tablets (10 mcg) by mouth daily 30 tablet 3     memantine (NAMENDA) 10 MG tablet Take 1 tablet (10 mg) by mouth daily 30 tablet 3     mirtazapine (REMERON) 7.5 MG tablet Take 1 tablet (7.5 mg) by mouth At Bedtime 30 tablet 3     Multiple Vitamin (TAB-A-JENNIFER) TABS TAKE 1 TABLET BY MOUTH ONCE DAILY 30 tablet PRN     potassium chloride ER (K-TAB) 20 MEQ CR tablet Take 1 tablet (20 mEq) by mouth daily 90 tablet 3     rosuvastatin (CRESTOR) 40 MG tablet TAKE 1 TABLET BY MOUTH ONCE DAILY 28 tablet 11       PAST SURGICAL HISTORY:  Past Surgical History:   Procedure Laterality Date     ANGIOGRAM  6/26/2009     ANGIOPLASTY  9/96    for angina     ANGIOPLASTY  8/03 - 9/03    X -2 - with stenst in coronary car.     APPENDECTOMY       BREAST BIOPSY, RT/LT      Breat Biopsy RT/LT, benign     BUNIONECTOMY  11/8/2011    Procedure:BUNIONECTOMY; Left donna bunionectomy; Surgeon:FREDY LITTLEJOHN; Location:[facility]     BUNIONECTOMY RT/LT  5/2007    right bunion and right 2nd hammertoe     CATARACT IOL, RT/LT  6/12 and 7/12    bilateral     COLONOSCOPY  2007, 2012    every 3 years for polyps     CV CORONARY ANGIOGRAM N/A 8/21/2020    Procedure: CV CORONARY ANGIOGRAM;  Surgeon: Gianluca Toscano MD;  [facility]     CV LEFT HEART CATH N/A 8/21/2020    Procedure: CV LEFT HEART CATH;  Surgeon: Gianluca Toscano MD;  [facility]     CV LEFT HEART CATH N/A 11/3/2020    Procedure: CV LEFT HEART CATH;  Surgeon: Tom Burrows MD;  [facility]     CV LOWER EXTREMITY ANGIOGRAM BILATERAL N/A 11/3/2020    Procedure: CV ANGIOGRAM LOWER EXTREMITY BILATERAL;  Surgeon: Tom Burrows MD;  [facility]     CV PCI STENT DRUG ELUTING N/A  8/21/2020    Procedure: Percutaneous Coronary Intervention Stent Drug Eluting;  Surgeon: Gianluca Toscano MD;  [facility]     CV RIGHT HEART CATH MEASUREMENTS RECORDED N/A 8/21/2020    Procedure: CV RIGHT HEART CATH;  Surgeon: Gianluca Toscano MD;  [facility]     CV RIGHT HEART CATH MEASUREMENTS RECORDED N/A 11/3/2020    Procedure: CV RIGHT HEART CATH;  Surgeon: Tom Burrows MD;  [facility]     JOINT REPLACEMENT, HIP RT/LT  4/2008    right hip replaced     JOINT REPLACEMENT, HIP RT/LT  4/2009    left hip replaced     REPAIR HAMMER TOE  11/8/2011    Procedure:REPAIR HAMMER TOE; left 2nd hammertoe repair; Surgeon:FREDY LITTLEJOHN; Location:[facility]     SURGICAL HISTORY OF -   11/11    left bunion and 2nd hammertoe repair     TUBAL LIGATION       ZZC ANESTH,BLEPHAROPLASTY      (R) for drooping eyelid     ZZC APPENDECTOMY         ALLERGIES:     Allergies   Allergen Reactions     Ace Inhibitors Cough     Diclofenac Other (See Comments)     Balance problems     Gluten Meal Diarrhea       FAMILY HISTORY:  Family History   Problem Relation Age of Onset     Asthma Mother      Cerebrovascular Disease Mother      Arthritis Mother      Depression Mother      Lipids Sister      Hypertension Sister      Heart Disease Sister      Lipids Sister      Hypertension Sister      Lipids Sister      Breast Cancer Sister          SOCIAL HISTORY:  Social History     Tobacco Use     Smoking status: Never Smoker     Smokeless tobacco: Never Used   Substance Use Topics     Alcohol use: No     Alcohol/week: 0.0 standard drinks     Types: 1 Standard drinks or equivalent per week     Drug use: No       ROS:   Constitutional: No fever, chills, or sweats. Weight stable.   Cardiovascular: As per HPI.       Exam:  BP (!) 150/92 (BP Location: Left arm, Patient Position: Sitting, Cuff Size: Adult Regular)   Pulse 62   Wt 68 kg (150 lb)   SpO2 97%   BMI 27.44  kg/m    GENERAL APPEARANCE: alert and no distress  HEENT: no icterus, no central cyanosis  LYMPH/NECK: no adenopathy, no asymmetry, JVP not elevated, no carotid bruits.  RESPIRATORY: lungs clear to auscultation - no rales, rhonchi or wheezes, no use of accessory muscles, no retractions, respirations are unlabored, normal respiratory rate  CARDIOVASCULAR: regular rhythm, normal S1, S2, no S3 or S4, left parasternal area 2/6 midsystolic ejection murmur   EXTREMITIES: trace edema (wearing compression socks)  NEURO: alert, normal speech,and affect  SKIN: no ecchymoses, no rashes     I have reviewed the labs and personally reviewed the imaging below and made my comment in the assessment and plan.    Labs:  CBC RESULTS:   Lab Results   Component Value Date    WBC 5.5 03/18/2022    WBC 4.6 05/17/2021    RBC 3.64 (L) 03/18/2022    RBC 3.97 05/17/2021    HGB 11.4 (L) 03/18/2022    HGB 12.9 05/17/2021    HCT 36.1 03/18/2022    HCT 40.3 05/17/2021    MCV 99 03/18/2022     (H) 05/17/2021    MCH 31.3 03/18/2022    MCH 32.5 05/17/2021    MCHC 31.6 03/18/2022    MCHC 32.0 05/17/2021    RDW 12.3 03/18/2022    RDW 12.7 05/17/2021     03/18/2022     05/17/2021       BMP RESULTS:  Lab Results   Component Value Date     (H) 04/28/2022     06/04/2021    POTASSIUM 3.5 04/28/2022    POTASSIUM 3.4 06/04/2021    CHLORIDE 106 04/28/2022    CHLORIDE 106 06/04/2021    CO2 32 (H) 04/28/2022    CO2 30 06/04/2021    ANIONGAP 8 04/28/2022    ANIONGAP 6 06/04/2021    GLC 94 04/28/2022     (H) 06/04/2021    BUN 22 04/28/2022    BUN 27 06/04/2021    CR 1.09 04/28/2022    CR 1.50 (H) 06/04/2021    GFRESTIMATED 50 (L) 04/28/2022    GFRESTIMATED 32 (L) 06/04/2021    GFRESTBLACK 37 (L) 06/04/2021    PRINCE 9.2 04/28/2022    PRINCE 9.4 06/04/2021      Echocardiogram 11/1/2021  Interpretation Summary  Global and regional left ventricular function is normal with an EF of 55-60%.  Right ventricular function, chamber size,  wall motion, and thickness are  normal.  The mean gradient across the aortic valve is23 mmHg.  Moderate aortic stenosis is present.  No significant changes noted.    Cardiac cath 11/2020 [facility]    Moderate aortic stenosis -valve area 1.2 cm2,Mean gradient 24 mm Hg    Right sided filling pressures are normal.    Normal PA pressures.    Left sided filling pressures are normal.    Mildly reduced cardiac output level.    Peripheral angiogram done and IVUS used to assess the vessel lumens of the descending thoracic aorta,right and left external iliac arteries.    Prox R VARUN lesion is 40% stenosed.    Dist R VARUN to Mid R CFA lesion is 70% stenosed.    Mid L EIA to Dist L EIA lesion is 30% stenosed.    Ultrasound (IVUS) was performed.     Coronary angiogram 8/21/2020  Conclusion         Dist LAD lesion is 70% stenosed.    Mid LAD-1 lesion is 80% stenosed.    Mid LAD-2 lesion is 60% stenosed.    Mid RCA lesion is 40% stenosed.    Prox RCA lesion is 40% stenosed.    Right sided filling pressures are normal.    Normal PA pressures.    Left sided filling pressures are normal.    Reduced cardiac output.     1. LAD - 3 EDDIE were placed in the mid to distal LAD (2.5 x 28 mm, 3.5 x 12 mm, and 3.0 x 8 mm)  2. RHC showed normal biventricular filling pressures. Normal PA pressure. Reduced cardiac output.       Assessment and Plan:     #Hypertension  -Currently well controlled for her age.  -Continue current treatment  -She was also started on Lasix 20 mg daily a week ago.  We will check BMP today.    #Moderate aortic stenosis  -Under surveillance    #CAD  -Status post multiple stents.  Currently asymptomatic.    No medication changes today.    Patient scheduled to see  in July of this year.  I will see patient as needed.    Total time spent today for this visit is 40 minutes including precharting, face-to-face clinic visit, review of labs/imaging and medical documentation.    Please donot hesitate to contact me if you have any  questions or concerns. Again, thank you for allowing me to participate in the care of your patient.    Chidi CHAVEZ MD  [facility] Division of Cardiology  Pager [phone]

## 2022-05-17 NOTE — NURSING NOTE
"Chief Complaint   Patient presents with     Follow Up     Pt reports GREGORY when going on longer walks, 1 month follow up per Tianna       Initial BP (!) 150/92 (BP Location: Left arm, Patient Position: Sitting, Cuff Size: Adult Regular)   Pulse 62   Wt 68 kg (150 lb)   SpO2 97%   BMI 27.44 kg/m   Estimated body mass index is 27.44 kg/m  as calculated from the following:    Height as of 3/17/22: 1.575 m (5' 2\").    Weight as of this encounter: 68 kg (150 lb)..  BP completed using cuff size: regular    LOGAN Chandler    "

## 2022-05-17 NOTE — LETTER
5/17/2022      RE: Kamryn Miller  [address]  [address]  [place] [address]       Dear Colleague,    Thank you for the opportunity to participate in the care of your patient, Kamryn Miller, at the [facility]. Please see a copy of my visit note below.                                                                                                General Cardiology [facility]      Referring provider: Tianna LOPEZ    HPI: Ms. Kamryn Miller is a 86 year old  female with PMH significant for    -CAD s/p PCI to LAD 8/21/2020 ([facility])   -HTN, MR and TR   -moderate aortic stenosis     She was seen in cardiology clinic in March and April of this year for hypertension management and follow-up of moderate aortic stenosis.  So far blood pressure control has been challenging due to side effects from multiple medications.  It appears hydralazine was stopped due to headache and hydrochlorothiazide was discontinued hypercalcemia. A month ago Coreg dose was increased to 18.75 mg twice daily and she was recommended to start Lasix 20 mg daily. She is following up with me today to recheck blood pressure control.  She is currently on amlodipine 10 mg, carvedilol 18.75, clonidine as needed (so far used only once), Lasix 20 mg (started over the last 1 week) and Crestor.    Patient tells me that since she started Lasix for about a week or so leg swelling has improved.  She brought a log of her blood pressure and weight from her assisted living which shows unchanged weight and well controlled blood pressure.  Blood pressure readings are in the range of 130-150/80/90 mmHg.  She denies chest pain, dizziness, or palpitations.  She feels short of breath if she walks long distance which has not changed over the last several years.  Patient tells me that her breathing is the same since she started Lasix.    With regards to aortic stenosis  she underwent TAVR work-up in 11/2020 and and deemed to be in the moderate aortic stenosis range (invasive cardiac cath found AV valve area at 1.2 cm with mean gradient at 24 mmHg).  Patient underwent LAD stent to the mid to distal LAD in 8/2020.    Medications, personal, family, and social history reviewed with patient and revised.    PAST MEDICAL HISTORY:  Past Medical History:   Diagnosis Date     Aortic valvar stenosis 7/2010    mild     Asthma      Bipolar disorder (H)      CAD (coronary artery disease)     s/p angioplasty     Celiac disease      Colon polyps      Colon polyps 2012    every 3 year colonoscopy      Depression      High cholesterol      HTN      Mitral insufficiency      Pulmonary HTN (H)     mild     Tremors 7/10    drug induced from antidepressants     Tricuspid insufficiency        CURRENT MEDICATIONS:  Current Outpatient Medications   Medication Sig Dispense Refill     albuterol (PROAIR HFA/PROVENTIL HFA/VENTOLIN HFA) 108 (90 Base) MCG/ACT inhaler Inhale 2 puffs into the lungs every 6 hours as needed for wheezing or shortness of breath / dyspnea 18 g 3     alendronate (FOSAMAX) 70 MG tablet Take 1 tablet (70 mg) by mouth every 7 days 4 tablet 1     amLODIPine (NORVASC) 5 MG tablet Take 2 tablets (10 mg) by mouth daily 180 tablet 3     aspirin 81 MG tablet Take 81 mg by mouth daily        azelastine (ASTEPRO) 0.15 % nasal spray INSTILL 1 SPRAY INTO BOTH NOSTRILS DAILY 5 mL 1     Calcium Citrate (CITRACAL OR) Take 1 tablet by mouth daily.       carvedilol (COREG) 6.25 MG tablet Take 3 tablets (18.75 mg) by mouth 2 times daily 90 tablet 3     cholecalciferol (VITAMIN D3) 1250 mcg (03673 units) capsule TAKE 1 CAPSULE BY MOUTH ONCE WEEKLY 4 capsule 11     cloNIDine (CATAPRES) 0.1 MG tablet Take 1 tablet (0.1 mg) by mouth daily as needed (For SBP > 180) 20 tablet 0     COMPRESSION STOCKINGS 1 each daily 1 each 1     desvenlafaxine (PRISTIQ) 100 MG 24 hr tablet Take 100 mg by mouth daily        fluticasone-salmeterol (ADVAIR DISKUS) 500-50 MCG/DOSE inhaler INHALE 1 PUFF BY MOUTH TWICE DAILY 60 each 0     furosemide (LASIX) 20 MG tablet Take 1 tablet (20 mg) by mouth daily 30 tablet 0     lamoTRIgine (LAMICTAL) 25 MG tablet Take 25 mg by mouth daily with 200 mg tablet for a total dose of 225 mg       LAMOTRIGINE 200 MG PO TABS Take 200 mg by mouth daily with 25 mg tablet for a total dose of 225 mg       levothyroxine (SYNTHROID/LEVOTHROID) 50 MCG tablet TAKE 1 TABLET BY MOUTH ONCE DAILY 28 tablet 11     liothyronine (CYTOMEL) 5 MCG tablet Take 2 tablets (10 mcg) by mouth daily 30 tablet 3     memantine (NAMENDA) 10 MG tablet Take 1 tablet (10 mg) by mouth daily 30 tablet 3     mirtazapine (REMERON) 7.5 MG tablet Take 1 tablet (7.5 mg) by mouth At Bedtime 30 tablet 3     Multiple Vitamin (TAB-A-JENNIFER) TABS TAKE 1 TABLET BY MOUTH ONCE DAILY 30 tablet PRN     potassium chloride ER (K-TAB) 20 MEQ CR tablet Take 1 tablet (20 mEq) by mouth daily 90 tablet 3     rosuvastatin (CRESTOR) 40 MG tablet TAKE 1 TABLET BY MOUTH ONCE DAILY 28 tablet 11       PAST SURGICAL HISTORY:  Past Surgical History:   Procedure Laterality Date     ANGIOGRAM  6/26/2009     ANGIOPLASTY  9/96    for angina     ANGIOPLASTY  8/03 - 9/03    X -2 - with stenst in coronary car.     APPENDECTOMY       BREAST BIOPSY, RT/LT      Breat Biopsy RT/LT, benign     BUNIONECTOMY  11/8/2011    Procedure:BUNIONECTOMY; Left donna bunionectomy; Surgeon:FREDY LITTLEJOHN; Location:[facility] OR     BUNIONECTOMY RT/LT  5/2007    right bunion and right 2nd hammertoe     CATARACT IOL, RT/LT  6/12 and 7/12    bilateral     COLONOSCOPY  2007, 2012    every 3 years for polyps     CV CORONARY ANGIOGRAM N/A 8/21/2020    Procedure: CV CORONARY ANGIOGRAM;  Surgeon: Gianluca Toscnao MD;  Location:  HEART CARDIAC CATH LAB     CV LEFT HEART CATH N/A 8/21/2020    Procedure: CV LEFT HEART CATH;  Surgeon: Gianluca Toscano MD;  Location:  HEART CARDIAC CATH LAB      CV LEFT HEART CATH N/A 11/3/2020    Procedure: CV LEFT HEART CATH;  Surgeon: Tom Burrows MD;  [facility]     CV LOWER EXTREMITY ANGIOGRAM BILATERAL N/A 11/3/2020    Procedure: CV ANGIOGRAM LOWER EXTREMITY BILATERAL;  Surgeon: Tom Burrows MD;  [facility]     CV PCI STENT DRUG ELUTING N/A 8/21/2020    Procedure: Percutaneous Coronary Intervention Stent Drug Eluting;  Surgeon: Gianluca Toscano MD;  [facility]     CV RIGHT HEART CATH MEASUREMENTS RECORDED N/A 8/21/2020    Procedure: CV RIGHT HEART CATH;  Surgeon: Gianluca Toscano MD;  [facility]     CV RIGHT HEART CATH MEASUREMENTS RECORDED N/A 11/3/2020    Procedure: CV RIGHT HEART CATH;  Surgeon: Tom Burrows MD;  [facility]     JOINT REPLACEMENT, HIP RT/LT  4/2008    right hip replaced     JOINT REPLACEMENT, HIP RT/LT  4/2009    left hip replaced     REPAIR HAMMER TOE  11/8/2011    Procedure:REPAIR HAMMER TOE; left 2nd hammertoe repair; Surgeon:FREDY LITTLEJOHN; Location:[facility] OR     SURGICAL HISTORY OF -   11/11    left bunion and 2nd hammertoe repair     TUBAL LIGATION       ZZC ANESTH,BLEPHAROPLASTY      (R) for drooping eyelid     ZZC APPENDECTOMY         ALLERGIES:     Allergies   Allergen Reactions     Ace Inhibitors Cough     Diclofenac Other (See Comments)     Balance problems     Gluten Meal Diarrhea       FAMILY HISTORY:  Family History   Problem Relation Age of Onset     Asthma Mother      Cerebrovascular Disease Mother      Arthritis Mother      Depression Mother      Lipids Sister      Hypertension Sister      Heart Disease Sister      Lipids Sister      Hypertension Sister      Lipids Sister      Breast Cancer Sister          SOCIAL HISTORY:  Social History     Tobacco Use     Smoking status: Never Smoker     Smokeless tobacco: Never Used   Substance Use Topics     Alcohol use: No     Alcohol/week:  0.0 standard drinks     Types: 1 Standard drinks or equivalent per week     Drug use: No       ROS:   Constitutional: No fever, chills, or sweats. Weight stable.   Cardiovascular: As per HPI.       Exam:  BP (!) 150/92 (BP Location: Left arm, Patient Position: Sitting, Cuff Size: Adult Regular)   Pulse 62   Wt 68 kg (150 lb)   SpO2 97%   BMI 27.44 kg/m    GENERAL APPEARANCE: alert and no distress  HEENT: no icterus, no central cyanosis  LYMPH/NECK: no adenopathy, no asymmetry, JVP not elevated, no carotid bruits.  RESPIRATORY: lungs clear to auscultation - no rales, rhonchi or wheezes, no use of accessory muscles, no retractions, respirations are unlabored, normal respiratory rate  CARDIOVASCULAR: regular rhythm, normal S1, S2, no S3 or S4, left parasternal area 2/6 midsystolic ejection murmur   EXTREMITIES: trace edema (wearing compression socks)  NEURO: alert, normal speech,and affect  SKIN: no ecchymoses, no rashes     I have reviewed the labs and personally reviewed the imaging below and made my comment in the assessment and plan.    Labs:  CBC RESULTS:   Lab Results   Component Value Date    WBC 5.5 03/18/2022    WBC 4.6 05/17/2021    RBC 3.64 (L) 03/18/2022    RBC 3.97 05/17/2021    HGB 11.4 (L) 03/18/2022    HGB 12.9 05/17/2021    HCT 36.1 03/18/2022    HCT 40.3 05/17/2021    MCV 99 03/18/2022     (H) 05/17/2021    MCH 31.3 03/18/2022    MCH 32.5 05/17/2021    MCHC 31.6 03/18/2022    MCHC 32.0 05/17/2021    RDW 12.3 03/18/2022    RDW 12.7 05/17/2021     03/18/2022     05/17/2021       BMP RESULTS:  Lab Results   Component Value Date     (H) 04/28/2022     06/04/2021    POTASSIUM 3.5 04/28/2022    POTASSIUM 3.4 06/04/2021    CHLORIDE 106 04/28/2022    CHLORIDE 106 06/04/2021    CO2 32 (H) 04/28/2022    CO2 30 06/04/2021    ANIONGAP 8 04/28/2022    ANIONGAP 6 06/04/2021    GLC 94 04/28/2022     (H) 06/04/2021    BUN 22 04/28/2022    BUN 27 06/04/2021    CR 1.09  04/28/2022    CR 1.50 (H) 06/04/2021    GFRESTIMATED 50 (L) 04/28/2022    GFRESTIMATED 32 (L) 06/04/2021    GFRESTBLACK 37 (L) 06/04/2021    PRINCE 9.2 04/28/2022    PRINCE 9.4 06/04/2021      Echocardiogram 11/1/2021  Interpretation Summary  Global and regional left ventricular function is normal with an EF of 55-60%.  Right ventricular function, chamber size, wall motion, and thickness are  normal.  The mean gradient across the aortic valve is23 mmHg.  Moderate aortic stenosis is present.  No significant changes noted.    Cardiac cath 11/2020 [facility]    Moderate aortic stenosis -valve area 1.2 cm2,Mean gradient 24 mm Hg    Right sided filling pressures are normal.    Normal PA pressures.    Left sided filling pressures are normal.    Mildly reduced cardiac output level.    Peripheral angiogram done and IVUS used to assess the vessel lumens of the descending thoracic aorta,right and left external iliac arteries.    Prox R VARUN lesion is 40% stenosed.    Dist R VARUN to Mid R CFA lesion is 70% stenosed.    Mid L EIA to Dist L EIA lesion is 30% stenosed.    Ultrasound (IVUS) was performed.     Coronary angiogram 8/21/2020  Conclusion         Dist LAD lesion is 70% stenosed.    Mid LAD-1 lesion is 80% stenosed.    Mid LAD-2 lesion is 60% stenosed.    Mid RCA lesion is 40% stenosed.    Prox RCA lesion is 40% stenosed.    Right sided filling pressures are normal.    Normal PA pressures.    Left sided filling pressures are normal.    Reduced cardiac output.     1. LAD - 3 EDDIE were placed in the mid to distal LAD (2.5 x 28 mm, 3.5 x 12 mm, and 3.0 x 8 mm)  2. RHC showed normal biventricular filling pressures. Normal PA pressure. Reduced cardiac output.       Assessment and Plan:     #Hypertension  -Currently well controlled for her age.  -Continue current treatment  -She was also started on Lasix 20 mg daily a week ago.  We will check BMP today.    #Moderate aortic stenosis  -Under surveillance    #CAD  -Status post multiple stents.   Currently asymptomatic.    No medication changes today.    Patient scheduled to see  in July of this year.  I will see patient as needed.    Total time spent today for this visit is 40 minutes including precharting, face-to-face clinic visit, review of labs/imaging and medical documentation.    Please donot hesitate to contact me if you have any questions or concerns. Again, thank you for allowing me to participate in the care of your patient.    Chidi CHAVEZ MD  [facility] Division of Cardiology  Pager [phone]

## 2022-05-18 ENCOUNTER — TELEPHONE (OUTPATIENT)
Dept: CARDIOLOGY | Facility: CLINIC | Age: 87
End: 2022-05-18
Payer: MEDICARE

## 2022-05-18 NOTE — CONFIDENTIAL NOTE
Ok to change to weekly weights and weekly BP's.  They needs orders.  Just need your approval.    Alisa Shook, RN  Cardiology Care Coordinator  [facility]  [phone] option 1

## 2022-05-18 NOTE — TELEPHONE ENCOUNTER
[facility] Call Center    Phone Message    May a detailed message be left on voicemail: yes     Reason for Call: Other: Pt told facility nurses that  Can told her they can take weight and BP weekly now. They do need signed orders for this, please fax to [phone]     Action Taken: Message routed to:  Other: [facility]    Travel Screening: Not Applicable

## 2022-05-18 NOTE — TELEPHONE ENCOUNTER
Verbal ok per  Can to check BP and weight weekly (versus daily).  Order faxed to requested #.    Alisa Shook RN  Cardiology Care Coordinator  [facility]  [phone] option 1

## 2022-05-24 DIAGNOSIS — M81.0 OSTEOPOROSIS, UNSPECIFIED OSTEOPOROSIS TYPE, UNSPECIFIED PATHOLOGICAL FRACTURE PRESENCE: ICD-10-CM

## 2022-05-26 RX ORDER — ALENDRONATE SODIUM 70 MG/1
TABLET ORAL
Qty: 4 TABLET | Refills: 23 | Status: SHIPPED | OUTPATIENT
Start: 2022-05-26 | End: 2023-06-28

## 2022-05-26 NOTE — TELEPHONE ENCOUNTER
"Requested Prescriptions   Pending Prescriptions Disp Refills     alendronate (FOSAMAX) 70 MG tablet [Pharmacy Med Name: ALENDRONATE 70MG TAB] 4 tablet 23     Sig: TAKE 1 TABLET BY MOUTH ONCE WEEKLY       Bisphosphonates Failed - 5/24/2022 11:21 AM        Failed - Dexa on file within past 2 years     Please review last Dexa result.           Failed - Normal serum creatinine on file within past 12 months     Recent Labs   Lab Test 05/17/22  1351   CR 1.39*       Ok to refill medication if creatinine is low          Passed - Recent (12 mo) or future (30 days) visit within the authorizing provider's specialty     Patient has had an office visit with the authorizing provider or a provider within the authorizing providers department within the previous 12 mos or has a future within next 30 days. See \"Patient Info\" tab in inbasket, or \"Choose Columns\" in Meds & Orders section of the refill encounter.              Passed - Medication is active on med list        Passed - Patient is age 18 or older           Routing refill request to provider for review/approval because:  Needs dexa and Cr    ThanksCris RN  [facility]           "

## 2022-06-10 DIAGNOSIS — I10 ESSENTIAL HYPERTENSION: ICD-10-CM

## 2022-06-14 RX ORDER — CARVEDILOL 6.25 MG/1
18.75 TABLET ORAL 2 TIMES DAILY
Qty: 180 TABLET | Refills: 2 | Status: SHIPPED | OUTPATIENT
Start: 2022-06-14 | End: 2022-08-22

## 2022-06-14 RX ORDER — FUROSEMIDE 20 MG
20 TABLET ORAL DAILY
Qty: 90 TABLET | Refills: 0 | Status: SHIPPED | OUTPATIENT
Start: 2022-06-14 | End: 2022-07-29

## 2022-06-14 NOTE — TELEPHONE ENCOUNTER
Last Clinic Visit:  5/17/22   NV:  7/29/22  CARD FYI  :  90 DAY RF SENT  BP>140/90  CR REVIEWED @ [facility]

## 2022-06-21 DIAGNOSIS — J45.40 MODERATE PERSISTENT ASTHMA WITHOUT COMPLICATION: ICD-10-CM

## 2022-06-22 RX ORDER — FLUTICASONE PROPIONATE AND SALMETEROL 500; 50 UG/1; UG/1
POWDER RESPIRATORY (INHALATION)
Qty: 60 EACH | Refills: 0 | Status: SHIPPED | OUTPATIENT
Start: 2022-06-22 | End: 2022-08-10

## 2022-06-22 NOTE — TELEPHONE ENCOUNTER
Prescription approved per [facility] Protocol.  Patient due for ACT in a few days, [employer] message sent.   Teresa Hanson RN

## 2022-06-24 DIAGNOSIS — J30.2 SEASONAL ALLERGIC RHINITIS, UNSPECIFIED TRIGGER: ICD-10-CM

## 2022-06-26 RX ORDER — AZELASTINE HCL 205.5 UG/1
SPRAY NASAL
Qty: 30 ML | Refills: 11 | Status: SHIPPED | OUTPATIENT
Start: 2022-06-26

## 2022-07-06 ENCOUNTER — TELEPHONE (OUTPATIENT)
Dept: FAMILY MEDICINE | Facility: CLINIC | Age: 87
End: 2022-07-06

## 2022-07-06 ENCOUNTER — TELEPHONE (OUTPATIENT)
Dept: CARDIOLOGY | Facility: CLINIC | Age: 87
End: 2022-07-06

## 2022-07-06 DIAGNOSIS — I50.30 NYHA CLASS 3 HEART FAILURE WITH PRESERVED EJECTION FRACTION (H): ICD-10-CM

## 2022-07-06 DIAGNOSIS — I25.5 ISCHEMIC CARDIOMYOPATHY: ICD-10-CM

## 2022-07-06 DIAGNOSIS — E03.9 HYPOTHYROIDISM, UNSPECIFIED TYPE: Primary | ICD-10-CM

## 2022-07-06 DIAGNOSIS — I35.0 SEVERE AORTIC STENOSIS: ICD-10-CM

## 2022-07-06 NOTE — TELEPHONE ENCOUNTER
Spoke with patient. Patient refused getting lab work prior to appointment and stated her daughter, who brings her to all appointments, will be out of town until the evening of July 28th. Patient was agreeable to get labs same-day and get a call with results. Patient scheduled for lab work 7/29 prior to [facility] appointment. I have mailed a letter to patient with appointment information.    LOGAN Benton

## 2022-07-06 NOTE — TELEPHONE ENCOUNTER
Huddled with Rolanda Junior- have assisted living give Coreg 2 tabs (12.5 mg) tonight instead of the usual 3 tabs (18.75 mg). Resume all meds as usual tomorrow    Called Katherine and updated her on the orders above.  She verbalized understanding    Raúl Haywood RN  [facility]

## 2022-07-06 NOTE — TELEPHONE ENCOUNTER
Please clarify- should they give morning meds today OR hold the morning meds today and resume tomorrow 7/7/22  ___________________________________________________  Additional call received at 7/6/2022; 1:50 PM-    Called received from Katherine with [facility]  They are still waiting to hear back and have not given patient her morning meds yet.  Patient typically gets her morning meds (listed below) at 8 AM and then her PM meds at 9 PM  VSS, BP was 146/76, HR 65 today. No signs/sx of adverse reactions from missed or possible double doses.    Patient returned to their facility yesterday afternoon (7/6/2022) after spending the weekend with family and received her evening meds last night at the facility  Patient does not recall receiving a double dose of her meds during her SILVIO with family but meds were missing upon return to the facility.  Katherine believes patient may have received a double dose at some point while out with family but if that was the case, it would likely have been > 24 hours ago at this point    Please call Katherine back on her mobile number at [phone]    Raúl Haywood RN  [facility]

## 2022-07-06 NOTE — TELEPHONE ENCOUNTER
Pt scheduled for an echocardiogram and to see Dr ervin on 7/29-   Labs are needed the week prior to her appt.     Orders placed. [facility]

## 2022-07-06 NOTE — TELEPHONE ENCOUNTER
Called and updated Katherine on provider's message.  She verbalized understanding.  They are an assisted living and do not typically do too much monitoring or additional cares.  The nurse goes home at 4:00 PM but Katherine can have the health aide check BP this evening before the 2nd dose of Coreg and call it into her.    They need to know BP parameters and when to hold the Coreg if needed goldie Haywood RN  [facility]

## 2022-07-06 NOTE — TELEPHONE ENCOUNTER
Katherine CURIEL at the [facility] (assisted living) called the clinic.  She reports that patient went with family from Saturday afternoon to Tuesday. Patient was supposed to go with family on Friday. The nursing staff ([facility]) gave patient Wednesday 7/6/22 morning medication from the pre-packed medication card    When patient is leaves the facility (to be with family), the RN sets up her medications in a yellow box to go with. When patient returned all the medications in the box were gone (taken).    Unknown: if patient took 2 doses of her morning medications on Saturday or sometime during the time she was with family.    Patient reports that she has not taken the medications this morning.  The nursing staff ([facility]) reported that she did not dispense patient's morning medication.    Katherine CURIEL at the [facility] would like provider's recommendations and orders:    1. Ok to give the morning medication as prescribed?    amLODIPine (NORVASC) 5 MG tablet: Sig - Route: Take 2 tablets (10 mg) by mouth daily - Oral    aspirin 81 MG tabletSig - Route: Take 81 mg by mouth daily  - Oral    azelastine (ASTEPRO) 0.15 % nasal spray: Sig: USE 1 SPRAY IN EACH NOSTRIL ONCE A DAY    Calcium Citrate (CITRACAL OR): Sig - Route: Take 1 tablet by mouth daily. - Oral    carvedilol (COREG) 6.25 MG tablet: Sig - Route: Take 3 tablets (18.75 mg) by mouth 2 times daily - Oral    furosemide (LASIX) 20 MG tablet: Sig - Route: Take 1 tablet (20 mg) by mouth daily - Oral    lamoTRIgine (LAMICTAL) 25 MG tablet: Sig - Route: Take 25 mg by mouth daily with 200 mg tablet for a total dose of 225 mg - Oral    rosuvastatin (CRESTOR) 40 MG tablet: Sig: TAKE 1 TABLET BY MOUTH ONCE DAILY    liothyronine (CYTOMEL) 5 MCG tablet: Sig - Route: Take 2 tablets (10 mcg) by mouth daily - Oral    memantine (NAMENDA) 10 MG tablet: Sig - Route: Take 1 tablet (10 mg) by mouth daily - Oral    Multiple Vitamin (TAB-A-JENNIFER) TABS: Sig: TAKE 1 TABLET BY MOUTH ONCE  DAILY    potassium chloride ER (K-TAB) 20 MEQ CR tablet: Sig - Route: Take 1 tablet (20 mEq) by mouth daily - Oral    rosuvastatin (CRESTOR) 40 MG tablet: Sig: TAKE 1 TABLET BY MOUTH ONCE DAILY    OR    2. HOLD the morning medications today and resume taking them as prescribed 7/7/22.    Please call Katherine CURIEL at the [facility] living at [phone] with the provider recommendations.      .

## 2022-07-29 ENCOUNTER — OFFICE VISIT (OUTPATIENT)
Dept: CARDIOLOGY | Facility: CLINIC | Age: 87
End: 2022-07-29
Payer: MEDICARE

## 2022-07-29 ENCOUNTER — LAB (OUTPATIENT)
Dept: LAB | Facility: CLINIC | Age: 87
End: 2022-07-29
Payer: MEDICARE

## 2022-07-29 ENCOUNTER — ANCILLARY PROCEDURE (OUTPATIENT)
Dept: CARDIOLOGY | Facility: CLINIC | Age: 87
End: 2022-07-29
Attending: INTERNAL MEDICINE
Payer: MEDICARE

## 2022-07-29 VITALS
HEART RATE: 64 BPM | BODY MASS INDEX: 26.89 KG/M2 | OXYGEN SATURATION: 98 % | DIASTOLIC BLOOD PRESSURE: 72 MMHG | WEIGHT: 147 LBS | SYSTOLIC BLOOD PRESSURE: 138 MMHG

## 2022-07-29 DIAGNOSIS — E78.2 MIXED HYPERLIPIDEMIA: ICD-10-CM

## 2022-07-29 DIAGNOSIS — I50.30 NYHA CLASS 3 HEART FAILURE WITH PRESERVED EJECTION FRACTION (H): ICD-10-CM

## 2022-07-29 DIAGNOSIS — E03.9 HYPOTHYROIDISM, UNSPECIFIED TYPE: ICD-10-CM

## 2022-07-29 DIAGNOSIS — I10 BENIGN ESSENTIAL HYPERTENSION: ICD-10-CM

## 2022-07-29 DIAGNOSIS — I25.5 ISCHEMIC CARDIOMYOPATHY: ICD-10-CM

## 2022-07-29 DIAGNOSIS — I35.0 SEVERE AORTIC STENOSIS: ICD-10-CM

## 2022-07-29 DIAGNOSIS — I35.0 MODERATE AORTIC STENOSIS: ICD-10-CM

## 2022-07-29 DIAGNOSIS — I10 BENIGN ESSENTIAL HYPERTENSION: Primary | ICD-10-CM

## 2022-07-29 DIAGNOSIS — I10 ESSENTIAL HYPERTENSION: ICD-10-CM

## 2022-07-29 LAB
ALBUMIN SERPL-MCNC: 3.8 G/DL (ref 3.4–5)
ALP SERPL-CCNC: 62 U/L (ref 40–150)
ALT SERPL W P-5'-P-CCNC: 26 U/L (ref 0–50)
ANION GAP SERPL CALCULATED.3IONS-SCNC: 3 MMOL/L (ref 3–14)
AST SERPL W P-5'-P-CCNC: 25 U/L (ref 0–45)
BILIRUB SERPL-MCNC: 0.2 MG/DL (ref 0.2–1.3)
BUN SERPL-MCNC: 35 MG/DL (ref 7–30)
CALCIUM SERPL-MCNC: 9.6 MG/DL (ref 8.5–10.1)
CHLORIDE BLD-SCNC: 109 MMOL/L (ref 94–109)
CO2 SERPL-SCNC: 30 MMOL/L (ref 20–32)
CREAT SERPL-MCNC: 1.29 MG/DL (ref 0.52–1.04)
ERYTHROCYTE [DISTWIDTH] IN BLOOD BY AUTOMATED COUNT: 15.6 % (ref 10–15)
GFR SERPL CREATININE-BSD FRML MDRD: 40 ML/MIN/1.73M2
GLUCOSE BLD-MCNC: 105 MG/DL (ref 70–99)
HCT VFR BLD AUTO: 37.5 % (ref 35–47)
HGB BLD-MCNC: 11.5 G/DL (ref 11.7–15.7)
LVEF ECHO: NORMAL
MAGNESIUM SERPL-MCNC: 2.4 MG/DL (ref 1.6–2.3)
MCH RBC QN AUTO: 26.9 PG (ref 26.5–33)
MCHC RBC AUTO-ENTMCNC: 30.7 G/DL (ref 31.5–36.5)
MCV RBC AUTO: 88 FL (ref 78–100)
NT-PROBNP SERPL-MCNC: 449 PG/ML (ref 0–1800)
PLATELET # BLD AUTO: 245 10E3/UL (ref 150–450)
POTASSIUM BLD-SCNC: 4.8 MMOL/L (ref 3.4–5.3)
PROT SERPL-MCNC: 7.8 G/DL (ref 6.8–8.8)
RBC # BLD AUTO: 4.28 10E6/UL (ref 3.8–5.2)
SODIUM SERPL-SCNC: 142 MMOL/L (ref 133–144)
TSH SERPL DL<=0.005 MIU/L-ACNC: 0.45 MU/L (ref 0.4–4)
WBC # BLD AUTO: 5.7 10E3/UL (ref 4–11)

## 2022-07-29 PROCEDURE — 80053 COMPREHEN METABOLIC PANEL: CPT

## 2022-07-29 PROCEDURE — 83880 ASSAY OF NATRIURETIC PEPTIDE: CPT

## 2022-07-29 PROCEDURE — 84443 ASSAY THYROID STIM HORMONE: CPT

## 2022-07-29 PROCEDURE — 93306 TTE W/DOPPLER COMPLETE: CPT | Performed by: STUDENT IN AN ORGANIZED HEALTH CARE EDUCATION/TRAINING PROGRAM

## 2022-07-29 PROCEDURE — 83735 ASSAY OF MAGNESIUM: CPT

## 2022-07-29 PROCEDURE — 99214 OFFICE O/P EST MOD 30 MIN: CPT | Performed by: INTERNAL MEDICINE

## 2022-07-29 PROCEDURE — 36415 COLL VENOUS BLD VENIPUNCTURE: CPT

## 2022-07-29 PROCEDURE — 85027 COMPLETE CBC AUTOMATED: CPT

## 2022-07-29 RX ORDER — FUROSEMIDE 20 MG
20 TABLET ORAL 2 TIMES DAILY
Qty: 180 TABLET | Refills: 3 | Status: SHIPPED | OUTPATIENT
Start: 2022-07-29 | End: 2022-09-09

## 2022-07-29 RX ORDER — AMLODIPINE BESYLATE 5 MG/1
10 TABLET ORAL DAILY
Qty: 180 TABLET | Refills: 3
Start: 2022-07-29 | End: 2022-10-07

## 2022-07-29 ASSESSMENT — PAIN SCALES - GENERAL: PAINLEVEL: NO PAIN (0)

## 2022-07-29 NOTE — PATIENT INSTRUCTIONS
Thank you for coming to the [facility]; please note the following instructions:    1. Increase lasix (furosemide) to 20 mg twice daily    2. Take your amlodipine in the evening    3. Dr Zuniga will talk to the heart valve clinic and they may call you to discuss an appointment about your aortic valve.     4. Follow up with Dr Zuniga in 3 months- we will call you to schedule this appointment and a lab visit prior.         If you have any questions regarding your visit please contact your care team:     Cardiology  Telephone Number   Alisa DIAS, RN  Jennifer VITALE,RN  Daniela DELGADO, FANG BOUCHER, MA  Héctor VICKERS, LPN   [phone]   (select option 1)    *After hours: [phone]     For scheduling appts:     [phone] or    [phone] (select option 1)    *After hours: [phone]     For the Device Clinic (Pacemakers and ICD's)  RN's :  Delia Vazquez   During business hours: [phone]    *After business hours:  [phone] (select option 4)      Normal test result notifications will be released via [employer] or mailed within 7 business days.  All other test results, will be communicated via telephone once reviewed by your cardiologist.    If you need a medication refill please contact your pharmacy.  Please allow 3 business days for your refill to be completed.    As always, thank you for trusting us with your health care needs!

## 2022-07-29 NOTE — PROGRESS NOTES
July 29, 2022     I had the pleasure of seeing Kamryn Miller  in the [facility] for further evaluation and management. She has a history of Hx CAD, HLD, HTN, mitral and tricuspid insuff, pulmonary hypertension, lost to follow up in the past.  She does have CAD and did receive 3 stents in 8/2020 as below.  She denies any cardiomyopathy and she has not had any cardiology follow-up for several years.  Notes that she does have shortness of breath especially with exertion or she walks outside.  This is class III symptoms at the moment she can will probably about a block and able to take a flight of stairs.  We did start TAVR evaluation and she did undergo right heart catheterization as well as invasive hemodynamic measurement and measurement of the valve area.  Given findings of moderate aortic stenosis, TAVR was not yet pursued and ongoing surveillance was recommended. Had been hypertensive in the recent past. She did see the structural team just recently and repeat TTE was performed that again showed moderate to severe aortic stenosis, essentially unchanged from prior TTE. Given that she had no concerning symptoms ongoing surveillance was recommended.  On last visit I suggested increasing amlodipine to 10mg daily due to persistent hypertension. However, she did have an admission 12/16/21 and multiple medication changes made, including stopping amlodipine. Accordingly, she developed hypertension and 5mg was restarted in UC. However, she developed hypertensive emergency requiring admission for this and amlodipine was increased to 10mg daily with good response. Today she is here for follow up.  Overall she denies any major complaints however notes that she is little bit more short of breath and her exercise tolerance has decreased significantly.  She is able to walk a block will be little bit more which is down from previous.  She is also does have some lower extremity edema which is fairly new for  her and has not had a bleed in the past.  She continues to take 20 mg of Lasix once a day which seems to work somewhat.  She denies however any episodes of chest pain dizziness or lightheadedness and no syncopal episodes.  Her blood pressure is a little bit better controlled and she is able to tolerate the amlodipine 10 in addition to the other medications however noted amlodipine still causes her some headache and some weird feelings which is hard to describe.  She did have similar episodes with hydralazine in the past.  Overall no other complaints    PAST MEDICAL HISTORY:  Past Medical History:   Diagnosis Date     Aortic valvar stenosis 7/2010    mild     Asthma      Bipolar disorder (H)      CAD (coronary artery disease)     s/p angioplasty     Celiac disease      Colon polyps      Colon polyps 2012    every 3 year colonoscopy      Depression      High cholesterol      HTN      Mitral insufficiency      Pulmonary HTN (H)     mild     Tremors 7/10    drug induced from antidepressants     Tricuspid insufficiency      FAMILY HISTORY:  Family History   Problem Relation Age of Onset     Asthma Mother      Cerebrovascular Disease Mother      Arthritis Mother      Depression Mother      Lipids Sister      Hypertension Sister      Heart Disease Sister      Lipids Sister      Hypertension Sister      Lipids Sister      Breast Cancer Sister      SOCIAL HISTORY:  Social History     Socioeconomic History     Marital status:      Spouse name: Adrien     Number of children: 2     Years of education: 16     Highest education level: None   Occupational History     Employer: RETIRED     Comment: Retired in 2002.    Tobacco Use     Smoking status: Never Smoker     Smokeless tobacco: Never Used   Substance and Sexual Activity     Alcohol use: No     Alcohol/week: 0.0 standard drinks     Types: 1 Standard drinks or equivalent per week     Drug use: No     Sexual activity: Not Currently     Partners: Male     CURRENT  MEDICATIONS:  Current Outpatient Medications   Medication     albuterol (PROAIR HFA/PROVENTIL HFA/VENTOLIN HFA) 108 (90 Base) MCG/ACT inhaler     alendronate (FOSAMAX) 70 MG tablet     amLODIPine (NORVASC) 5 MG tablet     aspirin 81 MG tablet     azelastine (ASTEPRO) 0.15 % nasal spray     Calcium Citrate (CITRACAL OR)     carvedilol (COREG) 6.25 MG tablet     cholecalciferol (VITAMIN D3) 1250 mcg (12432 units) capsule     cloNIDine (CATAPRES) 0.1 MG tablet     COMPRESSION STOCKINGS     desvenlafaxine (PRISTIQ) 100 MG 24 hr tablet     fluticasone-salmeterol (ADVAIR DISKUS) 500-50 MCG/ACT inhaler     furosemide (LASIX) 20 MG tablet     lamoTRIgine (LAMICTAL) 25 MG tablet     LAMOTRIGINE 200 MG PO TABS     levothyroxine (SYNTHROID/LEVOTHROID) 50 MCG tablet     liothyronine (CYTOMEL) 5 MCG tablet     memantine (NAMENDA) 10 MG tablet     mirtazapine (REMERON) 7.5 MG tablet     Multiple Vitamin (TAB-A-JENNIFER) TABS     potassium chloride ER (K-TAB) 20 MEQ CR tablet     rosuvastatin (CRESTOR) 40 MG tablet     No current facility-administered medications for this visit.     ROS:   Constitutional: No fever, chills, or sweats. Weight is 147 lbs 0 oz  ENT: No visual disturbance, ear ache, epistaxis, sore throat.   Allergies/Immunologic: Negative.   Respiratory: No cough, hemoptysis.   Cardiovascular: As per HPI.   GI: No nausea, vomiting, hematemesis, melena, or hematochezia.   : No urinary frequency, dysuria, or hematuria.   Integrument: Negative.   Psychiatric: No evidence of major depression  Neuro: No new neurological complaints at this time. Non focal  Endocrinology: Negative.   Musculoskeletal: As per HPI.      EXAM:  /72   Wt 66.7 kg (147 lb)   BMI 26.89 kg/m    General: appears comfortable, alert and oriented  Head: normocephalic, atraumatic  Eyes: anicteric sclera, EOMI , PERRL  Neck: no adenopathy  Orophyarynx: moist mucosa, no lesions noted  Heart: regular, S1/S2, no murmurs, rubs or gallop. Estimated JVP at  5 cmH2O  Lungs: CTAB, No wheezing.   Abdomen: soft, non-tender, bowel sounds present, no hepatosplenomegaly  Extremities: No LE edema today  Skin: no open lesions noted  Neuro: grossly non-focal  Psych: no evidence of depression noted     Labs:  Lab Results   Component Value Date    WBC 5.7 07/29/2022    HGB 11.5 (L) 07/29/2022    HCT 37.5 07/29/2022     07/29/2022     05/17/2022    POTASSIUM 3.5 05/17/2022    CHLORIDE 106 05/17/2022    CO2 29 05/17/2022    BUN 29 05/17/2022    CR 1.39 (H) 05/17/2022    GLC 97 05/17/2022    SED 11 07/28/2021    NTBNP 547 (H) 04/06/2022    AST 22 04/28/2022    ALT 13 04/28/2022    ALKPHOS 47 04/28/2022    BILITOTAL 0.3 04/28/2022    INR 1.05 11/03/2020     Echocardiogram reviewed:   Left ventricular function, chamber size, wall motion, and wall thickness are normal.The EF is 55-60%. No regional wall motion abnormalities are seen.  Right ventricular function, chamber size, wall motion, and thickness are normal.  Severe left atrial enlargement is present. Moderate mitral annular calcification is present. Mild mitral insufficiency is present. There is likley moderate aortic stenosis. The peak velocity is  3.47m/sec, the peak and mean gradients are 48mmHg and 28mmHg. The aortic valve  area is calculated low at 0.7cm2. Pulmonary artery systolic pressure is normal. The inferior vena cava was normal in size with preserved respiratory variability. No pericardial effusion is present.   Compared to prior study AS has worsened. Otherwise no significant change     Coronary angiogram 8/21/2020:    Dist LAD lesion is 70% stenosed.    Mid LAD-1 lesion is 80% stenosed.    Mid LAD-2 lesion is 60% stenosed.    Mid RCA lesion is 40% stenosed.    Prox RCA lesion is 40% stenosed.    Right sided filling pressures are normal.    Normal PA pressures.    Left sided filling pressures are normal.    Reduced cardiac output.  1. LAD - 3 EDDIE were placed in the mid to distal LAD (2.5 x 28 mm, 3.5 x 12  mm, and 3.0 x 8 mm)  2. RHC showed normal biventricular filling pressures. Normal PA pressure. Reduced cardiac output.     RHC/coronary angiogram 11/3/20:    Moderate aortic stenosis -valve area 1.2 cm2,Mean gradient 24 mm Hg    Right sided filling pressures are normal.    Normal PA pressures.    Left sided filling pressures are normal.    Mildly reduced cardiac output level.    Peripheral angiogram done and IVUS used to assess the vessel lumens of the descending thoracic aorta,right and left external iliac arteries     TTE 5/3/21:  Global and regional left ventricular function is normal with an EF of 55-60%.  The right ventricle is normal size. Global right ventricular function is normal.  Moderate to severe aortic stenosis, aortic valve area 1.02 cm2, peak velocity  3.3 m/s, mean gradient 25 mmHg, stroke volume index 40 ml/m2, DVI 0.29.  This study was compared with the study from 9/16/2020. The aortic valve peak velocity and mean gradient are similar     TTE 11/1/21:  Global and regional left ventricular function is normal with an EF of 55-60%.  Right ventricular function, chamber size, wall motion, and thickness are normal.  Moderate aortic valve calcification is present. The mean gradient across the aortic valve is 23 mmHg. The peak aortic velocity is 3.1 m/sec. Moderate aortic stenosis is present. Calculated valve area lower than previous study due to LVOT diameter measurement.  Moderate aortic stenosis is present.  No significant changes noted.      ASSESSMENT AND PLAN:  In summary, patient is a 86 year old lady with with coronary artery disease and status post PCI 20 years ago at [facility], we do not have records unfortunately available but did receive 2 stents.  She most complains of shortness of breath denies any dizziness lightheadedness syncope or recurrent episodes of chest pains.  Overall there has been minimal change in her condition since my last visit and since the invasive evaluation. I have reviewed  old imaging and blood work in person including visualizing the echocardiograms. Grossly I believe the echo is pretty much unchanged from the prior one.  She still has moderate to severe aortic stenosis.  However she does not have any concerning symptoms such as chest pain dizziness lightheadedness or syncope.  As such we will continue to monitor her with more aggressive blood pressure control.  Appreciate structural teams assistance as well. She did have an admission 12/16/21 and multiple medication changes have been made, including stopping amlodipine (on her visit with me in 11/2021 we actually increased to 10mg due to persistent hypertension). Accordingly, she was hypertensive and 5mg was restarted in . However, she developed hypertensive emergency requiring admission for this and amlodipine was increased to 10mg daily with good response. Today she is here for follow up.  At this time we will continue medication however change amlodipine to the evening dose so hopefully she describes she will not feel any side effects including the head pressure coming from it.  I am wondering if this might be related to dropping her blood pressure little bit more than desired in the setting of her aortic stenosis however it is very hard to tell because she is always hypertensive in clinic otherwise.  In addition we will add Lasix 20 mg in the afternoon so she is going to take 20 mg twice daily dosing of the Lasix given the lower extremity edema and some swelling.  I have reviewed the echocardiogram however we will also touch base with the structural team which I just have done to potentially reevaluate her and make invasive measurements whether she would be a candidate for TAVR at this time given her worsening shortness of breath and reduced exercise tolerance.  However again no concerning symptoms such as chest pain or syncope at this time.  No other medication changes I will see her back in 3 months or sooner as  needed.     I appreciate the opportunity to participate in the care of Kamryn Miller . Please do not hesitate to contact me with any further questions.     Sincerely,   Madhu Zuniga MD  [facility] of [facility]

## 2022-07-29 NOTE — NURSING NOTE
Chief Complaint   Patient presents with     Follow Up     6 mo HF return with echo       Vitals were taken and medications reconciled.    Felice Montes, EMT  3:43 PM

## 2022-07-29 NOTE — LETTER
7/29/2022      RE: Kamryn Miller  [address]  [address]  [place] [address]       Dear Colleague,    Thank you for the opportunity to participate in the care of your patient, Kamryn Miller, at the [facility]. Please see a copy of my visit note below.    July 29, 2022     I had the pleasure of seeing Kamryn Miller  in the [facility] for further evaluation and management. She has a history of Hx CAD, HLD, HTN, mitral and tricuspid insuff, pulmonary hypertension, lost to follow up in the past.  She does have CAD and did receive 3 stents in 8/2020 as below.  She denies any cardiomyopathy and she has not had any cardiology follow-up for several years.  Notes that she does have shortness of breath especially with exertion or she walks outside.  This is class III symptoms at the moment she can will probably about a block and able to take a flight of stairs.  We did start TAVR evaluation and she did undergo right heart catheterization as well as invasive hemodynamic measurement and measurement of the valve area.  Given findings of moderate aortic stenosis, TAVR was not yet pursued and ongoing surveillance was recommended. Had been hypertensive in the recent past. She did see the structural team just recently and repeat TTE was performed that again showed moderate to severe aortic stenosis, essentially unchanged from prior TTE. Given that she had no concerning symptoms ongoing surveillance was recommended.  On last visit I suggested increasing amlodipine to 10mg daily due to persistent hypertension. However, she did have an admission 12/16/21 and multiple medication changes made, including stopping amlodipine. Accordingly, she developed hypertension and 5mg was restarted in UC. However, she developed hypertensive emergency requiring admission for this and amlodipine was increased to 10mg daily with  good response. Today she is here for follow up.  Overall she denies any major complaints however notes that she is little bit more short of breath and her exercise tolerance has decreased significantly.  She is able to walk a block will be little bit more which is down from previous.  She is also does have some lower extremity edema which is fairly new for her and has not had a bleed in the past.  She continues to take 20 mg of Lasix once a day which seems to work somewhat.  She denies however any episodes of chest pain dizziness or lightheadedness and no syncopal episodes.  Her blood pressure is a little bit better controlled and she is able to tolerate the amlodipine 10 in addition to the other medications however noted amlodipine still causes her some headache and some weird feelings which is hard to describe.  She did have similar episodes with hydralazine in the past.  Overall no other complaints    PAST MEDICAL HISTORY:  Past Medical History:   Diagnosis Date     Aortic valvar stenosis 7/2010    mild     Asthma      Bipolar disorder (H)      CAD (coronary artery disease)     s/p angioplasty     Celiac disease      Colon polyps      Colon polyps 2012    every 3 year colonoscopy      Depression      High cholesterol      HTN      Mitral insufficiency      Pulmonary HTN (H)     mild     Tremors 7/10    drug induced from antidepressants     Tricuspid insufficiency      FAMILY HISTORY:  Family History   Problem Relation Age of Onset     Asthma Mother      Cerebrovascular Disease Mother      Arthritis Mother      Depression Mother      Lipids Sister      Hypertension Sister      Heart Disease Sister      Lipids Sister      Hypertension Sister      Lipids Sister      Breast Cancer Sister      SOCIAL HISTORY:  Social History     Socioeconomic History     Marital status:      Spouse name: Adrien     Number of children: 2     Years of education: 16     Highest education level: None   Occupational History      Employer: RETIRED     Comment: Retired in 2002.    Tobacco Use     Smoking status: Never Smoker     Smokeless tobacco: Never Used   Substance and Sexual Activity     Alcohol use: No     Alcohol/week: 0.0 standard drinks     Types: 1 Standard drinks or equivalent per week     Drug use: No     Sexual activity: Not Currently     Partners: Male     CURRENT MEDICATIONS:  Current Outpatient Medications   Medication     albuterol (PROAIR HFA/PROVENTIL HFA/VENTOLIN HFA) 108 (90 Base) MCG/ACT inhaler     alendronate (FOSAMAX) 70 MG tablet     amLODIPine (NORVASC) 5 MG tablet     aspirin 81 MG tablet     azelastine (ASTEPRO) 0.15 % nasal spray     Calcium Citrate (CITRACAL OR)     carvedilol (COREG) 6.25 MG tablet     cholecalciferol (VITAMIN D3) 1250 mcg (15695 units) capsule     cloNIDine (CATAPRES) 0.1 MG tablet     COMPRESSION STOCKINGS     desvenlafaxine (PRISTIQ) 100 MG 24 hr tablet     fluticasone-salmeterol (ADVAIR DISKUS) 500-50 MCG/ACT inhaler     furosemide (LASIX) 20 MG tablet     lamoTRIgine (LAMICTAL) 25 MG tablet     LAMOTRIGINE 200 MG PO TABS     levothyroxine (SYNTHROID/LEVOTHROID) 50 MCG tablet     liothyronine (CYTOMEL) 5 MCG tablet     memantine (NAMENDA) 10 MG tablet     mirtazapine (REMERON) 7.5 MG tablet     Multiple Vitamin (TAB-A-JENNIFER) TABS     potassium chloride ER (K-TAB) 20 MEQ CR tablet     rosuvastatin (CRESTOR) 40 MG tablet     No current facility-administered medications for this visit.     ROS:   Constitutional: No fever, chills, or sweats. Weight is 147 lbs 0 oz  ENT: No visual disturbance, ear ache, epistaxis, sore throat.   Allergies/Immunologic: Negative.   Respiratory: No cough, hemoptysis.   Cardiovascular: As per HPI.   GI: No nausea, vomiting, hematemesis, melena, or hematochezia.   : No urinary frequency, dysuria, or hematuria.   Integrument: Negative.   Psychiatric: No evidence of major depression  Neuro: No new neurological complaints at this time. Non focal  Endocrinology:  Negative.   Musculoskeletal: As per HPI.      EXAM:  /72   Wt 66.7 kg (147 lb)   BMI 26.89 kg/m    General: appears comfortable, alert and oriented  Head: normocephalic, atraumatic  Eyes: anicteric sclera, EOMI , PERRL  Neck: no adenopathy  Orophyarynx: moist mucosa, no lesions noted  Heart: regular, S1/S2, no murmurs, rubs or gallop. Estimated JVP at 5 cmH2O  Lungs: CTAB, No wheezing.   Abdomen: soft, non-tender, bowel sounds present, no hepatosplenomegaly  Extremities: No LE edema today  Skin: no open lesions noted  Neuro: grossly non-focal  Psych: no evidence of depression noted     Labs:  Lab Results   Component Value Date    WBC 5.7 07/29/2022    HGB 11.5 (L) 07/29/2022    HCT 37.5 07/29/2022     07/29/2022     05/17/2022    POTASSIUM 3.5 05/17/2022    CHLORIDE 106 05/17/2022    CO2 29 05/17/2022    BUN 29 05/17/2022    CR 1.39 (H) 05/17/2022    GLC 97 05/17/2022    SED 11 07/28/2021    NTBNP 547 (H) 04/06/2022    AST 22 04/28/2022    ALT 13 04/28/2022    ALKPHOS 47 04/28/2022    BILITOTAL 0.3 04/28/2022    INR 1.05 11/03/2020     Echocardiogram reviewed:   Left ventricular function, chamber size, wall motion, and wall thickness are normal.The EF is 55-60%. No regional wall motion abnormalities are seen.  Right ventricular function, chamber size, wall motion, and thickness are normal.  Severe left atrial enlargement is present. Moderate mitral annular calcification is present. Mild mitral insufficiency is present. There is likley moderate aortic stenosis. The peak velocity is  3.47m/sec, the peak and mean gradients are 48mmHg and 28mmHg. The aortic valve  area is calculated low at 0.7cm2. Pulmonary artery systolic pressure is normal. The inferior vena cava was normal in size with preserved respiratory variability. No pericardial effusion is present.   Compared to prior study AS has worsened. Otherwise no significant change     Coronary angiogram 8/21/2020:    Dist LAD lesion is 70%  stenosed.    Mid LAD-1 lesion is 80% stenosed.    Mid LAD-2 lesion is 60% stenosed.    Mid RCA lesion is 40% stenosed.    Prox RCA lesion is 40% stenosed.    Right sided filling pressures are normal.    Normal PA pressures.    Left sided filling pressures are normal.    Reduced cardiac output.  1. LAD - 3 EDDIE were placed in the mid to distal LAD (2.5 x 28 mm, 3.5 x 12 mm, and 3.0 x 8 mm)  2. RHC showed normal biventricular filling pressures. Normal PA pressure. Reduced cardiac output.     RHC/coronary angiogram 11/3/20:    Moderate aortic stenosis -valve area 1.2 cm2,Mean gradient 24 mm Hg    Right sided filling pressures are normal.    Normal PA pressures.    Left sided filling pressures are normal.    Mildly reduced cardiac output level.    Peripheral angiogram done and IVUS used to assess the vessel lumens of the descending thoracic aorta,right and left external iliac arteries     TTE 5/3/21:  Global and regional left ventricular function is normal with an EF of 55-60%.  The right ventricle is normal size. Global right ventricular function is normal.  Moderate to severe aortic stenosis, aortic valve area 1.02 cm2, peak velocity  3.3 m/s, mean gradient 25 mmHg, stroke volume index 40 ml/m2, DVI 0.29.  This study was compared with the study from 9/16/2020. The aortic valve peak velocity and mean gradient are similar     TTE 11/1/21:  Global and regional left ventricular function is normal with an EF of 55-60%.  Right ventricular function, chamber size, wall motion, and thickness are normal.  Moderate aortic valve calcification is present. The mean gradient across the aortic valve is 23 mmHg. The peak aortic velocity is 3.1 m/sec. Moderate aortic stenosis is present. Calculated valve area lower than previous study due to LVOT diameter measurement.  Moderate aortic stenosis is present.  No significant changes noted.      ASSESSMENT AND PLAN:  In summary, patient is a 86 year old lady with with coronary artery disease  and status post PCI 20 years ago at [facility], we do not have records unfortunately available but did receive 2 stents.  She most complains of shortness of breath denies any dizziness lightheadedness syncope or recurrent episodes of chest pains.  Overall there has been minimal change in her condition since my last visit and since the invasive evaluation. I have reviewed old imaging and blood work in person including visualizing the echocardiograms. Grossly I believe the echo is pretty much unchanged from the prior one.  She still has moderate to severe aortic stenosis.  However she does not have any concerning symptoms such as chest pain dizziness lightheadedness or syncope.  As such we will continue to monitor her with more aggressive blood pressure control.  Appreciate structural teams assistance as well. She did have an admission 12/16/21 and multiple medication changes have been made, including stopping amlodipine (on her visit with me in 11/2021 we actually increased to 10mg due to persistent hypertension). Accordingly, she was hypertensive and 5mg was restarted in UC. However, she developed hypertensive emergency requiring admission for this and amlodipine was increased to 10mg daily with good response. Today she is here for follow up.  At this time we will continue medication however change amlodipine to the evening dose so hopefully she describes she will not feel any side effects including the head pressure coming from it.  I am wondering if this might be related to dropping her blood pressure little bit more than desired in the setting of her aortic stenosis however it is very hard to tell because she is always hypertensive in clinic otherwise.  In addition we will add Lasix 20 mg in the afternoon so she is going to take 20 mg twice daily dosing of the Lasix given the lower extremity edema and some swelling.  I have reviewed the echocardiogram however we will also touch base with the structural team which I  just have done to potentially reevaluate her and make invasive measurements whether she would be a candidate for TAVR at this time given her worsening shortness of breath and reduced exercise tolerance.  However again no concerning symptoms such as chest pain or syncope at this time.  No other medication changes I will see her back in 3 months or sooner as needed.     I appreciate the opportunity to participate in the care of Kamryn Millre . Please do not hesitate to contact me with any further questions.     Sincerely,   Madhu Zuniga MD  [facility] Division of Cardiology        Please do not hesitate to contact me if you have any questions/concerns.     Sincerely,     Madhu Zuniga MD

## 2022-08-03 ENCOUNTER — CARE COORDINATION (OUTPATIENT)
Dept: CARDIOLOGY | Facility: CLINIC | Age: 87
End: 2022-08-03

## 2022-08-03 DIAGNOSIS — I35.0 SEVERE AORTIC STENOSIS: Primary | ICD-10-CM

## 2022-08-03 NOTE — PROGRESS NOTES
Date: 8/3/2022    Time of Call: 12:31 PM     Diagnosis:  Severe aortic stenosis     [ TORB ] Ordering provider: Pati De Santiago NP  Order:   TAVR CT  BMP     Order received by: Dayna Ray RN     Follow-up/additional notes:   Repeat TAVR CT needed as prior CT is two years old.

## 2022-08-04 ENCOUNTER — OFFICE VISIT (OUTPATIENT)
Dept: CARDIOLOGY | Facility: CLINIC | Age: 87
End: 2022-08-04
Attending: INTERNAL MEDICINE
Payer: MEDICARE

## 2022-08-04 VITALS
WEIGHT: 148.1 LBS | DIASTOLIC BLOOD PRESSURE: 90 MMHG | BODY MASS INDEX: 29.08 KG/M2 | OXYGEN SATURATION: 96 % | SYSTOLIC BLOOD PRESSURE: 150 MMHG | HEART RATE: 60 BPM | HEIGHT: 60 IN

## 2022-08-04 DIAGNOSIS — I35.0 NONRHEUMATIC AORTIC VALVE STENOSIS: Primary | ICD-10-CM

## 2022-08-04 PROCEDURE — 99215 OFFICE O/P EST HI 40 MIN: CPT | Mod: GC

## 2022-08-04 PROCEDURE — 93005 ELECTROCARDIOGRAM TRACING: CPT

## 2022-08-04 PROCEDURE — G0463 HOSPITAL OUTPT CLINIC VISIT: HCPCS | Mod: 25

## 2022-08-04 ASSESSMENT — PAIN SCALES - GENERAL: PAINLEVEL: NO PAIN (0)

## 2022-08-04 NOTE — PROGRESS NOTES
Structural Heart Coordinator visit:  Provided additional education regarding TAVR/MitraClip procedure, after being present for discussion with physician. Explained the work-up process and next steps for patient. Patient provided our direct contact number and instructed to call with any questions.     Frailty Score: 1/5    Completed frailty testing and KCCQ.       5 meter walk: 5.78  Trial 1:5.85  Trail 2: 6.55  Trial 3: 4.95    : 39.43  Albumin: 3.8  Eye ball test: Pass  ADL: 6/6- pass    Dental Clearance: Pass      KCCQ Results:   1a. 5  1b. 5  1c. 3  2. 1  3. 7  4. 6  5. 5  6. 4  7. 3  8a. 4  8b. 4  8c. 5    STS Risk Score: 3.0%  NYHA Class:       Maria L Gerard CMA  Structural Heart   [facility]

## 2022-08-04 NOTE — PROGRESS NOTES
[facility] HEART CARE  CARDIOVASCULAR DIVISION    VALVE CLINIC INITIAL CONSULTATION    PRIMARY CARDIOLOGIST: Dr. 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 Patient was previously evaluated in TAVR clinic 11/2020 at which time her aortic stenosis was found to be moderate by invasive valve assessment (LUCY 1.2 cm, mean PG 24 mmHg) and by cardiac CT demonstrating AV CA score 1263; therefore, ongoing surveillance was recommended. Recent echo shows progression of her aortic stenosis to severe with LUCY 0.6cm2, mean PG 31 mmHg, peak V 3.8 m/s, EF  60-65% and DI 0.2. Additional history notable for HTN, HLD, CAD LAD PCI 8/2020, HFpEF.     She has shortness of breath with exertion. No chest pain, dizziness, syncope.  She is also does have some lower extremity edema which is fairly new for her and has not had a bleed in the past.  She continues to take 20 mg of Lasix once a day which seems to work somewhat.  She denies however any episodes of chest pain dizziness or lightheadedness and no syncopal episodes.      PAST MEDICAL HISTORY:     Past Medical History:   Diagnosis Date     Aortic valvar stenosis 7/2010    mild     Asthma      Bipolar disorder (H)      CAD (coronary artery disease)     s/p angioplasty     Celiac disease      Colon polyps      Colon polyps 2012    every 3 year colonoscopy      Depression      High cholesterol      HTN      Mitral insufficiency      Pulmonary HTN (H)     mild     Tremors 7/10    drug induced from antidepressants     Tricuspid insufficiency         PAST SURGICAL HISTORY:     Past Surgical History:   Procedure Laterality Date     ANGIOGRAM  6/26/2009     ANGIOPLASTY  9/96    for angina     ANGIOPLASTY  8/03 - 9/03    X -2 - with stenst in coronary car.     APPENDECTOMY       BREAST BIOPSY, RT/LT      Breat Biopsy RT/LT, benign     BUNIONECTOMY   11/8/2011    Procedure:BUNIONECTOMY; Left donna bunionectomy; Surgeon:FREDY LITTLEJOHN; Location:[facility] OR     BUNIONECTOMY RT/LT  5/2007    right bunion and right 2nd hammertoe     CATARACT IOL, RT/LT  6/12 and 7/12    bilateral     COLONOSCOPY  2007, 2012    every 3 years for polyps     CV CORONARY ANGIOGRAM N/A 8/21/2020    Procedure: CV CORONARY ANGIOGRAM;  Surgeon: Gianluca Toscano MD;  Location: [facility]     CV LEFT HEART CATH N/A 8/21/2020    Procedure: CV LEFT HEART CATH;  Surgeon: Gianluca Toscano MD;  Location: [facility]     CV LEFT HEART CATH N/A 11/3/2020    Procedure: CV LEFT HEART CATH;  Surgeon: Tom Burrows MD;  Location: [facility]     CV LOWER EXTREMITY ANGIOGRAM BILATERAL N/A 11/3/2020    Procedure: CV ANGIOGRAM LOWER EXTREMITY BILATERAL;  Surgeon: Tom Burrows MD;  Location: [facility]     CV PCI STENT DRUG ELUTING N/A 8/21/2020    Procedure: Percutaneous Coronary Intervention Stent Drug Eluting;  Surgeon: Gianluca Toscano MD;  Location: [facility]     CV RIGHT HEART CATH MEASUREMENTS RECORDED N/A 8/21/2020    Procedure: CV RIGHT HEART CATH;  Surgeon: Gianluca Toscano MD;  Location: [facility]     CV RIGHT HEART CATH MEASUREMENTS RECORDED N/A 11/3/2020    Procedure: CV RIGHT HEART CATH;  Surgeon: Tom Burrows MD;  Location: [facility]     JOINT REPLACEMENT, HIP RT/LT  4/2008    right hip replaced     JOINT REPLACEMENT, HIP RT/LT  4/2009    left hip replaced     REPAIR HAMMER TOE  11/8/2011    Procedure:REPAIR HAMMER TOE; left 2nd hammertoe repair; Surgeon:FREDY LITTLEJOHN; Location:[facility] OR     SURGICAL HISTORY OF -   11/11    left bunion and 2nd hammertoe repair     TUBAL LIGATION       ZZC ANESTH,BLEPHAROPLASTY      (R) for drooping eyelid     ZZC APPENDECTOMY          CURRENT MEDICATIONS:     Current Outpatient Medications   Medication Sig Dispense  Refill     albuterol (PROAIR HFA/PROVENTIL HFA/VENTOLIN HFA) 108 (90 Base) MCG/ACT inhaler Inhale 2 puffs into the lungs every 6 hours as needed for wheezing or shortness of breath / dyspnea 18 g 3     alendronate (FOSAMAX) 70 MG tablet TAKE 1 TABLET BY MOUTH ONCE WEEKLY 4 tablet 23     amLODIPine (NORVASC) 5 MG tablet Take 2 tablets (10 mg) by mouth daily Take this in the evening. 180 tablet 3     aspirin 81 MG tablet Take 81 mg by mouth daily        azelastine (ASTEPRO) 0.15 % nasal spray USE 1 SPRAY IN EACH NOSTRIL ONCE A DAY 30 mL 11     Calcium Citrate (CITRACAL OR) Take 1 tablet by mouth daily.       carvedilol (COREG) 6.25 MG tablet Take 3 tablets (18.75 mg) by mouth 2 times daily 180 tablet 2     cholecalciferol (VITAMIN D3) 1250 mcg (71453 units) capsule TAKE 1 CAPSULE BY MOUTH ONCE WEEKLY 4 capsule 11     cloNIDine (CATAPRES) 0.1 MG tablet Take 1 tablet (0.1 mg) by mouth daily as needed (For SBP > 180) 20 tablet 0     COMPRESSION STOCKINGS 1 each daily 1 each 1     desvenlafaxine (PRISTIQ) 100 MG 24 hr tablet Take 100 mg by mouth daily       fluticasone-salmeterol (ADVAIR DISKUS) 500-50 MCG/ACT inhaler INHALE 1 PUFF BY MOUTH TWICE DAILY 60 each 0     furosemide (LASIX) 20 MG tablet Take 1 tablet (20 mg) by mouth 2 times daily 180 tablet 3     lamoTRIgine (LAMICTAL) 25 MG tablet Take 25 mg by mouth daily with 200 mg tablet for a total dose of 225 mg       LAMOTRIGINE 200 MG PO TABS Take 200 mg by mouth daily with 25 mg tablet for a total dose of 225 mg       levothyroxine (SYNTHROID/LEVOTHROID) 50 MCG tablet TAKE 1 TABLET BY MOUTH ONCE DAILY 28 tablet 11     liothyronine (CYTOMEL) 5 MCG tablet Take 2 tablets (10 mcg) by mouth daily 30 tablet 3     memantine (NAMENDA) 10 MG tablet Take 1 tablet (10 mg) by mouth daily 30 tablet 3     mirtazapine (REMERON) 7.5 MG tablet Take 1 tablet (7.5 mg) by mouth At Bedtime 30 tablet 3     Multiple Vitamin (TAB-A-JENNIFER) TABS TAKE 1 TABLET BY MOUTH ONCE DAILY 30 tablet PRN      potassium chloride ER (K-TAB) 20 MEQ CR tablet Take 1 tablet (20 mEq) by mouth daily 90 tablet 3     rosuvastatin (CRESTOR) 40 MG tablet TAKE 1 TABLET BY MOUTH ONCE DAILY 28 tablet 11        ALLERGIES:     Allergies   Allergen Reactions     Ace Inhibitors Cough     Diclofenac Other (See Comments)     Balance problems     Gluten Meal Diarrhea        FAMILY HISTORY:     Family History   Problem Relation Age of Onset     Asthma Mother      Cerebrovascular Disease Mother      Arthritis Mother      Depression Mother      Lipids Sister      Hypertension Sister      Heart Disease Sister      Lipids Sister      Hypertension Sister      Lipids Sister      Breast Cancer Sister         SOCIAL HISTORY:     Social History     Socioeconomic History     Marital status:      Spouse name: Adrien     Number of children: 2     Years of education: 16   Occupational History     Employer: RETIRED     Comment: Retired in 2002.    Tobacco Use     Smoking status: Never Smoker     Smokeless tobacco: Never Used   Substance and Sexual Activity     Alcohol use: No     Alcohol/week: 0.0 standard drinks     Types: 1 Standard drinks or equivalent per week     Drug use: No     Sexual activity: Not Currently     Partners: Male        REVIEW OF SYSTEMS:     Constitutional: No fevers or chills  Skin: No new rash or itching  Eyes: No acute change in vision  Ears/Nose/Throat: No purulent rhinorrhea, new hearing loss, or new vertigo  Respiratory: No cough or hemoptysis  Cardiovascular: See HPI  Gastrointestinal: No change in appetite, vomiting, hematemesis or diarrhea  Genitourinary: No dysuria or hematuria  Musculoskeletal: No new back pain, neck pain or muscle pain  Neurologic: No new headaches, focal weakness or behavior changes  Psychiatric: No hallucinations, excessive alcohol consumption or illegal drug usage  Hematologic/Lymphatic/Immunologic: No bleeding, chills, fever, night sweats or weight loss  Endocrine: No new cold intolerance,  "heat intolerance, polyphagia, polydipsia or polyuria      PHYSICAL EXAMINATION:     BP (!) 150/90 (BP Location: Right arm, Patient Position: Chair, Cuff Size: Adult Regular)   Pulse 60   Ht 1.53 m (5' 0.24\")   Wt 67.2 kg (148 lb 1.6 oz)   SpO2 96%   BMI 28.70 kg/m      GENERAL: No acute distress.  HEENT: EOMI. Sclerae white, not injected. Nares clear.  Heart: Regular rate and rhythm. Harsh crescendo decrescendo late peaking systolic murmur with radiation to carotids. Delayed carotid pulses.   Lungs: Clear to auscultation. No ronchi, wheezes, rales.   Abdomen: Soft, nontender, nondistended. Bowel sounds present.  Extremities: No clubbing, cyanosis, or edema.   Neurologic: Alert and oriented to person/place/time, normal speech and affect. No focal deficits.  Skin: No petechiae, purpura or rash.     LABORATORY DATA:     LIPID RESULTS:  Lab Results   Component Value Date    CHOL 172 12/17/2021    CHOL 175 10/23/2020    HDL 61 12/17/2021    HDL 71 10/23/2020    LDL 84 12/17/2021    LDL 83 10/23/2020    TRIG 137 12/17/2021    TRIG 103 10/23/2020    CHOLHDLRATIO 2.8 09/18/2015       LIVER ENZYME RESULTS:  Lab Results   Component Value Date    AST 25 07/29/2022    AST 27 05/17/2021    ALT 26 07/29/2022    ALT 38 05/17/2021       CBC RESULTS:  Lab Results   Component Value Date    WBC 5.7 07/29/2022    WBC 4.6 05/17/2021    RBC 4.28 07/29/2022    RBC 3.97 05/17/2021    HGB 11.5 (L) 07/29/2022    HGB 12.9 05/17/2021    HCT 37.5 07/29/2022    HCT 40.3 05/17/2021    MCV 88 07/29/2022     (H) 05/17/2021    MCH 26.9 07/29/2022    MCH 32.5 05/17/2021    MCHC 30.7 (L) 07/29/2022    MCHC 32.0 05/17/2021    RDW 15.6 (H) 07/29/2022    RDW 12.7 05/17/2021     07/29/2022     05/17/2021       BMP RESULTS:  Lab Results   Component Value Date     07/29/2022     06/04/2021    POTASSIUM 4.8 07/29/2022    POTASSIUM 3.4 06/04/2021    CHLORIDE 109 07/29/2022    CHLORIDE 106 06/04/2021    CO2 30 07/29/2022 "    CO2 30 2021    ANIONGAP 3 2022    ANIONGAP 6 2021     (H) 2022     (H) 2021    BUN 35 (H) 2022    BUN 27 2021    CR 1.29 (H) 2022    CR 1.50 (H) 2021    GFRESTIMATED 40 (L) 2022    GFRESTIMATED 32 (L) 2021    GFRESTBLACK 37 (L) 2021    PRINCE 9.6 2022    PRINCE 9.4 2021        A1C RESULTS:  Lab Results   Component Value Date    A1C 5.0 2009       INR RESULTS:  Lab Results   Component Value Date    INR 1.05 2020    INR 2.28 (H) 2009          PROCEDURES & FURTHER ASSESSMENTS:     EC2022  Normal sinus rhythm, 1 st degree AV block    Echocardiogram:  2022    Global and regional left ventricular function is normal with an EF of 60-65%.  The right ventricle is normal size. Global right ventricular function is  normal.  Moderate to severe mitral annular calcification is present. Mild mitral  stenosis is present.  Severe aortic valve calcification is present. (Vm 3.8m/s mean gradient 31mmHg  LUCY per VTI 0.6cm2, and DVI of 0.2 with normal SVI)  IVC diameter <2.1 cm collapsing >50% with sniff suggests a normal RA pressure  of 3 mmHg.  No pericardial effusion is present.     This study was compared with the study from 21 the gradient across the AV  has increased.      CT TAVR:  2020    IMPRESSION:     1.  See below for TAVR related aortic annular and vascular  measurements.   2.  No acute aortic dissection, intramural hematoma or contained  rupture noted.  3.  Suboptimal imaging of the lower extremity vasculature due to  low-contrast protocol and significant metallic artifacts related to  bilateral hip arthroplasty hardware.   4.  Please review detailed radiology report, to follow separately, for  incidental non-cardiovascular findings.     FINDINGS:     1.  Moderately calcified tricuspidaortic valve. Aortic valve calcium  score is 1263. The LVOT is mildly calcified. The ascending aorta  is  mildly calcified, aortic arch is  mildly calcified, the descending  thoracic aorta is mildly calcified. There is moderate mitral annular  calcification.  2.  There are no adverse aortic root features, ie coronary heights are  adequate and there is no significant aortic root calcification.  3.  There are significant native coronary calcifications.   4.  The arch vessel branching pattern is normal.   5.  The suprarenal abdominal aorta is moderately calcified; infrarenal  abdominal aorta is severely calcified  6.  Widely patent origins of celiac trunk, superior mesenteric artery  and inferior mesenteric artery.  Single bilateral renal arteries with  widely patent origins.   7.  There is no acute aortic pathology, such as dissection, intramural  hematoma, or contained rupture.   8.  Suboptimal imaging of the lower extremity vasculature due to  low-contrast protocol and significant metallic artifacts related to  bilateral hip arthroplasty hardware. Consider an additional study for  repeat assessment of iliofemoral dimensions, particularly in the  bilateral external iliac and common femoral arteries.     MEASUREMENTS:   Representative dimensions of the thoracoabdominal aorta are as  follows:     1. AORTIC ANNULUS MEASUREMENTS:     1.  The average aortic annulus diameter is 24.6 mm, (long diameter is  27.8 mm and short diameter is 22.3 mm)  2.  Aortic annulus area is 4.76 cm2  3.  Aortic annulus perimeter is 79.0 mm  4.  Suggested 3-cusp coaxial angle for aortic valve is (RAO8 , CAU 0)  and alternate A-P coaxial angle is (RANDI 0 , CRA 10 ), these  angles  will vary depending upon the patient's body position  5.  Aortic root angle is 35.5 degrees  6.  Left main - Annulus distance: 16.2 mm, RCA - Annulus distance:  13.7 mm     2. LVOT MEASUREMENTS:     1.  The average LVOT diameter (measured 4 mm below annular plane) is  26.4 mm  2.  LVOT area is 5.47 cm2  3.  LVOT perimeter is 85.6 mm     3. AORTA MEASUREMENTS     1.   The aortic root at the sinuses of Valsalva (left cusp, right cusp,  non cusp): 30.7 mm x  31.3 mm x 31.7 mm  2.  The sinotubular junction:  26.1 mm x 24.5 mm  3.  Sinotubular junction height: 21.1 mm x 21.1 mm  4.  Proximal ascending aorta: 33.7 mm x 33.7 mm  5.  Distal abdominal aorta proximal to the bifurcation: 13.1 mm x 11.7  mm  6.  Innominate artery 3 cm from ostium: 12.1 mm x 11.5 mm  7.  Left common carotid artery 3 cm from ostium: 6.4 mm x 6.1 mm     4. ILIOFEMORAL MEASUREMENTS:  Suboptimal imaging of the lower extremity vasculature due to  low-contrast protocol and significant metallic artifacts related to  bilateral hip arthroplasty hardware. Consider an additional study for  repeat assessment of iliofemoral dimensions, particularly in the  bilateral external iliac and common femoral arteries.     RIGHT:  1.  Right common iliac artery: 8.9 mm x 6.3 mm   2.  Right external iliac artery: 4.8 mm x 3.9 mm. Imaging of this  segment is extremely suboptimal and these dimensions may be  significantly underestimated.    3.  Right common femoral artery: Imaging of this segment is  non-diagnostic.   4.  Tortuosity: severe   5.  Calcification: moderate      LEFT:  1.  Left common iliac artery: 6.1 mm x  6.1 mm  2.  Left external iliac artery: 5.1 mm x 4.7 mm. Imaging of this  segment is extremely suboptimal and these dimensions may be  significantly underestimated.    3.  Left common femoral artery: 5.3. mm x 5.1 mm. Imaging of this  segment is extremely suboptimal and these dimensions may be  significantly underestimated.    4.  Tortuosity: severe   5.  Calcification: moderate      5. SUBCLAVIAN MEASUREMENTS:     RIGHT:  1. Minimal luminal diameter: 7.0 mm x  6.0 mm  2. Tortuosity: moderate   3. Calcification: mild      LEFT:  1. Minimal luminal diameter: 6.8 mm x  6.2 mm  2. Tortuosity: moderate   3. Calcification: mild      OTHER FINDINGS:   1.  Please review radiology review for incidental  non-cardiovascular  findings including lungs, abdominal organs, bone, soft tissue, nodes  to follow separately      Coronary Angiogram and Right Heart Catheterization:  11/3/2020      Moderate aortic stenosis -valve area 1.2 cm2, Mean gradient 24 mm Hg    Right sided filling pressures are normal.    Normal PA pressures.    Left sided filling pressures are normal.    Mildly reduced cardiac output level.    Peripheral angiogram done and IVUS used to assess the vessel lumens of the descending thoracic aorta,right and left external iliac arteries.    Prox R VARUN lesion is 40% stenosed.    Dist R VARUN to Mid R CFA lesion is 70% stenosed.    Mid L EIA to Dist L EIA lesion is 30% stenosed.    Ultrasound (IVUS) was performed.    Peripheral Vessel Findings      Diagnostic      Left External Iliac   Mid L EIA to Dist L EIA lesion is 30% stenosed. Ultrasound (IVUS) was performed. IVUS measurements at different segments From distal Left external iliac proximally till the left common iliac artery as below: 1. Minimal diameter - 8.9 mm,Maximum -9.8 mm 2. Minimum -7.5 mm,Maximum 9.1 mm 3. Minimum - 6.5 mm,Maximum -7.5 mm 4. Minimum- 6.4 mm,Maximum- 8.1 mm   Right Common Iliac   Prox R VARUN lesion is 40% stenosed. Ultrasound (IVUS) was performed. Minimal lumen diameter -13.1 mm Maximum lumen diameter - 14.9 mm   Dist R VARUN to Mid R CFA lesion is 70% stenosed. Multiple IVUS measurements proximally from the right common iliac till the distal right external iliac as below: 1. Minimal diameter -8.0 mm,Maximum diameter -8.8 mm 2. Minimal diameter - 7.5 mm,Maximum diameter - 8.7 mm 3. Minimal diameter - 5.8 mm,Maximum diameter - 7.5 mm       PFTs:  12/20/2019    Nonspecific pulmonary function testing with decreased FEV1 and FEV but normal ratio and TLC, could be seen in obesity or neuromuscular weakness   There is no significant bronchodilator response.   Normal diffusion capacity   Lung volumes are normal     Carotid  Ultrasound:  12/20/2019       1. Right side:      Degree of stenosis of the internal carotid artery: Less than 50%  due to presence of plaque.      2. Left side:       Degree of stenosis of the internal carotid artery: Less than 50%  due to presence of plaque.      STS RISK SCORE: 3%  FRAILTY SCORE: 1/5  NYHA CLASS: III     CLINICAL IMPRESSION:     Kamryn Miller is a 86 year old female with symptomatic severe aortic stenosis who is a good candidate for transcatheter aortic valve replacement based on age, frailty, co-morbidities and overall surgical mortality risk estimation of 3% based on the STS score.      The pre-procedural TAVR evaluation currently requires no additional studies prior to presentation to the Heart team for discussion during our multi-disciplinary conference for consensus decision and procedural planning.    Plan Summary:  1) Severe Aortic Stenosis:  -Presentation of data to the Heart team to assess the optimal treatment of her severe aortic stenosis    Patient was evaluated in clinic with Dr. Burrows of interventional cardiology.      Neli Patrick  Interventional Cardiology Fellow  [phone]    CC  Patient Care Team:  Rolanda Wilcox MD as PCP - General (Family Practice)  Rolanda Wilcox MD as Assigned PCP  Pati De Santiago NP as Assigned Heart and Vascular Provider  Javier Landa MD as Assigned OBGYN Provider  Ania Johnson MD as Assigned Nephrology Provider  Dimas Palacios, PhD as Assigned Behavioral Health Provider        Patient seen and examined with Cardiovascular fellow and agree with the assessment and plan described above.     Tom Burrows M.D.  Interventional Cardiology  [facility]

## 2022-08-04 NOTE — NURSING NOTE
Chief Complaint   Patient presents with     Follow Up      Increase in gradient on most resent ECHO patient is haing increase SOB.     Vitals were taken, medications reconciled, and EKG was performed.    Preston Durán, EMT  4:31 PM

## 2022-08-04 NOTE — PATIENT INSTRUCTIONS
You were seen today in the Cardiovascular Clinic at the [facility].      Cardiology Providers you saw during your visit:  Dr. Burrows    Recommendations:  I will present all of your testing next Thursday to the valve committee. If you have any questions before I call you please feel free to call me.    Thank you for your visit today!     Keiko Godinez RN  [facility]  TAVR, MitraClip and Watchman Programs  [facility]    Polina Moustapha, Lead [facility] Office: [phone]

## 2022-08-04 NOTE — LETTER
8/4/2022      RE: Kamryn Miller  [address]  [address]  [place] [address]       Dear Colleague,    Thank you for the opportunity to participate in the care of your patient, Kamryn Miller, at the [facility] [place] at [facility]. Please see a copy of my visit note below.        [facility] HEART CARE  CARDIOVASCULAR DIVISION    VALVE CLINIC INITIAL CONSULTATION    PRIMARY CARDIOLOGIST: Dr. 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 Patient was previously evaluated in TAVR clinic 11/2020 at which time her aortic stenosis was found to be moderate by invasive valve assessment (LUCY 1.2 cm, mean PG 24 mmHg) and by cardiac CT demonstrating AV CA score 1263; therefore, ongoing surveillance was recommended. Recent echo shows progression of her aortic stenosis to severe with LUCY 0.6cm2, mean PG 31 mmHg, peak V 3.8 m/s, EF  60-65% and DI 0.2. Additional history notable for HTN, HLD, CAD LAD PCI 8/2020, HFpEF.     She has shortness of breath with exertion. No chest pain, dizziness, syncope.  She is also does have some lower extremity edema which is fairly new for her and has not had a bleed in the past.  She continues to take 20 mg of Lasix once a day which seems to work somewhat.  She denies however any episodes of chest pain dizziness or lightheadedness and no syncopal episodes.      PAST MEDICAL HISTORY:     Past Medical History:   Diagnosis Date     Aortic valvar stenosis 7/2010    mild     Asthma      Bipolar disorder (H)      CAD (coronary artery disease)     s/p angioplasty     Celiac disease      Colon polyps      Colon polyps 2012    every 3 year colonoscopy      Depression      High cholesterol      HTN      Mitral insufficiency      Pulmonary HTN (H)     mild     Tremors 7/10    drug induced from  antidepressants     Tricuspid insufficiency         PAST SURGICAL HISTORY:     Past Surgical History:   Procedure Laterality Date     ANGIOGRAM  6/26/2009     ANGIOPLASTY  9/96    for angina     ANGIOPLASTY  8/03 - 9/03    X -2 - with stenst in coronary car.     APPENDECTOMY       BREAST BIOPSY, RT/LT      Breat Biopsy RT/LT, benign     BUNIONECTOMY  11/8/2011    Procedure:BUNIONECTOMY; Left donna bunionectomy; Surgeon:FREDY LITTLEJOHN; Location:[facility] OR     BUNIONECTOMY RT/LT  5/2007    right bunion and right 2nd hammertoe     CATARACT IOL, RT/LT  6/12 and 7/12    bilateral     COLONOSCOPY  2007, 2012    every 3 years for polyps     CV CORONARY ANGIOGRAM N/A 8/21/2020    Procedure: CV CORONARY ANGIOGRAM;  Surgeon: Gianluca Toscano MD;  Location: [facility]     CV LEFT HEART CATH N/A 8/21/2020    Procedure: CV LEFT HEART CATH;  Surgeon: Gianluca Toscano MD;  Location: [facility]     CV LEFT HEART CATH N/A 11/3/2020    Procedure: CV LEFT HEART CATH;  Surgeon: Tom Burrows MD;  Location: [facility] C[facility]     CV LOWER EXTREMITY ANGIOGRAM BILATERAL N/A 11/3/2020    Procedure: CV ANGIOGRAM LOWER EXTREMITY BILATERAL;  Surgeon: Tom Burrows MD;  Location: [facility] C[facility]     CV PCI STENT DRUG ELUTING N/A 8/21/2020    Procedure: Percutaneous Coronary Intervention Stent Drug Eluting;  Surgeon: Gianluca Toscano MD;  Location: [facility] CATH [facility]     CV RIGHT HEART CATH MEASUREMENTS RECORDED N/A 8/21/2020    Procedure: CV RIGHT HEART CATH;  Surgeon: Gianluca Toscano MD;  Location: [facility]     CV RIGHT HEART CATH MEASUREMENTS RECORDED N/A 11/3/2020    Procedure: CV RIGHT HEART CATH;  Surgeon: Tom Burrows MD;  Location: [facility]     JOINT REPLACEMENT, HIP RT/LT  4/2008    right hip replaced     JOINT REPLACEMENT, HIP RT/LT  4/2009    left hip replaced     REPAIR HAMMER TOE  11/8/2011     Procedure:REPAIR HAMMER TOE; left 2nd hammertoe repair; Surgeon:FREDY LITTLEJOHN; Location:[facility] OR     SURGICAL HISTORY OF -   11/11    left bunion and 2nd hammertoe repair     TUBAL LIGATION       ZZC ANESTH,BLEPHAROPLASTY      (R) for drooping eyelid     ZZC APPENDECTOMY          CURRENT MEDICATIONS:     Current Outpatient Medications   Medication Sig Dispense Refill     albuterol (PROAIR HFA/PROVENTIL HFA/VENTOLIN HFA) 108 (90 Base) MCG/ACT inhaler Inhale 2 puffs into the lungs every 6 hours as needed for wheezing or shortness of breath / dyspnea 18 g 3     alendronate (FOSAMAX) 70 MG tablet TAKE 1 TABLET BY MOUTH ONCE WEEKLY 4 tablet 23     amLODIPine (NORVASC) 5 MG tablet Take 2 tablets (10 mg) by mouth daily Take this in the evening. 180 tablet 3     aspirin 81 MG tablet Take 81 mg by mouth daily        azelastine (ASTEPRO) 0.15 % nasal spray USE 1 SPRAY IN EACH NOSTRIL ONCE A DAY 30 mL 11     Calcium Citrate (CITRACAL OR) Take 1 tablet by mouth daily.       carvedilol (COREG) 6.25 MG tablet Take 3 tablets (18.75 mg) by mouth 2 times daily 180 tablet 2     cholecalciferol (VITAMIN D3) 1250 mcg (66547 units) capsule TAKE 1 CAPSULE BY MOUTH ONCE WEEKLY 4 capsule 11     cloNIDine (CATAPRES) 0.1 MG tablet Take 1 tablet (0.1 mg) by mouth daily as needed (For SBP > 180) 20 tablet 0     COMPRESSION STOCKINGS 1 each daily 1 each 1     desvenlafaxine (PRISTIQ) 100 MG 24 hr tablet Take 100 mg by mouth daily       fluticasone-salmeterol (ADVAIR DISKUS) 500-50 MCG/ACT inhaler INHALE 1 PUFF BY MOUTH TWICE DAILY 60 each 0     furosemide (LASIX) 20 MG tablet Take 1 tablet (20 mg) by mouth 2 times daily 180 tablet 3     lamoTRIgine (LAMICTAL) 25 MG tablet Take 25 mg by mouth daily with 200 mg tablet for a total dose of 225 mg       LAMOTRIGINE 200 MG PO TABS Take 200 mg by mouth daily with 25 mg tablet for a total dose of 225 mg       levothyroxine (SYNTHROID/LEVOTHROID) 50 MCG tablet TAKE 1 TABLET BY MOUTH ONCE DAILY 28 tablet  11     liothyronine (CYTOMEL) 5 MCG tablet Take 2 tablets (10 mcg) by mouth daily 30 tablet 3     memantine (NAMENDA) 10 MG tablet Take 1 tablet (10 mg) by mouth daily 30 tablet 3     mirtazapine (REMERON) 7.5 MG tablet Take 1 tablet (7.5 mg) by mouth At Bedtime 30 tablet 3     Multiple Vitamin (TAB-A-JENNIFER) TABS TAKE 1 TABLET BY MOUTH ONCE DAILY 30 tablet PRN     potassium chloride ER (K-TAB) 20 MEQ CR tablet Take 1 tablet (20 mEq) by mouth daily 90 tablet 3     rosuvastatin (CRESTOR) 40 MG tablet TAKE 1 TABLET BY MOUTH ONCE DAILY 28 tablet 11        ALLERGIES:     Allergies   Allergen Reactions     Ace Inhibitors Cough     Diclofenac Other (See Comments)     Balance problems     Gluten Meal Diarrhea        FAMILY HISTORY:     Family History   Problem Relation Age of Onset     Asthma Mother      Cerebrovascular Disease Mother      Arthritis Mother      Depression Mother      Lipids Sister      Hypertension Sister      Heart Disease Sister      Lipids Sister      Hypertension Sister      Lipids Sister      Breast Cancer Sister         SOCIAL HISTORY:     Social History     Socioeconomic History     Marital status:      Spouse name: Adrien     Number of children: 2     Years of education: 16   Occupational History     Employer: RETIRED     Comment: Retired in 2002.    Tobacco Use     Smoking status: Never Smoker     Smokeless tobacco: Never Used   Substance and Sexual Activity     Alcohol use: No     Alcohol/week: 0.0 standard drinks     Types: 1 Standard drinks or equivalent per week     Drug use: No     Sexual activity: Not Currently     Partners: Male        REVIEW OF SYSTEMS:     Constitutional: No fevers or chills  Skin: No new rash or itching  Eyes: No acute change in vision  Ears/Nose/Throat: No purulent rhinorrhea, new hearing loss, or new vertigo  Respiratory: No cough or hemoptysis  Cardiovascular: See HPI  Gastrointestinal: No change in appetite, vomiting, hematemesis or diarrhea  Genitourinary: No  "dysuria or hematuria  Musculoskeletal: No new back pain, neck pain or muscle pain  Neurologic: No new headaches, focal weakness or behavior changes  Psychiatric: No hallucinations, excessive alcohol consumption or illegal drug usage  Hematologic/Lymphatic/Immunologic: No bleeding, chills, fever, night sweats or weight loss  Endocrine: No new cold intolerance, heat intolerance, polyphagia, polydipsia or polyuria      PHYSICAL EXAMINATION:     BP (!) 150/90 (BP Location: Right arm, Patient Position: Chair, Cuff Size: Adult Regular)   Pulse 60   Ht 1.53 m (5' 0.24\")   Wt 67.2 kg (148 lb 1.6 oz)   SpO2 96%   BMI 28.70 kg/m      GENERAL: No acute distress.  HEENT: EOMI. Sclerae white, not injected. Nares clear.  Heart: Regular rate and rhythm. Harsh crescendo decrescendo late peaking systolic murmur with radiation to carotids. Delayed carotid pulses.   Lungs: Clear to auscultation. No ronchi, wheezes, rales.   Abdomen: Soft, nontender, nondistended. Bowel sounds present.  Extremities: No clubbing, cyanosis, or edema.   Neurologic: Alert and oriented to person/place/time, normal speech and affect. No focal deficits.  Skin: No petechiae, purpura or rash.     LABORATORY DATA:     LIPID RESULTS:  Lab Results   Component Value Date    CHOL 172 12/17/2021    CHOL 175 10/23/2020    HDL 61 12/17/2021    HDL 71 10/23/2020    LDL 84 12/17/2021    LDL 83 10/23/2020    TRIG 137 12/17/2021    TRIG 103 10/23/2020    CHOLHDLRATIO 2.8 09/18/2015       LIVER ENZYME RESULTS:  Lab Results   Component Value Date    AST 25 07/29/2022    AST 27 05/17/2021    ALT 26 07/29/2022    ALT 38 05/17/2021       CBC RESULTS:  Lab Results   Component Value Date    WBC 5.7 07/29/2022    WBC 4.6 05/17/2021    RBC 4.28 07/29/2022    RBC 3.97 05/17/2021    HGB 11.5 (L) 07/29/2022    HGB 12.9 05/17/2021    HCT 37.5 07/29/2022    HCT 40.3 05/17/2021    MCV 88 07/29/2022     (H) 05/17/2021    MCH 26.9 07/29/2022    MCH 32.5 05/17/2021    MCHC 30.7 " (L) 2022    MCHC 32.0 2021    RDW 15.6 (H) 2022    RDW 12.7 2021     2022     2021       BMP RESULTS:  Lab Results   Component Value Date     2022     2021    POTASSIUM 4.8 2022    POTASSIUM 3.4 2021    CHLORIDE 109 2022    CHLORIDE 106 2021    CO2 30 2022    CO2 30 2021    ANIONGAP 3 2022    ANIONGAP 6 2021     (H) 2022     (H) 2021    BUN 35 (H) 2022    BUN 27 2021    CR 1.29 (H) 2022    CR 1.50 (H) 2021    GFRESTIMATED 40 (L) 2022    GFRESTIMATED 32 (L) 2021    GFRESTBLACK 37 (L) 2021    PRINCE 9.6 2022    PRINCE 9.4 2021        A1C RESULTS:  Lab Results   Component Value Date    A1C 5.0 2009       INR RESULTS:  Lab Results   Component Value Date    INR 1.05 2020    INR 2.28 (H) 2009          PROCEDURES & FURTHER ASSESSMENTS:     EC2022  Normal sinus rhythm, 1 st degree AV block    Echocardiogram:  2022    Global and regional left ventricular function is normal with an EF of 60-65%.  The right ventricle is normal size. Global right ventricular function is  normal.  Moderate to severe mitral annular calcification is present. Mild mitral  stenosis is present.  Severe aortic valve calcification is present. (Vm 3.8m/s mean gradient 31mmHg  LUCY per VTI 0.6cm2, and DVI of 0.2 with normal SVI)  IVC diameter <2.1 cm collapsing >50% with sniff suggests a normal RA pressure  of 3 mmHg.  No pericardial effusion is present.     This study was compared with the study from 21 the gradient across the AV  has increased.      CT TAVR:  2020    IMPRESSION:     1.  See below for TAVR related aortic annular and vascular  measurements.   2.  No acute aortic dissection, intramural hematoma or contained  rupture noted.  3.  Suboptimal imaging of the lower extremity vasculature due to  low-contrast  protocol and significant metallic artifacts related to  bilateral hip arthroplasty hardware.   4.  Please review detailed radiology report, to follow separately, for  incidental non-cardiovascular findings.     FINDINGS:     1.  Moderately calcified tricuspidaortic valve. Aortic valve calcium  score is 1263. The LVOT is mildly calcified. The ascending aorta is  mildly calcified, aortic arch is  mildly calcified, the descending  thoracic aorta is mildly calcified. There is moderate mitral annular  calcification.  2.  There are no adverse aortic root features, ie coronary heights are  adequate and there is no significant aortic root calcification.  3.  There are significant native coronary calcifications.   4.  The arch vessel branching pattern is normal.   5.  The suprarenal abdominal aorta is moderately calcified; infrarenal  abdominal aorta is severely calcified  6.  Widely patent origins of celiac trunk, superior mesenteric artery  and inferior mesenteric artery.  Single bilateral renal arteries with  widely patent origins.   7.  There is no acute aortic pathology, such as dissection, intramural  hematoma, or contained rupture.   8.  Suboptimal imaging of the lower extremity vasculature due to  low-contrast protocol and significant metallic artifacts related to  bilateral hip arthroplasty hardware. Consider an additional study for  repeat assessment of iliofemoral dimensions, particularly in the  bilateral external iliac and common femoral arteries.     MEASUREMENTS:   Representative dimensions of the thoracoabdominal aorta are as  follows:     1. AORTIC ANNULUS MEASUREMENTS:     1.  The average aortic annulus diameter is 24.6 mm, (long diameter is  27.8 mm and short diameter is 22.3 mm)  2.  Aortic annulus area is 4.76 cm2  3.  Aortic annulus perimeter is 79.0 mm  4.  Suggested 3-cusp coaxial angle for aortic valve is (RAO8 , CAU 0)  and alternate A-P coaxial angle is (RANDI 0 , CRA 10 ), these  angles  will vary  depending upon the patient's body position  5.  Aortic root angle is 35.5 degrees  6.  Left main - Annulus distance: 16.2 mm, RCA - Annulus distance:  13.7 mm     2. LVOT MEASUREMENTS:     1.  The average LVOT diameter (measured 4 mm below annular plane) is  26.4 mm  2.  LVOT area is 5.47 cm2  3.  LVOT perimeter is 85.6 mm     3. AORTA MEASUREMENTS     1.  The aortic root at the sinuses of Valsalva (left cusp, right cusp,  non cusp): 30.7 mm x  31.3 mm x 31.7 mm  2.  The sinotubular junction:  26.1 mm x 24.5 mm  3.  Sinotubular junction height: 21.1 mm x 21.1 mm  4.  Proximal ascending aorta: 33.7 mm x 33.7 mm  5.  Distal abdominal aorta proximal to the bifurcation: 13.1 mm x 11.7  mm  6.  Innominate artery 3 cm from ostium: 12.1 mm x 11.5 mm  7.  Left common carotid artery 3 cm from ostium: 6.4 mm x 6.1 mm     4. ILIOFEMORAL MEASUREMENTS:  Suboptimal imaging of the lower extremity vasculature due to  low-contrast protocol and significant metallic artifacts related to  bilateral hip arthroplasty hardware. Consider an additional study for  repeat assessment of iliofemoral dimensions, particularly in the  bilateral external iliac and common femoral arteries.     RIGHT:  1.  Right common iliac artery: 8.9 mm x 6.3 mm   2.  Right external iliac artery: 4.8 mm x 3.9 mm. Imaging of this  segment is extremely suboptimal and these dimensions may be  significantly underestimated.    3.  Right common femoral artery: Imaging of this segment is  non-diagnostic.   4.  Tortuosity: severe   5.  Calcification: moderate      LEFT:  1.  Left common iliac artery: 6.1 mm x  6.1 mm  2.  Left external iliac artery: 5.1 mm x 4.7 mm. Imaging of this  segment is extremely suboptimal and these dimensions may be  significantly underestimated.    3.  Left common femoral artery: 5.3. mm x 5.1 mm. Imaging of this  segment is extremely suboptimal and these dimensions may be  significantly underestimated.    4.  Tortuosity: severe   5.   Calcification: moderate      5. SUBCLAVIAN MEASUREMENTS:     RIGHT:  1. Minimal luminal diameter: 7.0 mm x  6.0 mm  2. Tortuosity: moderate   3. Calcification: mild      LEFT:  1. Minimal luminal diameter: 6.8 mm x  6.2 mm  2. Tortuosity: moderate   3. Calcification: mild      OTHER FINDINGS:   1.  Please review radiology review for incidental non-cardiovascular  findings including lungs, abdominal organs, bone, soft tissue, nodes  to follow separately      Coronary Angiogram and Right Heart Catheterization:  11/3/2020      Moderate aortic stenosis -valve area 1.2 cm2, Mean gradient 24 mm Hg    Right sided filling pressures are normal.    Normal PA pressures.    Left sided filling pressures are normal.    Mildly reduced cardiac output level.    Peripheral angiogram done and IVUS used to assess the vessel lumens of the descending thoracic aorta,right and left external iliac arteries.    Prox R VARUN lesion is 40% stenosed.    Dist R VARUN to Mid R CFA lesion is 70% stenosed.    Mid L EIA to Dist L EIA lesion is 30% stenosed.    Ultrasound (IVUS) was performed.    Peripheral Vessel Findings      Diagnostic      Left External Iliac   Mid L EIA to Dist L EIA lesion is 30% stenosed. Ultrasound (IVUS) was performed. IVUS measurements at different segments From distal Left external iliac proximally till the left common iliac artery as below: 1. Minimal diameter - 8.9 mm,Maximum -9.8 mm 2. Minimum -7.5 mm,Maximum 9.1 mm 3. Minimum - 6.5 mm,Maximum -7.5 mm 4. Minimum- 6.4 mm,Maximum- 8.1 mm   Right Common Iliac   Prox R VARUN lesion is 40% stenosed. Ultrasound (IVUS) was performed. Minimal lumen diameter -13.1 mm Maximum lumen diameter - 14.9 mm   Dist R VARUN to Mid R CFA lesion is 70% stenosed. Multiple IVUS measurements proximally from the right common iliac till the distal right external iliac as below: 1. Minimal diameter -8.0 mm,Maximum diameter -8.8 mm 2. Minimal diameter - 7.5 mm,Maximum diameter - 8.7 mm 3. Minimal diameter  - 5.8 mm,Maximum diameter - 7.5 mm       PFTs:  12/20/2019    Nonspecific pulmonary function testing with decreased FEV1 and FEV but normal ratio and TLC, could be seen in obesity or neuromuscular weakness   There is no significant bronchodilator response.   Normal diffusion capacity   Lung volumes are normal     Carotid Ultrasound:  12/20/2019       1. Right side:      Degree of stenosis of the internal carotid artery: Less than 50%  due to presence of plaque.      2. Left side:       Degree of stenosis of the internal carotid artery: Less than 50%  due to presence of plaque.      STS RISK SCORE: 3%  FRAILTY SCORE: 1/5  NYHA CLASS: III     CLINICAL IMPRESSION:     Kamryn Miller is a 86 year old female with symptomatic severe aortic stenosis who is a good candidate for transcatheter aortic valve replacement based on age, frailty, co-morbidities and overall surgical mortality risk estimation of 3% based on the STS score.      The pre-procedural TAVR evaluation currently requires no additional studies prior to presentation to the Heart team for discussion during our multi-disciplinary conference for consensus decision and procedural planning.    Plan Summary:  1) Severe Aortic Stenosis:  -Presentation of data to the Heart team to assess the optimal treatment of her severe aortic stenosis    Patient was evaluated in clinic with Dr. Burrows of interventional cardiology.      Neli Patrick  Interventional Cardiology Fellow  [phone]    CC  Patient Care Team:  Rolanda Wilcox MD as PCP - General (Family Practice)  Rolanda Wilcox MD as Assigned PCP  Pati De Santiago, NP as Assigned Heart and Vascular Provider  Javier Landa MD as Assigned OBGYN Provider  Ania Johnson MD as Assigned Nephrology Provider  Dimas Palacios, PhD as Assigned Behavioral Health Provider            Structural Heart Coordinator visit:  Provided additional education regarding TAVR/MitraClip procedure, after being  present for discussion with physician. Explained the work-up process and next steps for patient. Patient provided our direct contact number and instructed to call with any questions.     Frailty Score: 1/5    Completed frailty testing and KCCQ.       5 meter walk: 5.78  Trial 1:5.85  Trail 2: 6.55  Trial 3: 4.95    : 39.43  Albumin: 3.8  Eye ball test: Pass  ADL: 6/6- pass    Dental Clearance: Pass      KCCQ Results:   1a. 5  1b. 5  1c. 3  2. 1  3. 7  4. 6  5. 5  6. 4  7. 3  8a. 4  8b. 4  8c. 5    STS Risk Score: 3.0%  NYHA Class:       Maria L Gerard CMA  Structural Heart   [facility]      Please do not hesitate to contact me if you have any questions/concerns.     Sincerely,     [facility]

## 2022-08-08 LAB
ATRIAL RATE - MUSE: 60 BPM
DIASTOLIC BLOOD PRESSURE - MUSE: NORMAL MMHG
INTERPRETATION ECG - MUSE: NORMAL
P AXIS - MUSE: 74 DEGREES
PR INTERVAL - MUSE: 248 MS
QRS DURATION - MUSE: 98 MS
QT - MUSE: 444 MS
QTC - MUSE: 444 MS
R AXIS - MUSE: 5 DEGREES
SYSTOLIC BLOOD PRESSURE - MUSE: NORMAL MMHG
T AXIS - MUSE: 69 DEGREES
VENTRICULAR RATE- MUSE: 60 BPM

## 2022-08-16 ENCOUNTER — CARE COORDINATION (OUTPATIENT)
Dept: CARDIOLOGY | Facility: CLINIC | Age: 87
End: 2022-08-16

## 2022-08-16 DIAGNOSIS — I25.5 ISCHEMIC CARDIOMYOPATHY: ICD-10-CM

## 2022-08-16 DIAGNOSIS — I35.0 SEVERE AORTIC STENOSIS: Primary | ICD-10-CM

## 2022-08-16 DIAGNOSIS — I50.33 ACUTE ON CHRONIC DIASTOLIC (CONGESTIVE) HEART FAILURE (H): ICD-10-CM

## 2022-08-16 DIAGNOSIS — I10 ESSENTIAL HYPERTENSION: ICD-10-CM

## 2022-08-16 NOTE — PROGRESS NOTES
Patient's case was discussed at Valve conference, attended by structural heart cardiologists, CV surgeons, CNP's, and structural heart care coordinators, on August 16, 2022    Patient is appropriate to proceed with TAVR   Arndt Valve  Size 26  Approach: Left Transfemoral  Marcus:  No  Special considerations:      All imaging was reviewed, including new echo images. Given the symptoms of the patient and the recent echo it was determined that the aortic valve stenosis is severe and that the patient should move forward with TAVR.    Contacted patient to review discussion at Valve Conference.  Through shared decision making, patient agrees with the plan as outlined above.   ------------------------------------------------------  Date: 8/16/2022    Time of Call: 12:44 PM     Diagnosis:  Severe aortic stenosis     [ TORB ] Ordering provider: Pati De Santiago CNP  Order:   CASE REQUEST: TAVR  Echo  Labs: ABO/TS, BMP, CBC, INR, NT pro BNP, COVID 19 testing     Order received by: Dayna Ray RN  ---------------------------------------------------------

## 2022-08-22 NOTE — TELEPHONE ENCOUNTER
carvedilol (COREG) 6.25 MG tablet  Last Written Prescription Date:  6/14/2022  Last Fill Quantity: 180,   # refills: 2  Last Office Visit :  8/4/2022  Future Office visit:  9/12/2022    Routing refill request to provider for review/approval because:  Would Provider like 540 Tabs for a 90 day supply with 3 refills to cover a whole year.  Last order.  Only a 90 day supply total sent to pharm.  Please advise and send new order.       Aileen Main RN  Central Triage Red Flags/Med Refills

## 2022-08-23 RX ORDER — CARVEDILOL 6.25 MG/1
18.75 TABLET ORAL 2 TIMES DAILY
Qty: 540 TABLET | Refills: 3 | Status: ON HOLD | OUTPATIENT
Start: 2022-08-23 | End: 2022-09-13

## 2022-08-26 ENCOUNTER — TELEPHONE (OUTPATIENT)
Dept: CARDIOLOGY | Facility: CLINIC | Age: 87
End: 2022-08-26

## 2022-08-26 DIAGNOSIS — I50.33 ACUTE ON CHRONIC DIASTOLIC (CONGESTIVE) HEART FAILURE (H): ICD-10-CM

## 2022-08-26 DIAGNOSIS — I35.0 SEVERE AORTIC STENOSIS: Primary | ICD-10-CM

## 2022-08-26 NOTE — TELEPHONE ENCOUNTER
Call to nursing. Still had prn furosemide order, will discontinue    Will fax orders to be signed by dr ervin next week

## 2022-08-26 NOTE — TELEPHONE ENCOUNTER
Health Call Center    Phone Message    May a detailed message be left on voicemail: yes     Reason for Call: Other: Katherine from [facility] called on behalf of the patient looking to speak with a member of the patients care team. Would like to discuss the patients BP, Weight, and an Order that was placed in April. Please call Katherine back to further discuss, thank you.    Action Taken: Message routed to:  Other: Cardiology    Travel Screening: Not Applicable

## 2022-08-31 NOTE — Clinical Note
Stent prepped per 's instructions.   Additional Notes: Patient consent was obtained to proceed with the visit and recommended plan of care after discussion of all risks and benefits, including the risks of COVID-19 exposure. Detail Level: Simple

## 2022-09-07 NOTE — PROGRESS NOTES
STRUCTURAL HEART CLINIC  H&P    Referring Provider: Dr. Burrows  Primary Cardiologist: Dr. Zuniga    History of Present Illness:  Kamryn Miller is a 86 year old female who presents for pre-operative H&P in preparation for transcatheter aortic valve replacement on 9/12/22 at [facility].     Patient with a history of moderate paradoxical LFLG AS now progressed to severe with LUCY 0.6cm2, mean PG 31 mmHg, peak V 3.8 m/s, EF  60-65% and DI 0.2 by echo 7/29/22. She has shortness of breath with exertion, fatigue and mild lower extremity swelling. Patient was evaluated in structural heart clinic where she was deemed an appropriate TAVR candidate. She was counseled for the above procedure.     Additional history notable for HTN, HLD, CKD, CAD LAD PCI 8/2020, HFpEF, depression.     Cardiac symptoms are stable. Had a UTI about a month ago that was treated with antibiotics, no residual infectious symptoms.     History of blood transfusions/reactions: No   History of abnormal bleeding/anti-platelet use: No   Steroid use in the last year: No   Personal or family history with difficulty with anesthesia: No   Infection precautions: No   Difficulty w/bedrest: No     Current Medications:  Current Outpatient Medications   Medication Sig Dispense Refill     albuterol (PROAIR HFA/PROVENTIL HFA/VENTOLIN HFA) 108 (90 Base) MCG/ACT inhaler Inhale 2 puffs into the lungs every 6 hours as needed for wheezing or shortness of breath / dyspnea 18 g 3     alendronate (FOSAMAX) 70 MG tablet TAKE 1 TABLET BY MOUTH ONCE WEEKLY 4 tablet 23     amLODIPine (NORVASC) 5 MG tablet Take 2 tablets (10 mg) by mouth daily Take this in the evening. 180 tablet 3     aspirin 81 MG tablet Take 81 mg by mouth daily        azelastine (ASTEPRO) 0.15 % nasal spray USE 1 SPRAY IN EACH NOSTRIL ONCE A DAY 30 mL 11     Calcium Citrate (CITRACAL OR) Take 1 tablet by mouth daily.       carvedilol (COREG) 6.25 MG tablet Take 3 tablets  (18.75 mg) by mouth 2 times daily 540 tablet 3     cholecalciferol (VITAMIN D3) 1250 mcg (79448 units) capsule TAKE 1 CAPSULE BY MOUTH ONCE WEEKLY 4 capsule 11     cloNIDine (CATAPRES) 0.1 MG tablet Take 1 tablet (0.1 mg) by mouth daily as needed (For SBP > 180) 20 tablet 0     COMPRESSION STOCKINGS 1 each daily 1 each 1     desvenlafaxine (PRISTIQ) 100 MG 24 hr tablet Take 100 mg by mouth daily       fluticasone-salmeterol (ADVAIR DISKUS) 500-50 MCG/ACT inhaler INHALE 1 PUFF BY MOUTH TWICE DAILY 60 each 0     furosemide (LASIX) 20 MG tablet Take 1 tablet (20 mg) by mouth 2 times daily 180 tablet 3     lamoTRIgine (LAMICTAL) 25 MG tablet Take 25 mg by mouth daily with 200 mg tablet for a total dose of 225 mg       LAMOTRIGINE 200 MG PO TABS Take 200 mg by mouth daily with 25 mg tablet for a total dose of 225 mg       levothyroxine (SYNTHROID/LEVOTHROID) 50 MCG tablet TAKE 1 TABLET BY MOUTH ONCE DAILY 28 tablet 11     liothyronine (CYTOMEL) 5 MCG tablet Take 2 tablets (10 mcg) by mouth daily 30 tablet 3     memantine (NAMENDA) 10 MG tablet Take 1 tablet (10 mg) by mouth daily 30 tablet 3     mirtazapine (REMERON) 7.5 MG tablet Take 1 tablet (7.5 mg) by mouth At Bedtime 30 tablet 3     Multiple Vitamin (TAB-A-JENNIFER) TABS TAKE 1 TABLET BY MOUTH ONCE DAILY 30 tablet PRN     potassium chloride ER (K-TAB) 20 MEQ CR tablet Take 1 tablet (20 mEq) by mouth daily 90 tablet 3     rosuvastatin (CRESTOR) 40 MG tablet TAKE 1 TABLET BY MOUTH ONCE DAILY 28 tablet 11       Allergies:     Allergies   Allergen Reactions     Ace Inhibitors Cough     Diclofenac Other (See Comments)     Balance problems     Gluten Meal Diarrhea       Past Medical History:  Past Medical History:   Diagnosis Date     Aortic valvar stenosis 7/2010    mild     Asthma      Bipolar disorder (H)      CAD (coronary artery disease)     s/p angioplasty     Celiac disease      Colon polyps      Colon polyps 2012    every 3 year colonoscopy      Depression      High  cholesterol      HTN      Mitral insufficiency      Pulmonary HTN (H)     mild     Tremors 7/10    drug induced from antidepressants     Tricuspid insufficiency        Past Surgical History:  Past Surgical History:   Procedure Laterality Date     ANGIOGRAM  6/26/2009     ANGIOPLASTY  9/96    for angina     ANGIOPLASTY  8/03 - 9/03    X -2 - with stenst in coronary car.     APPENDECTOMY       BREAST BIOPSY, RT/LT      Breat Biopsy RT/LT, benign     BUNIONECTOMY  11/8/2011    Procedure:BUNIONECTOMY; Left donna bunionectomy; Surgeon:FREDY LITTLEJOHN; Location:[facility] OR     BUNIONECTOMY RT/LT  5/2007    right bunion and right 2nd hammertoe     CATARACT IOL, RT/LT  6/12 and 7/12    bilateral     COLONOSCOPY  2007, 2012    every 3 years for polyps     CV CORONARY ANGIOGRAM N/A 8/21/2020    Procedure: CV CORONARY ANGIOGRAM;  Surgeon: Gianluca Toscano MD;  Location: [facility]     CV LEFT HEART CATH N/A 8/21/2020    Procedure: CV LEFT HEART CATH;  Surgeon: Gianluca Toscano MD;  Location: [facility]     CV LEFT HEART CATH N/A 11/3/2020    Procedure: CV LEFT HEART CATH;  Surgeon: Tom Burrows MD;  Location: [facility]     CV LOWER EXTREMITY ANGIOGRAM BILATERAL N/A 11/3/2020    Procedure: CV ANGIOGRAM LOWER EXTREMITY BILATERAL;  Surgeon: Tom Burrows MD;  Location: [facility]     CV PCI STENT DRUG ELUTING N/A 8/21/2020    Procedure: Percutaneous Coronary Intervention Stent Drug Eluting;  Surgeon: Gianluca Toscano MD;  Location: [facility]     CV RIGHT HEART CATH MEASUREMENTS RECORDED N/A 8/21/2020    Procedure: CV RIGHT HEART CATH;  Surgeon: Gianluca Toscano MD;  Location: [facility]     CV RIGHT HEART CATH MEASUREMENTS RECORDED N/A 11/3/2020    Procedure: CV RIGHT HEART CATH;  Surgeon: Tom Burrows MD;  Location: [facility]     JOINT REPLACEMENT, HIP RT/LT  4/2008    right hip  replaced     JOINT REPLACEMENT, HIP RT/LT  4/2009    left hip replaced     REPAIR HAMMER TOE  11/8/2011    Procedure:REPAIR HAMMER TOE; left 2nd hammertoe repair; Surgeon:FREDY LITTLEJOHN; Location: OR     SURGICAL HISTORY OF -   11/11    left bunion and 2nd hammertoe repair     TUBAL LIGATION       ZZC ANESTH,BLEPHAROPLASTY      (R) for drooping eyelid     ZZC APPENDECTOMY         Family History:  Family History   Problem Relation Age of Onset     Asthma Mother      Cerebrovascular Disease Mother      Arthritis Mother      Depression Mother      Lipids Sister      Hypertension Sister      Heart Disease Sister      Lipids Sister      Hypertension Sister      Lipids Sister      Breast Cancer Sister        Social History:  Social History     Socioeconomic History     Marital status:      Spouse name: Adrien     Number of children: 2     Years of education: 16   Occupational History     Employer: RETIRED     Comment: Retired in 2002.    Tobacco Use     Smoking status: Never Smoker     Smokeless tobacco: Never Used   Substance and Sexual Activity     Alcohol use: No     Alcohol/week: 0.0 standard drinks     Types: 1 Standard drinks or equivalent per week     Drug use: No     Sexual activity: Not Currently     Partners: Male       Review of Systems:    Functional status: Independent in ADL's but lives in assisted living. Unable to achieve METS > 4.     Constitutional: No fever, chills, or sweats. No weight gain/loss   ENT: No visual disturbance, ear ache, epistaxis, sore throat  Allergies/Immunologic: Negative.   Respiratory: No cough, hemoptysia  Cardiovascular: As per HPI  GI: No nausea, vomiting, hematemesis, melena, or hematochezia  : No urinary frequency, dysuria, or hematuria  Integument: Negative  Psychiatric: Negative  Neuro: Negative  Endocrinology: Negative   Musculoskeletal: Negative      Physical Exam:  Vitals: /79 (BP Location: Left arm, Patient Position: Sitting, Cuff Size: Adult Small)    "Pulse 58   Ht 1.549 m (5' 1\")   Wt 67.5 kg (148 lb 12.8 oz)   SpO2 98%   BMI 28.12 kg/m     General: NAD  HEENT:  Dentition intact.    Neck: No jugular venous distension.   Heart: RRR with 2/6 ARI at RUSB  Lungs: CTA.    Abdomen: Soft, nontender, nondistended.   Extremities: No clubbing, cyanosis, or edema.  The pulses are +4/4 at the radial, brachial, femoral, popliteal, DP, and PT sites bilaterally.  No bruits are noted.  Neurologic: Alert and oriented to person/place/time, normal speech, gait and affect  Skin: No petechiae, purpura or rash.      Diagnostic Studies:      ECG 9/9/22:  Personally reviewed and interpreted by me.  SB with 1st degree AVB HR 50s    Echocardiogram 7/29/22:  Global and regional left ventricular function is normal with an EF of 60-65%.  The right ventricle is normal size. Global right ventricular function is  normal.  Moderate to severe mitral annular calcification is present. Mild mitral  stenosis is present.  Severe aortic valve calcification is present. (Vm 3.8m/s mean gradient 31mmHg  LUCY per VTI 0.6cm2, and DVI of 0.2 with normal SVI)  IVC diameter <2.1 cm collapsing >50% with sniff suggests a normal RA pressure  of 3 mmHg.  No pericardial effusion is present.     This study was compared with the study from 11/1/21 the gradient across the AV  has increased.    CT TAVR 9/2020                                                                     IMPRESSION:     1.  See below for TAVR related aortic annular and vascular  measurements.   2.  No acute aortic dissection, intramural hematoma or contained  rupture noted.  3.  Suboptimal imaging of the lower extremity vasculature due to  low-contrast protocol and significant metallic artifacts related to  bilateral hip arthroplasty hardware.   4.  Please review detailed radiology report, to follow separately, for  incidental non-cardiovascular findings.     FINDINGS:     1.  Moderately calcified tricuspidaortic valve. Aortic valve " calcium  score is 1263. The LVOT is mildly calcified. The ascending aorta is  mildly calcified, aortic arch is  mildly calcified, the descending  thoracic aorta is mildly calcified. There is moderate mitral annular  calcification.  2.  There are no adverse aortic root features, ie coronary heights are  adequate and there is no significant aortic root calcification.  3.  There are significant native coronary calcifications.   4.  The arch vessel branching pattern is normal.   5.  The suprarenal abdominal aorta is moderately calcified; infrarenal  abdominal aorta is severely calcified  6.  Widely patent origins of celiac trunk, superior mesenteric artery  and inferior mesenteric artery.  Single bilateral renal arteries with  widely patent origins.   7.  There is no acute aortic pathology, such as dissection, intramural  hematoma, or contained rupture.   8.  Suboptimal imaging of the lower extremity vasculature due to  low-contrast protocol and significant metallic artifacts related to  bilateral hip arthroplasty hardware. Consider an additional study for  repeat assessment of iliofemoral dimensions, particularly in the  bilateral external iliac and common femoral arteries.     MEASUREMENTS:   Representative dimensions of the thoracoabdominal aorta are as  follows:     1. AORTIC ANNULUS MEASUREMENTS:     1.  The average aortic annulus diameter is 24.6 mm, (long diameter is  27.8 mm and short diameter is 22.3 mm)  2.  Aortic annulus area is 4.76 cm2  3.  Aortic annulus perimeter is 79.0 mm  4.  Suggested 3-cusp coaxial angle for aortic valve is (RAO8 , CAU 0)  and alternate A-P coaxial angle is (Georgian 0 , CRA 10 ), these  angles  will vary depending upon the patient's body position  5.  Aortic root angle is 35.5 degrees  6.  Left main - Annulus distance: 16.2 mm, RCA - Annulus distance:  13.7 mm     2. LVOT MEASUREMENTS:     1.  The average LVOT diameter (measured 4 mm below annular plane) is  26.4 mm  2.  LVOT area is  5.47 cm2  3.  LVOT perimeter is 85.6 mm     3. AORTA MEASUREMENTS     1.  The aortic root at the sinuses of Valsalva (left cusp, right cusp,  non cusp): 30.7 mm x  31.3 mm x 31.7 mm  2.  The sinotubular junction:  26.1 mm x 24.5 mm  3.  Sinotubular junction height: 21.1 mm x 21.1 mm  4.  Proximal ascending aorta: 33.7 mm x 33.7 mm  5.  Distal abdominal aorta proximal to the bifurcation: 13.1 mm x 11.7  mm  6.  Innominate artery 3 cm from ostium: 12.1 mm x 11.5 mm  7.  Left common carotid artery 3 cm from ostium: 6.4 mm x 6.1 mm     4. ILIOFEMORAL MEASUREMENTS:  Suboptimal imaging of the lower extremity vasculature due to  low-contrast protocol and significant metallic artifacts related to  bilateral hip arthroplasty hardware. Consider an additional study for  repeat assessment of iliofemoral dimensions, particularly in the  bilateral external iliac and common femoral arteries.     RIGHT:  1.  Right common iliac artery: 8.9 mm x 6.3 mm   2.  Right external iliac artery: 4.8 mm x 3.9 mm. Imaging of this  segment is extremely suboptimal and these dimensions may be  significantly underestimated.    3.  Right common femoral artery: Imaging of this segment is  non-diagnostic.   4.  Tortuosity: severe   5.  Calcification: moderate      LEFT:  1.  Left common iliac artery: 6.1 mm x  6.1 mm  2.  Left external iliac artery: 5.1 mm x 4.7 mm. Imaging of this  segment is extremely suboptimal and these dimensions may be  significantly underestimated.    3.  Left common femoral artery: 5.3. mm x 5.1 mm. Imaging of this  segment is extremely suboptimal and these dimensions may be  significantly underestimated.    4.  Tortuosity: severe   5.  Calcification: moderate      5. SUBCLAVIAN MEASUREMENTS:     RIGHT:  1. Minimal luminal diameter: 7.0 mm x  6.0 mm  2. Tortuosity: moderate   3. Calcification: mild      LEFT:  1. Minimal luminal diameter: 6.8 mm x  6.2 mm  2. Tortuosity: moderate   3. Calcification: mild     Cath  11/2020:  Conclusion      Moderate aortic stenosis -valve area 1.2 cm2,Mean gradient 24 mm Hg    Right sided filling pressures are normal.    Normal PA pressures.    Left sided filling pressures are normal.    Mildly reduced cardiac output level.    Peripheral angiogram done and IVUS used to assess the vessel lumens of the descending thoracic aorta,right and left external iliac arteries.    Prox R VARUN lesion is 40% stenosed.    Dist R VARUN to Mid R CFA lesion is 70% stenosed.    Mid L EIA to Dist L EIA lesion is 30% stenosed.    Ultrasound (IVUS) was performed.           Plan      Follow bedrest per protocol    Continued medical management and lifestyle modifications for cardiovascular risk factor optimizations.    Follow up with Dr. Tom Burrows in 6 months time in clinic as there is no current indication for TAVR in lieu of the moderate severity of the aortic stenosis..    Arterial sheath removed from femoral artery with closure device.    Discharge today per protocol         Coronary Findings      Diagnostic  Dominance: Right    No diagnostic findings have been documented.      Intervention      No interventions have been documented.      Peripheral Vessel Findings      Diagnostic      Left External Iliac   Mid L EIA to Dist L EIA lesion is 30% stenosed. Ultrasound (IVUS) was performed. IVUS measurements at different segments From distal Left external iliac proximally till the left common iliac artery as below: 1. Minimal diameter - 8.9 mm,Maximum -9.8 mm 2. Minimum -7.5 mm,Maximum 9.1 mm 3. Minimum - 6.5 mm,Maximum -7.5 mm 4. Minimum- 6.4 mm,Maximum- 8.1 mm   Right Common Iliac   Prox R VARUN lesion is 40% stenosed. Ultrasound (IVUS) was performed. Minimal lumen diameter -13.1 mm Maximum lumen diameter - 14.9 mm   Dist R VARUN to Mid R CFA lesion is 70% stenosed. Multiple IVUS measurements proximally from the right common iliac till the distal right external iliac as below: 1. Minimal diameter -8.0 mm,Maximum  diameter -8.8 mm 2. Minimal diameter - 7.5 mm,Maximum diameter - 8.7 mm 3. Minimal diameter - 5.8 mm,Maximum diameter - 7.5 mm     Intervention      No interventions have been documented.      Hemodynamics    RA- 9/5/4 mm Hg  RV- 38/7 mm Hg  PA- 36/8/20 mm Hg  PCW- 8/8/7 mm Hg  LVEDP- 13 mm Hg  CO -4.2 L/min,CI - 2.45 (Thermodilution)  CO- 3.21L/min,CI- 1.87 (Ficks)   Right sided filling pressures are normal. Left sided filling pressures are normal. Normal PA pressures. Left ventricular filling pressures are normal. Reduced cardiac output level.     Laboratory Studies:  Personally reviewed and interpreted by me.    LIPID RESULTS:  Lab Results   Component Value Date    CHOL 172 12/17/2021    CHOL 175 10/23/2020    HDL 61 12/17/2021    HDL 71 10/23/2020    LDL 84 12/17/2021    LDL 83 10/23/2020    TRIG 137 12/17/2021    TRIG 103 10/23/2020    CHOLHDLRATIO 2.8 09/18/2015       LIVER ENZYME RESULTS:  Lab Results   Component Value Date    AST 25 07/29/2022    AST 27 05/17/2021    ALT 26 07/29/2022    ALT 38 05/17/2021       CBC RESULTS:  Lab Results   Component Value Date    WBC 5.7 07/29/2022    WBC 4.6 05/17/2021    RBC 4.28 07/29/2022    RBC 3.97 05/17/2021    HGB 11.5 (L) 07/29/2022    HGB 12.9 05/17/2021    HCT 37.5 07/29/2022    HCT 40.3 05/17/2021    MCV 88 07/29/2022     (H) 05/17/2021    MCH 26.9 07/29/2022    MCH 32.5 05/17/2021    MCHC 30.7 (L) 07/29/2022    MCHC 32.0 05/17/2021    RDW 15.6 (H) 07/29/2022    RDW 12.7 05/17/2021     07/29/2022     05/17/2021       BMP RESULTS:  Lab Results   Component Value Date     07/29/2022     06/04/2021    POTASSIUM 4.8 07/29/2022    POTASSIUM 3.4 06/04/2021    CHLORIDE 109 07/29/2022    CHLORIDE 106 06/04/2021    CO2 30 07/29/2022    CO2 30 06/04/2021    ANIONGAP 3 07/29/2022    ANIONGAP 6 06/04/2021     (H) 07/29/2022     (H) 06/04/2021    BUN 35 (H) 07/29/2022    BUN 27 06/04/2021    CR 1.29 (H) 07/29/2022    CR 1.50 (H)  06/04/2021    GFRESTIMATED 40 (L) 07/29/2022    GFRESTIMATED 32 (L) 06/04/2021    GFRESTBLACK 37 (L) 06/04/2021    PRINCE 9.6 07/29/2022    PRINCE 9.4 06/04/2021        A1C RESULTS:  Lab Results   Component Value Date    A1C 5.0 06/30/2009       INR RESULTS:  Lab Results   Component Value Date    INR 1.05 11/03/2020    INR 2.28 (H) 04/23/2009       Assessment and Plan     Kamryn Miller is a 86 year old female who presents for pre-operative H&P in preparation for transcatheter aortic valve replacement on 9/12/22 at [facility].     She has the following specific operative considerations:      - RCRI score 2 corresponding with a 2.1% perioperative risk of MACE      - RAQUEL # of risks 3/8      - VTE risk = 5. If equal to or > 4, pharmacologic prophylaxis is indicated      - RIsk of PONV score = 2. If > 2, anti-emetic intervention recommended    1. Severe aortic valve stenosis: Patient reports progressive exertional dyspnea and fatigue, echo shows progression of her aortic stenosis from moderate to severe. She has been evaluated by the our structural heart team and has been deemed an appropriate candidate for transcatheter aortic valve replacement. We discussed the procedure in detail, including pre and post-procedure care, restrictions and follow-up. She understands risks including 5-10% risk of heart block leading to permanent pacemaker placement, 3% risk of bleeding, infection, vascular complication, 1-3% risk of stroke and very low risk (<1%) of serious complications such as cardiac perforation, aortic root rupture, dissection or death.     All questions answered  Type and screen orders complete  COVID test ordered  Supplies for scrubbing provided  No known contrast dye allergy  No trouble with anesthesia in the past  Labs today WNL, no s/s of infection    2. Chronic diastolic heart failure, NYHA class II, Stage B: LVEF 60%. Secondary to valvular heart disease. No acute volume overload on exam,  though patient does endorse significant exertional dyspnea. NT-. Lasix was increased from 20 mg daily to 20 mg BID about 6 weeks ago, today creatinine is up slightly. Plan to decrease lasix to 20 mg daily with repeat labs Monday prior to TAVR.    3. CAD: Pre-TAVR coronary angiogram showed significant stenosis of the LAD s/p PCI 8/2020. She denies angina. Plan to continue ASA 81 mg daily and statin therapy.     4. Hypertension: Continue amlodipine, Coreg, hold Clonidine pre procedure.    5. Hyperlipidemia: Continue crestor at current dose.    6. Depression: Maintained on Pristiq, Lamictal and Remeron. Hold morning of surgery.     7. Cognitive mpairement: Hold Namenda morning of surgery.    8. TED on CKD: Creatinine slightly up from baseline 1.4-->1.6, GFR 40-->30. Possibly related to UTI last month vs volume depletion. She denies any residual infectious symptoms, normal WBC today. Suspect related to volume depletion. Reduce lasix to 20 mg daily. Repeat BMP Monday.    9. Hypothyroidism: Continue synthroid and cytomel DOS.    Diuretic: hold Lasix DOS  Antiplatelet: Continue ASA 81 mg perioperatively   BB: Continue perioperatively without interruption  Supplements: Hold morning of procedure    Patient is optimized and is acceptable candidate for the proposed procedure.  No further diagnostic evaluation is needed. Pre-procedure instructions provided in written format.     JESSICA Rousseau, CNP  Structural Heart Care Nurse Practitioner  [facility]  Clinic: [phone]  Pager: [phone]

## 2022-09-08 LAB
ABO/RH(D): NORMAL
ANTIBODY SCREEN: NEGATIVE
SPECIMEN EXPIRATION DATE: NORMAL

## 2022-09-08 NOTE — H&P
[facility]      CSI History and Physicial  Kamryn Miller MRN: [id number]  1935  Date of Admission:(Not on file)  Primary care provider: Rolanda Wilcox      Assessment and Plan:     Kamryn Miller is am 86 y.o.F with a PMHx of mod-severe low flow low gradient aortic stenosis, HFpEF, HTN, HLD, CAD s/p PCI LAD 8/2020, asthma who is admitted 9/12 for planned TAVR procedure.      # Severe aortic stenosis, now s/p Transcatheter Aortic Valve Replacement.   # Chronic Diastolic Heart Failure  Prior to procedure, pt known to have moderate-severe low flow low gradient aortic stenosis, now progressed to severe aortic stenosis. Most recent echo noted to have a mean gradient 31 mmHG, LUCY 0.6 Cm^2, peak velocity 3.8M/s- Intraoperative  S/p 26 mm Arndt Janis Ultra prosthetic valve. Sheath sites soft without hematoma. Post operative EKG with chronic 1st degree AVB and the appearance of a LBBB. Neuro's intact    - Bedrest per protocol   - Neuro checks, per protocol  - Echocardiogram tomorrow  - Monitor groin sites  - EKG in morning   - Orellana can be removed once patient is out of bed   - ASA monotherapy for anti-platelet therapy  - Cardiac rehab/PT/OT  - Continue PTA Lasix 20 mg BID  - Continue PTA K Tab 20 meQ daily  - Daily weights  - Strict intake/output  - Continuous telemetry monitoring  - Lifelong antibiotic prophylaxis prior to all dental procedures.      # CAD s/p PCI LAD 8/2020  # HTN  # HLD  Coronary angiogram 10/2020 with patent stent.   - Continue 81 mg ASA  - BB: Hold PTA Coreg 6.25 mg BID tonight while monitor for heart block, likely resume tomorrow  - Statin: Continue PTA Crestor 40 mg daily  - Continue PTA Norvasc 10 mg daily, prn clonidine 0.1 mg for SBP > 180    # Hypothyroidism  Most recent TSH 0.45 7/22  - Continue PTA Synthroid 50 mcg daily    # Asthma  - Continue PTA Inhaler   - Continue PTA prn albuterol inhaler     # Bipolar/ MDD  - Continue PTA Lamictal 225 mg daily  - Continue PTA  "Pristiq 100 mg daily   - Continue PTA Cytomel 10 mcg daily  - Continue PTA Namenda 10 mg daily  - Continue PTA Remeron 7.5 mg every night      Clinically Significant Risk Factors Present on Admission               # Overweight: Estimated body mass index is 27.06 kg/m  (pended) as calculated from the following:    Height as of this encounter: 1.575 m (5' 2\").    Weight as of this encounter: (P) 67.1 kg (147 lb 14.9 oz).    Cardiovascular: Aortic valve stenosis         FEN: Cardiac diet  Disposition: Home in 1-2 days with cardiac rehab pending stable post-op period  Code Status: FULL CODE    Dante Chacon, PGY-4  Cardiovascular Disease Fellow         Chief Complaint:   Planned TAVR         History of Present Illness:   Kamryn Miller is am 86 y.o.F with a PMHx of mod-severe low flow low gradient aortic stenosis, HFpEF, HTN, HLD, CAD s/p PCI LAD 8/2020, asthma, Bipolar/MDD who is admitted 9/12 for planned TAVR procedure.     Patient has followed with structural cardiology clinic, now with progressing severe aortic stenosis. Most recent echo with LUCY 0.6 cm2, MG 31 mmHg, and pV 3.8, with EF of 60-65%. Patient had historically denied any SOB, GREGORY, or chest pain.    She is now s/p 26 mm Arndt Janis Ultra prosthetic TAVR valve. Procedure went well, no immediate complications.     At time of interview, patient was asymptomatic and denied any chest pain/pressure, SOB, palpitations, dizziness, lightheadedness. EKG with 1st degree AVB and a new LBBB compared to prior EKGs.          Review of Systems:    10 point review of systems negative except for stated above in HPI.          Past Medical History:   Medical History reviewed.   Past Medical History:   Diagnosis Date     Aortic valvar stenosis 7/2010    mild     Asthma      Bipolar disorder (H)      CAD (coronary artery disease)     s/p angioplasty     Celiac disease      Colon polyps      Colon polyps 2012    every 3 year colonoscopy      Depression      High cholesterol  "     HTN      Mitral insufficiency      Pulmonary HTN (H)     mild     Tremors 7/10    drug induced from antidepressants     Tricuspid insufficiency              Past Surgical History:   Surgical History reviewed.   Past Surgical History:   Procedure Laterality Date     ANGIOGRAM  6/26/2009     ANGIOPLASTY  9/96    for angina     ANGIOPLASTY  8/03 - 9/03    X -2 - with stenst in coronary car.     APPENDECTOMY       BREAST BIOPSY, RT/LT      Breat Biopsy RT/LT, benign     BUNIONECTOMY  11/8/2011    Procedure:BUNIONECTOMY; Left donna bunionectomy; Surgeon:FREDY LITTLEJOHN; Location:[facility] OR     BUNIONECTOMY RT/LT  5/2007    right bunion and right 2nd hammertoe     CATARACT IOL, RT/LT  6/12 and 7/12    bilateral     COLONOSCOPY  2007, 2012    every 3 years for polyps     CV CORONARY ANGIOGRAM N/A 8/21/2020    Procedure: CV CORONARY ANGIOGRAM;  Surgeon: Gianluca Toscano MD;  Location: [facility]     CV LEFT HEART CATH N/A 8/21/2020    Procedure: CV LEFT HEART CATH;  Surgeon: Gianluca Toscano MD;  Location: [facility]     CV LEFT HEART CATH N/A 11/3/2020    Procedure: CV LEFT HEART CATH;  Surgeon: Tom Burrows MD;  Location: [facility]     CV LOWER EXTREMITY ANGIOGRAM BILATERAL N/A 11/3/2020    Procedure: CV ANGIOGRAM LOWER EXTREMITY BILATERAL;  Surgeon: Tom Burrows MD;  Location: [facility]     CV PCI STENT DRUG ELUTING N/A 8/21/2020    Procedure: Percutaneous Coronary Intervention Stent Drug Eluting;  Surgeon: Gianluca Toscano MD;  Location: [facility]     CV RIGHT HEART CATH MEASUREMENTS RECORDED N/A 8/21/2020    Procedure: CV RIGHT HEART CATH;  Surgeon: Gianluca Toscano MD;  Location: [facility]     CV RIGHT HEART CATH MEASUREMENTS RECORDED N/A 11/3/2020    Procedure: CV RIGHT HEART CATH;  Surgeon: Tom Burrows MD;  Location: [facility]     JOINT REPLACEMENT, HIP RT/LT   4/2008    right hip replaced     JOINT REPLACEMENT, HIP RT/LT  4/2009    left hip replaced     REPAIR HAMMER TOE  11/8/2011    Procedure:REPAIR HAMMER TOE; left 2nd hammertoe repair; Surgeon:FREDY LITTLEJOHN; Location: OR     SURGICAL HISTORY OF -   11/11    left bunion and 2nd hammertoe repair     TUBAL LIGATION       ZZC ANESTH,BLEPHAROPLASTY      (R) for drooping eyelid     ZZC APPENDECTOMY               Social History:   Social History reviewed.  Social History     Tobacco Use     Smoking status: Never Smoker     Smokeless tobacco: Never Used   Substance Use Topics     Alcohol use: No     Alcohol/week: 0.0 standard drinks     Types: 1 Standard drinks or equivalent per week             Family History:   Family History reviewed.   Family History   Problem Relation Age of Onset     Asthma Mother      Cerebrovascular Disease Mother      Arthritis Mother      Depression Mother      Lipids Sister      Hypertension Sister      Heart Disease Sister      Lipids Sister      Hypertension Sister      Lipids Sister      Breast Cancer Sister              Allergies:     Allergies   Allergen Reactions     Ace Inhibitors Cough     Diclofenac Other (See Comments)     Balance problems     Gluten Meal Diarrhea             Medications:   Medications Reviewed.   Current Facility-Administered Medications   Medication     aspirin (ASA) tablet 325 mg     lidocaine (LMX4) cream     lidocaine 1 % 0.1-1 mL     lidocaine 1 %     Patient is NOT allergic to contrast dye     sodium chloride (PF) 0.9% PF flush 3 mL     sodium chloride (PF) 0.9% PF flush 3 mL     sodium chloride (PF) 0.9% PF flush 3 mL     sodium chloride 0.9% infusion     [START ON 9/13/2022] ticagrelor (BRILINTA) tablet 90 mg     ticagrelor (BRILINTA) tablet     Facility-Administered Medications Ordered in Other Encounters   Medication     dexmedetomidine (PRECEDEX) 4 mcg/mL in NS PRE-MIX     dexmedetomidine (PRECEDEX) 4 mcg/mL in sodium chloride 0.9 % 100 mL infusion      "fentaNYL (PF) (SUBLIMAZE) injection     heparin (porcine) injection     lactated ringers infusion     midazolam (VERSED) injection     nitroGLYcerin 25 mg in D5W 250 mL infusion CENTRAL     nitroGLYcerin 25 mg in D5W 250 mL infusion CENTRAL     protamine injection             Physical Exam:   Vitals were reviewed.  Blood pressure 127/78, pulse 62, temperature 98.5  F (36.9  C), temperature source Oral, resp. rate 16, height 1.575 m (5' 2\"), weight (P) 67.1 kg (147 lb 14.9 oz), SpO2 98 %.    General: AAOx3, NAD  Skin: Not jaundiced, no rash, no ecchymoses; incision site CDI, soft, non-tender, no signs of hematoma. No bruit  HEENT: MMM, PERRLA, EOM intact  CV: RRR, normal S1S2, no appreciable murmur, clicks, rubs  Resp: Clear to auscultation bilaterally, no wheezes, rhonchi  Abd: Soft, non-tender, BS+, no masses appreciated  Extremities: warm and well perfused, palpable pulses, no edema  Neuro: No lateralizing symptoms or focal neurologic deficits        Labs:   Routine Labs:  No results found for: TROPI, TROPONIN, TROPR, TROPN  CMP  Recent Labs   Lab 09/12/22  1005 09/09/22  1147   NA  --  138   POTASSIUM  --  4.7   CHLORIDE  --  98   CO2  --  29   ANIONGAP  --  11   * 97   BUN  --  37.2*   CR  --  1.64*   GFRESTIMATED  --  30*   PRINCE  --  9.8   PROTTOTAL  --  7.3   ALBUMIN  --  4.2   BILITOTAL  --  0.2   ALKPHOS  --  66   AST  --  24   ALT  --  13     CBC  Recent Labs   Lab 09/09/22  1147   WBC 5.9   RBC 4.25   HGB 10.8*   HCT 36.4   MCV 86   MCH 25.4*   MCHC 29.7*   RDW 15.9*        INR  Recent Labs   Lab 09/09/22  1147   INR 1.04           Diagnostics:    EKG 12Lead 8/4/2022: NSR, HR 60, with 1st deg AVB       Echo 7/29/2022:  Interpretation Summary     Global and regional left ventricular function is normal with an EF of 60-65%.  The right ventricle is normal size. Global right ventricular function is  normal.  Moderate to severe mitral annular calcification is present. Mild mitral  stenosis is " present.  Severe aortic valve calcification is present. (Vm 3.8m/s mean gradient 31mmHg  LUCY per VTI 0.6cm2, and DVI of 0.2 with normal SVI)  IVC diameter <2.1 cm collapsing >50% with sniff suggests a normal RA pressure  of 3 mmHg.  No pericardial effusion is present.     This study was compared with the study from 11/1/21 the gradient across the AV  has increased.    Coronary Angiogram/Right heart Cath/Left Heart Cath 10/2020:  Pre Procedure Diagnosis    \Heart valve condition      Post Procedure Diagnosis    Moderate aortic stenosis        Conclusion    Moderate aortic stenosis -valve area 1.2 cm2,Mean gradient 24 mm Hg    Right sided filling pressures are normal.    Normal PA pressures.    Left sided filling pressures are normal.    Mildly reduced cardiac output level.    Peripheral angiogram done and IVUS used to assess the vessel lumens of the descending thoracic aorta,right and left external iliac arteries.    Prox R VARUN lesion is 40% stenosed.    Dist R VARUN to Mid R CFA lesion is 70% stenosed.    Mid L EIA to Dist L EIA lesion is 30% stenosed.    Ultrasound (IVUS) was performed.        Plan    Follow bedrest per protocol    Continued medical management and lifestyle modifications for cardiovascular risk factor optimizations.    Follow up with Dr. Tom Burrows in 6 months time in clinic as there is no current indication for TAVR in lieu of the moderate severity of the aortic stenosis..    Arterial sheath removed from femoral artery with closure device.    Discharge today per protocol     Coronary Findings  DiagnosticDominance: Right    No diagnostic findings have been documented.      Intervention  No interventions have been documented.    Peripheral Vessel Findings  Diagnostic  Left External Iliac   Mid L EIA to Dist L EIA lesion is 30% stenosed. Ultrasound (IVUS) was performed. IVUS measurements at different segments From distal Left external iliac proximally till the left common iliac artery as below:  1. Minimal diameter - 8.9 mm,Maximum -9.8 mm 2. Minimum -7.5 mm,Maximum 9.1 mm 3. Minimum - 6.5 mm,Maximum -7.5 mm 4. Minimum- 6.4 mm,Maximum- 8.1 mm   Right Common Iliac   Prox R VARUN lesion is 40% stenosed. Ultrasound (IVUS) was performed. Minimal lumen diameter -13.1 mm Maximum lumen diameter - 14.9 mm   Dist R VARUN to Mid R CFA lesion is 70% stenosed. Multiple IVUS measurements proximally from the right common iliac till the distal right external iliac as below: 1. Minimal diameter -8.0 mm,Maximum diameter -8.8 mm 2. Minimal diameter - 7.5 mm,Maximum diameter - 8.7 mm 3. Minimal diameter - 5.8 mm,Maximum diameter - 7.5 mm     Intervention  No interventions have been documented.    Hemodynamics  RA- 9/5/4 mm Hg  RV- 38/7 mm Hg  PA- 36/8/20 mm Hg  PCW- 8/8/7 mm Hg  LVEDP- 13 mm Hg  CO -4.2 L/min,CI - 2.45 (Thermodilution)  CO- 3.21L/min,CI- 1.87 (Ficks)   Right sided filling pressures are normal. Left sided filling pressures are normal. Normal PA pressures. Left ventricular filling pressures are normal. Reduced cardiac output level.     Aortic Valve  AV mean gradient 24. There is moderate aortic valve stenosis. The aortic valve is calcified. AV  area 1.2 cm2.  The aortic valve was crossed using an AL1 diagnostic catheter and a straight wire.An optowire was then placed in the LV cavity and the AL1 catheter was pulled back into the aortic root.Simultaneous gradients were then measured across the aortic valve.         Physician Attestation   I have reviewed and discussed with the advanced practice provider their history, physical and plan for Kamryn Miller. I did not participate in a shared visit by interviewing or examining the patient and this should be billed as an advanced practice provider only visit.    Tom Burrows MD  Interventional Cardiology

## 2022-09-09 ENCOUNTER — LAB (OUTPATIENT)
Dept: LAB | Facility: CLINIC | Age: 87
End: 2022-09-09
Payer: MEDICARE

## 2022-09-09 ENCOUNTER — OFFICE VISIT (OUTPATIENT)
Dept: CARDIOLOGY | Facility: CLINIC | Age: 87
DRG: 267 | End: 2022-09-09
Attending: NURSE PRACTITIONER
Payer: MEDICARE

## 2022-09-09 VITALS
HEIGHT: 61 IN | WEIGHT: 148.8 LBS | DIASTOLIC BLOOD PRESSURE: 79 MMHG | SYSTOLIC BLOOD PRESSURE: 136 MMHG | HEART RATE: 58 BPM | OXYGEN SATURATION: 98 % | BODY MASS INDEX: 28.09 KG/M2

## 2022-09-09 DIAGNOSIS — I50.33 ACUTE ON CHRONIC DIASTOLIC (CONGESTIVE) HEART FAILURE (H): ICD-10-CM

## 2022-09-09 DIAGNOSIS — I35.0 SEVERE AORTIC STENOSIS: ICD-10-CM

## 2022-09-09 DIAGNOSIS — Z01.818 PRE-OP EXAM: Primary | ICD-10-CM

## 2022-09-09 DIAGNOSIS — I10 ESSENTIAL HYPERTENSION: ICD-10-CM

## 2022-09-09 DIAGNOSIS — I35.0 SEVERE AORTIC STENOSIS: Primary | ICD-10-CM

## 2022-09-09 LAB
ALBUMIN SERPL BCG-MCNC: 4.2 G/DL (ref 3.5–5.2)
ALP SERPL-CCNC: 66 U/L (ref 35–104)
ALT SERPL W P-5'-P-CCNC: 13 U/L (ref 10–35)
ANION GAP SERPL CALCULATED.3IONS-SCNC: 11 MMOL/L (ref 7–15)
AST SERPL W P-5'-P-CCNC: 24 U/L (ref 10–35)
ATRIAL RATE - MUSE: 56 BPM
BILIRUB SERPL-MCNC: 0.2 MG/DL
BUN SERPL-MCNC: 37.2 MG/DL (ref 8–23)
CALCIUM SERPL-MCNC: 9.8 MG/DL (ref 8.8–10.2)
CHLORIDE SERPL-SCNC: 98 MMOL/L (ref 98–107)
CREAT SERPL-MCNC: 1.64 MG/DL (ref 0.51–0.95)
DEPRECATED HCO3 PLAS-SCNC: 29 MMOL/L (ref 22–29)
DIASTOLIC BLOOD PRESSURE - MUSE: NORMAL MMHG
ERYTHROCYTE [DISTWIDTH] IN BLOOD BY AUTOMATED COUNT: 15.9 % (ref 10–15)
GFR SERPL CREATININE-BSD FRML MDRD: 30 ML/MIN/1.73M2
GLUCOSE SERPL-MCNC: 97 MG/DL (ref 70–99)
HCT VFR BLD AUTO: 36.4 % (ref 35–47)
HGB BLD-MCNC: 10.8 G/DL (ref 11.7–15.7)
INR PPP: 1.04 (ref 0.85–1.15)
INTERPRETATION ECG - MUSE: NORMAL
MCH RBC QN AUTO: 25.4 PG (ref 26.5–33)
MCHC RBC AUTO-ENTMCNC: 29.7 G/DL (ref 31.5–36.5)
MCV RBC AUTO: 86 FL (ref 78–100)
NT-PROBNP SERPL-MCNC: 502 PG/ML (ref 0–450)
P AXIS - MUSE: 79 DEGREES
PLATELET # BLD AUTO: 248 10E3/UL (ref 150–450)
POTASSIUM SERPL-SCNC: 4.7 MMOL/L (ref 3.4–5.3)
PR INTERVAL - MUSE: 304 MS
PROT SERPL-MCNC: 7.3 G/DL (ref 6.4–8.3)
QRS DURATION - MUSE: 94 MS
QT - MUSE: 446 MS
QTC - MUSE: 430 MS
R AXIS - MUSE: 13 DEGREES
RBC # BLD AUTO: 4.25 10E6/UL (ref 3.8–5.2)
SARS-COV-2 RNA RESP QL NAA+PROBE: NEGATIVE
SODIUM SERPL-SCNC: 138 MMOL/L (ref 136–145)
SYSTOLIC BLOOD PRESSURE - MUSE: NORMAL MMHG
T AXIS - MUSE: 94 DEGREES
VENTRICULAR RATE- MUSE: 56 BPM
WBC # BLD AUTO: 5.9 10E3/UL (ref 4–11)

## 2022-09-09 PROCEDURE — 86850 RBC ANTIBODY SCREEN: CPT | Performed by: NURSE PRACTITIONER

## 2022-09-09 PROCEDURE — 85610 PROTHROMBIN TIME: CPT | Performed by: PATHOLOGY

## 2022-09-09 PROCEDURE — 36415 COLL VENOUS BLD VENIPUNCTURE: CPT | Performed by: PATHOLOGY

## 2022-09-09 PROCEDURE — 93005 ELECTROCARDIOGRAM TRACING: CPT

## 2022-09-09 PROCEDURE — 83880 ASSAY OF NATRIURETIC PEPTIDE: CPT | Performed by: PATHOLOGY

## 2022-09-09 PROCEDURE — U0003 INFECTIOUS AGENT DETECTION BY NUCLEIC ACID (DNA OR RNA); SEVERE ACUTE RESPIRATORY SYNDROME CORONAVIRUS 2 (SARS-COV-2) (CORONAVIRUS DISEASE [COVID-19]), AMPLIFIED PROBE TECHNIQUE, MAKING USE OF HIGH THROUGHPUT TECHNOLOGIES AS DESCRIBED BY CMS-2020-01-R: HCPCS | Performed by: NURSE PRACTITIONER

## 2022-09-09 PROCEDURE — 99214 OFFICE O/P EST MOD 30 MIN: CPT | Performed by: NURSE PRACTITIONER

## 2022-09-09 PROCEDURE — 80053 COMPREHEN METABOLIC PANEL: CPT | Performed by: PATHOLOGY

## 2022-09-09 PROCEDURE — G0463 HOSPITAL OUTPT CLINIC VISIT: HCPCS

## 2022-09-09 PROCEDURE — 85027 COMPLETE CBC AUTOMATED: CPT | Performed by: PATHOLOGY

## 2022-09-09 PROCEDURE — 86901 BLOOD TYPING SEROLOGIC RH(D): CPT | Performed by: NURSE PRACTITIONER

## 2022-09-09 RX ORDER — FUROSEMIDE 20 MG
20 TABLET ORAL DAILY
Qty: 90 TABLET | Refills: 3 | Status: SHIPPED | OUTPATIENT
Start: 2022-09-09 | End: 2022-10-07

## 2022-09-09 RX ORDER — CEFAZOLIN SODIUM 2 G/50ML
2 SOLUTION INTRAVENOUS
Status: CANCELLED | OUTPATIENT
Start: 2022-09-09

## 2022-09-09 RX ORDER — SODIUM CHLORIDE 9 MG/ML
INJECTION, SOLUTION INTRAVENOUS CONTINUOUS
Status: CANCELLED | OUTPATIENT
Start: 2022-09-09

## 2022-09-09 RX ORDER — ASPIRIN 325 MG
325 TABLET ORAL DAILY
Status: CANCELLED | OUTPATIENT
Start: 2022-09-09

## 2022-09-09 RX ORDER — LIDOCAINE 40 MG/G
CREAM TOPICAL
Status: CANCELLED | OUTPATIENT
Start: 2022-09-09

## 2022-09-09 ASSESSMENT — PAIN SCALES - GENERAL: PAINLEVEL: NO PAIN (0)

## 2022-09-09 NOTE — PROGRESS NOTES
Date: 9/9/2022    Time of Call: 8:41 AM     Diagnosis:  Severe aortic stenosis     [ TORB ] Ordering provider: Tom Burrows MD  Order: TAVR pre procedure orders, blood componenets     Order received by: Keiko Godinez RN     Follow-up/additional notes:

## 2022-09-09 NOTE — NURSING NOTE
Chief Complaint   Patient presents with     Follow Up      H&P for TAVR procedure on 9/12/22       Vitals were taken and medications were reconciled. EKG was performed   CARLOTA Gomez  10:41 AM

## 2022-09-09 NOTE — LETTER
9/9/2022      RE: Kamryn Miller  [address]  [address]  [place] [address]       Dear Colleague,    Thank you for the opportunity to participate in the care of your patient, Kamryn Miller, at the [facility] [place] [facility]. Please see a copy of my visit note below.        STRUCTURAL HEART CLINIC  H&P    Referring Provider: Dr. Burrows  Primary Cardiologist: Dr. Zuniga    History of Present Illness:  Kamryn Miller is a 86 year old female who presents for pre-operative H&P in preparation for transcatheter aortic valve replacement on 9/12/22 at [facility].     Patient with a history of moderate paradoxical LFLG AS now progressed to severe with LUCY 0.6cm2, mean PG 31 mmHg, peak V 3.8 m/s, EF  60-65% and DI 0.2 by echo 7/29/22. She has shortness of breath with exertion, fatigue and mild lower extremity swelling. Patient was evaluated in structural heart clinic where she was deemed an appropriate TAVR candidate. She was counseled for the above procedure.     Additional history notable for HTN, HLD, CKD, CAD LAD PCI 8/2020, HFpEF, depression.     Cardiac symptoms are stable. Had a UTI about a month ago that was treated with antibiotics, no residual infectious symptoms.     History of blood transfusions/reactions: No   History of abnormal bleeding/anti-platelet use: No   Steroid use in the last year: No   Personal or family history with difficulty with anesthesia: No   Infection precautions: No   Difficulty w/bedrest: No     Current Medications:  Current Outpatient Medications   Medication Sig Dispense Refill     albuterol (PROAIR HFA/PROVENTIL HFA/VENTOLIN HFA) 108 (90 Base) MCG/ACT inhaler Inhale 2 puffs into the lungs every 6 hours as needed for wheezing or shortness of breath / dyspnea 18 g 3     alendronate (FOSAMAX) 70 MG tablet TAKE 1 TABLET BY MOUTH ONCE WEEKLY 4 tablet 23     amLODIPine (NORVASC) 5  MG tablet Take 2 tablets (10 mg) by mouth daily Take this in the evening. 180 tablet 3     aspirin 81 MG tablet Take 81 mg by mouth daily        azelastine (ASTEPRO) 0.15 % nasal spray USE 1 SPRAY IN EACH NOSTRIL ONCE A DAY 30 mL 11     Calcium Citrate (CITRACAL OR) Take 1 tablet by mouth daily.       carvedilol (COREG) 6.25 MG tablet Take 3 tablets (18.75 mg) by mouth 2 times daily 540 tablet 3     cholecalciferol (VITAMIN D3) 1250 mcg (77935 units) capsule TAKE 1 CAPSULE BY MOUTH ONCE WEEKLY 4 capsule 11     cloNIDine (CATAPRES) 0.1 MG tablet Take 1 tablet (0.1 mg) by mouth daily as needed (For SBP > 180) 20 tablet 0     COMPRESSION STOCKINGS 1 each daily 1 each 1     desvenlafaxine (PRISTIQ) 100 MG 24 hr tablet Take 100 mg by mouth daily       fluticasone-salmeterol (ADVAIR DISKUS) 500-50 MCG/ACT inhaler INHALE 1 PUFF BY MOUTH TWICE DAILY 60 each 0     furosemide (LASIX) 20 MG tablet Take 1 tablet (20 mg) by mouth 2 times daily 180 tablet 3     lamoTRIgine (LAMICTAL) 25 MG tablet Take 25 mg by mouth daily with 200 mg tablet for a total dose of 225 mg       LAMOTRIGINE 200 MG PO TABS Take 200 mg by mouth daily with 25 mg tablet for a total dose of 225 mg       levothyroxine (SYNTHROID/LEVOTHROID) 50 MCG tablet TAKE 1 TABLET BY MOUTH ONCE DAILY 28 tablet 11     liothyronine (CYTOMEL) 5 MCG tablet Take 2 tablets (10 mcg) by mouth daily 30 tablet 3     memantine (NAMENDA) 10 MG tablet Take 1 tablet (10 mg) by mouth daily 30 tablet 3     mirtazapine (REMERON) 7.5 MG tablet Take 1 tablet (7.5 mg) by mouth At Bedtime 30 tablet 3     Multiple Vitamin (TAB-A-JENNIFER) TABS TAKE 1 TABLET BY MOUTH ONCE DAILY 30 tablet PRN     potassium chloride ER (K-TAB) 20 MEQ CR tablet Take 1 tablet (20 mEq) by mouth daily 90 tablet 3     rosuvastatin (CRESTOR) 40 MG tablet TAKE 1 TABLET BY MOUTH ONCE DAILY 28 tablet 11       Allergies:     Allergies   Allergen Reactions     Ace Inhibitors Cough     Diclofenac Other (See Comments)      Balance problems     Gluten Meal Diarrhea       Past Medical History:  Past Medical History:   Diagnosis Date     Aortic valvar stenosis 7/2010    mild     Asthma      Bipolar disorder (H)      CAD (coronary artery disease)     s/p angioplasty     Celiac disease      Colon polyps      Colon polyps 2012    every 3 year colonoscopy      Depression      High cholesterol      HTN      Mitral insufficiency      Pulmonary HTN (H)     mild     Tremors 7/10    drug induced from antidepressants     Tricuspid insufficiency        Past Surgical History:  Past Surgical History:   Procedure Laterality Date     ANGIOGRAM  6/26/2009     ANGIOPLASTY  9/96    for angina     ANGIOPLASTY  8/03 - 9/03    X -2 - with stenst in coronary car.     APPENDECTOMY       BREAST BIOPSY, RT/LT      Breat Biopsy RT/LT, benign     BUNIONECTOMY  11/8/2011    Procedure:BUNIONECTOMY; Left donna bunionectomy; Surgeon:FREDY LITTLEJOHN; Location:[facility] OR     BUNIONECTOMY RT/LT  5/2007    right bunion and right 2nd hammertoe     CATARACT IOL, RT/LT  6/12 and 7/12    bilateral     COLONOSCOPY  2007, 2012    every 3 years for polyps     CV CORONARY ANGIOGRAM N/A 8/21/2020    Procedure: CV CORONARY ANGIOGRAM;  Surgeon: Gianluca Toscano MD;  Location: [facility]     CV LEFT HEART CATH N/A 8/21/2020    Procedure: CV LEFT HEART CATH;  Surgeon: Gianluca Toscano MD;  Location: [facility]     CV LEFT HEART CATH N/A 11/3/2020    Procedure: CV LEFT HEART CATH;  Surgeon: Tom Burrows MD;  Location: [facility]     CV LOWER EXTREMITY ANGIOGRAM BILATERAL N/A 11/3/2020    Procedure: CV ANGIOGRAM LOWER EXTREMITY BILATERAL;  Surgeon: Tom Burrows MD;  Location: [facility]     CV PCI STENT DRUG ELUTING N/A 8/21/2020    Procedure: Percutaneous Coronary Intervention Stent Drug Eluting;  Surgeon: Gianluca Tocsano MD;  Location: [facility]     CV RIGHT HEART CATH  MEASUREMENTS RECORDED N/A 8/21/2020    Procedure: CV RIGHT HEART CATH;  Surgeon: Gianluca Toscano MD;  Location:  HEART CARDIAC CATH LAB     CV RIGHT HEART CATH MEASUREMENTS RECORDED N/A 11/3/2020    Procedure: CV RIGHT HEART CATH;  Surgeon: Tom Burrows MD;  Location:  HEART CARDIAC CATH LAB     JOINT REPLACEMENT, HIP RT/LT  4/2008    right hip replaced     JOINT REPLACEMENT, HIP RT/LT  4/2009    left hip replaced     REPAIR HAMMER TOE  11/8/2011    Procedure:REPAIR HAMMER TOE; left 2nd hammertoe repair; Surgeon:FREDY LITTLEJOHN; Location:[facility] OR     SURGICAL HISTORY OF -   11/11    left bunion and 2nd hammertoe repair     TUBAL LIGATION       ZZC ANESTH,BLEPHAROPLASTY      (R) for drooping eyelid     ZZC APPENDECTOMY         Family History:  Family History   Problem Relation Age of Onset     Asthma Mother      Cerebrovascular Disease Mother      Arthritis Mother      Depression Mother      Lipids Sister      Hypertension Sister      Heart Disease Sister      Lipids Sister      Hypertension Sister      Lipids Sister      Breast Cancer Sister        Social History:  Social History     Socioeconomic History     Marital status:      Spouse name: Adrien     Number of children: 2     Years of education: 16   Occupational History     Employer: RETIRED     Comment: Retired in 2002.    Tobacco Use     Smoking status: Never Smoker     Smokeless tobacco: Never Used   Substance and Sexual Activity     Alcohol use: No     Alcohol/week: 0.0 standard drinks     Types: 1 Standard drinks or equivalent per week     Drug use: No     Sexual activity: Not Currently     Partners: Male       Review of Systems:    Functional status: Independent in ADL's but lives in assisted living. Unable to achieve METS > 4.     Constitutional: No fever, chills, or sweats. No weight gain/loss   ENT: No visual disturbance, ear ache, epistaxis, sore throat  Allergies/Immunologic: Negative.   Respiratory: No cough,  "hemoptysia  Cardiovascular: As per HPI  GI: No nausea, vomiting, hematemesis, melena, or hematochezia  : No urinary frequency, dysuria, or hematuria  Integument: Negative  Psychiatric: Negative  Neuro: Negative  Endocrinology: Negative   Musculoskeletal: Negative      Physical Exam:  Vitals: /79 (BP Location: Left arm, Patient Position: Sitting, Cuff Size: Adult Small)   Pulse 58   Ht 1.549 m (5' 1\")   Wt 67.5 kg (148 lb 12.8 oz)   SpO2 98%   BMI 28.12 kg/m     General: NAD  HEENT:  Dentition intact.    Neck: No jugular venous distension.   Heart: RRR with 2/6 ARI at RUSB  Lungs: CTA.    Abdomen: Soft, nontender, nondistended.   Extremities: No clubbing, cyanosis, or edema.  The pulses are +4/4 at the radial, brachial, femoral, popliteal, DP, and PT sites bilaterally.  No bruits are noted.  Neurologic: Alert and oriented to person/place/time, normal speech, gait and affect  Skin: No petechiae, purpura or rash.      Diagnostic Studies:      ECG 9/9/22:  Personally reviewed and interpreted by me.  SB with 1st degree AVB HR 50s    Echocardiogram 7/29/22:  Global and regional left ventricular function is normal with an EF of 60-65%.  The right ventricle is normal size. Global right ventricular function is  normal.  Moderate to severe mitral annular calcification is present. Mild mitral  stenosis is present.  Severe aortic valve calcification is present. (Vm 3.8m/s mean gradient 31mmHg  LUCY per VTI 0.6cm2, and DVI of 0.2 with normal SVI)  IVC diameter <2.1 cm collapsing >50% with sniff suggests a normal RA pressure  of 3 mmHg.  No pericardial effusion is present.     This study was compared with the study from 11/1/21 the gradient across the AV  has increased.    CT TAVR 9/2020                                                                     IMPRESSION:     1.  See below for TAVR related aortic annular and vascular  measurements.   2.  No acute aortic dissection, intramural hematoma or contained  rupture " noted.  3.  Suboptimal imaging of the lower extremity vasculature due to  low-contrast protocol and significant metallic artifacts related to  bilateral hip arthroplasty hardware.   4.  Please review detailed radiology report, to follow separately, for  incidental non-cardiovascular findings.     FINDINGS:     1.  Moderately calcified tricuspidaortic valve. Aortic valve calcium  score is 1263. The LVOT is mildly calcified. The ascending aorta is  mildly calcified, aortic arch is  mildly calcified, the descending  thoracic aorta is mildly calcified. There is moderate mitral annular  calcification.  2.  There are no adverse aortic root features, ie coronary heights are  adequate and there is no significant aortic root calcification.  3.  There are significant native coronary calcifications.   4.  The arch vessel branching pattern is normal.   5.  The suprarenal abdominal aorta is moderately calcified; infrarenal  abdominal aorta is severely calcified  6.  Widely patent origins of celiac trunk, superior mesenteric artery  and inferior mesenteric artery.  Single bilateral renal arteries with  widely patent origins.   7.  There is no acute aortic pathology, such as dissection, intramural  hematoma, or contained rupture.   8.  Suboptimal imaging of the lower extremity vasculature due to  low-contrast protocol and significant metallic artifacts related to  bilateral hip arthroplasty hardware. Consider an additional study for  repeat assessment of iliofemoral dimensions, particularly in the  bilateral external iliac and common femoral arteries.     MEASUREMENTS:   Representative dimensions of the thoracoabdominal aorta are as  follows:     1. AORTIC ANNULUS MEASUREMENTS:     1.  The average aortic annulus diameter is 24.6 mm, (long diameter is  27.8 mm and short diameter is 22.3 mm)  2.  Aortic annulus area is 4.76 cm2  3.  Aortic annulus perimeter is 79.0 mm  4.  Suggested 3-cusp coaxial angle for aortic valve is (RAO8 ,  CAU 0)  and alternate A-P coaxial angle is (Upper sorbian 0 , CRA 10 ), these  angles  will vary depending upon the patient's body position  5.  Aortic root angle is 35.5 degrees  6.  Left main - Annulus distance: 16.2 mm, RCA - Annulus distance:  13.7 mm     2. LVOT MEASUREMENTS:     1.  The average LVOT diameter (measured 4 mm below annular plane) is  26.4 mm  2.  LVOT area is 5.47 cm2  3.  LVOT perimeter is 85.6 mm     3. AORTA MEASUREMENTS     1.  The aortic root at the sinuses of Valsalva (left cusp, right cusp,  non cusp): 30.7 mm x  31.3 mm x 31.7 mm  2.  The sinotubular junction:  26.1 mm x 24.5 mm  3.  Sinotubular junction height: 21.1 mm x 21.1 mm  4.  Proximal ascending aorta: 33.7 mm x 33.7 mm  5.  Distal abdominal aorta proximal to the bifurcation: 13.1 mm x 11.7  mm  6.  Innominate artery 3 cm from ostium: 12.1 mm x 11.5 mm  7.  Left common carotid artery 3 cm from ostium: 6.4 mm x 6.1 mm     4. ILIOFEMORAL MEASUREMENTS:  Suboptimal imaging of the lower extremity vasculature due to  low-contrast protocol and significant metallic artifacts related to  bilateral hip arthroplasty hardware. Consider an additional study for  repeat assessment of iliofemoral dimensions, particularly in the  bilateral external iliac and common femoral arteries.     RIGHT:  1.  Right common iliac artery: 8.9 mm x 6.3 mm   2.  Right external iliac artery: 4.8 mm x 3.9 mm. Imaging of this  segment is extremely suboptimal and these dimensions may be  significantly underestimated.    3.  Right common femoral artery: Imaging of this segment is  non-diagnostic.   4.  Tortuosity: severe   5.  Calcification: moderate      LEFT:  1.  Left common iliac artery: 6.1 mm x  6.1 mm  2.  Left external iliac artery: 5.1 mm x 4.7 mm. Imaging of this  segment is extremely suboptimal and these dimensions may be  significantly underestimated.    3.  Left common femoral artery: 5.3. mm x 5.1 mm. Imaging of this  segment is extremely suboptimal and these  dimensions may be  significantly underestimated.    4.  Tortuosity: severe   5.  Calcification: moderate      5. SUBCLAVIAN MEASUREMENTS:     RIGHT:  1. Minimal luminal diameter: 7.0 mm x  6.0 mm  2. Tortuosity: moderate   3. Calcification: mild      LEFT:  1. Minimal luminal diameter: 6.8 mm x  6.2 mm  2. Tortuosity: moderate   3. Calcification: mild     Cath 11/2020:  Conclusion      Moderate aortic stenosis -valve area 1.2 cm2,Mean gradient 24 mm Hg    Right sided filling pressures are normal.    Normal PA pressures.    Left sided filling pressures are normal.    Mildly reduced cardiac output level.    Peripheral angiogram done and IVUS used to assess the vessel lumens of the descending thoracic aorta,right and left external iliac arteries.    Prox R VARUN lesion is 40% stenosed.    Dist R VARUN to Mid R CFA lesion is 70% stenosed.    Mid L EIA to Dist L EIA lesion is 30% stenosed.    Ultrasound (IVUS) was performed.           Plan      Follow bedrest per protocol    Continued medical management and lifestyle modifications for cardiovascular risk factor optimizations.    Follow up with Dr. Tom Burrows in 6 months time in clinic as there is no current indication for TAVR in lieu of the moderate severity of the aortic stenosis..    Arterial sheath removed from femoral artery with closure device.    Discharge today per protocol         Coronary Findings      Diagnostic  Dominance: Right    No diagnostic findings have been documented.      Intervention      No interventions have been documented.      Peripheral Vessel Findings      Diagnostic      Left External Iliac   Mid L EIA to Dist L EIA lesion is 30% stenosed. Ultrasound (IVUS) was performed. IVUS measurements at different segments From distal Left external iliac proximally till the left common iliac artery as below: 1. Minimal diameter - 8.9 mm,Maximum -9.8 mm 2. Minimum -7.5 mm,Maximum 9.1 mm 3. Minimum - 6.5 mm,Maximum -7.5 mm 4. Minimum- 6.4 mm,Maximum-  8.1 mm   Right Common Iliac   Prox R VARUN lesion is 40% stenosed. Ultrasound (IVUS) was performed. Minimal lumen diameter -13.1 mm Maximum lumen diameter - 14.9 mm   Dist R VARUN to Mid R CFA lesion is 70% stenosed. Multiple IVUS measurements proximally from the right common iliac till the distal right external iliac as below: 1. Minimal diameter -8.0 mm,Maximum diameter -8.8 mm 2. Minimal diameter - 7.5 mm,Maximum diameter - 8.7 mm 3. Minimal diameter - 5.8 mm,Maximum diameter - 7.5 mm     Intervention      No interventions have been documented.      Hemodynamics    RA- 9/5/4 mm Hg  RV- 38/7 mm Hg  PA- 36/8/20 mm Hg  PCW- 8/8/7 mm Hg  LVEDP- 13 mm Hg  CO -4.2 L/min,CI - 2.45 (Thermodilution)  CO- 3.21L/min,CI- 1.87 (Ficks)   Right sided filling pressures are normal. Left sided filling pressures are normal. Normal PA pressures. Left ventricular filling pressures are normal. Reduced cardiac output level.     Laboratory Studies:  Personally reviewed and interpreted by me.    LIPID RESULTS:  Lab Results   Component Value Date    CHOL 172 12/17/2021    CHOL 175 10/23/2020    HDL 61 12/17/2021    HDL 71 10/23/2020    LDL 84 12/17/2021    LDL 83 10/23/2020    TRIG 137 12/17/2021    TRIG 103 10/23/2020    CHOLHDLRATIO 2.8 09/18/2015       LIVER ENZYME RESULTS:  Lab Results   Component Value Date    AST 25 07/29/2022    AST 27 05/17/2021    ALT 26 07/29/2022    ALT 38 05/17/2021       CBC RESULTS:  Lab Results   Component Value Date    WBC 5.7 07/29/2022    WBC 4.6 05/17/2021    RBC 4.28 07/29/2022    RBC 3.97 05/17/2021    HGB 11.5 (L) 07/29/2022    HGB 12.9 05/17/2021    HCT 37.5 07/29/2022    HCT 40.3 05/17/2021    MCV 88 07/29/2022     (H) 05/17/2021    MCH 26.9 07/29/2022    MCH 32.5 05/17/2021    MCHC 30.7 (L) 07/29/2022    MCHC 32.0 05/17/2021    RDW 15.6 (H) 07/29/2022    RDW 12.7 05/17/2021     07/29/2022     05/17/2021       BMP RESULTS:  Lab Results   Component Value Date     07/29/2022      06/04/2021    POTASSIUM 4.8 07/29/2022    POTASSIUM 3.4 06/04/2021    CHLORIDE 109 07/29/2022    CHLORIDE 106 06/04/2021    CO2 30 07/29/2022    CO2 30 06/04/2021    ANIONGAP 3 07/29/2022    ANIONGAP 6 06/04/2021     (H) 07/29/2022     (H) 06/04/2021    BUN 35 (H) 07/29/2022    BUN 27 06/04/2021    CR 1.29 (H) 07/29/2022    CR 1.50 (H) 06/04/2021    GFRESTIMATED 40 (L) 07/29/2022    GFRESTIMATED 32 (L) 06/04/2021    GFRESTBLACK 37 (L) 06/04/2021    PRINCE 9.6 07/29/2022    PRINCE 9.4 06/04/2021        A1C RESULTS:  Lab Results   Component Value Date    A1C 5.0 06/30/2009       INR RESULTS:  Lab Results   Component Value Date    INR 1.05 11/03/2020    INR 2.28 (H) 04/23/2009       Assessment and Plan     Kamryn Miller is a 86 year old female who presents for pre-operative H&P in preparation for transcatheter aortic valve replacement on 9/12/22 at [facility].     She has the following specific operative considerations:      - RCRI score 2 corresponding with a 2.1% perioperative risk of MACE      - RAQUEL # of risks 3/8      - VTE risk = 5. If equal to or > 4, pharmacologic prophylaxis is indicated      - RIsk of PONV score = 2. If > 2, anti-emetic intervention recommended    1. Severe aortic valve stenosis: Patient reports progressive exertional dyspnea and fatigue, echo shows progression of her aortic stenosis from moderate to severe. She has been evaluated by the our structural heart team and has been deemed an appropriate candidate for transcatheter aortic valve replacement. We discussed the procedure in detail, including pre and post-procedure care, restrictions and follow-up. She understands risks including 5-10% risk of heart block leading to permanent pacemaker placement, 3% risk of bleeding, infection, vascular complication, 1-3% risk of stroke and very low risk (<1%) of serious complications such as cardiac perforation, aortic root rupture, dissection or death.     All  questions answered  Type and screen orders complete  COVID test ordered  Supplies for scrubbing provided  No known contrast dye allergy  No trouble with anesthesia in the past  Labs today WNL, no s/s of infection    2. Chronic diastolic heart failure, NYHA class II, Stage B: LVEF 60%. Secondary to valvular heart disease. No acute volume overload on exam, though patient does endorse significant exertional dyspnea. NT-. Lasix was increased from 20 mg daily to 20 mg BID about 6 weeks ago, today creatinine is up slightly. Plan to decrease lasix to 20 mg daily with repeat labs Monday prior to TAVR.    3. CAD: Pre-TAVR coronary angiogram showed significant stenosis of the LAD s/p PCI 8/2020. She denies angina. Plan to continue ASA 81 mg daily and statin therapy.     4. Hypertension: Continue amlodipine, Coreg, hold Clonidine pre procedure.    5. Hyperlipidemia: Continue crestor at current dose.    6. Depression: Maintained on Pristiq, Lamictal and Remeron. Hold morning of surgery.     7. Cognitive mpairement: Hold Namenda morning of surgery.    8. TED on CKD: Creatinine slightly up from baseline 1.4-->1.6, GFR 40-->30. Possibly related to UTI last month vs volume depletion. She denies any residual infectious symptoms, normal WBC today. Suspect related to volume depletion. Reduce lasix to 20 mg daily. Repeat BMP Monday.    9. Hypothyroidism: Continue synthroid and cytomel DOS.    Diuretic: hold Lasix DOS  Antiplatelet: Continue ASA 81 mg perioperatively   BB: Continue perioperatively without interruption  Supplements: Hold morning of procedure    Patient is optimized and is acceptable candidate for the proposed procedure.  No further diagnostic evaluation is needed. Pre-procedure instructions provided in written format.     JESSICA Rousseau, CNP  Structural Heart Care Nurse Practitioner  [facility]  Clinic: [phone]  Pager: [phone]

## 2022-09-09 NOTE — PATIENT INSTRUCTIONS
You were seen today in the Cardiovascular Clinic at the [facility].      Cardiology Providers you saw during your visit:  LACY Rousseau    Recommendations:    Check in to the hospital Admissions/3C at 10:00.    Nothing to eat after midnight; water, apple juice or 7up/Sprite is OK up to two hours prior to your scheduled procedure.  You may take your medications in the morning with a sip of water.    Take a shower, with antibacterial soap/wipes, the night before the procedure, and the morning of the procedure.      Diagnosis: Severe aortic stenosis  Plan:  1. TAVR procedure scheduled for Monday, September 12th.      2. Medications instructions:    -- Please continue:  ASA 81 mg  Coreg  Synthroid  Cytomel  Amlodipine    -- Please hold:   Lasix   Potassium   Calcium   Vit D3   Mutivitamin   Pristiq   Lamictal   Remeron   Namenda   Crestor   Clonidine      If any questions please contact:  Keiko Godinez RN  Structural Heart RN Specialists  TAVR, MitraClip and Watchman Programs  [facility]  [phone]     Thank you for your visit today!

## 2022-09-12 ENCOUNTER — ANESTHESIA (OUTPATIENT)
Dept: CARDIOLOGY | Facility: CLINIC | Age: 87
DRG: 267 | End: 2022-09-12
Payer: MEDICARE

## 2022-09-12 ENCOUNTER — HOSPITAL ENCOUNTER (OUTPATIENT)
Dept: CARDIOLOGY | Facility: CLINIC | Age: 87
Discharge: HOME | DRG: 267 | End: 2022-09-12
Attending: NURSE PRACTITIONER | Admitting: INTERNAL MEDICINE
Payer: MEDICARE

## 2022-09-12 ENCOUNTER — ANESTHESIA EVENT (OUTPATIENT)
Dept: CARDIOLOGY | Facility: CLINIC | Age: 87
DRG: 267 | End: 2022-09-12
Payer: MEDICARE

## 2022-09-12 ENCOUNTER — HOSPITAL ENCOUNTER (INPATIENT)
Facility: CLINIC | Age: 87
LOS: 1 days | Discharge: NURSING, REHAB OR LONG-TERM CARE | DRG: 267 | End: 2022-09-13
Attending: INTERNAL MEDICINE | Admitting: INTERNAL MEDICINE
Payer: MEDICARE

## 2022-09-12 DIAGNOSIS — I10 ESSENTIAL HYPERTENSION: ICD-10-CM

## 2022-09-12 DIAGNOSIS — I35.0 SEVERE AORTIC STENOSIS: ICD-10-CM

## 2022-09-12 DIAGNOSIS — I35.0 AORTIC STENOSIS, SEVERE: ICD-10-CM

## 2022-09-12 DIAGNOSIS — Z95.2 S/P TAVR (TRANSCATHETER AORTIC VALVE REPLACEMENT): Primary | ICD-10-CM

## 2022-09-12 LAB
ACT BLD: 105 SECONDS (ref 74–150)
ACT BLD: 199 SECONDS (ref 74–150)
ACT BLD: 280 SECONDS (ref 74–150)
BLD PROD TYP BPU: NORMAL
BLOOD COMPONENT TYPE: NORMAL
CODING SYSTEM: NORMAL
CROSSMATCH: NORMAL
CROSSMATCH: NORMAL
GLUCOSE BLDC GLUCOMTR-MCNC: 110 MG/DL (ref 70–99)
ISSUE DATE AND TIME: NORMAL
ISSUE DATE AND TIME: NORMAL
MAGNESIUM SERPL-MCNC: 2.1 MG/DL (ref 1.7–2.3)
UNIT ABO/RH: NORMAL
UNIT NUMBER: NORMAL
UNIT STATUS: NORMAL
UNIT TYPE ISBT: 600

## 2022-09-12 PROCEDURE — 258N000003 HC RX IP 258 OP 636: Performed by: NURSE ANESTHETIST, CERTIFIED REGISTERED

## 2022-09-12 PROCEDURE — 93005 ELECTROCARDIOGRAM TRACING: CPT

## 2022-09-12 PROCEDURE — 33361 REPLACE AORTIC VALVE PERQ: CPT | Performed by: INTERNAL MEDICINE

## 2022-09-12 PROCEDURE — 86923 COMPATIBILITY TEST ELECTRIC: CPT | Performed by: INTERNAL MEDICINE

## 2022-09-12 PROCEDURE — 85347 COAGULATION TIME ACTIVATED: CPT

## 2022-09-12 PROCEDURE — C1730 CATH, EP, 19 OR FEW ELECT: HCPCS | Performed by: INTERNAL MEDICINE

## 2022-09-12 PROCEDURE — P9016 RBC LEUKOCYTES REDUCED: HCPCS | Performed by: INTERNAL MEDICINE

## 2022-09-12 PROCEDURE — 250N000011 HC RX IP 250 OP 636: Performed by: INTERNAL MEDICINE

## 2022-09-12 PROCEDURE — 370N000017 HC ANESTHESIA TECHNICAL FEE, PER MIN: Performed by: INTERNAL MEDICINE

## 2022-09-12 PROCEDURE — 250N000013 HC RX MED GY IP 250 OP 250 PS 637: Performed by: NURSE PRACTITIONER

## 2022-09-12 PROCEDURE — 360N000079 HC SURGERY LEVEL 6, PER MIN: Performed by: INTERNAL MEDICINE

## 2022-09-12 PROCEDURE — C1894 INTRO/SHEATH, NON-LASER: HCPCS | Performed by: INTERNAL MEDICINE

## 2022-09-12 PROCEDURE — 250N000013 HC RX MED GY IP 250 OP 250 PS 637: Performed by: INTERNAL MEDICINE

## 2022-09-12 PROCEDURE — 93306 TTE W/DOPPLER COMPLETE: CPT | Mod: 26 | Performed by: STUDENT IN AN ORGANIZED HEALTH CARE EDUCATION/TRAINING PROGRAM

## 2022-09-12 PROCEDURE — 33361 REPLACE AORTIC VALVE PERQ: CPT | Mod: 62 | Performed by: THORACIC SURGERY (CARDIOTHORACIC VASCULAR SURGERY)

## 2022-09-12 PROCEDURE — 83735 ASSAY OF MAGNESIUM: CPT | Performed by: NURSE PRACTITIONER

## 2022-09-12 PROCEDURE — 278N000051 HC OR IMPLANT GENERAL: Performed by: INTERNAL MEDICINE

## 2022-09-12 PROCEDURE — 214N000001 HC R&B CCU UMMC

## 2022-09-12 PROCEDURE — 250N000011 HC RX IP 250 OP 636: Performed by: STUDENT IN AN ORGANIZED HEALTH CARE EDUCATION/TRAINING PROGRAM

## 2022-09-12 PROCEDURE — 250N000011 HC RX IP 250 OP 636: Performed by: NURSE ANESTHETIST, CERTIFIED REGISTERED

## 2022-09-12 PROCEDURE — C1760 CLOSURE DEV, VASC: HCPCS | Performed by: INTERNAL MEDICINE

## 2022-09-12 PROCEDURE — 33361 REPLACE AORTIC VALVE PERQ: CPT | Mod: 62 | Performed by: INTERNAL MEDICINE

## 2022-09-12 PROCEDURE — 410N000003 HC PER-PERFUSION 1ST 30 MIN: Performed by: INTERNAL MEDICINE

## 2022-09-12 PROCEDURE — 272N000001 HC OR GENERAL SUPPLY STERILE: Performed by: INTERNAL MEDICINE

## 2022-09-12 PROCEDURE — 36415 COLL VENOUS BLD VENIPUNCTURE: CPT | Performed by: NURSE PRACTITIONER

## 2022-09-12 PROCEDURE — C1769 GUIDE WIRE: HCPCS | Performed by: INTERNAL MEDICINE

## 2022-09-12 PROCEDURE — 250N000009 HC RX 250: Performed by: NURSE ANESTHETIST, CERTIFIED REGISTERED

## 2022-09-12 PROCEDURE — 93306 TTE W/DOPPLER COMPLETE: CPT

## 2022-09-12 PROCEDURE — 93010 ELECTROCARDIOGRAM REPORT: CPT | Performed by: INTERNAL MEDICINE

## 2022-09-12 PROCEDURE — 02RF38Z REPLACEMENT OF AORTIC VALVE WITH ZOOPLASTIC TISSUE, PERCUTANEOUS APPROACH: ICD-10-PCS | Performed by: INTERNAL MEDICINE

## 2022-09-12 PROCEDURE — 250N000009 HC RX 250: Performed by: INTERNAL MEDICINE

## 2022-09-12 DEVICE — VALVE AORTIC SAPEIN 3 UTLRA TAVR 26MM 9750TFX26A: Type: IMPLANTABLE DEVICE | Site: CHEST | Status: FUNCTIONAL

## 2022-09-12 RX ORDER — FUROSEMIDE 20 MG
20 TABLET ORAL
Status: DISCONTINUED | OUTPATIENT
Start: 2022-09-13 | End: 2022-09-12

## 2022-09-12 RX ORDER — AZELASTINE 1 MG/ML
1 SPRAY, METERED NASAL DAILY
Status: DISCONTINUED | OUTPATIENT
Start: 2022-09-13 | End: 2022-09-13 | Stop reason: HOSPADM

## 2022-09-12 RX ORDER — FENTANYL CITRATE 50 UG/ML
INJECTION, SOLUTION INTRAMUSCULAR; INTRAVENOUS PRN
Status: DISCONTINUED | OUTPATIENT
Start: 2022-09-12 | End: 2022-09-12

## 2022-09-12 RX ORDER — POTASSIUM CHLORIDE 750 MG/1
20 TABLET, EXTENDED RELEASE ORAL DAILY
Status: DISCONTINUED | OUTPATIENT
Start: 2022-09-13 | End: 2022-09-13 | Stop reason: HOSPADM

## 2022-09-12 RX ORDER — LAMOTRIGINE 25 MG/1
25 TABLET ORAL DAILY
Status: DISCONTINUED | OUTPATIENT
Start: 2022-09-12 | End: 2022-09-13 | Stop reason: HOSPADM

## 2022-09-12 RX ORDER — ALBUTEROL SULFATE 90 UG/1
2 AEROSOL, METERED RESPIRATORY (INHALATION) EVERY 6 HOURS PRN
Status: DISCONTINUED | OUTPATIENT
Start: 2022-09-12 | End: 2022-09-13 | Stop reason: HOSPADM

## 2022-09-12 RX ORDER — ONDANSETRON 2 MG/ML
4 INJECTION INTRAMUSCULAR; INTRAVENOUS EVERY 6 HOURS PRN
Status: DISCONTINUED | OUTPATIENT
Start: 2022-09-12 | End: 2022-09-13 | Stop reason: HOSPADM

## 2022-09-12 RX ORDER — ONDANSETRON 4 MG/1
4 TABLET, ORALLY DISINTEGRATING ORAL EVERY 30 MIN PRN
Status: DISCONTINUED | OUTPATIENT
Start: 2022-09-12 | End: 2022-09-12 | Stop reason: HOSPADM

## 2022-09-12 RX ORDER — HEPARIN SODIUM 1000 [USP'U]/ML
INJECTION, SOLUTION INTRAVENOUS; SUBCUTANEOUS PRN
Status: DISCONTINUED | OUTPATIENT
Start: 2022-09-12 | End: 2022-09-12

## 2022-09-12 RX ORDER — SODIUM CHLORIDE, SODIUM LACTATE, POTASSIUM CHLORIDE, CALCIUM CHLORIDE 600; 310; 30; 20 MG/100ML; MG/100ML; MG/100ML; MG/100ML
INJECTION, SOLUTION INTRAVENOUS CONTINUOUS
Status: DISCONTINUED | OUTPATIENT
Start: 2022-09-12 | End: 2022-09-12 | Stop reason: HOSPADM

## 2022-09-12 RX ORDER — FUROSEMIDE 20 MG
20 TABLET ORAL DAILY
Status: DISCONTINUED | OUTPATIENT
Start: 2022-09-13 | End: 2022-09-13 | Stop reason: HOSPADM

## 2022-09-12 RX ORDER — CEFAZOLIN SODIUM 2 G/100ML
2 INJECTION, SOLUTION INTRAVENOUS
Status: COMPLETED | OUTPATIENT
Start: 2022-09-12 | End: 2022-09-12

## 2022-09-12 RX ORDER — ONDANSETRON 4 MG/1
4 TABLET, ORALLY DISINTEGRATING ORAL EVERY 6 HOURS PRN
Status: DISCONTINUED | OUTPATIENT
Start: 2022-09-12 | End: 2022-09-13 | Stop reason: HOSPADM

## 2022-09-12 RX ORDER — NALOXONE HYDROCHLORIDE 0.4 MG/ML
0.4 INJECTION, SOLUTION INTRAMUSCULAR; INTRAVENOUS; SUBCUTANEOUS
Status: DISCONTINUED | OUTPATIENT
Start: 2022-09-12 | End: 2022-09-13 | Stop reason: HOSPADM

## 2022-09-12 RX ORDER — NITROGLYCERIN 10 MG/100ML
INJECTION INTRAVENOUS PRN
Status: DISCONTINUED | OUTPATIENT
Start: 2022-09-12 | End: 2022-09-12

## 2022-09-12 RX ORDER — DEXMEDETOMIDINE HYDROCHLORIDE 4 UG/ML
INJECTION, SOLUTION INTRAVENOUS PRN
Status: DISCONTINUED | OUTPATIENT
Start: 2022-09-12 | End: 2022-09-12

## 2022-09-12 RX ORDER — ASPIRIN 81 MG/1
81 TABLET ORAL DAILY
Status: DISCONTINUED | OUTPATIENT
Start: 2022-09-13 | End: 2022-09-13 | Stop reason: HOSPADM

## 2022-09-12 RX ORDER — LIOTHYRONINE SODIUM 5 UG/1
10 TABLET ORAL DAILY
Status: DISCONTINUED | OUTPATIENT
Start: 2022-09-13 | End: 2022-09-13 | Stop reason: HOSPADM

## 2022-09-12 RX ORDER — ASPIRIN 325 MG
325 TABLET ORAL DAILY
Status: DISCONTINUED | OUTPATIENT
Start: 2022-09-12 | End: 2022-09-12 | Stop reason: HOSPADM

## 2022-09-12 RX ORDER — LIDOCAINE 40 MG/G
CREAM TOPICAL
Status: DISCONTINUED | OUTPATIENT
Start: 2022-09-12 | End: 2022-09-12 | Stop reason: HOSPADM

## 2022-09-12 RX ORDER — HYDRALAZINE HYDROCHLORIDE 20 MG/ML
5 INJECTION INTRAMUSCULAR; INTRAVENOUS EVERY 6 HOURS PRN
Status: DISCONTINUED | OUTPATIENT
Start: 2022-09-12 | End: 2022-09-13 | Stop reason: HOSPADM

## 2022-09-12 RX ORDER — NITROGLYCERIN 0.4 MG/1
0.4 TABLET SUBLINGUAL EVERY 5 MIN PRN
Status: DISCONTINUED | OUTPATIENT
Start: 2022-09-12 | End: 2022-09-13 | Stop reason: HOSPADM

## 2022-09-12 RX ORDER — AMLODIPINE BESYLATE 10 MG/1
10 TABLET ORAL EVERY EVENING
Status: DISCONTINUED | OUTPATIENT
Start: 2022-09-13 | End: 2022-09-13 | Stop reason: HOSPADM

## 2022-09-12 RX ORDER — MEMANTINE HYDROCHLORIDE 10 MG/1
10 TABLET ORAL DAILY
Status: DISCONTINUED | OUTPATIENT
Start: 2022-09-13 | End: 2022-09-13 | Stop reason: HOSPADM

## 2022-09-12 RX ORDER — OXYCODONE HYDROCHLORIDE 5 MG/1
5 TABLET ORAL EVERY 4 HOURS PRN
Status: DISCONTINUED | OUTPATIENT
Start: 2022-09-12 | End: 2022-09-12 | Stop reason: HOSPADM

## 2022-09-12 RX ORDER — LAMOTRIGINE 200 MG/1
200 TABLET ORAL DAILY
Status: DISCONTINUED | OUTPATIENT
Start: 2022-09-12 | End: 2022-09-13 | Stop reason: HOSPADM

## 2022-09-12 RX ORDER — NALOXONE HYDROCHLORIDE 0.4 MG/ML
0.2 INJECTION, SOLUTION INTRAMUSCULAR; INTRAVENOUS; SUBCUTANEOUS
Status: DISCONTINUED | OUTPATIENT
Start: 2022-09-12 | End: 2022-09-13 | Stop reason: HOSPADM

## 2022-09-12 RX ORDER — PROTAMINE SULFATE 10 MG/ML
INJECTION, SOLUTION INTRAVENOUS PRN
Status: DISCONTINUED | OUTPATIENT
Start: 2022-09-12 | End: 2022-09-12

## 2022-09-12 RX ORDER — HYDRALAZINE HYDROCHLORIDE 20 MG/ML
10 INJECTION INTRAMUSCULAR; INTRAVENOUS
Status: DISCONTINUED | OUTPATIENT
Start: 2022-09-12 | End: 2022-09-13 | Stop reason: HOSPADM

## 2022-09-12 RX ORDER — HYDROMORPHONE HYDROCHLORIDE 1 MG/ML
0.2 INJECTION, SOLUTION INTRAMUSCULAR; INTRAVENOUS; SUBCUTANEOUS EVERY 5 MIN PRN
Status: DISCONTINUED | OUTPATIENT
Start: 2022-09-12 | End: 2022-09-12 | Stop reason: HOSPADM

## 2022-09-12 RX ORDER — MIRTAZAPINE 7.5 MG/1
7.5 TABLET, FILM COATED ORAL AT BEDTIME
Status: DISCONTINUED | OUTPATIENT
Start: 2022-09-12 | End: 2022-09-13 | Stop reason: HOSPADM

## 2022-09-12 RX ORDER — DESVENLAFAXINE 100 MG/1
100 TABLET, EXTENDED RELEASE ORAL DAILY
Status: DISCONTINUED | OUTPATIENT
Start: 2022-09-13 | End: 2022-09-13 | Stop reason: HOSPADM

## 2022-09-12 RX ORDER — SODIUM CHLORIDE, SODIUM LACTATE, POTASSIUM CHLORIDE, CALCIUM CHLORIDE 600; 310; 30; 20 MG/100ML; MG/100ML; MG/100ML; MG/100ML
INJECTION, SOLUTION INTRAVENOUS CONTINUOUS PRN
Status: DISCONTINUED | OUTPATIENT
Start: 2022-09-12 | End: 2022-09-12

## 2022-09-12 RX ORDER — ROSUVASTATIN CALCIUM 40 MG/1
40 TABLET, COATED ORAL EVERY EVENING
Status: DISCONTINUED | OUTPATIENT
Start: 2022-09-12 | End: 2022-09-13 | Stop reason: HOSPADM

## 2022-09-12 RX ORDER — ONDANSETRON 2 MG/ML
4 INJECTION INTRAMUSCULAR; INTRAVENOUS EVERY 30 MIN PRN
Status: DISCONTINUED | OUTPATIENT
Start: 2022-09-12 | End: 2022-09-12 | Stop reason: HOSPADM

## 2022-09-12 RX ORDER — EPHEDRINE SULFATE 50 MG/ML
INJECTION, SOLUTION INTRAMUSCULAR; INTRAVENOUS; SUBCUTANEOUS PRN
Status: DISCONTINUED | OUTPATIENT
Start: 2022-09-12 | End: 2022-09-12

## 2022-09-12 RX ORDER — ACETAMINOPHEN 325 MG/1
650 TABLET ORAL EVERY 4 HOURS PRN
Status: DISCONTINUED | OUTPATIENT
Start: 2022-09-12 | End: 2022-09-13 | Stop reason: HOSPADM

## 2022-09-12 RX ORDER — SODIUM CHLORIDE 9 MG/ML
INJECTION, SOLUTION INTRAVENOUS CONTINUOUS
Status: DISCONTINUED | OUTPATIENT
Start: 2022-09-12 | End: 2022-09-12 | Stop reason: HOSPADM

## 2022-09-12 RX ORDER — LEVOTHYROXINE SODIUM 50 UG/1
50 TABLET ORAL DAILY
Status: DISCONTINUED | OUTPATIENT
Start: 2022-09-13 | End: 2022-09-13 | Stop reason: HOSPADM

## 2022-09-12 RX ORDER — CLONIDINE HYDROCHLORIDE 0.1 MG/1
0.1 TABLET ORAL DAILY PRN
Status: DISCONTINUED | OUTPATIENT
Start: 2022-09-12 | End: 2022-09-13 | Stop reason: HOSPADM

## 2022-09-12 RX ORDER — FENTANYL CITRATE 50 UG/ML
25 INJECTION, SOLUTION INTRAMUSCULAR; INTRAVENOUS EVERY 5 MIN PRN
Status: DISCONTINUED | OUTPATIENT
Start: 2022-09-12 | End: 2022-09-12 | Stop reason: HOSPADM

## 2022-09-12 RX ADMIN — Medication 16 MCG: at 15:31

## 2022-09-12 RX ADMIN — LAMOTRIGINE 200 MG: 200 TABLET ORAL at 21:26

## 2022-09-12 RX ADMIN — PROTAMINE SULFATE 115 MG: 10 INJECTION, SOLUTION INTRAVENOUS at 16:40

## 2022-09-12 RX ADMIN — Medication 8 MCG: at 15:36

## 2022-09-12 RX ADMIN — SODIUM CHLORIDE, POTASSIUM CHLORIDE, SODIUM LACTATE AND CALCIUM CHLORIDE: 600; 310; 30; 20 INJECTION, SOLUTION INTRAVENOUS at 16:06

## 2022-09-12 RX ADMIN — Medication 10000 UNITS: at 16:07

## 2022-09-12 RX ADMIN — NITROGLYCERIN 100 MCG: 10 INJECTION INTRAVENOUS at 17:05

## 2022-09-12 RX ADMIN — Medication 8 MCG: at 16:05

## 2022-09-12 RX ADMIN — NITROGLYCERIN 50 MCG: 10 INJECTION INTRAVENOUS at 16:09

## 2022-09-12 RX ADMIN — SODIUM CHLORIDE, POTASSIUM CHLORIDE, SODIUM LACTATE AND CALCIUM CHLORIDE: 600; 310; 30; 20 INJECTION, SOLUTION INTRAVENOUS at 15:19

## 2022-09-12 RX ADMIN — NITROGLYCERIN 100 MCG: 10 INJECTION INTRAVENOUS at 16:55

## 2022-09-12 RX ADMIN — Medication 8 MCG: at 15:41

## 2022-09-12 RX ADMIN — ROSUVASTATIN CALCIUM 40 MG: 40 TABLET, FILM COATED ORAL at 21:26

## 2022-09-12 RX ADMIN — NITROGLYCERIN 50 MCG: 10 INJECTION INTRAVENOUS at 17:32

## 2022-09-12 RX ADMIN — FENTANYL CITRATE 25 MCG: 50 INJECTION, SOLUTION INTRAMUSCULAR; INTRAVENOUS at 16:06

## 2022-09-12 RX ADMIN — LAMOTRIGINE 25 MG: 25 TABLET ORAL at 20:50

## 2022-09-12 RX ADMIN — HYDRALAZINE HYDROCHLORIDE 2.5 MG: 20 INJECTION INTRAMUSCULAR; INTRAVENOUS at 17:41

## 2022-09-12 RX ADMIN — PROTAMINE SULFATE 10 MG: 10 INJECTION, SOLUTION INTRAVENOUS at 16:38

## 2022-09-12 RX ADMIN — HYDRALAZINE HYDROCHLORIDE 5 MG: 20 INJECTION INTRAMUSCULAR; INTRAVENOUS at 22:31

## 2022-09-12 RX ADMIN — Medication 5 MG: at 17:18

## 2022-09-12 RX ADMIN — MIDAZOLAM 0.5 MG: 1 INJECTION INTRAMUSCULAR; INTRAVENOUS at 15:51

## 2022-09-12 RX ADMIN — Medication 2 G: at 15:25

## 2022-09-12 RX ADMIN — Medication 6000 UNITS: at 16:20

## 2022-09-12 RX ADMIN — NITROGLYCERIN 50 MCG: 10 INJECTION INTRAVENOUS at 16:30

## 2022-09-12 RX ADMIN — NITROGLYCERIN 100 MCG: 10 INJECTION INTRAVENOUS at 16:46

## 2022-09-12 RX ADMIN — MIRTAZAPINE 7.5 MG: 7.5 TABLET, FILM COATED ORAL at 22:31

## 2022-09-12 RX ADMIN — DEXMEDETOMIDINE HYDROCHLORIDE 0.8 MCG/KG/HR: 100 INJECTION, SOLUTION INTRAVENOUS at 15:27

## 2022-09-12 ASSESSMENT — VISUAL ACUITY
OU: NORMAL ACUITY

## 2022-09-12 ASSESSMENT — ACTIVITIES OF DAILY LIVING (ADL)
ADLS_ACUITY_SCORE: 32
ADLS_ACUITY_SCORE: 22
ADLS_ACUITY_SCORE: 32
ADLS_ACUITY_SCORE: 22
ADLS_ACUITY_SCORE: 22

## 2022-09-12 NOTE — ANESTHESIA CARE TRANSFER NOTE
Patient: Kamryn Miller    Procedure: Procedure(s):  Transfemoral Transcatheter Aortic Valve Replacement with possible open heart bypass and or balloon pump placement and any indicated procedure  OR TRANSCATHETER AORTIC VALVE REPLACEMENT, OPEN FEMORAL ARTERY APPROACH       Diagnosis: valvular disease  Diagnosis Additional Information: No value filed.    Anesthesia Type:   MAC     Note:    Oropharynx: oropharynx clear of all foreign objects and spontaneously breathing  Level of Consciousness: awake  Oxygen Supplementation: nasal cannula  Level of Supplemental Oxygen (L/min / FiO2): 2  Independent Airway: airway patency satisfactory and stable  Dentition: dentition unchanged  Vital Signs Stable: post-procedure vital signs reviewed and stable  Report to RN Given: handoff report given  Patient transferred to: PACU    Handoff Report: Identifed the Patient, Identified the Reponsible Provider, Reviewed the pertinent medical history, Discussed the surgical course, Reviewed Intra-OP anesthesia mangement and issues during anesthesia, Set expectations for post-procedure period and Allowed opportunity for questions and acknowledgement of understanding      Vitals:  Vitals Value Taken Time   /69 09/12/22 1730   Temp     Pulse 55 09/12/22 1732   Resp 11 09/12/22 1732   SpO2 99 % 09/12/22 1732   Vitals shown include unvalidated device data.    Electronically Signed By: JESSICA Umanzor CRNA  September 12, 2022  5:33 PM

## 2022-09-12 NOTE — PROGRESS NOTES
SPIRITUAL HEALTH SERVICES  [facility] 3C   PRE-SURGERY VISIT    Met with patient and her daughter for pre-surgery visit. Provided emotional support and prayer.     Becky Lopez MDiv  Chaplain Resident      * [facility] remains available 24/7 for emergent requests/referrals, either by having the switchboard page the on-call  or by entering an ASAP/STAT consult in Epic (this will also page the on-call ). Routine Epic consults receive an initial response within 24 hours.*

## 2022-09-12 NOTE — ANESTHESIA PREPROCEDURE EVALUATION
Anesthesia Pre-Procedure Evaluation    Patient: Kamryn Miller   MRN: [id number] : 1935        Procedure : Procedure(s):  Transfemoral Transcatheter Aortic Valve Replacement with possible open heart bypass and or balloon pump placement and any indicated procedure  OR TRANSCATHETER AORTIC VALVE REPLACEMENT, OPEN FEMORAL ARTERY APPROACH          Kamryn Miller is am 86 y.o.F with a PMHx of mod-severe low flow low gradient aortic stenosis, HFpEF, HTN, HLD, CAD s/p PCI LAD 2020 who is admitted  for planned TAVR procedure.     Past Medical History:   Diagnosis Date     Aortic valvar stenosis 2010    mild     Asthma      Bipolar disorder (H)      CAD (coronary artery disease)     s/p angioplasty     Celiac disease      Colon polyps      Colon polyps     every 3 year colonoscopy      Depression      High cholesterol      HTN      Mitral insufficiency      Pulmonary HTN (H)     mild     Tremors 7/10    drug induced from antidepressants     Tricuspid insufficiency       Past Surgical History:   Procedure Laterality Date     ANGIOGRAM  2009     ANGIOPLASTY      for angina     ANGIOPLASTY   -     X -2 - with stenst in coronary car.     APPENDECTOMY       BREAST BIOPSY, RT/LT      Breat Biopsy RT/LT, benign     BUNIONECTOMY  2011    Procedure:BUNIONECTOMY; Left donna bunionectomy; Surgeon:FREDY LITTLEJOHN; Location:[facility]     BUNIONECTOMY RT/LT  2007    right bunion and right 2nd hammertoe     CATARACT IOL, RT/LT   and     bilateral     COLONOSCOPY  ,     every 3 years for polyps     CV CORONARY ANGIOGRAM N/A 2020    Procedure: CV CORONARY ANGIOGRAM;  Surgeon: Gianluca Toscano MD;  [facility]     CV LEFT HEART CATH N/A 2020    Procedure: CV LEFT HEART CATH;  Surgeon: Gianluca Toscano MD;  [facility]     CV LEFT HEART CATH N/A 11/3/2020    Procedure: CV LEFT HEART CATH;  Surgeon: Tom Burrows,  MD;  [facility]     CV LOWER EXTREMITY ANGIOGRAM BILATERAL N/A 11/3/2020    Procedure: CV ANGIOGRAM LOWER EXTREMITY BILATERAL;  Surgeon: Tom Burrows MD;  [facility]     CV PCI STENT DRUG ELUTING N/A 8/21/2020    Procedure: Percutaneous Coronary Intervention Stent Drug Eluting;  Surgeon: Gianluca Toscano MD;  [facility]     CV RIGHT HEART CATH MEASUREMENTS RECORDED N/A 8/21/2020    Procedure: CV RIGHT HEART CATH;  Surgeon: Gianluca Toscano MD;  [facility]     CV RIGHT HEART CATH MEASUREMENTS RECORDED N/A 11/3/2020    Procedure: CV RIGHT HEART CATH;  Surgeon: Tom Burrows MD;  [facility]     JOINT REPLACEMENT, HIP RT/LT  4/2008    right hip replaced     JOINT REPLACEMENT, HIP RT/LT  4/2009    left hip replaced     REPAIR HAMMER TOE  11/8/2011    Procedure:REPAIR HAMMER TOE; left 2nd hammertoe repair; Surgeon:FREDY LITTLEJOHN; Location:[facility]     SURGICAL HISTORY OF -   11/11    left bunion and 2nd hammertoe repair     TUBAL LIGATION       ZZC ANESTH,BLEPHAROPLASTY      (R) for drooping eyelid     ZZC APPENDECTOMY        Allergies   Allergen Reactions     Ace Inhibitors Cough     Diclofenac Other (See Comments)     Balance problems     Gluten Meal Diarrhea      Social History     Tobacco Use     Smoking status: Never Smoker     Smokeless tobacco: Never Used   Substance Use Topics     Alcohol use: No     Alcohol/week: 0.0 standard drinks     Types: 1 Standard drinks or equivalent per week      Wt Readings from Last 1 Encounters:   09/12/22 (P) 67.1 kg (147 lb 14.9 oz)        Anesthesia Evaluation   Pt has had prior anesthetic.         ROS/MED HX  ENT/Pulmonary:     (+) asthma     Neurologic:       Cardiovascular:     (+) Dyslipidemia hypertension--CAD --stent-valvular problems/murmurs type: AS and MR pulmonary hypertension, Previous cardiac testing   Echo: Date: 7/9/22  Results:  BSA: 1.7 m2  Height: 61 in  Weight: 147 lb  BP: 138/72 mmHg  ______________________________________________________________________________  Procedure  Echocardiogram with two-dimensional, color and spectral Doppler performed.  ______________________________________________________________________________  Interpretation Summary     Global and regional left ventricular function is normal with an EF of 60-65%.  The right ventricle is normal size. Global right ventricular function is  normal.  Moderate to severe mitral annular calcification is present. Mild mitral  stenosis is present.  Severe aortic valve calcification is present. (Vm 3.8m/s mean gradient 31mmHg  LUCY per VTI 0.6cm2, and DVI of 0.2 with normal SVI)  IVC diameter <2.1 cm collapsing >50% with sniff suggests a normal RA pressure  of 3 mmHg.  No pericardial effusion is present.     This study was compared with the study from 11/1/21 the gradient across the AV  has increased.  ______________________________________________________________________________  Left Ventricle  Global and regional left ventricular function is normal with an EF of 60-65%.  Left ventricular size is normal. Mild concentric wall thickening consistent  with left ventricular hypertrophy is present. Diastolic function not assessed  due to significant mitral annular calcification. No regional wall motion  abnormalities are seen.     Right Ventricle  The right ventricle is normal size. Global right ventricular function is  normal.     Atria  Moderate left atrial enlargement is present.     Mitral Valve  Moderate to severe mitral annular calcification is present. Trace mitral  insufficiency is present. Mild mitral stenosis is present. Mean gradient is 4  at a heart rate of 59bpm.     Aortic Valve  Severe aortic valve calcification is present. Severe aortic stenosis is  present. Vm 3.8m/s mean gradient 31mmHg LUCY per VTI 0.6cm2, and DVI of 0.2  with normal SVI.     Tricuspid  Valve  The tricuspid valve is normal. Trace tricuspid insufficiency is present. Right  ventricular systolic pressure is 23mmHg above the right atrial pressure.     Pulmonic Valve  The pulmonic valve is normal. Mild pulmonic insufficiency is present.     Vessels  The thoracic aorta is normal. The aorta root is normal. IVC diameter <2.1 cm  collapsing >50% with sniff suggests a normal RA pressure of 3 mmHg.     Pericardium  No pericardial effusion is present.     Compared to Previous Study  This study was compared with the study from 21 the gradient across the AV  has increased. .  ______________________________________________________________________________  MMode/2D Measurements & Calculations     IVSd: 1.3 cm  LVIDd: 4.4 cm  LVIDs: 3.1 cm  LVPWd: 1.1 cm  FS: 30.2 %  LV mass(C)d: 187.6 grams  LV mass(C)dI: 113.2 grams/m2  Ao root diam: 3.3 cm  asc Aorta Diam: 3.7 cm  LVOT diam: 2.1 cm  LVOT area: 3.6 cm2  LA Volume (BP): 79.0 ml  LA Volume Index (BP): 47.6 ml/m2  RWT: 0.50     Doppler Measurements & Calculations  MV E max skinny: 128.0 cm/sec  MV A max skinny: 115.0 cm/sec  MV E/A: 1.1  MV max P.6 mmHg  MV mean P.0 mmHg  MV V2 VTI: 52.3 cm  MVA(VTI): 1.3 cm2  MV P1/2t max skinny: 147.0 cm/sec  MV P1/2t: 90.8 msec  MVA(P1/2t): 2.4 cm2  MV dec slope: 474.0 cm/sec2  MV dec time: 0.20 sec  Ao V2 max: 379.0 cm/sec  Ao max P.0 mmHg  Ao V2 mean: 258.5 cm/sec  Ao mean P.5 mmHg  Ao V2 VTI: 91.8 cm  LUCY(I,D): 0.76 cm2  LUCY(V,D): 0.71 cm2  LV V1 max P.2 mmHg  LV V1 max: 74.4 cm/sec  LV V1 VTI: 19.5 cm  SV(LVOT): 70.1 ml  SI(LVOT): 42.3 ml/m2  PA acc time: 0.10 sec  TR max skinny: 242.0 cm/sec  TR max P.4 mmHg  AV Skinny Ratio (DI): 0.20  LUCY Index (cm2/m2): 0.46  E/E' av.4  Lateral E/e': 14.7  Medial E/e': 36.1     ______________________________________________________________________________  Report approved by: Dalia Camacho 2022 04:30 PM  Stress Test: Date: Results:    ECG  Reviewed: Date: Results:    Cath: Date: Results:      METS/Exercise Tolerance:     Hematologic:       Musculoskeletal:       GI/Hepatic:       Renal/Genitourinary:     (+) renal disease, type: CRI,     Endo:     (+) thyroid problem,     Psychiatric/Substance Use:     (+) psychiatric history bipolar     Infectious Disease:       Malignancy:       Other:               OUTSIDE LABS:  CBC:   Lab Results   Component Value Date    WBC 5.9 09/09/2022    WBC 5.7 07/29/2022    HGB 10.8 (L) 09/09/2022    HGB 11.5 (L) 07/29/2022    HCT 36.4 09/09/2022    HCT 37.5 07/29/2022     09/09/2022     07/29/2022     BMP:   Lab Results   Component Value Date     09/09/2022     07/29/2022    POTASSIUM 4.7 09/09/2022    POTASSIUM 4.8 07/29/2022    CHLORIDE 98 09/09/2022    CHLORIDE 109 07/29/2022    CO2 29 09/09/2022    CO2 30 07/29/2022    BUN 37.2 (H) 09/09/2022    BUN 35 (H) 07/29/2022    CR 1.64 (H) 09/09/2022    CR 1.29 (H) 07/29/2022     (H) 09/12/2022    GLC 97 09/09/2022     COAGS:   Lab Results   Component Value Date    PTT 28 11/03/2020    INR 1.04 09/09/2022     POC: No results found for: BGM, HCG, HCGS  HEPATIC:   Lab Results   Component Value Date    ALBUMIN 4.2 09/09/2022    PROTTOTAL 7.3 09/09/2022    ALT 13 09/09/2022    AST 24 09/09/2022    ALKPHOS 66 09/09/2022    BILITOTAL 0.2 09/09/2022     OTHER:   Lab Results   Component Value Date    A1C 5.0 06/30/2009    PRINCE 9.8 09/09/2022    PHOS 3.5 04/06/2022    MAG 2.4 (H) 07/29/2022    TSH 0.45 07/29/2022    T4 0.72 (L) 01/18/2021    SED 11 07/28/2021       Anesthesia Plan    ASA Status:  4      Anesthesia Type: MAC.      Maintenance: TIVA.   Techniques and Equipment:     - Lines/Monitors: 2nd IV     Consents            Postoperative Care    Pain management: IV analgesics.   PONV prophylaxis: Ondansetron (or other 5HT-3), Dexamethasone or Solumedrol     Comments:                Russell Hatfield MD

## 2022-09-12 NOTE — Clinical Note
Prepped: groin, chest and abdomen. Prepped with: ChloraPrep. The patient was draped. .Pre-procedure site marking:Insertion site not predetermined

## 2022-09-12 NOTE — Clinical Note
This case will be done under MAC sedation with the guidance of Dr. Rex SOLANO and Nando Romero CRNA. For all vital signs, assessments, and medication administrations please see anesthesia record. Handing off nursing care to anesthesia at this time.

## 2022-09-12 NOTE — Clinical Note
dry, intact, no bleeding and no hematoma. LFA 16Fr- removed, two perclose deployed, pressure held to hemostasis, primapore.  RFA 7Fr- removed, angioseal deployed, primapore  RFV 6Fr- removed, pressure held to hemostasis, primapore.

## 2022-09-12 NOTE — LETTER
Transition Communication Hand-off for Care Transitions to Next Level of Care Provider    Name: Kamryn Miller  : 1935  MRN #: [id number]  Primary Care Provider: Rolanda Wilcox     Primary Clinic: [address] [place] [address]  [place] [address]     Reason for Hospitalization:  Severe aortic stenosis [I35.0]  Aortic stenosis, severe [I35.0]  Admit Date/Time: 2022  9:45 AM  Discharge Date: 22  Payor Source: Payor: MEDICARE / Plan: MEDICARE / Product Type: Medicare /          Reason for Communication Hand-off Referral: Continuity of Care    Discharge Plan: Home with OP  cardiac rehab       Concern for non-adherence with plan of care: No     Discharge Needs Assessment:  Needs    Flowsheet Row Most Recent Value   Equipment Currently Used at Home grab bar, toilet, grab bar, tub/shower        Follow-up plan:    Future Appointments   Date Time Provider Department Center   2022  8:30 AM Argentina Cole [facility]   2022  2:30 PM Pati De Santiago NP [facility]   2022  8:00 AM 1, Ur Cardiac Rehab [facility] [place]   10/5/2022 11:45 AM [facility] [place] [facility]   10/7/2022 12:00 PM [facility]       Any outstanding tests or procedures:        Referrals     Future Labs/Procedures    CARDIAC REHAB REFERRAL     Process Instructions:    Advance to Wellness and Exercise for Life (WEL) Program or to another maintenance exercise program upon completion of phase 2 cardiac rehab.    Weight mgt program is only offered at [facility].    *This therapy referral will be filtered to a centralized scheduling office at [facility] and the patient will receive a call to schedule an appointment at a [place] location most convenient for them. *        Comments:    Patient may choose their preference of the site for Cardiac Rehab.  If you have not heard from the scheduling office within 2 business days, please call [phone] for [facility], [phone] for  [place] and [phone] for [place].            Key Recommendations:      Gus Guadarrama RN    AVS/Discharge Summary is the source of truth; this is a helpful guide for improved communication of patient story

## 2022-09-12 NOTE — Clinical Note
femoral artery Cine(s)  injected utilizing power injector system.Rate (mL/sec) 10 Total Volume (mL) 20

## 2022-09-13 ENCOUNTER — APPOINTMENT (OUTPATIENT)
Dept: CARDIOLOGY | Facility: CLINIC | Age: 87
DRG: 267 | End: 2022-09-13
Attending: NURSE PRACTITIONER
Payer: MEDICARE

## 2022-09-13 ENCOUNTER — APPOINTMENT (OUTPATIENT)
Dept: ULTRASOUND IMAGING | Facility: CLINIC | Age: 87
DRG: 267 | End: 2022-09-13
Attending: NURSE PRACTITIONER
Payer: MEDICARE

## 2022-09-13 ENCOUNTER — APPOINTMENT (OUTPATIENT)
Dept: OCCUPATIONAL THERAPY | Facility: CLINIC | Age: 87
DRG: 267 | End: 2022-09-13
Attending: NURSE PRACTITIONER
Payer: MEDICARE

## 2022-09-13 VITALS
HEIGHT: 62 IN | HEART RATE: 66 BPM | WEIGHT: 144.6 LBS | DIASTOLIC BLOOD PRESSURE: 77 MMHG | BODY MASS INDEX: 26.61 KG/M2 | OXYGEN SATURATION: 98 % | RESPIRATION RATE: 16 BRPM | SYSTOLIC BLOOD PRESSURE: 140 MMHG | TEMPERATURE: 98.7 F

## 2022-09-13 DIAGNOSIS — Z95.2 S/P TAVR (TRANSCATHETER AORTIC VALVE REPLACEMENT): Primary | ICD-10-CM

## 2022-09-13 LAB
ANION GAP SERPL CALCULATED.3IONS-SCNC: 10 MMOL/L (ref 7–15)
ATRIAL RATE - MUSE: 56 BPM
ATRIAL RATE - MUSE: 73 BPM
BUN SERPL-MCNC: 19.9 MG/DL (ref 8–23)
CALCIUM SERPL-MCNC: 9.1 MG/DL (ref 8.8–10.2)
CHLORIDE SERPL-SCNC: 104 MMOL/L (ref 98–107)
CREAT SERPL-MCNC: 1.14 MG/DL (ref 0.51–0.95)
DEPRECATED HCO3 PLAS-SCNC: 24 MMOL/L (ref 22–29)
DIASTOLIC BLOOD PRESSURE - MUSE: NORMAL MMHG
DIASTOLIC BLOOD PRESSURE - MUSE: NORMAL MMHG
ERYTHROCYTE [DISTWIDTH] IN BLOOD BY AUTOMATED COUNT: 16 % (ref 10–15)
GFR SERPL CREATININE-BSD FRML MDRD: 47 ML/MIN/1.73M2
GLUCOSE SERPL-MCNC: 91 MG/DL (ref 70–99)
HCT VFR BLD AUTO: 32.6 % (ref 35–47)
HGB BLD-MCNC: 9.8 G/DL (ref 11.7–15.7)
INTERPRETATION ECG - MUSE: NORMAL
INTERPRETATION ECG - MUSE: NORMAL
LVEF ECHO: NORMAL
MAGNESIUM SERPL-MCNC: 2 MG/DL (ref 1.7–2.3)
MCH RBC QN AUTO: 25.2 PG (ref 26.5–33)
MCHC RBC AUTO-ENTMCNC: 30.1 G/DL (ref 31.5–36.5)
MCV RBC AUTO: 84 FL (ref 78–100)
P AXIS - MUSE: -23 DEGREES
P AXIS - MUSE: 80 DEGREES
PHOSPHATE SERPL-MCNC: 2.8 MG/DL (ref 2.5–4.5)
PLATELET # BLD AUTO: 194 10E3/UL (ref 150–450)
POTASSIUM SERPL-SCNC: 3.6 MMOL/L (ref 3.4–5.3)
PR INTERVAL - MUSE: 218 MS
PR INTERVAL - MUSE: 226 MS
QRS DURATION - MUSE: 162 MS
QRS DURATION - MUSE: 166 MS
QT - MUSE: 482 MS
QT - MUSE: 530 MS
QTC - MUSE: 511 MS
QTC - MUSE: 531 MS
R AXIS - MUSE: -22 DEGREES
R AXIS - MUSE: 65 DEGREES
RBC # BLD AUTO: 3.89 10E6/UL (ref 3.8–5.2)
SODIUM SERPL-SCNC: 138 MMOL/L (ref 136–145)
SYSTOLIC BLOOD PRESSURE - MUSE: NORMAL MMHG
SYSTOLIC BLOOD PRESSURE - MUSE: NORMAL MMHG
T AXIS - MUSE: -81 DEGREES
T AXIS - MUSE: 130 DEGREES
VENTRICULAR RATE- MUSE: 56 BPM
VENTRICULAR RATE- MUSE: 73 BPM
WBC # BLD AUTO: 6 10E3/UL (ref 4–11)

## 2022-09-13 PROCEDURE — 93325 DOPPLER ECHO COLOR FLOW MAPG: CPT | Mod: 26 | Performed by: INTERNAL MEDICINE

## 2022-09-13 PROCEDURE — 93925 LOWER EXTREMITY STUDY: CPT

## 2022-09-13 PROCEDURE — 93308 TTE F-UP OR LMTD: CPT | Mod: 26 | Performed by: INTERNAL MEDICINE

## 2022-09-13 PROCEDURE — 93270 REMOTE 30 DAY ECG REV/REPORT: CPT

## 2022-09-13 PROCEDURE — 93321 DOPPLER ECHO F-UP/LMTD STD: CPT

## 2022-09-13 PROCEDURE — 97530 THERAPEUTIC ACTIVITIES: CPT | Mod: GO | Performed by: OCCUPATIONAL THERAPIST

## 2022-09-13 PROCEDURE — 84100 ASSAY OF PHOSPHORUS: CPT | Performed by: NURSE PRACTITIONER

## 2022-09-13 PROCEDURE — 99238 HOSP IP/OBS DSCHRG MGMT 30/<: CPT | Performed by: NURSE PRACTITIONER

## 2022-09-13 PROCEDURE — 250N000013 HC RX MED GY IP 250 OP 250 PS 637: Performed by: NURSE PRACTITIONER

## 2022-09-13 PROCEDURE — 93005 ELECTROCARDIOGRAM TRACING: CPT

## 2022-09-13 PROCEDURE — 83735 ASSAY OF MAGNESIUM: CPT | Performed by: NURSE PRACTITIONER

## 2022-09-13 PROCEDURE — 97535 SELF CARE MNGMENT TRAINING: CPT | Mod: GO | Performed by: OCCUPATIONAL THERAPIST

## 2022-09-13 PROCEDURE — 97165 OT EVAL LOW COMPLEX 30 MIN: CPT | Mod: GO | Performed by: OCCUPATIONAL THERAPIST

## 2022-09-13 PROCEDURE — 85027 COMPLETE CBC AUTOMATED: CPT | Performed by: NURSE PRACTITIONER

## 2022-09-13 PROCEDURE — 82310 ASSAY OF CALCIUM: CPT | Performed by: NURSE PRACTITIONER

## 2022-09-13 PROCEDURE — 250N000013 HC RX MED GY IP 250 OP 250 PS 637: Performed by: STUDENT IN AN ORGANIZED HEALTH CARE EDUCATION/TRAINING PROGRAM

## 2022-09-13 PROCEDURE — 93325 DOPPLER ECHO COLOR FLOW MAPG: CPT

## 2022-09-13 PROCEDURE — 93321 DOPPLER ECHO F-UP/LMTD STD: CPT | Mod: 26 | Performed by: INTERNAL MEDICINE

## 2022-09-13 PROCEDURE — 93925 LOWER EXTREMITY STUDY: CPT | Mod: 26 | Performed by: RADIOLOGY

## 2022-09-13 PROCEDURE — 36415 COLL VENOUS BLD VENIPUNCTURE: CPT | Performed by: NURSE PRACTITIONER

## 2022-09-13 PROCEDURE — 250N000013 HC RX MED GY IP 250 OP 250 PS 637: Performed by: INTERNAL MEDICINE

## 2022-09-13 PROCEDURE — 93228 REMOTE 30 DAY ECG REV/REPORT: CPT | Performed by: INTERNAL MEDICINE

## 2022-09-13 PROCEDURE — 93010 ELECTROCARDIOGRAM REPORT: CPT | Performed by: INTERNAL MEDICINE

## 2022-09-13 RX ORDER — IOPAMIDOL 755 MG/ML
INJECTION, SOLUTION INTRAVASCULAR
Status: DISCONTINUED | OUTPATIENT
Start: 2022-09-12 | End: 2022-09-13 | Stop reason: HOSPADM

## 2022-09-13 RX ORDER — CARVEDILOL 6.25 MG/1
12.5 TABLET ORAL 2 TIMES DAILY
Qty: 540 TABLET | Refills: 3 | Status: ON HOLD | OUTPATIENT
Start: 2022-09-13 | End: 2022-12-06

## 2022-09-13 RX ORDER — CARVEDILOL 6.25 MG/1
6.25 TABLET ORAL ONCE
Status: COMPLETED | OUTPATIENT
Start: 2022-09-13 | End: 2022-09-13

## 2022-09-13 RX ADMIN — ASPIRIN 81 MG: 81 TABLET, COATED ORAL at 08:08

## 2022-09-13 RX ADMIN — LEVOTHYROXINE SODIUM 50 MCG: 50 TABLET ORAL at 09:51

## 2022-09-13 RX ADMIN — FUROSEMIDE 20 MG: 20 TABLET ORAL at 08:10

## 2022-09-13 RX ADMIN — POTASSIUM CHLORIDE 20 MEQ: 750 TABLET, EXTENDED RELEASE ORAL at 08:09

## 2022-09-13 RX ADMIN — LAMOTRIGINE 200 MG: 200 TABLET ORAL at 08:08

## 2022-09-13 RX ADMIN — LIOTHYRONINE SODIUM 10 MCG: 5 TABLET ORAL at 08:08

## 2022-09-13 RX ADMIN — MEMANTINE 10 MG: 10 TABLET ORAL at 08:10

## 2022-09-13 RX ADMIN — LAMOTRIGINE 25 MG: 25 TABLET ORAL at 08:09

## 2022-09-13 RX ADMIN — FLUTICASONE FUROATE AND VILANTEROL TRIFENATATE 1 PUFF: 200; 25 POWDER RESPIRATORY (INHALATION) at 09:52

## 2022-09-13 RX ADMIN — CARVEDILOL 6.25 MG: 6.25 TABLET, FILM COATED ORAL at 09:50

## 2022-09-13 RX ADMIN — TICAGRELOR 90 MG: 90 TABLET ORAL at 08:10

## 2022-09-13 RX ADMIN — AZELASTINE HYDROCHLORIDE 1 SPRAY: 137 SPRAY, METERED NASAL at 09:53

## 2022-09-13 RX ADMIN — DESVENLAFAXINE SUCCINATE 100 MG: 100 TABLET, EXTENDED RELEASE ORAL at 08:10

## 2022-09-13 ASSESSMENT — ACTIVITIES OF DAILY LIVING (ADL)
ADLS_ACUITY_SCORE: 22
ADLS_ACUITY_SCORE: 28
ADLS_ACUITY_SCORE: 30
ADLS_ACUITY_SCORE: 28
ADLS_ACUITY_SCORE: 30
ADLS_ACUITY_SCORE: 30
PREVIOUS_RESPONSIBILITIES: SHOPPING
ADLS_ACUITY_SCORE: 30

## 2022-09-13 NOTE — PLAN OF CARE
Admitted (9/12) for planned TAVR procedure. Now s/p TAVR. PMHx of mod-severe low flow low gradient aortic stenosis, HFpEF, HTN, HLD, CAD s/p PCI LAD (8/20), asthma.    Neuro: AOx4, Calm and cooperative, Pleasant. No deficits  Cardiac/Tele: SR with a 1st degree AV block and a BBB. BP's elevated (SBP130-160's) Hydralazine 5 mg given x1 (Pt confirms elevated BP's as baseline). Other VSS  Respiratory: Room air. Tolerates well. No SOB noted but slight GREGORY  GI/: Voids spontaneously without difficulty. Adequate UOP. No BM this shift.  Diet/Appetite: Cardiac diet. Tolerates thin liquids without issues.  Skin: Bilateral groin sites intact. No bleeding or hematoma. CMS intact  Endocrine: No deficits  LDAs: PIV saline locked  Activity: Up SBA. Got up to the bathroom twice  Pain: Denies pain     Plan: Continue to monitor. Notify team of concerns

## 2022-09-13 NOTE — PROGRESS NOTES
"   09/13/22 1000   Quick Adds   Type of Visit Initial Occupational Therapy Evaluation   Living Environment   People in Home alone  (Ivon lives nearby and assists with transportation.)   Current Living Arrangements assisted living   Home Accessibility no concerns   Transportation Anticipated family or friend will provide   Living Environment Comments Pt lives in an DMITRY where she receives assistance with medication management, showers, and meals. Her daughter provides all necessary transportation. Completes ADL primarily IND with the exception of showers where the nursing aide assists for safety. Has grab bars by toilet and in shower.   Self-Care   Usual Activity Tolerance good   Current Activity Tolerance good   Regular Exercise Yes   Activity/Exercise Type walking;other (see comments)  (Engages in [facility] fitness class weekly.)   Exercise Amount/Frequency 20 mins;2 times/wk   Equipment Currently Used at Home grab bar, toilet;grab bar, tub/shower   Fall history within last six months no   Activity/Exercise/Self-Care Comment Pt reports engaging in fitness class at [facility] and regularily walking as she goes down for meals in the dining narvaez and will walk with friends occasionally.   Instrumental Activities of Daily Living (IADL)   Previous Responsibilities shopping   General Information   Onset of Illness/Injury or Date of Surgery 09/12/22   Referring Physician Pat Cooper CNP   Patient/Family Therapy Goal Statement (OT) To return home safely   Additional Occupational Profile Info/Pertinent History of Current Problem Per chart, \"Kamryn Miller is am 86 y.o.F with a PMHx of mod-severe low flow low gradient aortic stenosis, HFpEF, HTN, HLD, CAD s/p PCI LAD 8/2020, asthma who is admitted 9/12 for planned TAVR procedure\".   Existing Precautions/Restrictions cardiac   Left Upper Extremity (Weight-bearing Status) partial weight-bearing (PWB)   Right Upper Extremity (Weight-bearing Status) partial weight-bearing (PWB) "   Left Lower Extremity (Weight-bearing Status) full weight-bearing (FWB)   Right Lower Extremity (Weight-bearing Status) full weight-bearing (FWB)   General Observations and Info Up with assist   Cognitive Status Examination   Orientation Status orientation to person, place and time   Cognitive Status Comments No concerns observed or reported by pt.   Visual Perception   Visual Impairment/Limitations WNL   Impact of Vision Impairment on Function (Vision) No concerns observed or reported by pt.   Sensory   Sensory Comments No concerns observed or reported by pt.   Pain Assessment   Patient Currently in Pain No   Range of Motion Comprehensive   General Range of Motion no range of motion deficits identified   Strength Comprehensive (MMT)   Comment, General Manual Muscle Testing (MMT) Assessment Grossly 4+/5   Muscle Tone Assessment   Muscle Tone Comments No concerns observed or reported by pt.   Coordination   Fine Motor Coordination No concerns observed or reported by pt.   Bed Mobility   Bed Mobility supine-sit;sit-supine   Supine-Sit [place] (Bed Mobility) contact guard   Sit-Supine [place] (Bed Mobility) contact guard   Assistive Device (Bed Mobility) bed rails   Transfers   Transfers sit-stand transfer   Sit-Stand Transfer   Sit-Stand [place] (Transfers) contact guard   Activities of Daily Living   BADL Assessment/Intervention lower body dressing;bathing;toileting   Bathing Assessment/Intervention   [place] Level (Bathing) moderate assist (50% patient effort)   Assistive Devices (Bathing) shower chair;grab bar, tub/shower   Lower Body Dressing Assessment/Training   [place] Level (Lower Body Dressing) contact guard assist   Toileting   [place] Level (Toileting) supervision   Clinical Impression   Criteria for Skilled Therapeutic Interventions Met (OT) Yes, treatment indicated   OT Diagnosis Impaired I/ADLs and functional endurance   OT Problem List-Impairments impacting ADL problems  related to;activity tolerance impaired;post-surgical precautions   Assessment of Occupational Performance 3-5 Performance Deficits   Identified Performance Deficits Driving, bathing, leisure participation, meal prep, functional mobility   Planned Therapy Interventions (OT) ADL retraining;progressive activity/exercise;home program guidelines   Clinical Decision Making Complexity (OT) low complexity   Risk & Benefits of therapy have been explained evaluation/treatment results reviewed;care plan/treatment goals reviewed;risks/benefits reviewed;current/potential barriers reviewed;participants voiced agreement with care plan;participants included;patient   OT Discharge Planning   OT Discharge Recommendation (DC Rec) home with outpatient cardiac rehab;home with assist;home   OT Rationale for DC Rec OT: Pt likely nearing baseline level of functioning. Continues to require intermittent assist with functional mobility, supervision with functional transfers, and assist for complex ADL/IADL as previously noted per pt. Pt's daughter lives nearby and can assist with driving to appts PRN. Pt would benefit from cardiac rehab to promote functional endurance and exercise tolerance progression related to cardiac health.   OT Brief overview of current status CGA-SBA, no AD   Total Evaluation Time (Minutes)   Total Evaluation Time (Minutes) 8

## 2022-09-13 NOTE — PROGRESS NOTES
Occupational Therapy Discharge Summary    Reason for therapy discharge:    Discharged to home with outpatient therapy.    Progress towards therapy goal(s). See goals on Care Plan in [facility] electronic health record for goal details.  Goals partially met.  Barriers to achieving goals:   discharge from facility.    Therapy recommendation(s):    Continued therapy is recommended.  Rationale/Recommendations:  Recommend OP cardiac rehab to progress IND and safety with functional mobility and functional endurance related to cardiovascular health.  .

## 2022-09-13 NOTE — DISCHARGE SUMMARY
[address] [address]  p: [phone]    Discharge Summary: Cardiology Service    Kamryn Miller MRN# [id number]   YOB: 1935 Age: 86 year old       Admission Date: 09/12/2022  Discharge Date: 09/13/2022    Discharge Diagnoses:  # Severe aortic stenosis, now s/p Transcatheter Aortic Valve Replacement.   # Chronic Diastolic Heart Failure  # New LBBB  # CAD s/p PCI LAD 8/2020  # HTN  # HLD  # Hypothyroidism  # Asthma  # Bipolar/MDD    Brief HPI:  Kamryn Miller is am 86 y.o.F with a PMHx of mod-severe low flow low gradient aortic stenosis, HFpEF, HTN, HLD, CAD s/p PCI LAD 8/2020, asthma who is admitted 9/12 for planned TAVR procedure.     Hospital Course by Diagnosis:  # Severe aortic stenosis, now s/p Transcatheter Aortic Valve Replacement.   # Chronic Diastolic Heart Failure  # New LBBB  Prior to procedure, pt known to have moderate-severe low flow low gradient aortic stenosis, now progressed to severe aortic stenosis. Most recent echo noted to have a mean gradient 31 mmHG, LUCY 0.6 Cm^2, peak velocity 3.8M/s- Intraoperative  S/p 26 mm Arndt Janis Ultra prosthetic valve with no leak. Iliofemoral angiogram showed narrowing of the CFA with a good flow distally.  Sheath sites soft without hematoma. Post operative EKG with chronic 1st degree AVB (baseline) and LBBB (new). Neuro's intact. POD echo with trivial AI, MG 7 mmHg. Bilateral LE US completed prior to discharge with normal findings in RLE, LLE with 62% stenosis.      - ASA and Brilinta (per Dr. Burrows) for dual anti-platelet therapy given US findings  - Cardionet x 30 days to monitor LBBB  - BB as listed below  - Cardiac rehab  - Continue PTA Lasix 20 mg BID  - Continue PTA K Tab 20 meQ daily  - Daily weights  - Lifelong antibiotic prophylaxis prior to all dental procedures.  - Structural Cardiology follow up in 1-2 weeks and 1 month with repeat echo prior to 1 month  appointment      # CAD s/p PCI LAD 8/2020  # HTN  # HLD  Coronary angiogram 10/2020 with patent stent.   - Continue 81 mg ASA  - BB: pt given 6.25 mg Coreg x 1 with no issue, continue at reduced dose 12.5 mg BID. If HR remains stable, can consider increasing back to PTA dose of 18.75 mg BID at follow up appointment. BP has been stable with Coreg at reduced dose  - Statin: Continue PTA Crestor 40 mg daily  - Continue PTA Norvasc 10 mg daily, prn clonidine 0.1 mg for SBP > 180     # Hypothyroidism  Most recent TSH 0.45 7/22  - Continue PTA Synthroid 50 mcg daily     # Asthma  - Continue PTA Inhaler   - Continue PTA prn albuterol inhaler      # Bipolar/ MDD  - Continue PTA Lamictal 225 mg daily  - Continue PTA Pristiq 100 mg daily   - Continue PTA Cytomel 10 mcg daily  - Continue PTA Namenda 10 mg daily  - Continue PTA Remeron 7.5 mg every night    Pertinent Procedures:  1. TAVR 26 mm Arndt Janis Ultra 9/12/2022    Medication Changes:  START Brilinta 90 mg BID  MODIFY Coreg 12.5 mg BID (decreased from 18.75 mg BID)    Discharge medications:   Current Discharge Medication List      START taking these medications    Details   ticagrelor (BRILINTA) 90 MG tablet Take 1 tablet (90 mg) by mouth 2 times daily  Qty: 180 tablet, Refills: 0    Associated Diagnoses: S/P TAVR (transcatheter aortic valve replacement); Aortic stenosis, severe         CONTINUE these medications which have CHANGED    Details   carvedilol (COREG) 6.25 MG tablet Take 2 tablets (12.5 mg) by mouth 2 times daily  Qty: 540 tablet, Refills: 3    Associated Diagnoses: Essential hypertension         CONTINUE these medications which have NOT CHANGED    Details   albuterol (PROAIR HFA/PROVENTIL HFA/VENTOLIN HFA) 108 (90 Base) MCG/ACT inhaler Inhale 2 puffs into the lungs every 6 hours as needed for wheezing or shortness of breath / dyspnea  Qty: 18 g, Refills: 3    Comments: Pharmacy may dispense brand covered by insurance (Proair, or proventil or ventolin or  generic albuterol inhaler)  Associated Diagnoses: Moderate persistent asthma without complication      alendronate (FOSAMAX) 70 MG tablet TAKE 1 TABLET BY MOUTH ONCE WEEKLY  Qty: 4 tablet, Refills: 23    Associated Diagnoses: Osteoporosis, unspecified osteoporosis type, unspecified pathological fracture presence      amLODIPine (NORVASC) 5 MG tablet Take 2 tablets (10 mg) by mouth daily Take this in the evening.  Qty: 180 tablet, Refills: 3    Associated Diagnoses: Benign essential hypertension      aspirin 81 MG tablet Take 81 mg by mouth daily       azelastine (ASTEPRO) 0.15 % nasal spray USE 1 SPRAY IN EACH NOSTRIL ONCE A DAY  Qty: 30 mL, Refills: 11    Associated Diagnoses: Seasonal allergic rhinitis, unspecified trigger      Calcium Citrate (CITRACAL OR) Take 1 tablet by mouth daily.      cholecalciferol (VITAMIN D3) 1250 mcg (24276 units) capsule TAKE 1 CAPSULE BY MOUTH ONCE WEEKLY  Qty: 4 capsule, Refills: 11    Associated Diagnoses: Stage 3b chronic kidney disease (H)      cloNIDine (CATAPRES) 0.1 MG tablet Take 1 tablet (0.1 mg) by mouth daily as needed (For SBP > 180)  Qty: 20 tablet, Refills: 0      desvenlafaxine (PRISTIQ) 100 MG 24 hr tablet Take 100 mg by mouth daily      fluticasone-salmeterol (ADVAIR DISKUS) 500-50 MCG/ACT inhaler INHALE 1 PUFF BY MOUTH TWICE DAILY  Qty: 60 each, Refills: 0    Associated Diagnoses: Moderate persistent asthma without complication      furosemide (LASIX) 20 MG tablet Take 1 tablet (20 mg) by mouth daily  Qty: 90 tablet, Refills: 3    Associated Diagnoses: Essential hypertension      !! lamoTRIgine (LAMICTAL) 25 MG tablet Take 25 mg by mouth daily with 200 mg tablet for a total dose of 225 mg      !! LAMOTRIGINE 200 MG PO TABS Take 200 mg by mouth daily with 25 mg tablet for a total dose of 225 mg      levothyroxine (SYNTHROID/LEVOTHROID) 50 MCG tablet TAKE 1 TABLET BY MOUTH ONCE DAILY  Qty: 28 tablet, Refills: 11    Associated Diagnoses: Stage 3b chronic kidney disease  (H)      liothyronine (CYTOMEL) 5 MCG tablet Take 2 tablets (10 mcg) by mouth daily  Qty: 30 tablet, Refills: 3    Associated Diagnoses: Bipolar disorder, current episode depressed, severe, without psychotic features (H)      memantine (NAMENDA) 10 MG tablet Take 1 tablet (10 mg) by mouth daily  Qty: 30 tablet, Refills: 3    Associated Diagnoses: Major neurocognitive disorder (H)      mirtazapine (REMERON) 7.5 MG tablet Take 1 tablet (7.5 mg) by mouth At Bedtime  Qty: 30 tablet, Refills: 3    Associated Diagnoses: Bipolar disorder, current episode depressed, severe, without psychotic features (H)      Multiple Vitamin (TAB-A-JENNIFER) TABS TAKE 1 TABLET BY MOUTH ONCE DAILY  Qty: 30 tablet, Refills: PRN    Associated Diagnoses: Stage 3b chronic kidney disease (H)      potassium chloride ER (K-TAB) 20 MEQ CR tablet Take 1 tablet (20 mEq) by mouth daily  Qty: 90 tablet, Refills: 3    Associated Diagnoses: Essential hypertension      rosuvastatin (CRESTOR) 40 MG tablet TAKE 1 TABLET BY MOUTH ONCE DAILY  Qty: 28 tablet, Refills: 11    Associated Diagnoses: Hyperlipidemia with target LDL less than 70      COMPRESSION STOCKINGS 1 each daily  Qty: 1 each, Refills: 1    Comments: Knee high 30 mmHg  Associated Diagnoses: Bilateral lower extremity edema; Essential hypertension       !! - Potential duplicate medications found. Please discuss with provider.          Follow-up:  - PCP 7-10 days for post hospitalization visit  - Structural Cardiology 2 weeks and 1 month for TAVR follow up    Labs or imaging requiring follow-up after discharge:  - 1 month repeat echo    Code status:  Full    Condition on discharge  Temp:  [97.6  F (36.4  C)-98.7  F (37.1  C)] 98.7  F (37.1  C)  Pulse:  [54-66] 66  Resp:  [11-18] 16  BP: ()/() 140/77  SpO2:  [97 %-100 %] 98 %  General: Alert, interactive, NAD  Eyes: sclera anicteric, EOMI  Neck: no JVD, carotid 2+ bilaterally  Cardiovascular: regular rate and rhythm, normal S1 and S2, no  murmurs, gallops, or rubs  Resp: clear to auscultation bilaterally, no rales, wheezes, or rhonchi  GI: Soft, nontender, nondistended. +BS.  No HSM or masses, no rebound or guarding.  Extremities: no edema, no cyanosis or clubbing, dorsalis pedis and posterior tibialis pulses 2+ bilaterally; bilateral groin sites, R>L, CDI, soft, non-tender to touch, no bruising, no signs of hemtoma  Skin: Warm and dry, no jaundice or rash  Neuro: CN 2-12 intact, moves all extremities equally  Psych: Alert & oriented x 3    Imaging with results:  JULIUS (intra Op) 9/12/2022:  Interpretation Summary  PROCEDURE  Intra-procedural transthoracic echocardiogram for transfemoral transcatheter  aortic valve replacement with 26mm Arndt Janis Ultra valve Aortic Valve.  Image acquisition by sonographer.     PRE-PROCEDURAL FINDINGS:  1. Severe calcific aortic stenosis.  2. Normal systolic function. Visual LVEF 55%.     POST-PROCEDURAL SURVEY:  1. A 26 mm Arndt Janis Ultra valve Aortic Valve was successfully deployed.  2. Valve is well seated. There is trivial periprosthetic regurgitation.  3. Post-procedure valve hemodynamics - mean gradient 5.0 mmHg.  4. No pericardial effusion    TTE (POD 1) 9/13/2022:  Interpretation Summary  Normal biventricular function.  Post 26 mm Arndt Janis Ultra valve Aortic Valve placement. The valve is  well seated. There is trivial periprosthetic regurgitation. Mean gradient is 7  mmHg.  The man RA pressure is normal.  No pericardial effusion is present.    Other imaging studies:  EKG 12 Lead 9/13/2022: NSR HR 73, with 1st deg AVB (baseline) and LBBB (new)      BLE US 9/13/2022:  IMPRESSION:  1. Negative right leg duplex arterial ultrasound.  2. Left common femoral 62% diameter stenosis suggested in grayscale  image. Velocity to 478 cm/s.    Patient Care Team:  Rolanda Wilcox MD as PCP - General (Family Practice)  Rolanda Wilcox MD as Assigned PCP  Pati De Santiago NP as Assigned Heart and Vascular  Provider  Javier Landa MD as Assigned OBGYN Provider  Ania Johnson MD as Assigned Nephrology Provider  Dimas Palacios, PhD as Assigned Behavioral Health Provider    Tessa LOPEZ CNP  [facility]    Time Spent on this Encounter   I, Pat Cooper CNP, personally saw the patient today and spent greater than 30 minutes discharging this patient.     Physician Attestation   I have reviewed and discussed with the advanced practice provider their history, physical and plan for Kamryn Miller. I did not participate in a shared visit by interviewing or examining the patient and this should be billed as an advanced practice provider only visit.    Tom Burrows MD  Interventional Cardiology

## 2022-09-13 NOTE — PLAN OF CARE
"D: pt admitted on 9/12 for planned TAVR   PMH of mod-severe aortic stenosis, HFpEF, HTN, HLD, CAD s/p PCI 8/2020, asthma.     I: Monitored vitals and assessed pt status.   Changes during this shift:     Running:   PRN Med:      Vitals:  Blood pressure (!) 140/77, pulse 66, temperature 98.7  F (37.1  C), temperature source Oral, resp. rate 16, height 1.575 m (5' 2\"), weight 65.6 kg (144 lb 9.6 oz), SpO2 98 %.    A:   Neuro: Ax4 Denies Headache, dizziness,  Lightheadedness, numbness and tingling. calls appropriately   Cardiac: SR with BBB, HR 60s. Afebrile, VSS.  Denies chest pain.   Respiratory: sating >95 ON RA. denies SOB. LS clear bilaterally.   Diet/appetite: Cardiac Diet. Good appetite.   GI/:  Last BM 9/11. Denies abdominal pain. Good urine output.   Activity:  Moves SBA  Pain: Denies  Skin: Bilateral groin sites WNL, LPIV SL  Plan: Continue to monitor and follow plan of care. Notify Cards 2 of any change in patient status. To discharge this afternoon.  "

## 2022-09-13 NOTE — OR NURSING
Dr Chacon (Cards fellow) came to see pt post procedure.  At that time he saw the post procedure 12 lead which revealed a new BBB.

## 2022-09-13 NOTE — ANESTHESIA POSTPROCEDURE EVALUATION
Patient: Kamryn Miller    Procedure: Procedure(s):  Transfemoral Transcatheter Aortic Valve Replacement with possible open heart bypass and or balloon pump placement and any indicated procedure  OR TRANSCATHETER AORTIC VALVE REPLACEMENT, OPEN FEMORAL ARTERY APPROACH       Anesthesia Type:  MAC    Note:  Disposition: Inpatient   Postop Pain Control: Uneventful            Sign Out: Well controlled pain   PONV: No   Neuro/Psych: Uneventful            Sign Out: Acceptable/Baseline neuro status   Airway/Respiratory: Uneventful            Sign Out: Acceptable/Baseline resp. status   CV/Hemodynamics: Uneventful            Sign Out: Acceptable CV status; No obvious hypovolemia; No obvious fluid overload   Other NRE: NONE   DID A NON-ROUTINE EVENT OCCUR? No           Last vitals:  Vitals Value Taken Time   /105 09/12/22 1906   Temp 36.4  C (97.6  F) 09/12/22 1815   Pulse 63 09/12/22 1906   Resp 9 09/12/22 1901   SpO2 98 % 09/12/22 1830   Vitals shown include unvalidated device data.    Electronically Signed By: Eleazar Goodman MD  September 12, 2022  7:08 PM

## 2022-09-13 NOTE — DISCHARGE INSTRUCTIONS
Going home after Transcatheter Aortic Valve Replacement (TAVR)  Femoral Access    You have small procedure sites at both groins, the one on the right is slightly bigger. You may have small amounts of bruising or discomfort, this is expected. If you develop worse swelling, redness and warmth that does not go away, fever and chills, Increasing numbness in your legs, worsening pain at the site then you need to contact your nurse coordinator.    You may shower, but do not soak in a bath tub or pool or apply lotions, ointments, powder, etc for 3-5 days or until sites look healed.     Activity: No lifting, pushing, pulling more than 10 pounds (examples to avoid: groceries, vacuuming, gardening, golfing): For one week with a procedure through the groin.    After the initial healing process of the access site, we recommend cardiac rehabilitation for all TAVR patients. Cardiac rehabilitation will help you:  - Rebuild stamina, strength and balance.  - Learn how to participate in activities safely, as well as help you regain confidence to do so.  - Return to activities of daily living and leisure.  Please contact their central scheduling to arrange at [phone]    We prefer you to follow up with your primary care provider within 2 weeks. You will be arranged to see the TAVR clinic in month. At that time you will have a repeat ultrasound of the heart to look at the valve.     Should you have any questions or concerns please contact your assigned TAVR nurse coordinator:  Emmy [phone] (at the first prompt enter #1, second prompt enter #3, then ask the triage nurse if you can speak with Emmy).

## 2022-09-13 NOTE — DISCHARGE SUMMARY
DISCHARGE   Discharged to: Home  Via: Automobile  Accompanied by: Family  Discharge Instructions: principal diagnosis, major findings/procedures, diet, activity, medications, follow up appointments, when to call the MD, and what to watchout for (i.e. s/s of infection, increasing SOB, palpitations, Angina). Pt states understanding of all discharge instructions.   Prescriptions: To be filled by home pharmacy per pt's request; scripts sent with pt.  Follow Up Appointments: Reviewed with patient.  Belongings: All sent with pt. Pt verifies that they are taking all belongings with them.   IV: discontinued.   Telemetry: discontinued.   Pt exhibits understanding of above discharge instructions; all questions answered.  Discharge Paperwork: faxed to discharge planner.

## 2022-09-13 NOTE — PROGRESS NOTES
"   09/13/22 1000   Quick Adds   Type of Visit Initial Occupational Therapy Evaluation   Living Environment   People in Home alone  (Ivon lives nearby and assists with transportation.)   Current Living Arrangements assisted living   Home Accessibility no concerns   Transportation Anticipated family or friend will provide   Living Environment Comments Pt lives in an DMITRY where she receives assistance with medication management, showers, and meals. Her daughter provides all necessary transportation. Completes ADL primarily IND with the exception of showers where the nursing aide assists for safety. Has grab bars by toilet and in shower.   Self-Care   Usual Activity Tolerance good   Current Activity Tolerance good   Regular Exercise Yes   Activity/Exercise Type walking;other (see comments)  (Engages in [facility] fitness class weekly.)   Exercise Amount/Frequency 20 mins;2 times/wk   Equipment Currently Used at Home grab bar, toilet;grab bar, tub/shower   Fall history within last six months no   Activity/Exercise/Self-Care Comment Pt reports engaging in fitness class at [facility] and regularily walking as she goes down for meals in the dining [place] and will walk with friends occasionally.   Instrumental Activities of Daily Living (IADL)   Previous Responsibilities shopping   General Information   Onset of Illness/Injury or Date of Surgery 09/12/22   Referring Physician Pat Cooper CNP   Patient/Family Therapy Goal Statement (OT) To return home safely   Additional Occupational Profile Info/Pertinent History of Current Problem Per chart, \"Kamryn Miller is am 86 y.o.F with a PMHx of mod-severe low flow low gradient aortic stenosis, HFpEF, HTN, HLD, CAD s/p PCI LAD 8/2020, asthma who is admitted 9/12 for planned TAVR procedure\".   Existing Precautions/Restrictions cardiac   Left Upper Extremity (Weight-bearing Status) partial weight-bearing (PWB)   Right Upper Extremity (Weight-bearing Status) partial weight-bearing (PWB) "   Left Lower Extremity (Weight-bearing Status) full weight-bearing (FWB)   Right Lower Extremity (Weight-bearing Status) full weight-bearing (FWB)   General Observations and Info Up with assist   Cognitive Status Examination   Orientation Status orientation to person, place and time   Cognitive Status Comments No concerns observed or reported by pt.   Visual Perception   Visual Impairment/Limitations WNL   Impact of Vision Impairment on Function (Vision) No concerns observed or reported by pt.   Sensory   Sensory Comments No concerns observed or reported by pt.   Pain Assessment   Patient Currently in Pain No   Range of Motion Comprehensive   General Range of Motion no range of motion deficits identified   Strength Comprehensive (MMT)   Comment, General Manual Muscle Testing (MMT) Assessment Grossly 4+/5   Muscle Tone Assessment   Muscle Tone Comments No concerns observed or reported by pt.   Coordination   Fine Motor Coordination No concerns observed or reported by pt.   Bed Mobility   Bed Mobility supine-sit;sit-supine   Supine-Sit [place] (Bed Mobility) contact guard   Sit-Supine [place] (Bed Mobility) contact guard   Assistive Device (Bed Mobility) bed rails   Transfers   Transfers sit-stand transfer   Sit-Stand Transfer   Sit-Stand [place] (Transfers) contact guard   Activities of Daily Living   BADL Assessment/Intervention lower body dressing;bathing;toileting   Bathing Assessment/Intervention   [place] Level (Bathing) moderate assist (50% patient effort)   Assistive Devices (Bathing) shower chair;grab bar, tub/shower   Lower Body Dressing Assessment/Training   [place] Level (Lower Body Dressing) contact guard assist   Toileting   [place] Level (Toileting) supervision   Clinical Impression   Criteria for Skilled Therapeutic Interventions Met (OT) Yes, treatment indicated   OT Diagnosis Impaired I/ADLs and functional endurance   OT Problem List-Impairments impacting ADL problems  related to;activity tolerance impaired;post-surgical precautions   Assessment of Occupational Performance 3-5 Performance Deficits   Identified Performance Deficits Driving, bathing, leisure participation, meal prep, functional mobility   Planned Therapy Interventions (OT) ADL retraining;progressive activity/exercise;home program guidelines   Clinical Decision Making Complexity (OT) low complexity   Risk & Benefits of therapy have been explained evaluation/treatment results reviewed;care plan/treatment goals reviewed;risks/benefits reviewed;current/potential barriers reviewed;participants voiced agreement with care plan;participants included;patient   OT Discharge Planning   OT Discharge Recommendation (DC Rec) home with outpatient cardiac rehab;home with assist;home   OT Rationale for DC Rec OT: Pt likely nearing baseline level of functioning. Continues to require intermittent assist with functional mobility, supervision with functional transfers, and assist for complex ADL/IADL as previously noted per pt. Pt's daughter lives nearby and can assist with driving to appts PRN. Pt would benefit from cardiac rehab to promote functional endurance and exercise tolerance progression related to cardiac health.   OT Brief overview of current status CGA-SBA, no AD   Total Evaluation Time (Minutes)   Total Evaluation Time (Minutes) 8   OT Goals   Therapy Frequency (OT) Daily   OT Predicted Duration/Target Date for Goal Attainment 09/15/22   OT Goals Aerobic Activity;Cardiac Phase 1   OT: Perform aerobic activity with stable cardiovascular response 10 minutes;intermittent activity;ambulation   OT: Understanding of cardiac education to maximize quality of life, condition management, and health outcomes Patient;Verbalize;Demonstrate   OT: Perform aerobic activity with stable cardiovascular response 10 minutes;intermittent;ambulation   OT: Functional/aerobic ambulation tolerance with stable cardiovascular response in order to  return to home and community environment Modified independent;100 feet;Within precautions   OT: Navigation of stairs simulating home set up with stable cardiovascular response in order to return to home and community environment   (No stairs required.)

## 2022-09-13 NOTE — PROGRESS NOTES
Care Management Discharge Note    Discharge Date: 09/13/2022  Discharge Disposition: Home  Discharge Services:  OP Cardiac Rehab  Discharge DME:  NA  Discharge Transportation: family or friend will provide  Education Provided on the Discharge Plan:  Yes  Persons Notified of Discharge Plans: yes  Patient/Family in Agreement with the Plan:  yes    Handoff Referral Completed: Yes    Additional Information:  RNCC notified pt is medically ready for discharge today. Pt will be returning to [facility] and daughter will be providing discharge  Transportation. Outpatient cardiac rehab referral placed and IHO completed. RNCC connected with [facility] and they are ok with pt returning. AVS faxed to them. RNCC spoke with pt and daughter and both are ok with discharging today. No other discharge needs.    [facility]  [address] [place] [address]  Ph: [phone]  Fax: [phone]      Gus Guadarrama RN BSN  RN Care Coordinator [facility]

## 2022-09-15 ENCOUNTER — PATIENT OUTREACH (OUTPATIENT)
Dept: CARE COORDINATION | Facility: CLINIC | Age: 87
End: 2022-09-15

## 2022-09-15 NOTE — PROGRESS NOTES
[facility]    Background: Transitional Care Management program auto-identified and prompting a chart review by [facility] team.    Assessment: Upon chart review, [facility] Team member will cancel/close this episode of Transitional Care Management program due to reason below:    Patient has discharged to a Memory Care, Nursing Home, Assisted Living or Group Home where patient is receiving on-site support with their daily cares, including support with hospital follow up plan.    Plan: Transitional Care Management episode closed per reason above.      Taylor Jin RN  [facility], [facility]    *Connected Care Resource Team does NOT follow patient ongoing. Referrals are identified based on internal discharge reports and the outreach is to ensure patient has an understanding of their discharge instructions.

## 2022-09-16 DIAGNOSIS — I50.30 NYHA CLASS 3 HEART FAILURE WITH PRESERVED EJECTION FRACTION (H): Primary | ICD-10-CM

## 2022-09-16 DIAGNOSIS — E78.2 MIXED HYPERLIPIDEMIA: ICD-10-CM

## 2022-09-16 DIAGNOSIS — I10 BENIGN ESSENTIAL HYPERTENSION: ICD-10-CM

## 2022-09-19 ENCOUNTER — OFFICE VISIT (OUTPATIENT)
Dept: CARDIOLOGY | Facility: CLINIC | Age: 87
End: 2022-09-19
Attending: NURSE PRACTITIONER
Payer: MEDICARE

## 2022-09-19 VITALS
BODY MASS INDEX: 28.3 KG/M2 | SYSTOLIC BLOOD PRESSURE: 149 MMHG | OXYGEN SATURATION: 98 % | HEIGHT: 61 IN | DIASTOLIC BLOOD PRESSURE: 85 MMHG | WEIGHT: 149.9 LBS | HEART RATE: 61 BPM

## 2022-09-19 DIAGNOSIS — Z95.2 S/P TAVR (TRANSCATHETER AORTIC VALVE REPLACEMENT): ICD-10-CM

## 2022-09-19 DIAGNOSIS — I73.9 CLAUDICATION (H): Primary | ICD-10-CM

## 2022-09-19 PROCEDURE — 99214 OFFICE O/P EST MOD 30 MIN: CPT | Performed by: NURSE PRACTITIONER

## 2022-09-19 PROCEDURE — G0463 HOSPITAL OUTPT CLINIC VISIT: HCPCS

## 2022-09-19 ASSESSMENT — PAIN SCALES - GENERAL: PAINLEVEL: NO PAIN (0)

## 2022-09-19 NOTE — NURSING NOTE
Chief Complaint   Patient presents with     Follow Up     Return TAVR-  1-2 wk follow up s/p TAVR     Vitals were taken and medications reconciled.    David Alaniz, EMT  2:25 PM

## 2022-09-19 NOTE — LETTER
9/19/2022      RE: Kamryn Miller  [address]  [address]  [place] [address]       Dear Colleague,    Thank you for the opportunity to participate in the care of your patient, Kamryn Miller, at the [facility] [place] [facility]. Please see a copy of my visit note below.        [facility] HEART CARE  CARDIOVASCULAR DIVISION    VALVE CLINIC RETURN VISIT    PRIMARY CARDIOLOGIST: Dr. Zuniga      PERTINENT CLINICAL HISTORY:     Kamryn Miller is a very pleasant 86 year old female with HFpEF, HTN, HLD, CAD s/p LAD PCI 2020 and severe aortic valvular stenosis that was treated with a transfemoral transcatheter aortic valve replacement (TAVR) with a 26mm Arndt Janis Ultra on 9/12/22 by Morro Burrows. The TAVR and post-procedural course were notable for no complications. She was noted to have 60% left femoral stenosis post procedure, but had adequate flow on post procedure peripheral angiogram. She was started on dual antiplatelet therapy with plans to assess symptoms of PAD as outpatient. She was noted to have new LBBB post procedure and discharged on a Cardionet. Her post TAVR echo showed mean PG of 7 mmHg with trace PVL.     Kamryn states she has been feeling well since her valve replacement. Her leg swelling has improved post procedure. She continues to experience exertional shortness of breath with walking about a block. This is unchanged. She denies any chest pain, palpitations, lightheadedness, syncope, orthopnea or PND. Her groin site is healing well. Has noticed a considerable amount of pain in the left leg, starts in the left hip and radiates down the outside of her thigh. Feels like an aching, starts after several steps. Groin puncture sites are healing. Home BP 1 teen-140s.      PAST MEDICAL HISTORY:     Past Medical History:   Diagnosis Date     Aortic valvar stenosis 7/2010    mild     Asthma      Bipolar disorder (H)      CAD  (coronary artery disease)     s/p angioplasty     Celiac disease      Colon polyps      Colon polyps 2012    every 3 year colonoscopy      Depression      High cholesterol      HTN      Mitral insufficiency      Pulmonary HTN (H)     mild     Tremors 7/10    drug induced from antidepressants     Tricuspid insufficiency         PAST SURGICAL HISTORY:     Past Surgical History:   Procedure Laterality Date     ANGIOGRAM  6/26/2009     ANGIOPLASTY  9/96    for angina     ANGIOPLASTY  8/03 - 9/03    X -2 - with stenst in coronary car.     APPENDECTOMY       BREAST BIOPSY, RT/LT      Breat Biopsy RT/LT, benign     BUNIONECTOMY  11/8/2011    Procedure:BUNIONECTOMY; Left donna bunionectomy; Surgeon:FREDY LITTLEJOHN; Location:[facility] OR     BUNIONECTOMY RT/LT  5/2007    right bunion and right 2nd hammertoe     CATARACT IOL, RT/LT  6/12 and 7/12    bilateral     COLONOSCOPY  2007, 2012    every 3 years for polyps     CV CORONARY ANGIOGRAM N/A 8/21/2020    Procedure: CV CORONARY ANGIOGRAM;  Surgeon: Gianluca Toscano MD;  Location: [facility] HE[facility]ARD[facility] C[facility]     CV LEFT HEART CATH N/A 8/21/2020    Procedure: CV LEFT HEART CATH;  Surgeon: Gianluca Toscano MD;  Location: [facility] CATH [facility]     CV LEFT HEART CATH N/A 11/3/2020    Procedure: CV LEFT HEART CATH;  Surgeon: Tom Burrows MD;  Location: [facility]     CV LOWER EXTREMITY ANGIOGRAM BILATERAL N/A 11/3/2020    Procedure: CV ANGIOGRAM LOWER EXTREMITY BILATERAL;  Surgeon: Tom Burrows MD;  Location: [facility]     CV PCI STENT DRUG ELUTING N/A 8/21/2020    Procedure: Percutaneous Coronary Intervention Stent Drug Eluting;  Surgeon: Gianluca Toscano MD;  Location: [facility]ATH [facility]     CV RIGHT HEART CATH MEASUREMENTS RECORDED N/A 8/21/2020    Procedure: CV RIGHT HEART CATH;  Surgeon: Gianluca Toscano MD;  Location: [facility]     CV RIGHT HEART CATH MEASUREMENTS RECORDED N/A 11/3/2020     Procedure: CV RIGHT HEART CATH;  Surgeon: Tom Burrows MD;  Location:  HEART CARDIAC CATH LAB     CV TRANSCATHETER AORTIC VALVE REPLACEMENT N/A 9/12/2022    Procedure: Transfemoral Transcatheter Aortic Valve Replacement with possible open heart bypass and or balloon pump placement and any indicated procedure;  Surgeon: Tom Burrows MD;  Location:  HEART CARDIAC CATH LAB     HEART CATH FEMORAL CANNULIZATION WITH OPEN STANDBY REPAIR AORTIC VALVE N/A 9/12/2022    Procedure: OR TRANSCATHETER AORTIC VALVE REPLACEMENT, OPEN FEMORAL ARTERY APPROACH;  Surgeon: Arthur Markham MD;  Location:  HEART CARDIAC CATH LAB     JOINT REPLACEMENT, HIP RT/LT  4/2008    right hip replaced     JOINT REPLACEMENT, HIP RT/LT  4/2009    left hip replaced     REPAIR HAMMER TOE  11/8/2011    Procedure:REPAIR HAMMER TOE; left 2nd hammertoe repair; Surgeon:FREDY LITTLEJOHN; Location: OR     SURGICAL HISTORY OF -   11/11    left bunion and 2nd hammertoe repair     TUBAL LIGATION       ZZC ANESTH,BLEPHAROPLASTY      (R) for drooping eyelid     Z APPENDECTOMY          CURRENT MEDICATIONS:     Current Outpatient Medications   Medication Sig Dispense Refill     albuterol (PROAIR HFA/PROVENTIL HFA/VENTOLIN HFA) 108 (90 Base) MCG/ACT inhaler Inhale 2 puffs into the lungs every 6 hours as needed for wheezing or shortness of breath / dyspnea 18 g 3     alendronate (FOSAMAX) 70 MG tablet TAKE 1 TABLET BY MOUTH ONCE WEEKLY 4 tablet 23     amLODIPine (NORVASC) 5 MG tablet Take 2 tablets (10 mg) by mouth daily Take this in the evening. 180 tablet 3     aspirin 81 MG tablet Take 81 mg by mouth daily        azelastine (ASTEPRO) 0.15 % nasal spray USE 1 SPRAY IN EACH NOSTRIL ONCE A DAY 30 mL 11     Calcium Citrate (CITRACAL OR) Take 1 tablet by mouth daily.       carvedilol (COREG) 6.25 MG tablet Take 2 tablets (12.5 mg) by mouth 2 times daily 540 tablet 3     cholecalciferol (VITAMIN D3) 1250 mcg (84311 units) capsule TAKE 1  CAPSULE BY MOUTH ONCE WEEKLY 4 capsule 11     cloNIDine (CATAPRES) 0.1 MG tablet Take 1 tablet (0.1 mg) by mouth daily as needed (For SBP > 180) 20 tablet 0     COMPRESSION STOCKINGS 1 each daily 1 each 1     desvenlafaxine (PRISTIQ) 100 MG 24 hr tablet Take 100 mg by mouth daily       fluticasone-salmeterol (ADVAIR DISKUS) 500-50 MCG/ACT inhaler INHALE 1 PUFF BY MOUTH TWICE DAILY 60 each 0     furosemide (LASIX) 20 MG tablet Take 1 tablet (20 mg) by mouth daily 90 tablet 3     lamoTRIgine (LAMICTAL) 25 MG tablet Take 25 mg by mouth daily with 200 mg tablet for a total dose of 225 mg       LAMOTRIGINE 200 MG PO TABS Take 200 mg by mouth daily with 25 mg tablet for a total dose of 225 mg       levothyroxine (SYNTHROID/LEVOTHROID) 50 MCG tablet TAKE 1 TABLET BY MOUTH ONCE DAILY 28 tablet 11     liothyronine (CYTOMEL) 5 MCG tablet Take 2 tablets (10 mcg) by mouth daily 30 tablet 3     memantine (NAMENDA) 10 MG tablet Take 1 tablet (10 mg) by mouth daily 30 tablet 3     mirtazapine (REMERON) 7.5 MG tablet Take 1 tablet (7.5 mg) by mouth At Bedtime 30 tablet 3     Multiple Vitamin (TAB-A-JENNIFER) TABS TAKE 1 TABLET BY MOUTH ONCE DAILY 30 tablet PRN     potassium chloride ER (K-TAB) 20 MEQ CR tablet Take 1 tablet (20 mEq) by mouth daily 90 tablet 3     rosuvastatin (CRESTOR) 40 MG tablet TAKE 1 TABLET BY MOUTH ONCE DAILY 28 tablet 11     ticagrelor (BRILINTA) 90 MG tablet Take 1 tablet (90 mg) by mouth 2 times daily 180 tablet 0        ALLERGIES:     Allergies   Allergen Reactions     Ace Inhibitors Cough     Diclofenac Other (See Comments)     Balance problems     Gluten Meal Diarrhea        FAMILY HISTORY:     Family History   Problem Relation Age of Onset     Asthma Mother      Cerebrovascular Disease Mother      Arthritis Mother      Depression Mother      Lipids Sister      Hypertension Sister      Heart Disease Sister      Lipids Sister      Hypertension Sister      Lipids Sister      Breast Cancer Sister          "SOCIAL HISTORY:     Social History     Socioeconomic History     Marital status:      Spouse name: Adrien     Number of children: 2     Years of education: 16     Highest education level: None   Occupational History     Employer: RETIRED     Comment: Retired in 2002.    Tobacco Use     Smoking status: Never Smoker     Smokeless tobacco: Never Used   Substance and Sexual Activity     Alcohol use: No     Alcohol/week: 0.0 standard drinks     Types: 1 Standard drinks or equivalent per week     Drug use: No     Sexual activity: Not Currently     Partners: Male        REVIEW OF SYSTEMS:     Constitutional: No fevers or chills  Skin: No new rash or itching  Eyes: No acute change in vision  Ears/Nose/Throat: No purulent rhinorrhea, new hearing loss, or new vertigo  Respiratory: No cough or hemoptysis  Cardiovascular: See HPI  Gastrointestinal: No change in appetite, vomiting, hematemesis or diarrhea  Genitourinary: No dysuria or hematuria  Musculoskeletal: No new back pain, neck pain or muscle pain  Neurologic: No new headaches, focal weakness or behavior changes  Psychiatric: No hallucinations, excessive alcohol consumption or illegal drug usage  Hematologic/Lymphatic/Immunologic: No bleeding, chills, fever, night sweats or weight loss  Endocrine: No new cold intolerance, heat intolerance, polyphagia, polydipsia or polyuria      PHYSICAL EXAMINATION:     BP (!) 149/85 (BP Location: Right arm, Patient Position: Chair, Cuff Size: Adult Regular)   Pulse 61   Ht 1.561 m (5' 1.46\")   Wt 68 kg (149 lb 14.4 oz)   SpO2 98%   BMI 27.90 kg/m      GENERAL: No acute distress.  HEENT: EOMI. Sclerae white, not injected. Nares clear. Pharynx without erythema or exudate.   Neck: No adenopathy. No thyromegaly. No jugular venous distension.   Heart: Regular rate and rhythm. No murmur.   Lungs: Clear to auscultation. No ronchi, wheezes, rales.   Abdomen: Soft, nontender, nondistended. Bowel sounds present.  Extremities: No " clubbing, cyanosis, or edema.   Neurologic: Alert and oriented to person/place/time, normal speech and affect. No focal deficits.  Skin: No petechiae, purpura or rash.     LABORATORY DATA:     LIPID RESULTS:  Lab Results   Component Value Date    CHOL 172 12/17/2021    CHOL 175 10/23/2020    HDL 61 12/17/2021    HDL 71 10/23/2020    LDL 84 12/17/2021    LDL 83 10/23/2020    TRIG 137 12/17/2021    TRIG 103 10/23/2020    CHOLHDLRATIO 2.8 09/18/2015       LIVER ENZYME RESULTS:  Lab Results   Component Value Date    AST 24 09/09/2022    AST 27 05/17/2021    ALT 13 09/09/2022    ALT 38 05/17/2021       CBC RESULTS:  Lab Results   Component Value Date    WBC 6.0 09/13/2022    WBC 4.6 05/17/2021    RBC 3.89 09/13/2022    RBC 3.97 05/17/2021    HGB 9.8 (L) 09/13/2022    HGB 12.9 05/17/2021    HCT 32.6 (L) 09/13/2022    HCT 40.3 05/17/2021    MCV 84 09/13/2022     (H) 05/17/2021    MCH 25.2 (L) 09/13/2022    MCH 32.5 05/17/2021    MCHC 30.1 (L) 09/13/2022    MCHC 32.0 05/17/2021    RDW 16.0 (H) 09/13/2022    RDW 12.7 05/17/2021     09/13/2022     05/17/2021       BMP RESULTS:  Lab Results   Component Value Date     09/13/2022     06/04/2021    POTASSIUM 3.6 09/13/2022    POTASSIUM 4.8 07/29/2022    POTASSIUM 3.4 06/04/2021    CHLORIDE 104 09/13/2022    CHLORIDE 109 07/29/2022    CHLORIDE 106 06/04/2021    CO2 24 09/13/2022    CO2 30 07/29/2022    CO2 30 06/04/2021    ANIONGAP 10 09/13/2022    ANIONGAP 3 07/29/2022    ANIONGAP 6 06/04/2021    GLC 91 09/13/2022     (H) 09/12/2022     (H) 07/29/2022     (H) 06/04/2021    BUN 19.9 09/13/2022    BUN 35 (H) 07/29/2022    BUN 27 06/04/2021    CR 1.14 (H) 09/13/2022    CR 1.50 (H) 06/04/2021    GFRESTIMATED 47 (L) 09/13/2022    GFRESTIMATED 32 (L) 06/04/2021    GFRESTBLACK 37 (L) 06/04/2021    PRINCE 9.1 09/13/2022    PRINCE 9.4 06/04/2021        A1C RESULTS:  Lab Results   Component Value Date    A1C 5.0 06/30/2009       INR  RESULTS:  Lab Results   Component Value Date    INR 1.04 09/09/2022    INR 1.05 11/03/2020    INR 2.28 (H) 04/23/2009          PROCEDURES & FURTHER ASSESSMENTS:     ECG dated 9/13/22  SR 1st degree AVB and LBBB    Echocardiogram dated  9/13/22:    Interpretation Summary  Normal biventricular function.  Post 26 mm Arndt Janis Ultra valve Aortic Valve placement. The valve is  well seated. There is trivial periprosthetic regurgitation. Mean gradient is 7  mmHg.  The man RA pressure is normal.  No pericardial effusion is present.      NYHA Class: II     CLINICAL IMPRESSION:     Kamryn Miller is a very pleasant 86 year old female with severe aortic valvular stenosis that was treated with a transfemoral transcatheter aortic valve replacement (TAVR) with a 26mm Arndt Janis Ultra on 9/12/22 by Morro Burrows. The TAVR and post-procedural course were notable for no complications. She was noted to have 60% left femoral stenosis post procedure, but had adequate flow on post procedure peripheral angiogram. She was started on dual antiplatelet therapy with plans to assess symptoms of PAD as outpatient. She was noted to have new LBBB post procedure and discharged on a Cardionet. Her post TAVR echo showed mean PG of 7 mmHg with trace PVL.     Patient reports feeling fairly well from a cardiac standpoint; however, she is reporting what sounds like LLE claudication, predictably brought on by exertion, resolves with rest. Fortunately, she denies rest pain, her LLE appears warm and well perfused, she does have palpable DP pulse and sensation is intact. Plan to refer to IR for possible intervention of left femoral artery. Patient advised to go the ER if symptoms acutely worsen. Otherwise continue current medication regimen.     Plan Summary:  1) Aspirin 81 mg daily lifelong  2) Continue Brilinta 90 mg BID for left femoral artery stenosis  3) Continue high intensity statin   4) IR referral for LLE claudication  5) Continue  lasix 20 mg for HFpEF and current antihypertensive regimen  6) Lifelong antibiotic prophylaxis prior to all dental procedures    RTC: 1 month with echo, labs and ECG prior     Pati JESSICA De Santiago, CNP  [facility] Cardiology Team      CC  Patient Care Team:  Rolanda Wilcox MD as PCP - General (Family Practice)  Javier Landa MD as Assigned OBGYN Provider  Ania Johnson MD as Assigned Nephrology Provider  Dimas Palacios, PhD as Assigned Behavioral Health Provider

## 2022-09-19 NOTE — PROGRESS NOTES
[facility] HEART [facility]  CARDIOVASCULAR DIVISION    VALVE CLINIC RETURN VISIT    PRIMARY CARDIOLOGIST: Dr. Zuniga      PERTINENT CLINICAL HISTORY:     Kamryn Miller is a very pleasant 86 year old female with HFpEF, HTN, HLD, CAD s/p LAD PCI 2020 and severe aortic valvular stenosis that was treated with a transfemoral transcatheter aortic valve replacement (TAVR) with a 26mm Arndt Janis Ultra on 9/12/22 by Morro Burrows. The TAVR and post-procedural course were notable for no complications. She was noted to have 60% left femoral stenosis post procedure, but had adequate flow on post procedure peripheral angiogram. She was started on dual antiplatelet therapy with plans to assess symptoms of PAD as outpatient. She was noted to have new LBBB post procedure and discharged on a Cardionet. Her post TAVR echo showed mean PG of 7 mmHg with trace PVL.     Kamryn states she has been feeling well since her valve replacement. Her leg swelling has improved post procedure. She continues to experience exertional shortness of breath with walking about a block. This is unchanged. She denies any chest pain, palpitations, lightheadedness, syncope, orthopnea or PND. Her groin site is healing well. Has noticed a considerable amount of pain in the left leg, starts in the left hip and radiates down the outside of her thigh. Feels like an aching, starts after several steps. Groin puncture sites are healing. Home BP 1 teen-140s.      PAST MEDICAL HISTORY:     Past Medical History:   Diagnosis Date     Aortic valvar stenosis 7/2010    mild     Asthma      Bipolar disorder (H)      CAD (coronary artery disease)     s/p angioplasty     Celiac disease      Colon polyps      Colon polyps 2012    every 3 year colonoscopy      Depression      High cholesterol      HTN      Mitral insufficiency      Pulmonary HTN (H)     mild     Tremors 7/10    drug induced from antidepressants     Tricuspid insufficiency         PAST SURGICAL HISTORY:      Past Surgical History:   Procedure Laterality Date     ANGIOGRAM  6/26/2009     ANGIOPLASTY  9/96    for angina     ANGIOPLASTY  8/03 - 9/03    X -2 - with stenst in coronary car.     APPENDECTOMY       BREAST BIOPSY, RT/LT      Breat Biopsy RT/LT, benign     BUNIONECTOMY  11/8/2011    Procedure:BUNIONECTOMY; Left donna bunionectomy; Surgeon:FREDY LITTLEJOHN; Location:[facility] OR     BUNIONECTOMY RT/LT  5/2007    right bunion and right 2nd hammertoe     CATARACT IOL, RT/LT  6/12 and 7/12    bilateral     COLONOSCOPY  2007, 2012    every 3 years for polyps     CV CORONARY ANGIOGRAM N/A 8/21/2020    Procedure: CV CORONARY ANGIOGRAM;  Surgeon: Gianluca Toscano MD;  Location: [facility] CARDIAC CATH LAB     CV LEFT HEART CATH N/A 8/21/2020    Procedure: CV LEFT HEART CATH;  Surgeon: Gianluca Toscano MD;  Location: [facility] CARDIAC CATH LAB     CV LEFT HEART CATH N/A 11/3/2020    Procedure: CV LEFT HEART CATH;  Surgeon: Tom Burrows MD;  Location: [facility] CARD[facility] CATH LAB     CV LOWER EXTREMITY ANGIOGRAM BILATERAL N/A 11/3/2020    Procedure: CV ANGIOGRAM LOWER EXTREMITY BILATERAL;  Surgeon: Tom Burrows MD;  Location: [facility] CARDIAC CATH LAB     CV PCI STENT DRUG ELUTING N/A 8/21/2020    Procedure: Percutaneous Coronary Intervention Stent Drug Eluting;  Surgeon: Gianluca Toscano MD;  Location: [facility] CARDIAC CATH LAB     CV RIGHT HEART CATH MEASUREMENTS RECORDED N/A 8/21/2020    Procedure: CV RIGHT HEART CATH;  Surgeon: Gianluca Toscano MD;  Location: [facility]ARD[facility] C[facility]     CV RIGHT HEART CATH MEASUREMENTS RECORDED N/A 11/3/2020    Procedure: CV RIGHT HEART CATH;  Surgeon: Tom Burrows MD;  Location: [facility]ARD[facility] C[facility]     CV TRANSCATHETER AORTIC VALVE REPLACEMENT N/A 9/12/2022    Procedure: Transfemoral Transcatheter Aortic Valve Replacement with possible open heart bypass and or balloon pump placement and any indicated procedure;  Surgeon: Tom Burrows,  MD;  Location:  HEART CARDIAC CATH LAB     HEART CATH FEMORAL CANNULIZATION WITH OPEN STANDBY REPAIR AORTIC VALVE N/A 9/12/2022    Procedure: OR TRANSCATHETER AORTIC VALVE REPLACEMENT, OPEN FEMORAL ARTERY APPROACH;  Surgeon: Arthur Markham MD;  Location:  HEART CARDIAC CATH LAB     JOINT REPLACEMENT, HIP RT/LT  4/2008    right hip replaced     JOINT REPLACEMENT, HIP RT/LT  4/2009    left hip replaced     REPAIR HAMMER TOE  11/8/2011    Procedure:REPAIR HAMMER TOE; left 2nd hammertoe repair; Surgeon:FREDY LITTLEJOHN; Location: OR     SURGICAL HISTORY OF -   11/11    left bunion and 2nd hammertoe repair     TUBAL LIGATION       ZZC ANESTH,BLEPHAROPLASTY      (R) for drooping eyelid     ZC APPENDECTOMY          CURRENT MEDICATIONS:     Current Outpatient Medications   Medication Sig Dispense Refill     albuterol (PROAIR HFA/PROVENTIL HFA/VENTOLIN HFA) 108 (90 Base) MCG/ACT inhaler Inhale 2 puffs into the lungs every 6 hours as needed for wheezing or shortness of breath / dyspnea 18 g 3     alendronate (FOSAMAX) 70 MG tablet TAKE 1 TABLET BY MOUTH ONCE WEEKLY 4 tablet 23     amLODIPine (NORVASC) 5 MG tablet Take 2 tablets (10 mg) by mouth daily Take this in the evening. 180 tablet 3     aspirin 81 MG tablet Take 81 mg by mouth daily        azelastine (ASTEPRO) 0.15 % nasal spray USE 1 SPRAY IN EACH NOSTRIL ONCE A DAY 30 mL 11     Calcium Citrate (CITRACAL OR) Take 1 tablet by mouth daily.       carvedilol (COREG) 6.25 MG tablet Take 2 tablets (12.5 mg) by mouth 2 times daily 540 tablet 3     cholecalciferol (VITAMIN D3) 1250 mcg (71182 units) capsule TAKE 1 CAPSULE BY MOUTH ONCE WEEKLY 4 capsule 11     cloNIDine (CATAPRES) 0.1 MG tablet Take 1 tablet (0.1 mg) by mouth daily as needed (For SBP > 180) 20 tablet 0     COMPRESSION STOCKINGS 1 each daily 1 each 1     desvenlafaxine (PRISTIQ) 100 MG 24 hr tablet Take 100 mg by mouth daily       fluticasone-salmeterol (ADVAIR DISKUS) 500-50 MCG/ACT inhaler  INHALE 1 PUFF BY MOUTH TWICE DAILY 60 each 0     furosemide (LASIX) 20 MG tablet Take 1 tablet (20 mg) by mouth daily 90 tablet 3     lamoTRIgine (LAMICTAL) 25 MG tablet Take 25 mg by mouth daily with 200 mg tablet for a total dose of 225 mg       LAMOTRIGINE 200 MG PO TABS Take 200 mg by mouth daily with 25 mg tablet for a total dose of 225 mg       levothyroxine (SYNTHROID/LEVOTHROID) 50 MCG tablet TAKE 1 TABLET BY MOUTH ONCE DAILY 28 tablet 11     liothyronine (CYTOMEL) 5 MCG tablet Take 2 tablets (10 mcg) by mouth daily 30 tablet 3     memantine (NAMENDA) 10 MG tablet Take 1 tablet (10 mg) by mouth daily 30 tablet 3     mirtazapine (REMERON) 7.5 MG tablet Take 1 tablet (7.5 mg) by mouth At Bedtime 30 tablet 3     Multiple Vitamin (TAB-A-JENNIFER) TABS TAKE 1 TABLET BY MOUTH ONCE DAILY 30 tablet PRN     potassium chloride ER (K-TAB) 20 MEQ CR tablet Take 1 tablet (20 mEq) by mouth daily 90 tablet 3     rosuvastatin (CRESTOR) 40 MG tablet TAKE 1 TABLET BY MOUTH ONCE DAILY 28 tablet 11     ticagrelor (BRILINTA) 90 MG tablet Take 1 tablet (90 mg) by mouth 2 times daily 180 tablet 0        ALLERGIES:     Allergies   Allergen Reactions     Ace Inhibitors Cough     Diclofenac Other (See Comments)     Balance problems     Gluten Meal Diarrhea        FAMILY HISTORY:     Family History   Problem Relation Age of Onset     Asthma Mother      Cerebrovascular Disease Mother      Arthritis Mother      Depression Mother      Lipids Sister      Hypertension Sister      Heart Disease Sister      Lipids Sister      Hypertension Sister      Lipids Sister      Breast Cancer Sister         SOCIAL HISTORY:     Social History     Socioeconomic History     Marital status:      Spouse name: Adrien     Number of children: 2     Years of education: 16     Highest education level: None   Occupational History     Employer: RETIRED     Comment: Retired in 2002.    Tobacco Use     Smoking status: Never Smoker     Smokeless tobacco: Never Used  "  Substance and Sexual Activity     Alcohol use: No     Alcohol/week: 0.0 standard drinks     Types: 1 Standard drinks or equivalent per week     Drug use: No     Sexual activity: Not Currently     Partners: Male        REVIEW OF SYSTEMS:     Constitutional: No fevers or chills  Skin: No new rash or itching  Eyes: No acute change in vision  Ears/Nose/Throat: No purulent rhinorrhea, new hearing loss, or new vertigo  Respiratory: No cough or hemoptysis  Cardiovascular: See HPI  Gastrointestinal: No change in appetite, vomiting, hematemesis or diarrhea  Genitourinary: No dysuria or hematuria  Musculoskeletal: No new back pain, neck pain or muscle pain  Neurologic: No new headaches, focal weakness or behavior changes  Psychiatric: No hallucinations, excessive alcohol consumption or illegal drug usage  Hematologic/Lymphatic/Immunologic: No bleeding, chills, fever, night sweats or weight loss  Endocrine: No new cold intolerance, heat intolerance, polyphagia, polydipsia or polyuria      PHYSICAL EXAMINATION:     BP (!) 149/85 (BP Location: Right arm, Patient Position: Chair, Cuff Size: Adult Regular)   Pulse 61   Ht 1.561 m (5' 1.46\")   Wt 68 kg (149 lb 14.4 oz)   SpO2 98%   BMI 27.90 kg/m      GENERAL: No acute distress.  HEENT: EOMI. Sclerae white, not injected. Nares clear. Pharynx without erythema or exudate.   Neck: No adenopathy. No thyromegaly. No jugular venous distension.   Heart: Regular rate and rhythm. No murmur.   Lungs: Clear to auscultation. No ronchi, wheezes, rales.   Abdomen: Soft, nontender, nondistended. Bowel sounds present.  Extremities: No clubbing, cyanosis, or edema.   Neurologic: Alert and oriented to person/place/time, normal speech and affect. No focal deficits.  Skin: No petechiae, purpura or rash.     LABORATORY DATA:     LIPID RESULTS:  Lab Results   Component Value Date    CHOL 172 12/17/2021    CHOL 175 10/23/2020    HDL 61 12/17/2021    HDL 71 10/23/2020    LDL 84 12/17/2021    LDL 83 " 10/23/2020    TRIG 137 12/17/2021    TRIG 103 10/23/2020    CHOLHDLRATIO 2.8 09/18/2015       LIVER ENZYME RESULTS:  Lab Results   Component Value Date    AST 24 09/09/2022    AST 27 05/17/2021    ALT 13 09/09/2022    ALT 38 05/17/2021       CBC RESULTS:  Lab Results   Component Value Date    WBC 6.0 09/13/2022    WBC 4.6 05/17/2021    RBC 3.89 09/13/2022    RBC 3.97 05/17/2021    HGB 9.8 (L) 09/13/2022    HGB 12.9 05/17/2021    HCT 32.6 (L) 09/13/2022    HCT 40.3 05/17/2021    MCV 84 09/13/2022     (H) 05/17/2021    MCH 25.2 (L) 09/13/2022    MCH 32.5 05/17/2021    MCHC 30.1 (L) 09/13/2022    MCHC 32.0 05/17/2021    RDW 16.0 (H) 09/13/2022    RDW 12.7 05/17/2021     09/13/2022     05/17/2021       BMP RESULTS:  Lab Results   Component Value Date     09/13/2022     06/04/2021    POTASSIUM 3.6 09/13/2022    POTASSIUM 4.8 07/29/2022    POTASSIUM 3.4 06/04/2021    CHLORIDE 104 09/13/2022    CHLORIDE 109 07/29/2022    CHLORIDE 106 06/04/2021    CO2 24 09/13/2022    CO2 30 07/29/2022    CO2 30 06/04/2021    ANIONGAP 10 09/13/2022    ANIONGAP 3 07/29/2022    ANIONGAP 6 06/04/2021    GLC 91 09/13/2022     (H) 09/12/2022     (H) 07/29/2022     (H) 06/04/2021    BUN 19.9 09/13/2022    BUN 35 (H) 07/29/2022    BUN 27 06/04/2021    CR 1.14 (H) 09/13/2022    CR 1.50 (H) 06/04/2021    GFRESTIMATED 47 (L) 09/13/2022    GFRESTIMATED 32 (L) 06/04/2021    GFRESTBLACK 37 (L) 06/04/2021    PRINCE 9.1 09/13/2022    PRINCE 9.4 06/04/2021        A1C RESULTS:  Lab Results   Component Value Date    A1C 5.0 06/30/2009       INR RESULTS:  Lab Results   Component Value Date    INR 1.04 09/09/2022    INR 1.05 11/03/2020    INR 2.28 (H) 04/23/2009          PROCEDURES & FURTHER ASSESSMENTS:     ECG dated 9/13/22  SR 1st degree AVB and LBBB    Echocardiogram dated  9/13/22:    Interpretation Summary  Normal biventricular function.  Post 26 mm Arndt Janis Ultra valve Aortic Valve placement. The  valve is  well seated. There is trivial periprosthetic regurgitation. Mean gradient is 7  mmHg.  The man RA pressure is normal.  No pericardial effusion is present.      NYHA Class: II     CLINICAL IMPRESSION:     Kamryn Miller is a very pleasant 86 year old female with severe aortic valvular stenosis that was treated with a transfemoral transcatheter aortic valve replacement (TAVR) with a 26mm Arndt Janis Ultra on 9/12/22 by Morro Burrows. The TAVR and post-procedural course were notable for no complications. She was noted to have 60% left femoral stenosis post procedure, but had adequate flow on post procedure peripheral angiogram. She was started on dual antiplatelet therapy with plans to assess symptoms of PAD as outpatient. She was noted to have new LBBB post procedure and discharged on a Cardionet. Her post TAVR echo showed mean PG of 7 mmHg with trace PVL.     Patient reports feeling fairly well from a cardiac standpoint; however, she is reporting what sounds like LLE claudication, predictably brought on by exertion, resolves with rest. Fortunately, she denies rest pain, her LLE appears warm and well perfused, she does have palpable DP pulse and sensation is intact. Plan to refer to IR for possible intervention of left femoral artery. Patient advised to go the ER if symptoms acutely worsen. Otherwise continue current medication regimen.     Plan Summary:  1) Aspirin 81 mg daily lifelong  2) Continue Brilinta 90 mg BID for left femoral artery stenosis  3) Continue high intensity statin   4) IR referral for LLE claudication  5) Continue lasix 20 mg for HFpEF and current antihypertensive regimen  6) Lifelong antibiotic prophylaxis prior to all dental procedures    RTC: 1 month with echo, labs and ECG prior     JESSICA Rousseau, CNP  [facility] Cardiology Team      CC  Patient Care Team:  Rolanda Wilcox MD as PCP - General (Family Practice)  Rolanda Wilcox MD as Assigned PCP  Pati De Santiago NP as Assigned  Heart and Vascular Provider  Javier Landa MD as Assigned OBGYN Provider  Ania Johnson MD as Assigned Nephrology Provider  Dimas Palacios, PhD as Assigned Behavioral Health Provider  JERRY COYLE

## 2022-09-22 ENCOUNTER — HOSPITAL ENCOUNTER (OUTPATIENT)
Facility: CLINIC | Age: 87
Setting detail: OBSERVATION
Discharge: HOME | End: 2022-09-23
Attending: FAMILY MEDICINE | Admitting: INTERNAL MEDICINE
Payer: MEDICARE

## 2022-09-22 ENCOUNTER — APPOINTMENT (OUTPATIENT)
Dept: CT IMAGING | Facility: CLINIC | Age: 87
End: 2022-09-22
Payer: MEDICARE

## 2022-09-22 ENCOUNTER — TELEPHONE (OUTPATIENT)
Dept: CARDIOLOGY | Facility: CLINIC | Age: 87
End: 2022-09-22

## 2022-09-22 ENCOUNTER — HOSPITAL ENCOUNTER (OUTPATIENT)
Dept: CARDIAC REHAB | Facility: CLINIC | Age: 87
Discharge: HOME | End: 2022-09-22
Attending: NURSE PRACTITIONER
Payer: MEDICARE

## 2022-09-22 ENCOUNTER — APPOINTMENT (OUTPATIENT)
Dept: GENERAL RADIOLOGY | Facility: CLINIC | Age: 87
End: 2022-09-22
Attending: FAMILY MEDICINE
Payer: MEDICARE

## 2022-09-22 ENCOUNTER — APPOINTMENT (OUTPATIENT)
Dept: ULTRASOUND IMAGING | Facility: CLINIC | Age: 87
End: 2022-09-22
Attending: FAMILY MEDICINE
Payer: MEDICARE

## 2022-09-22 DIAGNOSIS — Z95.2 S/P TAVR (TRANSCATHETER AORTIC VALVE REPLACEMENT): ICD-10-CM

## 2022-09-22 DIAGNOSIS — R06.02 SOB (SHORTNESS OF BREATH): ICD-10-CM

## 2022-09-22 DIAGNOSIS — I70.209 FEMORAL ARTERY STENOSIS (H): Primary | ICD-10-CM

## 2022-09-22 DIAGNOSIS — R53.1 WEAKNESS GENERALIZED: ICD-10-CM

## 2022-09-22 DIAGNOSIS — E78.5 HYPERLIPIDEMIA LDL GOAL <70: ICD-10-CM

## 2022-09-22 DIAGNOSIS — Z11.52 ENCOUNTER FOR SCREENING LABORATORY TESTING FOR SEVERE ACUTE RESPIRATORY SYNDROME CORONAVIRUS 2 (SARS-COV-2): ICD-10-CM

## 2022-09-22 DIAGNOSIS — I73.9 LEFT LEG CLAUDICATION (H): ICD-10-CM

## 2022-09-22 LAB
ALBUMIN SERPL BCG-MCNC: 4.1 G/DL (ref 3.5–5.2)
ALP SERPL-CCNC: 65 U/L (ref 35–104)
ALT SERPL W P-5'-P-CCNC: 15 U/L (ref 10–35)
ANION GAP SERPL CALCULATED.3IONS-SCNC: 10 MMOL/L (ref 7–15)
APTT PPP: 26 SECONDS (ref 22–38)
AST SERPL W P-5'-P-CCNC: 28 U/L (ref 10–35)
ATRIAL RATE - MUSE: 55 BPM
BASOPHILS # BLD AUTO: 0 10E3/UL (ref 0–0.2)
BASOPHILS NFR BLD AUTO: 1 %
BILIRUB SERPL-MCNC: 0.2 MG/DL
BUN SERPL-MCNC: 30.7 MG/DL (ref 8–23)
CALCIUM SERPL-MCNC: 9.4 MG/DL (ref 8.8–10.2)
CHLORIDE SERPL-SCNC: 104 MMOL/L (ref 98–107)
CK SERPL-CCNC: 121 U/L (ref 26–192)
CREAT SERPL-MCNC: 1.43 MG/DL (ref 0.51–0.95)
DEPRECATED HCO3 PLAS-SCNC: 25 MMOL/L (ref 22–29)
DIASTOLIC BLOOD PRESSURE - MUSE: NORMAL MMHG
EOSINOPHIL # BLD AUTO: 0.2 10E3/UL (ref 0–0.7)
EOSINOPHIL NFR BLD AUTO: 3 %
ERYTHROCYTE [DISTWIDTH] IN BLOOD BY AUTOMATED COUNT: 16.4 % (ref 10–15)
GFR SERPL CREATININE-BSD FRML MDRD: 36 ML/MIN/1.73M2
GLUCOSE SERPL-MCNC: 88 MG/DL (ref 70–99)
HCT VFR BLD AUTO: 34.5 % (ref 35–47)
HGB BLD-MCNC: 10.3 G/DL (ref 11.7–15.7)
HOLD SPECIMEN: NORMAL
IMM GRANULOCYTES # BLD: 0 10E3/UL
IMM GRANULOCYTES NFR BLD: 0 %
INR PPP: 1.09 (ref 0.85–1.15)
INTERPRETATION ECG - MUSE: NORMAL
LYMPHOCYTES # BLD AUTO: 0.9 10E3/UL (ref 0.8–5.3)
LYMPHOCYTES NFR BLD AUTO: 18 %
MAGNESIUM SERPL-MCNC: 2.5 MG/DL (ref 1.7–2.3)
MCH RBC QN AUTO: 25.6 PG (ref 26.5–33)
MCHC RBC AUTO-ENTMCNC: 29.9 G/DL (ref 31.5–36.5)
MCV RBC AUTO: 86 FL (ref 78–100)
MONOCYTES # BLD AUTO: 0.7 10E3/UL (ref 0–1.3)
MONOCYTES NFR BLD AUTO: 13 %
NEUTROPHILS # BLD AUTO: 3.3 10E3/UL (ref 1.6–8.3)
NEUTROPHILS NFR BLD AUTO: 65 %
NRBC # BLD AUTO: 0 10E3/UL
NRBC BLD AUTO-RTO: 0 /100
NT-PROBNP SERPL-MCNC: 576 PG/ML (ref 0–1800)
P AXIS - MUSE: 74 DEGREES
PLATELET # BLD AUTO: 231 10E3/UL (ref 150–450)
POTASSIUM SERPL-SCNC: 4.1 MMOL/L (ref 3.4–5.3)
PR INTERVAL - MUSE: 316 MS
PROT SERPL-MCNC: 7.3 G/DL (ref 6.4–8.3)
QRS DURATION - MUSE: 162 MS
QT - MUSE: 512 MS
QTC - MUSE: 489 MS
R AXIS - MUSE: -8 DEGREES
RBC # BLD AUTO: 4.02 10E6/UL (ref 3.8–5.2)
SARS-COV-2 RNA RESP QL NAA+PROBE: NEGATIVE
SODIUM SERPL-SCNC: 139 MMOL/L (ref 136–145)
SYSTOLIC BLOOD PRESSURE - MUSE: NORMAL MMHG
T AXIS - MUSE: 124 DEGREES
TROPONIN T SERPL HS-MCNC: 77 NG/L
TROPONIN T SERPL HS-MCNC: 79 NG/L
VENTRICULAR RATE- MUSE: 55 BPM
WBC # BLD AUTO: 5.1 10E3/UL (ref 4–11)

## 2022-09-22 PROCEDURE — 93926 LOWER EXTREMITY STUDY: CPT | Mod: LT

## 2022-09-22 PROCEDURE — 99285 EMERGENCY DEPT VISIT HI MDM: CPT | Mod: 25 | Performed by: FAMILY MEDICINE

## 2022-09-22 PROCEDURE — 75635 CT ANGIO ABDOMINAL ARTERIES: CPT | Mod: 26 | Performed by: RADIOLOGY

## 2022-09-22 PROCEDURE — 94640 AIRWAY INHALATION TREATMENT: CPT | Performed by: FAMILY MEDICINE

## 2022-09-22 PROCEDURE — 93926 LOWER EXTREMITY STUDY: CPT | Mod: 26 | Performed by: RADIOLOGY

## 2022-09-22 PROCEDURE — 75635 CT ANGIO ABDOMINAL ARTERIES: CPT | Mod: MG

## 2022-09-22 PROCEDURE — 80053 COMPREHEN METABOLIC PANEL: CPT | Performed by: FAMILY MEDICINE

## 2022-09-22 PROCEDURE — 83880 ASSAY OF NATRIURETIC PEPTIDE: CPT | Performed by: FAMILY MEDICINE

## 2022-09-22 PROCEDURE — 85730 THROMBOPLASTIN TIME PARTIAL: CPT | Performed by: FAMILY MEDICINE

## 2022-09-22 PROCEDURE — 250N000009 HC RX 250: Performed by: INTERNAL MEDICINE

## 2022-09-22 PROCEDURE — U0003 INFECTIOUS AGENT DETECTION BY NUCLEIC ACID (DNA OR RNA); SEVERE ACUTE RESPIRATORY SYNDROME CORONAVIRUS 2 (SARS-COV-2) (CORONAVIRUS DISEASE [COVID-19]), AMPLIFIED PROBE TECHNIQUE, MAKING USE OF HIGH THROUGHPUT TECHNOLOGIES AS DESCRIBED BY CMS-2020-01-R: HCPCS | Performed by: FAMILY MEDICINE

## 2022-09-22 PROCEDURE — 999N000104 HC STATISTIC NO CHARGE

## 2022-09-22 PROCEDURE — 83735 ASSAY OF MAGNESIUM: CPT | Performed by: FAMILY MEDICINE

## 2022-09-22 PROCEDURE — 93005 ELECTROCARDIOGRAM TRACING: CPT | Performed by: FAMILY MEDICINE

## 2022-09-22 PROCEDURE — 85610 PROTHROMBIN TIME: CPT | Performed by: FAMILY MEDICINE

## 2022-09-22 PROCEDURE — 84484 ASSAY OF TROPONIN QUANT: CPT | Performed by: FAMILY MEDICINE

## 2022-09-22 PROCEDURE — 71046 X-RAY EXAM CHEST 2 VIEWS: CPT

## 2022-09-22 PROCEDURE — 36415 COLL VENOUS BLD VENIPUNCTURE: CPT | Performed by: FAMILY MEDICINE

## 2022-09-22 PROCEDURE — 120N000002 HC R&B MED SURG/OB UMMC

## 2022-09-22 PROCEDURE — 71046 X-RAY EXAM CHEST 2 VIEWS: CPT | Mod: 26 | Performed by: RADIOLOGY

## 2022-09-22 PROCEDURE — 85025 COMPLETE CBC W/AUTO DIFF WBC: CPT | Performed by: FAMILY MEDICINE

## 2022-09-22 PROCEDURE — 250N000013 HC RX MED GY IP 250 OP 250 PS 637: Performed by: INTERNAL MEDICINE

## 2022-09-22 PROCEDURE — 99024 POSTOP FOLLOW-UP VISIT: CPT | Performed by: SURGERY

## 2022-09-22 PROCEDURE — C9803 HOPD COVID-19 SPEC COLLECT: HCPCS | Performed by: FAMILY MEDICINE

## 2022-09-22 PROCEDURE — 250N000011 HC RX IP 250 OP 636: Performed by: INTERNAL MEDICINE

## 2022-09-22 PROCEDURE — 82550 ASSAY OF CK (CPK): CPT | Performed by: FAMILY MEDICINE

## 2022-09-22 PROCEDURE — G1010 CDSM STANSON: HCPCS | Mod: GC | Performed by: RADIOLOGY

## 2022-09-22 PROCEDURE — 93010 ELECTROCARDIOGRAM REPORT: CPT | Performed by: FAMILY MEDICINE

## 2022-09-22 RX ORDER — LIDOCAINE 40 MG/G
CREAM TOPICAL
Status: DISCONTINUED | OUTPATIENT
Start: 2022-09-22 | End: 2022-09-23 | Stop reason: HOSPADM

## 2022-09-22 RX ORDER — AMLODIPINE BESYLATE 5 MG/1
10 TABLET ORAL EVERY EVENING
Status: DISCONTINUED | OUTPATIENT
Start: 2022-09-22 | End: 2022-09-23 | Stop reason: HOSPADM

## 2022-09-22 RX ORDER — CARVEDILOL 12.5 MG/1
12.5 TABLET ORAL 2 TIMES DAILY
Status: DISCONTINUED | OUTPATIENT
Start: 2022-09-22 | End: 2022-09-23 | Stop reason: HOSPADM

## 2022-09-22 RX ORDER — ONDANSETRON 2 MG/ML
4 INJECTION INTRAMUSCULAR; INTRAVENOUS EVERY 6 HOURS PRN
Status: DISCONTINUED | OUTPATIENT
Start: 2022-09-22 | End: 2022-09-23 | Stop reason: HOSPADM

## 2022-09-22 RX ORDER — MIRTAZAPINE 7.5 MG/1
7.5 TABLET, FILM COATED ORAL AT BEDTIME
Status: DISCONTINUED | OUTPATIENT
Start: 2022-09-22 | End: 2022-09-23 | Stop reason: HOSPADM

## 2022-09-22 RX ORDER — LAMOTRIGINE 25 MG/1
25 TABLET ORAL DAILY
Status: DISCONTINUED | OUTPATIENT
Start: 2022-09-23 | End: 2022-09-23 | Stop reason: HOSPADM

## 2022-09-22 RX ORDER — LAMOTRIGINE 200 MG/1
200 TABLET ORAL DAILY
Status: DISCONTINUED | OUTPATIENT
Start: 2022-09-23 | End: 2022-09-23 | Stop reason: HOSPADM

## 2022-09-22 RX ORDER — ASPIRIN 81 MG/1
81 TABLET, CHEWABLE ORAL DAILY
Status: DISCONTINUED | OUTPATIENT
Start: 2022-09-23 | End: 2022-09-23 | Stop reason: HOSPADM

## 2022-09-22 RX ORDER — ACETAMINOPHEN 650 MG/1
650 SUPPOSITORY RECTAL EVERY 4 HOURS PRN
Status: DISCONTINUED | OUTPATIENT
Start: 2022-09-22 | End: 2022-09-23 | Stop reason: HOSPADM

## 2022-09-22 RX ORDER — IOPAMIDOL 755 MG/ML
120 INJECTION, SOLUTION INTRAVASCULAR ONCE
Status: COMPLETED | OUTPATIENT
Start: 2022-09-22 | End: 2022-09-22

## 2022-09-22 RX ORDER — ONDANSETRON 4 MG/1
4 TABLET, ORALLY DISINTEGRATING ORAL EVERY 6 HOURS PRN
Status: DISCONTINUED | OUTPATIENT
Start: 2022-09-22 | End: 2022-09-23 | Stop reason: HOSPADM

## 2022-09-22 RX ORDER — ROSUVASTATIN CALCIUM 10 MG/1
40 TABLET, COATED ORAL DAILY
Status: DISCONTINUED | OUTPATIENT
Start: 2022-09-23 | End: 2022-09-23 | Stop reason: HOSPADM

## 2022-09-22 RX ORDER — MEMANTINE HYDROCHLORIDE 10 MG/1
10 TABLET ORAL DAILY
Status: DISCONTINUED | OUTPATIENT
Start: 2022-09-23 | End: 2022-09-23 | Stop reason: HOSPADM

## 2022-09-22 RX ORDER — MAGNESIUM HYDROXIDE/ALUMINUM HYDROXICE/SIMETHICONE 120; 1200; 1200 MG/30ML; MG/30ML; MG/30ML
30 SUSPENSION ORAL EVERY 4 HOURS PRN
Status: DISCONTINUED | OUTPATIENT
Start: 2022-09-22 | End: 2022-09-23 | Stop reason: HOSPADM

## 2022-09-22 RX ORDER — ALBUTEROL SULFATE 90 UG/1
2 AEROSOL, METERED RESPIRATORY (INHALATION) EVERY 6 HOURS PRN
Status: DISCONTINUED | OUTPATIENT
Start: 2022-09-22 | End: 2022-09-23 | Stop reason: HOSPADM

## 2022-09-22 RX ORDER — ACETAMINOPHEN 325 MG/1
650 TABLET ORAL EVERY 4 HOURS PRN
Status: DISCONTINUED | OUTPATIENT
Start: 2022-09-22 | End: 2022-09-23 | Stop reason: HOSPADM

## 2022-09-22 RX ORDER — FUROSEMIDE 20 MG
20 TABLET ORAL DAILY
Status: DISCONTINUED | OUTPATIENT
Start: 2022-09-23 | End: 2022-09-23 | Stop reason: HOSPADM

## 2022-09-22 RX ORDER — NITROGLYCERIN 0.4 MG/1
0.4 TABLET SUBLINGUAL EVERY 5 MIN PRN
Status: DISCONTINUED | OUTPATIENT
Start: 2022-09-22 | End: 2022-09-23 | Stop reason: HOSPADM

## 2022-09-22 RX ORDER — LEVOTHYROXINE SODIUM 50 UG/1
50 TABLET ORAL DAILY
Status: DISCONTINUED | OUTPATIENT
Start: 2022-09-23 | End: 2022-09-23 | Stop reason: HOSPADM

## 2022-09-22 RX ORDER — DESVENLAFAXINE 100 MG/1
100 TABLET, EXTENDED RELEASE ORAL DAILY
Status: DISCONTINUED | OUTPATIENT
Start: 2022-09-23 | End: 2022-09-23 | Stop reason: HOSPADM

## 2022-09-22 RX ADMIN — CARVEDILOL 12.5 MG: 12.5 TABLET, FILM COATED ORAL at 21:35

## 2022-09-22 RX ADMIN — AMLODIPINE BESYLATE 10 MG: 5 TABLET ORAL at 21:36

## 2022-09-22 RX ADMIN — MIRTAZAPINE 7.5 MG: 7.5 TABLET, FILM COATED ORAL at 21:47

## 2022-09-22 RX ADMIN — IOPAMIDOL 120 ML: 755 INJECTION, SOLUTION INTRAVENOUS at 18:54

## 2022-09-22 RX ADMIN — TICAGRELOR 90 MG: 90 TABLET ORAL at 21:36

## 2022-09-22 RX ADMIN — SODIUM CHLORIDE, PRESERVATIVE FREE 100 ML: 5 INJECTION INTRAVENOUS at 18:55

## 2022-09-22 ASSESSMENT — ACTIVITIES OF DAILY LIVING (ADL)
ADLS_ACUITY_SCORE: 37

## 2022-09-22 ASSESSMENT — ENCOUNTER SYMPTOMS
DECREASED CONCENTRATION: 0
ACTIVITY CHANGE: 1
COUGH: 0
FATIGUE: 0
CONFUSION: 0
DIAPHORESIS: 0
NAUSEA: 0
DYSURIA: 0
WOUND: 0
MYALGIAS: 1
DYSPHORIC MOOD: 0
PALPITATIONS: 0
WEAKNESS: 1
VOMITING: 0
BRUISES/BLEEDS EASILY: 1
SHORTNESS OF BREATH: 1
SORE THROAT: 0
CHEST TIGHTNESS: 0
FREQUENCY: 0
SINUS PRESSURE: 0
JOINT SWELLING: 0
ABDOMINAL PAIN: 0
EYE PAIN: 0
VOICE CHANGE: 0
NECK PAIN: 0
FEVER: 0
ARTHRALGIAS: 1
AGITATION: 0
CHILLS: 0

## 2022-09-22 NOTE — TELEPHONE ENCOUNTER
Spoke with Milo who was sending Kamryn to ER here at the [facility].    Milo stated that the pain in Kamryn's left leg has been increasing in severity since her follow up office visit with Pati. Milo stated that there were no signs or symptoms of stroke. He did not know status of pedal pulses or if limb was warm or cool to the touch.    I will update Valve team here.

## 2022-09-22 NOTE — CONSULTS
VASCULAR SURGERY  PATIENT CONSULTATION NOTE    HPI: 85 y/o female with TAVR via L femoral access 9/12 presented with pain in left groin and thigh with ambulation. Noting increasing shortness of breath though lower extremity swelling improved. Left leg will get tired with walking but her shortness of breath starts first.        PAST MEDICAL HISTORY:  Past Medical History:   Diagnosis Date     Aortic valvar stenosis 7/2010    mild     Asthma      Bipolar disorder (H)      CAD (coronary artery disease)     s/p angioplasty     Celiac disease      Colon polyps      Colon polyps 2012    every 3 year colonoscopy      Depression      High cholesterol      HTN      Mitral insufficiency      Pulmonary HTN (H)     mild     Tremors 7/10    drug induced from antidepressants     Tricuspid insufficiency        PAST SURGICAL HISTORY:  Past Surgical History:   Procedure Laterality Date     ANGIOGRAM  6/26/2009     ANGIOPLASTY  9/96    for angina     ANGIOPLASTY  8/03 - 9/03    X -2 - with stenst in coronary car.     APPENDECTOMY       BREAST BIOPSY, RT/LT      Breat Biopsy RT/LT, benign     BUNIONECTOMY  11/8/2011    Procedure:BUNIONECTOMY; Left donna bunionectomy; Surgeon:FREDY LITTLEJOHN; Location:[facility]     BUNIONECTOMY RT/LT  5/2007    right bunion and right 2nd hammertoe     CATARACT IOL, RT/LT  6/12 and 7/12    bilateral     COLONOSCOPY  2007, 2012    every 3 years for polyps     CV CORONARY ANGIOGRAM N/A 8/21/2020    Procedure: CV CORONARY ANGIOGRAM;  Surgeon: Gianluca Toscano MD;  [facility]     CV LEFT HEART CATH N/A 8/21/2020    Procedure: CV LEFT HEART CATH;  Surgeon: Gianluca Toscano MD;  [facility]     CV LEFT HEART CATH N/A 11/3/2020    Procedure: CV LEFT HEART CATH;  Surgeon: Tom Burrows MD;  [facility]     CV LOWER EXTREMITY ANGIOGRAM BILATERAL N/A 11/3/2020    Procedure: CV ANGIOGRAM LOWER EXTREMITY BILATERAL;  Surgeon:  Tom Burrows MD;  Location:  [facility]     CV PCI STENT DRUG ELUTING N/A 8/21/2020    Procedure: Percutaneous Coronary Intervention Stent Drug Eluting;  Surgeon: Gianluca Toscano MD;  Location:  [facility]     CV RIGHT HEART CATH MEASUREMENTS RECORDED N/A 8/21/2020    Procedure: CV RIGHT HEART CATH;  Surgeon: Gianluca Toscano MD;  Location:  [facility]     CV RIGHT HEART CATH MEASUREMENTS RECORDED N/A 11/3/2020    Procedure: CV RIGHT HEART CATH;  Surgeon: Tom Burrows MD;  Location:  [facility]     CV TRANSCATHETER AORTIC VALVE REPLACEMENT N/A 9/12/2022    Procedure: Transfemoral Transcatheter Aortic Valve Replacement with possible open heart bypass and or balloon pump placement and any indicated procedure;  Surgeon: Tom Burrows MD;  Location:  [facility]     HEART CATH FEMORAL CANNULIZATION WITH OPEN STANDBY REPAIR AORTIC VALVE N/A 9/12/2022    Procedure: OR TRANSCATHETER AORTIC VALVE REPLACEMENT, OPEN FEMORAL ARTERY APPROACH;  Surgeon: Arthur Markham MD;  Location:  [facility]     JOINT REPLACEMENT, HIP RT/LT  4/2008    right hip replaced     JOINT REPLACEMENT, HIP RT/LT  4/2009    left hip replaced     REPAIR HAMMER TOE  11/8/2011    Procedure:REPAIR HAMMER TOE; left 2nd hammertoe repair; Surgeon:FREDY LITTLEJOHN; Location: OR     SURGICAL HISTORY OF -   11/11    left bunion and 2nd hammertoe repair     TUBAL LIGATION       ZZC ANESTH,BLEPHAROPLASTY      (R) for drooping eyelid     ZZC APPENDECTOMY         FAMILY HISTORY:  Family History   Problem Relation Age of Onset     Asthma Mother      Cerebrovascular Disease Mother      Arthritis Mother      Depression Mother      Lipids Sister      Hypertension Sister      Heart Disease Sister      Lipids Sister      Hypertension Sister      Lipids Sister      Breast Cancer Sister        SOCIAL HISTORY:   Social History     Tobacco Use      Smoking status: Never Smoker     Smokeless tobacco: Never Used   Substance Use Topics     Alcohol use: No     Alcohol/week: 0.0 standard drinks     Types: 1 Standard drinks or equivalent per week       TOBACCO USE:  No        HOME MEDICATIONS:  Prior to Admission medications    Medication Sig Start Date End Date Taking? Authorizing Provider   albuterol (PROAIR HFA/PROVENTIL HFA/VENTOLIN HFA) 108 (90 Base) MCG/ACT inhaler Inhale 2 puffs into the lungs every 6 hours as needed for wheezing or shortness of breath / dyspnea 12/23/21   Gomez Christopher MD   alendronate (FOSAMAX) 70 MG tablet TAKE 1 TABLET BY MOUTH ONCE WEEKLY 5/26/22   Rolanda Wilcox MD   amLODIPine (NORVASC) 5 MG tablet Take 2 tablets (10 mg) by mouth daily Take this in the evening. 7/29/22   Madhu Zuniga MD   aspirin 81 MG tablet Take 81 mg by mouth daily     Reported, Patient   azelastine (ASTEPRO) 0.15 % nasal spray USE 1 SPRAY IN EACH NOSTRIL ONCE A DAY 6/26/22   Ania Johnosn MD   Calcium Citrate (CITRACAL OR) Take 1 tablet by mouth daily.    Reported, Patient   carvedilol (COREG) 6.25 MG tablet Take 2 tablets (12.5 mg) by mouth 2 times daily 9/13/22   Pat Cooper CNP   cholecalciferol (VITAMIN D3) 1250 mcg (10234 units) capsule TAKE 1 CAPSULE BY MOUTH ONCE WEEKLY 4/21/22   Ania Johnson MD   cloNIDine (CATAPRES) 0.1 MG tablet Take 1 tablet (0.1 mg) by mouth daily as needed (For SBP > 180) 3/18/22   Jamie Toro,    COMPRESSION STOCKINGS 1 each daily 4/21/22   Tianna Luevano APRN CNP   desvenlafaxine (PRISTIQ) 100 MG 24 hr tablet Take 100 mg by mouth daily    Reported, Patient   fluticasone-salmeterol (ADVAIR DISKUS) 500-50 MCG/ACT inhaler INHALE 1 PUFF BY MOUTH TWICE DAILY 8/10/22   Rolanda Wilcox MD   furosemide (LASIX) 20 MG tablet Take 1 tablet (20 mg) by mouth daily 9/9/22   Pati De Santiago NP   lamoTRIgine (LAMICTAL) 25 MG tablet Take 25 mg by mouth daily with 200 mg tablet for a total dose of 225  mg 9/12/18   Reported, Patient   LAMOTRIGINE 200 MG PO TABS Take 200 mg by mouth daily with 25 mg tablet for a total dose of 225 mg    Reported, Patient   levothyroxine (SYNTHROID/LEVOTHROID) 50 MCG tablet TAKE 1 TABLET BY MOUTH ONCE DAILY 4/5/22   Ania Johnson MD   liothyronine (CYTOMEL) 5 MCG tablet Take 2 tablets (10 mcg) by mouth daily 12/23/21   Gomez Christopher MD   memantine (NAMENDA) 10 MG tablet Take 1 tablet (10 mg) by mouth daily 12/23/21   Gomez Christopher MD   mirtazapine (REMERON) 7.5 MG tablet Take 1 tablet (7.5 mg) by mouth At Bedtime 12/23/21   Gomez Christopher MD   Multiple Vitamin (TAB-A-JENNIFER) TABS TAKE 1 TABLET BY MOUTH ONCE DAILY 2/22/22   Ania Johnson MD   potassium chloride ER (K-TAB) 20 MEQ CR tablet Take 1 tablet (20 mEq) by mouth daily 4/21/22   Tianna Luevano APRN CNP   rosuvastatin (CRESTOR) 40 MG tablet TAKE 1 TABLET BY MOUTH ONCE DAILY 4/1/22   Ania Johnson MD   ticagrelor (BRILINTA) 90 MG tablet Take 1 tablet (90 mg) by mouth 2 times daily 9/13/22   Pat Cooper CNP       VITAL SIGNS:  Temp: 98.6  F (37  C) Temp src: Temporal BP: (!) 154/82 Pulse: 61   Resp: 19 SpO2: 94 % O2 Device: None (Room air)      0 lbs 0 oz    PHYSICAL EXAM:  NEURO/PSYCH:  The patient is alert and oriented.  Appropriate.  Moves all extremities.   SKIN: Color is appropriate for race, warm, dry.  CARDIAC: S1, S2, regular rate and rhythm.  No murmurs, rubs, or gallops noted.    PULMONARY:  Lungs clear to auscultation bilaterally.    GI:  Bowel sounds present.  Abdomen soft, nontender to light palpation.  EXTREMITIES: Left groin with old firm hematoma. Ecchymosis present. Palpable DP pulses bilaterally.      ULTRASOUND:   FINDINGS: LEFT Lower extremity arterial duplex:  Common femoral artery: 124/0 cm/s, biphasic  Profundus femoral artery: 47/0 cm/s, biphasic     Superficial femoral artery, origin: 74/0 cm/s, biphasic  Superficial femoral artery, mid thigh: 65/0 cm/s,  biphasic  Superficial femoral artery, distal thigh: 46/0 cm/s, triphasic     Popliteal artery: 3/10 cm/s, triphasic     Anterior tibial artery, ankle: 49/0 cm/s, triphasic  Posterior tibial artery, ankle: 47/0 cm/s, triphasic                                                                      IMPRESSION: Negative study.    ASSESSMENT:  Patient Active Problem List   Diagnosis     Hyperlipidemia LDL goal <70     Mild major depression (H)     Pulmonary hypertension (H)     Mild persistent asthma     Seasonal allergic rhinitis     Mitral insufficiency     Tricuspid insufficiency     Bipolar disorder (H)     Renal insufficiency     Tremors     Advanced directives, counseling/discussion     Family history of diabetes mellitus     Family history of celiac disease     Celiac disease     History of colonic polyps     Benign essential hypertension     Hypothyroidism, unspecified type     CKD (chronic kidney disease) stage 3, GFR 30-59 ml/min (H)     Severe aortic stenosis     Other ill-defined heart diseases     Anemia     Disorder of kidney and ureter     Generalized anxiety disorder     Osteopenia     CAD S/P percutaneous coronary angioplasty     Status post coronary angiogram     NYHA class 3 heart failure with preserved ejection fraction (H)     Ischemic cardiomyopathy     Mixed hyperlipidemia     Moderate persistent asthma without complication     Hearing disorder, unspecified laterality     Impairment of balance     Bipolar 1 disorder, depressed, severe (H)     Essential hypertension     Stented coronary artery     Atherosclerosis of native coronary artery of native heart without angina pectoris     Encounter for screening laboratory testing for severe acute respiratory syndrome coronavirus 2 (SARS-CoV-2)     Aortic stenosis, severe     Weakness generalized     SOB (shortness of breath)     Left leg claudication (H)     S/P TAVR (transcatheter aortic valve replacement)       Assessment and Plan: Normal arterial  perfusion to LLE without redemonstration of stenosis on duplex today that was seen last week.  Would recommend ABIs at her visit with Pati De Santiago in October. Antiplatelet and statin therapy per Cardiology. We will sign off. Pati can reach out in October if any questions.    Thank you,  Selena Sow MD, DFSVS, RPVI  Director, [facility] Vascular Services  Chief, Vascular and Endovascular Surgery  [facility]  [email or web address]  Pager: 1. Send message or 10 digit call back number Securely via [employer] with the Vocera Web Console (learn more here)              2. Outside of [facility]? Call [phone]    Addendum: CTA obtained by which shows some narrowing just proximal to inguinal ligament with preserved perfusion distally in lower extremity. With palpable DP pulses  and triphasic waveforms at rest, nothing to do. She may have claudication but is not a candidate for any intervention.  Recommendations as stated to obtain ABIs when patient follows up with Pati De Santiago in October.

## 2022-09-22 NOTE — TELEPHONE ENCOUNTER
Health Call Center    Phone Message    May a detailed message be left on voicemail: no     Reason for Call: Other: Milo called from cardiac rehab to report Kamryn has been experiencing pretty severe LT leg pain which has been progressively getting worse, he also mentioned she is having gait abnormalities in her LT leg as well. Kamryn is currently in office, and Milo would like to speak to a member of the care team ASAP. Please reach out to him at [phone].    Action Taken: Message routed to:  [facility] ([facility]): Cardiology    Travel Screening: Not Applicable

## 2022-09-22 NOTE — ED NOTES
Bed: [facility]  Expected date:   Expected time:   Means of arrival:   Comments:  Avail - stretcher

## 2022-09-22 NOTE — ED TRIAGE NOTES
Pt ambulates into ED with daughter Ana. Pt had TAVR procedure on 9/12/22. She is on brilinta. She states she began to have left leg pain in the hospital and was discharged home. Now she has SOB with exertion and left leg pain from her groin to her calf muscle, difficult to describe her pain. She has not had any falls or trauma to the leg. She takes her meds as prescribed. Ambulates free of walker or assistive device. Denies numbness/tingling in legs. Denies chest pain.      Triage Assessment     Row Name 09/22/22 [id number]       Triage Assessment (Adult)    Airway WDL WDL       Respiratory WDL    Respiratory WDL all    Rhythm/Pattern, Respiratory dyspnea on exertion       Skin Circulation/Temperature WDL    Skin Circulation/Temperature WDL WDL       Cardiac WDL    Cardiac WDL X;rhythm    Cardiac Rhythm bradycardic       Peripheral/Neurovascular WDL    Peripheral Neurovascular WDL WDL       Cognitive/Neuro/Behavioral WDL    Cognitive/Neuro/Behavioral WDL WDL

## 2022-09-22 NOTE — H&P
[facility]   Cardiology CSI  History and Physical    Kamryn Miller MRN# [id number]   YOB: 1935 Age: 86 year old     Admission Date: 9/22/2022    Assessment and plan:   Ms. Miller is a 85 yo F with a pmhx of HFpEF, severe aortic stenosis s/p TAVR (26 mm Arndt Janis Ultra 9/12/22), HTN, HLD, CAD s/p LAD PCI (2020) who presented to the ED on 9/22/22 with LLE pain.     #LLE pain  #claudication  #PAD  #L common femoral stenosis  - Patient noted to have 60% stenosis of L femoral aa following TAVR, but with intact flows.   - Duplex demonstrated 62% stenosis, velocity 478 cm/s  - Discharged on DAPT with plans to see IR as outpatient, though now presents for evaluation of worsening claudication symptoms.   - Repeat duplex 9/22 with stenosis of common femoral aa.   - Seen by IR and vascular. No acute surgical interventions  Plan:  - Final vascular recs pending  - CTA LLE  - Continue ASA 81 mg and Brillinta 90 mg BID    #severe aortic stenosis s/p TAVR:  - S/p TAVR on 9/12.  - F/u TTE with MG 7 mm Hg, no periprostethic leak  Plan:  - ASA 81 mg every day    #LBBB  - Noted following TAVR, discharged on cardiac monitor  - Currently without cardiopulmonary complaints  Plan:  - Tele  - K>4, Mg>2    #HFpEF  #HTN  - Currently not in acute exacerbation  - Home meds: amlodipine 5 mg daily, lasix 20 mg daily, coreg 6.25 mg BID  Plan:  - Continue coreg 6.25 mg BID  - Continue PTA amlodipine 5 mg daily  - Continue lasix 20 mg daily    #CAD s/p LAD PCI (2020)  #HLD  #elevated troponin:  - s/p PCI to LAD in 2020  - Presenting with flat trop of 79-->77. EKG without acute ischemic findings. Possibly acute myocardial injury following recent TAVR. Low suspicion for ACS  Plan:  - No longer trending trop  - Tele  - ASA 81 mg every day  - Continue rosuvastatin 40 mg daily    #CKD:  - Likely has CKD stage II vs III, baseline Cr 1.2-1.4 over the last year  Plan:  - Avoid nephrotoxic agents  -  Renally dose meds    #Hypothyroidism  Most recent TSH 0.45 7/22  Plan:  - Continue PTA Synthroid 50 mcg daily     #Asthma  - Continue PTA Inhaler   - Continue PTA prn albuterol inhaler     #Bipolar/ MDD  - Continue PTA Lamictal 225 mg daily  - Continue PTA Pristiq 100 mg daily   - Continue PTA Cytomel 10 mcg daily  - Continue PTA Namenda 10 mg daily  - Continue PTA Remeron 7.5 mg every night          FEN: General  Code status: FULL  Prophylaxis:  Mechanical  Isolation: None  Disposition: CSI    Discussed with Dr. Burrows.     Dante Chacon, PGY-4  Cardiovascular Disease Fellow    Chief Complaint: LLE pain    HPI:   Ms. Miller is a 85 yo F with a pmhx of HFpEF, severe aortic stenosis s/p TAVR (26 mm Arndt Janis Ultra 9/12/22), HTN, HLD, CAD s/p LAD PCI (2020) who presented to the ED on 9/22/22 with LLE pain.     Patient was recently discharged on 9/13 following a TAVR procedure for aortic stenosis. She was noted to have a 60% left femoral stenosis following the procedure, but had adequate flows on peripheral angiogram. Discharged with DAPT (ASA+Brillinta) and a cardiac monitor for a LBBB noted following the TAVR. Followed up in clinic on 9/19, was doing well at that time, though continued to have L leg pain. Unilateral on the L, radiated from the hip to the thigh. This was worse with exertion, even after a few steps. No bleeding noted. Referred to IR for evaluation of LLE claudication.     In the ED, HDS and afebrile. Labs notable for Cr 1.43 (baseline ~1.2-1.4), trop 79-->77, Hgb 10.3 (stable). EKG with NSR, prior LBBB (noted following TAVR 9/13/22), 1st degree AVB. CXR without acute process. Arterial duplex of the LLE demonstrated stenosis in common femoral aa, which was new compared to prior study on 9/13. IR consulted, recommended vascular consult. Seen by vascular, intact pulses, no acute surgical indication.     On encounter, patient denies any symptoms at rest, only with short amounts of exertion. This  has been worsening since discharge. She also has some SOB, though this is also with exertion. She was previously SOB prior to TAVR, and feels that it has not changed. No chest pain , pressure, palpitations, dizziness, fever or chills. Confirmed FULL CODE.     Past Medical History:   Diagnosis Date     Aortic valvar stenosis 7/2010    mild     Asthma      Bipolar disorder (H)      CAD (coronary artery disease)     s/p angioplasty     Celiac disease      Colon polyps      Colon polyps 2012    every 3 year colonoscopy      Depression      High cholesterol      HTN      Mitral insufficiency      Pulmonary HTN (H)     mild     Tremors 7/10    drug induced from antidepressants     Tricuspid insufficiency        Past Surgical History:   Procedure Laterality Date     ANGIOGRAM  6/26/2009     ANGIOPLASTY  9/96    for angina     ANGIOPLASTY  8/03 - 9/03    X -2 - with stenst in coronary car.     APPENDECTOMY       BREAST BIOPSY, RT/LT      Breat Biopsy RT/LT, benign     BUNIONECTOMY  11/8/2011    Procedure:BUNIONECTOMY; Left donna bunionectomy; Surgeon:FREDY LITTLEJOHN; Location:[facility] OR     BUNIONECTOMY RT/LT  5/2007    right bunion and right 2nd hammertoe     CATARACT IOL, RT/LT  6/12 and 7/12    bilateral     COLONOSCOPY  2007, 2012    every 3 years for polyps     CV CORONARY ANGIOGRAM N/A 8/21/2020    Procedure: CV CORONARY ANGIOGRAM;  Surgeon: Gianluca Toscano MD;  [facility]     CV LEFT HEART CATH N/A 8/21/2020    Procedure: CV LEFT HEART CATH;  Surgeon: Gianluca Toscano MD;  [facility]     CV LEFT HEART CATH N/A 11/3/2020    Procedure: CV LEFT HEART CATH;  Surgeon: Tom Burrows MD;  [facility]     CV LOWER EXTREMITY ANGIOGRAM BILATERAL N/A 11/3/2020    Procedure: CV ANGIOGRAM LOWER EXTREMITY BILATERAL;  Surgeon: Tom Burrows MD;  Location:  [facility]     CV PCI STENT DRUG ELUTING N/A 8/21/2020    Procedure:  Percutaneous Coronary Intervention Stent Drug Eluting;  Surgeon: Gianluca Toscano MD;  Location:  [facility]     CV RIGHT HEART CATH MEASUREMENTS RECORDED N/A 8/21/2020    Procedure: CV RIGHT HEART CATH;  Surgeon: Gianluca Toscano MD;  Location:  [facility]     CV RIGHT HEART CATH MEASUREMENTS RECORDED N/A 11/3/2020    Procedure: CV RIGHT HEART CATH;  Surgeon: Tom Burrows MD;  Location:  [facility] C[facility] LAB     CV TRANSCATHETER AORTIC VALVE REPLACEMENT N/A 9/12/2022    Procedure: Transfemoral Transcatheter Aortic Valve Replacement with possible open heart bypass and or balloon pump placement and any indicated procedure;  Surgeon: Tom Burrows MD;  Location:  [facility]     HEART CATH FEMORAL CANNULIZATION WITH OPEN STANDBY REPAIR AORTIC VALVE N/A 9/12/2022    Procedure: OR TRANSCATHETER AORTIC VALVE REPLACEMENT, OPEN FEMORAL ARTERY APPROACH;  Surgeon: Arthur Markham MD;  Location:  [facility] CATH LAB     JOINT REPLACEMENT, HIP RT/LT  4/2008    right hip replaced     JOINT REPLACEMENT, HIP RT/LT  4/2009    left hip replaced     REPAIR HAMMER TOE  11/8/2011    Procedure:REPAIR HAMMER TOE; left 2nd hammertoe repair; Surgeon:FREDY LITTLEJOHN; Location: OR     SURGICAL HISTORY OF -   11/11    left bunion and 2nd hammertoe repair     TUBAL LIGATION       ZZC ANESTH,BLEPHAROPLASTY      (R) for drooping eyelid     Z APPENDECTOMY         No current facility-administered medications on file prior to encounter.  albuterol (PROAIR HFA/PROVENTIL HFA/VENTOLIN HFA) 108 (90 Base) MCG/ACT inhaler, Inhale 2 puffs into the lungs every 6 hours as needed for wheezing or shortness of breath / dyspnea  alendronate (FOSAMAX) 70 MG tablet, TAKE 1 TABLET BY MOUTH ONCE WEEKLY  amLODIPine (NORVASC) 5 MG tablet, Take 2 tablets (10 mg) by mouth daily Take this in the evening.  aspirin 81 MG tablet, Take 81 mg by mouth daily   azelastine (ASTEPRO) 0.15 %  nasal spray, USE 1 SPRAY IN EACH NOSTRIL ONCE A DAY  Calcium Citrate (CITRACAL OR), Take 1 tablet by mouth daily.  carvedilol (COREG) 6.25 MG tablet, Take 2 tablets (12.5 mg) by mouth 2 times daily  cholecalciferol (VITAMIN D3) 1250 mcg (85291 units) capsule, TAKE 1 CAPSULE BY MOUTH ONCE WEEKLY  cloNIDine (CATAPRES) 0.1 MG tablet, Take 1 tablet (0.1 mg) by mouth daily as needed (For SBP > 180)  COMPRESSION STOCKINGS, 1 each daily  desvenlafaxine (PRISTIQ) 100 MG 24 hr tablet, Take 100 mg by mouth daily  fluticasone-salmeterol (ADVAIR DISKUS) 500-50 MCG/ACT inhaler, INHALE 1 PUFF BY MOUTH TWICE DAILY  furosemide (LASIX) 20 MG tablet, Take 1 tablet (20 mg) by mouth daily  lamoTRIgine (LAMICTAL) 25 MG tablet, Take 25 mg by mouth daily with 200 mg tablet for a total dose of 225 mg  LAMOTRIGINE 200 MG PO TABS, Take 200 mg by mouth daily with 25 mg tablet for a total dose of 225 mg  levothyroxine (SYNTHROID/LEVOTHROID) 50 MCG tablet, TAKE 1 TABLET BY MOUTH ONCE DAILY  liothyronine (CYTOMEL) 5 MCG tablet, Take 2 tablets (10 mcg) by mouth daily  memantine (NAMENDA) 10 MG tablet, Take 1 tablet (10 mg) by mouth daily  mirtazapine (REMERON) 7.5 MG tablet, Take 1 tablet (7.5 mg) by mouth At Bedtime  Multiple Vitamin (TAB-A-JENNIFER) TABS, TAKE 1 TABLET BY MOUTH ONCE DAILY  potassium chloride ER (K-TAB) 20 MEQ CR tablet, Take 1 tablet (20 mEq) by mouth daily  rosuvastatin (CRESTOR) 40 MG tablet, TAKE 1 TABLET BY MOUTH ONCE DAILY  ticagrelor (BRILINTA) 90 MG tablet, Take 1 tablet (90 mg) by mouth 2 times daily        Family History   Problem Relation Age of Onset     Asthma Mother      Cerebrovascular Disease Mother      Arthritis Mother      Depression Mother      Lipids Sister      Hypertension Sister      Heart Disease Sister      Lipids Sister      Hypertension Sister      Lipids Sister      Breast Cancer Sister        Social History     Tobacco Use     Smoking status: Never Smoker     Smokeless tobacco: Never Used   Substance  Use Topics     Alcohol use: No     Alcohol/week: 0.0 standard drinks     Types: 1 Standard drinks or equivalent per week       Allergies   Allergen Reactions     Ace Inhibitors Cough     Diclofenac Other (See Comments)     Balance problems     Gluten Meal Diarrhea     ROS:   10 point ROS negative except for symptoms in H&P    Physical Examination:  Vitals: BP (!) 146/76   Pulse 57   Temp 98.6  F (37  C) (Temporal)   Resp 20   SpO2 94%   BMI= There is no height or weight on file to calculate BMI.    GENERAL APPEARANCE: healthy, alert and no distress  HEENT: no icterus, no xanthelasmas, normal pupil size  CHEST: lungs clear to auscultation without rales, rhonchi or wheezes, no use of accessory muscles, no retractions  CARDIOVASCULAR: regular rhythm, normal S1 and S2, no S3 or S4 and no murmur, click or rub.  ABDOMEN: soft, non tender, without hepatosplenomegaly, no masses palpable, bowel sounds normal  EXTREMITIES: warm, no edema, DP/PT pulses 2+ bilaterally, no clubbing or cyanosis. Sensation itact  NEURO: alert and oriented to person/place/time, normal speech, affect  SKIN: no ecchymoses, no rashes  Laboratory:  CMP  Recent Labs   Lab 09/22/22  1015      POTASSIUM 4.1   CHLORIDE 104   CO2 25   ANIONGAP 10   GLC 88   BUN 30.7*   CR 1.43*   GFRESTIMATED 36*   PRINCE 9.4   MAG 2.5*   PROTTOTAL 7.3   ALBUMIN 4.1   BILITOTAL 0.2   ALKPHOS 65   AST 28   ALT 15     CBC  Recent Labs   Lab 09/22/22  1015   WBC 5.1   RBC 4.02   HGB 10.3*   HCT 34.5*   MCV 86   MCH 25.6*   MCHC 29.9*   RDW 16.4*          No results found for: TROPI, TROPONIN, TROPR, TROPN    EKG 9/22/22:  NSR, LBBB, 1st degree AVB    TTE 9/13/22:  Interpretation Summary  Normal biventricular function.  Post 26 mm Arndt Janis Ultra valve Aortic Valve placement. The valve is  well seated. There is trivial periprosthetic regurgitation. Mean gradient is 7  mmHg.  The man RA pressure is normal.  No pericardial effusion is present.    Coronary  Angiogram and RHC 11/2020:    Moderate aortic stenosis -valve area 1.2 cm2,Mean gradient 24 mm Hg    Right sided filling pressures are normal.    Normal PA pressures.    Left sided filling pressures are normal.    Mildly reduced cardiac output level.    Peripheral angiogram done and IVUS used to assess the vessel lumens of the descending thoracic aorta,right and left external iliac arteries.    Prox R VARUN lesion is 40% stenosed.    Dist R VARUN to Mid R CFA lesion is 70% stenosed.    Mid L EIA to Dist L EIA lesion is 30% stenosed.    Ultrasound (IVUS) was performed.    RA- 9/5/4 mm Hg  RV- 38/7 mm Hg  PA- 36/8/20 mm Hg  PCW- 8/8/7 mm Hg  LVEDP- 13 mm Hg  CO -4.2 L/min,CI - 2.45 (Thermodilution)  CO- 3.21L/min,CI- 1.87 ([place])      I have seen and examined the patient and agree with the finding and plan.       Tom Burrows MD  Cardiology-CSI  [phone]

## 2022-09-22 NOTE — CONSULTS
Impression: Left common femoral artery stenosis following TAVR    Plan: Recommend consulting vascular surgery to assess for endarterectomy. Endovascular management (arterioplasty, stenting) is not indicated in this area due to restenosis and hip flexion.     HPI: Increasing left leg pain with activity prompting return to OR s/p TAVR 9/12/22     LOWER EXTREMITY ARTERIAL DUPLEX LEFT 9/22/22  Negative study  -does not show narrowing seen previously described 9/13  -further imaging with CTA may be considered    Milton Novak PA-C  Interventional Radiology  [phone]

## 2022-09-22 NOTE — PROGRESS NOTES
Vascular Surgery Staff    Called about reported consult. Please enter order for Vascular Surgery Consult. Patient seen and examined with palpable pedal pulses and left groin access site firmness from old blood. Elevated velocities seen last week on duplex not seen today. No acute vascular issues at this time. Will arrange follow up studies as outpatient. Once order in for Consult, will place note.    Thanks,  Selena Sow MD, DFSVS, RPVI  Director, [facility] Vascular Services  Chief, Vascular and Endovascular Surgery  [facility]  [email or web address]  Pager: 1. Send message or 10 digit call back number Securely via [employer] with the Vocera Web Console (learn more here)              2. Outside of [facility]? Call [phone]

## 2022-09-22 NOTE — ED PROVIDER NOTES
ED Provider Note  [facility]      History     Chief Complaint   Patient presents with     Leg Pain     Shortness of Breath     With exertion     HPI  Kamryn Miller is a 86 year old female who presents emergency room with daughter for increasing left leg pain with any ambulation.  Patient had a TAVR done by Dr. Burrows on 9/12/2022 is on Brilinta and aspirin.  Patient noted while in the hospital she start developing some left leg pain with any ambulation had an ultrasound done bilateral of the right leg was normal left leg revealed 60% stenosis of the common femoral.  Patient went home since then she has been having increasing pain with any type of activity taking a few steps and starts noticing increasing pain in the thigh and the leg without color changes no true numbness also the leg feels little bit weaker when she is ambulating but at rest otherwise she feels fine she is been having some shortness of breath with activity also.  She had edema in both legs but that has now resolved since the procedure.  Patient otherwise denies any fever chills coughing no true chest pain.  No hemoptysis.  Now presents for evaluation.    Past Medical History  Past Medical History:   Diagnosis Date     Aortic valvar stenosis 7/2010    mild     Asthma      Bipolar disorder (H)      CAD (coronary artery disease)     s/p angioplasty     Celiac disease      Colon polyps      Colon polyps 2012    every 3 year colonoscopy      Depression      High cholesterol      HTN      Mitral insufficiency      Pulmonary HTN (H)     mild     Tremors 7/10    drug induced from antidepressants     Tricuspid insufficiency      Past Surgical History:   Procedure Laterality Date     ANGIOGRAM  6/26/2009     ANGIOPLASTY  9/96    for angina     ANGIOPLASTY  8/03 - 9/03    X -2 - with stenst in coronary car.     APPENDECTOMY       BREAST BIOPSY, RT/LT      Breat Biopsy RT/LT, benign     BUNIONECTOMY  11/8/2011     Procedure:BUNIONECTOMY; Left donna bunionectomy; Surgeon:FREYD LITTLEJOHN; Location:[facility] OR     BUNIONECTOMY RT/LT  5/2007    right bunion and right 2nd hammertoe     CATARACT IOL, RT/LT  6/12 and 7/12    bilateral     COLONOSCOPY  2007, 2012    every 3 years for polyps     CV CORONARY ANGIOGRAM N/A 8/21/2020    Procedure: CV CORONARY ANGIOGRAM;  Surgeon: Gianluca Toscano MD;  [facility]     CV LEFT HEART CATH N/A 8/21/2020    Procedure: CV LEFT HEART CATH;  Surgeon: Gianluca Toscano MD;  [facility]: [facility]     CV LEFT HEART CATH N/A 11/3/2020    Procedure: CV LEFT HEART CATH;  Surgeon: Tom Burrows MD;  [facility]: [facility]     CV LOWER EXTREMITY ANGIOGRAM BILATERAL N/A 11/3/2020    Procedure: CV ANGIOGRAM LOWER EXTREMITY BILATERAL;  Surgeon: Tom Burrows MD;  Location: [facility]     CV PCI STENT DRUG ELUTING N/A 8/21/2020    Procedure: Percutaneous Coronary Intervention Stent Drug Eluting;  Surgeon: Gianluca Toscano MD;  Location: [facility]     CV RIGHT HEART CATH MEASUREMENTS RECORDED N/A 8/21/2020    Procedure: CV RIGHT HEART CATH;  Surgeon: Gianluca Toscano MD;  [facility]: [facility]     CV RIGHT HEART CATH MEASUREMENTS RECORDED N/A 11/3/2020    Procedure: CV RIGHT HEART CATH;  Surgeon: Tom Burrows MD;  [facility]     CV TRANSCATHETER AORTIC VALVE REPLACEMENT N/A 9/12/2022    Procedure: Transfemoral Transcatheter Aortic Valve Replacement with possible open heart bypass and or balloon pump placement and any indicated procedure;  Surgeon: Tom Burrows MD;  [facility]     HEART CATH FEMORAL CANNULIZATION WITH OPEN STANDBY REPAIR AORTIC VALVE N/A 9/12/2022    Procedure: OR TRANSCATHETER AORTIC VALVE REPLACEMENT, OPEN FEMORAL ARTERY APPROACH;  Surgeon: Arthur Markham MD;  [facility]     JOINT REPLACEMENT, HIP RT/LT  4/2008    right hip replaced     JOINT REPLACEMENT, HIP RT/LT  4/2009    left hip replaced     REPAIR HAMMER TOE  11/8/2011    Procedure:REPAIR HAMMER TOE; left 2nd hammertoe repair; Surgeon:FREDY LITTLEJOHN; Location:[facility] OR     SURGICAL HISTORY OF -   11/11    left bunion and 2nd hammertoe repair     TUBAL LIGATION       ZZC ANESTH,BLEPHAROPLASTY      (R) for drooping eyelid     ZZC APPENDECTOMY       albuterol (PROAIR HFA/PROVENTIL HFA/VENTOLIN HFA) 108 (90 Base) MCG/ACT inhaler  alendronate (FOSAMAX) 70 MG tablet  amLODIPine (NORVASC) 5 MG tablet  aspirin 81 MG tablet  azelastine (ASTEPRO) 0.15 % nasal spray  Calcium Citrate (CITRACAL OR)  carvedilol (COREG) 6.25 MG tablet  cholecalciferol (VITAMIN D3) 1250 mcg (97533 units) capsule  cloNIDine (CATAPRES) 0.1 MG tablet  COMPRESSION STOCKINGS  desvenlafaxine (PRISTIQ) 100 MG 24 hr tablet  fluticasone-salmeterol (ADVAIR DISKUS) 500-50 MCG/ACT inhaler  furosemide (LASIX) 20 MG tablet  lamoTRIgine (LAMICTAL) 25 MG tablet  LAMOTRIGINE 200 MG PO TABS  levothyroxine (SYNTHROID/LEVOTHROID) 50 MCG tablet  liothyronine (CYTOMEL) 5 MCG tablet  memantine (NAMENDA) 10 MG tablet  mirtazapine (REMERON) 7.5 MG tablet  Multiple Vitamin (TAB-A-JENNIFER) TABS  potassium chloride ER (K-TAB) 20 MEQ CR tablet  rosuvastatin (CRESTOR) 40 MG tablet  ticagrelor (BRILINTA) 90 MG tablet      Allergies   Allergen Reactions     Ace Inhibitors Cough     Diclofenac Other (See Comments)     Balance problems     Gluten Meal Diarrhea     Family History  Family History   Problem Relation Age of Onset     Asthma Mother      Cerebrovascular Disease Mother      Arthritis Mother      Depression Mother      Lipids Sister      Hypertension Sister      Heart Disease Sister      Lipids Sister      Hypertension Sister      Lipids Sister      Breast Cancer Sister      Social History   Social History     Tobacco Use     Smoking status: Never Smoker     Smokeless tobacco: Never  Used   Substance Use Topics     Alcohol use: No     Alcohol/week: 0.0 standard drinks     Types: 1 Standard drinks or equivalent per week     Drug use: No      Past medical history, past surgical history, medications, allergies, family history, and social history were reviewed with the patient. No additional pertinent items.       Review of Systems   Constitutional: Positive for activity change. Negative for chills, diaphoresis, fatigue and fever.   HENT: Negative for sinus pressure, sneezing, sore throat, tinnitus and voice change.    Eyes: Negative for pain and visual disturbance.   Respiratory: Positive for shortness of breath (dowling). Negative for cough and chest tightness.    Cardiovascular: Negative for chest pain, palpitations and leg swelling.   Gastrointestinal: Negative for abdominal pain, nausea and vomiting.   Genitourinary: Negative for dysuria and frequency.   Musculoskeletal: Positive for arthralgias, gait problem and myalgias. Negative for joint swelling and neck pain.        Left leg pain with ambulation   Skin: Negative for rash and wound.   Allergic/Immunologic: Negative for immunocompromised state.   Neurological: Positive for weakness (left leg with activity).   Hematological: Bruises/bleeds easily (bilinta and asa).   Psychiatric/Behavioral: Negative for agitation, confusion, decreased concentration and dysphoric mood.   All other systems reviewed and are negative.    A complete review of systems was performed with pertinent positives and negatives noted in the HPI, and all other systems negative.         Physical Exam   BP: (!) 146/73  Pulse: 55  Temp: 98.6  F (37  C)  Resp: 12  SpO2: 98 %  Physical Exam  Vitals and nursing note reviewed.   Constitutional:       General: She is in acute distress.      Appearance: She is not ill-appearing, toxic-appearing or diaphoretic.      Comments: Patient pleasant nontoxic.  With daughter at rest symptom-free vitally stable   HENT:      Head: Normocephalic  and atraumatic.      Nose: Nose normal.      Mouth/Throat:      Mouth: Mucous membranes are moist.      Pharynx: No oropharyngeal exudate.   Eyes:      General: No scleral icterus.     Extraocular Movements: Extraocular movements intact.      Conjunctiva/sclera: Conjunctivae normal.      Pupils: Pupils are equal, round, and reactive to light.   Cardiovascular:      Rate and Rhythm: Regular rhythm. Bradycardia present.      Heart sounds: Normal heart sounds.   Pulmonary:      Effort: No respiratory distress.      Breath sounds: Normal breath sounds.   Abdominal:      General: Bowel sounds are normal.      Palpations: Abdomen is soft.      Tenderness: There is no abdominal tenderness.   Musculoskeletal:         General: No swelling, tenderness or deformity.      Right lower leg: No edema.      Left lower leg: No edema.      Comments: No tenderness of the left leg noted or groin etc.  No joint effusions.  No rash   Skin:     General: Skin is warm.      Capillary Refill: Capillary refill takes less than 2 seconds.      Coloration: Skin is not pale.      Findings: No bruising, erythema, lesion or rash.      Comments: No erythema rash etc.  Patient distal pulses although diminished in the left foot the leg is not cool no paresthesias noted at all no paralysis   Neurological:      General: No focal deficit present.      Mental Status: She is oriented to person, place, and time.   Psychiatric:         Mood and Affect: Mood normal.         Behavior: Behavior normal.         Thought Content: Thought content normal.         Judgment: Judgment normal.         ED Course         Patient valuated here in the ER.  Patient's lower extremity is warm at this point no pallor distal pulses are somewhat decreased no paresthesias or paralysis.    EKG done revealing a left bundle branch block with bradycardia which appears to be old.    Chest x-ray was clear.    Discussed case with Dr. Burrows who agreed at this point we will plan admit  the patient their service for unclear etiology I consulted IR who felt vascular surgery should see the patient they did see the patient in the ER with recommendations made.    Laboratory testing white count 5.1 hemoglobin 10.3.  Platelets are 231.  Sodium 139 potassium 4.1.  Bicarb 25 gap is 10 creatinine 1.43.  BNP is 576.  CK is 121 INR 1.09    Troponin noted to be 77 COVID testing negative.    In the ER talked to [facility] GRECIA also who will admit the patient to their service.  Further management for work-up of this shortness of breath along with claudicating symptoms of the left leg post TAVR procedure.    Repeat troponin was 77 also.    Procedures            EKG Interpretation:      Interpreted by Jonathan Fleming MD  Time reviewed: 1018  Symptoms at time of EKG: sob   Rhythm: normal sinus jarad  Rate: normal  Axis: normal  Ectopy: none  Conduction: lbbb  ST Segments/ T Waves: Nonspecific ST-T wave changes  Q Waves: none  Comparison to prior: Unchanged    Clinical Impression: Old up under much block with sinus bradycardia                    Results for orders placed or performed during the hospital encounter of 09/22/22   XR Chest 2 Views     Status: None    Narrative    EXAM: XR CHEST 2 VIEWS  9/22/2022 11:32 AM      HISTORY: sob    COMPARISON: None.    FINDINGS: PA and lateral radiographs of the chest. Prosthetic aortic  valve. Coronary artery stent. Electronic device projects over the left  hemithorax and appears external to the chest on lateral view. The  trachea is midline. The cardiac silhouette is not enlarged. No pleural  effusion or pneumothorax. No focal airspace opacity. Degenerative  changes of the spine with mild exaggerated thoracic kyphosis. The  visualized upper abdomen is unremarkable.      Impression    IMPRESSION: Clear lungs.    I have personally reviewed the examination and initial interpretation  and I agree with the findings.    ELIESER RODRIGUEZ MD         SYSTEM ID:  [id number]   [facility] [place]  Extremity Arterial Duplex Left     Status: None    Narrative    ULTRASOUND LOWER EXTREMITY ARTERIAL DUPLEX LEFT 9/22/2022 11:04 AM    CLINICAL HISTORY: Increasing exertional pain and weakness.     COMPARISONS: Ultrasound lower extremity arterial duplex bilateral  9/13/2022    REFERRING PROVIDER: WILL PRICE SCOTT    TECHNIQUE: Left leg arteries evaluated with grayscale, color Doppler,  and spectral pulsed wave Doppler ultrasound.    FINDINGS: LEFT:  Common femoral artery: 124/0 cm/s, biphasic  Profundus femoral artery: 47/0 cm/s, biphasic    Superficial femoral artery, origin: 74/0 cm/s, biphasic  Superficial femoral artery, mid thigh: 65/0 cm/s, biphasic  Superficial femoral artery, distal thigh: 46/0 cm/s, triphasic    Popliteal artery: 3/10 cm/s, triphasic    Anterior tibial artery, ankle: 49/0 cm/s, triphasic  Posterior tibial artery, ankle: 47/0 cm/s, triphasic      Impression    IMPRESSION: Negative study.    MARGO GREENWOOD MD         SYSTEM ID:  [id number]   CTA Abdomen Pelvis Bilat Leg Runoff w Contr     Status: None (Preliminary result)    Impression    RESIDENT PRELIMINARY INTERPRETATION  Impression:  1. Severe stenosis at the origin of the left common femoral artery  (series 11 image 61). Remaining distal left lower extremity arterial  vasculature is patent.  This may be vascular injury vs vasospasm from  prior procedure.  2. 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   3. Additional nonemergent incidental findings include diverticulosis  without diverticulitis and cholelithiasis without cholecystitis.     INR     Status: Normal   Result Value Ref Range    INR 1.09 0.85 - 1.15   Partial thromboplastin time     Status: Normal   Result Value Ref Range    aPTT 26 22 - 38 Seconds   Comprehensive metabolic panel     Status: Abnormal   Result Value Ref Range    Sodium 139 136 - 145 mmol/L    Potassium 4.1 3.4 - 5.3 mmol/L    Chloride 104 98 - 107  mmol/L    Carbon Dioxide (CO2) 25 22 - 29 mmol/L    Anion Gap 10 7 - 15 mmol/L    Urea Nitrogen 30.7 (H) 8.0 - 23.0 mg/dL    Creatinine 1.43 (H) 0.51 - 0.95 mg/dL    Calcium 9.4 8.8 - 10.2 mg/dL    Glucose 88 70 - 99 mg/dL    Alkaline Phosphatase 65 35 - 104 U/L    AST 28 10 - 35 U/L    ALT 15 10 - 35 U/L    Protein Total 7.3 6.4 - 8.3 g/dL    Albumin 4.1 3.5 - 5.2 g/dL    Bilirubin Total 0.2 <=1.2 mg/dL    GFR Estimate 36 (L) >60 mL/min/1.73m2   Troponin T, High Sensitivity     Status: Abnormal   Result Value Ref Range    Troponin T, High Sensitivity 79 (H) <=14 ng/L   Nt probnp inpatient (BNP)     Status: Normal   Result Value Ref Range    N terminal Pro BNP Inpatient 576 0 - 1,800 pg/mL   Magnesium     Status: Abnormal   Result Value Ref Range    Magnesium 2.5 (H) 1.7 - 2.3 mg/dL   CK total     Status: Normal   Result Value Ref Range     26 - 192 U/L   Douglas Draw     Status: None    Narrative    The following orders were created for panel order Douglas Draw.  Procedure                               Abnormality         Status                     ---------                               -----------         ------                     Extra Red Top Tube[[id number]]                               Final result                 Please view results for these tests on the individual orders.   CBC with platelets and differential     Status: Abnormal   Result Value Ref Range    WBC Count 5.1 4.0 - 11.0 10e3/uL    RBC Count 4.02 3.80 - 5.20 10e6/uL    Hemoglobin 10.3 (L) 11.7 - 15.7 g/dL    Hematocrit 34.5 (L) 35.0 - 47.0 %    MCV 86 78 - 100 fL    MCH 25.6 (L) 26.5 - 33.0 pg    MCHC 29.9 (L) 31.5 - 36.5 g/dL    RDW 16.4 (H) 10.0 - 15.0 %    Platelet Count 231 150 - 450 10e3/uL    % Neutrophils 65 %    % Lymphocytes 18 %    % Monocytes 13 %    % Eosinophils 3 %    % Basophils 1 %    % Immature Granulocytes 0 %    NRBCs per 100 WBC 0 <1 /100    Absolute Neutrophils 3.3 1.6 - 8.3 10e3/uL    Absolute Lymphocytes 0.9 0.8 -  5.3 10e3/uL    Absolute Monocytes 0.7 0.0 - 1.3 10e3/uL    Absolute Eosinophils 0.2 0.0 - 0.7 10e3/uL    Absolute Basophils 0.0 0.0 - 0.2 10e3/uL    Absolute Immature Granulocytes 0.0 <=0.4 10e3/uL    Absolute NRBCs 0.0 10e3/uL   Extra Red Top Tube     Status: None   Result Value Ref Range    Hold Specimen JIC    Asymptomatic COVID-19 Virus (Coronavirus) by PCR Nasopharyngeal     Status: Normal    Specimen: Nasopharyngeal; Swab   Result Value Ref Range    SARS CoV2 PCR Negative Negative    Narrative    Testing was performed using the Xpert Xpress SARS-CoV-2 Assay on the  Cepheid Gene-Xpert Instrument Systems. Additional information about  this Emergency Use Authorization (EUA) assay can be found via the Lab  Guide. This test should be ordered for the detection of SARS-CoV-2 in  individuals who meet SARS-CoV-2 clinical and/or epidemiological  criteria. Test performance is unknown in asymptomatic patients. This  test is for in vitro diagnostic use under the FDA EUA for  laboratories certified under CLIA to perform high complexity testing.  This test has not been FDA cleared or approved. A negative result  does not rule out the presence of PCR inhibitors in the specimen or  target RNA in concentration below the limit of detection for the  assay. The possibility of a false negative should be considered if  the patient's recent exposure or clinical presentation suggests  COVID-19. This test was validated by the [facility]. This laboratory is certified under  the Clinical Laboratory Improvement Amendments of 1988 (CLIA-88) as  qualified to perform high complexity laboratory testing.     Troponin T, High Sensitivity     Status: Abnormal   Result Value Ref Range    Troponin T, High Sensitivity 77 (H) <=14 ng/L   EKG 12-lead, tracing only     Status: None   Result Value Ref Range    Systolic Blood Pressure  mmHg    Diastolic Blood Pressure  mmHg    Ventricular Rate 55 BPM     Atrial Rate 55 BPM    NC Interval 316 ms    QRS Duration 162 ms     ms    QTc 489 ms    P Axis 74 degrees    R AXIS -8 degrees    T Axis 124 degrees    Interpretation ECG       Sinus bradycardia with 1st degree A-V block  Left bundle branch block  Abnormal ECG  Unconfirmed report - interpretation of this ECG is computer generated - see medical record for final interpretation    Confirmed by - EMERGENCY ROOM, PHYSICIAN ([id number]),  CONCETTA HUITRON ([id number]) on 9/22/2022 10:45:02 AM     CBC with platelets differential     Status: Abnormal    Narrative    The following orders were created for panel order CBC with platelets differential.  Procedure                               Abnormality         Status                     ---------                               -----------         ------                     CBC with platelets and d...[[id number]]  Abnormal            Final result                 Please view results for these tests on the individual orders.     Medications   lidocaine 1 % 0.1-1 mL (has no administration in time range)   lidocaine (LMX4) cream (has no administration in time range)   sodium chloride (PF) 0.9% PF flush 3 mL (has no administration in time range)   sodium chloride (PF) 0.9% PF flush 3 mL (has no administration in time range)   lidocaine 1 % 0.1-1 mL (has no administration in time range)   lidocaine (LMX4) cream (has no administration in time range)   sodium chloride (PF) 0.9% PF flush 3 mL (has no administration in time range)   sodium chloride (PF) 0.9% PF flush 3 mL (has no administration in time range)   medication instruction (has no administration in time range)   nitroGLYcerin (NITROSTAT) sublingual tablet 0.4 mg (has no administration in time range)   alum & mag hydroxide-simethicone (MAALOX) suspension 30 mL (has no administration in time range)   acetaminophen (TYLENOL) tablet 650 mg (has no administration in time range)   acetaminophen (TYLENOL) Suppository 650 mg (has no  administration in time range)   ondansetron (ZOFRAN ODT) ODT tab 4 mg (has no administration in time range)     Or   ondansetron (ZOFRAN) injection 4 mg (has no administration in time range)   albuterol (PROVENTIL HFA/VENTOLIN HFA) inhaler (has no administration in time range)   amLODIPine (NORVASC) tablet 10 mg (10 mg Oral Given 9/22/22 2136)   carvedilol (COREG) tablet 12.5 mg (12.5 mg Oral Given 9/22/22 2135)   desvenlafaxine (PRISTIQ) 24 hr tablet 100 mg (has no administration in time range)   furosemide (LASIX) tablet 20 mg (has no administration in time range)   fluticasone-vilanterol (BREO ELLIPTA) 200-25 MCG/INH inhaler 1 puff (has no administration in time range)   lamoTRIgine (LaMICtal) tablet 25 mg (has no administration in time range)   lamoTRIgine (LaMICtal) tablet 200 mg (has no administration in time range)   levothyroxine (SYNTHROID/LEVOTHROID) tablet 50 mcg (has no administration in time range)   memantine (NAMENDA) tablet 10 mg (has no administration in time range)   mirtazapine (REMERON) tablet TABS 7.5 mg (7.5 mg Oral Given 9/22/22 2147)   rosuvastatin (CRESTOR) tablet 40 mg (has no administration in time range)   ticagrelor (BRILINTA) tablet 90 mg (90 mg Oral Given 9/22/22 2136)   aspirin (ASA) chewable tablet 81 mg (has no administration in time range)   CT saline (100 mLs Intravenous Given 9/22/22 1855)   iopamidol (ISOVUE-370) solution 120 mL (120 mLs Intravenous Given 9/22/22 1854)        Assessments & Plan (with Medical Decision Making)  86-year-old female status post TAVR 6 days ago has been having now increasing claudication symptoms left leg pain with ambulation some shortness of breath also.  EKG she has no left bundle branch block.  Vitally stable otherwise at rest without symptoms distally her pulses may be diminished but her leg is warm no swelling no paresthesias or paralysis etc. contacted I cardiologist who agreed with admitting their service I talked to IR who recommended  vascular surgery consult who did see the patient made recommendations in the ER.  Patient to be admitted to the care of the [facility] team I talked to the [facility] GRECIA who will admit the patient and further manage patient here in the ER.  At this point we have gotten arterial ultrasound did not show any acute findings but still concerning as patient had noted to have some femoral artery stenosis on the left with prior arterial ultrasound in the hospital.  So wonder if this is claudicate of symptoms.         I have reviewed the nursing notes. I have reviewed the findings, diagnosis, plan and need for follow up with the patient.    New Prescriptions    No medications on file       Final diagnoses:   Left leg claudication (H)   S/P TAVR (transcatheter aortic valve replacement)   SOB (shortness of breath)   Weakness generalized       --  Jonathan Fleming  [facility] EMERGENCY DEPARTMENT  9/22/2022    This note was created at least in part by the use of dragon voice dictation system. Inadvertent typographical errors may still exist.  Jonathan Fleming MD.    Patient evaluated in the emergency department during the COVID-19 pandemic period. Careful attention to patients safety was addressed throughout the evaluation. Evaluation and treatment management was initiated with disposition made efficiently and appropriate as possible to minimize any risk of potential exposure to patient during this evaluation.       Jonathan Fleming MD  09/22/22 [id number]

## 2022-09-23 ENCOUNTER — TELEPHONE (OUTPATIENT)
Dept: CARDIOLOGY | Facility: CLINIC | Age: 87
End: 2022-09-23

## 2022-09-23 VITALS
HEART RATE: 57 BPM | OXYGEN SATURATION: 96 % | RESPIRATION RATE: 17 BRPM | SYSTOLIC BLOOD PRESSURE: 120 MMHG | TEMPERATURE: 98.4 F | DIASTOLIC BLOOD PRESSURE: 61 MMHG

## 2022-09-23 LAB
ANION GAP SERPL CALCULATED.3IONS-SCNC: 7 MMOL/L (ref 7–15)
BUN SERPL-MCNC: 25.1 MG/DL (ref 8–23)
CALCIUM SERPL-MCNC: 9.2 MG/DL (ref 8.8–10.2)
CHLORIDE SERPL-SCNC: 106 MMOL/L (ref 98–107)
CREAT SERPL-MCNC: 1.28 MG/DL (ref 0.51–0.95)
DEPRECATED HCO3 PLAS-SCNC: 27 MMOL/L (ref 22–29)
ERYTHROCYTE [DISTWIDTH] IN BLOOD BY AUTOMATED COUNT: 16.6 % (ref 10–15)
GFR SERPL CREATININE-BSD FRML MDRD: 41 ML/MIN/1.73M2
GLUCOSE SERPL-MCNC: 99 MG/DL (ref 70–99)
HCT VFR BLD AUTO: 32.5 % (ref 35–47)
HGB BLD-MCNC: 9.7 G/DL (ref 11.7–15.7)
MAGNESIUM SERPL-MCNC: 2.2 MG/DL (ref 1.7–2.3)
MCH RBC QN AUTO: 25.7 PG (ref 26.5–33)
MCHC RBC AUTO-ENTMCNC: 29.8 G/DL (ref 31.5–36.5)
MCV RBC AUTO: 86 FL (ref 78–100)
PLATELET # BLD AUTO: 214 10E3/UL (ref 150–450)
POTASSIUM SERPL-SCNC: 3.8 MMOL/L (ref 3.4–5.3)
RBC # BLD AUTO: 3.78 10E6/UL (ref 3.8–5.2)
SODIUM SERPL-SCNC: 140 MMOL/L (ref 136–145)
WBC # BLD AUTO: 4.3 10E3/UL (ref 4–11)

## 2022-09-23 PROCEDURE — 83735 ASSAY OF MAGNESIUM: CPT | Performed by: INTERNAL MEDICINE

## 2022-09-23 PROCEDURE — 80048 BASIC METABOLIC PNL TOTAL CA: CPT | Performed by: INTERNAL MEDICINE

## 2022-09-23 PROCEDURE — 99214 OFFICE O/P EST MOD 30 MIN: CPT | Mod: FS

## 2022-09-23 PROCEDURE — 85027 COMPLETE CBC AUTOMATED: CPT | Performed by: INTERNAL MEDICINE

## 2022-09-23 PROCEDURE — 36415 COLL VENOUS BLD VENIPUNCTURE: CPT | Performed by: INTERNAL MEDICINE

## 2022-09-23 PROCEDURE — G0378 HOSPITAL OBSERVATION PER HR: HCPCS

## 2022-09-23 PROCEDURE — 250N000013 HC RX MED GY IP 250 OP 250 PS 637: Performed by: INTERNAL MEDICINE

## 2022-09-23 RX ADMIN — LAMOTRIGINE 225 MG: 25 TABLET ORAL at 08:05

## 2022-09-23 RX ADMIN — ROSUVASTATIN CALCIUM 40 MG: 10 TABLET, FILM COATED ORAL at 08:01

## 2022-09-23 RX ADMIN — DESVENLAFAXINE SUCCINATE 100 MG: 100 TABLET, EXTENDED RELEASE ORAL at 08:04

## 2022-09-23 RX ADMIN — FUROSEMIDE 20 MG: 20 TABLET ORAL at 08:00

## 2022-09-23 RX ADMIN — ASPIRIN 81 MG CHEWABLE TABLET 81 MG: 81 TABLET CHEWABLE at 08:01

## 2022-09-23 RX ADMIN — FLUTICASONE FUROATE AND VILANTEROL TRIFENATATE 1 PUFF: 200; 25 POWDER RESPIRATORY (INHALATION) at 08:09

## 2022-09-23 RX ADMIN — MEMANTINE 10 MG: 10 TABLET ORAL at 08:04

## 2022-09-23 RX ADMIN — LEVOTHYROXINE SODIUM 50 MCG: 0.05 TABLET ORAL at 07:37

## 2022-09-23 RX ADMIN — LAMOTRIGINE 200 MG: 200 TABLET ORAL at 08:08

## 2022-09-23 RX ADMIN — CARVEDILOL 12.5 MG: 12.5 TABLET, FILM COATED ORAL at 08:04

## 2022-09-23 RX ADMIN — TICAGRELOR 90 MG: 90 TABLET ORAL at 08:01

## 2022-09-23 ASSESSMENT — ACTIVITIES OF DAILY LIVING (ADL)
ADLS_ACUITY_SCORE: 37

## 2022-09-23 NOTE — TELEPHONE ENCOUNTER
Received fax from [facility] for patient medication. Form needs signature from Dr. Zuniga. Dr. Zuniga will not be back in the [facility] until October 7th. Writer faxed form to Jimmie Coppola RN, who works with Dr. Zuniga in [place] in hopes of the form being completed sooner. Writer called and spoke to nursing office at [facility] to notify them.     LOGAN Benton

## 2022-09-23 NOTE — PROGRESS NOTES
Care Management Follow Up    Length of Stay (days): 1    Expected Discharge Date: 09/24/2022     Concerns to be Addressed:     MARTINEZ  Patient plan of care discussed at interdisciplinary rounds: No    Anticipated Discharge Disposition:       Anticipated Discharge Services:    Anticipated Discharge DME:      Patient/family educated on Medicare website which has current facility and service quality ratings:    Education Provided on the Discharge Plan:    Patient/Family in Agreement with the Plan:      Referrals Placed by CM/SW:    Private pay costs discussed: insurance costs out of pocket expenses, co-pays and deductibles    Additional Information:  Writer met with patient to discuss and complete MARTINEZ paperwork. Writer answered any of patient's questions regarding insurance coverage. Writer encouraged patient to follow up with their insurance plan directly to receive the most accurate information. Patient signed MARTINEZ form and the form was placed on the patient's chart.    Pt is from:  [address] [address]  Ph: [phone]  Fax: [phone]    ________________    SOFIA Stallings, [facility]  ED/Observation   [facility]  Phone: [phone]  Pager: [phone]  Fax: [phone]    On-call pager, [phone], 4:00pm to midnight

## 2022-09-23 NOTE — DISCHARGE INSTRUCTIONS
Please follow up with your previously scheduled MD appointments.  You will be contacted to schedule an BALDO evaluation.

## 2022-09-23 NOTE — UTILIZATION REVIEW
"Admission Status; Secondary Review Determination     Under the authority of the Utilization Management Committee, the utilization review process indicated a secondary review on the above patient.  The review outcome is based on review of the medical records, discussions with staff, and applying clinical experience noted on the date of the review.       (x) Observation Status Appropriate - This patient does not meet hospital inpatient criteria and is placed in observation status. If this patient's primary payer is Medicare and was admitted as an inpatient, Condition Code 44 should be used and patient status changed to \"observation\".     RATIONALE FOR DETERMINATION: Complex 86-year-old female with a pmhx of HFpEF, severe aortic stenosis s/p TAVR (26 mm Arndt Janis Ultra 9/12/22), HTN, HLD, CAD s/p LAD PCI (2020) who presented to the ED on 9/22/22 with LLE pain. She was recently discharged following TAVR procedure at which time she was noted to have 60% left femoral stenosis following procedure but with adequate flow on angiogram. She was seen in clinic on 9/19 and was doing well at that time but continued to endorse left leg pain from the hip to the thigh, worse with exertion. She was referred to IR for LLE claudication. Observation care appropriate to obtain arterial duplex of LLE demonstrated stenosis in common femoral area, new from prior 9/13 study. She was evaluated by vascular team who ordered a CTA showing narrowing just proximal to inguinal ligament with preserved distal perfusion and palpable pulses. Patient is not a candidate for any intervention.        The severity of illness, intensity of service provided, expected LOS and risk for adverse outcome make the care appropriate for further observation; however, doesn't meet criteria for hospital inpatient admission. This was discussed with attending physician who concurred with this determination.    The information on this document is developed by the " utilization review team in order for the business office to ensure compliance.  This only denotes the appropriateness of proper admission status and does not reflect the quality of care rendered.         The definitions of Inpatient Status and Observation Status used in making the determination above are those provided in the CMS Coverage Manual, Chapter 1 and Chapter 6, section 70.4.      Sincerely,     Suman Langley MD    Physician Advisor  Utilization Review/ Case Management  [facility].     Normal

## 2022-09-23 NOTE — DISCHARGE SUMMARY
[address] [address]  p: [phone]    Discharge Summary: Cardiology Service    Kamryn Miller MRN# [id number]   YOB: 1935 Age: 86 year old       Admission Date: 9/22/2022  Discharge Date: 09/23/22    Discharge Diagnoses:  1. Claudication  2. Peripheral Artery Disease  3. Left Common Femoral Stenosis  4. Severe Aortic Stenosis s/p TAVR (9/12/22)  5. Left Bundle Branch Block   6. Heart Failure Preserved Ejection Fraction (EF 60-65%)  7. Hypertension  8. CAD s/p PCI to LAD  9. Hyperlipidemia  10. Elevated Troponin   11. Chronic Kidney Disease  12. Asthma   13. Hypothyroidism  14. Bipolar/MDD    Brief HPI:  Ms. Miller is a 87 yo F with a pmhx of HFpEF, severe aortic stenosis s/p TAVR (26 mm Arndt Janis Ultra 9/12/22), HTN, HLD, CAD s/p LAD PCI (2020) who presented to the ED on 9/22/22 with LLE pain. She was recently discharged following TAVR procedure at which time she was noted to have 60% left femoral stenosis following procedure but with adequate flow on angiogram. She was seen in clinic on 9/19 and was doing well at that time but continued to endorse left leg pain from the hip to the thigh, worse with exertion. She was referred to IR for LLE claudication. In the ED arterial duplex of LLE demonstrated stenosis in common femoral area, new from prior 9/13 study. She was evaluated by vascular team who ordered a CTA showing narrowing just proximal to inguinal ligament with preserved distal perfusion and palpable pulses. Patient is not a candidate for any intervention. They recommend BALDO's at follow up with Pati De Santiago in October. Patient is discharged in stable condition.     Hospital Course by Diagnosis:  # Claudication  # PAD  # Left Common Femoral Stenosis   Patient noted to have 60% stenosis of L femoral aa following TAVR, but with intact flows. Duplex demonstrated 62% stenosis, velocity 478 cm/s. Discharged on DAPT with  plans to see IR as outpatient, though now presents for evaluation of worsening claudication symptoms. Repeat duplex 9/22 with stenosis of common femoral aa. Seen by IR and vascular. No acute surgical interventions  - Vascular recommend outpatient BALDO's at follow up with Pati De Santiago NP in October  - Continue ASA 81 mg and Brillinta 90 mg BID     # Severe Aortic Stenosis s/p TAVR (9/12/22)  S/p TAVR on 9/12. F/u TTE with MG 7 mm Hg, no periprostethic leak  - ASA 81 mg every day  - Post TAVR follow up with Pati in October     # Left Bundle Branch Block  Noted following TAVR, discharged on cardiac monitor. Currently without cardiopulmonary complaints  - Outpatient monitor still on      # Heart Failure with Preserved Ejection Fraction (EF 60-65%)  # Hypertension   Currently not in acute exacerbation. Home meds: amlodipine 5 mg daily, lasix 20 mg daily, coreg 6.25 mg BID  - Continue coreg 6.25 mg BID  - Continue PTA amlodipine 5 mg daily  - Continue lasix 20 mg daily     # CAD s/p LAD PCI (2020)  # Hyperlipidemia   # Elevated Troponin   s/p PCI to LAD in 2020. Presenting with flat trop of 79-->77. EKG without acute ischemic findings. Possibly acute myocardial injury following recent TAVR. Low suspicion for ACS.  - Continue PTA Crestor 40mg q HS     # Chronic Kidney Disease  Likely has CKD stage II vs III, baseline Cr 1.2-1.4 over the last year  - Avoid nephrotoxic agents  - Renally dose meds     # Hypothyroidism  Most recent TSH 0.45 7/22  - Continue PTA Synthroid 50 mcg daily      # Asthma  - Continue PTA Inhaler   - Continue PTA PRN albuterol inhaler      # Bipolar/ MDD  - Continue PTA Lamictal 225 mg daily  - Continue PTA Pristiq 100 mg daily   - Continue PTA Cytomel 10 mcg daily  - Continue PTA Namenda 10 mg daily  - Continue PTA Remeron 7.5 mg every night    Pertinent Procedures:  1. None    Consults:  - IR  - Vascular    Medication Changes:  - None    Discharge medications:   Current Discharge Medication List       CONTINUE these medications which have NOT CHANGED    Details   albuterol (PROAIR HFA/PROVENTIL HFA/VENTOLIN HFA) 108 (90 Base) MCG/ACT inhaler Inhale 2 puffs into the lungs every 6 hours as needed for wheezing or shortness of breath / dyspnea  Qty: 18 g, Refills: 3    Comments: Pharmacy may dispense brand covered by insurance (Proair, or proventil or ventolin or generic albuterol inhaler)  Associated Diagnoses: Moderate persistent asthma without complication      alendronate (FOSAMAX) 70 MG tablet TAKE 1 TABLET BY MOUTH ONCE WEEKLY  Qty: 4 tablet, Refills: 23    Associated Diagnoses: Osteoporosis, unspecified osteoporosis type, unspecified pathological fracture presence      amLODIPine (NORVASC) 5 MG tablet Take 2 tablets (10 mg) by mouth daily Take this in the evening.  Qty: 180 tablet, Refills: 3    Associated Diagnoses: Benign essential hypertension      aspirin 81 MG tablet Take 81 mg by mouth daily       azelastine (ASTEPRO) 0.15 % nasal spray USE 1 SPRAY IN EACH NOSTRIL ONCE A DAY  Qty: 30 mL, Refills: 11    Associated Diagnoses: Seasonal allergic rhinitis, unspecified trigger      Calcium Citrate (CITRACAL OR) Take 1 tablet by mouth daily.      carvedilol (COREG) 6.25 MG tablet Take 2 tablets (12.5 mg) by mouth 2 times daily  Qty: 540 tablet, Refills: 3    Associated Diagnoses: Essential hypertension      cholecalciferol (VITAMIN D3) 1250 mcg (12182 units) capsule TAKE 1 CAPSULE BY MOUTH ONCE WEEKLY  Qty: 4 capsule, Refills: 11    Associated Diagnoses: Stage 3b chronic kidney disease (H)      cloNIDine (CATAPRES) 0.1 MG tablet Take 1 tablet (0.1 mg) by mouth daily as needed (For SBP > 180)  Qty: 20 tablet, Refills: 0      COMPRESSION STOCKINGS 1 each daily  Qty: 1 each, Refills: 1    Comments: Knee high 30 mmHg  Associated Diagnoses: Bilateral lower extremity edema; Essential hypertension      desvenlafaxine (PRISTIQ) 100 MG 24 hr tablet Take 100 mg by mouth daily      fluticasone-salmeterol (ADVAIR  DISKUS) 500-50 MCG/ACT inhaler INHALE 1 PUFF BY MOUTH TWICE DAILY  Qty: 60 each, Refills: 0    Associated Diagnoses: Moderate persistent asthma without complication      furosemide (LASIX) 20 MG tablet Take 1 tablet (20 mg) by mouth daily  Qty: 90 tablet, Refills: 3    Associated Diagnoses: Essential hypertension      !! lamoTRIgine (LAMICTAL) 25 MG tablet Take 25 mg by mouth daily with 200 mg tablet for a total dose of 225 mg      !! LAMOTRIGINE 200 MG PO TABS Take 200 mg by mouth daily with 25 mg tablet for a total dose of 225 mg      levothyroxine (SYNTHROID/LEVOTHROID) 50 MCG tablet TAKE 1 TABLET BY MOUTH ONCE DAILY  Qty: 28 tablet, Refills: 11    Associated Diagnoses: Stage 3b chronic kidney disease (H)      liothyronine (CYTOMEL) 5 MCG tablet Take 2 tablets (10 mcg) by mouth daily  Qty: 30 tablet, Refills: 3    Associated Diagnoses: Bipolar disorder, current episode depressed, severe, without psychotic features (H)      memantine (NAMENDA) 10 MG tablet Take 1 tablet (10 mg) by mouth daily  Qty: 30 tablet, Refills: 3    Associated Diagnoses: Major neurocognitive disorder (H)      mirtazapine (REMERON) 7.5 MG tablet Take 1 tablet (7.5 mg) by mouth At Bedtime  Qty: 30 tablet, Refills: 3    Associated Diagnoses: Bipolar disorder, current episode depressed, severe, without psychotic features (H)      Multiple Vitamin (TAB-A-JENNIFER) TABS TAKE 1 TABLET BY MOUTH ONCE DAILY  Qty: 30 tablet, Refills: PRN    Associated Diagnoses: Stage 3b chronic kidney disease (H)      potassium chloride ER (K-TAB) 20 MEQ CR tablet Take 1 tablet (20 mEq) by mouth daily  Qty: 90 tablet, Refills: 3    Associated Diagnoses: Essential hypertension      rosuvastatin (CRESTOR) 40 MG tablet TAKE 1 TABLET BY MOUTH ONCE DAILY  Qty: 28 tablet, Refills: 11    Associated Diagnoses: Hyperlipidemia with target LDL less than 70      ticagrelor (BRILINTA) 90 MG tablet Take 1 tablet (90 mg) by mouth 2 times daily  Qty: 180 tablet, Refills: 0    Associated  Diagnoses: S/P TAVR (transcatheter aortic valve replacement); Aortic stenosis, severe       !! - Potential duplicate medications found. Please discuss with provider.          Follow-up:  - HF Follow-up with Dr. Leung 10/7/22  - Post TAVR follow up with Pati De Santiago NP on 10/24    Labs or imaging requiring follow-up after discharge:  - BALDO's in October prior to follow up with Pati    Code status:  Full     Condition on discharge  Temp:  [98.6  F (37  C)] 98.6  F (37  C)  Pulse:  [55-63] 55  Resp:  [12-20] 13  BP: (125-165)/(50-82) 129/59  SpO2:  [93 %-98 %] 96 %     General: Alert, interactive, NAD  Eyes: sclera anicteric, EOMI  Neck: JVP at clavicle, carotid 2+ bilaterally  Cardiovascular: regular rate and rhythm, normal S1 and S2, no murmurs, gallops, or rubs  Resp: clear to auscultation bilaterally, no rales, wheezes, or rhonchi  GI: Soft, nontender, nondistended. +BS.  No HSM or masses, no rebound or guarding.  Extremities: no edema, no cyanosis or clubbing, dorsalis pedis and posterior tibialis pulses 2+ bilaterally  Skin: Warm and dry, no jaundice or rash  Neuro: CN 2-12 intact, moves all extremities equally  Psych: Alert & oriented x 3    Imaging with results:  9/22/22 EKG:      TTE 9/13/22:  Interpretation Summary  Normal biventricular function.  Post 26 mm Arndt Janis Ultra valve Aortic Valve placement. The valve is  well seated. There is trivial periprosthetic regurgitation. Mean gradient is 7  mmHg.  The man RA pressure is normal.  No pericardial effusion is present.     Coronary Angiogram and RHC 11/2020:    Moderate aortic stenosis -valve area 1.2 cm2,Mean gradient 24 mm Hg    Right sided filling pressures are normal.    Normal PA pressures.    Left sided filling pressures are normal.    Mildly reduced cardiac output level.    Peripheral angiogram done and IVUS used to assess the vessel lumens of the descending thoracic aorta,right and left external iliac arteries.    Prox R VARUN lesion is 40%  stenosed.    Dist R VARUN to Mid R CFA lesion is 70% stenosed.    Mid L EIA to Dist L EIA lesion is 30% stenosed.    Ultrasound (IVUS) was performed.     RA- 9/5/4 mm Hg  RV- 38/7 mm Hg  PA- 36/8/20 mm Hg  PCW- 8/8/7 mm Hg  LVEDP- 13 mm Hg  CO -4.2 L/min,CI - 2.45 (Thermodilution)  CO- 3.21L/min,CI- 1.87 (Ficks)    Patient Care Team:  Rolanda Wilcox MD as PCP - General (Family Practice)  Rolanda Wilcox MD as Assigned PCP  Pati De Santiago NP as Assigned Heart and Vascular Provider  Javier Landa MD as Assigned OBGYN Provider  Ania Johnson MD as Assigned Nephrology Provider  Dimas Palacios, PhD as Assigned Behavioral Health Provider    Time Spent on this Encounter   I, JESSICA Junior CNP, personally saw the patient today and spent greater than 30 minutes discharging this patient.     JESSICA Junior CNP on 9/23/2022 at 8:31 AM  [facility]      I have seen and examined the patient and agree with the finding and plan.       Tom Burrows MD  Cardiology-I  [phone]

## 2022-09-25 ENCOUNTER — PATIENT OUTREACH (OUTPATIENT)
Dept: CARE COORDINATION | Facility: CLINIC | Age: 87
End: 2022-09-25

## 2022-09-25 NOTE — PROGRESS NOTES
[facility]    Background: Transitional Care Management program auto-identified and prompting a chart review by [facility] Resource Center team.    Assessment: Upon chart review, [facility] Team member will cancel/close this episode of Transitional Care Management program due to reason below:    Patient has discharged to a Memory Care, Nursing Home, Assisted Living or Group Home where patient is receiving on-site support with their daily cares, including support with hospital follow up plan.    Plan: Transitional Care Management episode closed per reason above.      Delia Farris MA  Connected Care Resource [place], [facility]    *Connected Care Resource Team does NOT follow patient ongoing. Referrals are identified based on internal discharge reports and the outreach is to ensure patient has an understanding of their discharge instructions.

## 2022-09-27 ENCOUNTER — OFFICE VISIT (OUTPATIENT)
Dept: FAMILY MEDICINE | Facility: CLINIC | Age: 87
End: 2022-09-27
Payer: MEDICARE

## 2022-09-27 VITALS
HEART RATE: 65 BPM | WEIGHT: 148.8 LBS | TEMPERATURE: 98 F | OXYGEN SATURATION: 100 % | BODY MASS INDEX: 27.7 KG/M2 | SYSTOLIC BLOOD PRESSURE: 122 MMHG | DIASTOLIC BLOOD PRESSURE: 76 MMHG

## 2022-09-27 DIAGNOSIS — E03.9 HYPOTHYROIDISM, UNSPECIFIED TYPE: ICD-10-CM

## 2022-09-27 DIAGNOSIS — I25.10 CAD S/P PERCUTANEOUS CORONARY ANGIOPLASTY: ICD-10-CM

## 2022-09-27 DIAGNOSIS — E78.5 HYPERLIPIDEMIA LDL GOAL <70: ICD-10-CM

## 2022-09-27 DIAGNOSIS — I10 ESSENTIAL HYPERTENSION: ICD-10-CM

## 2022-09-27 DIAGNOSIS — I50.30 NYHA CLASS 3 HEART FAILURE WITH PRESERVED EJECTION FRACTION (H): ICD-10-CM

## 2022-09-27 DIAGNOSIS — I70.202 FEMORAL ARTERY STENOSIS, LEFT (H): Primary | ICD-10-CM

## 2022-09-27 DIAGNOSIS — N18.32 STAGE 3B CHRONIC KIDNEY DISEASE (H): ICD-10-CM

## 2022-09-27 DIAGNOSIS — E78.2 MIXED HYPERLIPIDEMIA: ICD-10-CM

## 2022-09-27 DIAGNOSIS — F31.4 BIPOLAR 1 DISORDER, DEPRESSED, SEVERE (H): ICD-10-CM

## 2022-09-27 DIAGNOSIS — Z98.61 CAD S/P PERCUTANEOUS CORONARY ANGIOPLASTY: ICD-10-CM

## 2022-09-27 DIAGNOSIS — Z95.2 S/P TAVR (TRANSCATHETER AORTIC VALVE REPLACEMENT): ICD-10-CM

## 2022-09-27 DIAGNOSIS — I10 BENIGN ESSENTIAL HYPERTENSION: ICD-10-CM

## 2022-09-27 PROBLEM — I35.0 SEVERE AORTIC STENOSIS: Status: RESOLVED | Noted: 2019-12-17 | Resolved: 2022-09-27

## 2022-09-27 PROBLEM — I35.0 AORTIC STENOSIS, SEVERE: Status: RESOLVED | Noted: 2022-09-12 | Resolved: 2022-09-27

## 2022-09-27 LAB
ERYTHROCYTE [DISTWIDTH] IN BLOOD BY AUTOMATED COUNT: 16.5 % (ref 10–15)
HCT VFR BLD AUTO: 33.4 % (ref 35–47)
HGB BLD-MCNC: 10.3 G/DL (ref 11.7–15.7)
MCH RBC QN AUTO: 26.4 PG (ref 26.5–33)
MCHC RBC AUTO-ENTMCNC: 30.8 G/DL (ref 31.5–36.5)
MCV RBC AUTO: 86 FL (ref 78–100)
PLATELET # BLD AUTO: 264 10E3/UL (ref 150–450)
RBC # BLD AUTO: 3.9 10E6/UL (ref 3.8–5.2)
WBC # BLD AUTO: 5.4 10E3/UL (ref 4–11)

## 2022-09-27 PROCEDURE — 99214 OFFICE O/P EST MOD 30 MIN: CPT | Performed by: FAMILY MEDICINE

## 2022-09-27 PROCEDURE — 36415 COLL VENOUS BLD VENIPUNCTURE: CPT | Performed by: FAMILY MEDICINE

## 2022-09-27 PROCEDURE — 80053 COMPREHEN METABOLIC PANEL: CPT | Performed by: FAMILY MEDICINE

## 2022-09-27 PROCEDURE — 85027 COMPLETE CBC AUTOMATED: CPT | Performed by: FAMILY MEDICINE

## 2022-09-27 PROCEDURE — 83735 ASSAY OF MAGNESIUM: CPT | Performed by: FAMILY MEDICINE

## 2022-09-27 PROCEDURE — 83880 ASSAY OF NATRIURETIC PEPTIDE: CPT | Performed by: FAMILY MEDICINE

## 2022-09-27 ASSESSMENT — PATIENT HEALTH QUESTIONNAIRE - PHQ9
10. IF YOU CHECKED OFF ANY PROBLEMS, HOW DIFFICULT HAVE THESE PROBLEMS MADE IT FOR YOU TO DO YOUR WORK, TAKE CARE OF THINGS AT HOME, OR GET ALONG WITH OTHER PEOPLE: SOMEWHAT DIFFICULT
SUM OF ALL RESPONSES TO PHQ QUESTIONS 1-9: 3
SUM OF ALL RESPONSES TO PHQ QUESTIONS 1-9: 3

## 2022-09-27 ASSESSMENT — ASTHMA QUESTIONNAIRES
ACT_TOTALSCORE: 21
QUESTION_3 LAST FOUR WEEKS HOW OFTEN DID YOUR ASTHMA SYMPTOMS (WHEEZING, COUGHING, SHORTNESS OF BREATH, CHEST TIGHTNESS OR PAIN) WAKE YOU UP AT NIGHT OR EARLIER THAN USUAL IN THE MORNING: NOT AT ALL
QUESTION_4 LAST FOUR WEEKS HOW OFTEN HAVE YOU USED YOUR RESCUE INHALER OR NEBULIZER MEDICATION (SUCH AS ALBUTEROL): NOT AT ALL
QUESTION_2 LAST FOUR WEEKS HOW OFTEN HAVE YOU HAD SHORTNESS OF BREATH: ONCE A DAY
QUESTION_1 LAST FOUR WEEKS HOW MUCH OF THE TIME DID YOUR ASTHMA KEEP YOU FROM GETTING AS MUCH DONE AT WORK, SCHOOL OR AT HOME: NONE OF THE TIME
ACT_TOTALSCORE: 21
QUESTION_5 LAST FOUR WEEKS HOW WOULD YOU RATE YOUR ASTHMA CONTROL: WELL CONTROLLED

## 2022-09-27 ASSESSMENT — PAIN SCALES - GENERAL: PAINLEVEL: NO PAIN (0)

## 2022-09-27 NOTE — PROGRESS NOTES
"  Assessment & Plan     Femoral artery stenosis, left (H)  Pt has  appointment with Cardiology and vaculer for further work up  Notes reviewed and discussed with pt and Family    S/P TAVR (transcatheter aortic valve replacement)  Stable     NYHA class 3 heart failure with preserved ejection fraction (H)  Doing well  - CBC with platelets  - Comprehensive metabolic panel  - Magnesium  - N terminal pro BNP outpatient    CAD S/P percutaneous coronary angioplasty  Pending   - CBC with platelets; Future    Essential hypertension  controlled    Hyperlipidemia LDL goal <70  Stable     Hypothyroidism, unspecified type  Stable     Stage 3b chronic kidney disease (H)  Labs reviewed ER    Bipolar 1 disorder, depressed, severe (H)  Doing well    Benign essential hypertension  controlled  - CBC with platelets  - Comprehensive metabolic panel  - Magnesium  - N terminal pro BNP outpatient    Mixed hyperlipidemia  Labs pending   - CBC with platelets  - Comprehensive metabolic panel  - Magnesium  - N terminal pro BNP outpatient  BMI:   Estimated body mass index is 27.7 kg/m  as calculated from the following:    Height as of 9/19/22: 1.561 m (5' 1.46\").    Weight as of this encounter: 67.5 kg (148 lb 12.8 oz).           Return in about 2 months (around 11/27/2022) for Medicare wellness Exam.    Rolanda Wilcox MD  [facility] PIERCE Harry is a 86 year old, presenting for the following health issues:  ER F/U (9/22/22) and Hospital F/U (9/12/22-09/13/22)      HPI      Facility: [facility]   Date of visit: September 22, 2022  Reason for visit: Leg pain and shortness of breath.   Current Status: Staying the same.       Hospital Follow-up Visit:    Hospital/Nursing Home/IP Rehab Facility: [facility]  Date of Admission: September 12, 2022  Date of Discharge: September 13, 2022  Reason(s) for Admission: Aortic stenosis s/p avr    Was your hospitalization related to COVID-19? No   Problems taking medications regularly:  " None  Medication changes since discharge: None  Problems adhering to non-medication therapy:  None    Summary of hospitalization:  CareEverywhere information obtained and reviewed  Diagnostic Tests/Treatments reviewed.  Follow up needed: cardiology  Other Healthcare Providers Involved in Patient s Care:         None  Update since discharge: better     Post Medication Reconciliation Status:      . Severe stenosis at the origin of the left common femoral artery  (series 11 image 61). Remaining distal left lower extremity arterial  vasculature is patent.  This may be vascular injury vs vasospasm from  prior procedure.  2. 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   3. Additional nonemergent incidental findings include diverticulosis  without diverticulitis and cholelithiasis without cholecystitis.     Plan of care communicated with patient     pt is s/p TAVR and stable otherwise  Hyperlipidemia Follow-Up      Are you regularly taking any medication or supplement to lower your cholesterol?   Yes- statins    Are you having muscle aches or other side effects that you think could be caused by your cholesterol lowering medication?  No    Hypertension Follow-up      Do you check your blood pressure regularly outside of the clinic? No     Are you following a low salt diet? Yes    Are your blood pressures ever more than 140 on the top number (systolic) OR more   than 90 on the bottom number (diastolic), for example 140/90? No    Heart Failure Follow-up    Are you experiencing any shortness of breath? Doing better though was sob when in ER    Are you experiencing any swelling in your legs or feet?  No        Have you had a weight change recently? Stable     Are you having any of the following side effects from your medications? (Select all that apply) stable     Since your last visit, how many times have you gone to the cardiologist, urgent care, emergency  room, or hospital because of your heart failure?   Was recently seen in ER    Hypothyroidism Follow-up    Since last visit, patient describes the following symptoms: {wt stable           Review of Systems   CONSTITUTIONAL: NEGATIVE for fever, chills, change in weight  ENT/MOUTH: NEGATIVE for ear, mouth and throat problems  RESP: NEGATIVE for significant cough or SOB  CV: NEGATIVE for chest pain, palpitations or peripheral edema  GI: NEGATIVE for nausea, abdominal pain, heartburn, or change in bowel habits  MUSCULOSKELETAL: still has pain in her leg with walking  PSYCHIATRIC: NEGATIVE for changes in mood or affect  Has leg pain  Rest of the ROS is Negative except see above and Problem list [stable]        Objective    /76   Pulse 65   Temp 98  F (36.7  C) (Temporal)   Wt 67.5 kg (148 lb 12.8 oz)   SpO2 100%   BMI 27.70 kg/m    Body mass index is 27.7 kg/m .  Physical Exam   GENERAL: healthy, alert and no distress  EYES: Eyes grossly normal to inspection  NECK: no adenopathy, no asymmetry, masses, or scars and thyroid normal to palpation  RESP: lungs clear to auscultation - no rales, rhonchi or wheezes  CV: regular rate and rhythm, normal S1 S2, no S3 or S4, no murmur, click or rub, no peripheral edema and peripheral pulses strong  ABDOMEN: soft, nontender, no hepatosplenomegaly, no masses and bowel sounds normal  MS: no gross musculoskeletal defects noted, no edema  PSYCH: mentation appears normal, affect normal/bright    Reviewed labs     Notes reviewed care everywhere and Imaging

## 2022-09-28 LAB
ALBUMIN SERPL-MCNC: 3.6 G/DL (ref 3.4–5)
ALP SERPL-CCNC: 65 U/L (ref 40–150)
ALT SERPL W P-5'-P-CCNC: 21 U/L (ref 0–50)
ANION GAP SERPL CALCULATED.3IONS-SCNC: 5 MMOL/L (ref 3–14)
AST SERPL W P-5'-P-CCNC: 21 U/L (ref 0–45)
BILIRUB SERPL-MCNC: 0.3 MG/DL (ref 0.2–1.3)
BUN SERPL-MCNC: 31 MG/DL (ref 7–30)
CALCIUM SERPL-MCNC: 9.6 MG/DL (ref 8.5–10.1)
CHLORIDE BLD-SCNC: 108 MMOL/L (ref 94–109)
CO2 SERPL-SCNC: 27 MMOL/L (ref 20–32)
CREAT SERPL-MCNC: 1.44 MG/DL (ref 0.52–1.04)
GFR SERPL CREATININE-BSD FRML MDRD: 35 ML/MIN/1.73M2
GLUCOSE BLD-MCNC: 101 MG/DL (ref 70–99)
MAGNESIUM SERPL-MCNC: 2.4 MG/DL (ref 1.6–2.3)
NT-PROBNP SERPL-MCNC: 566 PG/ML (ref 0–1800)
POTASSIUM BLD-SCNC: 4.7 MMOL/L (ref 3.4–5.3)
PROT SERPL-MCNC: 7.5 G/DL (ref 6.8–8.8)
SODIUM SERPL-SCNC: 140 MMOL/L (ref 133–144)

## 2022-09-28 NOTE — OP NOTE
OPERATIVE DATE: 9/12/2022    PRE-OPERATIVE DIAGNOSIS:  1) Severe aortic stenosis  Patient Active Problem List   Diagnosis     Hyperlipidemia LDL goal <70     Mild major depression (H)     Pulmonary hypertension (H)     Mild persistent asthma     Seasonal allergic rhinitis     Mitral insufficiency     Tricuspid insufficiency     Renal insufficiency     Tremors     Advanced directives, counseling/discussion     Family history of diabetes mellitus     Family history of celiac disease     Celiac disease     History of colonic polyps     Benign essential hypertension     Hypothyroidism, unspecified type     CKD (chronic kidney disease) stage 3, GFR 30-59 ml/min (H)     Other ill-defined heart diseases     Anemia     Disorder of kidney and ureter     Generalized anxiety disorder     Osteopenia     CAD S/P percutaneous coronary angioplasty     Status post coronary angiogram     NYHA class 3 heart failure with preserved ejection fraction (H)     Ischemic cardiomyopathy     Mixed hyperlipidemia     Moderate persistent asthma without complication     Hearing disorder, unspecified laterality     Impairment of balance     Bipolar 1 disorder, depressed, severe (H)     Essential hypertension     Stented coronary artery     Atherosclerosis of native coronary artery of native heart without angina pectoris     Encounter for screening laboratory testing for severe acute respiratory syndrome coronavirus 2 (SARS-CoV-2)     Weakness generalized     SOB (shortness of breath)     Left leg claudication (H)     S/P TAVR (transcatheter aortic valve replacement)     POST-OPERATIVE DIAGNOSIS:  1) Severe aortic stenosis  Patient Active Problem List   Diagnosis     Hyperlipidemia LDL goal <70     Mild major depression (H)     Pulmonary hypertension (H)     Mild persistent asthma     Seasonal allergic rhinitis     Mitral insufficiency     Tricuspid insufficiency     Renal insufficiency     Tremors     Advanced directives, counseling/discussion      Family history of diabetes mellitus     Family history of celiac disease     Celiac disease     History of colonic polyps     Benign essential hypertension     Hypothyroidism, unspecified type     CKD (chronic kidney disease) stage 3, GFR 30-59 ml/min (H)     Other ill-defined heart diseases     Anemia     Disorder of kidney and ureter     Generalized anxiety disorder     Osteopenia     CAD S/P percutaneous coronary angioplasty     Status post coronary angiogram     NYHA class 3 heart failure with preserved ejection fraction (H)     Ischemic cardiomyopathy     Mixed hyperlipidemia     Moderate persistent asthma without complication     Hearing disorder, unspecified laterality     Impairment of balance     Bipolar 1 disorder, depressed, severe (H)     Essential hypertension     Stented coronary artery     Atherosclerosis of native coronary artery of native heart without angina pectoris     Encounter for screening laboratory testing for severe acute respiratory syndrome coronavirus 2 (SARS-CoV-2)     Weakness generalized     SOB (shortness of breath)     Left leg claudication (H)     S/P TAVR (transcatheter aortic valve replacement)     PROCEDURE:  1) Temporary pacemaker insertion  2) Iliofemoral artery angiography  3) Ascending aorta angiography  4) Left heart catheterization  5) Transfemoral transcatheter aortic valve replacement with 26mm Janis S3 valve  6) Arteriotomy closure    SURGEON: Arthur Markham MD    CARDIOLOGIST: Tom Jimenez MD    ANESTHESIA: GETA    ESTIMATED BLOOD LOSS: 100 cc    INDICATIONS:  Ms. Kamryn Miller is a 86 year old year old female admitted with severe aortic stenosis.  We were asked to evaluate for TAVR.  Risks and benefits of the operation were explained to the patient and their family including, but not limited to, bleeding, infection, stroke and even death.  They understood these risks and agreed to proceed electively.    OPERATIVE REPORT:  The patient was transferred  to the operating room and positioned supine on the OR table.  Appropriate support and monitoring devices were placed and managed by anesthesia.  The patients abdomen and bilateral lower extremities were clipped, prepped and draped in sterile fashion.  A pre-procedure time-out was performed confirming the correct patient, correct site and correct procedure.  Intravenous heparin was administered.  Arterial access of the right and left femoral arteries and left common femoral vein was performed by interventional cardiology.  Two Perclose Proglide devices were deployed on the side used for valve deployment.  A temporary transvenous pacemaker was placed by the cardiology team.  A Lunderquist wire was advanced to support the Arndt sheath insertion.  A pigtail catheter was advanced to the aortic root and angiogram performed to confirm optimal implant angle.  The pigtail was placed in the non-coronary cusp.  The LV was accessed using an AL-2 catheter over a straight wire.  The pigtail catheter was advanced into the LV.  The pigtail catheter was used to advance a Lunderquist Safari wire into the LV and the pigtail catheter was removed.  The Janis bioprosthetic valve was positioned across the native aortic valve and deployed using rapid pacing and ventilatory pause.  Transesophageal and transthoracic echocardiography showed trace paravalvular insufficiency.  The deployment system and wires were removed.  The femoral access was made hemostatic.  A final iliofemoral angiogram showed no extravasation or stenosis.  The patient was then transferred from the operating bed to an ICU bed and transferred to the ICU in critical, but stable, condition.    All needle, sponge and instrument counts were correct at the end of the case.    Arthur Markham  Cardiothoracic Surgery  Pager: [phone]

## 2022-09-30 ENCOUNTER — TELEPHONE (OUTPATIENT)
Dept: FAMILY MEDICINE | Facility: CLINIC | Age: 87
End: 2022-09-30

## 2022-09-30 NOTE — TELEPHONE ENCOUNTER
Daughter, Ana calling. No consent to communicate on file. States pt just had a TAVR and is very tired and this is getting worse. Pt's daughter reviewed mychart results from previous and are wondering if the low hgb could   be a significant component of her tiredness?  Wondering if iron pills can be considered? Will see Efrain in 1 week. Pt was seen by Dr. Wilcox most recently. Routing to PCP to advise. Please call pt at [phone] with provider's response and ok to leave a detailed message.    Verona Blackwell RN  [facility]

## 2022-10-03 NOTE — TELEPHONE ENCOUNTER
Left detailed message regarding PCPs response. Provided call back number of [phone] with any further questions.     Zari Garcia RN  [facility]

## 2022-10-05 ENCOUNTER — LAB (OUTPATIENT)
Dept: LAB | Facility: CLINIC | Age: 87
End: 2022-10-05
Payer: MEDICARE

## 2022-10-05 ENCOUNTER — DOCUMENTATION ONLY (OUTPATIENT)
Dept: LAB | Facility: CLINIC | Age: 87
End: 2022-10-05

## 2022-10-05 DIAGNOSIS — I50.33 ACUTE ON CHRONIC DIASTOLIC (CONGESTIVE) HEART FAILURE (H): Primary | ICD-10-CM

## 2022-10-05 DIAGNOSIS — I50.33 ACUTE ON CHRONIC DIASTOLIC (CONGESTIVE) HEART FAILURE (H): ICD-10-CM

## 2022-10-05 LAB
ERYTHROCYTE [DISTWIDTH] IN BLOOD BY AUTOMATED COUNT: 16.9 % (ref 10–15)
HCT VFR BLD AUTO: 33.5 % (ref 35–47)
HGB BLD-MCNC: 9.9 G/DL (ref 11.7–15.7)
MCH RBC QN AUTO: 25.9 PG (ref 26.5–33)
MCHC RBC AUTO-ENTMCNC: 29.6 G/DL (ref 31.5–36.5)
MCV RBC AUTO: 88 FL (ref 78–100)
PLATELET # BLD AUTO: 251 10E3/UL (ref 150–450)
RBC # BLD AUTO: 3.82 10E6/UL (ref 3.8–5.2)
WBC # BLD AUTO: 5.8 10E3/UL (ref 4–11)

## 2022-10-05 PROCEDURE — 36415 COLL VENOUS BLD VENIPUNCTURE: CPT

## 2022-10-05 PROCEDURE — 80048 BASIC METABOLIC PNL TOTAL CA: CPT

## 2022-10-05 PROCEDURE — 85027 COMPLETE CBC AUTOMATED: CPT

## 2022-10-06 LAB
ANION GAP SERPL CALCULATED.3IONS-SCNC: 7 MMOL/L (ref 3–14)
BUN SERPL-MCNC: 39 MG/DL (ref 7–30)
CALCIUM SERPL-MCNC: 9.6 MG/DL (ref 8.5–10.1)
CHLORIDE BLD-SCNC: 103 MMOL/L (ref 94–109)
CO2 SERPL-SCNC: 27 MMOL/L (ref 20–32)
CREAT SERPL-MCNC: 1.59 MG/DL (ref 0.52–1.04)
GFR SERPL CREATININE-BSD FRML MDRD: 31 ML/MIN/1.73M2
GLUCOSE BLD-MCNC: 98 MG/DL (ref 70–99)
POTASSIUM BLD-SCNC: 4.6 MMOL/L (ref 3.4–5.3)
SODIUM SERPL-SCNC: 137 MMOL/L (ref 133–144)

## 2022-10-07 ENCOUNTER — OFFICE VISIT (OUTPATIENT)
Dept: CARDIOLOGY | Facility: CLINIC | Age: 87
End: 2022-10-07
Payer: MEDICARE

## 2022-10-07 VITALS
HEART RATE: 60 BPM | OXYGEN SATURATION: 98 % | WEIGHT: 149 LBS | BODY MASS INDEX: 27.74 KG/M2 | DIASTOLIC BLOOD PRESSURE: 69 MMHG | SYSTOLIC BLOOD PRESSURE: 125 MMHG

## 2022-10-07 DIAGNOSIS — Z95.2 S/P TAVR (TRANSCATHETER AORTIC VALVE REPLACEMENT): ICD-10-CM

## 2022-10-07 DIAGNOSIS — E78.2 MIXED HYPERLIPIDEMIA: ICD-10-CM

## 2022-10-07 DIAGNOSIS — I50.30 NYHA CLASS 3 HEART FAILURE WITH PRESERVED EJECTION FRACTION (H): Primary | ICD-10-CM

## 2022-10-07 DIAGNOSIS — I42.8 NONISCHEMIC CARDIOMYOPATHY (H): ICD-10-CM

## 2022-10-07 DIAGNOSIS — I10 BENIGN ESSENTIAL HYPERTENSION: ICD-10-CM

## 2022-10-07 DIAGNOSIS — I10 ESSENTIAL HYPERTENSION: ICD-10-CM

## 2022-10-07 PROCEDURE — 99214 OFFICE O/P EST MOD 30 MIN: CPT | Performed by: INTERNAL MEDICINE

## 2022-10-07 RX ORDER — HYDRALAZINE HYDROCHLORIDE 50 MG/1
50 TABLET, FILM COATED ORAL 3 TIMES DAILY
Qty: 270 TABLET | Refills: 1 | Status: SHIPPED | OUTPATIENT
Start: 2022-10-07 | End: 2022-10-17

## 2022-10-07 NOTE — PATIENT INSTRUCTIONS
Thank you for coming to the [facility] at [place]; please note the following instructions:    1. Discontinue amlodipine    2. Discontinue furosemide    3. Restart Hydralazine 50 mg three times daily (new script has been sent)    4. Follow up with Dr Zuniga on December 2        If you have any questions regarding your visit, please contact your care team:     CARDIOLOGY  TELEPHONE NUMBER   Alisa BROWNLEE., Registered Nurse  Jennifer VITALE, Registered Nurse  Fidelina BOUCHER, Registered Medical Assistant  Becky MORGAN, Visit Facilitator [phone] (select option 1)    *After hours: [phone]   For Scheduling Appts:     [phone] (select option 1)    *After hours: [phone]   For the Device Clinic (Pacemakers and ICD's)  Delia VARGHESE, Registered Nurse   During business hours: [phone]    *After business hours:  [phone] (select option 4)      Normal test result notifications will be released via [employer] or mailed within 7 business days.  All other test results, will be communicated via telephone once reviewed by your cardiologist.    If you need a medication refill, please contact your pharmacy.  Please allow 3 business days for your refill to be completed.    As always, thank you for trusting us with your health care needs!

## 2022-10-07 NOTE — NURSING NOTE
"Chief Complaint   Patient presents with     Follow Up     Pt reports SOB which has gotten \"worse since surgery\", 3 mo return with labs prior       Initial BP (!) 161/83 (BP Location: Right arm, Patient Position: Sitting, Cuff Size: Adult Small)   Pulse 65   Wt 67.6 kg (149 lb)   SpO2 98%   BMI 27.74 kg/m   Estimated body mass index is 27.74 kg/m  as calculated from the following:    Height as of 9/19/22: 1.561 m (5' 1.46\").    Weight as of this encounter: 67.6 kg (149 lb)..  BP completed using cuff size: small LOGAN Young    "

## 2022-10-07 NOTE — PROGRESS NOTES
October 7, 2022  I had the pleasure of seeing Kamryn Miller  in the [facility] for further evaluation and management. She has a history of Hx CAD, HLD, HTN, mitral and tricuspid insuff, pulmonary hypertension, lost to follow up in the past.  She does have CAD and did receive 3 stents in 8/2020 as below.  She denies any cardiomyopathy and she has not had any cardiology follow-up for several years.  Notes that she does have shortness of breath especially with exertion or she walks outside.  This is class III symptoms at the moment she can will probably about a block and able to take a flight of stairs.  We did start TAVR evaluation and she did undergo right heart catheterization as well as invasive hemodynamic measurement and measurement of the valve area.  Given findings of moderate aortic stenosis, TAVR was not yet pursued and ongoing surveillance was recommended. Had been hypertensive in the recent past. She did see the structural team just recently and repeat TTE was performed that again showed moderate to severe aortic stenosis, essentially unchanged from prior TTE. Given that she had no concerning symptoms ongoing surveillance was recommended.  On last visit I suggested increasing amlodipine to 10mg daily due to persistent hypertension. However, she did have an admission 12/16/21 and multiple medication changes made, including stopping amlodipine. Accordingly, she developed hypertension and 5mg was restarted in UC. However, she developed hypertensive emergency requiring admission for this and amlodipine was increased to 10mg daily with good response. Goven ongoing and worsening symptoms she was revaluated by the structural team and ultimately underwent successful TAVR on 9/12/22 with a post intervention gradient of 7mmHg. She was seen in the ER subsequently for claudication and leg pain in the setting of known LFA stenosis. Today she is here for follow up.  Overall she has a few issues  since the TAVR procedure.  She is feels that she is feeling more tired and fatigued and she does not have much energy at this time.  She does not have any chest pain dizziness palpitations however the shortness of breath persistent has catheter symptoms at this time which is ongoing.  She is happy though that her lower extremity edema has completely resolved.  Denies any other issues she did have claudication as noted above however feels that this is a little bit better.  No other issues    PAST MEDICAL HISTORY:  Past Medical History:   Diagnosis Date     Aortic valvar stenosis 7/2010    mild     Asthma      Bipolar disorder (H)      CAD (coronary artery disease)     s/p angioplasty     Celiac disease      Colon polyps      Colon polyps 2012    every 3 year colonoscopy      Depression      High cholesterol      HTN      Mitral insufficiency      Pulmonary HTN (H)     mild     Tremors 7/10    drug induced from antidepressants     Tricuspid insufficiency      FAMILY HISTORY:  Family History   Problem Relation Age of Onset     Asthma Mother      Cerebrovascular Disease Mother      Arthritis Mother      Depression Mother      Lipids Sister      Hypertension Sister      Heart Disease Sister      Lipids Sister      Hypertension Sister      Lipids Sister      Breast Cancer Sister      SOCIAL HISTORY:  Social History     Socioeconomic History     Marital status:      Spouse name: Adrien     Number of children: 2     Years of education: 16   Occupational History     Employer: RETIRED     Comment: Retired in 2002.    Tobacco Use     Smoking status: Never Smoker     Smokeless tobacco: Never Used   Substance and Sexual Activity     Alcohol use: No     Alcohol/week: 0.0 standard drinks     Types: 1 Standard drinks or equivalent per week     Drug use: No     Sexual activity: Not Currently     Partners: Male     CURRENT MEDICATIONS:  Current Outpatient Medications   Medication     albuterol (PROAIR HFA/PROVENTIL HFA/VENTOLIN  HFA) 108 (90 Base) MCG/ACT inhaler     alendronate (FOSAMAX) 70 MG tablet     amLODIPine (NORVASC) 5 MG tablet     aspirin 81 MG tablet     azelastine (ASTEPRO) 0.15 % nasal spray     Calcium Citrate (CITRACAL OR)     carvedilol (COREG) 6.25 MG tablet     cholecalciferol (VITAMIN D3) 1250 mcg (01508 units) capsule     cloNIDine (CATAPRES) 0.1 MG tablet     COMPRESSION STOCKINGS     desvenlafaxine (PRISTIQ) 100 MG 24 hr tablet     fluticasone-salmeterol (ADVAIR DISKUS) 500-50 MCG/ACT inhaler     furosemide (LASIX) 20 MG tablet     lamoTRIgine (LAMICTAL) 25 MG tablet     LAMOTRIGINE 200 MG PO TABS     levothyroxine (SYNTHROID/LEVOTHROID) 50 MCG tablet     liothyronine (CYTOMEL) 5 MCG tablet     memantine (NAMENDA) 10 MG tablet     mirtazapine (REMERON) 7.5 MG tablet     Multiple Vitamin (TAB-A-JENNIFER) TABS     potassium chloride ER (K-TAB) 20 MEQ CR tablet     rosuvastatin (CRESTOR) 40 MG tablet     ticagrelor (BRILINTA) 90 MG tablet     No current facility-administered medications for this visit.     ROS:   Constitutional: No fever, chills, or sweats.   ENT: No visual disturbance, ear ache, epistaxis, sore throat.   Allergies/Immunologic: Negative.   Respiratory: No cough, hemoptysis.   Cardiovascular: As per HPI.   GI: No nausea, vomiting, hematemesis, melena, or hematochezia.   : No urinary frequency, dysuria, or hematuria.   Integrument: Negative.   Psychiatric: No evidence of major depression  Neuro: No new neurological complaints at this time. Non focal  Endocrinology: Negative.   Musculoskeletal: As per HPI.      EXAM:  BP (!) 161/83 (BP Location: Right arm, Patient Position: Sitting, Cuff Size: Adult Small)   Pulse 65   Wt 67.6 kg (149 lb)   SpO2 98%   BMI 27.74 kg/m    General: appears comfortable, alert and oriented  Head: normocephalic, atraumatic  Eyes: anicteric sclera, EOMI , PERRL  Neck: no adenopathy  Orophyarynx: moist mucosa, no lesions noted  Heart: regular, S1/S2, no murmurs, rubs or gallop.  Estimated JVP at 5 cmH2O  Lungs: CTAB, No wheezing.   Abdomen: soft, non-tender, bowel sounds present, no hepatosplenomegaly  Extremities: No LE edema today  Skin: no open lesions noted  Neuro: grossly non-focal  Psych: no evidence of depression noted     Labs:  Lab Results   Component Value Date    WBC 5.8 10/05/2022    HGB 9.9 (L) 10/05/2022    HCT 33.5 (L) 10/05/2022     10/05/2022     10/05/2022    POTASSIUM 4.6 10/05/2022    CHLORIDE 103 10/05/2022    CO2 27 10/05/2022    BUN 39 (H) 10/05/2022    CR 1.59 (H) 10/05/2022    GLC 98 10/05/2022    SED 11 07/28/2021    NTBNPI 576 09/22/2022    NTBNP 566 09/27/2022    AST 21 09/27/2022    ALT 21 09/27/2022    ALKPHOS 65 09/27/2022    BILITOTAL 0.3 09/27/2022    INR 1.09 09/22/2022     Echocardiogram reviewed:   Left ventricular function, chamber size, wall motion, and wall thickness are normal.The EF is 55-60%. No regional wall motion abnormalities are seen.  Right ventricular function, chamber size, wall motion, and thickness are normal.  Severe left atrial enlargement is present. Moderate mitral annular calcification is present. Mild mitral insufficiency is present. There is likley moderate aortic stenosis. The peak velocity is  3.47m/sec, the peak and mean gradients are 48mmHg and 28mmHg. The aortic valve  area is calculated low at 0.7cm2. Pulmonary artery systolic pressure is normal. The inferior vena cava was normal in size with preserved respiratory variability. No pericardial effusion is present.   Compared to prior study AS has worsened. Otherwise no significant change     Coronary angiogram 8/21/2020:    Dist LAD lesion is 70% stenosed.    Mid LAD-1 lesion is 80% stenosed.    Mid LAD-2 lesion is 60% stenosed.    Mid RCA lesion is 40% stenosed.    Prox RCA lesion is 40% stenosed.    Right sided filling pressures are normal.    Normal PA pressures.    Left sided filling pressures are normal.    Reduced cardiac output.  1. LAD - 3 EDDIE were placed in  the mid to distal LAD (2.5 x 28 mm, 3.5 x 12 mm, and 3.0 x 8 mm)  2. RHC showed normal biventricular filling pressures. Normal PA pressure. Reduced cardiac output.     RHC/coronary angiogram 11/3/20:    Moderate aortic stenosis -valve area 1.2 cm2,Mean gradient 24 mm Hg    Right sided filling pressures are normal.    Normal PA pressures.    Left sided filling pressures are normal.    Mildly reduced cardiac output level.    Peripheral angiogram done and IVUS used to assess the vessel lumens of the descending thoracic aorta,right and left external iliac arteries     TTE 5/3/21:  Global and regional left ventricular function is normal with an EF of 55-60%.  The right ventricle is normal size. Global right ventricular function is normal.  Moderate to severe aortic stenosis, aortic valve area 1.02 cm2, peak velocity  3.3 m/s, mean gradient 25 mmHg, stroke volume index 40 ml/m2, DVI 0.29.  This study was compared with the study from 9/16/2020. The aortic valve peak velocity and mean gradient are similar     TTE 11/1/21:  Global and regional left ventricular function is normal with an EF of 55-60%.  Right ventricular function, chamber size, wall motion, and thickness are normal.  Moderate aortic valve calcification is present. The mean gradient across the aortic valve is 23 mmHg. The peak aortic velocity is 3.1 m/sec. Moderate aortic stenosis is present. Calculated valve area lower than previous study due to LVOT diameter measurement. Moderate aortic stenosis is present.  No significant changes noted.    TTE 9/12/22:  Intra-procedural transthoracic echocardiogram for transfemoral transcatheter aortic valve replacement with 26mm Arndt Janis Ultra valve Aortic Valve. Image acquisition by sonographer.  PRE-PROCEDURAL FINDINGS:  1. Severe calcific aortic stenosis.  2. Normal systolic function. Visual LVEF 55%.   POST-PROCEDURAL SURVEY:  1. A 26 mm Arndt Janis Ultra valve Aortic Valve was successfully deployed.  2. Valve  is well seated. There is trivial periprosthetic regurgitation.  3. Post-procedure valve hemodynamics - mean gradient 5.0 mmHg.  4. No pericardial effusion    CTA LE 9/22/22:  1. Left common femoral artery 22 cm segmental greater than 80% diameter stenosis from its origin. Linear defect suggests a dissection as the etiology. Mid and distal segments of the common femoral artery are patent.  2. 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  3. Additional nonemergent incidental findings include diverticulosis without diverticulitis and cholelithiasis without cholecystitis.    ASSESSMENT AND PLAN:  In summary, patient is a 87 year old lady with with coronary artery disease and status post PCI 20 years ago at [facility], we do not have records unfortunately available but did receive 2 stents.  She most complains of shortness of breath denies any dizziness lightheadedness syncope or recurrent episodes of chest pains.  Overall there has been minimal change in her condition since my last visit and since the invasive evaluation. I have reviewed old imaging and blood work in person including visualizing the echocardiograms.  She did undergo TAVR with a 26 mm valve in September 2022.  As above the procedure overall went well however it was complicated by left bundle branch block and unfortunately she does not feel significantly better after the procedure.  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  She does have some associated claudication with it however she feels better.  At this time we will do a few things.  We will discontinue Lasix she appears euvolemic on clinical examination and her renal function is a little bit worse as well.  Weight has been stable.  In addition we will discontinue amlodipine because she notes that she does have some headaches and ear plugging from the amlodipine which is always happening when she takes her  medication.  As such we will try to discontinue then transition over to hydralazine.  We will start at 50 mg 3 times a day and uptitrate as able.  She already has further evaluation for the lower extremity which which she will do on Monday.  She already has an echo scheduled end of October which I think she should keep the appointment for.  IV reviewed.  We will also get a repeat EKG when she is coming back for clinic visit either to structural team or myself to see whether the left bundle branch block has resolved.  This will also help reestablish his ejection fraction.  No other medication changes today.  We will need to continue to watch.  Creatinine and cardiac function.  We also discussed that her leg is important to worsen if the claudication gets worse or would get severe pain she will let us know immediately.  We will see her back in 2 to 3-month    I appreciate the opportunity to participate in the care of Kamryn Miller . Please do not hesitate to contact me with any further questions.     Sincerely,   Madhu Zuniga MD  [facility] Division of Cardiology

## 2022-10-07 NOTE — LETTER
10/7/2022      RE: Kamryn Miller  [address]  [address]  [place] [address]       Dear Colleague,    Thank you for the opportunity to participate in the care of your patient, Kamryn Miller, at the [facility] HEART CLINIC [facility]. Please see a copy of my visit note below.    October 7, 2022  I had the pleasure of seeing Kamryn Miller  in the [facility] for further evaluation and management. She has a history of Hx CAD, HLD, HTN, mitral and tricuspid insuff, pulmonary hypertension, lost to follow up in the past.  She does have CAD and did receive 3 stents in 8/2020 as below.  She denies any cardiomyopathy and she has not had any cardiology follow-up for several years.  Notes that she does have shortness of breath especially with exertion or she walks outside.  This is class III symptoms at the moment she can will probably about a block and able to take a flight of stairs.  We did start TAVR evaluation and she did undergo right heart catheterization as well as invasive hemodynamic measurement and measurement of the valve area.  Given findings of moderate aortic stenosis, TAVR was not yet pursued and ongoing surveillance was recommended. Had been hypertensive in the recent past. She did see the structural team just recently and repeat TTE was performed that again showed moderate to severe aortic stenosis, essentially unchanged from prior TTE. Given that she had no concerning symptoms ongoing surveillance was recommended.  On last visit I suggested increasing amlodipine to 10mg daily due to persistent hypertension. However, she did have an admission 12/16/21 and multiple medication changes made, including stopping amlodipine. Accordingly, she developed hypertension and 5mg was restarted in UC. However, she developed hypertensive emergency requiring admission for this and amlodipine was increased to 10mg daily with good  response. Max ongoing and worsening symptoms she was revaluated by the structural team and ultimately underwent successful TAVR on 9/12/22 with a post intervention gradient of 7mmHg. She was seen in the ER subsequently for claudication and leg pain in the setting of known LFA stenosis. Today she is here for follow up.  Overall she has a few issues since the TAVR procedure.  She is feels that she is feeling more tired and fatigued and she does not have much energy at this time.  She does not have any chest pain dizziness palpitations however the shortness of breath persistent has catheter symptoms at this time which is ongoing.  She is happy though that her lower extremity edema has completely resolved.  Denies any other issues she did have claudication as noted above however feels that this is a little bit better.  No other issues    PAST MEDICAL HISTORY:  Past Medical History:   Diagnosis Date     Aortic valvar stenosis 7/2010    mild     Asthma      Bipolar disorder (H)      CAD (coronary artery disease)     s/p angioplasty     Celiac disease      Colon polyps      Colon polyps 2012    every 3 year colonoscopy      Depression      High cholesterol      HTN      Mitral insufficiency      Pulmonary HTN (H)     mild     Tremors 7/10    drug induced from antidepressants     Tricuspid insufficiency      FAMILY HISTORY:  Family History   Problem Relation Age of Onset     Asthma Mother      Cerebrovascular Disease Mother      Arthritis Mother      Depression Mother      Lipids Sister      Hypertension Sister      Heart Disease Sister      Lipids Sister      Hypertension Sister      Lipids Sister      Breast Cancer Sister      SOCIAL HISTORY:  Social History     Socioeconomic History     Marital status:      Spouse name: Adrien     Number of children: 2     Years of education: 16   Occupational History     Employer: RETIRED     Comment: Retired in 2002.    Tobacco Use     Smoking status: Never Smoker     Smokeless  tobacco: Never Used   Substance and Sexual Activity     Alcohol use: No     Alcohol/week: 0.0 standard drinks     Types: 1 Standard drinks or equivalent per week     Drug use: No     Sexual activity: Not Currently     Partners: Male     CURRENT MEDICATIONS:  Current Outpatient Medications   Medication     albuterol (PROAIR HFA/PROVENTIL HFA/VENTOLIN HFA) 108 (90 Base) MCG/ACT inhaler     alendronate (FOSAMAX) 70 MG tablet     amLODIPine (NORVASC) 5 MG tablet     aspirin 81 MG tablet     azelastine (ASTEPRO) 0.15 % nasal spray     Calcium Citrate (CITRACAL OR)     carvedilol (COREG) 6.25 MG tablet     cholecalciferol (VITAMIN D3) 1250 mcg (01512 units) capsule     cloNIDine (CATAPRES) 0.1 MG tablet     COMPRESSION STOCKINGS     desvenlafaxine (PRISTIQ) 100 MG 24 hr tablet     fluticasone-salmeterol (ADVAIR DISKUS) 500-50 MCG/ACT inhaler     furosemide (LASIX) 20 MG tablet     lamoTRIgine (LAMICTAL) 25 MG tablet     LAMOTRIGINE 200 MG PO TABS     levothyroxine (SYNTHROID/LEVOTHROID) 50 MCG tablet     liothyronine (CYTOMEL) 5 MCG tablet     memantine (NAMENDA) 10 MG tablet     mirtazapine (REMERON) 7.5 MG tablet     Multiple Vitamin (TAB-A-JENNIFER) TABS     potassium chloride ER (K-TAB) 20 MEQ CR tablet     rosuvastatin (CRESTOR) 40 MG tablet     ticagrelor (BRILINTA) 90 MG tablet     No current facility-administered medications for this visit.     ROS:   Constitutional: No fever, chills, or sweats.   ENT: No visual disturbance, ear ache, epistaxis, sore throat.   Allergies/Immunologic: Negative.   Respiratory: No cough, hemoptysis.   Cardiovascular: As per HPI.   GI: No nausea, vomiting, hematemesis, melena, or hematochezia.   : No urinary frequency, dysuria, or hematuria.   Integrument: Negative.   Psychiatric: No evidence of major depression  Neuro: No new neurological complaints at this time. Non focal  Endocrinology: Negative.   Musculoskeletal: As per HPI.      EXAM:  BP (!) 161/83 (BP Location: Right arm, Patient  Position: Sitting, Cuff Size: Adult Small)   Pulse 65   Wt 67.6 kg (149 lb)   SpO2 98%   BMI 27.74 kg/m    General: appears comfortable, alert and oriented  Head: normocephalic, atraumatic  Eyes: anicteric sclera, EOMI , PERRL  Neck: no adenopathy  Orophyarynx: moist mucosa, no lesions noted  Heart: regular, S1/S2, no murmurs, rubs or gallop. Estimated JVP at 5 cmH2O  Lungs: CTAB, No wheezing.   Abdomen: soft, non-tender, bowel sounds present, no hepatosplenomegaly  Extremities: No LE edema today  Skin: no open lesions noted  Neuro: grossly non-focal  Psych: no evidence of depression noted     Labs:  Lab Results   Component Value Date    WBC 5.8 10/05/2022    HGB 9.9 (L) 10/05/2022    HCT 33.5 (L) 10/05/2022     10/05/2022     10/05/2022    POTASSIUM 4.6 10/05/2022    CHLORIDE 103 10/05/2022    CO2 27 10/05/2022    BUN 39 (H) 10/05/2022    CR 1.59 (H) 10/05/2022    GLC 98 10/05/2022    SED 11 07/28/2021    NTBNPI 576 09/22/2022    NTBNP 566 09/27/2022    AST 21 09/27/2022    ALT 21 09/27/2022    ALKPHOS 65 09/27/2022    BILITOTAL 0.3 09/27/2022    INR 1.09 09/22/2022     Echocardiogram reviewed:   Left ventricular function, chamber size, wall motion, and wall thickness are normal.The EF is 55-60%. No regional wall motion abnormalities are seen.  Right ventricular function, chamber size, wall motion, and thickness are normal.  Severe left atrial enlargement is present. Moderate mitral annular calcification is present. Mild mitral insufficiency is present. There is likley moderate aortic stenosis. The peak velocity is  3.47m/sec, the peak and mean gradients are 48mmHg and 28mmHg. The aortic valve  area is calculated low at 0.7cm2. Pulmonary artery systolic pressure is normal. The inferior vena cava was normal in size with preserved respiratory variability. No pericardial effusion is present.   Compared to prior study AS has worsened. Otherwise no significant change     Coronary angiogram  8/21/2020:    Dist LAD lesion is 70% stenosed.    Mid LAD-1 lesion is 80% stenosed.    Mid LAD-2 lesion is 60% stenosed.    Mid RCA lesion is 40% stenosed.    Prox RCA lesion is 40% stenosed.    Right sided filling pressures are normal.    Normal PA pressures.    Left sided filling pressures are normal.    Reduced cardiac output.  1. LAD - 3 EDDIE were placed in the mid to distal LAD (2.5 x 28 mm, 3.5 x 12 mm, and 3.0 x 8 mm)  2. RHC showed normal biventricular filling pressures. Normal PA pressure. Reduced cardiac output.     RHC/coronary angiogram 11/3/20:    Moderate aortic stenosis -valve area 1.2 cm2,Mean gradient 24 mm Hg    Right sided filling pressures are normal.    Normal PA pressures.    Left sided filling pressures are normal.    Mildly reduced cardiac output level.    Peripheral angiogram done and IVUS used to assess the vessel lumens of the descending thoracic aorta,right and left external iliac arteries     TTE 5/3/21:  Global and regional left ventricular function is normal with an EF of 55-60%.  The right ventricle is normal size. Global right ventricular function is normal.  Moderate to severe aortic stenosis, aortic valve area 1.02 cm2, peak velocity  3.3 m/s, mean gradient 25 mmHg, stroke volume index 40 ml/m2, DVI 0.29.  This study was compared with the study from 9/16/2020. The aortic valve peak velocity and mean gradient are similar     TTE 11/1/21:  Global and regional left ventricular function is normal with an EF of 55-60%.  Right ventricular function, chamber size, wall motion, and thickness are normal.  Moderate aortic valve calcification is present. The mean gradient across the aortic valve is 23 mmHg. The peak aortic velocity is 3.1 m/sec. Moderate aortic stenosis is present. Calculated valve area lower than previous study due to LVOT diameter measurement. Moderate aortic stenosis is present.  No significant changes noted.    TTE 9/12/22:  Intra-procedural transthoracic echocardiogram for  transfemoral transcatheter aortic valve replacement with 26mm Arndt Janis Ultra valve Aortic Valve. Image acquisition by sonographer.  PRE-PROCEDURAL FINDINGS:  1. Severe calcific aortic stenosis.  2. Normal systolic function. Visual LVEF 55%.   POST-PROCEDURAL SURVEY:  1. A 26 mm Arndt Janis Ultra valve Aortic Valve was successfully deployed.  2. Valve is well seated. There is trivial periprosthetic regurgitation.  3. Post-procedure valve hemodynamics - mean gradient 5.0 mmHg.  4. No pericardial effusion    CTA  9/22/22:  1. Left common femoral artery 22 cm segmental greater than 80% diameter stenosis from its origin. Linear defect suggests a dissection as the etiology. Mid and distal segments of the common femoral artery are patent.  2. 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  3. Additional nonemergent incidental findings include diverticulosis without diverticulitis and cholelithiasis without cholecystitis.    ASSESSMENT AND PLAN:  In summary, patient is a 87 year old lady with with coronary artery disease and status post PCI 20 years ago at [facility], we do not have records unfortunately available but did receive 2 stents.  She most complains of shortness of breath denies any dizziness lightheadedness syncope or recurrent episodes of chest pains.  Overall there has been minimal change in her condition since my last visit and since the invasive evaluation. I have reviewed old imaging and blood work in person including visualizing the echocardiograms.  She did undergo TAVR with a 26 mm valve in September 2022.  As above the procedure overall went well however it was complicated by left bundle branch block and unfortunately she does not feel significantly better after the procedure.  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  She does have some associated claudication with it however she feels better.  At this time  we will do a few things.  We will discontinue Lasix she appears euvolemic on clinical examination and her renal function is a little bit worse as well.  Weight has been stable.  In addition we will discontinue amlodipine because she notes that she does have some headaches and ear plugging from the amlodipine which is always happening when she takes her medication.  As such we will try to discontinue then transition over to hydralazine.  We will start at 50 mg 3 times a day and uptitrate as able.  She already has further evaluation for the lower extremity which which she will do on Monday.  She already has an echo scheduled end of October which I think she should keep the appointment for.  IV reviewed.  We will also get a repeat EKG when she is coming back for clinic visit either to structural team or myself to see whether the left bundle branch block has resolved.  This will also help reestablish his ejection fraction.  No other medication changes today.  We will need to continue to watch.  Creatinine and cardiac function.  We also discussed that her leg is important to worsen if the claudication gets worse or would get severe pain she will let us know immediately.  We will see her back in 2 to 3-month    I appreciate the opportunity to participate in the care of Kamryn Miller . Please do not hesitate to contact me with any further questions.     Sincerely,   Madhu Zuniga MD  [facility] Division of Cardiology

## 2022-10-10 ENCOUNTER — ANCILLARY PROCEDURE (OUTPATIENT)
Dept: ULTRASOUND IMAGING | Facility: CLINIC | Age: 87
End: 2022-10-10
Payer: MEDICARE

## 2022-10-10 DIAGNOSIS — I70.209 FEMORAL ARTERY STENOSIS (H): ICD-10-CM

## 2022-10-10 PROCEDURE — 93924 LWR XTR VASC STDY BILAT: CPT | Performed by: RADIOLOGY

## 2022-10-11 DIAGNOSIS — I50.30 NYHA CLASS 3 HEART FAILURE WITH PRESERVED EJECTION FRACTION (H): ICD-10-CM

## 2022-10-11 DIAGNOSIS — Z95.2 S/P TAVR (TRANSCATHETER AORTIC VALVE REPLACEMENT): ICD-10-CM

## 2022-10-11 DIAGNOSIS — I10 ESSENTIAL HYPERTENSION: ICD-10-CM

## 2022-10-11 PROCEDURE — 93000 ELECTROCARDIOGRAM COMPLETE: CPT

## 2022-10-17 ENCOUNTER — TELEPHONE (OUTPATIENT)
Dept: CARDIOLOGY | Facility: CLINIC | Age: 87
End: 2022-10-17

## 2022-10-17 ENCOUNTER — NURSE TRIAGE (OUTPATIENT)
Dept: CARDIOLOGY | Facility: CLINIC | Age: 87
End: 2022-10-17

## 2022-10-17 DIAGNOSIS — I10 ESSENTIAL HYPERTENSION: Primary | ICD-10-CM

## 2022-10-17 RX ORDER — HYDRALAZINE HYDROCHLORIDE 25 MG/1
25 TABLET, FILM COATED ORAL 3 TIMES DAILY
Qty: 270 TABLET | Refills: 1 | Status: SHIPPED | OUTPATIENT
Start: 2022-10-17 | End: 2022-10-31

## 2022-10-17 NOTE — TELEPHONE ENCOUNTER
[facility] Call Center    Phone Message    May a detailed message be left on voicemail: no     Reason for Call: Other: Pt's care team at [facility] is trying to get to Alisa Shook/ Dr. Zuniga's Team.  Call [phone]     Action Taken: Other: [facility] CARDIOLOGY    Travel Screening: Not Applicable

## 2022-10-17 NOTE — TELEPHONE ENCOUNTER
Sangita from [facility] calling back to report blood pressure readings of 80/48 earlier this morning and now it is 90/63 because of low blood pressure they want to hold the Hydralazine.     Due to low blood pressure transferred to triage.     Thank you!  [facility]

## 2022-10-17 NOTE — TELEPHONE ENCOUNTER
Spoke to Sangita who reports that the patient's daughter is concerned about stopping hydralazine without replacing the medication.  Patient has past hx of extremely high blood pressure initiating ER visits.  Writer readdressed the situation with Dr. Zuniga.  Per Dr. Zuniga, decrease hydrazine to 25 mg TID:  He does not feel it is a good idea at this time to replace hydralazine with a new blood pressure medication at this time.    Date: 10/17/2022    Time of Call: 3:34 PM     Diagnosis:  hypotension     [ VORB ] Ordering provider: Dr. Zuniga  Order: decrease hydralazine to 25 mg TID.  Monitor blood pressure daily.  Update clinic in 1-2 weeks if symptoms continue.     Order received by: Alisa Shook RN       Follow-up/additional notes: reviewed new orders with Sangita who verbalized understanding.  She states she will fax over an order for Dr. Zuniga to sign and will contact clinic in 1-2 weeks with an update

## 2022-10-17 NOTE — TELEPHONE ENCOUNTER
"Received a call from the call center to discuss low blood pressure with nurse from facility.  Spoke to Sangita CURIEL who says Dr. Zuniga had made some medication changes on 10/8/22 where amlodipine was stopped and hydralaizne TID was started. She says over the weekend, Kamryn started having some side effects about 1 hour after taking it that includes a headache, fatigue, ears feeling full, and unsteady gait. Today she was having these same symptoms and had to catch her in the elevator from falling. She checked her BP at this time and was 80/48 HR 76. Currently her BP is 90/63 so will hold the 1400 dose of hydralazine. She says Kamryn states she does feel better now that the medication is wearing off. She also mentions Kamryn's daughter commented that she has taken hydralazine before back in January and had these same side effects, but can also get these side effects from amlodipine. Sangita says she had sent a message to the team earlier today and also sent a fax. She would like a return call to discuss medications. Please call Sangita CURIEL at [phone].  1. BLOOD PRESSURE: \"What is the blood pressure?\" \"Did you take at least two measurements 5 minutes apart?\"  2. ONSET: \"When did you take your blood pressure?\"  3. HOW: \"How did you obtain the blood pressure?\" (e.g., visiting nurse, automatic home BP monitor)  4. HISTORY: \"Do you have a history of low blood pressure?\" \"What is your blood pressure normally?\"  5. MEDICATIONS: \"Are you taking any medications for blood pressure?\" If Yes, ask: \"Have they been changed recently?\" Yes  6. PULSE RATE: \"Do you know what your pulse rate is?\"   7. OTHER SYMPTOMS: \"Have you been sick recently?\" \"Have you had a recent injury?\"      "

## 2022-10-17 NOTE — TELEPHONE ENCOUNTER
Date: 10/17/2022    Time of Call: 2:07 PM     Diagnosis:  hypotension     [ VORB ] Ordering provider: Dr. Zuniga  Order: STOP Hydralazine  Continue to check daily BP's and contact clinic if no improvement     Order received by: Alisa Shook RN       Follow-up/additional notes: FELECIA Quesada notified and verbalized understanding.  Sangita reports she will communicate with patient and inform her that the hydralazine was stopped.  At this time, patient is checking her own BP and Sangita states that they can restart checking it daily if patient prefers.  Patient typically prefers to check her own BP.  Sangita will call clinic back if any issues or concerns.    Med list updated.    Alisa Shook RN  Cardiology Care Coordinator  [facility]  [phone] option 1

## 2022-10-17 NOTE — TELEPHONE ENCOUNTER
Health Call Center    Phone Message    May a detailed message be left on voicemail: yes     Reason for Call: Medication Question or concern regarding medication   Prescription Clarification  Name of Medication: hydrALAZINE (APRESOLINE) 50 MG tablet  Prescribing Provider: Efrain   Pharmacy: [facility] [address]   What on the order needs clarification? Sangita called stating the patient has reported feeling side effects from this medication. Patient reports headaches, ears plugged, fatigue, feeling unbalanced, and a low bp. Patient was on this medication prior in the past and experienced similar side effects. Please call Sangita back to further discuss, thank you.    Action Taken: Message routed to:  Other: Cardiology    Travel Screening: Not Applicable     Thank you!  [facility]

## 2022-10-23 NOTE — PROGRESS NOTES
"    [facility] HEART CARE  CARDIOVASCULAR DIVISION    VALVE CLINIC RETURN VISIT    PRIMARY CARDIOLOGIST: Dr. Zuniga      PERTINENT CLINICAL HISTORY:     Kamryn Miller is a very pleasant 86 year old female who presents for 1 month TAVR follow-up. She has a history of HFpEF, HTN, HLD, significant CAD history dx at age 61 w/PTCA 1996, LAD and RCA stenting 2003, s/p LAD PCI 2020 and severe aortic valvular stenosis that was treated with a transfemoral transcatheter aortic valve replacement (TAVR) with a 26mm Arndt Janis Ultra on 9/12/22 by Morro Burrows. The TAVR and post-procedural course were notable for no complications. She was noted to have 60% left femoral stenosis post procedure, but had adequate flow on post procedure peripheral angiogram. She was started on dual antiplatelet therapy with plans to assess symptoms of PAD as outpatient. She was noted to have new LBBB post procedure and discharged on a Cardionet. Her post TAVR echo showed mean PG of 7 mmHg with trace PVL.     Since her valve replacement Kamryn has been feeling fatigued, even after getting a good nights sleep, she wakes up feeling very tired. She also reports worsening shortness of breath since her valve replacement, though she denies any s/s of heart failure. Her weight has been stable and leg swelling has improved post procedure. She feels quite limited by her breathing. She denies any central chest pain, but over the past week has been experiencing waxing and waning pain between her shoulder blades. She describes it as a \"gnawing pain\"and states it is identical to the pain she experienced before receiving stents in the past. She denies any claudication, this has resolved. She was having low blood pressures and amlodipine was stopped, hydralazine was started. Home blood pressure have been okay, she does not know the measurements.      PAST MEDICAL HISTORY:     Past Medical History:   Diagnosis Date     Aortic valvar stenosis 7/2010    mild "     Asthma      Bipolar disorder (H)      CAD (coronary artery disease)     s/p angioplasty     Celiac disease      Colon polyps      Colon polyps 2012    every 3 year colonoscopy      Depression      High cholesterol      HTN      Mitral insufficiency      Pulmonary HTN (H)     mild     Tremors 7/10    drug induced from antidepressants     Tricuspid insufficiency         PAST SURGICAL HISTORY:     Past Surgical History:   Procedure Laterality Date     ANGIOGRAM  6/26/2009     ANGIOPLASTY  9/96    for angina     ANGIOPLASTY  8/03 - 9/03    X -2 - with stenst in coronary car.     APPENDECTOMY       BREAST BIOPSY, RT/LT      Breat Biopsy RT/LT, benign     BUNIONECTOMY  11/8/2011    Procedure:BUNIONECTOMY; Left donna bunionectomy; Surgeon:FREDY LITTLEJOHN; Location:[facility] OR     BUNIONECTOMY RT/LT  5/2007    right bunion and right 2nd hammertoe     CATARACT IOL, RT/LT  6/12 and 7/12    bilateral     COLONOSCOPY  2007, 2012    every 3 years for polyps     CV CORONARY ANGIOGRAM N/A 8/21/2020    Procedure: CV CORONARY ANGIOGRAM;  Surgeon: Gianluca Toscano MD;  Location: [facility] HEART CARDIAC CATH LAB     CV LEFT HEART CATH N/A 8/21/2020    Procedure: CV LEFT HEART CATH;  Surgeon: Gianluca Toscano MD;  Location: [facility]ARDI[facility] CATH LAB     CV LEFT HEART CATH N/A 11/3/2020    Procedure: CV LEFT HEART CATH;  Surgeon: Tom Burrows MD;  Location: [facility] CARDI[facility] CATH LAB     CV LOWER EXTREMITY ANGIOGRAM BILATERAL N/A 11/3/2020    Procedure: CV ANGIOGRAM LOWER EXTREMITY BILATERAL;  Surgeon: Tom Burrows MD;  Location: [facility] CARDIAC CATH LAB     CV PCI STENT DRUG ELUTING N/A 8/21/2020    Procedure: Percutaneous Coronary Intervention Stent Drug Eluting;  Surgeon: Gianluca Toscano MD;  Location: [facility] CARDIAC CATH LAB     CV RIGHT HEART CATH MEASUREMENTS RECORDED N/A 8/21/2020    Procedure: CV RIGHT HEART CATH;  Surgeon: Gianluca Toscano MD;  Location: [facility] CATH [facility]     CV RIGHT  HEART CATH MEASUREMENTS RECORDED N/A 11/3/2020    Procedure: CV RIGHT HEART CATH;  Surgeon: Tom Burrows MD;  Location:  HEART CARDIAC CATH LAB     CV TRANSCATHETER AORTIC VALVE REPLACEMENT N/A 9/12/2022    Procedure: Transfemoral Transcatheter Aortic Valve Replacement with possible open heart bypass and or balloon pump placement and any indicated procedure;  Surgeon: Tom Burrows MD;  Location:  HEART CARDIAC CATH LAB     HEART CATH FEMORAL CANNULIZATION WITH OPEN STANDBY REPAIR AORTIC VALVE N/A 9/12/2022    Procedure: OR TRANSCATHETER AORTIC VALVE REPLACEMENT, OPEN FEMORAL ARTERY APPROACH;  Surgeon: Arthur Markham MD;  Location:  HEART CARDIAC CATH LAB     JOINT REPLACEMENT, HIP RT/LT  4/2008    right hip replaced     JOINT REPLACEMENT, HIP RT/LT  4/2009    left hip replaced     REPAIR HAMMER TOE  11/8/2011    Procedure:REPAIR HAMMER TOE; left 2nd hammertoe repair; Surgeon:FREDY LITTLEJOHN; Location: OR     SURGICAL HISTORY OF -   11/11    left bunion and 2nd hammertoe repair     TUBAL LIGATION       ZZC ANESTH,BLEPHAROPLASTY      (R) for drooping eyelid     Z APPENDECTOMY          CURRENT MEDICATIONS:     Current Outpatient Medications   Medication Sig Dispense Refill     albuterol (PROAIR HFA/PROVENTIL HFA/VENTOLIN HFA) 108 (90 Base) MCG/ACT inhaler Inhale 2 puffs into the lungs every 6 hours as needed for wheezing or shortness of breath / dyspnea 18 g 3     alendronate (FOSAMAX) 70 MG tablet TAKE 1 TABLET BY MOUTH ONCE WEEKLY 4 tablet 23     aspirin 81 MG tablet Take 81 mg by mouth daily        azelastine (ASTEPRO) 0.15 % nasal spray USE 1 SPRAY IN EACH NOSTRIL ONCE A DAY 30 mL 11     Calcium Citrate (CITRACAL OR) Take 1 tablet by mouth daily.       carvedilol (COREG) 6.25 MG tablet Take 2 tablets (12.5 mg) by mouth 2 times daily 540 tablet 3     cholecalciferol (VITAMIN D3) 1250 mcg (09441 units) capsule TAKE 1 CAPSULE BY MOUTH ONCE WEEKLY 4 capsule 11     cloNIDine (CATAPRES)  0.1 MG tablet Take 1 tablet (0.1 mg) by mouth daily as needed (For SBP > 180) 20 tablet 0     COMPRESSION STOCKINGS 1 each daily 1 each 1     desvenlafaxine (PRISTIQ) 100 MG 24 hr tablet Take 100 mg by mouth daily       fluticasone-salmeterol (ADVAIR DISKUS) 500-50 MCG/ACT inhaler INHALE 1 PUFF BY MOUTH TWICE DAILY 60 each 0     hydrALAZINE (APRESOLINE) 25 MG tablet Take 1 tablet (25 mg) by mouth 3 times daily 270 tablet 1     lamoTRIgine (LAMICTAL) 25 MG tablet Take 25 mg by mouth daily with 200 mg tablet for a total dose of 225 mg       LAMOTRIGINE 200 MG PO TABS Take 200 mg by mouth daily with 25 mg tablet for a total dose of 225 mg       levothyroxine (SYNTHROID/LEVOTHROID) 50 MCG tablet TAKE 1 TABLET BY MOUTH ONCE DAILY 28 tablet 11     liothyronine (CYTOMEL) 5 MCG tablet Take 2 tablets (10 mcg) by mouth daily 30 tablet 3     memantine (NAMENDA) 10 MG tablet Take 1 tablet (10 mg) by mouth daily 30 tablet 3     mirtazapine (REMERON) 7.5 MG tablet Take 1 tablet (7.5 mg) by mouth At Bedtime 30 tablet 3     Multiple Vitamin (TAB-A-JENNIFER) TABS TAKE 1 TABLET BY MOUTH ONCE DAILY 30 tablet PRN     potassium chloride ER (K-TAB) 20 MEQ CR tablet Take 1 tablet (20 mEq) by mouth daily 90 tablet 3     rosuvastatin (CRESTOR) 40 MG tablet TAKE 1 TABLET BY MOUTH ONCE DAILY 28 tablet 11     ticagrelor (BRILINTA) 90 MG tablet Take 1 tablet (90 mg) by mouth 2 times daily 180 tablet 0        ALLERGIES:     Allergies   Allergen Reactions     Ace Inhibitors Cough     Diclofenac Other (See Comments)     Balance problems     Gluten Meal Diarrhea        FAMILY HISTORY:     Family History   Problem Relation Age of Onset     Asthma Mother      Cerebrovascular Disease Mother      Arthritis Mother      Depression Mother      Lipids Sister      Hypertension Sister      Heart Disease Sister      Lipids Sister      Hypertension Sister      Lipids Sister      Breast Cancer Sister         SOCIAL HISTORY:     Social History     Socioeconomic  "History     Marital status:      Spouse name: Adrien     Number of children: 2     Years of education: 16     Highest education level: None   Occupational History     Employer: RETIRED     Comment: Retired in 2002.    Tobacco Use     Smoking status: Never Smoker     Smokeless tobacco: Never Used   Substance and Sexual Activity     Alcohol use: No     Alcohol/week: 0.0 standard drinks     Types: 1 Standard drinks or equivalent per week     Drug use: No     Sexual activity: Not Currently     Partners: Male        REVIEW OF SYSTEMS:     Constitutional: No fevers or chills  Skin: No new rash or itching  Eyes: No acute change in vision  Ears/Nose/Throat: No purulent rhinorrhea, new hearing loss, or new vertigo  Respiratory: No cough or hemoptysis  Cardiovascular: See HPI  Gastrointestinal: No change in appetite, vomiting, hematemesis or diarrhea  Genitourinary: No dysuria or hematuria  Musculoskeletal: No new back pain, neck pain or muscle pain  Neurologic: No new headaches, focal weakness or behavior changes  Psychiatric: No hallucinations, excessive alcohol consumption or illegal drug usage  Hematologic/Lymphatic/Immunologic: No bleeding, chills, fever, night sweats or weight loss  Endocrine: No new cold intolerance, heat intolerance, polyphagia, polydipsia or polyuria      PHYSICAL EXAMINATION:     BP (!) 176/83 (BP Location: Right arm, Patient Position: Chair, Cuff Size: Adult Small)   Pulse 62   Ht 1.552 m (5' 1.1\")   Wt 68.4 kg (150 lb 12.8 oz)   SpO2 95%   BMI 28.40 kg/m      GENERAL: No acute distress.  HEENT: EOMI. Sclerae white, not injected. Nares clear. Pharynx without erythema or exudate.   Neck: No adenopathy. No thyromegaly. No jugular venous distension.   Heart: Regular rate and rhythm. No murmur.   Lungs: Clear to auscultation. No ronchi, wheezes, rales.   Abdomen: Soft, nontender, nondistended. Bowel sounds present.  Extremities: No clubbing, cyanosis, or edema.   Neurologic: Alert and " oriented to person/place/time, normal speech and affect. No focal deficits.  Skin: No petechiae, purpura or rash.     LABORATORY DATA:     LIPID RESULTS:  Lab Results   Component Value Date    CHOL 172 12/17/2021    CHOL 175 10/23/2020    HDL 61 12/17/2021    HDL 71 10/23/2020    LDL 84 12/17/2021    LDL 83 10/23/2020    TRIG 137 12/17/2021    TRIG 103 10/23/2020    CHOLHDLRATIO 2.8 09/18/2015       LIVER ENZYME RESULTS:  Lab Results   Component Value Date    AST 21 09/27/2022    AST 27 05/17/2021    ALT 21 09/27/2022    ALT 38 05/17/2021       CBC RESULTS:  Lab Results   Component Value Date    WBC 5.8 10/05/2022    WBC 4.6 05/17/2021    RBC 3.82 10/05/2022    RBC 3.97 05/17/2021    HGB 9.9 (L) 10/05/2022    HGB 12.9 05/17/2021    HCT 33.5 (L) 10/05/2022    HCT 40.3 05/17/2021    MCV 88 10/05/2022     (H) 05/17/2021    MCH 25.9 (L) 10/05/2022    MCH 32.5 05/17/2021    MCHC 29.6 (L) 10/05/2022    MCHC 32.0 05/17/2021    RDW 16.9 (H) 10/05/2022    RDW 12.7 05/17/2021     10/05/2022     05/17/2021       BMP RESULTS:  Lab Results   Component Value Date     10/05/2022     06/04/2021    POTASSIUM 4.6 10/05/2022    POTASSIUM 3.4 06/04/2021    CHLORIDE 103 10/05/2022    CHLORIDE 106 06/04/2021    CO2 27 10/05/2022    CO2 30 06/04/2021    ANIONGAP 7 10/05/2022    ANIONGAP 6 06/04/2021    GLC 98 10/05/2022     (H) 06/04/2021    BUN 39 (H) 10/05/2022    BUN 27 06/04/2021    CR 1.59 (H) 10/05/2022    CR 1.50 (H) 06/04/2021    GFRESTIMATED 31 (L) 10/05/2022    GFRESTIMATED 32 (L) 06/04/2021    GFRESTBLACK 37 (L) 06/04/2021    PRINCE 9.6 10/05/2022    PRINCE 9.4 06/04/2021        A1C RESULTS:  Lab Results   Component Value Date    A1C 5.0 06/30/2009       INR RESULTS:  Lab Results   Component Value Date    INR 1.09 09/22/2022    INR 1.04 09/09/2022    INR 1.05 11/03/2020    INR 2.28 (H) 04/23/2009          PROCEDURES & FURTHER ASSESSMENTS:   EKG today 10/24/22:  Personally reviewed  NSR with  LBBB unchanged    ECHO today 10/24/22:  Interpretation Summary  Status post 26 mm Arndt Janis Ultra valve TAVR.  Global and regional left ventricular function is normal with an EF of 55-60%.  Global right ventricular function is normal. The right ventricle is normal  size.  The TAVR is well-seated. Leaflet opening is not clearly visualized. There is  no valvular regurgitation but there is trace paravalvular regurgitation. The  Vmax is 2.6 m/s, the mean gradient is 13 mmHg, the dimensionless index is  0.34, and the acceleration time is 110 ms.  IVC diameter and respiratory changes fall into an intermediate range  suggesting an RA pressure of 8 mmHg.  This study was compared with the study from 09/13/2022. No significant changes  noted. The TAVR gradient has increased modestly, as is to be expected.  ______________________________________________________________________________  Left Ventricle  Global and regional left ventricular function is normal with an EF of 55-60%.  Left ventricular wall thickness is normal. Left ventricular size is normal.  Left ventricular diastolic function is not assessable.     Right Ventricle  Global right ventricular function is normal. The right ventricle is normal  size.     Atria  Both atria appear normal. Moderate left atrial enlargement is present.     Mitral Valve  Mild mitral annular calcification is present. Mild mitral insufficiency is  present.     Aortic Valve  The TAVR is well-seated. Leaflet opening is not clearly visualized. There is  no valvular regurgitation but there is trace paravalvular regurgitation. The  Vmax is 2.6 m/s, the mean gradient is 13 mmHg, the dimensionless index is  0.34, and the acceleration time is 110 ms.     Tricuspid Valve  The tricuspid valve is normal. Trace tricuspid insufficiency is present. The  right ventricular systolic pressure is approximated at 21.2 mmHg plus the  right atrial pressure.     Pulmonic Valve  The pulmonic valve is normal.  Trace pulmonic insufficiency is present.     Vessels  The aorta root cannot be assessed. Ascending aorta 3.2 cm. IVC diameter and  respiratory changes fall into an intermediate range suggesting an RA pressure  of 8 mmHg.     Pericardium  No pericardial effusion is present.     Compared to Previous Study  This study was compared with the study from 2022 . No significant  changes noted. The TAVR gradient has increased modestly, as is to be expected.  ______________________________________________________________________________  MMode/2D Measurements & Calculations     IVSd: 1.1 cm  LVIDd: 4.3 cm  LVPWd: 0.89 cm  LV mass(C)d: 139.1 grams  LV mass(C)dI: 83.4 grams/m2  asc Aorta Diam: 3.2 cm  LVOT diam: 1.9 cm  LVOT area: 2.9 cm2  RWT: 0.42     Doppler Measurements & Calculations  MV E max skinny: 171.0 cm/sec  MV A max skinny: 145.4 cm/sec  MV E/A: 1.2  MV max PG: 10.4 mmHg  MV mean P.3 mmHg  MV V2 VTI: 54.1 cm  MVA(VTI): 1.0 cm2  MV dec slope: 856.6 cm/sec2  MV dec time: 0.20 sec  Ao V2 max: 254.2 cm/sec  Ao max P.0 mmHg  Ao V2 mean: 171.2 cm/sec  Ao mean P.7 mmHg  Ao V2 VTI: 53.9 cm  LUCY(I,D): 1.0 cm2  LUCY(V,D): 0.99 cm2  AI P1/2t: 688.4 msec  LV V1 max PG: 3.1 mmHg  LV V1 max: 88.0 cm/sec  LV V1 VTI: 19.5 cm  SV(LVOT): 55.9 ml  SI(LVOT): 33.6 ml/m2  TR max skinny: 229.8 cm/sec  TR max P.2 mmHg     AV Skinny Ratio (DI): 0.35  LUCY Index (cm2/m2): 0.62       Cardiac monitor 22:      ECG dated 22  SR 1st degree AVB and LBBB    Echocardiogram dated  22:    Interpretation Summary  Normal biventricular function.  Post 26 mm Arndt Janis Ultra valve Aortic Valve placement. The valve is  well seated. There is trivial periprosthetic regurgitation. Mean gradient is 7  mmHg.  The man RA pressure is normal.  No pericardial effusion is present.    NYHA Class: II    Coronary angiogram 2020:    Physicians    Panel Physicians Referring Physician Case Authorizing Physician   Gianluca Toscano MD  (Primary) Self, Referred, Gianluca Junior MD Ibrahim, Abdisamad, MD (Fellow - Assisting)     Jose Alfredo Matias MD (Fellow - Assisting)       Procedures    Panel 1    Primary Surgeon:  Gianluca Toscano MD   Procedure:  CV CORONARY ANGIOGRAM    CV RIGHT HEART CATH    CV LEFT HEART CATH    Percutaneous Coronary Intervention Stent Drug Eluting        Indications    Severe aortic stenosis [I35.0 (ICD-10-CM)]   Other ill-defined heart diseases [I51.89 (ICD-10-CM)]     Comments/Patient Narrative    84 year old female with with aortic stenosis here for cors/RHC for TAVR work up.     Pre Procedure Diagnosis    severe aortic stenosis    Post Procedure Diagnosis    s/p EDDIE on LAD x 3.      Conclusion         Dist LAD lesion is 70% stenosed.    Mid LAD-1 lesion is 80% stenosed.    Mid LAD-2 lesion is 60% stenosed.    Mid RCA lesion is 40% stenosed.    Prox RCA lesion is 40% stenosed.    Right sided filling pressures are normal.    Normal PA pressures.    Left sided filling pressures are normal.    Reduced cardiac output.     1. LAD - 3 EDDIE were placed in the mid to distal LAD (2.5 x 28 mm, 3.5 x 12 mm, and 3.0 x 8 mm)  2. RHC showed normal biventricular filling pressures. Normal PA pressure. Reduced cardiac output.            Plan      Follow bedrest per protocol    Continued medical management and lifestyle modifications for cardiovascular risk factor optimizations.    Arterial sheath removed from femoral artery with closure device.    Discharge today per protocol     Coronary Findings    Diagnostic  Dominance: Right  Left Anterior Descending   Mid LAD-1 lesion is 80% stenosed.   Mid LAD-2 lesion is 60% stenosed.   Dist LAD lesion is 70% stenosed.      Right Coronary Artery   The vessel is moderate in size.   Prox RCA lesion is 40% stenosed.   Mid RCA lesion is 40% stenosed.         Intervention     Mid LAD-1 lesion   Stent   The pre-interventional distal flow is decreased (FALGUNI 2). A STENT SYNERGY DRUG ELUTING  2.29G83CV [id number] drug eluting stent was successfully placed. No pre-stent angioplasty was performed. The post-interventional distal flow is normal (FALGUNI 3). The intervention was successful. No complications occurred at this lesion.   There is a 0% residual stenosis post intervention.      Mid LAD-2 lesion   Stent   The pre-interventional distal flow is decreased (FALGUNI 2). A STENT SYNERGY DRUG ELUTING 3.71H75OC [id number] drug eluting stent was successfully placed. No pre-stent angioplasty was performed. The strut is apposed. No post-stent angioplasty was performed. The post-interventional distal flow is normal (FALGUNI 3). The intervention was successful. No complications occurred at this lesion.   There is a 0% residual stenosis post intervention.      Dist LAD lesion   Stent   The pre-interventional distal flow is decreased (FALGUNI 2). A STENT SYNERGY DRUG ELUTING 3.07C95KA [id number] drug eluting stent was successfully placed. No pre-stent angioplasty was performed. The strut is apposed. No post-stent angioplasty was performed. The post-interventional distal flow is normal (FALGUNI 3). The intervention was successful. No complications occurred at this lesion.   There is a 0% residual stenosis post intervention.           Hemodynamics    Right sided filling pressures are normal.Left sided filling pressures are normal. Normal PA pressures.Reduced cardiac output level.            CLINICAL IMPRESSION:     Kamryn Miller is a very pleasant 86 year old female who presents for 1 month TAVR follow-up. She has a history of HFpEF, HTN, HLD, significant CAD history diagnosed at age 61 w/PTCA 1996, LAD and RCA stenting 2003, s/p LAD PCI 2020 and severe aortic valvular stenosis that was treated with a transfemoral transcatheter aortic valve replacement (TAVR) with a 26mm Arndt Janis Ultra on 9/12/22 by Morro Burrows. The TAVR and post-procedural course were notable for no complications. She was noted to have 60%  left femoral stenosis post procedure, but had adequate flow on post procedure peripheral angiogram. She was started on dual antiplatelet therapy with plans to assess symptoms of PAD as outpatient. She was noted to have new LBBB post procedure and discharged on a Cardionet. Her post TAVR echo showed mean PG of 7 mmHg with trace PVL.     Since her valve replacement Kamryn has been feeling fatigued and short of breath, though she denies any s/s of heart failure and appears euvolemic on exam. She also reports a one week history of waxing and waning pain between her shoulder blades, very similar to her prior angina. ECHO today shows normal TAVR function, mean PG slightly higher 13 mmHg, but no major concerns regarding the valve. EKG shows NSR with LBBB unchanged when compared to prior. CardioNet showed NSR with IVCD, no high degree AVB. I am concerned her symptoms may be related to obstructive CAD. She does have a significant CAD history and her pain is similar to her prior angina, also no other explanation for her fatigue/dyspnea. I recommended she go to the ER for ACS rule out and ischemic evaluation. Would consider admission to [facility] and coronary angiogram.     Plan Summary:  1) ER for chest pain evaluation    2) Aspirin 81 mg daily lifelong  3) Continue Brilinta 90 mg BID for left femoral artery stenosis - follow-up with vascular surgery to discuss ABIs and duration of DAPT  4) Continue high intensity statin   5) Lifelong antibiotic prophylaxis prior to all dental procedures  6) Echo in 6 months - if gradient continues to increase on echo would perform CT heart to rule out HALT  7) Follow-up valve clinic 1 year with echo, labs and ECG prior     RTC: 6 months with Dr. Zuniga, 1 year with TAVR clinic     JESSICA Rousseau, CNP  [facility] Cardiology Team      CC  Patient Care Team:  Rolanda Wilcox MD as PCP - General (Family Practice)  Rolanda Wilcox MD as Assigned PCP  Pati De Santiago NP as Assigned Heart and Vascular  Provider  Javier Landa MD as Assigned OBGYN Provider  Ania Johnson MD as Assigned Nephrology Provider  Dimas Palacios, PhD as Assigned Behavioral Health Provider  JERRY COYLE

## 2022-10-24 ENCOUNTER — OFFICE VISIT (OUTPATIENT)
Dept: CARDIOLOGY | Facility: CLINIC | Age: 87
End: 2022-10-24
Attending: NURSE PRACTITIONER
Payer: MEDICARE

## 2022-10-24 ENCOUNTER — HOSPITAL ENCOUNTER (OUTPATIENT)
Facility: CLINIC | Age: 87
Setting detail: OBSERVATION
Discharge: NURSING, REHAB OR LONG-TERM CARE | End: 2022-10-26
Attending: EMERGENCY MEDICINE | Admitting: INTERNAL MEDICINE
Payer: MEDICARE

## 2022-10-24 ENCOUNTER — LAB (OUTPATIENT)
Dept: LAB | Facility: CLINIC | Age: 87
End: 2022-10-24
Payer: MEDICARE

## 2022-10-24 ENCOUNTER — APPOINTMENT (OUTPATIENT)
Dept: GENERAL RADIOLOGY | Facility: CLINIC | Age: 87
End: 2022-10-24
Attending: EMERGENCY MEDICINE
Payer: MEDICARE

## 2022-10-24 VITALS
SYSTOLIC BLOOD PRESSURE: 176 MMHG | OXYGEN SATURATION: 95 % | HEART RATE: 62 BPM | WEIGHT: 150.8 LBS | BODY MASS INDEX: 28.47 KG/M2 | HEIGHT: 61 IN | DIASTOLIC BLOOD PRESSURE: 83 MMHG

## 2022-10-24 DIAGNOSIS — I35.0 SEVERE AORTIC STENOSIS: ICD-10-CM

## 2022-10-24 DIAGNOSIS — M54.6 MIDLINE THORACIC BACK PAIN, UNSPECIFIED CHRONICITY: ICD-10-CM

## 2022-10-24 DIAGNOSIS — I21.4 NSTEMI (NON-ST ELEVATED MYOCARDIAL INFARCTION) (H): Primary | ICD-10-CM

## 2022-10-24 DIAGNOSIS — I25.9 CHEST PAIN DUE TO MYOCARDIAL ISCHEMIA, UNSPECIFIED ISCHEMIC CHEST PAIN TYPE: ICD-10-CM

## 2022-10-24 DIAGNOSIS — I25.118 CORONARY ARTERY DISEASE OF NATIVE ARTERY OF NATIVE HEART WITH STABLE ANGINA PECTORIS (H): ICD-10-CM

## 2022-10-24 DIAGNOSIS — Z20.822 LAB TEST NEGATIVE FOR COVID-19 VIRUS: ICD-10-CM

## 2022-10-24 DIAGNOSIS — R07.89 ATYPICAL CHEST PAIN: ICD-10-CM

## 2022-10-24 DIAGNOSIS — N18.32 STAGE 3B CHRONIC KIDNEY DISEASE (H): ICD-10-CM

## 2022-10-24 DIAGNOSIS — I10 ESSENTIAL HYPERTENSION: ICD-10-CM

## 2022-10-24 DIAGNOSIS — Z95.2 HISTORY OF TRANSCATHETER AORTIC VALVE REPLACEMENT (TAVR): Primary | ICD-10-CM

## 2022-10-24 DIAGNOSIS — Z98.890 S/P CORONARY ANGIOGRAM: ICD-10-CM

## 2022-10-24 LAB
ALBUMIN SERPL BCG-MCNC: 3.9 G/DL (ref 3.5–5.2)
ALBUMIN SERPL BCG-MCNC: 3.9 G/DL (ref 3.5–5.2)
ALP SERPL-CCNC: 61 U/L (ref 35–104)
ALT SERPL W P-5'-P-CCNC: 19 U/L (ref 10–35)
ANION GAP SERPL CALCULATED.3IONS-SCNC: 11 MMOL/L (ref 7–15)
ANION GAP SERPL CALCULATED.3IONS-SCNC: 8 MMOL/L (ref 7–15)
AST SERPL W P-5'-P-CCNC: 30 U/L (ref 10–35)
BASOPHILS # BLD AUTO: 0 10E3/UL (ref 0–0.2)
BASOPHILS NFR BLD AUTO: 1 %
BILIRUB SERPL-MCNC: 0.2 MG/DL
BUN SERPL-MCNC: 27.3 MG/DL (ref 8–23)
BUN SERPL-MCNC: 30.4 MG/DL (ref 8–23)
CALCIUM SERPL-MCNC: 9.5 MG/DL (ref 8.8–10.2)
CALCIUM SERPL-MCNC: 9.6 MG/DL (ref 8.8–10.2)
CHLORIDE SERPL-SCNC: 105 MMOL/L (ref 98–107)
CHLORIDE SERPL-SCNC: 107 MMOL/L (ref 98–107)
CREAT SERPL-MCNC: 1.28 MG/DL (ref 0.51–0.95)
CREAT SERPL-MCNC: 1.39 MG/DL (ref 0.51–0.95)
CREAT UR-MCNC: 39.3 MG/DL
DEPRECATED CALCIDIOL+CALCIFEROL SERPL-MC: 75 UG/L (ref 20–75)
DEPRECATED HCO3 PLAS-SCNC: 23 MMOL/L (ref 22–29)
DEPRECATED HCO3 PLAS-SCNC: 25 MMOL/L (ref 22–29)
EOSINOPHIL # BLD AUTO: 0.1 10E3/UL (ref 0–0.7)
EOSINOPHIL NFR BLD AUTO: 2 %
ERYTHROCYTE [DISTWIDTH] IN BLOOD BY AUTOMATED COUNT: 17.2 % (ref 10–15)
GFR SERPL CREATININE-BSD FRML MDRD: 37 ML/MIN/1.73M2
GFR SERPL CREATININE-BSD FRML MDRD: 41 ML/MIN/1.73M2
GLUCOSE SERPL-MCNC: 82 MG/DL (ref 70–99)
GLUCOSE SERPL-MCNC: 90 MG/DL (ref 70–99)
HCT VFR BLD AUTO: 30.7 % (ref 35–47)
HGB BLD-MCNC: 9 G/DL (ref 11.7–15.7)
HGB BLD-MCNC: 9.1 G/DL (ref 11.7–15.7)
IMM GRANULOCYTES # BLD: 0 10E3/UL
IMM GRANULOCYTES NFR BLD: 0 %
INR PPP: 1.13 (ref 0.85–1.15)
LVEF ECHO: NORMAL
LYMPHOCYTES # BLD AUTO: 1.1 10E3/UL (ref 0.8–5.3)
LYMPHOCYTES NFR BLD AUTO: 19 %
MCH RBC QN AUTO: 25.8 PG (ref 26.5–33)
MCHC RBC AUTO-ENTMCNC: 29.6 G/DL (ref 31.5–36.5)
MCV RBC AUTO: 87 FL (ref 78–100)
MICROALBUMIN UR-MCNC: 138 MG/L
MICROALBUMIN/CREAT UR: 351.15 MG/G CR (ref 0–25)
MONOCYTES # BLD AUTO: 0.8 10E3/UL (ref 0–1.3)
MONOCYTES NFR BLD AUTO: 12 %
NEUTROPHILS # BLD AUTO: 4 10E3/UL (ref 1.6–8.3)
NEUTROPHILS NFR BLD AUTO: 66 %
NRBC # BLD AUTO: 0 10E3/UL
NRBC BLD AUTO-RTO: 0 /100
NT-PROBNP SERPL-MCNC: 1549 PG/ML (ref 0–1800)
PHOSPHATE SERPL-MCNC: 3.1 MG/DL (ref 2.5–4.5)
PLATELET # BLD AUTO: 205 10E3/UL (ref 150–450)
POTASSIUM SERPL-SCNC: 4.1 MMOL/L (ref 3.4–5.3)
POTASSIUM SERPL-SCNC: 4.2 MMOL/L (ref 3.4–5.3)
PROT SERPL-MCNC: 6.8 G/DL (ref 6.4–8.3)
PTH-INTACT SERPL-MCNC: 63 PG/ML (ref 15–65)
RBC # BLD AUTO: 3.53 10E6/UL (ref 3.8–5.2)
SARS-COV-2 RNA RESP QL NAA+PROBE: NEGATIVE
SODIUM SERPL-SCNC: 138 MMOL/L (ref 136–145)
SODIUM SERPL-SCNC: 141 MMOL/L (ref 136–145)
TROPONIN T SERPL HS-MCNC: 37 NG/L
TROPONIN T SERPL HS-MCNC: 41 NG/L
TSH SERPL DL<=0.005 MIU/L-ACNC: 1.4 UIU/ML (ref 0.3–4.2)
WBC # BLD AUTO: 6 10E3/UL (ref 4–11)

## 2022-10-24 PROCEDURE — 83880 ASSAY OF NATRIURETIC PEPTIDE: CPT | Performed by: EMERGENCY MEDICINE

## 2022-10-24 PROCEDURE — 93325 DOPPLER ECHO COLOR FLOW MAPG: CPT | Performed by: STUDENT IN AN ORGANIZED HEALTH CARE EDUCATION/TRAINING PROGRAM

## 2022-10-24 PROCEDURE — 80061 LIPID PANEL: CPT | Performed by: NURSE PRACTITIONER

## 2022-10-24 PROCEDURE — 85018 HEMOGLOBIN: CPT | Performed by: EMERGENCY MEDICINE

## 2022-10-24 PROCEDURE — 36415 COLL VENOUS BLD VENIPUNCTURE: CPT | Performed by: PATHOLOGY

## 2022-10-24 PROCEDURE — 99285 EMERGENCY DEPT VISIT HI MDM: CPT | Mod: 25 | Performed by: EMERGENCY MEDICINE

## 2022-10-24 PROCEDURE — 93010 ELECTROCARDIOGRAM REPORT: CPT | Performed by: EMERGENCY MEDICINE

## 2022-10-24 PROCEDURE — 85610 PROTHROMBIN TIME: CPT | Performed by: EMERGENCY MEDICINE

## 2022-10-24 PROCEDURE — 82306 VITAMIN D 25 HYDROXY: CPT | Performed by: INTERNAL MEDICINE

## 2022-10-24 PROCEDURE — 80053 COMPREHEN METABOLIC PANEL: CPT | Performed by: PATHOLOGY

## 2022-10-24 PROCEDURE — 93321 DOPPLER ECHO F-UP/LMTD STD: CPT | Performed by: STUDENT IN AN ORGANIZED HEALTH CARE EDUCATION/TRAINING PROGRAM

## 2022-10-24 PROCEDURE — 36415 COLL VENOUS BLD VENIPUNCTURE: CPT | Performed by: EMERGENCY MEDICINE

## 2022-10-24 PROCEDURE — 85025 COMPLETE CBC W/AUTO DIFF WBC: CPT | Performed by: PATHOLOGY

## 2022-10-24 PROCEDURE — 93005 ELECTROCARDIOGRAM TRACING: CPT

## 2022-10-24 PROCEDURE — 84443 ASSAY THYROID STIM HORMONE: CPT | Performed by: EMERGENCY MEDICINE

## 2022-10-24 PROCEDURE — 84443 ASSAY THYROID STIM HORMONE: CPT | Performed by: PATHOLOGY

## 2022-10-24 PROCEDURE — 82043 UR ALBUMIN QUANTITATIVE: CPT | Performed by: INTERNAL MEDICINE

## 2022-10-24 PROCEDURE — 250N000013 HC RX MED GY IP 250 OP 250 PS 637: Performed by: EMERGENCY MEDICINE

## 2022-10-24 PROCEDURE — 83970 ASSAY OF PARATHORMONE: CPT | Performed by: PATHOLOGY

## 2022-10-24 PROCEDURE — 99214 OFFICE O/P EST MOD 30 MIN: CPT | Mod: 25 | Performed by: NURSE PRACTITIONER

## 2022-10-24 PROCEDURE — 84100 ASSAY OF PHOSPHORUS: CPT | Performed by: PATHOLOGY

## 2022-10-24 PROCEDURE — G0463 HOSPITAL OUTPT CLINIC VISIT: HCPCS

## 2022-10-24 PROCEDURE — 84484 ASSAY OF TROPONIN QUANT: CPT | Performed by: EMERGENCY MEDICINE

## 2022-10-24 PROCEDURE — 71046 X-RAY EXAM CHEST 2 VIEWS: CPT

## 2022-10-24 PROCEDURE — 93308 TTE F-UP OR LMTD: CPT | Performed by: STUDENT IN AN ORGANIZED HEALTH CARE EDUCATION/TRAINING PROGRAM

## 2022-10-24 PROCEDURE — 93005 ELECTROCARDIOGRAM TRACING: CPT | Performed by: EMERGENCY MEDICINE

## 2022-10-24 PROCEDURE — U0005 INFEC AGEN DETEC AMPLI PROBE: HCPCS | Performed by: EMERGENCY MEDICINE

## 2022-10-24 PROCEDURE — 120N000002 HC R&B MED SURG/OB UMMC

## 2022-10-24 PROCEDURE — 71046 X-RAY EXAM CHEST 2 VIEWS: CPT | Mod: 26 | Performed by: RADIOLOGY

## 2022-10-24 RX ORDER — CARVEDILOL 6.25 MG/1
6.25 TABLET ORAL ONCE
Status: COMPLETED | OUTPATIENT
Start: 2022-10-24 | End: 2022-10-24

## 2022-10-24 RX ORDER — NITROGLYCERIN 0.4 MG/1
0.4 TABLET SUBLINGUAL EVERY 5 MIN PRN
Status: DISCONTINUED | OUTPATIENT
Start: 2022-10-24 | End: 2022-10-26 | Stop reason: HOSPADM

## 2022-10-24 RX ORDER — ACETAMINOPHEN 325 MG/1
650 TABLET ORAL EVERY 4 HOURS PRN
Status: DISCONTINUED | OUTPATIENT
Start: 2022-10-24 | End: 2022-10-25

## 2022-10-24 RX ORDER — ASPIRIN 81 MG/1
324 TABLET, CHEWABLE ORAL ONCE
Status: COMPLETED | OUTPATIENT
Start: 2022-10-24 | End: 2022-10-24

## 2022-10-24 RX ORDER — HYDRALAZINE HYDROCHLORIDE 25 MG/1
25 TABLET, FILM COATED ORAL ONCE
Status: COMPLETED | OUTPATIENT
Start: 2022-10-24 | End: 2022-10-24

## 2022-10-24 RX ORDER — LIDOCAINE 40 MG/G
CREAM TOPICAL
Status: DISCONTINUED | OUTPATIENT
Start: 2022-10-24 | End: 2022-10-26 | Stop reason: HOSPADM

## 2022-10-24 RX ORDER — NITROGLYCERIN 0.4 MG/1
0.4 TABLET SUBLINGUAL EVERY 5 MIN PRN
Status: DISCONTINUED | OUTPATIENT
Start: 2022-10-24 | End: 2022-10-25

## 2022-10-24 RX ORDER — MAGNESIUM HYDROXIDE/ALUMINUM HYDROXICE/SIMETHICONE 120; 1200; 1200 MG/30ML; MG/30ML; MG/30ML
30 SUSPENSION ORAL EVERY 4 HOURS PRN
Status: DISCONTINUED | OUTPATIENT
Start: 2022-10-24 | End: 2022-10-26 | Stop reason: HOSPADM

## 2022-10-24 RX ORDER — ACETAMINOPHEN 650 MG/1
650 SUPPOSITORY RECTAL EVERY 4 HOURS PRN
Status: DISCONTINUED | OUTPATIENT
Start: 2022-10-24 | End: 2022-10-26 | Stop reason: HOSPADM

## 2022-10-24 RX ADMIN — HYDRALAZINE HYDROCHLORIDE 25 MG: 25 TABLET, FILM COATED ORAL at 20:12

## 2022-10-24 RX ADMIN — CARVEDILOL 6.25 MG: 6.25 TABLET, FILM COATED ORAL at 20:11

## 2022-10-24 RX ADMIN — ASPIRIN 81 MG CHEWABLE TABLET 324 MG: 81 TABLET CHEWABLE at 15:56

## 2022-10-24 ASSESSMENT — ACTIVITIES OF DAILY LIVING (ADL)
ADLS_ACUITY_SCORE: 37

## 2022-10-24 ASSESSMENT — PAIN SCALES - GENERAL: PAINLEVEL: NO PAIN (0)

## 2022-10-24 NOTE — ED TRIAGE NOTES
"Triage Assessment & Note:    BP (!) 159/65   Pulse 60   Temp 97.8  F (36.6  C) (Temporal)   Resp 16   Ht 1.549 m (5' 1\")   Wt 68 kg (150 lb)   SpO2 97%   BMI 28.34 kg/m        Patient presents with: Pt comes ambulatory to triage from clinic with family for reports of chest wall pain and post op f/u. No reports of fever, cough, or travel.     Home Treatments/Remedies: home medications    Febrile / Afebrile: afebrile    Duration of C/o: 4 days    Analy Cornejo RN  October 24, 2022            "

## 2022-10-24 NOTE — ED NOTES
ED Triage Provider Note  [facility]  Encounter Date: Oct 24, 2022    History:  No chief complaint on file.    Kamryn Miller is a 87 year old female who presents to the ED with intermittent recurrent back pain similar to her previous anginal pain that led to 5 stents being placed in her heart in the past.  The last time she had stents placed was 2 years ago.  Patient states that over the last week she has been having almost daily chest pain that initially came on with exertion but now comes on at rest.  Patient states that when she gets the pain she stops and it goes away after a couple minutes.  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  Patient denies any pain currently and thinks she took her baby aspirin already today.    Past Medical History:   Diagnosis Date     Aortic valvar stenosis 7/2010    mild     Asthma      Bipolar disorder (H)      CAD (coronary artery disease)     s/p angioplasty     Celiac disease      Colon polyps      Colon polyps 2012    every 3 year colonoscopy      Depression      High cholesterol      HTN      Mitral insufficiency      Pulmonary HTN (H)     mild     Tremors 7/10    drug induced from antidepressants     Tricuspid insufficiency      Current Outpatient Medications   Medication Sig Dispense Refill     albuterol (PROAIR HFA/PROVENTIL HFA/VENTOLIN HFA) 108 (90 Base) MCG/ACT inhaler Inhale 2 puffs into the lungs every 6 hours as needed for wheezing or shortness of breath / dyspnea 18 g 3     alendronate (FOSAMAX) 70 MG tablet TAKE 1 TABLET BY MOUTH ONCE WEEKLY 4 tablet 23     aspirin 81 MG tablet Take 81 mg by mouth daily        azelastine (ASTEPRO) 0.15 % nasal spray USE 1 SPRAY IN EACH NOSTRIL ONCE A DAY 30 mL 11     Calcium Citrate (CITRACAL OR) Take 1 tablet by mouth daily.       carvedilol (COREG) 6.25 MG tablet Take 2 tablets (12.5  mg) by mouth 2 times daily 540 tablet 3     cholecalciferol (VITAMIN D3) 1250 mcg (73596 units) capsule TAKE 1 CAPSULE BY MOUTH ONCE WEEKLY 4 capsule 11     cloNIDine (CATAPRES) 0.1 MG tablet Take 1 tablet (0.1 mg) by mouth daily as needed (For SBP > 180) 20 tablet 0     COMPRESSION STOCKINGS 1 each daily (Patient not taking: Reported on 10/24/2022) 1 each 1     desvenlafaxine (PRISTIQ) 100 MG 24 hr tablet Take 100 mg by mouth daily       fluticasone-salmeterol (ADVAIR DISKUS) 500-50 MCG/ACT inhaler INHALE 1 PUFF BY MOUTH TWICE DAILY 60 each 0     hydrALAZINE (APRESOLINE) 25 MG tablet Take 1 tablet (25 mg) by mouth 3 times daily 270 tablet 1     lamoTRIgine (LAMICTAL) 25 MG tablet Take 25 mg by mouth daily with 200 mg tablet for a total dose of 225 mg       LAMOTRIGINE 200 MG PO TABS Take 200 mg by mouth daily with 25 mg tablet for a total dose of 225 mg       levothyroxine (SYNTHROID/LEVOTHROID) 50 MCG tablet TAKE 1 TABLET BY MOUTH ONCE DAILY 28 tablet 11     liothyronine (CYTOMEL) 5 MCG tablet Take 2 tablets (10 mcg) by mouth daily 30 tablet 3     memantine (NAMENDA) 10 MG tablet Take 1 tablet (10 mg) by mouth daily 30 tablet 3     mirtazapine (REMERON) 7.5 MG tablet Take 1 tablet (7.5 mg) by mouth At Bedtime 30 tablet 3     Multiple Vitamin (TAB-A-JENNIFER) TABS TAKE 1 TABLET BY MOUTH ONCE DAILY 30 tablet PRN     potassium chloride ER (K-TAB) 20 MEQ CR tablet Take 1 tablet (20 mEq) by mouth daily 90 tablet 3     rosuvastatin (CRESTOR) 40 MG tablet TAKE 1 TABLET BY MOUTH ONCE DAILY 28 tablet 11     ticagrelor (BRILINTA) 90 MG tablet Take 1 tablet (90 mg) by mouth 2 times daily 180 tablet 0     Allergies   Allergen Reactions     Ace Inhibitors Cough     Diclofenac Other (See Comments)     Balance problems     Gluten Meal Diarrhea     Social History     Socioeconomic History     Marital status:      Spouse name: Adrien     Number of children: 2     Years of education: 16     Highest education level: Not on file    Occupational History     Employer: RETIRED     Comment: Retired in 2002.    Tobacco Use     Smoking status: Never     Smokeless tobacco: Never   Substance and Sexual Activity     Alcohol use: No     Alcohol/week: 0.0 standard drinks     Types: 1 Standard drinks or equivalent per week     Drug use: No     Sexual activity: Not Currently     Partners: Male   Other Topics Concern     Parent/sibling w/ CABG, MI or angioplasty before 65F 55M? Not Asked   Social History Narrative     Not on file     Social Determinants of Health     Financial Resource Strain: Not on file   Food Insecurity: Not on file   Transportation Needs: Not on file   Physical Activity: Not on file   Stress: Not on file   Social Connections: Not on file   Intimate Partner Violence: Not on file   Housing Stability: Not on file     Past Surgical History:   Procedure Laterality Date     ANGIOGRAM  6/26/2009     ANGIOPLASTY  9/96    for angina     ANGIOPLASTY  8/03 - 9/03    X -2 - with stenst in coronary car.     APPENDECTOMY       BREAST BIOPSY, RT/LT      Breat Biopsy RT/LT, benign     BUNIONECTOMY  11/8/2011    Procedure:BUNIONECTOMY; Left donna bunionectomy; Surgeon:FREDY LITTLEJOHN; Location:[facility] OR     BUNIONECTOMY RT/LT  5/2007    right bunion and right 2nd hammertoe     CATARACT IOL, RT/LT  6/12 and 7/12    bilateral     COLONOSCOPY  2007, 2012    every 3 years for polyps     CV CORONARY ANGIOGRAM N/A 8/21/2020    Procedure: CV CORONARY ANGIOGRAM;  Surgeon: Gianluca Toscano MD;  [facility]     CV LEFT HEART CATH N/A 8/21/2020    Procedure: CV LEFT HEART CATH;  Surgeon: Gianluca Toscano MD;  [facility]     CV LEFT HEART CATH N/A 11/3/2020    Procedure: CV LEFT HEART CATH;  Surgeon: Tom Burrows MD;  [facility]     CV LOWER EXTREMITY ANGIOGRAM BILATERAL N/A 11/3/2020    Procedure: CV ANGIOGRAM LOWER EXTREMITY BILATERAL;  Surgeon: Tom Burrows MD;   Location:  [facility]     CV PCI STENT DRUG ELUTING N/A 8/21/2020    Procedure: Percutaneous Coronary Intervention Stent Drug Eluting;  Surgeon: Gianluca Toscano MD;  Location:  [facility]     CV RIGHT HEART CATH MEASUREMENTS RECORDED N/A 8/21/2020    Procedure: CV RIGHT HEART CATH;  Surgeon: Gianluca Toscano MD;  Location:  [facility]     CV RIGHT HEART CATH MEASUREMENTS RECORDED N/A 11/3/2020    Procedure: CV RIGHT HEART CATH;  Surgeon: Tom Burrows MD;  Location:  [facility] CATH LAB     CV TRANSCATHETER AORTIC VALVE REPLACEMENT N/A 9/12/2022    Procedure: Transfemoral Transcatheter Aortic Valve Replacement with possible open heart bypass and or balloon pump placement and any indicated procedure;  Surgeon: Tom Burrows MD;  Location:  [facility]ATH [facility]     HEART CATH FEMORAL CANNULIZATION WITH OPEN STANDBY REPAIR AORTIC VALVE N/A 9/12/2022    Procedure: OR TRANSCATHETER AORTIC VALVE REPLACEMENT, OPEN FEMORAL ARTERY APPROACH;  Surgeon: Arthur Markham MD;  Location: [facility] CARDIAC CATH LAB     JOINT REPLACEMENT, HIP RT/LT  4/2008    right hip replaced     JOINT REPLACEMENT, HIP RT/LT  4/2009    left hip replaced     REPAIR HAMMER TOE  11/8/2011    Procedure:REPAIR HAMMER TOE; left 2nd hammertoe repair; Surgeon:FREDY LITTLEJOHN; Location: OR     SURGICAL HISTORY OF -   11/11    left bunion and 2nd hammertoe repair     TUBAL LIGATION       ZZC ANESTH,BLEPHAROPLASTY      (R) for drooping eyelid     ZZC APPENDECTOMY       Family History   Problem Relation Age of Onset     Asthma Mother      Cerebrovascular Disease Mother      Arthritis Mother      Depression Mother      Lipids Sister      Hypertension Sister      Heart Disease Sister      Lipids Sister      Hypertension Sister      Lipids Sister      Breast Cancer Sister        Review of Systems:  No fever cough    Exam:  There were no vitals taken for this visit.  General: No  acute distress. Appears stated age.   Cardio: Regular rate, extremities well perfused  Resp: Normal work of breathing, grossly normal respiratory rate  Neuro: Alert. CN II-XII grossly intact. Grossly intact strength.       Medical Decision Making:  Patient arriving to the ED with problem as above. A medical screening exam was performed. Orders initiated from Triage. The patient is appropriate to be immediately roomed.    Orders Placed This Encounter   Procedures     XR Chest Port 1 View     Troponin T, High Sensitivity     INR     Comprehensive metabolic panel     Asymptomatic COVID-19 Virus (Coronavirus) by PCR     EKG 12-lead, tracing only     Pulse oximetry nursing     Cardiac Continuous Monitoring     Peripheral IV: Standard     Review medications with patient     Oxygen: Nasal cannula, Oxygen mask     CBC with platelets differential     Medications   aspirin (ASA) chewable tablet 324 mg (has no administration in time range)         Chad Jin MD, MD on 10/24/2022 at 3:36 PM       Chad Jin MD  10/24/22 2337

## 2022-10-24 NOTE — LETTER
"10/24/2022      RE: Kamryn Miller  [address]  [address]  [place] [address]       Dear Colleague,    Thank you for the opportunity to participate in the care of your patient, Kamryn Miller, at the [facility] HEART [facility]IN[facility] [place] at [facility]. Please see a copy of my visit note below.        [facility] HEART CARE  CARDIOVASCULAR DIVISION    VALVE CLINIC RETURN VISIT    PRIMARY CARDIOLOGIST: Dr. Zuniga      PERTINENT CLINICAL HISTORY:     Kamryn Miller is a very pleasant 86 year old female who presents for 1 month TAVR follow-up. She has a history of HFpEF, HTN, HLD, significant CAD history dx at age 61 w/PTCA 1996, LAD and RCA stenting 2003, s/p LAD PCI 2020 and severe aortic valvular stenosis that was treated with a transfemoral transcatheter aortic valve replacement (TAVR) with a 26mm Arndt Janis Ultra on 9/12/22 by Morro Burrows. The TAVR and post-procedural course were notable for no complications. She was noted to have 60% left femoral stenosis post procedure, but had adequate flow on post procedure peripheral angiogram. She was started on dual antiplatelet therapy with plans to assess symptoms of PAD as outpatient. She was noted to have new LBBB post procedure and discharged on a Cardionet. Her post TAVR echo showed mean PG of 7 mmHg with trace PVL.     Since her valve replacement Kamryn has been feeling fatigued, even after getting a good nights sleep, she wakes up feeling very tired. She also reports worsening shortness of breath since her valve replacement, though she denies any s/s of heart failure. Her weight has been stable and leg swelling has improved post procedure. She feels quite limited by her breathing. She denies any central chest pain, but over the past week has been experiencing waxing and waning pain between her shoulder blades. She describes it as a \"gnawing pain\"and states it is identical to the pain she experienced " before receiving stents in the past. She denies any claudication, this has resolved. She was having low blood pressures and amlodipine was stopped, hydralazine was started. Home blood pressure have been okay, she does not know the measurements.      PAST MEDICAL HISTORY:     Past Medical History:   Diagnosis Date     Aortic valvar stenosis 7/2010    mild     Asthma      Bipolar disorder (H)      CAD (coronary artery disease)     s/p angioplasty     Celiac disease      Colon polyps      Colon polyps 2012    every 3 year colonoscopy      Depression      High cholesterol      HTN      Mitral insufficiency      Pulmonary HTN (H)     mild     Tremors 7/10    drug induced from antidepressants     Tricuspid insufficiency         PAST SURGICAL HISTORY:     Past Surgical History:   Procedure Laterality Date     ANGIOGRAM  6/26/2009     ANGIOPLASTY  9/96    for angina     ANGIOPLASTY  8/03 - 9/03    X -2 - with stenst in coronary car.     APPENDECTOMY       BREAST BIOPSY, RT/LT      Breat Biopsy RT/LT, benign     BUNIONECTOMY  11/8/2011    Procedure:BUNIONECTOMY; Left donna bunionectomy; Surgeon:FREDY LITTLEJOHN; Location:[facility] OR     BUNIONECTOMY RT/LT  5/2007    right bunion and right 2nd hammertoe     CATARACT IOL, RT/LT  6/12 and 7/12    bilateral     COLONOSCOPY  2007, 2012    every 3 years for polyps     CV CORONARY ANGIOGRAM N/A 8/21/2020    Procedure: CV CORONARY ANGIOGRAM;  Surgeon: Gianluca Toscano MD;  Location:  HEART CARDIAC CATH LAB     CV LEFT HEART CATH N/A 8/21/2020    Procedure: CV LEFT HEART CATH;  Surgeon: Gianluca Toscano MD;  Location:  HEART CARDIAC CATH LAB     CV LEFT HEART CATH N/A 11/3/2020    Procedure: CV LEFT HEART CATH;  Surgeon: Tom Burrows MD;  Location:  HEART CARDIAC CATH LAB     CV LOWER EXTREMITY ANGIOGRAM BILATERAL N/A 11/3/2020    Procedure: CV ANGIOGRAM LOWER EXTREMITY BILATERAL;  Surgeon: Tom Burrows MD;  Location:  HEART CARDIAC CATH LAB     CV PCI  STENT DRUG ELUTING N/A 8/21/2020    Procedure: Percutaneous Coronary Intervention Stent Drug Eluting;  Surgeon: Gianluca Toscano MD;  Location:  HEART CARDIAC CATH LAB     CV RIGHT HEART CATH MEASUREMENTS RECORDED N/A 8/21/2020    Procedure: CV RIGHT HEART CATH;  Surgeon: Gianluca Toscano MD;  Location:  HEART CARDIAC CATH LAB     CV RIGHT HEART CATH MEASUREMENTS RECORDED N/A 11/3/2020    Procedure: CV RIGHT HEART CATH;  Surgeon: Tom Burrows MD;  Location:  HEART CARDIAC CATH LAB     CV TRANSCATHETER AORTIC VALVE REPLACEMENT N/A 9/12/2022    Procedure: Transfemoral Transcatheter Aortic Valve Replacement with possible open heart bypass and or balloon pump placement and any indicated procedure;  Surgeon: Tom Burrows MD;  Location:  HEART CARDIAC CATH LAB     HEART CATH FEMORAL CANNULIZATION WITH OPEN STANDBY REPAIR AORTIC VALVE N/A 9/12/2022    Procedure: OR TRANSCATHETER AORTIC VALVE REPLACEMENT, OPEN FEMORAL ARTERY APPROACH;  Surgeon: Arthur Markham MD;  Location:  HEART CARDIAC CATH LAB     JOINT REPLACEMENT, HIP RT/LT  4/2008    right hip replaced     JOINT REPLACEMENT, HIP RT/LT  4/2009    left hip replaced     REPAIR HAMMER TOE  11/8/2011    Procedure:REPAIR HAMMER TOE; left 2nd hammertoe repair; Surgeon:FREDY LITTLEJOHN; Location: OR     SURGICAL HISTORY OF -   11/11    left bunion and 2nd hammertoe repair     TUBAL LIGATION       ZZC ANESTH,BLEPHAROPLASTY      (R) for drooping eyelid     Z APPENDECTOMY          CURRENT MEDICATIONS:     Current Outpatient Medications   Medication Sig Dispense Refill     albuterol (PROAIR HFA/PROVENTIL HFA/VENTOLIN HFA) 108 (90 Base) MCG/ACT inhaler Inhale 2 puffs into the lungs every 6 hours as needed for wheezing or shortness of breath / dyspnea 18 g 3     alendronate (FOSAMAX) 70 MG tablet TAKE 1 TABLET BY MOUTH ONCE WEEKLY 4 tablet 23     aspirin 81 MG tablet Take 81 mg by mouth daily        azelastine (ASTEPRO) 0.15 % nasal  spray USE 1 SPRAY IN EACH NOSTRIL ONCE A DAY 30 mL 11     Calcium Citrate (CITRACAL OR) Take 1 tablet by mouth daily.       carvedilol (COREG) 6.25 MG tablet Take 2 tablets (12.5 mg) by mouth 2 times daily 540 tablet 3     cholecalciferol (VITAMIN D3) 1250 mcg (89900 units) capsule TAKE 1 CAPSULE BY MOUTH ONCE WEEKLY 4 capsule 11     cloNIDine (CATAPRES) 0.1 MG tablet Take 1 tablet (0.1 mg) by mouth daily as needed (For SBP > 180) 20 tablet 0     COMPRESSION STOCKINGS 1 each daily 1 each 1     desvenlafaxine (PRISTIQ) 100 MG 24 hr tablet Take 100 mg by mouth daily       fluticasone-salmeterol (ADVAIR DISKUS) 500-50 MCG/ACT inhaler INHALE 1 PUFF BY MOUTH TWICE DAILY 60 each 0     hydrALAZINE (APRESOLINE) 25 MG tablet Take 1 tablet (25 mg) by mouth 3 times daily 270 tablet 1     lamoTRIgine (LAMICTAL) 25 MG tablet Take 25 mg by mouth daily with 200 mg tablet for a total dose of 225 mg       LAMOTRIGINE 200 MG PO TABS Take 200 mg by mouth daily with 25 mg tablet for a total dose of 225 mg       levothyroxine (SYNTHROID/LEVOTHROID) 50 MCG tablet TAKE 1 TABLET BY MOUTH ONCE DAILY 28 tablet 11     liothyronine (CYTOMEL) 5 MCG tablet Take 2 tablets (10 mcg) by mouth daily 30 tablet 3     memantine (NAMENDA) 10 MG tablet Take 1 tablet (10 mg) by mouth daily 30 tablet 3     mirtazapine (REMERON) 7.5 MG tablet Take 1 tablet (7.5 mg) by mouth At Bedtime 30 tablet 3     Multiple Vitamin (TAB-A-JENNIFER) TABS TAKE 1 TABLET BY MOUTH ONCE DAILY 30 tablet PRN     potassium chloride ER (K-TAB) 20 MEQ CR tablet Take 1 tablet (20 mEq) by mouth daily 90 tablet 3     rosuvastatin (CRESTOR) 40 MG tablet TAKE 1 TABLET BY MOUTH ONCE DAILY 28 tablet 11     ticagrelor (BRILINTA) 90 MG tablet Take 1 tablet (90 mg) by mouth 2 times daily 180 tablet 0        ALLERGIES:     Allergies   Allergen Reactions     Ace Inhibitors Cough     Diclofenac Other (See Comments)     Balance problems     Gluten Meal Diarrhea        FAMILY HISTORY:     Family  "History   Problem Relation Age of Onset     Asthma Mother      Cerebrovascular Disease Mother      Arthritis Mother      Depression Mother      Lipids Sister      Hypertension Sister      Heart Disease Sister      Lipids Sister      Hypertension Sister      Lipids Sister      Breast Cancer Sister         SOCIAL HISTORY:     Social History     Socioeconomic History     Marital status:      Spouse name: Adrien     Number of children: 2     Years of education: 16     Highest education level: None   Occupational History     Employer: RETIRED     Comment: Retired in 2002.    Tobacco Use     Smoking status: Never Smoker     Smokeless tobacco: Never Used   Substance and Sexual Activity     Alcohol use: No     Alcohol/week: 0.0 standard drinks     Types: 1 Standard drinks or equivalent per week     Drug use: No     Sexual activity: Not Currently     Partners: Male        REVIEW OF SYSTEMS:     Constitutional: No fevers or chills  Skin: No new rash or itching  Eyes: No acute change in vision  Ears/Nose/Throat: No purulent rhinorrhea, new hearing loss, or new vertigo  Respiratory: No cough or hemoptysis  Cardiovascular: See HPI  Gastrointestinal: No change in appetite, vomiting, hematemesis or diarrhea  Genitourinary: No dysuria or hematuria  Musculoskeletal: No new back pain, neck pain or muscle pain  Neurologic: No new headaches, focal weakness or behavior changes  Psychiatric: No hallucinations, excessive alcohol consumption or illegal drug usage  Hematologic/Lymphatic/Immunologic: No bleeding, chills, fever, night sweats or weight loss  Endocrine: No new cold intolerance, heat intolerance, polyphagia, polydipsia or polyuria      PHYSICAL EXAMINATION:     BP (!) 176/83 (BP Location: Right arm, Patient Position: Chair, Cuff Size: Adult Small)   Pulse 62   Ht 1.552 m (5' 1.1\")   Wt 68.4 kg (150 lb 12.8 oz)   SpO2 95%   BMI 28.40 kg/m      GENERAL: No acute distress.  HEENT: EOMI. Sclerae white, not injected. Nares " clear. Pharynx without erythema or exudate.   Neck: No adenopathy. No thyromegaly. No jugular venous distension.   Heart: Regular rate and rhythm. No murmur.   Lungs: Clear to auscultation. No ronchi, wheezes, rales.   Abdomen: Soft, nontender, nondistended. Bowel sounds present.  Extremities: No clubbing, cyanosis, or edema.   Neurologic: Alert and oriented to person/place/time, normal speech and affect. No focal deficits.  Skin: No petechiae, purpura or rash.     LABORATORY DATA:     LIPID RESULTS:  Lab Results   Component Value Date    CHOL 172 12/17/2021    CHOL 175 10/23/2020    HDL 61 12/17/2021    HDL 71 10/23/2020    LDL 84 12/17/2021    LDL 83 10/23/2020    TRIG 137 12/17/2021    TRIG 103 10/23/2020    CHOLHDLRATIO 2.8 09/18/2015       LIVER ENZYME RESULTS:  Lab Results   Component Value Date    AST 21 09/27/2022    AST 27 05/17/2021    ALT 21 09/27/2022    ALT 38 05/17/2021       CBC RESULTS:  Lab Results   Component Value Date    WBC 5.8 10/05/2022    WBC 4.6 05/17/2021    RBC 3.82 10/05/2022    RBC 3.97 05/17/2021    HGB 9.9 (L) 10/05/2022    HGB 12.9 05/17/2021    HCT 33.5 (L) 10/05/2022    HCT 40.3 05/17/2021    MCV 88 10/05/2022     (H) 05/17/2021    MCH 25.9 (L) 10/05/2022    MCH 32.5 05/17/2021    MCHC 29.6 (L) 10/05/2022    MCHC 32.0 05/17/2021    RDW 16.9 (H) 10/05/2022    RDW 12.7 05/17/2021     10/05/2022     05/17/2021       BMP RESULTS:  Lab Results   Component Value Date     10/05/2022     06/04/2021    POTASSIUM 4.6 10/05/2022    POTASSIUM 3.4 06/04/2021    CHLORIDE 103 10/05/2022    CHLORIDE 106 06/04/2021    CO2 27 10/05/2022    CO2 30 06/04/2021    ANIONGAP 7 10/05/2022    ANIONGAP 6 06/04/2021    GLC 98 10/05/2022     (H) 06/04/2021    BUN 39 (H) 10/05/2022    BUN 27 06/04/2021    CR 1.59 (H) 10/05/2022    CR 1.50 (H) 06/04/2021    GFRESTIMATED 31 (L) 10/05/2022    GFRESTIMATED 32 (L) 06/04/2021    GFRESTBLACK 37 (L) 06/04/2021    PRINCE 9.6  10/05/2022    PRINCE 9.4 06/04/2021        A1C RESULTS:  Lab Results   Component Value Date    A1C 5.0 06/30/2009       INR RESULTS:  Lab Results   Component Value Date    INR 1.09 09/22/2022    INR 1.04 09/09/2022    INR 1.05 11/03/2020    INR 2.28 (H) 04/23/2009          PROCEDURES & FURTHER ASSESSMENTS:   EKG today 10/24/22:  Personally reviewed  NSR with LBBB unchanged    ECHO today 10/24/22:  Interpretation Summary  Status post 26 mm Arndt Janis Ultra valve TAVR.  Global and regional left ventricular function is normal with an EF of 55-60%.  Global right ventricular function is normal. The right ventricle is normal  size.  The TAVR is well-seated. Leaflet opening is not clearly visualized. There is  no valvular regurgitation but there is trace paravalvular regurgitation. The  Vmax is 2.6 m/s, the mean gradient is 13 mmHg, the dimensionless index is  0.34, and the acceleration time is 110 ms.  IVC diameter and respiratory changes fall into an intermediate range  suggesting an RA pressure of 8 mmHg.  This study was compared with the study from 09/13/2022. No significant changes  noted. The TAVR gradient has increased modestly, as is to be expected.  ______________________________________________________________________________  Left Ventricle  Global and regional left ventricular function is normal with an EF of 55-60%.  Left ventricular wall thickness is normal. Left ventricular size is normal.  Left ventricular diastolic function is not assessable.     Right Ventricle  Global right ventricular function is normal. The right ventricle is normal  size.     Atria  Both atria appear normal. Moderate left atrial enlargement is present.     Mitral Valve  Mild mitral annular calcification is present. Mild mitral insufficiency is  present.     Aortic Valve  The TAVR is well-seated. Leaflet opening is not clearly visualized. There is  no valvular regurgitation but there is trace paravalvular regurgitation. The  Vmax is  2.6 m/s, the mean gradient is 13 mmHg, the dimensionless index is  0.34, and the acceleration time is 110 ms.     Tricuspid Valve  The tricuspid valve is normal. Trace tricuspid insufficiency is present. The  right ventricular systolic pressure is approximated at 21.2 mmHg plus the  right atrial pressure.     Pulmonic Valve  The pulmonic valve is normal. Trace pulmonic insufficiency is present.     Vessels  The aorta root cannot be assessed. Ascending aorta 3.2 cm. IVC diameter and  respiratory changes fall into an intermediate range suggesting an RA pressure  of 8 mmHg.     Pericardium  No pericardial effusion is present.     Compared to Previous Study  This study was compared with the study from 2022 . No significant  changes noted. The TAVR gradient has increased modestly, as is to be expected.  ______________________________________________________________________________  MMode/2D Measurements & Calculations     IVSd: 1.1 cm  LVIDd: 4.3 cm  LVPWd: 0.89 cm  LV mass(C)d: 139.1 grams  LV mass(C)dI: 83.4 grams/m2  asc Aorta Diam: 3.2 cm  LVOT diam: 1.9 cm  LVOT area: 2.9 cm2  RWT: 0.42     Doppler Measurements & Calculations  MV E max skinny: 171.0 cm/sec  MV A max skinny: 145.4 cm/sec  MV E/A: 1.2  MV max PG: 10.4 mmHg  MV mean P.3 mmHg  MV V2 VTI: 54.1 cm  MVA(VTI): 1.0 cm2  MV dec slope: 856.6 cm/sec2  MV dec time: 0.20 sec  Ao V2 max: 254.2 cm/sec  Ao max P.0 mmHg  Ao V2 mean: 171.2 cm/sec  Ao mean P.7 mmHg  Ao V2 VTI: 53.9 cm  LUCY(I,D): 1.0 cm2  LUCY(V,D): 0.99 cm2  AI P1/2t: 688.4 msec  LV V1 max PG: 3.1 mmHg  LV V1 max: 88.0 cm/sec  LV V1 VTI: 19.5 cm  SV(LVOT): 55.9 ml  SI(LVOT): 33.6 ml/m2  TR max skinny: 229.8 cm/sec  TR max P.2 mmHg     AV Skinny Ratio (DI): 0.35  LUCY Index (cm2/m2): 0.62       Cardiac monitor 22:      ECG dated 22  SR 1st degree AVB and LBBB    Echocardiogram dated  22:    Interpretation Summary  Normal biventricular function.  Post 26 mm Arndt Janis Ultra  valve Aortic Valve placement. The valve is  well seated. There is trivial periprosthetic regurgitation. Mean gradient is 7  mmHg.  The man RA pressure is normal.  No pericardial effusion is present.    NYHA Class: II    Coronary angiogram 8/2020:    Physicians    Panel Physicians Referring Physician Case Authorizing Physician   Gianluca Toscano MD (Primary) Self, Referred, Gianluca Junior MD Ibrahim, Abdisamad, MD (Fellow - Assisting)     Jose Alfredo Matias MD (Fellow - Assisting)       Procedures    Panel 1    Primary Surgeon:  Gianluca Toscano MD   Procedure:  CV CORONARY ANGIOGRAM    CV RIGHT HEART CATH    CV LEFT HEART CATH    Percutaneous Coronary Intervention Stent Drug Eluting        Indications    Severe aortic stenosis [I35.0 (ICD-10-CM)]   Other ill-defined heart diseases [I51.89 (ICD-10-CM)]     Comments/Patient Narrative    84 year old female with with aortic stenosis here for cors/RHC for TAVR work up.     Pre Procedure Diagnosis    severe aortic stenosis    Post Procedure Diagnosis    s/p EDDIE on LAD x 3.      Conclusion         Dist LAD lesion is 70% stenosed.    Mid LAD-1 lesion is 80% stenosed.    Mid LAD-2 lesion is 60% stenosed.    Mid RCA lesion is 40% stenosed.    Prox RCA lesion is 40% stenosed.    Right sided filling pressures are normal.    Normal PA pressures.    Left sided filling pressures are normal.    Reduced cardiac output.     1. LAD - 3 EDDIE were placed in the mid to distal LAD (2.5 x 28 mm, 3.5 x 12 mm, and 3.0 x 8 mm)  2. RHC showed normal biventricular filling pressures. Normal PA pressure. Reduced cardiac output.            Plan      Follow bedrest per protocol    Continued medical management and lifestyle modifications for cardiovascular risk factor optimizations.    Arterial sheath removed from femoral artery with closure device.    Discharge today per protocol     Coronary Findings    Diagnostic  Dominance: Right  Left Anterior Descending   Mid LAD-1 lesion  is 80% stenosed.   Mid LAD-2 lesion is 60% stenosed.   Dist LAD lesion is 70% stenosed.      Right Coronary Artery   The vessel is moderate in size.   Prox RCA lesion is 40% stenosed.   Mid RCA lesion is 40% stenosed.         Intervention     Mid LAD-1 lesion   Stent   The pre-interventional distal flow is decreased (FALGUNI 2). A STENT SYNERGY DRUG ELUTING 2.27Z25TQ [id number] drug eluting stent was successfully placed. No pre-stent angioplasty was performed. The post-interventional distal flow is normal (FALGUNI 3). The intervention was successful. No complications occurred at this lesion.   There is a 0% residual stenosis post intervention.      Mid LAD-2 lesion   Stent   The pre-interventional distal flow is decreased (FALGUNI 2). A STENT SYNERGY DRUG ELUTING 3.27W79GO [id number] drug eluting stent was successfully placed. No pre-stent angioplasty was performed. The strut is apposed. No post-stent angioplasty was performed. The post-interventional distal flow is normal (FALGUNI 3). The intervention was successful. No complications occurred at this lesion.   There is a 0% residual stenosis post intervention.      Dist LAD lesion   Stent   The pre-interventional distal flow is decreased (FALGUNI 2). A STENT SYNERGY DRUG ELUTING 3.65Q99SI [id number] drug eluting stent was successfully placed. No pre-stent angioplasty was performed. The strut is apposed. No post-stent angioplasty was performed. The post-interventional distal flow is normal (FALGUNI 3). The intervention was successful. No complications occurred at this lesion.   There is a 0% residual stenosis post intervention.           Hemodynamics    Right sided filling pressures are normal.Left sided filling pressures are normal. Normal PA pressures.Reduced cardiac output level.            CLINICAL IMPRESSION:     Kamryn Miller is a very pleasant 86 year old female who presents for 1 month TAVR follow-up. She has a history of HFpEF, HTN, HLD, significant CAD history  diagnosed at age 61 w/PTCA 1996, LAD and RCA stenting 2003, s/p LAD PCI 2020 and severe aortic valvular stenosis that was treated with a transfemoral transcatheter aortic valve replacement (TAVR) with a 26mm Arndt Janis Ultra on 9/12/22 by Morro Burrows. The TAVR and post-procedural course were notable for no complications. She was noted to have 60% left femoral stenosis post procedure, but had adequate flow on post procedure peripheral angiogram. She was started on dual antiplatelet therapy with plans to assess symptoms of PAD as outpatient. She was noted to have new LBBB post procedure and discharged on a Cardionet. Her post TAVR echo showed mean PG of 7 mmHg with trace PVL.     Since her valve replacement Kamryn has been feeling fatigued and short of breath, though she denies any s/s of heart failure and appears euvolemic on exam. She also reports a one week history of waxing and waning pain between her shoulder blades, very similar to her prior angina. ECHO today shows normal TAVR function, mean PG slightly higher 13 mmHg, but no major concerns regarding the valve. EKG shows NSR with LBBB unchanged when compared to prior. CardioNet showed NSR with IVCD, no high degree AVB. I am concerned her symptoms may be related to obstructive CAD. She does have a significant CAD history and her pain is similar to her prior angina, also no other explanation for her fatigue/dyspnea. I recommended she go to the ER for ACS rule out and ischemic evaluation. Would consider admission to [facility] and coronary angiogram.     Plan Summary:  1) ER for chest pain evaluation    2) Aspirin 81 mg daily lifelong  3) Continue Brilinta 90 mg BID for left femoral artery stenosis - follow-up with vascular surgery to discuss ABIs and duration of DAPT  4) Continue high intensity statin   5) Lifelong antibiotic prophylaxis prior to all dental procedures  6) Echo in 6 months - if gradient continues to increase on echo would perform CT heart to rule  out HALT  7) Follow-up valve clinic 1 year with echo, labs and ECG prior     RTC: 6 months with Dr. Zuniga, 1 year with TAVR clinic     JESSICA Rousseau, CNP  [facility] Cardiology Team      CC  Patient Care Team:  Rolanda Wilcox MD as PCP - General (Family Practice)  Rolanda Wilcox MD as Assigned PCP  Pati De Santiago NP as Assigned Heart and Vascular Provider  Javier Landa MD as Assigned OBGYN Provider  Ania Johnson MD as Assigned Nephrology Provider  Dimas Palacios, PhD as Assigned Behavioral Health Provider

## 2022-10-24 NOTE — Clinical Note
dry, intact, no bleeding and no hematoma. 7Fr sheath removed from RRA. TR band in place with 12cc in band.

## 2022-10-24 NOTE — LETTER
Attention: Nursing at [facility].     Message: Attached are discharge orders for RONALDOc Angela. Thanks!    Migdalia Yañez, RNCC, BSN    [facility]    [address]  [address] [place] [address]    [email or web address]    Office: [phone] Pager: [phone]    To contact the weekend RNCC  [place] (0800 - 1630) Saturday and Sunday    Units: 4A, 4C, 4E, 5A and 5B- Pager 1: [phone]    Units: 6A, 6B, 6C, 6D- Pager 2: [phone]    Units: 7A, 7B, 7C, 7D, and 5C-Pager 3: [phone]

## 2022-10-24 NOTE — NURSING NOTE
Chief Complaint   Patient presents with     Follow Up     Return TAVR         Vitals were taken, medications reconciled and EKG performed.     Tano Gordillo, EMT   11:53 AM

## 2022-10-25 PROBLEM — R07.89 ATYPICAL CHEST PAIN: Status: ACTIVE | Noted: 2022-10-24

## 2022-10-25 LAB
ACT BLD: 258 SECONDS (ref 74–150)
ATRIAL RATE - MUSE: 61 BPM
CHOLEST SERPL-MCNC: 155 MG/DL
DIASTOLIC BLOOD PRESSURE - MUSE: NORMAL MMHG
HDLC SERPL-MCNC: 54 MG/DL
INTERPRETATION ECG - MUSE: NORMAL
LDLC SERPL CALC-MCNC: 76 MG/DL
NONHDLC SERPL-MCNC: 101 MG/DL
P AXIS - MUSE: 102 DEGREES
PR INTERVAL - MUSE: 232 MS
QRS DURATION - MUSE: 156 MS
QT - MUSE: 486 MS
QTC - MUSE: 489 MS
R AXIS - MUSE: -12 DEGREES
SYSTOLIC BLOOD PRESSURE - MUSE: NORMAL MMHG
T AXIS - MUSE: 138 DEGREES
TRIGL SERPL-MCNC: 124 MG/DL
VENTRICULAR RATE- MUSE: 61 BPM

## 2022-10-25 PROCEDURE — 99152 MOD SED SAME PHYS/QHP 5/>YRS: CPT | Mod: GC | Performed by: INTERNAL MEDICINE

## 2022-10-25 PROCEDURE — 250N000009 HC RX 250: Performed by: INTERNAL MEDICINE

## 2022-10-25 PROCEDURE — 93571 IV DOP VEL&/PRESS C FLO 1ST: CPT | Mod: 26 | Performed by: INTERNAL MEDICINE

## 2022-10-25 PROCEDURE — 85347 COAGULATION TIME ACTIVATED: CPT

## 2022-10-25 PROCEDURE — 93454 CORONARY ARTERY ANGIO S&I: CPT | Performed by: INTERNAL MEDICINE

## 2022-10-25 PROCEDURE — 258N000003 HC RX IP 258 OP 636: Performed by: STUDENT IN AN ORGANIZED HEALTH CARE EDUCATION/TRAINING PROGRAM

## 2022-10-25 PROCEDURE — 250N000011 HC RX IP 250 OP 636: Performed by: INTERNAL MEDICINE

## 2022-10-25 PROCEDURE — 99152 MOD SED SAME PHYS/QHP 5/>YRS: CPT | Performed by: INTERNAL MEDICINE

## 2022-10-25 PROCEDURE — 99214 OFFICE O/P EST MOD 30 MIN: CPT | Performed by: NURSE PRACTITIONER

## 2022-10-25 PROCEDURE — C1894 INTRO/SHEATH, NON-LASER: HCPCS | Performed by: INTERNAL MEDICINE

## 2022-10-25 PROCEDURE — C1887 CATHETER, GUIDING: HCPCS | Performed by: INTERNAL MEDICINE

## 2022-10-25 PROCEDURE — G0378 HOSPITAL OBSERVATION PER HR: HCPCS

## 2022-10-25 PROCEDURE — 93571 IV DOP VEL&/PRESS C FLO 1ST: CPT | Performed by: INTERNAL MEDICINE

## 2022-10-25 PROCEDURE — 99153 MOD SED SAME PHYS/QHP EA: CPT | Performed by: INTERNAL MEDICINE

## 2022-10-25 PROCEDURE — 250N000013 HC RX MED GY IP 250 OP 250 PS 637

## 2022-10-25 PROCEDURE — 93454 CORONARY ARTERY ANGIO S&I: CPT | Mod: 26 | Performed by: INTERNAL MEDICINE

## 2022-10-25 PROCEDURE — 272N000001 HC OR GENERAL SUPPLY STERILE: Performed by: INTERNAL MEDICINE

## 2022-10-25 PROCEDURE — 258N000003 HC RX IP 258 OP 636: Performed by: INTERNAL MEDICINE

## 2022-10-25 PROCEDURE — C1769 GUIDE WIRE: HCPCS | Performed by: INTERNAL MEDICINE

## 2022-10-25 RX ORDER — IBUPROFEN 200 MG
950 CAPSULE ORAL DAILY
Status: DISCONTINUED | OUTPATIENT
Start: 2022-10-25 | End: 2022-10-26 | Stop reason: HOSPADM

## 2022-10-25 RX ORDER — HYDRALAZINE HYDROCHLORIDE 25 MG/1
25 TABLET, FILM COATED ORAL 3 TIMES DAILY
Status: DISCONTINUED | OUTPATIENT
Start: 2022-10-25 | End: 2022-10-26 | Stop reason: HOSPADM

## 2022-10-25 RX ORDER — MULTIVITAMIN,THERAPEUTIC
1 TABLET ORAL DAILY
Status: DISCONTINUED | OUTPATIENT
Start: 2022-10-25 | End: 2022-10-26 | Stop reason: HOSPADM

## 2022-10-25 RX ORDER — ASPIRIN 81 MG/1
81 TABLET, CHEWABLE ORAL DAILY
Status: DISCONTINUED | OUTPATIENT
Start: 2022-10-25 | End: 2022-10-26 | Stop reason: HOSPADM

## 2022-10-25 RX ORDER — CLONIDINE HYDROCHLORIDE 0.1 MG/1
0.1 TABLET ORAL 2 TIMES DAILY
Status: DISCONTINUED | OUTPATIENT
Start: 2022-10-25 | End: 2022-10-26 | Stop reason: HOSPADM

## 2022-10-25 RX ORDER — HEPARIN SODIUM 1000 [USP'U]/ML
INJECTION, SOLUTION INTRAVENOUS; SUBCUTANEOUS
Status: DISCONTINUED | OUTPATIENT
Start: 2022-10-25 | End: 2022-10-25 | Stop reason: HOSPADM

## 2022-10-25 RX ORDER — DESVENLAFAXINE 100 MG/1
100 TABLET, EXTENDED RELEASE ORAL DAILY
Status: DISCONTINUED | OUTPATIENT
Start: 2022-10-25 | End: 2022-10-26 | Stop reason: HOSPADM

## 2022-10-25 RX ORDER — LIOTHYRONINE SODIUM 5 UG/1
10 TABLET ORAL DAILY
Status: DISCONTINUED | OUTPATIENT
Start: 2022-10-25 | End: 2022-10-26 | Stop reason: HOSPADM

## 2022-10-25 RX ORDER — ALBUTEROL SULFATE 90 UG/1
2 AEROSOL, METERED RESPIRATORY (INHALATION) EVERY 6 HOURS PRN
Status: DISCONTINUED | OUTPATIENT
Start: 2022-10-25 | End: 2022-10-26 | Stop reason: HOSPADM

## 2022-10-25 RX ORDER — SODIUM CHLORIDE 9 MG/ML
75 INJECTION, SOLUTION INTRAVENOUS CONTINUOUS
Status: ACTIVE | OUTPATIENT
Start: 2022-10-25 | End: 2022-10-25

## 2022-10-25 RX ORDER — LEVOTHYROXINE SODIUM 25 UG/1
50 TABLET ORAL
Status: DISCONTINUED | OUTPATIENT
Start: 2022-10-25 | End: 2022-10-26 | Stop reason: HOSPADM

## 2022-10-25 RX ORDER — FENTANYL CITRATE 50 UG/ML
INJECTION, SOLUTION INTRAMUSCULAR; INTRAVENOUS
Status: DISCONTINUED | OUTPATIENT
Start: 2022-10-25 | End: 2022-10-25 | Stop reason: HOSPADM

## 2022-10-25 RX ORDER — NITROGLYCERIN 5 MG/ML
VIAL (ML) INTRAVENOUS
Status: DISCONTINUED | OUTPATIENT
Start: 2022-10-25 | End: 2022-10-25 | Stop reason: HOSPADM

## 2022-10-25 RX ORDER — AZELASTINE 1 MG/ML
1 SPRAY, METERED NASAL 2 TIMES DAILY
Status: DISCONTINUED | OUTPATIENT
Start: 2022-10-25 | End: 2022-10-26 | Stop reason: HOSPADM

## 2022-10-25 RX ORDER — NICARDIPINE HYDROCHLORIDE 2.5 MG/ML
INJECTION INTRAVENOUS
Status: DISCONTINUED | OUTPATIENT
Start: 2022-10-25 | End: 2022-10-25 | Stop reason: HOSPADM

## 2022-10-25 RX ORDER — ACETAMINOPHEN 325 MG/1
650 TABLET ORAL EVERY 4 HOURS PRN
Status: DISCONTINUED | OUTPATIENT
Start: 2022-10-25 | End: 2022-10-26 | Stop reason: HOSPADM

## 2022-10-25 RX ORDER — MIRTAZAPINE 7.5 MG/1
7.5 TABLET, FILM COATED ORAL AT BEDTIME
Status: DISCONTINUED | OUTPATIENT
Start: 2022-10-25 | End: 2022-10-26 | Stop reason: HOSPADM

## 2022-10-25 RX ORDER — CARVEDILOL 6.25 MG/1
6.25 TABLET ORAL 2 TIMES DAILY WITH MEALS
Status: DISCONTINUED | OUTPATIENT
Start: 2022-10-25 | End: 2022-10-26 | Stop reason: HOSPADM

## 2022-10-25 RX ORDER — ROSUVASTATIN CALCIUM 40 MG/1
40 TABLET, COATED ORAL DAILY
Status: DISCONTINUED | OUTPATIENT
Start: 2022-10-25 | End: 2022-10-26 | Stop reason: HOSPADM

## 2022-10-25 RX ORDER — MEMANTINE HYDROCHLORIDE 5 MG/1
10 TABLET ORAL DAILY
Status: DISCONTINUED | OUTPATIENT
Start: 2022-10-25 | End: 2022-10-26 | Stop reason: HOSPADM

## 2022-10-25 RX ADMIN — DESVENLAFAXINE SUCCINATE 100 MG: 100 TABLET, EXTENDED RELEASE ORAL at 14:06

## 2022-10-25 RX ADMIN — LAMOTRIGINE 225 MG: 25 TABLET ORAL at 09:29

## 2022-10-25 RX ADMIN — THERA TABS 1 TABLET: TAB at 09:27

## 2022-10-25 RX ADMIN — TICAGRELOR 90 MG: 90 TABLET ORAL at 09:27

## 2022-10-25 RX ADMIN — TICAGRELOR 90 MG: 90 TABLET ORAL at 20:02

## 2022-10-25 RX ADMIN — HYDRALAZINE HYDROCHLORIDE 25 MG: 25 TABLET, FILM COATED ORAL at 14:05

## 2022-10-25 RX ADMIN — CLONIDINE HYDROCHLORIDE 0.1 MG: 0.1 TABLET ORAL at 09:28

## 2022-10-25 RX ADMIN — LEVOTHYROXINE SODIUM 50 MCG: 0.05 TABLET ORAL at 09:28

## 2022-10-25 RX ADMIN — CLONIDINE HYDROCHLORIDE 0.1 MG: 0.1 TABLET ORAL at 20:01

## 2022-10-25 RX ADMIN — CARVEDILOL 6.25 MG: 6.25 TABLET, FILM COATED ORAL at 20:01

## 2022-10-25 RX ADMIN — MIRTAZAPINE 7.5 MG: 7.5 TABLET, FILM COATED ORAL at 22:09

## 2022-10-25 RX ADMIN — HYDRALAZINE HYDROCHLORIDE 25 MG: 25 TABLET, FILM COATED ORAL at 09:29

## 2022-10-25 RX ADMIN — Medication 125 MCG: at 14:06

## 2022-10-25 RX ADMIN — MEMANTINE 10 MG: 10 TABLET ORAL at 09:29

## 2022-10-25 RX ADMIN — LIOTHYRONINE SODIUM 10 MCG: 5 TABLET ORAL at 09:28

## 2022-10-25 RX ADMIN — ROSUVASTATIN CALCIUM 40 MG: 40 TABLET, FILM COATED ORAL at 09:27

## 2022-10-25 RX ADMIN — MIRTAZAPINE 7.5 MG: 7.5 TABLET, FILM COATED ORAL at 05:09

## 2022-10-25 RX ADMIN — ASPIRIN 81 MG CHEWABLE TABLET 81 MG: 81 TABLET CHEWABLE at 09:27

## 2022-10-25 RX ADMIN — SODIUM CHLORIDE 75 ML/HR: 9 INJECTION, SOLUTION INTRAVENOUS at 20:02

## 2022-10-25 RX ADMIN — HYDRALAZINE HYDROCHLORIDE 25 MG: 25 TABLET, FILM COATED ORAL at 20:02

## 2022-10-25 RX ADMIN — Medication 950 MG: at 09:29

## 2022-10-25 RX ADMIN — CARVEDILOL 6.25 MG: 6.25 TABLET, FILM COATED ORAL at 14:04

## 2022-10-25 ASSESSMENT — ACTIVITIES OF DAILY LIVING (ADL)
ADLS_ACUITY_SCORE: 37
ADLS_ACUITY_SCORE: 23
ADLS_ACUITY_SCORE: 37
ADLS_ACUITY_SCORE: 22
ADLS_ACUITY_SCORE: 37
ADLS_ACUITY_SCORE: 22
ADLS_ACUITY_SCORE: 37
ADLS_ACUITY_SCORE: 37

## 2022-10-25 NOTE — PROGRESS NOTES
Observation Brochure    Patient and family informed of observation status based on provider's order.  Observation brochure was given and the video watched. Patient/Family stated understanding. Questions answered.  LILIA MERCADO RN

## 2022-10-25 NOTE — H&P
[facility]    History and Physical - Cardiology        Date of Admission:  10/24/2022    Assessment & Plan      Kamryn Miller is a 87 year old female PMH s/f CAD s/p EDDIE to LAD x3, RCA in 2003, Aortic stenosis s/p TAVR in September 2019, hypothyroidism, htn, hld and HFpEF who presents with angina equivalent back pain.    #Angina equivalent  #CAD  #Dyspnea  #Aortic stenosis s/p TAVR  #HFpEF  #Hypertension  Patient reports that her current back pain is the same as the one at the time she was having her stents placed. Daughter is at bedside and verifies. Although the location of the pain is quite atypical, her description fits features of angina, especially duration, lack of respiratory variation and the exertional dyspnea. Of note, the pain did improve with administration of nitroglycerin while at the ED. Troponins have plateaued and the patient is currently chest/back pain free. In the presence of stents, cannot perform coronary CT, would likely benefit from nuclear imaging to evaluate for inducible ischemia or invasive assessment with cors. The patient had a TAVR in 8/22 and post-procedure TTE showed it well seated with a mean gradient of 7. Patient has a history of HFpEF. Currently she appears euvolemic without GRETEL or JVD. Weight stable.   - Admit to cardiology  - NPO at midnight for potential procedure or nuclear imaging  - S/P 324mg of aspirin at the ED  - PRN nitroglycerin for chest pain recurrence  - Patient on telemetry   - Continue PTA aspirin  - Continue PTA ticagrelor  - Continue PTA carvedilol  - Continue PTA clonidine    # Hypothyroidism  - Continue PTA Synthroid 50 mcg daily      # Asthma  - Continue PTA Inhaler   - Continue PTA PRN albuterol inhaler      # Bipolar/ MDD  - Continue PTA Lamictal 225 mg daily  - Continue PTA Pristiq 100 mg daily   - Continue PTA Cytomel 10 mcg daily  - Continue PTA Namenda 10 mg daily  - Continue PTA Remeron 7.5 mg every  "night    # Chronic Kidney Disease  Likely has CKD stage II vs III, baseline Cr 1.2-1.4 over the last year  - Avoid nephrotoxic agents  - Renally dose meds    #Osteoporosis  No recent evidence of fracture.  - Continue PTA calcium  - Continue PTA cholecalciferol  - Apparently taking alendronate on Thursdays, will prescribe if patient still in hospital.     Diet: NPO per Anesthesia Guidelines for Procedure/Surgery Except for: Meds, Ice Chips    DVT Prophylaxis: Ambulate every shift  Orellana Catheter: Not present  Fluids: None  Central Lines: None  Cardiac Monitoring: ACTIVE order. Indication: Chest pain/ ACS rule out (24 hours)  Code Status: Full Code      Clinically Significant Risk Factors Present on Admission                # Drug Induced Platelet Defect: home medication list includes an antiplatelet medication   # Hypertension: home medication list includes antihypertensive(s)     # Overweight: Estimated body mass index is 28.34 kg/m  as calculated from the following:    Height as of this encounter: 1.549 m (5' 1\").    Weight as of this encounter: 68 kg (150 lb).           Disposition Plan      Expected Discharge Date: 10/25/2022                The patient will be formally staffed this AM    Luis Manuel Wynn MD  Cardiology Service  [facility]  Securely message with the Vocera Web Console (learn more here)  Text page via [employer] Paging/Directory       ______________________________________________________________________    Chief Complaint   Angina equivalent    History is obtained from the patient    History of Present Illness   Kamryn Miller is a 87 year old female with PMH s/f CAD s/p EDDIE to LAD x3, RCA in 2003, Aortic stenosis s/p TAVR in September 2019, hypothyroidism, htn, hld and HFpEF who presents with angina equivalent back pain. The patient notes that over the past four days she has been having pressure like back pain in the middle of the back at the level of " her shoulder blades. The pain lasts for less than an hour and it comes and goes. Patient reports it also happens at rest. It is 5/10 in intensity and does not change with breathing. The patient notes that the pain is similar to the one she had prior to being stented. She notes chronic exertional shortness of breath that has been getting worse and now the patient can walk less than half a block. She denies PND or orthopnea, does not have a scale at home. She denies nausea, vomiting, fever, night sweats. Reports being very compliant with her medications and has home nurses who assist with administration.     Review of Systems    The 10 point Review of Systems is negative other than noted in the HPI or here.     Past Medical History    I have reviewed this patient's medical history and updated it with pertinent information if needed.   Past Medical History:   Diagnosis Date     Aortic valvar stenosis 7/2010    mild     Asthma      Bipolar disorder (H)      CAD (coronary artery disease)     s/p angioplasty     Celiac disease      Colon polyps      Colon polyps 2012    every 3 year colonoscopy      Depression      High cholesterol      HTN      Mitral insufficiency      Pulmonary HTN (H)     mild     Tremors 7/10    drug induced from antidepressants     Tricuspid insufficiency         Past Surgical History   I have reviewed this patient's surgical history and updated it with pertinent information if needed.  Past Surgical History:   Procedure Laterality Date     ANGIOGRAM  6/26/2009     ANGIOPLASTY  9/96    for angina     ANGIOPLASTY  8/03 - 9/03    X -2 - with stenst in coronary car.     APPENDECTOMY       BREAST BIOPSY, RT/LT      Breat Biopsy RT/LT, benign     BUNIONECTOMY  11/8/2011    Procedure:BUNIONECTOMY; Left donna bunionectomy; Surgeon:FREDY LITTLEJOHN; Location:[facility] OR     BUNIONECTOMY RT/LT  5/2007    right bunion and right 2nd hammertoe     CATARACT IOL, RT/LT  6/12 and 7/12    bilateral     COLONOSCOPY   2007, 2012    every 3 years for polyps     CV CORONARY ANGIOGRAM N/A 8/21/2020    Procedure: CV CORONARY ANGIOGRAM;  Surgeon: Gianluca Toscano MD;  [facility]     CV LEFT HEART CATH N/A 8/21/2020    Procedure: CV LEFT HEART CATH;  Surgeon: Gianluca Toscano MD;  [facility]     CV LEFT HEART CATH N/A 11/3/2020    Procedure: CV LEFT HEART CATH;  Surgeon: Tom Burrows MD;  [facility]     CV LOWER EXTREMITY ANGIOGRAM BILATERAL N/A 11/3/2020    Procedure: CV ANGIOGRAM LOWER EXTREMITY BILATERAL;  Surgeon: Tom Burrows MD;  Location:  [facility]     CV PCI STENT DRUG ELUTING N/A 8/21/2020    Procedure: Percutaneous Coronary Intervention Stent Drug Eluting;  Surgeon: Gianluca Toscano MD;  [facility]     CV RIGHT HEART CATH MEASUREMENTS RECORDED N/A 8/21/2020    Procedure: CV RIGHT HEART CATH;  Surgeon: Gianluca Toscano MD;  Location:  [facility]     CV RIGHT HEART CATH MEASUREMENTS RECORDED N/A 11/3/2020    Procedure: CV RIGHT HEART CATH;  Surgeon: Tom Burrows MD;  Location:  [facility]ATH [facility]     CV TRANSCATHETER AORTIC VALVE REPLACEMENT N/A 9/12/2022    Procedure: Transfemoral Transcatheter Aortic Valve Replacement with possible open heart bypass and or balloon pump placement and any indicated procedure;  Surgeon: Tom Burrows MD;  Location:  [facility] CATH [facility]     HEART CATH FEMORAL CANNULIZATION WITH OPEN STANDBY REPAIR AORTIC VALVE N/A 9/12/2022    Procedure: OR TRANSCATHETER AORTIC VALVE REPLACEMENT, OPEN FEMORAL ARTERY APPROACH;  Surgeon: Arthur Markham MD;  Location:  [facility]ARD[facility] CATH LAB     JOINT REPLACEMENT, HIP RT/LT  4/2008    right hip replaced     JOINT REPLACEMENT, HIP RT/LT  4/2009    left hip replaced     REPAIR HAMMER TOE  11/8/2011    Procedure:REPAIR HAMMER TOE; left 2nd hammertoe repair;  Surgeon:FREDY LITTLEJOHN; Location:[facility]     SURGICAL HISTORY OF -       left bunion and 2nd hammertoe repair     TUBAL LIGATION       ZZC ANESTH,BLEPHAROPLASTY      (R) for drooping eyelid     ZZC APPENDECTOMY          Social History   I have reviewed this patient's social history and updated it with pertinent information if needed. Kamryn Miller  reports that she has never smoked. She has never used smokeless tobacco. She reports that she does not drink alcohol and does not use drugs.    Family History   I have reviewed this patient's family history and updated it with pertinent information if needed.  Family History   Problem Relation Age of Onset     Asthma Mother      Cerebrovascular Disease Mother      Arthritis Mother      Depression Mother      Lipids Sister      Hypertension Sister      Heart Disease Sister      Lipids Sister      Hypertension Sister      Lipids Sister      Breast Cancer Sister        Prior to Admission Medications   Prior to Admission Medications   Prescriptions Last Dose Informant Patient Reported? Taking?   COMPRESSION STOCKINGS   No No   Si each daily   Patient not taking: Reported on 10/24/2022   Calcium Citrate (CITRACAL OR)  Self Yes No   Sig: Take 1 tablet by mouth daily.   LAMOTRIGINE 200 MG PO TABS  Self Yes No   Sig: Take 200 mg by mouth daily with 25 mg tablet for a total dose of 225 mg   Multiple Vitamin (TAB-A-JENNIFER) TABS   No No   Sig: TAKE 1 TABLET BY MOUTH ONCE DAILY   albuterol (PROAIR HFA/PROVENTIL HFA/VENTOLIN HFA) 108 (90 Base) MCG/ACT inhaler   No No   Sig: Inhale 2 puffs into the lungs every 6 hours as needed for wheezing or shortness of breath / dyspnea   alendronate (FOSAMAX) 70 MG tablet   No No   Sig: TAKE 1 TABLET BY MOUTH ONCE WEEKLY   aspirin 81 MG tablet  Self Yes No   Sig: Take 81 mg by mouth daily    azelastine (ASTEPRO) 0.15 % nasal spray   No No   Sig: USE 1 SPRAY IN EACH NOSTRIL ONCE A DAY   carvedilol (COREG) 6.25 MG tablet   No No   Sig: Take  2 tablets (12.5 mg) by mouth 2 times daily   cholecalciferol (VITAMIN D3) 1250 mcg (41946 units) capsule   No No   Sig: TAKE 1 CAPSULE BY MOUTH ONCE WEEKLY   cloNIDine (CATAPRES) 0.1 MG tablet   No No   Sig: Take 1 tablet (0.1 mg) by mouth daily as needed (For SBP > 180)   desvenlafaxine (PRISTIQ) 100 MG 24 hr tablet  Self Yes No   Sig: Take 100 mg by mouth daily   fluticasone-salmeterol (ADVAIR DISKUS) 500-50 MCG/ACT inhaler   No No   Sig: INHALE 1 PUFF BY MOUTH TWICE DAILY   hydrALAZINE (APRESOLINE) 25 MG tablet   No No   Sig: Take 1 tablet (25 mg) by mouth 3 times daily   lamoTRIgine (LAMICTAL) 25 MG tablet  Self Yes No   Sig: Take 25 mg by mouth daily with 200 mg tablet for a total dose of 225 mg   levothyroxine (SYNTHROID/LEVOTHROID) 50 MCG tablet   No No   Sig: TAKE 1 TABLET BY MOUTH ONCE DAILY   liothyronine (CYTOMEL) 5 MCG tablet   No No   Sig: Take 2 tablets (10 mcg) by mouth daily   memantine (NAMENDA) 10 MG tablet   No No   Sig: Take 1 tablet (10 mg) by mouth daily   mirtazapine (REMERON) 7.5 MG tablet   No No   Sig: Take 1 tablet (7.5 mg) by mouth At Bedtime   potassium chloride ER (K-TAB) 20 MEQ CR tablet   No No   Sig: Take 1 tablet (20 mEq) by mouth daily   rosuvastatin (CRESTOR) 40 MG tablet   No No   Sig: TAKE 1 TABLET BY MOUTH ONCE DAILY   ticagrelor (BRILINTA) 90 MG tablet   No No   Sig: Take 1 tablet (90 mg) by mouth 2 times daily      Facility-Administered Medications: None     Allergies   Allergies   Allergen Reactions     Ace Inhibitors Cough     Diclofenac Other (See Comments)     Balance problems     Gluten Meal Diarrhea       Physical Exam   Vital Signs: Temp: 97.8  F (36.6  C) Temp src: Temporal BP: (!) 146/76 Pulse: 54   Resp: 16 SpO2: 96 % O2 Device: None (Room air)    Weight: 150 lbs 0 oz    General Appearance: AAOx4, lying in bed, NAD  Eyes: PERRLA, EOMI  HEENT: NCAT, np, op mucosae moist and clear  Respiratory: CTAB, no crackles, rales or wheezing  Cardiovascular: S1, S2, rrr, soft  systolic murmur in the AV auscultation point  GI: soft, nt, nd, bs (+)  Skin: No rash appreciated  Musculoskeletal: Spines normal on palpation without any significant tenderness, sacroiliac joints without tenderness, slight kyphosis appreciated over the back exam  Neurologic: AAOx4, answers questions appropriately, moves all extremities freely    Data   Data reviewed today: I reviewed all medications, new labs and imaging results over the last 24 hours. I personally reviewed the EKG tracing showing stable LBBB with out any new ST segment changes.    Recent Labs   Lab 10/24/22  1602 10/24/22  1009   WBC 6.0  --    HGB 9.1* 9.0*   MCV 87  --      --    INR 1.13  --     138   POTASSIUM 4.2 4.1   CHLORIDE 107 105   CO2 23 25   BUN 27.3* 30.4*   CR 1.28* 1.39*   ANIONGAP 11 8   PRINCE 9.6 9.5   GLC 90 82   ALBUMIN 3.9 3.9   PROTTOTAL 6.8  --    BILITOTAL 0.2  --    ALKPHOS 61  --    ALT 19  --    AST 30  --        I have seen and examined the patient and agree with the finding and plan.       Tom Burrows MD  Cardiology-[facility]  [phone]

## 2022-10-25 NOTE — PROGRESS NOTES
Interventional Cardiology Progress Note    Assessment & Plan:  Kamryn Miller is an 86 y.o.F with a PMHx of HFpEF, HTN, HLD, CAD s/p PTCA 1996, LAD and RCA stenting 2003, s/p LAD PCI x 3 8/2020, severe aortic valvular stenosis s/p 26 mm ES Ultra TAVR 9/12/2022, and PAD who was admitted 10/24 for chest pain     # Atypical Chest Pain  # CAD s/p PCI LAD, RCA  # Severe Aortic Stenosis s/p TAVR 9/22  # Hypertension  Patient seen in [facility]iology Clinic 10/24 for TAVR follow up, pt reported ongoing back pain that radiates between shoulders since weekend. Pt states comes and goes, unaffected by activity/rest, but does improve with Nitroglycerin. These symptoms identical to symptoms felt prior to PCI. She was sent to ED for further eval. Troponin mildly elevated, flat 37-->41. EKG with no ST-T changes.     - Given symptoms, plan for coronary angiogram  - s/p 325 mg ASA x 1  - NPO for procedure  - DAPT: Continue PTA 81 mg ASA daily/ Brilinta 90 mg BID  - BB: Continue PTA Coreg 6.25 mg BID, pending BP post cath, can consider increasing dose, will be cautious in elderly woman  - BP mildly elevated, stable given pt age, 150/70; continue PTA Clonidine 0.1 mg daily, hydralazine 25 mg TID   - Consider switching to ARB tomorrow given stable Cr, for ease of use  - Statin: check lipid panel x 1, continue Crestor 40 mg daily  - [place] to Observation, if pt requires PCI switch to inpatient status; pending recovery, likely discharge home tomorrow    # PAD  Noted to have 60% left femoral stenosis post procedure, but had adequate flow on post procedure peripheral angiogram. She was started on dual antiplatelet therapy with plans to assess symptoms of PAD as outpatient    # Hypothyroidism  TSH 1.4  - Continue PTA Synthroid 50 mcg daily      # Asthma  - Continue PTA Inhaler   - Continue PTA PRN albuterol inhaler      # Bipolar/ MDD  - Continue PTA Lamictal 225 mg daily  - Continue PTA Pristiq 100 mg daily   - Continue PTA Cytomel  "10 mcg daily  - Continue PTA Namenda 10 mg daily  - Continue PTA Remeron 7.5 mg every night     # Chronic Kidney Disease  Likely has CKD stage II vs III, baseline Cr 1.2-1.4 over the last year. Cr on admission 1.28  - Avoid nephrotoxic agents  - Renally dose meds     # Osteoporosis  No recent evidence of fracture.  - Continue PTA calcium  - Continue PTA cholecalciferol      Diet: NPO for procedure  Activity: Up as tolerated  Code Status: Full  Disposition: possible discharge home tomorrow pending cors/recovery    Patient discussed w/ Dr. Stormy Cooper, APRN, CNP  [facility] Structural/Interventional Cardiology   Pager [phone]      Interval History:  - No acute events overnight  - Pt reports chest pain/back pain free this am; feeling well  - Troponin mildly elevated, flat 37-->41    Most recent vital signs:  BP (!) 154/74 (BP Location: Right arm)   Pulse 69   Temp 98.4  F (36.9  C) (Oral)   Resp (!) 2   Ht 1.549 m (5' 1\")   Wt 68 kg (150 lb)   SpO2 96%   BMI 28.34 kg/m    Temp:  [98.4  F (36.9  C)-99.2  F (37.3  C)] 98.4  F (36.9  C)  Pulse:  [54-82] 69  Resp:  [2-30] 2  BP: (115-160)/(45-84) 154/74  SpO2:  [95 %-99 %] 96 %  Wt Readings from Last 2 Encounters:   10/24/22 68 kg (150 lb)   10/24/22 68.4 kg (150 lb 12.8 oz)     No intake or output data in the 24 hours ending 10/25/22 1617    Physical exam:  General: In bed, in NAD  HEENT: EOMI, PERRLA, no scleral icterus or injection  CARDIAC: RRR, no m/r/g appreciated. Peripheral pulses 2+  RESP: CTAB, no wheezes, rhonchi or crackles appreciated.  GI: NABS, NT/ND, no guarding or rebound  EXTREMITIES: NO LE edema, pulses 2+.  SKIN: No acute lesions appreciated  NEURO: Alert and oriented X3      Labs (Past three days):  CBC  Recent Labs   Lab 10/24/22  1602 10/24/22  1009   WBC 6.0  --    RBC 3.53*  --    HGB 9.1* 9.0*   HCT 30.7*  --    MCV 87  --    MCH 25.8*  --    MCHC 29.6*  --    RDW 17.2*  --      --      BMP  Recent Labs   Lab " 10/24/22  1602 10/24/22  1009    138   POTASSIUM 4.2 4.1   CHLORIDE 107 105   CO2 23 25   ANIONGAP 11 8   GLC 90 82   BUN 27.3* 30.4*   CR 1.28* 1.39*   GFRESTIMATED 41* 37*   PRINCE 9.6 9.5   PHOS  --  3.1     Troponins:   Lab Results   Component Value Date    CTROPT 41 (H) 10/24/2022    CTROPT 37 (H) 10/24/2022    CTROPT 77 (H) 09/22/2022    CTROPT 79 (H) 09/22/2022        INR  Recent Labs   Lab 10/24/22  1602   INR 1.13     Liver panel  Recent Labs   Lab 10/24/22  1602 10/24/22  1009   PROTTOTAL 6.8  --    ALBUMIN 3.9 3.9   BILITOTAL 0.2  --    ALKPHOS 61  --    AST 30  --    ALT 19  --        Imaging/procedure results:  EKG 12 Lead 10/25/2022: SR with 1st deg AVB, HR 61       ECHO 10/24/2022:  Interpretation Summary  Status post 26 mm Arndt Janis Ultra valve TAVR.  Global and regional left ventricular function is normal with an EF of 55-60%.  Global right ventricular function is normal. The right ventricle is normal  size.  The TAVR is well-seated. Leaflet opening is not clearly visualized. There is  no valvular regurgitation but there is trace paravalvular regurgitation. The  Vmax is 2.6 m/s, the mean gradient is 13 mmHg, the dimensionless index is  0.34, and the acceleration time is 110 ms.  IVC diameter and respiratory changes fall into an intermediate range  suggesting an RA pressure of 8 mmHg.  This study was compared with the study from 09/13/2022. No significant changes  noted. The TAVR gradient has increased modestly, as is to be expected.    Time Spent on this Encounter   I spent 40 minutes on the unit/floor managing the care of Kamryn Miller. Over 50% of my time was spent on the following:   - Counseling the patient and/or family regarding: diagnostic results, prognosis, risks and benefits of treatment options and recommended follow-up  - Coordination of care with the: nurse    Pat Cooper, CNP

## 2022-10-25 NOTE — PRE-PROCEDURE
GENERAL PRE-PROCEDURE:   Procedure:  Coronary angiogram with possible PCI  Date/Time:  10/25/2022 12:06 PM    Verbal consent obtained?: Yes    Written consent obtained?: Yes    Risks and benefits: Risks, benefits and alternatives were discussed    Consent given by:  Patient  Patient states understanding of procedure being performed: Yes    Patient's understanding of procedure matches consent: Yes    Procedure consent matches procedure scheduled: Yes    Expected level of sedation:  Moderate  Appropriately NPO:  Yes  Mallampati  :  Grade 2- soft palate, base of uvula, tonsillar pillars, and portion of posterior pharyngeal wall visible  Lungs:  Lungs clear with good breath sounds bilaterally  Heart:  Normal heart sounds and rate  History & Physical reviewed:  History and physical reviewed and no updates needed  Statement of review:  I have reviewed the lab findings, diagnostic data, medications, and the plan for sedation    JESSICA Wong, CNP  [facility] Cardiology

## 2022-10-25 NOTE — PROVIDER NOTIFICATION
Notified [facility] via phone call that pt is post angiogram with SBPs >180 without prn meds or MD notification parameters. MD will look into orders and adjust them accordingly. Continue to monitor.

## 2022-10-25 NOTE — ED PROVIDER NOTES
[place] EMERGENCY DEPARTMENT ([facility])  10/24/22    History     Chief Complaint   Patient presents with     Chest Pain     HPI  Kamryn Miller is a 87 year old female with history of CAD s/p PCI, HFpEF, pulmonary HTN, severe aortic valvular stenosis s/p TAVR (9/12/2022), CKD stage 3, HTN, and HLD who presents to the Emergency Department with back pain, that is consistent with her previous cardiac ischemic pain that she had prior to her CABG.  Pain started approximately 4 days ago.  It is not associated with exertion or position.  Nothing makes it better or worse.  It feels like a squeezing sensation.  She denies any orthopnea, lower extremity edema, cough, fever, lightheadedness.  She denies any tearing or ripping sensation, fevers, missed medication doses, trauma, radiating pain.    She had a TAVR on 9/12/2022.  She states that since the TAVR, she has had fatigue and mild dyspnea.  She was seen at the valve clinic today for follow-up.  With her complaint of back pain similar to her ischemic pain they sent her to the emergency department to rule out ACS.  She had an echocardiogram today which revealed no significant changes from her previous echocardiogram on 9/13/2022.  The TAVR gradient has increased modestly.  Ascending aorta wnl size.  55-60%.        Past Medical History  Past Medical History:   Diagnosis Date     Aortic valvar stenosis 7/2010    mild     Asthma      Bipolar disorder (H)      CAD (coronary artery disease)     s/p angioplasty     Celiac disease      Colon polyps      Colon polyps 2012    every 3 year colonoscopy      Depression      High cholesterol      HTN      Mitral insufficiency      Pulmonary HTN (H)     mild     Tremors 7/10    drug induced from antidepressants     Tricuspid insufficiency      Past Surgical History:   Procedure Laterality Date     ANGIOGRAM  6/26/2009     ANGIOPLASTY  9/96    for angina     ANGIOPLASTY  8/03 - 9/03    X -2 - with stenst in coronary car.      APPENDECTOMY       BREAST BIOPSY, RT/LT      Breat Biopsy RT/LT, benign     BUNIONECTOMY  11/8/2011    Procedure:BUNIONECTOMY; Left donna bunionectomy; Surgeon:FREDY LITTLEJOHN; Location:[facility] OR     BUNIONECTOMY RT/LT  5/2007    right bunion and right 2nd hammertoe     CATARACT IOL, RT/LT  6/12 and 7/12    bilateral     COLONOSCOPY  2007, 2012    every 3 years for polyps     CV CORONARY ANGIOGRAM N/A 8/21/2020    Procedure: CV CORONARY ANGIOGRAM;  Surgeon: Gianluca Toscano MD;  Location: [facility]     CV LEFT HEART CATH N/A 8/21/2020    Procedure: CV LEFT HEART CATH;  Surgeon: Gianluca Toscano MD;  Location: [facility]     CV LEFT HEART CATH N/A 11/3/2020    Procedure: CV LEFT HEART CATH;  Surgeon: Tom Burrows MD;  Location: [facility]     CV LOWER EXTREMITY ANGIOGRAM BILATERAL N/A 11/3/2020    Procedure: CV ANGIOGRAM LOWER EXTREMITY BILATERAL;  Surgeon: Tom Burrows MD;  Location: [facility]     CV PCI STENT DRUG ELUTING N/A 8/21/2020    Procedure: Percutaneous Coronary Intervention Stent Drug Eluting;  Surgeon: Gianluca Toscano MD;  Location: [facility]     CV RIGHT HEART CATH MEASUREMENTS RECORDED N/A 8/21/2020    Procedure: CV RIGHT HEART CATH;  Surgeon: Gianluca Toscano MD;  Location: [facility]     CV RIGHT HEART CATH MEASUREMENTS RECORDED N/A 11/3/2020    Procedure: CV RIGHT HEART CATH;  Surgeon: Tom Burrows MD;  Location: [facility]     CV TRANSCATHETER AORTIC VALVE REPLACEMENT N/A 9/12/2022    Procedure: Transfemoral Transcatheter Aortic Valve Replacement with possible open heart bypass and or balloon pump placement and any indicated procedure;  Surgeon: Tom Burrows MD;  Location: [facility]     HEART CATH FEMORAL CANNULIZATION WITH OPEN STANDBY REPAIR AORTIC VALVE N/A 9/12/2022    Procedure: OR TRANSCATHETER AORTIC VALVE  REPLACEMENT, OPEN FEMORAL ARTERY APPROACH;  Surgeon: Arthur Markham MD;  Location:  HEART CARDIAC CATH LAB     JOINT REPLACEMENT, HIP RT/LT  4/2008    right hip replaced     JOINT REPLACEMENT, HIP RT/LT  4/2009    left hip replaced     REPAIR HAMMER TOE  11/8/2011    Procedure:REPAIR HAMMER TOE; left 2nd hammertoe repair; Surgeon:FREDY LITTLEJOHN; Location: OR     SURGICAL HISTORY OF -   11/11    left bunion and 2nd hammertoe repair     TUBAL LIGATION       ZC ANESTH,BLEPHAROPLASTY      (R) for drooping eyelid     Z APPENDECTOMY       albuterol (PROAIR HFA/PROVENTIL HFA/VENTOLIN HFA) 108 (90 Base) MCG/ACT inhaler  alendronate (FOSAMAX) 70 MG tablet  aspirin 81 MG tablet  azelastine (ASTEPRO) 0.15 % nasal spray  Calcium Citrate (CITRACAL OR)  carvedilol (COREG) 6.25 MG tablet  cholecalciferol (VITAMIN D3) 1250 mcg (81165 units) capsule  cloNIDine (CATAPRES) 0.1 MG tablet  COMPRESSION STOCKINGS  desvenlafaxine (PRISTIQ) 100 MG 24 hr tablet  fluticasone-salmeterol (ADVAIR DISKUS) 500-50 MCG/ACT inhaler  hydrALAZINE (APRESOLINE) 25 MG tablet  lamoTRIgine (LAMICTAL) 25 MG tablet  LAMOTRIGINE 200 MG PO TABS  levothyroxine (SYNTHROID/LEVOTHROID) 50 MCG tablet  liothyronine (CYTOMEL) 5 MCG tablet  memantine (NAMENDA) 10 MG tablet  mirtazapine (REMERON) 7.5 MG tablet  Multiple Vitamin (TAB-A-JENNIFER) TABS  potassium chloride ER (K-TAB) 20 MEQ CR tablet  rosuvastatin (CRESTOR) 40 MG tablet  ticagrelor (BRILINTA) 90 MG tablet      Allergies   Allergen Reactions     Ace Inhibitors Cough     Diclofenac Other (See Comments)     Balance problems     Gluten Meal Diarrhea     Family History  Family History   Problem Relation Age of Onset     Asthma Mother      Cerebrovascular Disease Mother      Arthritis Mother      Depression Mother      Lipids Sister      Hypertension Sister      Heart Disease Sister      Lipids Sister      Hypertension Sister      Lipids Sister      Breast Cancer Sister      Social History   Social  "History     Tobacco Use     Smoking status: Never     Smokeless tobacco: Never   Substance Use Topics     Alcohol use: No     Alcohol/week: 0.0 standard drinks     Types: 1 Standard drinks or equivalent per week     Drug use: No      Past medical history, past surgical history, medications, allergies, family history, and social history were reviewed with the patient. No additional pertinent items.       Review of Systems  A complete review of systems was performed with pertinent positives and negatives noted in the HPI, and all other systems negative.    Physical Exam   BP: (!) 159/65  Pulse: 60  Temp: 97.8  F (36.6  C)  Resp: 16  Height: 154.9 cm (5' 1\")  Weight: 68 kg (150 lb)  SpO2: 97 %  Physical Exam  General: No acute distress.  HENT: Normocephalic and atraumatic.   Eyes: EOMI. Conjunctivae normal.   Cardiovascular: Normal rate and regular rhythm.  Normal heart sounds. No murmur heard.  Pulmonary: No respiratory distress. Normal breath sounds.   Abdominal: There is no distension. Abdomen is soft. There is no mass. There is no abdominal tenderness.   Musculoskeletal: No swelling or tenderness. Moving all extremities spontaneously.  Negative Homans' sign.  No reproducible musculoskeletal tenderness in the back.  Skin: Warm and dry  Neurological: No focal deficit present.   Mood and Affect: Mood normal.     ED Course      Procedures       The medical record was reviewed and interpreted.  Current labs reviewed and interpreted.              Results for orders placed or performed during the hospital encounter of 10/24/22   Troponin T, High Sensitivity     Status: Abnormal   Result Value Ref Range    Troponin T, High Sensitivity 37 (H) <=14 ng/L   INR     Status: Normal   Result Value Ref Range    INR 1.13 0.85 - 1.15   Comprehensive metabolic panel     Status: Abnormal   Result Value Ref Range    Sodium 141 136 - 145 mmol/L    Potassium 4.2 3.4 - 5.3 mmol/L    Chloride 107 98 - 107 mmol/L    Carbon Dioxide (CO2) 23 " 22 - 29 mmol/L    Anion Gap 11 7 - 15 mmol/L    Urea Nitrogen 27.3 (H) 8.0 - 23.0 mg/dL    Creatinine 1.28 (H) 0.51 - 0.95 mg/dL    Calcium 9.6 8.8 - 10.2 mg/dL    Glucose 90 70 - 99 mg/dL    Alkaline Phosphatase 61 35 - 104 U/L    AST 30 10 - 35 U/L    ALT 19 10 - 35 U/L    Protein Total 6.8 6.4 - 8.3 g/dL    Albumin 3.9 3.5 - 5.2 g/dL    Bilirubin Total 0.2 <=1.2 mg/dL    GFR Estimate 41 (L) >60 mL/min/1.73m2   Asymptomatic COVID-19 Virus (Coronavirus) by PCR Nasopharyngeal     Status: Normal    Specimen: Nasopharyngeal; Swab   Result Value Ref Range    SARS CoV2 PCR Negative Negative    Narrative    Testing was performed using the Xpert Xpress SARS-CoV-2 Assay on the  Cepheid Gene-Xpert Instrument Systems. Additional information about  this Emergency Use Authorization (EUA) assay can be found via the Lab  Guide. This test should be ordered for the detection of SARS-CoV-2 in  individuals who meet SARS-CoV-2 clinical and/or epidemiological  criteria. Test performance is unknown in asymptomatic patients. This  test is for in vitro diagnostic use under the FDA EUA for  laboratories certified under CLIA to perform high complexity testing.  This test has not been FDA cleared or approved. A negative result  does not rule out the presence of PCR inhibitors in the specimen or  target RNA in concentration below the limit of detection for the  assay. The possibility of a false negative should be considered if  the patient's recent exposure or clinical presentation suggests  COVID-19. This test was validated by the [facility]. This laboratory is certified under  the Clinical Laboratory Improvement Amendments of 1988 (CLIA-88) as  qualified to perform high complexity laboratory testing.     CBC with platelets and differential     Status: Abnormal   Result Value Ref Range    WBC Count 6.0 4.0 - 11.0 10e3/uL    RBC Count 3.53 (L) 3.80 - 5.20 10e6/uL    Hemoglobin 9.1 (L) 11.7 -  15.7 g/dL    Hematocrit 30.7 (L) 35.0 - 47.0 %    MCV 87 78 - 100 fL    MCH 25.8 (L) 26.5 - 33.0 pg    MCHC 29.6 (L) 31.5 - 36.5 g/dL    RDW 17.2 (H) 10.0 - 15.0 %    Platelet Count 205 150 - 450 10e3/uL    % Neutrophils 66 %    % Lymphocytes 19 %    % Monocytes 12 %    % Eosinophils 2 %    % Basophils 1 %    % Immature Granulocytes 0 %    NRBCs per 100 WBC 0 <1 /100    Absolute Neutrophils 4.0 1.6 - 8.3 10e3/uL    Absolute Lymphocytes 1.1 0.8 - 5.3 10e3/uL    Absolute Monocytes 0.8 0.0 - 1.3 10e3/uL    Absolute Eosinophils 0.1 0.0 - 0.7 10e3/uL    Absolute Basophils 0.0 0.0 - 0.2 10e3/uL    Absolute Immature Granulocytes 0.0 <=0.4 10e3/uL    Absolute NRBCs 0.0 10e3/uL   EKG 12-lead, tracing only     Status: None (Preliminary result)   Result Value Ref Range    Systolic Blood Pressure  mmHg    Diastolic Blood Pressure  mmHg    Ventricular Rate 61 BPM    Atrial Rate 61 BPM    IL Interval 232 ms    QRS Duration 156 ms     ms    QTc 489 ms    P Axis 102 degrees    R AXIS -12 degrees    T Axis 138 degrees    Interpretation ECG       Sinus rhythm with 1st degree A-V block  Left bundle branch block  Abnormal ECG     CBC with platelets differential     Status: Abnormal    Narrative    The following orders were created for panel order CBC with platelets differential.  Procedure                               Abnormality         Status                     ---------                               -----------         ------                     CBC with platelets and d...[[id number]]  Abnormal            Final result                 Please view results for these tests on the individual orders.   Results for orders placed or performed in visit on 10/24/22   EKG 12-lead, tracing only (Same Day)     Status: None (Preliminary result)   Result Value Ref Range    Systolic Blood Pressure  mmHg    Diastolic Blood Pressure  mmHg    Ventricular Rate 62 BPM    Atrial Rate 62 BPM    IL Interval 262 ms    QRS Duration 166 ms     ms     QTc 529 ms    P Axis 76 degrees    R AXIS -3 degrees    T Axis 105 degrees    Interpretation ECG       Sinus rhythm with 1st degree A-V block  Left bundle branch block  Abnormal ECG  When compared with ECG of 22-SEP-2022 10:18,  Nonspecific T wave abnormality now evident in Anterior leads     Results for orders placed or performed in visit on 10/24/22   Echocardiogram Limited     Status: None   Result Value Ref Range    LVEF  55-60%     [facility]    [id number]  [id number]  [id number]  [id number]^LEONIDES^JERRY^DOMINIC     [facility],[place]  Echocardiography Laboratory  [address] [place] [id number]     Name: DESMOND WHEELER  MRN: [id number]  : 1935  Study Date: 10/24/2022 10:32 AM  Age: 87 yrs  Gender: Female  Patient Location: [facility]  Reason For Study: Severe aortic stenosis  Ordering Physician: JERRY COYLE  Referring Physician: JERRY COYLE  Performed By: Samia Abdi     BSA: 1.7 m2  Height: 61 in  Weight: 149 lb  BP: 125/69 mmHg  ______________________________________________________________________________  Procedure  Limited Echocardiogram with portions of two-dimensional, color and spectral  Doppler performed.  ______________________________________________________________________________  Interpretation Summary  Status post 26 mm Arndt Janis Ultra valve TAVR.  Global and regional left ventricular function is normal with an EF of 55-60%.  Global right ventricular function is normal. The right ventricle is normal  size.  The TAVR is well-seated. Leaflet opening is not clearly visualized. There is  no valvular regurgitation but there is trace paravalvular regurgitation. The  Vmax is 2.6 m/s, the mean gradient is 13 mmHg, the dimensionless index is  0.34, and the acceleration time is 110 ms.  IVC diameter and respiratory changes fall into an intermediate range  suggesting an RA pressure of 8 mmHg.  This study was compared with the study from  09/13/2022. No significant changes  noted. The TAVR gradient has increased modestly, as is to be expected.  ______________________________________________________________________________  Left Ventricle  Global and regional left ventricular function is normal with an EF of 55-60%.  Left ventricular wall thickness is normal. Left ventricular size is normal.  Left ventricular diastolic function is not assessable.     Right Ventricle  Global right ventricular function is normal. The right ventricle is normal  size.     Atria  Both atria appear normal. Moderate left atrial enlargement is present.     Mitral Valve  Mild mitral annular calcification is present. Mild mitral insufficiency is  present.     Aortic Valve  The TAVR is well-seated. Leaflet opening is not clearly visualized. There is  no valvular regurgitation but there is trace paravalvular regurgitation. The  Vmax is 2.6 m/s, the mean gradient is 13 mmHg, the dimensionless index is  0.34, and the acceleration time is 110 ms.     Tricuspid Valve  The tricuspid valve is normal. Trace tricuspid insufficiency is present. The  right ventricular systolic pressure is approximated at 21.2 mmHg plus the  right atrial pressure.     Pulmonic Valve  The pulmonic valve is normal. Trace pulmonic insufficiency is present.     Vessels  The aorta root cannot be assessed. Ascending aorta 3.2 cm. IVC diameter and  respiratory changes fall into an intermediate range suggesting an RA pressure  of 8 mmHg.     Pericardium  No pericardial effusion is present.     Compared to Previous Study  This study was compared with the study from 09/13/2022 . No significant  changes noted. The TAVR gradient has increased modestly, as is to be expected.  ______________________________________________________________________________  MMode/2D Measurements & Calculations     IVSd: 1.1 cm  LVIDd: 4.3 cm  LVPWd: 0.89 cm  LV mass(C)d: 139.1 grams  LV mass(C)dI: 83.4 grams/m2  asc Aorta Diam: 3.2  cm  LVOT diam: 1.9 cm  LVOT area: 2.9 cm2  RWT: 0.42     Doppler Measurements & Calculations  MV E max skinny: 171.0 cm/sec  MV A max skinny: 145.4 cm/sec  MV E/A: 1.2  MV max PG: 10.4 mmHg  MV mean P.3 mmHg  MV V2 VTI: 54.1 cm  MVA(VTI): 1.0 cm2  MV dec slope: 856.6 cm/sec2  MV dec time: 0.20 sec  Ao V2 max: 254.2 cm/sec  Ao max P.0 mmHg  Ao V2 mean: 171.2 cm/sec  Ao mean P.7 mmHg  Ao V2 VTI: 53.9 cm  LUCY(I,D): 1.0 cm2  LUCY(V,D): 0.99 cm2  AI P1/2t: 688.4 msec  LV V1 max PG: 3.1 mmHg  LV V1 max: 88.0 cm/sec  LV V1 VTI: 19.5 cm  SV(LVOT): 55.9 ml  SI(LVOT): 33.6 ml/m2  TR max skinny: 229.8 cm/sec  TR max P.2 mmHg     AV Skinny Ratio (DI): 0.35  LUCY Index (cm2/m2): 0.62     ______________________________________________________________________________  Report approved by: Everardo Whitt 10/24/2022 11:24 AM         Results for orders placed or performed in visit on 10/24/22   Renal panel     Status: Abnormal   Result Value Ref Range    Sodium 138 136 - 145 mmol/L    Potassium 4.1 3.4 - 5.3 mmol/L    Chloride 105 98 - 107 mmol/L    Carbon Dioxide (CO2) 25 22 - 29 mmol/L    Anion Gap 8 7 - 15 mmol/L    Glucose 82 70 - 99 mg/dL    Urea Nitrogen 30.4 (H) 8.0 - 23.0 mg/dL    Creatinine 1.39 (H) 0.51 - 0.95 mg/dL    GFR Estimate 37 (L) >60 mL/min/1.73m2    Calcium 9.5 8.8 - 10.2 mg/dL    Albumin 3.9 3.5 - 5.2 g/dL    Phosphorus 3.1 2.5 - 4.5 mg/dL   Hemoglobin     Status: Abnormal   Result Value Ref Range    Hemoglobin 9.0 (L) 11.7 - 15.7 g/dL   Albumin Random Urine Quantitative with Creat Ratio     Status: Abnormal   Result Value Ref Range    Albumin Urine mg/L 138.0 mg/L    Albumin Urine mg/g Cr 351.15 (H) 0.00 - 25.00 mg/g Cr    Creatinine Urine mg/dL 39.3 mg/dL   Parathyroid Hormone Intact     Status: Normal   Result Value Ref Range    Parathyroid Hormone Intact 63 15 - 65 pg/mL    Narrative    This result was obtained with the Roche Elecsys PTH STAT assay.   This reference range differs from PTH assays used  in other [facility].   Vitamin D Deficiency     Status: Normal   Result Value Ref Range    Vitamin D, Total (25-Hydroxy) 75 20 - 75 ug/L    Narrative    Season, race, dietary intake, and treatment affect the concentration of 25-hydroxy-Vitamin D. Values may decrease during winter months and increase during summer months. Values 20-29 ug/L may indicate Vitamin D insufficiency and values <20 ug/L may indicate Vitamin D deficiency.    Vitamin D determination is routinely performed by an immunoassay specific for 25 hydroxyvitamin D3.  If an individual is on vitamin D2(ergocalciferol) supplementation, please specify 25 OH vitamin D2 and D3 level determination by LCMSMS test [id number].       Medications   aspirin (ASA) chewable tablet 324 mg (324 mg Oral Given 10/24/22 1556)        Assessments & Plan (with Medical Decision Making)   Patient arrives with her daughter for complaint of back pain that has been occurring for 4 days, consistent with her prior angina she had with CABG.  She was seen in the cardiology valvular clinic today for follow-up.  Echocardiogram was performed without any new wall motion abnormality.  Nightly blood pressure medications ordered.  Patient was given aspirin.    EKG revealed known left bundle branch block with first-degree AV block.  EKG in the emergency department without significant change from EKG performed earlier in the cardiology clinic. Troponin 37, repeat 41.  BNP 1549.  Hemoglobin consistent with previous.  Creatinine 1.28, consistent with previous.  COVID-19 swab negative. Chest x-ray consistent with moderate sized hiatal hernia, previous TAVR and stent visualized.    Spoke with cardiology regarding her angina, which she notes is exactly the same as her ischemic pain when she presented for her CABG.  They will admit for ischemic work-up including stress test.  Patient and daughter were agreeable, their questions were answered and she was admitted to cardiology in  stable condition.    I have reviewed the nursing notes. I have reviewed the findings, diagnosis, plan and need for follow up with the patient.    New Prescriptions    No medications on file       Final diagnoses:   None       --  DO DANILO Storm [facility] EMERGENCY DEPARTMENT  10/24/2022     Romelia Jansen MD  10/25/22 0101

## 2022-10-25 NOTE — UTILIZATION REVIEW
"Admission Status; Secondary Review Determination     Under the authority of the Utilization Management Committee, the utilization review process indicated a secondary review on the above patient.  The review outcome is based on review of the medical records, discussions with staff, and applying clinical experience noted on the date of the review.       (x) Observation Status Appropriate - This patient does not meet hospital inpatient criteria and is placed in observation status. If this patient's primary payer is Medicare and was admitted as an inpatient, Condition Code 44 should be used and patient status changed to \"observation\".     RATIONALE FOR DETERMINATION:  87-year-old female with history of multiple coronary artery interventions in the past as well as aortic stenosis with a TAVR in 2019 now presents with pressure sensation in the middle of her back between her shoulder blades.  This pain lasts less than 1 hour and fluctuates.  Initial troponin levels are minimally elevated and flat.  Patient describes similar symptoms prior to a previous CAD intervention.  Observation care appropriate to admit patient for atypical chest/back discomfort worrisome for potential acute coronary syndrome.  If patient is found to have acute coronary syndrome with need for CAD intervention, then at that time patient would be appropriate to advance to inpatient care.      The severity of illness, intensity of service provided, expected LOS and risk for adverse outcome make the care appropriate for further observation; however, doesn't meet criteria for hospital inpatient admission. This was discussed with attending physician who concurred with this determination.    The information on this document is developed by the utilization review team in order for the business office to ensure compliance.  This only denotes the appropriateness of proper admission status and does not reflect the quality of care rendered.         The " definitions of Inpatient Status and Observation Status used in making the determination above are those provided in the CMS Coverage Manual, Chapter 1 and Chapter 6, section 70.4.      Sincerely,     Suman Langley MD    Physician Advisor  Utilization Review/ Case Management  [facility].

## 2022-10-25 NOTE — PROGRESS NOTES
NPO for cath lab angiogram. No pain. Sleeping off/on. Blood pressure slightly elevated. Given cardiac scheduled home medications. Sinus bradycardia.

## 2022-10-26 VITALS
HEIGHT: 61 IN | OXYGEN SATURATION: 99 % | DIASTOLIC BLOOD PRESSURE: 66 MMHG | BODY MASS INDEX: 27.51 KG/M2 | RESPIRATION RATE: 16 BRPM | TEMPERATURE: 98.7 F | WEIGHT: 145.72 LBS | HEART RATE: 77 BPM | SYSTOLIC BLOOD PRESSURE: 159 MMHG

## 2022-10-26 PROBLEM — R07.89 ATYPICAL CHEST PAIN: Status: RESOLVED | Noted: 2022-10-24 | Resolved: 2022-10-26

## 2022-10-26 LAB
ANION GAP SERPL CALCULATED.3IONS-SCNC: 11 MMOL/L (ref 7–15)
ATRIAL RATE - MUSE: 62 BPM
BUN SERPL-MCNC: 23.5 MG/DL (ref 8–23)
CALCIUM SERPL-MCNC: 9.4 MG/DL (ref 8.8–10.2)
CHLORIDE SERPL-SCNC: 108 MMOL/L (ref 98–107)
CREAT SERPL-MCNC: 1.2 MG/DL (ref 0.51–0.95)
DEPRECATED HCO3 PLAS-SCNC: 20 MMOL/L (ref 22–29)
DIASTOLIC BLOOD PRESSURE - MUSE: NORMAL MMHG
ERYTHROCYTE [DISTWIDTH] IN BLOOD BY AUTOMATED COUNT: 17.2 % (ref 10–15)
GFR SERPL CREATININE-BSD FRML MDRD: 44 ML/MIN/1.73M2
GLUCOSE SERPL-MCNC: 90 MG/DL (ref 70–99)
HCT VFR BLD AUTO: 28.8 % (ref 35–47)
HGB BLD-MCNC: 8.6 G/DL (ref 11.7–15.7)
INTERPRETATION ECG - MUSE: NORMAL
MCH RBC QN AUTO: 25.7 PG (ref 26.5–33)
MCHC RBC AUTO-ENTMCNC: 29.9 G/DL (ref 31.5–36.5)
MCV RBC AUTO: 86 FL (ref 78–100)
P AXIS - MUSE: 76 DEGREES
PLATELET # BLD AUTO: 189 10E3/UL (ref 150–450)
POTASSIUM SERPL-SCNC: 3.8 MMOL/L (ref 3.4–5.3)
PR INTERVAL - MUSE: 262 MS
QRS DURATION - MUSE: 166 MS
QT - MUSE: 522 MS
QTC - MUSE: 529 MS
R AXIS - MUSE: -3 DEGREES
RBC # BLD AUTO: 3.34 10E6/UL (ref 3.8–5.2)
SODIUM SERPL-SCNC: 139 MMOL/L (ref 136–145)
SYSTOLIC BLOOD PRESSURE - MUSE: NORMAL MMHG
T AXIS - MUSE: 105 DEGREES
VENTRICULAR RATE- MUSE: 62 BPM
WBC # BLD AUTO: 5.4 10E3/UL (ref 4–11)

## 2022-10-26 PROCEDURE — 36415 COLL VENOUS BLD VENIPUNCTURE: CPT | Performed by: NURSE PRACTITIONER

## 2022-10-26 PROCEDURE — 85027 COMPLETE CBC AUTOMATED: CPT | Performed by: NURSE PRACTITIONER

## 2022-10-26 PROCEDURE — 83550 IRON BINDING TEST: CPT | Performed by: INTERNAL MEDICINE

## 2022-10-26 PROCEDURE — 82728 ASSAY OF FERRITIN: CPT | Performed by: INTERNAL MEDICINE

## 2022-10-26 PROCEDURE — 250N000013 HC RX MED GY IP 250 OP 250 PS 637

## 2022-10-26 PROCEDURE — G0378 HOSPITAL OBSERVATION PER HR: HCPCS

## 2022-10-26 PROCEDURE — 80048 BASIC METABOLIC PNL TOTAL CA: CPT | Performed by: NURSE PRACTITIONER

## 2022-10-26 PROCEDURE — 99214 OFFICE O/P EST MOD 30 MIN: CPT | Performed by: NURSE PRACTITIONER

## 2022-10-26 RX ORDER — ISOSORBIDE MONONITRATE 30 MG/1
30 TABLET, EXTENDED RELEASE ORAL DAILY
Qty: 90 TABLET | Refills: 0 | Status: SHIPPED | OUTPATIENT
Start: 2022-10-26 | End: 2022-10-26

## 2022-10-26 RX ORDER — ISOSORBIDE MONONITRATE 30 MG/1
30 TABLET, EXTENDED RELEASE ORAL DAILY
Qty: 90 TABLET | Refills: 1 | Status: SHIPPED | OUTPATIENT
Start: 2022-10-26 | End: 2022-10-31

## 2022-10-26 RX ADMIN — MEMANTINE 10 MG: 10 TABLET ORAL at 09:23

## 2022-10-26 RX ADMIN — FLUTICASONE FUROATE AND VILANTEROL TRIFENATATE 1 PUFF: 200; 25 POWDER RESPIRATORY (INHALATION) at 10:03

## 2022-10-26 RX ADMIN — CLONIDINE HYDROCHLORIDE 0.1 MG: 0.1 TABLET ORAL at 09:22

## 2022-10-26 RX ADMIN — Medication 950 MG: at 09:24

## 2022-10-26 RX ADMIN — LEVOTHYROXINE SODIUM 50 MCG: 0.05 TABLET ORAL at 09:21

## 2022-10-26 RX ADMIN — HYDRALAZINE HYDROCHLORIDE 25 MG: 25 TABLET, FILM COATED ORAL at 09:22

## 2022-10-26 RX ADMIN — CARVEDILOL 6.25 MG: 6.25 TABLET, FILM COATED ORAL at 09:22

## 2022-10-26 RX ADMIN — ASPIRIN 81 MG CHEWABLE TABLET 81 MG: 81 TABLET CHEWABLE at 09:23

## 2022-10-26 RX ADMIN — AZELASTINE HYDROCHLORIDE 1 SPRAY: 137 SPRAY, METERED NASAL at 09:26

## 2022-10-26 RX ADMIN — THERA TABS 1 TABLET: TAB at 09:23

## 2022-10-26 RX ADMIN — LIOTHYRONINE SODIUM 10 MCG: 5 TABLET ORAL at 09:24

## 2022-10-26 RX ADMIN — DESVENLAFAXINE SUCCINATE 100 MG: 100 TABLET, EXTENDED RELEASE ORAL at 09:25

## 2022-10-26 RX ADMIN — TICAGRELOR 90 MG: 90 TABLET ORAL at 09:22

## 2022-10-26 RX ADMIN — LAMOTRIGINE 225 MG: 25 TABLET ORAL at 09:21

## 2022-10-26 RX ADMIN — ROSUVASTATIN CALCIUM 40 MG: 40 TABLET, FILM COATED ORAL at 09:21

## 2022-10-26 RX ADMIN — Medication 125 MCG: at 09:25

## 2022-10-26 ASSESSMENT — ACTIVITIES OF DAILY LIVING (ADL)
ADLS_ACUITY_SCORE: 23

## 2022-10-26 NOTE — DISCHARGE SUMMARY
[address] [address]  p: [phone]    Discharge Summary: Cardiology Service    Kamryn Miller MRN# [id number]   YOB: 1935 Age: 87 year old       Admission Date: 10/24/2022  Discharge Date: 10/26/22      Discharge Diagnoses:  1. Atypical chest pain   2. CAD s/p PCI of LAD and RCA 1996, 2003, 8/2020  3. HTN  4. HLD  5. Severe aortic stenosis s/p TAVR 9/2022  6. HFpEF  7. PAD   8. Hypothyroidism  9. Asthma  10. Bipolar/MDD  11. CKD, stage III   12. Osteoporosis    Pertinent Procedures:  1. Coronary angiogram 10/25    Consults:  None    Imaging with results:    Coronary Angiogram 10/25/22:  Kamryn Miller is a 87 year old female PMH s/f CAD s/p EDDIE to LAD historically (uncleard details) and then EDDIE x3 to LAD in 2020, s/p EDDIE to RCA in 2003 (details unclear), Aortic stenosis s/p TAVR in September 2019, hypothyroidism, HTN, HLD and HFpEF who presents with angina equivalent back pain. She is referred to the cath lab for coronary angiogram for further work up.         Conclusion      Mid RCA lesion is 40% stenosed.    Prox RCA lesion is 40% stenosed.         Recommendations    General Recommendations:  - Patient given specific instructions regarding care of arteriotomy site, activity restrictions, signs and symptoms of cardiac or vascular complications and to seek immediate medical evaluation should they occur.   - Arterial sheath removed from radial artery with TR Band placement.     Recommend optimization of anti-anginal treatment.     Coronary Findings    Diagnostic  Dominance: Right  Left Anterior Descending   Previously placed Mid LAD-1 drug eluting stent is widely patent.   Previously placed Mid LAD-2 drug eluting stent is widely patent.   0% stenosed Dist LAD lesion.      Right Coronary Artery   The vessel is moderate in size.   Prox RCA lesion is 40% stenosed.   Mid RCA lesion is 40% stenosed. The lesion was previously treated  using a stent of unknown type.         Intervention     No interventions have been documented.         Brief HPI:  Assessment & Plan:  Kamryn Miller is an 86 y.o.F with a PMHx of HFpEF, HTN, HLD, CAD s/p PTCA 1996, LAD and RCA stenting 2003, s/p LAD PCI x 3 8/2020, severe aortic valvular stenosis s/p 26 mm ES Ultra TAVR 9/12/2022, and PAD who was admitted 10/24 for chest pain concerning for accelerating angina.       Hospital Course by Diagnosis:    # Atypical chest pain   # CAD s/p PCI LAD, RCA  # Severe Aortic Stenosis s/p TAVR 9/22  # Hypertension  Patient seen in [facility] 10/24 for TAVR follow up, pt reported ongoing back pain that radiates between shoulders since weekend. Pt states comes and goes, unaffected by activity/rest, but does improve with Nitroglycerin. These symptoms identical to symptoms felt prior to PCI. She was sent to ED for further eval. Troponin mildly elevated, flat 37-->41. EKG with no ST-T changes. Coronary angiogram with moderate non obstructive CAD. Plan for ongoing medical management.      - DAPT: Continue PTA 81 mg ASA daily/ Brilinta 90 mg BID  - BB: Continue PTA Coreg 12.5 BID  - Continue PTA Clonidine 0.1 mg daily, hydralazine 25 mg TID   - Start Imdur 30 mg daily for BP and angina   - Statin: continue Crestor 40 mg daily    # PAD  Noted to have 60% left femoral stenosis post procedure, but had adequate flow on post procedure peripheral angiogram. She was started on dual antiplatelet therapy with plans to assess symptoms of PAD as outpatient  - Continue ASA, Brilinta, statin  - Follow-up with vascular surgery      # Hypothyroidism  TSH 1.4  - Continue PTA Synthroid 50 mcg daily      # Asthma  - Continue PTA Inhaler   - Continue PTA PRN albuterol inhaler      # Bipolar/ MDD  - Continue PTA Lamictal 225 mg daily  - Continue PTA Pristiq 100 mg daily   - Continue PTA Cytomel 10 mcg daily  - Continue PTA Namenda 10 mg daily  - Continue PTA Remeron 7.5 mg every  night     # Chronic Kidney Disease  Likely has CKD stage II vs III, baseline Cr 1.2-1.4 over the last year. Cr on admission 1.28  - Renally dose meds  - Repeat BMP 1 week     # Chronic anemia:  - Baseline Hgb 9-10  - Hgb 8.6 day of discharge, possibly related to procedural blood loss, no other bleeding concerns   - repeat CBC 1 week      # Osteoporosis  No recent evidence of fracture.  - Continue PTA calcium  - Continue PTA cholecalciferol     Patient discussed w/ Dr. Douglass     Condition on discharge  Temp:  [98  F (36.7  C)-99.2  F (37.3  C)] 98.7  F (37.1  C)  Pulse:  [56-82] 64  Resp:  [2-20] 16  BP: (122-190)/(69-99) 172/92  SpO2:  [96 %-99 %] 99 %  General: Alert, interactive, NAD  Eyes: sclera anicteric, EOMI  Neck: JVP normal , carotid 2+ bilaterally  Cardiovascular: regular rate and rhythm, normal S1 and S2, no murmurs, gallops, or rubs  Resp: clear to auscultation bilaterally, no rales, wheezes, or rhonchi  GI: Soft, nontender, nondistended. +BS.  No HSM or masses, no rebound or guarding.  Extremities: no edema, no cyanosis or clubbing, dorsalis pedis and posterior tibialis pulses 2+ bilaterally  Skin: Warm and dry, no jaundice or rash  Neuro: CN 2-12 intact, moves all extremities equally  Psych: Alert & oriented x 3    Medication Changes:  Start Imdur 30 mg daily   Stop Potassium    Discharge medications:   Current Discharge Medication List      START taking these medications    Details   isosorbide mononitrate (IMDUR) 30 MG 24 hr tablet Take 1 tablet (30 mg) by mouth daily  Qty: 90 tablet, Refills: 0    Associated Diagnoses: S/P coronary angiogram; Coronary artery disease of native artery of native heart with stable angina pectoris (H)         CONTINUE these medications which have NOT CHANGED    Details   albuterol (PROAIR HFA/PROVENTIL HFA/VENTOLIN HFA) 108 (90 Base) MCG/ACT inhaler Inhale 2 puffs into the lungs every 6 hours as needed for wheezing or shortness of breath / dyspnea  Qty: 18 g, Refills:  3    Comments: Pharmacy may dispense brand covered by insurance (Proair, or proventil or ventolin or generic albuterol inhaler)  Associated Diagnoses: Moderate persistent asthma without complication      alendronate (FOSAMAX) 70 MG tablet TAKE 1 TABLET BY MOUTH ONCE WEEKLY  Qty: 4 tablet, Refills: 23    Associated Diagnoses: Osteoporosis, unspecified osteoporosis type, unspecified pathological fracture presence      aspirin 81 MG tablet Take 81 mg by mouth daily       azelastine (ASTEPRO) 0.15 % nasal spray USE 1 SPRAY IN EACH NOSTRIL ONCE A DAY  Qty: 30 mL, Refills: 11    Associated Diagnoses: Seasonal allergic rhinitis, unspecified trigger      Calcium Citrate (CITRACAL OR) Take 1 tablet by mouth daily.      carvedilol (COREG) 6.25 MG tablet Take 2 tablets (12.5 mg) by mouth 2 times daily  Qty: 540 tablet, Refills: 3    Associated Diagnoses: Essential hypertension      cholecalciferol (VITAMIN D3) 1250 mcg (15915 units) capsule TAKE 1 CAPSULE BY MOUTH ONCE WEEKLY  Qty: 4 capsule, Refills: 11    Associated Diagnoses: Stage 3b chronic kidney disease (H)      cloNIDine (CATAPRES) 0.1 MG tablet Take 1 tablet (0.1 mg) by mouth daily as needed (For SBP > 180)  Qty: 20 tablet, Refills: 0      COMPRESSION STOCKINGS 1 each daily  Qty: 1 each, Refills: 1    Comments: Knee high 30 mmHg  Associated Diagnoses: Bilateral lower extremity edema; Essential hypertension      desvenlafaxine (PRISTIQ) 100 MG 24 hr tablet Take 100 mg by mouth daily      fluticasone-salmeterol (ADVAIR DISKUS) 500-50 MCG/ACT inhaler INHALE 1 PUFF BY MOUTH TWICE DAILY  Qty: 60 each, Refills: 0    Associated Diagnoses: Moderate persistent asthma without complication      hydrALAZINE (APRESOLINE) 25 MG tablet Take 1 tablet (25 mg) by mouth 3 times daily  Qty: 270 tablet, Refills: 1    Comments: Decreased dose from previous  Associated Diagnoses: Essential hypertension      !! lamoTRIgine (LAMICTAL) 25 MG tablet Take 25 mg by mouth daily with 200 mg tablet  for a total dose of 225 mg      !! LAMOTRIGINE 200 MG PO TABS Take 200 mg by mouth daily with 25 mg tablet for a total dose of 225 mg      levothyroxine (SYNTHROID/LEVOTHROID) 50 MCG tablet TAKE 1 TABLET BY MOUTH ONCE DAILY  Qty: 28 tablet, Refills: 11    Associated Diagnoses: Stage 3b chronic kidney disease (H)      liothyronine (CYTOMEL) 5 MCG tablet Take 2 tablets (10 mcg) by mouth daily  Qty: 30 tablet, Refills: 3    Associated Diagnoses: Bipolar disorder, current episode depressed, severe, without psychotic features (H)      memantine (NAMENDA) 10 MG tablet Take 1 tablet (10 mg) by mouth daily  Qty: 30 tablet, Refills: 3    Associated Diagnoses: Major neurocognitive disorder (H)      mirtazapine (REMERON) 7.5 MG tablet Take 1 tablet (7.5 mg) by mouth At Bedtime  Qty: 30 tablet, Refills: 3    Associated Diagnoses: Bipolar disorder, current episode depressed, severe, without psychotic features (H)      Multiple Vitamin (TAB-A-JENNIFER) TABS TAKE 1 TABLET BY MOUTH ONCE DAILY  Qty: 30 tablet, Refills: PRN    Associated Diagnoses: Stage 3b chronic kidney disease (H)      rosuvastatin (CRESTOR) 40 MG tablet TAKE 1 TABLET BY MOUTH ONCE DAILY  Qty: 28 tablet, Refills: 11    Associated Diagnoses: Hyperlipidemia with target LDL less than 70      ticagrelor (BRILINTA) 90 MG tablet Take 1 tablet (90 mg) by mouth 2 times daily  Qty: 180 tablet, Refills: 0    Associated Diagnoses: S/P TAVR (transcatheter aortic valve replacement); Aortic stenosis, severe       !! - Potential duplicate medications found. Please discuss with provider.      STOP taking these medications       potassium chloride ER (K-TAB) 20 MEQ CR tablet Comments:   Reason for Stopping:               Labs or imaging requiring follow-up after discharge:  1 week follow-up with cardiology with BMP and CBC prior       Follow-up:  1 week cardiology    Code status:  Full Code    JESSICA Rousseau, NP-C  [facility]    A total of 30 minutes spent face to face with  patient and > 50% of the time spent counseling and coordinating care.

## 2022-10-26 NOTE — PLAN OF CARE
Observation Goals:    Completion of CAD work up (coronary angiogram)- Met  Chest pain free on PO medications- In progress  BP less than 160/100- not met    Observation         10/25/2022  1810  -----------------------------------------------------------  Reason for admission: Post Angiogram for chest pain  Primary team notified of pt arrival.  Admitted from: Cath Lab after ED  Via: stretcher  Accompanied by: family  Belongings: Placed in closet  Admission Profile: complete  Teaching: orientation to unit and call light- call light within reach, call don't fall, use of console, meal times, when to call for the RN, and enforced importance of safety   Access: PIV  Telemetry:Placed on pt  Ht./Wt.: Wt needs to be done  Code Status verified on armband: yes  2 RN Skin Assessment Completed By: To be completed by NOC RN  Med Rec completed: no  Bed surface reassessed with algorithm and charted: yes  New bed surface ordered: no  Suction/Ambu bag/Flowmeter at bedside: yes  Is patient having diarrhea upon admission- if YES fill out testing algorithm : no    C. Diff Testing Algorithm (MUST be marked YES)   3 or more loose stools in 24 hrs. [] Yes [] No       Additional symptoms:(At least ONE must be marked yes)   Abdominal pain/discomfort [] Yes [] No   Fever at least 38C (100.4 F) [] Yes [] No   Elevated WBC(>11,000) [] Yes [] No       Exclusion Criteria:  (MUST be marked YES)   Off laxatives for at least 48 hrs. [] Yes [] No       Pt status:    Temp:  [98  F (36.7  C)-99.2  F (37.3  C)] 98  F (36.7  C)  Pulse:  [54-82] 60  Resp:  [2-30] 14  BP: (115-190)/(45-99) 189/99  SpO2:  [95 %-99 %] 99 %

## 2022-10-26 NOTE — PLAN OF CARE
"Neuro: A&Ox4. Use of call light appropriately and able to make needs known  Cardiac: Blood pressure (!) (P) 161/77, pulse 73, temperature 98.4  F (36.9  C), temperature source Oral, resp. rate 14, height 1.549 m (5' 1\"), weight 66.1 kg (145 lb 11.6 oz), SpO2 98 %.  Respiratory: Sating 98 on RA.  GI/: Adequate urine output. Use of bathroom  Diet/appetite: Tolerating regular diet. Eating well.  Activity:  Assist X 1.  Pain: At acceptable level on current regimen.   Skin: Blister on right wrist where the TR Band was.fragile skinNo new deficits noted.  LDA's: L PIV SL     Skin checked with 2 RN: Paolo GALINDO RN and Skylar. RN  Notified MD about the blister on patient wrist, MD to see patient this morning, no order received. Will continue to monitor patient     Plan: Continue with POC. Notify primary team with changes.    Problem: Plan of Care - These are the overarching goals to be used throughout the patient stay.    Goal: Plan of Care Review  Description: The Plan of Care Review/Shift note should be completed every shift.  The Outcome Evaluation is a brief statement about your assessment that the patient is improving, declining, or no change.  This information will be displayed automatically on your shift note.  10/26/2022 0236 by Ellie Burgos RN  Outcome: Not Progressing  10/25/2022 2358 by Ellie Burgos RN  Outcome: Progressing  Goal: Patient-Specific Goal (Individualized)  Description: You can add care plan individualizations to a care plan. Examples of Individualization might be:  \"Parent requests to be called daily at 9am for status\", \"I have a hard time hearing out of my right ear\", or \"Do not touch me to wake me up as it startles me\".  10/26/2022 0236 by Ellie Burgos RN  Outcome: Not Progressing  10/25/2022 2358 by Ellie Burgos RN  Outcome: Progressing  Goal: Absence of Hospital-Acquired Illness or Injury  10/26/2022 0236 by Ellie Burgos RN  Outcome: " Not Progressing  10/25/2022 2358 by Ellie Burgos RN  Outcome: Progressing  Intervention: Identify and Manage Fall Risk  Recent Flowsheet Documentation  Taken 10/26/2022 0016 by Ellie Burgos RN  Safety Promotion/Fall Prevention:    activity supervised    assistive device/personal items within reach    nonskid shoes/slippers when out of bed    patient and family education    clutter free environment maintained  Taken 10/25/2022 1945 by Ellie Burgos RN  Safety Promotion/Fall Prevention:    activity supervised    assistive device/personal items within reach    nonskid shoes/slippers when out of bed    patient and family education    clutter free environment maintained  Intervention: Prevent Skin Injury  Recent Flowsheet Documentation  Taken 10/26/2022 0016 by Ellie Burgos RN  Body Position: position changed independently  Taken 10/25/2022 1945 by Ellie Burgos RN  Body Position: position changed independently  Intervention: Prevent and Manage VTE (Venous Thromboembolism) Risk  Recent Flowsheet Documentation  Taken 10/26/2022 0016 by Ellie Burgos RN  VTE Prevention/Management: SCDs (sequential compression devices) off  Taken 10/25/2022 1945 by Ellie Burgos RN  VTE Prevention/Management: SCDs (sequential compression devices) off  Goal: Optimal Comfort and Wellbeing  10/26/2022 0236 by Ellie Burgos RN  Outcome: Not Progressing  10/25/2022 2358 by Ellie Burgos RN  Outcome: Progressing  Goal: Readiness for Transition of Care  10/26/2022 0236 by Ellie Burgos RN  Outcome: Not Progressing  10/25/2022 2358 by Ellie Burgos RN  Outcome: Progressing  Flowsheets (Taken 10/25/2022 2358)  Anticipated Changes Related to Illness: inability to work  Intervention: Mutually Develop Transition Plan  Recent Flowsheet Documentation  Taken 10/25/2022 2358 by Ellie Burgos RN  Anticipated Changes Related  to Illness: inability to work     Problem: Chest Pain  Goal: Resolution of Chest Pain Symptoms  10/26/2022 0236 by Ellie Burgos RN  Outcome: Not Progressing  10/25/2022 2358 by Ellie Burgos RN  Outcome: Progressing     Problem: Heart Failure Comorbidity  Goal: Maintenance of Heart Failure Symptom Control  10/26/2022 0236 by Ellie Burgos RN  Outcome: Not Progressing  10/25/2022 2358 by Ellie Burgos RN  Outcome: Not Progressing  Intervention: Maintain Heart Failure Management  Recent Flowsheet Documentation  Taken 10/26/2022 0016 by Ellie Burgos RN  Medication Review/Management: medications reviewed  Taken 10/25/2022 1945 by Ellie Burgos RN  Medication Review/Management: medications reviewed     Problem: Hypertension Comorbidity  Goal: Blood Pressure in Desired Range  10/26/2022 0236 by Ellie Burgos RN  Outcome: Not Progressing  10/25/2022 2358 by Ellie Burgos RN  Outcome: Progressing  Intervention: Maintain Blood Pressure Management  Recent Flowsheet Documentation  Taken 10/26/2022 0016 by Ellie Burgos RN  Medication Review/Management: medications reviewed  Taken 10/25/2022 1945 by Ellie Burgos RN  Medication Review/Management: medications reviewed   Goal Outcome Evaluation:

## 2022-10-26 NOTE — PLAN OF CARE
Goal Outcome Evaluation:      Plan of Care Reviewed With: patient    DISCHARGE                         No discharge date for patient encounter.  ----------------------------------------------------------------------------  Discharged to: [facility]  Via: private transportation   Accompanied by: Family, son (jairo)  Discharge Instructions: regular diet, activity as tolerated, medications, follow up appointments, when to call the MD, aftercare instructions.  Prescriptions: To send to assisted living ; medication list reviewed & sent with pt.  Follow Up Appointments: arranged; information given  Belongings: All sent with pt  IV: d/c'd  Telemetry: d/c'd  Pt exhibits understanding of above discharge instructions; all questions answered.    Discharge Paperwork: Signed, copied, and sent home with patient.

## 2022-10-27 ENCOUNTER — PATIENT OUTREACH (OUTPATIENT)
Dept: CARE COORDINATION | Facility: CLINIC | Age: 87
End: 2022-10-27

## 2022-10-27 NOTE — PROGRESS NOTES
[facility]    Background: Transitional Care Management program identified per system criteria and reviewed by [facility] team for possible outreach.    Assessment: Upon chart review, [facility] Team member will not proceed with patient outreach related to this episode of Transitional Care Management program due to reason below:    Patient has discharged to a Memory Care, Nursing Home, Assisted Living or Group Home where patient is receiving on-site support with their daily cares, including support with hospital follow up plan.    Plan: Transitional Care Management episode addressed appropriately per reason noted above.      GABO Sutton  [facility]    *Connected Care Resource Team does NOT follow patient ongoing. Referrals are identified based on internal discharge reports and the outreach is to ensure patient has an understanding of their discharge instructions.

## 2022-10-28 DIAGNOSIS — D63.1 ANEMIA IN STAGE 3A CHRONIC KIDNEY DISEASE (H): Primary | ICD-10-CM

## 2022-10-28 DIAGNOSIS — N18.31 ANEMIA IN STAGE 3A CHRONIC KIDNEY DISEASE (H): Primary | ICD-10-CM

## 2022-10-28 LAB
FERRITIN SERPL-MCNC: 19 NG/ML (ref 11–328)
IRON BINDING CAPACITY (ROCHE): 318 UG/DL (ref 240–430)
IRON SATN MFR SERPL: 7 % (ref 15–46)
IRON SERPL-MCNC: 23 UG/DL (ref 37–145)

## 2022-10-30 NOTE — PROGRESS NOTES
[facility] HEART [facility]  CARDIOVASCULAR DIVISION    VALVE CLINIC RETURN VISIT    PRIMARY CARDIOLOGIST: Dr. Zuniga      PERTINENT CLINICAL HISTORY:     Kamryn Miller is a very pleasant 87 year old female who presents for coronary angiogram follow-up. She has a history of HFpEF, HTN, HLD, significant CAD history dx at age 61 w/PTCA 1996, LAD and RCA stenting 2003, s/p LAD PCI 2020 and severe aortic valvular stenosis that was treated with a transfemoral transcatheter aortic valve replacement (TAVR) with a 26mm Arndt Janis Ultra on 9/12/22 by Morro Burrows. She was noted to have 60% left femoral stenosis post procedure, but had adequate flow on post procedure peripheral angiogram. She was started on dual antiplatelet therapy with plans to assess symptoms of PAD as outpatient. She was noted to have new LBBB post procedure and discharged on a Cardionet. Her post TAVR echo showed mean PG of 7 mmHg with trace PVL. She was last evaluated 1 month post TAVR and reported worsening exertional dyspnea, fatigue and recurrent angina (dull ache between shoulder blades). She underwent echo which showed normal TAVR function. Was referred for coronary angiogram which showed LAD w/50% ISR that was dPR negative (0.91), otherwise mild to moderate nonobstructive disease. She was started on Imdur for antianginal therapy and discharged home in stable condition.    Since her recent admission she has been feeling alright. She has been have headaches and feeling like her ears being plugged - she initially thought it was related to amlodipine, but it has persisted even with switching amlodipine to hydralazine. Headaches occur about an after taking hydralazine, persist until she is ready to take her next dose. She continues to have waxing and waning pain between her should blades which is very similar to her prior angina. Happens a few times a week, thinks it has gotten better since starting Imdur. She continues to experience worsening  shortness of breath, though no orthopnea, PND, leg swelling. Says her leg swelling got better post TAVR. She continues to experience fatigue. She denies lightheadedness, palpitations, syncope. She denies any claudication, this has resolved. Home blood pressure are 120's-130's systolic.      PAST MEDICAL HISTORY:     Past Medical History:   Diagnosis Date     Aortic valvar stenosis 7/2010    mild     Asthma      Bipolar disorder (H)      CAD (coronary artery disease)     s/p angioplasty     Celiac disease      Colon polyps      Colon polyps 2012    every 3 year colonoscopy      Depression      High cholesterol      HTN      Mitral insufficiency      Pulmonary HTN (H)     mild     Tremors 7/10    drug induced from antidepressants     Tricuspid insufficiency         PAST SURGICAL HISTORY:     Past Surgical History:   Procedure Laterality Date     ANGIOGRAM  6/26/2009     ANGIOPLASTY  9/96    for angina     ANGIOPLASTY  8/03 - 9/03    X -2 - with stenst in coronary car.     APPENDECTOMY       BREAST BIOPSY, RT/LT      Breat Biopsy RT/LT, benign     BUNIONECTOMY  11/8/2011    Procedure:BUNIONECTOMY; Left donna bunionectomy; Surgeon:FREDY LITTLEJOHN; Location:[facility] OR     BUNIONECTOMY RT/LT  5/2007    right bunion and right 2nd hammertoe     CATARACT IOL, RT/LT  6/12 and 7/12    bilateral     COLONOSCOPY  2007, 2012    every 3 years for polyps     CV CORONARY ANGIOGRAM N/A 8/21/2020    Procedure: CV CORONARY ANGIOGRAM;  Surgeon: Gianluca Toscano MD;  Location:  HEART CARDIAC CATH LAB     CV CORONARY ANGIOGRAM N/A 10/25/2022    Procedure: Coronary Angiogram;  Surgeon: Carter Douglass MD;  Location:  HEART CARDIAC CATH LAB     CV INSTANTANEOUS WAVE-FREE RATIO N/A 10/25/2022    Procedure: Instantaneous Wave-Free Ratio;  Surgeon: Carter Douglass MD;  [facility] CARDIAC CATH [facility]     CV LEFT HEART CATH N/A 8/21/2020    Procedure: CV LEFT HEART CATH;  Surgeon: Gianluca Toscano,  MD;  Location:  HEART CARDIAC CATH LAB     CV LEFT HEART CATH N/A 11/3/2020    Procedure: CV LEFT HEART CATH;  Surgeon: Tom Burrows MD;  Location:  HEART CARDIAC CATH LAB     CV LOWER EXTREMITY ANGIOGRAM BILATERAL N/A 11/3/2020    Procedure: CV ANGIOGRAM LOWER EXTREMITY BILATERAL;  Surgeon: Tom Burrows MD;  Location:  HEART CARDIAC CATH LAB     CV PCI STENT DRUG ELUTING N/A 8/21/2020    Procedure: Percutaneous Coronary Intervention Stent Drug Eluting;  Surgeon: Gianluca Toscano MD;  Location:  HEART CARDIAC CATH LAB     CV RIGHT HEART CATH MEASUREMENTS RECORDED N/A 8/21/2020    Procedure: CV RIGHT HEART CATH;  Surgeon: Gianluca Toscano MD;  Location:  HEART CARDIAC CATH LAB     CV RIGHT HEART CATH MEASUREMENTS RECORDED N/A 11/3/2020    Procedure: CV RIGHT HEART CATH;  Surgeon: Tom Burrows MD;  Location:  HEART CARDIAC CATH LAB     CV TRANSCATHETER AORTIC VALVE REPLACEMENT N/A 9/12/2022    Procedure: Transfemoral Transcatheter Aortic Valve Replacement with possible open heart bypass and or balloon pump placement and any indicated procedure;  Surgeon: Tom Burrows MD;  Location:  HEART CARDIAC CATH LAB     HEART CATH FEMORAL CANNULIZATION WITH OPEN STANDBY REPAIR AORTIC VALVE N/A 9/12/2022    Procedure: OR TRANSCATHETER AORTIC VALVE REPLACEMENT, OPEN FEMORAL ARTERY APPROACH;  Surgeon: Arthur Markham MD;  Location:  HEART CARDIAC CATH LAB     JOINT REPLACEMENT, HIP RT/LT  4/2008    right hip replaced     JOINT REPLACEMENT, HIP RT/LT  4/2009    left hip replaced     REPAIR HAMMER TOE  11/8/2011    Procedure:REPAIR HAMMER TOE; left 2nd hammertoe repair; Surgeon:FREDY LITTLEJOHN; Location: OR     SURGICAL HISTORY OF -   11/11    left bunion and 2nd hammertoe repair     TUBAL LIGATION       ZZC ANESTH,BLEPHAROPLASTY      (R) for drooping eyelid     ZZC APPENDECTOMY          CURRENT MEDICATIONS:     Current Outpatient Medications   Medication Sig Dispense  Refill     albuterol (PROAIR HFA/PROVENTIL HFA/VENTOLIN HFA) 108 (90 Base) MCG/ACT inhaler Inhale 2 puffs into the lungs every 6 hours as needed for wheezing or shortness of breath / dyspnea 18 g 3     alendronate (FOSAMAX) 70 MG tablet TAKE 1 TABLET BY MOUTH ONCE WEEKLY 4 tablet 23     aspirin 81 MG tablet Take 81 mg by mouth daily        azelastine (ASTEPRO) 0.15 % nasal spray USE 1 SPRAY IN EACH NOSTRIL ONCE A DAY 30 mL 11     Calcium Citrate (CITRACAL OR) Take 1 tablet by mouth daily.       carvedilol (COREG) 6.25 MG tablet Take 2 tablets (12.5 mg) by mouth 2 times daily 540 tablet 3     cholecalciferol (VITAMIN D3) 1250 mcg (04883 units) capsule TAKE 1 CAPSULE BY MOUTH ONCE WEEKLY 4 capsule 11     cloNIDine (CATAPRES) 0.1 MG tablet Take 1 tablet (0.1 mg) by mouth daily as needed (For SBP > 180) 20 tablet 0     desvenlafaxine (PRISTIQ) 100 MG 24 hr tablet Take 100 mg by mouth daily       fluticasone-salmeterol (ADVAIR DISKUS) 500-50 MCG/ACT inhaler INHALE 1 PUFF BY MOUTH TWICE DAILY 60 each 0     hydrALAZINE (APRESOLINE) 25 MG tablet Take 1 tablet (25 mg) by mouth 3 times daily 270 tablet 1     isosorbide mononitrate (IMDUR) 30 MG 24 hr tablet Take 1 tablet (30 mg) by mouth daily 90 tablet 1     lamoTRIgine (LAMICTAL) 25 MG tablet Take 25 mg by mouth daily with 200 mg tablet for a total dose of 225 mg       LAMOTRIGINE 200 MG PO TABS Take 200 mg by mouth daily with 25 mg tablet for a total dose of 225 mg       levothyroxine (SYNTHROID/LEVOTHROID) 50 MCG tablet TAKE 1 TABLET BY MOUTH ONCE DAILY 28 tablet 11     liothyronine (CYTOMEL) 5 MCG tablet Take 2 tablets (10 mcg) by mouth daily 30 tablet 3     memantine (NAMENDA) 10 MG tablet Take 1 tablet (10 mg) by mouth daily 30 tablet 3     mirtazapine (REMERON) 7.5 MG tablet Take 1 tablet (7.5 mg) by mouth At Bedtime 30 tablet 3     Multiple Vitamin (TAB-A-JENNIFER) TABS TAKE 1 TABLET BY MOUTH ONCE DAILY 30 tablet PRN     rosuvastatin (CRESTOR) 40 MG tablet TAKE 1 TABLET  BY MOUTH ONCE DAILY 28 tablet 11     ticagrelor (BRILINTA) 90 MG tablet Take 1 tablet (90 mg) by mouth 2 times daily 180 tablet 0     COMPRESSION STOCKINGS 1 each daily (Patient not taking: Reported on 10/24/2022) 1 each 1        ALLERGIES:     Allergies   Allergen Reactions     Ace Inhibitors Cough     Diclofenac Other (See Comments)     Balance problems     Gluten Meal Diarrhea        FAMILY HISTORY:     Family History   Problem Relation Age of Onset     Asthma Mother      Cerebrovascular Disease Mother      Arthritis Mother      Depression Mother      Lipids Sister      Hypertension Sister      Heart Disease Sister      Lipids Sister      Hypertension Sister      Lipids Sister      Breast Cancer Sister         SOCIAL HISTORY:     Social History     Socioeconomic History     Marital status:      Spouse name: Adrien     Number of children: 2     Years of education: 16     Highest education level: None   Occupational History     Employer: RETIRED     Comment: Retired in 2002.    Tobacco Use     Smoking status: Never Smoker     Smokeless tobacco: Never Used   Substance and Sexual Activity     Alcohol use: No     Alcohol/week: 0.0 standard drinks     Types: 1 Standard drinks or equivalent per week     Drug use: No     Sexual activity: Not Currently     Partners: Male        REVIEW OF SYSTEMS:     Constitutional: No fevers or chills  Skin: No new rash or itching  Eyes: No acute change in vision  Ears/Nose/Throat: No purulent rhinorrhea, new hearing loss, or new vertigo  Respiratory: No cough or hemoptysis  Cardiovascular: See HPI  Gastrointestinal: No change in appetite, vomiting, hematemesis or diarrhea  Genitourinary: No dysuria or hematuria  Musculoskeletal: No new back pain, neck pain or muscle pain  Neurologic: No new headaches, focal weakness or behavior changes  Psychiatric: No hallucinations, excessive alcohol consumption or illegal drug usage  Hematologic/Lymphatic/Immunologic: No bleeding, chills, fever,  night sweats or weight loss  Endocrine: No new cold intolerance, heat intolerance, polyphagia, polydipsia or polyuria      PHYSICAL EXAMINATION:     BP (!) 174/83 (BP Location: Right arm, Patient Position: Chair, Cuff Size: Adult Regular)   Pulse 65   Wt 68.2 kg (150 lb 4.8 oz)   SpO2 97%   BMI 28.40 kg/m      GENERAL: No acute distress.  HEENT: EOMI. Sclerae white, not injected. Nares clear. Pharynx without erythema or exudate.   Neck: No adenopathy. No thyromegaly. No jugular venous distension.   Heart: Regular rate and rhythm. No murmur.   Lungs: Clear to auscultation. No ronchi, wheezes, rales.   Abdomen: Soft, nontender, nondistended. Bowel sounds present.  Extremities: No clubbing, cyanosis, or edema.   Neurologic: Alert and oriented to person/place/time, normal speech and affect. No focal deficits.  Skin: No petechiae, purpura or rash.     LABORATORY DATA:     LIPID RESULTS:  Lab Results   Component Value Date    CHOL 155 10/24/2022    CHOL 175 10/23/2020    HDL 54 10/24/2022    HDL 71 10/23/2020    LDL 76 10/24/2022    LDL 83 10/23/2020    TRIG 124 10/24/2022    TRIG 103 10/23/2020    CHOLHDLRATIO 2.8 09/18/2015       LIVER ENZYME RESULTS:  Lab Results   Component Value Date    AST 30 10/24/2022    AST 27 05/17/2021    ALT 19 10/24/2022    ALT 38 05/17/2021       CBC RESULTS:  Lab Results   Component Value Date    WBC 5.4 10/26/2022    WBC 4.6 05/17/2021    RBC 3.34 (L) 10/26/2022    RBC 3.97 05/17/2021    HGB 8.6 (L) 10/26/2022    HGB 12.9 05/17/2021    HCT 28.8 (L) 10/26/2022    HCT 40.3 05/17/2021    MCV 86 10/26/2022     (H) 05/17/2021    MCH 25.7 (L) 10/26/2022    MCH 32.5 05/17/2021    MCHC 29.9 (L) 10/26/2022    MCHC 32.0 05/17/2021    RDW 17.2 (H) 10/26/2022    RDW 12.7 05/17/2021     10/26/2022     05/17/2021       BMP RESULTS:  Lab Results   Component Value Date     10/31/2022     06/04/2021    POTASSIUM 4.1 10/31/2022    POTASSIUM 4.6 10/05/2022    POTASSIUM  3.4 06/04/2021    CHLORIDE 106 10/31/2022    CHLORIDE 103 10/05/2022    CHLORIDE 106 06/04/2021    CO2 25 10/31/2022    CO2 27 10/05/2022    CO2 30 06/04/2021    ANIONGAP 10 10/31/2022    ANIONGAP 7 10/05/2022    ANIONGAP 6 06/04/2021     (H) 10/31/2022    GLC 98 10/05/2022     (H) 06/04/2021    BUN 33.3 (H) 10/31/2022    BUN 39 (H) 10/05/2022    BUN 27 06/04/2021    CR 1.46 (H) 10/31/2022    CR 1.50 (H) 06/04/2021    GFRESTIMATED 35 (L) 10/31/2022    GFRESTIMATED 32 (L) 06/04/2021    GFRESTBLACK 37 (L) 06/04/2021    PRINCE 9.6 10/31/2022    PRINCE 9.4 06/04/2021        A1C RESULTS:  Lab Results   Component Value Date    A1C 5.0 06/30/2009       INR RESULTS:  Lab Results   Component Value Date    INR 1.13 10/24/2022    INR 1.09 09/22/2022    INR 1.05 11/03/2020    INR 2.28 (H) 04/23/2009          PROCEDURES & FURTHER ASSESSMENTS:   Coronary angiogram 10/25/22    Two vessel CAD with prior PCI to the RCA and LAD, otherwise mild non obstructive CAD elsewhere.  Patent stents in the RCA and LAD with mild to moderate in stent restenosis of the proximal LAD stents (progressed since last angiogram from 2020).  Proximal LAD ISR hemodynamically not significant by dPR at 0.91.      Plan      Follow bedrest per protocol    Continued medical management and lifestyle modifications for cardiovascular risk factor optimizations.    Follow up visit with Nurse Practitioner in 1-2 weeks.    Arterial sheath removed from radial artery with TR band placement.    Cardiac rehabilitation.    Return to the primary inpatient team for further evaluation and managmenet     Coronary Findings    Diagnostic  Dominance: Right  Left Main   The vessel is moderate in size and is angiographically normal.      Left Anterior Descending   The vessel is moderate in size.   Ost LAD to Prox LAD lesion is 50% stenosed. Measured dPr. Pre adenosine administration dPr: 0.91.   Previously placed Mid LAD-1 drug eluting stent is widely patent.   Previously  placed Mid LAD-2 drug eluting stent is widely patent.   0% stenosed Dist LAD lesion.      First Diagonal Branch   The vessel is small.   1st Diag lesion is 50% stenosed.      Second Diagonal Branch   The vessel is moderate in size. The vessel exhibits minimal luminal irregularities.      Left Circumflex   The vessel is moderate in size.      First Obtuse Marginal Branch   The vessel is moderate in size.   1st Mrg lesion is 30% stenosed.      Second Obtuse Marginal Branch   The vessel is moderate in size and is angiographically normal.      Third Obtuse Marginal Branch   The vessel is moderate in size and is angiographically normal.      Right Coronary Artery   The vessel is moderate in size.   Prox RCA lesion is 40% stenosed.   Mid RCA lesion is 20% stenosed. The lesion was previously treated using a stent of unknown type.      Right Posterior Descending Artery   The vessel is moderate in size. The vessel exhibits minimal luminal irregularities.      Right Posterior Atrioventricular Artery   The vessel is moderate in size. The vessel exhibits minimal luminal irregularities.      First Right Posterolateral Branch   The vessel is moderate in size. The vessel exhibits minimal luminal irregularities.                   EKG 10/24/22:  Personally reviewed  NSR with LBBB unchanged    ECHO 10/24/22:  Interpretation Summary  Status post 26 mm Arndt Janis Ultra valve TAVR.  Global and regional left ventricular function is normal with an EF of 55-60%.  Global right ventricular function is normal. The right ventricle is normal  size.  The TAVR is well-seated. Leaflet opening is not clearly visualized. There is  no valvular regurgitation but there is trace paravalvular regurgitation. The  Vmax is 2.6 m/s, the mean gradient is 13 mmHg, the dimensionless index is  0.34, and the acceleration time is 110 ms.  IVC diameter and respiratory changes fall into an intermediate range  suggesting an RA pressure of 8 mmHg.  This study was  compared with the study from 09/13/2022. No significant changes  noted. The TAVR gradient has increased modestly, as is to be expected.  ______________________________________________________________________________  Left Ventricle  Global and regional left ventricular function is normal with an EF of 55-60%.  Left ventricular wall thickness is normal. Left ventricular size is normal.  Left ventricular diastolic function is not assessable.     Right Ventricle  Global right ventricular function is normal. The right ventricle is normal  size.     Atria  Both atria appear normal. Moderate left atrial enlargement is present.     Mitral Valve  Mild mitral annular calcification is present. Mild mitral insufficiency is  present.     Aortic Valve  The TAVR is well-seated. Leaflet opening is not clearly visualized. There is  no valvular regurgitation but there is trace paravalvular regurgitation. The  Vmax is 2.6 m/s, the mean gradient is 13 mmHg, the dimensionless index is  0.34, and the acceleration time is 110 ms.     Tricuspid Valve  The tricuspid valve is normal. Trace tricuspid insufficiency is present. The  right ventricular systolic pressure is approximated at 21.2 mmHg plus the  right atrial pressure.     Pulmonic Valve  The pulmonic valve is normal. Trace pulmonic insufficiency is present.     Vessels  The aorta root cannot be assessed. Ascending aorta 3.2 cm. IVC diameter and  respiratory changes fall into an intermediate range suggesting an RA pressure  of 8 mmHg.     Pericardium  No pericardial effusion is present.     Compared to Previous Study  This study was compared with the study from 09/13/2022 . No significant  changes noted. The TAVR gradient has increased modestly, as is to be expected.  ______________________________________________________________________________  MMode/2D Measurements & Calculations     IVSd: 1.1 cm  LVIDd: 4.3 cm  LVPWd: 0.89 cm  LV mass(C)d: 139.1 grams  LV mass(C)dI: 83.4  grams/m2  asc Aorta Diam: 3.2 cm  LVOT diam: 1.9 cm  LVOT area: 2.9 cm2  RWT: 0.42     Doppler Measurements & Calculations  MV E max skinny: 171.0 cm/sec  MV A max skinny: 145.4 cm/sec  MV E/A: 1.2  MV max PG: 10.4 mmHg  MV mean P.3 mmHg  MV V2 VTI: 54.1 cm  MVA(VTI): 1.0 cm2  MV dec slope: 856.6 cm/sec2  MV dec time: 0.20 sec  Ao V2 max: 254.2 cm/sec  Ao max P.0 mmHg  Ao V2 mean: 171.2 cm/sec  Ao mean P.7 mmHg  Ao V2 VTI: 53.9 cm  LUCY(I,D): 1.0 cm2  LUCY(V,D): 0.99 cm2  AI P1/2t: 688.4 msec  LV V1 max PG: 3.1 mmHg  LV V1 max: 88.0 cm/sec  LV V1 VTI: 19.5 cm  SV(LVOT): 55.9 ml  SI(LVOT): 33.6 ml/m2  TR max skinny: 229.8 cm/sec  TR max P.2 mmHg     AV Skinny Ratio (DI): 0.35  LUCY Index (cm2/m2): 0.62       Cardiac monitor 22:      ECG dated 22  SR 1st degree AVB and LBBB    Echocardiogram dated  22:    Interpretation Summary  Normal biventricular function.  Post 26 mm Arndt Janis Ultra valve Aortic Valve placement. The valve is  well seated. There is trivial periprosthetic regurgitation. Mean gradient is 7  mmHg.  The man RA pressure is normal.  No pericardial effusion is present.    NYHA Class: II    Coronary angiogram 2020:    Physicians    Panel Physicians Referring Physician Case Authorizing Physician   Gianluca Toscano MD (Primary) Self, Dulce, Gianluca Junior MD Ibrahim, Abdisamad, MD (Fellow - Assisting)     Jose Alfredo Matias MD (Fellow - Assisting)       Procedures    Panel 1    Primary Surgeon:  Gianluca Toscano MD   Procedure:  CV CORONARY ANGIOGRAM    CV RIGHT HEART CATH    CV LEFT HEART CATH    Percutaneous Coronary Intervention Stent Drug Eluting        Indications    Severe aortic stenosis [I35.0 (ICD-10-CM)]   Other ill-defined heart diseases [I51.89 (ICD-10-CM)]     Comments/Patient Narrative    84 year old female with with aortic stenosis here for cors/RHC for TAVR work up.     Pre Procedure Diagnosis    severe aortic stenosis    Post Procedure  Diagnosis    s/p EDDIE on LAD x 3.      Conclusion         Dist LAD lesion is 70% stenosed.    Mid LAD-1 lesion is 80% stenosed.    Mid LAD-2 lesion is 60% stenosed.    Mid RCA lesion is 40% stenosed.    Prox RCA lesion is 40% stenosed.    Right sided filling pressures are normal.    Normal PA pressures.    Left sided filling pressures are normal.    Reduced cardiac output.     1. LAD - 3 EDDIE were placed in the mid to distal LAD (2.5 x 28 mm, 3.5 x 12 mm, and 3.0 x 8 mm)  2. RHC showed normal biventricular filling pressures. Normal PA pressure. Reduced cardiac output.            Plan      Follow bedrest per protocol    Continued medical management and lifestyle modifications for cardiovascular risk factor optimizations.    Arterial sheath removed from femoral artery with closure device.    Discharge today per protocol     Coronary Findings    Diagnostic  Dominance: Right  Left Anterior Descending   Mid LAD-1 lesion is 80% stenosed.   Mid LAD-2 lesion is 60% stenosed.   Dist LAD lesion is 70% stenosed.      Right Coronary Artery   The vessel is moderate in size.   Prox RCA lesion is 40% stenosed.   Mid RCA lesion is 40% stenosed.         Intervention     Mid LAD-1 lesion   Stent   The pre-interventional distal flow is decreased (FALGUNI 2). A STENT SYNERGY DRUG ELUTING 2.10S29XU [id number] drug eluting stent was successfully placed. No pre-stent angioplasty was performed. The post-interventional distal flow is normal (FALGUNI 3). The intervention was successful. No complications occurred at this lesion.   There is a 0% residual stenosis post intervention.      Mid LAD-2 lesion   Stent   The pre-interventional distal flow is decreased (FALGUNI 2). A STENT SYNERGY DRUG ELUTING 3.85E85YB [id number] drug eluting stent was successfully placed. No pre-stent angioplasty was performed. The strut is apposed. No post-stent angioplasty was performed. The post-interventional distal flow is normal (FALGUNI 3). The intervention was  successful. No complications occurred at this lesion.   There is a 0% residual stenosis post intervention.      Dist LAD lesion   Stent   The pre-interventional distal flow is decreased (FALGUNI 2). A STENT SYNERGY DRUG ELUTING 3.77D22TG [id number] drug eluting stent was successfully placed. No pre-stent angioplasty was performed. The strut is apposed. No post-stent angioplasty was performed. The post-interventional distal flow is normal (FALGUNI 3). The intervention was successful. No complications occurred at this lesion.   There is a 0% residual stenosis post intervention.           Hemodynamics    Right sided filling pressures are normal.Left sided filling pressures are normal. Normal PA pressures.Reduced cardiac output level.            CLINICAL IMPRESSION:     Kamryn Miller is a very pleasant 87 year old female who presents for coronary angiogram follow-up. She has a history of HFpEF, HTN, HLD, significant CAD history dx at age 61 w/PTCA 1996, LAD and RCA stenting 2003, s/p LAD PCI 2020 and severe aortic valvular stenosis that was treated with a transfemoral transcatheter aortic valve replacement (TAVR) with a 26mm Arndt Janis Ultra on 9/12/22 by Morro Burrows. She was noted to have 60% left femoral stenosis post procedure, but had adequate flow on post procedure peripheral angiogram. She was started on dual antiplatelet therapy with plans to assess symptoms of PAD as outpatient. She was noted to have new LBBB post procedure and discharged on a Cardionet. Her post TAVR echo showed mean PG of 7 mmHg with trace PVL. She was last evaluated 1 month post TAVR and reported worsening exertional dyspnea, fatigue and recurrent angina (dull ache between shoulder blades). She underwent echo which showed normal TAVR function. Was referred for coronary angiogram which showed LAD w/50% ISR that was dPR negative (0.91), otherwise mild to moderate nonobstructive disease. She was started on Imdur for antianginal therapy  and discharged home in stable condition.    Coronary artery disease w/stable angina:  - Continue ASA 81 mg daily lifelong  - Continue high intensity statin  - Increase Imdur to 60 mg daily  - Continue exercise and heart healthy diet    Aortic stenosis s/p TAVR:  HFpEF:   - ECHO with slightly elevated mean PG 13, though WNL  - Continue ASA as above  - SBE prophylaxis lifelong  - Repeat echo in 6 months   - Euvolemic, no diuretics needed    PAD:   - denies claudication, normal pulse exam on left  - recent BALDO's mildly abnormal on left  - Continue ASA 81 mg lifelong  - Continue Brilinta 90 mg BID x 6 months  - Continue rehab  - vascular consult should she develop recurrent claudication    HTN:  - Well controlled though patient reports side effects from hydralazine  - Decrease hydralazine to 12.5 mg TID, increase Imdur as above  - Continue Coreg 12.5 mg BID   - Clonidine 0.1 mg as needed for SBP > 180    HLD:  - Continue statin therapy.     RTC: 6 months with Dr. Zuniga, 1 year with [facility]     JESSICA Rousseau, CNP  [facility] Cardiology Team      CC  Patient Care Team:  Rolanda Wilcox MD as PCP - General (Family Practice)  Rolanda Wilcox MD as Assigned PCP  Pati Coyle, NP as Assigned Heart and Vascular Provider  Javier Landa MD as Assigned OBGYN Provider  Ania Johnson MD as Assigned Nephrology Provider  Dimas Palacios, PhD as Assigned Behavioral Health Provider  PATI COYLE

## 2022-10-31 ENCOUNTER — OFFICE VISIT (OUTPATIENT)
Dept: CARDIOLOGY | Facility: CLINIC | Age: 87
End: 2022-10-31
Attending: NURSE PRACTITIONER
Payer: MEDICARE

## 2022-10-31 ENCOUNTER — LAB (OUTPATIENT)
Dept: LAB | Facility: CLINIC | Age: 87
End: 2022-10-31
Payer: MEDICARE

## 2022-10-31 VITALS
DIASTOLIC BLOOD PRESSURE: 83 MMHG | OXYGEN SATURATION: 97 % | SYSTOLIC BLOOD PRESSURE: 174 MMHG | HEART RATE: 65 BPM | BODY MASS INDEX: 28.4 KG/M2 | WEIGHT: 150.3 LBS

## 2022-10-31 DIAGNOSIS — I25.118 CORONARY ARTERY DISEASE OF NATIVE ARTERY OF NATIVE HEART WITH STABLE ANGINA PECTORIS (H): ICD-10-CM

## 2022-10-31 DIAGNOSIS — I50.30 NYHA CLASS 3 HEART FAILURE WITH PRESERVED EJECTION FRACTION (H): ICD-10-CM

## 2022-10-31 DIAGNOSIS — I10 ESSENTIAL HYPERTENSION: ICD-10-CM

## 2022-10-31 DIAGNOSIS — Z98.890 S/P CORONARY ANGIOGRAM: ICD-10-CM

## 2022-10-31 DIAGNOSIS — I10 BENIGN ESSENTIAL HYPERTENSION: ICD-10-CM

## 2022-10-31 DIAGNOSIS — Z95.2 S/P TAVR (TRANSCATHETER AORTIC VALVE REPLACEMENT): Primary | ICD-10-CM

## 2022-10-31 LAB
ANION GAP SERPL CALCULATED.3IONS-SCNC: 10 MMOL/L (ref 7–15)
BUN SERPL-MCNC: 33.3 MG/DL (ref 8–23)
CALCIUM SERPL-MCNC: 9.6 MG/DL (ref 8.8–10.2)
CHLORIDE SERPL-SCNC: 106 MMOL/L (ref 98–107)
CREAT SERPL-MCNC: 1.46 MG/DL (ref 0.51–0.95)
DEPRECATED HCO3 PLAS-SCNC: 25 MMOL/L (ref 22–29)
ERYTHROCYTE [DISTWIDTH] IN BLOOD BY AUTOMATED COUNT: 17.3 % (ref 10–15)
GFR SERPL CREATININE-BSD FRML MDRD: 35 ML/MIN/1.73M2
GLUCOSE SERPL-MCNC: 101 MG/DL (ref 70–99)
HCT VFR BLD AUTO: 26.9 % (ref 35–47)
HGB BLD-MCNC: 8.1 G/DL (ref 11.7–15.7)
MCH RBC QN AUTO: 25.6 PG (ref 26.5–33)
MCHC RBC AUTO-ENTMCNC: 30.1 G/DL (ref 31.5–36.5)
MCV RBC AUTO: 85 FL (ref 78–100)
PLATELET # BLD AUTO: 212 10E3/UL (ref 150–450)
POTASSIUM SERPL-SCNC: 4.1 MMOL/L (ref 3.4–5.3)
RBC # BLD AUTO: 3.16 10E6/UL (ref 3.8–5.2)
SODIUM SERPL-SCNC: 141 MMOL/L (ref 136–145)
WBC # BLD AUTO: 6.7 10E3/UL (ref 4–11)

## 2022-10-31 PROCEDURE — G0463 HOSPITAL OUTPT CLINIC VISIT: HCPCS

## 2022-10-31 PROCEDURE — 36415 COLL VENOUS BLD VENIPUNCTURE: CPT | Performed by: PATHOLOGY

## 2022-10-31 PROCEDURE — 99214 OFFICE O/P EST MOD 30 MIN: CPT | Performed by: NURSE PRACTITIONER

## 2022-10-31 RX ORDER — ISOSORBIDE MONONITRATE 60 MG/1
60 TABLET, EXTENDED RELEASE ORAL DAILY
Qty: 90 TABLET | Refills: 1 | Status: SHIPPED | OUTPATIENT
Start: 2022-10-31 | End: 2022-11-15

## 2022-10-31 RX ORDER — HYDRALAZINE HYDROCHLORIDE 25 MG/1
12.5 TABLET, FILM COATED ORAL 3 TIMES DAILY
Qty: 270 TABLET | Refills: 1 | Status: SHIPPED | OUTPATIENT
Start: 2022-10-31 | End: 2022-11-16

## 2022-10-31 ASSESSMENT — PAIN SCALES - GENERAL: PAINLEVEL: NO PAIN (0)

## 2022-10-31 NOTE — LETTER
10/31/2022      RE: Kamryn Miller  [address]  [address]  [place] [address]       Dear Colleague,    Thank you for the opportunity to participate in the care of your patient, Kamryn Miller, at the [facility]ART [facility] [place] at [facility]. Please see a copy of my visit note below.        [facility] HEART CARE  CARDIOVASCULAR DIVISION    VALVE CLINIC RETURN VISIT    PRIMARY CARDIOLOGIST: Dr. Zuniga      PERTINENT CLINICAL HISTORY:     Kamryn Miller is a very pleasant 87 year old female who presents for coronary angiogram follow-up. She has a history of HFpEF, HTN, HLD, significant CAD history dx at age 61 w/PTCA 1996, LAD and RCA stenting 2003, s/p LAD PCI 2020 and severe aortic valvular stenosis that was treated with a transfemoral transcatheter aortic valve replacement (TAVR) with a 26mm Arndt Janis Ultra on 9/12/22 by Morro Burrows. She was noted to have 60% left femoral stenosis post procedure, but had adequate flow on post procedure peripheral angiogram. She was started on dual antiplatelet therapy with plans to assess symptoms of PAD as outpatient. She was noted to have new LBBB post procedure and discharged on a Cardionet. Her post TAVR echo showed mean PG of 7 mmHg with trace PVL. She was last evaluated 1 month post TAVR and reported worsening exertional dyspnea, fatigue and recurrent angina (dull ache between shoulder blades). She underwent echo which showed normal TAVR function. Was referred for coronary angiogram which showed LAD w/50% ISR that was dPR negative (0.91), otherwise mild to moderate nonobstructive disease. She was started on Imdur for antianginal therapy and discharged home in stable condition.    Since her recent admission she has been feeling alright. She has been have headaches and feeling like her ears being plugged - she initially thought it was related to amlodipine, but it has persisted even with switching  amlodipine to hydralazine. Headaches occur about an after taking hydralazine, persist until she is ready to take her next dose. She continues to have waxing and waning pain between her should blades which is very similar to her prior angina. Happens a few times a week, thinks it has gotten better since starting Imdur. She continues to experience worsening shortness of breath, though no orthopnea, PND, leg swelling. Says her leg swelling got better post TAVR. She continues to experience fatigue. She denies lightheadedness, palpitations, syncope. She denies any claudication, this has resolved. Home blood pressure are 120's-130's systolic.      PAST MEDICAL HISTORY:     Past Medical History:   Diagnosis Date     Aortic valvar stenosis 7/2010    mild     Asthma      Bipolar disorder (H)      CAD (coronary artery disease)     s/p angioplasty     Celiac disease      Colon polyps      Colon polyps 2012    every 3 year colonoscopy      Depression      High cholesterol      HTN      Mitral insufficiency      Pulmonary HTN (H)     mild     Tremors 7/10    drug induced from antidepressants     Tricuspid insufficiency         PAST SURGICAL HISTORY:     Past Surgical History:   Procedure Laterality Date     ANGIOGRAM  6/26/2009     ANGIOPLASTY  9/96    for angina     ANGIOPLASTY  8/03 - 9/03    X -2 - with stenst in coronary car.     APPENDECTOMY       BREAST BIOPSY, RT/LT      Breat Biopsy RT/LT, benign     BUNIONECTOMY  11/8/2011    Procedure:BUNIONECTOMY; Left donna bunionectomy; Surgeon:FREDY LITTLEJOHN; Location:[facility] OR     BUNIONECTOMY RT/LT  5/2007    right bunion and right 2nd hammertoe     CATARACT IOL, RT/LT  6/12 and 7/12    bilateral     COLONOSCOPY  2007, 2012    every 3 years for polyps     CV CORONARY ANGIOGRAM N/A 8/21/2020    Procedure: CV CORONARY ANGIOGRAM;  Surgeon: Gianluca Toscano MD;  Location:  HEART CARDIAC CATH LAB     CV CORONARY ANGIOGRAM N/A 10/25/2022    Procedure: Coronary Angiogram;   Surgeon: Carter Douglass MD;  Location:  HEART CARDIAC CATH LAB     CV INSTANTANEOUS WAVE-FREE RATIO N/A 10/25/2022    Procedure: Instantaneous Wave-Free Ratio;  Surgeon: Carter Douglass MD;  Location:  HEART CARDIAC CATH LAB     CV LEFT HEART CATH N/A 8/21/2020    Procedure: CV LEFT HEART CATH;  Surgeon: Gianluca Toscano MD;  Location:  HEART CARDIAC CATH LAB     CV LEFT HEART CATH N/A 11/3/2020    Procedure: CV LEFT HEART CATH;  Surgeon: Tom Burrows MD;  Location:  HEART CARDIAC CATH LAB     CV LOWER EXTREMITY ANGIOGRAM BILATERAL N/A 11/3/2020    Procedure: CV ANGIOGRAM LOWER EXTREMITY BILATERAL;  Surgeon: Tom Burrows MD;  Location:  HEART CARDIAC CATH LAB     CV PCI STENT DRUG ELUTING N/A 8/21/2020    Procedure: Percutaneous Coronary Intervention Stent Drug Eluting;  Surgeon: Gianluca Toscano MD;  Location:  HEART CARDIAC CATH LAB     CV RIGHT HEART CATH MEASUREMENTS RECORDED N/A 8/21/2020    Procedure: CV RIGHT HEART CATH;  Surgeon: Gianluca Toscano MD;  Location:  HEART CARDIAC CATH LAB     CV RIGHT HEART CATH MEASUREMENTS RECORDED N/A 11/3/2020    Procedure: CV RIGHT HEART CATH;  Surgeon: Tom Burrows MD;  Location:  HEART CARDIAC CATH LAB     CV TRANSCATHETER AORTIC VALVE REPLACEMENT N/A 9/12/2022    Procedure: Transfemoral Transcatheter Aortic Valve Replacement with possible open heart bypass and or balloon pump placement and any indicated procedure;  Surgeon: Tom Burrows MD;  Location: [facility] CARDIAC CATH LAB     HEART CATH FEMORAL CANNULIZATION WITH OPEN STANDBY REPAIR AORTIC VALVE N/A 9/12/2022    Procedure: OR TRANSCATHETER AORTIC VALVE REPLACEMENT, OPEN FEMORAL ARTERY APPROACH;  Surgeon: Arthur Markham MD;  Location:  HEART CARDIAC CATH LAB     JOINT REPLACEMENT, HIP RT/LT  4/2008    right hip replaced     JOINT REPLACEMENT, HIP RT/LT  4/2009    left hip replaced     REPAIR HAMMER TOE  11/8/2011     Procedure:REPAIR HAMMER TOE; left 2nd hammertoe repair; Surgeon:FREDY LITTLEJOHN; Location:[facility] OR     SURGICAL HISTORY OF -   11/11    left bunion and 2nd hammertoe repair     TUBAL LIGATION       ZZC ANESTH,BLEPHAROPLASTY      (R) for drooping eyelid     ZZC APPENDECTOMY          CURRENT MEDICATIONS:     Current Outpatient Medications   Medication Sig Dispense Refill     albuterol (PROAIR HFA/PROVENTIL HFA/VENTOLIN HFA) 108 (90 Base) MCG/ACT inhaler Inhale 2 puffs into the lungs every 6 hours as needed for wheezing or shortness of breath / dyspnea 18 g 3     alendronate (FOSAMAX) 70 MG tablet TAKE 1 TABLET BY MOUTH ONCE WEEKLY 4 tablet 23     aspirin 81 MG tablet Take 81 mg by mouth daily        azelastine (ASTEPRO) 0.15 % nasal spray USE 1 SPRAY IN EACH NOSTRIL ONCE A DAY 30 mL 11     Calcium Citrate (CITRACAL OR) Take 1 tablet by mouth daily.       carvedilol (COREG) 6.25 MG tablet Take 2 tablets (12.5 mg) by mouth 2 times daily 540 tablet 3     cholecalciferol (VITAMIN D3) 1250 mcg (55503 units) capsule TAKE 1 CAPSULE BY MOUTH ONCE WEEKLY 4 capsule 11     cloNIDine (CATAPRES) 0.1 MG tablet Take 1 tablet (0.1 mg) by mouth daily as needed (For SBP > 180) 20 tablet 0     desvenlafaxine (PRISTIQ) 100 MG 24 hr tablet Take 100 mg by mouth daily       fluticasone-salmeterol (ADVAIR DISKUS) 500-50 MCG/ACT inhaler INHALE 1 PUFF BY MOUTH TWICE DAILY 60 each 0     hydrALAZINE (APRESOLINE) 25 MG tablet Take 1 tablet (25 mg) by mouth 3 times daily 270 tablet 1     isosorbide mononitrate (IMDUR) 30 MG 24 hr tablet Take 1 tablet (30 mg) by mouth daily 90 tablet 1     lamoTRIgine (LAMICTAL) 25 MG tablet Take 25 mg by mouth daily with 200 mg tablet for a total dose of 225 mg       LAMOTRIGINE 200 MG PO TABS Take 200 mg by mouth daily with 25 mg tablet for a total dose of 225 mg       levothyroxine (SYNTHROID/LEVOTHROID) 50 MCG tablet TAKE 1 TABLET BY MOUTH ONCE DAILY 28 tablet 11     liothyronine (CYTOMEL) 5 MCG tablet Take 2  tablets (10 mcg) by mouth daily 30 tablet 3     memantine (NAMENDA) 10 MG tablet Take 1 tablet (10 mg) by mouth daily 30 tablet 3     mirtazapine (REMERON) 7.5 MG tablet Take 1 tablet (7.5 mg) by mouth At Bedtime 30 tablet 3     Multiple Vitamin (TAB-A-JENNIFER) TABS TAKE 1 TABLET BY MOUTH ONCE DAILY 30 tablet PRN     rosuvastatin (CRESTOR) 40 MG tablet TAKE 1 TABLET BY MOUTH ONCE DAILY 28 tablet 11     ticagrelor (BRILINTA) 90 MG tablet Take 1 tablet (90 mg) by mouth 2 times daily 180 tablet 0     COMPRESSION STOCKINGS 1 each daily (Patient not taking: Reported on 10/24/2022) 1 each 1        ALLERGIES:     Allergies   Allergen Reactions     Ace Inhibitors Cough     Diclofenac Other (See Comments)     Balance problems     Gluten Meal Diarrhea        FAMILY HISTORY:     Family History   Problem Relation Age of Onset     Asthma Mother      Cerebrovascular Disease Mother      Arthritis Mother      Depression Mother      Lipids Sister      Hypertension Sister      Heart Disease Sister      Lipids Sister      Hypertension Sister      Lipids Sister      Breast Cancer Sister         SOCIAL HISTORY:     Social History     Socioeconomic History     Marital status:      Spouse name: Adrien     Number of children: 2     Years of education: 16     Highest education level: None   Occupational History     Employer: RETIRED     Comment: Retired in 2002.    Tobacco Use     Smoking status: Never Smoker     Smokeless tobacco: Never Used   Substance and Sexual Activity     Alcohol use: No     Alcohol/week: 0.0 standard drinks     Types: 1 Standard drinks or equivalent per week     Drug use: No     Sexual activity: Not Currently     Partners: Male        REVIEW OF SYSTEMS:     Constitutional: No fevers or chills  Skin: No new rash or itching  Eyes: No acute change in vision  Ears/Nose/Throat: No purulent rhinorrhea, new hearing loss, or new vertigo  Respiratory: No cough or hemoptysis  Cardiovascular: See HPI  Gastrointestinal: No  change in appetite, vomiting, hematemesis or diarrhea  Genitourinary: No dysuria or hematuria  Musculoskeletal: No new back pain, neck pain or muscle pain  Neurologic: No new headaches, focal weakness or behavior changes  Psychiatric: No hallucinations, excessive alcohol consumption or illegal drug usage  Hematologic/Lymphatic/Immunologic: No bleeding, chills, fever, night sweats or weight loss  Endocrine: No new cold intolerance, heat intolerance, polyphagia, polydipsia or polyuria      PHYSICAL EXAMINATION:     BP (!) 174/83 (BP Location: Right arm, Patient Position: Chair, Cuff Size: Adult Regular)   Pulse 65   Wt 68.2 kg (150 lb 4.8 oz)   SpO2 97%   BMI 28.40 kg/m      GENERAL: No acute distress.  HEENT: EOMI. Sclerae white, not injected. Nares clear. Pharynx without erythema or exudate.   Neck: No adenopathy. No thyromegaly. No jugular venous distension.   Heart: Regular rate and rhythm. No murmur.   Lungs: Clear to auscultation. No ronchi, wheezes, rales.   Abdomen: Soft, nontender, nondistended. Bowel sounds present.  Extremities: No clubbing, cyanosis, or edema.   Neurologic: Alert and oriented to person/place/time, normal speech and affect. No focal deficits.  Skin: No petechiae, purpura or rash.     LABORATORY DATA:     LIPID RESULTS:  Lab Results   Component Value Date    CHOL 155 10/24/2022    CHOL 175 10/23/2020    HDL 54 10/24/2022    HDL 71 10/23/2020    LDL 76 10/24/2022    LDL 83 10/23/2020    TRIG 124 10/24/2022    TRIG 103 10/23/2020    CHOLHDLRATIO 2.8 09/18/2015       LIVER ENZYME RESULTS:  Lab Results   Component Value Date    AST 30 10/24/2022    AST 27 05/17/2021    ALT 19 10/24/2022    ALT 38 05/17/2021       CBC RESULTS:  Lab Results   Component Value Date    WBC 5.4 10/26/2022    WBC 4.6 05/17/2021    RBC 3.34 (L) 10/26/2022    RBC 3.97 05/17/2021    HGB 8.6 (L) 10/26/2022    HGB 12.9 05/17/2021    HCT 28.8 (L) 10/26/2022    HCT 40.3 05/17/2021    MCV 86 10/26/2022     (H)  05/17/2021    MCH 25.7 (L) 10/26/2022    MCH 32.5 05/17/2021    MCHC 29.9 (L) 10/26/2022    MCHC 32.0 05/17/2021    RDW 17.2 (H) 10/26/2022    RDW 12.7 05/17/2021     10/26/2022     05/17/2021       BMP RESULTS:  Lab Results   Component Value Date     10/31/2022     06/04/2021    POTASSIUM 4.1 10/31/2022    POTASSIUM 4.6 10/05/2022    POTASSIUM 3.4 06/04/2021    CHLORIDE 106 10/31/2022    CHLORIDE 103 10/05/2022    CHLORIDE 106 06/04/2021    CO2 25 10/31/2022    CO2 27 10/05/2022    CO2 30 06/04/2021    ANIONGAP 10 10/31/2022    ANIONGAP 7 10/05/2022    ANIONGAP 6 06/04/2021     (H) 10/31/2022    GLC 98 10/05/2022     (H) 06/04/2021    BUN 33.3 (H) 10/31/2022    BUN 39 (H) 10/05/2022    BUN 27 06/04/2021    CR 1.46 (H) 10/31/2022    CR 1.50 (H) 06/04/2021    GFRESTIMATED 35 (L) 10/31/2022    GFRESTIMATED 32 (L) 06/04/2021    GFRESTBLACK 37 (L) 06/04/2021    PRINCE 9.6 10/31/2022    PRINCE 9.4 06/04/2021        A1C RESULTS:  Lab Results   Component Value Date    A1C 5.0 06/30/2009       INR RESULTS:  Lab Results   Component Value Date    INR 1.13 10/24/2022    INR 1.09 09/22/2022    INR 1.05 11/03/2020    INR 2.28 (H) 04/23/2009          PROCEDURES & FURTHER ASSESSMENTS:   Coronary angiogram 10/25/22    Two vessel CAD with prior PCI to the RCA and LAD, otherwise mild non obstructive CAD elsewhere.  Patent stents in the RCA and LAD with mild to moderate in stent restenosis of the proximal LAD stents (progressed since last angiogram from 2020).  Proximal LAD ISR hemodynamically not significant by dPR at 0.91.      Plan      Follow bedrest per protocol    Continued medical management and lifestyle modifications for cardiovascular risk factor optimizations.    Follow up visit with Nurse Practitioner in 1-2 weeks.    Arterial sheath removed from radial artery with TR band placement.    Cardiac rehabilitation.    Return to the primary inpatient team for further evaluation and managmenet      Coronary Findings    Diagnostic  Dominance: Right  Left Main   The vessel is moderate in size and is angiographically normal.      Left Anterior Descending   The vessel is moderate in size.   Ost LAD to Prox LAD lesion is 50% stenosed. Measured dPr. Pre adenosine administration dPr: 0.91.   Previously placed Mid LAD-1 drug eluting stent is widely patent.   Previously placed Mid LAD-2 drug eluting stent is widely patent.   0% stenosed Dist LAD lesion.      First Diagonal Branch   The vessel is small.   1st Diag lesion is 50% stenosed.      Second Diagonal Branch   The vessel is moderate in size. The vessel exhibits minimal luminal irregularities.      Left Circumflex   The vessel is moderate in size.      First Obtuse Marginal Branch   The vessel is moderate in size.   1st Mrg lesion is 30% stenosed.      Second Obtuse Marginal Branch   The vessel is moderate in size and is angiographically normal.      Third Obtuse Marginal Branch   The vessel is moderate in size and is angiographically normal.      Right Coronary Artery   The vessel is moderate in size.   Prox RCA lesion is 40% stenosed.   Mid RCA lesion is 20% stenosed. The lesion was previously treated using a stent of unknown type.      Right Posterior Descending Artery   The vessel is moderate in size. The vessel exhibits minimal luminal irregularities.      Right Posterior Atrioventricular Artery   The vessel is moderate in size. The vessel exhibits minimal luminal irregularities.      First Right Posterolateral Branch   The vessel is moderate in size. The vessel exhibits minimal luminal irregularities.                   EKG 10/24/22:  Personally reviewed  NSR with LBBB unchanged    ECHO 10/24/22:  Interpretation Summary  Status post 26 mm Arndt Janis Ultra valve TAVR.  Global and regional left ventricular function is normal with an EF of 55-60%.  Global right ventricular function is normal. The right ventricle is normal  size.  The TAVR is well-seated.  Leaflet opening is not clearly visualized. There is  no valvular regurgitation but there is trace paravalvular regurgitation. The  Vmax is 2.6 m/s, the mean gradient is 13 mmHg, the dimensionless index is  0.34, and the acceleration time is 110 ms.  IVC diameter and respiratory changes fall into an intermediate range  suggesting an RA pressure of 8 mmHg.  This study was compared with the study from 09/13/2022. No significant changes  noted. The TAVR gradient has increased modestly, as is to be expected.  ______________________________________________________________________________  Left Ventricle  Global and regional left ventricular function is normal with an EF of 55-60%.  Left ventricular wall thickness is normal. Left ventricular size is normal.  Left ventricular diastolic function is not assessable.     Right Ventricle  Global right ventricular function is normal. The right ventricle is normal  size.     Atria  Both atria appear normal. Moderate left atrial enlargement is present.     Mitral Valve  Mild mitral annular calcification is present. Mild mitral insufficiency is  present.     Aortic Valve  The TAVR is well-seated. Leaflet opening is not clearly visualized. There is  no valvular regurgitation but there is trace paravalvular regurgitation. The  Vmax is 2.6 m/s, the mean gradient is 13 mmHg, the dimensionless index is  0.34, and the acceleration time is 110 ms.     Tricuspid Valve  The tricuspid valve is normal. Trace tricuspid insufficiency is present. The  right ventricular systolic pressure is approximated at 21.2 mmHg plus the  right atrial pressure.     Pulmonic Valve  The pulmonic valve is normal. Trace pulmonic insufficiency is present.     Vessels  The aorta root cannot be assessed. Ascending aorta 3.2 cm. IVC diameter and  respiratory changes fall into an intermediate range suggesting an RA pressure  of 8 mmHg.     Pericardium  No pericardial effusion is present.     Compared to Previous  Study  This study was compared with the study from 2022 . No significant  changes noted. The TAVR gradient has increased modestly, as is to be expected.  ______________________________________________________________________________  MMode/2D Measurements & Calculations     IVSd: 1.1 cm  LVIDd: 4.3 cm  LVPWd: 0.89 cm  LV mass(C)d: 139.1 grams  LV mass(C)dI: 83.4 grams/m2  asc Aorta Diam: 3.2 cm  LVOT diam: 1.9 cm  LVOT area: 2.9 cm2  RWT: 0.42     Doppler Measurements & Calculations  MV E max skinny: 171.0 cm/sec  MV A max skinny: 145.4 cm/sec  MV E/A: 1.2  MV max PG: 10.4 mmHg  MV mean P.3 mmHg  MV V2 VTI: 54.1 cm  MVA(VTI): 1.0 cm2  MV dec slope: 856.6 cm/sec2  MV dec time: 0.20 sec  Ao V2 max: 254.2 cm/sec  Ao max P.0 mmHg  Ao V2 mean: 171.2 cm/sec  Ao mean P.7 mmHg  Ao V2 VTI: 53.9 cm  LUCY(I,D): 1.0 cm2  LUCY(V,D): 0.99 cm2  AI P1/2t: 688.4 msec  LV V1 max PG: 3.1 mmHg  LV V1 max: 88.0 cm/sec  LV V1 VTI: 19.5 cm  SV(LVOT): 55.9 ml  SI(LVOT): 33.6 ml/m2  TR max skinny: 229.8 cm/sec  TR max P.2 mmHg     AV Skinny Ratio (DI): 0.35  LUCY Index (cm2/m2): 0.62       Cardiac monitor 22:      ECG dated 22  SR 1st degree AVB and LBBB    Echocardiogram dated  22:    Interpretation Summary  Normal biventricular function.  Post 26 mm Arndt Janis Ultra valve Aortic Valve placement. The valve is  well seated. There is trivial periprosthetic regurgitation. Mean gradient is 7  mmHg.  The man RA pressure is normal.  No pericardial effusion is present.    NYHA Class: II    Coronary angiogram 2020:    Physicians    Panel Physicians Referring Physician Case Authorizing Physician   Gianluca Toscano MD (Primary) Self, Referred, Gianluca Juinor MD Ibrahim, Abdisamad, MD (Fellow - Assisting)     Jose Alfredo Matias MD (Fellow - Assisting)       Procedures    Panel 1    Primary Surgeon:  Gianluca Toscano MD   Procedure:  CV CORONARY ANGIOGRAM    CV RIGHT HEART CATH    CV LEFT HEART CATH     Percutaneous Coronary Intervention Stent Drug Eluting        Indications    Severe aortic stenosis [I35.0 (ICD-10-CM)]   Other ill-defined heart diseases [I51.89 (ICD-10-CM)]     Comments/Patient Narrative    84 year old female with with aortic stenosis here for cors/RHC for TAVR work up.     Pre Procedure Diagnosis    severe aortic stenosis    Post Procedure Diagnosis    s/p EDDIE on LAD x 3.      Conclusion         Dist LAD lesion is 70% stenosed.    Mid LAD-1 lesion is 80% stenosed.    Mid LAD-2 lesion is 60% stenosed.    Mid RCA lesion is 40% stenosed.    Prox RCA lesion is 40% stenosed.    Right sided filling pressures are normal.    Normal PA pressures.    Left sided filling pressures are normal.    Reduced cardiac output.     1. LAD - 3 EDDIE were placed in the mid to distal LAD (2.5 x 28 mm, 3.5 x 12 mm, and 3.0 x 8 mm)  2. RHC showed normal biventricular filling pressures. Normal PA pressure. Reduced cardiac output.            Plan      Follow bedrest per protocol    Continued medical management and lifestyle modifications for cardiovascular risk factor optimizations.    Arterial sheath removed from femoral artery with closure device.    Discharge today per protocol     Coronary Findings    Diagnostic  Dominance: Right  Left Anterior Descending   Mid LAD-1 lesion is 80% stenosed.   Mid LAD-2 lesion is 60% stenosed.   Dist LAD lesion is 70% stenosed.      Right Coronary Artery   The vessel is moderate in size.   Prox RCA lesion is 40% stenosed.   Mid RCA lesion is 40% stenosed.         Intervention     Mid LAD-1 lesion   Stent   The pre-interventional distal flow is decreased (FALGUNI 2). A STENT SYNERGY DRUG ELUTING 2.70V81OY [id number] drug eluting stent was successfully placed. No pre-stent angioplasty was performed. The post-interventional distal flow is normal (FALGUNI 3). The intervention was successful. No complications occurred at this lesion.   There is a 0% residual stenosis post intervention.       Mid LAD-2 lesion   Stent   The pre-interventional distal flow is decreased (FALGUNI 2). A STENT SYNERGY DRUG ELUTING 3.77P61PH [id number] drug eluting stent was successfully placed. No pre-stent angioplasty was performed. The strut is apposed. No post-stent angioplasty was performed. The post-interventional distal flow is normal (FALGUNI 3). The intervention was successful. No complications occurred at this lesion.   There is a 0% residual stenosis post intervention.      Dist LAD lesion   Stent   The pre-interventional distal flow is decreased (FALGUNI 2). A STENT SYNERGY DRUG ELUTING 3.08Z50JD [id number] drug eluting stent was successfully placed. No pre-stent angioplasty was performed. The strut is apposed. No post-stent angioplasty was performed. The post-interventional distal flow is normal (FALGUNI 3). The intervention was successful. No complications occurred at this lesion.   There is a 0% residual stenosis post intervention.           Hemodynamics    Right sided filling pressures are normal.Left sided filling pressures are normal. Normal PA pressures.Reduced cardiac output level.            CLINICAL IMPRESSION:     Kamryn Miller is a very pleasant 87 year old female who presents for coronary angiogram follow-up. She has a history of HFpEF, HTN, HLD, significant CAD history dx at age 61 w/PTCA 1996, LAD and RCA stenting 2003, s/p LAD PCI 2020 and severe aortic valvular stenosis that was treated with a transfemoral transcatheter aortic valve replacement (TAVR) with a 26mm Arndt Janis Ultra on 9/12/22 by Morro Burrows. She was noted to have 60% left femoral stenosis post procedure, but had adequate flow on post procedure peripheral angiogram. She was started on dual antiplatelet therapy with plans to assess symptoms of PAD as outpatient. She was noted to have new LBBB post procedure and discharged on a Cardionet. Her post TAVR echo showed mean PG of 7 mmHg with trace PVL. She was last evaluated 1 month  post TAVR and reported worsening exertional dyspnea, fatigue and recurrent angina (dull ache between shoulder blades). She underwent echo which showed normal TAVR function. Was referred for coronary angiogram which showed LAD w/50% ISR that was dPR negative (0.91), otherwise mild to moderate nonobstructive disease. She was started on Imdur for antianginal therapy and discharged home in stable condition.    Coronary artery disease w/stable angina:  - Continue ASA 81 mg daily lifelong  - Continue high intensity statin  - Increase Imdur to 60 mg daily  - Continue exercise and heart healthy diet    Aortic stenosis s/p TAVR:  HFpEF:   - ECHO with slightly elevated mean PG 13, though WNL  - Continue ASA as above  - SBE prophylaxis lifelong  - Repeat echo in 6 months   - Euvolemic, no diuretics needed    PAD:   - denies claudication, normal pulse exam on left  - recent BALDO's mildly abnormal on left  - Continue ASA 81 mg lifelong  - Continue Brilinta 90 mg BID x 6 months  - Continue rehab  - vascular consult should she develop recurrent claudication    HTN:  - Well controlled though patient reports side effects from hydralazine  - Decrease hydralazine to 12.5 mg TID, increase Imdur as above  - Continue Coreg 12.5 mg BID   - Clonidine 0.1 mg as needed for SBP > 180    HLD:  - Continue statin therapy.     RTC: 6 months with Dr. Zuniga, 1 year with TAVR clinic     JESSICA Rousseau CNP  [facility] Cardiology Team      CC  Patient Care Team:  Rolanda Wilcox MD as PCP - General (Family Practice)  Pati De Santiago NP as Assigned Heart and Vascular Provider  Javier Landa MD as Assigned OBGYN Provider  Ania Johnson MD as Assigned Nephrology Provider  Dimas Palacios, PhD as Assigned Behavioral Health Provider

## 2022-10-31 NOTE — PATIENT INSTRUCTIONS
You were seen today in the Structural Heart Clinic at the [facility].    Cardiology provider you saw during your visit: Pati De Santiago NP    Instructions:   Reduce hydralazine to 12.5 mg three times daily, increase Imdur to 60 mg daily.   Continue aspirin 81 mg daily lifelong.  Continue Brilinta x 6 months for PAD.  Start cardiac rehab at [facility]   Delay any nonurgent dental procedures for the first 6 month post TAVR.  For all future dental cleanings and procedures you will need to take antibiotics prior - see instructions below.   Follow-up with your Primary Cardiologist in 6 months with echo prior   Follow-up in Valve Clinic in 1 year with echo, labs and ECG prior     Prevention of Infective (Bacterial) Endocarditis:  You are at increased risk for developing adverse outcomes from infective endocarditis (IE), also known as bacterial endocarditis (BE) because of the new device in your heart. The guidelines for prevention of IE are to give patients antibiotics prior to any dental procedures that involve manipulation of gingival tissue or the periapical region of teeth, or perforation of the oral mucosa:      It is recommended to take Amoxicillin 2 gm by mouth as a single dose 30 to 60 minutes before procedure.     OR if allergic to Penicillin or Ampicillin:     Cephalexin 2 gm by mouth, or  Clindamycin 600 mg by mouth, or  Azithromycin or Clarithromycin 500 mg PO       Questions and scheduling:   First call: Structural Heart  Polina Gerard [phone]    General scheduling line: [phone].   First press #1 for the University and then press #3 for Medical Questions to reach the Cardiology triage nurse.     On Call Cardiologist for after hours or on weekends: [phone], press option #4 and ask to speak to the on-call Cardiologist.

## 2022-10-31 NOTE — NURSING NOTE
Chief Complaint   Patient presents with     Follow Up     88 yo FM, here today for follow-up post-angiogram     Vitals were taken and medications reconciled.    Preston Durán, EMT  12:02 PM

## 2022-11-02 ENCOUNTER — TELEPHONE (OUTPATIENT)
Dept: CARDIOLOGY | Facility: CLINIC | Age: 87
End: 2022-11-02

## 2022-11-02 DIAGNOSIS — Z95.2 S/P TAVR (TRANSCATHETER AORTIC VALVE REPLACEMENT): ICD-10-CM

## 2022-11-02 DIAGNOSIS — Z98.890 S/P CORONARY ANGIOGRAM: ICD-10-CM

## 2022-11-02 DIAGNOSIS — I25.118 CORONARY ARTERY DISEASE OF NATIVE ARTERY OF NATIVE HEART WITH STABLE ANGINA PECTORIS (H): ICD-10-CM

## 2022-11-02 DIAGNOSIS — E78.2 MIXED HYPERLIPIDEMIA: ICD-10-CM

## 2022-11-02 DIAGNOSIS — I10 ESSENTIAL HYPERTENSION: ICD-10-CM

## 2022-11-02 DIAGNOSIS — I50.30 NYHA CLASS 3 HEART FAILURE WITH PRESERVED EJECTION FRACTION (H): Primary | ICD-10-CM

## 2022-11-02 RX ORDER — ISOSORBIDE MONONITRATE 30 MG/1
30 TABLET, EXTENDED RELEASE ORAL DAILY
Qty: 90 TABLET | Refills: 1 | Status: SHIPPED | OUTPATIENT
Start: 2022-11-02 | End: 2022-11-16

## 2022-11-02 NOTE — TELEPHONE ENCOUNTER
Spoke to Artie who reports that Kamryn's BP has been running low and today she felt dizzy with an initial blood pressure of 80/50 then when it was retaken it was 84/54 but she felt better and said her dizziness was gone.    Date: 11/2/2022    Time of Call: 12:01 PM     Diagnosis:  hypotension     [ VORB ] Ordering provider: Pati Coyle NP  Order: decrease imdur to 30 mg daily     Order received by: Michelle Shook RN       Follow-up/additional notes: order givenn to Artie.  Advised artie to continue to monitor BP and symptoms and report if symptoms worsen or no improvement.  Artie verbalized understanding.    rx sent to preferred pharmacy.    Signed Prescriptions:                        Disp   Refills    isosorbide mononitrate (IMDUR) 30 MG 24 hr*90 tab*1        Sig: Take 1 tablet (30 mg) by mouth daily  Authorizing Provider: PATI COYLE  Ordering User: MICHELLE SHOOK, RN  Cardiology Care Coordinator  [facility] Heart [facility]  [phone] option 1                  Pati Coyle NP Mortensen, Nicole L, RN 22 minutes ago (11:31 AM)     BF  Adair Cuellar:     Would you be able to help me out with this patient? Could you reach out to facility and have patient reduce Imdur to 30 mg daily? I recently increased Imdur to 60 mg daily after decreasing hydralazine to 12.5 mg TID (from 25 mg TID) due to headaches from the hydralazine.     Let me know if you were able to connect with the patient and facility.     Thanks so much!     Pati Krueger:    Adair Cuellar:

## 2022-11-02 NOTE — TELEPHONE ENCOUNTER
[facility] Call Center    Phone Message    May a detailed message be left on voicemail: yes     Reason for Call: Other: Sumaya with Assisted living called in and stated that there was a change to Kamryn's Blood pressure medication and since yesterday her BP is 80/50 and she is feeling dizzy and woozy.  Please reach out.       Action Taken: Other: Cardio    Travel Screening: Not Applicable

## 2022-11-02 NOTE — TELEPHONE ENCOUNTER
Will route it to [facility] to address.  Patient was seen on 10/31 by Pati De Santiago NP and BP med changes were made.  Please contact caller to discuss.    Alisa Shook RN  Cardiology Care Coordinator  [facility]  [phone] option 1

## 2022-11-09 DIAGNOSIS — Z95.2 S/P TAVR (TRANSCATHETER AORTIC VALVE REPLACEMENT): Primary | ICD-10-CM

## 2022-11-15 ENCOUNTER — TELEPHONE (OUTPATIENT)
Dept: CARDIOLOGY | Facility: CLINIC | Age: 87
End: 2022-11-15

## 2022-11-15 DIAGNOSIS — Z98.890 S/P CORONARY ANGIOGRAM: ICD-10-CM

## 2022-11-15 DIAGNOSIS — I25.118 CORONARY ARTERY DISEASE OF NATIVE ARTERY OF NATIVE HEART WITH STABLE ANGINA PECTORIS (H): ICD-10-CM

## 2022-11-15 NOTE — TELEPHONE ENCOUNTER
Spoke to patient who reports that she is aware that there has been multiple changes in her blood pressure medication but she is reporting that she is taking 12.5 mg of hydralazine three times a day and she notices that right after taking the hydralazine she gets a headache, pressure in ears and her balance is off due to the pressure in her ears.  States she wakes up feeling great but once she takes her hydralazine she gets symptoms.  She is also taking imdur which was recently decreased from 60 mg daily to 30 mg daily due to low blood pressure 11/2/22 by Pati De Santiago NP.    Writer also contacted Kamryn's nurse to review medications and her current BP medications:  Carvedilol 12.5 mg BID  Imdur 30 mg daily  Hydralazine 12.5 mg TID    Her last 3 blood pressures:   11/15 126/74  11/14 124/72  11/13 104/68    Patient is requesting a trial off of hydralazine to see if her symptoms improve.  Patient is scheduled to see Dr. Zuniga on 12/2/22 in [facility].  Will route this to Pati to review and advise in the meantime.      Alisa Shook, RN  Cardiology Care Coordinator  [facility]  [phone] option 1

## 2022-11-15 NOTE — TELEPHONE ENCOUNTER
Health Call Center    Phone Message    May a detailed message be left on voicemail: yes     Reason for Call: Symptoms or Concerns     If patient has red-flag symptoms, warm transfer to triage line    Current symptom or concern: Pt states she has had medication changes a few times and she wanted to touch base with Dr. Zuniga that she is still experiencing headaches and pressure in her ears after taking this medication and she is wondering if instead of another decrease if there is an alternative medication or if she can just discontinue use of hydrALAZINE (APRESOLINE) 25 MG tablet.      Symptoms have been present for:  3 month(s)    Has patient previously been seen for this? Yes    By: Dr. Zuniga    Date: 10/07/22     Are there any new or worsening symptoms? No      Action Taken: Other: Cardiology    Travel Screening: Not Applicable     Thank you!  [facility]

## 2022-11-16 ENCOUNTER — TELEPHONE (OUTPATIENT)
Dept: CARDIOLOGY | Facility: CLINIC | Age: 87
End: 2022-11-16

## 2022-11-16 RX ORDER — ISOSORBIDE MONONITRATE 60 MG/1
60 TABLET, EXTENDED RELEASE ORAL DAILY
Qty: 90 TABLET | Refills: 1 | Status: ON HOLD | OUTPATIENT
Start: 2022-11-16 | End: 2022-12-06

## 2022-11-16 NOTE — TELEPHONE ENCOUNTER
Spoke to pharmacist and discussed patient's medication changes.  Pharmacist verbalized understanding.    Alisa Shook, RN  Cardiology Care Coordinator  [facility]  [phone] option 1

## 2022-11-16 NOTE — TELEPHONE ENCOUNTER
[facility] Call Center    Phone Message    May a detailed message be left on voicemail: yes     Reason for Call: Medication Question or concern regarding medication   Prescription Clarification  Name of Medication: isosorbide mononitrate (IMDUR) 60 MG 24 hr tablet  Prescribing Provider:  Sintia De Santiago     Pharmacy: [facility] [address]     What on the order needs clarification? Note attached to new Rx states to increase the dose to 60mgs; pharmacy shows the patient is currently on this dose.    Does the provider what the patient to stop Hydralazine?      Please send a clarification.  [facility] [address]    Action Taken: Other: Cardiology    Travel Screening: Not Applicable

## 2022-11-16 NOTE — TELEPHONE ENCOUNTER
Spoke with Katherine, nurse at assisted living and orders were given.  Also, attempted to contact patient, no answer, vm left advising patient that there are some medication changes and to talk to her assisted living nurse for the details but to call back with any questions.    Date: 11/16/2022    Time of Call: 10:15 AM     Diagnosis:  H/a, ears plugged     [ VORB ] Ordering provider: Pati De Santiago NP  Order: Trial STOP Hydralazine  Increase imdur to 60 mg daily   continue to monitor BP  Follow-up with Dr. Zuniga as scheduled in Dec    Order received by: Alisa Shook RN     Follow-up/additional notes: Rx sent to preferred pharmacy.  Med list updated.            Alisa Shook RN  Cardiology Care Coordinator  [facility]  [phone] option 1    Pati De Santiago NP  You; Fk Cardiology 3 hours ago (7:10 AM)     NEAL Cuellar:     I am fine trialing off of hydralazine; however, I do think we should increase Imdur back up to 60 mg daily for BP control.     Tell her to keep us updated. Thanks!

## 2022-11-17 DIAGNOSIS — N18.32 STAGE 3B CHRONIC KIDNEY DISEASE (H): Primary | ICD-10-CM

## 2022-11-18 RX ORDER — ASPIRIN 81 MG
TABLET,CHEWABLE ORAL
Qty: 28 TABLET | Refills: 11 | Status: ON HOLD | OUTPATIENT
Start: 2022-11-18 | End: 2022-12-03

## 2022-11-18 RX ORDER — IBUPROFEN 200 MG
CAPSULE ORAL
Qty: 28 TABLET | Refills: 11 | Status: SHIPPED | OUTPATIENT
Start: 2022-11-18 | End: 2023-10-16

## 2022-11-19 ENCOUNTER — HEALTH MAINTENANCE LETTER (OUTPATIENT)
Age: 87
End: 2022-11-19

## 2022-11-22 ENCOUNTER — RESULT FOLLOW UP (OUTPATIENT)
Dept: NEPHROLOGY | Facility: CLINIC | Age: 87
End: 2022-11-22

## 2022-11-22 ENCOUNTER — TELEPHONE (OUTPATIENT)
Dept: CARDIAC REHAB | Facility: CLINIC | Age: 87
End: 2022-11-22

## 2022-11-22 DIAGNOSIS — N18.32 ANEMIA DUE TO STAGE 3B CHRONIC KIDNEY DISEASE (H): Primary | ICD-10-CM

## 2022-11-22 DIAGNOSIS — D63.1 ANEMIA DUE TO STAGE 3B CHRONIC KIDNEY DISEASE (H): Primary | ICD-10-CM

## 2022-11-22 NOTE — TELEPHONE ENCOUNTER
Patient had called and Left Voicemail cancelling her rehab appointment today. Called pt back to discuss rescheduling and pt verbalized she would not like to start rehab at this time. She reports that she has continued to feel well and is performing group exercise in her living facility. Informed patient to discuss any future concern with her cardiology team and if any changes occur in her recovery from TAVR procedure she is able to attend rehab up to 1 year post surgery.    Milo Tee MS,RCEP,CCRP  Clinical Exercise Physiologist  [facility] Cardiac Rehab

## 2022-11-28 RX ORDER — HEPARIN SODIUM,PORCINE 10 UNIT/ML
5 VIAL (ML) INTRAVENOUS
Status: CANCELLED | OUTPATIENT
Start: 2022-11-30

## 2022-11-28 RX ORDER — ALBUTEROL SULFATE 90 UG/1
1-2 AEROSOL, METERED RESPIRATORY (INHALATION)
Status: CANCELLED
Start: 2022-11-30

## 2022-11-28 RX ORDER — EPINEPHRINE 1 MG/ML
0.3 INJECTION, SOLUTION, CONCENTRATE INTRAVENOUS EVERY 5 MIN PRN
Status: CANCELLED | OUTPATIENT
Start: 2022-11-30

## 2022-11-28 RX ORDER — HEPARIN SODIUM (PORCINE) LOCK FLUSH IV SOLN 100 UNIT/ML 100 UNIT/ML
5 SOLUTION INTRAVENOUS
Status: CANCELLED | OUTPATIENT
Start: 2022-11-30

## 2022-11-28 RX ORDER — DIPHENHYDRAMINE HYDROCHLORIDE 50 MG/ML
50 INJECTION INTRAMUSCULAR; INTRAVENOUS
Status: CANCELLED
Start: 2022-11-30

## 2022-11-28 RX ORDER — METHYLPREDNISOLONE SODIUM SUCCINATE 125 MG/2ML
125 INJECTION, POWDER, LYOPHILIZED, FOR SOLUTION INTRAMUSCULAR; INTRAVENOUS
Status: CANCELLED
Start: 2022-11-30

## 2022-11-28 RX ORDER — ALBUTEROL SULFATE 0.83 MG/ML
2.5 SOLUTION RESPIRATORY (INHALATION)
Status: CANCELLED | OUTPATIENT
Start: 2022-11-30

## 2022-11-30 ENCOUNTER — APPOINTMENT (OUTPATIENT)
Dept: CT IMAGING | Facility: CLINIC | Age: 87
End: 2022-11-30
Attending: PHYSICIAN ASSISTANT
Payer: MEDICARE

## 2022-11-30 ENCOUNTER — HOSPITAL ENCOUNTER (OUTPATIENT)
Facility: CLINIC | Age: 87
Setting detail: OBSERVATION
Discharge: HOME | End: 2022-12-07
Attending: EMERGENCY MEDICINE | Admitting: STUDENT IN AN ORGANIZED HEALTH CARE EDUCATION/TRAINING PROGRAM
Payer: MEDICARE

## 2022-11-30 ENCOUNTER — APPOINTMENT (OUTPATIENT)
Dept: GENERAL RADIOLOGY | Facility: CLINIC | Age: 87
End: 2022-11-30
Attending: PHYSICIAN ASSISTANT
Payer: MEDICARE

## 2022-11-30 DIAGNOSIS — S22.41XA CLOSED FRACTURE OF MULTIPLE RIBS OF RIGHT SIDE, INITIAL ENCOUNTER: Primary | ICD-10-CM

## 2022-11-30 DIAGNOSIS — D64.9 ANEMIA, UNSPECIFIED TYPE: ICD-10-CM

## 2022-11-30 DIAGNOSIS — I25.118 CORONARY ARTERY DISEASE OF NATIVE ARTERY OF NATIVE HEART WITH STABLE ANGINA PECTORIS (H): ICD-10-CM

## 2022-11-30 DIAGNOSIS — N18.32 ANEMIA DUE TO STAGE 3B CHRONIC KIDNEY DISEASE (H): ICD-10-CM

## 2022-11-30 DIAGNOSIS — Z98.890 S/P CORONARY ANGIOGRAM: ICD-10-CM

## 2022-11-30 DIAGNOSIS — I10 ESSENTIAL HYPERTENSION: ICD-10-CM

## 2022-11-30 DIAGNOSIS — D63.1 ANEMIA DUE TO STAGE 3B CHRONIC KIDNEY DISEASE (H): ICD-10-CM

## 2022-11-30 DIAGNOSIS — R55 SYNCOPE AND COLLAPSE: ICD-10-CM

## 2022-11-30 LAB
ABO/RH(D): NORMAL
ALBUMIN SERPL BCG-MCNC: 3.7 G/DL (ref 3.5–5.2)
ALBUMIN UR-MCNC: 20 MG/DL
ALP SERPL-CCNC: 47 U/L (ref 35–104)
ALT SERPL W P-5'-P-CCNC: 25 U/L (ref 10–35)
ANION GAP SERPL CALCULATED.3IONS-SCNC: 12 MMOL/L (ref 7–15)
ANTIBODY SCREEN: NEGATIVE
APPEARANCE UR: CLEAR
AST SERPL W P-5'-P-CCNC: 27 U/L (ref 10–35)
ATRIAL RATE - MUSE: 58 BPM
BASOPHILS # BLD AUTO: 0.1 10E3/UL (ref 0–0.2)
BASOPHILS NFR BLD AUTO: 1 %
BILIRUB SERPL-MCNC: <0.2 MG/DL
BILIRUB UR QL STRIP: NEGATIVE
BLD PROD TYP BPU: NORMAL
BLOOD COMPONENT TYPE: NORMAL
BUN SERPL-MCNC: 40.7 MG/DL (ref 8–23)
CALCIUM SERPL-MCNC: 9.4 MG/DL (ref 8.8–10.2)
CHLORIDE SERPL-SCNC: 107 MMOL/L (ref 98–107)
CODING SYSTEM: NORMAL
COLOR UR AUTO: ABNORMAL
CREAT SERPL-MCNC: 1.48 MG/DL (ref 0.51–0.95)
CROSSMATCH: NORMAL
DEPRECATED HCO3 PLAS-SCNC: 21 MMOL/L (ref 22–29)
DIASTOLIC BLOOD PRESSURE - MUSE: NORMAL MMHG
EOSINOPHIL # BLD AUTO: 0.1 10E3/UL (ref 0–0.7)
EOSINOPHIL NFR BLD AUTO: 1 %
ERYTHROCYTE [DISTWIDTH] IN BLOOD BY AUTOMATED COUNT: 17.4 % (ref 10–15)
FLUAV RNA SPEC QL NAA+PROBE: NEGATIVE
FLUBV RNA RESP QL NAA+PROBE: NEGATIVE
GFR SERPL CREATININE-BSD FRML MDRD: 34 ML/MIN/1.73M2
GLUCOSE SERPL-MCNC: 138 MG/DL (ref 70–99)
GLUCOSE UR STRIP-MCNC: NEGATIVE MG/DL
HCT VFR BLD AUTO: 21.9 % (ref 35–47)
HGB BLD-MCNC: 6.4 G/DL (ref 11.7–15.7)
HGB BLD-MCNC: 7.4 G/DL (ref 11.7–15.7)
HGB UR QL STRIP: NEGATIVE
HOLD SPECIMEN: NORMAL
IMM GRANULOCYTES # BLD: 0.1 10E3/UL
IMM GRANULOCYTES NFR BLD: 1 %
INR PPP: 1.21 (ref 0.85–1.15)
INTERPRETATION ECG - MUSE: NORMAL
ISSUE DATE AND TIME: NORMAL
KETONES UR STRIP-MCNC: NEGATIVE MG/DL
LEUKOCYTE ESTERASE UR QL STRIP: NEGATIVE
LYMPHOCYTES # BLD AUTO: 0.8 10E3/UL (ref 0.8–5.3)
LYMPHOCYTES NFR BLD AUTO: 14 %
MCH RBC QN AUTO: 24.4 PG (ref 26.5–33)
MCHC RBC AUTO-ENTMCNC: 29.2 G/DL (ref 31.5–36.5)
MCV RBC AUTO: 84 FL (ref 78–100)
MONOCYTES # BLD AUTO: 0.4 10E3/UL (ref 0–1.3)
MONOCYTES NFR BLD AUTO: 7 %
NEUTROPHILS # BLD AUTO: 4.3 10E3/UL (ref 1.6–8.3)
NEUTROPHILS NFR BLD AUTO: 76 %
NITRATE UR QL: NEGATIVE
NRBC # BLD AUTO: 0 10E3/UL
NRBC BLD AUTO-RTO: 0 /100
NT-PROBNP SERPL-MCNC: 2164 PG/ML (ref 0–1800)
P AXIS - MUSE: 88 DEGREES
PH UR STRIP: 7 [PH] (ref 5–7)
PLATELET # BLD AUTO: 190 10E3/UL (ref 150–450)
POTASSIUM SERPL-SCNC: 4.5 MMOL/L (ref 3.4–5.3)
PR INTERVAL - MUSE: 240 MS
PROT SERPL-MCNC: 6.1 G/DL (ref 6.4–8.3)
QRS DURATION - MUSE: 156 MS
QT - MUSE: 532 MS
QTC - MUSE: 522 MS
R AXIS - MUSE: -7 DEGREES
RBC # BLD AUTO: 2.62 10E6/UL (ref 3.8–5.2)
RBC URINE: <1 /HPF
RSV RNA SPEC NAA+PROBE: NEGATIVE
SARS-COV-2 RNA RESP QL NAA+PROBE: NEGATIVE
SODIUM SERPL-SCNC: 140 MMOL/L (ref 136–145)
SP GR UR STRIP: 1.01 (ref 1–1.03)
SPECIMEN EXPIRATION DATE: NORMAL
SYSTOLIC BLOOD PRESSURE - MUSE: NORMAL MMHG
T AXIS - MUSE: 152 DEGREES
TROPONIN T SERPL HS-MCNC: 35 NG/L
TROPONIN T SERPL HS-MCNC: 37 NG/L
TSH SERPL DL<=0.005 MIU/L-ACNC: 0.68 UIU/ML (ref 0.3–4.2)
UNIT ABO/RH: NORMAL
UNIT NUMBER: NORMAL
UNIT STATUS: NORMAL
UNIT TYPE ISBT: 600
UROBILINOGEN UR STRIP-MCNC: NORMAL MG/DL
VENTRICULAR RATE- MUSE: 58 BPM
VIT B12 SERPL-MCNC: 651 PG/ML (ref 232–1245)
WBC # BLD AUTO: 5.7 10E3/UL (ref 4–11)
WBC URINE: 1 /HPF

## 2022-11-30 PROCEDURE — G1010 CDSM STANSON: HCPCS

## 2022-11-30 PROCEDURE — 85018 HEMOGLOBIN: CPT | Mod: 91

## 2022-11-30 PROCEDURE — 99285 EMERGENCY DEPT VISIT HI MDM: CPT | Mod: 25 | Performed by: PHYSICIAN ASSISTANT

## 2022-11-30 PROCEDURE — 84443 ASSAY THYROID STIM HORMONE: CPT

## 2022-11-30 PROCEDURE — 70450 CT HEAD/BRAIN W/O DYE: CPT | Mod: 26 | Performed by: RADIOLOGY

## 2022-11-30 PROCEDURE — 99223 1ST HOSP IP/OBS HIGH 75: CPT | Mod: AI | Performed by: STUDENT IN AN ORGANIZED HEALTH CARE EDUCATION/TRAINING PROGRAM

## 2022-11-30 PROCEDURE — 120N000002 HC R&B MED SURG/OB UMMC

## 2022-11-30 PROCEDURE — 36430 TRANSFUSION BLD/BLD COMPNT: CPT

## 2022-11-30 PROCEDURE — 85025 COMPLETE CBC W/AUTO DIFF WBC: CPT | Performed by: PHYSICIAN ASSISTANT

## 2022-11-30 PROCEDURE — 36415 COLL VENOUS BLD VENIPUNCTURE: CPT | Performed by: PHYSICIAN ASSISTANT

## 2022-11-30 PROCEDURE — 36415 COLL VENOUS BLD VENIPUNCTURE: CPT

## 2022-11-30 PROCEDURE — 87637 SARSCOV2&INF A&B&RSV AMP PRB: CPT | Performed by: EMERGENCY MEDICINE

## 2022-11-30 PROCEDURE — 83880 ASSAY OF NATRIURETIC PEPTIDE: CPT | Performed by: PHYSICIAN ASSISTANT

## 2022-11-30 PROCEDURE — G1010 CDSM STANSON: HCPCS | Mod: GC | Performed by: RADIOLOGY

## 2022-11-30 PROCEDURE — 86850 RBC ANTIBODY SCREEN: CPT | Performed by: EMERGENCY MEDICINE

## 2022-11-30 PROCEDURE — 86923 COMPATIBILITY TEST ELECTRIC: CPT | Performed by: PHYSICIAN ASSISTANT

## 2022-11-30 PROCEDURE — 84484 ASSAY OF TROPONIN QUANT: CPT | Performed by: PHYSICIAN ASSISTANT

## 2022-11-30 PROCEDURE — C9803 HOPD COVID-19 SPEC COLLECT: HCPCS | Performed by: PHYSICIAN ASSISTANT

## 2022-11-30 PROCEDURE — 71046 X-RAY EXAM CHEST 2 VIEWS: CPT

## 2022-11-30 PROCEDURE — 93010 ELECTROCARDIOGRAM REPORT: CPT | Performed by: PHYSICIAN ASSISTANT

## 2022-11-30 PROCEDURE — 85610 PROTHROMBIN TIME: CPT | Performed by: PHYSICIAN ASSISTANT

## 2022-11-30 PROCEDURE — 36415 COLL VENOUS BLD VENIPUNCTURE: CPT | Performed by: EMERGENCY MEDICINE

## 2022-11-30 PROCEDURE — 80053 COMPREHEN METABOLIC PANEL: CPT | Performed by: PHYSICIAN ASSISTANT

## 2022-11-30 PROCEDURE — 71046 X-RAY EXAM CHEST 2 VIEWS: CPT | Mod: 26 | Performed by: RADIOLOGY

## 2022-11-30 PROCEDURE — 82607 VITAMIN B-12: CPT

## 2022-11-30 PROCEDURE — P9016 RBC LEUKOCYTES REDUCED: HCPCS | Performed by: PHYSICIAN ASSISTANT

## 2022-11-30 PROCEDURE — 93005 ELECTROCARDIOGRAM TRACING: CPT | Performed by: PHYSICIAN ASSISTANT

## 2022-11-30 PROCEDURE — 81001 URINALYSIS AUTO W/SCOPE: CPT | Performed by: PHYSICIAN ASSISTANT

## 2022-11-30 RX ORDER — LIDOCAINE 40 MG/G
CREAM TOPICAL
Status: DISCONTINUED | OUTPATIENT
Start: 2022-11-30 | End: 2022-12-07 | Stop reason: HOSPADM

## 2022-11-30 RX ORDER — DESVENLAFAXINE 100 MG/1
100 TABLET, EXTENDED RELEASE ORAL DAILY
Status: DISCONTINUED | OUTPATIENT
Start: 2022-12-01 | End: 2022-12-07 | Stop reason: HOSPADM

## 2022-11-30 RX ORDER — AMOXICILLIN 250 MG
2 CAPSULE ORAL 2 TIMES DAILY PRN
Status: DISCONTINUED | OUTPATIENT
Start: 2022-11-30 | End: 2022-12-07 | Stop reason: HOSPADM

## 2022-11-30 RX ORDER — MIRTAZAPINE 7.5 MG/1
7.5 TABLET, FILM COATED ORAL AT BEDTIME
Status: DISCONTINUED | OUTPATIENT
Start: 2022-11-30 | End: 2022-12-07 | Stop reason: HOSPADM

## 2022-11-30 RX ORDER — CARVEDILOL 12.5 MG/1
12.5 TABLET ORAL 2 TIMES DAILY
Status: DISCONTINUED | OUTPATIENT
Start: 2022-11-30 | End: 2022-12-04

## 2022-11-30 RX ORDER — POLYETHYLENE GLYCOL 3350 17 G/17G
17 POWDER, FOR SOLUTION ORAL DAILY PRN
Status: DISCONTINUED | OUTPATIENT
Start: 2022-11-30 | End: 2022-12-07 | Stop reason: HOSPADM

## 2022-11-30 RX ORDER — LANOLIN ALCOHOL/MO/W.PET/CERES
3 CREAM (GRAM) TOPICAL
Status: DISCONTINUED | OUTPATIENT
Start: 2022-11-30 | End: 2022-12-07 | Stop reason: HOSPADM

## 2022-11-30 RX ORDER — ALBUTEROL SULFATE 90 UG/1
2 AEROSOL, METERED RESPIRATORY (INHALATION) EVERY 6 HOURS PRN
Status: DISCONTINUED | OUTPATIENT
Start: 2022-11-30 | End: 2022-12-07 | Stop reason: HOSPADM

## 2022-11-30 RX ORDER — ROSUVASTATIN CALCIUM 40 MG/1
40 TABLET, COATED ORAL DAILY
Status: DISCONTINUED | OUTPATIENT
Start: 2022-12-01 | End: 2022-12-07 | Stop reason: HOSPADM

## 2022-11-30 RX ORDER — MEMANTINE HYDROCHLORIDE 10 MG/1
10 TABLET ORAL DAILY
Status: DISCONTINUED | OUTPATIENT
Start: 2022-12-01 | End: 2022-12-07 | Stop reason: HOSPADM

## 2022-11-30 RX ORDER — LIOTHYRONINE SODIUM 5 UG/1
10 TABLET ORAL DAILY
Status: DISCONTINUED | OUTPATIENT
Start: 2022-12-01 | End: 2022-12-07 | Stop reason: HOSPADM

## 2022-11-30 RX ORDER — LEVOTHYROXINE SODIUM 50 UG/1
50 TABLET ORAL DAILY
Status: DISCONTINUED | OUTPATIENT
Start: 2022-12-01 | End: 2022-12-07 | Stop reason: HOSPADM

## 2022-11-30 RX ORDER — CLONIDINE HYDROCHLORIDE 0.1 MG/1
0.1 TABLET ORAL DAILY PRN
Status: DISCONTINUED | OUTPATIENT
Start: 2022-11-30 | End: 2022-12-07 | Stop reason: HOSPADM

## 2022-11-30 RX ORDER — FLUTICASONE FUROATE AND VILANTEROL 100; 25 UG/1; UG/1
1 POWDER RESPIRATORY (INHALATION) DAILY
Status: DISCONTINUED | OUTPATIENT
Start: 2022-12-01 | End: 2022-12-07 | Stop reason: HOSPADM

## 2022-11-30 RX ORDER — LAMOTRIGINE 200 MG/1
200 TABLET ORAL DAILY
Status: DISCONTINUED | OUTPATIENT
Start: 2022-12-01 | End: 2022-12-02

## 2022-11-30 RX ORDER — LAMOTRIGINE 25 MG/1
25 TABLET ORAL DAILY
Status: DISCONTINUED | OUTPATIENT
Start: 2022-12-01 | End: 2022-12-02

## 2022-11-30 RX ORDER — ISOSORBIDE MONONITRATE 60 MG/1
60 TABLET, EXTENDED RELEASE ORAL DAILY
Status: DISCONTINUED | OUTPATIENT
Start: 2022-12-01 | End: 2022-12-04

## 2022-11-30 RX ORDER — AMOXICILLIN 250 MG
1 CAPSULE ORAL 2 TIMES DAILY PRN
Status: DISCONTINUED | OUTPATIENT
Start: 2022-11-30 | End: 2022-12-07 | Stop reason: HOSPADM

## 2022-11-30 ASSESSMENT — ACTIVITIES OF DAILY LIVING (ADL)
ADLS_ACUITY_SCORE: 37

## 2022-11-30 NOTE — LETTER
[facility] [address] [place]  [address] [address]  [phone]    FACSIMILE TRANSMITTAL SHEET    TO: [facility]  _____URGENT _____REVIEW ONLY _____PLEASE COMMENT____PLEASE REPLY    NOTES/COMMENTS:  Attached please find final discharge orders for Kamryn Lomelidamien Pantoja, RN BSN, PHN, ACM-RN  7A RN Care Coordinator  Phone: [phone]  Pager [phone]    To contact the weekend RNCC  [place] (0800 - 1630) Saturday and Sunday    Units: 4A, 4C, 4E, 5A and 5B- Pager 1: [phone]    Units: 6A, 6B, 6C, 6D- Pager 2: [phone]    Units: 7A, 7B, 7C, 7D, and 5C-Pager 3: [phone]    [facility] (3825-2519) Saturday and Sunday    Units: 5 Ortho, 8A, 10 ICU, & Pediatric Units-Pager 4: [phone]    12/6/2022 11:38 AM                                        IF YOU DID NOT RECEIVE THE CORRECT NUMBER OF PAGES OR THE FAX DID NOT COME THROUGH CLEARLY, PLEASE CALL THE SENDER     CONFIDENTIALITY STATEMENT: Confidential information that may accompany this transmission contains protected health information under state and federal law and is legally privileged. This information is intended only for the use of the individual or entity named above and may be used only for carrying out treatment, payment or other healthcare operations. The recipient or person responsible for delivering this information is prohibited by law from disclosing this information without proper authorization to any other party, unless required to do so by law or regulation. If you are not the intended recipient, you are hereby notified that any review, dissemination, distribution, or copying of this message is strictly prohibited. If you have received this communication in error, please destroy the materials and contact us immediately by calling the number listed above. No response indicates that the information was received by the appropriate authorized party

## 2022-11-30 NOTE — H&P
[facility]    History and Physical - Medicine Service, [facility] TEAM        Date of Admission:  11/30/2022    Assessment & Plan      Kamryn Miller is a 87 year old female admitted on 11/30/2022. She has a history of pulmonary hypertension, CKD stage III, ESTHER, CAD status post coronary angioplasty, CHF, history of ischemic cardiomyopathy, TAVR who presents the emergency department today with concerns for syncopal episode found to be acutely anemic without sign of bleeding.    Syncope  Severe AORTIC STENOSIS s/p TAVR   HTN  Fasting prior to labs   Patient came from clinic with a syncopal episode, no presyncopal sx or postictal period, fall was not mechanical. Did hit her head though CTH negative. Imdur increased and hydralazine discontinued ~1 month ago and has been having lower Bps at home (100 SBP at times per [facility]). Patient was fasting PTA for labs as well. The patient also underwent TAVR in September and has been feeling more shortness of breath since then. Considering this, syncopal episode most likely multifactorial 2/2 acute anemia, fasting, and potentially recent TAVR and change in BP meds (however recent TTE without evidence of TAVR failure).  - Blood resuscitation as below  - Regular diet, encourage PO intake  - Cardiology consult to consider role of recent TAVR and BP medication changes in syncopal episode. Appreciate recommendations for medication titration.  - Continue coreg, hold imdur for now and assess am BP    Acute on chronic anemia  Iron deficiency anemia  Iron low, sat low, ferritin WNL. No signs or symptoms of bleeding. Appears as though hgb has been steadily decreasing since January. No previous smear, appears to be iron deficiency at baseline however etiology of acute drop unclear. Would consider smear however patient already receiving blood in ED.  - 1U PRBC  - HGB recheck 1 hour post transfusion  - Add on B12 and folate  - Monitor for bleeding  -  "Consider smear as part of outpatient workup if patient has recurrent anemia    CHF, likely not in exacerbation  Shortness of breath  Patient reporting shortness of breath which preceded her TAVR, symptoms did not change post procedure. They have not worsened recently. No leg swelling, no cough or infectious symptoms. BNP elevated likely 2/2 acute anemia. CXR without edema, not clinically volume overloaded. Echo last month with normal EF, functioning TAVR.   - Hold diuresis at this time  - Cardiology consult as above     CAD s/p angioplasty  Troponin elevated likely demand ischemia  EKG without new changes (LBBB noted post procedure), no chest pain, troponin stable on recheck, unlikely ACS. Troponin elevation most likely demand in the setting of acute anemia.   - ASA, Brilinta held 2/2 hgb  - Statin    Bipolar  MDD  Insomnia  - Continue PTA Lamictal 225 mg daily  - Continue PTA Pristiq 100 mg daily   - Continue PTA Namenda 10 mg daily  - Continue PTA Remeron 7.5 mg every night  - Melatonin PRN    CKD III  At baseline  - Daily creatinine    Hypothyroidism  TSH WNL  - Continue PTA levothyroxine and liothyrodine     Diet: Low Saturated Fat Na <2400 mg    DVT Prophylaxis: Pneumatic Compression Devices  Orellana Catheter: Not present  Fluids: None  Central Lines: None  Cardiac Monitoring: None  Code Status:   FULL    Clinically Significant Risk Factors Present on Admission               # Coagulation Defect: INR = 1.21 (Ref range: 0.85 - 1.15) and/or PTT = 26 Seconds (Ref range: 22 - 38 Seconds), will monitor for bleeding  # Drug Induced Platelet Defect: home medication list includes an antiplatelet medication   # Hypertension: home medication list includes antihypertensive(s)     # Overweight: Estimated body mass index is 27.62 kg/m  as calculated from the following:    Height as of this encounter: 1.575 m (5' 2\").    Weight as of this encounter: 68.5 kg (151 lb).           Disposition Plan      Expected Discharge Date: " 12/02/2022                The patient's care was discussed with the Attending Physician, Dr. Jansen.    Michelle Page MD  Medicine Service, [facility]  Securely message with the Vocera Web Console (learn more here)  Text page via [facility] Paging/Directory   Please see signed in provider for up to date coverage information    ______________________________________________________________________    Chief Complaint   Syncope    History is obtained from the patient    History of Present Illness   Kamryn Miller is a 87 year old female who presents with a syncopal episode. She states that she was feeling at her baseline this morning and was fasting prior to getting labs drawn this am when she sycopized outside her provider's office. She denies any presyncopal symptoms, no lightheadedness or dizziness. No postictal confusion. Endorses LOC for 2 minutes, daughter states it was sudden, no mechanical trip. Does note she his her head when she fell however fall blunted by daughter helping her.     She notes chronic shortness of breath for the past month or so which did not improve with her TAVR. No chest pain or leg swelling. Not worsening today.  Otherwise, patient denies infectious symptoms including dysuria, fevers, chills, abdominal pain, nausea, vomiting, or changes in BM.     Patient denies any bleeding, bruising, changes in stool or urine color, or hemoptysis. Is on brilinta and aspirin and taking these daily. Some tiredness, does not feel dizzy or woozy when standing. She does note her blood pressure has been lower (closer to 100 SBP) from readings at her [facility], but this started happening after her BP meds were changed (stopped hydral, increased imdur).     Review of Systems    The 10 point Review of Systems is negative other than noted in the HPI or here.     Past Medical History    I have reviewed this patient's medical history and updated it with pertinent  information if needed.   Past Medical History:   Diagnosis Date     Aortic valvar stenosis 7/2010    mild     Asthma      Bipolar disorder (H)      CAD (coronary artery disease)     s/p angioplasty     Celiac disease      Colon polyps      Colon polyps 2012    every 3 year colonoscopy      Depression      High cholesterol      HTN      Mitral insufficiency      Pulmonary HTN (H)     mild     Tremors 7/10    drug induced from antidepressants     Tricuspid insufficiency         Past Surgical History   I have reviewed this patient's surgical history and updated it with pertinent information if needed.  Past Surgical History:   Procedure Laterality Date     ANGIOGRAM  6/26/2009     ANGIOPLASTY  9/96    for angina     ANGIOPLASTY  8/03 - 9/03    X -2 - with stenst in coronary car.     APPENDECTOMY       BREAST BIOPSY, RT/LT      Breat Biopsy RT/LT, benign     BUNIONECTOMY  11/8/2011    Procedure:BUNIONECTOMY; Left donna bunionectomy; Surgeon:FREDY LITTLEJOHN; Location:[facility] OR     BUNIONECTOMY RT/LT  5/2007    right bunion and right 2nd hammertoe     CATARACT IOL, RT/LT  6/12 and 7/12    bilateral     COLONOSCOPY  2007, 2012    every 3 years for polyps     CV CORONARY ANGIOGRAM N/A 8/21/2020    Procedure: CV CORONARY ANGIOGRAM;  Surgeon: Gianluca Toscano MD;  Location:  HEART CARDIAC CATH LAB     CV CORONARY ANGIOGRAM N/A 10/25/2022    Procedure: Coronary Angiogram;  Surgeon: Carter Douglass MD;  Location:  HEART CARDIAC CATH LAB     CV INSTANTANEOUS WAVE-FREE RATIO N/A 10/25/2022    Procedure: Instantaneous Wave-Free Ratio;  Surgeon: Carter Douglass MD;  Location:  HEART CARDIAC CATH LAB     CV LEFT HEART CATH N/A 8/21/2020    Procedure: CV LEFT HEART CATH;  Surgeon: Gianluca Toscano MD;  Location:  HEART CARDIAC CATH LAB     CV LEFT HEART CATH N/A 11/3/2020    Procedure: CV LEFT HEART CATH;  Surgeon: Tom Burrows MD;  Location:  HEART CARDIAC CATH LAB     CV  LOWER EXTREMITY ANGIOGRAM BILATERAL N/A 11/3/2020    Procedure: CV ANGIOGRAM LOWER EXTREMITY BILATERAL;  Surgeon: Tom Burrows MD;  Location:  HEART CARDIAC CATH LAB     CV PCI STENT DRUG ELUTING N/A 8/21/2020    Procedure: Percutaneous Coronary Intervention Stent Drug Eluting;  Surgeon: Gianluca Toscano MD;  Location:  HEART CARDIAC CATH LAB     CV RIGHT HEART CATH MEASUREMENTS RECORDED N/A 8/21/2020    Procedure: CV RIGHT HEART CATH;  Surgeon: Gianluca Toscano MD;  Location:  HEART CARDIAC CATH LAB     CV RIGHT HEART CATH MEASUREMENTS RECORDED N/A 11/3/2020    Procedure: CV RIGHT HEART CATH;  Surgeon: Tom Burrows MD;  Location:  HEART CARDIAC CATH LAB     CV TRANSCATHETER AORTIC VALVE REPLACEMENT N/A 9/12/2022    Procedure: Transfemoral Transcatheter Aortic Valve Replacement with possible open heart bypass and or balloon pump placement and any indicated procedure;  Surgeon: Tom Burrows MD;  Location:  HEART CARDIAC CATH LAB     HEART CATH FEMORAL CANNULIZATION WITH OPEN STANDBY REPAIR AORTIC VALVE N/A 9/12/2022    Procedure: OR TRANSCATHETER AORTIC VALVE REPLACEMENT, OPEN FEMORAL ARTERY APPROACH;  Surgeon: Arthur Markham MD;  Location:  HEART CARDIAC CATH LAB     JOINT REPLACEMENT, HIP RT/LT  4/2008    right hip replaced     JOINT REPLACEMENT, HIP RT/LT  4/2009    left hip replaced     REPAIR HAMMER TOE  11/8/2011    Procedure:REPAIR HAMMER TOE; left 2nd hammertoe repair; Surgeon:FREDY LITTLEJOHN; Location: OR     SURGICAL HISTORY OF -   11/11    left bunion and 2nd hammertoe repair     TUBAL LIGATION       [facility] ANESTH,BLEPHAROPLASTY      (R) for drooping eyelid     [facility] APPENDECTOMY          Social History   I have reviewed this patient's social history and updated it with pertinent information if needed. Kamryn Miller  reports that she has never smoked. She has never used smokeless tobacco. She reports that she does not drink alcohol and does not use  drugs.    Family History   I have reviewed this patient's family history and updated it with pertinent information if needed.  Family History   Problem Relation Age of Onset     Asthma Mother      Cerebrovascular Disease Mother      Arthritis Mother      Depression Mother      Lipids Sister      Hypertension Sister      Heart Disease Sister      Lipids Sister      Hypertension Sister      Lipids Sister      Breast Cancer Sister        Prior to Admission Medications   Prior to Admission Medications   Prescriptions Last Dose Informant Patient Reported? Taking?   ASPIRIN LOW DOSE 81 MG chewable tablet   No No   Sig: TAKE 1 TABLET BY MOUTH ONCE DAILY   COMPRESSION STOCKINGS   No No   Si each daily   Patient not taking: Reported on 10/24/2022   Calcium Citrate (CITRACAL OR)  Self Yes No   Sig: Take 1 tablet by mouth daily.   LAMOTRIGINE 200 MG PO TABS  Self Yes No   Sig: Take 200 mg by mouth daily with 25 mg tablet for a total dose of 225 mg   Multiple Vitamin (TAB-A-JENNIFER) TABS   No No   Sig: TAKE 1 TABLET BY MOUTH ONCE DAILY   albuterol (PROAIR HFA/PROVENTIL HFA/VENTOLIN HFA) 108 (90 Base) MCG/ACT inhaler   No No   Sig: Inhale 2 puffs into the lungs every 6 hours as needed for wheezing or shortness of breath / dyspnea   alendronate (FOSAMAX) 70 MG tablet   No No   Sig: TAKE 1 TABLET BY MOUTH ONCE WEEKLY   aspirin 81 MG tablet  Self Yes No   Sig: Take 81 mg by mouth daily    azelastine (ASTEPRO) 0.15 % nasal spray   No No   Sig: USE 1 SPRAY IN EACH NOSTRIL ONCE A DAY   calcium citrate (CITRACAL) 950 (200 Ca) MG tablet   No No   Sig: TAKE 1 TABLET BY MOUTH ONCE DAILY   carvedilol (COREG) 6.25 MG tablet   No No   Sig: Take 2 tablets (12.5 mg) by mouth 2 times daily   cholecalciferol (VITAMIN D3) 1250 mcg (85257 units) capsule   No No   Sig: TAKE 1 CAPSULE BY MOUTH ONCE WEEKLY   cloNIDine (CATAPRES) 0.1 MG tablet   No No   Sig: Take 1 tablet (0.1 mg) by mouth daily as needed (For SBP > 180)   desvenlafaxine (PRISTIQ)  100 MG 24 hr tablet  Self Yes No   Sig: Take 100 mg by mouth daily   fluticasone-salmeterol (ADVAIR DISKUS) 500-50 MCG/ACT inhaler   No No   Sig: INHALE 1 PUFF BY MOUTH TWICE DAILY   isosorbide mononitrate (IMDUR) 60 MG 24 hr tablet   No No   Sig: Take 1 tablet (60 mg) by mouth daily   lamoTRIgine (LAMICTAL) 25 MG tablet  Self Yes No   Sig: Take 25 mg by mouth daily with 200 mg tablet for a total dose of 225 mg   levothyroxine (SYNTHROID/LEVOTHROID) 50 MCG tablet   No No   Sig: TAKE 1 TABLET BY MOUTH ONCE DAILY   liothyronine (CYTOMEL) 5 MCG tablet   No No   Sig: Take 2 tablets (10 mcg) by mouth daily   memantine (NAMENDA) 10 MG tablet   No No   Sig: Take 1 tablet (10 mg) by mouth daily   mirtazapine (REMERON) 7.5 MG tablet   No No   Sig: Take 1 tablet (7.5 mg) by mouth At Bedtime   rosuvastatin (CRESTOR) 40 MG tablet   No No   Sig: TAKE 1 TABLET BY MOUTH ONCE DAILY   ticagrelor (BRILINTA) 90 MG tablet   No No   Sig: Take 1 tablet (90 mg) by mouth 2 times daily      Facility-Administered Medications: None     Allergies   Allergies   Allergen Reactions     Ace Inhibitors Cough     Diclofenac Other (See Comments)     Balance problems     Gluten Meal Diarrhea       Physical Exam   Vital Signs: Temp: 97.9  F (36.6  C) Temp src: Oral BP: (!) 151/73 Pulse: 62   Resp: 16 SpO2: 96 % O2 Device: None (Room air)    Weight: 151 lbs 0 oz    Constitutional: awake, alert, cooperative, no apparent distress, and appears stated age  Eyes: lids and lashes normal, extra-ocular muscles intact and vision intact  Respiratory: No increased work of breathing, good air exchange, clear to auscultation bilaterally, no crackles or wheezing  Cardiovascular: bradycardic with rhythm with ectopic beats, normal S1 and S2, no murmur noted and no edema  GI: No scars, normal bowel sounds, soft, non-distended, non-tender, no masses palpated, no hepatosplenomegally  Skin: no bruising or bleeding, normal skin color, texture, turgor and no redness, warmth,  or swelling  Musculoskeletal: no lower extremity pitting edema present  there is no redness, warmth, or swelling of the joints  Neurologic: Awake, alert, oriented to name, place and time.  Cranial nerves II-XII are grossly intact.      Data   Data reviewed today: I reviewed all medications, new labs and imaging results over the last 24 hours. I personally reviewed the EKG tracing showing no ST elevations and the chest x-ray image(s) showing no edema, small bilateral pleural effusions.    Recent Labs   Lab 11/30/22  1243 11/30/22  1242   WBC  --  5.7   HGB  --  6.4*   MCV  --  84   PLT  --  190   INR 1.21*  --    NA  --  140   POTASSIUM  --  4.5   CHLORIDE  --  107   CO2  --  21*   BUN  --  40.7*   CR  --  1.48*   ANIONGAP  --  12   PRINCE  --  9.4   GLC  --  138*   ALBUMIN  --  3.7   PROTTOTAL  --  6.1*   BILITOTAL  --  <0.2   ALKPHOS  --  47   ALT  --  25   AST  --  27

## 2022-11-30 NOTE — ED TRIAGE NOTES
BIBA from clinic   Pt felt Dizzy  Assisted to the floor by family   Denies pain/SOB        Hx: aortic valve replacement 9/2022, Left bundle branch block

## 2022-11-30 NOTE — ED NOTES
Bed: ED29  Expected date: 11/30/22  Expected time:   Means of arrival:   Comments:  Jennifer Stone

## 2022-11-30 NOTE — ED PROVIDER NOTES
ED Provider Note  [facility]      History     Chief Complaint   Patient presents with     Fall     Dizziness     HPI  Kamryn Miller is a 87 year old female past medical history significant for pulmonary hypertension, CKD stage III, ESTHER, CAD status post coronary angioplasty, CHF, history of ischemic cardiomyopathy, TAVR who presents the emergency department today with concerns for syncopal episode.  Patient presents via EMS, her daughter is here in the exam room as well.  Patient states that she was scheduled to have a routine fasting lab work obtained this morning at 1130 and her clinic in [place].  Patient reports that while walking into the clinic, she had a rather abrupt onset syncopal episode.  Patient states that she passed out and the next thing that she remembers there were nurses hovering over her.  Patient states that prior to the episode she felt normal, she did not have any associated presyncopal sensations such as dizziness lightheadedness chest pain shortness of breath.  Per daughter patient was out for about 2 minutes, thereafter spontaneously woke back up.  Daughter states that patient did have an assisted fall, although unfortunately did hit her head on the carpet on the ground.  Patient is anticoagulated on aspirin and Brilinta.    At this time patient states she is not having any pain.  She denies any headache, chest pain shortness of breath abdominal pain pain in the chest wall or pain in her upper and lower extremities.  She denies any associated fevers, cough, urinary symptoms.  Patient denies losing bladder bowel function, biting her tongue during the episode.  No history of seizures in the past.  No reported seizure-like activity.  Patient denies any bleeding from any sources including black or bloody stools.    She notes that she does have some ongoing shortness of breath difficulties since the fall that she attributes to her aortic valve replacement.    Per EMR  review patient did have her medications recently changed, she was discontinued off of hydralazine and had her Imdur increased from 30 mgDaily to 60 mg daily.  Patient is also on 12.5 mg twice daily carvedilol.    Past Medical History  Past Medical History:   Diagnosis Date     Aortic valvar stenosis 7/2010    mild     Asthma      Bipolar disorder (H)      CAD (coronary artery disease)     s/p angioplasty     Celiac disease      Colon polyps      Colon polyps 2012    every 3 year colonoscopy      Depression      High cholesterol      HTN      Mitral insufficiency      Pulmonary HTN (H)     mild     Tremors 7/10    drug induced from antidepressants     Tricuspid insufficiency      Past Surgical History:   Procedure Laterality Date     ANGIOGRAM  6/26/2009     ANGIOPLASTY  9/96    for angina     ANGIOPLASTY  8/03 - 9/03    X -2 - with stenst in coronary car.     APPENDECTOMY       BREAST BIOPSY, RT/LT      Breat Biopsy RT/LT, benign     BUNIONECTOMY  11/8/2011    Procedure:BUNIONECTOMY; Left donna bunionectomy; Surgeon:FREDY LITTLEJOHN; Location:[facility] OR     BUNIONECTOMY RT/LT  5/2007    right bunion and right 2nd hammertoe     CATARACT IOL, RT/LT  6/12 and 7/12    bilateral     COLONOSCOPY  2007, 2012    every 3 years for polyps     CV CORONARY ANGIOGRAM N/A 8/21/2020    Procedure: CV CORONARY ANGIOGRAM;  Surgeon: Gianluca Toscano MD;  Location: [facility]     CV CORONARY ANGIOGRAM N/A 10/25/2022    Procedure: Coronary Angiogram;  Surgeon: Carter Douglass MD;  Location: [facility]     CV INSTANTANEOUS WAVE-FREE RATIO N/A 10/25/2022    Procedure: Instantaneous Wave-Free Ratio;  Surgeon: Carter Douglass MD;  Location: [facility]     CV LEFT HEART CATH N/A 8/21/2020    Procedure: CV LEFT HEART CATH;  Surgeon: Gianluca Toscano MD;  Location: [facility]     CV LEFT HEART CATH N/A 11/3/2020    Procedure: CV LEFT HEART CATH;   Surgeon: Tom Burrows MD;  Location:  HEART CARDIAC CATH LAB     CV LOWER EXTREMITY ANGIOGRAM BILATERAL N/A 11/3/2020    Procedure: CV ANGIOGRAM LOWER EXTREMITY BILATERAL;  Surgeon: Tom Burrows MD;  Location:  HEART CARDIAC CATH LAB     CV PCI STENT DRUG ELUTING N/A 8/21/2020    Procedure: Percutaneous Coronary Intervention Stent Drug Eluting;  Surgeon: Gianluca Toscano MD;  Location:  HEART CARDIAC CATH LAB     CV RIGHT HEART CATH MEASUREMENTS RECORDED N/A 8/21/2020    Procedure: CV RIGHT HEART CATH;  Surgeon: Gianluca Toscano MD;  Location:  HEART CARDIAC CATH LAB     CV RIGHT HEART CATH MEASUREMENTS RECORDED N/A 11/3/2020    Procedure: CV RIGHT HEART CATH;  Surgeon: Tom Burrows MD;  Location:  HEART CARDIAC CATH LAB     CV TRANSCATHETER AORTIC VALVE REPLACEMENT N/A 9/12/2022    Procedure: Transfemoral Transcatheter Aortic Valve Replacement with possible open heart bypass and or balloon pump placement and any indicated procedure;  Surgeon: Tom Burrows MD;  Location: [facility] CARDIAC CATH LAB     HEART CATH FEMORAL CANNULIZATION WITH OPEN STANDBY REPAIR AORTIC VALVE N/A 9/12/2022    Procedure: OR TRANSCATHETER AORTIC VALVE REPLACEMENT, OPEN FEMORAL ARTERY APPROACH;  Surgeon: Arthur Markham MD;  Location: [facility] CARDIAC CATH LAB     JOINT REPLACEMENT, HIP RT/LT  4/2008    right hip replaced     JOINT REPLACEMENT, HIP RT/LT  4/2009    left hip replaced     REPAIR HAMMER TOE  11/8/2011    Procedure:REPAIR HAMMER TOE; left 2nd hammertoe repair; Surgeon:FREDY LITTLEJOHN; Location: OR     SURGICAL HISTORY OF -   11/11    left bunion and 2nd hammertoe repair     TUBAL LIGATION       Z ANESTH,BLEPHAROPLASTY      (R) for drooping eyelid     Z APPENDECTOMY       albuterol (PROAIR HFA/PROVENTIL HFA/VENTOLIN HFA) 108 (90 Base) MCG/ACT inhaler  alendronate (FOSAMAX) 70 MG tablet  aspirin 81 MG tablet  ASPIRIN LOW DOSE 81 MG chewable tablet  azelastine  "(ASTEPRO) 0.15 % nasal spray  Calcium Citrate (CITRACAL OR)  calcium citrate (CITRACAL) 950 (200 Ca) MG tablet  carvedilol (COREG) 6.25 MG tablet  cholecalciferol (VITAMIN D3) 1250 mcg (22374 units) capsule  cloNIDine (CATAPRES) 0.1 MG tablet  COMPRESSION STOCKINGS  desvenlafaxine (PRISTIQ) 100 MG 24 hr tablet  fluticasone-salmeterol (ADVAIR DISKUS) 500-50 MCG/ACT inhaler  isosorbide mononitrate (IMDUR) 60 MG 24 hr tablet  lamoTRIgine (LAMICTAL) 25 MG tablet  LAMOTRIGINE 200 MG PO TABS  levothyroxine (SYNTHROID/LEVOTHROID) 50 MCG tablet  liothyronine (CYTOMEL) 5 MCG tablet  memantine (NAMENDA) 10 MG tablet  mirtazapine (REMERON) 7.5 MG tablet  Multiple Vitamin (TAB-A-JENNIFER) TABS  rosuvastatin (CRESTOR) 40 MG tablet  ticagrelor (BRILINTA) 90 MG tablet      Allergies   Allergen Reactions     Ace Inhibitors Cough     Diclofenac Other (See Comments)     Balance problems     Gluten Meal Diarrhea     Family History  Family History   Problem Relation Age of Onset     Asthma Mother      Cerebrovascular Disease Mother      Arthritis Mother      Depression Mother      Lipids Sister      Hypertension Sister      Heart Disease Sister      Lipids Sister      Hypertension Sister      Lipids Sister      Breast Cancer Sister      Social History   Social History     Tobacco Use     Smoking status: Never     Smokeless tobacco: Never   Substance Use Topics     Alcohol use: No     Alcohol/week: 0.0 standard drinks     Types: 1 Standard drinks or equivalent per week     Drug use: No      Past medical history, past surgical history, medications, allergies, family history, and social history were reviewed with the patient. No additional pertinent items.       Review of Systems  A complete review of systems was performed with pertinent positives and negatives noted in the HPI, and all other systems negative.    Physical Exam   BP: (!) 140/71  Pulse: 58  Temp: 97.9  F (36.6  C)  Resp: 18  Height: 157.5 cm (5' 2\")  Weight: 68.5 kg (151 " lb)  SpO2: 100 %  Physical Exam    GENERAL APPEARANCE: The patient is well developed, well appearing, and in no acute distress.  HEAD:  Normocephalic and atraumatic.   EENT: Voice normal.  NECK: Trachea is midline.No lymphadenopathy or tenderness.  LUNGS: Breath sounds are equal and clear bilaterally. No wheezes, rhonchi, or rales.  HEART: Regular rate and normal rhythm.  Systolic murmur present.  ABDOMEN: Soft, flat, and benign. No mass, tenderness, guarding, or rebound.Bowel sounds are present.  EXTREMITIES: No cyanosis, clubbing, or edema.  Negative hip rock, no tenderness to palpation of the chest wall bilaterally.  Patient has no tenderness to palpation of the upper and lower extremities bilaterally including shoulders elbows hands wrists knees feet and ankles.  I do not appreciate any visible lower extremity edema.  NEUROLOGIC: No focal sensory or motor deficits are noted.  Renal nerves II through XII intact.  Rapid alternating movements, finger-nose testing intact.   strength 5 out of 5 bilaterally.  Resisted plantar flexion dorsiflexion 5 out of 5 bilaterally.  PSYCHIATRIC: The patient is awake, alert.  Appropriate mood and affect.  SKIN: Warm, dry, and well perfused. Good turgor.      ED Course      EKG 11/30/2022:    Sinus bradycardia with first-degree AV block with occasional PVC, left bundle branch block.  My interpretation, appreciate sinus bradycardia with PVCs present, and first-degree block.  Do not see any visible ST depression or elevation to suggest ischemia.  Compared to prior EKG from 10/24/2022, I do appreciate new upward trending of the QRS complex in V4 and V5         Results for orders placed or performed during the hospital encounter of 11/30/22   XR Chest 2 Views     Status: None    Narrative    EXAM: XR CHEST 2 VIEWS 11/30/2022 2:06 PM    HISTORY: 87-year-old female with syncope.     COMPARISON: Chest radiograph 10/24/2022, 9/22/2022.    TECHNIQUE: Frontal and lateral views of the  chest.    FINDINGS:   TAVR. Coronary artery stent. Midline trachea. Stable cardiomediastinal  silhouette. Distinct pulmonary vasculature. Trace costophrenic  blunting bilaterally. No discernible pneumothorax. Normal lung  volumes. No focal airspace or interstitial opacities. Probable  retrocardiac double density, representing a moderately sized hiatal  hernia. Thoracolumbar spondylosis. Osteopenia. Unremarkable soft  tissues.      Impression    IMPRESSION: Trace pleural effusions versus thickening. Otherwise  negative.    I have personally reviewed the examination and initial interpretation  and I agree with the findings.    PRIYA LION MD         SYSTEM ID:  [id number]   Head CT w/o contrast     Status: None    Narrative    EXAM: CT HEAD W/O CONTRAST  11/30/2022 2:20 PM     HISTORY:  fall, hit head       COMPARISON:  None    TECHNIQUE: Using multidetector thin collimation helical acquisition  technique, axial, coronal and sagittal CT images from the skull base  to the vertex were obtained without intravenous contrast.   (topogram) image(s) also obtained and reviewed.    FINDINGS:  No acute intracranial hemorrhage, mass effect, or midline shift. No  acute loss of gray-white matter differentiation in the cerebral  hemispheres. Ventricles are proportionate to the cerebral sulci. Clear  basal cisterns. Mild diffuse cerebral volume loss. Patchy  periventricular hypoattenuation consistent with chronic small vessel  ischemic disease.     The bony calvaria and the bones of the skull base are normal. The  visualized portions of the paranasal sinuses and mastoid air cells are  clear. Grossly normal orbits.       Impression    IMPRESSION: No acute intracranial pathology.     I have personally reviewed the examination and initial interpretation  and I agree with the findings.    KAREN ARDON MD         SYSTEM ID:  [id number]   [place] Draw     Status: None    Narrative    The following orders were created for  panel order [place] Draw.  Procedure                               Abnormality         Status                     ---------                               -----------         ------                     Extra Blue Top Tube[[id number]]                              Final result               Extra Red Top Tube[[id number]]                               Final result               Extra Green Top (Lithium...[[id number]]                      Final result               Extra Purple Top Tube[[id number]]                            Final result                 Please view results for these tests on the individual orders.   Asymptomatic Influenza A/B & SARS-CoV2 (COVID-19) Virus PCR Multiplex Nose     Status: Normal    Specimen: Nose; Swab   Result Value Ref Range    Influenza A PCR Negative Negative    Influenza B PCR Negative Negative    RSV PCR Negative Negative    SARS CoV2 PCR Negative Negative    Narrative    Testing was performed using the Xpert Xpress CoV2/Flu/RSV Assay on the [employer] Instrument. This test should be ordered for the detection of SARS-CoV-2 and influenza viruses in individuals who meet clinical and/or epidemiological criteria. Test performance is unknown in asymptomatic patients. This test is for in vitro diagnostic use under the FDA EUA for laboratories certified under CLIA to perform high or moderate complexity testing. This test has not been FDA cleared or approved. A negative result does not rule out the presence of PCR inhibitors in the specimen or target RNA in concentration below the limit of detection for the assay. If only one viral target is positive but coinfection with multiple targets is suspected, the sample should be re-tested with another FDA cleared, approved, or authorized test, if coinfection would change clinical management. This test was validated by the [facility]. These laboratories are certified under the Clinical Laboratory Improvement Amendments of 1988  (CLIA-88) as qualified to perform high complexity laboratory testing.   Extra Blue Top Tube     Status: None   Result Value Ref Range    Hold Specimen JIC    Extra Red Top Tube     Status: None   Result Value Ref Range    Hold Specimen JIC    Extra Green Top (Lithium Heparin) Tube     Status: None   Result Value Ref Range    Hold Specimen JIC    Extra Purple Top Tube     Status: None   Result Value Ref Range    Hold Specimen JIC    Comprehensive metabolic panel     Status: Abnormal   Result Value Ref Range    Sodium 140 136 - 145 mmol/L    Potassium 4.5 3.4 - 5.3 mmol/L    Chloride 107 98 - 107 mmol/L    Carbon Dioxide (CO2) 21 (L) 22 - 29 mmol/L    Anion Gap 12 7 - 15 mmol/L    Urea Nitrogen 40.7 (H) 8.0 - 23.0 mg/dL    Creatinine 1.48 (H) 0.51 - 0.95 mg/dL    Calcium 9.4 8.8 - 10.2 mg/dL    Glucose 138 (H) 70 - 99 mg/dL    Alkaline Phosphatase 47 35 - 104 U/L    AST 27 10 - 35 U/L    ALT 25 10 - 35 U/L    Protein Total 6.1 (L) 6.4 - 8.3 g/dL    Albumin 3.7 3.5 - 5.2 g/dL    Bilirubin Total <0.2 <=1.2 mg/dL    GFR Estimate 34 (L) >60 mL/min/1.73m2   Troponin T, High Sensitivity     Status: Abnormal   Result Value Ref Range    Troponin T, High Sensitivity 35 (H) <=14 ng/L   BNP     Status: Abnormal   Result Value Ref Range    N terminal Pro BNP Inpatient 2,164 (H) 0 - 1,800 pg/mL   CBC with platelets and differential     Status: Abnormal   Result Value Ref Range    WBC Count 5.7 4.0 - 11.0 10e3/uL    RBC Count 2.62 (L) 3.80 - 5.20 10e6/uL    Hemoglobin 6.4 (LL) 11.7 - 15.7 g/dL    Hematocrit 21.9 (L) 35.0 - 47.0 %    MCV 84 78 - 100 fL    MCH 24.4 (L) 26.5 - 33.0 pg    MCHC 29.2 (L) 31.5 - 36.5 g/dL    RDW 17.4 (H) 10.0 - 15.0 %    Platelet Count 190 150 - 450 10e3/uL    % Neutrophils 76 %    % Lymphocytes 14 %    % Monocytes 7 %    % Eosinophils 1 %    % Basophils 1 %    % Immature Granulocytes 1 %    NRBCs per 100 WBC 0 <1 /100    Absolute Neutrophils 4.3 1.6 - 8.3 10e3/uL    Absolute Lymphocytes 0.8 0.8 - 5.3  10e3/uL    Absolute Monocytes 0.4 0.0 - 1.3 10e3/uL    Absolute Eosinophils 0.1 0.0 - 0.7 10e3/uL    Absolute Basophils 0.1 0.0 - 0.2 10e3/uL    Absolute Immature Granulocytes 0.1 <=0.4 10e3/uL    Absolute NRBCs 0.0 10e3/uL   Troponin T, High Sensitivity     Status: Abnormal   Result Value Ref Range    Troponin T, High Sensitivity 37 (H) <=14 ng/L   INR     Status: Abnormal   Result Value Ref Range    INR 1.21 (H) 0.85 - 1.15   TSH with free T4 reflex     Status: Normal   Result Value Ref Range    TSH 0.68 0.30 - 4.20 uIU/mL   EKG 12 lead     Status: None (Preliminary result)   Result Value Ref Range    Systolic Blood Pressure  mmHg    Diastolic Blood Pressure  mmHg    Ventricular Rate 58 BPM    Atrial Rate 58 BPM    CA Interval 240 ms    QRS Duration 156 ms     ms    QTc 522 ms    P Axis 88 degrees    R AXIS -7 degrees    T Axis 152 degrees    Interpretation ECG       Sinus bradycardia with 1st degree A-V block with occasional Premature ventricular complexes  Left bundle branch block  Abnormal ECG     Adult Type and Screen     Status: None   Result Value Ref Range    ABO/RH(D) A NEG     Antibody Screen Negative Negative    SPECIMEN EXPIRATION DATE [id number]    Prepare red blood cells (unit)     Status: None   Result Value Ref Range    Blood Component Type Red Blood Cells     Product Code [id number]     Unit Status Transfused     Unit Number [id number]     CROSSMATCH Compatible     CODING SYSTEM [id number]     ISSUE DATE AND TIME [id number]     UNIT ABO/RH A-     UNIT TYPE ISBT 0600    CBC with platelets differential     Status: Abnormal    Narrative    The following orders were created for panel order CBC with platelets differential.  Procedure                               Abnormality         Status                     ---------                               -----------         ------                     CBC with platelets and d...[[id number]]  Abnormal            Final result                 Please  view results for these tests on the individual orders.   ABO/Rh type and screen *Canceled*     Status: None ()    Narrative    The following orders were created for panel order ABO/Rh type and screen.  Procedure                               Abnormality         Status                     ---------                               -----------         ------                       Please view results for these tests on the individual orders.   ABO/Rh type and screen     Status: None    Narrative    The following orders were created for panel order ABO/Rh type and screen.  Procedure                               Abnormality         Status                     ---------                               -----------         ------                     Adult Type and Screen[[id number]]                            Final result                 Please view results for these tests on the individual orders.     Medications   0.9% sodium chloride BOLUS (has no administration in time range)   lidocaine 1 % 0.1-1 mL (has no administration in time range)   lidocaine (LMX4) cream (has no administration in time range)   sodium chloride (PF) 0.9% PF flush 3 mL (has no administration in time range)   sodium chloride (PF) 0.9% PF flush 3 mL (has no administration in time range)   melatonin tablet 3 mg (has no administration in time range)   polyethylene glycol (MIRALAX) Packet 17 g (has no administration in time range)   senna-docusate (SENOKOT-S/PERICOLACE) 8.6-50 MG per tablet 1 tablet (has no administration in time range)     Or   senna-docusate (SENOKOT-S/PERICOLACE) 8.6-50 MG per tablet 2 tablet (has no administration in time range)   albuterol (PROVENTIL HFA/VENTOLIN HFA) inhaler (has no administration in time range)   carvedilol (COREG) tablet 12.5 mg (has no administration in time range)   cloNIDine (CATAPRES) tablet 0.1 mg (has no administration in time range)   desvenlafaxine (PRISTIQ) 24 hr tablet 100 mg (has no administration in time  range)   fluticasone-vilanterol (BREO ELLIPTA) 100-25 MCG/ACT inhaler 1 puff (has no administration in time range)   isosorbide mononitrate (IMDUR) 24 hr tablet 60 mg ( Oral Automatically Held 12/4/22 0800)   lamoTRIgine (LaMICtal) tablet 25 mg (has no administration in time range)   lamoTRIgine (LaMICtal) tablet 200 mg (has no administration in time range)   levothyroxine (SYNTHROID/LEVOTHROID) tablet 50 mcg (has no administration in time range)   liothyronine (CYTOMEL) tablet 10 mcg (has no administration in time range)   memantine (NAMENDA) tablet 10 mg (has no administration in time range)   mirtazapine (REMERON) tablet TABS 7.5 mg (has no administration in time range)   rosuvastatin (CRESTOR) tablet 40 mg (has no administration in time range)   ticagrelor (BRILINTA) tablet 90 mg ( Oral Automatically Held 12/3/22 2000)        Assessments & Plan (with Medical Decision Making)   This is a 87 this is an 87-year-old female anticoagulated on Brilinta and aspirin presenting with concerns for syncopal event prior to coming into the emergency department.  On presentation patient has stable vital signs, she is not tachycardic or tachypneic.  Physical exam shows no focal neurologic deficits, no obvious findings to suggest trauma.  Patient is generally asymptomatic on my evaluation of her.  Initial EKG my interpretation shows sinus bradycardia, without findings to suggest ischemia.  Metabolic function shows creatinine similar to baseline, no electrolyte disturbances.  CBC notable for new anemia, hemoglobin 6.4 down from 8.1 10/31/22, drop of 1.7 points in that time.  I did perform a bedside CIARA with nursing staff present, returned positive guaiac, with palpable stool burden, stool is brown without findings to suggest gross GI bleed.    Patient's syncopal episode also further evaluated with troponins, initial troponin 35 subsequent troponin 37, and absence of any findings for ischemia on EKG, low suspicion of ACS causing  patient's episode.  BNP is elevated, at 2164 increased from 1 month ago 1500.  Chest x-ray returned showing trace pleural effusions versus thickening without consolidation.  Patient does not have significant weight gain, lower extremity edema, significant changes on chest x-ray to suggest CHF exacerbation, low suspicion.  Patient is not tachycardic, hypoxic, not having any chest pain or shortness of breath, low suspicion of PE causing the syncopal episode.  In addition, patient is reportedly hitting her head and being anticoagulated on aspirin and Brilinta a CT head was obtained, returns negative for bleed or fracture.    With patient being significantly anemic, she was typed and screened, and I did consent her for blood.  She was given 1 unit packed red blood cells here in the ED.  With unclear etiology for patient's normocytic anemia, I do feel she would benefit with admission for observation of her hemoglobins, to ensure that she has no more syncopal episodes prior to going home.  Report given to the medicine team, who will be managing the patient.  I had initially tried to admit the patient to ED observation, unfortunately due to the large drop in hemoglobin she was not a candidate.    Patient seen and discussed with attending physician , who agrees with my plan of care.    I have reviewed the nursing notes. I have reviewed the findings, diagnosis, plan and need for follow up with the patient.    New Prescriptions    No medications on file       Final diagnoses:   Syncope and collapse   Anemia, unspecified type     --  HEIDI Fuller  [facility] EMERGENCY DEPARTMENT  11/30/2022

## 2022-12-01 ENCOUNTER — APPOINTMENT (OUTPATIENT)
Dept: CARDIOLOGY | Facility: CLINIC | Age: 87
End: 2022-12-01
Attending: STUDENT IN AN ORGANIZED HEALTH CARE EDUCATION/TRAINING PROGRAM
Payer: MEDICARE

## 2022-12-01 ENCOUNTER — APPOINTMENT (OUTPATIENT)
Dept: PHYSICAL THERAPY | Facility: CLINIC | Age: 87
End: 2022-12-01
Payer: MEDICARE

## 2022-12-01 ENCOUNTER — TELEPHONE (OUTPATIENT)
Dept: INFUSION THERAPY | Facility: CLINIC | Age: 87
End: 2022-12-01

## 2022-12-01 ENCOUNTER — TELEPHONE (OUTPATIENT)
Dept: CARDIOLOGY | Facility: CLINIC | Age: 87
End: 2022-12-01

## 2022-12-01 ENCOUNTER — APPOINTMENT (OUTPATIENT)
Dept: GENERAL RADIOLOGY | Facility: CLINIC | Age: 87
End: 2022-12-01
Attending: INTERNAL MEDICINE
Payer: MEDICARE

## 2022-12-01 LAB
ALBUMIN SERPL BCG-MCNC: 3.4 G/DL (ref 3.5–5.2)
ALP SERPL-CCNC: 47 U/L (ref 35–104)
ALT SERPL W P-5'-P-CCNC: 16 U/L (ref 10–35)
ANION GAP SERPL CALCULATED.3IONS-SCNC: 9 MMOL/L (ref 7–15)
AST SERPL W P-5'-P-CCNC: 23 U/L (ref 10–35)
BILIRUB SERPL-MCNC: 0.2 MG/DL
BUN SERPL-MCNC: 35.5 MG/DL (ref 8–23)
CALCIUM SERPL-MCNC: 9.2 MG/DL (ref 8.8–10.2)
CHLORIDE SERPL-SCNC: 112 MMOL/L (ref 98–107)
CREAT SERPL-MCNC: 1.39 MG/DL (ref 0.51–0.95)
DEPRECATED HCO3 PLAS-SCNC: 22 MMOL/L (ref 22–29)
ERYTHROCYTE [DISTWIDTH] IN BLOOD BY AUTOMATED COUNT: 16.8 % (ref 10–15)
FOLATE SERPL-MCNC: 31.6 NG/ML (ref 4.6–34.8)
GFR SERPL CREATININE-BSD FRML MDRD: 37 ML/MIN/1.73M2
GLUCOSE BLDC GLUCOMTR-MCNC: 99 MG/DL (ref 70–99)
GLUCOSE SERPL-MCNC: 95 MG/DL (ref 70–99)
HCT VFR BLD AUTO: 28 % (ref 35–47)
HGB BLD-MCNC: 8.2 G/DL (ref 11.7–15.7)
HOLD SPECIMEN: NORMAL
LVEF ECHO: NORMAL
MCH RBC QN AUTO: 24.7 PG (ref 26.5–33)
MCHC RBC AUTO-ENTMCNC: 29.3 G/DL (ref 31.5–36.5)
MCV RBC AUTO: 84 FL (ref 78–100)
PLATELET # BLD AUTO: 182 10E3/UL (ref 150–450)
POTASSIUM SERPL-SCNC: 4 MMOL/L (ref 3.4–5.3)
PROT SERPL-MCNC: 6.2 G/DL (ref 6.4–8.3)
RBC # BLD AUTO: 3.32 10E6/UL (ref 3.8–5.2)
RETICS # AUTO: 0.06 10E6/UL (ref 0.03–0.1)
RETICS/RBC NFR AUTO: 1.8 % (ref 0.5–2)
SODIUM SERPL-SCNC: 143 MMOL/L (ref 136–145)
WBC # BLD AUTO: 5.6 10E3/UL (ref 4–11)

## 2022-12-01 PROCEDURE — 250N000013 HC RX MED GY IP 250 OP 250 PS 637

## 2022-12-01 PROCEDURE — 99222 1ST HOSP IP/OBS MODERATE 55: CPT | Mod: GC | Performed by: INTERNAL MEDICINE

## 2022-12-01 PROCEDURE — 72070 X-RAY EXAM THORAC SPINE 2VWS: CPT

## 2022-12-01 PROCEDURE — 36415 COLL VENOUS BLD VENIPUNCTURE: CPT

## 2022-12-01 PROCEDURE — 82962 GLUCOSE BLOOD TEST: CPT

## 2022-12-01 PROCEDURE — 82746 ASSAY OF FOLIC ACID SERUM: CPT

## 2022-12-01 PROCEDURE — 97161 PT EVAL LOW COMPLEX 20 MIN: CPT | Mod: GP

## 2022-12-01 PROCEDURE — 72070 X-RAY EXAM THORAC SPINE 2VWS: CPT | Mod: 26 | Performed by: STUDENT IN AN ORGANIZED HEALTH CARE EDUCATION/TRAINING PROGRAM

## 2022-12-01 PROCEDURE — 80053 COMPREHEN METABOLIC PANEL: CPT

## 2022-12-01 PROCEDURE — 85045 AUTOMATED RETICULOCYTE COUNT: CPT

## 2022-12-01 PROCEDURE — 99226 PR SUBSEQUENT OBSERVATION CARE,LEVEL III: CPT | Mod: GC | Performed by: INTERNAL MEDICINE

## 2022-12-01 PROCEDURE — 999N000285 HC STATISTIC VASC ACCESS LAB DRAW WITH PIV START

## 2022-12-01 PROCEDURE — G0378 HOSPITAL OBSERVATION PER HR: HCPCS

## 2022-12-01 PROCEDURE — 85027 COMPLETE CBC AUTOMATED: CPT

## 2022-12-01 PROCEDURE — 93306 TTE W/DOPPLER COMPLETE: CPT

## 2022-12-01 PROCEDURE — 250N000013 HC RX MED GY IP 250 OP 250 PS 637: Performed by: INTERNAL MEDICINE

## 2022-12-01 PROCEDURE — 82668 ASSAY OF ERYTHROPOIETIN: CPT | Performed by: INTERNAL MEDICINE

## 2022-12-01 PROCEDURE — 999N000128 HC STATISTIC PERIPHERAL IV START W/O US GUIDANCE

## 2022-12-01 PROCEDURE — 93306 TTE W/DOPPLER COMPLETE: CPT | Mod: 26 | Performed by: INTERNAL MEDICINE

## 2022-12-01 PROCEDURE — 97530 THERAPEUTIC ACTIVITIES: CPT | Mod: GP

## 2022-12-01 PROCEDURE — 97116 GAIT TRAINING THERAPY: CPT | Mod: GP

## 2022-12-01 RX ORDER — ACETAMINOPHEN 325 MG/1
975 TABLET ORAL EVERY 8 HOURS
Status: DISCONTINUED | OUTPATIENT
Start: 2022-12-01 | End: 2022-12-07 | Stop reason: HOSPADM

## 2022-12-01 RX ADMIN — CARVEDILOL 12.5 MG: 12.5 TABLET, FILM COATED ORAL at 20:00

## 2022-12-01 RX ADMIN — LAMOTRIGINE 200 MG: 200 TABLET ORAL at 08:38

## 2022-12-01 RX ADMIN — DESVENLAFAXINE SUCCINATE 100 MG: 100 TABLET, EXTENDED RELEASE ORAL at 08:39

## 2022-12-01 RX ADMIN — ACETAMINOPHEN 975 MG: 325 TABLET, FILM COATED ORAL at 12:13

## 2022-12-01 RX ADMIN — MIRTAZAPINE 7.5 MG: 7.5 TABLET, FILM COATED ORAL at 03:29

## 2022-12-01 RX ADMIN — MIRTAZAPINE 7.5 MG: 7.5 TABLET, FILM COATED ORAL at 20:01

## 2022-12-01 RX ADMIN — ROSUVASTATIN CALCIUM 40 MG: 40 TABLET, FILM COATED ORAL at 10:12

## 2022-12-01 RX ADMIN — LEVOTHYROXINE SODIUM 50 MCG: 0.05 TABLET ORAL at 08:39

## 2022-12-01 RX ADMIN — FLUTICASONE FUROATE AND VILANTEROL TRIFENATATE 1 PUFF: 100; 25 POWDER RESPIRATORY (INHALATION) at 10:12

## 2022-12-01 RX ADMIN — ACETAMINOPHEN 975 MG: 325 TABLET, FILM COATED ORAL at 04:06

## 2022-12-01 RX ADMIN — CARVEDILOL 12.5 MG: 12.5 TABLET, FILM COATED ORAL at 08:39

## 2022-12-01 RX ADMIN — CARVEDILOL 12.5 MG: 12.5 TABLET, FILM COATED ORAL at 03:30

## 2022-12-01 RX ADMIN — LIOTHYRONINE SODIUM 10 MCG: 5 TABLET ORAL at 08:39

## 2022-12-01 RX ADMIN — ACETAMINOPHEN 975 MG: 325 TABLET, FILM COATED ORAL at 20:00

## 2022-12-01 RX ADMIN — MEMANTINE 10 MG: 10 TABLET ORAL at 08:38

## 2022-12-01 RX ADMIN — LAMOTRIGINE 25 MG: 25 TABLET ORAL at 08:38

## 2022-12-01 ASSESSMENT — ACTIVITIES OF DAILY LIVING (ADL)
ADLS_ACUITY_SCORE: 43
ADLS_ACUITY_SCORE: 39
ADLS_ACUITY_SCORE: 43
ADLS_ACUITY_SCORE: 37
ADLS_ACUITY_SCORE: 39
ADLS_ACUITY_SCORE: 37
ADLS_ACUITY_SCORE: 43
ADLS_ACUITY_SCORE: 30
ADLS_ACUITY_SCORE: 37
ADLS_ACUITY_SCORE: 37

## 2022-12-01 NOTE — PROGRESS NOTES
"[id number]    Vital signs:  Temp: 99.1  F (37.3  C) Temp src: Oral BP: (!) 171/77 Pulse: 56   Resp: 16 SpO2: 94 % O2 Device: None (Room air)   Height: 157.5 cm (5' 2\") Weight: 68.5 kg (151 lb)  Estimated body mass index is 27.62 kg/m  as calculated from the following:    Height as of this encounter: 1.575 m (5' 2\").    Weight as of this encounter: 68.5 kg (151 lb).    AO x4. Bradycardic, hypertensive; antihypertensive given, cardiology consult in place, and monitoring on tele. OVSS on RA. Pain managed w/ scheduled Tylenol. Voids spontaneously. A1.    Continue w/ POC.  "

## 2022-12-01 NOTE — PROGRESS NOTES
"ED PT Eval     12/01/22 1500   Appointment Info   Signing Clinician's Name / Credentials (PT) Tano Bobo, PT, DPT   Quick Adds   Quick Adds Certification   Living Environment   People in Home alone   Current Living Arrangements independent living facility   Home Accessibility no concerns   Transportation Anticipated family or friend will provide   Living Environment Comments Patient lives in [facility] with assist for med, meals, and bathing. Patient's daughter lives nearby and assists with transportation as needed.   Self-Care   Usual Activity Tolerance good   Current Activity Tolerance fair   Regular Exercise Yes   Exercise Amount/Frequency 2 times/wk   Equipment Currently Used at Home grab bar, toilet;grab bar, tub/shower;cane, straight   Fall history within last six months no   Activity/Exercise/Self-Care Comment Patient participates in fitness class at [facility] and walks IND at facility. Patient owns SPC but typically just \"furniture surfs\". Daughter notes difficulty turning while ambulating.   General Information   Onset of Illness/Injury or Date of Surgery 11/30/22   Referring Physician Michelle Page MD   Patient/Family Therapy Goals Statement (PT) To return home safely   Pertinent History of Current Problem (include personal factors and/or comorbidities that impact the POC) Per EMR \"Kamryn Miller is a 87 year old female admitted on 11/30/2022. She has a history of pulmonary hypertension, CKD stage III, ESTHER, CAD status post coronary angioplasty, CHF, history of ischemic cardiomyopathy, TAVR who presents the emergency department today with concerns for syncopal episode found to be acutely anemic without sign of bleeding.\"   General Observations activity: up ad carisa   Cognition   Affect/Mental Status (Cognition) WFL   Posture    Posture Forward head position;Kyphosis   Range of Motion (ROM)   Range of Motion ROM is WFL   Strength (Manual Muscle Testing)   Strength (Manual Muscle Testing) Deficits observed during " functional mobility   Strength Comments slightly deconditioned   Bed Mobility   Bed Mobility supine-sit;sit-supine;scooting/bridging   Scooting/Bridging Rapides (Bed Mobility) moderate assist (50% patient effort)   Supine-Sit Rapides (Bed Mobility) independent   Sit-Supine Rapides (Bed Mobility) independent   Comment, (Bed Mobility) assist for scooting   Transfers   Transfers sit-stand transfer   Sit-Stand Transfer   Sit-Stand Rapides (Transfers) contact guard   Comment, (Sit-Stand Transfer) patient with preference for hand hold assist for comfort   Gait/Stairs (Locomotion)   Rapides Level (Gait) minimum assist (75% patient effort)   Comment, (Gait/Stairs) wide MILENA, slight postural sway, quickly fatigued, min A for hand hold support   Balance   Balance Comments IND sitting balance, wide base stance   Clinical Impression   Criteria for Skilled Therapeutic Intervention Yes, treatment indicated   PT Diagnosis (PT) impaired functional mobility   Influenced by the following impairments decreased activity tolerance, impaired balance   Functional limitations due to impairments gait, stairs   Clinical Presentation (PT Evaluation Complexity) Stable/Uncomplicated   Clinical Presentation Rationale clinical judgement   Clinical Decision Making (Complexity) low complexity   Planned Therapy Interventions (PT) balance training;bed mobility training;gait training;ROM (range of motion);stair training;strengthening;stretching;transfer training   Anticipated Equipment Needs at Discharge (PT) walker, rolling   Risk & Benefits of therapy have been explained evaluation/treatment results reviewed;care plan/treatment goals reviewed;risks/benefits reviewed;current/potential barriers reviewed;participants voiced agreement with care plan;participants included;patient;spouse/significant other   PT Total Evaluation Time   PT Eval, Low Complexity Minutes (91733) 15   Therapy Certification   Start of care date 12/01/22    Certification date from 12/01/22   Certification date to 12/15/22   Medical Diagnosis anemic   Physical Therapy Goals   PT Frequency 3x/week   PT Predicted Duration/Target Date for Goal Attainment 12/15/22   PT Goals Transfers;Gait   PT: Transfers Modified independent;Assistive device   PT: Gait Modified independent;Assistive device;Greater than 200 feet   Interventions   Interventions Quick Adds Gait Training;Therapeutic Activity   PT Discharge Planning   PT Plan trial SPC if INPT   PT Discharge Recommendation (DC Rec) home with assist;home with home care physical therapy   PT Rationale for DC Rec Patient at baseline but demonstrates chronic balance and endurance deficits. Recommend  PT for further balance and endurance training.   PT Brief overview of current status FWW for ambulation   Total Session Time   Total Session Time (sum of timed and untimed services) 15       Tano Bobo, PT, DPT  Pager #[phone]                [facility]  OUTPATIENT PHYSICAL THERAPY EVALUATION  PLAN OF TREATMENT FOR OUTPATIENT REHABILITATION  (COMPLETE FOR INITIAL CLAIMS ONLY)  Patient's Last Name, First Name, M.I.  YOB: 1935  AngelaKamryn  Y                        Provider's Name  [facility] Medical Record No.  [id number]                             Onset Date:  11/30/22   Start of Care Date:  12/01/22   Type:     _X_PT   ___OT   ___SLP Medical Diagnosis:  anemic              PT Diagnosis:  impaired functional mobility Visits from SOC:  1     See note for plan of treatment, functional goals and certification details    I CERTIFY THE NEED FOR THESE SERVICES FURNISHED UNDER        THIS PLAN OF TREATMENT AND WHILE UNDER MY CARE     (Physician co-signature of this document indicates review and certification of the therapy plan).

## 2022-12-01 NOTE — UTILIZATION REVIEW
"Admission Status; Secondary Review Determination     Under the authority of the Utilization Management Committee, the utilization review process indicated a secondary review on the above patient.  The review outcome is based on review of the medical records, discussions with staff, and applying clinical experience noted on the date of the review.       (x) Observation Status Appropriate - This patient does not meet hospital inpatient criteria and is placed in observation status. If this patient's primary payer is Medicare and was admitted as an inpatient, Condition Code 44 should be used and patient status changed to \"observation\".     RATIONALE FOR DETERMINATION:  87-year-old female withaortic stenosis s/p TAVR w/ Arndt Janis Ultra on 09/12/2022, CAD s/p LAD and RCA stent in 2003, LAD PCI in 2020 and last coronary angiogram w/ 50% stenosis of the LAD, HTN, HLD, HFpEF, PAD on DAPT and CKD stage III who presented after a syncopal episode.  Patient found to have iron deficiency anemia 1 month ago with progressive lowering of hemoglobin from the previous 8.6 down to 6.4.  Observation care appropriate to rule out other etiologies of syncope while initiating treatment for iron deficiency anemia.      If patient is felt to be unsafe for discharge requiring urgent gastroenterology endoscopic evaluation, then patient would be appropriate to advance to inpatient care.        The severity of illness, intensity of service provided, expected LOS and risk for adverse outcome make the care appropriate for further observation; however, doesn't meet criteria for hospital inpatient admission. This was discussed with attending physician who concurred with this determination.    The information on this document is developed by the utilization review team in order for the business office to ensure compliance.  This only denotes the appropriateness of proper admission status and does not reflect the quality of care rendered.       "   The definitions of Inpatient Status and Observation Status used in making the determination above are those provided in the CMS Coverage Manual, Chapter 1 and Chapter 6, section 70.4.      Sincerely,     Suman Langley MD    Physician Advisor  Utilization Review/ Case Management  [facility].

## 2022-12-01 NOTE — TELEPHONE ENCOUNTER
Spoke with pt/dtr.will change to virtual visit tomorrow in event pt is discharged.   Will look at scheduling follow up- possibly with core sooner.

## 2022-12-01 NOTE — PLAN OF CARE
"Assumed cares at [id number]     Status: Fall, Dizziness  Neuro:  AOX4  GI/: WNL: On bedside commode  Resp: Not in distress: sating at RA  Mobility: SBA  Cardiac: On tele, Sinus jarad: Denies chest pain  Lines/Drains: PIV line at lt AC, saline locked  Pain: 8/10 mid back pain due to fall, ant chest pain probably due to couple compression  Skin: lt lateral back bruise noted  Labs: Hgb 7.4 after transfusion    Vs: BP (!) 161/68   Pulse 63   Temp 98.8  F (37.1  C) (Oral)   Resp 13   Ht 1.575 m (5' 2\")   Wt 68.5 kg (151 lb)   SpO2 96%   BMI 27.62 kg/m        Plan of Care:   -For CARDS, PT, OT consult  -On tele  "

## 2022-12-01 NOTE — TELEPHONE ENCOUNTER
Pt presently in ED at [facility]. Pt was getting blood drawn yeserday and had syncopal episode. Found to have HGB 6.4  Pt received blood. Is still in ED.   Cardiology supposed to see pt this afternoon.     Pt is schd to see Dr zuniga in clinic tomorrow.   Spoke with both pt and daughter on speaker phone. . Pt is feeling better after getting blood, but heart rate is slow and they are to see cardiology this afternoon.     Unsure if plan is to discharge. Offered to move appt with Dr Zuniga to 12/16- but pts daughter will be out of town and unable to bring pt.     Discussed work up for anemia. Discussed anemia causes/symptoms. Dr Zuniga may want hgb recovered before follow up.   Explained will try to reach Dr Zuniga and follow up with dtr. Appt at this time is schd at 12 noon, but could move to 3:00 pm and change to video visit to accommodate pt/family.     Msg sent to Dr Zuniga.   Instructed pt and dtr, iwll follow up after discussing with dr zuniga. Agreed, verbalized understanding

## 2022-12-01 NOTE — TELEPHONE ENCOUNTER
Left voicemail for patient to call us back to schedule Injectafer infusions     Falguni Arce  Procedure   [phone]

## 2022-12-01 NOTE — PROGRESS NOTES
Paged the team pt c/o back pain 8/10 due to fall yesterday, if she can do xray and Tylenol.  -Tylenol was advised to give and xray to follow in the morning

## 2022-12-01 NOTE — PROGRESS NOTES
Internal Medicine Progress Note     Kamryn Miller MRN:[id number]   YOB: 1935           Assessment and Plan:   Kamryn Miller is a 88 yo female w/ pmhx of pHTN, ckd stage III, CAD s/p coronary angioplasty, hx of ischemic cardiomyopathy, and TAVR (9/2022), who presents after a syncopal episode in clinic and then incidentally found to be anemic to 6.4 on admission. She is s/p 1u PRBC with appropriate response. She is being worked up for acute on chronic anemia.      Symptomatic acute on chronic microcytic anemia  Syncope  INGRID  Patient came from clinic with a syncopal episode, no presyncopal sx or postictal period, fall was not mechanical. Did hit her head though CTH negative. Recently had Imdur increased and hydralazine discontinued ~1 month ago and has been having lower Bps at home (100 SBP at times per DMITRY). Patient was fasting PTA for labs as well.  Considering this, syncopal episode most likely multifactorial 2/2 acute anemia, fasting, and potentially recent TAVR and change in BP meds. Retic index shows hypoproliferation. She does have iron panel done recently consistent with INGRID. Her hgb was 11-12 in early 2022 and consistently trended downward. Last colonoscopy in 2016, 3mm tubular adenoma at hepatic flexure was resected. No further colonoscopies due to age. Creatinine and GFR remained stable throughout the year but a consistent drop in hgb. There definitely could be a component of CKD driving the anemia but would be worth it to r/o other etiologies.   - s/p 1u pRBC  - Could consider giving her IV venofer prior to DC  - Orthostatics  - Regular diet, encourage PO intake  - GI consulted to get patient into colonoscopy, likely to be done OP.   - X-ray of T-spine w/o acute osseous abnormality   - pain control with APAP  - TTE without WAB, TAVR is in place and well seated. No significant changes from prior TTE    Hx of aortic stenosis s/p TAVR on 09/12/2022  Hx of CAD s/p LAD and RCA stent in 2003,  "LAD PCI in 2020  HFpEF, last EF of 55-60% on 10/24  - Cardiology consulted   - Ok to hold brillinta and ASA   - continue imdur 60 mg qdaily   - At discharge, recommend [facility] outpatient cardiac monitoring for 48 hours  - PTA statin, BB      Bipolar  MDD  Insomnia  - Continue PTA Lamictal 225 mg daily  - Continue PTA Pristiq 100 mg daily   - Continue PTA Namenda 10 mg daily  - Continue PTA Remeron 7.5 mg every night  - Melatonin PRN     CKD III  At baseline. Creatinine and GFR remained stable throughout the year but a consistent drop in hgb. There definitely could be a component of CKD driving the anemia but would be worth it to r/o other etiologies.   - Daily BMP     Hypothyroidism  TSH WNL  - Continue PTA levothyroxine and liothyrodine        Diet: Low Saturated Fat Na <2400 mg    DVT Prophylaxis: Pneumatic Compression Devices  Orellana Catheter: Not present  Fluids: None  Central Lines: None  Cardiac Monitoring: Tele  Code Status:   FULL    Dora Ovalles,   Internal Medicine PGY-2    Staffed with Dr. Galvan    SUBJECTIVE  Admitted overnight. NAEO. NNR. Currently not feeling lightheaded, denies CP. Did have some SOB prior to admission, also seems improved here. Spoke with daughter and son in law on phone.     PHYSICAL EXAM:   BP (!) 171/77   Pulse 56   Temp 99.1  F (37.3  C) (Oral)   Resp 16   Ht 1.575 m (5' 2\")   Wt 68.5 kg (151 lb)   SpO2 94%   BMI 27.62 kg/m          General: alert and no distress, pale  Head: NC/AT  Eyes: non-icteric sclera, EOMI  Pulmonary: CTAB, no cough  CV: RRR, no murmurs.   GI: soft, non-tender, non-distended + bowel sounds  Skin: no rashes seen on visible skin  EXT: no edema, WWP  Neuro: A&Ox3, no focal deficits        "

## 2022-12-01 NOTE — CONSULTS
Cardiology Inpatient Consultation  December 1, 2022    Reason for Consult:  A cardiology consult was requested by Dr. Page from the general medicine service to provide clinical guidance regarding syncope.    Assessment and Recommendation:  Kamryn Miller is a 87 year old female with a history of aortic stenosis s/p TAVR w/ Arndt Janis Ultra on 09/12/2022, CAD s/p LAD and RCA stent in 2003, LAD PCI in 2020 and last coronary angiogram w/ 50% stenosis of the LAD, HTN, HLD, HFpEF (last EF of 55-60% on 10/24) and PAD on DAPT who presented with syncope. Cardiology was consulted due to her cardiac history. She is hypertensive today (BP meds were held this AM) and borderline bradycardic; otherwise, vitals are stable. Her hemoglobin at admission was 6.4 and has improved to 8.2 after transfusion of x1 PRBCs.     #. Syncope  #. Dyspnea on exertion, progressively worsening  #. Hx of aortic stenosis s/p TAVR on 09/12/2022  #. Hx of CAD s/p LAD and RCA stent in 2003, LAD PCI in 2020  #. HFpEF, last EF of 55-60% on 10/24  Patient's presenting history is most concerning for syncope secondary to severe anemia. Her hemoglobin has been low since her TAVR procedure (9.8 on 09/13, 9.7 on 09/23, 8.6 on 10/26, 8.1 on 10/31), and on admission, her hemoglobin was severely low at 6.4, necessitating transfusion of x1 PRBCs. This may also explain her worsening shortness of breath over the last 2 weeks. The etiology of her syncope is likely vasovagal and multifactorial, including acute on chronic anemia, orthostatic hypotension from recent increase in Imdur from 30 mg to 60 mg, and she had not eaten the morning prior to her surgery. Other potential etiologies of her syncope were considered, including arrhyhtmia/heart block, post-TAVR aortic insufficiency/stenosis, or other perivalvular disease. She did develop a new LBBB following her TAVR procedure; 1st degree AV block has been noted on prior ECGs. No evidence of advanced or complete  heart block on ECG or telemetry. No ST or T wave changes to suggest acute ischemia, and troponins have been stable. Echocardiogram obtained 12/01 showed an EF of 55-60%, a well-seated aortic valve and trace paravalvular AI, so valvular malfunction or disease is unlikely to have caused her syncope.   - Primary team may consider orthostatic vitals  - Continue PTA statin  - Continue PTA carvedilol  - Continue PTA Imdur 60 mg daily  - Discontinue Brilinta, as her procedure was in 09/2022 and she may have evidence of acute hemorrhage  - Okay to continue holding aspirin for now; resume per primary team when her hemoglobin and possible bleeding are stable  - At discharge, recommend Ziopatch outpatient cardiac monitoring for 2 weeks.    #. Hypertension  Patient's blood pressure has been elevated to 171/77. She may require further blood pressure optimization as an outpatient.   - Continue PTA carvedilol   - Restart Imdur 60 mg daily, reassess blood pressure and orthostatics     Cardiology will sign off at this time.     Patient seen and discussed with Dr. Mcguire, who agrees with above plan.    Thank you for consulting the cardiovascular services at the [facility]. Please do not hesitate to call us with any questions.     Florencio Pickard  MS4  [facility]    I, Winston Thorpe MD, saw this patient with the Med Student and agree with the findings and plan of care as  documented in the above note. I personally reviewed vital signs, labs, images (including EKG, echocardiogram, chest xray, telemetry).      HPI:   Kamryn Miller is a 87 year old female w/ aortic stenosis s/p TAVR w/ Arndt Janis Ultra on 09/12/2022, CAD s/p LAD and RCA stent in 2003, LAD PCI in 2020 and last coronary angiogram w/ 50% stenosis of the LAD, HTN, HLD, HFpEF, PAD on DAPT and CKD stage III who presented after a syncopal episode. Kamryn reports that she was fasting on the morning of 11/30/2022 for a lab draw. She woke  up that morning without any symptoms, including chest pain or difficulty breathing. She was able to ambulate to the bathroom and otherwise had an uneventful morning. She then drove to the clinic for the lab draw, and, at about 1130 yesterday, she was walking into clinic while holding onto her daughter when she suddenly lost consciousness. She denies feeling any symptoms prior to the event, including shortness of breath, chest pain, palpitations, lightheadedness or dizziness. She then woke up a few minutes later (patient is unsure how long she was down) while laying in a bed. Her daughter told her that she did not have any abnormal movements while she lost consciousness. She was lowered to the ground backwards, hitting her back and the back of her head on the ground. EMS told her at the time that her blood pressure was low. She denies any leg swelling or recent illness, including fevers, chills, nausea or vomiting. She has never passed out or lost consciousness before.     Of note, Kamryn reports that she has been having progressively worsening shortness of breath over the last 2 weeks; now she becomes short of breath even when ambulating to the bathroom. She was admitted on 10/24 for atypical chest pain and NSTEMI; angiogram showed non-obstructive CAD w/ 50% stenosis of the LAD. She was started on Imdur 30 mg daily at that time; this was increased to 60 mg daily on 10/31 with one episode of hypotension at home.     Review of Systems:    Complete review of systems was performed and negative except per HPI.    PMH:  Past Medical History:   Diagnosis Date    Aortic valvar stenosis 7/2010    mild    Asthma     Bipolar disorder (H)     CAD (coronary artery disease)     s/p angioplasty    Celiac disease     Colon polyps     Colon polyps 2012    every 3 year colonoscopy     Depression     High cholesterol     HTN     Mitral insufficiency     Pulmonary HTN (H)     mild    Tremors 7/10    drug induced from antidepressants     Tricuspid insufficiency      Active Problems:  Patient Active Problem List    Diagnosis Date Noted    Syncope and collapse 11/30/2022     Priority: Medium    Anemia, unspecified type 11/30/2022     Priority: Medium    Weakness generalized 09/22/2022     Priority: Medium    SOB (shortness of breath) 09/22/2022     Priority: Medium    Left leg claudication (H) 09/22/2022     Priority: Medium    S/P TAVR (transcatheter aortic valve replacement) 09/22/2022     Priority: Medium    Essential hypertension 01/08/2022     Priority: Medium    Stented coronary artery 01/08/2022     Priority: Medium    Atherosclerosis of native coronary artery of native heart without angina pectoris 01/08/2022     Priority: Medium    Encounter for screening laboratory testing for severe acute respiratory syndrome coronavirus 2 (SARS-CoV-2) 01/08/2022     Priority: Medium    Bipolar 1 disorder, depressed, severe (H) 12/16/2021     Priority: Medium    Impairment of balance 11/29/2021     Priority: Medium     11/2021  My balance continues to be an issue.  I fell twice at the end of summer         NYHA class 3 heart failure with preserved ejection fraction (H) 07/21/2021     Priority: Medium    Ischemic cardiomyopathy 07/21/2021     Priority: Medium    Mixed hyperlipidemia 07/21/2021     Priority: Medium    Moderate persistent asthma without complication 07/21/2021     Priority: Medium    Hearing disorder, unspecified laterality 07/21/2021     Priority: Medium    Status post coronary angiogram 11/03/2020     Priority: Medium    CAD S/P percutaneous coronary angioplasty 08/21/2020     Priority: Medium    Generalized anxiety disorder 07/13/2020     Priority: Medium    Osteopenia 07/13/2020     Priority: Medium    Other ill-defined heart diseases 12/17/2019     Priority: Medium     Added automatically from request for surgery [id number]      CKD (chronic kidney disease) stage 3, GFR 30-59 ml/min (H) 06/25/2019     Priority: Medium    Hypothyroidism,  unspecified type 07/14/2017     Priority: Medium    Benign essential hypertension 11/08/2016     Priority: Medium    History of colonic polyps 09/22/2015     Priority: Medium    Celiac disease      Priority: Medium    Family history of celiac disease 09/24/2012     Priority: Medium    Advanced directives, counseling/discussion 07/25/2011     Priority: Medium     Advance Care Planning 10/6/2015: Receipt of ACP document:  Received: Health Care Directive which was witnessed or notarized on 12/18/14.  Document not previously scanned.  Validation form completed and sent with document to be scanned.  Code Status reflects choices in most recent ACP document.  Confirmed/documented designated decision maker(s).  Added by Zari Lopez        Patient states has Advance Directive and will bring in a copy to clinic.      Family history of diabetes mellitus 07/25/2011     Priority: Medium    Tremors      Priority: Medium     drug induced from antidepressants  Problem list name updated by automated process. Provider to review and confirm      Renal insufficiency 07/15/2010     Priority: Medium    Disorder of kidney and ureter 07/15/2010     Priority: Medium    Hyperlipidemia LDL goal <70 07/01/2010     Priority: Medium     Diagnosis updated by automated process. Provider to review and confirm.      Mild major depression (H) 07/01/2010     Priority: Medium    Pulmonary hypertension (H) 07/01/2010     Priority: Medium    Mild persistent asthma 07/01/2010     Priority: Medium    Seasonal allergic rhinitis 07/01/2010     Priority: Medium    Mitral insufficiency      Priority: Medium    Tricuspid insufficiency      Priority: Medium    Anemia 08/28/2008     Priority: Medium     Social History:  Social History     Tobacco Use    Smoking status: Never    Smokeless tobacco: Never   Substance Use Topics    Alcohol use: No     Alcohol/week: 0.0 standard drinks     Types: 1 Standard drinks or equivalent per week    Drug use: No     Family  History:  Family History   Problem Relation Age of Onset    Asthma Mother     Cerebrovascular Disease Mother     Arthritis Mother     Depression Mother     Lipids Sister     Hypertension Sister     Heart Disease Sister     Lipids Sister     Hypertension Sister     Lipids Sister     Breast Cancer Sister        Medications:   acetaminophen  975 mg Oral Q8H    carvedilol  12.5 mg Oral BID    desvenlafaxine  100 mg Oral Daily    fluticasone-vilanterol  1 puff Inhalation Daily    [Held by provider] isosorbide mononitrate  60 mg Oral Daily    lamoTRIgine  200 mg Oral Daily    lamoTRIgine  25 mg Oral Daily    levothyroxine  50 mcg Oral Daily    liothyronine  10 mcg Oral Daily    memantine  10 mg Oral Daily    mirtazapine  7.5 mg Oral At Bedtime    rosuvastatin  40 mg Oral Daily    sodium chloride (PF)  3 mL Intracatheter Q8H    [Held by provider] ticagrelor  90 mg Oral BID           Physical Exam:  Temp:  [97.9  F (36.6  C)-99.1  F (37.3  C)] 99.1  F (37.3  C)  Pulse:  [57-68] 57  Resp:  [13-18] (P) 16  BP: (100-171)/() 171/77  SpO2:  [95 %-100 %] (P) 97 %    Intake/Output Summary (Last 24 hours) at 12/1/2022 0934  Last data filed at 11/30/2022 1835  Gross per 24 hour   Intake 300 ml   Output --   Net 300 ml     GEN: pleasant, no acute distress, laying in bed   CV: bradycardic, rate of 58. Regular rhythm. Distant heart sounds. No murmurs. No carotid bruit. No JVP elevation.   CHEST: clear to ausculation bilaterally, no rales or wheezing  ABD: soft, non-tender, normal active bowel sounds  EXTR: bilateral radial, PT pulses 2+. No clubbing, cyanosis or edema.   NEURO: alert oriented, speech fluent/appropriate, motor grossly nonfocal    Diagnostics:  All labs and imaging were reviewed, of note:    CMP  Recent Labs   Lab 12/01/22  0803 11/30/22  1242    140   POTASSIUM 4.0 4.5   CHLORIDE 112* 107   CO2 22 21*   ANIONGAP 9 12   GLC 95 138*   BUN 35.5* 40.7*   CR 1.39* 1.48*   GFRESTIMATED 37* 34*   PRINCE 9.2 9.4    PROTTOTAL 6.2* 6.1*   ALBUMIN 3.4* 3.7   BILITOTAL 0.2 <0.2   ALKPHOS 47 47   AST 23 27   ALT 16 25     CBC  Recent Labs   Lab 12/01/22  0803 11/30/22 2004 11/30/22  1242   WBC 5.6  --  5.7   RBC 3.32*  --  2.62*   HGB 8.2* 7.4* 6.4*   HCT 28.0*  --  21.9*   MCV 84  --  84   MCH 24.7*  --  24.4*   MCHC 29.3*  --  29.2*   RDW 16.8*  --  17.4*     --  190     INR  Recent Labs   Lab 11/30/22  1243   INR 1.21*     Arterial Blood GasNo lab results found in last 7 days.    No results found for: TROPI, TROPONIN, TROPR, TROPN    EKG:  Rate of 58. 1st degree AVB, otherwise sinus rhythm w/ one sinus pause. LBBB.     CXR:   TECHNIQUE: Frontal and lateral views of the chest.     FINDINGS:   TAVR. Coronary artery stent. Midline trachea. Stable cardiomediastinal  silhouette. Distinct pulmonary vasculature. Trace costophrenic  blunting bilaterally. No discernible pneumothorax. Normal lung  volumes. No focal airspace or interstitial opacities. Probable  retrocardiac double density, representing a moderately sized hiatal  hernia. Thoracolumbar spondylosis. Osteopenia. Unremarkable soft  tissues.                                                                    IMPRESSION: Trace pleural effusions versus thickening. Otherwise  negative.    Transthoracic echocardiogram:   12/01/2022:  Interpretation Summary  Global and regional left ventricular function is normal with an EF of 55-60%.  Global right ventricular function is normal.  Status post 26 mm Arndt Janis Ultra valve TAVR on 9/12/2022. The valve is  well seated. MG 11mmHg; PV 2.2 m/s; trace paravalvular AI.  The inferior vena cava is normal.     No significant change compared with the study from 10/24/22.  ______________________________________________________________________________  Left Ventricle  Global and regional left ventricular function is normal with an EF of 55-60%.     Right Ventricle  The right ventricle is normal size. Global right ventricular function  is  normal.     Mitral Valve  Severe mitral annular calcification is present. The mean pressure gradient is  3.8 mmHg at a heart rate of 60 bpm.     Aortic Valve  Status post 26 mm Arndt Janis Ultra valve TAVR on 2022. The valve is  well seated. MG 11mmHg; PV 2.2 m/s; trace paravalvular AI. A bioprosthetic  aortic valve is present. The peak velocity across the aortic valve is 2.2  m/sec. The mean gradient is 11 mmHg.     Tricuspid Valve  The tricuspid valve is normal. The peak velocity of the tricuspid regurgitant  jet is not obtainable. Pulmonary artery systolic pressure cannot be assessed.     Pulmonic Valve  The pulmonic valve is normal. Mild pulmonic insufficiency is present.     Vessels  The thoracic aorta is normal. The aorta root cannot be assessed. The inferior  vena cava is normal. Ascending aorta 3.7 cm.  ______________________________________________________________________________  MMode/2D Measurements & Calculations  LVIDd: 5.2 cm     Doppler Measurements & Calculations  MV max P.4 mmHg  MV mean PG: 3.8 mmHg  MV V2 VTI: 57.8 cm  Ao V2 max: 221.1 cm/sec  Ao max P.5 mmHg  Ao V2 mean: 161.4 cm/sec  Ao mean P.4 mmHg  Ao V2 VTI: 46.0 cm  LV V1 max P.1 mmHg  LV V1 max: 166.8 cm/sec  LV V1 VTI: 34.6 cm  AV Skinny Ratio (DI): 0.75

## 2022-12-01 NOTE — PLAN OF CARE
OT: after chart review and conversation with PT post PT evaluation it is concluded that this pt has no acute OT needs. Pt currently mobilizing with PT and will not require 2 skills services to return to PLOF. Defer acute OT today.

## 2022-12-02 LAB
ANION GAP SERPL CALCULATED.3IONS-SCNC: 10 MMOL/L (ref 7–15)
ATRIAL RATE - MUSE: 55 BPM
BASOPHILS # BLD AUTO: 0.1 10E3/UL (ref 0–0.2)
BASOPHILS NFR BLD AUTO: 1 %
BUN SERPL-MCNC: 35.1 MG/DL (ref 8–23)
CALCIUM SERPL-MCNC: 9.2 MG/DL (ref 8.8–10.2)
CHLORIDE SERPL-SCNC: 109 MMOL/L (ref 98–107)
CREAT SERPL-MCNC: 1.49 MG/DL (ref 0.51–0.95)
DEPRECATED HCO3 PLAS-SCNC: 23 MMOL/L (ref 22–29)
DIASTOLIC BLOOD PRESSURE - MUSE: NORMAL MMHG
EOSINOPHIL # BLD AUTO: 0.3 10E3/UL (ref 0–0.7)
EOSINOPHIL NFR BLD AUTO: 6 %
ERYTHROCYTE [DISTWIDTH] IN BLOOD BY AUTOMATED COUNT: 16.8 % (ref 10–15)
GFR SERPL CREATININE-BSD FRML MDRD: 34 ML/MIN/1.73M2
GLUCOSE SERPL-MCNC: 99 MG/DL (ref 70–99)
HCT VFR BLD AUTO: 27.9 % (ref 35–47)
HGB BLD-MCNC: 8.2 G/DL (ref 11.7–15.7)
IMM GRANULOCYTES # BLD: 0 10E3/UL
IMM GRANULOCYTES NFR BLD: 0 %
INTERPRETATION ECG - MUSE: NORMAL
LYMPHOCYTES # BLD AUTO: 1.3 10E3/UL (ref 0.8–5.3)
LYMPHOCYTES NFR BLD AUTO: 23 %
MCH RBC QN AUTO: 25.1 PG (ref 26.5–33)
MCHC RBC AUTO-ENTMCNC: 29.4 G/DL (ref 31.5–36.5)
MCV RBC AUTO: 85 FL (ref 78–100)
MONOCYTES # BLD AUTO: 0.8 10E3/UL (ref 0–1.3)
MONOCYTES NFR BLD AUTO: 13 %
NEUTROPHILS # BLD AUTO: 3.3 10E3/UL (ref 1.6–8.3)
NEUTROPHILS NFR BLD AUTO: 57 %
NRBC # BLD AUTO: 0 10E3/UL
NRBC BLD AUTO-RTO: 0 /100
P AXIS - MUSE: 40 DEGREES
PLATELET # BLD AUTO: 188 10E3/UL (ref 150–450)
POTASSIUM SERPL-SCNC: 4.5 MMOL/L (ref 3.4–5.3)
PR INTERVAL - MUSE: 224 MS
QRS DURATION - MUSE: 158 MS
QT - MUSE: 512 MS
QTC - MUSE: 489 MS
R AXIS - MUSE: -8 DEGREES
RBC # BLD AUTO: 3.27 10E6/UL (ref 3.8–5.2)
SODIUM SERPL-SCNC: 142 MMOL/L (ref 136–145)
SYSTOLIC BLOOD PRESSURE - MUSE: NORMAL MMHG
T AXIS - MUSE: 155 DEGREES
VENTRICULAR RATE- MUSE: 55 BPM
WBC # BLD AUTO: 5.8 10E3/UL (ref 4–11)

## 2022-12-02 PROCEDURE — 250N000013 HC RX MED GY IP 250 OP 250 PS 637

## 2022-12-02 PROCEDURE — G0378 HOSPITAL OBSERVATION PER HR: HCPCS

## 2022-12-02 PROCEDURE — 93005 ELECTROCARDIOGRAM TRACING: CPT

## 2022-12-02 PROCEDURE — 80048 BASIC METABOLIC PNL TOTAL CA: CPT

## 2022-12-02 PROCEDURE — 99226 PR SUBSEQUENT OBSERVATION CARE,LEVEL III: CPT | Mod: GC | Performed by: INTERNAL MEDICINE

## 2022-12-02 PROCEDURE — 93010 ELECTROCARDIOGRAM REPORT: CPT | Mod: 76 | Performed by: INTERNAL MEDICINE

## 2022-12-02 PROCEDURE — 85025 COMPLETE CBC W/AUTO DIFF WBC: CPT

## 2022-12-02 PROCEDURE — 36415 COLL VENOUS BLD VENIPUNCTURE: CPT

## 2022-12-02 RX ADMIN — CARVEDILOL 12.5 MG: 12.5 TABLET, FILM COATED ORAL at 07:47

## 2022-12-02 RX ADMIN — DESVENLAFAXINE SUCCINATE 100 MG: 100 TABLET, EXTENDED RELEASE ORAL at 07:47

## 2022-12-02 RX ADMIN — CARVEDILOL 12.5 MG: 12.5 TABLET, FILM COATED ORAL at 19:41

## 2022-12-02 RX ADMIN — ACETAMINOPHEN 975 MG: 325 TABLET, FILM COATED ORAL at 04:09

## 2022-12-02 RX ADMIN — ROSUVASTATIN CALCIUM 40 MG: 40 TABLET, FILM COATED ORAL at 07:47

## 2022-12-02 RX ADMIN — LEVOTHYROXINE SODIUM 50 MCG: 0.05 TABLET ORAL at 07:47

## 2022-12-02 RX ADMIN — LAMOTRIGINE 25 MG: 25 TABLET ORAL at 07:48

## 2022-12-02 RX ADMIN — ACETAMINOPHEN 975 MG: 325 TABLET, FILM COATED ORAL at 11:04

## 2022-12-02 RX ADMIN — ACETAMINOPHEN 975 MG: 325 TABLET, FILM COATED ORAL at 19:41

## 2022-12-02 RX ADMIN — ISOSORBIDE MONONITRATE 60 MG: 60 TABLET, EXTENDED RELEASE ORAL at 07:47

## 2022-12-02 RX ADMIN — MEMANTINE 10 MG: 10 TABLET ORAL at 07:47

## 2022-12-02 RX ADMIN — FLUTICASONE FUROATE AND VILANTEROL TRIFENATATE 1 PUFF: 100; 25 POWDER RESPIRATORY (INHALATION) at 07:49

## 2022-12-02 RX ADMIN — LIOTHYRONINE SODIUM 10 MCG: 5 TABLET ORAL at 07:48

## 2022-12-02 RX ADMIN — LAMOTRIGINE 200 MG: 200 TABLET ORAL at 07:47

## 2022-12-02 RX ADMIN — MIRTAZAPINE 7.5 MG: 7.5 TABLET, FILM COATED ORAL at 21:17

## 2022-12-02 ASSESSMENT — ACTIVITIES OF DAILY LIVING (ADL)
DEPENDENT_IADLS:: MEDICATION MANAGEMENT;MEAL PREPARATION;TRANSPORTATION
ADLS_ACUITY_SCORE: 30

## 2022-12-02 NOTE — CONSULTS
Care Management Initial Consult    General Information  Assessment completed with: Patient, VM-chart review,    Type of CM/SW Visit: Initial Assessment    Primary Care Provider verified and updated as needed:     Readmission within the last 30 days:        Reason for Consult: discharge planning  Advance Care Planning: Advance Care Planning Reviewed: no concerns identified          Communication Assessment  Patient's communication style: spoken language (English or Bilingual)    Hearing Difficulty or Deaf: no   Wear Glasses or Blind: yes    Cognitive  Cognitive/Neuro/Behavioral: WDL                      Living Environment:   People in home: alone     Current living Arrangements: assisted living      Able to return to prior arrangements: yes       Family/Social Support:  Care provided by: self ([facility] staff assist with medication setup/administration, meals and housekeeping)  Provides care for: no one  Marital Status:   Children          Description of Support System: Supportive, Involved    Support Assessment: Adequate family and caregiver support    Current Resources:   Patient receiving home care services: No     Community Resources:  ([facility] staff assist with medication set up/administrration and meals)  Equipment currently used at home: grab bar, toilet, grab bar, tub/shower  Supplies currently used at home:      Employment/Financial:  Employment Status: retired        Financial Concerns: No concerns identified           Lifestyle & Psychosocial Needs:  Social Determinants of Health     Tobacco Use: Low Risk      Smoking Tobacco Use: Never     Smokeless Tobacco Use: Never     Passive Exposure: Not on file   Alcohol Use: Not on file   Financial Resource Strain: Not on file   Food Insecurity: Not on file   Transportation Needs: Not on file   Physical Activity: Not on file   Stress: Not on file   Social Connections: Not on file   Intimate Partner Violence: Not on file   Depression: Not at risk     PHQ-2 Score: 1    Housing Stability: Not on file       Functional Status:  Prior to admission patient needed assistance:   Dependent ADLs:: Independent  Dependent IADLs:: Medication Management, Meal Preparation, Transportation       Mental Health Status:  Mental Health Status: No Current Concerns       Chemical Dependency Status:  Chemical Dependency Status: No Current Concerns             Values/Beliefs:  Spiritual, Cultural Beliefs, [employer] Practices, Values that affect care:                 Additional Information:  Pt admitted with sycope with acute anemia without signs of bleeding.   Pt with a medical history significant for     pulmonary hypertension, CKD stage III, ESTHER, CAD status post coronary angioplasty, CHF, history of ischemic cardiomyopathy, TAVR.  PT/OT consulted and recommending home care PT.  Cardiology consulted.  Pt currently listed as observation status.   Per chart review noted pt is from:    [facility]  [address] [place] [address]  Ph: [phone]  Fax: [phone]      Met with pt.  Introduced RNCC role.  Pt confirmed she resides at [facility].  [facility] staff assist with medications, meals.  Pt normally independent with ambulation and her own care needs.  Per pt when cleared for discharge recommending home PT.  Reviewed Medicare.gov and home care star rating.  Pt has no home care preference.  Per pt her daughter Sarah would be the one transporting home. Pt notes no concerns regarding her ability to return to [facility].  Reviewed, MARTINEZ and provided pt with observation info. Sheet.      Initiated home care referral to [facility] who accepted pt for home PT with estimated start of care on 12/7.  Discharge home care orders updated.    Spoke with VEENA Murphy with [facility]  Pt is able to return to [facility] over the weekend.  RN 24 hour cellphone is [phone].       Twyla Pantoja RN BSN, PHN, ACM-RN  7A RN Care Coordinator  Phone: [phone]  Pager [phone]    To  contact the weekend RNCC  [place] (0800 - 1630) Saturday and Sunday    Units: 4A, 4C, 4E, 5A and 5B- Pager 1: [phone]    Units: 6A, 6B, 6C, 6D- Pager 2: [phone]    Units: 7A, 7B, 7C, 7D, and 5C-Pager 3: [phone]    [facility] ([id number]) Saturday and Sunday    Units: 5 Ortho, 8A, 10 ICU, & Pediatric Units-Pager 4: [phone]    12/2/2022 11:58 AM

## 2022-12-02 NOTE — PROGRESS NOTES
"    Internal Medicine Progress Note     Kamryn Miller MRN:[id number]   YOB: 1935           Assessment and Plan:   Kamryn Miller is a 86 yo female w/ pmhx of pHTN, ckd stage III, CAD s/p coronary angioplasty, hx of ischemic cardiomyopathy, and TAVR (9/2022), who presents after a syncopal episode in clinic and then incidentally found to be anemic to 6.4 on admission. She is s/p 1u PRBC with appropriate response. She is being worked up for acute on chronic anemia.      Symptomatic acute on chronic microcytic anemia  Syncope  INGRID  Patient came from clinic with a syncopal episode, no presyncopal sx or postictal period, fall was not mechanical. Did hit her head though CTH negative. Recently had Imdur increased and hydralazine discontinued ~1 month ago and has been having lower Bps at home (100 SBP at times per long-term). Patient was fasting PTA for labs as well.  Considering this, syncopal episode most likely multifactorial 2/2 acute anemia, fasting, and potentially recent TAVR and change in BP meds. Retic index shows hypoproliferation. She does have iron panel done recently consistent with INGRID. Her hgb was 11-12 in early 2022 and consistently trended downward. Last colonoscopy in 2016, 3mm tubular adenoma at hepatic flexure was resected. No further colonoscopies due to age. Creatinine and GFR remained stable throughout the year but a consistent drop in hgb. There definitely could be a component of CKD driving the anemia but would be worth it to r/o other etiologies. Orthostatics negative. TTE without WAB, TAVR is in place and well seated. No significant changes from prior TTE  - s/p 1u pRBC, with appropriate improvement in Hgb  - Could consider giving her IV venofer prior to discontinue  - Epo levels pending  - Regular diet, encourage PO intake  - GI consult   - patient is willing to undergo colon prep   - cards: RCRI of 2 with 10.1% 30 day risk of death, MI or cardiac arrest   - \"Patient is high risk for " "complications; however, she is also at high risk for continued bleeding if she does not undergo endoscopy\"   - Patient and family deciding whether or not to undergo EGD/colon    Hx of aortic stenosis s/p TAVR on 09/12/2022  Hx of CAD s/p LAD and RCA stent in 2003, LAD PCI in 2020  HFpEF, last EF of 55-60% on 10/24  - Cardiology consulted   - Ok to hold brillinta and ASA   - continue imdur 60 mg qdaily   - At discharge, recommend Ziopatch outpatient cardiac monitoring for 48 hours  - PTA statin, BB     Low Back pain   Fall 2/2 syncope  X-ray of T-spine w/o acute osseous abnormality  - pain control with APAP     Bipolar  MDD  Insomnia  - Continue PTA Lamictal 225 mg daily  - Continue PTA Pristiq 100 mg daily   - Continue PTA Namenda 10 mg daily  - Continue PTA Remeron 7.5 mg every night  - Melatonin PRN     CKD III  At baseline. Creatinine and GFR remained stable throughout the year but a consistent drop in hgb. There definitely could be a component of CKD driving the anemia but would be worth it to r/o other etiologies.   - Daily BMP     Hypothyroidism  TSH WNL  - Continue PTA levothyroxine and liothyrodine        Diet: Low Saturated Fat Na <2400 mg    DVT Prophylaxis: Pneumatic Compression Devices  Orellana Catheter: Not present  Fluids: None  Central Lines: None  Cardiac Monitoring: Tele  Code Status:   FULL    Dora Ovalles, DO  Internal Medicine PGY-2    Staffed with Dr. Cole YEAGER  Admitted overnight. NAEO. NNR. Patient is feeling better today. Slept well.     PHYSICAL EXAM:   /59 (BP Location: Left arm)   Pulse 55   Temp 98  F (36.7  C) (Oral)   Resp 16   Ht 1.575 m (5' 2\")   Wt 68.5 kg (151 lb)   SpO2 94%   BMI 27.62 kg/m          General: alert and no distress, pale  Head: NC/AT  Eyes: non-icteric sclera, EOMI  Pulmonary: CTAB, no cough  CV: RRR, no murmurs.   GI: soft, non-tender, non-distended + bowel sounds  Skin: no rashes seen on visible skin  EXT: no edema, WWP  Neuro: A&Ox3, " no focal deficits

## 2022-12-02 NOTE — TELEPHONE ENCOUNTER
No openings with Dr Zuniga until 3/3/22 at .     [place] [facility] [place] 1:00 [place], labs prior.

## 2022-12-02 NOTE — PROGRESS NOTES
Patient transferred to unit 7A, VSS on RA. Pain on chest due to chest compressions given yesterday, given schedule Tylenol - effective per patient report, 0/10.  giRegular diet - tolerated well, SBA GB and walker. Got up 2x to the bathroom. Given report to Pham.

## 2022-12-02 NOTE — PROGRESS NOTES
"/59 (BP Location: Left arm)   Pulse 55   Temp 98  F (36.7  C) (Oral)   Resp 16   Ht 1.575 m (5' 2\")   Wt 68.5 kg (151 lb)   SpO2 94%   BMI 27.62 kg/m       Neuro: A&Ox4.   Cardiac: Afebrile, VSS.   Respiratory: RA   GI/: Voiding spontaneously. No BM this shift.   Diet/appetite: Tolerating diet. Denies nausea   Activity: Up SBA  Pain: Denies- scheduled tylenol  Skin: No new deficits noted.  Lines:PIV  Drains:None  No new complaints.Will continue to monitor and follow plan of care.       "

## 2022-12-02 NOTE — UTILIZATION REVIEW
"Admission Status; Secondary Review Determination     Admission Date: 11/30/2022 12:33 PM       Under the authority of the Utilization Management Committee, the utilization review process indicated a secondary review on the above patient.  The review outcome is based on review of the medical records, discussions with staff, and applying clinical experience noted on the date of the review.        (x)      Inpatient Status Appropriate - This patient's medical care is consistent with medical management for inpatient care and reasonable inpatient medical practice.      () Observation Status Appropriate - This patient does not meet hospital inpatient criteria and is placed in observation status. If this patient's primary payer is Medicare and was admitted as an inpatient, Condition Code 44 should be used and patient status changed to \"observation\".   () Admission Status NOT Appropriate - This patient's medical care is not consistent with medical management for Inpatient or Observation Status.        () Outpatient Procedure Status Appropriate - Procedure not on Medicare Inpatient list and no complications at the time of this review       RATIONALE FOR DETERMINATION      Brief clinical presentation, information copied from the chart, abbreviated and edited for relevant content:     THIS IS CONTINGENT REVIEW - will keep OBS until decision is made regarding the need and timing for endoscopy.     Patient is a 87-year-old female withaortic stenosis s/p TAVR w/ Randt Janis Ultra on 09/12/2022, CAD s/p LAD and RCA stent in 2003, LAD PCI in 2020 and last coronary angiogram w/ 50% stenosis of the LAD, HTN, HLD, HFpEF, PAD on DAPT and CKD stage III who presented after a syncopal episode.  Patient found to have iron deficiency anemia 1 month ago with progressive lowering of hemoglobin from the previous 8.6 down to 6.4.   Had transfusion yesterday and hgb improved to 8.2. Yesterday reviewed, changed from Ip to OBS with the caveat " that if patient is felt to be unsafe for discharge requiring urgent gastroenterology endoscopic evaluation, then patient would be appropriate to advance to inpatient care.    Discussed with attending, cardiology has cleared her for procedure, but the team has not conferred with GI regarding the procedure. If GI wants to do it urgently, will change to IP. If not, will keep OBS.            In summary, the severity of illness, intensity of service provided, expected length of stay and risk for adverse outcome make the care complex, high risk and appropriate for hospital admission.        The information on this document is developed by the utilization review team in order for the business office to ensure compliance.  This only denotes the appropriateness of proper admission status and does not reflect the quality of care rendered.         The definitions of Inpatient Status and Observation Status used in making the determination above are those provided in the CMS Coverage Manual, Chapter 1 and Chapter 6, section 70.4.      Sincerely,      Pattie Carmen MD   Utilization Review/ Case Management  [facility].

## 2022-12-02 NOTE — PLAN OF CARE
"BP (!) 140/84 (BP Location: Right arm)   Pulse 55   Temp 98.2  F (36.8  C) (Oral)   Resp 16   Ht 1.575 m (5' 2\")   Wt 68.5 kg (151 lb)   SpO2 96%   BMI 27.62 kg/m          Observation Goals:    1. Stable hemoglobin and vital signs in context of workup for acute on chronic anemia: Progressing. Recheck Hgb in am. Vitals stable on RA.    Pt. on tele & cross-cover paged that pt's tele: sinus jarad w/ BBB & rare PVC & ordered stat EKG. EKG result: sinus jarad w/ 1st degree AV block w/ left BBB & no treatment ordered. Pt. c/o chest pain d/t chest compressions 2 days ago & back pain relieved with sched. Tylenol. Pt. appeared to rest comfortably between cares. Rt. PIV SL. Continue to follow POC & notify MD with change in status.               "

## 2022-12-02 NOTE — PROVIDER NOTIFICATION
Timi cross-cover paged: [facility] on 7A, M5  FYI: Pt's Tele. appears to be jarad 50's w/ bundle branch block & rare PVC. Vitals stable on RA. Pt. sleeping comfortably w/ no c/o's.  Thanks, Becky [phone]    MD ordered stat EKG.    MD paged: Stroom on 7A, M5  FYI: EKG shows sinus jarad with 1st degree AV block, Left BBB.    Thanks, Becky [phone]

## 2022-12-02 NOTE — PLAN OF CARE
"/77 (BP Location: Right arm)   Pulse 57   Temp 98.1  F (36.7  C) (Oral)   Resp 15   Ht 1.575 m (5' 2\")   Wt 68.5 kg (151 lb)   SpO2 94%   BMI 27.62 kg/m        Observation Goals:    1. Stable hemoglobin and vital signs in context of workup for acute on chronic anemia: Progressing. Recheck Hgb in am. Vitals stable.                 "

## 2022-12-02 NOTE — PROGRESS NOTES
Admitted/transferred from: ED  Time of arrival on unit 2110  2 RN full  skin assessment completed by Becky LINO & Penny PITTS   Skin assessment finding: issues found small bruise on RUE   Interventions/actions: skin interventions none     Will continue to monitor.

## 2022-12-02 NOTE — PROGRESS NOTES
Cardiology Consult Progress Note     ASSESSMENT:   Kamryn Miller is a 87 year old female with a history of aortic stenosis s/p TAVR w/ Arndt Janis Ultra on 09/12/2022, CAD s/p LAD and RCA stent in 2003, LAD PCI in 2020 and last coronary angiogram w/ 50% stenosis of the LAD, HTN, HLD, HFpEF (last EF of 55-60% on 10/24) and PAD on DAPT who presented with syncope. Cardiology was consulted due to her cardiac history. Her hypertension has improved, and her vitals are otherwise stable. Her hemoglobin at admission was 6.4 and has improved to 8.2 after transfusion of x1 PRBCs and is now stable.     #. Preoperative Risk Assessment for Possible Endoscopy  #. Acute on chronic symptomatic microcytic anemia  #. Possible GI bleed  Primary team is considering endoscopy for possible GI bleed vs other colon pathology. Revised cardiac risk assessment score is 2, correlating to a class III risk and 10.1% 30 day risk of death, MI or cardiac arrest. Therefore, Kamryn is at relatively high risk for cardiac complications after this surgery. However, given the acute nature of her anemia and need to identify and/or treat a possible GI bleed, there is no indication for further optimization or cardiac testing prior to this procedure. Although she does become symptomatic with shortness of breath when ambulating from the bed to her bathroom, she does not have chest pain and has had stable tropnins x2 on 11/30. Her symptoms are more likely secondary to acute on chronic anemia and less likely due to acute myocardial ischemia. Furthermore she had a coronary angiogram done on 10/25/22 which showed patent stents in the RCA and LAD with mild to moderate in stent restenosis of the proximal LAD stents and otherwise mild non obstructive CAD elsewhere.   - Patient is high risk for complications; however, she is also at high risk for continued bleeding if she does not undergo endoscopy. Currently is maximally optimized from cardiac stand point  -  Ticagrelor 90 mg BID PO has been discontinued (as discussed below)    #. Syncope  #. Dyspnea on exertion, progressively worsening  #. Hx of aortic stenosis s/p TAVR on 09/12/2022  #. Hx of CAD s/p LAD and RCA stent in 2003, LAD PCI in 2020  #. HFpEF, last EF of 55-60% on 10/24  Patient's presenting history is most concerning for syncope secondary to severe anemia. Her hemoglobin has been low since her TAVR procedure (9.8 on 09/13, 9.7 on 09/23, 8.6 on 10/26, 8.1 on 10/31), and on admission, her hemoglobin was severely low at 6.4, necessitating transfusion of x1 PRBCs. This may also explain her worsening shortness of breath over the last 2 weeks. The etiology of her syncope is likely vasovagal and multifactorial, including acute on chronic anemia, orthostatic hypotension from recent increase in Imdur from 30 mg to 60 mg, and she had not eaten the morning prior to her surgery. Kamryn developed sinus bradycardia with PVCs and BBB @ 0400 on 12/02/2022; ECG was obtained and showed likely ectopic beats, but no indication of worsening heart block. It is unlikely that her ectopy or replaced aortic valve are the primary factors for her syncope.    - Continue PTA statin  - Continue PTA carvedilol  - Continue PTA Imdur 60 mg daily  - Discontinue Brilinta, as her procedure was in 09/2022 and she may have evidence of acute hemorrhage  - Okay to continue holding aspirin for now; resume per primary team when her hemoglobin and possible bleeding are stable  - At discharge, recommend Ziopatch outpatient cardiac monitoring for 48 hours     #. Hypertension  Patient's blood pressure has been elevated to 171/77. She may require further blood pressure optimization as an outpatient.   - Continue PTA carvedilol   - Restart Imdur 60 mg daily    Patient seen and discussed with Dr. Mcguire, who agrees with above plan.    Thank you for consulting the cardiovascular services at the [facility]. Please do not hesitate to  call us with any questions.     Florencio Pickard  MS4    I, Winston Thorpe MD, saw this patient with the Med Student and agree with the findings and plan of care as  documented in the above note. I personally reviewed vital signs, labs, images (including EKG, echocardiogram, chest xray, telemetry).      Subjective:   Kamryn reports that she feels about the same as yesterday. No new chest pain or shortness of breath. She was able to ambulate around the unit with assistance yesterday and did not have chest pain or shortness of breath at that time. She feels achy chest discomfort from around her sternum, which she attributes to the 2 chest compressions she received before recovering from her syncopal episode. She denies any new leg swelling and has no other concerns.     CURRENT MEDICATIONS:  Current Outpatient Medications   Medication Instructions     albuterol (PROAIR HFA/PROVENTIL HFA/VENTOLIN HFA) 108 (90 Base) MCG/ACT inhaler 2 puffs, Inhalation, EVERY 6 HOURS PRN     alendronate (FOSAMAX) 70 MG tablet TAKE 1 TABLET BY MOUTH ONCE WEEKLY     aspirin (ASA) 81 mg, Oral, DAILY     ASPIRIN LOW DOSE 81 MG chewable tablet TAKE 1 TABLET BY MOUTH ONCE DAILY     azelastine (ASTEPRO) 0.15 % nasal spray USE 1 SPRAY IN EACH NOSTRIL ONCE A DAY     Calcium Citrate (CITRACAL OR) 1 tablet, Oral, DAILY     calcium citrate (CITRACAL) 950 (200 Ca) MG tablet TAKE 1 TABLET BY MOUTH ONCE DAILY     carvedilol (COREG) 12.5 mg, Oral, 2 TIMES DAILY     cholecalciferol (VITAMIN D3) 1250 mcg (89588 units) capsule TAKE 1 CAPSULE BY MOUTH ONCE WEEKLY     cloNIDine (CATAPRES) 0.1 mg, Oral, DAILY PRN     COMPRESSION STOCKINGS 1 each, Does not apply, DAILY     desvenlafaxine (PRISTIQ) 100 mg, Oral, DAILY     fluticasone-salmeterol (ADVAIR DISKUS) 500-50 MCG/ACT inhaler INHALE 1 PUFF BY MOUTH TWICE DAILY     isosorbide mononitrate (IMDUR) 60 mg, Oral, DAILY     lamoTRIgine (LAMICTAL) 200 mg, Oral, DAILY, with 25 mg tablet for a total dose of 225 mg      lamoTRIgine (LAMICTAL) 25 mg, Oral, DAILY, with 200 mg tablet for a total dose of 225 mg     levothyroxine (SYNTHROID/LEVOTHROID) 50 MCG tablet TAKE 1 TABLET BY MOUTH ONCE DAILY     liothyronine (CYTOMEL) 10 mcg, Oral, DAILY     memantine (NAMENDA) 10 mg, Oral, DAILY     mirtazapine (REMERON) 7.5 mg, Oral, AT BEDTIME     Multiple Vitamin (TAB-A-JENNIFER) TABS TAKE 1 TABLET BY MOUTH ONCE DAILY     rosuvastatin (CRESTOR) 40 MG tablet TAKE 1 TABLET BY MOUTH ONCE DAILY     ticagrelor (BRILINTA) 90 mg, Oral, 2 TIMES DAILY     Physical Exam   Temp: 98  F (36.7  C) Temp src: Oral BP: 93/44 Pulse: 56   Resp: 16 SpO2: 95 % O2 Device: None (Room air)    Vital Signs with Ranges  Temp:  [98  F (36.7  C)-98.8  F (37.1  C)] 98  F (36.7  C)  Pulse:  [53-57] 56  Resp:  [15-16] 16  BP: ()/(44-89) 93/44  Cuff Mean (mmHg):  [97] 97  SpO2:  [93 %-96 %] 95 %  151 lbs 0 oz    GEN: NAD, pleasant. Sleeping in the room, awakens easily to voice.   CV: RRR, normal s1/s2, no murmurs/rubs/s3/s4, no heave.   CHEST: CTAB  ABD: soft, non-distended.   NEURO: Alert, interacting appropriately.  Fluent/appropriate, motor grossly nonfocal  PSYCH: cooperative, affect appropriate    Data   Data reviewed today:  I personally reviewed the EKG tracing showing multiple ectopic beats (PACs vs PVCs).  Recent Labs   Lab 12/02/22  0534 12/01/22  2146 12/01/22  0803 11/30/22  2004 11/30/22  1243 11/30/22  1242   WBC 5.8  --  5.6  --   --  5.7   HGB 8.2*  --  8.2* 7.4*  --  6.4*   MCV 85  --  84  --   --  84     --  182  --   --  190   INR  --   --   --   --  1.21*  --      --  143  --   --  140   POTASSIUM 4.5  --  4.0  --   --  4.5   CHLORIDE 109*  --  112*  --   --  107   CO2 23  --  22  --   --  21*   BUN 35.1*  --  35.5*  --   --  40.7*   CR 1.49*  --  1.39*  --   --  1.48*   ANIONGAP 10  --  9  --   --  12   PRINCE 9.2  --  9.2  --   --  9.4   GLC 99 99 95  --   --  138*   ALBUMIN  --   --  3.4*  --   --  3.7   PROTTOTAL  --   --  6.2*  --   --   6.1*   BILITOTAL  --   --  0.2  --   --  <0.2   ALKPHOS  --   --  47  --   --  47   ALT  --   --  16  --   --  25   AST  --   --  23  --   --  27       Recent Results (from the past 24 hour(s))   XR Thoracic Spine 2 Views    Narrative    EXAMINATION:  XR THORACIC SPINE 2 VIEWS 12/1/2022 1:28 PM.    History: s/pfall,mid and upper back pain    Comparison: 9/22/2020 to radiograph    Findings:    12 rib bearing vertebral bodies are identified.     No acute fracture or subluxation.    Multilevel degenerative changes of the thoracic spine with anterior  bridging osteophytes and opposing endplate sclerotic changes  predominantly in the mid and lower thoracic spine.    The visualized lungs are clear. TAVR.        Impression    Impression:  1.  No acute osseous abnormality.  2.  Multilevel degenerative changes of the thoracic spine.    VALENTINA ALLEN MD         SYSTEM ID:  [id number]

## 2022-12-03 ENCOUNTER — APPOINTMENT (OUTPATIENT)
Dept: CT IMAGING | Facility: CLINIC | Age: 87
End: 2022-12-03
Attending: INTERNAL MEDICINE
Payer: MEDICARE

## 2022-12-03 LAB
ALBUMIN SERPL BCG-MCNC: 3.6 G/DL (ref 3.5–5.2)
ALP SERPL-CCNC: 51 U/L (ref 35–104)
ALT SERPL W P-5'-P-CCNC: 16 U/L (ref 10–35)
ANION GAP SERPL CALCULATED.3IONS-SCNC: 13 MMOL/L (ref 7–15)
AST SERPL W P-5'-P-CCNC: 22 U/L (ref 10–35)
ATRIAL RATE - MUSE: 25 BPM
BILIRUB SERPL-MCNC: 0.2 MG/DL
BUN SERPL-MCNC: 38 MG/DL (ref 8–23)
CALCIUM SERPL-MCNC: 9.7 MG/DL (ref 8.8–10.2)
CHLORIDE SERPL-SCNC: 107 MMOL/L (ref 98–107)
CREAT SERPL-MCNC: 1.56 MG/DL (ref 0.51–0.95)
CREAT SERPL-MCNC: 1.73 MG/DL (ref 0.51–0.95)
DEPRECATED HCO3 PLAS-SCNC: 22 MMOL/L (ref 22–29)
DIASTOLIC BLOOD PRESSURE - MUSE: NORMAL MMHG
EPO SERPL-ACNC: 158 MU/ML
ERYTHROCYTE [DISTWIDTH] IN BLOOD BY AUTOMATED COUNT: 16.8 % (ref 10–15)
ERYTHROCYTE [DISTWIDTH] IN BLOOD BY AUTOMATED COUNT: 16.8 % (ref 10–15)
GFR SERPL CREATININE-BSD FRML MDRD: 28 ML/MIN/1.73M2
GFR SERPL CREATININE-BSD FRML MDRD: 32 ML/MIN/1.73M2
GLUCOSE SERPL-MCNC: 101 MG/DL (ref 70–99)
HCT VFR BLD AUTO: 24.8 % (ref 35–47)
HCT VFR BLD AUTO: 29.8 % (ref 35–47)
HGB BLD-MCNC: 7.3 G/DL (ref 11.7–15.7)
HGB BLD-MCNC: 7.4 G/DL (ref 11.7–15.7)
HGB BLD-MCNC: 8.7 G/DL (ref 11.7–15.7)
INTERPRETATION ECG - MUSE: NORMAL
MCH RBC QN AUTO: 24.8 PG (ref 26.5–33)
MCH RBC QN AUTO: 25.3 PG (ref 26.5–33)
MCHC RBC AUTO-ENTMCNC: 29.2 G/DL (ref 31.5–36.5)
MCHC RBC AUTO-ENTMCNC: 29.8 G/DL (ref 31.5–36.5)
MCV RBC AUTO: 85 FL (ref 78–100)
MCV RBC AUTO: 85 FL (ref 78–100)
P AXIS - MUSE: 23 DEGREES
PLATELET # BLD AUTO: 171 10E3/UL (ref 150–450)
PLATELET # BLD AUTO: 203 10E3/UL (ref 150–450)
POTASSIUM SERPL-SCNC: 4.4 MMOL/L (ref 3.4–5.3)
PR INTERVAL - MUSE: 216 MS
PROT SERPL-MCNC: 6.4 G/DL (ref 6.4–8.3)
QRS DURATION - MUSE: 118 MS
QT - MUSE: 494 MS
QTC - MUSE: 484 MS
R AXIS - MUSE: 0 DEGREES
RBC # BLD AUTO: 2.93 10E6/UL (ref 3.8–5.2)
RBC # BLD AUTO: 3.51 10E6/UL (ref 3.8–5.2)
SODIUM SERPL-SCNC: 142 MMOL/L (ref 136–145)
SYSTOLIC BLOOD PRESSURE - MUSE: NORMAL MMHG
T AXIS - MUSE: 211 DEGREES
VENTRICULAR RATE- MUSE: 58 BPM
WBC # BLD AUTO: 5.8 10E3/UL (ref 4–11)
WBC # BLD AUTO: 6 10E3/UL (ref 4–11)

## 2022-12-03 PROCEDURE — G1010 CDSM STANSON: HCPCS | Mod: GC | Performed by: RADIOLOGY

## 2022-12-03 PROCEDURE — G1010 CDSM STANSON: HCPCS

## 2022-12-03 PROCEDURE — G0378 HOSPITAL OBSERVATION PER HR: HCPCS

## 2022-12-03 PROCEDURE — 250N000013 HC RX MED GY IP 250 OP 250 PS 637

## 2022-12-03 PROCEDURE — 80053 COMPREHEN METABOLIC PANEL: CPT | Performed by: INTERNAL MEDICINE

## 2022-12-03 PROCEDURE — 71250 CT THORAX DX C-: CPT | Mod: MG

## 2022-12-03 PROCEDURE — 36415 COLL VENOUS BLD VENIPUNCTURE: CPT | Performed by: INTERNAL MEDICINE

## 2022-12-03 PROCEDURE — 82565 ASSAY OF CREATININE: CPT | Performed by: INTERNAL MEDICINE

## 2022-12-03 PROCEDURE — 250N000013 HC RX MED GY IP 250 OP 250 PS 637: Performed by: INTERNAL MEDICINE

## 2022-12-03 PROCEDURE — 258N000003 HC RX IP 258 OP 636: Performed by: INTERNAL MEDICINE

## 2022-12-03 PROCEDURE — 72125 CT NECK SPINE W/O DYE: CPT | Mod: 26 | Performed by: RADIOLOGY

## 2022-12-03 PROCEDURE — 99226 PR SUBSEQUENT OBSERVATION CARE,LEVEL III: CPT | Performed by: INTERNAL MEDICINE

## 2022-12-03 PROCEDURE — 71250 CT THORAX DX C-: CPT | Mod: 26 | Performed by: RADIOLOGY

## 2022-12-03 PROCEDURE — 85018 HEMOGLOBIN: CPT | Performed by: INTERNAL MEDICINE

## 2022-12-03 PROCEDURE — 72128 CT CHEST SPINE W/O DYE: CPT | Mod: 26 | Performed by: RADIOLOGY

## 2022-12-03 PROCEDURE — 85027 COMPLETE CBC AUTOMATED: CPT | Performed by: INTERNAL MEDICINE

## 2022-12-03 RX ORDER — LIDOCAINE 4 G/G
1 PATCH TOPICAL
Status: DISCONTINUED | OUTPATIENT
Start: 2022-12-03 | End: 2022-12-04

## 2022-12-03 RX ORDER — IRON POLYSACCHARIDE COMPLEX 150 MG
150 CAPSULE ORAL DAILY
Qty: 30 CAPSULE | Refills: 1 | Status: SHIPPED | OUTPATIENT
Start: 2022-12-03 | End: 2022-12-06

## 2022-12-03 RX ORDER — NALOXONE HYDROCHLORIDE 0.4 MG/ML
0.2 INJECTION, SOLUTION INTRAMUSCULAR; INTRAVENOUS; SUBCUTANEOUS
Status: DISCONTINUED | OUTPATIENT
Start: 2022-12-03 | End: 2022-12-07 | Stop reason: HOSPADM

## 2022-12-03 RX ORDER — NALOXONE HYDROCHLORIDE 0.4 MG/ML
0.4 INJECTION, SOLUTION INTRAMUSCULAR; INTRAVENOUS; SUBCUTANEOUS
Status: DISCONTINUED | OUTPATIENT
Start: 2022-12-03 | End: 2022-12-07 | Stop reason: HOSPADM

## 2022-12-03 RX ORDER — IRON POLYSACCHARIDE COMPLEX 150 MG
150 CAPSULE ORAL DAILY
Status: DISCONTINUED | OUTPATIENT
Start: 2022-12-03 | End: 2022-12-03

## 2022-12-03 RX ORDER — ACETAMINOPHEN 500 MG
500 TABLET ORAL ONCE
Status: COMPLETED | OUTPATIENT
Start: 2022-12-03 | End: 2022-12-03

## 2022-12-03 RX ADMIN — LEVOTHYROXINE SODIUM 50 MCG: 0.05 TABLET ORAL at 08:30

## 2022-12-03 RX ADMIN — ACETAMINOPHEN 975 MG: 325 TABLET, FILM COATED ORAL at 14:05

## 2022-12-03 RX ADMIN — OXYCODONE HYDROCHLORIDE 2.5 MG: 5 TABLET ORAL at 22:55

## 2022-12-03 RX ADMIN — FLUTICASONE FUROATE AND VILANTEROL TRIFENATATE 1 PUFF: 100; 25 POWDER RESPIRATORY (INHALATION) at 08:30

## 2022-12-03 RX ADMIN — ROSUVASTATIN CALCIUM 40 MG: 40 TABLET, FILM COATED ORAL at 08:29

## 2022-12-03 RX ADMIN — DESVENLAFAXINE SUCCINATE 100 MG: 100 TABLET, EXTENDED RELEASE ORAL at 08:30

## 2022-12-03 RX ADMIN — MIRTAZAPINE 7.5 MG: 7.5 TABLET, FILM COATED ORAL at 21:45

## 2022-12-03 RX ADMIN — LIOTHYRONINE SODIUM 10 MCG: 5 TABLET ORAL at 08:29

## 2022-12-03 RX ADMIN — LIDOCAINE PATCH 4% 1 PATCH: 40 PATCH TOPICAL at 20:11

## 2022-12-03 RX ADMIN — ACETAMINOPHEN 975 MG: 325 TABLET, FILM COATED ORAL at 04:48

## 2022-12-03 RX ADMIN — ISOSORBIDE MONONITRATE 60 MG: 60 TABLET, EXTENDED RELEASE ORAL at 08:30

## 2022-12-03 RX ADMIN — LAMOTRIGINE 225 MG: 25 TABLET ORAL at 08:29

## 2022-12-03 RX ADMIN — MEMANTINE 10 MG: 10 TABLET ORAL at 08:30

## 2022-12-03 RX ADMIN — ACETAMINOPHEN 500 MG: 500 TABLET ORAL at 20:11

## 2022-12-03 RX ADMIN — CARVEDILOL 12.5 MG: 12.5 TABLET, FILM COATED ORAL at 08:29

## 2022-12-03 RX ADMIN — SODIUM CHLORIDE 250 ML: 9 INJECTION, SOLUTION INTRAVENOUS at 11:17

## 2022-12-03 ASSESSMENT — ACTIVITIES OF DAILY LIVING (ADL)
ADLS_ACUITY_SCORE: 32
ADLS_ACUITY_SCORE: 31
ADLS_ACUITY_SCORE: 31
ADLS_ACUITY_SCORE: 32
ADLS_ACUITY_SCORE: 31
ADLS_ACUITY_SCORE: 32
ADLS_ACUITY_SCORE: 30
ADLS_ACUITY_SCORE: 32

## 2022-12-03 NOTE — PLAN OF CARE
Goal Outcome Evaluation:  [id number]   AVSS on room air. Tele monitor unchanged bradycardia with HR in the 50s and 1st Degree AV Block. Toby was up to bathroom x1, void not saved. She received her scheduled Tylenol X1 for body aches, resting between cares. She may discharge back to her assisted living some time today or tomorrow.

## 2022-12-03 NOTE — PROGRESS NOTES
[facility]    Medicine Progress Note - Medicine Service, SARI TEAM 5    Date of Admission:  11/30/2022    Assessment & Plan   Kamryn Miller is a 88 yo female w/ pmhx of pHTN, ckd stage III, CAD s/p coronary angioplasty, hx of ischemic cardiomyopathy, and TAVR (9/2022), who presents after a syncopal episode in clinic and then incidentally found to be anemic to 6.4 on admission. She is s/p 1u PRBC with appropriate response. She is being worked up for acute on chronic anemia. Post syncopal spell patient did suffer a fall and required bystander CPR briefly. Given high cardiac risk of endoscopy patient was d/w GI and patient and her family decided to decline endoscopy acutely given stability of hgb since the one unit PRBC transfusion. No arrhythmic etiology of syncope identified on tele during hospital stay. Today patient was getting ready to be discharged from the hospital when she developed new onset right chest wall pain in upper axillary area and had a sudden drop in her BP to systolic of 70s and 80s. Due to persistent hypotension patient was treated with 250 ml NS bolus and hemoglobin and creat rechecked. BP has improved to 122/72 with one bolus of 250 ml but hgb has dropped from 8.7 to 7.4 and creat has increased from 1.49 to 1.71. To further workup the right axillary chest wall pain with above scenario CT chest ordered stat and will be followed by on call team. Discharge cancelled and will monitor hgb q 6h and vitals q 1 hour.     Today  - Hgb q 6h  - CT chest to further evaluate right chest wall pain in upper axillary region (h/o syncope and fall and bystander CPR briefly)  - Monitor vital q 1 hour for at least next 12 hours  - BP improved post one NS bolus of 250 ml  - Check CT cervical and thoracic spine given the syncope and fall and new right side sub axillary area pain  - Hold imdur given significant BP drop      Symptomatic acute on chronic microcytic  "anemia  Syncope and fall  INGRID  Patient came from clinic with a syncopal episode, no presyncopal sx or postictal period, fall was not mechanical. Did hit her head though CTH negative. Recently had Imdur increased and hydralazine discontinued ~1 month ago and has been having lower Bps at home (100 SBP at times per snf). Patient was fasting PTA for labs as well.  Considering this, syncopal episode most likely multifactorial 2/2 acute anemia, fasting, and potentially recent TAVR and change in BP meds. Retic index shows hypoproliferation. She does have iron panel done recently consistent with INGRID. Her hgb was 11-12 in early 2022 and consistently trended downward. Last colonoscopy in 2016, 3mm tubular adenoma at hepatic flexure was resected. No further colonoscopies due to age. Creatinine and GFR remained stable throughout the year but a consistent drop in hgb. There definitely could be a component of CKD driving the anemia but would be worth it to r/o other etiologies. Orthostatics negative. TTE without WAB, TAVR is in place and well seated. No significant changes from prior TTE  - s/p 1u pRBC, with appropriate improvement in Hgb. However today developed an acute drop in hemoglobin accompanied by low Bps.  - Niferex was ordered to start on discharge day. Hold niferex till hgb can be trended for 24 hours.  - Epo level appropriately elevated  - Regular diet to continue for now as no s/s of GIB  - GI consulted for possible endoscopy; after understanding cardiac risk and with stability of hgb post 1 unit PRBC and no active e/o bleed patient and her family had decided against proceeding with endoscopy acutely, however agreeable to canceling discharge and monitoring hgb q6h with the new development of hypotension and drop in hemoglobin                                 - cards: RCRI of 2 with 10.1% 30 day risk of death, MI or cardiac arrest                 - \"Patient is high risk for complications; however, she is also at high " "risk for continued bleeding if she does not undergo endoscopy\"                      Hx of aortic stenosis s/p TAVR on 09/12/2022  Hx of CAD s/p LAD and RCA stent in 2003, LAD PCI in 2020  HFpEF, last EF of 55-60% on 10/24  - Cardiology consulted                 - Ok to stop brillinta and hold ASA                 -  imdur 60 mg qdaily had been restarted on 12/2 but has again been placed on hold given new significant drop in BP                 - At discharge, recommend Ziopatch outpatient cardiac monitoring for 48 hours  - PTA statin, BB (with holding parameters)     Low Back pain   Fall 2/2 syncope  X-ray of T-spine w/o acute osseous abnormality  - pain control with APAP     Bipolar  MDD  Insomnia  - Continue PTA Lamictal 225 mg daily  - Continue PTA Pristiq 100 mg daily   - Continue PTA Namenda 10 mg daily  - Continue PTA Remeron 7.5 mg every night  - Melatonin PRN     TED on CKD III  - Bump in creat today with drop in hgb leading to suspicion of hypoperfusion. Coulld also be 2/2 drop in BP from Imdur.   - Baseline creat 1.4. Creatinine and GFR remained stable throughout the year but a consistent drop in hgb. There definitely could be a component of CKD driving the anemia but would be worth it to r/o other etiologies.   - Check BMP in AM and hold imdur     Hypothyroidism  TSH WNL  - Continue PTA levothyroxine and liothyrodine       Diet: Combination Diet Regular Diet Adult  Diet    DVT Prophylaxis: Pneumatic Compression Devices  Orellana Catheter: Not present  Central Lines: None  Cardiac Monitoring: ACTIVE order. Indication: Syncope- high cardiac risk (48 hours)  Code Status: Full Code      Disposition Plan      Expected Discharge Date: 12/03/2022,  9:00 AM    Destination: assisted living  Discharge Comments: observation, cardiology consulted. home PT recommended  12.2: pt from [place], no restrictions to return over w/e        The patient's care was discussed with the Patient.    Dary Galvan MD  Medicine Service, [facility] " "TEAM 5  [facility]  Securely message with the Vocera Web Console (learn more here)  Text page via [facility] Paging/Directory   Please see signed in provider for up to date coverage information      Clinically Significant Risk Factors              # Hypoalbuminemia: Lowest albumin = 3.4 g/dL (Ref range: 3.5-5.2) at 12/1/2022  8:03 AM, will monitor as appropriate          # Overweight: Estimated body mass index is 27.62 kg/m  as calculated from the following:    Height as of this encounter: 1.575 m (5' 2\").    Weight as of this encounter: 68.5 kg (151 lb)., PRESENT ON ADMISSION         ______________________________________________________________________    Interval History   Patient is resting comfortably in bed. Has ongoing right sub axillary area discomfort. Worse with deep breaths. Dizziness/light headedness has improved post fluid bolus. No melena or bleeding ME. ROS negative.    Data reviewed today: I reviewed all medications, new labs and imaging results over the last 24 hours.     Physical Exam   Vital Signs: Temp: 98.6  F (37  C) Temp src: Oral BP: (!) 83/45 Pulse: 56   Resp: 16 SpO2: 94 % O2 Device: None (Room air)    Weight: 151 lbs 0 oz  General Appearance: Well built and nourished, comfortable at rest, not in any acute cardio pulmonary distress  Respiratory: CTA b/l  Cardiovascular: S1S2 normal RRR  GI: soft NT  Skin: NAD  Other: aaox3 moving all 4 ext spont     Data   Recent Labs   Lab 12/03/22  1251 12/03/22  0453 12/02/22  0534 12/01/22  2146 12/01/22  0803 11/30/22  2004 11/30/22  1243   WBC 6.0 5.8 5.8  --  5.6  --   --    HGB 7.4* 8.7* 8.2*  --  8.2*   < >  --    MCV 85 85 85  --  84  --   --     203 188  --  182  --   --    INR  --   --   --   --   --   --  1.21*   NA  --  142 142  --  143  --   --    POTASSIUM  --  4.4 4.5  --  4.0  --   --    CHLORIDE  --  107 109*  --  112*  --   --    CO2  --  22 23  --  22  --   --    BUN  --  38.0* 35.1*  --  " 35.5*  --   --    CR 1.73* 1.56* 1.49*  --  1.39*  --   --    ANIONGAP  --  13 10  --  9  --   --    PRINCE  --  9.7 9.2  --  9.2  --   --    GLC  --  101* 99 99 95  --   --    ALBUMIN  --  3.6  --   --  3.4*  --   --    PROTTOTAL  --  6.4  --   --  6.2*  --   --    BILITOTAL  --  0.2  --   --  0.2  --   --    ALKPHOS  --  51  --   --  47  --   --    ALT  --  16  --   --  16  --   --    AST  --  22  --   --  23  --   --     < > = values in this interval not displayed.     No results found for this or any previous visit (from the past 24 hour(s)).  Medications       acetaminophen  975 mg Oral Q8H     carvedilol  12.5 mg Oral BID     desvenlafaxine  100 mg Oral Daily     fluticasone-vilanterol  1 puff Inhalation Daily     [Held by provider] isosorbide mononitrate  60 mg Oral Daily     lamoTRIgine  225 mg Oral Daily     levothyroxine  50 mcg Oral Daily     liothyronine  10 mcg Oral Daily     memantine  10 mg Oral Daily     mirtazapine  7.5 mg Oral At Bedtime     rosuvastatin  40 mg Oral Daily     sodium chloride (PF)  3 mL Intracatheter Q8H     [Held by provider] ticagrelor  90 mg Oral BID

## 2022-12-03 NOTE — PROGRESS NOTES
MD enquired about ZioPatch placement prior to discharge back to Assisted Living (AL).  This writer requested order be placed then contacted EKG staff re: timing for placement.  EKG would like to place patch right at time of discharge.      Per RN Care Coordinator (CC) note, HC for therapy has been set up.  Contacted on-call CC for advisement re: discharge prescriptions, paperwork to send with pt.  CC will contact RN at AL to verify what paperwork needs to be faxed or sent with and how to deal with prescriptions.

## 2022-12-03 NOTE — PLAN OF CARE
"  BP (!) 155/74 (BP Location: Right arm)   Pulse 55   Temp 98.4  F (36.9  C) (Oral)   Resp 16   Ht 1.575 m (5' 2\")   Wt 68.5 kg (151 lb)   SpO2 95%   BMI 27.62 kg/m      [id number]  VSS on RA, no s/s of distress. NSR/Sinus jarad on tele; team aware. A/Ox4, pleasant and cooperative with cares. Pt sleeping thru shift with no complaints. Denied pain. Denied n/v--on regular diet with good appetite. PIV SL. Up SBA with walker in room; calling appropriately. Voiding not saving. No BMs this shift. Plan for possible discharge tomorrow. Pt sleeping now; call light and belongings within reach. Will continue to monitor and continue POC/notify team as needed.     OBSERVATION GOALS:  1) Stable hemoglobin and vital signs in context of workup for acute on chronic anemia; IN PROGRESS--Hgb stable, VSS; work up in progress.   "

## 2022-12-03 NOTE — PROGRESS NOTES
Care Management Discharge Note    Discharge Date: 12/03/2022       Discharge Disposition: Assisted Living    Discharge Services: None    Discharge DME: None    Discharge Transportation: family or friend will provide    Private pay costs discussed: Not applicable    PAS Confirmation Code:NA    Patient/family educated on Medicare website which has current facility and service quality ratings: yes (Reviewed Medicare.gov - pt has no preference)    Education Provided on the Discharge Plan: yes    Persons Notified of Discharge Plans: patient, daughter   Patient/Family in Agreement with the Plan: yes    Handoff Referral Completed: Yes    Additional Information:  Patient is medically ready for discharge today.  Called and updated on call nurse at [facility](Ph: [phone]), they are ok with her returning.  Any new medications can be filled at the hospital and sent with.  They had no further question.  Per 7A charge, Steffany, pt's daughter will provide transportation back to the [facility].  RNCC will remain available if further needs arise.     [facility]  [address] [place] [address]  Ph: [phone]  Fax: [phone]  24 Hour cellphone Ph: [phone]           JESSICA Guillermo  Phone: [phone]  Pager: [phone]    SEARCHABLE in [facility] - search CARE COORDINATOR     [place] [employer] [place] ([id number]) Saturday & Sunday; ([id number]) [facility] Recognized Holidays     Units: 4A, 4C, 4E, 5A & 5B   Pager: [phone]    Units: 6A & 6B    Pager: [phone]    Units: 6C & 6D   Pager: [phone]    Units: 7A, 7B, 7C, 7D & 5C    Pager: [phone]    Units: [facility], 5 Ortho, 5 Med/Surg, 6 Med/Surg, 8A, 10 ICU, & [facility]    Pager: [phone]

## 2022-12-03 NOTE — PLAN OF CARE
Vitals: stable room air.   Tele : NSR  Blood glucose: NA.   Pain/nausea: denies   Diet: regular.   Lines: PIVR SL.   : good voids not saving.   GI:x 1.   Drains : NA.    Skin: WDL.   Mobility: up sba.  Pt refused colonoscopy option to provider, paged night team. Possible discharge home tomorrow.

## 2022-12-03 NOTE — PLAN OF CARE
"Patient reporting new onset of pain under her right arm/upper back, noticeable when sitting or laying down or taking deep breaths. She also became light headed and \"fuzzy\" while sitting in bed. /82, dropping down to 108/55, HR 52, BP remained low 77/51.  cc IV bolus. BP improving to 115/56. Orthostatic BP's done, no significant changes. Cardiac monitoring iin place, bradycardic, 1st degree block.     Discharge is on hold pending BP changes.   HGB dropped to 7.4 from 8.7, creatinine elevated.   Chest CT completed, HGB checks Q 6hours.    Observation Goals. Stable hemoglobin and vital signs in context of workup for acute on chronic anemia. Goal Not Met.                         "

## 2022-12-03 NOTE — PHARMACY-ADMISSION MEDICATION HISTORY
Admission Medication History Completed by Pharmacy    See [facility] Admission Navigator for allergy information, preferred outpatient pharmacy, prior to admission medications and immunization status.     Medication History Sources:     Dispense report    Patient    Changes made to PTA medication list (reason):    Added: None    Deleted: None    Changed: None    Additional Information:  None.     Prior to Admission medications    Medication Sig Last Dose Taking? Auth Provider Long Term End Date   albuterol (PROAIR HFA/PROVENTIL HFA/VENTOLIN HFA) 108 (90 Base) MCG/ACT inhaler Inhale 2 puffs into the lungs every 6 hours as needed for wheezing or shortness of breath / dyspnea More than a month Yes Gomez Christopher MD Yes    cholecalciferol (VITAMIN D3) 1250 mcg (52263 units) capsule TAKE 1 CAPSULE BY MOUTH ONCE WEEKLY Past Week Yes Ania Johnson MD     cloNIDine (CATAPRES) 0.1 MG tablet Take 1 tablet (0.1 mg) by mouth daily as needed (For SBP > 180) More than a month Yes Jamie Toro,  Yes    alendronate (FOSAMAX) 70 MG tablet TAKE 1 TABLET BY MOUTH ONCE WEEKLY 11/26/2022  Rolanda Wilcox MD Yes    ASPIRIN LOW DOSE 81 MG chewable tablet TAKE 1 TABLET BY MOUTH ONCE DAILY 11/30/2022  Ania Johnson MD     azelastine (ASTEPRO) 0.15 % nasal spray USE 1 SPRAY IN EACH NOSTRIL ONCE A DAY 11/30/2022  Ania Johnson MD     calcium citrate (CITRACAL) 950 (200 Ca) MG tablet TAKE 1 TABLET BY MOUTH ONCE DAILY 11/30/2022  Ania Johnson MD     carvedilol (COREG) 6.25 MG tablet Take 2 tablets (12.5 mg) by mouth 2 times daily 11/30/2022  Pat Cooper CNP Yes    COMPRESSION STOCKINGS 1 each daily  Patient not taking: Reported on 10/24/2022   Tianna Luevano APRN CNP     desvenlafaxine (PRISTIQ) 100 MG 24 hr tablet Take 100 mg by mouth daily 11/30/2022  Reported, Patient     fluticasone-salmeterol (ADVAIR DISKUS) 500-50 MCG/ACT inhaler INHALE 1 PUFF BY MOUTH TWICE DAILY  Patient  taking differently: 1 puff daily INHALE 1 PUFF BY MOUTH TWICE DAILY 11/30/2022  Rolanda Wilcox MD Yes    isosorbide mononitrate (IMDUR) 60 MG 24 hr tablet Take 1 tablet (60 mg) by mouth daily 11/30/2022  Pati De Santiago, MAGALY Yes    lamoTRIgine (LAMICTAL) 25 MG tablet Take 25 mg by mouth daily with 200 mg tablet for a total dose of 225 mg 11/30/2022  Reported, Patient     LAMOTRIGINE 200 MG PO TABS Take 200 mg by mouth daily with 25 mg tablet for a total dose of 225 mg 11/30/2022  Reported, Patient     levothyroxine (SYNTHROID/LEVOTHROID) 50 MCG tablet TAKE 1 TABLET BY MOUTH ONCE DAILY 11/30/2022  Ania Johnson MD Yes    liothyronine (CYTOMEL) 5 MCG tablet Take 2 tablets (10 mcg) by mouth daily 11/30/2022  Gomez Christopher MD Yes    memantine (NAMENDA) 10 MG tablet Take 1 tablet (10 mg) by mouth daily 11/30/2022  Gomez Christopher MD     mirtazapine (REMERON) 7.5 MG tablet Take 1 tablet (7.5 mg) by mouth At Bedtime 11/30/2022  Gomez Christopher MD Yes    Multiple Vitamin (TAB-A-JENNIFER) TABS TAKE 1 TABLET BY MOUTH ONCE DAILY 11/30/2022  Ania Johnson MD     rosuvastatin (CRESTOR) 40 MG tablet TAKE 1 TABLET BY MOUTH ONCE DAILY 11/30/2022  Ania Johnson MD Yes    ticagrelor (BRILINTA) 90 MG tablet Take 1 tablet (90 mg) by mouth 2 times daily 11/30/2022  Pat Cooper, CNP Yes              Date completed: 12/02/22    Medication history completed by: Fide Molina, PD2 intern

## 2022-12-04 LAB
ANION GAP SERPL CALCULATED.3IONS-SCNC: 10 MMOL/L (ref 7–15)
BUN SERPL-MCNC: 36.7 MG/DL (ref 8–23)
CALCIUM SERPL-MCNC: 9.4 MG/DL (ref 8.8–10.2)
CHLORIDE SERPL-SCNC: 108 MMOL/L (ref 98–107)
CREAT SERPL-MCNC: 1.55 MG/DL (ref 0.51–0.95)
DEPRECATED HCO3 PLAS-SCNC: 24 MMOL/L (ref 22–29)
ERYTHROCYTE [DISTWIDTH] IN BLOOD BY AUTOMATED COUNT: 16.6 % (ref 10–15)
GFR SERPL CREATININE-BSD FRML MDRD: 32 ML/MIN/1.73M2
GLUCOSE SERPL-MCNC: 105 MG/DL (ref 70–99)
HCT VFR BLD AUTO: 26 % (ref 35–47)
HGB BLD-MCNC: 7.7 G/DL (ref 11.7–15.7)
HGB BLD-MCNC: 7.7 G/DL (ref 11.7–15.7)
HGB BLD-MCNC: 7.8 G/DL (ref 11.7–15.7)
HGB BLD-MCNC: 8 G/DL (ref 11.7–15.7)
MAGNESIUM SERPL-MCNC: 2.1 MG/DL (ref 1.7–2.3)
MCH RBC QN AUTO: 25.2 PG (ref 26.5–33)
MCHC RBC AUTO-ENTMCNC: 29.6 G/DL (ref 31.5–36.5)
MCV RBC AUTO: 85 FL (ref 78–100)
PLATELET # BLD AUTO: 187 10E3/UL (ref 150–450)
POTASSIUM SERPL-SCNC: 4.5 MMOL/L (ref 3.4–5.3)
PTH-INTACT SERPL-MCNC: 45 PG/ML (ref 15–65)
RBC # BLD AUTO: 3.05 10E6/UL (ref 3.8–5.2)
SODIUM SERPL-SCNC: 142 MMOL/L (ref 136–145)
WBC # BLD AUTO: 5.8 10E3/UL (ref 4–11)

## 2022-12-04 PROCEDURE — 83970 ASSAY OF PARATHORMONE: CPT | Performed by: PHYSICIAN ASSISTANT

## 2022-12-04 PROCEDURE — 250N000013 HC RX MED GY IP 250 OP 250 PS 637

## 2022-12-04 PROCEDURE — G0378 HOSPITAL OBSERVATION PER HR: HCPCS

## 2022-12-04 PROCEDURE — 94150 VITAL CAPACITY TEST: CPT

## 2022-12-04 PROCEDURE — 85018 HEMOGLOBIN: CPT | Mod: 91 | Performed by: INTERNAL MEDICINE

## 2022-12-04 PROCEDURE — 36415 COLL VENOUS BLD VENIPUNCTURE: CPT | Performed by: INTERNAL MEDICINE

## 2022-12-04 PROCEDURE — 83735 ASSAY OF MAGNESIUM: CPT | Performed by: PHYSICIAN ASSISTANT

## 2022-12-04 PROCEDURE — 82310 ASSAY OF CALCIUM: CPT | Performed by: INTERNAL MEDICINE

## 2022-12-04 PROCEDURE — 250N000013 HC RX MED GY IP 250 OP 250 PS 637: Performed by: INTERNAL MEDICINE

## 2022-12-04 PROCEDURE — 999N000157 HC STATISTIC RCP TIME EA 10 MIN

## 2022-12-04 PROCEDURE — 36415 COLL VENOUS BLD VENIPUNCTURE: CPT | Performed by: PHYSICIAN ASSISTANT

## 2022-12-04 PROCEDURE — 250N000013 HC RX MED GY IP 250 OP 250 PS 637: Performed by: PHYSICIAN ASSISTANT

## 2022-12-04 PROCEDURE — 99204 OFFICE O/P NEW MOD 45 MIN: CPT | Performed by: PHYSICIAN ASSISTANT

## 2022-12-04 PROCEDURE — 36415 COLL VENOUS BLD VENIPUNCTURE: CPT

## 2022-12-04 PROCEDURE — 82306 VITAMIN D 25 HYDROXY: CPT | Performed by: PHYSICIAN ASSISTANT

## 2022-12-04 PROCEDURE — 99226 PR SUBSEQUENT OBSERVATION CARE,LEVEL III: CPT | Mod: GC | Performed by: INTERNAL MEDICINE

## 2022-12-04 PROCEDURE — 85018 HEMOGLOBIN: CPT | Mod: 91

## 2022-12-04 PROCEDURE — 85018 HEMOGLOBIN: CPT | Performed by: INTERNAL MEDICINE

## 2022-12-04 RX ORDER — ISOSORBIDE MONONITRATE 30 MG/1
30 TABLET, EXTENDED RELEASE ORAL DAILY
Status: DISCONTINUED | OUTPATIENT
Start: 2022-12-04 | End: 2022-12-07 | Stop reason: HOSPADM

## 2022-12-04 RX ORDER — CARVEDILOL 6.25 MG/1
6.25 TABLET ORAL 2 TIMES DAILY
Status: DISCONTINUED | OUTPATIENT
Start: 2022-12-04 | End: 2022-12-07 | Stop reason: HOSPADM

## 2022-12-04 RX ORDER — GABAPENTIN 100 MG/1
100 CAPSULE ORAL 3 TIMES DAILY
Status: DISCONTINUED | OUTPATIENT
Start: 2022-12-04 | End: 2022-12-07 | Stop reason: HOSPADM

## 2022-12-04 RX ORDER — METHOCARBAMOL 500 MG/1
500 TABLET, FILM COATED ORAL EVERY 6 HOURS PRN
Status: DISCONTINUED | OUTPATIENT
Start: 2022-12-04 | End: 2022-12-07 | Stop reason: HOSPADM

## 2022-12-04 RX ORDER — LIDOCAINE 4 G/G
1-3 PATCH TOPICAL
Status: DISCONTINUED | OUTPATIENT
Start: 2022-12-04 | End: 2022-12-07 | Stop reason: HOSPADM

## 2022-12-04 RX ADMIN — ROSUVASTATIN CALCIUM 40 MG: 40 TABLET, FILM COATED ORAL at 07:48

## 2022-12-04 RX ADMIN — MEMANTINE 10 MG: 10 TABLET ORAL at 07:48

## 2022-12-04 RX ADMIN — LIOTHYRONINE SODIUM 10 MCG: 5 TABLET ORAL at 07:48

## 2022-12-04 RX ADMIN — OXYCODONE HYDROCHLORIDE 2.5 MG: 5 TABLET ORAL at 02:15

## 2022-12-04 RX ADMIN — MIRTAZAPINE 7.5 MG: 7.5 TABLET, FILM COATED ORAL at 20:09

## 2022-12-04 RX ADMIN — OXYCODONE HYDROCHLORIDE 2.5 MG: 5 TABLET ORAL at 07:47

## 2022-12-04 RX ADMIN — ACETAMINOPHEN 975 MG: 325 TABLET, FILM COATED ORAL at 01:12

## 2022-12-04 RX ADMIN — GABAPENTIN 100 MG: 100 CAPSULE ORAL at 20:09

## 2022-12-04 RX ADMIN — OXYCODONE HYDROCHLORIDE 2.5 MG: 5 TABLET ORAL at 13:05

## 2022-12-04 RX ADMIN — DESVENLAFAXINE SUCCINATE 100 MG: 100 TABLET, EXTENDED RELEASE ORAL at 07:48

## 2022-12-04 RX ADMIN — LIDOCAINE PATCH 4% 1 PATCH: 40 PATCH TOPICAL at 20:09

## 2022-12-04 RX ADMIN — ACETAMINOPHEN 975 MG: 325 TABLET, FILM COATED ORAL at 09:44

## 2022-12-04 RX ADMIN — ISOSORBIDE MONONITRATE 30 MG: 30 TABLET, EXTENDED RELEASE ORAL at 13:08

## 2022-12-04 RX ADMIN — FLUTICASONE FUROATE AND VILANTEROL TRIFENATATE 1 PUFF: 100; 25 POWDER RESPIRATORY (INHALATION) at 07:49

## 2022-12-04 RX ADMIN — LAMOTRIGINE 225 MG: 25 TABLET ORAL at 07:49

## 2022-12-04 RX ADMIN — GABAPENTIN 100 MG: 100 CAPSULE ORAL at 14:15

## 2022-12-04 RX ADMIN — CARVEDILOL 12.5 MG: 12.5 TABLET, FILM COATED ORAL at 07:56

## 2022-12-04 RX ADMIN — ACETAMINOPHEN 975 MG: 325 TABLET, FILM COATED ORAL at 17:25

## 2022-12-04 RX ADMIN — LEVOTHYROXINE SODIUM 50 MCG: 0.05 TABLET ORAL at 07:49

## 2022-12-04 ASSESSMENT — ACTIVITIES OF DAILY LIVING (ADL)
ADLS_ACUITY_SCORE: 32
ADLS_ACUITY_SCORE: 30
ADLS_ACUITY_SCORE: 32
ADLS_ACUITY_SCORE: 29
ADLS_ACUITY_SCORE: 29
ADLS_ACUITY_SCORE: 30
ADLS_ACUITY_SCORE: 30
ADLS_ACUITY_SCORE: 29
ADLS_ACUITY_SCORE: 32
ADLS_ACUITY_SCORE: 30

## 2022-12-04 NOTE — PROGRESS NOTES
[facility]    Medicine Progress Note - Medicine Service, SARI TEAM 5    Date of Admission:  11/30/2022    Assessment & Plan   Kamryn Miller is a 86 yo female w/ pmhx of pHTN, ckd stage III, CAD s/p coronary angioplasty, hx of ischemic cardiomyopathy, and TAVR (9/2022), who presents after a syncopal episode in clinic and then incidentally found to be anemic to 6.4 on admission. She is s/p 1u PRBC with appropriate response. She is being worked up for acute on chronic anemia. Post syncopal spell patient did suffer a fall and required bystander CPR briefly. Given high cardiac risk of endoscopy patient was d/w GI and patient and her family decided to decline endoscopy acutely given stability of hgb since the one unit PRBC transfusion. No arrhythmic etiology of syncope identified on tele during hospital stay. On 12/3/22 patient was getting ready to be discharged from the hospital when she developed new onset right chest wall pain in upper axillary area and had a sudden drop in her BP to systolic of 70s and 80s. Due to persistent hypotension patient was treated with 250 ml NS bolus and hemoglobin and creat rechecked. BP has improved to 122/72 with one bolus of 250 ml but hgb has dropped from 8.7 to 7.4 and creat has increased from 1.49 to 1.71.  Discharge cancelled. CT chest showing multiple rib fractures b/l, trauma surgery has been consulted.     Today  - Trauma surgery consult  - Decreased coreg to 6.25 mg BID and imdur to 30 mg BID (with holding parameters)  - PT    Symptomatic acute on chronic microcytic anemia  Syncope and fall  INGRID  Patient came from clinic with a syncopal episode, no presyncopal sx or postictal period, fall was not mechanical. Did hit her head though CTH negative. Recently had Imdur increased and hydralazine discontinued ~1 month ago and has been having lower Bps at home (100 SBP at times per correction). Patient was fasting PTA for labs as well.   "Considering this, syncopal episode most likely multifactorial 2/2 acute anemia, fasting, and potentially recent TAVR and change in BP meds. Retic index shows hypoproliferation. She does have iron panel done recently consistent with INGRID. Her hgb was 11-12 in early 2022 and consistently trended downward. Last colonoscopy in 2016, 3mm tubular adenoma at hepatic flexure was resected. No further colonoscopies due to age. Creatinine and GFR remained stable throughout the year but a consistent drop in hgb. There definitely could be a component of CKD driving the anemia but would be worth it to r/o other etiologies. Orthostatics negative. TTE without WAB, TAVR is in place and well seated. No significant changes from prior TTE  - s/p 1u pRBC, with appropriate improvement in Hgb. However 12/3 developed an acute drop in hemoglobin accompanied by low Bps.  - Hgb q 12 h  - Niferex was ordered to start on discharge day. Hold niferex till hgb can be trended for 24 hours.  - Epo level appropriately elevated  - Regular diet to continue for now as no s/s of GIB  - GI consulted for possible endoscopy; after understanding cardiac risk and with stability of hgb post 1 unit PRBC and no active e/o bleed patient and her family had decided against proceeding with endoscopy acutely, however agreeable to canceling discharge and monitoring hgb q6h with the new development of hypotension and drop in hemoglobin                                 - cards: RCRI of 2 with 10.1% 30 day risk of death, MI or cardiac arrest                 - \"Patient is high risk for complications; however, she is also at high risk for continued bleeding if she does not undergo endoscopy\"    Multiple b/l anterolateral rib fractures  2/2 syncope + CPR  - Trauma surgery consulted, rib fracture order set                     Hx of aortic stenosis s/p TAVR on 09/12/2022  Hx of CAD s/p LAD and RCA stent in 2003, LAD PCI in 2020  HFpEF, last EF of 55-60% on 10/24  - Cardiology " consulted                 - Ok to stop brillinta and hold ASA                 -  Change imdur to 30 mg qdaily                 - At discharge, recommend [facility] outpatient cardiac monitoring for 48 hours  - PTA statin, BB (with holding parameters)     Low Back pain   Fall 2/2 syncope  X-ray of T-spine w/o acute osseous abnormality  - pain control with APAP     Bipolar  MDD  Insomnia  - Continue PTA Lamictal 225 mg daily  - Continue PTA Pristiq 100 mg daily   - Continue PTA Namenda 10 mg daily  - Continue PTA Remeron 7.5 mg every night  - Melatonin PRN     TED on CKD III  - Bump in creat today with drop in hgb leading to suspicion of hypoperfusion. Coulld also be 2/2 drop in BP from Imdur.   - Baseline creat 1.4. Creatinine and GFR remained stable throughout the year but a consistent drop in hgb. There definitely could be a component of CKD driving the anemia but would be worth it to r/o other etiologies.   - Check BMP in AM and hold imdur     Hypothyroidism  TSH WNL  - Continue PTA levothyroxine and liothyrodine       Diet: Combination Diet Regular Diet Adult  Diet    DVT Prophylaxis: Pneumatic Compression Devices  Orellana Catheter: Not present  Central Lines: None  Cardiac Monitoring: ACTIVE order. Indication: Syncope- high cardiac risk (48 hours)  Code Status: Full Code      Disposition Plan         The patient's care was discussed with the Patient. Staffed with Dr. Cole Ovalles, DO  Medicine Service, [facility] TEAM [address] [place] [address]  Securely message with the Vocera Web Console (learn more here)  Text page via [facility] Paging/Directory   Please see signed in provider for up to date coverage information      Clinically Significant Risk Factors              # Hypoalbuminemia: Lowest albumin = 3.4 g/dL (Ref range: 3.5-5.2) at 12/1/2022  8:03 AM, will monitor as appropriate          # Overweight: Estimated body mass index is 27.62 kg/m  as calculated from the  "following:    Height as of this encounter: 1.575 m (5' 2\").    Weight as of this encounter: 68.5 kg (151 lb)., PRESENT ON ADMISSION         ______________________________________________________________________    Interval History   Patient is resting comfortably in bed. Has ongoing right sub axillary area discomfort. No melena or bleeding MD. ROS negative.    Data reviewed today: I reviewed all medications, new labs and imaging results over the last 24 hours.     Physical Exam   Vital Signs: Temp: 98.3  F (36.8  C) Temp src: Oral BP: 111/58 Pulse: 54   Resp: 16 SpO2: 95 % O2 Device: None (Room air)    Weight: 151 lbs 0 oz  General Appearance: Well built and nourished, comfortable at rest, not in any acute cardio pulmonary distress  Respiratory: CTA b/l, pain with deep breathing R > L  Cardiovascular: S1S2 normal RRR  GI: soft NT  Skin: NAD  Other: aaox3 moving all 4 ext spont       "

## 2022-12-04 NOTE — PLAN OF CARE
Vitals: jarad 50-60s Tele on. Bp 130s-50s. RA. Afebrile. Every 1 hour VS till 6 am to monitor hypotension and jarad.   Blood glucose: NA.   Pain/nausea: tylenolx 1. C/o right side rib pain , prn oxy put in, will give once verified.   Diet: regular.   Lines: pivr SL.   : x 1,   GI: bm today.   Drains: NA  Skin: wdl.   Mobility: sba, on bed alarm due to fall risk *hypotension  Labs : hgb q6 hrs. Due @ 2330.

## 2022-12-04 NOTE — PLAN OF CARE
"BP (!) 164/88 (BP Location: Left arm)   Pulse 56   Temp 97.8  F (36.6  C) (Oral)   Resp 16   Ht 1.575 m (5' 2\")   Wt 68.5 kg (151 lb)   SpO2 95%   BMI 27.62 kg/m      Goal Outcome Evaluation:      Plan of Care Reviewed With: patient    Overall Patient Progress: improvingOverall Patient Progress: improving         Stable hemoglobin and vital signs in context of workup for acute on chronic anemia:  Serial hgb's ordered. Last hgb 7.8  BP's increasing to 150-160 sbp's over past few hrs  Tylenol and oxycodone admin for back and rib pain  "

## 2022-12-04 NOTE — PROVIDER NOTIFICATION
7a 10-2 priya ashraf   pt has rib pain from Chest compressions, can you please put in prn pain meds.   [phone] RN.

## 2022-12-04 NOTE — CONSULTS
[facility]    Consult note: Trauma Service       Date of Admission:  11/30/2022    Trauma mechanism:Syncopal Fall followed by chest compressions   Time/date of injury:11/30/22  Known Injuries:  1. Bilateral anterolateral Rib fractures: right 3-6 and left 4-6 ribs      Assessment: Kamryn Miller is a 87 year old female past medical history significant for pulmonary hypertension, CKD stage III, ESTHER, CAD status post coronary angioplasty, CHF, history of ischemic cardiomyopathy, TAVR, who had chest compressions on 11/30/22 after a syncopal episode. Patient started to have chest wall pain yesterday and a CT showed 7 rib fractures.      Neuro/Pain/Psych:  # Acute on chronic pain   Multimodal pain management   - Continue Scheduled: Tylenol  - Added scheduled Gabapentin 100mg tid,   - Added prn Robaxin 500mg q600mg, if muscle spasms increase can change to scheduled    - continue PRN oxycodone, if too sedating recommend ultram as an alternative    - it patient has increased pain can add to topicals: Voltaren gel, icy/hot, especially when Lidoderm is off.    - If patient has severe pain recommend consulting [facility] for a block. Patient had little pain during my exam so I did not consult them       Pulmonary:  # 7 rib fractures 2/2 Chest Compression   - On admission a CXR was performed, 11/30 CXR: Trace pleural effusions versus thickening. Otherwise Negative.   - Chest CT 12/3/22: Findings: Acute nondisplaced anterolateral right third, fourth, fifth, and sixth and left fourth, fifth, and sixth rib fractures. No other acute osseous abnormality identified. Impression: No pneumothorax or evidence of hemorrhage.  - Nondisplaced fractures without signs of flail, I did not consult Thoracic Surgery  - Rib fracture protocols ordered. Incentive spirometer/ flutter valve.   - Supplemental oxygen to keep saturation above 92 %. Patient on room air during exam and has  not required supplemental oxygen during admission.   - Labs added: one time PTH results were wnl, one time Vit D pending, daily Mg wnl, Phos pending.   - Severe Rib fracture Risk Stratification: With 7 rib fractures in a patient > 64 y/o the first 72 hours is the most critical. During this time there could be expansion of pulmonary contusions and pulmonary compromise. Since it is believed that the original fractures for this patient were sustained on 11/30, even though they were not identified on CXR, the patient is now out this critical period and doing well.   - I added Rib fracture patient information to discharge paperwork  - Patient can follow-up with PCP for continued management or the Trauma Clinic  [facility], Floor [address], [address] [place] [address] , Appointments: [phone]    Infectious disease:   - No concern for pneumonia at this time 2/2 rib fractures. Discussed importance of continued pulm hygiene. No indications for antibiotics.      Overall Plan:  - Rib fracture protocols initiated. Patient is stable   - Pain management recs per above  - follow-up as needed with PCP, for further concerns Trauma Clinic information included  - Trauma will sign off at this time, If there are any other concerns please reach out to us       Migdalia Varghese PA-C  To contact the trauma service use job code pager [phone],   Numeric texts or alpha text through [facility]        Chief Complaint   Rib fractures     History of Present Illness   Kamryn Miller is a 87 year old female past medical history significant for pulmonary hypertension, CKD stage III, ESTHER, CAD status post coronary angioplasty, CHF, history of ischemic cardiomyopathy, TAVR who presented to the ED on 11/30/22. Patient was at an OP clinic when she had a sudden loss of consciousness. Patient had chest compressions at the clinic, but woke up shortly after initiation so daughter (who is bedside during my exam thinks very few were  given).  She was transferred to the ED.  Per chart review, pt had SOB in the ED but that was not acute to the incident. Per daughter she had some tenderness along her sternum the first day. The patient was admitted to medicine and worked up for syncope. She was getting ready for discharge yesterday (12/3) and started to have left sided chest pain. CTs were performed that showed 7 rib fractures (4 right, 3 left) that would resemble a pattern from compressions vs from a fall which are usually unilateral from ground level.     On my exam patient is sitting in the chair in mild pain. She had been given Tylenol and oxycodone. She is breathing well without supplemental oxygen.     Past Medical History    I have reviewed this patient's medical history and updated it with pertinent information if needed.   Past Medical History:   Diagnosis Date     Aortic valvar stenosis 7/2010    mild     Asthma      Bipolar disorder (H)      CAD (coronary artery disease)     s/p angioplasty     Celiac disease      Colon polyps      Colon polyps 2012    every 3 year colonoscopy      Depression      High cholesterol      HTN      Mitral insufficiency      Pulmonary HTN (H)     mild     Tremors 7/10    drug induced from antidepressants     Tricuspid insufficiency        Past Surgical History   I have reviewed this patient's surgical history and updated it with pertinent information if needed.  Past Surgical History:   Procedure Laterality Date     ANGIOGRAM  6/26/2009     ANGIOPLASTY  9/96    for angina     ANGIOPLASTY  8/03 - 9/03    X -2 - with stenst in coronary car.     APPENDECTOMY       BREAST BIOPSY, RT/LT      Breat Biopsy RT/LT, benign     BUNIONECTOMY  11/8/2011    Procedure:BUNIONECTOMY; Left donna bunionectomy; Surgeon:FREDY LITTLEJOHN; Location:[facility] OR     BUNIONECTOMY RT/LT  5/2007    right bunion and right 2nd hammertoe     CATARACT IOL, RT/LT  6/12 and 7/12    bilateral     COLONOSCOPY  2007, 2012    every 3 years for polyps      CV CORONARY ANGIOGRAM N/A 8/21/2020    Procedure: CV CORONARY ANGIOGRAM;  Surgeon: Gianluca Toscano MD;  [facility]     CV CORONARY ANGIOGRAM N/A 10/25/2022    Procedure: Coronary Angiogram;  Surgeon: Carter Douglass MD;  [facility]     CV INSTANTANEOUS WAVE-FREE RATIO N/A 10/25/2022    Procedure: Instantaneous Wave-Free Ratio;  Surgeon: Carter Douglass MD;  [facility]     CV LEFT HEART CATH N/A 8/21/2020    Procedure: CV LEFT HEART CATH;  Surgeon: Gianluca Toscano MD;  [facility]     CV LEFT HEART CATH N/A 11/3/2020    Procedure: CV LEFT HEART CATH;  Surgeon: Tom Burrows MD;  [facility]     CV LOWER EXTREMITY ANGIOGRAM BILATERAL N/A 11/3/2020    Procedure: CV ANGIOGRAM LOWER EXTREMITY BILATERAL;  Surgeon: Tom Burrows MD;  [facility]     CV PCI STENT DRUG ELUTING N/A 8/21/2020    Procedure: Percutaneous Coronary Intervention Stent Drug Eluting;  Surgeon: Gianluca Toscano MD;  [facility]     CV RIGHT HEART CATH MEASUREMENTS RECORDED N/A 8/21/2020    Procedure: CV RIGHT HEART CATH;  Surgeon: Gianluca Toscano MD;  Location:  [facility]     CV RIGHT HEART CATH MEASUREMENTS RECORDED N/A 11/3/2020    Procedure: CV RIGHT HEART CATH;  Surgeon: Tom Burrows MD;  Location:  [facility]     CV TRANSCATHETER AORTIC VALVE REPLACEMENT N/A 9/12/2022    Procedure: Transfemoral Transcatheter Aortic Valve Replacement with possible open heart bypass and or balloon pump placement and any indicated procedure;  Surgeon: Tom Burrows MD;  Location:  [facility]     HEART CATH FEMORAL CANNULIZATION WITH OPEN STANDBY REPAIR AORTIC VALVE N/A 9/12/2022    Procedure: OR TRANSCATHETER AORTIC VALVE REPLACEMENT, OPEN FEMORAL ARTERY APPROACH;   Surgeon: Arthur Markham MD;  Location:  [facility]     JOINT REPLACEMENT, HIP RT/LT  2008    right hip replaced     JOINT REPLACEMENT, HIP RT/LT  2009    left hip replaced     REPAIR HAMMER TOE  2011    Procedure:REPAIR HAMMER TOE; left 2nd hammertoe repair; Surgeon:FREDY LITTLEJOHN; Location: OR     SURGICAL HISTORY OF -       left bunion and 2nd hammertoe repair     TUBAL LIGATION       ZZC ANESTH,BLEPHAROPLASTY      (R) for drooping eyelid     ZZC APPENDECTOMY       Prior to Admission Medications   Prior to Admission Medications   Prescriptions Last Dose Informant Patient Reported? Taking?   ASPIRIN LOW DOSE 81 MG chewable tablet 2022  No No   Sig: TAKE 1 TABLET BY MOUTH ONCE DAILY   COMPRESSION STOCKINGS   No No   Si each daily   Patient not taking: Reported on 10/24/2022   LAMOTRIGINE 200 MG PO TABS 2022 Self Yes No   Sig: Take 200 mg by mouth daily with 25 mg tablet for a total dose of 225 mg   Multiple Vitamin (TAB-A-JENNIFER) TABS 2022  No No   Sig: TAKE 1 TABLET BY MOUTH ONCE DAILY   albuterol (PROAIR HFA/PROVENTIL HFA/VENTOLIN HFA) 108 (90 Base) MCG/ACT inhaler More than a month  No Yes   Sig: Inhale 2 puffs into the lungs every 6 hours as needed for wheezing or shortness of breath / dyspnea   alendronate (FOSAMAX) 70 MG tablet 2022  No No   Sig: TAKE 1 TABLET BY MOUTH ONCE WEEKLY   azelastine (ASTEPRO) 0.15 % nasal spray 2022  No No   Sig: USE 1 SPRAY IN EACH NOSTRIL ONCE A DAY   calcium citrate (CITRACAL) 950 (200 Ca) MG tablet 2022  No No   Sig: TAKE 1 TABLET BY MOUTH ONCE DAILY   carvedilol (COREG) 6.25 MG tablet 2022  No No   Sig: Take 2 tablets (12.5 mg) by mouth 2 times daily   cholecalciferol (VITAMIN D3) 1250 mcg (74902 units) capsule Past Week  No Yes   Sig: TAKE 1 CAPSULE BY MOUTH ONCE WEEKLY   cloNIDine (CATAPRES) 0.1 MG tablet More than a month  No Yes   Sig: Take 1 tablet (0.1 mg) by mouth daily as needed (For  SBP > 180)   desvenlafaxine (PRISTIQ) 100 MG 24 hr tablet 11/30/2022 Self Yes No   Sig: Take 100 mg by mouth daily   fluticasone-salmeterol (ADVAIR DISKUS) 500-50 MCG/ACT inhaler 11/30/2022  No No   Sig: INHALE 1 PUFF BY MOUTH TWICE DAILY   Patient taking differently: 1 puff daily INHALE 1 PUFF BY MOUTH TWICE DAILY   isosorbide mononitrate (IMDUR) 60 MG 24 hr tablet 11/30/2022  No No   Sig: Take 1 tablet (60 mg) by mouth daily   lamoTRIgine (LAMICTAL) 25 MG tablet 11/30/2022 Self Yes No   Sig: Take 25 mg by mouth daily with 200 mg tablet for a total dose of 225 mg   levothyroxine (SYNTHROID/LEVOTHROID) 50 MCG tablet 11/30/2022  No No   Sig: TAKE 1 TABLET BY MOUTH ONCE DAILY   liothyronine (CYTOMEL) 5 MCG tablet 11/30/2022  No No   Sig: Take 2 tablets (10 mcg) by mouth daily   memantine (NAMENDA) 10 MG tablet 11/30/2022  No No   Sig: Take 1 tablet (10 mg) by mouth daily   mirtazapine (REMERON) 7.5 MG tablet 11/30/2022  No No   Sig: Take 1 tablet (7.5 mg) by mouth At Bedtime   rosuvastatin (CRESTOR) 40 MG tablet 11/30/2022  No No   Sig: TAKE 1 TABLET BY MOUTH ONCE DAILY   ticagrelor (BRILINTA) 90 MG tablet 11/30/2022  No No   Sig: Take 1 tablet (90 mg) by mouth 2 times daily      Facility-Administered Medications: None     Allergies   Allergies   Allergen Reactions     Ace Inhibitors Cough     Diclofenac Other (See Comments)     Balance problems     Gluten Meal Diarrhea       Social History   Social History     Socioeconomic History     Marital status:      Spouse name: Adrien     Number of children: 2     Years of education: 16     Highest education level: Not on file   Occupational History     Employer: RETIRED     Comment: Retired in 2002.    Tobacco Use     Smoking status: Never     Smokeless tobacco: Never   Substance and Sexual Activity     Alcohol use: No     Alcohol/week: 0.0 standard drinks     Types: 1 Standard drinks or equivalent per week     Drug use: No     Sexual activity: Not Currently      Partners: Male   Other Topics Concern     Parent/sibling w/ CABG, MI or angioplasty before 65F 55M? Not Asked   Social History Narrative     Not on file     Social Determinants of Health     Financial Resource Strain: Not on file   Food Insecurity: Not on file   Transportation Needs: Not on file   Physical Activity: Not on file   Stress: Not on file   Social Connections: Not on file   Intimate Partner Violence: Not on file   Housing Stability: Not on file       Family History   Family history reviewed with patient and is noncontributory.      Review of Systems   CONSTITUTIONAL: No fever, chills, sweats, fatigue   EYES: no visual blurring, no double vision or visual loss  ENT: no decrease in hearing, no tinnitus, no vertigo, no hoarseness  RESPIRATORY: shortness of breath, chest wall pain  CARDIOVASCULAR: no palpitations, no exertional chest pain or pressure  GASTROINTESTINAL: no nausea or vomiting, or abd pain  GENITOURINARY: no dysuria, no frequency or hesitancy, no hematuria  MUSCULOSKELETAL: no weakness, no redness, no swelling, no joint pain,   SKIN: no rashes, ecchymoses, abrasions or lacerations  NEUROLOGIC: no numbness or tingling of hands, no numbness or tingling  of feet, no syncope, no tremors or weakness  PSYCHIATRIC: no sleep disturbances, no anxiety or depression    Physical Exam   Temp: 98.3  F (36.8  C) Temp src: Oral BP: 111/58 Pulse: 54   Resp: 16 SpO2: 95 % O2 Device: None (Room air)    Vital Signs with Ranges  Temp:  [97.8  F (36.6  C)-98.4  F (36.9  C)] 98.3  F (36.8  C)  Pulse:  [54-66] 54  Resp:  [16-20] 16  BP: ()/() 111/58  SpO2:  [93 %-98 %] 95 % 151 lbs 0 oz    Sangeeta Coma Scale - Total 15/15  Eye Response (E): 4  4= spontaneous,  3= to verbal/voice, 2=  to pain, 1= No response   Verbal Response (V): 5   5= Orientated, converses,  4= Confused, converses, 3= Inappropriate words,  2= Incomprehensible sounds,  1=No response   Motor Response (M): 6   6= Obeys commands, 5= Localizes  to pain, 4= Withdrawal to pain, 3=Fexion to pain, 2= Extension to pain, 1= No response    General: alert, oriented to person, place, time  Head: atraumatic, normocephalic,  Eyes: PERRLA, pupils 4mm, EOMI, corneas and conjunctivae clear  Nose: nares patent, no drainage, nasal septum non-tender  Mouth/Throat: no exudates or erythema,  no dental tenderness or malocclusions, no tongue lacerations  Neck: Trachea midline. No midline posterior tenderness, full AROM without pain or tenderness   Chest/Pulmonary: normal respiratory rate and rhythm,  bilateral clear breath sounds, no wheezes, rales or rhonchi, no chest wall tenderness or deformities,   Cardiovascular: normal and regular rate and rhythm,  Abdomen: soft, non-tender, no guarding,   :  pelvis stable to lateral compression,  Musculoskel/Extremities: normal extremities, baseline ROM of major joints   Skin: skin warm and dry.   Neuro: PERRLA, alert, oriented x 3.No focal deficits. Strength 3/5 x 4 extremities.  Sensation intact.  Psychiatric: affect/mood normal, cooperative, normal judgement/insight and memory intact  # Pain Assessment:  Current Pain Score 12/4/2022   Patient currently in pain? yes   Pain score (0-10) -   - Kamryn is experiencing pain due to fractures. Pain management was discussed with Kamryn and her daughter and the plan was created in a collaborative fashion.  Kamryn's response to the current recommendations: engaged  - Please see the plan for pain management as documented above      Data   Results for orders placed or performed during the hospital encounter of 11/30/22 (from the past 24 hour(s))   Hemoglobin   Result Value Ref Range    Hemoglobin 7.8 (L) 11.7 - 15.7 g/dL   Hemoglobin   Result Value Ref Range    Hemoglobin 7.7 (L) 11.7 - 15.7 g/dL   CBC with platelets   Result Value Ref Range    WBC Count 5.8 4.0 - 11.0 10e3/uL    RBC Count 3.05 (L) 3.80 - 5.20 10e6/uL    Hemoglobin 7.7 (L) 11.7 - 15.7 g/dL    Hematocrit 26.0 (L) 35.0 - 47.0 %     MCV 85 78 - 100 fL    MCH 25.2 (L) 26.5 - 33.0 pg    MCHC 29.6 (L) 31.5 - 36.5 g/dL    RDW 16.6 (H) 10.0 - 15.0 %    Platelet Count 187 150 - 450 10e3/uL   Basic metabolic panel   Result Value Ref Range    Sodium 142 136 - 145 mmol/L    Potassium 4.5 3.4 - 5.3 mmol/L    Chloride 108 (H) 98 - 107 mmol/L    Carbon Dioxide (CO2) 24 22 - 29 mmol/L    Anion Gap 10 7 - 15 mmol/L    Urea Nitrogen 36.7 (H) 8.0 - 23.0 mg/dL    Creatinine 1.55 (H) 0.51 - 0.95 mg/dL    Calcium 9.4 8.8 - 10.2 mg/dL    Glucose 105 (H) 70 - 99 mg/dL    GFR Estimate 32 (L) >60 mL/min/1.73m2   Parathyroid Hormone Intact   Result Value Ref Range    Parathyroid Hormone Intact 45 15 - 65 pg/mL    Narrative    This result was obtained with the Roche Elecsys PTH STAT assay.   This reference range differs from PTH assays used in other [facility] laboratories.   Magnesium   Result Value Ref Range    Magnesium 2.1 1.7 - 2.3 mg/dL   Hemoglobin   Result Value Ref Range    Hemoglobin 8.0 (L) 11.7 - 15.7 g/dL       Studies:  CT Thoracic Spine w/o Contrast   Final Result   IMPRESSION:   1. No acute fracture or traumatic subluxation.   2. Multilevel spondylosis with grossly patent spinal canal and neural   foramina throughout the thoracic spine.      I have personally reviewed the examination and initial interpretation   and I agree with the findings.      MONIE LARA MD            SYSTEM ID:  [id number]      CT Cervical Spine w/o Contrast   Final Result   IMPRESSION: Mild motion artifact.   1. No acute fracture or traumatic subluxation.   2. Multilevel spondylosis, including mild degenerative spinal canal   stenosis at C5-6 along with multilevel mild-to-moderate neural   foraminal narrowing.      I have personally reviewed the examination and initial interpretation   and I agree with the findings.      MONIE LARA MD            SYSTEM ID:  [id number]      CT Chest w/o Contrast   Final Result   IMPRESSION:    1. Acute nondisplaced bilateral  anterolateral rib fractures, as   detailed above. No pneumothorax or evidence of hemorrhage.   2. Multiple new nodular opacities measuring up to 9 mm in the lower   lobes may be of infectious or inflammatory etiology. Consider   follow-up chest CT in 3-6 months, per Fleischner Society criteria.   3. Trace bilateral pleural effusions with adjacent atelectasis.   4. Heavy multivessel coronary artery atherosclerotic calcifications   with coronary artery stents.   5. Cholelithiasis without evidence of cholecystitis.   6. Moderate hiatal hernia.      I have personally reviewed the examination and initial interpretation   and I agree with the findings.      CHANELLE HECTOR DO            SYSTEM ID:  [id number]      XR Thoracic Spine 2 Views   Final Result   Impression:   1.  No acute osseous abnormality.   2.  Multilevel degenerative changes of the thoracic spine.      VALENTINA ALLEN MD            SYSTEM ID:  [id number]      Echo Complete   Final Result      Head CT w/o contrast   Final Result   IMPRESSION: No acute intracranial pathology.       I have personally reviewed the examination and initial interpretation   and I agree with the findings.      KAREN ARDON MD            SYSTEM ID:  [id number]      XR Chest 2 Views   Final Result   IMPRESSION: Trace pleural effusions versus thickening. Otherwise   negative.      I have personally reviewed the examination and initial interpretation   and I agree with the findings.      PRIYA LION MD            SYSTEM ID:  [id number]      Leadless EKG Monitor 48 Hour    (Results Pending)

## 2022-12-04 NOTE — PLAN OF CARE
"Vitals: /43 (BP Location: Right arm)   Pulse 57   Temp 98.3  F (36.8  C) (Oral)   Resp 16   Ht 1.575 m (5' 2\")   Wt 68.5 kg (151 lb)   SpO2 95%   BMI 27.62 kg/m    203/108, 180/103. Coreg 12/5 mg, B/P dropped to 100/37 and 106/43, stabilized back to 140/70.  Imdur restarted at lower dose. Patient is on telemetry, sinus bradycardia, irregular.   Endocrine: n/a  Labs: Stable HGB and creatinine.  Pain: Right sided flank pain, mild pain in upper sternum.   PRN's: Oxycodone 2.5 X2  Diet: Regular diet.  LDA: PIV saline locked.  GI: BM 12/3.  : Voids spontaneously.  Skin: Pale, skin intact.  Neuro: Alert and oriented, daughter here visiting all day.   Mobility: Stand by assist to the bathroom with walker.  Education: n/a  Plan: Monitor BP, order set from trauma team regarding cares for rib fractures, has PRN muscle relaxants and started on scheduled Gabapentin.                    "

## 2022-12-04 NOTE — PROGRESS NOTES
CLINICAL NUTRITION SERVICES - BRIEF NOTE    Consult received for Trauma patient with multiple rib fractures    A full Nutrition Assessment will be deferred at this time as patient is currently observation status.      Pt can be referred to outpatient RD by primary care provider after discharge as appropriate.      Should patient's status change to Inpatient, we will be available for a full Nutrition Assessment with a consult.     Bonny Mullen, MS/RD/LD  7A/Obs RD pager: [phone]  Weekend/Holiday RD pager: [phone]

## 2022-12-04 NOTE — PLAN OF CARE
Goal Outcome Evaluation:      Plan of Care Reviewed With: patient    Overall Patient Progress: no changeOverall Patient Progress: no change     Stable hemoglobin: tbd- serial hgb checks in place. Last hgb 7.8 mg/dL.   vital signs in context of workup for acute on chronic anemia: bradycardia. See flow sheet for up to date vitals  Goals not met

## 2022-12-04 NOTE — UTILIZATION REVIEW
Admission Status; Secondary Review Determination         Under the authority of the Utilization Management Committee, the utilization review process indicated a secondary review on the above patient.  The review outcome is based on review of the medical records, discussions with staff, and applying clinical experience noted on the date of the review.        (x)      Observation status appropriate        RATIONALE FOR DETERMINATION   The patient is an 87-year-old female admitted on 12/1/2022.  She apparently had a syncopal episode in the clinic and was transferred to the hospital.  She was found to have a hemoglobin of 6.4 and was given 1 unit of packed red cells.  GI endoscopic evaluation was deferred and she was about to be discharged from the hospital to start iron and the patient had another near syncopal episode with systolic blood pressures measured at 70s.  Patient has had cardiac issues in the past with a relatively recent ejection fraction.  Patient was also noted to have some acute rib fractures following obtaining an acute CT scan.  Rib fractures are nondisplaced and anterior lateral.  Patient has creatinine of 1.55 currently which is similar to admitting creatinine of 1.39 there is a cardiac consultation with orders to decrease the Imdur dose and stop Brilinta.  Is not clear in the chart if GI will be consulted.  Patient's blood pressure appears to be stable from a hypotensive perspective.  There are several higher readings noted including a 203/108 reading.  Patient is having appropriate oxygenation on room air in the mid 90s.  Based on current information and evaluation, recommend continuation of observation status.  If there is a more definitive diagnosis consistent with acute inpatient management, reconsideration for inpatient status can be done at that time.      The severity of illness, intensity of service provided, expected LOS and risk for adverse outcome make the care complex, high risk and  appropriate for hospital admission.        The information on this document is developed by the utilization review team in order for the business office to ensure compliance.  This only denotes the appropriateness of proper admission status and does not reflect the quality of care rendered.         The definitions of Inpatient Status and Observation Status used in making the determination above are those provided in the CMS Coverage Manual, Chapter 1 and Chapter 6, section 70.4.      Sincerely,     Kofi Johnson MD  Physician Advisor  Utilization Review/ Case Management  [facility].

## 2022-12-05 ENCOUNTER — APPOINTMENT (OUTPATIENT)
Dept: PHYSICAL THERAPY | Facility: CLINIC | Age: 87
End: 2022-12-05
Payer: MEDICARE

## 2022-12-05 ENCOUNTER — APPOINTMENT (OUTPATIENT)
Dept: GENERAL RADIOLOGY | Facility: CLINIC | Age: 87
End: 2022-12-05
Attending: INTERNAL MEDICINE
Payer: MEDICARE

## 2022-12-05 LAB
ANION GAP SERPL CALCULATED.3IONS-SCNC: 9 MMOL/L (ref 7–15)
BUN SERPL-MCNC: 38.4 MG/DL (ref 8–23)
CALCIUM SERPL-MCNC: 8.8 MG/DL (ref 8.8–10.2)
CHLORIDE SERPL-SCNC: 105 MMOL/L (ref 98–107)
CREAT SERPL-MCNC: 1.32 MG/DL (ref 0.51–0.95)
DEPRECATED CALCIDIOL+CALCIFEROL SERPL-MC: 73 UG/L (ref 20–75)
DEPRECATED HCO3 PLAS-SCNC: 24 MMOL/L (ref 22–29)
GFR SERPL CREATININE-BSD FRML MDRD: 39 ML/MIN/1.73M2
GLUCOSE SERPL-MCNC: 103 MG/DL (ref 70–99)
HGB BLD-MCNC: 7.3 G/DL (ref 11.7–15.7)
HGB BLD-MCNC: 7.7 G/DL (ref 11.7–15.7)
MAGNESIUM SERPL-MCNC: 2.1 MG/DL (ref 1.7–2.3)
PHOSPHATE SERPL-MCNC: 4.4 MG/DL (ref 2.5–4.5)
POTASSIUM SERPL-SCNC: 4.5 MMOL/L (ref 3.4–5.3)
SODIUM SERPL-SCNC: 138 MMOL/L (ref 136–145)

## 2022-12-05 PROCEDURE — 83735 ASSAY OF MAGNESIUM: CPT | Performed by: PHYSICIAN ASSISTANT

## 2022-12-05 PROCEDURE — G0378 HOSPITAL OBSERVATION PER HR: HCPCS

## 2022-12-05 PROCEDURE — 250N000013 HC RX MED GY IP 250 OP 250 PS 637

## 2022-12-05 PROCEDURE — 93010 ELECTROCARDIOGRAM REPORT: CPT | Performed by: INTERNAL MEDICINE

## 2022-12-05 PROCEDURE — 97110 THERAPEUTIC EXERCISES: CPT | Mod: GP

## 2022-12-05 PROCEDURE — 99226 PR SUBSEQUENT OBSERVATION CARE,LEVEL III: CPT | Mod: GC | Performed by: INTERNAL MEDICINE

## 2022-12-05 PROCEDURE — 36415 COLL VENOUS BLD VENIPUNCTURE: CPT | Performed by: PHYSICIAN ASSISTANT

## 2022-12-05 PROCEDURE — 36415 COLL VENOUS BLD VENIPUNCTURE: CPT

## 2022-12-05 PROCEDURE — 250N000013 HC RX MED GY IP 250 OP 250 PS 637: Performed by: PHYSICIAN ASSISTANT

## 2022-12-05 PROCEDURE — 85018 HEMOGLOBIN: CPT | Mod: 91

## 2022-12-05 PROCEDURE — 250N000013 HC RX MED GY IP 250 OP 250 PS 637: Performed by: INTERNAL MEDICINE

## 2022-12-05 PROCEDURE — 85018 HEMOGLOBIN: CPT

## 2022-12-05 PROCEDURE — 999N000157 HC STATISTIC RCP TIME EA 10 MIN

## 2022-12-05 PROCEDURE — 84100 ASSAY OF PHOSPHORUS: CPT | Performed by: PHYSICIAN ASSISTANT

## 2022-12-05 PROCEDURE — 80048 BASIC METABOLIC PNL TOTAL CA: CPT | Performed by: PHYSICIAN ASSISTANT

## 2022-12-05 PROCEDURE — 71045 X-RAY EXAM CHEST 1 VIEW: CPT | Mod: 26 | Performed by: RADIOLOGY

## 2022-12-05 PROCEDURE — 71045 X-RAY EXAM CHEST 1 VIEW: CPT

## 2022-12-05 PROCEDURE — 97530 THERAPEUTIC ACTIVITIES: CPT | Mod: GP

## 2022-12-05 RX ADMIN — MIRTAZAPINE 7.5 MG: 7.5 TABLET, FILM COATED ORAL at 20:08

## 2022-12-05 RX ADMIN — CARVEDILOL 6.25 MG: 6.25 TABLET, FILM COATED ORAL at 20:08

## 2022-12-05 RX ADMIN — GABAPENTIN 100 MG: 100 CAPSULE ORAL at 08:05

## 2022-12-05 RX ADMIN — OXYCODONE HYDROCHLORIDE 2.5 MG: 5 TABLET ORAL at 22:29

## 2022-12-05 RX ADMIN — ACETAMINOPHEN 975 MG: 325 TABLET, FILM COATED ORAL at 08:04

## 2022-12-05 RX ADMIN — GABAPENTIN 100 MG: 100 CAPSULE ORAL at 13:09

## 2022-12-05 RX ADMIN — LIDOCAINE PATCH 4% 1 PATCH: 40 PATCH TOPICAL at 20:07

## 2022-12-05 RX ADMIN — LIOTHYRONINE SODIUM 10 MCG: 5 TABLET ORAL at 08:06

## 2022-12-05 RX ADMIN — OXYCODONE HYDROCHLORIDE 2.5 MG: 5 TABLET ORAL at 00:17

## 2022-12-05 RX ADMIN — DESVENLAFAXINE SUCCINATE 100 MG: 100 TABLET, EXTENDED RELEASE ORAL at 08:05

## 2022-12-05 RX ADMIN — FLUTICASONE FUROATE AND VILANTEROL TRIFENATATE 1 PUFF: 100; 25 POWDER RESPIRATORY (INHALATION) at 08:04

## 2022-12-05 RX ADMIN — ACETAMINOPHEN 975 MG: 325 TABLET, FILM COATED ORAL at 16:45

## 2022-12-05 RX ADMIN — LAMOTRIGINE 225 MG: 25 TABLET ORAL at 08:05

## 2022-12-05 RX ADMIN — LEVOTHYROXINE SODIUM 50 MCG: 0.05 TABLET ORAL at 08:05

## 2022-12-05 RX ADMIN — ROSUVASTATIN CALCIUM 40 MG: 40 TABLET, FILM COATED ORAL at 08:06

## 2022-12-05 RX ADMIN — ISOSORBIDE MONONITRATE 30 MG: 30 TABLET, EXTENDED RELEASE ORAL at 05:25

## 2022-12-05 RX ADMIN — OXYCODONE HYDROCHLORIDE 2.5 MG: 5 TABLET ORAL at 11:04

## 2022-12-05 RX ADMIN — MEMANTINE 10 MG: 10 TABLET ORAL at 08:06

## 2022-12-05 RX ADMIN — METHOCARBAMOL 500 MG: 500 TABLET ORAL at 11:04

## 2022-12-05 RX ADMIN — OXYCODONE HYDROCHLORIDE 2.5 MG: 5 TABLET ORAL at 05:16

## 2022-12-05 RX ADMIN — OXYCODONE HYDROCHLORIDE 2.5 MG: 5 TABLET ORAL at 16:44

## 2022-12-05 RX ADMIN — GABAPENTIN 100 MG: 100 CAPSULE ORAL at 20:08

## 2022-12-05 RX ADMIN — ACETAMINOPHEN 975 MG: 325 TABLET, FILM COATED ORAL at 00:17

## 2022-12-05 ASSESSMENT — ACTIVITIES OF DAILY LIVING (ADL)
ADLS_ACUITY_SCORE: 29

## 2022-12-05 NOTE — PLAN OF CARE
"BP (!) 161/79 (BP Location: Right arm)   Pulse 58   Temp 98.7  F (37.1  C) (Oral)   Resp 17   Ht 1.575 m (5' 2\")   Wt 68.5 kg (151 lb)   SpO2 93%   BMI 27.62 kg/m      Goal Outcome Evaluation:      Plan of Care Reviewed With: patient    Overall Patient Progress: no changeOverall Patient Progress: no change     Stable hemoglobin and vital signs in context of workup for acute on chronic anemia: serial hgb's ordered. Telemetry maintained- Bradycardia; LBBB. HTN. O2 sats wdl on ra. Pulmo hygiene and rib fx cares encouraged.      "

## 2022-12-05 NOTE — PROGRESS NOTES
Observation Goal:    Stable hemoglobin and vital signs in context of workup for acute on chronic anemia: In progress     - Hgb 7.3

## 2022-12-05 NOTE — PLAN OF CARE
"/69 (BP Location: Right arm)   Pulse 59   Temp 98.5  F (36.9  C) (Oral)   Resp 16   Ht 1.575 m (5' 2\")   Wt 68.5 kg (151 lb)   SpO2 92%   BMI 27.62 kg/m      Goal Outcome Evaluation:      Plan of Care Reviewed With: patient    Overall Patient Progress: improvingOverall Patient Progress: improving       Stable hemoglobin and vital signs in context of workup for acute on chronic anemia: serial hbg's ordered. Telemetry with jarad rates 50's. BP wdl. O2 sats wdl on ra. Pulmo hygiene encouraged    "

## 2022-12-05 NOTE — PROGRESS NOTES
Care Management Follow Up    Length of Stay (days): 1    Expected Discharge Date: 12/06/2022     Concerns to be Addressed: discharge planning (home care)     Patient plan of care discussed at interdisciplinary rounds: Yes    Anticipated Discharge Disposition: Assisted Living     Anticipated Discharge Services: None  Anticipated Discharge DME: None    Patient/family educated on Medicare website which has current facility and service quality ratings: yes (Reviewed Medicare.gov - pt has no preference)  Education Provided on the Discharge Plan:    Patient/Family in Agreement with the Plan: yes    Referrals Placed by CM/SW: Internal Clinic Care Coordination, Homecare    Additional Information:  Spoke with Timi Valera 5 Resident.  Team anticipates possible discharge back to [facility] on Tuesday.  Pt with new rib fx's, Hgb check q 12.   PT last saw pt on 12/1 and recommended home with home care. As previously noted pt is from [facility].  Daniele Murphy RN, [facility].  Agreed to f/u with [facility] on pt status tomorrow.     Assisted Living:  [facility]  [address] [place] [address]  Ph: [phone]  Fax: [phone]  24 Hour cellphone Ph: [phone]      Home Care:  [facility]  Phone: [phone]  Fax: [phone]  Accepted for home PT services.       Twyla Pantoja RN BSN, PHN, ACM-RN  7A RN Care Coordinator  Phone: [phone]  Pager [phone]    To contact the weekend RNCC  [place] ([phone]) Saturday and Sunday    Units: 4A, 4C, 4E, 5A and 5B- Pager 1: [phone]    Units: 6A, 6B, 6C, 6D- Pager 2: [phone]    Units: 7A, 7B, 7C, 7D, and 5C-Pager 3: [phone]    [facility] ([phone]) Saturday and Sunday    Units: 5 Ortho, 8A, 10 ICU, & Pediatric Units-Pager 4: [phone]    12/5/2022 9:27 AM

## 2022-12-05 NOTE — PROGRESS NOTES
[facility]    Medicine Progress Note - Medicine Service, SARI TEAM 5    Date of Admission:  11/30/2022    Assessment & Plan   Kamryn Miller is a 87 year old female with a history of aortic stenosis s/p TAVR w/ Arndt Janis Ultra on 09/12/2022, CAD s/p LAD and RCA stent in 2003, LAD PCI in 2020 and last coronary angiogram w/ 50% stenosis of the LAD, HTN, HLD, HFpEF (last EF of 55-60% on 10/24) and PAD on DAPT admitted with syncope.  Post syncope patient did suffer a fall and required bystander CPR briefly.  She was found to be anemic to 6.4 on admission. She was being worked up for acute on chronic anemia. Endoscopy was considered given INGRID and last colonoscopy in 2016. However,  given high cardiac risk (RCRI Class III) of endoscopy patient was d/w GI and patient and her family decided to decline endoscopy acutely given stability of hgb since the one unit PRBC transfusion. No arrhythmic etiology of syncope identified on tele during hospital stay. CT chest was ordered showing multiple rib fractures b/l 2/2 to receiving bystander CPR post yncope, trauma surgery was consulted.     Interval updates:  - Get rib pain under better control  - Continue incentive spirometry and work with PT  - Called family multiple times, no answer    Fall 2/2 syncope  Patient came from clinic with a syncopal episode, no presyncopal sx or postictal period, fall was not mechanical. Did hit her head though CTH negative. Recently had Imdur increased and hydralazine discontinued ~1 month ago and has been having lower Bps at home (100 SBP at times per DMITRY). Patient was fasting PTA for labs as well.  Considering this, syncopal episode most likely multifactorial 2/2 acute anemia, fasting, and potentially recent TAVR and change in BP meds. Orthostatics negative. TTE without WAB, TAVR is in place and well seated. No significant changes from prior TTE.    - Tele w/o arrythmias  - Holter monitor at  discharge for 48 h  - Cards was consulted as below    - Imdur decreased to 30 gm daily   - Coreg decreased to 6.25 mg BID     Symptomatic acute on chronic microcytic anemia  INGRID  Retic index shows hypoproliferation. She does have iron panel done recently consistent with INGRID. Her hgb was 11-12 in early 2022 and consistently trended downward. Last colonoscopy in 2016, 3mm tubular adenoma at hepatic flexure was resected. No further colonoscopies due to age. Creatinine and GFR remained stable throughout the year but a consistent drop in hgb. There definitely could be a component of CKD driving the anemia. GI consulted for possible endoscopy (last colonoscopy in 2016); after understanding cardiac risk and with stability of hgb post 1 unit PRBC and no active e/o bleed patient and her family had decided against proceeding with endoscopy acutely  - s/p 1u pRBC, with appropriate improvement in Hgb. However 12/3 developed an acute drop in hemoglobin accompanied by low Bps.  - Hgb q 12 h  - Niferex to start 12/6/22  - Epo level appropriately elevated    Multiple b/l anterolateral rib fractures  2/2 syncope + CPR  - Trauma surgery consulted, rib fracture order set placed  - Pain control with oxy, robaxin and lido patches     Hx of aortic stenosis s/p TAVR on 09/12/2022  Hx of CAD s/p LAD and RCA stent in 2003, LAD PCI in 2020  HFpEF, last EF of 55-60% on 10/24  - Cardiology consulted                 - Ok to stop brillinta and hold ASA                 -  Change imdur to 30 mg qdaily                 - At discharge, recommend Ziopatch outpatient cardiac monitoring for 48 hours  - PTA statin, BB (with holding parameters)     Low Back pain   Fall 2/2 syncope  X-ray of T-spine w/o acute osseous abnormality  - pain control with APAP     Bipolar  MDD  Insomnia  - Continue PTA Lamictal 225 mg daily  - Continue PTA Pristiq 100 mg daily   - Continue PTA Namenda 10 mg daily  - Continue PTA Remeron 7.5 mg every night  - Melatonin  "PRN     TED on CKD III   Baseline creat 1.4. Creatinine and GFR remained stable throughout the year but a consistent drop in hgb.   - Check BMP in AM  - Gentle fluid resuscitation on 12/3/22     Hypothyroidism  TSH WNL  - Continue PTA levothyroxine and liothyrodine       Diet: Combination Diet Regular Diet Adult  Diet    DVT Prophylaxis: Pneumatic Compression Devices  Orellana Catheter: Not present  Central Lines: None  Cardiac Monitoring: ACTIVE order. Indication: Syncope- high cardiac risk (48 hours)  Code Status: Full Code      Disposition Plan      Expected Discharge Date: 12/06/2022,  9:00 AM    Destination: assisted living  Discharge Comments: observation, cardiology consulted. home PT recommended  12/5: Anticipate return to [facility].      The patient's care was discussed with the Patient. Staffed with Dr. Cole Ovalles, DO  Medicine Service, [facility] TEAM [address] [place] [address]  Securely message with the Vocera Web Console (learn more here)  Text page via [facility] Paging/Directory   Please see signed in provider for up to date coverage information      Clinically Significant Risk Factors              # Hypoalbuminemia: Lowest albumin = 3.4 g/dL (Ref range: 3.5-5.2) at 12/1/2022  8:03 AM, will monitor as appropriate          # Overweight: Estimated body mass index is 27.38 kg/m  as calculated from the following:    Height as of this encounter: 1.575 m (5' 2\").    Weight as of this encounter: 67.9 kg (149 lb 11.1 oz)., PRESENT ON ADMISSION         ______________________________________________________________________    Interval History   Patient is resting comfortably in bed. Has ongoing right sub axillary area discomfort. No melena or bleeding IA. ROS negative.    Data reviewed today: I reviewed all medications, new labs and imaging results over the last 24 hours.     Physical Exam   Vital Signs: Temp: 98.3  F (36.8  C) Temp src: Oral BP: 133/59 Pulse: 61   Resp: 16 " SpO2: 95 % O2 Device: None (Room air)    Weight: 149 lbs 11.08 oz  General Appearance: Well built and nourished, comfortable at rest, not in any acute cardio pulmonary distress  Respiratory: CTA b/l, pain with deep breathing R > L  Cardiovascular: S1S2 normal RRR  GI: soft NT  Skin: NAD  Other: aaox3 moving all 4 ext spont

## 2022-12-05 NOTE — PLAN OF CARE
"Goal Outcome Evaluation: progressing/ongoing      Plan of Care Reviewed With: patient, child    Blood pressure 133/59, pulse 61, temperature 98.3  F (36.8  C), temperature source Oral, resp. rate 16, height 1.575 m (5' 2\"), weight 67.9 kg (149 lb 11.1 oz), SpO2 95 %.       Vitals: WDL on RA  Neuros: A/O x 4  Cardiac: patient is on cardiac monitoring.  IV: L PIV SL  Labs/Electrolytes: Hgb 7.3  Resp/trach: Denied SOB  Diet: Regular, tolerating, denies nausea   GI/: +BS, No BM during the shift, voiding, not saving urine  Skin: No deficit noted.  Pain: Patient complain of R hip pain, team is aware. PRN Oxy and Robaxin given with relief.   Activity: Stand by assist with walker.   Social: Daughter at the bed side.  Plan: Continue to encourage to do the Incentive spirometer, patient tolerated well. Possible discharge tomorrow.      "

## 2022-12-05 NOTE — PLAN OF CARE
Vitals: stable room air.   Blood glucose: NA.   Pain/nausea: tylenolx 1. C/o right side rib pain , prn oxy put in, will give once verified. lido on his right rib pain.   Diet: regular. fair appetite.   Lines: pivr SL.   : voids but not saving.   GI: bm yesterday.   Drains: NA  Skin: wdl.   Mobility: sba.   Labs : hgb q12 hrs : hgb 8.0.

## 2022-12-06 ENCOUNTER — APPOINTMENT (OUTPATIENT)
Dept: CARDIOLOGY | Facility: CLINIC | Age: 87
End: 2022-12-06
Attending: INTERNAL MEDICINE
Payer: MEDICARE

## 2022-12-06 ENCOUNTER — APPOINTMENT (OUTPATIENT)
Dept: GENERAL RADIOLOGY | Facility: CLINIC | Age: 87
End: 2022-12-06
Attending: INTERNAL MEDICINE
Payer: MEDICARE

## 2022-12-06 LAB
ANION GAP SERPL CALCULATED.3IONS-SCNC: 8 MMOL/L (ref 7–15)
ATRIAL RATE - MUSE: 64 BPM
BUN SERPL-MCNC: 41.1 MG/DL (ref 8–23)
CALCIUM SERPL-MCNC: 8.9 MG/DL (ref 8.8–10.2)
CHLORIDE SERPL-SCNC: 105 MMOL/L (ref 98–107)
CREAT SERPL-MCNC: 1.39 MG/DL (ref 0.51–0.95)
DEPRECATED HCO3 PLAS-SCNC: 23 MMOL/L (ref 22–29)
DIASTOLIC BLOOD PRESSURE - MUSE: NORMAL MMHG
GFR SERPL CREATININE-BSD FRML MDRD: 37 ML/MIN/1.73M2
GLUCOSE SERPL-MCNC: 99 MG/DL (ref 70–99)
HGB BLD-MCNC: 7.2 G/DL (ref 11.7–15.7)
HGB BLD-MCNC: 8 G/DL (ref 11.7–15.7)
INTERPRETATION ECG - MUSE: NORMAL
MAGNESIUM SERPL-MCNC: 2 MG/DL (ref 1.7–2.3)
P AXIS - MUSE: 84 DEGREES
PHOSPHATE SERPL-MCNC: 4.1 MG/DL (ref 2.5–4.5)
POTASSIUM SERPL-SCNC: 4.6 MMOL/L (ref 3.4–5.3)
PR INTERVAL - MUSE: 232 MS
QRS DURATION - MUSE: 152 MS
QT - MUSE: 486 MS
QTC - MUSE: 501 MS
R AXIS - MUSE: -4 DEGREES
SODIUM SERPL-SCNC: 136 MMOL/L (ref 136–145)
SYSTOLIC BLOOD PRESSURE - MUSE: NORMAL MMHG
T AXIS - MUSE: 180 DEGREES
VENTRICULAR RATE- MUSE: 64 BPM

## 2022-12-06 PROCEDURE — 250N000013 HC RX MED GY IP 250 OP 250 PS 637

## 2022-12-06 PROCEDURE — 93225 XTRNL ECG REC<48 HRS REC: CPT

## 2022-12-06 PROCEDURE — G0378 HOSPITAL OBSERVATION PER HR: HCPCS

## 2022-12-06 PROCEDURE — 71045 X-RAY EXAM CHEST 1 VIEW: CPT | Mod: 26 | Performed by: RADIOLOGY

## 2022-12-06 PROCEDURE — 250N000013 HC RX MED GY IP 250 OP 250 PS 637: Performed by: PHYSICIAN ASSISTANT

## 2022-12-06 PROCEDURE — 250N000013 HC RX MED GY IP 250 OP 250 PS 637: Performed by: INTERNAL MEDICINE

## 2022-12-06 PROCEDURE — 999N000127 HC STATISTIC PERIPHERAL IV START W US GUIDANCE

## 2022-12-06 PROCEDURE — 84100 ASSAY OF PHOSPHORUS: CPT | Performed by: PHYSICIAN ASSISTANT

## 2022-12-06 PROCEDURE — 93005 ELECTROCARDIOGRAM TRACING: CPT | Mod: XU

## 2022-12-06 PROCEDURE — 36415 COLL VENOUS BLD VENIPUNCTURE: CPT | Performed by: PHYSICIAN ASSISTANT

## 2022-12-06 PROCEDURE — 85018 HEMOGLOBIN: CPT

## 2022-12-06 PROCEDURE — 83735 ASSAY OF MAGNESIUM: CPT | Performed by: PHYSICIAN ASSISTANT

## 2022-12-06 PROCEDURE — 80051 ELECTROLYTE PANEL: CPT | Performed by: PHYSICIAN ASSISTANT

## 2022-12-06 PROCEDURE — 36415 COLL VENOUS BLD VENIPUNCTURE: CPT

## 2022-12-06 PROCEDURE — 71045 X-RAY EXAM CHEST 1 VIEW: CPT

## 2022-12-06 PROCEDURE — 93010 ELECTROCARDIOGRAM REPORT: CPT | Performed by: INTERNAL MEDICINE

## 2022-12-06 RX ORDER — ISOSORBIDE MONONITRATE 30 MG/1
30 TABLET, EXTENDED RELEASE ORAL DAILY
Qty: 30 TABLET | Refills: 2 | Status: SHIPPED | OUTPATIENT
Start: 2022-12-06 | End: 2022-12-06

## 2022-12-06 RX ORDER — GABAPENTIN 100 MG/1
100 CAPSULE ORAL 3 TIMES DAILY
Qty: 60 CAPSULE | Refills: 0 | Status: SHIPPED | OUTPATIENT
Start: 2022-12-06 | End: 2022-12-20

## 2022-12-06 RX ORDER — CARVEDILOL 6.25 MG/1
6.25 TABLET ORAL 2 TIMES DAILY
Qty: 60 TABLET | Refills: 3 | Status: SHIPPED | OUTPATIENT
Start: 2022-12-06 | End: 2022-12-07

## 2022-12-06 RX ORDER — CARVEDILOL 6.25 MG/1
6.25 TABLET ORAL 2 TIMES DAILY
Qty: 60 TABLET | Refills: 3 | Status: SHIPPED | OUTPATIENT
Start: 2022-12-06 | End: 2022-12-06

## 2022-12-06 RX ORDER — GABAPENTIN 100 MG/1
100 CAPSULE ORAL 3 TIMES DAILY
Qty: 60 CAPSULE | Refills: 0 | Status: SHIPPED | OUTPATIENT
Start: 2022-12-06 | End: 2022-12-06

## 2022-12-06 RX ORDER — ISOSORBIDE MONONITRATE 30 MG/1
30 TABLET, EXTENDED RELEASE ORAL DAILY
Qty: 30 TABLET | Refills: 2 | Status: SHIPPED | OUTPATIENT
Start: 2022-12-06 | End: 2023-02-15 | Stop reason: DRUGHIGH

## 2022-12-06 RX ORDER — METHOCARBAMOL 500 MG/1
500 TABLET, FILM COATED ORAL EVERY 6 HOURS PRN
Qty: 15 TABLET | Refills: 0 | Status: SHIPPED | OUTPATIENT
Start: 2022-12-06 | End: 2022-12-06

## 2022-12-06 RX ORDER — METHOCARBAMOL 500 MG/1
500 TABLET, FILM COATED ORAL EVERY 6 HOURS PRN
Qty: 15 TABLET | Refills: 0 | Status: SHIPPED | OUTPATIENT
Start: 2022-12-06 | End: 2023-01-11

## 2022-12-06 RX ORDER — IRON POLYSACCHARIDE COMPLEX 150 MG
150 CAPSULE ORAL DAILY
Qty: 30 CAPSULE | Refills: 1 | Status: SHIPPED | OUTPATIENT
Start: 2022-12-06 | End: 2023-01-19

## 2022-12-06 RX ADMIN — GABAPENTIN 100 MG: 100 CAPSULE ORAL at 14:55

## 2022-12-06 RX ADMIN — LAMOTRIGINE 225 MG: 25 TABLET ORAL at 07:58

## 2022-12-06 RX ADMIN — ACETAMINOPHEN 975 MG: 325 TABLET, FILM COATED ORAL at 01:42

## 2022-12-06 RX ADMIN — ACETAMINOPHEN 975 MG: 325 TABLET, FILM COATED ORAL at 08:47

## 2022-12-06 RX ADMIN — FLUTICASONE FUROATE AND VILANTEROL TRIFENATATE 1 PUFF: 100; 25 POWDER RESPIRATORY (INHALATION) at 07:59

## 2022-12-06 RX ADMIN — OXYCODONE HYDROCHLORIDE 2.5 MG: 5 TABLET ORAL at 01:42

## 2022-12-06 RX ADMIN — OXYCODONE HYDROCHLORIDE 2.5 MG: 5 TABLET ORAL at 10:42

## 2022-12-06 RX ADMIN — LIDOCAINE PATCH 4% 2 PATCH: 40 PATCH TOPICAL at 19:52

## 2022-12-06 RX ADMIN — DESVENLAFAXINE SUCCINATE 100 MG: 100 TABLET, EXTENDED RELEASE ORAL at 07:59

## 2022-12-06 RX ADMIN — OXYCODONE HYDROCHLORIDE 2.5 MG: 5 TABLET ORAL at 06:03

## 2022-12-06 RX ADMIN — ACETAMINOPHEN 975 MG: 325 TABLET, FILM COATED ORAL at 16:50

## 2022-12-06 RX ADMIN — CARVEDILOL 6.25 MG: 6.25 TABLET, FILM COATED ORAL at 19:52

## 2022-12-06 RX ADMIN — CARVEDILOL 6.25 MG: 6.25 TABLET, FILM COATED ORAL at 08:47

## 2022-12-06 RX ADMIN — GABAPENTIN 100 MG: 100 CAPSULE ORAL at 19:52

## 2022-12-06 RX ADMIN — GABAPENTIN 100 MG: 100 CAPSULE ORAL at 07:59

## 2022-12-06 RX ADMIN — ISOSORBIDE MONONITRATE 30 MG: 30 TABLET, EXTENDED RELEASE ORAL at 06:03

## 2022-12-06 RX ADMIN — MIRTAZAPINE 7.5 MG: 7.5 TABLET, FILM COATED ORAL at 20:48

## 2022-12-06 RX ADMIN — LEVOTHYROXINE SODIUM 50 MCG: 0.05 TABLET ORAL at 07:59

## 2022-12-06 RX ADMIN — ROSUVASTATIN CALCIUM 40 MG: 40 TABLET, FILM COATED ORAL at 07:59

## 2022-12-06 RX ADMIN — LIOTHYRONINE SODIUM 10 MCG: 5 TABLET ORAL at 07:58

## 2022-12-06 RX ADMIN — SENNOSIDES AND DOCUSATE SODIUM 1 TABLET: 50; 8.6 TABLET ORAL at 10:42

## 2022-12-06 RX ADMIN — MEMANTINE 10 MG: 10 TABLET ORAL at 08:00

## 2022-12-06 ASSESSMENT — ACTIVITIES OF DAILY LIVING (ADL)
ADLS_ACUITY_SCORE: 29
ADLS_ACUITY_SCORE: 29
ADLS_ACUITY_SCORE: 28
ADLS_ACUITY_SCORE: 31
ADLS_ACUITY_SCORE: 31
ADLS_ACUITY_SCORE: 28
ADLS_ACUITY_SCORE: 28
ADLS_ACUITY_SCORE: 31

## 2022-12-06 NOTE — PLAN OF CARE
"VS: BP (!) 156/77 (BP Location: Left arm)   Pulse 64   Temp 98.5  F (36.9  C) (Oral)   Resp 16   Ht 1.575 m (5' 2\")   Wt 67.9 kg (149 lb 11.1 oz)   SpO2 92%   BMI 27.38 kg/m      Shift [id number]  observation   Neuro: alert and oriented X4   Cardio: systolic in the 150's   Respiratory: RA   GI/: continent of bowel and bladder   Skin: intact   Diet: Regular     Labs: pending AM   BG: none   LDA:  PIV SL   Mobility:  SBA with walker   Pain:right rib pain   PRN medications: oxycodone   Changes: triggered BPA for rib fracture alert team was made aware   Plan of Care:  To discharge home today            "

## 2022-12-06 NOTE — PLAN OF CARE
"Vitals: BP (!) 162/85   Pulse 66   Temp 98.5  F (36.9  C) (Oral)   Resp 16   Ht 1.575 m (5' 2\")   Wt 67.9 kg (149 lb 11.1 oz)   SpO2 92%   BMI 27.38 kg/m     See flow sheet for BP readings at 11 am. 88/44, 105/25, 95/45, back up to 122/64. Patient was asymptomatic.    Endocrine: n/a  Labs: HGB 7.2 from 7.7.  Pain: Rib, chest and flank pain.  PRN's: Oxycodone 2,5 mg X1.  Diet: Regular diet, good appetite.  LDA: PIV saline locked, telemetry.  GI: BM 12/  : Voids, not saving.  Skin: Pale, skin is intact.  Neuro: Alert and oriented, bed alarm on, calls appropriately.  Mobility: Stand by assist of 1, gait belt and walker.  Education: n/a  Plan: Discharge back to her facility this afternoon when her daughter arrives. EKG contacted to set up portable monitoring.                    "

## 2022-12-06 NOTE — PROVIDER NOTIFICATION
Paged the maroon 5 cross cover regarding a Rib Fracture alert BPA message that triggered in Epic.   Next step was to Notify Provider ASAP and monitor VS q 30 minutes for 1 hour

## 2022-12-06 NOTE — PROVIDER NOTIFICATION
"Paged Dr. Page at [phone]:    \"FYI: Called tele monitor who said she's in sinus rhythm with 1st degree AV block, and BBB, with frequent PACs and up to 18 PVCs/minute in bigeminy/trigeminy. Let us know if there is anything you'd like us to do.  Paulino Koch RN ([facility])  [phone]\"    Call returned at 1848.  Provider will touch base with attending.  "

## 2022-12-06 NOTE — PROGRESS NOTES
Observation Goal:     Stable hemoglobin and vital signs in context of workup for acute on chronic anemia: In progress      - Hgb 7.7

## 2022-12-06 NOTE — PROGRESS NOTES
"Observation Goal:     Stable hemoglobin and vital signs in context of workup for acute on chronic anemia: In progress      - Hgb 7.7    /58 (BP Location: Left arm)   Pulse 65   Temp 98.8  F (37.1  C) (Oral)   Resp 16   Ht 1.575 m (5' 2\")   Wt 67.9 kg (149 lb 11.1 oz)   SpO2 95%   BMI 27.38 kg/m      Shift: [id number]  Isolation Status: NA  VSS on RA, afebrile  Neuro: Aox4  Behaviors:pleasant & cooperative  Labs: hgb 7.7 hgb Q 12 hrs  Respiratory: WDL  Cardiac: Tele, SR, BBB, 1st degree AV block PVCs PACs, MD notified EKG  Pain/Nausea: Pain to (R) side rib, managed with Oxy/Tylenol  PRN: Oxycodone at 2230  Diet: Reg  IV Access: (R) PIV  GI/: WDL  Skin: CDI  Mobility: SBA  Plan: Contniue with POC, update PRN  Liekly discharge tomorrow    "

## 2022-12-06 NOTE — PROGRESS NOTES
Care Management Discharge Note    Discharge Date: 12/06/2022       Discharge Disposition: Assisted Living    Discharge Services: None    Discharge DME: None    Discharge Transportation: family or friend will provide  Patient/family educated on Medicare website which has current facility and service quality ratings: yes (Reviewed Medicare.gov - pt has no preference)    Education Provided on the Discharge Plan:  yes  Persons Notified of Discharge Plans: patient, DMITRY RN  Patient/Family in Agreement with the Plan: yes    Handoff Referral Completed: Yes    Additional Information:  Notified by provider pt cleared to discharge back to [facility] today.  Spoke with JOSEPH Porter and DMITRY Murphy RN regarding pt status.  [facility] requested copy of most recent provider note - faxed for review along with discharge orders.  [facility] requests discharge  prescriptions be sent to [facility] in [place].   Discharge pharmacy following up with provider regarding pharmacy change.  Provided Katherine ANDRES RN with writer's contact information.      Twyla Pantoja RN BSN, PHN, ACM-RN  7A RN Care Coordinator  Phone: [phone]  Pager [phone]    To contact the weekend RNCC  [place] ([phone]) Saturday and Sunday    Units: 4A, 4C, 4E, 5A and 5B- Pager 1: [phone]    Units: 6A, 6B, 6C, 6D- Pager 2: [phone]    Units: 7A, 7B, 7C, 7D, and 5C-Pager 3: [phone]    [facility] ([phone]) Saturday and Sunday    Units: 5 Ortho, 8A, 10 ICU, & Pediatric Units-Pager 4: [phone]    12/6/2022 12:05 PM

## 2022-12-07 VITALS
BODY MASS INDEX: 27.55 KG/M2 | OXYGEN SATURATION: 95 % | RESPIRATION RATE: 16 BRPM | WEIGHT: 149.69 LBS | HEART RATE: 73 BPM | HEIGHT: 62 IN | DIASTOLIC BLOOD PRESSURE: 55 MMHG | TEMPERATURE: 98.7 F | SYSTOLIC BLOOD PRESSURE: 116 MMHG

## 2022-12-07 LAB
ANION GAP SERPL CALCULATED.3IONS-SCNC: 12 MMOL/L (ref 7–15)
ATRIAL RATE - MUSE: 55 BPM
BUN SERPL-MCNC: 37.8 MG/DL (ref 8–23)
CALCIUM SERPL-MCNC: 8.9 MG/DL (ref 8.8–10.2)
CHLORIDE SERPL-SCNC: 106 MMOL/L (ref 98–107)
CREAT SERPL-MCNC: 1.33 MG/DL (ref 0.51–0.95)
DEPRECATED HCO3 PLAS-SCNC: 21 MMOL/L (ref 22–29)
DIASTOLIC BLOOD PRESSURE - MUSE: NORMAL MMHG
GFR SERPL CREATININE-BSD FRML MDRD: 39 ML/MIN/1.73M2
GLUCOSE SERPL-MCNC: 105 MG/DL (ref 70–99)
HGB BLD-MCNC: 7.5 G/DL (ref 11.7–15.7)
INTERPRETATION ECG - MUSE: NORMAL
MAGNESIUM SERPL-MCNC: 2.3 MG/DL (ref 1.7–2.3)
P AXIS - MUSE: 70 DEGREES
PHOSPHATE SERPL-MCNC: 4 MG/DL (ref 2.5–4.5)
POTASSIUM SERPL-SCNC: 4.4 MMOL/L (ref 3.4–5.3)
PR INTERVAL - MUSE: 232 MS
QRS DURATION - MUSE: 150 MS
QT - MUSE: 486 MS
QTC - MUSE: 464 MS
R AXIS - MUSE: -11 DEGREES
SODIUM SERPL-SCNC: 139 MMOL/L (ref 136–145)
SYSTOLIC BLOOD PRESSURE - MUSE: NORMAL MMHG
T AXIS - MUSE: 154 DEGREES
VENTRICULAR RATE- MUSE: 55 BPM

## 2022-12-07 PROCEDURE — 250N000013 HC RX MED GY IP 250 OP 250 PS 637

## 2022-12-07 PROCEDURE — 250N000013 HC RX MED GY IP 250 OP 250 PS 637: Performed by: PHYSICIAN ASSISTANT

## 2022-12-07 PROCEDURE — G0378 HOSPITAL OBSERVATION PER HR: HCPCS

## 2022-12-07 PROCEDURE — 36415 COLL VENOUS BLD VENIPUNCTURE: CPT

## 2022-12-07 PROCEDURE — 85018 HEMOGLOBIN: CPT

## 2022-12-07 PROCEDURE — 250N000013 HC RX MED GY IP 250 OP 250 PS 637: Performed by: INTERNAL MEDICINE

## 2022-12-07 PROCEDURE — 84100 ASSAY OF PHOSPHORUS: CPT | Performed by: PHYSICIAN ASSISTANT

## 2022-12-07 PROCEDURE — 80048 BASIC METABOLIC PNL TOTAL CA: CPT | Performed by: PHYSICIAN ASSISTANT

## 2022-12-07 PROCEDURE — 83735 ASSAY OF MAGNESIUM: CPT | Performed by: PHYSICIAN ASSISTANT

## 2022-12-07 PROCEDURE — 99217 PR OBSERVATION CARE DISCHARGE: CPT | Mod: GC | Performed by: STUDENT IN AN ORGANIZED HEALTH CARE EDUCATION/TRAINING PROGRAM

## 2022-12-07 RX ORDER — CARVEDILOL 6.25 MG/1
6.25 TABLET ORAL 2 TIMES DAILY
Qty: 60 TABLET | Refills: 3 | Status: SHIPPED | OUTPATIENT
Start: 2022-12-07 | End: 2022-12-12 | Stop reason: DRUGHIGH

## 2022-12-07 RX ADMIN — OXYCODONE HYDROCHLORIDE 2.5 MG: 5 TABLET ORAL at 00:56

## 2022-12-07 RX ADMIN — GABAPENTIN 100 MG: 100 CAPSULE ORAL at 08:56

## 2022-12-07 RX ADMIN — FLUTICASONE FUROATE AND VILANTEROL TRIFENATATE 1 PUFF: 100; 25 POWDER RESPIRATORY (INHALATION) at 08:59

## 2022-12-07 RX ADMIN — LAMOTRIGINE 225 MG: 25 TABLET ORAL at 08:56

## 2022-12-07 RX ADMIN — MEMANTINE 10 MG: 10 TABLET ORAL at 08:56

## 2022-12-07 RX ADMIN — ROSUVASTATIN CALCIUM 40 MG: 40 TABLET, FILM COATED ORAL at 08:56

## 2022-12-07 RX ADMIN — CARVEDILOL 6.25 MG: 6.25 TABLET, FILM COATED ORAL at 08:56

## 2022-12-07 RX ADMIN — LIOTHYRONINE SODIUM 10 MCG: 5 TABLET ORAL at 08:59

## 2022-12-07 RX ADMIN — ISOSORBIDE MONONITRATE 30 MG: 30 TABLET, EXTENDED RELEASE ORAL at 06:03

## 2022-12-07 RX ADMIN — ACETAMINOPHEN 975 MG: 325 TABLET, FILM COATED ORAL at 00:50

## 2022-12-07 RX ADMIN — LEVOTHYROXINE SODIUM 50 MCG: 0.05 TABLET ORAL at 08:56

## 2022-12-07 RX ADMIN — DESVENLAFAXINE SUCCINATE 100 MG: 100 TABLET, EXTENDED RELEASE ORAL at 08:59

## 2022-12-07 RX ADMIN — ACETAMINOPHEN 975 MG: 325 TABLET, FILM COATED ORAL at 08:56

## 2022-12-07 ASSESSMENT — ACTIVITIES OF DAILY LIVING (ADL)
ADLS_ACUITY_SCORE: 27
ADLS_ACUITY_SCORE: 31
ADLS_ACUITY_SCORE: 27

## 2022-12-07 NOTE — PLAN OF CARE
" 7p-7a    VS: /63 (BP Location: Right arm)   Pulse 75   Temp 98.7  F (37.1  C) (Oral)   Resp 16   Ht 1.575 m (5' 2\")   Wt 67.9 kg (149 lb 11.1 oz)   SpO2 95%   BMI 27.38 kg/m    Neuro: alert and oriented X4   Cardio: on Tele with PAC's and PVS and bundle branch block   Respiratory: RA,tolerating  GI/: continent of bowel and bladder LBM was 12/6/22  Skin: intact has lido patch on the right under breast for pain   Diet:  Regular,tolerating  BG: none   LDA:  R PIV SL   Mobility: SBA with walker walker   Pain: rib/back pain-tylenol and oxycodone given  PRN medications:  will offer Robaxin before the oxycodone   Changes: parameters was added for coreg.   Plan of Care: to Discharge early AM back to her senior care                        "

## 2022-12-07 NOTE — PLAN OF CARE
"VS: /75 (BP Location: Right arm)   Pulse 85   Temp 98.2  F (36.8  C) (Oral)   Resp 16   Ht 1.575 m (5' 2\")   Wt 67.9 kg (149 lb 11.1 oz)   SpO2 92%   BMI 27.38 kg/m        Neuro: alert and oriented X4   Cardio: on Tele with PAC's and PVS and bundle branch block   Respiratory: RA   GI/: continent of bowel and bladder LBM was 12/6/22  Skin: intact has lido patch on the right under breast for pain   Diet:   Regular and is able to tolerate it   Labs: pending AM   BG: none   LDA:  R PIV SL   Mobility: SBA with walker walked X2 around the nursing unit    Pain: rib pain   PRN medications: none will offer Robaxin before the oxycodone   Changes: parameters was added for coreg.   Plan of Care:  [facility] Dis[facility]            "

## 2022-12-07 NOTE — PROGRESS NOTES
Care Management Follow Up    Length of Stay (days): 1    Expected Discharge Date: 12/07/2022     Concerns to be Addressed: discharge planning (home care)     Patient plan of care discussed at interdisciplinary rounds: Yes    Anticipated Discharge Disposition: Assisted Living     Anticipated Discharge Services: [facility] - PT  Anticipated Discharge DME: None      Additional Information:  Discharge orders are placed. Faxed AVS to group home and left VM with RN dept    Assisted Living:  [address] [address]  Ph: [phone]  Fax: [phone]  24 Hour cellphone Ph: [phone]       Home Care:  [facility]  Phone: [phone]  Fax: [phone]  Accepted for home PT services.       Aleida Nation RN, [place]  Float Care Coordinator  Covering 7A RNCC   Pager: [phone]

## 2022-12-07 NOTE — PLAN OF CARE
Patient discharged to her facility this morning accompanied by her daughter. IV DC'd. Patient has portable telemetry, Zio patch in place. Discharge orders reviewed with the patient and her daughter. Vitals and HGB are stable.  Goals met for Observation.

## 2022-12-07 NOTE — PLAN OF CARE
Physical Therapy Discharge Summary    Reason for therapy discharge:    To assisted living facility with ongoing PT.    Progress towards therapy goal(s). See goals on Care Plan in [facility] electronic health record for goal details.  Goals partially met.  Barriers to achieving goals:   discharge from facility.    Therapy recommendation(s):    Continued therapy is recommended.  Rationale/Recommendations:  Pt will benefit from further PT to continue to improve safety and independence with functional mobility.

## 2022-12-08 ENCOUNTER — PATIENT OUTREACH (OUTPATIENT)
Dept: CARE COORDINATION | Facility: CLINIC | Age: 87
End: 2022-12-08

## 2022-12-08 ENCOUNTER — TELEPHONE (OUTPATIENT)
Dept: FAMILY MEDICINE | Facility: CLINIC | Age: 87
End: 2022-12-08

## 2022-12-08 NOTE — TELEPHONE ENCOUNTER
Prior Authorization Retail Medication Request    Medication/Dose: methocarbamol (ROBAXIN) 500 MG tablet  ICD code (if different than what is on RX):  S22.41XA      Insurance Name:  MEDICARE  Insurance ID:  [id number]       Pharmacy Information (if different than what is on RX)  Name:  [facility]  Phone:  [phone]

## 2022-12-08 NOTE — PROGRESS NOTES
General acute hospital    Background: Transitional Care Management program identified per system criteria and reviewed by [facility] team for possible outreach.    Assessment: Upon chart review, [facility] Team member will not proceed with patient outreach related to this episode of Transitional Care Management program due to reason below:    The CHW did not contact the patient, but did place an episode for CCC to determine if the patient would benefit from a call to discuss any support.     Patient has discharged to a Memory Care, Long-term Care, Assisted Living or Group Home where patient is receiving on-site support with their daily cares, including support with hospital follow up plan.    Plan: Transitional Care Management episode addressed appropriately per reason noted above.      GABO Sutton  [facility]    *Connected Care Resource Team does NOT follow patient ongoing. Referrals are identified based on internal discharge reports and the outreach is to ensure patient has an understanding of their discharge instructions.

## 2022-12-08 NOTE — DISCHARGE SUMMARY
[facility]  Inpatient Medicine Service - [facility] 5  Discharge Summary       Date of Admission:  2022   Date of Discharge:  2022 10:15 AM  Discharging Provider: Dr. Martino  Discharge Service: Medicine [facility] 5    Discharge Diagnoses:   Primary:  Syncope, multifactorial    Secondary:  Iron deficiency anemia  Multiple rib fractures post CPR  TED    Follow-up Plannin. Medication changes:   a. Stop: Aspirin and brilinta (possibly resume pending hgb)  b. Begin:  i. Methocarbamol 500mg q6 hr PRN  ii. Gabapentin 100mg TID  iii. Iron 150mg daily  c. Change:  i. Carvedilol 12.5 to 6.25mg BID  ii. Isosorbide mononitrate 60 to 30mg daily  2. Follow-up appointments: GI and cardiology  3. Pending diagnostics: None    Follow-up Appointments     Adult [facility] Follow-up and recommended labs and tests      Follow up with primary care provider, Rolanda Wilcox, within 3-5 days, to   evaluate medication change, to evaluate treatment change, for hospital   follow- up, and regarding new diagnosis. The following labs/tests are   recommended: CBC. Aspirin being held at discharge. If hemoglobin stays   stable at follow up consider restarting aspirin with close hgb follow up.  Follow up with cardiology as outpatient in 2-3 weeks  Follow up with GI. Referral being sent to GI to call patient to schedule   follow up appointment.    Appointments on [place] and/or [facility] (with [facility] or [facility]   provider or service). Call [phone] if you haven't heard regarding   these appointments within 7 days of discharge.                Hospital Course       Kamryn Miller is a 87 year old female with a history of aortic stenosis s/p TAVR w/ 2022, CAD s/p LAD and RCA stent in , LAD PCI in , HTN, HLD, HFpEF (last EF of 55-60% on 10/24) and PAD on DAPT admitted with syncope.  Post syncope patient did suffer a fall and required bystander CPR briefly.  She was found to be  anemic to 6.4 on admission. She was being worked up for acute on chronic anemia. Endoscopy was considered given INGRID and last colonoscopy in 2016. However,  given high cardiac risk (RCRI Class III) of endoscopy patient was d/w GI and patient and her family decided to decline endoscopy acutely given stability of hgb since the one unit PRBC transfusion. No arrhythmic etiology of syncope identified on tele during hospital stay. CT chest was ordered showing multiple rib fractures b/l 2/2 to receiving bystander CPR post yncope, trauma surgery was consulted.        Fall 2/2 syncope  Patient came from clinic with a syncopal episode, no presyncopal sx or postictal period, fall was not mechanical. Did hit her head though CTH negative. Recently had Imdur increased and hydralazine discontinued ~1 month ago and has been having lower Bps at home (100 SBP at times per DMITRY). Patient was fasting PTA for labs as well.  Considering this, syncopal episode most likely multifactorial 2/2 acute anemia, fasting, and potentially recent TAVR and change in BP meds. Orthostatics negative. TTE without WAB, TAVR is in place and well seated. No significant changes from prior TTE.    - Holter monitor at discharge for 48 h  - Med changes                  - Imdur decreased to 30 gm daily                 - Coreg decreased to 6.25 mg BID      Symptomatic acute on chronic microcytic anemia  INGRID  Retic index shows hypoproliferation. She does have iron panel done recently consistent with INGRID. Her hgb was 11-12 in early 2022 and consistently trended downward. Last colonoscopy in 2016, 3mm tubular adenoma at hepatic flexure was resected. No further colonoscopies due to age. Creatinine and GFR remained stable throughout the year but a consistent drop in hgb. There definitely could be a component of CKD driving the anemia. GI consulted for possible endoscopy (last colonoscopy in 2016); after understanding cardiac risk and with stability of hgb post 1 unit  "PRBC and no active e/o bleed patient and her family had decided against proceeding with endoscopy acutely. S/p 1u pRBC, with appropriate improvement in Hgb. However 12/3 developed an acute drop in hemoglobin accompanied by low Bps.  - Med changes   - Niferex to start 12/6/22     Multiple b/l anterolateral rib fractures  2/2 syncope + CPR  Trauma surgery consulted, rib fracture order set placed. Pain control with oxy, robaxin and lido patches     Hx of aortic stenosis s/p TAVR on 09/12/2022  Hx of CAD s/p LAD and RCA stent in 2003, LAD PCI in 2020  HFpEF, last EF of 55-60% on 10/24  - Cardiology consulted, med changes as above and   - Hold aspirin and brillinta     Low Back pain   Fall 2/2 syncope  X-ray of T-spine w/o acute osseous abnormality. Pain control with APAP     TED on CKD III   Baseline creat 1.4. Creatinine and GFR remained stable throughout the year but a consistent drop in hgb.     --- Chronic ---    Bipolar  MDD  Insomnia  - Continue PTA Lamictal 225 mg daily  - Continue PTA Pristiq 100 mg daily   - Continue PTA Namenda 10 mg daily  - Continue PTA Remeron 7.5 mg every night  - Melatonin PRN     Hypothyroidism  TSH WNL  - Continue PTA levothyroxine and liothyrodine    Discharge Disposition   Discharged to assisted living  Condition at discharge: Stable    Code Status on Discharge   Prior    The patient was discussed on day of discharge with Dr. Martino.    Yokasta Cameron MD  Internal Medicine PGY-2  [facility] Team 5  (Please see sign-in/sign-out for up-to-date physician coverage information)    ----------------------------------------------------------------------------------------------------------------    Discharge Physical Exam   Vital Signs: /55   Pulse 73   Temp 98.7  F (37.1  C) (Oral)   Resp 16   Ht 1.575 m (5' 2\")   Wt 67.9 kg (149 lb 11.1 oz)   SpO2 95%   BMI 27.38 kg/m    Weight: 149 lbs 11.08 oz    General Appearance: Well built and nourished, comfortable at rest, not in any " acute cardio pulmonary distress  Respiratory: CTA b/l, pain with deep breathing R > L  Cardiovascular: S1S2 normal RRR  GI: soft NT, non-distended  Other:   aaox3 moving all 4 ext spont     Significant Results and Procedures     Results for orders placed or performed during the hospital encounter of 11/30/22   XR Chest 2 Views    Narrative    EXAM: XR CHEST 2 VIEWS 11/30/2022 2:06 PM    HISTORY: 87-year-old female with syncope.     COMPARISON: Chest radiograph 10/24/2022, 9/22/2022.    TECHNIQUE: Frontal and lateral views of the chest.    FINDINGS:   TAVR. Coronary artery stent. Midline trachea. Stable cardiomediastinal  silhouette. Distinct pulmonary vasculature. Trace costophrenic  blunting bilaterally. No discernible pneumothorax. Normal lung  volumes. No focal airspace or interstitial opacities. Probable  retrocardiac double density, representing a moderately sized hiatal  hernia. Thoracolumbar spondylosis. Osteopenia. Unremarkable soft  tissues.      Impression    IMPRESSION: Trace pleural effusions versus thickening. Otherwise  negative.    I have personally reviewed the examination and initial interpretation  and I agree with the findings.    PRIYA LION MD         SYSTEM ID:  [id number]   Head CT w/o contrast    Narrative    EXAM: CT HEAD W/O CONTRAST  11/30/2022 2:20 PM     HISTORY:  fall, hit head       COMPARISON:  None    TECHNIQUE: Using multidetector thin collimation helical acquisition  technique, axial, coronal and sagittal CT images from the skull base  to the vertex were obtained without intravenous contrast.   (topogram) image(s) also obtained and reviewed.    FINDINGS:  No acute intracranial hemorrhage, mass effect, or midline shift. No  acute loss of gray-white matter differentiation in the cerebral  hemispheres. Ventricles are proportionate to the cerebral sulci. Clear  basal cisterns. Mild diffuse cerebral volume loss. Patchy  periventricular hypoattenuation consistent with chronic  small vessel  ischemic disease.     The bony calvaria and the bones of the skull base are normal. The  visualized portions of the paranasal sinuses and mastoid air cells are  clear. Grossly normal orbits.       Impression    IMPRESSION: No acute intracranial pathology.     I have personally reviewed the examination and initial interpretation  and I agree with the findings.    KAREN ARDON MD         SYSTEM ID:  [id number]   XR Thoracic Spine 2 Views    Narrative    EXAMINATION:  XR THORACIC SPINE 2 VIEWS 12/1/2022 1:28 PM.    History: s/pfall,mid and upper back pain    Comparison: 9/22/2020 to radiograph    Findings:    12 rib bearing vertebral bodies are identified.     No acute fracture or subluxation.    Multilevel degenerative changes of the thoracic spine with anterior  bridging osteophytes and opposing endplate sclerotic changes  predominantly in the mid and lower thoracic spine.    The visualized lungs are clear. TAVR.        Impression    Impression:  1.  No acute osseous abnormality.  2.  Multilevel degenerative changes of the thoracic spine.    VALENTINA ALLEN MD         SYSTEM ID:  [id number]   CT Chest w/o Contrast    Narrative    CT CHEST W/O CONTRAST  12/3/2022 3:28 PM    History:  right side chest pain, recent syncope and fall, new drop in  hemoglobin, received brief bystander CPR following syncope.     COMPARISON: CT dated 9/16/2020.    TECHNIQUE: CT imaging obtained through the chest without intravenous  contrast. Coronal and axial MIP reformatted images obtained.    FINDINGS:  Tubes/Lines: None.    Mediastinum:  Heart is not enlarged. Trace pericardial effusion. TAVR. Heavy  multivessel coronary artery atherosclerotic calcifications with  coronary artery stents. Mitral valve annulus calcifications. Normal  caliber thoracic aorta and main pulmonary artery. No abnormally  enlarged thoracic lymph nodes. Moderate hiatal hernia.    Lungs/Airways:  Central tracheobronchial tree is clear. There are new  clustered  nodular opacities in the left lower lobe and right lower lobe (series  4, image 157 and 155) which are new since 2020. A 7 mm nodule in the  anterior right lower lobe (series 4 image 182). 9 mm nodular opacities  seen in the dependent left lower lobe (series 4, images 199 and 222).  Several other scattered sub-6 mm pulmonary nodules are unchanged.  Scattered calcified granulomas. There are trace bilateral pleural  effusions. No large consolidative opacities. No pneumothorax.     Bones and soft tissues: Acute nondisplaced anterolateral right third,  fourth, fifth, and sixth and left fourth, fifth, and sixth rib  fractures. No other acute osseous abnormality identified. Degenerative  change in the shoulder joints and spine.    Partially imaged upper abdomen: Cholelithiasis without evidence of  cholecystitis. Bilateral simple and hemorrhagic/proteinaceous renal  cysts.      Impression    IMPRESSION:   1. Acute nondisplaced bilateral anterolateral rib fractures, as  detailed above. No pneumothorax or evidence of hemorrhage.  2. Multiple new nodular opacities measuring up to 9 mm in the lower  lobes may be of infectious or inflammatory etiology. Consider  follow-up chest CT in 3-6 months, per Fleischner Society criteria.  3. Trace bilateral pleural effusions with adjacent atelectasis.  4. Heavy multivessel coronary artery atherosclerotic calcifications  with coronary artery stents.  5. Cholelithiasis without evidence of cholecystitis.  6. Moderate hiatal hernia.    I have personally reviewed the examination and initial interpretation  and I agree with the findings.    CHANELLE HECTOR DO         SYSTEM ID:  [id number]   CT Thoracic Spine w/o Contrast    Narrative    EXAM: CT THORACIC SPINE W/O CONTRAST  12/3/2022 4:31 PM     HISTORY:  syncope and fall recently now with right chest pain in  subaxillary area       COMPARISON:  Chest CT 12/3/2022 and thoracic x-ray 12/1/2022.    TECHNIQUE: Using multidetector thin  collimation helical acquisition  technique, axial, sagittal and coronal 3 mm thickness CT  reconstructions were obtained through the thoracic spine without  intravenous contrast.  Images were viewed in bone and soft tissue  windows.    FINDINGS:  Coronal and sagittal alignment of the thoracic spine are within normal  limits. Multilevel/diffuse mild intervertebral disc height loss and  vacuum disc phenomena and mild Schmorl's nodule formations, along with  mid thoracic multilevel right lateral flowing osteophyte formation. No  acute fracture or traumatic subluxation. No osseous lesion seen.  Patent spinal canal and neural foramina. Visualized paraspinous  tissues are within normal limits.    Pulmonary nodules, hiatal hernia, bilateral pleural effusions and mild  basilar atelectasis, better characterized on CT thorax today.      Impression    IMPRESSION:  1. No acute fracture or traumatic subluxation.  2. Multilevel spondylosis with grossly patent spinal canal and neural  foramina throughout the thoracic spine.    I have personally reviewed the examination and initial interpretation  and I agree with the findings.    MONIE LARA MD         SYSTEM ID:  [id number]   CT Cervical Spine w/o Contrast    Narrative    EXAM: CT CERVICAL SPINE W/O CONTRAST  12/3/2022 4:30 PM     HISTORY:  Neck pain; Trauma; None of the following:  Spondyloarthropathy, cervical x-ray with negative result, questionable  finding, or inadequate coverage       COMPARISON:  None    TECHNIQUE: Using multidetector thin collimation helical acquisition  technique, axial, coronal and sagittal CT images through the cervical  spine were obtained without intravenous contrast.    FINDINGS:  Normal alignment of C1 on C2. The dens is unremarkable. No acute  fracture or traumatic subluxation of the cervical spine. Spinal  alignment is within normal limits. Mild/moderate focal disc height  loss at C5-6 and C6-7. Posterior osteophyte formation at the  superior  endplate of C6 extending towards C5. No osseous lesion seen. No  prevertebral edema.    The findings on a level by level basis are as follows:    C2-3:  No spinal canal or neural foraminal stenosis.    C3-4: Facet arthropathy without spinal canal or neural foraminal  narrowing.    C4-5:  Mild uncovertebral hypertrophy and facet arthropathy with mild  left greater than right neural foraminal narrowing. No spinal canal  narrowing.    C5-6:  Uncovertebral hypertrophy and posterior uncal spurring,  resulting in moderate bilateral neural foraminal narrowing and mild  spinal canal stenosis.    C6-7:  Uncovertebral hypertrophy with mild bilateral neural foraminal  narrowing. No spinal canal stenosis.     C7-T1:  No spinal canal or neural foraminal stenosis.     No abnormality of the paraspinous soft tissues.      Impression    IMPRESSION: Mild motion artifact.  1. No acute fracture or traumatic subluxation.  2. Multilevel spondylosis, including mild degenerative spinal canal  stenosis at C5-6 along with multilevel mild-to-moderate neural  foraminal narrowing.    I have personally reviewed the examination and initial interpretation  and I agree with the findings.    MONIE LARA MD         SYSTEM ID:  [id number]   XR Chest Port 1 View    Narrative    Portable chest    INDICATION: Trauma patient with rib fractures    COMPARISON: CT 12/3/2022. Plain film 11/30/2022.    FINDINGS: Heart remains borderline enlarged. Prominent air/soft tissue  level noted overlying the lower heart shadow, consistent with hiatal  hernia. No pneumothorax. Previous nondisplaced rib fractures are not  well identified on plain film. This is not unexpected.      Impression    IMPRESSION:Known rib fractures not well identified on plain film.  Hiatal hernia.    PRIYA LION MD         SYSTEM ID:  [id number]   XR Chest Port 1 View    Narrative    XR CHEST PORT 1 VIEW  12/6/2022 9:38 AM      HISTORY: Trauma Patient with Rib  Fracture(s)    COMPARISON: Chest x-ray 2022. CT chest 12/3/2022.    FINDINGS:   Portable AP 65 degree upright chest x-ray. Postprocedural aortic valve  replacement changes.    Trachea is midline. Cardiac silhouette is enlarged. Pulmonary  vasculature is distinct. No pneumothorax. Small bilateral pleural  effusions    Known rib fractures are better appreciated on prior CT chest      Impression    IMPRESSION:   1.  Cardiomegaly and small bilateral pleural effusions.  2.  Known rib fractures are better appreciated on prior CT chest    I have personally reviewed the examination and initial interpretation  and I agree with the findings.    ELIESER RODRIGUEZ MD         SYSTEM ID:  [id number]   Echo Complete     Value    LVEF  55-60%    Narrative    [id number]  [id number]  [id number]  [id number]^MARCY^CHILO     [facility],[place]  Echocardiography Laboratory  [address] [place] [id number]     Name: DESMOND WHEELER  MRN: [id number]  : 1935  Study Date: 2022 10:16 AM  Age: 87 yrs  Gender: Female  Patient Location: [facility]  Reason For Study: Aortic Valve Replacement  Ordering Physician: CHILO VIDAL  Performed By: Flores Garcia     BSA: 1.7 m2  Height: 62 in  Weight: 151 lb  HR: 55  BP: 171/77 mmHg  ______________________________________________________________________________  Procedure  Complete Portable Echo Adult.  ______________________________________________________________________________  Interpretation Summary  Global and regional left ventricular function is normal with an EF of 55-60%.  Global right ventricular function is normal.  Status post 26 mm Arndt Janis Ultra valve TAVR on 2022. The valve is  well seated. MG 11mmHg; PV 2.2 m/s; trace paravalvular AI.  The inferior vena cava is normal.     No significant change compared with the study from 10/24/22.  ______________________________________________________________________________  Left Ventricle  Global and  regional left ventricular function is normal with an EF of 55-60%.     Right Ventricle  The right ventricle is normal size. Global right ventricular function is  normal.     Mitral Valve  Severe mitral annular calcification is present. The mean pressure gradient is  3.8 mmHg at a heart rate of 60 bpm.     Aortic Valve  Status post 26 mm Arndt Janis Ultra valve TAVR on 2022. The valve is  well seated. MG 11mmHg; PV 2.2 m/s; trace paravalvular AI. A bioprosthetic  aortic valve is present. The peak velocity across the aortic valve is 2.2  m/sec. The mean gradient is 11 mmHg.     Tricuspid Valve  The tricuspid valve is normal. The peak velocity of the tricuspid regurgitant  jet is not obtainable. Pulmonary artery systolic pressure cannot be assessed.     Pulmonic Valve  The pulmonic valve is normal. Mild pulmonic insufficiency is present.     Vessels  The thoracic aorta is normal. The aorta root cannot be assessed. The inferior  vena cava is normal. Ascending aorta 3.7 cm.  ______________________________________________________________________________  MMode/2D Measurements & Calculations  LVIDd: 5.2 cm     Doppler Measurements & Calculations  MV max P.4 mmHg  MV mean PG: 3.8 mmHg  MV V2 VTI: 57.8 cm  Ao V2 max: 221.1 cm/sec  Ao max P.5 mmHg  Ao V2 mean: 161.4 cm/sec  Ao mean P.4 mmHg  Ao V2 VTI: 46.0 cm  LV V1 max P.1 mmHg  LV V1 max: 166.8 cm/sec  LV V1 VTI: 34.6 cm  AV Skinny Ratio (DI): 0.75     ______________________________________________________________________________  Report approved by: Everardo Mary 2022 01:26 PM               Consultations this Admission   CARDIOLOGY GENERAL ADULT IP CONSULT  PHYSICAL THERAPY ADULT IP CONSULT  OCCUPATIONAL THERAPY ADULT IP CONSULT  GI LUMINAL ADULT IP CONSULT  NURSING TO CONSULT FOR VASCULAR ACCESS CARE IP CONSULT  CARE MANAGEMENT / SOCIAL WORK IP CONSULT  PHYSICAL THERAPY ADULT IP CONSULT  TRAUMA SURGERY IP CONSULT  NUTRITION SERVICES  ADULT IP CONSULT  NURSING TO CONSULT FOR VASCULAR ACCESS CARE IP CONSULT  OCCUPATIONAL THERAPY ADULT IP CONSULT    Discharge Orders        Home Care Referral      Adult GI  Referral - Consult Only      Primary Care - Care Coordination Referral      Primary Care - Care Coordination Referral      Activity    Your activity upon discharge: activity as tolerated     Reason for your hospital stay    Syncope likely multifactorial related to iron deficiency anemia with hgb 6.4 and recent BP med changes and fasting status. Needs to follow up with primary physician, GI, and cardiology as outpt. Status post 1 unit PRBC transfusion with stable hemoglobin. Zio patch placed at discharge.     Adult [facility] Follow-up and recommended labs and tests    Follow up with primary care provider, Rolanda Wilcox, within 3-5 days, to evaluate medication change, to evaluate treatment change, for hospital follow- up, and regarding new diagnosis. The following labs/tests are recommended: CBC. Aspirin being held at discharge. If hemoglobin stays stable at follow up consider restarting aspirin with close hgb follow up.  Follow up with cardiology as outpatient in 2-3 weeks  Follow up with GI. Referral being sent to GI to call patient to schedule follow up appointment.    Appointments on [place] and/or [facility] (with [facility] or [facility] provider or service). Call [phone] if you haven't heard regarding these appointments within 7 days of discharge.     Discharge Instructions    Please do not give carvedilol for SBP < 100, DBP < 50     Diet    Follow this diet upon discharge: Combination Diet Regular Diet Adult, cardiac diet       Discharge Medications     Discharge Medication List as of 12/7/2022  9:56 AM      CONTINUE these medications which have CHANGED    Details   carvedilol (COREG) 6.25 MG tablet Take 1 tablet (6.25 mg) by mouth 2 times daily Hold for SBP < 100, DBP < 50, Disp-60 tablet, R-3, E-Prescribe      gabapentin  (NEURONTIN) 100 MG capsule Take 1 capsule (100 mg) by mouth 3 times daily, Disp-60 capsule, R-0, E-Prescribe      iron polysaccharides (NIFEREX) 150 MG capsule Take 1 capsule (150 mg) by mouth daily, Disp-30 capsule, R-1, E-Prescribe      isosorbide mononitrate (IMDUR) 30 MG 24 hr tablet Take 1 tablet (30 mg) by mouth daily, Disp-30 tablet, R-2, E-Prescribe      methocarbamol (ROBAXIN) 500 MG tablet Take 1 tablet (500 mg) by mouth every 6 hours as needed for muscle spasms, Disp-15 tablet, R-0, E-Prescribe         CONTINUE these medications which have NOT CHANGED    Details   albuterol (PROAIR HFA/PROVENTIL HFA/VENTOLIN HFA) 108 (90 Base) MCG/ACT inhaler Inhale 2 puffs into the lungs every 6 hours as needed for wheezing or shortness of breath / dyspnea, Disp-18 g, R-3, E-PrescribePharmacy may dispense brand covered by insurance (Proair, or proventil or ventolin or generic albuterol inhaler)      alendronate (FOSAMAX) 70 MG tablet TAKE 1 TABLET BY MOUTH ONCE WEEKLY, Disp-4 tablet, R-23, E-Prescribe      azelastine (ASTEPRO) 0.15 % nasal spray USE 1 SPRAY IN EACH NOSTRIL ONCE A DAY, Disp-30 mL, R-11, E-Prescribe      calcium citrate (CITRACAL) 950 (200 Ca) MG tablet TAKE 1 TABLET BY MOUTH ONCE DAILY, Disp-28 tablet, R-11, E-Prescribe      cholecalciferol (VITAMIN D3) 1250 mcg (08314 units) capsule TAKE 1 CAPSULE BY MOUTH ONCE WEEKLY, Disp-4 capsule, R-11, E-Prescribe      cloNIDine (CATAPRES) 0.1 MG tablet Take 1 tablet (0.1 mg) by mouth daily as needed (For SBP > 180), Disp-20 tablet, R-0, Local Print      COMPRESSION STOCKINGS 1 each daily, Disp-1 each, R-1, E-PrescribeKnee high 30 mmHg      desvenlafaxine (PRISTIQ) 100 MG 24 hr tablet Take 100 mg by mouth daily, Historical      fluticasone-salmeterol (ADVAIR DISKUS) 500-50 MCG/ACT inhaler INHALE 1 PUFF BY MOUTH TWICE DAILY, Disp-60 each, R-0, E-Prescribe      !! lamoTRIgine (LAMICTAL) 25 MG tablet Take 25 mg by mouth daily with 200 mg tablet for a total dose of 225  mg, Historical      !! LAMOTRIGINE 200 MG PO TABS Take 200 mg by mouth daily with 25 mg tablet for a total dose of 225 mg, Historical      levothyroxine (SYNTHROID/LEVOTHROID) 50 MCG tablet TAKE 1 TABLET BY MOUTH ONCE DAILY, Disp-28 tablet, R-11, E-Prescribe      liothyronine (CYTOMEL) 5 MCG tablet Take 2 tablets (10 mcg) by mouth daily, Disp-30 tablet, R-3, E-Prescribe      memantine (NAMENDA) 10 MG tablet Take 1 tablet (10 mg) by mouth daily, Disp-30 tablet, R-3, E-Prescribe      mirtazapine (REMERON) 7.5 MG tablet Take 1 tablet (7.5 mg) by mouth At Bedtime, Disp-30 tablet, R-3, E-Prescribe      Multiple Vitamin (TAB-A-JENNIFER) TABS TAKE 1 TABLET BY MOUTH ONCE DAILY, Disp-30 tablet, R-PRN, E-Prescribe      rosuvastatin (CRESTOR) 40 MG tablet TAKE 1 TABLET BY MOUTH ONCE DAILY, Disp-28 tablet, R-11, E-Prescribe       !! - Potential duplicate medications found. Please discuss with provider.      STOP taking these medications       ASPIRIN LOW DOSE 81 MG chewable tablet Comments:   Reason for Stopping:         ticagrelor (BRILINTA) 90 MG tablet Comments:   Reason for Stopping:                Primary Care Physician   Rolanda Wilcox

## 2022-12-09 ENCOUNTER — TELEPHONE (OUTPATIENT)
Dept: FAMILY MEDICINE | Facility: CLINIC | Age: 87
End: 2022-12-09

## 2022-12-09 ENCOUNTER — TELEPHONE (OUTPATIENT)
Dept: GASTROENTEROLOGY | Facility: CLINIC | Age: 87
End: 2022-12-09

## 2022-12-09 ENCOUNTER — MEDICAL CORRESPONDENCE (OUTPATIENT)
Dept: HEALTH INFORMATION MANAGEMENT | Facility: CLINIC | Age: 87
End: 2022-12-09

## 2022-12-09 ENCOUNTER — PATIENT OUTREACH (OUTPATIENT)
Dept: CARE COORDINATION | Facility: CLINIC | Age: 87
End: 2022-12-09

## 2022-12-09 NOTE — TELEPHONE ENCOUNTER
REFERRAL INFORMATION:    Referring Provider:  Dr. 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     FUTURE VISIT INFORMATION:    Appointment Date: 1/24/2023    Appointment Time: 3 PM      NOTES STATUS DETAILS   OFFICE NOTE from Referring Provider Internal 11/30/2022 ED- Admission    OFFICE NOTE from Other Specialist N/A    HOSPITAL DISCHARGE SUMMARY/  ED VISITS N/A    OPERATIVE REPORT N/A    MEDICATION LIST Internal         ENDOSCOPY  N/A    COLONOSCOPY Received 12/11/15, 9/10/12, 5/8/07 ([facility])    ERCP N/A    EUS N/A    STOOL TESTING N/A    PERTINENT LABS Internal    PATHOLOGY REPORTS (RELATED) Received  12/11/15, 9/10/12, 5/8/07 ([facility])    IMAGING (CT, MRI, EGD, MRCP, Small Bowel Follow Through/SBT, MR/CT Enterography) N/A        12/9/2022 4:11pm Fax request sent to [facility] for med recs.Pema     12/13/2022 10:35am Received recs from [facility]; sent to scan. A copy was placed in [facility] folder in Right Fax. Pema

## 2022-12-09 NOTE — TELEPHONE ENCOUNTER
Reason for Call: Request for an order or referral:    Order or referral being requested: Verbal orders    Date needed: as soon as possible    Has the patient been seen by the PCP for this problem? Not Applicable    Additional comments: Verbal Orders  physical therapy for strengthening, balance, gate training / 1x a wk for 4 wks - every other wk the last 30 days    SAV: [phone] - Requested detailed message if no answer    Phone number Patient can be reached at:  Cell number on file:    Telephone Information:   Mobile [phone]       Best Time:  Anytime    Can we leave a detailed message on this number?  YES    Call taken on 12/9/2022 at 3:29 PM by Chencho Garcia

## 2022-12-09 NOTE — PROGRESS NOTES
Clinic Care Coordination Contact  Care Team Conversations    The RN CC nurse care coordinator performed a chart review regarding this patient.  The patient had a syncopal episode and ended up with fractured ribs as well.  It was noted at that time that her hemoglobin was 6.4.  The patient was discharged to the assisted living facility that she had been living in.  And she has extra services from the facility.  Along with orders for her hemoglobin to be checked every 12 hours by the nursing staff, who also set up her medications.  The patient has a guardian to ensure that things are being followed and makes the decisions for the patient.  Therefore at this time there is not a need for care coordination as this could confuse the patient more than help the patient.  Therefore the case will be closed at this time although it can be reopened at any time by a referral from the provider.      Toby Myrick MSN, RN, PHN, [facility]   Primary Care Clinical RN Care Coordinator  [facility]  12/9/2022   8:54 AM  [email or web address]  Office: [phone]

## 2022-12-09 NOTE — TELEPHONE ENCOUNTER
Called ARIA Dyer with [facility] and left detailed voice message notifying that verbal home care orders are approved.     Romelia Paz RN   [facility]

## 2022-12-09 NOTE — TELEPHONE ENCOUNTER
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 Navarro Escalona. Current appointment is outside requested time frame. Please review for further follow up per scheduling guidelines. Thanks!     Action Taken: Message routed to:  [facility] GI    Travel Screening: Not Applicable

## 2022-12-12 ENCOUNTER — TELEPHONE (OUTPATIENT)
Dept: INTERNAL MEDICINE | Facility: CLINIC | Age: 87
End: 2022-12-12

## 2022-12-12 ENCOUNTER — HOSPITAL ENCOUNTER (OUTPATIENT)
Dept: GENERAL RADIOLOGY | Facility: HOSPITAL | Age: 87
Discharge: HOME | End: 2022-12-12
Attending: NURSE PRACTITIONER | Admitting: NURSE PRACTITIONER
Payer: MEDICARE

## 2022-12-12 ENCOUNTER — OFFICE VISIT (OUTPATIENT)
Dept: INTERNAL MEDICINE | Facility: CLINIC | Age: 87
End: 2022-12-12
Payer: MEDICARE

## 2022-12-12 VITALS
HEART RATE: 77 BPM | WEIGHT: 152 LBS | BODY MASS INDEX: 27.97 KG/M2 | HEIGHT: 62 IN | OXYGEN SATURATION: 92 % | DIASTOLIC BLOOD PRESSURE: 72 MMHG | RESPIRATION RATE: 18 BRPM | SYSTOLIC BLOOD PRESSURE: 130 MMHG | TEMPERATURE: 98.7 F

## 2022-12-12 DIAGNOSIS — D64.9 ANEMIA, UNSPECIFIED TYPE: ICD-10-CM

## 2022-12-12 DIAGNOSIS — I50.30 NYHA CLASS 3 HEART FAILURE WITH PRESERVED EJECTION FRACTION (H): ICD-10-CM

## 2022-12-12 DIAGNOSIS — Z95.2 S/P TAVR (TRANSCATHETER AORTIC VALVE REPLACEMENT): ICD-10-CM

## 2022-12-12 DIAGNOSIS — D50.0 IRON DEFICIENCY ANEMIA DUE TO CHRONIC BLOOD LOSS: ICD-10-CM

## 2022-12-12 DIAGNOSIS — R26.89 IMPAIRMENT OF BALANCE: ICD-10-CM

## 2022-12-12 DIAGNOSIS — R06.02 SOB (SHORTNESS OF BREATH): ICD-10-CM

## 2022-12-12 DIAGNOSIS — R06.02 SHORTNESS OF BREATH: ICD-10-CM

## 2022-12-12 DIAGNOSIS — R53.1 WEAKNESS GENERALIZED: ICD-10-CM

## 2022-12-12 DIAGNOSIS — I10 ESSENTIAL HYPERTENSION: ICD-10-CM

## 2022-12-12 DIAGNOSIS — R06.02 SHORTNESS OF BREATH: Primary | ICD-10-CM

## 2022-12-12 LAB
ALBUMIN SERPL BCG-MCNC: 3.7 G/DL (ref 3.5–5.2)
ALP SERPL-CCNC: 66 U/L (ref 35–104)
ALT SERPL W P-5'-P-CCNC: 37 U/L (ref 10–35)
ANION GAP SERPL CALCULATED.3IONS-SCNC: 11 MMOL/L (ref 7–15)
AST SERPL W P-5'-P-CCNC: 39 U/L (ref 10–35)
BILIRUB SERPL-MCNC: 0.2 MG/DL
BUN SERPL-MCNC: 29.7 MG/DL (ref 8–23)
CALCIUM SERPL-MCNC: 9.2 MG/DL (ref 8.8–10.2)
CHLORIDE SERPL-SCNC: 104 MMOL/L (ref 98–107)
CREAT SERPL-MCNC: 1.29 MG/DL (ref 0.51–0.95)
DEPRECATED HCO3 PLAS-SCNC: 25 MMOL/L (ref 22–29)
ERYTHROCYTE [DISTWIDTH] IN BLOOD BY AUTOMATED COUNT: 17.1 % (ref 10–15)
FLUAV AG SPEC QL IA: NEGATIVE
FLUBV AG SPEC QL IA: NEGATIVE
GFR SERPL CREATININE-BSD FRML MDRD: 40 ML/MIN/1.73M2
GLUCOSE SERPL-MCNC: 116 MG/DL (ref 70–99)
HCT VFR BLD AUTO: 23.9 % (ref 35–47)
HGB BLD-MCNC: 7.1 G/DL (ref 11.7–15.7)
MCH RBC QN AUTO: 25 PG (ref 26.5–33)
MCHC RBC AUTO-ENTMCNC: 29.7 G/DL (ref 31.5–36.5)
MCV RBC AUTO: 84 FL (ref 78–100)
NT-PROBNP SERPL-MCNC: ABNORMAL PG/ML (ref 0–1800)
PLATELET # BLD AUTO: 235 10E3/UL (ref 150–450)
POTASSIUM SERPL-SCNC: 4.6 MMOL/L (ref 3.4–5.3)
PROT SERPL-MCNC: 6.6 G/DL (ref 6.4–8.3)
RBC # BLD AUTO: 2.84 10E6/UL (ref 3.8–5.2)
SARS-COV-2 RNA RESP QL NAA+PROBE: NEGATIVE
SODIUM SERPL-SCNC: 140 MMOL/L (ref 136–145)
WBC # BLD AUTO: 8.2 10E3/UL (ref 4–11)

## 2022-12-12 PROCEDURE — 99215 OFFICE O/P EST HI 40 MIN: CPT | Performed by: NURSE PRACTITIONER

## 2022-12-12 PROCEDURE — 83880 ASSAY OF NATRIURETIC PEPTIDE: CPT | Performed by: NURSE PRACTITIONER

## 2022-12-12 PROCEDURE — U0005 INFEC AGEN DETEC AMPLI PROBE: HCPCS | Performed by: NURSE PRACTITIONER

## 2022-12-12 PROCEDURE — 71046 X-RAY EXAM CHEST 2 VIEWS: CPT

## 2022-12-12 PROCEDURE — 85027 COMPLETE CBC AUTOMATED: CPT | Performed by: NURSE PRACTITIONER

## 2022-12-12 PROCEDURE — 36415 COLL VENOUS BLD VENIPUNCTURE: CPT | Performed by: NURSE PRACTITIONER

## 2022-12-12 PROCEDURE — 99417 PROLNG OP E/M EACH 15 MIN: CPT | Performed by: NURSE PRACTITIONER

## 2022-12-12 PROCEDURE — 87804 INFLUENZA ASSAY W/OPTIC: CPT | Performed by: NURSE PRACTITIONER

## 2022-12-12 PROCEDURE — U0003 INFECTIOUS AGENT DETECTION BY NUCLEIC ACID (DNA OR RNA); SEVERE ACUTE RESPIRATORY SYNDROME CORONAVIRUS 2 (SARS-COV-2) (CORONAVIRUS DISEASE [COVID-19]), AMPLIFIED PROBE TECHNIQUE, MAKING USE OF HIGH THROUGHPUT TECHNOLOGIES AS DESCRIBED BY CMS-2020-01-R: HCPCS | Performed by: NURSE PRACTITIONER

## 2022-12-12 PROCEDURE — 80053 COMPREHEN METABOLIC PANEL: CPT | Performed by: NURSE PRACTITIONER

## 2022-12-12 RX ORDER — CARVEDILOL 12.5 MG/1
12.5 TABLET ORAL 2 TIMES DAILY WITH MEALS
Qty: 180 TABLET | Refills: 0 | Status: SHIPPED | OUTPATIENT
Start: 2022-12-12 | End: 2022-12-13 | Stop reason: DRUGHIGH

## 2022-12-12 RX ORDER — HEPARIN SODIUM,PORCINE 10 UNIT/ML
5 VIAL (ML) INTRAVENOUS
Status: CANCELLED | OUTPATIENT
Start: 2022-12-12

## 2022-12-12 RX ORDER — HEPARIN SODIUM (PORCINE) LOCK FLUSH IV SOLN 100 UNIT/ML 100 UNIT/ML
5 SOLUTION INTRAVENOUS
Status: CANCELLED | OUTPATIENT
Start: 2022-12-12

## 2022-12-12 ASSESSMENT — PAIN SCALES - GENERAL: PAINLEVEL: NO PAIN (0)

## 2022-12-12 NOTE — TELEPHONE ENCOUNTER
Central Prior Authorization Team   Phone: [phone]    PA Initiation    Medication: Methocarbamol 500 mg  Insurance Company: [employer] - Phone [phone] Fax [phone]  Pharmacy Filling the Rx: [facility] [place] [address] [address]  Filling Pharmacy Phone: [phone]  Filling Pharmacy Fax:    Start Date: 12/12/2022

## 2022-12-12 NOTE — PATIENT INSTRUCTIONS
Call GI at [phone] if you do not hear from someone within a week of your discharge from the hospital

## 2022-12-12 NOTE — PROGRESS NOTES
PMH of aortic stenosis s/p TAVR w/ 09/12/2022, CAD s/p LAD and RCA stent in 2003, LAD PCI in 2020, HTN, HLD, HFpEF (last EF of 55-60% on 10/24) and PAD on DAPT admitted with syncope.  She suffereed a fall and a bystander administered CPR. She was found to be anemic with a hemoglobin of 6.4 upon admission. GI evaluation was deferred due to high cardiac risk per patient/family decision. SHe was given 1 unit of PRBC and hemoglobin stabilized. No arrhythmia was noted during hospitalization. She had multiple rib fractures related to CPR.     Fall/syncope- blood pressure medications were decreased. She was transfused. Fall considered likely multifactorial. TTE without changes. Holter monitor was ordered for a 48 hour period after discharge.     INGRID- hemoglobin had been down trending in the setting of CKD. GI was also consulted for possible endoscopy, family and GI decided against upper endoscopy/colonooscopy and hgb was stable.     H/o CAD, last PCI in 2020.  severe aortic valvular stenosis that was treated with a transfemoral transcatheter aortic valve replacement (TAVR) with a 26mm Arndt Janis Ultra on 9/12/22 by Morro Burrows. She was noted to have 60% left femoral stenosis post procedure, but had adequate flow on post procedure peripheral angiogram. She was started on dual antiplatelet therapy with plans to assess symptoms of PAD as outpatient. Aspirin and Brillinta were held.

## 2022-12-12 NOTE — TELEPHONE ENCOUNTER
Patient was seen at the [facility] today for a hospital follow up. Patient needs to follow up in 1 week with PCP or a different provider at that clinic. Writer assisted with trying to schedule an appointment but there is no availability. Please contact patient to assist with scheduling an appointment if someone is able to squeeze her in.

## 2022-12-12 NOTE — TELEPHONE ENCOUNTER
LM regarding sooner appointment with SOLANGE Jacobson tomorrow at 8am. Writers number left for callback.

## 2022-12-12 NOTE — TELEPHONE ENCOUNTER
Prior Authorization Approval    Authorization Effective Date: 11/12/2022  Authorization Expiration Date: 12/12/2023  Medication: Methocarbamol 500 mg  Approved Dose/Quantity:    Reference #:     Insurance Company: [employer] - Phone [phone] Fax [phone]  Expected CoPay:       CoPay Card Available:      Foundation Assistance Needed:    Which Pharmacy is filling the prescription (Not needed for infusion/clinic administered): [facility] [address]  Pharmacy Notified: Yes  Patient Notified: Yes  **Instructed pharmacy to notify patient when script is ready to /ship.**

## 2022-12-12 NOTE — PROGRESS NOTES
Assessment & Plan   Problem List Items Addressed This Visit        Respiratory    SOB (shortness of breath)       Circulatory    NYHA class 3 heart failure with preserved ejection fraction (H)    Essential hypertension    S/P TAVR (transcatheter aortic valve replacement)       Hematologic    Anemia    Relevant Orders    CBC with platelets (Completed)    Comprehensive metabolic panel (Completed)    Anemia, unspecified type       Other    Impairment of balance    Weakness generalized   Other Visit Diagnoses     Shortness of breath    -  Primary    Relevant Orders    Asymptomatic COVID-19 Virus (Coronavirus) by PCR (Completed)    N terminal pro BNP outpatient (Completed)    Influenza A/B antigen (Completed)    Comprehensive metabolic panel (Completed)    XR Chest 2 Views (Completed)         - Shortness of breath likely multifactorial, however my primary concern is a drop in her hemoglobin again given recent events and objective finding of pallor.  We will plan to run CBC stat and patient will plan to wait in the clinic for results in the event that she needs to have a transfusion today or be evaluated in the emergency department.  Her hemoglobin is 7.1, comparable to during the hospital stay when it ranged from 7.2-8.0 after her transfusion.  We will plan to arrange for an outpatient transfusion in 2 days as her preference is to avoid hospitalization if possible.  She and her family are strongly encouraged to schedule follow-up appointments with gastroenterology to consider the potential for upper and lower endoscopy if she has further drop in her hemoglobin following the transfusion.  Additionally, they will plan to schedule the iron infusion that was ordered in the hospital.  - Considered an asthma exacerbation as the underlying reason for shortness of breath, however her breath sounds are clear.  - Congestive heart failure is also considered as a possible etiology of her shortness of breath, however, her weight is  up by only about 2 pounds from what it looks like her baseline was in October.  She was taken off of the diuretic, furosemide, earlier this fall.  She does not have any lower extremity edema and breath sounds are clear to auscultation.  We will plan to check a BNP and consider the addition of a diuretic if needed.  -Blood pressure is elevated today, we will plan to increase the carvedilol to 12.5 mg twice daily.  ADDENDUM: BNP is elevated so we will plan to start a diuretic.  As such, maintain carvedilol at 6.25 mg twice daily.  - She needs to follow-up with her primary care provider in 1 week.  We reviewed that given her age and comorbidities if she is feeling at all worse (develops worsening weakness, shortness of breath, any additional symptoms) she should have a low bar to return to the emergency department for evaluation and treatment. She has good oversight and family involvement so we will attempt to address the above concerns with outpatient management.     Return in about 1 week (around 12/19/2022) for Follow up with PCP .    Kaye Alexandra NP  [facility]    90 minutes were spent on date of encounter on chart review, ordering tests and reviewing results, and other activities as outlined in this note     Subjective   Kamryn is a 87 year old accompanied by her son and daughter is on the phone , patient of Dr. Wilcox with a PMH of aortic stenosis s/p TAVR w/ 09/12/2022, CAD s/p LAD and RCA stent in 2003, LAD PCI in 2020, HTN, HLD, HFpEF (last EF of 55-60% on 10/24) and PAD on DAPT presenting for the following health issues:  Hospital F/U (Please check Hgb per son. Patient has been SOB as well. She is scheduled for a colonoscopy but due to recent cardiac procedure, cardiologist wants her to wait 6 months. Patient fell at her apartment Sat morning, she denies hitting her head or other injuries. )      HPI Here with her son, Jonathan. Her daughter, Ana, participates by phone   She was admitted  with syncope.  She suffereed a fall and a bystander administered CPR. She was found to be anemic with a hemoglobin of 6.4 upon admission. GI evaluation was deferred due to high cardiac risk per patient/family decision. She was given 1 unit of PRBC and hemoglobin stabilized until she had a slight dip again on 12/3. It appears that outpatient injectafer infusions were ordered but patient/family wasn't aware so this has not yet been completed/started. No arrhythmia was noted during hospitalization. She had multiple rib fractures related to CPR.    She has been feeling more short of breath in the past 3-4 days. She was feeling short of breath before her hospitalization as well even after the TAVR procedure in September but symptoms are more pronounced now. She had to stop on the walk into the clinic to rest because she was winded. Echo was repeated on 12/1, LVEF 55-60%, valve well seated and without significant change from 10/2022. She used albuterol recently and noticed that it helped with shortness of breath. She has had to clear her throat more and has been coughing occasionally. No fever, chills. She denies pain with inspiration. She has asthma- induced by dust, exercise, and cold weather. Historically her asthma has been well controlled.    She fell on Saturday. She was sitting down in a chair but fell forward. She did not lose consciousness and was able to get herself back up. She did not have chest pain, lightheadedness, or feel particularly short of breath when it occurred. Her blood pressure was taken by her daughter shortly after the fall, systolic reading was 115, heart rate was normal.     Blood pressure readings from home have been in the 140-160 range. No lightheadedness or dizziness.     Fall/syncope prior to hospitalization- blood pressure medications were decreased. She was transfused 1 unit PRBC. Fall considered likely multifactorial. TTE without changes. Holter monitor was ordered for a 48 hour period  "after discharge- she just returned it a couple of days ago so results are not yet available. She has not felt lightheadedness or dizziness.     INGRID- hemoglobin had been down trending in the setting of CKD over the past 1 year but hemoglobin was 6.4 upon admission. GI was also consulted for possible endoscopy, family and GI decided against upper endoscopy/colonooscopy. She did have a drop in her hemoglobin during the hospitalization, the plan was for her to start Injectafer infusions outpatient on 12/6 but I don't think she has yet started this. She has not heard from GI for scheduling and appointment.     H/o CAD, last PCI in 2020.  severe aortic valvular stenosis that was treated with a transfemoral transcatheter aortic valve replacement (TAVR) with a 26mm Randt Janis Ultra on 9/12/22 by Morro Burrows. She was noted to have 60% left femoral stenosis post procedure, but had adequate flow on post procedure peripheral angiogram. She was started on dual antiplatelet therapy with plans to assess symptoms of PAD as outpatient. Aspirin and Brillinta were held. She sees her cardiologist back at the end of January.       Hospital Follow-up Visit:    Hospital/Nursing Home/IP Rehab Facility: [facility]  Date of Admission: 11/30/2022  Date of Discharge: 12/7/2022  Reason(s) for Admission: Anemia    Was your hospitalization related to COVID-19? No   Problems taking medications regularly:  None  Medication changes since discharge: None  Problems adhering to non-medication therapy:  None          Objective    /72 (BP Location: Right arm, Patient Position: Sitting, Cuff Size: Adult Regular)   Pulse 77   Temp 98.7  F (37.1  C) (Oral)   Resp 18   Ht 1.575 m (5' 2\")   Wt 68.9 kg (152 lb)   SpO2 92%   Breastfeeding No   BMI 27.80 kg/m    Body mass index is 27.8 kg/m .     Wt Readings from Last 5 Encounters:   12/12/22 68.9 kg (152 lb)   12/05/22 67.9 kg (149 lb 11.1 oz) "   10/31/22 68.2 kg (150 lb 4.8 oz)   10/26/22 66.1 kg (145 lb 11.6 oz)   10/24/22 68.4 kg (150 lb 12.8 oz)         Physical Exam   GENERAL: alert and no distress  EYES: Eyes grossly normal to inspection, conjunctival pallor is noted   RESP: lungs clear to auscultation - no rales, rhonchi or wheezes  CV: regular rate and rhythm. No LE edema

## 2022-12-13 ENCOUNTER — TELEPHONE (OUTPATIENT)
Dept: FAMILY MEDICINE | Facility: CLINIC | Age: 87
End: 2022-12-13

## 2022-12-13 DIAGNOSIS — I50.30 NYHA CLASS 3 HEART FAILURE WITH PRESERVED EJECTION FRACTION (H): Primary | ICD-10-CM

## 2022-12-13 RX ORDER — FUROSEMIDE 20 MG
20 TABLET ORAL DAILY
Qty: 30 TABLET | Refills: 1 | Status: SHIPPED | OUTPATIENT
Start: 2022-12-13 | End: 2022-12-20

## 2022-12-13 RX ORDER — CARVEDILOL 6.25 MG/1
6.25 TABLET ORAL 2 TIMES DAILY WITH MEALS
Qty: 180 TABLET | Refills: 0 | Status: SHIPPED | OUTPATIENT
Start: 2022-12-13 | End: 2022-12-20

## 2022-12-13 NOTE — TELEPHONE ENCOUNTER
Called patient to schedule. No answer. LM for call back. If patient calls back please assist with scheduling     Lissy-

## 2022-12-13 NOTE — TELEPHONE ENCOUNTER
Call patient's son, Jonathan (he was the one who attended her appointment yesterday ( [phone]): I have received the rest of Kamryn's labs. Her labs show that she is also in heart failure which is likely contributing to her shortness of breath. I would like to make the following medication changes:  1.  Instead of increasing the carvedilol medication to 12.5 mg, stay with the 6.25 mg BID dose that she was sent home from the hospital with.   2. Start furosemide (lasix) 20 mg daily. She was taking this before but it was discontinued in September because her heart failure was stable at that time     Her COVID test was negative.     Again, if any of her symptoms seem to be worsening (shortnes of breath, she feels dizziness/lightheadedness) then I would recommend ED evaluation but we will attempt to treat this on the outpatient side without returning to the hospital if possible.

## 2022-12-13 NOTE — TELEPHONE ENCOUNTER
Left a voicemail for patients sonJonathan at listed phone number [phone] (No C2C on file for patient).     Please relay PCP's message below to patient/son when they return call.     Thank you

## 2022-12-13 NOTE — TELEPHONE ENCOUNTER
Patient left writer message that 8am 12/13 will not work and to call to set something else up that will work.

## 2022-12-14 ENCOUNTER — INFUSION THERAPY VISIT (OUTPATIENT)
Dept: INFUSION THERAPY | Facility: CLINIC | Age: 87
End: 2022-12-14
Attending: FAMILY MEDICINE
Payer: MEDICARE

## 2022-12-14 VITALS
SYSTOLIC BLOOD PRESSURE: 184 MMHG | OXYGEN SATURATION: 95 % | HEART RATE: 60 BPM | TEMPERATURE: 98.3 F | DIASTOLIC BLOOD PRESSURE: 102 MMHG | RESPIRATION RATE: 16 BRPM

## 2022-12-14 DIAGNOSIS — D64.9 ANEMIA, UNSPECIFIED TYPE: Primary | ICD-10-CM

## 2022-12-14 LAB
ABO/RH(D): NORMAL
ANTIBODY SCREEN: NEGATIVE
BASOPHILS # BLD AUTO: 0.1 10E3/UL (ref 0–0.2)
BASOPHILS NFR BLD AUTO: 1 %
BLD PROD TYP BPU: NORMAL
BLOOD COMPONENT TYPE: NORMAL
CODING SYSTEM: NORMAL
CROSSMATCH: NORMAL
EOSINOPHIL # BLD AUTO: 0.2 10E3/UL (ref 0–0.7)
EOSINOPHIL NFR BLD AUTO: 2 %
ERYTHROCYTE [DISTWIDTH] IN BLOOD BY AUTOMATED COUNT: 17.4 % (ref 10–15)
HCT VFR BLD AUTO: 23.1 % (ref 35–47)
HGB BLD-MCNC: 7 G/DL (ref 11.7–15.7)
IMM GRANULOCYTES # BLD: 0 10E3/UL
IMM GRANULOCYTES NFR BLD: 0 %
ISSUE DATE AND TIME: NORMAL
LYMPHOCYTES # BLD AUTO: 1 10E3/UL (ref 0.8–5.3)
LYMPHOCYTES NFR BLD AUTO: 13 %
MCH RBC QN AUTO: 25.7 PG (ref 26.5–33)
MCHC RBC AUTO-ENTMCNC: 30.3 G/DL (ref 31.5–36.5)
MCV RBC AUTO: 85 FL (ref 78–100)
MONOCYTES # BLD AUTO: 0.7 10E3/UL (ref 0–1.3)
MONOCYTES NFR BLD AUTO: 8 %
NEUTROPHILS # BLD AUTO: 5.9 10E3/UL (ref 1.6–8.3)
NEUTROPHILS NFR BLD AUTO: 76 %
NRBC # BLD AUTO: 0 10E3/UL
NRBC BLD AUTO-RTO: 0 /100
PLATELET # BLD AUTO: 271 10E3/UL (ref 150–450)
RBC # BLD AUTO: 2.72 10E6/UL (ref 3.8–5.2)
SPECIMEN EXPIRATION DATE: NORMAL
UNIT ABO/RH: NORMAL
UNIT NUMBER: NORMAL
UNIT STATUS: NORMAL
UNIT TYPE ISBT: 600
WBC # BLD AUTO: 7.8 10E3/UL (ref 4–11)

## 2022-12-14 PROCEDURE — 85025 COMPLETE CBC W/AUTO DIFF WBC: CPT | Performed by: NURSE PRACTITIONER

## 2022-12-14 PROCEDURE — 86850 RBC ANTIBODY SCREEN: CPT | Performed by: NURSE PRACTITIONER

## 2022-12-14 PROCEDURE — 36430 TRANSFUSION BLD/BLD COMPNT: CPT

## 2022-12-14 PROCEDURE — 36415 COLL VENOUS BLD VENIPUNCTURE: CPT | Performed by: NURSE PRACTITIONER

## 2022-12-14 PROCEDURE — 86923 COMPATIBILITY TEST ELECTRIC: CPT | Performed by: NURSE PRACTITIONER

## 2022-12-14 PROCEDURE — P9016 RBC LEUKOCYTES REDUCED: HCPCS | Performed by: NURSE PRACTITIONER

## 2022-12-14 PROCEDURE — 86901 BLOOD TYPING SEROLOGIC RH(D): CPT | Performed by: NURSE PRACTITIONER

## 2022-12-14 RX ORDER — HEPARIN SODIUM (PORCINE) LOCK FLUSH IV SOLN 100 UNIT/ML 100 UNIT/ML
5 SOLUTION INTRAVENOUS
Status: DISCONTINUED | OUTPATIENT
Start: 2022-12-14 | End: 2022-12-14 | Stop reason: HOSPADM

## 2022-12-14 RX ORDER — HEPARIN SODIUM,PORCINE 10 UNIT/ML
5 VIAL (ML) INTRAVENOUS
Status: DISCONTINUED | OUTPATIENT
Start: 2022-12-14 | End: 2022-12-14 | Stop reason: HOSPADM

## 2022-12-14 RX ORDER — HEPARIN SODIUM,PORCINE 10 UNIT/ML
5 VIAL (ML) INTRAVENOUS
Status: CANCELLED | OUTPATIENT
Start: 2022-12-14

## 2022-12-14 RX ORDER — HEPARIN SODIUM (PORCINE) LOCK FLUSH IV SOLN 100 UNIT/ML 100 UNIT/ML
5 SOLUTION INTRAVENOUS
Status: CANCELLED | OUTPATIENT
Start: 2022-12-14

## 2022-12-14 ASSESSMENT — PAIN SCALES - GENERAL: PAINLEVEL: NO PAIN (0)

## 2022-12-14 NOTE — PROGRESS NOTES
Infusion Nursing Note:  Kamryn Miller presents today for labs and blood transfusion.    Patient seen by provider today: No   present during visit today: Not Applicable.    Note: N/A.    Intravenous Access:  Peripheral IV placed.    Treatment Conditions:  Lab Results   Component Value Date    HGB 7.0 (L) 12/14/2022    WBC 7.8 12/14/2022    ANEU 2.4 05/17/2021    ANEUTAUTO 5.9 12/14/2022     12/14/2022      Results reviewed, labs MET treatment parameters, ok to proceed with treatment.  Blood transfusion consent signed 09/22/22.    Post Infusion Assessment:  Patient tolerated infusion without incident.  No evidence of extravasations.  Access discontinued per protocol.     Discharge Plan:   Copy of AVS reviewed with patient and/or family.  Patient will return 12/20/22 for next appointment. Iron infusion.  Patient discharged in stable condition accompanied by: daughter.  Departure Mode: Wheelchair.      Royal Koch RN

## 2022-12-15 ENCOUNTER — TELEPHONE (OUTPATIENT)
Dept: CARDIOLOGY | Facility: CLINIC | Age: 87
End: 2022-12-15

## 2022-12-15 ENCOUNTER — TELEPHONE (OUTPATIENT)
Dept: FAMILY MEDICINE | Facility: CLINIC | Age: 87
End: 2022-12-15

## 2022-12-15 NOTE — TELEPHONE ENCOUNTER
Fax to be reviewed by the provider Home Care Plan of Care Certification     Who is the it from? Certification [facility] 12/9/2022 - 2/6/2023  This was faxed to [facility]   Where was the fax placed? Provider's desk to sign  What number is listed as a contact on the fax?     [facility]  Phone: [phone] Fax: [phone]    Please fax to above    Additional comments:

## 2022-12-15 NOTE — TELEPHONE ENCOUNTER
Health Call Center    Phone Message    May a detailed message be left on voicemail: yes     Reason for Call: Other:   Needs signiture on an order from November 16 20223 that was faxed to [facility].  Being faxed to Dr. Zuniga and needs a signature as soon as possible as it's going on a month w/out a signature.  Any questions please call Katherine Tomlinson RN at [phone]      Action Taken: Message routed to:  [facility] cardio    Travel Screening: Not Applicable     Thank you!  [facility]

## 2022-12-19 NOTE — TELEPHONE ENCOUNTER
Called patient's son, Jonathan. He requested we call patients daughter, Aan as he lives further away.     A voicemail was left for Ana to return call to clinic.     Please relay providers message below when they return call.

## 2022-12-21 ENCOUNTER — MYC MEDICAL ADVICE (OUTPATIENT)
Dept: CARDIOLOGY | Facility: CLINIC | Age: 87
End: 2022-12-21

## 2022-12-21 ENCOUNTER — MYC MEDICAL ADVICE (OUTPATIENT)
Dept: FAMILY MEDICINE | Facility: CLINIC | Age: 87
End: 2022-12-21

## 2022-12-21 NOTE — TELEPHONE ENCOUNTER
Please see [employer] message and advise.    Patient was discharged from the hospital on 12/19 for a fall and head injury. Patient currently at rehab facility. Patient was instructed to make ED follow-up appointment within a week.     Patient also inquiring regarding Hydralazine for treatment in replacement of carvedilol.    Soonest available appointment with PCP is 12/23 as virtual appointments.    Please advise.    Foreign Cervantes RN  [facility]

## 2022-12-21 NOTE — TELEPHONE ENCOUNTER
Called and spoke to rehab facility. The facility has an in house provider that manages patient's blood pressures. The nurse stated that the patient has had a few elevated blood pressures(165/102 and 185/102. The rechecks show blood pressures down(119/69, 140/75. Patient does have prn clonidine ordered for elevated blood pressures. Message sent to schedulers to call and help schedule first available C[facility] appointment. Patient has first available with Dr. Zuniga scheduled for 1/31/23. Provider notified, awaiting any recommendations.

## 2022-12-21 NOTE — TELEPHONE ENCOUNTER
Spoke with patient's daughter Ana. Informed of provider's message. Patient's daughter informed writer that patient is still currently in rehab facility, and hospital instructed patient be seen 1-2 days after discharge from facility.    Assisted with booking appointment on 12/29 at 10:20 am.     Foreign Cervantes RN  [facility]

## 2022-12-22 ENCOUNTER — TELEPHONE (OUTPATIENT)
Dept: CARDIOLOGY | Facility: CLINIC | Age: 87
End: 2022-12-22

## 2022-12-22 ENCOUNTER — LAB REQUISITION (OUTPATIENT)
Dept: LAB | Facility: CLINIC | Age: 87
End: 2022-12-22

## 2022-12-22 DIAGNOSIS — N18.30 CHRONIC KIDNEY DISEASE, STAGE 3 UNSPECIFIED (H): ICD-10-CM

## 2022-12-22 DIAGNOSIS — D64.9 ANEMIA, UNSPECIFIED: ICD-10-CM

## 2022-12-22 NOTE — TELEPHONE ENCOUNTER
----- Message from Jimmie Coppola RN sent at 12/21/2022  1:11 PM CST -----  Hi there,  Can you please call the patient's daughter and help schedule an appointment, [facility], first available, as a follow up to hospitalization at [facility]. She is already scheduled to see Dr. Zuniga on 1/31/23, however the hospital had requested she have an appointment in a week. She was discharged on 12/19/22.  Thanks,  Jimmie

## 2022-12-23 LAB
ANION GAP SERPL CALCULATED.3IONS-SCNC: 12 MMOL/L (ref 7–15)
BASOPHILS # BLD AUTO: 0 10E3/UL (ref 0–0.2)
BASOPHILS NFR BLD AUTO: 1 %
BUN SERPL-MCNC: 26 MG/DL (ref 8–23)
CALCIUM SERPL-MCNC: 9.6 MG/DL (ref 8.8–10.2)
CHLORIDE SERPL-SCNC: 108 MMOL/L (ref 98–107)
CREAT SERPL-MCNC: 1.3 MG/DL (ref 0.51–0.95)
DEPRECATED HCO3 PLAS-SCNC: 24 MMOL/L (ref 22–29)
EOSINOPHIL # BLD AUTO: 0.3 10E3/UL (ref 0–0.7)
EOSINOPHIL NFR BLD AUTO: 5 %
ERYTHROCYTE [DISTWIDTH] IN BLOOD BY AUTOMATED COUNT: 19.8 % (ref 10–15)
GFR SERPL CREATININE-BSD FRML MDRD: 40 ML/MIN/1.73M2
GLUCOSE SERPL-MCNC: 92 MG/DL (ref 70–99)
HCT VFR BLD AUTO: 31.7 % (ref 35–47)
HGB BLD-MCNC: 9 G/DL (ref 11.7–15.7)
IMM GRANULOCYTES # BLD: 0 10E3/UL
IMM GRANULOCYTES NFR BLD: 0 %
LYMPHOCYTES # BLD AUTO: 1.4 10E3/UL (ref 0.8–5.3)
LYMPHOCYTES NFR BLD AUTO: 27 %
MCH RBC QN AUTO: 25.6 PG (ref 26.5–33)
MCHC RBC AUTO-ENTMCNC: 28.4 G/DL (ref 31.5–36.5)
MCV RBC AUTO: 90 FL (ref 78–100)
MONOCYTES # BLD AUTO: 0.8 10E3/UL (ref 0–1.3)
MONOCYTES NFR BLD AUTO: 15 %
NEUTROPHILS # BLD AUTO: 2.7 10E3/UL (ref 1.6–8.3)
NEUTROPHILS NFR BLD AUTO: 52 %
NRBC # BLD AUTO: 0 10E3/UL
NRBC BLD AUTO-RTO: 0 /100
PLATELET # BLD AUTO: 182 10E3/UL (ref 150–450)
POTASSIUM SERPL-SCNC: 3.6 MMOL/L (ref 3.4–5.3)
RBC # BLD AUTO: 3.51 10E6/UL (ref 3.8–5.2)
SODIUM SERPL-SCNC: 144 MMOL/L (ref 136–145)
WBC # BLD AUTO: 5.2 10E3/UL (ref 4–11)

## 2022-12-23 PROCEDURE — 36415 COLL VENOUS BLD VENIPUNCTURE: CPT | Performed by: INTERNAL MEDICINE

## 2022-12-23 PROCEDURE — P9604 ONE-WAY ALLOW PRORATED TRIP: HCPCS | Performed by: INTERNAL MEDICINE

## 2022-12-23 PROCEDURE — 85025 COMPLETE CBC W/AUTO DIFF WBC: CPT | Performed by: INTERNAL MEDICINE

## 2022-12-23 PROCEDURE — 80048 BASIC METABOLIC PNL TOTAL CA: CPT | Performed by: INTERNAL MEDICINE

## 2022-12-27 RX ORDER — FUROSEMIDE 20 MG
TABLET ORAL
COMMUNITY
Start: 2022-12-26 | End: 2023-01-03

## 2022-12-27 NOTE — TELEPHONE ENCOUNTER
Contacted daughter Ana who explained patient had a fall in her apartment and was taken to [place] which is outside [facility] network, Ana further explained due to the snow and roads they had to go to the closest ED.  Ana believes her meds were changed at [place], and that the lasix may have been stopped.  Ana states patient is going in to see provider on Thursday and will get the meds straightened out then.

## 2022-12-29 ENCOUNTER — OFFICE VISIT (OUTPATIENT)
Dept: FAMILY MEDICINE | Facility: CLINIC | Age: 87
End: 2022-12-29
Payer: MEDICARE

## 2022-12-29 ENCOUNTER — TELEPHONE (OUTPATIENT)
Dept: FAMILY MEDICINE | Facility: CLINIC | Age: 87
End: 2022-12-29

## 2022-12-29 VITALS
SYSTOLIC BLOOD PRESSURE: 144 MMHG | HEART RATE: 79 BPM | DIASTOLIC BLOOD PRESSURE: 74 MMHG | OXYGEN SATURATION: 95 % | TEMPERATURE: 99 F | HEIGHT: 62 IN | BODY MASS INDEX: 27.23 KG/M2 | WEIGHT: 148 LBS

## 2022-12-29 DIAGNOSIS — R26.89 IMPAIRMENT OF BALANCE: ICD-10-CM

## 2022-12-29 DIAGNOSIS — D64.9 ANEMIA, UNSPECIFIED TYPE: Primary | ICD-10-CM

## 2022-12-29 DIAGNOSIS — N18.30 STAGE 3 CHRONIC KIDNEY DISEASE, UNSPECIFIED WHETHER STAGE 3A OR 3B CKD (H): ICD-10-CM

## 2022-12-29 DIAGNOSIS — I10 BENIGN ESSENTIAL HYPERTENSION: ICD-10-CM

## 2022-12-29 DIAGNOSIS — E61.1 IRON DEFICIENCY: ICD-10-CM

## 2022-12-29 DIAGNOSIS — R53.1 WEAKNESS GENERALIZED: ICD-10-CM

## 2022-12-29 PROCEDURE — 99496 TRANSJ CARE MGMT HIGH F2F 7D: CPT | Performed by: FAMILY MEDICINE

## 2022-12-29 RX ORDER — LANOLIN ALCOHOL/MO/W.PET/CERES
400 CREAM (GRAM) TOPICAL DAILY
COMMUNITY
Start: 2022-12-29 | End: 2023-02-22

## 2022-12-29 RX ORDER — HYDRALAZINE HYDROCHLORIDE 10 MG/1
20 TABLET, FILM COATED ORAL 3 TIMES DAILY
COMMUNITY
Start: 2022-12-29 | End: 2023-01-03

## 2022-12-29 ASSESSMENT — PAIN SCALES - GENERAL: PAINLEVEL: NO PAIN (0)

## 2022-12-29 NOTE — TELEPHONE ENCOUNTER
[facility] ([facility]) called requesting medication changes form patient's visit today, asked for fax to [phone]. Please see recent fax from [facility].    No medication changes noted per after visit summary.    Routing to team to send medication list/AVS fax.    [facility] call back: [phone]    PACHECO Gooden RN  [facility]

## 2022-12-29 NOTE — PROGRESS NOTES
Assessment & Plan       ICD-10-CM    1. Anemia, unspecified type  D64.9       2. Iron deficiency  E61.1       3. Stage 3 chronic kidney disease, unspecified whether stage 3a or 3b CKD (H)  N18.30       4. Impairment of balance  R26.89       5. Weakness generalized  R53.1       6. Benign essential hypertension  I10         We reviewed various aspects of her anemia treatment plan, CKD, blood pressure control, generalized weakness and poor balance, etc.  I answered some general questions they had about her medications and care  We will plan to continue with her same medications now  Since she just had her hemoglobin checked a few days ago, we will pass on labs today, but she will likely get labs done again when she sees her PCP, Dr. Wilcox, in less than 2 weeks for her annual wellness exam      Post Medication Reconciliation Status: discharge medications reconciled, continue medications without change      Return in about 13 days (around 1/11/2023) for Physical Exam, Lab Work, Routine Visit, BP Recheck.    Paxton Posada MD  [facility] PIERCE Harry is a 87 year old accompanied by her daughter, presenting for the following health issues:  Hospital F/U      [facility]       Hospital Follow-up Visit:    Hospital/Nursing Home/IP Rehab Facility: [facility]  Date of Admission: 12/15/22  Date of Discharge: 12/19/22 from hospital to rehab center, and discharged today 12/29/2022 from rehab center  Reason(s) for Admission: Fell and hit the back of her head on he nightstand.    Was your hospitalization related to COVID-19? No   Problems taking medications regularly:  None  Medication changes since discharge: Yes, see the med sheet brought in from the rehab.  Problems adhering to non-medication therapy:  None    Summary of hospitalization:  CareEverywhere information obtained and reviewed  Diagnostic Tests/Treatments reviewed.  Follow up needed: Ongoing primary care  Other Healthcare Providers Involved in  Patient s Care:         Specialist appointment - GI and nephrology  Update since discharge: improved.         Plan of care communicated with patient and her daughter, Ana.           The patient has had a couple of hospitalizations in the last month, initially at the end of November after a fall and then again earlier this month after another fall.  She has been struggling with anemia for the last year which apparently is felt primarily due to stage IIIb CKD.  Her nephrologist, Dr. Johnson has been helping to manage this with iron infusions.  Her hemoglobin was quite low again when she was hospitalized a couple weeks ago, so since then she has received 2 transfusions of packed red blood cells and also 2 iron infusions.  Her most recent hemoglobin was 9.0 when checked a few days ago at the rehab center.  She did not do the iron infusion that was scheduled for 2 days ago because she was still at the rehab center and had already had a couple of iron transfusions in the last 2 weeks.  She has an appointment to see her PCP on January 11 for annual wellness exam and has an appointment to see GI later next month.  She did have some medication adjustments while in the rehab center, including to her blood pressure medications.  She is now on medications as below.  She is taking an iron supplement as well as a folic acid supplement.  She is going back to her assisted living facility as of today.  She uses a walker to help with ambulation.  She has a son who lives in [place] and a daughter who lives locally who have been helping her out quite a bit.    Patient Active Problem List   Diagnosis     Hyperlipidemia LDL goal <70     Mild major depression (H)     Pulmonary hypertension (H)     Mild persistent asthma     Seasonal allergic rhinitis     Mitral insufficiency     Tricuspid insufficiency     Renal insufficiency     Tremors     Advanced directives, counseling/discussion     Family history of diabetes mellitus     Family  history of celiac disease     Celiac disease     History of colonic polyps     Benign essential hypertension     Hypothyroidism, unspecified type     CKD (chronic kidney disease) stage 3, GFR 30-59 ml/min (H)     Other ill-defined heart diseases     Anemia     Disorder of kidney and ureter     Generalized anxiety disorder     Osteopenia     CAD S/P percutaneous coronary angioplasty     Status post coronary angiogram     NYHA class 3 heart failure with preserved ejection fraction (H)     Ischemic cardiomyopathy     Mixed hyperlipidemia     Moderate persistent asthma without complication     Hearing disorder, unspecified laterality     Impairment of balance     Bipolar 1 disorder, depressed, severe (H)     Essential hypertension     Stented coronary artery     Atherosclerosis of native coronary artery of native heart without angina pectoris     Weakness generalized     SOB (shortness of breath)     Left leg claudication (H)     S/P TAVR (transcatheter aortic valve replacement)     Syncope and collapse     Anemia, unspecified type     Current Outpatient Medications   Medication     albuterol (PROAIR HFA/PROVENTIL HFA/VENTOLIN HFA) 108 (90 Base) MCG/ACT inhaler     alendronate (FOSAMAX) 70 MG tablet     azelastine (ASTEPRO) 0.15 % nasal spray     calcium citrate (CITRACAL) 950 (200 Ca) MG tablet     cholecalciferol (VITAMIN D3) 1250 mcg (83949 units) capsule     cloNIDine (CATAPRES) 0.1 MG tablet     desvenlafaxine (PRISTIQ) 100 MG 24 hr tablet     fluticasone-salmeterol (ADVAIR DISKUS) 500-50 MCG/ACT inhaler     folic acid (FOLVITE) 400 MCG tablet     hydrALAZINE (APRESOLINE) 10 MG tablet     iron polysaccharides (NIFEREX) 150 MG capsule     isosorbide mononitrate (IMDUR) 30 MG 24 hr tablet     lamoTRIgine (LAMICTAL) 25 MG tablet     LAMOTRIGINE 200 MG PO TABS     levothyroxine (SYNTHROID/LEVOTHROID) 50 MCG tablet     liothyronine (CYTOMEL) 5 MCG tablet     memantine (NAMENDA) 10 MG tablet     mirtazapine (REMERON) 7.5  "MG tablet     Multiple Vitamin (TAB-A-JENNIFER) TABS     psyllium (METAMUCIL/KONSYL) 58.6 % powder     REGULOID 28.3 % POWD     rosuvastatin (CRESTOR) 40 MG tablet     COMPRESSION STOCKINGS     furosemide (LASIX) 20 MG tablet     methocarbamol (ROBAXIN) 500 MG tablet     No current facility-administered medications for this visit.         Review of Systems   Mainly significant for the above.      Objective    BP (!) 144/74 (BP Location: Right arm, Patient Position: Sitting, Cuff Size: Adult Regular)   Pulse 79   Temp 99  F (37.2  C) (Oral)   Ht 1.575 m (5' 2\")   Wt 67.1 kg (148 lb)   SpO2 95%   BMI 27.07 kg/m    Body mass index is 27.07 kg/m .  Physical Exam   GENERAL: Very pleasant, alert, no distress and elderly    Past hospital records and rehab center records and lab reports were reviewed            "

## 2022-12-30 ENCOUNTER — TELEPHONE (OUTPATIENT)
Dept: NURSING | Facility: CLINIC | Age: 87
End: 2022-12-30

## 2022-12-30 NOTE — TELEPHONE ENCOUNTER
Pt Call: [facility] nurse Kaye requesting orders to delay home care until Tuesday or Wednesday    Warm transferred to [facility] to speak to nurse there.    BANDAR WITT RN on 12/30/2022 at 4:50 PM

## 2022-12-30 NOTE — TELEPHONE ENCOUNTER
Routing to PCP- home care orders    [facility] calling-    Pt was recently discharged.    Wanting orders to start home care again next Tuesday 01/03/23 or Wednesday 01/04/23.    Sofia Wells RN

## 2023-01-02 DIAGNOSIS — I50.30 NYHA CLASS 3 HEART FAILURE WITH PRESERVED EJECTION FRACTION (H): Primary | ICD-10-CM

## 2023-01-02 NOTE — TELEPHONE ENCOUNTER
Called Kaye. Left detailed voice message on identified voicemail box regarding approval of requested home care orders, advised to return call at [phone] for any further questions.     Foreign Cervantes RN  [facility]

## 2023-01-03 ENCOUNTER — LAB (OUTPATIENT)
Dept: LAB | Facility: CLINIC | Age: 88
End: 2023-01-03
Payer: MEDICARE

## 2023-01-03 ENCOUNTER — OFFICE VISIT (OUTPATIENT)
Dept: CARDIOLOGY | Facility: CLINIC | Age: 88
End: 2023-01-03
Attending: NURSE PRACTITIONER
Payer: MEDICARE

## 2023-01-03 ENCOUNTER — TELEPHONE (OUTPATIENT)
Dept: CARDIOLOGY | Facility: CLINIC | Age: 88
End: 2023-01-03

## 2023-01-03 ENCOUNTER — TELEPHONE (OUTPATIENT)
Dept: FAMILY MEDICINE | Facility: CLINIC | Age: 88
End: 2023-01-03

## 2023-01-03 VITALS
SYSTOLIC BLOOD PRESSURE: 142 MMHG | BODY MASS INDEX: 29.55 KG/M2 | OXYGEN SATURATION: 97 % | HEIGHT: 60 IN | WEIGHT: 150.5 LBS | HEART RATE: 81 BPM | DIASTOLIC BLOOD PRESSURE: 69 MMHG

## 2023-01-03 DIAGNOSIS — I50.32 CHRONIC HEART FAILURE WITH PRESERVED EJECTION FRACTION (HFPEF) (H): Primary | ICD-10-CM

## 2023-01-03 DIAGNOSIS — I50.30 NYHA CLASS 3 HEART FAILURE WITH PRESERVED EJECTION FRACTION (H): ICD-10-CM

## 2023-01-03 LAB
ANION GAP SERPL CALCULATED.3IONS-SCNC: 9 MMOL/L (ref 7–15)
BUN SERPL-MCNC: 27.5 MG/DL (ref 8–23)
CALCIUM SERPL-MCNC: 9.2 MG/DL (ref 8.8–10.2)
CHLORIDE SERPL-SCNC: 107 MMOL/L (ref 98–107)
CREAT SERPL-MCNC: 1.27 MG/DL (ref 0.51–0.95)
DEPRECATED HCO3 PLAS-SCNC: 25 MMOL/L (ref 22–29)
GFR SERPL CREATININE-BSD FRML MDRD: 41 ML/MIN/1.73M2
GLUCOSE SERPL-MCNC: 131 MG/DL (ref 70–99)
NT-PROBNP SERPL-MCNC: 3151 PG/ML (ref 0–1800)
POTASSIUM SERPL-SCNC: 4.1 MMOL/L (ref 3.4–5.3)
SODIUM SERPL-SCNC: 141 MMOL/L (ref 136–145)

## 2023-01-03 PROCEDURE — 83880 ASSAY OF NATRIURETIC PEPTIDE: CPT | Performed by: PATHOLOGY

## 2023-01-03 PROCEDURE — G0463 HOSPITAL OUTPT CLINIC VISIT: HCPCS

## 2023-01-03 PROCEDURE — G0463 HOSPITAL OUTPT CLINIC VISIT: HCPCS | Performed by: NURSE PRACTITIONER

## 2023-01-03 PROCEDURE — 36415 COLL VENOUS BLD VENIPUNCTURE: CPT | Performed by: PATHOLOGY

## 2023-01-03 PROCEDURE — 99215 OFFICE O/P EST HI 40 MIN: CPT | Performed by: NURSE PRACTITIONER

## 2023-01-03 PROCEDURE — 80048 BASIC METABOLIC PNL TOTAL CA: CPT | Performed by: PATHOLOGY

## 2023-01-03 RX ORDER — HYDRALAZINE HYDROCHLORIDE 25 MG/1
25 TABLET, FILM COATED ORAL 3 TIMES DAILY
Qty: 270 TABLET | Refills: 1 | Status: SHIPPED | OUTPATIENT
Start: 2023-01-03 | End: 2023-01-23

## 2023-01-03 ASSESSMENT — PAIN SCALES - GENERAL: PAINLEVEL: NO PAIN (0)

## 2023-01-03 NOTE — TELEPHONE ENCOUNTER
Received call from Lisa CURIEL with [facility]. She is calling to request verbal home care orders for ongoing care.     Orders:  Nursing/SN visits: once every other week for 4 weeks until re-certification for cardiac monitoring, pain, and safety with 3 additional PRN visits.   PT: to evaluate and treat.     Verbal approval given.    Call back [phone].    Romelia Paz RN   [facility]

## 2023-01-03 NOTE — NURSING NOTE
Chief Complaint   Patient presents with     New Patient     new core: 87 year old female presents with history of s/p tavr, presumed diastolic heart failure for hospital follow up and to follows up on blood pressures with labs prior       Vitals were taken and medications reconciled.    Felice Montes, EMT  9:52 AM

## 2023-01-03 NOTE — TELEPHONE ENCOUNTER
Returned call. Confirmed increase of hydralazine. Will send updated AVS confirming Kamryn should remain off lasix at this time.

## 2023-01-03 NOTE — TELEPHONE ENCOUNTER
"[facility] Call Center    Phone Message    May a detailed message be left on voicemail: yes     Reason for Call: Medication Question or concern regarding medication   Prescription Clarification  Name of Medication: Hydralazine  Prescribing Provider: Colby   Pharmacy: [facility]   What on the order needs clarification? Katherine wants to confirm patient is increasing hydralazine to 25 mg. Katherine states patient is sensitive to this medication and wants to confirm this change, complains she feels \"cloudy\" and \"doesn't feel good\". Katherine needs clarification and a signature from Vianey confirming this change. Gave Katherine [facility] clinic fax #.          Action Taken: Message routed to:  [facility] Cardiology    Travel Screening: Not Applicable                                                                        "

## 2023-01-03 NOTE — NURSING NOTE
Diet: Patient instructed regarding a heart failure healthy diet, including discussion of reduced fat and 2000 mg daily sodium restriction, daily weights, medication purpose and compliance, fluid restrictions and resources for patient and family to access for assistance with heart failure management.       Labs: Patient was given results of the laboratory testing obtained today and patient was instructed about when to return for the next laboratory testing.     Med Reconcile: Reviewed and verified all current medications with the patient. The updated medication list was printed and given to the patient. INCREASE hydralazine to 25mg TID.    Return Appointment: Patient given instructions regarding scheduling next clinic visit. RTC to see DR Zuniga as scheduled.     Patient stated she understood all health information given and agreed to call with further questions or concerns.     Meg Ayoub RN

## 2023-01-03 NOTE — PATIENT INSTRUCTIONS
Take your medicines every day, as directed    Changes made today:  Increase your hydralazine to 25mg three times daily     Monitor Your Weight and Symptoms    Contact us if you:    Gain 2 pounds in one day or 5 pounds in one week  Feel more short of breath  Notice more leg swelling  Feel lightheadeded   Change your lifestyle    Limit Salt or Sodium:  2000 mg  Limit Fluids:  2000 mL or approximately 64 ounces  Eat a Heart Healthy Diet  Low in saturated fats  Stay Active:  Aim to move at least 150 minutes every  week         To Contact us    During Business Hours:  [phone], option # 1      After hours, weekends or holidays:   [phone], Option #4  Ask to speak to the On-Call Cardiologist. Inform them you are a CORE/heart failure patient at the [facility].     Use [employer] allows you to communicate directly with your heart team through secure messaging.  [employer] can be accessed any time on your phone, computer, or tablet.  If you need assistance, we'd be happy to help!         Keep your Heart Appointments:    Follow up with Dr Zuniga as scheduled    Call if questions or concerns before then     Please consider attending our virtual support group which is held monthly. Please reach out to Gomez at [phone] for more information if you are interested in attending.       2023 dates:  Monday, January 2nd , 1-2pm: Heart Failure and Electrophysiology  Monday, February 6th , 1-2pm  Monday, March 6th , 1-2pm  Monday, April 3rd, 1-2pm  Monday, May 1st, 1-2pm  Monday, June 5th, 1-2pm  Monday, July 3rd, 1-2pm  Monday, August 7th, 1-2pm  Monday, September 11th, 1-2pm  Monday, October 2nd, 1-2pm  Monday, November 6th, 1-2pm  Monday, December 4th, 1-2pm

## 2023-01-03 NOTE — PROGRESS NOTES
Advanced [facility] Failure [facility] -- CORE Evaluation  January 3, 2023    HPI:   Ms. Kamryn Miller is an 87yr old female with a history of HTN, CAD (s/p PCI in 1996, PCI to LAD and RCA in 2003, PCI to LAD x3 in 8/2020), severe aortic valve stenosis (s/p 26mm ES Ultra TAVR 9/12/22), PAD, HLD, HFpEF (LVEF 55-60% per TTE 12/2022), and anemia who presents to [facility] for evaluation and for follow-up of recent hospitalization.    She presented to her local ED 12/15 progressive weakness resulting in a fall, where she hit her head.  Head CT was negative for acute pathology.  Carvedilol and lasix were stopped, and she was started on low-dose hydralazine.   She discharged 12/19.    Since discharge, she states that she feels better.  She was discharged from the hospital to rehab for 1 week, and has now discharged home.  She will be starting home nursing and therapy.  She has been walking, and feels improvements in her strength and endurance.  Her breathing has been well, and she only notes SOB related to her asthma, when wearing masks and when walking too far.  She is sleeping in a bed, and uses 1 pillow without PND and orthopnea.  Her appetite has been well, and she is eating regularly without nausea, vomiting, diarrhea, and constipation.  Her weight has been stable, ranging 145-146#, and she denies fluid retention.  She notes that her weight has not changed since stopping lasix.  She has been hydrating well, at least 2L/day.  Her assisted living staff checks her BPs, and she does not believe that there have been any concerns.  Her headaches have drastically improved since stopping amlodipine.  She otherwise denies chest pain, palpitations, dizziness, additional falls, acute vision changes, fevers, chills, cough, sore throat, and signs of bleeding.      PAST MEDICAL HISTORY:  Past Medical History:   Diagnosis Date     Aortic valvar stenosis 7/2010    mild     Asthma      Bipolar disorder (H)      CAD (coronary artery disease)     s/p  angioplasty     Celiac disease      Colon polyps      Colon polyps 2012    every 3 year colonoscopy      Depression      High cholesterol      HTN      Mitral insufficiency      Pulmonary HTN (H)     mild     Tremors 7/10    drug induced from antidepressants     Tricuspid insufficiency        FAMILY HISTORY:  Family History   Problem Relation Age of Onset     Asthma Mother      Cerebrovascular Disease Mother      Arthritis Mother      Depression Mother      Lipids Sister      Hypertension Sister      Heart Disease Sister      Lipids Sister      Hypertension Sister      Lipids Sister      Breast Cancer Sister        SOCIAL HISTORY:  Social History     Socioeconomic History     Marital status:      Spouse name: Adrien     Number of children: 2     Years of education: 16   Occupational History     Employer: RETIRED     Comment: Retired in 2002.    Tobacco Use     Smoking status: Never     Smokeless tobacco: Never   Vaping Use     Vaping Use: Never used   Substance and Sexual Activity     Alcohol use: No     Alcohol/week: 0.0 standard drinks     Types: 1 Standard drinks or equivalent per week     Drug use: No     Sexual activity: Not Currently     Partners: Male       CURRENT MEDICATIONS:  Current Outpatient Medications   Medication Sig Dispense Refill     albuterol (PROAIR HFA/PROVENTIL HFA/VENTOLIN HFA) 108 (90 Base) MCG/ACT inhaler Inhale 2 puffs into the lungs every 6 hours as needed for wheezing or shortness of breath / dyspnea 18 g 3     alendronate (FOSAMAX) 70 MG tablet TAKE 1 TABLET BY MOUTH ONCE WEEKLY 4 tablet 23     azelastine (ASTEPRO) 0.15 % nasal spray USE 1 SPRAY IN EACH NOSTRIL ONCE A DAY 30 mL 11     calcium citrate (CITRACAL) 950 (200 Ca) MG tablet TAKE 1 TABLET BY MOUTH ONCE DAILY 28 tablet 11     cholecalciferol (VITAMIN D3) 1250 mcg (61402 units) capsule TAKE 1 CAPSULE BY MOUTH ONCE WEEKLY 4 capsule 11     cloNIDine (CATAPRES) 0.1 MG tablet Take 1 tablet (0.1 mg) by mouth daily as needed (For  SBP > 180) 20 tablet 0     COMPRESSION STOCKINGS 1 each daily (Patient not taking: Reported on 12/12/2022) 1 each 1     desvenlafaxine (PRISTIQ) 100 MG 24 hr tablet Take 100 mg by mouth daily       fluticasone-salmeterol (ADVAIR DISKUS) 500-50 MCG/ACT inhaler INHALE 1 PUFF BY MOUTH TWICE DAILY (Patient taking differently: 1 puff daily INHALE 1 PUFF BY MOUTH TWICE DAILY) 60 each 0     folic acid (FOLVITE) 400 MCG tablet Take 1 tablet (400 mcg) by mouth daily       furosemide (LASIX) 20 MG tablet  (Patient not taking: Reported on 12/29/2022)       hydrALAZINE (APRESOLINE) 10 MG tablet Take 2 tablets (20 mg) by mouth 3 times daily       iron polysaccharides (NIFEREX) 150 MG capsule Take 1 capsule (150 mg) by mouth daily 30 capsule 1     isosorbide mononitrate (IMDUR) 30 MG 24 hr tablet Take 1 tablet (30 mg) by mouth daily 30 tablet 2     lamoTRIgine (LAMICTAL) 25 MG tablet Take 25 mg by mouth daily with 200 mg tablet for a total dose of 225 mg       LAMOTRIGINE 200 MG PO TABS Take 200 mg by mouth daily with 25 mg tablet for a total dose of 225 mg       levothyroxine (SYNTHROID/LEVOTHROID) 50 MCG tablet TAKE 1 TABLET BY MOUTH ONCE DAILY 28 tablet 11     liothyronine (CYTOMEL) 5 MCG tablet Take 2 tablets (10 mcg) by mouth daily 30 tablet 3     memantine (NAMENDA) 10 MG tablet Take 1 tablet (10 mg) by mouth daily 30 tablet 3     methocarbamol (ROBAXIN) 500 MG tablet Take 1 tablet (500 mg) by mouth every 6 hours as needed for muscle spasms (Patient not taking: Reported on 12/29/2022) 15 tablet 0     mirtazapine (REMERON) 7.5 MG tablet Take 1 tablet (7.5 mg) by mouth At Bedtime 30 tablet 3     Multiple Vitamin (TAB-A-JENNIFER) TABS TAKE 1 TABLET BY MOUTH ONCE DAILY 30 tablet PRN     psyllium (METAMUCIL/KONSYL) 58.6 % powder Take 1 tspn in liquid daily       REGULOID 28.3 % POWD MIX 1 TEASPOON IN LIQUID AND DRINK BY MOUTH ONCE DAILY 538 g 11     rosuvastatin (CRESTOR) 40 MG tablet TAKE 1 TABLET BY MOUTH ONCE DAILY 28 tablet 11        ROS:   Constitutional: No fever, chills, or sweats. Stable weight.   ENT: No visual disturbance, ear ache, epistaxis, sore throat.   Allergies/Immunologic: Negative.   Respiratory: As per HPI.   Cardiovascular: As per HPI.   GI: No nausea, vomiting, hematemesis, melena, or hematochezia.   : No urinary frequency, dysuria, or hematuria.   Integument: Negative.   Psychiatric: Negative.   Neuro: Negative.   Endocrinology: Negative.   Musculoskeletal: As per HPI.    EXAM:  BP (!) 142/69 (BP Location: Right arm, Patient Position: Sitting, Cuff Size: Adult Regular)   Pulse 81   Ht 1.524 m (5')   Wt 68.3 kg (150 lb 8 oz)   SpO2 97%   BMI 29.39 kg/m    General: appears comfortable, alert and articulate  Head: normocephalic, atraumatic  Eyes: anicteric sclera, EOMI  Neck: no adenopathy  Orophyarynx: moist mucosa, no lesions, dentition intact  Heart: regular, S1/S2, no murmur, gallop, rub, JVD negative when sitting upright  Lungs: clear, no rales or wheezing  Abdomen: soft, non-tender, bowel sounds present, no hepatosplenomegaly  Extremities: no clubbing, cyanosis or LE edema  Neurological: normal speech and affect, no gross motor deficits  Integument: no open lesions, rashes, or jaundice    Labs:  CBC RESULTS:  Lab Results   Component Value Date    WBC 7.8 12/14/2022    WBC 4.6 05/17/2021    RBC 2.72 (L) 12/14/2022    RBC 3.97 05/17/2021    HGB 7.0 (L) 12/14/2022    HGB 12.9 05/17/2021    HCT 23.1 (L) 12/14/2022    HCT 40.3 05/17/2021    MCV 85 12/14/2022     (H) 05/17/2021    MCH 25.7 (L) 12/14/2022    MCH 32.5 05/17/2021    MCHC 30.3 (L) 12/14/2022    MCHC 32.0 05/17/2021    RDW 17.4 (H) 12/14/2022    RDW 12.7 05/17/2021     12/14/2022     05/17/2021       CMP RESULTS:  Lab Results   Component Value Date     12/12/2022     06/04/2021    POTASSIUM 4.6 12/12/2022    POTASSIUM 4.6 10/05/2022    POTASSIUM 3.4 06/04/2021    CHLORIDE 104 12/12/2022    CHLORIDE 103 10/05/2022     CHLORIDE 106 06/04/2021    CO2 25 12/12/2022    CO2 27 10/05/2022    CO2 30 06/04/2021    ANIONGAP 11 12/12/2022    ANIONGAP 7 10/05/2022    ANIONGAP 6 06/04/2021     (H) 12/12/2022    GLC 99 12/01/2022    GLC 98 10/05/2022     (H) 06/04/2021    BUN 29.7 (H) 12/12/2022    BUN 39 (H) 10/05/2022    BUN 27 06/04/2021    CR 1.29 (H) 12/12/2022    CR 1.50 (H) 06/04/2021    GFRESTIMATED 40 (L) 12/12/2022    GFRESTIMATED 32 (L) 06/04/2021    GFRESTBLACK 37 (L) 06/04/2021    PRINCE 9.2 12/12/2022    PRINCE 9.4 06/04/2021    BILITOTAL 0.2 12/12/2022    BILITOTAL 0.3 05/17/2021    ALBUMIN 3.7 12/12/2022    ALBUMIN 3.6 09/27/2022    ALBUMIN 3.4 05/17/2021    ALKPHOS 66 12/12/2022    ALKPHOS 57 05/17/2021    ALT 37 (H) 12/12/2022    ALT 38 05/17/2021    AST 39 (H) 12/12/2022    AST 27 05/17/2021        INR RESULTS:  Lab Results   Component Value Date    INR 1.21 (H) 11/30/2022    INR 1.05 11/03/2020       Lab Results   Component Value Date    MAG 2.3 12/07/2022    MAG 2.4 (H) 10/28/2019     Lab Results   Component Value Date    NTBNPI 2,164 (H) 11/30/2022     Lab Results   Component Value Date    NTBNP 14,521 (H) 12/12/2022    NTBNP 462 (H) 05/17/2021       Assessment and Plan:   Ms. Kamryn Miller is an 87yr old female with a history of HTN, CAD (s/p PCI in 1996, PCI to LAD and RCA in 2003, PCI to LAD x3 in 8/2020), severe aortic valve stenosis (s/p 26mm ES Ultra TAVR 9/12/22), PAD, HLD, HFpEF (LVEF 55-60% per TTE 12/2022), and anemia [place] [facility] for follow-up of recent hospitalization.    Labs today show stable electrolytes and renal function.    Ms. Miller appears well today.  Her BPs are stable.  Her weight trend is stable, and she appears euvolemic, now off lasix.    Advised that she increase hydralazine to 25mg TID.      Of note, she is unsure what medications she is actively taking as her assisted living facility administers her meds.  Will ask RNCC to call facility to review  meds/update med profile.    We will plan for her to follow-up in clinic to see Dr. Zuniga in 1 month, or CORE sooner should new concerns arise.      Chronic HFpEF/diastolic heart failure (LVEF 55-60% per TTE 12/2022)  HTN  Diastolic heart failure is caused by any disease process that causes stiffening of the left ventricular, resulting in abnormal relaxation and impaired LV filling.  Common causes include long-standing hypertension, valvular heart disease, diabetes, sleep apnea, and age.  Treatment of the underlying disease in currently the most important therapeutic approach.  The mainstays of therapy include volume management, blood pressure control, treating underlying sleep apnea if present, treatment of underlying CAD if within the goals of care, weight management, exercise training, rate control for atrial fibrillation as well as consideration for a rhythm control strategy, and consideration for clinical trials.  Consideration of spironolactone in low risk patients should be taken given the positive CHF results in the TOPCAT trial.    Risk factors include prior CAD, female gender, age and HTN    Stage C. NYHA Class IIIB.  Primary Cardiologist: Efrain  BP: elevated, increasing hydral to 25mg TID and continue imdur 30mg daily, and clonidine 0.1mg daily prn (has not used in some time)  Fluid status: Euvolemic  Aldosterone antagonist: no  Ischemia evaluation: last cor angio 8/2020.  No anginal concerns today.  ACEi/ARB/ARNI: could consider entresto  BB: n/a, no evidence for use in HFpEF --> stopped during last hospitalization, could consider reintroducing low-dose carvedilol due to her extensive CAD  SCD prophylaxis: n/a, no evidence for use in HFpEF  NSAID use: Contraindicated    CAD  HLD  s/p PCI in 1996, PCI to LAD and RCA in 2003, PCI to LAD x3 in 8/2020.  Her antiplatelet/DAPT therapy has been held due to her low Hgb, defer resumption to her PCP.  Continue rosuvastatin 40mg daily.  BB was stopped during recent  hospitalization -- note that there is no indication for beta blockade in HFpEF, but she does have extensive CAD so low-dose BB could be reconsidered.    PAD  Last BALDO 10/2022 showed:  RIGHT LEG:       A. Resting BALDO is normal, 1.08..       B. Exercise study: Negative, but exercise tolerance was poor.  LEFT LEG:       A. Resting BALDO is ABNORMAL, 0.84.       B. Exercise study: Negative, but exercise tolerance was poor. No symptoms elicited and pressure dropped less than 20% from baseline. BALDO dropped 0.2. Pressure returned to baseline by 3 minutes.    Plan to continue to monitor for claudication per Dr. Zuniga.  Currently off antiplatelet/DAPT due to hgb trend, continue rosuvastatin 40mg daily.  BB as noted above.    Severe aortic stenosis  s/p 26mm ES Ultra TAVR 9/12/22.  Last TTE 12/2022 showed well-seated valve, with trace paravalvular AI, MG 11mmHg, and peak velocity 2.2m/sec.  Follows with the structural team, plan to repeat TTE 10/2023.  Needs SBE ppx.    Anemia  Management per PCP, recently given IV iron.  Discussed referral to Anemia Clinic, which she will discuss with her PCP next week.  She is taking oral iron.        The above was reviewed with Ms. Miller, who verbalized understanding and will call with further questions/concerns.    60+ minutes spent with patient, along with preparing for visit, reviewing follow up, and completing documentation.      Vianey Bowman DNP, [facility]  Advanced Heart Failure Nurse Practitioner  [facility]  Patient Care Team:  Rolanda Wilcox MD as PCP - General (Family Practice)  Rolanda Wilcox MD as Assigned PCP  Pati De Santiago NP as Assigned Heart and Vascular Provider  Javier Landa MD as Assigned OBGYN Provider  Ania Johnson MD as Assigned Nephrology Provider  Dimas Palacios, PhD as Assigned Behavioral Health Provider  Navarro Escalona MD as Fellow  Dary Galvan MD as Hospitalist (Internal Medicine)  Vianey Bowman NP as  Nurse Practitioner (Cardiovascular Disease)  Macie Samuel, RN as Specialty Care Coordinator

## 2023-01-03 NOTE — LETTER
1/3/2023      RE: Kamryn Miller  [address]  [address]  [place] [address]       Dear Colleague,    Thank you for the opportunity to participate in the care of your patient, Kamryn Miller, at the [facility] [place] at [facility]. Please see a copy of my visit note below.    Advanced Heart Failure Clinic -- CORE Evaluation  January 3, 2023    HPI:   Ms. Kamryn Miller is an 87yr old female with a history of HTN, CAD (s/p PCI in 1996, PCI to LAD and RCA in 2003, PCI to LAD x3 in 8/2020), severe aortic valve stenosis (s/p 26mm ES Ultra TAVR 9/12/22), PAD, HLD, HFpEF (LVEF 55-60% per TTE 12/2022), and anemia who presents to [facility] for evaluation and for follow-up of recent hospitalization.    She presented to her local ED 12/15 progressive weakness resulting in a fall, where she hit her head.  Head CT was negative for acute pathology.  Carvedilol and lasix were stopped, and she was started on low-dose hydralazine.   She discharged 12/19.    Since discharge, she states that she feels better.  She was discharged from the hospital to rehab for 1 week, and has now discharged home.  She will be starting home nursing and therapy.  She has been walking, and feels improvements in her strength and endurance.  Her breathing has been well, and she only notes SOB related to her asthma, when wearing masks and when walking too far.  She is sleeping in a bed, and uses 1 pillow without PND and orthopnea.  Her appetite has been well, and she is eating regularly without nausea, vomiting, diarrhea, and constipation.  Her weight has been stable, ranging 145-146#, and she denies fluid retention.  She notes that her weight has not changed since stopping lasix.  She has been hydrating well, at least 2L/day.  Her assisted living staff checks her BPs, and she does not believe that there have been any concerns.  Her headaches have drastically improved since stopping  amlodipine.  She otherwise denies chest pain, palpitations, dizziness, additional falls, acute vision changes, fevers, chills, cough, sore throat, and signs of bleeding.      PAST MEDICAL HISTORY:  Past Medical History:   Diagnosis Date     Aortic valvar stenosis 7/2010    mild     Asthma      Bipolar disorder (H)      CAD (coronary artery disease)     s/p angioplasty     Celiac disease      Colon polyps      Colon polyps 2012    every 3 year colonoscopy      Depression      High cholesterol      HTN      Mitral insufficiency      Pulmonary HTN (H)     mild     Tremors 7/10    drug induced from antidepressants     Tricuspid insufficiency        FAMILY HISTORY:  Family History   Problem Relation Age of Onset     Asthma Mother      Cerebrovascular Disease Mother      Arthritis Mother      Depression Mother      Lipids Sister      Hypertension Sister      Heart Disease Sister      Lipids Sister      Hypertension Sister      Lipids Sister      Breast Cancer Sister        SOCIAL HISTORY:  Social History     Socioeconomic History     Marital status:      Spouse name: Adrien     Number of children: 2     Years of education: 16   Occupational History     Employer: RETIRED     Comment: Retired in 2002.    Tobacco Use     Smoking status: Never     Smokeless tobacco: Never   Vaping Use     Vaping Use: Never used   Substance and Sexual Activity     Alcohol use: No     Alcohol/week: 0.0 standard drinks     Types: 1 Standard drinks or equivalent per week     Drug use: No     Sexual activity: Not Currently     Partners: Male       CURRENT MEDICATIONS:  Current Outpatient Medications   Medication Sig Dispense Refill     albuterol (PROAIR HFA/PROVENTIL HFA/VENTOLIN HFA) 108 (90 Base) MCG/ACT inhaler Inhale 2 puffs into the lungs every 6 hours as needed for wheezing or shortness of breath / dyspnea 18 g 3     alendronate (FOSAMAX) 70 MG tablet TAKE 1 TABLET BY MOUTH ONCE WEEKLY 4 tablet 23     azelastine (ASTEPRO) 0.15 % nasal  spray USE 1 SPRAY IN EACH NOSTRIL ONCE A DAY 30 mL 11     calcium citrate (CITRACAL) 950 (200 Ca) MG tablet TAKE 1 TABLET BY MOUTH ONCE DAILY 28 tablet 11     cholecalciferol (VITAMIN D3) 1250 mcg (62964 units) capsule TAKE 1 CAPSULE BY MOUTH ONCE WEEKLY 4 capsule 11     cloNIDine (CATAPRES) 0.1 MG tablet Take 1 tablet (0.1 mg) by mouth daily as needed (For SBP > 180) 20 tablet 0     COMPRESSION STOCKINGS 1 each daily (Patient not taking: Reported on 12/12/2022) 1 each 1     desvenlafaxine (PRISTIQ) 100 MG 24 hr tablet Take 100 mg by mouth daily       fluticasone-salmeterol (ADVAIR DISKUS) 500-50 MCG/ACT inhaler INHALE 1 PUFF BY MOUTH TWICE DAILY (Patient taking differently: 1 puff daily INHALE 1 PUFF BY MOUTH TWICE DAILY) 60 each 0     folic acid (FOLVITE) 400 MCG tablet Take 1 tablet (400 mcg) by mouth daily       furosemide (LASIX) 20 MG tablet  (Patient not taking: Reported on 12/29/2022)       hydrALAZINE (APRESOLINE) 10 MG tablet Take 2 tablets (20 mg) by mouth 3 times daily       iron polysaccharides (NIFEREX) 150 MG capsule Take 1 capsule (150 mg) by mouth daily 30 capsule 1     isosorbide mononitrate (IMDUR) 30 MG 24 hr tablet Take 1 tablet (30 mg) by mouth daily 30 tablet 2     lamoTRIgine (LAMICTAL) 25 MG tablet Take 25 mg by mouth daily with 200 mg tablet for a total dose of 225 mg       LAMOTRIGINE 200 MG PO TABS Take 200 mg by mouth daily with 25 mg tablet for a total dose of 225 mg       levothyroxine (SYNTHROID/LEVOTHROID) 50 MCG tablet TAKE 1 TABLET BY MOUTH ONCE DAILY 28 tablet 11     liothyronine (CYTOMEL) 5 MCG tablet Take 2 tablets (10 mcg) by mouth daily 30 tablet 3     memantine (NAMENDA) 10 MG tablet Take 1 tablet (10 mg) by mouth daily 30 tablet 3     methocarbamol (ROBAXIN) 500 MG tablet Take 1 tablet (500 mg) by mouth every 6 hours as needed for muscle spasms (Patient not taking: Reported on 12/29/2022) 15 tablet 0     mirtazapine (REMERON) 7.5 MG tablet Take 1 tablet (7.5 mg) by mouth At  Bedtime 30 tablet 3     Multiple Vitamin (TAB-A-JENNIFER) TABS TAKE 1 TABLET BY MOUTH ONCE DAILY 30 tablet PRN     psyllium (METAMUCIL/KONSYL) 58.6 % powder Take 1 tspn in liquid daily       REGULOID 28.3 % POWD MIX 1 TEASPOON IN LIQUID AND DRINK BY MOUTH ONCE DAILY 538 g 11     rosuvastatin (CRESTOR) 40 MG tablet TAKE 1 TABLET BY MOUTH ONCE DAILY 28 tablet 11       ROS:   Constitutional: No fever, chills, or sweats. Stable weight.   ENT: No visual disturbance, ear ache, epistaxis, sore throat.   Allergies/Immunologic: Negative.   Respiratory: As per HPI.   Cardiovascular: As per HPI.   GI: No nausea, vomiting, hematemesis, melena, or hematochezia.   : No urinary frequency, dysuria, or hematuria.   Integument: Negative.   Psychiatric: Negative.   Neuro: Negative.   Endocrinology: Negative.   Musculoskeletal: As per HPI.    EXAM:  BP (!) 142/69 (BP Location: Right arm, Patient Position: Sitting, Cuff Size: Adult Regular)   Pulse 81   Ht 1.524 m (5')   Wt 68.3 kg (150 lb 8 oz)   SpO2 97%   BMI 29.39 kg/m    General: appears comfortable, alert and articulate  Head: normocephalic, atraumatic  Eyes: anicteric sclera, EOMI  Neck: no adenopathy  Orophyarynx: moist mucosa, no lesions, dentition intact  Heart: regular, S1/S2, no murmur, gallop, rub, JVD negative when sitting upright  Lungs: clear, no rales or wheezing  Abdomen: soft, non-tender, bowel sounds present, no hepatosplenomegaly  Extremities: no clubbing, cyanosis or LE edema  Neurological: normal speech and affect, no gross motor deficits  Integument: no open lesions, rashes, or jaundice    Labs:  CBC RESULTS:  Lab Results   Component Value Date    WBC 7.8 12/14/2022    WBC 4.6 05/17/2021    RBC 2.72 (L) 12/14/2022    RBC 3.97 05/17/2021    HGB 7.0 (L) 12/14/2022    HGB 12.9 05/17/2021    HCT 23.1 (L) 12/14/2022    HCT 40.3 05/17/2021    MCV 85 12/14/2022     (H) 05/17/2021    MCH 25.7 (L) 12/14/2022    MCH 32.5 05/17/2021    MCHC 30.3 (L) 12/14/2022     MCHC 32.0 05/17/2021    RDW 17.4 (H) 12/14/2022    RDW 12.7 05/17/2021     12/14/2022     05/17/2021       CMP RESULTS:  Lab Results   Component Value Date     12/12/2022     06/04/2021    POTASSIUM 4.6 12/12/2022    POTASSIUM 4.6 10/05/2022    POTASSIUM 3.4 06/04/2021    CHLORIDE 104 12/12/2022    CHLORIDE 103 10/05/2022    CHLORIDE 106 06/04/2021    CO2 25 12/12/2022    CO2 27 10/05/2022    CO2 30 06/04/2021    ANIONGAP 11 12/12/2022    ANIONGAP 7 10/05/2022    ANIONGAP 6 06/04/2021     (H) 12/12/2022    GLC 99 12/01/2022    GLC 98 10/05/2022     (H) 06/04/2021    BUN 29.7 (H) 12/12/2022    BUN 39 (H) 10/05/2022    BUN 27 06/04/2021    CR 1.29 (H) 12/12/2022    CR 1.50 (H) 06/04/2021    GFRESTIMATED 40 (L) 12/12/2022    GFRESTIMATED 32 (L) 06/04/2021    GFRESTBLACK 37 (L) 06/04/2021    PRINCE 9.2 12/12/2022    PRINCE 9.4 06/04/2021    BILITOTAL 0.2 12/12/2022    BILITOTAL 0.3 05/17/2021    ALBUMIN 3.7 12/12/2022    ALBUMIN 3.6 09/27/2022    ALBUMIN 3.4 05/17/2021    ALKPHOS 66 12/12/2022    ALKPHOS 57 05/17/2021    ALT 37 (H) 12/12/2022    ALT 38 05/17/2021    AST 39 (H) 12/12/2022    AST 27 05/17/2021        INR RESULTS:  Lab Results   Component Value Date    INR 1.21 (H) 11/30/2022    INR 1.05 11/03/2020       Lab Results   Component Value Date    MAG 2.3 12/07/2022    MAG 2.4 (H) 10/28/2019     Lab Results   Component Value Date    NTBNPI 2,164 (H) 11/30/2022     Lab Results   Component Value Date    NTBNP 14,521 (H) 12/12/2022    NTBNP 462 (H) 05/17/2021       Assessment and Plan:   Ms. Kamryn Miller is an 87yr old female with a history of HTN, CAD (s/p PCI in 1996, PCI to LAD and RCA in 2003, PCI to LAD x3 in 8/2020), severe aortic valve stenosis (s/p 26mm ES Ultra TAVR 9/12/22), PAD, HLD, HFpEF (LVEF 55-60% per TTE 12/2022), and anemia who presents to CORE for evaluation and for follow-up of recent hospitalization.    Labs today show stable electrolytes and renal  function.    Ms. Miller appears well today.  Her BPs are stable.  Her weight trend is stable, and she appears euvolemic, now off lasix.    Advised that she increase hydralazine to 25mg TID.      Of note, she is unsure what medications she is actively taking as her assisted living facility administers her meds.  Will ask RNCC to call facility to review meds/update med profile.    We will plan for her to follow-up in clinic to see Dr. Zuniga in 1 month, or CORE sooner should new concerns arise.      Chronic HFpEF/diastolic heart failure (LVEF 55-60% per TTE 12/2022)  HTN  Diastolic heart failure is caused by any disease process that causes stiffening of the left ventricular, resulting in abnormal relaxation and impaired LV filling.  Common causes include long-standing hypertension, valvular heart disease, diabetes, sleep apnea, and age.  Treatment of the underlying disease in currently the most important therapeutic approach.  The mainstays of therapy include volume management, blood pressure control, treating underlying sleep apnea if present, treatment of underlying CAD if within the goals of care, weight management, exercise training, rate control for atrial fibrillation as well as consideration for a rhythm control strategy, and consideration for clinical trials.  Consideration of spironolactone in low risk patients should be taken given the positive CHF results in the TOPCAT trial.    Risk factors include prior CAD, female gender, age and HTN    Stage C. NYHA Class IIIB.  Primary Cardiologist: Efrain  BP: elevated, increasing hydral to 25mg TID and continue imdur 30mg daily, and clonidine 0.1mg daily prn (has not used in some time)  Fluid status: Euvolemic  Aldosterone antagonist: no  Ischemia evaluation: last cor angio 8/2020.  No anginal concerns today.  ACEi/ARB/ARNI: could consider entresto  BB: n/a, no evidence for use in HFpEF --> stopped during last hospitalization, could consider reintroducing low-dose  carvedilol due to her extensive CAD  SCD prophylaxis: n/a, no evidence for use in HFpEF  NSAID use: Contraindicated    CAD  HLD  s/p PCI in 1996, PCI to LAD and RCA in 2003, PCI to LAD x3 in 8/2020.  Her antiplatelet/DAPT therapy has been held due to her low Hgb, defer resumption to her PCP.  Continue rosuvastatin 40mg daily.  BB was stopped during recent hospitalization -- note that there is no indication for beta blockade in HFpEF, but she does have extensive CAD so low-dose BB could be reconsidered.    PAD  Last BALDO 10/2022 showed:  RIGHT LEG:       A. Resting BALDO is normal, 1.08..       B. Exercise study: Negative, but exercise tolerance was poor.  LEFT LEG:       A. Resting BALDO is ABNORMAL, 0.84.       B. Exercise study: Negative, but exercise tolerance was poor. No symptoms elicited and pressure dropped less than 20% from baseline. BALDO dropped 0.2. Pressure returned to baseline by 3 minutes.    Plan to continue to monitor for claudication per Dr. Zuniga.  Currently off antiplatelet/DAPT due to hgb trend, continue rosuvastatin 40mg daily.  BB as noted above.    Severe aortic stenosis  s/p 26mm ES Ultra TAVR 9/12/22.  Last TTE 12/2022 showed well-seated valve, with trace paravalvular AI, MG 11mmHg, and peak velocity 2.2m/sec.  Follows with the structural team, plan to repeat TTE 10/2023.  Needs SBE ppx.    Anemia  Management per PCP, recently given IV iron.  Discussed referral to Anemia Clinic, which she will discuss with her PCP next week.  She is taking oral iron.        The above was reviewed with Ms. Miller, who verbalized understanding and will call with further questions/concerns.    60+ minutes spent with patient, along with preparing for visit, reviewing follow up, and completing documentation.      Vianey Bowman DNP, FNP-BC  Advanced Heart Failure Nurse Practitioner  [facility]  Patient Care Team:  Rolanda Wilcox MD as PCP - General (Family Practice)  Pati De Santiago NP as Assigned  Heart and Vascular Provider  Javier Landa MD as Assigned OBGYN Provider  Ania Johnson MD as Assigned Nephrology Provider  Dimas Palacios, PhD as Assigned Behavioral Health Provider  Navarro Escalona MD as Fellow  Dary Galvan MD as Hospitalist (Internal Medicine)  Vianey Bowman NP as Nurse Practitioner (Cardiovascular Disease)  Macie Samuel, RN as Specialty Care Coordinator

## 2023-01-04 ENCOUNTER — TELEPHONE (OUTPATIENT)
Dept: CARDIOLOGY | Facility: CLINIC | Age: 88
End: 2023-01-04

## 2023-01-04 NOTE — TELEPHONE ENCOUNTER
[facility] Call Center    Phone Message    May a detailed message be left on voicemail: yes     Reason for Call: Medication Question or concern regarding medication   Prescription Clarification  Name of Medication: cloNIDine (CATAPRES) 0.1 MG tablet  Prescribing Provider: DR Toro   Pharmacy:    What on the order needs clarification? They have this medication as discontinued and it is on the patient's AVS and the nurse would like to know if the patient is to restart this and if they need an order for this.  Please contact VEENA harris to discuss      Action Taken: Other: Cardiology    Travel Screening: Not Applicable     Thank you!  Specialty Access Center

## 2023-01-09 ENCOUNTER — TELEPHONE (OUTPATIENT)
Dept: CARDIOLOGY | Facility: CLINIC | Age: 88
End: 2023-01-09

## 2023-01-09 ENCOUNTER — MYC MEDICAL ADVICE (OUTPATIENT)
Dept: GASTROENTEROLOGY | Facility: CLINIC | Age: 88
End: 2023-01-09
Payer: MEDICARE

## 2023-01-09 DIAGNOSIS — I10 ESSENTIAL HYPERTENSION: Primary | ICD-10-CM

## 2023-01-09 RX ORDER — CLONIDINE HYDROCHLORIDE 0.1 MG/1
0.1 TABLET ORAL DAILY PRN
Qty: 90 TABLET | Refills: 3 | Status: SHIPPED | OUTPATIENT
Start: 2023-01-09 | End: 2023-02-13

## 2023-01-09 NOTE — TELEPHONE ENCOUNTER
[facility] Call Center    Phone Message    May a detailed message be left on voicemail: yes     Reason for Call: Other: Pt weight decrease. Pt was 149.7lb on the 8th and is now 145 today. Please call back to discuss. thank you     Action Taken: Message routed to:  Other: Cardiology    Travel Screening: Not Applicable     Thank you!  Specialty Access Center

## 2023-01-09 NOTE — TELEPHONE ENCOUNTER
Called Sangita back; discussed pt recent weight loss of  # over 2 days. Weight was taken on different scale at a different time of day. Pt is feeling well, eating and drinking normally.     Asked that they continue monitoring weight and try and get weight around the same time per day on the same scale if possible. Sangita verbalized understanding.     Macie Samuel RN

## 2023-01-09 NOTE — TELEPHONE ENCOUNTER
Date: 1/9/2023    Time of Call: 12:54 PM     Diagnosis:  HF     [ VORB ] Ordering provider: Vianey Bowman CNP  Order: Continue Clonidine 0.1 mg daily PRN SBP > 180.      Order received by: Macie Samuel RN       Follow-up/additional notes: Medication refilled to preferred pharmacy. Order faxed to [facility] [phone] Attn: Grace Samuel RN

## 2023-01-09 NOTE — TELEPHONE ENCOUNTER
Called [facility] and talked to Sangita; Pt BP's have been running high 180's/100's. Pt CLinidine was ordered for 0.1 mg PRN SBP greater than 180.     Vianey, okay to continue Clonidine?      Macie Samuel RN

## 2023-01-09 NOTE — TELEPHONE ENCOUNTER
[facility] Call Center    Phone Message    May a detailed message be left on voicemail: yes     Reason for Call: Symptoms or Concerns     If patient has red-flag symptoms, warm transfer to triage line    Current symptom or concern:  /100 today   They are waiting for orders for BP Medication  Fax # [phone]  Nurse Sangita said she spoke to Macie already today about this   See TE from 1/4       Action Taken: Other: Cardiology    Travel Screening: Not Applicable     Thank you!  Specialty Access Center

## 2023-01-10 ENCOUNTER — TELEPHONE (OUTPATIENT)
Dept: FAMILY MEDICINE | Facility: CLINIC | Age: 88
End: 2023-01-10

## 2023-01-10 NOTE — TELEPHONE ENCOUNTER
Order/Referral Request    Who is requesting: [facility]    Orders being requested: PT for strengthening, balance, and gait training 1/week for 3 weeks, then 1/every other week for the last month    Reason service is needed/diagnosis: Generalized weakness and recent falls    When are orders needed by: ASAP    Has this been discussed with Provider: No    Does patient have a preference on a Group/Provider/Facility? -    Does patient have an appointment scheduled?: No    Where to send orders: Call and LVM    Could we send this information to you in [facility] or would you prefer to receive a phone call?:   Patient would prefer a phone call   Okay to leave a detailed message?: Yes at Other phone number:  [facility]: [phone]*

## 2023-01-10 NOTE — TELEPHONE ENCOUNTER
Called [facility]. Left detailed voice message on identified voicemail box regarding approval of requested home care orders per nurse protocol, advised to return call at [phone] for any further questions.     Foreign Cervantes RN  [facility]

## 2023-01-11 ENCOUNTER — TELEPHONE (OUTPATIENT)
Dept: FAMILY MEDICINE | Facility: CLINIC | Age: 88
End: 2023-01-11

## 2023-01-11 ENCOUNTER — OFFICE VISIT (OUTPATIENT)
Dept: FAMILY MEDICINE | Facility: CLINIC | Age: 88
End: 2023-01-11
Payer: MEDICARE

## 2023-01-11 VITALS
WEIGHT: 150.6 LBS | TEMPERATURE: 97.3 F | HEIGHT: 59 IN | HEART RATE: 83 BPM | BODY MASS INDEX: 30.36 KG/M2 | OXYGEN SATURATION: 93 % | SYSTOLIC BLOOD PRESSURE: 164 MMHG | DIASTOLIC BLOOD PRESSURE: 70 MMHG

## 2023-01-11 DIAGNOSIS — F32.9 MAJOR DEPRESSIVE DISORDER WITH SINGLE EPISODE, REMISSION STATUS UNSPECIFIED: ICD-10-CM

## 2023-01-11 DIAGNOSIS — I50.30 NYHA CLASS 3 HEART FAILURE WITH PRESERVED EJECTION FRACTION (H): ICD-10-CM

## 2023-01-11 DIAGNOSIS — I12.9 BENIGN HYPERTENSION WITH CKD (CHRONIC KIDNEY DISEASE) STAGE III (H): ICD-10-CM

## 2023-01-11 DIAGNOSIS — I73.9 LEFT LEG CLAUDICATION (H): ICD-10-CM

## 2023-01-11 DIAGNOSIS — I25.10 CAD S/P PERCUTANEOUS CORONARY ANGIOPLASTY: ICD-10-CM

## 2023-01-11 DIAGNOSIS — K90.0 CELIAC DISEASE: ICD-10-CM

## 2023-01-11 DIAGNOSIS — D63.8 ANEMIA OF CHRONIC DISEASE: ICD-10-CM

## 2023-01-11 DIAGNOSIS — F31.4 BIPOLAR 1 DISORDER, DEPRESSED, SEVERE (H): ICD-10-CM

## 2023-01-11 DIAGNOSIS — J45.40 MODERATE PERSISTENT ASTHMA WITHOUT COMPLICATION: ICD-10-CM

## 2023-01-11 DIAGNOSIS — H91.90 HEARING DISORDER, UNSPECIFIED LATERALITY: ICD-10-CM

## 2023-01-11 DIAGNOSIS — E03.9 HYPOTHYROIDISM, UNSPECIFIED TYPE: ICD-10-CM

## 2023-01-11 DIAGNOSIS — I73.9 PERIPHERAL VASCULAR DISEASE, UNSPECIFIED (H): ICD-10-CM

## 2023-01-11 DIAGNOSIS — N18.30 BENIGN HYPERTENSION WITH CKD (CHRONIC KIDNEY DISEASE) STAGE III (H): ICD-10-CM

## 2023-01-11 DIAGNOSIS — Z95.2 S/P TAVR (TRANSCATHETER AORTIC VALVE REPLACEMENT): ICD-10-CM

## 2023-01-11 DIAGNOSIS — I25.5 ISCHEMIC CARDIOMYOPATHY: ICD-10-CM

## 2023-01-11 DIAGNOSIS — Z00.00 ENCOUNTER FOR MEDICARE ANNUAL WELLNESS EXAM: Primary | ICD-10-CM

## 2023-01-11 DIAGNOSIS — E78.5 HYPERLIPIDEMIA LDL GOAL <70: ICD-10-CM

## 2023-01-11 DIAGNOSIS — N18.32 STAGE 3B CHRONIC KIDNEY DISEASE (H): ICD-10-CM

## 2023-01-11 DIAGNOSIS — I25.118 CORONARY ARTERY DISEASE OF NATIVE ARTERY OF NATIVE HEART WITH STABLE ANGINA PECTORIS (H): ICD-10-CM

## 2023-01-11 DIAGNOSIS — Z98.61 CAD S/P PERCUTANEOUS CORONARY ANGIOPLASTY: ICD-10-CM

## 2023-01-11 PROBLEM — E78.2 MIXED HYPERLIPIDEMIA: Status: RESOLVED | Noted: 2021-07-21 | Resolved: 2023-01-11

## 2023-01-11 LAB
ERYTHROCYTE [DISTWIDTH] IN BLOOD BY AUTOMATED COUNT: 24 % (ref 10–15)
HCT VFR BLD AUTO: 34.7 % (ref 35–47)
HGB BLD-MCNC: 10.3 G/DL (ref 11.7–15.7)
MCH RBC QN AUTO: 27.9 PG (ref 26.5–33)
MCHC RBC AUTO-ENTMCNC: 29.7 G/DL (ref 31.5–36.5)
MCV RBC AUTO: 94 FL (ref 78–100)
PLATELET # BLD AUTO: 169 10E3/UL (ref 150–450)
RBC # BLD AUTO: 3.69 10E6/UL (ref 3.8–5.2)
WBC # BLD AUTO: 5 10E3/UL (ref 4–11)

## 2023-01-11 PROCEDURE — 83550 IRON BINDING TEST: CPT | Performed by: FAMILY MEDICINE

## 2023-01-11 PROCEDURE — G0439 PPPS, SUBSEQ VISIT: HCPCS | Performed by: FAMILY MEDICINE

## 2023-01-11 PROCEDURE — 99214 OFFICE O/P EST MOD 30 MIN: CPT | Mod: 25 | Performed by: FAMILY MEDICINE

## 2023-01-11 PROCEDURE — 85027 COMPLETE CBC AUTOMATED: CPT | Performed by: FAMILY MEDICINE

## 2023-01-11 PROCEDURE — 83540 ASSAY OF IRON: CPT | Performed by: FAMILY MEDICINE

## 2023-01-11 PROCEDURE — 82728 ASSAY OF FERRITIN: CPT | Performed by: FAMILY MEDICINE

## 2023-01-11 PROCEDURE — 36415 COLL VENOUS BLD VENIPUNCTURE: CPT | Performed by: FAMILY MEDICINE

## 2023-01-11 RX ORDER — CARVEDILOL 3.12 MG/1
3.12 TABLET ORAL 2 TIMES DAILY WITH MEALS
Qty: 60 TABLET | Refills: 0 | Status: SHIPPED | OUTPATIENT
Start: 2023-01-11 | End: 2023-01-23 | Stop reason: SINTOL

## 2023-01-11 ASSESSMENT — ASTHMA QUESTIONNAIRES: ACT_TOTALSCORE: 22

## 2023-01-11 ASSESSMENT — ENCOUNTER SYMPTOMS
WEAKNESS: 0
COUGH: 0
DYSURIA: 0
SHORTNESS OF BREATH: 1
DIARRHEA: 0
CHILLS: 0
DIZZINESS: 0
JOINT SWELLING: 0
FREQUENCY: 0
HEMATURIA: 0
HEMATOCHEZIA: 0
EYE PAIN: 0
ABDOMINAL PAIN: 0
FEVER: 0
CONSTIPATION: 0
NAUSEA: 0
BREAST MASS: 0
PARESTHESIAS: 0
ARTHRALGIAS: 0
PALPITATIONS: 0
HEADACHES: 0
SORE THROAT: 0
HEARTBURN: 0
NERVOUS/ANXIOUS: 0
MYALGIAS: 0

## 2023-01-11 ASSESSMENT — PAIN SCALES - GENERAL: PAINLEVEL: NO PAIN (0)

## 2023-01-11 ASSESSMENT — ACTIVITIES OF DAILY LIVING (ADL): CURRENT_FUNCTION: NO ASSISTANCE NEEDED

## 2023-01-11 NOTE — PROGRESS NOTES
"    The patient was provided with written information regarding signs of hearing loss.  She is at risk for falling and has been provided with information to reduce the risk of falling at home.  Answers for HPI/ROS submitted by the patient on 1/11/2023  In general, how would you rate your overall physical health?: good  Frequency of exercise:: 4-5 days/week  Do you usually eat at least 4 servings of fruit and vegetables a day, include whole grains & fiber, and avoid regularly eating high fat or \"junk\" foods? : Yes  Taking medications regularly:: Yes  Medication side effects:: None  Activities of Daily Living: no assistance needed  Home safety: no safety concerns identified  Hearing Impairment:: need to ask people to speak up or repeat themselves, difficulty understanding soft or whispered speech  In the past 6 months, have you been bothered by leaking of urine?: No  abdominal pain: No  Blood in stool: No  Blood in urine: No  chest pain: No  chills: No  congestion: No  constipation: No  cough: No  diarrhea: No  dizziness: No  ear pain: No  eye pain: No  nervous/anxious: No  fever: No  frequency: No  genital sores: No  headaches: No  hearing loss: Yes  heartburn: No  arthralgias: No  joint swelling: No  peripheral edema: No  mood changes: No  myalgias: No  nausea: No  dysuria: No  palpitations: No  Skin sensation changes: No  sore throat: No  urgency: No  rash: No  shortness of breath: Yes  visual disturbance: No  weakness: No  pelvic pain: No  vaginal bleeding: No  tenderness: No  breast mass: No  In general, how would you rate your overall mental or emotional health?: good  Additional concerns today:: Yes  Duration of exercise:: 15-30 minutes      "

## 2023-01-11 NOTE — PROGRESS NOTES
"SUBJECTIVE:   Kamryn is a 87 year old who presents for Preventive Visit.  Patient has been advised of split billing requirements and indicates understanding: Yes  Are you in the first 12 months of your Medicare coverage?  No    Healthy Habits:     In general, how would you rate your overall health?  Good    Frequency of exercise:  4-5 days/week    Duration of exercise:  15-30 minutes    Do you usually eat at least 4 servings of fruit and vegetables a day, include whole grains    & fiber and avoid regularly eating high fat or \"junk\" foods?  Yes    Taking medications regularly:  Yes    Medication side effects:  None    Ability to successfully perform activities of daily living:  No assistance needed    Home Safety:  No safety concerns identified    Hearing Impairment:  Need to ask people to speak up or repeat themselves and difficulty understanding soft or whispered speech    In the past 6 months, have you been bothered by leaking of urine?  No    In general, how would you rate your overall mental or emotional health?  Good      PHQ-2 Total Score: 0    Additional concerns today:  Yes      Have you ever done Advance Care Planning? (For example, a Health Directive, POLST, or a discussion with a medical provider or your loved ones about your wishes): Yes, advance care planning is on file.       Fall risk  Fallen 2 or more times in the past year?: Yes  Any fall with injury in the past year?: Yes    Cognitive Screening   1) Repeat 3 items (Leader, Season, Table)    2) Clock draw: ABNORMAL Pt ahs dementia  3) 3 item recall: Recalls 2 objects   Results: ABNORMAL clock, 1-2 items recalled: PROBABLE COGNITIVE IMPAIRMENT, **INFORM PROVIDER**    Mini-CogTM Copyright TIMOTHY Flores. Licensed by the author for use in [facility]; reprinted with permission (ermelinda@.[email or web address]). All rights reserved.          Reviewed and updated as needed this visit by clinical staff   Tobacco  Allergies  Meds  Problems  Med Hx  Surg Hx  Fam " Hx          Reviewed and updated as needed this visit by Provider   Tobacco  Allergies  Meds  Problems  Med Hx  Surg Hx  Fam Hx         Social History     Tobacco Use     Smoking status: Never     Smokeless tobacco: Never   Substance Use Topics     Alcohol use: No     Alcohol/week: 0.0 standard drinks     Types: 1 Standard drinks or equivalent per week         Alcohol Use 1/11/2023   Prescreen: >3 drinks/day or >7 drinks/week? No   Prescreen: >3 drinks/day or >7 drinks/week? -           Hyperlipidemia Follow-Up      Are you regularly taking any medication or supplement to lower your cholesterol?   Yes- statins    Are you having muscle aches or other side effects that you think could be caused by your cholesterol lowering medication?  No    Hypertension Follow-up      Do you check your blood pressure regularly outside of the clinic? Yes     Are you following a low salt diet? Yes    Are your blood pressures ever more than 140 on the top number (systolic) OR more   than 90 on the bottom number (diastolic), for example 140/90? Yes 140-160/80-90    Vascular Disease Follow-up      How often do you take nitroglycerin? Never    Do you take an aspirin every day? Yes    Heart Failure Follow-up  Are you experiencing any shortness of breath? Yes, with activity only   How would you describe your shortness of breath?  pt is anemic-It is at baseline        Are you experiencing any swelling in your legs or feet?  No    Are you using more pillows than usual? No    Do you cough at night?  No    Do you check your weight daily?  Yes    Have you had a weight change recently?  No    Are you having any of the following side effects from your medications? (Select all that apply)  The patient does not report symptoms of dizziness, fatigue, cough, swelling, or slow heart beat.    Since your last visit, how many times have you gone to the cardiologist, urgent care, emergency room, or hospital because of your heart failure?  Pt  Has   been Hospitalized several Times for syncope, anemia -see epic Discharge summaries    Asthma Follow-Up    Was ACT completed today?    Yes    ACT Total Scores 9/27/2022   ACT TOTAL SCORE -   ASTHMA ER VISITS -   ASTHMA HOSPITALIZATIONS -   ACT TOTAL SCORE (Goal Greater than or Equal to 20) 21   In the past 12 months, how many times did you visit the emergency room for your asthma without being admitted to the hospital? 0   In the past 12 months, how many times were you hospitalized overnight because of your asthma? 0          How many days per week do you miss taking your asthma controller medication?  0    Please describe any recent triggers for your asthma: upper respiratory infections    Pt has HFpEF   Has a follow up appointment with Cardiology  She is s/p AVR    Current providers sharing in care for this patient include:   Patient Care Team:  Rolanda Wilcox MD as PCP - General (Family Practice)  Rolanda Wilcox MD as Assigned PCP  Pati De Santiago NP as Assigned Heart and Vascular Provider  Javier Landa MD as Assigned OBGYN Provider  Ania Johnson MD as Assigned Nephrology Provider  Dimas Palacios, PhD as Assigned Behavioral Health Provider  Navarro Escalona MD as Fellow  Dary Galvan MD as Hospitalist (Internal Medicine)  Vianey Bowman NP as Nurse Practitioner (Cardiovascular Disease)  Macie Samuel, RN as Specialty Care Coordinator    The following health maintenance items are reviewed in Epic and correct as of today:  Health Maintenance   Topic Date Due     HF ACTION PLAN  Never done     DTAP/TDAP/TD IMMUNIZATION (3 - Td or Tdap) 06/19/2022     MEDICARE ANNUAL WELLNESS VISIT  07/20/2022     ASTHMA ACTION PLAN  12/31/2022     ASTHMA CONTROL TEST  03/27/2023     BMP  07/03/2023     ANNUAL REVIEW OF HM ORDERS  09/27/2023     LIPID  10/24/2023     MICROALBUMIN  10/24/2023     ALT  12/12/2023     CBC  12/14/2023     HEMOGLOBIN  12/14/2023     CREATININE  01/03/2024     FALL  RISK ASSESSMENT  01/11/2024     ADVANCE CARE PLANNING  01/11/2028     TSH W/FREE T4 REFLEX  Completed     INFLUENZA VACCINE  Completed     Pneumococcal Vaccine: 65+ Years  Completed     URINALYSIS  Completed     ZOSTER IMMUNIZATION  Completed     COVID-19 Vaccine  Completed     IPV IMMUNIZATION  Aged Out     MENINGITIS IMMUNIZATION  Aged Out     COLORECTAL CANCER SCREENING  Discontinued     Lab work is in process  Labs reviewed in EPIC  BP Readings from Last 3 Encounters:   01/11/23 (!) 164/70   01/03/23 (!) 142/69   12/29/22 (!) 144/74    Wt Readings from Last 3 Encounters:   01/11/23 68.3 kg (150 lb 9.6 oz)   01/03/23 68.3 kg (150 lb 8 oz)   12/29/22 67.1 kg (148 lb)                  Patient Active Problem List   Diagnosis     Hyperlipidemia LDL goal <70     Mild major depression (H)     Pulmonary hypertension (H)     Seasonal allergic rhinitis     Mitral insufficiency     Tricuspid insufficiency     Renal insufficiency     Tremors     Advanced directives, counseling/discussion     Family history of diabetes mellitus     Family history of celiac disease     Celiac disease     History of colonic polyps     Benign essential hypertension     Hypothyroidism, unspecified type     CKD (chronic kidney disease) stage 3, GFR 30-59 ml/min (H)     Other ill-defined heart diseases     Anemia     Disorder of kidney and ureter     Generalized anxiety disorder     Osteopenia     CAD S/P percutaneous coronary angioplasty     Status post coronary angiogram     NYHA class 3 heart failure with preserved ejection fraction (H)     Ischemic cardiomyopathy     Moderate persistent asthma without complication     Hearing disorder, unspecified laterality     Impairment of balance     Bipolar 1 disorder, depressed, severe (H)     Essential hypertension     Stented coronary artery     Atherosclerosis of native coronary artery of native heart without angina pectoris     Weakness generalized     SOB (shortness of breath)     Left leg  claudication (H)     S/P TAVR (transcatheter aortic valve replacement)     Syncope and collapse     Anemia, unspecified type     Coronary artery disease of native artery of native heart with stable angina pectoris (H)     Past Surgical History:   Procedure Laterality Date     ANGIOGRAM  6/26/2009     ANGIOPLASTY  9/96    for angina     ANGIOPLASTY  8/03 - 9/03    X -2 - with stenst in coronary car.     APPENDECTOMY       BREAST BIOPSY, RT/LT      Breat Biopsy RT/LT, benign     BUNIONECTOMY  11/8/2011    Procedure:BUNIONECTOMY; Left donna bunionectomy; Surgeon:FREDY LITTLEJOHN; Location:[facility]     BUNIONECTOMY RT/LT  5/2007    right bunion and right 2nd hammertoe     CATARACT IOL, RT/LT  6/12 and 7/12    bilateral     COLONOSCOPY  2007, 2012    every 3 years for polyps     CV CORONARY ANGIOGRAM N/A 8/21/2020    Procedure: CV CORONARY ANGIOGRAM;  Surgeon: Gianluca Toscano MD;  [facility]     CV CORONARY ANGIOGRAM N/A 10/25/2022    Procedure: Coronary Angiogram;  Surgeon: Carter Douglass MD;  [facility]     CV INSTANTANEOUS WAVE-FREE RATIO N/A 10/25/2022    Procedure: Instantaneous Wave-Free Ratio;  Surgeon: Carter Douglass MD;  [facility]     CV LEFT HEART CATH N/A 8/21/2020    Procedure: CV LEFT HEART CATH;  Surgeon: Gianluca Toscano MD;  [facility]     CV LEFT HEART CATH N/A 11/3/2020    Procedure: CV LEFT HEART CATH;  Surgeon: Tom Burrows MD;  [facility]     CV LOWER EXTREMITY ANGIOGRAM BILATERAL N/A 11/3/2020    Procedure: CV ANGIOGRAM LOWER EXTREMITY BILATERAL;  Surgeon: Tom Burrows MD;  [facility]     CV PCI STENT DRUG ELUTING N/A 8/21/2020    Procedure: Percutaneous Coronary Intervention Stent Drug Eluting;  Surgeon: Gianluca Toscano MD;  [facility]     CV RIGHT HEART CATH  MEASUREMENTS RECORDED N/A 8/21/2020    Procedure: CV RIGHT HEART CATH;  Surgeon: Gianluca Toscano MD;  Location:  [facility]     CV RIGHT HEART CATH MEASUREMENTS RECORDED N/A 11/3/2020    Procedure: CV RIGHT HEART CATH;  Surgeon: Tom Burrows MD;  Location:  [facility]     CV TRANSCATHETER AORTIC VALVE REPLACEMENT N/A 9/12/2022    Procedure: Transfemoral Transcatheter Aortic Valve Replacement with possible open heart bypass and or balloon pump placement and any indicated procedure;  Surgeon: Tom Burrows MD;  Location:  [facility]     HEART CATH FEMORAL CANNULIZATION WITH OPEN STANDBY REPAIR AORTIC VALVE N/A 9/12/2022    Procedure: OR TRANSCATHETER AORTIC VALVE REPLACEMENT, OPEN FEMORAL ARTERY APPROACH;  Surgeon: Arthur Markham MD;  Location:  [facility]     JOINT REPLACEMENT, HIP RT/LT  4/2008    right hip replaced     JOINT REPLACEMENT, HIP RT/LT  4/2009    left hip replaced     REPAIR HAMMER TOE  11/8/2011    Procedure:REPAIR HAMMER TOE; left 2nd hammertoe repair; Surgeon:FREDY LITTLEJOHN; Location: OR     SURGICAL HISTORY OF -   11/11    left bunion and 2nd hammertoe repair     TUBAL LIGATION       ZZC ANESTH,BLEPHAROPLASTY      (R) for drooping eyelid     ZZC APPENDECTOMY         Social History     Tobacco Use     Smoking status: Never     Smokeless tobacco: Never   Substance Use Topics     Alcohol use: No     Alcohol/week: 0.0 standard drinks     Types: 1 Standard drinks or equivalent per week     Family History   Problem Relation Age of Onset     Asthma Mother      Cerebrovascular Disease Mother      Arthritis Mother      Depression Mother      Lipids Sister      Hypertension Sister      Heart Disease Sister      Lipids Sister      Hypertension Sister      Lipids Sister      Breast Cancer Sister          Current Outpatient Medications   Medication Sig Dispense Refill     albuterol (PROAIR HFA/PROVENTIL HFA/VENTOLIN HFA) 108  (90 Base) MCG/ACT inhaler Inhale 2 puffs into the lungs every 6 hours as needed for wheezing or shortness of breath / dyspnea 18 g 3     alendronate (FOSAMAX) 70 MG tablet TAKE 1 TABLET BY MOUTH ONCE WEEKLY 4 tablet 23     azelastine (ASTEPRO) 0.15 % nasal spray USE 1 SPRAY IN EACH NOSTRIL ONCE A DAY 30 mL 11     calcium citrate (CITRACAL) 950 (200 Ca) MG tablet TAKE 1 TABLET BY MOUTH ONCE DAILY 28 tablet 11     carvedilol (COREG) 3.125 MG tablet Take 1 tablet (3.125 mg) by mouth 2 times daily (with meals) 60 tablet 0     cholecalciferol (VITAMIN D3) 1250 mcg (61513 units) capsule TAKE 1 CAPSULE BY MOUTH ONCE WEEKLY 4 capsule 11     cloNIDine (CATAPRES) 0.1 MG tablet Take 1 tablet (0.1 mg) by mouth daily as needed (For SBP > 180) 90 tablet 3     desvenlafaxine (PRISTIQ) 100 MG 24 hr tablet Take 100 mg by mouth daily       fluticasone-salmeterol (ADVAIR DISKUS) 500-50 MCG/ACT inhaler INHALE 1 PUFF BY MOUTH TWICE DAILY (Patient taking differently: 1 puff daily INHALE 1 PUFF BY MOUTH TWICE DAILY) 60 each 0     folic acid (FOLVITE) 400 MCG tablet Take 1 tablet (400 mcg) by mouth daily       hydrALAZINE (APRESOLINE) 25 MG tablet Take 1 tablet (25 mg) by mouth 3 times daily 270 tablet 1     iron polysaccharides (NIFEREX) 150 MG capsule Take 1 capsule (150 mg) by mouth daily 30 capsule 1     isosorbide mononitrate (IMDUR) 30 MG 24 hr tablet Take 1 tablet (30 mg) by mouth daily 30 tablet 2     lamoTRIgine (LAMICTAL) 25 MG tablet Take 25 mg by mouth daily with 200 mg tablet for a total dose of 225 mg       LAMOTRIGINE 200 MG PO TABS Take 200 mg by mouth daily with 25 mg tablet for a total dose of 225 mg       levothyroxine (SYNTHROID/LEVOTHROID) 50 MCG tablet TAKE 1 TABLET BY MOUTH ONCE DAILY 28 tablet 11     liothyronine (CYTOMEL) 5 MCG tablet Take 2 tablets (10 mcg) by mouth daily 30 tablet 3     memantine (NAMENDA) 10 MG tablet Take 1 tablet (10 mg) by mouth daily 30 tablet 3     mirtazapine (REMERON) 7.5 MG tablet Take 1  "tablet (7.5 mg) by mouth At Bedtime 30 tablet 3     Multiple Vitamin (TAB-A-JENNIFER) TABS TAKE 1 TABLET BY MOUTH ONCE DAILY 30 tablet PRN     psyllium (METAMUCIL/KONSYL) 58.6 % powder Take 1 tspn in liquid daily       rosuvastatin (CRESTOR) 40 MG tablet TAKE 1 TABLET BY MOUTH ONCE DAILY 28 tablet 11     REGULOID 28.3 % POWD MIX 1 TEASPOON IN LIQUID AND DRINK BY MOUTH ONCE DAILY (Patient not taking: Reported on 1/3/2023) 538 g 11     Allergies   Allergen Reactions     Ace Inhibitors Cough     Amlodipine      Headache and plugged ears     Diclofenac Other (See Comments)     Balance problems     Gluten Meal Diarrhea         Mammogram Screening - Patient over age 75, has elected to discontinue screenings.  Pertinent mammograms are reviewed under the imaging tab.    Review of Systems   Constitutional: Negative for chills and fever.   HENT: Positive for hearing loss. Negative for congestion, ear pain and sore throat.    Eyes: Negative for pain and visual disturbance.   Respiratory: Positive for shortness of breath. Negative for cough.    Cardiovascular: Negative for chest pain, palpitations and peripheral edema.   Gastrointestinal: Negative for abdominal pain, constipation, diarrhea, heartburn, hematochezia and nausea.   Breasts:  Negative for tenderness and breast mass.   Genitourinary: Negative for dysuria, frequency, genital sores, hematuria, pelvic pain, urgency and vaginal bleeding.   Musculoskeletal: Negative for arthralgias, joint swelling and myalgias.   Skin: Negative for rash.   Neurological: Negative for dizziness, weakness, headaches and paresthesias.   Psychiatric/Behavioral: Negative for mood changes. The patient is not nervous/anxious.    Rest of the ROS is Negative except see above and Problem list [stable]      OBJECTIVE:   BP (!) 164/70   Pulse 83   Temp 97.3  F (36.3  C) (Temporal)   Ht 1.511 m (4' 11.49\")   Wt 68.3 kg (150 lb 9.6 oz)   SpO2 93%   BMI 29.92 kg/m   Estimated body mass index is 29.92 " "kg/m  as calculated from the following:    Height as of this encounter: 1.511 m (4' 11.49\").    Weight as of this encounter: 68.3 kg (150 lb 9.6 oz).  Physical Exam  GENERAL APPEARANCE: no distress  EYES: Eyes grossly normal to inspection, PERRL and conjunctivae and sclerae normal  HENT: ear canals and TM's normal, nose and mouth without ulcers or lesions, oropharynx clear and oral mucous membranes moist  NECK: no adenopathy, no asymmetry, masses, or scars and thyroid normal to palpation  RESP: lungs clear to auscultation - no rales, rhonchi or wheezes  BREAST: normal without masses, tenderness or nipple discharge and no palpable axillary masses or adenopathy  CV: regular rates and rhythm, normal S1 S2, no S3 or S4, no murmur, click or rub and no peripheral edema  ABDOMEN: soft, nontender, no hepatosplenomegaly, no masses and bowel sounds normal  MS: no musculoskeletal defects are noted and gait is age appropriate without ataxia  SKIN: no suspicious lesions or rashes  NEURO: Normal strength and tone, sensory exam grossly normal, mentation intact and speech normal  PSYCH: anxious    Diagnostic Test Results:  Labs reviewed in Epic  Pending     ASSESSMENT / PLAN:   (Z00.00) Encounter for Medicare annual wellness exam  (primary encounter diagnosis)  Comment:   Plan:     (J45.40) Moderate persistent asthma without complication  Comment: stable   Plan:     (K90.0) Celiac disease  Comment: stable   Plan:     (E78.5) Hyperlipidemia LDL goal <70  Comment: labs reviewed   Plan: stable     (E03.9) Hypothyroidism, unspecified type  Comment: last TSH is therapeutic  Plan:     (I25.10,  Z98.61) CAD S/P percutaneous coronary angioplasty  Comment: sees Cardiology  Plan: Notes reviewed     (I25.5) Ischemic cardiomyopathy  Comment: sees Cardiology  Plan: notes reviewed     (Z95.2) S/P TAVR (transcatheter aortic valve replacement)  Comment: as above  Plan:     (D63.8) Anemia of chronic disease  Comment: pending labs  Due to occult " Bleed vs anemia of chronic diseae  Plan: CBC with platelets, Iron and iron binding         capacity, Ferritin        Await labs   May need Iron infusions  Follow up with Gastroeneterology as recommended in Hospital    (I12.9,  N18.30) Benign hypertension with CKD (chronic kidney disease) stage III (H)  Comment: advised satrt Low dose Coreg  Plan: carvedilol (COREG) 3.125 MG tablet        Pt ahs a follow up appointment with Dr Zuniga  BP meds need to be adjusted     (I25.118) Coronary artery disease of native artery of native heart with stable angina pectoris (H)  Comment: stable     (F31.4) Bipolar 1 disorder, depressed, severe (H)  Comment: sees Psych and stable   Plan:     (I73.9) Peripheral vascular disease, unspecified (H)  Comment: sees Cardiology   Plan: No pain at Present in her legs    (F32.9) Major depressive disorder with single episode, remission status unspecified  Comment: stable       (H91.90) Hearing disorder, unspecified laterality  Comment:   Plan:     (I73.9) Left leg claudication (H)  Comment:as above  Plan:     (I50.30) NYHA class 3 heart failure with preserved ejection fraction (H)  Comment: To stable   Plan: meds reviewed   Consider Ludmila  Has appointment with Cardiology    (N18.32) Stage 3b chronic kidney disease (H)  Comment: labs reviewed   Plan: stable     Patient has been advised of split billing requirements and indicates understanding: Yes      COUNSELING:  Reviewed preventive health counseling, as reflected in patient instructions       Healthy diet/nutrition       Vision screening       Hearing screening       Dental care       Bladder control       Fall risk prevention       Osteoporosis prevention/bone health       Advanced Planning         She reports that she has never smoked. She has never used smokeless tobacco.      Appropriate preventive services were discussed with this patient, including applicable screening as appropriate for cardiovascular disease, diabetes,  osteopenia/osteoporosis, and glaucoma.  As appropriate for age/gender, discussed screening for colorectal cancer, prostate cancer, breast cancer, and cervical cancer. Checklist reviewing preventive services available has been given to the patient.    Reviewed patients plan of care and provided an AVS. The Complex Care Plan (for patients with higher acuity and needing more deliberate coordination of services) for Kamryn meets the Care Plan requirement. This Care Plan has been established and reviewed with the Patient.      Rolanda Wilcox MD  [facility]    Identified Health Risks:

## 2023-01-11 NOTE — PATIENT INSTRUCTIONS
Patient Education   Personalized Prevention Plan  You are due for the preventive services outlined below.  Your care team is available to assist you in scheduling these services.  If you have already completed any of these items, please share that information with your care team to update in your medical record.  Health Maintenance Due   Topic Date Due     Heart Failure Action Plan  Never done     Diptheria Tetanus Pertussis (DTAP/TDAP/TD) Vaccine (3 - Td or Tdap) 06/19/2022     Annual Wellness Visit  07/20/2022     Asthma Action Plan - yearly  12/31/2022       Signs of Hearing Loss      Hearing much better with one ear can be a sign of hearing loss.   Hearing loss is a problem shared by many people. In fact, it is one of the most common health problems, particularly as people age. Most people age 65 and older have some hearing loss. By age 80, almost everyone does. Hearing loss often occurs slowly over the years. So you may not realize your hearing has gotten worse.  Have your hearing checked  Call your healthcare provider if you:    Have to strain to hear normal conversation    Have to watch other people s faces very carefully to follow what they re saying    Need to ask people to repeat what they ve said    Often misunderstand what people are saying    Turn the volume of the television or radio up so high that others complain    Feel that people are mumbling when they re talking to you    Find that the effort to hear leaves you feeling tired and irritated    Notice, when using the phone, that you hear better with one ear than the other  [employer] last reviewed this educational content on 1/1/2020 2000-2021 [employer]. All rights reserved. This information is not intended as a substitute for professional medical care. Always follow your healthcare professional's instructions.          Preventing Falls at Home  A person can fall for many reasons. Older adults may fall because reaction time slows  down as we age. Your muscles and joints may get stiff, weak, or less flexible because of illness, medicines, or a physical condition.   Other health problems that make falls more likely include:     Arthritis    Dizziness or lightheadedness when you stand up (orthostatic hypotension)    History of a stroke    Dizziness    Anemia    Certain medicines taken for mental illness or to control blood pressure.    Problems with balance or gait    Bladder or urinary problems    History of falling    Changes in vision (vision impairment)    Changes in thinking skills and memory (cognitive impairment)  Injuries from a fall can include serious injuries such as broken bones, dislocated joints, internal bleeding and cuts. Injuries like these can limit your independence.   Prevention tips  To help prevent falls and fall-related injuries, follow the tips below.    Floors  To make floors safer:     Put nonskid pads under area rugs.    Remove small rugs.    Replace worn floor coverings.    Tack carpets firmly to each step on carpeted stairs. Put nonskid strips on the edges of uncarpeted stairs.    Keep floors and stairs free of clutter and cords.    Arrange furniture so there are clear pathways.    Clean up any spills right away.  Bathrooms    To make bathrooms safer:     Install grab bars in the tub or shower.    Apply nonskid strips or put a nonskid rubber mat in the tub or shower.    Sit on a bath chair to bathe.    Use bathmats with nonskid backing.  Lighting  To improve visibility in your home:      Keep a flashlight in each room. Or put a lamp next to the bed within easy reach.    Put nightlights in the bedrooms, hallways, kitchen, and bathrooms.    Make sure all stairways have good lighting.    Take your time when going up and down stairs.    Put handrails on both sides of stairs and in walkways for more support. To prevent injury to your wrist or arm, don t use handrails to pull yourself up.    Install grab bars to pull  yourself up.    Move or rearrange items that you use often. This will make them easier to find or reach.    Look at your home to find any safety hazards. Especially look at doorways, walkways, and the driveway. Remove or repair any safety problems that you find.  Other changes to make    Look around to find any safety hazards. Look closely at doorways, walkways, and the driveway. Remove or repair any safety problems that you find.    Wear shoes that fit well.    Take your time when going up and down stairs.    Put handrails on both sides of stairs and in walkways for more support. To prevent injury to your wrist or arm, don t use handrails to pull yourself up.    Install grab bars wherever needed to pull yourself up.    Arrange items that you use often. This will make them easier to find or reach.    Kari last reviewed this educational content on 3/1/2020    5753-9503 [employer]. All rights reserved. This information is not intended as a substitute for professional medical care. Always follow your healthcare professional's instructions.

## 2023-01-12 ENCOUNTER — TELEPHONE (OUTPATIENT)
Dept: FAMILY MEDICINE | Facility: CLINIC | Age: 88
End: 2023-01-12

## 2023-01-12 LAB
FERRITIN SERPL-MCNC: 126 NG/ML (ref 8–252)
IRON SATN MFR SERPL: 17 % (ref 15–46)
IRON SERPL-MCNC: 62 UG/DL (ref 35–180)
TIBC SERPL-MCNC: 357 UG/DL (ref 240–430)

## 2023-01-12 RX ORDER — CARVEDILOL 3.12 MG/1
3.12 TABLET ORAL 2 TIMES DAILY
Qty: 60 TABLET | Refills: 0 | Status: CANCELLED | OUTPATIENT
Start: 2023-01-12

## 2023-01-13 ENCOUNTER — MEDICAL CORRESPONDENCE (OUTPATIENT)
Dept: HEALTH INFORMATION MANAGEMENT | Facility: CLINIC | Age: 88
End: 2023-01-13

## 2023-01-16 ENCOUNTER — CARE COORDINATION (OUTPATIENT)
Dept: CARDIOLOGY | Facility: CLINIC | Age: 88
End: 2023-01-16
Payer: MEDICARE

## 2023-01-16 ENCOUNTER — NURSE TRIAGE (OUTPATIENT)
Dept: FAMILY MEDICINE | Facility: CLINIC | Age: 88
End: 2023-01-16
Payer: MEDICARE

## 2023-01-16 ENCOUNTER — TELEPHONE (OUTPATIENT)
Dept: FAMILY MEDICINE | Facility: CLINIC | Age: 88
End: 2023-01-16

## 2023-01-16 ENCOUNTER — MEDICAL CORRESPONDENCE (OUTPATIENT)
Dept: CARDIOLOGY | Facility: CLINIC | Age: 88
End: 2023-01-16
Payer: MEDICARE

## 2023-01-16 DIAGNOSIS — N18.32 STAGE 3B CHRONIC KIDNEY DISEASE (H): Primary | ICD-10-CM

## 2023-01-16 DIAGNOSIS — N18.32 HYPERTENSIVE KIDNEY DISEASE WITH STAGE 3B CHRONIC KIDNEY DISEASE (H): ICD-10-CM

## 2023-01-16 DIAGNOSIS — I12.9 HYPERTENSIVE KIDNEY DISEASE WITH STAGE 3B CHRONIC KIDNEY DISEASE (H): ICD-10-CM

## 2023-01-16 RX ORDER — LOSARTAN POTASSIUM 25 MG/1
25 TABLET ORAL DAILY
Qty: 30 TABLET | Refills: 0 | Status: SHIPPED | OUTPATIENT
Start: 2023-01-16 | End: 2023-01-31

## 2023-01-16 NOTE — TELEPHONE ENCOUNTER
Received call from Sangita with additional questions. Stating that what should the patient be taking in place of coreg? She does have a PRN clonidine to give for SBP >180. Daughter said that she does not do well on amlodipine or coreg and that patient has hx of HTN. Most recent BP at 10:30 am today 158/88, pulse 56.     Romelia Paz RN   [facility]

## 2023-01-16 NOTE — TELEPHONE ENCOUNTER
Cardiology Team,     Spoke with pt and notified of below. Pt asking if she should be seen sooner than the 31st of this month by cards? Asking if Cardiology has any advice for her?    Thanks,  VEENA Lynch  [facility]

## 2023-01-16 NOTE — PROGRESS NOTES
Patient was recently started on Carvedilol for hypertension. She developed some side effects. Per pt her ears feels plugged and her breathing feels heavy/SOB. Per provider, patient can stop the coreg until seen by cardiology. Called and spoke with facility who states her BP today was 158/86 with a pulse of 56. The facility will fax a history of her blood pressures to us.   Patient has an upcoming appointment in cardiology next month. Will route this message to her cardiologist to see if there are any recommendations prior to her appointment and will contact patient and facility with reply.

## 2023-01-16 NOTE — TELEPHONE ENCOUNTER
Sangita calling from [facility] where patient resides.    Sangita calling to further clarify regarding medication management for carvedilol. Per nurse triage encounter on from today on 1/16, PCP advised patient may stop carvedilol due to side effects including plugged ears and breathing feeling heavy.    Sangita requesting a written order or doctor's note from provider to be faxed to facility regarding discontinuing carvedilol due to side effects and follow up with cardiologist per PCP advice.    [facility] Fax Number:  [phone]    PACHECO Greenfield RN  [facility]

## 2023-01-16 NOTE — TELEPHONE ENCOUNTER
Dr. Wilcox and Dr. Zuniga,     Patient called with concerns about possilbe side effects to carvedilol. State she started this medication fter visit with PCP on the 11th and has folllow up with Cardiology end of this month. Per pt her ears feels plugged and her breathing feels heavy/SOB. Per pt lives in an assisted living and nurses check as vitals every morning but she does not have the readings with her. Stated they were not concerned with her BP levels or oxygen saturation.     Note from PCP visit:    (I12.9,  N18.30) Benign hypertension with CKD (chronic kidney disease) stage III (H)  Comment: advised satrt Low dose Coreg  Plan: carvedilol (COREG) 3.125 MG tablet        Pt ahs a follow up appointment with Dr Zuniga  BP meds need to be adjusted       Patient is asking what she should do next. I told pt to hold second dose of the day for now until we hear from providers and to see if she can get a hold of her vitals from one of the nurse in the [facility]. Please advise.       [phone], ok for detailed voice mail.     Thanks,  Cris, RN  [facility]         Reason for Disposition    Taking BP medications and feels is having side effects (e.g., impotence, cough, dizziness)    Additional Information    Negative: Pregnant > 20 weeks or postpartum (< 6 weeks after delivery) and new hand or face swelling    Negative: Pregnant > 20 weeks and BP > 140/90    Negative: Systolic BP >= 160 OR Diastolic >= 100, and any cardiac or neurologic symptoms (e.g., chest pain, difficulty breathing, unsteady gait, blurred vision)    Negative: Patient sounds very sick or weak to the triager    Negative: BP Systolic BP >= 140 OR Diastolic >= 90 and postpartum (from 0 to 6 weeks after delivery)    Negative: Systolic BP >= 180 OR Diastolic >= 110, and missed most recent dose of blood pressure medication    Negative: Systolic BP >= 180 OR Diastolic >= 110    Negative: Patient wants to be seen    Answer Assessment - Initial Assessment  "Questions  1. BLOOD PRESSURE: \"What is the blood pressure?\" \"Did you take at least two measurements 5 minutes apart?\"      155/78 - December 12th        Pt does not have recent readings, living in assisted living and the nurses do check BPs but pt does not have readings   2. ONSET: \"When did you take your blood pressure?\"      Every mornings   3. HOW: \"How did you obtain the blood pressure?\" (e.g., visiting nurse, automatic home BP monitor)      Upper arm machine and wrist machine   4. HISTORY: \"Do you have a history of high blood pressure?\"      HTN   5. MEDICATIONS: \"Are you taking any medications for blood pressure?\" \"Have you missed any doses recently?\"      Patient started Carvedilol on 1/11/23- see visit notes   6. OTHER SYMPTOMS: \"Do you have any symptoms?\" (e.g., headache, chest pain, blurred vision, difficulty breathing, weakness)      \"plugged ears   7. PREGNANCY: \"Is there any chance you are pregnant?\" \"When was your last menstrual period?\"      N/A    Protocols used: HIGH BLOOD PRESSURE-A-OH      "

## 2023-01-16 NOTE — TELEPHONE ENCOUNTER
Reason for Call:  Other call back    Detailed comments: Experiencing side effects  From medications    Phone Number Patient can be reached at: Home number on file [phone] (home)    Best Time: ANYTIME    Can we leave a detailed message on this number? YES    Call taken on 1/16/2023 at 9:07 AM by Felice Bennett

## 2023-01-16 NOTE — TELEPHONE ENCOUNTER
She can Try cozaar instead of coreg and see if this helps  Follow up with me in 2-3 weeks  Side effects as per pharmacy  Give it 2 weeks  Call if rash or Breathing issues

## 2023-01-17 NOTE — TELEPHONE ENCOUNTER
Called Sangita. Left detailed voice message on identified voicemail box regarding provider's message as written, advised to return call at [phone] for any further questions.     Foreign Cervantes RN  [facility]

## 2023-01-18 NOTE — TELEPHONE ENCOUNTER
Madhu Zuniga MD  You; Rolanda Wilcox MD 8 hours ago (1:08 AM)     TA  Agree, can stop. Shall see Batul in [place] earlier   Madhu

## 2023-01-18 NOTE — TELEPHONE ENCOUNTER
Attempted to call pt, phone line busy. Will have to call again.     Thanks,  VEENA Lynch  [facility]

## 2023-01-18 NOTE — TELEPHONE ENCOUNTER
Provider Response to 2nd Level Triage Request    I have reviewed the RN documentation. My recommendation is:  Pt has appointment with cardiologistgo to ER if worse

## 2023-01-19 DIAGNOSIS — N18.32 ANEMIA DUE TO STAGE 3B CHRONIC KIDNEY DISEASE (H): ICD-10-CM

## 2023-01-19 DIAGNOSIS — D63.1 ANEMIA DUE TO STAGE 3B CHRONIC KIDNEY DISEASE (H): ICD-10-CM

## 2023-01-19 RX ORDER — IRON POLYSACCHARIDE COMPLEX 150 MG
150 CAPSULE ORAL DAILY
Qty: 30 CAPSULE | Refills: 1 | Status: SHIPPED | OUTPATIENT
Start: 2023-01-19

## 2023-01-19 NOTE — TELEPHONE ENCOUNTER
Called patient and notified her of response from Dr. Wilcox. She verbalized understanding and had no further questions.    Romelia Paz RN   [facility]

## 2023-01-19 NOTE — TELEPHONE ENCOUNTER
Called to remind patient of their upcoming appointment with our GI clinic, on Tuesday 1/24/2023 at 2:45PM with Dr. Escalona. This appointment is scheduled as an in-person appt. Please arrive 15 minutes early to check in for your appointment. , if your appointment is virtual (video or telephone) you need to be in [place] for the visit. To reschedule or cancel patient to call [phone].    Laxmi Sue MA

## 2023-01-20 ENCOUNTER — TELEPHONE (OUTPATIENT)
Dept: CARDIOLOGY | Facility: CLINIC | Age: 88
End: 2023-01-20
Payer: MEDICARE

## 2023-01-20 DIAGNOSIS — I50.32 CHRONIC HEART FAILURE WITH PRESERVED EJECTION FRACTION (HFPEF) (H): ICD-10-CM

## 2023-01-20 DIAGNOSIS — N18.32 STAGE 3B CHRONIC KIDNEY DISEASE (H): ICD-10-CM

## 2023-01-20 RX ORDER — MULTIVITAMIN WITH FOLIC ACID 400 MCG
1 TABLET ORAL DAILY
Qty: 30 TABLET | Status: SHIPPED | OUTPATIENT
Start: 2023-01-20 | End: 2023-12-15

## 2023-01-20 NOTE — TELEPHONE ENCOUNTER
Health Call Center    Phone Message    May a detailed message be left on voicemail: yes     Reason for Call: Other: Please call kimberly back to discuss concerning BP.     Action Taken: Message routed to:  Other: Cardiology    Travel Screening: Not Applicable       Thank you!  [facility]                        Called and spoke with nurse at [facility]. Patient's blood pressures have been elevated lately, 190's/90's. She has often needed the prn Clonidine to bring blood pressures down in to the 160's/90's. She had recently been started on Coreg however was unable to tolerate related to side effects. Called and spoke with Dr. Matthews and new orders received:  Date: 1/23/2023    Time of Call: 9:36 AM     Diagnosis:  Hypertension     [ VORB ] Ordering provider: Dr. Zuniga  Order: Increase Hydralazine to 50 MG TID.     Order received by: Jimmie Coppola RN     Follow-up/additional notes: Nurse at [facility] notified.

## 2023-01-20 NOTE — TELEPHONE ENCOUNTER
[facility] Call Center    Phone Message    May a detailed message be left on voicemail: no     Reason for Call: Other: Nurse called on behalf of the pt to speak to her care team in regards to her BP over the past couple days. Nurse has concerns on BP levels. Please reach out to nurse to coordinate further     Action Taken: Other: Cardiology    Travel Screening: Not Applicable     Thank you!  [facility]

## 2023-01-20 NOTE — TELEPHONE ENCOUNTER
Pending Prescriptions:                       Disp   Refills    Multiple Vitamin (TAB-A-JENNIFER) TABS        30 tab*PRN          Sig: Take 1 tablet by mouth daily

## 2023-01-23 RX ORDER — HYDRALAZINE HYDROCHLORIDE 25 MG/1
50 TABLET, FILM COATED ORAL 3 TIMES DAILY
Qty: 540 TABLET | Refills: 1 | Status: SHIPPED | OUTPATIENT
Start: 2023-01-23 | End: 2023-01-31

## 2023-01-24 ENCOUNTER — OFFICE VISIT (OUTPATIENT)
Dept: GASTROENTEROLOGY | Facility: CLINIC | Age: 88
End: 2023-01-24
Attending: INTERNAL MEDICINE
Payer: MEDICARE

## 2023-01-24 ENCOUNTER — TELEPHONE (OUTPATIENT)
Dept: CARDIOLOGY | Facility: CLINIC | Age: 88
End: 2023-01-24

## 2023-01-24 ENCOUNTER — TELEPHONE (OUTPATIENT)
Dept: FAMILY MEDICINE | Facility: CLINIC | Age: 88
End: 2023-01-24

## 2023-01-24 ENCOUNTER — PRE VISIT (OUTPATIENT)
Dept: GASTROENTEROLOGY | Facility: CLINIC | Age: 88
End: 2023-01-24

## 2023-01-24 VITALS
BODY MASS INDEX: 30.04 KG/M2 | DIASTOLIC BLOOD PRESSURE: 106 MMHG | WEIGHT: 153 LBS | HEIGHT: 60 IN | HEART RATE: 87 BPM | OXYGEN SATURATION: 94 % | SYSTOLIC BLOOD PRESSURE: 179 MMHG

## 2023-01-24 DIAGNOSIS — D50.0 IRON DEFICIENCY ANEMIA DUE TO CHRONIC BLOOD LOSS: Primary | ICD-10-CM

## 2023-01-24 DIAGNOSIS — N18.32 ANEMIA DUE TO STAGE 3B CHRONIC KIDNEY DISEASE (H): ICD-10-CM

## 2023-01-24 DIAGNOSIS — D63.1 ANEMIA DUE TO STAGE 3B CHRONIC KIDNEY DISEASE (H): ICD-10-CM

## 2023-01-24 PROCEDURE — 99204 OFFICE O/P NEW MOD 45 MIN: CPT | Mod: GC | Performed by: STUDENT IN AN ORGANIZED HEALTH CARE EDUCATION/TRAINING PROGRAM

## 2023-01-24 RX ORDER — HYDRALAZINE HYDROCHLORIDE 50 MG/1
50 TABLET, FILM COATED ORAL 3 TIMES DAILY
COMMUNITY
Start: 2023-01-23 | End: 2023-01-31

## 2023-01-24 ASSESSMENT — PAIN SCALES - GENERAL: PAINLEVEL: NO PAIN (0)

## 2023-01-24 NOTE — LETTER
1/24/2023         RE: Kamryn Miller  [address]  [address]  [place] [address]        Dear Colleague,    Thank you for referring your patient, Kamryn Miller, to the [facility] [place]. Please see a copy of my visit note below.    Chief Complaint   Patient presents with     New Patient       Vitals:    01/24/23 1447   BP: (!) 179/106   BP Location: Left arm   Cuff Size: Adult Regular   Pulse: 87   SpO2: 94%   Weight: 69.4 kg (153 lb)   Height: 1.524 m (5')       Body mass index is 29.88 kg/m .    Berkley Mora      [facility]:     Post-discharge clinic visit for anemia evaluation    Date of visit: 1/24/2023 --      CC: 87 year old female referred for evaluation of chronic anemia.    ASSESSMENT AND PLAN:    Ms. Miller is a 87 years old F with PMH severe aortic stenosis, CAD s/p PCI, HFpEF, chronic anemia who presents to GI clinic for post-discharge follow-up.     #. Chronic normocytic anemia  #. Iron deficiency    Patient with multiple risk factors for anemia, including chronic kidney disease, bone marrow suppression, poor iron absorption due to heart disease, but cannot rule out GI bleed. After recent family conversation, risk likely outweight benefit to proceed with endoscopic intervention at this time.  -- Continue supportive management with PO/IV iron supplementation.  -- Patient to continue to monitor with primary Cardiologist for hemoglobin monitoring.  -- No plans for endoscopic interventions at this time.  -- Follow-up in GI clinic PRN.               Return to Clinic: PRN    Navarro Escalona MD      Patient discussed and seen with Gastroenterology staff Dr. Vences, who is in agreement with the above.     ====================================================================================================================================  HPI:     Ms. Miller is a 87 years old F with PMH severe aortic stenosis, CAD s/p PCI,  HFpEF, chronic anemia who presents to GI clinic for post-discharge follow-up.     Patient was recently admitted at [facility] on 12/2022 for evaluate of acute on chronic anemia. At the time, GI was consulted for possible endoscopy, but given high surgical risk and no signs of active GI Bleeding, decision was made by family to hold off any interventions.     Patient was discharged on 12/07/2022, then again briefly admitted at [facility] for blood transfusions, has since then only required few transfusions but also got IV and PO iron. Patient's recent hemoglobin up to 10 from 7 last month.    Patient feels great today, accompanied by her daughter. Denies any blood in her stool, says it looks darker after starting iron pills, but it's not black. Denies abdominal pain or other symptoms.        Targeted GI review of systems complete and is otherwise negative.     Past Medical History:   Diagnosis Date     Aortic valvar stenosis 7/2010    mild     Asthma      Bipolar disorder (H)      CAD (coronary artery disease)     s/p angioplasty     Celiac disease      Colon polyps      Colon polyps 2012    every 3 year colonoscopy      Depression      High cholesterol      HTN      Mitral insufficiency      Pulmonary HTN (H)     mild     Tremors 7/10    drug induced from antidepressants     Tricuspid insufficiency        Past Surgical History:   Procedure Laterality Date     ANGIOGRAM  6/26/2009     ANGIOPLASTY  9/96    for angina     ANGIOPLASTY  8/03 - 9/03    X -2 - with stenst in coronary car.     APPENDECTOMY       BREAST BIOPSY, RT/LT      Breat Biopsy RT/LT, benign     BUNIONECTOMY  11/8/2011    Procedure:BUNIONECTOMY; Left donna bunionectomy; Surgeon:FREDY LITTLEJOHN; Location:[facility] OR     BUNIONECTOMY RT/LT  5/2007    right bunion and right 2nd hammertoe     CATARACT IOL, RT/LT  6/12 and 7/12    bilateral     COLONOSCOPY  2007, 2012    every 3 years for polyps     CV CORONARY ANGIOGRAM N/A 8/21/2020    Procedure: CV CORONARY ANGIOGRAM;   Surgeon: Gianluca Toscano MD;  Location:  HEART CARDIAC CATH LAB     CV CORONARY ANGIOGRAM N/A 10/25/2022    Procedure: Coronary Angiogram;  Surgeon: Carter Douglass MD;  Location:  HEART CARDIAC CATH LAB     CV INSTANTANEOUS WAVE-FREE RATIO N/A 10/25/2022    Procedure: Instantaneous Wave-Free Ratio;  Surgeon: Carter Douglass MD;  Location:  HEART CARDIAC CATH LAB     CV LEFT HEART CATH N/A 8/21/2020    Procedure: CV LEFT HEART CATH;  Surgeon: Gianluca Toscano MD;  Location:  HEART CARDIAC CATH LAB     CV LEFT HEART CATH N/A 11/3/2020    Procedure: CV LEFT HEART CATH;  Surgeon: Tom Burrows MD;  Location:  HEART CARDIAC CATH LAB     CV LOWER EXTREMITY ANGIOGRAM BILATERAL N/A 11/3/2020    Procedure: CV ANGIOGRAM LOWER EXTREMITY BILATERAL;  Surgeon: Tom Burrows MD;  Location:  HEART CARDIAC CATH LAB     CV PCI STENT DRUG ELUTING N/A 8/21/2020    Procedure: Percutaneous Coronary Intervention Stent Drug Eluting;  Surgeon: Gianluca Toscano MD;  Location:  HEART CARDIAC CATH LAB     CV RIGHT HEART CATH MEASUREMENTS RECORDED N/A 8/21/2020    Procedure: CV RIGHT HEART CATH;  Surgeon: Gianluca Toscano MD;  Location:  HEART CARDIAC CATH LAB     CV RIGHT HEART CATH MEASUREMENTS RECORDED N/A 11/3/2020    Procedure: CV RIGHT HEART CATH;  Surgeon: Tom Burrows MD;  Location:  HEART CARDIAC CATH LAB     CV TRANSCATHETER AORTIC VALVE REPLACEMENT N/A 9/12/2022    Procedure: Transfemoral Transcatheter Aortic Valve Replacement with possible open heart bypass and or balloon pump placement and any indicated procedure;  Surgeon: Tom Burrows MD;  Location:  HEART CARDIAC CATH LAB     HEART CATH FEMORAL CANNULIZATION WITH OPEN STANDBY REPAIR AORTIC VALVE N/A 9/12/2022    Procedure: OR TRANSCATHETER AORTIC VALVE REPLACEMENT, OPEN FEMORAL ARTERY APPROACH;  Surgeon: rAthur Markham MD;  Location:  HEART CARDIAC CATH LAB     JOINT  REPLACEMENT, HIP RT/LT  4/2008    right hip replaced     JOINT REPLACEMENT, HIP RT/LT  4/2009    left hip replaced     REPAIR HAMMER TOE  11/8/2011    Procedure:REPAIR HAMMER TOE; left 2nd hammertoe repair; Surgeon:FREDY LITTLEJOHN; Location:[facility] OR     SURGICAL HISTORY OF -   11/11    left bunion and 2nd hammertoe repair     TUBAL LIGATION       ZZC ANESTH,BLEPHAROPLASTY      (R) for drooping eyelid     ZZC APPENDECTOMY         Family History   Problem Relation Age of Onset     Asthma Mother      Cerebrovascular Disease Mother      Arthritis Mother      Depression Mother      Lipids Sister      Hypertension Sister      Heart Disease Sister      Lipids Sister      Hypertension Sister      Lipids Sister      Breast Cancer Sister        Social History     Tobacco Use     Smoking status: Never     Smokeless tobacco: Never   Substance Use Topics     Alcohol use: No     Alcohol/week: 0.0 standard drinks     Types: 1 Standard drinks or equivalent per week       Physical exam:     Vitals:BP (!) 179/106 (BP Location: Left arm, Cuff Size: Adult Regular)   Pulse 87   Ht 1.524 m (5')   Wt 69.4 kg (153 lb)   SpO2 94%   BMI 29.88 kg/m     BMI: Body mass index is 29.88 kg/m .      GEN: NAD, A&Ox3, appropriate  HEENT: EOMI, PERRLA, MMM, hearing grossly intact  Neck: full ROM  Cardiopulmonary: non labored, comfortable on RA, nondiaphoretic, no LE edema  Abdominal: soft, nontender, nondistended, no HSM, no rebound, no guarding, normoactive BS  Skin: no jaundice, no gross lesions on visible skin  Neuro: A&Ox3, grossly intact motor/sensation, gait intact  MSK: no gross deformity, moves arms/legs equally  Psych: normal affect, congruent with mood      Labs:       Relevant imaging:       Endoscopy:    Colonoscopy 2015 [facility] -> Diverticulosis, 3 mm hepatic flexure polyp resecte      Pathology:       Relevant surgical reports:       Attestation signed by Олег Vences MD at 2/7/2023  4:28 PM (Updated):  ATTENDING  ATTESTATION:     DATE SEEN: 1/24/23    Patient was discussed, seen, and examined by me, Олег Vences. The plan of care and pertinent data/imaging were reviewed with Dr. Escalona. I agree with the assessment and plan as delineated above with the following additions:     Discussed in detail with patient and daughter. Given significant co-morbidities the risk of further endoscopic evaluation outweigh benefits. We discussed risks of colon cancer - given colonoscopy in 2015 with only small polyp this is very unlikely. But we cannot know for sure if we do not do a colonoscopy. With her co-morbidities and age she elects not to pursue colonoscopy/EGD which is very reasonable. Will continue to manage anemia with iron supplementation. No plan for endoscopic evaluation at this time. Follow up in GI clinic PRN.    Thank you for this consultation.  It was a pleasure to participate in the care of this patient; please contact us with any further questions.  I spent a total of 45 minutes during the day of encounter performed chart review, meeting with patient, patient counseling, care coordination, and documentation.      Please contact me with any further questions.    Олег Vences MD  [facility]  Division of Gastroenterology, Hepatology and Nutrition        Sincerely,    Navarro Escalona MD

## 2023-01-24 NOTE — PROGRESS NOTES
Chief Complaint   Patient presents with     New Patient       Vitals:    01/24/23 1447   BP: (!) 179/106   BP Location: Left arm   Cuff Size: Adult Regular   Pulse: 87   SpO2: 94%   Weight: 69.4 kg (153 lb)   Height: 1.524 m (5')       Body mass index is 29.88 kg/m .    Berkley Mora

## 2023-01-24 NOTE — TELEPHONE ENCOUNTER
[facility] Call Center    Phone Message    May a detailed message be left on voicemail: yes     Reason for Call: Other: Katherine from pt living facility was calling to let the Dr know pt is not doing well with the medication change as she is feeling like her ears are plugged and headache, she is upset and does not want to continue on medication,, Please reach out to Katherine to discuss     Action Taken: Message routed to:  [facility] Cardio    Travel Screening: Not Applicable                                 Called and spoke with nurse from facility. Patient has attributed her ears feeling plugged with both coreg and hydralazine. Staff instructed to reach out to patient's primary care for management of hypertension per Dr. Zuniga. Staff states they will reach out to the primary care provider.

## 2023-01-24 NOTE — PROGRESS NOTES
[facility]:     Post-discharge clinic visit for anemia evaluation    Date of visit: 1/24/2023 --      CC: 87 year old female referred for evaluation of chronic anemia.    ASSESSMENT AND PLAN:    Ms. Miller is a 87 years old F with PMH severe aortic stenosis, CAD s/p PCI, HFpEF, chronic anemia who presents to GI clinic for post-discharge follow-up.     #. Chronic normocytic anemia  #. Iron deficiency    Patient with multiple risk factors for anemia, including chronic kidney disease, bone marrow suppression, poor iron absorption due to heart disease, but cannot rule out GI bleed. After recent family conversation, risk likely outweight benefit to proceed with endoscopic intervention at this time.  -- Continue supportive management with PO/IV iron supplementation.  -- Patient to continue to monitor with primary Cardiologist for hemoglobin monitoring.  -- No plans for endoscopic interventions at this time.  -- Follow-up in GI clinic PRN.               Return to Clinic: PRN    Navarro Escalona MD      Patient discussed and seen with Gastroenterology staff Dr. Vences, who is in agreement with the above.     ====================================================================================================================================  HPI:     Ms. Miller is a 87 years old F with PMH severe aortic stenosis, CAD s/p PCI, HFpEF, chronic anemia who presents to GI clinic for post-discharge follow-up.     Patient was recently admitted at [facility] on 12/2022 for evaluate of acute on chronic anemia. At the time, GI was consulted for possible endoscopy, but given high surgical risk and no signs of active GI Bleeding, decision was made by family to hold off any interventions.     Patient was discharged on 12/07/2022, then again briefly admitted at [place] for blood transfusions, has since then only required few transfusions but also got IV and PO iron. Patient's recent hemoglobin up to 10 from 7 last  month.    Patient feels great today, accompanied by her daughter. Denies any blood in her stool, says it looks darker after starting iron pills, but it's not black. Denies abdominal pain or other symptoms.        Targeted GI review of systems complete and is otherwise negative.     Past Medical History:   Diagnosis Date     Aortic valvar stenosis 7/2010    mild     Asthma      Bipolar disorder (H)      CAD (coronary artery disease)     s/p angioplasty     Celiac disease      Colon polyps      Colon polyps 2012    every 3 year colonoscopy      Depression      High cholesterol      HTN      Mitral insufficiency      Pulmonary HTN (H)     mild     Tremors 7/10    drug induced from antidepressants     Tricuspid insufficiency        Past Surgical History:   Procedure Laterality Date     ANGIOGRAM  6/26/2009     ANGIOPLASTY  9/96    for angina     ANGIOPLASTY  8/03 - 9/03    X -2 - with stenst in coronary car.     APPENDECTOMY       BREAST BIOPSY, RT/LT      Breat Biopsy RT/LT, benign     BUNIONECTOMY  11/8/2011    Procedure:BUNIONECTOMY; Left donna bunionectomy; Surgeon:FREDY LITTLEJOHN; Location:[facility] OR     BUNIONECTOMY RT/LT  5/2007    right bunion and right 2nd hammertoe     CATARACT IOL, RT/LT  6/12 and 7/12    bilateral     COLONOSCOPY  2007, 2012    every 3 years for polyps     CV CORONARY ANGIOGRAM N/A 8/21/2020    Procedure: CV CORONARY ANGIOGRAM;  Surgeon: Gianluca Toscano MD;  Location:  HEART CARDIAC CATH LAB     CV CORONARY ANGIOGRAM N/A 10/25/2022    Procedure: Coronary Angiogram;  Surgeon: Carter Douglass MD;  [facility]     CV INSTANTANEOUS WAVE-FREE RATIO N/A 10/25/2022    Procedure: Instantaneous Wave-Free Ratio;  Surgeon: Carter Douglass MD;  [facility]     CV LEFT HEART CATH N/A 8/21/2020    Procedure: CV LEFT HEART CATH;  Surgeon: Gianluca Toscano MD;  [facility]     CV LEFT HEART CATH  N/A 11/3/2020    Procedure: CV LEFT HEART CATH;  Surgeon: Tom Burrows MD;  Location:  HEART CARDIAC CATH LAB     CV LOWER EXTREMITY ANGIOGRAM BILATERAL N/A 11/3/2020    Procedure: CV ANGIOGRAM LOWER EXTREMITY BILATERAL;  Surgeon: Tom Burrows MD;  Location:  HEART CARDIAC CATH LAB     CV PCI STENT DRUG ELUTING N/A 8/21/2020    Procedure: Percutaneous Coronary Intervention Stent Drug Eluting;  Surgeon: Gianluca Toscano MD;  Location:  HEART CARDIAC CATH LAB     CV RIGHT HEART CATH MEASUREMENTS RECORDED N/A 8/21/2020    Procedure: CV RIGHT HEART CATH;  Surgeon: Gianluca Toscano MD;  Location:  HEART CARDIAC CATH LAB     CV RIGHT HEART CATH MEASUREMENTS RECORDED N/A 11/3/2020    Procedure: CV RIGHT HEART CATH;  Surgeon: Tom Burrows MD;  Location:  HEART CARDIAC CATH LAB     CV TRANSCATHETER AORTIC VALVE REPLACEMENT N/A 9/12/2022    Procedure: Transfemoral Transcatheter Aortic Valve Replacement with possible open heart bypass and or balloon pump placement and any indicated procedure;  Surgeon: Tom Burrows MD;  Location:  HEART CARDIAC CATH LAB     HEART CATH FEMORAL CANNULIZATION WITH OPEN STANDBY REPAIR AORTIC VALVE N/A 9/12/2022    Procedure: OR TRANSCATHETER AORTIC VALVE REPLACEMENT, OPEN FEMORAL ARTERY APPROACH;  Surgeon: Arthur Markham MD;  Location:  HEART CARDIAC CATH LAB     JOINT REPLACEMENT, HIP RT/LT  4/2008    right hip replaced     JOINT REPLACEMENT, HIP RT/LT  4/2009    left hip replaced     REPAIR HAMMER TOE  11/8/2011    Procedure:REPAIR HAMMER TOE; left 2nd hammertoe repair; Surgeon:FREDY LITTLEJOHN; Location: OR     SURGICAL HISTORY OF -   11/11    left bunion and 2nd hammertoe repair     TUBAL LIGATION       ZZC ANESTH,BLEPHAROPLASTY      (R) for drooping eyelid     ZZC APPENDECTOMY         Family History   Problem Relation Age of Onset     Asthma Mother      Cerebrovascular Disease Mother      Arthritis Mother      Depression Mother       Lipids Sister      Hypertension Sister      Heart Disease Sister      Lipids Sister      Hypertension Sister      Lipids Sister      Breast Cancer Sister        Social History     Tobacco Use     Smoking status: Never     Smokeless tobacco: Never   Substance Use Topics     Alcohol use: No     Alcohol/week: 0.0 standard drinks     Types: 1 Standard drinks or equivalent per week       Physical exam:     Vitals:BP (!) 179/106 (BP Location: Left arm, Cuff Size: Adult Regular)   Pulse 87   Ht 1.524 m (5')   Wt 69.4 kg (153 lb)   SpO2 94%   BMI 29.88 kg/m     BMI: Body mass index is 29.88 kg/m .      GEN: NAD, A&Ox3, appropriate  HEENT: EOMI, PERRLA, MMM, hearing grossly intact  Neck: full ROM  Cardiopulmonary: non labored, comfortable on RA, nondiaphoretic, no LE edema  Abdominal: soft, nontender, nondistended, no HSM, no rebound, no guarding, normoactive BS  Skin: no jaundice, no gross lesions on visible skin  Neuro: A&Ox3, grossly intact motor/sensation, gait intact  MSK: no gross deformity, moves arms/legs equally  Psych: normal affect, congruent with mood      Labs:       Relevant imaging:       Endoscopy:    Colonoscopy 2015 [facility] -> Diverticulosis, 3 mm hepatic flexure polyp resecte      Pathology:       Relevant surgical reports:

## 2023-01-24 NOTE — PATIENT INSTRUCTIONS
Thank you for visiting us today.      You are doing great! Your blood counts are looking much better than before. There are many reasons why your blood counts might be low, such as kidney disease, poor bone marrow function, poor absorption of iron, but could also be from gastrointestinal bleeding. At this time, risks likely outweigh benefits of getting an endoscopy, so conservative management is more appropriate.    Continue to take your iron pills.    You can come back to our clinic if you have any concern for bleeding!

## 2023-01-30 ENCOUNTER — APPOINTMENT (OUTPATIENT)
Dept: GENERAL RADIOLOGY | Facility: CLINIC | Age: 88
DRG: 293 | End: 2023-01-30
Attending: EMERGENCY MEDICINE
Payer: MEDICARE

## 2023-01-30 ENCOUNTER — NURSE TRIAGE (OUTPATIENT)
Dept: FAMILY MEDICINE | Facility: CLINIC | Age: 88
End: 2023-01-30
Payer: MEDICARE

## 2023-01-30 ENCOUNTER — HOSPITAL ENCOUNTER (INPATIENT)
Facility: CLINIC | Age: 88
LOS: 1 days | Discharge: HOME | DRG: 293 | End: 2023-01-31
Attending: EMERGENCY MEDICINE | Admitting: INTERNAL MEDICINE
Payer: MEDICARE

## 2023-01-30 DIAGNOSIS — R07.9 CHEST PAIN, UNSPECIFIED TYPE: ICD-10-CM

## 2023-01-30 DIAGNOSIS — R79.89 ELEVATED TROPONIN: ICD-10-CM

## 2023-01-30 DIAGNOSIS — R07.89 OTHER CHEST PAIN: ICD-10-CM

## 2023-01-30 DIAGNOSIS — N18.32 STAGE 3B CHRONIC KIDNEY DISEASE (H): ICD-10-CM

## 2023-01-30 DIAGNOSIS — I25.10 ATHEROSCLEROSIS OF NATIVE CORONARY ARTERY OF NATIVE HEART WITHOUT ANGINA PECTORIS: ICD-10-CM

## 2023-01-30 DIAGNOSIS — Z95.2 HEART VALVE REPLACED: ICD-10-CM

## 2023-01-30 DIAGNOSIS — N18.32 HYPERTENSIVE KIDNEY DISEASE WITH STAGE 3B CHRONIC KIDNEY DISEASE (H): ICD-10-CM

## 2023-01-30 DIAGNOSIS — I50.30 NYHA CLASS 3 HEART FAILURE WITH PRESERVED EJECTION FRACTION (H): Primary | ICD-10-CM

## 2023-01-30 DIAGNOSIS — Z11.52 ENCOUNTER FOR SCREENING LABORATORY TESTING FOR SEVERE ACUTE RESPIRATORY SYNDROME CORONAVIRUS 2 (SARS-COV-2): ICD-10-CM

## 2023-01-30 DIAGNOSIS — R79.89 ELEVATED TROPONIN LEVEL: ICD-10-CM

## 2023-01-30 DIAGNOSIS — Z95.5 STENTED CORONARY ARTERY: ICD-10-CM

## 2023-01-30 DIAGNOSIS — I12.9 HYPERTENSIVE KIDNEY DISEASE WITH STAGE 3B CHRONIC KIDNEY DISEASE (H): ICD-10-CM

## 2023-01-30 LAB
ALBUMIN SERPL BCG-MCNC: 3.5 G/DL (ref 3.5–5.2)
ALP SERPL-CCNC: 56 U/L (ref 35–104)
ALT SERPL W P-5'-P-CCNC: 29 U/L (ref 10–35)
ANION GAP SERPL CALCULATED.3IONS-SCNC: 12 MMOL/L (ref 7–15)
AST SERPL W P-5'-P-CCNC: 39 U/L (ref 10–35)
ATRIAL RATE - MUSE: 66 BPM
BASOPHILS # BLD AUTO: 0.1 10E3/UL (ref 0–0.2)
BASOPHILS NFR BLD AUTO: 1 %
BILIRUB SERPL-MCNC: 0.2 MG/DL
BUN SERPL-MCNC: 24.3 MG/DL (ref 8–23)
CALCIUM SERPL-MCNC: 9.3 MG/DL (ref 8.8–10.2)
CHLORIDE SERPL-SCNC: 108 MMOL/L (ref 98–107)
CREAT SERPL-MCNC: 1.13 MG/DL (ref 0.51–0.95)
DEPRECATED HCO3 PLAS-SCNC: 23 MMOL/L (ref 22–29)
DIASTOLIC BLOOD PRESSURE - MUSE: NORMAL MMHG
EOSINOPHIL # BLD AUTO: 0.1 10E3/UL (ref 0–0.7)
EOSINOPHIL NFR BLD AUTO: 2 %
ERYTHROCYTE [DISTWIDTH] IN BLOOD BY AUTOMATED COUNT: 22.1 % (ref 10–15)
GFR SERPL CREATININE-BSD FRML MDRD: 47 ML/MIN/1.73M2
GLUCOSE SERPL-MCNC: 100 MG/DL (ref 70–99)
HCT VFR BLD AUTO: 34.4 % (ref 35–47)
HGB BLD-MCNC: 10.2 G/DL (ref 11.7–15.7)
HOLD SPECIMEN: NORMAL
HOLD SPECIMEN: NORMAL
IMM GRANULOCYTES # BLD: 0 10E3/UL
IMM GRANULOCYTES NFR BLD: 0 %
INTERPRETATION ECG - MUSE: NORMAL
LYMPHOCYTES # BLD AUTO: 1.1 10E3/UL (ref 0.8–5.3)
LYMPHOCYTES NFR BLD AUTO: 22 %
MCH RBC QN AUTO: 29.1 PG (ref 26.5–33)
MCHC RBC AUTO-ENTMCNC: 29.7 G/DL (ref 31.5–36.5)
MCV RBC AUTO: 98 FL (ref 78–100)
MONOCYTES # BLD AUTO: 0.7 10E3/UL (ref 0–1.3)
MONOCYTES NFR BLD AUTO: 15 %
NEUTROPHILS # BLD AUTO: 2.8 10E3/UL (ref 1.6–8.3)
NEUTROPHILS NFR BLD AUTO: 60 %
NRBC # BLD AUTO: 0 10E3/UL
NRBC BLD AUTO-RTO: 0 /100
NT-PROBNP SERPL-MCNC: 5809 PG/ML (ref 0–1800)
P AXIS - MUSE: 79 DEGREES
PLATELET # BLD AUTO: 159 10E3/UL (ref 150–450)
POTASSIUM SERPL-SCNC: 4 MMOL/L (ref 3.4–5.3)
PR INTERVAL - MUSE: 226 MS
PROT SERPL-MCNC: 6 G/DL (ref 6.4–8.3)
QRS DURATION - MUSE: 158 MS
QT - MUSE: 484 MS
QTC - MUSE: 507 MS
R AXIS - MUSE: -10 DEGREES
RBC # BLD AUTO: 3.51 10E6/UL (ref 3.8–5.2)
SARS-COV-2 RNA RESP QL NAA+PROBE: NEGATIVE
SODIUM SERPL-SCNC: 143 MMOL/L (ref 136–145)
SYSTOLIC BLOOD PRESSURE - MUSE: NORMAL MMHG
T AXIS - MUSE: 157 DEGREES
TROPONIN T SERPL HS-MCNC: 45 NG/L
TROPONIN T SERPL HS-MCNC: 45 NG/L
TROPONIN T SERPL HS-MCNC: 53 NG/L
VENTRICULAR RATE- MUSE: 66 BPM
WBC # BLD AUTO: 4.7 10E3/UL (ref 4–11)

## 2023-01-30 PROCEDURE — 99285 EMERGENCY DEPT VISIT HI MDM: CPT | Mod: 25 | Performed by: EMERGENCY MEDICINE

## 2023-01-30 PROCEDURE — 36415 COLL VENOUS BLD VENIPUNCTURE: CPT

## 2023-01-30 PROCEDURE — 250N000011 HC RX IP 250 OP 636

## 2023-01-30 PROCEDURE — 80053 COMPREHEN METABOLIC PANEL: CPT | Performed by: EMERGENCY MEDICINE

## 2023-01-30 PROCEDURE — 36415 COLL VENOUS BLD VENIPUNCTURE: CPT | Performed by: EMERGENCY MEDICINE

## 2023-01-30 PROCEDURE — 85025 COMPLETE CBC W/AUTO DIFF WBC: CPT | Performed by: EMERGENCY MEDICINE

## 2023-01-30 PROCEDURE — 83880 ASSAY OF NATRIURETIC PEPTIDE: CPT

## 2023-01-30 PROCEDURE — 93010 ELECTROCARDIOGRAM REPORT: CPT | Performed by: EMERGENCY MEDICINE

## 2023-01-30 PROCEDURE — 120N000002 HC R&B MED SURG/OB UMMC

## 2023-01-30 PROCEDURE — 84484 ASSAY OF TROPONIN QUANT: CPT | Performed by: EMERGENCY MEDICINE

## 2023-01-30 PROCEDURE — C9803 HOPD COVID-19 SPEC COLLECT: HCPCS | Performed by: EMERGENCY MEDICINE

## 2023-01-30 PROCEDURE — 84484 ASSAY OF TROPONIN QUANT: CPT

## 2023-01-30 PROCEDURE — U0003 INFECTIOUS AGENT DETECTION BY NUCLEIC ACID (DNA OR RNA); SEVERE ACUTE RESPIRATORY SYNDROME CORONAVIRUS 2 (SARS-COV-2) (CORONAVIRUS DISEASE [COVID-19]), AMPLIFIED PROBE TECHNIQUE, MAKING USE OF HIGH THROUGHPUT TECHNOLOGIES AS DESCRIBED BY CMS-2020-01-R: HCPCS

## 2023-01-30 PROCEDURE — 71046 X-RAY EXAM CHEST 2 VIEWS: CPT

## 2023-01-30 PROCEDURE — 93005 ELECTROCARDIOGRAM TRACING: CPT | Performed by: EMERGENCY MEDICINE

## 2023-01-30 PROCEDURE — 250N000013 HC RX MED GY IP 250 OP 250 PS 637

## 2023-01-30 PROCEDURE — 71046 X-RAY EXAM CHEST 2 VIEWS: CPT | Mod: 26 | Performed by: RADIOLOGY

## 2023-01-30 RX ORDER — ASPIRIN 81 MG/1
81 TABLET, CHEWABLE ORAL DAILY
Status: DISCONTINUED | OUTPATIENT
Start: 2023-01-30 | End: 2023-01-31 | Stop reason: HOSPADM

## 2023-01-30 RX ORDER — LANOLIN ALCOHOL/MO/W.PET/CERES
400 CREAM (GRAM) TOPICAL DAILY
Status: DISCONTINUED | OUTPATIENT
Start: 2023-01-31 | End: 2023-01-31 | Stop reason: HOSPADM

## 2023-01-30 RX ORDER — HYDRALAZINE HYDROCHLORIDE 50 MG/1
50 TABLET, FILM COATED ORAL 3 TIMES DAILY
Status: DISCONTINUED | OUTPATIENT
Start: 2023-01-30 | End: 2023-01-31

## 2023-01-30 RX ORDER — IBUPROFEN 200 MG
950 CAPSULE ORAL DAILY
Status: DISCONTINUED | OUTPATIENT
Start: 2023-01-31 | End: 2023-01-31 | Stop reason: HOSPADM

## 2023-01-30 RX ORDER — LIOTHYRONINE SODIUM 5 UG/1
10 TABLET ORAL DAILY
Status: DISCONTINUED | OUTPATIENT
Start: 2023-01-31 | End: 2023-01-31 | Stop reason: HOSPADM

## 2023-01-30 RX ORDER — ALENDRONATE SODIUM 70 MG/1
70 TABLET ORAL WEEKLY
Status: DISCONTINUED | OUTPATIENT
Start: 2023-01-30 | End: 2023-01-30

## 2023-01-30 RX ORDER — NITROGLYCERIN 0.4 MG/1
0.4 TABLET SUBLINGUAL EVERY 5 MIN PRN
Status: DISCONTINUED | OUTPATIENT
Start: 2023-01-30 | End: 2023-01-31 | Stop reason: HOSPADM

## 2023-01-30 RX ORDER — HEPARIN SODIUM 5000 [USP'U]/.5ML
5000 INJECTION, SOLUTION INTRAVENOUS; SUBCUTANEOUS EVERY 12 HOURS
Status: DISCONTINUED | OUTPATIENT
Start: 2023-01-30 | End: 2023-01-31 | Stop reason: HOSPADM

## 2023-01-30 RX ORDER — ROSUVASTATIN CALCIUM 40 MG/1
40 TABLET, COATED ORAL DAILY
Status: DISCONTINUED | OUTPATIENT
Start: 2023-01-31 | End: 2023-01-31 | Stop reason: HOSPADM

## 2023-01-30 RX ORDER — MAGNESIUM HYDROXIDE/ALUMINUM HYDROXICE/SIMETHICONE 120; 1200; 1200 MG/30ML; MG/30ML; MG/30ML
30 SUSPENSION ORAL EVERY 4 HOURS PRN
Status: DISCONTINUED | OUTPATIENT
Start: 2023-01-30 | End: 2023-01-31 | Stop reason: HOSPADM

## 2023-01-30 RX ORDER — ACETAMINOPHEN 650 MG/1
650 SUPPOSITORY RECTAL EVERY 4 HOURS PRN
Status: DISCONTINUED | OUTPATIENT
Start: 2023-01-30 | End: 2023-01-31 | Stop reason: HOSPADM

## 2023-01-30 RX ORDER — MULTIVITAMIN,THERAPEUTIC
1 TABLET ORAL DAILY
Status: DISCONTINUED | OUTPATIENT
Start: 2023-01-31 | End: 2023-01-31 | Stop reason: HOSPADM

## 2023-01-30 RX ORDER — LAMOTRIGINE 200 MG/1
200 TABLET ORAL DAILY
Status: DISCONTINUED | OUTPATIENT
Start: 2023-01-31 | End: 2023-01-31 | Stop reason: HOSPADM

## 2023-01-30 RX ORDER — FUROSEMIDE 10 MG/ML
20 INJECTION INTRAMUSCULAR; INTRAVENOUS ONCE
Status: COMPLETED | OUTPATIENT
Start: 2023-01-30 | End: 2023-01-30

## 2023-01-30 RX ORDER — CHOLECALCIFEROL (VITAMIN D3) 1250 MCG
1250 CAPSULE ORAL WEEKLY
Status: DISCONTINUED | OUTPATIENT
Start: 2023-01-30 | End: 2023-01-30

## 2023-01-30 RX ORDER — CLONIDINE HYDROCHLORIDE 0.1 MG/1
0.1 TABLET ORAL DAILY PRN
Status: DISCONTINUED | OUTPATIENT
Start: 2023-01-30 | End: 2023-01-31 | Stop reason: HOSPADM

## 2023-01-30 RX ORDER — MIRTAZAPINE 7.5 MG/1
7.5 TABLET, FILM COATED ORAL AT BEDTIME
Status: DISCONTINUED | OUTPATIENT
Start: 2023-01-30 | End: 2023-01-31 | Stop reason: HOSPADM

## 2023-01-30 RX ORDER — IRON POLYSACCHARIDE COMPLEX 150 MG
150 CAPSULE ORAL DAILY
Status: DISCONTINUED | OUTPATIENT
Start: 2023-01-30 | End: 2023-01-31

## 2023-01-30 RX ORDER — LEVOTHYROXINE SODIUM 50 UG/1
50 TABLET ORAL
Status: DISCONTINUED | OUTPATIENT
Start: 2023-01-31 | End: 2023-01-31 | Stop reason: HOSPADM

## 2023-01-30 RX ORDER — ISOSORBIDE MONONITRATE 30 MG/1
30 TABLET, EXTENDED RELEASE ORAL DAILY
Status: DISCONTINUED | OUTPATIENT
Start: 2023-01-31 | End: 2023-01-31

## 2023-01-30 RX ORDER — LAMOTRIGINE 25 MG/1
25 TABLET ORAL DAILY
Status: DISCONTINUED | OUTPATIENT
Start: 2023-01-31 | End: 2023-01-31 | Stop reason: HOSPADM

## 2023-01-30 RX ORDER — DESVENLAFAXINE 100 MG/1
100 TABLET, EXTENDED RELEASE ORAL DAILY
Status: DISCONTINUED | OUTPATIENT
Start: 2023-01-31 | End: 2023-01-31 | Stop reason: HOSPADM

## 2023-01-30 RX ORDER — MEMANTINE HYDROCHLORIDE 10 MG/1
10 TABLET ORAL DAILY
Status: DISCONTINUED | OUTPATIENT
Start: 2023-01-31 | End: 2023-01-31 | Stop reason: HOSPADM

## 2023-01-30 RX ORDER — AZELASTINE 1 MG/ML
1 SPRAY, METERED NASAL DAILY
Status: DISCONTINUED | OUTPATIENT
Start: 2023-01-31 | End: 2023-01-31 | Stop reason: HOSPADM

## 2023-01-30 RX ORDER — LIDOCAINE 40 MG/G
CREAM TOPICAL
Status: DISCONTINUED | OUTPATIENT
Start: 2023-01-30 | End: 2023-01-31 | Stop reason: HOSPADM

## 2023-01-30 RX ORDER — ACETAMINOPHEN 325 MG/1
650 TABLET ORAL EVERY 4 HOURS PRN
Status: DISCONTINUED | OUTPATIENT
Start: 2023-01-30 | End: 2023-01-31 | Stop reason: HOSPADM

## 2023-01-30 RX ORDER — LOSARTAN POTASSIUM 25 MG/1
25 TABLET ORAL DAILY
Status: DISCONTINUED | OUTPATIENT
Start: 2023-01-31 | End: 2023-01-31

## 2023-01-30 RX ORDER — ALBUTEROL SULFATE 90 UG/1
2 AEROSOL, METERED RESPIRATORY (INHALATION) EVERY 6 HOURS PRN
Status: DISCONTINUED | OUTPATIENT
Start: 2023-01-30 | End: 2023-01-31 | Stop reason: HOSPADM

## 2023-01-30 RX ORDER — FLUTICASONE FUROATE AND VILANTEROL 200; 25 UG/1; UG/1
1 POWDER RESPIRATORY (INHALATION) DAILY
Status: DISCONTINUED | OUTPATIENT
Start: 2023-01-31 | End: 2023-01-31 | Stop reason: HOSPADM

## 2023-01-30 RX ORDER — CALCIUM CARBONATE 500 MG/1
1000 TABLET, CHEWABLE ORAL 4 TIMES DAILY PRN
Status: DISCONTINUED | OUTPATIENT
Start: 2023-01-30 | End: 2023-01-31 | Stop reason: HOSPADM

## 2023-01-30 RX ADMIN — FUROSEMIDE 20 MG: 10 INJECTION, SOLUTION INTRAVENOUS at 21:34

## 2023-01-30 RX ADMIN — ASPIRIN 81 MG: 81 TABLET, CHEWABLE ORAL at 21:34

## 2023-01-30 RX ADMIN — HEPARIN SODIUM 5000 UNITS: 5000 INJECTION, SOLUTION INTRAVENOUS; SUBCUTANEOUS at 21:16

## 2023-01-30 RX ADMIN — HYDRALAZINE HYDROCHLORIDE 50 MG: 50 TABLET, FILM COATED ORAL at 21:44

## 2023-01-30 RX ADMIN — MIRTAZAPINE 7.5 MG: 7.5 TABLET, FILM COATED ORAL at 21:44

## 2023-01-30 ASSESSMENT — ACTIVITIES OF DAILY LIVING (ADL)
ADLS_ACUITY_SCORE: 37

## 2023-01-30 NOTE — ED TRIAGE NOTES
Pt BIBA with c/o chest pain. HX asthma. Per EMS pt hypertensive this AM, SPBs in the 200s, and began to have chest tightness. Given 324 ASA & 4 SL nitroglycerine in route, and now asymptomatic. Pt A&OX4, VSS on RA, denies pain.      Triage Assessment     Row Name 01/30/23 [id number]       Triage Assessment (Adult)    Airway WDL WDL       Respiratory WDL    Respiratory WDL X;rhythm/pattern    Rhythm/Pattern, Respiratory dyspnea on exertion       Skin Circulation/Temperature WDL    Skin Circulation/Temperature WDL WDL       Cardiac WDL    Cardiac WDL X;chest pain       Chest Pain Assessment    Chest Pain Location midsternal    Chest Pain Intervention nitroglycerin SL given       Peripheral/Neurovascular WDL    Peripheral Neurovascular WDL WDL       Cognitive/Neuro/Behavioral WDL    Cognitive/Neuro/Behavioral WDL WDL

## 2023-01-30 NOTE — ED PROVIDER NOTES
ED Provider Note  [facility]      History     Chief Complaint   Patient presents with     Chest Pain     HPI  87yr old female with a history of HTN, CAD (s/p PCI in 1996, PCI to LAD and RCA in 2003, PCI to LAD x3 in 8/2020), severe aortic valve stenosis (s/p 26mm ES Ultra TAVR 9/12/22), PAD, HLD, HFpEF (LVEF 55-60% per TTE 12/2022), and anemia. She arrives today to the emergency department via EMS secondary to chest discomfort.  Patient reports mild onset of chest tightness overnight.  This did increase to a moderate intensity chest pressure this afternoon prompting EMS call.  Upon arrival patient was given aspirin and nitroglycerin.  This did help and patient is currently chest pain-free.  Patient reports no other symptoms such as nausea, vomiting, diaphoresis.  She denies recent medical illness such as fever, nasal congestion or rhinorrhea.  No recent fall or trauma.  Mild nonproductive cough which is unchanged.  No recent medication change.    Past Medical History  Past Medical History:   Diagnosis Date     Aortic valvar stenosis 7/2010    mild     Asthma      Bipolar disorder (H)      CAD (coronary artery disease)     s/p angioplasty     Celiac disease      Colon polyps      Colon polyps 2012    every 3 year colonoscopy      Depression      High cholesterol      HTN      Mitral insufficiency      Pulmonary HTN (H)     mild     Tremors 7/10    drug induced from antidepressants     Tricuspid insufficiency      Past Surgical History:   Procedure Laterality Date     ANGIOGRAM  6/26/2009     ANGIOPLASTY  9/96    for angina     ANGIOPLASTY  8/03 - 9/03    X -2 - with stenst in coronary car.     APPENDECTOMY       BREAST BIOPSY, RT/LT      Breat Biopsy RT/LT, benign     BUNIONECTOMY  11/8/2011    Procedure:BUNIONECTOMY; Left donna bunionectomy; Surgeon:FREDY LITTLEJOHN; Location:[facility]     BUNIONECTOMY RT/LT  5/2007    right bunion and right 2nd hammertoe     CATARACT IOL, RT/LT  6/12 and  7/12    bilateral     COLONOSCOPY  2007, 2012    every 3 years for polyps     CV CORONARY ANGIOGRAM N/A 8/21/2020    Procedure: CV CORONARY ANGIOGRAM;  Surgeon: Gianluca Toscano MD;  [facility]     CV CORONARY ANGIOGRAM N/A 10/25/2022    Procedure: Coronary Angiogram;  Surgeon: Carter Douglass MD;  [facility]     CV INSTANTANEOUS WAVE-FREE RATIO N/A 10/25/2022    Procedure: Instantaneous Wave-Free Ratio;  Surgeon: Carter Douglass MD;  [facility]     CV LEFT HEART CATH N/A 8/21/2020    Procedure: CV LEFT HEART CATH;  Surgeon: Gianluca Toscano MD;  [facility]     CV LEFT HEART CATH N/A 11/3/2020    Procedure: CV LEFT HEART CATH;  Surgeon: Tom Burrows MD;  [facility]     CV LOWER EXTREMITY ANGIOGRAM BILATERAL N/A 11/3/2020    Procedure: CV ANGIOGRAM LOWER EXTREMITY BILATERAL;  Surgeon: Tom Burrows MD;  [facility]     CV PCI STENT DRUG ELUTING N/A 8/21/2020    Procedure: Percutaneous Coronary Intervention Stent Drug Eluting;  Surgeon: Gianluca Toscano MD;  Location:  [facility]     CV RIGHT HEART CATH MEASUREMENTS RECORDED N/A 8/21/2020    Procedure: CV RIGHT HEART CATH;  Surgeon: Gianluca Toscano MD;  Location:  [facility]     CV RIGHT HEART CATH MEASUREMENTS RECORDED N/A 11/3/2020    Procedure: CV RIGHT HEART CATH;  Surgeon: Tom Burrows MD;  Location:  [facility]     CV TRANSCATHETER AORTIC VALVE REPLACEMENT N/A 9/12/2022    Procedure: Transfemoral Transcatheter Aortic Valve Replacement with possible open heart bypass and or balloon pump placement and any indicated procedure;  Surgeon: Tom Burrows MD;  Location:  [facility]     HEART CATH FEMORAL CANNULIZATION WITH OPEN STANDBY REPAIR AORTIC VALVE N/A 9/12/2022    Procedure: OR  TRANSCATHETER AORTIC VALVE REPLACEMENT, OPEN FEMORAL ARTERY APPROACH;  Surgeon: Arthur Markham MD;  Location:  [facility]     JOINT REPLACEMENT, HIP RT/LT  4/2008    right hip replaced     JOINT REPLACEMENT, HIP RT/LT  4/2009    left hip replaced     REPAIR HAMMER TOE  11/8/2011    Procedure:REPAIR HAMMER TOE; left 2nd hammertoe repair; Surgeon:FREDY LITTLEJOHN; Location: OR     SURGICAL HISTORY OF -   11/11    left bunion and 2nd hammertoe repair     TUBAL LIGATION       [facility] ANESTH,BLEPHAROPLASTY      (R) for drooping eyelid     [facility] APPENDECTOMY       albuterol (PROAIR HFA/PROVENTIL HFA/VENTOLIN HFA) 108 (90 Base) MCG/ACT inhaler  alendronate (FOSAMAX) 70 MG tablet  azelastine (ASTEPRO) 0.15 % nasal spray  calcium citrate (CITRACAL) 950 (200 Ca) MG tablet  cholecalciferol (VITAMIN D3) 1250 mcg (38326 units) capsule  cloNIDine (CATAPRES) 0.1 MG tablet  desvenlafaxine (PRISTIQ) 100 MG 24 hr tablet  fluticasone-salmeterol (ADVAIR DISKUS) 500-50 MCG/ACT inhaler  folic acid (FOLVITE) 400 MCG tablet  hydrALAZINE (APRESOLINE) 25 MG tablet  hydrALAZINE (APRESOLINE) 50 MG tablet  iron polysaccharides (NIFEREX) 150 MG capsule  isosorbide mononitrate (IMDUR) 30 MG 24 hr tablet  lamoTRIgine (LAMICTAL) 25 MG tablet  LAMOTRIGINE 200 MG PO TABS  levothyroxine (SYNTHROID/LEVOTHROID) 50 MCG tablet  liothyronine (CYTOMEL) 5 MCG tablet  losartan (COZAAR) 25 MG tablet  memantine (NAMENDA) 10 MG tablet  mirtazapine (REMERON) 7.5 MG tablet  Multiple Vitamin (TAB-A-JENNIFER) TABS  psyllium (METAMUCIL/KONSYL) 58.6 % powder  REGULOID 28.3 % POWD  rosuvastatin (CRESTOR) 40 MG tablet      Allergies   Allergen Reactions     Ace Inhibitors Cough     Amlodipine      Headache and plugged ears     Diclofenac Other (See Comments)     Balance problems     Gluten Meal Diarrhea     Family History  Family History   Problem Relation Age of Onset     Asthma Mother      Cerebrovascular Disease Mother      Arthritis Mother       Depression Mother      Lipids Sister      Hypertension Sister      Heart Disease Sister      Lipids Sister      Hypertension Sister      Lipids Sister      Breast Cancer Sister      Social History   Social History     Tobacco Use     Smoking status: Never     Smokeless tobacco: Never   Vaping Use     Vaping Use: Never used   Substance Use Topics     Alcohol use: No     Alcohol/week: 0.0 standard drinks     Types: 1 Standard drinks or equivalent per week     Drug use: No      Past medical history, past surgical history, medications, allergies, family history, and social history were reviewed with the patient. No additional pertinent items.      A medically appropriate review of systems was performed with pertinent positives and negatives noted in the HPI, and all other systems negative.    Physical Exam   BP: (!) 167/82  Pulse: 69  Temp: 98.3  F (36.8  C)  Resp: 18  SpO2: 98 %  Physical Exam  Vitals and nursing note reviewed.   Constitutional:       General: She is not in acute distress.     Appearance: Normal appearance. She is not ill-appearing, toxic-appearing or diaphoretic.   HENT:      Head: Normocephalic and atraumatic.      Right Ear: External ear normal.      Left Ear: External ear normal.      Nose: Nose normal. No congestion.      Mouth/Throat:      Mouth: Mucous membranes are moist.      Pharynx: Oropharynx is clear. No oropharyngeal exudate.   Eyes:      Extraocular Movements: Extraocular movements intact.      Conjunctiva/sclera: Conjunctivae normal.      Pupils: Pupils are equal, round, and reactive to light.   Cardiovascular:      Rate and Rhythm: Normal rate and regular rhythm.      Pulses: Normal pulses.      Heart sounds: Normal heart sounds. No murmur heard.    No friction rub.   Pulmonary:      Effort: Pulmonary effort is normal. No respiratory distress.      Breath sounds: No stridor. No wheezing, rhonchi or rales.   Abdominal:      General: Abdomen is flat. There is no distension.       Tenderness: There is no abdominal tenderness. There is no guarding or rebound.   Musculoskeletal:         General: No deformity or signs of injury. Normal range of motion.      Cervical back: Normal range of motion.      Right lower leg: No tenderness. No edema.      Left lower leg: No tenderness. No edema.   Skin:     General: Skin is warm.      Capillary Refill: Capillary refill takes less than 2 seconds.      Coloration: Skin is not pale.      Findings: No bruising or erythema.   Neurological:      General: No focal deficit present.      Mental Status: She is alert.      Cranial Nerves: No cranial nerve deficit.      Motor: No weakness.   Psychiatric:         Mood and Affect: Mood normal.         Behavior: Behavior normal.         ED Course, Procedures, & Data      Procedures                     Results for orders placed or performed during the hospital encounter of 01/30/23   XR Chest 2 Views     Status: None    Narrative    Exam: XR CHEST 2 VIEWS, 1/30/2023 5:26 PM    Comparison: 12/6/2022, 12/5/2022    History: Chest pain    Findings:  Portable AP and lateral view of the chest. Postsurgical changes of  aortic valve replacement.    Trachea is midline. Cardiomediastinal silhouette is enlarged. Patchy  mixed opacities in the right lower lobe. There is no pneumothorax.  Stable small bilateral pleural effusions.. The upper abdomen is  unremarkable. No acute osseous abnormality. Chronic rib fractures  better seen on prior CT.      Impression    Impression:   1. Patchy mixed opacities in the right lower lobe, may represent  infection, edema, and/or atelectasis.   2. Stable cardiomegaly and small bilateral pleural effusions.    I have personally reviewed the examination and initial interpretation  and I agree with the findings.    DOTTIE MIRANDA MD         SYSTEM ID:  [id number]   [place] Draw     Status: None (In process)    Narrative    The following orders were created for panel order [place] Draw.  Procedure                                Abnormality         Status                     ---------                               -----------         ------                     Extra Blue Top Tube[[id number]]                              Final result               Extra Red Top Tube[[id number]]                               Final result               Extra Green Top (Lithium...[[id number]]                                                 Extra Purple Top Tube[[id number]]                                                         Please view results for these tests on the individual orders.   Comprehensive metabolic panel     Status: Abnormal   Result Value Ref Range    Sodium 143 136 - 145 mmol/L    Potassium 4.0 3.4 - 5.3 mmol/L    Chloride 108 (H) 98 - 107 mmol/L    Carbon Dioxide (CO2) 23 22 - 29 mmol/L    Anion Gap 12 7 - 15 mmol/L    Urea Nitrogen 24.3 (H) 8.0 - 23.0 mg/dL    Creatinine 1.13 (H) 0.51 - 0.95 mg/dL    Calcium 9.3 8.8 - 10.2 mg/dL    Glucose 100 (H) 70 - 99 mg/dL    Alkaline Phosphatase 56 35 - 104 U/L    AST 39 (H) 10 - 35 U/L    ALT 29 10 - 35 U/L    Protein Total 6.0 (L) 6.4 - 8.3 g/dL    Albumin 3.5 3.5 - 5.2 g/dL    Bilirubin Total 0.2 <=1.2 mg/dL    GFR Estimate 47 (L) >60 mL/min/1.73m2   Troponin T, High Sensitivity     Status: Abnormal   Result Value Ref Range    Troponin T, High Sensitivity 45 (H) <=14 ng/L   Extra Blue Top Tube     Status: None   Result Value Ref Range    Hold Specimen JIC    Extra Red Top Tube     Status: None   Result Value Ref Range    Hold Specimen JIC    CBC with platelets and differential     Status: Abnormal   Result Value Ref Range    WBC Count 4.7 4.0 - 11.0 10e3/uL    RBC Count 3.51 (L) 3.80 - 5.20 10e6/uL    Hemoglobin 10.2 (L) 11.7 - 15.7 g/dL    Hematocrit 34.4 (L) 35.0 - 47.0 %    MCV 98 78 - 100 fL    MCH 29.1 26.5 - 33.0 pg    MCHC 29.7 (L) 31.5 - 36.5 g/dL    RDW 22.1 (H) 10.0 - 15.0 %    Platelet Count 159 150 - 450 10e3/uL    % Neutrophils 60 %    % Lymphocytes 22 %    % Monocytes  15 %    % Eosinophils 2 %    % Basophils 1 %    % Immature Granulocytes 0 %    NRBCs per 100 WBC 0 <1 /100    Absolute Neutrophils 2.8 1.6 - 8.3 10e3/uL    Absolute Lymphocytes 1.1 0.8 - 5.3 10e3/uL    Absolute Monocytes 0.7 0.0 - 1.3 10e3/uL    Absolute Eosinophils 0.1 0.0 - 0.7 10e3/uL    Absolute Basophils 0.1 0.0 - 0.2 10e3/uL    Absolute Immature Granulocytes 0.0 <=0.4 10e3/uL    Absolute NRBCs 0.0 10e3/uL   CBC with platelets differential     Status: Abnormal    Narrative    The following orders were created for panel order CBC with platelets differential.  Procedure                               Abnormality         Status                     ---------                               -----------         ------                     CBC with platelets and d...[[id number]]  Abnormal            Final result                 Please view results for these tests on the individual orders.     Medications - No data to display  Labs Ordered and Resulted from Time of ED Arrival to Time of ED Departure   COMPREHENSIVE METABOLIC PANEL - Abnormal       Result Value    Sodium 143      Potassium 4.0      Chloride 108 (*)     Carbon Dioxide (CO2) 23      Anion Gap 12      Urea Nitrogen 24.3 (*)     Creatinine 1.13 (*)     Calcium 9.3      Glucose 100 (*)     Alkaline Phosphatase 56      AST 39 (*)     ALT 29      Protein Total 6.0 (*)     Albumin 3.5      Bilirubin Total 0.2      GFR Estimate 47 (*)    TROPONIN T, HIGH SENSITIVITY - Abnormal    Troponin T, High Sensitivity 45 (*)    CBC WITH PLATELETS AND DIFFERENTIAL - Abnormal    WBC Count 4.7      RBC Count 3.51 (*)     Hemoglobin 10.2 (*)     Hematocrit 34.4 (*)     MCV 98      MCH 29.1      MCHC 29.7 (*)     RDW 22.1 (*)     Platelet Count 159      % Neutrophils 60      % Lymphocytes 22      % Monocytes 15      % Eosinophils 2      % Basophils 1      % Immature Granulocytes 0      NRBCs per 100 WBC 0      Absolute Neutrophils 2.8      Absolute Lymphocytes 1.1      Absolute  Monocytes 0.7      Absolute Eosinophils 0.1      Absolute Basophils 0.1      Absolute Immature Granulocytes 0.0      Absolute NRBCs 0.0       XR Chest 2 Views   Final Result   Impression:    1. Patchy mixed opacities in the right lower lobe, may represent   infection, edema, and/or atelectasis.    2. Stable cardiomegaly and small bilateral pleural effusions.      I have personally reviewed the examination and initial interpretation   and I agree with the findings.      DOTTIE MIRANDA MD            SYSTEM ID:  [id number]             Assessment & Plan    87yr old female with a history of HTN, CAD (s/p PCI in 1996, PCI to LAD and RCA in 2003, PCI to LAD x3 in 8/2020), severe aortic valve stenosis (s/p 26mm ES Ultra TAVR 9/12/22), PAD, HLD, HFpEF (LVEF 55-60% per TTE 12/2022), and anemia. She arrives today to the emergency department via EMS secondary to chest discomfort.  Upon arrival patient noted be alert.  She is presently afebrile and hemodynamically stable with mild systolic hypertension.  GCS currently 15.  She is interactive without signs of increased work of breathing.  My clinical suspicion based on exam for aortic pathology such as dissection or aneurysm would be low.  At this time this does not sound consistent with PE, pneumothorax, effusion, pneumonia warranting antibiotics but would plan for chest x-ray.  Certainly of concern is her history of cardiovascular disease and prior stents.  She does describe tightness and pressure concerning for ACS.  At this time she has chest pain-free.  She did receive nitroglycerin.  I would plan for stat EKG.  Heart score 4 placing her at moderate risk for major adverse cardiac events.  I do believe she would benefit from admission for trend of her troponins.    EKG on my interpretation demonstrating normal sinus rhythm with first-degree AV block.  No appreciable acute ST changes.  Left bundle branch block apparent and consistent with previous.  EKG abnormal.    Initial  troponin modestly elevated at 45.  Secondary to cardiovascular risk factors I do believe she requires trend of troponin overnight.  Case discussed with on-call cardiologist with recommendations for plan for overnight admission, trend of troponin.  On reevaluation patient remains chest pain-free.    I have reviewed the nursing notes. I have reviewed the findings, diagnosis, plan and need for follow up with the patient.    New Prescriptions    No medications on file       Final diagnoses:   Chest pain, unspecified type   Elevated troponin       Jamie Gordon  [facility] EMERGENCY DEPARTMENT  1/30/2023     Jamie Gordon MD  01/30/23 1922

## 2023-01-30 NOTE — TELEPHONE ENCOUNTER
Sangita (RN- [facility]) calling stating that patient had increased blood pressure this morning, 190/100, SOB, mild chest pain, administered PRN Clonidine and albuterol inhaler and patient felt some relief. BP check again this afternoon and was 188/100, patient again complaining of shortness of breath and chest tightness. Sangita states that patient's weight has increased as well: 1/21/23 148.2 lbs, today 1/30/23 154 lbs, no edema noted. No headache/dizziness noted. Writer completed triage with disposition to be seen now in ED, Sangita stated that she will tell patient and daughter and have them go to ED.    Reason for Disposition    Systolic BP >= 160 OR Diastolic >= 100, and any cardiac or neurologic symptoms (e.g., chest pain, difficulty breathing, unsteady gait, blurred vision)    Additional Information    Negative: Symptom is main concern (e.g., headache, chest pain)    Negative: Low blood pressure is main concern    Negative: Sounds like a life-threatening emergency to the triager    Protocols used: BLOOD PRESSURE - HIGH-A-OH    PACHECO Gooden RN  [facility]

## 2023-01-30 NOTE — ED NOTES
Bed: ED15  Expected date:   Expected time:   Means of arrival:   Comments:  H445 87F CP, SOB 5min @ 1612

## 2023-01-31 ENCOUNTER — APPOINTMENT (OUTPATIENT)
Dept: CARDIOLOGY | Facility: CLINIC | Age: 88
DRG: 293 | End: 2023-01-31
Payer: MEDICARE

## 2023-01-31 VITALS
BODY MASS INDEX: 27.11 KG/M2 | WEIGHT: 153 LBS | HEIGHT: 63 IN | RESPIRATION RATE: 19 BRPM | OXYGEN SATURATION: 93 % | DIASTOLIC BLOOD PRESSURE: 78 MMHG | HEART RATE: 67 BPM | TEMPERATURE: 97.7 F | SYSTOLIC BLOOD PRESSURE: 143 MMHG

## 2023-01-31 LAB
ALBUMIN SERPL BCG-MCNC: 3.7 G/DL (ref 3.5–5.2)
ALP SERPL-CCNC: 57 U/L (ref 35–104)
ALT SERPL W P-5'-P-CCNC: 33 U/L (ref 10–35)
ANION GAP SERPL CALCULATED.3IONS-SCNC: 11 MMOL/L (ref 7–15)
AST SERPL W P-5'-P-CCNC: 39 U/L (ref 10–35)
ATRIAL RATE - MUSE: 62 BPM
BASOPHILS # BLD AUTO: 0.1 10E3/UL (ref 0–0.2)
BASOPHILS NFR BLD AUTO: 1 %
BILIRUB SERPL-MCNC: 0.3 MG/DL
BUN SERPL-MCNC: 22.1 MG/DL (ref 8–23)
CALCIUM SERPL-MCNC: 9.6 MG/DL (ref 8.8–10.2)
CHLORIDE SERPL-SCNC: 107 MMOL/L (ref 98–107)
CREAT SERPL-MCNC: 1.06 MG/DL (ref 0.51–0.95)
DEPRECATED HCO3 PLAS-SCNC: 26 MMOL/L (ref 22–29)
DIASTOLIC BLOOD PRESSURE - MUSE: NORMAL MMHG
EOSINOPHIL # BLD AUTO: 0.2 10E3/UL (ref 0–0.7)
EOSINOPHIL NFR BLD AUTO: 5 %
ERYTHROCYTE [DISTWIDTH] IN BLOOD BY AUTOMATED COUNT: 21.6 % (ref 10–15)
GFR SERPL CREATININE-BSD FRML MDRD: 51 ML/MIN/1.73M2
GLUCOSE BLDC GLUCOMTR-MCNC: 85 MG/DL (ref 70–99)
GLUCOSE SERPL-MCNC: 95 MG/DL (ref 70–99)
HCT VFR BLD AUTO: 35.9 % (ref 35–47)
HGB BLD-MCNC: 10.9 G/DL (ref 11.7–15.7)
IMM GRANULOCYTES # BLD: 0 10E3/UL
IMM GRANULOCYTES NFR BLD: 0 %
INTERPRETATION ECG - MUSE: NORMAL
LVEF ECHO: NORMAL
LYMPHOCYTES # BLD AUTO: 1 10E3/UL (ref 0.8–5.3)
LYMPHOCYTES NFR BLD AUTO: 23 %
MAGNESIUM SERPL-MCNC: 2 MG/DL (ref 1.7–2.3)
MCH RBC QN AUTO: 28.9 PG (ref 26.5–33)
MCHC RBC AUTO-ENTMCNC: 30.4 G/DL (ref 31.5–36.5)
MCV RBC AUTO: 95 FL (ref 78–100)
MONOCYTES # BLD AUTO: 0.6 10E3/UL (ref 0–1.3)
MONOCYTES NFR BLD AUTO: 15 %
NEUTROPHILS # BLD AUTO: 2.4 10E3/UL (ref 1.6–8.3)
NEUTROPHILS NFR BLD AUTO: 56 %
NRBC # BLD AUTO: 0 10E3/UL
NRBC BLD AUTO-RTO: 0 /100
P AXIS - MUSE: 37 DEGREES
PLATELET # BLD AUTO: 154 10E3/UL (ref 150–450)
POTASSIUM SERPL-SCNC: 3.3 MMOL/L (ref 3.4–5.3)
POTASSIUM SERPL-SCNC: 3.9 MMOL/L (ref 3.4–5.3)
PR INTERVAL - MUSE: 222 MS
PROT SERPL-MCNC: 6.2 G/DL (ref 6.4–8.3)
QRS DURATION - MUSE: 162 MS
QT - MUSE: 504 MS
QTC - MUSE: 511 MS
R AXIS - MUSE: -6 DEGREES
RBC # BLD AUTO: 3.77 10E6/UL (ref 3.8–5.2)
SODIUM SERPL-SCNC: 144 MMOL/L (ref 136–145)
SYSTOLIC BLOOD PRESSURE - MUSE: NORMAL MMHG
T AXIS - MUSE: 172 DEGREES
TROPONIN T SERPL HS-MCNC: 56 NG/L
VENTRICULAR RATE- MUSE: 62 BPM
WBC # BLD AUTO: 4.2 10E3/UL (ref 4–11)

## 2023-01-31 PROCEDURE — 36415 COLL VENOUS BLD VENIPUNCTURE: CPT

## 2023-01-31 PROCEDURE — 93308 TTE F-UP OR LMTD: CPT | Mod: 26 | Performed by: INTERNAL MEDICINE

## 2023-01-31 PROCEDURE — 93321 DOPPLER ECHO F-UP/LMTD STD: CPT | Mod: 26 | Performed by: INTERNAL MEDICINE

## 2023-01-31 PROCEDURE — 93321 DOPPLER ECHO F-UP/LMTD STD: CPT

## 2023-01-31 PROCEDURE — 250N000011 HC RX IP 250 OP 636: Performed by: CASE MANAGER/CARE COORDINATOR

## 2023-01-31 PROCEDURE — 250N000011 HC RX IP 250 OP 636

## 2023-01-31 PROCEDURE — 80053 COMPREHEN METABOLIC PANEL: CPT

## 2023-01-31 PROCEDURE — 99222 1ST HOSP IP/OBS MODERATE 55: CPT | Mod: 25 | Performed by: INTERNAL MEDICINE

## 2023-01-31 PROCEDURE — 93325 DOPPLER ECHO COLOR FLOW MAPG: CPT | Mod: 26 | Performed by: INTERNAL MEDICINE

## 2023-01-31 PROCEDURE — 250N000013 HC RX MED GY IP 250 OP 250 PS 637

## 2023-01-31 PROCEDURE — 84132 ASSAY OF SERUM POTASSIUM: CPT

## 2023-01-31 PROCEDURE — 84484 ASSAY OF TROPONIN QUANT: CPT

## 2023-01-31 PROCEDURE — 83735 ASSAY OF MAGNESIUM: CPT | Performed by: CASE MANAGER/CARE COORDINATOR

## 2023-01-31 PROCEDURE — 250N000013 HC RX MED GY IP 250 OP 250 PS 637: Performed by: CASE MANAGER/CARE COORDINATOR

## 2023-01-31 PROCEDURE — 82962 GLUCOSE BLOOD TEST: CPT

## 2023-01-31 PROCEDURE — 85025 COMPLETE CBC W/AUTO DIFF WBC: CPT

## 2023-01-31 PROCEDURE — 93325 DOPPLER ECHO COLOR FLOW MAPG: CPT

## 2023-01-31 RX ORDER — POTASSIUM CHLORIDE 750 MG/1
40 TABLET, EXTENDED RELEASE ORAL ONCE
Status: COMPLETED | OUTPATIENT
Start: 2023-01-31 | End: 2023-01-31

## 2023-01-31 RX ORDER — LOSARTAN POTASSIUM 25 MG/1
25 TABLET ORAL DAILY
Status: COMPLETED | OUTPATIENT
Start: 2023-01-31 | End: 2023-01-31

## 2023-01-31 RX ORDER — HYDRALAZINE HYDROCHLORIDE 50 MG/1
50 TABLET, FILM COATED ORAL 3 TIMES DAILY
COMMUNITY
End: 2023-01-31

## 2023-01-31 RX ORDER — ISOSORBIDE MONONITRATE 30 MG/1
30 TABLET, EXTENDED RELEASE ORAL DAILY
Status: DISCONTINUED | OUTPATIENT
Start: 2023-02-01 | End: 2023-01-31 | Stop reason: HOSPADM

## 2023-01-31 RX ORDER — ISOSORBIDE MONONITRATE 30 MG/1
30 TABLET, EXTENDED RELEASE ORAL DAILY
Status: DISCONTINUED | OUTPATIENT
Start: 2023-01-31 | End: 2023-01-31

## 2023-01-31 RX ORDER — LOSARTAN POTASSIUM 50 MG/1
50 TABLET ORAL DAILY
Status: DISCONTINUED | OUTPATIENT
Start: 2023-02-01 | End: 2023-01-31 | Stop reason: HOSPADM

## 2023-01-31 RX ORDER — ACETAMINOPHEN 500 MG
500-1000 TABLET ORAL EVERY 6 HOURS PRN
COMMUNITY
End: 2023-02-07

## 2023-01-31 RX ORDER — FUROSEMIDE 20 MG
10 TABLET ORAL DAILY
Qty: 45 TABLET | Refills: 3 | Status: SHIPPED | OUTPATIENT
Start: 2023-01-31 | End: 2023-02-08

## 2023-01-31 RX ORDER — FUROSEMIDE 10 MG/ML
20 INJECTION INTRAMUSCULAR; INTRAVENOUS ONCE
Status: COMPLETED | OUTPATIENT
Start: 2023-01-31 | End: 2023-01-31

## 2023-01-31 RX ORDER — FUROSEMIDE 20 MG
10 TABLET ORAL DAILY
Qty: 45 TABLET | Refills: 3 | Status: SHIPPED | OUTPATIENT
Start: 2023-01-31 | End: 2023-01-31

## 2023-01-31 RX ORDER — ISOSORBIDE MONONITRATE 30 MG/1
60 TABLET, EXTENDED RELEASE ORAL DAILY
Status: DISCONTINUED | OUTPATIENT
Start: 2023-02-01 | End: 2023-01-31

## 2023-01-31 RX ORDER — LOSARTAN POTASSIUM 50 MG/1
50 TABLET ORAL DAILY
Qty: 90 TABLET | Refills: 3 | Status: SHIPPED | OUTPATIENT
Start: 2023-01-31 | End: 2023-02-08

## 2023-01-31 RX ORDER — METHOCARBAMOL 500 MG/1
500 TABLET, FILM COATED ORAL 4 TIMES DAILY PRN
COMMUNITY
End: 2023-02-13

## 2023-01-31 RX ADMIN — LAMOTRIGINE 25 MG: 25 TABLET ORAL at 08:39

## 2023-01-31 RX ADMIN — AZELASTINE 1 SPRAY: 1 SPRAY, METERED NASAL at 08:23

## 2023-01-31 RX ADMIN — DESVENLAFAXINE SUCCINATE 100 MG: 100 TABLET, EXTENDED RELEASE ORAL at 08:25

## 2023-01-31 RX ADMIN — THERA TABS 1 TABLET: TAB at 08:25

## 2023-01-31 RX ADMIN — ISOSORBIDE MONONITRATE 30 MG: 30 TABLET, EXTENDED RELEASE ORAL at 08:22

## 2023-01-31 RX ADMIN — HEPARIN SODIUM 5000 UNITS: 5000 INJECTION, SOLUTION INTRAVENOUS; SUBCUTANEOUS at 08:21

## 2023-01-31 RX ADMIN — LOSARTAN POTASSIUM 25 MG: 25 TABLET, FILM COATED ORAL at 12:25

## 2023-01-31 RX ADMIN — MEMANTINE 10 MG: 10 TABLET ORAL at 08:26

## 2023-01-31 RX ADMIN — LOSARTAN POTASSIUM 25 MG: 25 TABLET, FILM COATED ORAL at 08:25

## 2023-01-31 RX ADMIN — Medication 950 MG: at 08:25

## 2023-01-31 RX ADMIN — LEVOTHYROXINE SODIUM 50 MCG: 0.05 TABLET ORAL at 08:23

## 2023-01-31 RX ADMIN — POTASSIUM CHLORIDE 40 MEQ: 750 TABLET, EXTENDED RELEASE ORAL at 08:27

## 2023-01-31 RX ADMIN — FLUTICASONE FUROATE AND VILANTEROL TRIFENATATE 1 PUFF: 200; 25 POWDER RESPIRATORY (INHALATION) at 08:23

## 2023-01-31 RX ADMIN — CLONIDINE HYDROCHLORIDE 0.1 MG: 0.1 TABLET ORAL at 01:18

## 2023-01-31 RX ADMIN — Medication 400 MCG: at 08:27

## 2023-01-31 RX ADMIN — ASPIRIN 81 MG: 81 TABLET, CHEWABLE ORAL at 08:25

## 2023-01-31 RX ADMIN — LAMOTRIGINE 200 MG: 200 TABLET ORAL at 08:40

## 2023-01-31 RX ADMIN — LIOTHYRONINE SODIUM 10 MCG: 5 TABLET ORAL at 08:27

## 2023-01-31 RX ADMIN — HYDRALAZINE HYDROCHLORIDE 50 MG: 50 TABLET, FILM COATED ORAL at 08:25

## 2023-01-31 RX ADMIN — FUROSEMIDE 20 MG: 10 INJECTION, SOLUTION INTRAVENOUS at 10:56

## 2023-01-31 RX ADMIN — ROSUVASTATIN CALCIUM 40 MG: 40 TABLET, FILM COATED ORAL at 08:25

## 2023-01-31 RX ADMIN — FUROSEMIDE 20 MG: 10 INJECTION, SOLUTION INTRAVENOUS at 16:39

## 2023-01-31 ASSESSMENT — ACTIVITIES OF DAILY LIVING (ADL)
ADLS_ACUITY_SCORE: 37

## 2023-01-31 NOTE — ED NOTES
Discharge orders signed by TANESHA DURAN and med list faxed to [facility].

## 2023-01-31 NOTE — UTILIZATION REVIEW
[facility] Utilization Review  Admission Status; Secondary Review Determination     Admission Date: 1/30/2023  4:25 PM      Under the authority of the Utilization Management Committee, the utilization review process indicated a secondary review on the above patient.  The review outcome is based on review of the medical records, discussions with staff, and applying clinical experience noted on the date of the review.        (X)      Inpatient Status Appropriate - This patient's medical care is consistent with medical management for inpatient care and reasonable inpatient medical practice.          RATIONALE FOR DETERMINATION   87-year-old female with history of asthma, heart failure with preserved ejection fraction, coronary artery disease status post PTCA, hypertension, hyperlipidemia, peripheral vascular disease, iron deficiency anemia, admitted for complaints of chest pain and pressure.  Patient also has hypertensive urgency with significantly elevated blood pressures, elevated troponins, started on IV Lasix, with elevated BNP, adjusting blood pressure medications.  Complex patient who has multiple acute issues and needs further work-up with ongoing interventions including adjustment in medications and requiring IV diuresis, anticipate more than 2 midnight hospital stay with ongoing interventions, recommend continue inpatient status      The severity of illness, intensity of service provided, expected LOS and risk for adverse outcome make the care complex, high risk and appropriate for hospital admission.The patient requires hospital based medical care which is anticipated to require a stay of 2 or more midnights; according to CMS guidelines the patient should be admitted as inpatient        The information on this document is developed by the utilization review team in order for the business office to ensure compliance.  This only denotes the appropriateness of proper admission status and does not  reflect the quality of care rendered.         The definitions of Inpatient Status and Observation Status used in making the determination above are those provided in the CMS Coverage Manual, Chapter 1 and Chapter 6, section 70.4.      Sincerely,       Andrey Lemus MD  Physician Advisor  Utilization Review-[place]    Phone: [phone]

## 2023-01-31 NOTE — PHARMACY
"The following home medications were NOT continued on inpatient admission per \"Discontinuation of nonessential home medications during hospitalization\" policy: alendronate, vitamin D3 1250 mcg once weekly    If a therapeutic holiday is deemed inappropriate per the prescriber, please notify the pharmacist regarding the medication order.    The pharmacist is available to answer any questions and/or concerns the patient may have regarding discontinuation of non-essential medications.    Please ensure that these medications are restarted as needed upon discharge via the medication reconciliation discharge process and included on the discharge medication reconciliation report.    Thank you,  Camila Lopez RPH    "

## 2023-01-31 NOTE — PROGRESS NOTES
[facility]   Cardiology   Progress Note     ASSESSMENT/PLAN:  Kamryn Miller is a 87 year old year old female with PMHx of HFpEF (EF 55-60%, 11/30/22), CAD s/p PTCA 1996, LAD and RCA stenting 2003, s/p LAD PCI x 3 8/2020, severe AS s/p 26 mm ES Ultra TAVR 9/12/2022, HTN, HLD, PAD, and iron deficiency anemia (followed with GI) who was admitted on 1/30/2023 for chest pain    Today's Update:  - Redose Lasix 20mg IV  - Discontinue hydralazine, increase Imur & Losartan    # Acute chest pain  # History of CAD s/p PTCA 1996, LAD and RCA stenting 2003, s/p LAD PCI x 3 8/2020  # HFpEF (EF 55-60%, 11/30/22)  # Severe AS s/p 26 mm ES Ultra TAVR 9/12/2022  #HTN  Patient presented with chest pain that started at rest. Similar to her hospitalization in 10/2022 when she underwent coronary angiogram on 10/25/22 with moderate non obstructive CAD. Discharged with ASA and Brilinta. However, she was admitted again on 11/30/22 for a syncopal episode, and was found to have Hb of 6.4. Brillanta discontinued permanently and ASA held until Hgb stable. Low suspicion of acute obstructive coronary process.  - Troponins stable 40-50s  - NT-BNP 5809  -Admission weight 153 lbs (home weight 145-146)  - Redose Lasix 20mg IV   - Afterload reduction:   - discontinue hydralazine   - Cont PTA Imur 30mg PO daily   - Increase Losartan to 50mg daily tomorrow (give add'l 25mg today)  - Resume ASA 81   - T/c starting spironolactone closer to discharge for gentle diuresis   - Cont SLT NG for pain  - Daily troponins  - Strict I&O's with daily weight  - Low Na diet & 2 L fluid restriction  - Monitor K & Mg with goal K > 4 & Mg > 2    # Hypothyroidism  TSH WNL  - Continue PTA levothyroxine and liothyrodine     FEN: 2g Na diet, 2L fluid restriction  Code status: FULL  Prophylaxis:  SCDs, ASA  Isolation: Standard  Disposition: Pending clinical course. Lives alone in apartment in assisted living facility    Patient seen and  discussed with Dr. Kraus, who agrees with above plan.    Toma Andujar, APRN, CNP  [facility] Cardiology Team  Pager [phone]/ASCOM [phone]    Interval History:  - No acute events overnight  - Still experiencing chest tightness    Physical Exam:  Temp:  [97.7  F (36.5  C)-98.3  F (36.8  C)] 97.7  F (36.5  C)  Pulse:  [60-71] 60  Resp:  [14-26] 16  BP: (143-181)/(68-96) 143/82  SpO2:  [87 %-98 %] 87 %    I/O:    Intake/Output Summary (Last 24 hours) at 1/31/2023 1013  Last data filed at 1/31/2023 0800  Gross per 24 hour   Intake 360 ml   Output 1150 ml   Net -790 ml         Wt:   Wt Readings from Last 5 Encounters:   01/31/23 69.4 kg (153 lb)   01/24/23 69.4 kg (153 lb)   01/11/23 68.3 kg (150 lb 9.6 oz)   01/03/23 68.3 kg (150 lb 8 oz)   12/29/22 67.1 kg (148 lb)         General: NAD  HEENT:  PERRLA, EOMI.   Neck: JVD mildly elevate.   CV: RRR. No murmur appreciated. No rubs or gallops. Peripheral radial pulse intact.  Resp: No increased work of breathing or use of accessory muscles, breathing comfortably on room air.  Lung sounds clear throughout/bilaterally  Abdomen:  Normal active bowel sounds.  Abdomen is soft. No distension, non-tender to palpation.    Extremities: Warm. Capillary refill less than 3 sec. 2/4 radial pulses bilaterally.  2/4 pedal pulses bilaterally. No pre-tibial edema. No cyanosis or clubbing.  Skin:  Warm and dry. No erythema, rashes, ulceration or diaphoresis.  Neuro: Alert and oriented x3.      Medications:    aspirin  81 mg Oral Daily     azelastine  1 spray Both Nostrils Daily     calcium citrate  950 mg Oral Daily     desvenlafaxine  100 mg Oral Daily     fluticasone-vilanterol  1 puff Inhalation Daily     folic acid  400 mcg Oral Daily     heparin ANTICOAGULANT  5,000 Units Subcutaneous Q12H     iron polysaccharides  150 mg Oral Daily     isosorbide mononitrate  30 mg Oral Daily     [START ON 2/1/2023] isosorbide mononitrate  60 mg Oral Daily     lamoTRIgine  200 mg Oral Daily     lamoTRIgine  25  mg Oral Daily     levothyroxine  50 mcg Oral QAM AC     liothyronine  10 mcg Oral Daily     losartan  25 mg Oral Daily     [START ON 2/1/2023] losartan  50 mg Oral Daily     memantine  10 mg Oral Daily     mirtazapine  7.5 mg Oral At Bedtime     multivitamin, therapeutic  1 tablet Oral Daily     rosuvastatin  40 mg Oral Daily     sodium chloride (PF)  3 mL Intracatheter Q8H       - MEDICATION INSTRUCTIONS -         Labs:   CMP  Recent Labs   Lab 01/31/23  0615 01/30/23  1654    143   POTASSIUM 3.3* 4.0   CHLORIDE 107 108*   CO2 26 23   ANIONGAP 11 12   GLC 95 100*   BUN 22.1 24.3*   CR 1.06* 1.13*   GFRESTIMATED 51* 47*   PRINCE 9.6 9.3   MAG 2.0  --    PROTTOTAL 6.2* 6.0*   ALBUMIN 3.7 3.5   BILITOTAL 0.3 0.2   ALKPHOS 57 56   AST 39* 39*   ALT 33 29     CBC  Recent Labs   Lab 01/31/23  0615 01/30/23  1654   WBC 4.2 4.7   RBC 3.77* 3.51*   HGB 10.9* 10.2*   HCT 35.9 34.4*   MCV 95 98   MCH 28.9 29.1   MCHC 30.4* 29.7*   RDW 21.6* 22.1*    159     INRNo lab results found in last 7 days.  Arterial Blood GasNo lab results found in last 7 days.    Diagnostics:  ECG: NSR w/ LBBB HR 62      Echo: 12/1/22  Interpretation Summary  Global and regional left ventricular function is normal with an EF of 55-60%.  Global right ventricular function is normal.  Status post 26 mm Arndt Janis Ultra valve TAVR on 9/12/2022. The valve is  well seated. MG 11mmHg; PV 2.2 m/s; trace paravalvular AI.  The inferior vena cava is normal.     No significant change compared with the study from 10/24/22.  ______________________________________________________________________________  Left Ventricle  Global and regional left ventricular function is normal with an EF of 55-60%.     Right Ventricle  The right ventricle is normal size. Global right ventricular function is  normal.     Mitral Valve  Severe mitral annular calcification is present. The mean pressure gradient is  3.8 mmHg at a heart rate of 60 bpm.     Aortic Valve  Status  post 26 mm Arndt Janis Ultra valve TAVR on 9/12/2022. The valve is  well seated. MG 11mmHg; PV 2.2 m/s; trace paravalvular AI. A bioprosthetic  aortic valve is present. The peak velocity across the aortic valve is 2.2  m/sec. The mean gradient is 11 mmHg.     Tricuspid Valve  The tricuspid valve is normal. The peak velocity of the tricuspid regurgitant  jet is not obtainable. Pulmonary artery systolic pressure cannot be assessed.     Pulmonic Valve  The pulmonic valve is normal. Mild pulmonic insufficiency is present.     Vessels  The thoracic aorta is normal. The aorta root cannot be assessed. The inferior  vena cava is normal. Ascending aorta 3.7 cm.    Coronary angiogram: 10/25/22 with moderate non obstructive CAD.    Recent Results (from the past 24 hour(s))   XR Chest 2 Views    Narrative    Exam: XR CHEST 2 VIEWS, 1/30/2023 5:26 PM    Comparison: 12/6/2022, 12/5/2022    History: Chest pain    Findings:  Portable AP and lateral view of the chest. Postsurgical changes of  aortic valve replacement.    Trachea is midline. Cardiomediastinal silhouette is enlarged. Patchy  mixed opacities in the right lower lobe. There is no pneumothorax.  Stable small bilateral pleural effusions.. The upper abdomen is  unremarkable. No acute osseous abnormality. Chronic rib fractures  better seen on prior CT.      Impression    Impression:   1. Patchy mixed opacities in the right lower lobe, may represent  infection, edema, and/or atelectasis.   2. Stable cardiomegaly and small bilateral pleural effusions.    I have personally reviewed the examination and initial interpretation  and I agree with the findings.    DOTTIE MIRANDA MD         SYSTEM ID:  [id number]       Medical Decision Making       35 MINUTES SPENT BY ME on the date of service doing chart review, history, exam, documentation & further activities per the note.

## 2023-01-31 NOTE — H&P
[facility]   Cardiology I Service  History & Physical    Kamryn Miller  : 1935  MRN # [id number]    ADMIT DATE: 2023    PCP: Rolanda Wilcox MD    Primary cardiologist: Dr. Zuniga    CHIEF COMPLAINT:    HPI:   Kamryn Miller is a 87 year old female with PMHx of HFpEF (EF 55-60%, 22), CAD s/p PTCA , LAD and RCA stenting , s/p LAD PCI x 3 2020, severe AS s/p 26 mm ES Ultra TAVR 2022, HTN, HLD, PAD, and iron deficiency anemia (followed with GI) who was admitted on 2023 for chest pain    The patient notes that over the since last night, she has been having pressure like chest pain in the middle of the chest. The pain lasts for less than an hour and it comes and goes. Patient reports it also happens at rest. It is 4-5/10 in intensity and does not change with breathing. The patient notes that the pain is similar to the one she had prior to being stented. The pain improved with SL NTG that she received on the ambulance. Denies fever, worsening shortness of breath, dyspnea on exertion, cough, PND, orthopnea, LE edema, palpitations, syncope or near-syncope, hemoptysis, nausea, vomiting, abdominal pain, melena, hematochezia, constipation, diarrhea, dysuria, hematuria, headaches, local weakness, numbness/tingling of hands/feet.    She presented with similar symptoms in 10/24/2022, and underwent coronary angiogram on 10/25/22 with moderate non obstructive CAD. Discharged with ASA and Brilinta. However, she was admitted again on 22 for a syncopal episode, and was found to have Hb of 6.4, which became stable afterwards. Cardiology was consulted for DAPT management, and had recommended that Brilinta be discontinued as her procedure was in 2022 and she may have evidence of acute hemorrhage, as well as ASA be held during that time, and resume per primary team when her Hb and bleeding are stable. Last seen by GI on 23 , and recommended.  -- Continue supportive  management with PO/IV iron supplementation.  -- Patient to continue to monitor with primary Cardiologist for hemoglobin monitoring.  -- No plans for endoscopic interventions at this time.  -- Follow-up in GI clinic PRN.    However, ASA was still not resumed.    In the ED, hemodynamically stable, but hypertensive with BP of 170s/90s, HR 70. No active chest pain, per ED provider. EKG with old LBBB, no significant ST-T change. Troponin was 45 (baseline upper 30s-40).    ROS:   12-pt ROS otherwise negative except as noted above    PMH:  Past Medical History:   Diagnosis Date     Aortic valvar stenosis 7/2010    mild     Asthma      Bipolar disorder (H)      CAD (coronary artery disease)     s/p angioplasty     Celiac disease      Colon polyps      Colon polyps 2012    every 3 year colonoscopy      Depression      High cholesterol      HTN      Mitral insufficiency      Pulmonary HTN (H)     mild     Tremors 7/10    drug induced from antidepressants     Tricuspid insufficiency        PSH:  Past Surgical History:   Procedure Laterality Date     ANGIOGRAM  6/26/2009     ANGIOPLASTY  9/96    for angina     ANGIOPLASTY  8/03 - 9/03    X -2 - with stenst in coronary car.     APPENDECTOMY       BREAST BIOPSY, RT/LT      Breat Biopsy RT/LT, benign     BUNIONECTOMY  11/8/2011    Procedure:BUNIONECTOMY; Left donna bunionectomy; Surgeon:FREDY LITTLEJOHN; Location:[facility] OR     BUNIONECTOMY RT/LT  5/2007    right bunion and right 2nd hammertoe     CATARACT IOL, RT/LT  6/12 and 7/12    bilateral     COLONOSCOPY  2007, 2012    every 3 years for polyps     CV CORONARY ANGIOGRAM N/A 8/21/2020    Procedure: CV CORONARY ANGIOGRAM;  Surgeon: Gianluca Toscano MD;  Location:  [facility]     CV CORONARY ANGIOGRAM N/A 10/25/2022    Procedure: Coronary Angiogram;  Surgeon: Carter Douglass MD;  Location:  [facility]ATH [facility]     CV INSTANTANEOUS WAVE-FREE RATIO N/A 10/25/2022    Procedure: Instantaneous  Wave-Free Ratio;  Surgeon: Carter Douglass MD;  Location:  HEART CARDI[facility] CATH LAB     CV LEFT HEART CATH N/A 8/21/2020    Procedure: CV LEFT HEART CATH;  Surgeon: Gianluca Toscano MD;  Location: [facility]     CV LEFT HEART CATH N/A 11/3/2020    Procedure: CV LEFT HEART CATH;  Surgeon: Tom Burrows MD;  [facility]     CV LOWER EXTREMITY ANGIOGRAM BILATERAL N/A 11/3/2020    Procedure: CV ANGIOGRAM LOWER EXTREMITY BILATERAL;  Surgeon: Tom Burrows MD;  [facility]     CV PCI STENT DRUG ELUTING N/A 8/21/2020    Procedure: Percutaneous Coronary Intervention Stent Drug Eluting;  Surgeon: Gianluca Toscano MD;  Location:  [facility]ATH LAB     CV RIGHT HEART CATH MEASUREMENTS RECORDED N/A 8/21/2020    Procedure: CV RIGHT HEART CATH;  Surgeon: Gianluca Toscano MD;  Location:  [facility] CATH LAB     CV RIGHT HEART CATH MEASUREMENTS RECORDED N/A 11/3/2020    Procedure: CV RIGHT HEART CATH;  Surgeon: Tom Burrows MD;  Location:  HE[facility]ARD[facility] CATH LAB     CV TRANSCATHETER AORTIC VALVE REPLACEMENT N/A 9/12/2022    Procedure: Transfemoral Transcatheter Aortic Valve Replacement with possible open heart bypass and or balloon pump placement and any indicated procedure;  Surgeon: Tom Burrows MD;  Location:  HE[facility]ARD[facility] CATH LAB     HEART CATH FEMORAL CANNULIZATION WITH OPEN STANDBY REPAIR AORTIC VALVE N/A 9/12/2022    Procedure: OR TRANSCATHETER AORTIC VALVE REPLACEMENT, OPEN FEMORAL ARTERY APPROACH;  Surgeon: Arthur Markham MD;  Location:  HEART CARDIAC CATH LAB     JOINT REPLACEMENT, HIP RT/LT  4/2008    right hip replaced     JOINT REPLACEMENT, HIP RT/LT  4/2009    left hip replaced     REPAIR HAMMER TOE  11/8/2011    Procedure:REPAIR HAMMER TOE; left 2nd hammertoe repair; Surgeon:FREDY LITTLEJOHN; Location: OR     SURGICAL HISTORY OF -   11/11    left bunion and 2nd  hammertoe repair     TUBAL LIGATION       ZZC ANESTH,BLEPHAROPLASTY      (R) for drooping eyelid     ZZC APPENDECTOMY         MEDICATIONS:  Prior to Admission Medications   Prescriptions Last Dose Informant Patient Reported? Taking?   LAMOTRIGINE 200 MG PO TABS  Self Yes No   Sig: Take 200 mg by mouth daily with 25 mg tablet for a total dose of 225 mg   Multiple Vitamin (TAB-A-JENNIFER) TABS   No No   Sig: Take 1 tablet by mouth daily   REGULOID 28.3 % POWD   No No   Sig: MIX 1 TEASPOON IN LIQUID AND DRINK BY MOUTH ONCE DAILY   Patient not taking: Reported on 1/3/2023   albuterol (PROAIR HFA/PROVENTIL HFA/VENTOLIN HFA) 108 (90 Base) MCG/ACT inhaler   No No   Sig: Inhale 2 puffs into the lungs every 6 hours as needed for wheezing or shortness of breath / dyspnea   alendronate (FOSAMAX) 70 MG tablet   No No   Sig: TAKE 1 TABLET BY MOUTH ONCE WEEKLY   azelastine (ASTEPRO) 0.15 % nasal spray   No No   Sig: USE 1 SPRAY IN EACH NOSTRIL ONCE A DAY   calcium citrate (CITRACAL) 950 (200 Ca) MG tablet   No No   Sig: TAKE 1 TABLET BY MOUTH ONCE DAILY   cholecalciferol (VITAMIN D3) 1250 mcg (62310 units) capsule   No No   Sig: TAKE 1 CAPSULE BY MOUTH ONCE WEEKLY   cloNIDine (CATAPRES) 0.1 MG tablet   No No   Sig: Take 1 tablet (0.1 mg) by mouth daily as needed (For SBP > 180)   desvenlafaxine (PRISTIQ) 100 MG 24 hr tablet  Self Yes No   Sig: Take 100 mg by mouth daily   fluticasone-salmeterol (ADVAIR DISKUS) 500-50 MCG/ACT inhaler   No No   Sig: INHALE 1 PUFF BY MOUTH TWICE DAILY   Patient taking differently: 1 puff daily INHALE 1 PUFF BY MOUTH TWICE DAILY   folic acid (FOLVITE) 400 MCG tablet   Yes No   Sig: Take 1 tablet (400 mcg) by mouth daily   hydrALAZINE (APRESOLINE) 25 MG tablet   No No   Sig: Take 2 tablets (50 mg) by mouth 3 times daily   Patient not taking: Reported on 1/24/2023   hydrALAZINE (APRESOLINE) 50 MG tablet   Yes No   Sig: Take 50 mg by mouth 3 times daily   iron polysaccharides (NIFEREX) 150 MG capsule   No  No   Sig: Take 1 capsule (150 mg) by mouth daily   isosorbide mononitrate (IMDUR) 30 MG 24 hr tablet   No No   Sig: Take 1 tablet (30 mg) by mouth daily   lamoTRIgine (LAMICTAL) 25 MG tablet  Self Yes No   Sig: Take 25 mg by mouth daily with 200 mg tablet for a total dose of 225 mg   levothyroxine (SYNTHROID/LEVOTHROID) 50 MCG tablet   No No   Sig: TAKE 1 TABLET BY MOUTH ONCE DAILY   liothyronine (CYTOMEL) 5 MCG tablet   No No   Sig: Take 2 tablets (10 mcg) by mouth daily   losartan (COZAAR) 25 MG tablet   No No   Sig: Take 1 tablet (25 mg) by mouth daily   memantine (NAMENDA) 10 MG tablet   No No   Sig: Take 1 tablet (10 mg) by mouth daily   mirtazapine (REMERON) 7.5 MG tablet   No No   Sig: Take 1 tablet (7.5 mg) by mouth At Bedtime   psyllium (METAMUCIL/KONSYL) 58.6 % powder   Yes No   Sig: Take 1 tspn in liquid daily   rosuvastatin (CRESTOR) 40 MG tablet   No No   Sig: TAKE 1 TABLET BY MOUTH ONCE DAILY      Facility-Administered Medications: None        ALLERGIES:     Allergies   Allergen Reactions     Ace Inhibitors Cough     Amlodipine      Headache and plugged ears     Diclofenac Other (See Comments)     Balance problems     Gluten Meal Diarrhea       FAMILY HISTORY:  Family History   Problem Relation Age of Onset     Asthma Mother      Cerebrovascular Disease Mother      Arthritis Mother      Depression Mother      Lipids Sister      Hypertension Sister      Heart Disease Sister      Lipids Sister      Hypertension Sister      Lipids Sister      Breast Cancer Sister        SOCIAL HISTORY:  Social History     Socioeconomic History     Marital status:      Spouse name: Adrien     Number of children: 2     Years of education: 16     Highest education level: Not on file   Occupational History     Employer: RETIRED     Comment: Retired in 2002.    Tobacco Use     Smoking status: Never     Smokeless tobacco: Never   Vaping Use     Vaping Use: Never used   Substance and Sexual Activity     Alcohol use: No      Alcohol/week: 0.0 standard drinks     Types: 1 Standard drinks or equivalent per week     Drug use: No     Sexual activity: Not Currently     Partners: Male   Other Topics Concern     Parent/sibling w/ CABG, MI or angioplasty before 65F 55M? Not Asked   Social History Narrative     Not on file     Social Determinants of Health     Financial Resource Strain: Not on file   Food Insecurity: Not on file   Transportation Needs: Not on file   Physical Activity: Not on file   Stress: Not on file   Social Connections: Not on file   Intimate Partner Violence: Not on file   Housing Stability: Not on file       PHYSICAL EXAM:  Blood pressure (!) 173/96, pulse 68, temperature 98.3  F (36.8  C), temperature source Oral, resp. rate 18, SpO2 95 %, not currently breastfeeding.  GENERAL: NAD, on RA  HEENT: EOMI, PERRLA  NECK: Supple and without lymphadenopathy. JVP 12 cm  CV: S1/S2 heard without murmur, regular heart beat   RESPIRATORY: Normal breath sounds, no wheezes or crackles  GI: Soft and non distended with normoactive bowel sounds present in all quadrants. No tenderness, rebound, guarding  EXTREMITIES: No peripheral edema. 2+ bilateral pedal pulses.   NEUROLOGIC: OAx 3. CN II-XII grossly intact. No focal deficits.   MUSCULOSKELETAL: No joint swelling or tenderness.   SKIN: No jaundice. No acute rashes or lesions.     LABS:  BMP  Recent Labs   Lab 01/30/23  1654      POTASSIUM 4.0   CHLORIDE 108*   CO2 23   BUN 24.3*   CR 1.13*   *   PRINCE 9.3     CBC  Recent Labs   Lab 01/30/23  1654   WBC 4.7   HGB 10.2*   HCT 34.4*   MCV 98      LYMPH 22     INRNo lab results found in last 7 days.  LFTs  Recent Labs   Lab 01/30/23  1654   ALKPHOS 56   AST 39*   ALT 29   BILITOTAL 0.2   PROTTOTAL 6.0*   ALBUMIN 3.5      INFNo lab results found in last 7 days.    IMAGING:    Results for orders placed or performed during the hospital encounter of 01/30/23   XR Chest 2 Views    Narrative    Exam: XR CHEST 2 VIEWS, 1/30/2023  5:26 PM    Comparison: 12/6/2022, 12/5/2022    History: Chest pain    Findings:  Portable AP and lateral view of the chest. Postsurgical changes of  aortic valve replacement.    Trachea is midline. Cardiomediastinal silhouette is enlarged. Patchy  mixed opacities in the right lower lobe. There is no pneumothorax.  Stable small bilateral pleural effusions.. The upper abdomen is  unremarkable. No acute osseous abnormality. Chronic rib fractures  better seen on prior CT.      Impression    Impression:   1. Patchy mixed opacities in the right lower lobe, may represent  infection, edema, and/or atelectasis.   2. Stable cardiomegaly and small bilateral pleural effusions.    I have personally reviewed the examination and initial interpretation  and I agree with the findings.    DOTTIE MIRANDA MD         SYSTEM ID:  [id number]       ASSESSMENT & PLAN:  Kamryn Miller is a 87 year old female with PMHx of HFpEF (EF 55-60%, 11/30/22), CAD s/p PTCA 1996, LAD and RCA stenting 2003, s/p LAD PCI x 3 8/2020, severe AS s/p 26 mm ES Ultra TAVR 9/12/2022, HTN, HLD, PAD, and iron deficiency anemia (followed with GI) who was admitted on 1/30/2023 for chest pain    # Acute chest pain  # History of CAD s/p PTCA 1996, LAD and RCA stenting 2003, s/p LAD PCI x 3 8/2020  Patient presented with chest pain that started at rest. Similar to her hospitalization in 10/2022 when she underwent coronary angiogram on 10/25/22 with moderate non obstructive CAD. Discharged with ASA and Brilinta. However, she was admitted again on 11/30/22 for a syncopal episode, and was found to have Hb of 6.4, which became stable afterwards. Cardiology was consulted for DAPT management, and had recommended that Brilinta be discontinued as her procedure was in 09/2022 and she may have evidence of acute hemorrhage, as well as ASA be held during that time, and resume per primary team when her Hb and bleeding are stable. Given that her Hb seems to be stable over the past  month, will resume ASA 81 mg qday, trend troponin, and monitor symptom/EKG.  Plan:  - Resume ASA 81 mg qday  - Trend troponin  - SL NTG PRN for pain  - NPO at [facility] for possible procedure, though less likely    # HFpEF (EF 55-60%, 11/30/22)  # Severe AS s/p 26 mm ES Ultra TAVR 9/12/2022  - Etiology: multifactorial, ischemic and valvular  - Ischemic work up:  coronary angiogram on 10/25/22 with moderate non obstructive CAD  - Last TTE: 12/1/22- EF 55-60%  - EKG: SR, 1st degree AV block, old LBBB  - Volume status: currently mild hypervolemic, 153 lbs on admission (-150 lbs)  - NT-proBNP: pending  - Diuretics: none  - Inotropes: none  - Guideline-directed therapy for HFpEF as below:   - MRA:none   - SGLT2i: none  - Afterload reduction: Hydralazine 50 mg TID, Imdur 30 mg qday  - Devices: none  - Antiplatelets: none, resuming aspirin 81 mg qday  - Statins: PTA rosuvastatin  - Anticoagulation: none  - SCD PPX: not indicated  - Mechanical circulatory support: none    Plan:  - Lasix 20 mg IV once  - Strict I&O's with daily weight  - Low Na diet & 2 L fluid restriction  - Monitor K & Mg with goal K > 4 & Mg > 2     # HTN  BP 170s/90s in the ED.  Plan:  - Continue PTA losartan 25 mg qday  - Continue PTA hydralazine 50 mg TID  - Continue PTA imdur 30 mg qday   - Continue PTA clonidine PRN for SBP > 180     -----------------------------------------Chronic Medical Problems---------------------------------------    # Bipolar  # MDD  # Insomnia  - Continue PTA Lamictal 225 mg daily  - Continue PTA Pristiq 100 mg daily   - Continue PTA Namenda 10 mg daily  - Continue PTA Remeron 7.5 mg every night  - Melatonin PRN     # Hypothyroidism  TSH WNL  - Continue PTA levothyroxine and liothyrodine       Floor Care  Fluids: 1.5L fluid restriction  Food: 2 gram sodium diet  DVT ppx: already anticoagulated  Catheters: none  Lines: PIV  Code Status: full  Discharge plan: inpatient admission, anticipate discharge to prior living situation  in 2-3 days      To be staffed in the AM with the cardiology team.      Tessa Whitaker MD  Internal Medicine Resident (PGY-2)  [facility]  Pager: [phone]

## 2023-01-31 NOTE — DISCHARGE INSTRUCTIONS
- Please make an appointment to see your primary care provider and have lab work (BMP and CBC) in one week from now.    - Please follow up with Dr. Zuniga as previously scheduled on 2/15/22.     Future Appointments   Date Time Provider Department Center   2/15/2023  2:00 PM  LAB [facility]   2/15/2023  2:30 PM Madhu Zuniga MD [facility]   2/20/2023 11:00 AM Rolanda Wilcox MD [facility]   3/13/2023 10:30 AM [id number]   3/13/2023 12:00 PM Pati De Santiago NP [facility]     - Please keep a log of your daily weights and blood pressures. Bring this with you to your upcoming appointments.          Take your medicines every day, as directed     Changes made today:     STOP Hydralazine, INCREASE Losartan to 50mg     START Lasix 10mg daily (start 2/1/23)     Please come to the ER if you are having nausea that prevents you from taking your medications, vomiting, chest pain that is different than your normal, inability to stand up or complete your daily tasks because of dizziness, passing out, shortness of breath that is worsening       Monitor Your Weight and Symptoms     Contact us if you:     Gain 2 pounds in one day or 5 pounds in one week  Feel more short of breath  Notice more leg swelling  Feel lightheadeded    Change your lifestyle     Limit Salt or Sodium:  2000 mg  Limit Fluids:  2000 mL or approximately 64 ounces  Eat a Heart Healthy Diet  Low in saturated fats  Stay Active:  Aim to move at least 150 minutes every  week            To Contact us     During Business Hours:  [phone], option # 1      After hours, weekends or holidays:   [phone], Option #4  Ask to speak to the On-Call Cardiologist. Inform them you are a CORE/heart failure patient at the [facility].       Use [employer] allows you to communicate directly with your heart team through secure messaging.  [employer] can be accessed any time on your phone, computer, or tablet.  If you need assistance, we'd be  happy to help!             Keep your Heart Appointments:     - Dr. Zuniga on 2/15/23     - Schedule with primary care provider in one week (with labs)

## 2023-01-31 NOTE — DISCHARGE SUMMARY
[address] [address]  p: [phone]    Discharge Summary: Cardiology Service    Kamryn Miller MRN# [id number]   YOB: 1935 Age: 87 year old       Admission Date: 1/30/2023  Discharge Date: 01/31/23    Discharge Diagnoses:  1. Hx of CAD s/p PTCA (1996), PCI to RCA (2003), PCI to LAD x 3 (8/2020)  2. 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 Severe Aortic Stenosis s/p TAVR (9/12/2022)  4. Hypertension  5. Hyperlipidemia  6. Peripheral Artery Disease  7. Hypothyroidism    Brief HPI:  Kamryn Miller is a 87 year old year old female with PMHx of HFpEF (EF 55-60%, 11/30/22), CAD s/p PTCA 1996, LAD and RCA stenting 2003, s/p LAD PCI x 3 8/2020, severe AS s/p 26 mm ES Ultra TAVR 9/12/2022, HTN, HLD, PAD, and iron deficiency anemia (followed with GI) who was admitted on 1/30/2023 for chest pain    Hospital Course by Diagnosis:  # History of CAD s/p PTCA 1996, LAD and RCA stenting 2003, s/p LAD PCI x 3 8/2020  # HFpEF (EF 55-60%, 11/30/22)  # Severe AS s/p 26 mm ES Ultra TAVR 9/12/2022  # Hypertension  # Hyperlipidemia  # Peripheral Artery Disease  Patient presented with chest pain that started at rest. Similar to her hospitalization in 10/2022 when she underwent coronary angiogram on 10/25/22 with moderate non obstructive CAD. Discharged at that time with ASA and Brilinta. However, she was admitted again on 11/30/22 for a syncopal episode, and was found to have Hb of 6.4. Brillanta discontinued permanently and ASA held until Hgb stable. Low suspicion of acute obstructive coronary process. Limited echo with EF 55-60%, no WMA's, no significant valve disease. Troponin's flat 45 --> 53 --> 56. NT-P BNP 5809. BP's 140's -  160's. Patient denies edema but does not small increase in weight over the last couple of days.   Admission weight 153 lbs (home weight 145-146). Suspect combination of HTN and hypervolemia. She was diuresed  with IV Lasix 20mg BID.   - Fluid Status: Near euvolemic; start PO Lasix 10mg daily  - PTA Losartan 25mg increase to 50mg  - PTA Hydralazine discontinued  - Continue PTA Crestor 40mg q HS  - Follow-up with CORE clinic as previously scheduled on 2/15/23  - Daily weights / Low Na Diet      # Hypothyroidism  Most recent TSH normal 0.68 on 11/30/22.   - Continue PTA levothyroxine  - Continue PTA liothyronine    # Iron Deficiency Anemia  Follows outpatient with GI. Hemoglobin 10.9 on admission.   - Hemoglobin at discharge 10.9  - Continue PTA Iron Polysaccharides     Pertinent Procedures:  1. None    Consults:  None    Medication Changes:  - STOP Hydralazine  - INCREASE Losartan to 50mg   - START Lasix 10mg daily     Discharge medications:   Current Discharge Medication List      START taking these medications    Details   furosemide (LASIX) 20 MG tablet Take 0.5 tablets (10 mg) by mouth daily  Qty: 45 tablet, Refills: 3    Associated Diagnoses: NYHA class 3 heart failure with preserved ejection fraction (H)         CONTINUE these medications which have CHANGED    Details   losartan (COZAAR) 50 MG tablet Take 1 tablet (50 mg) by mouth daily  Qty: 90 tablet, Refills: 3    Associated Diagnoses: Stage 3b chronic kidney disease (H); Hypertensive kidney disease with stage 3b chronic kidney disease (H)         CONTINUE these medications which have NOT CHANGED    Details   albuterol (PROAIR HFA/PROVENTIL HFA/VENTOLIN HFA) 108 (90 Base) MCG/ACT inhaler Inhale 2 puffs into the lungs every 6 hours as needed for wheezing or shortness of breath / dyspnea  Qty: 18 g, Refills: 3    Comments: Pharmacy may dispense brand covered by insurance (Proair, or proventil or ventolin or generic albuterol inhaler)  Associated Diagnoses: Moderate persistent asthma without complication      alendronate (FOSAMAX) 70 MG tablet TAKE 1 TABLET BY MOUTH ONCE WEEKLY  Qty: 4 tablet, Refills: 23    Associated Diagnoses: Osteoporosis, unspecified  osteoporosis type, unspecified pathological fracture presence      azelastine (ASTEPRO) 0.15 % nasal spray USE 1 SPRAY IN EACH NOSTRIL ONCE A DAY  Qty: 30 mL, Refills: 11    Associated Diagnoses: Seasonal allergic rhinitis, unspecified trigger      calcium citrate (CITRACAL) 950 (200 Ca) MG tablet TAKE 1 TABLET BY MOUTH ONCE DAILY  Qty: 28 tablet, Refills: 11    Associated Diagnoses: Stage 3b chronic kidney disease (H)      cholecalciferol (VITAMIN D3) 1250 mcg (50186 units) capsule TAKE 1 CAPSULE BY MOUTH ONCE WEEKLY  Qty: 4 capsule, Refills: 11    Associated Diagnoses: Stage 3b chronic kidney disease (H)      cloNIDine (CATAPRES) 0.1 MG tablet Take 1 tablet (0.1 mg) by mouth daily as needed (For SBP > 180)  Qty: 90 tablet, Refills: 3    Associated Diagnoses: Essential hypertension      desvenlafaxine (PRISTIQ) 100 MG 24 hr tablet Take 100 mg by mouth daily      fluticasone-salmeterol (ADVAIR DISKUS) 500-50 MCG/ACT inhaler INHALE 1 PUFF BY MOUTH TWICE DAILY  Qty: 60 each, Refills: 0    Associated Diagnoses: Moderate persistent asthma without complication      folic acid (FOLVITE) 400 MCG tablet Take 1 tablet (400 mcg) by mouth daily      iron polysaccharides (NIFEREX) 150 MG capsule Take 1 capsule (150 mg) by mouth daily  Qty: 30 capsule, Refills: 1    Associated Diagnoses: Anemia due to stage 3b chronic kidney disease (H)      isosorbide mononitrate (IMDUR) 30 MG 24 hr tablet Take 1 tablet (30 mg) by mouth daily  Qty: 30 tablet, Refills: 2    Associated Diagnoses: S/P coronary angiogram; Coronary artery disease of native artery of native heart with stable angina pectoris (H)      !! lamoTRIgine (LAMICTAL) 25 MG tablet Take 25 mg by mouth daily with 200 mg tablet for a total dose of 225 mg      !! LAMOTRIGINE 200 MG PO TABS Take 200 mg by mouth daily with 25 mg tablet for a total dose of 225 mg      levothyroxine (SYNTHROID/LEVOTHROID) 50 MCG tablet TAKE 1 TABLET BY MOUTH ONCE DAILY  Qty: 28 tablet, Refills: 11     Associated Diagnoses: Stage 3b chronic kidney disease (H)      liothyronine (CYTOMEL) 5 MCG tablet Take 2 tablets (10 mcg) by mouth daily  Qty: 30 tablet, Refills: 3    Associated Diagnoses: Bipolar disorder, current episode depressed, severe, without psychotic features (H)      memantine (NAMENDA) 10 MG tablet Take 1 tablet (10 mg) by mouth daily  Qty: 30 tablet, Refills: 3    Associated Diagnoses: Major neurocognitive disorder (H)      mirtazapine (REMERON) 7.5 MG tablet Take 1 tablet (7.5 mg) by mouth At Bedtime  Qty: 30 tablet, Refills: 3    Associated Diagnoses: Bipolar disorder, current episode depressed, severe, without psychotic features (H)      Multiple Vitamin (TAB-A-JENNIFER) TABS Take 1 tablet by mouth daily  Qty: 30 tablet, Refills: PRN    Associated Diagnoses: Stage 3b chronic kidney disease (H)      psyllium (METAMUCIL/KONSYL) 58.6 % powder Take 1 tspn in liquid daily      REGULOID 28.3 % POWD MIX 1 TEASPOON IN LIQUID AND DRINK BY MOUTH ONCE DAILY  Qty: 538 g, Refills: 11    Associated Diagnoses: Celiac disease      rosuvastatin (CRESTOR) 40 MG tablet TAKE 1 TABLET BY MOUTH ONCE DAILY  Qty: 28 tablet, Refills: 11    Associated Diagnoses: Hyperlipidemia with target LDL less than 70       !! - Potential duplicate medications found. Please discuss with provider.      STOP taking these medications       hydrALAZINE (APRESOLINE) 25 MG tablet Comments:   Reason for Stopping:         hydrALAZINE (APRESOLINE) 50 MG tablet Comments:   Reason for Stopping:               Follow-up:  - CORE clinic follow up on 2/15/23  - PCP follow up in one week with lab work     Labs or imaging requiring follow-up after discharge:  - BMP and CBC in one week     Code status:  Full     Condition on discharge  Temp:  [97.7  F (36.5  C)-98.3  F (36.8  C)] 97.7  F (36.5  C)  Pulse:  [60-71] 67  Resp:  [14-26] 16  BP: (128-181)/(68-96) 128/68  SpO2:  [87 %-98 %] 93 %     General: NAD  HEENT:  PERRLA, EOMI.   Neck: JVD mildly elevate.    CV: RRR. No murmur appreciated. No rubs or gallops. Peripheral radial pulse intact.  Resp: No increased work of breathing or use of accessory muscles, breathing comfortably on room air.  Lung sounds clear throughout/bilaterally  Abdomen:  Normal active bowel sounds.  Abdomen is soft. No distension, non-tender to palpation.    Extremities: Warm. Capillary refill less than 3 sec. 2/4 radial pulses bilaterally.  2/4 pedal pulses bilaterally. No pre-tibial edema. No cyanosis or clubbing.  Skin:  Warm and dry. No erythema, rashes, ulceration or diaphoresis.  Neuro: Alert and oriented x3.     Imaging with results:  1/31/23 Limited Echocardiogram:  Left ventricular size, wall motion and function are normal. The ejection  fraction is 55-60%.  Right ventricular size and function are normal.  Status post 26 mm Arndt Janis Ultra valve TAVR on 9/12/2022. The valve is  well seated. MG 16 mmHg; PV 2.5 m/s; no paravalvular AI.  No pericardial effusion is present.    1/31/23 EKG:      Patient Care Team:  Rolanda Wilcox MD as PCP - General (Family Practice)  Rolanda Wilcox MD as Assigned PCP  Pati De Santiago NP as Assigned Heart and Vascular Provider  Javier Landa MD as Assigned OBGYN Provider  Ania Johnson MD as Assigned Nephrology Provider  Dimas Palacios, PhD as Assigned Behavioral Health Provider  Navarro Escalona MD as Fellow  Dary Galvan MD as Hospitalist (Internal Medicine)  Vianey Bowman NP as Nurse Practitioner (Cardiovascular Disease)  Macie Samuel RN as Specialty Care Coordinator    Time Spent on this Encounter   I, JESSICA Junior CNP, personally saw the patient today and spent greater than 30 minutes discharging this patient.     Patient discussed with staff cardiologist, Dr. William Orozco, who agrees with the above documentation and plan. Documentation represents joint decision making.     JESSICA Junior CNP on 1/31/2023 at 1:19 PM  [facility]

## 2023-02-01 ENCOUNTER — PATIENT OUTREACH (OUTPATIENT)
Dept: CARE COORDINATION | Facility: CLINIC | Age: 88
End: 2023-02-01
Payer: MEDICARE

## 2023-02-01 NOTE — PROGRESS NOTES
[facility] Care Resource [place]    Background: Transitional Care Management program identified per system criteria and reviewed by [facility] Resource [place] team for possible outreach.    Assessment: Upon chart review, [facility] Team member will not proceed with patient outreach related to this episode of Transitional Care Management program due to reason below:    Patient has discharged to a Memory Care, Long-term Care, Assisted Living or Group Home where patient is receiving on-site support with their daily cares, including support with hospital follow up plan.    Plan: Transitional Care Management episode addressed appropriately per reason noted above.      GABO Sutton  Connected Care Resource [facility]    *Connected Care Resource Team does NOT follow patient ongoing. Referrals are identified based on internal discharge reports and the outreach is to ensure patient has an understanding of their discharge instructions.

## 2023-02-01 NOTE — TELEPHONE ENCOUNTER
"Called patient, left message to call back at [phone]. Calling to review medication instructions below from Dr. Rolanda Wilcox and schedule patient for BP check in 2-3 weeks:                She should continue Previous meds  Coreg was added   She needs follow up BP check 2-3 weeks                        Katherine (RN with [facility]) calling to ask if new sig for carvedilol (coreg) can be sent so patient is not changed so much for administration.    Currently states: Take 1 tablet (3.125 mg) by mouth 2 times daily (with meals) - Oral    Katherine is asking if sig can be changed to exclude \"with meals\": Take 1 tablet (3.125 mg) by mouth 2 times daily- Oral    Including \"with meals\" would end up charging patient additional amount due to senior living administering. Katherine states that she will instruct patient to eat cracker with administration.    Katherine callback #: [phone], okay for DVM    PCP- Okay to change sig to exclude \"with meals\"?    Order cued if appropriate    PACHECO Gooden RN  [facility]    "
Noted that Coreg was discontinued off med list by cardiology on 1/23/2023 due to intolerance/side effects  See 1/20/2023 telephone encounter    Raúl Haywood RN  [facility]    
Patient calling to get her BP meds clarified.  She was seen today by Rolanda Junior and thought a new BP was being prescribed to replace a current one.  However, she is not seeing this on her AVS.    Please clarify which BP meds she is to take and whether any were discontinued    Patient would like a return call once clarified.  The nurses at [facility] manages her meds and will need to be alerted of any changes as well  (living at [facility]: nurse line: [phone])    Raúl Haywood RN  [facility]    
She should continue Previous meds  Coreg was added   She needs follow up BP check 2-3 weeks  
hand grasp, leg strength strong and equal bilaterally

## 2023-02-02 ENCOUNTER — TELEPHONE (OUTPATIENT)
Dept: NEPHROLOGY | Facility: CLINIC | Age: 88
End: 2023-02-02
Payer: MEDICARE

## 2023-02-02 ENCOUNTER — TELEPHONE (OUTPATIENT)
Dept: FAMILY MEDICINE | Facility: CLINIC | Age: 88
End: 2023-02-02
Payer: MEDICARE

## 2023-02-02 ENCOUNTER — TELEPHONE (OUTPATIENT)
Dept: FAMILY MEDICINE | Facility: CLINIC | Age: 88
End: 2023-02-02

## 2023-02-02 DIAGNOSIS — N18.32 STAGE 3B CHRONIC KIDNEY DISEASE (H): Primary | ICD-10-CM

## 2023-02-02 NOTE — TELEPHONE ENCOUNTER
[facility] Call Center    Phone Message    May a detailed message be left on voicemail: yes     Reason for Call: Order(s): Other:   Reason for requested: annual CKD follow up labs  Date needed: appt scheduled 3/27/23  Provider name: Dr. Johnson      Action Taken: Message routed to:  Other: fk neph    Travel Screening: Not Applicable           Lab orders placed for CKD 3 lab appointment and upcoming visit with Dr Johnson 3/27/23.  VEENA Hearn

## 2023-02-02 NOTE — TELEPHONE ENCOUNTER
"Forwarding to [place] cardiology team to review/advise. Appears that pt has been seen in the past by Dr. Zuniga at [place] and Vianey Bowman NP and Pati De Santiago NP at [place].     Per Dr. Wilcox, \"Please discuss with her cardiologist as pt is intolerant to several meds.\"    Called Gomez at [facility] and left a detailed voice message with response from Dr. Wilcox.     Romelia Paz RN   [facility]  "

## 2023-02-02 NOTE — TELEPHONE ENCOUNTER
Dr. Wilcox,      [facility], Gomez PT called to report BP.     Was in ED 1/20/23 for HF, please see notes.   Med changes in ED:  START taking:  furosemide (LASIX)  CHANGE how you take:  losartan (COZAAR)  STOP taking:  hydrALAZINE 25 MG tablet (APRESOLINE)  hydrALAZINE 50 MG tablet (APRESOLINE)      BP reading done about 3 minutes ago by home care 169/93  Last BP with home care was 1/25/23 159/95   Did not miss any medications today   Asymptomatic   Per PT stated he will not do exercises with pt today as contraindicated due to BP reading.     Ok to call Kamryn [phone] with advice, ok detailed vm.    Gomez asking for call back as well with update [phone], ok detailed vm.     Thanks,  VEENA Lynch  [facility]

## 2023-02-02 NOTE — TELEPHONE ENCOUNTER
Received call from Gomez with [facility]. He is calling to request verbal orders for SN to evaluate and treat to address HTN and PT for strength, balance, and training for once a week for 4 weeks and every other week for the last month. Verbal approval given.    Romelia Paz RN   [facility]

## 2023-02-02 NOTE — UTILIZATION REVIEW
Admission Status; Secondary Review Determination    No action to be taken. Please contact me on my Email : [email or web address] if you have any questions.    As part of the [place] Utilization review plan, a self-audit is done on Medicare inpatient admission with less than 2 midnights stay. The 2014 IPPS Final Rule allows outpatient billing in the event that a hospital determines that an inpatient admission was not medically necessary under utilization review process.     (x) Outpatient status would be Appropriate- Short Stay- Post discharge review.    RATIONALE FOR DETERMINATION  Kamryn Miller is a 87 year old year old female with PMHx of HFpEF (EF 55-60%, 11/30/22), CAD s/p PTCA 1996, LAD and RCA stenting 2003, s/p LAD PCI x 3 8/2020, severe AS s/p 26 mm ES Ultra TAVR 9/12/2022, HTN, HLD, PAD, and iron deficiency anemia (followed with GI) who was admitted on 1/30/2023 for chest pain The chest pain resolved with diuresis. Not determined to be NSTEMI or unstable angina. It was felt that chest pain was due to the combination of HTN and hypervolemia          Patient was admitted and discharge after one night stay. Record was sent by  for a PA review. Based on the  severity of illness, intensity of service provided, expected LOS and risk for adverse outcome make the care appropriate for further outpatient/observation; however, doesn't meet criteria for hospital inpatient admission.       The information on this document is developed by the utilization review team in order for the business office to ensure compliance.  This only denotes the appropriateness of proper admission status and does not reflect the quality of care rendered.       The definitions of Inpatient Status and Observation Status used in making the determination above are those provided in the CMS Coverage Manual, Chapter 1 and Chapter 6, section 70.4.     Please cont me if you want to discuss further about this admission episode.      Bc Rodriguez   MD, BA LEVIN  Medical Director - Utilization management  Staff Hospitalist  [facility]    Pager: [phone]

## 2023-02-06 ENCOUNTER — DOCUMENTATION ONLY (OUTPATIENT)
Dept: OTHER | Facility: CLINIC | Age: 88
End: 2023-02-06
Payer: MEDICARE

## 2023-02-07 ENCOUNTER — TELEPHONE (OUTPATIENT)
Dept: FAMILY MEDICINE | Facility: CLINIC | Age: 88
End: 2023-02-07
Payer: MEDICARE

## 2023-02-07 DIAGNOSIS — N18.32 STAGE 3B CHRONIC KIDNEY DISEASE (H): ICD-10-CM

## 2023-02-07 DIAGNOSIS — N18.32 HYPERTENSIVE KIDNEY DISEASE WITH STAGE 3B CHRONIC KIDNEY DISEASE (H): ICD-10-CM

## 2023-02-07 DIAGNOSIS — I50.30 NYHA CLASS 3 HEART FAILURE WITH PRESERVED EJECTION FRACTION (H): ICD-10-CM

## 2023-02-07 DIAGNOSIS — I12.9 HYPERTENSIVE KIDNEY DISEASE WITH STAGE 3B CHRONIC KIDNEY DISEASE (H): ICD-10-CM

## 2023-02-07 DIAGNOSIS — Z96.643 HISTORY OF BILATERAL HIP REPLACEMENTS: Primary | ICD-10-CM

## 2023-02-07 NOTE — TELEPHONE ENCOUNTER
Pt to schedule visit In person  as PCP is out of town at least with an ancillary nursing visit to discuss BP     MG

## 2023-02-07 NOTE — TELEPHONE ENCOUNTER
[facility] received.  Per [facility] Dr. Wilcox has to sign.  Form held for her to sign when she returns on February 13th.  Sophia Hayden,

## 2023-02-07 NOTE — TELEPHONE ENCOUNTER
Spoke with Francy calling from [employer] with an update.    Patient's blood pressure was taken today with a reading of:  /100    Patient denies any dizziness, blurred vision, or headache.     Francy notifying and FYI, and would like to know if provider has any further suggestions. Please advise.    MERE GreenfieldN RN  [facility]

## 2023-02-07 NOTE — TELEPHONE ENCOUNTER
Pending Prescriptions:                       Disp   Refills    acetaminophen (TYLENOL) 500 MG tablet                         Sig: Take 1-2 tablets (500-1,000 mg) by mouth every 6           hours as needed for mild pain

## 2023-02-07 NOTE — TELEPHONE ENCOUNTER
Francy called back and was updated with provider's message.  She will let family know of Dr. Cambpell's recommendations.  Explained that patient should seek care more urgently if she develops symptoms.  Francy agreed.    She is also requesting the orders below for SN visits    1 x 4 weeks  Every other week x 4 weeks  To help monitor BP and meds    Verbal orders as requested given    Raúl Haywood RN  [facility]

## 2023-02-07 NOTE — TELEPHONE ENCOUNTER
Reason for Call:  Form, our goal is to have forms completed with 72 hours, however, some forms may require a visit or additional information.    Type of letter, form or note:  Home Health Certification    Who is the form from?: Home care    Where did the form come from: form was faxed in    What clinic location was the form placed at?: [facility]    Where the form was placed: Given to physician    What number is listed as a contact on the form?: [phone]       Call taken on 2/7/2023 at 12:53 PM by Sophia Hayden

## 2023-02-08 PROBLEM — R79.89 ELEVATED TROPONIN: Status: RESOLVED | Noted: 2023-01-30 | Resolved: 2023-02-08

## 2023-02-08 PROBLEM — I51.89 OTHER ILL-DEFINED HEART DISEASES: Status: RESOLVED | Noted: 2019-12-17 | Resolved: 2023-02-08

## 2023-02-08 PROBLEM — R07.9 CHEST PAIN, UNSPECIFIED TYPE: Status: RESOLVED | Noted: 2023-01-30 | Resolved: 2023-02-08

## 2023-02-08 PROBLEM — R06.02 SOB (SHORTNESS OF BREATH): Status: RESOLVED | Noted: 2022-09-22 | Resolved: 2023-02-08

## 2023-02-08 PROBLEM — I25.10 ATHEROSCLEROSIS OF NATIVE CORONARY ARTERY OF NATIVE HEART WITHOUT ANGINA PECTORIS: Status: RESOLVED | Noted: 2022-01-08 | Resolved: 2023-02-08

## 2023-02-08 PROBLEM — I25.5 ISCHEMIC CARDIOMYOPATHY: Status: RESOLVED | Noted: 2021-07-21 | Resolved: 2023-02-08

## 2023-02-08 PROBLEM — I25.118 CORONARY ARTERY DISEASE OF NATIVE ARTERY OF NATIVE HEART WITH STABLE ANGINA PECTORIS (H): Status: RESOLVED | Noted: 2023-01-11 | Resolved: 2023-02-08

## 2023-02-08 PROBLEM — Z98.890 STATUS POST CORONARY ANGIOGRAM: Status: RESOLVED | Noted: 2020-11-03 | Resolved: 2023-02-08

## 2023-02-08 RX ORDER — FUROSEMIDE 20 MG
10 TABLET ORAL DAILY
Qty: 45 TABLET | Refills: 0 | Status: SHIPPED | OUTPATIENT
Start: 2023-02-08 | End: 2023-03-27

## 2023-02-08 RX ORDER — ACETAMINOPHEN 500 MG
500 TABLET ORAL EVERY 6 HOURS PRN
Qty: 60 TABLET | Refills: 4 | Status: SHIPPED | OUTPATIENT
Start: 2023-02-08

## 2023-02-08 RX ORDER — LOSARTAN POTASSIUM 50 MG/1
50 TABLET ORAL DAILY
Qty: 90 TABLET | Refills: 0 | Status: SHIPPED | OUTPATIENT
Start: 2023-02-08 | End: 2023-02-13

## 2023-02-09 ENCOUNTER — TELEPHONE (OUTPATIENT)
Dept: FAMILY MEDICINE | Facility: CLINIC | Age: 88
End: 2023-02-09
Payer: MEDICARE

## 2023-02-09 NOTE — TELEPHONE ENCOUNTER
Katherine, nurse with [facility] calling to report elevated BP.  She stated that this has been an ongoing issue and have been some back and forth with the clinic.    BP yesterday after patient came back from an appointment was 200/124 around 1600.  PRN clonidine 0.1 mg was given for SBP over 180.   BP rechecked at 1800 was 170/90.  Patient was asymptomatic.    This morning, patient reported generalized weakness.  BP at 0730 was 200/100 before morning meds.    Patient then took scheduled morning meds which included-  Furosemide 10 mg   Isosorbide 30 mg  Losartan 50 mg     BP rechecked 30-45 min after morning meds- 176/94  Denies any CP, dizziness, SOB, or other heart related symptoms    Katherine will check patient again this morning and if SBP >180, will give PRN clonidine as ordered.  She would like further directions as this has been an ongoing issue.  She is not sure if/when she should send patient to ED with these high BP readings if patient is mainly asymptomatic  Has follow-up with Rolanda Junior on 2/13/2023    Ph. [phone] is Katherine's direct mobile number    Raúl Haywood RN  [facility]

## 2023-02-09 NOTE — TELEPHONE ENCOUNTER
Spoke with Katherine CURIEL and notified of below. Stated agreement and that she will give an extra dose of losartan today.     Per RN will call back tomorrow with updated on VS. Keeping encounter open until we hear back from RN.     Thanks,  VEENA Lynch  [facility]

## 2023-02-09 NOTE — TELEPHONE ENCOUNTER
Please have losartan increased to 100mg oral daily, and report vitals tomorrow. Same ED criteria otherwise.    Dr Kaden Aguilera

## 2023-02-10 NOTE — TELEPHONE ENCOUNTER
Routing to provider    Katherine calling back to report pt BP as instructed by provider at 11:30am bp: 176/100    Sofia Wells RN

## 2023-02-10 NOTE — TELEPHONE ENCOUNTER
Spoke with Katherine  BP pills at 0746, and BP at 11:45am high.  Nurse says that the patient is actually doing quite well no headache no shortness of breath no chest pain she is acting completely normally.  After discussion with nurse and knowledge that the patient has an appointment with Dr. Wilcox on Monday morning, we decided to continue the elevated dose of losartan over the weekend and let Dr. Wilcox see Kamryn in 3 days.  Same emergency protocols in place.  Dr Kaden Aguilera

## 2023-02-13 ENCOUNTER — OFFICE VISIT (OUTPATIENT)
Dept: FAMILY MEDICINE | Facility: CLINIC | Age: 88
End: 2023-02-13
Payer: MEDICARE

## 2023-02-13 VITALS
SYSTOLIC BLOOD PRESSURE: 160 MMHG | HEART RATE: 76 BPM | WEIGHT: 151.8 LBS | BODY MASS INDEX: 26.56 KG/M2 | DIASTOLIC BLOOD PRESSURE: 82 MMHG | TEMPERATURE: 98.6 F | OXYGEN SATURATION: 98 %

## 2023-02-13 DIAGNOSIS — I10 ESSENTIAL HYPERTENSION: ICD-10-CM

## 2023-02-13 DIAGNOSIS — E78.5 HYPERLIPIDEMIA LDL GOAL <70: ICD-10-CM

## 2023-02-13 DIAGNOSIS — R07.9 CHEST PAIN, UNSPECIFIED TYPE: Primary | ICD-10-CM

## 2023-02-13 DIAGNOSIS — I50.30 NYHA CLASS 3 HEART FAILURE WITH PRESERVED EJECTION FRACTION (H): ICD-10-CM

## 2023-02-13 DIAGNOSIS — E78.2 MIXED HYPERLIPIDEMIA: ICD-10-CM

## 2023-02-13 DIAGNOSIS — I27.20 PULMONARY HYPERTENSION (H): ICD-10-CM

## 2023-02-13 DIAGNOSIS — N18.32 STAGE 3B CHRONIC KIDNEY DISEASE (H): ICD-10-CM

## 2023-02-13 DIAGNOSIS — Z95.2 S/P TAVR (TRANSCATHETER AORTIC VALVE REPLACEMENT): ICD-10-CM

## 2023-02-13 PROCEDURE — 99214 OFFICE O/P EST MOD 30 MIN: CPT | Performed by: FAMILY MEDICINE

## 2023-02-13 RX ORDER — CLONIDINE HYDROCHLORIDE 0.1 MG/1
0.1 TABLET ORAL 2 TIMES DAILY
Qty: 180 TABLET | Refills: 0 | Status: ON HOLD | OUTPATIENT
Start: 2023-02-13 | End: 2023-02-28

## 2023-02-13 RX ORDER — LOSARTAN POTASSIUM 100 MG/1
100 TABLET ORAL DAILY
Status: ON HOLD | COMMUNITY
Start: 2023-02-13 | End: 2023-02-28

## 2023-02-13 ASSESSMENT — PAIN SCALES - GENERAL: PAINLEVEL: NO PAIN (0)

## 2023-02-13 NOTE — PROGRESS NOTES
Assessment & Plan     Chest pain, unspecified type  better    Essential hypertension  Intolerant to several meds  Tolerating cozaar  Add clonidine  - cloNIDine (CATAPRES) 0.1 MG tablet; Take 1 tablet (0.1 mg) by mouth 2 times daily  The potential side effects of this medication have been discussed with the patient.  Call if any significant problems with these are experienced.  Follow up BP check 1 month    Hyperlipidemia LDL goal <70  Stable     Stage 3b chronic kidney disease (H)  Sees Dr Johnson    Pulmonary hypertension (H)  Stable     Return in about 1 month (around 3/13/2023) for Med check.    Rolanda Wilcox MD  [facility] PIERCE Harry is a 87 year old accompanied by her daughter, presenting for the following health issues:  Hospital F/U (Worse shortness of breath, elevated blood pressure, chest pain has improved. )      HPI       Hospital Follow-up Visit:  87yr old female with a history of HTN, CAD (s/p PCI in 1996, PCI to LAD and RCA in 2003, PCI to LAD x3 in 8/2020), severe aortic valve stenosis (s/p 26mm ES Ultra TAVR 9/12/22), PAD, HLD, HFpEF (LVEF 55-60% per TTE 12/2022), and anemia  Hospital/Nursing Home/IP Rehab Facility: [facility]  Date of Admission: January 30, 2023  Date of Discharge: January 31, 2023  Reason(s) for Admission: Chest Pain, elevated troponin  NYHA class 3 heart failure with preserved ejection fraction (H) +10 more  Hypertension  Was your hospitalization related to COVID-19? No   Problems taking medications regularly:  None  Medication changes since discharge: None  Problems adhering to non-medication therapy:  None    Summary of hospitalization:  [facility] discharge summary reviewed  Diagnostic Tests/Treatments reviewed.  Follow up needed: yes  Other Healthcare Providers Involved in Patient s Care:         cardiologist  Update since discharge: fluctuating course.         Plan of care  communicated with patient           Hyperlipidemia Follow-Up      Are you regularly taking any medication or supplement to lower your cholesterol?   Yes- statins    Are you having muscle aches or other side effects that you think could be caused by your cholesterol lowering medication?  No    Hypertension Follow-up      Do you check your blood pressure regularly outside of the clinic? Yes     Are you following a low salt diet? Yes    Are your blood pressures ever more than 140 on the top number (systolic) OR more   than 90 on the bottom number (diastolic), for example 140/90? Yes    Vascular Disease Follow-up      How often do you take nitroglycerin? Never    Do you take an aspirin every day? Yes      Pt also has Known asthma and on advair    nd albuterol    HGB has been stable     She has a follow up with her cardiologist in a week  Review of Systems   CONSTITUTIONAL: NEGATIVE for fever, chills, change in weight  ENT/MOUTH: NEGATIVE for ear, mouth and throat problems  RESP: NEGATIVE for significant cough or SOB  CV: NEGATIVE for chest pain, palpitations or peripheral edema  GI: NEGATIVE for nausea, abdominal pain, heartburn, or change in bowel habits  MUSCULOSKELETAL: NEGATIVE for significant arthralgias or myalgia  ROS otherwise negative      Objective    BP (!) 160/82   Pulse 76   Temp 98.6  F (37  C) (Temporal)   Wt 68.9 kg (151 lb 12.8 oz)   SpO2 98%   BMI 26.56 kg/m    Body mass index is 26.56 kg/m .  Physical Exam   GENERAL: healthy, alert and no distress  NECK: no adenopathy, no asymmetry, masses, or scars and thyroid normal to palpation  RESP: lungs clear to auscultation - no rales, rhonchi or wheezes  CV: regular rate and rhythm, normal S1 S2, no S3 or S4, no murmur, click or rub, no peripheral edema and peripheral pulses strong  ABDOMEN: soft, nontender, no hepatosplenomegaly, no masses and bowel sounds normal  MS: no gross musculoskeletal defects noted, no edema  PSYCH: mentation appears  normal    Reviewed labs in Hospital

## 2023-02-13 NOTE — LETTER
My Heart Failure Action Plan   Name: Kamryn Miller    YOB: 1935   Date: 2/13/2023    My doctor: Rolanda Wilcox [address]  [facility] [id number] [phone]  My Diagnosis:hf   My Ejection Fraction:   Lab Results   Component Value Date    LVEF 55-60% 01/31/2023      {EF% (Optional):424879}   My Exercise Goal: 30 minutes daily     My Weight Plan:   Wt Readings from Last 2 Encounters:   02/13/23 68.9 kg (151 lb 12.8 oz)   01/31/23 69.4 kg (153 lb)     Weigh yourself daily using the same scale. If you gain more than 2 pounds in 24 hours or 5 pounds in a week {HF WEIGHT GOAL:222533}    My Diet Goal: low salt    Emergency Room Visits:    Our goal is to improve your quality of life and help you avoid a visit to the emergency room or hospital.  If we work together, we can achieve this goal. But, if you feel you need to call 911 or go to the emergency room, please do so.  If you go to the emergency room, please bring your list of medicines and your daily weight chart with you.       GREEN ZONE     Doing well today    Weight gained is no more than 2 pounds a day or 5 pounds a week.    No swelling in feet, ankles, legs or stomach.    No more swelling than usual.    No more trouble breathing than usual.    No change in my sleep.    No other problems. Actions:    I am doing fine.  I will take my medicine, follow my diet, see my doctor, exercise, and watch for symptoms.           YELLOW ZONE         Having a bad day or flare up    Weight gain of more than 2 pounds in one day or 5 pounds in one week.    New swelling in ankle, leg, knee or thigh.    Bloating in belly, pants feel tighter.    Swelling in hands or face.    Coughing or trouble breathing while walking or talking.    Harder to breathe last night.    Have trouble sleeping, wake up short of breath.    Much more tired than usual.    Not eating.    Pain in my chest or bad leg cramps.    Feel weak or dizzy.  Actions:    I need to take action and call my doctor or nurse today.                 RED ZONE         Need medical care now    Weight gain of 5 pounds overnight.    Chest pain or pressure that does not go away.    Feel less alert.    Wheezing or have trouble breathing when at rest.    Cannot sleep lying down.    Cannot take my water pill.    Pass out or faint. Actions:    I need to call my doctor or nurse now!    Call 911 if I have chest pain or cannot breathe.

## 2023-02-15 ENCOUNTER — OFFICE VISIT (OUTPATIENT)
Dept: CARDIOLOGY | Facility: CLINIC | Age: 88
End: 2023-02-15
Attending: INTERNAL MEDICINE
Payer: MEDICARE

## 2023-02-15 ENCOUNTER — LAB (OUTPATIENT)
Dept: LAB | Facility: CLINIC | Age: 88
End: 2023-02-15
Payer: MEDICARE

## 2023-02-15 VITALS
HEART RATE: 78 BPM | SYSTOLIC BLOOD PRESSURE: 196 MMHG | DIASTOLIC BLOOD PRESSURE: 118 MMHG | OXYGEN SATURATION: 96 % | WEIGHT: 153 LBS | BODY MASS INDEX: 28.89 KG/M2 | HEIGHT: 61 IN

## 2023-02-15 DIAGNOSIS — I10 BENIGN ESSENTIAL HYPERTENSION: Primary | ICD-10-CM

## 2023-02-15 DIAGNOSIS — I50.32 CHRONIC HEART FAILURE WITH PRESERVED EJECTION FRACTION (HFPEF) (H): ICD-10-CM

## 2023-02-15 DIAGNOSIS — R73.9 HYPERGLYCEMIA: ICD-10-CM

## 2023-02-15 DIAGNOSIS — I10 BENIGN ESSENTIAL HYPERTENSION: ICD-10-CM

## 2023-02-15 DIAGNOSIS — I50.32 CHRONIC HEART FAILURE WITH PRESERVED EJECTION FRACTION (HFPEF) (H): Primary | ICD-10-CM

## 2023-02-15 DIAGNOSIS — I10 ESSENTIAL HYPERTENSION: ICD-10-CM

## 2023-02-15 DIAGNOSIS — E78.2 MIXED HYPERLIPIDEMIA: ICD-10-CM

## 2023-02-15 DIAGNOSIS — Z95.2 HISTORY OF TRANSCATHETER AORTIC VALVE REPLACEMENT (TAVR): ICD-10-CM

## 2023-02-15 LAB
ALBUMIN MFR UR ELPH: 55.4 MG/DL (ref 1–14)
ALBUMIN SERPL BCG-MCNC: 4 G/DL (ref 3.5–5.2)
ALP SERPL-CCNC: 79 U/L (ref 35–104)
ALT SERPL W P-5'-P-CCNC: 68 U/L (ref 10–35)
ANION GAP SERPL CALCULATED.3IONS-SCNC: 11 MMOL/L (ref 7–15)
AST SERPL W P-5'-P-CCNC: 77 U/L (ref 10–35)
BILIRUB SERPL-MCNC: 0.3 MG/DL
BUN SERPL-MCNC: 23.1 MG/DL (ref 8–23)
CALCIUM SERPL-MCNC: 9.8 MG/DL (ref 8.8–10.2)
CHLORIDE SERPL-SCNC: 104 MMOL/L (ref 98–107)
CREAT SERPL-MCNC: 1.3 MG/DL (ref 0.51–0.95)
CREAT UR-MCNC: 22.4 MG/DL
DEPRECATED HCO3 PLAS-SCNC: 26 MMOL/L (ref 22–29)
ERYTHROCYTE [DISTWIDTH] IN BLOOD BY AUTOMATED COUNT: 18.9 % (ref 10–15)
GFR SERPL CREATININE-BSD FRML MDRD: 40 ML/MIN/1.73M2
GLUCOSE SERPL-MCNC: 109 MG/DL (ref 70–99)
HCT VFR BLD AUTO: 36.4 % (ref 35–47)
HGB BLD-MCNC: 11.2 G/DL (ref 11.7–15.7)
HOLD SPECIMEN: NORMAL
MAGNESIUM SERPL-MCNC: 2.1 MG/DL (ref 1.7–2.3)
MCH RBC QN AUTO: 29.1 PG (ref 26.5–33)
MCHC RBC AUTO-ENTMCNC: 30.8 G/DL (ref 31.5–36.5)
MCV RBC AUTO: 95 FL (ref 78–100)
NT-PROBNP SERPL-MCNC: 6742 PG/ML (ref 0–1800)
PHOSPHATE SERPL-MCNC: 4 MG/DL (ref 2.5–4.5)
PLATELET # BLD AUTO: 156 10E3/UL (ref 150–450)
POTASSIUM SERPL-SCNC: 3.9 MMOL/L (ref 3.4–5.3)
PROT SERPL-MCNC: 7 G/DL (ref 6.4–8.3)
PROT/CREAT 24H UR: 2.47 MG/MG CR (ref 0–0.2)
PTH-INTACT SERPL-MCNC: 81 PG/ML (ref 15–65)
RBC # BLD AUTO: 3.85 10E6/UL (ref 3.8–5.2)
SODIUM SERPL-SCNC: 141 MMOL/L (ref 136–145)
WBC # BLD AUTO: 5.5 10E3/UL (ref 4–11)

## 2023-02-15 PROCEDURE — G0463 HOSPITAL OUTPT CLINIC VISIT: HCPCS | Performed by: INTERNAL MEDICINE

## 2023-02-15 PROCEDURE — 83880 ASSAY OF NATRIURETIC PEPTIDE: CPT | Performed by: FAMILY MEDICINE

## 2023-02-15 PROCEDURE — 83036 HEMOGLOBIN GLYCOSYLATED A1C: CPT | Performed by: INTERNAL MEDICINE

## 2023-02-15 PROCEDURE — 36415 COLL VENOUS BLD VENIPUNCTURE: CPT | Performed by: FAMILY MEDICINE

## 2023-02-15 PROCEDURE — 80053 COMPREHEN METABOLIC PANEL: CPT | Performed by: FAMILY MEDICINE

## 2023-02-15 PROCEDURE — 99215 OFFICE O/P EST HI 40 MIN: CPT | Performed by: INTERNAL MEDICINE

## 2023-02-15 PROCEDURE — 85027 COMPLETE CBC AUTOMATED: CPT | Performed by: FAMILY MEDICINE

## 2023-02-15 PROCEDURE — 84100 ASSAY OF PHOSPHORUS: CPT | Performed by: FAMILY MEDICINE

## 2023-02-15 PROCEDURE — 83735 ASSAY OF MAGNESIUM: CPT | Performed by: FAMILY MEDICINE

## 2023-02-15 PROCEDURE — 84156 ASSAY OF PROTEIN URINE: CPT | Performed by: FAMILY MEDICINE

## 2023-02-15 PROCEDURE — 83970 ASSAY OF PARATHORMONE: CPT | Performed by: FAMILY MEDICINE

## 2023-02-15 RX ORDER — ISOSORBIDE MONONITRATE 60 MG/1
60 TABLET, EXTENDED RELEASE ORAL DAILY
Qty: 90 TABLET | Refills: 3 | Status: SHIPPED | OUTPATIENT
Start: 2023-02-15 | End: 2023-02-15

## 2023-02-15 RX ORDER — ISOSORBIDE MONONITRATE 60 MG/1
60 TABLET, EXTENDED RELEASE ORAL DAILY
Qty: 90 TABLET | Refills: 3 | Status: ON HOLD | OUTPATIENT
Start: 2023-02-15 | End: 2023-02-28

## 2023-02-15 ASSESSMENT — PAIN SCALES - GENERAL: PAINLEVEL: NO PAIN (0)

## 2023-02-15 NOTE — LETTER
2/15/2023      RE: Kamryn Miller  [address]  [address]  [place] [address]       Dear Colleague,    Thank you for the opportunity to participate in the care of your patient, Kamryn Miller, at the [facility] [place] [facility]. Please see a copy of my visit note below.    February 15, 2023     I had the pleasure of seeing Kamryn Miller  in the [facility] for further evaluation and management.  She has a history of Hx CAD, HLD, HTN, mitral and tricuspid insuff, pulmonary hypertension, lost to follow up in the past.  She does have CAD and did receive 3 stents in 8/2020 as below.  She denies any cardiomyopathy and she has not had any cardiology follow-up for several years.  Notes that she does have shortness of breath especially with exertion or she walks outside.  This is class III symptoms at the moment she can will probably about a block and able to take a flight of stairs.  We did start TAVR evaluation and she did undergo right heart catheterization as well as invasive hemodynamic measurement and measurement of the valve area.  Given findings of moderate aortic stenosis, TAVR was not yet pursued and ongoing surveillance was recommended. Had been hypertensive in the recent past. She did see the structural team just recently and repeat TTE was performed that again showed moderate to severe aortic stenosis, essentially unchanged from prior TTE. Given that she had no concerning symptoms ongoing surveillance was recommended.  On last visit I suggested increasing amlodipine to 10mg daily due to persistent hypertension. However, she did have an admission 12/16/21 and multiple medication changes made, including stopping amlodipine. Accordingly, she developed hypertension and 5mg was restarted in UC. However, she developed hypertensive emergency requiring admission for this and amlodipine was increased to 10mg daily with good  response. Elmaen ongoing and worsening symptoms she was revaluated by the structural team and ultimately underwent successful TAVR on 9/12/22 with a post intervention gradient of 7mmHg. She was seen in the ER subsequently for claudication and leg pain in the setting of known LFA stenosis. She was seen in the ER recently for episode of chest pain. Workup was overall negative and was discharged in stable condition.  Overall she has been feeling well after the TAVR however blood pressure remains elevated.  Today's 196/111 mmHg.  She does not have any headache dizziness lightheadedness with this.  No falls or other issues.  Doing well she is given no other medications.     PAST MEDICAL HISTORY:  Past Medical History:   Diagnosis Date     Aortic valvar stenosis 7/2010    mild     Asthma      Bipolar disorder (H)      CAD (coronary artery disease)     s/p angioplasty     Celiac disease      Colon polyps      Colon polyps 2012    every 3 year colonoscopy      Depression      High cholesterol      HTN      Mitral insufficiency      Pulmonary HTN (H)     mild     Tremors 7/10    drug induced from antidepressants     Tricuspid insufficiency      FAMILY HISTORY:  Family History   Problem Relation Age of Onset     Asthma Mother      Cerebrovascular Disease Mother      Arthritis Mother      Depression Mother      Lipids Sister      Hypertension Sister      Heart Disease Sister      Lipids Sister      Hypertension Sister      Lipids Sister      Breast Cancer Sister      SOCIAL HISTORY:  Social History     Socioeconomic History     Marital status:      Spouse name: Adrien     Number of children: 2     Years of education: 16   Occupational History     Employer: RETIRED     Comment: Retired in 2002.    Tobacco Use     Smoking status: Never     Smokeless tobacco: Never   Vaping Use     Vaping Use: Never used   Substance and Sexual Activity     Alcohol use: No     Alcohol/week: 0.0 standard drinks     Types: 1 Standard drinks or  equivalent per week     Drug use: No     Sexual activity: Not Currently     Partners: Male     CURRENT MEDICATIONS:  Current Outpatient Medications   Medication     acetaminophen (TYLENOL) 500 MG tablet     albuterol (PROAIR HFA/PROVENTIL HFA/VENTOLIN HFA) 108 (90 Base) MCG/ACT inhaler     alendronate (FOSAMAX) 70 MG tablet     azelastine (ASTEPRO) 0.15 % nasal spray     calcium citrate (CITRACAL) 950 (200 Ca) MG tablet     cholecalciferol (VITAMIN D3) 1250 mcg (14094 units) capsule     cloNIDine (CATAPRES) 0.1 MG tablet     desvenlafaxine (PRISTIQ) 100 MG 24 hr tablet     fluticasone-salmeterol (ADVAIR DISKUS) 500-50 MCG/ACT inhaler     folic acid (FOLVITE) 400 MCG tablet     furosemide (LASIX) 20 MG tablet     iron polysaccharides (NIFEREX) 150 MG capsule     isosorbide mononitrate (IMDUR) 30 MG 24 hr tablet     lamoTRIgine (LAMICTAL) 25 MG tablet     LAMOTRIGINE 200 MG PO TABS     levothyroxine (SYNTHROID/LEVOTHROID) 50 MCG tablet     liothyronine (CYTOMEL) 5 MCG tablet     losartan (COZAAR) 100 MG tablet     memantine (NAMENDA) 10 MG tablet     mirtazapine (REMERON) 7.5 MG tablet     Multiple Vitamin (TAB-A-JENNIFER) TABS     psyllium (METAMUCIL/KONSYL) 58.6 % powder     REGULOID 28.3 % POWD     rosuvastatin (CRESTOR) 40 MG tablet     No current facility-administered medications for this visit.     ROS:   Constitutional: No fever, chills, or sweats.   ENT: No visual disturbance, ear ache, epistaxis, sore throat.   Allergies/Immunologic: Negative.   Respiratory: No cough, hemoptysis.   Cardiovascular: As per HPI.   GI: No nausea, vomiting, hematemesis, melena, or hematochezia.   : No urinary frequency, dysuria, or hematuria.   Integument: Negative.   Psychiatric: Pleasant, no major depression noted  Neuro: No focal neurological deficits noted  Endocrinology: Negative.   Musculoskeletal: As per HPI.      EXAM:  BP (!) 196/118 (BP Location: Left arm, Patient Position: Chair, Cuff Size: Adult Small)   Pulse 78   Ht  "1.555 m (5' 1.22\")   Wt 69.4 kg (153 lb)   SpO2 96%   BMI 28.70 kg/m    General: appears comfortable, alert and oriented  Head: normocephalic, atraumatic  Eyes: anicteric sclera, EOMI , PERRL  Neck: no adenopathy  Orophyarynx: moist mucosa, no lesions noted  Heart: regular, S1/S2, no murmurs, rubs or gallop. Estimated JVP at 5 cmH2O  Lungs: CTAB, No wheezing.   Abdomen: soft, non-tender, bowel sounds present, no hepatosplenomegaly  Extremities: No LE edema today  Skin: no open lesions noted  Neuro: grossly non-focal  Psych: no evidence of depression noted     Labs:  Lab Results   Component Value Date    WBC 4.2 01/31/2023    HGB 10.9 (L) 01/31/2023    HCT 35.9 01/31/2023     01/31/2023     01/31/2023    POTASSIUM 3.9 01/31/2023    CHLORIDE 107 01/31/2023    CO2 26 01/31/2023    BUN 22.1 01/31/2023    CR 1.06 (H) 01/31/2023    GLC 85 01/31/2023    SED 11 07/28/2021    NTBNPI 5,809 (H) 01/30/2023    NTBNP 3,151 (H) 01/03/2023    AST 39 (H) 01/31/2023    ALT 33 01/31/2023    ALKPHOS 57 01/31/2023    BILITOTAL 0.3 01/31/2023    INR 1.21 (H) 11/30/2022     Echocardiogram reviewed:   Left ventricular function, chamber size, wall motion, and wall thickness are normal.The EF is 55-60%. No regional wall motion abnormalities are seen.  Right ventricular function, chamber size, wall motion, and thickness are normal.  Severe left atrial enlargement is present. Moderate mitral annular calcification is present. Mild mitral insufficiency is present. There is likley moderate aortic stenosis. The peak velocity is  3.47m/sec, the peak and mean gradients are 48mmHg and 28mmHg. The aortic valve  area is calculated low at 0.7cm2. Pulmonary artery systolic pressure is normal. The inferior vena cava was normal in size with preserved respiratory variability. No pericardial effusion is present.   Compared to prior study AS has worsened. Otherwise no significant change     Coronary angiogram 8/21/2020:    Dist LAD lesion is " 70% stenosed.    Mid LAD-1 lesion is 80% stenosed.    Mid LAD-2 lesion is 60% stenosed.    Mid RCA lesion is 40% stenosed.    Prox RCA lesion is 40% stenosed.    Right sided filling pressures are normal.    Normal PA pressures.    Left sided filling pressures are normal.    Reduced cardiac output.  1. LAD - 3 EDDIE were placed in the mid to distal LAD (2.5 x 28 mm, 3.5 x 12 mm, and 3.0 x 8 mm)  2. RHC showed normal biventricular filling pressures. Normal PA pressure. Reduced cardiac output.     RHC/coronary angiogram 11/3/20:    Moderate aortic stenosis -valve area 1.2 cm2,Mean gradient 24 mm Hg    Right sided filling pressures are normal.    Normal PA pressures.    Left sided filling pressures are normal.    Mildly reduced cardiac output level.    Peripheral angiogram done and IVUS used to assess the vessel lumens of the descending thoracic aorta,right and left external iliac arteries     TTE 5/3/21:  Global and regional left ventricular function is normal with an EF of 55-60%.  The right ventricle is normal size. Global right ventricular function is normal.  Moderate to severe aortic stenosis, aortic valve area 1.02 cm2, peak velocity  3.3 m/s, mean gradient 25 mmHg, stroke volume index 40 ml/m2, DVI 0.29.  This study was compared with the study from 9/16/2020. The aortic valve peak velocity and mean gradient are similar     TTE 11/1/21:  Global and regional left ventricular function is normal with an EF of 55-60%.  Right ventricular function, chamber size, wall motion, and thickness are normal.  Moderate aortic valve calcification is present. The mean gradient across the aortic valve is 23 mmHg. The peak aortic velocity is 3.1 m/sec. Moderate aortic stenosis is present. Calculated valve area lower than previous study due to LVOT diameter measurement. Moderate aortic stenosis is present.  No significant changes noted.     TTE 9/12/22:  Intra-procedural transthoracic echocardiogram for transfemoral transcatheter  aortic valve replacement with 26mm Arndt Janis Ultra valve Aortic Valve. Image acquisition by sonographer.  PRE-PROCEDURAL FINDINGS:  1. Severe calcific aortic stenosis.  2. Normal systolic function. Visual LVEF 55%.   POST-PROCEDURAL SURVEY:  1. A 26 mm Arndt Janis Ultra valve Aortic Valve was successfully deployed.  2. Valve is well seated. There is trivial periprosthetic regurgitation.  3. Post-procedure valve hemodynamics - mean gradient 5.0 mmHg.  4. No pericardial effusion     CTA LE 9/22/22:  1. Left common femoral artery 22 cm segmental greater than 80% diameter stenosis from its origin. Linear defect suggests a dissection as the etiology. Mid and distal segments of the common femoral artery are patent.  2. 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  3. Additional nonemergent incidental findings include diverticulosis without diverticulitis and cholelithiasis without cholecystitis.     TTE 1/30/23:  Left ventricular size, wall motion and function are normal. The ejection fraction is 55-60%.  Right ventricular size and function are normal.  Status post 26 mm Arndt Janis Ultra valve TAVR on 9/12/2022. The valve is well seated. MG 16 mmHg; PV 2.5 m/s; no paravalvular AI.  No pericardial effusion is present.   This study was compared with the study from 12/1/2022. No significant changes noted.    ASSESSMENT AND PLAN:  In summary, patient is a 87 year old lady with with coronary artery disease and status post PCI 20 years ago at [facility], we do not have records unfortunately available but did receive 2 stents.  She most complains of shortness of breath denies any dizziness lightheadedness syncope or recurrent episodes of chest pains.  Overall there has been minimal change in her condition since my last visit and since the invasive evaluation. I have reviewed old imaging and blood work in person including visualizing the echocardiograms.  She did undergo TAVR with a 26  mm valve in September 2022.  As above the procedure overall went well however it was complicated by left bundle branch block and unfortunately she does not feel significantly better after the procedure.  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  She does have some associated claudication with it however she feels better.  She does appear euvolemic on exam today however she has severe hypertension.  Again her blood pressure was 196/118 mmHg with no other issues including headaches or blurry vision.  She as noted above, did have multiple medication intolerances and as such we will not include hydralazine or amlodipine at this time.  However we will increase the Imdur next to 60 mg daily.  Her losartan has also been increased to 100 mg just 2 days ago and it might take some time for her to achieve steady-state level and for the blood pressure medication to work.  As such will need close follow-up and Dr. Wilcox and as her office can take care of this hopefully.  I will see her back in 1 year or sooner as needed with echocardiogram     I appreciate the opportunity to participate in the care of Kamryn Miller . Please do not hesitate to contact me with any further questions.     Sincerely,   Madhu Zuniga MD  [facility] Division of Cardiology

## 2023-02-15 NOTE — PATIENT INSTRUCTIONS
Dr. Zuniga recommends:    Increase Imdur to 60 MG once daily.    Follow up clinic visit with Dr. Zuniga in one year with labs the same day.    Thank you for your visit today.  Please call me with any questions or concerns.   Jimmie Coppola RN  Cardiology Care Coordinator  [phone]

## 2023-02-15 NOTE — NURSING NOTE
Chief Complaint   Patient presents with     Follow Up     Dr. Zuniga: HF     Vitals were taken and medications reconciled.    Hugh Phillip, EMT  2:36 PM

## 2023-02-15 NOTE — PROGRESS NOTES
February 15, 2023     I had the pleasure of seeing Kamryn Miller  in the [facility] for further evaluation and management.  She has a history of Hx CAD, HLD, HTN, mitral and tricuspid insuff, pulmonary hypertension, lost to follow up in the past.  She does have CAD and did receive 3 stents in 8/2020 as below.  She denies any cardiomyopathy and she has not had any cardiology follow-up for several years.  Notes that she does have shortness of breath especially with exertion or she walks outside.  This is class III symptoms at the moment she can will probably about a block and able to take a flight of stairs.  We did start TAVR evaluation and she did undergo right heart catheterization as well as invasive hemodynamic measurement and measurement of the valve area.  Given findings of moderate aortic stenosis, TAVR was not yet pursued and ongoing surveillance was recommended. Had been hypertensive in the recent past. She did see the structural team just recently and repeat TTE was performed that again showed moderate to severe aortic stenosis, essentially unchanged from prior TTE. Given that she had no concerning symptoms ongoing surveillance was recommended.  On last visit I suggested increasing amlodipine to 10mg daily due to persistent hypertension. However, she did have an admission 12/16/21 and multiple medication changes made, including stopping amlodipine. Accordingly, she developed hypertension and 5mg was restarted in UC. However, she developed hypertensive emergency requiring admission for this and amlodipine was increased to 10mg daily with good response. Goven ongoing and worsening symptoms she was revaluated by the structural team and ultimately underwent successful TAVR on 9/12/22 with a post intervention gradient of 7mmHg. She was seen in the ER subsequently for claudication and leg pain in the setting of known LFA stenosis. She was seen in the ER recently for episode of chest pain. Workup was  overall negative and was discharged in stable condition.  Overall she has been feeling well after the TAVR however blood pressure remains elevated.  Today's 196/111 mmHg.  She does not have any headache dizziness lightheadedness with this.  No falls or other issues.  Doing well she is given no other medications.     PAST MEDICAL HISTORY:  Past Medical History:   Diagnosis Date     Aortic valvar stenosis 7/2010    mild     Asthma      Bipolar disorder (H)      CAD (coronary artery disease)     s/p angioplasty     Celiac disease      Colon polyps      Colon polyps 2012    every 3 year colonoscopy      Depression      High cholesterol      HTN      Mitral insufficiency      Pulmonary HTN (H)     mild     Tremors 7/10    drug induced from antidepressants     Tricuspid insufficiency      FAMILY HISTORY:  Family History   Problem Relation Age of Onset     Asthma Mother      Cerebrovascular Disease Mother      Arthritis Mother      Depression Mother      Lipids Sister      Hypertension Sister      Heart Disease Sister      Lipids Sister      Hypertension Sister      Lipids Sister      Breast Cancer Sister      SOCIAL HISTORY:  Social History     Socioeconomic History     Marital status:      Spouse name: Adrien     Number of children: 2     Years of education: 16   Occupational History     Employer: RETIRED     Comment: Retired in 2002.    Tobacco Use     Smoking status: Never     Smokeless tobacco: Never   Vaping Use     Vaping Use: Never used   Substance and Sexual Activity     Alcohol use: No     Alcohol/week: 0.0 standard drinks     Types: 1 Standard drinks or equivalent per week     Drug use: No     Sexual activity: Not Currently     Partners: Male     CURRENT MEDICATIONS:  Current Outpatient Medications   Medication     acetaminophen (TYLENOL) 500 MG tablet     albuterol (PROAIR HFA/PROVENTIL HFA/VENTOLIN HFA) 108 (90 Base) MCG/ACT inhaler     alendronate (FOSAMAX) 70 MG tablet     azelastine (ASTEPRO) 0.15 %  "nasal spray     calcium citrate (CITRACAL) 950 (200 Ca) MG tablet     cholecalciferol (VITAMIN D3) 1250 mcg (22418 units) capsule     cloNIDine (CATAPRES) 0.1 MG tablet     desvenlafaxine (PRISTIQ) 100 MG 24 hr tablet     fluticasone-salmeterol (ADVAIR DISKUS) 500-50 MCG/ACT inhaler     folic acid (FOLVITE) 400 MCG tablet     furosemide (LASIX) 20 MG tablet     iron polysaccharides (NIFEREX) 150 MG capsule     isosorbide mononitrate (IMDUR) 30 MG 24 hr tablet     lamoTRIgine (LAMICTAL) 25 MG tablet     LAMOTRIGINE 200 MG PO TABS     levothyroxine (SYNTHROID/LEVOTHROID) 50 MCG tablet     liothyronine (CYTOMEL) 5 MCG tablet     losartan (COZAAR) 100 MG tablet     memantine (NAMENDA) 10 MG tablet     mirtazapine (REMERON) 7.5 MG tablet     Multiple Vitamin (TAB-A-JENNIFER) TABS     psyllium (METAMUCIL/KONSYL) 58.6 % powder     REGULOID 28.3 % POWD     rosuvastatin (CRESTOR) 40 MG tablet     No current facility-administered medications for this visit.     ROS:   Constitutional: No fever, chills, or sweats.   ENT: No visual disturbance, ear ache, epistaxis, sore throat.   Allergies/Immunologic: Negative.   Respiratory: No cough, hemoptysis.   Cardiovascular: As per HPI.   GI: No nausea, vomiting, hematemesis, melena, or hematochezia.   : No urinary frequency, dysuria, or hematuria.   Integument: Negative.   Psychiatric: Pleasant, no major depression noted  Neuro: No focal neurological deficits noted  Endocrinology: Negative.   Musculoskeletal: As per HPI.      EXAM:  BP (!) 196/118 (BP Location: Left arm, Patient Position: Chair, Cuff Size: Adult Small)   Pulse 78   Ht 1.555 m (5' 1.22\")   Wt 69.4 kg (153 lb)   SpO2 96%   BMI 28.70 kg/m    General: appears comfortable, alert and oriented  Head: normocephalic, atraumatic  Eyes: anicteric sclera, EOMI , PERRL  Neck: no adenopathy  Orophyarynx: moist mucosa, no lesions noted  Heart: regular, S1/S2, no murmurs, rubs or gallop. Estimated JVP at 5 cmH2O  Lungs: CTAB, No " wheezing.   Abdomen: soft, non-tender, bowel sounds present, no hepatosplenomegaly  Extremities: No LE edema today  Skin: no open lesions noted  Neuro: grossly non-focal  Psych: no evidence of depression noted     Labs:  Lab Results   Component Value Date    WBC 4.2 01/31/2023    HGB 10.9 (L) 01/31/2023    HCT 35.9 01/31/2023     01/31/2023     01/31/2023    POTASSIUM 3.9 01/31/2023    CHLORIDE 107 01/31/2023    CO2 26 01/31/2023    BUN 22.1 01/31/2023    CR 1.06 (H) 01/31/2023    GLC 85 01/31/2023    SED 11 07/28/2021    NTBNPI 5,809 (H) 01/30/2023    NTBNP 3,151 (H) 01/03/2023    AST 39 (H) 01/31/2023    ALT 33 01/31/2023    ALKPHOS 57 01/31/2023    BILITOTAL 0.3 01/31/2023    INR 1.21 (H) 11/30/2022     Echocardiogram reviewed:   Left ventricular function, chamber size, wall motion, and wall thickness are normal.The EF is 55-60%. No regional wall motion abnormalities are seen.  Right ventricular function, chamber size, wall motion, and thickness are normal.  Severe left atrial enlargement is present. Moderate mitral annular calcification is present. Mild mitral insufficiency is present. There is likley moderate aortic stenosis. The peak velocity is  3.47m/sec, the peak and mean gradients are 48mmHg and 28mmHg. The aortic valve  area is calculated low at 0.7cm2. Pulmonary artery systolic pressure is normal. The inferior vena cava was normal in size with preserved respiratory variability. No pericardial effusion is present.   Compared to prior study AS has worsened. Otherwise no significant change     Coronary angiogram 8/21/2020:    Dist LAD lesion is 70% stenosed.    Mid LAD-1 lesion is 80% stenosed.    Mid LAD-2 lesion is 60% stenosed.    Mid RCA lesion is 40% stenosed.    Prox RCA lesion is 40% stenosed.    Right sided filling pressures are normal.    Normal PA pressures.    Left sided filling pressures are normal.    Reduced cardiac output.  1. LAD - 3 EDDIE were placed in the mid to distal LAD (2.5  x 28 mm, 3.5 x 12 mm, and 3.0 x 8 mm)  2. RHC showed normal biventricular filling pressures. Normal PA pressure. Reduced cardiac output.     RHC/coronary angiogram 11/3/20:    Moderate aortic stenosis -valve area 1.2 cm2,Mean gradient 24 mm Hg    Right sided filling pressures are normal.    Normal PA pressures.    Left sided filling pressures are normal.    Mildly reduced cardiac output level.    Peripheral angiogram done and IVUS used to assess the vessel lumens of the descending thoracic aorta,right and left external iliac arteries     TTE 5/3/21:  Global and regional left ventricular function is normal with an EF of 55-60%.  The right ventricle is normal size. Global right ventricular function is normal.  Moderate to severe aortic stenosis, aortic valve area 1.02 cm2, peak velocity  3.3 m/s, mean gradient 25 mmHg, stroke volume index 40 ml/m2, DVI 0.29.  This study was compared with the study from 9/16/2020. The aortic valve peak velocity and mean gradient are similar     TTE 11/1/21:  Global and regional left ventricular function is normal with an EF of 55-60%.  Right ventricular function, chamber size, wall motion, and thickness are normal.  Moderate aortic valve calcification is present. The mean gradient across the aortic valve is 23 mmHg. The peak aortic velocity is 3.1 m/sec. Moderate aortic stenosis is present. Calculated valve area lower than previous study due to LVOT diameter measurement. Moderate aortic stenosis is present.  No significant changes noted.     TTE 9/12/22:  Intra-procedural transthoracic echocardiogram for transfemoral transcatheter aortic valve replacement with 26mm Arndt Janis Ultra valve Aortic Valve. Image acquisition by sonographer.  PRE-PROCEDURAL FINDINGS:  1. Severe calcific aortic stenosis.  2. Normal systolic function. Visual LVEF 55%.   POST-PROCEDURAL SURVEY:  1. A 26 mm Arndt Janis Ultra valve Aortic Valve was successfully deployed.  2. Valve is well seated. There is  trivial periprosthetic regurgitation.  3. Post-procedure valve hemodynamics - mean gradient 5.0 mmHg.  4. No pericardial effusion     CTA LE 9/22/22:  1. Left common femoral artery 22 cm segmental greater than 80% diameter stenosis from its origin. Linear defect suggests a dissection as the etiology. Mid and distal segments of the common femoral artery are patent.  2. 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  3. Additional nonemergent incidental findings include diverticulosis without diverticulitis and cholelithiasis without cholecystitis.     TTE 1/30/23:  Left ventricular size, wall motion and function are normal. The ejection fraction is 55-60%.  Right ventricular size and function are normal.  Status post 26 mm Arndt Janis Ultra valve TAVR on 9/12/2022. The valve is well seated. MG 16 mmHg; PV 2.5 m/s; no paravalvular AI.  No pericardial effusion is present.   This study was compared with the study from 12/1/2022. No significant changes noted.    ASSESSMENT AND PLAN:  In summary, patient is a 87 year old lady with with coronary artery disease and status post PCI 20 years ago at [facility], we do not have records unfortunately available but did receive 2 stents.  She most complains of shortness of breath denies any dizziness lightheadedness syncope or recurrent episodes of chest pains.  Overall there has been minimal change in her condition since my last visit and since the invasive evaluation. I have reviewed old imaging and blood work in person including visualizing the echocardiograms.  She did undergo TAVR with a 26 mm valve in September 2022.  As above the procedure overall went well however it was complicated by left bundle branch block and unfortunately she does not feel significantly better after the procedure.  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  She does have some associated claudication with it however  she feels better.  She does appear euvolemic on exam today however she has severe hypertension.  Again her blood pressure was 196/118 mmHg with no other issues including headaches or blurry vision.  She as noted above, did have multiple medication intolerances and as such we will not include hydralazine or amlodipine at this time.  However we will increase the Imdur next to 60 mg daily.  Her losartan has also been increased to 100 mg just 2 days ago and it might take some time for her to achieve steady-state level and for the blood pressure medication to work.  As such will need close follow-up and Dr. Wilcox and as her office can take care of this hopefully.  I will see her back in 1 year or sooner as needed with echocardiogram     I appreciate the opportunity to participate in the care of Kamryn Miller . Please do not hesitate to contact me with any further questions.     Sincerely,   Madhu Zuniga MD  [facility] Division of Cardiology

## 2023-02-21 ENCOUNTER — CARE COORDINATION (OUTPATIENT)
Dept: CARDIOLOGY | Facility: CLINIC | Age: 88
End: 2023-02-21
Payer: MEDICARE

## 2023-02-21 ENCOUNTER — NURSE TRIAGE (OUTPATIENT)
Dept: CARDIOLOGY | Facility: CLINIC | Age: 88
End: 2023-02-21
Payer: MEDICARE

## 2023-02-21 ENCOUNTER — TELEPHONE (OUTPATIENT)
Dept: FAMILY MEDICINE | Facility: CLINIC | Age: 88
End: 2023-02-21
Payer: MEDICARE

## 2023-02-21 DIAGNOSIS — Z00.00 PREVENTATIVE HEALTH CARE: Primary | ICD-10-CM

## 2023-02-21 NOTE — TELEPHONE ENCOUNTER
Katherine calling from [facility] to report patient's elevated blood pressure readings from today.    Patient had checked BP, which resulted as 188/108. Patient having a mild headache as only side effect.    Patient is currently on 0.1 mg of Clonidine two times daily.     Katherine followed-up with patient's cardiologist, Dr. Zuniga, who increased patient's Imdur from 30 to 60 mg.     Katherine inquiring for further suggestions from PCP regarding elevated BP. Please advise.    MERE GreenfieldN RN  [facility]

## 2023-02-21 NOTE — TELEPHONE ENCOUNTER
Camille calling from [facility] to check on status of message. Pt just took her BP and was 189/114 HR 75. No chest pan, blurred vision or SOB. Is up and alert and says she is going to eat in her apartment. Clonidine is a scheduled medication now. This was started on 2/13. Was previously PRN. Per RN protocol, pt should be seen in office today. Pt does not want to be seen. Routing to provider to advise. Camille can be reached at [phone].    Verona Blackwell RN  [facility]

## 2023-02-21 NOTE — PROGRESS NOTES
Called and spoke with nurse at facility. Dr. Zuniga was notified and is asking that Dr. Wilcox manage the patient's blood pressures. Nurse states message sent to Dr. Wilcox.

## 2023-02-22 ENCOUNTER — HOSPITAL ENCOUNTER (INPATIENT)
Facility: CLINIC | Age: 88
LOS: 6 days | Discharge: HOME HEALTH | DRG: 291 | End: 2023-02-28
Attending: EMERGENCY MEDICINE | Admitting: STUDENT IN AN ORGANIZED HEALTH CARE EDUCATION/TRAINING PROGRAM
Payer: MEDICARE

## 2023-02-22 ENCOUNTER — TELEPHONE (OUTPATIENT)
Dept: FAMILY MEDICINE | Facility: CLINIC | Age: 88
End: 2023-02-22

## 2023-02-22 ENCOUNTER — APPOINTMENT (OUTPATIENT)
Dept: CT IMAGING | Facility: CLINIC | Age: 88
DRG: 291 | End: 2023-02-22
Attending: EMERGENCY MEDICINE
Payer: MEDICARE

## 2023-02-22 ENCOUNTER — CARE COORDINATION (OUTPATIENT)
Dept: CARDIOLOGY | Facility: CLINIC | Age: 88
End: 2023-02-22
Payer: MEDICARE

## 2023-02-22 DIAGNOSIS — R79.89 ELEVATED BRAIN NATRIURETIC PEPTIDE (BNP) LEVEL: ICD-10-CM

## 2023-02-22 DIAGNOSIS — I50.30 NYHA CLASS 3 HEART FAILURE WITH PRESERVED EJECTION FRACTION (H): ICD-10-CM

## 2023-02-22 DIAGNOSIS — I25.10 CAD S/P PERCUTANEOUS CORONARY ANGIOPLASTY: Primary | ICD-10-CM

## 2023-02-22 DIAGNOSIS — I10 ESSENTIAL HYPERTENSION: ICD-10-CM

## 2023-02-22 DIAGNOSIS — Z98.61 CAD S/P PERCUTANEOUS CORONARY ANGIOPLASTY: Primary | ICD-10-CM

## 2023-02-22 DIAGNOSIS — R63.5 WEIGHT GAIN: ICD-10-CM

## 2023-02-22 LAB
ALBUMIN SERPL BCG-MCNC: 3.7 G/DL (ref 3.5–5.2)
ALP SERPL-CCNC: 75 U/L (ref 35–104)
ALT SERPL W P-5'-P-CCNC: 71 U/L (ref 10–35)
ANION GAP SERPL CALCULATED.3IONS-SCNC: 14 MMOL/L (ref 7–15)
AST SERPL W P-5'-P-CCNC: 56 U/L (ref 10–35)
BASOPHILS # BLD AUTO: 0.1 10E3/UL (ref 0–0.2)
BASOPHILS NFR BLD AUTO: 1 %
BILIRUB SERPL-MCNC: 0.3 MG/DL
BUN SERPL-MCNC: 20.5 MG/DL (ref 8–23)
CALCIUM SERPL-MCNC: 9.6 MG/DL (ref 8.8–10.2)
CHLORIDE SERPL-SCNC: 108 MMOL/L (ref 98–107)
CREAT SERPL-MCNC: 1.2 MG/DL (ref 0.51–0.95)
D DIMER PPP FEU-MCNC: 1.8 UG/ML FEU (ref 0–0.5)
DEPRECATED HCO3 PLAS-SCNC: 23 MMOL/L (ref 22–29)
EOSINOPHIL # BLD AUTO: 0.1 10E3/UL (ref 0–0.7)
EOSINOPHIL NFR BLD AUTO: 2 %
ERYTHROCYTE [DISTWIDTH] IN BLOOD BY AUTOMATED COUNT: 18.4 % (ref 10–15)
GFR SERPL CREATININE-BSD FRML MDRD: 44 ML/MIN/1.73M2
GLUCOSE SERPL-MCNC: 101 MG/DL (ref 70–99)
HCT VFR BLD AUTO: 36.7 % (ref 35–47)
HGB BLD-MCNC: 11.1 G/DL (ref 11.7–15.7)
HOLD SPECIMEN: NORMAL
IMM GRANULOCYTES # BLD: 0 10E3/UL
IMM GRANULOCYTES NFR BLD: 0 %
INR PPP: 1.12 (ref 0.85–1.15)
LYMPHOCYTES # BLD AUTO: 0.9 10E3/UL (ref 0.8–5.3)
LYMPHOCYTES NFR BLD AUTO: 17 %
MCH RBC QN AUTO: 29.4 PG (ref 26.5–33)
MCHC RBC AUTO-ENTMCNC: 30.2 G/DL (ref 31.5–36.5)
MCV RBC AUTO: 97 FL (ref 78–100)
MONOCYTES # BLD AUTO: 0.6 10E3/UL (ref 0–1.3)
MONOCYTES NFR BLD AUTO: 11 %
NEUTROPHILS # BLD AUTO: 3.7 10E3/UL (ref 1.6–8.3)
NEUTROPHILS NFR BLD AUTO: 69 %
NRBC # BLD AUTO: 0 10E3/UL
NRBC BLD AUTO-RTO: 0 /100
NT-PROBNP SERPL-MCNC: ABNORMAL PG/ML (ref 0–1800)
PLATELET # BLD AUTO: 143 10E3/UL (ref 150–450)
POTASSIUM SERPL-SCNC: 3.8 MMOL/L (ref 3.4–5.3)
PROT SERPL-MCNC: 6.5 G/DL (ref 6.4–8.3)
RBC # BLD AUTO: 3.78 10E6/UL (ref 3.8–5.2)
SODIUM SERPL-SCNC: 145 MMOL/L (ref 136–145)
TROPONIN T SERPL HS-MCNC: 69 NG/L
TROPONIN T SERPL HS-MCNC: 69 NG/L
TSH SERPL DL<=0.005 MIU/L-ACNC: 2.31 UIU/ML (ref 0.3–4.2)
WBC # BLD AUTO: 5.3 10E3/UL (ref 4–11)

## 2023-02-22 PROCEDURE — G1010 CDSM STANSON: HCPCS | Mod: GC | Performed by: RADIOLOGY

## 2023-02-22 PROCEDURE — 85025 COMPLETE CBC W/AUTO DIFF WBC: CPT | Performed by: EMERGENCY MEDICINE

## 2023-02-22 PROCEDURE — 71275 CT ANGIOGRAPHY CHEST: CPT | Mod: 26 | Performed by: STUDENT IN AN ORGANIZED HEALTH CARE EDUCATION/TRAINING PROGRAM

## 2023-02-22 PROCEDURE — 70450 CT HEAD/BRAIN W/O DYE: CPT | Mod: 26 | Performed by: RADIOLOGY

## 2023-02-22 PROCEDURE — 250N000011 HC RX IP 250 OP 636

## 2023-02-22 PROCEDURE — 70450 CT HEAD/BRAIN W/O DYE: CPT | Mod: MF

## 2023-02-22 PROCEDURE — 83880 ASSAY OF NATRIURETIC PEPTIDE: CPT | Performed by: EMERGENCY MEDICINE

## 2023-02-22 PROCEDURE — 93005 ELECTROCARDIOGRAM TRACING: CPT | Performed by: EMERGENCY MEDICINE

## 2023-02-22 PROCEDURE — 36415 COLL VENOUS BLD VENIPUNCTURE: CPT | Performed by: EMERGENCY MEDICINE

## 2023-02-22 PROCEDURE — 99223 1ST HOSP IP/OBS HIGH 75: CPT | Mod: AI | Performed by: STUDENT IN AN ORGANIZED HEALTH CARE EDUCATION/TRAINING PROGRAM

## 2023-02-22 PROCEDURE — 250N000013 HC RX MED GY IP 250 OP 250 PS 637: Performed by: EMERGENCY MEDICINE

## 2023-02-22 PROCEDURE — 85379 FIBRIN DEGRADATION QUANT: CPT | Performed by: EMERGENCY MEDICINE

## 2023-02-22 PROCEDURE — 99285 EMERGENCY DEPT VISIT HI MDM: CPT | Mod: 25 | Performed by: EMERGENCY MEDICINE

## 2023-02-22 PROCEDURE — G1010 CDSM STANSON: HCPCS

## 2023-02-22 PROCEDURE — 84484 ASSAY OF TROPONIN QUANT: CPT | Performed by: EMERGENCY MEDICINE

## 2023-02-22 PROCEDURE — 85610 PROTHROMBIN TIME: CPT | Performed by: EMERGENCY MEDICINE

## 2023-02-22 PROCEDURE — 93010 ELECTROCARDIOGRAM REPORT: CPT | Performed by: EMERGENCY MEDICINE

## 2023-02-22 PROCEDURE — 84443 ASSAY THYROID STIM HORMONE: CPT | Performed by: EMERGENCY MEDICINE

## 2023-02-22 PROCEDURE — 84145 PROCALCITONIN (PCT): CPT

## 2023-02-22 PROCEDURE — 120N000002 HC R&B MED SURG/OB UMMC

## 2023-02-22 PROCEDURE — C9803 HOPD COVID-19 SPEC COLLECT: HCPCS | Performed by: EMERGENCY MEDICINE

## 2023-02-22 PROCEDURE — 82565 ASSAY OF CREATININE: CPT

## 2023-02-22 PROCEDURE — G1010 CDSM STANSON: HCPCS | Mod: GC | Performed by: STUDENT IN AN ORGANIZED HEALTH CARE EDUCATION/TRAINING PROGRAM

## 2023-02-22 PROCEDURE — 80053 COMPREHEN METABOLIC PANEL: CPT | Performed by: EMERGENCY MEDICINE

## 2023-02-22 RX ORDER — IOPAMIDOL 755 MG/ML
55 INJECTION, SOLUTION INTRAVASCULAR ONCE
Status: COMPLETED | OUTPATIENT
Start: 2023-02-22 | End: 2023-02-22

## 2023-02-22 RX ORDER — DESVENLAFAXINE 100 MG/1
100 TABLET, EXTENDED RELEASE ORAL DAILY
Status: DISCONTINUED | OUTPATIENT
Start: 2023-02-23 | End: 2023-02-25

## 2023-02-22 RX ORDER — CLONIDINE HYDROCHLORIDE 0.1 MG/1
0.1 TABLET ORAL 2 TIMES DAILY
Status: DISCONTINUED | OUTPATIENT
Start: 2023-02-23 | End: 2023-02-23

## 2023-02-22 RX ORDER — ALBUTEROL SULFATE 90 UG/1
2 AEROSOL, METERED RESPIRATORY (INHALATION) EVERY 6 HOURS PRN
Status: DISCONTINUED | OUTPATIENT
Start: 2023-02-22 | End: 2023-02-28 | Stop reason: HOSPADM

## 2023-02-22 RX ORDER — LOSARTAN POTASSIUM 100 MG/1
100 TABLET ORAL DAILY
Status: DISCONTINUED | OUTPATIENT
Start: 2023-02-23 | End: 2023-02-25

## 2023-02-22 RX ORDER — ACETAMINOPHEN 325 MG/1
650 TABLET ORAL EVERY 6 HOURS PRN
Status: DISCONTINUED | OUTPATIENT
Start: 2023-02-22 | End: 2023-02-28 | Stop reason: HOSPADM

## 2023-02-22 RX ORDER — LAMOTRIGINE 25 MG/1
25 TABLET ORAL DAILY
Status: DISCONTINUED | OUTPATIENT
Start: 2023-02-23 | End: 2023-02-22

## 2023-02-22 RX ORDER — ENOXAPARIN SODIUM 100 MG/ML
40 INJECTION SUBCUTANEOUS EVERY 24 HOURS
Status: DISCONTINUED | OUTPATIENT
Start: 2023-02-23 | End: 2023-02-24

## 2023-02-22 RX ORDER — MEMANTINE HYDROCHLORIDE 10 MG/1
10 TABLET ORAL DAILY
Status: DISCONTINUED | OUTPATIENT
Start: 2023-02-23 | End: 2023-02-28 | Stop reason: HOSPADM

## 2023-02-22 RX ORDER — ISOSORBIDE MONONITRATE 30 MG/1
60 TABLET, EXTENDED RELEASE ORAL DAILY
Status: DISCONTINUED | OUTPATIENT
Start: 2023-02-23 | End: 2023-02-26

## 2023-02-22 RX ORDER — ONDANSETRON 4 MG/1
4 TABLET, ORALLY DISINTEGRATING ORAL EVERY 6 HOURS PRN
Status: DISCONTINUED | OUTPATIENT
Start: 2023-02-22 | End: 2023-02-28 | Stop reason: HOSPADM

## 2023-02-22 RX ORDER — POLYETHYLENE GLYCOL 3350 17 G/17G
17 POWDER, FOR SOLUTION ORAL DAILY
Status: DISCONTINUED | OUTPATIENT
Start: 2023-02-23 | End: 2023-02-28 | Stop reason: HOSPADM

## 2023-02-22 RX ORDER — ACETAMINOPHEN 500 MG
500 TABLET ORAL ONCE
Status: COMPLETED | OUTPATIENT
Start: 2023-02-22 | End: 2023-02-22

## 2023-02-22 RX ORDER — FUROSEMIDE 10 MG/ML
20 INJECTION INTRAMUSCULAR; INTRAVENOUS ONCE
Status: COMPLETED | OUTPATIENT
Start: 2023-02-22 | End: 2023-02-23

## 2023-02-22 RX ORDER — MIRTAZAPINE 7.5 MG/1
7.5 TABLET, FILM COATED ORAL AT BEDTIME
Status: DISCONTINUED | OUTPATIENT
Start: 2023-02-23 | End: 2023-02-28 | Stop reason: HOSPADM

## 2023-02-22 RX ORDER — CEFTRIAXONE 1 G/1
1 INJECTION, POWDER, FOR SOLUTION INTRAMUSCULAR; INTRAVENOUS ONCE
Status: COMPLETED | OUTPATIENT
Start: 2023-02-22 | End: 2023-02-23

## 2023-02-22 RX ORDER — ONDANSETRON 2 MG/ML
4 INJECTION INTRAMUSCULAR; INTRAVENOUS EVERY 6 HOURS PRN
Status: DISCONTINUED | OUTPATIENT
Start: 2023-02-22 | End: 2023-02-28 | Stop reason: HOSPADM

## 2023-02-22 RX ORDER — LIDOCAINE 40 MG/G
CREAM TOPICAL
Status: DISCONTINUED | OUTPATIENT
Start: 2023-02-22 | End: 2023-02-28 | Stop reason: HOSPADM

## 2023-02-22 RX ORDER — LANOLIN ALCOHOL/MO/W.PET/CERES
400 CREAM (GRAM) TOPICAL DAILY
Status: DISCONTINUED | OUTPATIENT
Start: 2023-02-23 | End: 2023-02-28 | Stop reason: HOSPADM

## 2023-02-22 RX ORDER — ACETAMINOPHEN 650 MG/1
650 SUPPOSITORY RECTAL EVERY 6 HOURS PRN
Status: DISCONTINUED | OUTPATIENT
Start: 2023-02-22 | End: 2023-02-28 | Stop reason: HOSPADM

## 2023-02-22 RX ORDER — LANOLIN ALCOHOL/MO/W.PET/CERES
400 CREAM (GRAM) TOPICAL DAILY
Qty: 90 TABLET | Refills: 0 | Status: SHIPPED | OUTPATIENT
Start: 2023-02-22 | End: 2023-06-05

## 2023-02-22 RX ORDER — CHOLECALCIFEROL (VITAMIN D3) 1250 MCG
1250 CAPSULE ORAL WEEKLY
Status: DISCONTINUED | OUTPATIENT
Start: 2023-02-23 | End: 2023-02-28 | Stop reason: HOSPADM

## 2023-02-22 RX ORDER — IBUPROFEN 200 MG
950 CAPSULE ORAL DAILY
Status: DISCONTINUED | OUTPATIENT
Start: 2023-02-23 | End: 2023-02-28 | Stop reason: HOSPADM

## 2023-02-22 RX ORDER — LEVOTHYROXINE SODIUM 25 UG/1
50 TABLET ORAL DAILY
Status: DISCONTINUED | OUTPATIENT
Start: 2023-02-23 | End: 2023-02-28 | Stop reason: HOSPADM

## 2023-02-22 RX ORDER — ROSUVASTATIN CALCIUM 40 MG/1
40 TABLET, COATED ORAL DAILY
Status: DISCONTINUED | OUTPATIENT
Start: 2023-02-23 | End: 2023-02-24

## 2023-02-22 RX ORDER — LIOTHYRONINE SODIUM 5 UG/1
10 TABLET ORAL DAILY
Status: DISCONTINUED | OUTPATIENT
Start: 2023-02-23 | End: 2023-02-28 | Stop reason: HOSPADM

## 2023-02-22 RX ORDER — FLUTICASONE FUROATE AND VILANTEROL 100; 25 UG/1; UG/1
1 POWDER RESPIRATORY (INHALATION) DAILY
Status: DISCONTINUED | OUTPATIENT
Start: 2023-02-23 | End: 2023-02-28 | Stop reason: HOSPADM

## 2023-02-22 RX ORDER — LAMOTRIGINE 200 MG/1
200 TABLET ORAL DAILY
Status: DISCONTINUED | OUTPATIENT
Start: 2023-02-23 | End: 2023-02-22

## 2023-02-22 RX ADMIN — ACETAMINOPHEN 500 MG: 500 TABLET ORAL at 23:25

## 2023-02-22 RX ADMIN — IOPAMIDOL 55 ML: 755 INJECTION, SOLUTION INTRAVENOUS at 18:44

## 2023-02-22 ASSESSMENT — ACTIVITIES OF DAILY LIVING (ADL)
ADLS_ACUITY_SCORE: 37

## 2023-02-22 NOTE — ED TRIAGE NOTES
Pt presents with HTN noted today.  She is on Losartan but checked her pressures when she began having a headache and SOB.  O2 96% on RA.       Triage Assessment     Row Name 02/22/23 8182       Triage Assessment (Adult)    Airway WDL WDL       Respiratory WDL    Respiratory WDL WDL       Skin Circulation/Temperature WDL    Skin Circulation/Temperature WDL WDL       Cardiac WDL    Cardiac WDL WDL       Peripheral/Neurovascular WDL    Peripheral Neurovascular WDL WDL       Cognitive/Neuro/Behavioral WDL    Cognitive/Neuro/Behavioral WDL WDL

## 2023-02-22 NOTE — TELEPHONE ENCOUNTER
Blood Pressure is very elevated  She should follow up with her Cardiologist as she has Multiple Intolerances  Also can follow up here  We have discussed with Her BP can cause complications Like stroke

## 2023-02-22 NOTE — TELEPHONE ENCOUNTER
See 2/21/2023 Cardiology Care Coordination encounter.  Cardiologist, Dr. Zuniga is asking that Rolanda Junior manage patient's BP meds.  Per note below, patient was already advised to be seen but she does not want to be seen.   Please advise if you are able to make any further med adjustments without an appointment    Raúl Haywood RN  [facility]

## 2023-02-22 NOTE — TELEPHONE ENCOUNTER
Patient already has an appointment scheduled with Rolanda Junior for tomorrow.    Will also route to cardiology to see if they can advise further    Raúl Haywood RN  [facility]

## 2023-02-22 NOTE — TELEPHONE ENCOUNTER
Dayna ([facility]) calling regarding patient's recent blood pressures and management. States that facility is having trouble managing her blood pressures and is concerned that patient has been in and out of ED with no true management of hypertension. Writer stated understanding and that open discussion between cardiology and PCP on 2/21/23 relayed that PCP will be following all blood pressure management at this time, please see below. Writer stated that clinic has been in contact with nursing team at [facility] but patient is denying scheduling appointment, PCP states that patient needs to be seen, patient noted to have multiple intolerances. Writer assisted Dayna in setting patient up with appointment with PCP tomorrow 2/23/23 at 1220, Dayna will reach out to daughter and patient to confirm patient will be at appointment.    Patient's BP noted to be 211/118, pulse 76 today. Patient has been taking clonidine BID (0800, 2000). Asymptomatic, no reports of chest pain, weakness, SOB, or blurred vision. Dayna asking if there is any PRN patient can be prescribed today due to high BP with patient following up at appointment tomorrow? Writer discussed with Dayna that patient does need to proceed to ED if becomes symptomatic, Dayna stated understanding.    Dr. Rolanda Wilcox:  See 2/21/2023 Cardiology Care Coordination encounter.  Cardiologist, Dr. Zuniga is asking that Rolanda Junior manage patient's BP meds.  Appointment made for tomorrow 2/23/23 with PCP.  Director of Nursing ([facility]) states that patient's BP today 02/22/23 is 211/118, pulse 76, patient asymptomatic. Has been taking Clonidine BID (0800, 2000). Writer advised that patient proceed to ED if she becomes symptomatic. Please advise if you are able to make any further med adjustments in the meantime.  Nursing facility requesting a better plan as they keep having to send patient into ED for elevated BP.    Dayna (JOSE[facility]) also asking  if patient can/will have labs completed with appointment tomorrow as she has history of severe anemia, wondering if this could be caused by chronic kidney issues. Routing to PCP to advise.    Alireza Key Fax (if new orders are placed): [phone]  CB#: [phone], zoie Mcintosh BSN VEENA  [facility]

## 2023-02-22 NOTE — ED PROVIDER NOTES
[place] EMERGENCY DEPARTMENT ([facility])    2/22/23       ED PROVIDER NOTE  ED25      History     Chief Complaint   Patient presents with     Hypertension     HPI  Kamryn Miller is a 87 year old female who has a PMH notable for CAD (s/p angioplasty) pulmonary HTN, HLD, prior TAVR, tricuspid and mitral insufficiency, asthma, CKD, celiac disease, anemia, prior issues with syncope, depression, bipolar disorder, et al., who presents today on concern for shortness of breath and elevated BP.     Patient presents today in concern for a 1 day history of shortness of breath as well as elevated BP patient has been in her normal state of health, though admits that they have been having issues getting her on a blood pressure regimen that works, while there are other medications she did not tolerate, she does not feel as though her current antihypertensive regimen actually helped her blood pressure, but that is more of an ongoing issue.  Today then has had a 1 day history of shortness of breath.  She checked her blood pressure and found it to be even more elevated than usual and so presented to the ED for further evaluation and management.    Here, she does report a mild degree of shortness of breath, but not severe, no cough, no productive sputum, no hemoptysis.  Also reports a mild headache diffuse throughout the whole head without any thunderclap onset, no photophobia or any eye or ear symptoms.  No sore throats or any other HEENT symptoms.  No neck pain, stiffness or rigidity.  Denies any chest pain as mentioned, no palpitations, just a shortness of breath.  No back discomfort.  No nausea or vomiting.  No change to bowel or bladder or any other  symptoms.  She reports that after a prior leg injury on the right that that leg is always been skinnier than the left and so has some degree of asymmetry to her legs but her current appearance of her legs is unchanged from usual.  Otherwise no extremity swelling,  discomfort, [place].  No rashes or skin changes.  No other new symptoms or complaints this time.  Full ROS completed without any additional findings.      Past Medical History  Past Medical History:   Diagnosis Date     Aortic valvar stenosis 7/2010    mild     Asthma      Bipolar disorder (H)      CAD (coronary artery disease)     s/p angioplasty     Celiac disease      Colon polyps      Colon polyps 2012    every 3 year colonoscopy      Depression      High cholesterol      HTN      Mitral insufficiency      Pulmonary HTN (H)     mild     Tremors 7/10    drug induced from antidepressants     Tricuspid insufficiency      Past Surgical History:   Procedure Laterality Date     ANGIOGRAM  6/26/2009     ANGIOPLASTY  9/96    for angina     ANGIOPLASTY  8/03 - 9/03    X -2 - with stenst in coronary car.     APPENDECTOMY       BREAST BIOPSY, RT/LT      Breat Biopsy RT/LT, benign     BUNIONECTOMY  11/8/2011    Procedure:BUNIONECTOMY; Left donna bunionectomy; Surgeon:FREDY LITTLEJOHN; Location:[facility] OR     BUNIONECTOMY RT/LT  5/2007    right bunion and right 2nd hammertoe     CATARACT IOL, RT/LT  6/12 and 7/12    bilateral     COLONOSCOPY  2007, 2012    every 3 years for polyps     CV CORONARY ANGIOGRAM N/A 8/21/2020    Procedure: CV CORONARY ANGIOGRAM;  Surgeon: Gianluca Toscano MD;  Location: [facility] CARDIAC CATH LAB     CV CORONARY ANGIOGRAM N/A 10/25/2022    Procedure: Coronary Angiogram;  Surgeon: Carter Douglass MD;  Location: [facility] CARDIAC CATH LAB     CV INSTANTANEOUS WAVE-FREE RATIO N/A 10/25/2022    Procedure: Instantaneous Wave-Free Ratio;  Surgeon: Carter Douglass MD;  Location: [facility] CARDIAC CATH LAB     CV LEFT HEART CATH N/A 8/21/2020    Procedure: CV LEFT HEART CATH;  Surgeon: Gianluca Toscano MD;  Location: [facility] CARDIAC CATH LAB     CV LEFT HEART CATH N/A 11/3/2020    Procedure: CV LEFT HEART CATH;  Surgeon: Tom Burrows MD;  Location: [facility]  CARDIAC CATH LAB     CV LOWER EXTREMITY ANGIOGRAM BILATERAL N/A 11/3/2020    Procedure: CV ANGIOGRAM LOWER EXTREMITY BILATERAL;  Surgeon: Tom Burrows MD;  Location:  HEART CARDIAC CATH LAB     CV PCI STENT DRUG ELUTING N/A 8/21/2020    Procedure: Percutaneous Coronary Intervention Stent Drug Eluting;  Surgeon: Gianluca Toscano MD;  Location:  HEART CARDIAC CATH LAB     CV RIGHT HEART CATH MEASUREMENTS RECORDED N/A 8/21/2020    Procedure: CV RIGHT HEART CATH;  Surgeon: Gianluca Toscano MD;  Location:  HEART CARDIAC CATH LAB     CV RIGHT HEART CATH MEASUREMENTS RECORDED N/A 11/3/2020    Procedure: CV RIGHT HEART CATH;  Surgeon: Tom Burrows MD;  Location:  HEART CARDIAC CATH LAB     CV TRANSCATHETER AORTIC VALVE REPLACEMENT N/A 9/12/2022    Procedure: Transfemoral Transcatheter Aortic Valve Replacement with possible open heart bypass and or balloon pump placement and any indicated procedure;  Surgeon: Tom Burrows MD;  Location:  HEART CARDIAC CATH LAB     HEART CATH FEMORAL CANNULIZATION WITH OPEN STANDBY REPAIR AORTIC VALVE N/A 9/12/2022    Procedure: OR TRANSCATHETER AORTIC VALVE REPLACEMENT, OPEN FEMORAL ARTERY APPROACH;  Surgeon: Arthur Markham MD;  Location:  HEART CARDIAC CATH LAB     JOINT REPLACEMENT, HIP RT/LT  4/2008    right hip replaced     JOINT REPLACEMENT, HIP RT/LT  4/2009    left hip replaced     REPAIR HAMMER TOE  11/8/2011    Procedure:REPAIR HAMMER TOE; left 2nd hammertoe repair; Surgeon:FREDY LITTLEJOHN; Location: OR     SURGICAL HISTORY OF -   11/11    left bunion and 2nd hammertoe repair     TUBAL LIGATION       Z ANESTH,BLEPHAROPLASTY      (R) for drooping eyelid     Z APPENDECTOMY       acetaminophen (TYLENOL) 500 MG tablet  albuterol (PROAIR HFA/PROVENTIL HFA/VENTOLIN HFA) 108 (90 Base) MCG/ACT inhaler  alendronate (FOSAMAX) 70 MG tablet  azelastine (ASTEPRO) 0.15 % nasal spray  calcium citrate (CITRACAL) 950 (200 Ca) MG  tablet  cholecalciferol (VITAMIN D3) 1250 mcg (90386 units) capsule  cloNIDine (CATAPRES) 0.1 MG tablet  desvenlafaxine (PRISTIQ) 100 MG 24 hr tablet  fluticasone-salmeterol (ADVAIR DISKUS) 500-50 MCG/ACT inhaler  folic acid (FOLVITE) 400 MCG tablet  furosemide (LASIX) 20 MG tablet  iron polysaccharides (NIFEREX) 150 MG capsule  isosorbide mononitrate (IMDUR) 60 MG 24 hr tablet  lamoTRIgine (LAMICTAL) 25 MG tablet  LAMOTRIGINE 200 MG PO TABS  levothyroxine (SYNTHROID/LEVOTHROID) 50 MCG tablet  liothyronine (CYTOMEL) 5 MCG tablet  losartan (COZAAR) 100 MG tablet  memantine (NAMENDA) 10 MG tablet  Multiple Vitamin (TAB-A-JENNIFER) TABS  psyllium (METAMUCIL/KONSYL) 58.6 % powder  rosuvastatin (CRESTOR) 40 MG tablet  mirtazapine (REMERON) 7.5 MG tablet  REGULOID 28.3 % POWD      Allergies   Allergen Reactions     Ace Inhibitors Cough     Amlodipine      Headache and plugged ears     Diclofenac Other (See Comments)     Balance problems     Gluten Meal Diarrhea     Hydralazine-Hctz      Family History  Family History   Problem Relation Age of Onset     Asthma Mother      Cerebrovascular Disease Mother      Arthritis Mother      Depression Mother      Lipids Sister      Hypertension Sister      Heart Disease Sister      Lipids Sister      Hypertension Sister      Lipids Sister      Breast Cancer Sister      Social History   Social History     Tobacco Use     Smoking status: Never     Smokeless tobacco: Never   Vaping Use     Vaping Use: Never used   Substance Use Topics     Alcohol use: No     Alcohol/week: 0.0 standard drinks     Types: 1 Standard drinks or equivalent per week     Drug use: No      Past medical history, past surgical history, medications, allergies, family history, and social history were reviewed with the patient. No additional pertinent items.      A complete review of systems was performed with pertinent positives and negatives noted in the HPI, and all other systems negative.    Physical Exam   BP: (!)  "167/116  Pulse: 68  Temp: 98.7  F (37.1  C)  Resp: 18  Height: 160 cm (5' 3\")  Weight: 69.4 kg (153 lb)  SpO2: 95 %  Physical Exam    CONSTITUTIONAL: Well-developed and well-nourished. Awake and alert. Non-toxic appearance. No acute distress.   HENT:   - Head: Normocephalic and atraumatic.   - Ears: External ear grossly normal.   - Nose: Nose normal. No rhinorrhea. No epistaxis.   - Mouth/Throat: MMM  EYES: Conjunctivae and lids are normal. No scleral icterus.   NECK: Normal range of motion and phonation normal. Neck supple.  No tracheal deviation, no stridor. No edema or erythema noted.  CARDIOVASCULAR: Normal rate, regular rhythm, seems to have a gallop/S3 vs. Mechanical heart valve sound (heard best LUSB), otherwise no additional abnormal sounds appreciated.   PULMONARY/CHEST: Normal work of breathing. No accessory muscle usage or stridor. No respiratory distress.  Coarse/crackle sounds bilateral mid to low lungs  ABDOMEN: Soft, non-distended. No tenderness. No peritoneal findings, no rigidity, rebound or guarding.  No palpable masses or abnormal pulsatility appreciated.  MUSCULOSKELETAL: Extremities warm and seemingly well perfused. No edema or calf tenderness. Right lower extremity does appear thinner than the left lower extremity, but patient says that this is her baseline after a prior leg injury.  No calf soreness.   NEUROLOGIC: Awake, alert. Not disoriented. No seizure activity. GCS 15  SKIN: Skin is warm and dry. No rash noted. No diaphoresis. No pallor.   PSYCHIATRIC: Normal mood and affect. Speech and behavior normal. Thought processes linear. Cognition and memory are normal. No SI/HI reported.    ED Course, Procedures, & Data     ED Course as of 02/23/23 1720   Wed Feb 22, 2023 2155 Received a call from Radiology.  They believe her imaging looks like infection, though heart failure could also be on the differential.  Formal report to follow.            EKG Interpretation:      Interpreted by Yokasta" MD Shantel  Time reviewed: 4:47 PM  Symptoms at time of EKG: Hypertension   Rhythm: normal sinus   Rate: 67 bpm  Axis: NORMAL  Ectopy: none  Conduction: normal  ST Segments/ T Waves: No acute appearing ST-T wave changes (depression/elevation)  Comparison to prior: looking generally similar to previous on 01/31/2023  Clinical Impression: Normal sinus rhythm      Medical Decision Making  The patient's presentation was of high complexity (a chronic illness severe exacerbation, progression, or side effect of treatment).    The patient's evaluation involved:  review of external note(s) from 2 sources (above and reviewed EMR prior visit)  ordering and/or review of 3+ test(s) in this encounter (see separate area of note for details)  review of 3+ test result(s) ordered prior to this encounter (prior labs, echo, etc.)  strong consideration of a test (echo (will get while admitted)) that was ultimately deferred  discussion of management or test interpretation with another health professional (see separate area of note for details)    The patient's management necessitated high risk (drug therapy requiring intensive monitoring (see separate area of note for details)), high risk (a decision regarding hospitalization) and further care after sign-out to IM and overnight EM physician (see their note for further management).      Assessment & Plan    IMPRESSION:   87 year old female w/ PMH notable for  CAD (s/p angioplasty) pulmonary HTN, HLD, prior TAVR, tricuspid and mitral insufficiency, asthma, CKD, celiac disease, anemia, prior issues with syncope, depression, bipolar disorder, et al., who presents today on concern for shortness of breath and elevated BP, as described further above and in EMR.    Clinically, patient appears nontoxic, NAD . On arrival her blood pressure was quite high at 167/116 and does have some shortness of breath and headache, unclear if related or not, but otherwise no apparent vital sign abnormalities  "(heart rates relatively low in the 60s, which may limit to some degree BP management options, will continue monitor). Otherwise on examination, does have abnormal heart sound which may be an S3 or sort of heart valve change, has unequal lower extremities but reportedly at baseline, no other acute findings on exam.    Ddx includes, but not limited to, hypertensive urgency, less likely hypertensive emergency, other occult cardiac etiology, PE, heart failure, PNA, etc., does not sound classic for dissection, PE, pericarditis/pericardial effusion, less likely PTX also considered dysrhythmia though not feeling any palpitations, no pain anything this would be other etiologies and no particular abdominal symptoms.      PLAN:   - Screening ECG, laboratory studies, chest imaging, BP monitoring and intervention as needed  - Risks/benefits of pursuing imaging reviewed and accepted.   - Disposition pending ED course    RESULTS:  Labs:   - No leukocytosis, Hgb of 11.1 is near previous  - Initial troponin 69, repeat troponin 69  BNP 1174  D-dimer 1.80  TSH WNL  Creatinine 1.20 was improved from previous, AST and ALT both slightly elevated with AST 56, ALT 71, has had previous elevation mildly like this before  Procalcitonin pending  Urine:   - No sample provided  Imaging: Written preliminary reports reviewed:  - CT Head: \"No acute intracranial pathology.\"  - CT Chest: \"1.  No pulmonary embolism.  2.  Multifocal new nodular densities in the right lung, associated with patchy basilar groundglass densities right more than left and small pleural effusions right more than left; indeterminate, underlying infectious etiology cannot be excluded. Pulmonary edema  would also be on the differential. Recommend follow-up to resolution.  3.  Healing old right-sided rib fractures. 4.  Moderate hiatal hernia.\"  Results/reports reviewed w/ patient who expresses understanding of findings and F/U recommendations.    INTERVENTIONS:   - Cardiac " monitoring  - IV ceftriaxone (for empiric CAP coverage)  - Acetaminophen  - Admitting team is going to place order for diuretics as needed as well as BP management ongoing as needed though has spontaneously proved here on his own    RE-EVALUATION:  - BP improved spontaneously from 160's/116 to 150's/70's  - Pt otherwise continues to do well here in the ED, no acute issues or apparent concerning changes in vitals or clinical appearance.    DISCUSSIONS:  - w/ IM: Reviewed patient/presentation, current state of workup/any pending studies. They will admit for further evaluation/management, F/U pending studies as needed, coordinate w/ consulting services as needed. No additional requests/recommendations for workup/management for in the ED at this time.  - w/ Patient: I have reviewed the available findings, plan with the patient and her daughter. They expressed understanding and agreement with this plan. All questions answered to the best of our ability at this time.     DISPOSITION/PLANNING:  IMPRESSION:   - Possible community acquired pneumonia  - Hypertensive urgency (improving)  - heart failure   - Elevated Troponin and BNP  - Possible S3 or valve sound  DISPOSITION:  - Admit to IM for further evalution/management  - Discussed additional BP management  OTHER RECOMMENDATIONS:   - Echo, consider Cards consult      ______________________________________________________________________            Yokasta Funes MD  [facility] EMERGENCY DEPARTMENT  2/22/2023     Yokasta Funes MD  02/24/23 0326

## 2023-02-22 NOTE — TELEPHONE ENCOUNTER
We have Tried Giving pt several meds But she has Multiple drug Intolerances and hence was referred to Cardiology  Nurses should be calling Cardiology for further management  I will be happy to see her in clinic also

## 2023-02-23 ENCOUNTER — APPOINTMENT (OUTPATIENT)
Dept: CARDIOLOGY | Facility: CLINIC | Age: 88
DRG: 291 | End: 2023-02-23
Payer: MEDICARE

## 2023-02-23 LAB
ALBUMIN SERPL BCG-MCNC: 3.7 G/DL (ref 3.5–5.2)
ALBUMIN UR-MCNC: 30 MG/DL
ALP SERPL-CCNC: 73 U/L (ref 35–104)
ALT SERPL W P-5'-P-CCNC: 67 U/L (ref 10–35)
ANION GAP SERPL CALCULATED.3IONS-SCNC: 13 MMOL/L (ref 7–15)
APPEARANCE UR: CLEAR
AST SERPL W P-5'-P-CCNC: 51 U/L (ref 10–35)
ATRIAL RATE - MUSE: 67 BPM
BILIRUB SERPL-MCNC: 0.3 MG/DL
BILIRUB UR QL STRIP: NEGATIVE
BUN SERPL-MCNC: 18.5 MG/DL (ref 8–23)
CALCIUM SERPL-MCNC: 9.1 MG/DL (ref 8.8–10.2)
CHLORIDE SERPL-SCNC: 107 MMOL/L (ref 98–107)
COLOR UR AUTO: ABNORMAL
CREAT SERPL-MCNC: 1.14 MG/DL (ref 0.51–0.95)
CREAT SERPL-MCNC: 1.2 MG/DL (ref 0.51–0.95)
DEPRECATED HCO3 PLAS-SCNC: 25 MMOL/L (ref 22–29)
DIASTOLIC BLOOD PRESSURE - MUSE: NORMAL MMHG
ERYTHROCYTE [DISTWIDTH] IN BLOOD BY AUTOMATED COUNT: 18.4 % (ref 10–15)
FLUAV RNA SPEC QL NAA+PROBE: NEGATIVE
FLUBV RNA RESP QL NAA+PROBE: NEGATIVE
GFR SERPL CREATININE-BSD FRML MDRD: 44 ML/MIN/1.73M2
GFR SERPL CREATININE-BSD FRML MDRD: 47 ML/MIN/1.73M2
GLUCOSE SERPL-MCNC: 93 MG/DL (ref 70–99)
GLUCOSE UR STRIP-MCNC: NEGATIVE MG/DL
HCT VFR BLD AUTO: 37.7 % (ref 35–47)
HGB BLD-MCNC: 11.4 G/DL (ref 11.7–15.7)
HGB UR QL STRIP: NEGATIVE
INTERPRETATION ECG - MUSE: NORMAL
KETONES UR STRIP-MCNC: NEGATIVE MG/DL
LEUKOCYTE ESTERASE UR QL STRIP: ABNORMAL
LVEF ECHO: NORMAL
MAGNESIUM SERPL-MCNC: 2 MG/DL (ref 1.7–2.3)
MCH RBC QN AUTO: 28.9 PG (ref 26.5–33)
MCHC RBC AUTO-ENTMCNC: 30.2 G/DL (ref 31.5–36.5)
MCV RBC AUTO: 96 FL (ref 78–100)
NITRATE UR QL: NEGATIVE
P AXIS - MUSE: 46 DEGREES
PH UR STRIP: 7 [PH] (ref 5–7)
PHOSPHATE SERPL-MCNC: 3.4 MG/DL (ref 2.5–4.5)
PLATELET # BLD AUTO: 150 10E3/UL (ref 150–450)
POTASSIUM SERPL-SCNC: 3.2 MMOL/L (ref 3.4–5.3)
POTASSIUM SERPL-SCNC: 3.3 MMOL/L (ref 3.4–5.3)
PR INTERVAL - MUSE: 196 MS
PROCALCITONIN SERPL IA-MCNC: 0.1 NG/ML
PROT SERPL-MCNC: 6.4 G/DL (ref 6.4–8.3)
QRS DURATION - MUSE: 162 MS
QT - MUSE: 482 MS
QTC - MUSE: 509 MS
R AXIS - MUSE: 5 DEGREES
RBC # BLD AUTO: 3.94 10E6/UL (ref 3.8–5.2)
RBC URINE: 1 /HPF
RSV RNA SPEC NAA+PROBE: NEGATIVE
SARS-COV-2 RNA RESP QL NAA+PROBE: NEGATIVE
SODIUM SERPL-SCNC: 145 MMOL/L (ref 136–145)
SP GR UR STRIP: 1.01 (ref 1–1.03)
SYSTOLIC BLOOD PRESSURE - MUSE: NORMAL MMHG
T AXIS - MUSE: 165 DEGREES
TRANSITIONAL EPI: <1 /HPF
UROBILINOGEN UR STRIP-MCNC: NORMAL MG/DL
VENTRICULAR RATE- MUSE: 67 BPM
WBC # BLD AUTO: 5.5 10E3/UL (ref 4–11)
WBC URINE: 68 /HPF
YEAST #/AREA URNS HPF: ABNORMAL /HPF

## 2023-02-23 PROCEDURE — 93321 DOPPLER ECHO F-UP/LMTD STD: CPT | Mod: 26 | Performed by: INTERNAL MEDICINE

## 2023-02-23 PROCEDURE — 87086 URINE CULTURE/COLONY COUNT: CPT

## 2023-02-23 PROCEDURE — 80053 COMPREHEN METABOLIC PANEL: CPT

## 2023-02-23 PROCEDURE — 93308 TTE F-UP OR LMTD: CPT | Mod: 26 | Performed by: INTERNAL MEDICINE

## 2023-02-23 PROCEDURE — 250N000011 HC RX IP 250 OP 636: Performed by: INTERNAL MEDICINE

## 2023-02-23 PROCEDURE — 250N000013 HC RX MED GY IP 250 OP 250 PS 637: Performed by: STUDENT IN AN ORGANIZED HEALTH CARE EDUCATION/TRAINING PROGRAM

## 2023-02-23 PROCEDURE — 83735 ASSAY OF MAGNESIUM: CPT | Performed by: STUDENT IN AN ORGANIZED HEALTH CARE EDUCATION/TRAINING PROGRAM

## 2023-02-23 PROCEDURE — 36415 COLL VENOUS BLD VENIPUNCTURE: CPT | Performed by: STUDENT IN AN ORGANIZED HEALTH CARE EDUCATION/TRAINING PROGRAM

## 2023-02-23 PROCEDURE — 93325 DOPPLER ECHO COLOR FLOW MAPG: CPT | Mod: 26 | Performed by: INTERNAL MEDICINE

## 2023-02-23 PROCEDURE — 258N000003 HC RX IP 258 OP 636

## 2023-02-23 PROCEDURE — 255N000002 HC RX 255 OP 636: Performed by: INTERNAL MEDICINE

## 2023-02-23 PROCEDURE — 84100 ASSAY OF PHOSPHORUS: CPT | Performed by: STUDENT IN AN ORGANIZED HEALTH CARE EDUCATION/TRAINING PROGRAM

## 2023-02-23 PROCEDURE — 250N000013 HC RX MED GY IP 250 OP 250 PS 637

## 2023-02-23 PROCEDURE — 85027 COMPLETE CBC AUTOMATED: CPT

## 2023-02-23 PROCEDURE — 87637 SARSCOV2&INF A&B&RSV AMP PRB: CPT

## 2023-02-23 PROCEDURE — 36415 COLL VENOUS BLD VENIPUNCTURE: CPT

## 2023-02-23 PROCEDURE — 120N000002 HC R&B MED SURG/OB UMMC

## 2023-02-23 PROCEDURE — 84132 ASSAY OF SERUM POTASSIUM: CPT | Performed by: STUDENT IN AN ORGANIZED HEALTH CARE EDUCATION/TRAINING PROGRAM

## 2023-02-23 PROCEDURE — 93325 DOPPLER ECHO COLOR FLOW MAPG: CPT

## 2023-02-23 PROCEDURE — 250N000011 HC RX IP 250 OP 636

## 2023-02-23 PROCEDURE — 99233 SBSQ HOSP IP/OBS HIGH 50: CPT | Performed by: INTERNAL MEDICINE

## 2023-02-23 PROCEDURE — 81001 URINALYSIS AUTO W/SCOPE: CPT

## 2023-02-23 PROCEDURE — 250N000013 HC RX MED GY IP 250 OP 250 PS 637: Performed by: INTERNAL MEDICINE

## 2023-02-23 RX ORDER — LABETALOL HYDROCHLORIDE 5 MG/ML
10 INJECTION, SOLUTION INTRAVENOUS EVERY 4 HOURS PRN
Status: DISCONTINUED | OUTPATIENT
Start: 2023-02-23 | End: 2023-02-24

## 2023-02-23 RX ORDER — NIFEDIPINE 30 MG/1
60 TABLET, EXTENDED RELEASE ORAL DAILY
Status: DISCONTINUED | OUTPATIENT
Start: 2023-02-24 | End: 2023-02-24

## 2023-02-23 RX ORDER — POTASSIUM CHLORIDE 750 MG/1
40 TABLET, EXTENDED RELEASE ORAL ONCE
Status: COMPLETED | OUTPATIENT
Start: 2023-02-23 | End: 2023-02-23

## 2023-02-23 RX ORDER — FUROSEMIDE 10 MG/ML
20 INJECTION INTRAMUSCULAR; INTRAVENOUS ONCE
Status: COMPLETED | OUTPATIENT
Start: 2023-02-23 | End: 2023-02-23

## 2023-02-23 RX ORDER — HYDRALAZINE HYDROCHLORIDE 10 MG/1
10 TABLET, FILM COATED ORAL EVERY 6 HOURS PRN
Status: DISCONTINUED | OUTPATIENT
Start: 2023-02-23 | End: 2023-02-28 | Stop reason: HOSPADM

## 2023-02-23 RX ORDER — METOPROLOL TARTRATE 25 MG/1
25 TABLET, FILM COATED ORAL 2 TIMES DAILY
Status: DISCONTINUED | OUTPATIENT
Start: 2023-02-23 | End: 2023-02-23

## 2023-02-23 RX ORDER — CEFTRIAXONE 1 G/1
1 INJECTION, POWDER, FOR SOLUTION INTRAMUSCULAR; INTRAVENOUS EVERY 24 HOURS
Status: DISCONTINUED | OUTPATIENT
Start: 2023-02-23 | End: 2023-02-23

## 2023-02-23 RX ADMIN — HUMAN ALBUMIN MICROSPHERES AND PERFLUTREN 6 ML: 10; .22 INJECTION, SOLUTION INTRAVENOUS at 10:00

## 2023-02-23 RX ADMIN — FUROSEMIDE 20 MG: 10 INJECTION, SOLUTION INTRAVENOUS at 00:07

## 2023-02-23 RX ADMIN — NIFEDIPINE 60 MG: 60 TABLET, EXTENDED RELEASE ORAL at 12:00

## 2023-02-23 RX ADMIN — MIRTAZAPINE 7.5 MG: 7.5 TABLET, FILM COATED ORAL at 22:47

## 2023-02-23 RX ADMIN — ROSUVASTATIN CALCIUM 40 MG: 40 TABLET, FILM COATED ORAL at 08:42

## 2023-02-23 RX ADMIN — ENOXAPARIN SODIUM 40 MG: 40 INJECTION SUBCUTANEOUS at 08:42

## 2023-02-23 RX ADMIN — Medication 950 MG: at 08:42

## 2023-02-23 RX ADMIN — FUROSEMIDE 20 MG: 10 INJECTION, SOLUTION INTRAVENOUS at 13:38

## 2023-02-23 RX ADMIN — AZITHROMYCIN MONOHYDRATE 500 MG: 500 INJECTION, POWDER, LYOPHILIZED, FOR SOLUTION INTRAVENOUS at 02:33

## 2023-02-23 RX ADMIN — ALBUTEROL SULFATE 2 PUFF: 90 AEROSOL, METERED RESPIRATORY (INHALATION) at 08:39

## 2023-02-23 RX ADMIN — CEFTRIAXONE SODIUM 1 G: 1 INJECTION, POWDER, FOR SOLUTION INTRAMUSCULAR; INTRAVENOUS at 00:08

## 2023-02-23 RX ADMIN — MIRTAZAPINE 7.5 MG: 7.5 TABLET, FILM COATED ORAL at 01:12

## 2023-02-23 RX ADMIN — POLYETHYLENE GLYCOL 3350 17 G: 17 POWDER, FOR SOLUTION ORAL at 12:00

## 2023-02-23 RX ADMIN — LAMOTRIGINE 225 MG: 25 TABLET ORAL at 08:42

## 2023-02-23 RX ADMIN — LEVOTHYROXINE SODIUM 50 MCG: 0.05 TABLET ORAL at 08:42

## 2023-02-23 RX ADMIN — LOSARTAN POTASSIUM 100 MG: 100 TABLET, FILM COATED ORAL at 08:54

## 2023-02-23 RX ADMIN — DESVENLAFAXINE SUCCINATE 100 MG: 100 TABLET, EXTENDED RELEASE ORAL at 08:54

## 2023-02-23 RX ADMIN — LABETALOL HYDROCHLORIDE 10 MG: 5 INJECTION, SOLUTION INTRAVENOUS at 08:43

## 2023-02-23 RX ADMIN — FLUTICASONE FUROATE AND VILANTEROL TRIFENATATE 1 PUFF: 100; 25 POWDER RESPIRATORY (INHALATION) at 08:39

## 2023-02-23 RX ADMIN — ACETAMINOPHEN 650 MG: 325 TABLET ORAL at 14:58

## 2023-02-23 RX ADMIN — Medication 400 MCG: at 08:54

## 2023-02-23 RX ADMIN — CHOLECALCIFEROL CAP 1.25 MG (50000 UNIT) 1250 MCG: 1.25 CAP at 08:54

## 2023-02-23 RX ADMIN — MEMANTINE 10 MG: 10 TABLET ORAL at 08:54

## 2023-02-23 RX ADMIN — LIOTHYRONINE SODIUM 10 MCG: 5 TABLET ORAL at 08:54

## 2023-02-23 RX ADMIN — LABETALOL HYDROCHLORIDE 10 MG: 5 INJECTION, SOLUTION INTRAVENOUS at 04:52

## 2023-02-23 RX ADMIN — POTASSIUM CHLORIDE 40 MEQ: 750 TABLET, EXTENDED RELEASE ORAL at 16:38

## 2023-02-23 RX ADMIN — ISOSORBIDE MONONITRATE 60 MG: 30 TABLET, EXTENDED RELEASE ORAL at 08:42

## 2023-02-23 ASSESSMENT — ACTIVITIES OF DAILY LIVING (ADL)
ADLS_ACUITY_SCORE: 41
ADLS_ACUITY_SCORE: 41
ADLS_ACUITY_SCORE: 39
ADLS_ACUITY_SCORE: 41
ADLS_ACUITY_SCORE: 39
ADLS_ACUITY_SCORE: 41

## 2023-02-23 NOTE — PROVIDER NOTIFICATION
High BP, pt reported headache and feeling ear clogged/full whenever she takes Clonidine 0.1 mg. [facility] cross cover notified and med discontinued and new order Hydralazine 10 mg iv prn for  or . Recheck /95, prn Hydralazine offered, but pt refused and reported the same problem as Clonidine headache and feeling ear clogged.  Pt also has infiltrated PIV from Azithromycin, not vesicant per pharmacist report. Cold compress applied, and team notified. No call back and no new order received.

## 2023-02-23 NOTE — ED NOTES
Pt called and asked for tylenol for headache and to have shoes removed so she may go to bed. Primary RN preoccupied with another pt, so this writer spoke with Dr. Funes. Tylenol was ordered. Will administer for primary RN and she will continue to be primary RN for this pt

## 2023-02-23 NOTE — PLAN OF CARE
"Goal Outcome Evaluation:    BP (!) 147/71 (BP Location: Left arm)   Pulse 66   Temp 98.3  F (36.8  C) (Oral)   Resp 16   Ht 1.6 m (5' 3\")   Wt 69.4 kg (153 lb)   SpO2 93%   BMI 27.10 kg/m      [id number]    Admitted yesterday for weight gain and HTN. A&Ox4, denied pain. Lasix 20 mg once. Voided using bedside commode. UA sent and COVID-19 and Influenza A&B including SARS negaitve. Azithromycin iv once. Left PIV saline locked. Infiltrated PIV from Azithromycin, not vesicant per pharmacist. Cold compress applied. Elevated BP prn Labetalol 10 mg once for SBP > 180. Will continue to monitor.     "

## 2023-02-23 NOTE — H&P
[facility]    History and Physical - Medicine Service, [facility] TEAM        Date of Admission:  2/22/2023    Assessment & Plan      Kamryn Miller is a 87 year old female admitted on 2/22/2023. She has a history of CAD, HLD, HTN, s/p TAVR, asthma, celiac disease, hypothyroidism, and bipolar disorder and is admitted for hypertension and pulmonary edema.    #Hypertensive Urgency   #Pulmonary Edema    #Shortness of Breath   She presented with complaints of hypertension and reported systolic blood pressure > 200 at home. BP on admission 171/90. CTA PE negative for PE but showed groundglass opacities and small pleural effusions. Last TTE was 1/30/23 which showed EF 55-60%. Her presentation is most consistent with pulmonary edema secondary to hypertension/diastolic dysfunction.   - Continue PTA Losartan and Imdur   - Hold PTA Clonidine (she reports headache and not taking it)   - PRN Hydralazine for SBP > 180   - Consider addition of Calcium Channel Blocker vs. Beta Blocker   - IV Furosemide 20mg (PTA dose 10mg PO daily)   - Repeat TTE ordered     #Concern for Community Acquired Pneumonia   CT with ground glass opacities. No fever or cough. Procalcitonin mildly elevated. In the setting of shortness of breath, infiltrates on CT, and procalcitonin elevation, will treat with antibiotics.   - CTX and Azithromycin ordered   - Sputum Culture ordered     #Elevated Transaminases, Hepatocellular Pattern   ALT 77 and AST 56 on admission. LFTs have been elevated for the past few checks. Suspect secondary to hepatic congestion, however could be drug-induced injury.   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 No EKG changes on admission - previous LBBB.   - Continue PTA Rosuvastatin     #Asthma   - Continue PTA Breo-Ellipta   - PRN Albuterol   - Incentive  "Spirometry     #Celiac Disease   - Gluten Free Diet     #Hypothyroidism   - Continue PTA levothyroxine and liothyronine     #Bipolar Disorder   #Cognitive Impairment   - Continue PTA Lamotrigene and Desvenlafaxine   - Continue PTA Memantine      Diet: Combination Diet Gluten Free Diet, Regular Diet Adult; 2 gm NA Diet  DVT Prophylaxis: Enoxaparin (Lovenox) SQ  Orellana Catheter: Not present  Fluids: None   Lines: None     Cardiac Monitoring: None  Code Status: Full Code    Clinically Significant Risk Factors Present on Admission                  # Hypertension: home medication list includes antihypertensive(s)      # Overweight: Estimated body mass index is 27.1 kg/m  as calculated from the following:    Height as of this encounter: 1.6 m (5' 3\").    Weight as of this encounter: 69.4 kg (153 lb).           Disposition Plan      Expected Discharge Date: 02/24/2023                The patient's care was discussed with the Attending Physician, Dr. Giang .      Lizeth Orr MD  Medicine Service, [facility]  Securely message with Vocera (more info)  Text page via [employer] Paging/Directory   See signed in provider for up to date coverage information  ______________________________________________________________________    Chief Complaint   Shortness of Breath     History is obtained from the patient and electronic health record    History of Present Illness   Kamryn Miller is a 87 year old female who presents with progressive shortness of breath and hypertension. She presented to the ED initially with concern of hypertension with a systolic blood pressure of > 200 at home. She notes that over the past few days she has had progressive dyspnea on exertion and would \"wake up gasping\" occasionally at night over the past few nights. She has gained 4-5# over the past few weeks. She denies any orthopnea. No cough, chest pain. She has not had any fevers, chills, abdominal " "pain, dysuria, frequency, or vision changes. She does endorse having a headache \"all day\" that improved with acetaminophen. She denies any focal neurologic deficits. She reports that she feels \"generally weak\" and has been utilizing her walker more.     She reports compliance with her medications - takes 10mg of lasix daily at home. She was seen in [facility] on 2/15 for CAD and TAVR follow up. Her BP at that time was 196/118. Her imdur dosing was increased to 60mg daily and 100mg daily. She did not notice any change in her home BP's following the medication changes.     She denies any tobacco, alcohol, or drug use. She lives in an [facility]/senior living.     Past Medical History    Past Medical History:   Diagnosis Date     Aortic valvar stenosis 7/2010    mild     Asthma      Bipolar disorder (H)      CAD (coronary artery disease)     s/p angioplasty     Celiac disease      Colon polyps      Colon polyps 2012    every 3 year colonoscopy      Depression      High cholesterol      HTN      Mitral insufficiency      Pulmonary HTN (H)     mild     Tremors 7/10    drug induced from antidepressants     Tricuspid insufficiency        Past Surgical History   Past Surgical History:   Procedure Laterality Date     ANGIOGRAM  6/26/2009     ANGIOPLASTY  9/96    for angina     ANGIOPLASTY  8/03 - 9/03    X -2 - with stenst in coronary car.     APPENDECTOMY       BREAST BIOPSY, RT/LT      Breat Biopsy RT/LT, benign     BUNIONECTOMY  11/8/2011    Procedure:BUNIONECTOMY; Left donna bunionectomy; Surgeon:FREDY LITTLEJOHN; Location:[facility] OR     BUNIONECTOMY RT/LT  5/2007    right bunion and right 2nd hammertoe     CATARACT IOL, RT/LT  6/12 and 7/12    bilateral     COLONOSCOPY  2007, 2012    every 3 years for polyps     CV CORONARY ANGIOGRAM N/A 8/21/2020    Procedure: CV CORONARY ANGIOGRAM;  Surgeon: Gianluca Toscano MD;  Location:  HEART CARDIAC CATH LAB     CV CORONARY ANGIOGRAM N/A 10/25/2022    Procedure: Coronary Angiogram; "  Surgeon: Carter Douglass MD;  Location:  HEART CARDIAC CATH LAB     CV INSTANTANEOUS WAVE-FREE RATIO N/A 10/25/2022    Procedure: Instantaneous Wave-Free Ratio;  Surgeon: Carter Douglass MD;  Location:  HEART CARDIAC CATH LAB     CV LEFT HEART CATH N/A 8/21/2020    Procedure: CV LEFT HEART CATH;  Surgeon: Gianluca Toscano MD;  Location:  HEART CARDIAC CATH LAB     CV LEFT HEART CATH N/A 11/3/2020    Procedure: CV LEFT HEART CATH;  Surgeon: Tom Burrows MD;  Location:  HEART CARDIAC CATH LAB     CV LOWER EXTREMITY ANGIOGRAM BILATERAL N/A 11/3/2020    Procedure: CV ANGIOGRAM LOWER EXTREMITY BILATERAL;  Surgeon: Tom Burrows MD;  Location:  HEART CARDIAC CATH LAB     CV PCI STENT DRUG ELUTING N/A 8/21/2020    Procedure: Percutaneous Coronary Intervention Stent Drug Eluting;  Surgeon: Gianluca Toscano MD;  Location:  HEART CARDIAC CATH LAB     CV RIGHT HEART CATH MEASUREMENTS RECORDED N/A 8/21/2020    Procedure: CV RIGHT HEART CATH;  Surgeon: Gianluca Toscano MD;  Location:  HEART CARDIAC CATH LAB     CV RIGHT HEART CATH MEASUREMENTS RECORDED N/A 11/3/2020    Procedure: CV RIGHT HEART CATH;  Surgeon: Tom Burrows MD;  Location:  HEART CARDIAC CATH LAB     CV TRANSCATHETER AORTIC VALVE REPLACEMENT N/A 9/12/2022    Procedure: Transfemoral Transcatheter Aortic Valve Replacement with possible open heart bypass and or balloon pump placement and any indicated procedure;  Surgeon: Tom Burrows MD;  Location: [facility] CARDIAC CATH LAB     HEART CATH FEMORAL CANNULIZATION WITH OPEN STANDBY REPAIR AORTIC VALVE N/A 9/12/2022    Procedure: OR TRANSCATHETER AORTIC VALVE REPLACEMENT, OPEN FEMORAL ARTERY APPROACH;  Surgeon: Arthur Markham MD;  Location:  HEART CARDIAC CATH LAB     JOINT REPLACEMENT, HIP RT/LT  4/2008    right hip replaced     JOINT REPLACEMENT, HIP RT/LT  4/2009    left hip replaced     REPAIR HAMMER TOE  11/8/2011     Procedure:REPAIR HAMMER TOE; left 2nd hammertoe repair; Surgeon:FREDY LITTLEJOHN; Location:[facility] OR     SURGICAL HISTORY OF -   11/11    left bunion and 2nd hammertoe repair     TUBAL LIGATION       ZZC ANESTH,BLEPHAROPLASTY      (R) for drooping eyelid     ZZC APPENDECTOMY         Prior to Admission Medications   Prior to Admission Medications   Prescriptions Last Dose Informant Patient Reported? Taking?   LAMOTRIGINE 200 MG PO TABS  Self, Nursing Home Yes No   Sig: Take 200 mg by mouth daily with 25 mg tablet for a total dose of 225 mg   Multiple Vitamin (TAB-A-JENNIFER) TABS  Nursing Home No No   Sig: Take 1 tablet by mouth daily   REGULOID 28.3 % POWD  Nursing Home No No   Sig: MIX 1 TEASPOON IN LIQUID AND DRINK BY MOUTH ONCE DAILY   Patient not taking: Reported on 1/3/2023   acetaminophen (TYLENOL) 500 MG tablet   No No   Sig: Take 1 tablet (500 mg) by mouth every 6 hours as needed for pain   albuterol (PROAIR HFA/PROVENTIL HFA/VENTOLIN HFA) 108 (90 Base) MCG/ACT inhaler  Nursing Home No No   Sig: Inhale 2 puffs into the lungs every 6 hours as needed for wheezing or shortness of breath / dyspnea   alendronate (FOSAMAX) 70 MG tablet  Nursing Home No No   Sig: TAKE 1 TABLET BY MOUTH ONCE WEEKLY   azelastine (ASTEPRO) 0.15 % nasal spray  Nursing Home No No   Sig: USE 1 SPRAY IN EACH NOSTRIL ONCE A DAY   calcium citrate (CITRACAL) 950 (200 Ca) MG tablet  Nursing Home No No   Sig: TAKE 1 TABLET BY MOUTH ONCE DAILY   cholecalciferol (VITAMIN D3) 1250 mcg (60989 units) capsule  Nursing Home No No   Sig: TAKE 1 CAPSULE BY MOUTH ONCE WEEKLY   cloNIDine (CATAPRES) 0.1 MG tablet   No No   Sig: Take 1 tablet (0.1 mg) by mouth 2 times daily   desvenlafaxine (PRISTIQ) 100 MG 24 hr tablet  Self, Nursing Home Yes No   Sig: Take 100 mg by mouth daily   fluticasone-salmeterol (ADVAIR DISKUS) 500-50 MCG/ACT inhaler  Nursing Home No No   Sig: INHALE 1 PUFF BY MOUTH TWICE DAILY   Patient taking differently: 1 puff daily INHALE 1 PUFF  BY MOUTH TWICE DAILY   folic acid (FOLVITE) 400 MCG tablet   No No   Sig: Take 1 tablet (400 mcg) by mouth daily   furosemide (LASIX) 20 MG tablet   No No   Sig: Take 0.5 tablets (10 mg) by mouth daily   iron polysaccharides (NIFEREX) 150 MG capsule  Nursing Home No No   Sig: Take 1 capsule (150 mg) by mouth daily   isosorbide mononitrate (IMDUR) 60 MG 24 hr tablet   No No   Sig: Take 1 tablet (60 mg) by mouth daily   lamoTRIgine (LAMICTAL) 25 MG tablet  Self, Nursing Home Yes No   Sig: Take 25 mg by mouth daily with 200 mg tablet for a total dose of 225 mg   levothyroxine (SYNTHROID/LEVOTHROID) 50 MCG tablet  Nursing Home No No   Sig: TAKE 1 TABLET BY MOUTH ONCE DAILY   liothyronine (CYTOMEL) 5 MCG tablet  Nursing Home No No   Sig: Take 2 tablets (10 mcg) by mouth daily   losartan (COZAAR) 100 MG tablet   Yes No   Sig: Take 1 tablet (100 mg) by mouth daily   memantine (NAMENDA) 10 MG tablet  Nursing Home No No   Sig: Take 1 tablet (10 mg) by mouth daily   mirtazapine (REMERON) 7.5 MG tablet  Nursing Home No No   Sig: Take 1 tablet (7.5 mg) by mouth At Bedtime   psyllium (METAMUCIL/KONSYL) 58.6 % powder  Nursing Home Yes No   Sig: Take 1 tspn in liquid daily   rosuvastatin (CRESTOR) 40 MG tablet  Nursing Home No No   Sig: TAKE 1 TABLET BY MOUTH ONCE DAILY      Facility-Administered Medications: None        Review of Systems    The 10 point Review of Systems is negative other than noted in the HPI or here.      Physical Exam   Vital Signs: Temp: 98.3  F (36.8  C) Temp src: Oral BP: (!) 171/90 Pulse: 66   Resp: 16 SpO2: 93 % O2 Device: None (Room air)    Weight: 153 lbs 0 oz    General Appearance: alert, comfortable in bed, no acute distress   Respiratory: crackles present in bilateral bases   Cardiovascular: regular rate, systolic murmur present   GI: soft, nontender, nondistended   Skin: no rashes or lesions   Other: no pitting edema in lower extremities      Medical Decision Making       Please see A&P for  additional details of medical decision making.      Data     I have personally reviewed the following data over the past 24 hrs:    5.3  \   11.1 (L)   / 143 (L)     145 108 (H) 20.5 /  101 (H)   3.8 23 1.14 (H) \       ALT: 71 (H) AST: 56 (H) AP: 75 TBILI: 0.3   ALB: 3.7 TOT PROTEIN: 6.5 LIPASE: N/A       Trop: 69 (H) BNP: 11,774 (H)       TSH: 2.31 T4: N/A A1C: N/A       Procal: 0.10 (H) CRP: N/A Lactic Acid: N/A       INR:  1.12 PTT:  N/A   D-dimer:  1.80 (H) Fibrinogen:  N/A       Imaging results reviewed over the past 24 hrs:   Recent Results (from the past 24 hour(s))   Head CT w/o contrast    Narrative    EXAM: Head CT without contrast 2/22/2023 7:19 PM     HISTORY: Headache.    COMPARISON: Head CT without contrast 11/30/2022.    TECHNIQUE: Using multidetector thin collimation helical acquisition  technique, axial, coronal and sagittal CT images from the skull base  to the vertex were obtained without intravenous contrast.   (topogram) image(s) also obtained and reviewed.    FINDINGS:  No acute intracranial hemorrhage, mass effect, or midline shift. No  acute loss of gray-white matter differentiation in the cerebral  hemispheres. Ventricles are proportionate to the cerebral sulci. Clear  basal cisterns. Confluent periventricular white matter hypodensities  likely reflecting chronic small vessel ischemic disease considering  the patient's age. Generalized cerebellar and cerebral global volume  loss. Incidental empty sella. Wall calcification of the intradural V4  segment right vertebral artery. A partially calcified sebaceous cyst  in the occipital scalp.  The bony calvaria and the bones of the skull base are normal. The  visualized portions of the paranasal sinuses and mastoid air cells are  clear. Grossly normal orbits.       Impression    IMPRESSION: No acute intracranial pathology.    I have personally reviewed the examination and initial interpretation  and I agree with the findings.    CALI SIERRA  MD         SYSTEM ID:  [id number]   CT Chest Pulmonary Embolism w Contrast    Narrative    EXAM: Chest CT pulmonary embolism with contrast 2023 7:20 PM    HISTORY: Shortness of breath, hypertension, elevated d-dimer.    COMPARISON: Chest radiograph 2023. Chest CT without contrast  12/3/2022. Outside hospital chest CT PE 2016 (images available,  only interpretation).    TECHNIQUE: CTA imaging obtained through the chest with contrast was  performed. Coronal, sagittal and axial MIP reformatted images  obtained. Images reviewed in soft tissue, lung, and bone windows.    FINDINGS:   image(s): Noncontributory.    There is good contrast opacification/timing of the pulmonary vessels.  No pulmonary embolus. The RV to LV ratio is less than 0.9. There is  some reflux of contrast in the IVC and hepatic veins.    LINES/TUBES: None.    LOWER NECK: Thyroid unremarkable.    LUN mm nodular density in the right lower lobe (series 8, image  208), patchy groundglass density in the lower lobes right more than  left; patchy groundglass density in the right middle lobe (series 8,  image 154) increased nodular densities along the right oblique  fissure; mucous plugging the left lower lobe (series 8, image 204).     AIRWAYS: Patent tracheobronchial tree without intraluminal mass. No  bronchial wall thickening.    PLEURA: No pneumothorax. Small right greater than left pleural  effusions with mild compressive atelectasis.    VASCULAR: Normal size/configuration of the great vessels. Aortic  atherosclerosis.    CARDIAC: Mild cardiomegaly. No pericardial effusion. Coronary artery  atherosclerosis. Left anterior descending coronary artery metallic  stent. Bulky mitral valve annular calcifications.    MEDIASTINUM: No suspicious lymphadenopathy.    CHEST WALL: Unremarkable.    ESOPHAGUS: Moderate hiatal hernia.    UPPER ABDOMEN: Limited evaluation.    MUSCULOSKELETAL/SOFT TISSUES: Degenerative changes consistent with  the  patient's age. Old/healing right-sided rib fractures. No suspicious  osseous lesions. Unremarkable soft tissues.      Impression    IMPRESSION:   1.  No pulmonary embolism.  2.  Multifocal new nodular densities in the right lung, associated  with patchy basilar groundglass densities right more than left and  small pleural effusions right more than left; indeterminate,  underlying infectious etiology cannot be excluded. Pulmonary edema  would also be on the differential. Recommend follow-up to resolution.   3.  Healing old right-sided rib fractures.  4.  Moderate hiatal hernia.    These findings were communicated to Dr. Funes by Dr. Morales at  2/22/2023 9:56 PM via telephone and understanding was verbalized.

## 2023-02-23 NOTE — PROGRESS NOTES
[facility]    Medicine Progress Note - Hospitalist Service, GOLD TEAM 2    Date of Admission:  2/22/2023    Assessment & Plan   Kamryn Miller is a 87 year old female admitted on 2/22/2023. She has a history of CAD, HLD, HTN, s/p TAVR, asthma, celiac disease, hypothyroidism, and bipolar disorder and is admitted for uncontrolled hypertension and pulmonary edema.     #Hypertensive Urgency  #Acute systolic HF exacerbation   #Acute Pulmonary Edema    #Shortness of Breath  She presented with complaints of hypertension with reported SBP of > 200 at her AL. BP on admission 171/90. CTA PE negative for PE but showed groundglass opacities and small pleural effusions. Last TTE was 1/30/23 which showed EF 55-60%, functioning TAVR without AI. SOB likely 2/2 pulmonary edema and possibly debility.    - Repeat TTE shows mildly reduced EF (45-50%) and trace AI, severe mitral calcification  - Continue PTA Losartan and Imdur  - Patient HR 50's-60's, will hold off on BB  - Start nifedipine 60 ER, titrate   - Pt reports headache with multiple antihypertensives including amlodipine and clonidine   - S/p IV lasix 20 mg (PTA on 10 mg), will re-dose 20 mg IV again today   - PRN Hydralazine for SBP > 180  - IV Furosemide 20mg (PTA dose 10mg PO daily)   - Was recently seen in [facility] clinic, will need to follow-up      #Concern for Community Acquired Pneumonia   CT with ground glass opacities and patient was started on abx. However, she is afebrile, has no cough, no leukocytosis and procal is only 0.1 (low suspicion for bacterial infection). No fever or cough. Procalcitonin mildly elevated.   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 No EKG changes on admission - previous LBBB.   - Continue PTA  "Rosuvastatin   - BB added as above     #Asthma   - Continue PTA Breo-Ellipta   - PRN Albuterol   - Incentive Spirometry      #Celiac Disease   - Gluten Free Diet      #Hypothyroidism   - Continue PTA levothyroxine and liothyronine      #Bipolar Disorder   #Cognitive Impairment   - Continue PTA Lamotrigene and Desvenlafaxine   - Continue PTA Memantine        Diet: Combination Diet Gluten Free Diet, Regular Diet Adult; 2 gm NA Diet    DVT Prophylaxis: Enoxaparin (Lovenox) SQ  Orellana Catheter: Not present  Lines: None     Cardiac Monitoring: None  Code Status: Full Code      Clinically Significant Risk Factors Present on Admission        # Hypokalemia: Lowest K = 3.2 mmol/L in last 2 days, will replace as needed           # Hypertension: home medication list includes antihypertensive(s)      # Overweight: Estimated body mass index is 27.1 kg/m  as calculated from the following:    Height as of this encounter: 1.6 m (5' 3\").    Weight as of this encounter: 69.4 kg (153 lb).           Disposition Plan      Expected Discharge Date: 02/24/2023                  Eleazar Mccloud MD  Hospitalist Service, [address]  Securely message with Mogi (more info)  Text page via [facility] Paging/Directory   See signed in provider for up to date coverage information  ______________________________________________________________________    Interval History   No acute events overnight, denies fever, chills, cough. Continues to have some SOB with activity    Physical Exam   Vital Signs: Temp: 98.4  F (36.9  C) Temp src: Oral BP: (!) 127/34 Pulse: 63   Resp: 17 SpO2: 94 % O2 Device: None (Room air)    Weight: 153 lbs 0 oz    Constitutional: Awake, alert, cooperative, no apparent distress  Respiratory: Clear to auscultation bilaterally  Cardiovascular: Regular rate and rhythm  GI: Normal bowel sounds, soft, non-distended, non-tender  Skin/Integumen: No rashes, no cyanosis        Data "     I have personally reviewed the following data over the past 24 hrs:    5.5  \   11.4 (L)   / 150     145 107 18.5 /  93   3.2 (L) 25 1.20 (H) \       ALT: 67 (H) AST: 51 (H) AP: 73 TBILI: 0.3   ALB: 3.7 TOT PROTEIN: 6.4 LIPASE: N/A       Trop: 69 (H) BNP: 11,774 (H)       TSH: 2.31 T4: N/A A1C: N/A       Procal: 0.10 (H) CRP: N/A Lactic Acid: N/A       INR:  1.12 PTT:  N/A   D-dimer:  1.80 (H) Fibrinogen:  N/A       Imaging results reviewed over the past 24 hrs:   Recent Results (from the past 24 hour(s))   Head CT w/o contrast    Narrative    EXAM: Head CT without contrast 2/22/2023 7:19 PM     HISTORY: Headache.    COMPARISON: Head CT without contrast 11/30/2022.    TECHNIQUE: Using multidetector thin collimation helical acquisition  technique, axial, coronal and sagittal CT images from the skull base  to the vertex were obtained without intravenous contrast.   (topogram) image(s) also obtained and reviewed.    FINDINGS:  No acute intracranial hemorrhage, mass effect, or midline shift. No  acute loss of gray-white matter differentiation in the cerebral  hemispheres. Ventricles are proportionate to the cerebral sulci. Clear  basal cisterns. Confluent periventricular white matter hypodensities  likely reflecting chronic small vessel ischemic disease considering  the patient's age. Generalized cerebellar and cerebral global volume  loss. Incidental empty sella. Wall calcification of the intradural V4  segment right vertebral artery. A partially calcified sebaceous cyst  in the occipital scalp.  The bony calvaria and the bones of the skull base are normal. The  visualized portions of the paranasal sinuses and mastoid air cells are  clear. Grossly normal orbits.       Impression    IMPRESSION: No acute intracranial pathology.    I have personally reviewed the examination and initial interpretation  and I agree with the findings.    CALI SIERRA MD         SYSTEM ID:  [id number]   CT Chest Pulmonary Embolism w  Contrast    Narrative    EXAM: Chest CT pulmonary embolism with contrast 2023 7:20 PM    HISTORY: Shortness of breath, hypertension, elevated d-dimer.    COMPARISON: Chest radiograph 2023. Chest CT without contrast  12/3/2022. Outside hospital chest CT PE 2016 (images available,  only interpretation).    TECHNIQUE: CTA imaging obtained through the chest with contrast was  performed. Coronal, sagittal and axial MIP reformatted images  obtained. Images reviewed in soft tissue, lung, and bone windows.    FINDINGS:   image(s): Noncontributory.    There is good contrast opacification/timing of the pulmonary vessels.  No pulmonary embolus. The RV to LV ratio is less than 0.9. There is  some reflux of contrast in the IVC and hepatic veins.    LINES/TUBES: None.    LOWER NECK: Thyroid unremarkable.    LUN mm nodular density in the right lower lobe (series 8, image  208), patchy groundglass density in the lower lobes right more than  left; patchy groundglass density in the right middle lobe (series 8,  image 154) increased nodular densities along the right oblique  fissure; mucous plugging the left lower lobe (series 8, image 204).     AIRWAYS: Patent tracheobronchial tree without intraluminal mass. No  bronchial wall thickening.    PLEURA: No pneumothorax. Small right greater than left pleural  effusions with mild compressive atelectasis.    VASCULAR: Normal size/configuration of the great vessels. Aortic  atherosclerosis.    CARDIAC: Mild cardiomegaly. No pericardial effusion. Coronary artery  atherosclerosis. Left anterior descending coronary artery metallic  stent. Bulky mitral valve annular calcifications.    MEDIASTINUM: No suspicious lymphadenopathy.    CHEST WALL: Unremarkable.    ESOPHAGUS: Moderate hiatal hernia.    UPPER ABDOMEN: Limited evaluation.    MUSCULOSKELETAL/SOFT TISSUES: Degenerative changes consistent with the  patient's age. Old/healing right-sided rib fractures. No  suspicious  osseous lesions. Unremarkable soft tissues.      Impression    IMPRESSION:   1.  No pulmonary embolism.  2.  Multifocal new nodular densities in the right lung, associated  with patchy basilar groundglass densities right more than left and  small pleural effusions right more than left; indeterminate,  underlying infectious etiology cannot be excluded. Pulmonary edema  would also be on the differential. Recommend follow-up to resolution.   3.  Healing old right-sided rib fractures.  4.  Moderate hiatal hernia.    These findings were communicated to Dr. Funes by Dr. Morales at  2023 9:56 PM via telephone and understanding was verbalized.   Echo Limited   Result Value    LVEF  45-50% (mildly reduced)    [facility]    [id number]  [id number]  [id number]  [id number]^ANAIS^SID     [facility],[place]  Echocardiography Laboratory  [address] [place] [address]     Name: DESMOND WHEELER  MRN: [id number]  : 1935  Study Date: 2023 09:18 AM  Age: 87 yrs  Gender: Female  Patient Location: [facility]  Reason For Study: Heart Failure  Ordering Physician: SID MANZO  Performed By: Paxton Manuel     BSA: 1.7 m2  Height: 63 in  Weight: 153 lb  HR: 58  BP: 153/75 mmHg  ______________________________________________________________________________  Procedure  Limited Echocardiogram with portions of two-dimensional, color and spectral  Doppler performed. Contrast Optison.  ______________________________________________________________________________  Interpretation Summary  Status post 26 mm Arndt Janis Ultra valve TAVR on 2022. MG is 18 mmHg,  PV is 2.8 m/s. Trace paravalvular AI.  Left ventricular function is decreased. The ejection fraction is 45-50%  (mildly reduced).  Global right ventricular function is normal.  Severe mitral annular calcification is present.  The inferior vena cava was normal in size with preserved respiratory  variability.     This study was  compared with the study from 1/31/2023 .LVEF mildly declined.  Mean gradient across TAVR mildly higher.  ______________________________________________________________________________  Left Ventricle  Left ventricular size is normal. Mild concentric wall thickening consistent  with left ventricular hypertrophy is present. Left ventricular function is  decreased. The ejection fraction is 45-50% (mildly reduced). Diastolic  function not assessed due to significant mitral annular calcification. Mild  diffuse hypokinesis is present.     Right Ventricle  The right ventricle is normal size. Global right ventricular function is  normal.     Atria  The right atria appears normal. Cannot assess LA size.     Mitral Valve  Severe mitral annular calcification is present. On Doppler interrogation,  there is no significant stenosis or regurgitation.     Aortic Valve  Trace aortic insufficiency is present. The mean AoV pressure gradient is 18.0  mmHg. The peak aortic velocity is 2.8 m/sec. Status post 26 mm Arndt Janis  Ultra valve TAVR on 9/12/2022.     Tricuspid Valve  The tricuspid valve is normal. Trace tricuspid insufficiency is present. The  right ventricular systolic pressure is approximated at 34.3 mmHg plus the  right atrial pressure.     Pulmonic Valve  Mild pulmonic insufficiency is present.     Vessels  The aorta root is normal. The inferior vena cava was normal in size with  preserved respiratory variability. IVC diameter <2.1 cm collapsing >50% with  sniff suggests a normal RA pressure of 3 mmHg.     Pericardium  No pericardial effusion is present.     Compared to Previous Study  This study was compared with the study from 1/31/2023 . LVEF mildly declined.  ______________________________________________________________________________  MMode/2D Measurements & Calculations     IVSd: 1.2 cm  LVIDd: 4.8 cm  LVIDs: 4.0 cm  LVPWd: 1.2 cm  FS: 16.3 %  LV mass(C)d: 217.9 grams  LV mass(C)dI: 126.3 grams/m2  asc Aorta  Diam: 3.2 cm  RWT: 0.50     Doppler Measurements & Calculations  Ao V2 max: 283.0 cm/sec  Ao max P.0 mmHg  Ao V2 mean: 199.0 cm/sec  Ao mean P.0 mmHg  Ao V2 VTI: 62.2 cm  PA V2 max: 86.0 cm/sec  PA max PG: 3.0 mmHg  PA acc time: 0.11 sec  TR max angie: 293.0 cm/sec  TR max P.3 mmHg     ______________________________________________________________________________  Report approved by: Dalia MELENDEZ 2023 10:43 AM

## 2023-02-24 ENCOUNTER — APPOINTMENT (OUTPATIENT)
Dept: OCCUPATIONAL THERAPY | Facility: CLINIC | Age: 88
DRG: 291 | End: 2023-02-24
Payer: MEDICARE

## 2023-02-24 LAB
ALBUMIN SERPL BCG-MCNC: 3.6 G/DL (ref 3.5–5.2)
ALP SERPL-CCNC: 71 U/L (ref 35–104)
ALT SERPL W P-5'-P-CCNC: 51 U/L (ref 10–35)
ANION GAP SERPL CALCULATED.3IONS-SCNC: 11 MMOL/L (ref 7–15)
AST SERPL W P-5'-P-CCNC: 35 U/L (ref 10–35)
ATRIAL RATE - MUSE: 66 BPM
BACTERIA UR CULT: ABNORMAL
BILIRUB DIRECT SERPL-MCNC: <0.2 MG/DL (ref 0–0.3)
BILIRUB SERPL-MCNC: 0.3 MG/DL
BUN SERPL-MCNC: 25.4 MG/DL (ref 8–23)
CALCIUM SERPL-MCNC: 9.2 MG/DL (ref 8.8–10.2)
CHLORIDE SERPL-SCNC: 105 MMOL/L (ref 98–107)
CREAT SERPL-MCNC: 1.28 MG/DL (ref 0.51–0.95)
DEPRECATED HCO3 PLAS-SCNC: 25 MMOL/L (ref 22–29)
DIASTOLIC BLOOD PRESSURE - MUSE: NORMAL MMHG
ERYTHROCYTE [DISTWIDTH] IN BLOOD BY AUTOMATED COUNT: 18 % (ref 10–15)
GFR SERPL CREATININE-BSD FRML MDRD: 41 ML/MIN/1.73M2
GLUCOSE SERPL-MCNC: 89 MG/DL (ref 70–99)
HCT VFR BLD AUTO: 38.3 % (ref 35–47)
HGB BLD-MCNC: 11.4 G/DL (ref 11.7–15.7)
INTERPRETATION ECG - MUSE: NORMAL
MCH RBC QN AUTO: 28.7 PG (ref 26.5–33)
MCHC RBC AUTO-ENTMCNC: 29.8 G/DL (ref 31.5–36.5)
MCV RBC AUTO: 97 FL (ref 78–100)
P AXIS - MUSE: 78 DEGREES
PLATELET # BLD AUTO: 154 10E3/UL (ref 150–450)
POTASSIUM SERPL-SCNC: 4.1 MMOL/L (ref 3.4–5.3)
PR INTERVAL - MUSE: 206 MS
PROT SERPL-MCNC: 6.3 G/DL (ref 6.4–8.3)
QRS DURATION - MUSE: 162 MS
QT - MUSE: 490 MS
QTC - MUSE: 513 MS
R AXIS - MUSE: 3 DEGREES
RBC # BLD AUTO: 3.97 10E6/UL (ref 3.8–5.2)
SODIUM SERPL-SCNC: 141 MMOL/L (ref 136–145)
SYSTOLIC BLOOD PRESSURE - MUSE: NORMAL MMHG
T AXIS - MUSE: 185 DEGREES
VENTRICULAR RATE- MUSE: 66 BPM
WBC # BLD AUTO: 5.3 10E3/UL (ref 4–11)

## 2023-02-24 PROCEDURE — 80053 COMPREHEN METABOLIC PANEL: CPT | Performed by: INTERNAL MEDICINE

## 2023-02-24 PROCEDURE — 250N000013 HC RX MED GY IP 250 OP 250 PS 637: Performed by: INTERNAL MEDICINE

## 2023-02-24 PROCEDURE — 250N000013 HC RX MED GY IP 250 OP 250 PS 637

## 2023-02-24 PROCEDURE — 99233 SBSQ HOSP IP/OBS HIGH 50: CPT | Mod: FS | Performed by: STUDENT IN AN ORGANIZED HEALTH CARE EDUCATION/TRAINING PROGRAM

## 2023-02-24 PROCEDURE — 97530 THERAPEUTIC ACTIVITIES: CPT | Mod: GO | Performed by: OCCUPATIONAL THERAPIST

## 2023-02-24 PROCEDURE — 250N000013 HC RX MED GY IP 250 OP 250 PS 637: Performed by: PHYSICIAN ASSISTANT

## 2023-02-24 PROCEDURE — 120N000002 HC R&B MED SURG/OB UMMC

## 2023-02-24 PROCEDURE — 85027 COMPLETE CBC AUTOMATED: CPT | Performed by: INTERNAL MEDICINE

## 2023-02-24 PROCEDURE — 36415 COLL VENOUS BLD VENIPUNCTURE: CPT | Performed by: INTERNAL MEDICINE

## 2023-02-24 PROCEDURE — 999N000147 HC STATISTIC PT IP EVAL DEFER

## 2023-02-24 PROCEDURE — 250N000011 HC RX IP 250 OP 636: Performed by: INTERNAL MEDICINE

## 2023-02-24 PROCEDURE — 99222 1ST HOSP IP/OBS MODERATE 55: CPT | Mod: GC | Performed by: INTERNAL MEDICINE

## 2023-02-24 PROCEDURE — 250N000013 HC RX MED GY IP 250 OP 250 PS 637: Performed by: STUDENT IN AN ORGANIZED HEALTH CARE EDUCATION/TRAINING PROGRAM

## 2023-02-24 PROCEDURE — 97165 OT EVAL LOW COMPLEX 30 MIN: CPT | Mod: GO | Performed by: OCCUPATIONAL THERAPIST

## 2023-02-24 PROCEDURE — 250N000011 HC RX IP 250 OP 636

## 2023-02-24 PROCEDURE — 82248 BILIRUBIN DIRECT: CPT | Performed by: PHYSICIAN ASSISTANT

## 2023-02-24 PROCEDURE — 97535 SELF CARE MNGMENT TRAINING: CPT | Mod: GO | Performed by: OCCUPATIONAL THERAPIST

## 2023-02-24 PROCEDURE — 99207 PR APP CREDIT; MD BILLING SHARED VISIT: CPT | Mod: FS | Performed by: PHYSICIAN ASSISTANT

## 2023-02-24 RX ORDER — ASPIRIN 81 MG/1
81 TABLET ORAL DAILY
Status: DISCONTINUED | OUTPATIENT
Start: 2023-02-24 | End: 2023-02-28 | Stop reason: HOSPADM

## 2023-02-24 RX ORDER — FUROSEMIDE 10 MG/ML
20 INJECTION INTRAMUSCULAR; INTRAVENOUS 2 TIMES DAILY
Status: COMPLETED | OUTPATIENT
Start: 2023-02-24 | End: 2023-02-24

## 2023-02-24 RX ORDER — CARVEDILOL 25 MG/1
25 TABLET ORAL 2 TIMES DAILY WITH MEALS
Status: DISCONTINUED | OUTPATIENT
Start: 2023-02-24 | End: 2023-02-25

## 2023-02-24 RX ORDER — FUROSEMIDE 10 MG/ML
20 INJECTION INTRAMUSCULAR; INTRAVENOUS 2 TIMES DAILY
Status: DISCONTINUED | OUTPATIENT
Start: 2023-02-24 | End: 2023-02-24

## 2023-02-24 RX ORDER — ROSUVASTATIN CALCIUM 10 MG/1
10 TABLET, COATED ORAL DAILY
Status: DISCONTINUED | OUTPATIENT
Start: 2023-02-25 | End: 2023-02-28 | Stop reason: HOSPADM

## 2023-02-24 RX ORDER — ENOXAPARIN SODIUM 100 MG/ML
30 INJECTION SUBCUTANEOUS EVERY 24 HOURS
Status: DISCONTINUED | OUTPATIENT
Start: 2023-02-25 | End: 2023-02-28 | Stop reason: HOSPADM

## 2023-02-24 RX ADMIN — MEMANTINE 10 MG: 10 TABLET ORAL at 08:57

## 2023-02-24 RX ADMIN — POLYETHYLENE GLYCOL 3350 17 G: 17 POWDER, FOR SOLUTION ORAL at 08:57

## 2023-02-24 RX ADMIN — ISOSORBIDE MONONITRATE 60 MG: 30 TABLET, EXTENDED RELEASE ORAL at 08:57

## 2023-02-24 RX ADMIN — LEVOTHYROXINE SODIUM 50 MCG: 0.05 TABLET ORAL at 08:57

## 2023-02-24 RX ADMIN — DESVENLAFAXINE SUCCINATE 100 MG: 100 TABLET, EXTENDED RELEASE ORAL at 08:59

## 2023-02-24 RX ADMIN — FUROSEMIDE 20 MG: 10 INJECTION, SOLUTION INTRAVENOUS at 09:00

## 2023-02-24 RX ADMIN — Medication 950 MG: at 08:59

## 2023-02-24 RX ADMIN — ROSUVASTATIN CALCIUM 40 MG: 40 TABLET, FILM COATED ORAL at 09:05

## 2023-02-24 RX ADMIN — LOSARTAN POTASSIUM 100 MG: 100 TABLET, FILM COATED ORAL at 08:58

## 2023-02-24 RX ADMIN — LIOTHYRONINE SODIUM 10 MCG: 5 TABLET ORAL at 08:57

## 2023-02-24 RX ADMIN — ENOXAPARIN SODIUM 40 MG: 40 INJECTION SUBCUTANEOUS at 08:56

## 2023-02-24 RX ADMIN — FLUTICASONE FUROATE AND VILANTEROL TRIFENATATE 1 PUFF: 100; 25 POWDER RESPIRATORY (INHALATION) at 09:09

## 2023-02-24 RX ADMIN — NIFEDIPINE 60 MG: 60 TABLET, EXTENDED RELEASE ORAL at 08:59

## 2023-02-24 RX ADMIN — LAMOTRIGINE 225 MG: 25 TABLET ORAL at 08:58

## 2023-02-24 RX ADMIN — Medication 400 MCG: at 08:59

## 2023-02-24 RX ADMIN — ASPIRIN 81 MG: 81 TABLET ORAL at 18:31

## 2023-02-24 RX ADMIN — MIRTAZAPINE 7.5 MG: 7.5 TABLET, FILM COATED ORAL at 21:12

## 2023-02-24 ASSESSMENT — ACTIVITIES OF DAILY LIVING (ADL)
DIFFICULTY_EATING/SWALLOWING: NO
ADLS_ACUITY_SCORE: 42
CONCENTRATING,_REMEMBERING_OR_MAKING_DECISIONS_DIFFICULTY: NO
ADLS_ACUITY_SCORE: 27
ADLS_ACUITY_SCORE: 41
ADLS_ACUITY_SCORE: 41
ADLS_ACUITY_SCORE: 42
DOING_ERRANDS_INDEPENDENTLY_DIFFICULTY: NO
ADLS_ACUITY_SCORE: 41
HEARING_DIFFICULTY_OR_DEAF: NO
ADLS_ACUITY_SCORE: 42
DEPENDENT_IADLS:: MEDICATION MANAGEMENT;MEAL PREPARATION;TRANSPORTATION
DRESSING/BATHING_DIFFICULTY: NO
WALKING_OR_CLIMBING_STAIRS_DIFFICULTY: NO
WEAR_GLASSES_OR_BLIND: YES
ADLS_ACUITY_SCORE: 27
ADLS_ACUITY_SCORE: 41
ADLS_ACUITY_SCORE: 27
CHANGE_IN_FUNCTIONAL_STATUS_SINCE_ONSET_OF_CURRENT_ILLNESS/INJURY: NO
DIFFICULTY_COMMUNICATING: NO
TOILETING_ISSUES: NO
ADLS_ACUITY_SCORE: 42
ADLS_ACUITY_SCORE: 42

## 2023-02-24 NOTE — PLAN OF CARE
"Assumed cares at [id number]     Status: Uncontrolled HTN, SOB  Neuro:  AOX4  : Good amt of UO using bedside commode  GI: (-) BM  Resp: Sating at RA, not in distress  Mobility: SBA  Cardiac: Denies chest pain: EKG done SR LBBB  Lines/Drains: PIV line saline locked  Pain: Denies pain    VS: BP (!) 147/80   Pulse 61   Temp 98.3  F (36.8  C) (Oral)   Resp 18   Ht 1.6 m (5' 3\")   Wt 69.4 kg (153 lb)   SpO2 96%   BMI 27.10 kg/m        Plan of Care:   -For OT, PT consult  -PRN medS for HTN  "

## 2023-02-24 NOTE — CONSULTS
"87 year old female presenting with hypertensive urgency. CORE consult requested. Kamryn is currently admitted with Acute HF, PE and Hypertensive Urgency.     Kamryn and daughter Sarah were previously provided with a CHF book at prior admission.  I reviewed the importance of daily weights, 2 gm sodium diet, 2L fluid restriction and compliance with medications upon hospital discharge with Sarah over the phone. Sarah is pt main caregiver and makes all of her appt.  CORE contact phone numbers provided and patient is encouraged to call with any questions or concerns, including any weight gain or loss of 2 or more pounds in 24 hours or 5 or more pounds in 1 week.      Appointment made in CORE clinic on 4/3/23 at 2:30. Only appt that worked with pt busy provider schedule.  I will follow-up with the patient at that time, sooner if needed.  Thank you for the consult.    Macie Samuel RN   Cardiology Care Coordinator - [facility]  Questions and [phone]  First press #1 for the [place] and then press #3 for \"Medical Advise\" to reach us Cardiology Nurses.    "

## 2023-02-24 NOTE — PROGRESS NOTES
02/24/23 1400   Appointment Info   Signing Clinician's Name / Credentials (OT) Deedee Arce, OTR/L   Living Environment   People in Home alone   Current Living Arrangements assisted living   Home Accessibility wheelchair accessible   Transportation Anticipated family or friend will provide   Living Environment Comments Pt lives in an [facility] facility with nursing cares offered daily for medication mgmt. She receives supervision by staff during showers for safety; however, completes the shower herself. Has shower stool. Grab bars by toilet and in shower. Pt has a FWW.   Self-Care   Usual Activity Tolerance good   Current Activity Tolerance fair   Regular Exercise Yes   Activity/Exercise Type other (see comments)   Exercise Amount/Frequency 3-5 times/wk   Equipment Currently Used at Home shower chair;grab bar, toilet;grab bar, tub/shower;walker, rolling   Fall history within last six months no   Activity/Exercise/Self-Care Comment Pt is SBA-Mod I for all ADLs at [facility] with assist from staffing for showers and medications. Has walk in shower. Has 3x meals per day through facility. Owns FWW. Pt has homecare PT setup from previous admission. Has higher level balance and mobility challenges per pt report that is currently being treated in homecare setting. Engages in fitness class at [facility] 5x/week.   Instrumental Activities of Daily Living (IADL)   Previous Responsibilities housekeeping;laundry;shopping;finances   General Information   Onset of Illness/Injury or Date of Surgery 02/21/23   Referring Physician Lizeth Orr MD   Patient/Family Therapy Goal Statement (OT) To go home safely when ready   Additional Occupational Profile Info/Pertinent History of Current Problem Kamryn Miller is a 87 year old female admitted on 2/22/2023. She has a history of CAD, HLD, HTN, s/p TAVR, asthma, celiac disease, hypothyroidism, and bipolar disorder and is admitted for uncontrolled hypertension and pulmonary edema.   Existing  Precautions/Restrictions fall   Left Upper Extremity (Weight-bearing Status) full weight-bearing (FWB)   Right Upper Extremity (Weight-bearing Status) full weight-bearing (FWB)   Left Lower Extremity (Weight-bearing Status) full weight-bearing (FWB)   Right Lower Extremity (Weight-bearing Status) full weight-bearing (FWB)   General Observations and Info Up with assist   Cognitive Status Examination   Orientation Status orientation to person, place and time   Cognitive Status Comments Pt demonstrates adequate attention to all questions related to evaluation, provided background information on living arrangements with no concerns noted. Will continue to monitor.   Visual Perception   Visual Impairment/Limitations corrective lenses full-time   Sensory   Sensory Quick Adds UE sensation intact   Posture   Posture kyphosis   Range of Motion Comprehensive   General Range of Motion no range of motion deficits identified   Strength Comprehensive (MMT)   Comment, General Manual Muscle Testing (MMT) Assessment grossly 5/5 ho UEs, reports generalized weakness   Muscle Tone Assessment   Muscle Tone Quick Adds No deficits were identified   Bed Mobility   Bed Mobility supine-sit;sit-supine   Supine-Sit [place] (Bed Mobility) modified independence   Sit-Supine [place] (Bed Mobility) modified independence   Transfers   Transfers sit-stand transfer;toilet transfer;shower transfer   Sit-Stand Transfer   Sit-Stand [place] (Transfers) supervision   Assistive Device (Sit-Stand Transfers) walker, front-wheeled   Shower Transfer   Type (Shower Transfer) sit-stand;stand-sit;lateral   [place] Level (Shower Transfer) supervision   Assistive Device (Shower Transfer) grab bar, tub rail;shower chair   Toilet Transfer   Type (Toilet Transfer) sit-stand;stand-sit   [place] Level (Toilet Transfer) supervision   Activities of Daily Living   BADL Assessment/Intervention lower body dressing;bathing;grooming;toileting    Bathing Assessment/Intervention   [place] Level (Bathing) supervision   Lower Body Dressing Assessment/Training   [place] Level (Lower Body Dressing) independent   Grooming Assessment/Training   [place] Level (Grooming) supervision   Toileting   [place] Level (Toileting) supervision   Clinical Impression   Criteria for Skilled Therapeutic Interventions Met (OT) Yes, treatment indicated   OT Diagnosis Impaired I/ADLs and prevent deconditioning with LOS   OT Problem List-Impairments impacting ADL problems related to;activity tolerance impaired;strength;mobility   Assessment of Occupational Performance 3-5 Performance Deficits   Identified Performance Deficits functional transfers, functional mobility, activity tolerance   Planned Therapy Interventions (OT) ADL retraining;progressive activity/exercise;transfer training;strengthening   Clinical Decision Making Complexity (OT) low complexity   Risk & Benefits of therapy have been explained evaluation/treatment results reviewed;care plan/treatment goals reviewed;risks/benefits reviewed;current/potential barriers reviewed;participants voiced agreement with care plan;participants included;patient   OT Total Evaluation Time   OT Eval, Low Complexity Minutes (22046) 12   OT Goals   Therapy Frequency (OT) 6 times/wk   OT Predicted Duration/Target Date for Goal Attainment 02/28/23   OT Goals Transfers;Toilet Transfer/Toileting;Home Management;Aerobic Activity;OT Goal 1;Hygiene/Grooming   OT: Hygiene/Grooming modified independent   OT: Transfer Modified independent;with assistive device  (Bed <> chair)   OT: Toilet Transfer/Toileting Modified independent;toilet transfer;cleaning and garment management;within precautions   OT: Home Management Modified independent;with light demand household tasks;ambulatory level   OT: Perform aerobic activity with stable cardiovascular response 15 minutes;intermittent activity;ambulation   OT: Goal 1 Pt to verbalize  understanding of at least 3 fall prevention and EC strategies to promote safety.   Interventions   Interventions Quick Adds Self-Care/Home Management;Therapeutic Activity   Self-Care/Home Management   Self-Care/Home Mgmt/ADL, Compensatory, Meal Prep Minutes (18380) 24   Symptoms Noted During/After Treatment (Meal Preparation/Planning Training) fatigue   Treatment Detail/Skilled Intervention Facilitated ADLs to progress IND and safety. Pt completed bed mobility with Mod I and additional time for supine <> sit at EOB. STS from EOB completed SBA to ambulate into BR. Pt educated on fall prevention strategies, verbalized understanding. Toilet tx completed SBA with IND with aydin-cares. Completed TB washing from seated position with setup A; mod A for back only as pt has long handled sponge in home setting. Completed TB dressing with SBA with cueing for safe FWW use to remain within FWW at all times during ADLs, pt requiring cueing throughout session for carryover. Facilitated g/h and oral cares at sink with SBA with education on bringing FWW into sink and then transitioning to countertop for safety, pt demo'd understanding.   Therapeutic Activities   Therapeutic Activity Minutes (82052) 12   Symptoms noted during/after treatment fatigue;shortness of breath   Treatment Detail/Skilled Intervention Facilitated hallway ambulation to progress activity tolerance and household distance needs. Pt ambulated ~200 ft x 2 trials SBA with FWW with x1 standing rest break. Reports fatigue and SOB with activity. On return to room, pt completed bed mobility Mod I using bed rail. Left with daughter present, AVSS on RA.   OT Discharge Planning   OT Plan Standing ADLs, functional transfers, strengthening, activity tolerance   OT Discharge Recommendation (DC Rec) home with assist   OT Rationale for DC Rec Recommend pt d/c to [facility] once medically stable with assist for IADLs PRN from daughter (Ana) and continued assist for IADLs/ADLs from [facility]  staff. Recommend pt continue homecare PT to progress strength and high level balance.   OT Brief overview of current status SBA with FWW, decreased activity tolerance   Total Session Time   Timed Code Treatment Minutes 36   Total Session Time (sum of timed and untimed services) 48

## 2023-02-24 NOTE — PLAN OF CARE
"Goal Outcome Evaluation:    /70 (BP Location: Left arm)   Pulse 62   Temp 98.3  F (36.8  C) (Oral)   Resp 16   Ht 1.6 m (5' 3\")   Wt 69.4 kg (153 lb)   SpO2 94%   BMI 27.10 kg/m      [id number]    Admitted yesterday for sob/weight gain/pulm edema and HTN urgency. A&Ox4, denied pain. Voided using bedside commode. Left PIV saline locked. BP stable and WNL, no prn med needed. On room air sating > 92%, clear upper lobes and diminished lower lobes. Continue to monitor BP.   "

## 2023-02-24 NOTE — PROGRESS NOTES
Admission/Transfer from: [facility] ED  2 RN skin assessment completed. Yes  Significant findings include: Rash to bilateral perineum in groin folds. x1 scab to bilateral shin.   WOC Nurse Consult Ordered? No

## 2023-02-24 NOTE — PLAN OF CARE
"Goal Outcome Evaluation:      Plan of Care Reviewed With: patient    Overall Patient Progress: improving     Admitted from [facility] ED at 1240. Pending four eyes skin assessment. See Epic for additional admission documentation.   - Belongings obtain, verified and remain with patient.  - Released/reviewed signed and held  - Reviewed Allergies  - Admission education provided to patient  - VS, Height and weight obtained.    Reason for admission: Weight gain [R63.5]  Elevated brain natriuretic peptide (BNP) level [R79.89]     Vitals: BP (!) 153/84 (BP Location: Right arm, Patient Position: Semi-Canales's)   Pulse 63   Temp 98.8  F (37.1  C) (Oral)   Resp 16   Ht 1.6 m (5' 3\")   Wt 64.6 kg (142 lb 6.4 oz)   SpO2 92%   BMI 25.23 kg/m    "

## 2023-02-24 NOTE — CONSULTS
Care Management Initial Consult    General Information  Assessment completed with: Patient,    Type of CM/SW Visit: Initial Assessment    Primary Care Provider verified and updated as needed: Yes   Readmission within the last 30 days: current reason for admission unrelated to previous admission   Return Category: New Diagnosis  Reason for Consult: discharge planning, utilization management concerns  Advance Care Planning: Advance Care Planning Reviewed: present on chart, verified with patient          Communication Assessment  Patient's communication style: spoken language (English or Bilingual)         Cognitive  Cognitive/Neuro/Behavioral: WDL                      Living Environment:   People in home: alone     Current living Arrangements: assisted living ([facility])  Name of Facility: [facility]   Able to return to prior arrangements: yes       Family/Social Support:  Care provided by: self  Provides care for: no one, unable/limited ability to care for self  Marital Status:   Children (Daughter Sarah.  Son lives in [place].)          Description of Support System: Supportive, Involved    Support Assessment: Adequate family and caregiver support    Current Resources:   Patient receiving home care services: No     Community Resources: None  Equipment currently used at home: grab bar, toilet, grab bar, tub/shower  Supplies currently used at home: None    Employment/Financial:  Employment Status: retired        Financial Concerns: No concerns identified       Lifestyle & Psychosocial Needs:  Social Determinants of Health     Tobacco Use: Low Risk      Smoking Tobacco Use: Never     Smokeless Tobacco Use: Never     Passive Exposure: Not on file   Alcohol Use: Not on file   Financial Resource Strain: Not on file   Food Insecurity: Not on file   Transportation Needs: Not on file   Physical Activity: Not on file   Stress: Not on file   Social Connections: Not on file    Intimate Partner Violence: Not on file   Depression: Not at risk     PHQ-2 Score: 0   Housing Stability: Not on file       Functional Status:  Prior to admission patient needed assistance:   Dependent ADLs:: Independent  Dependent IADLs:: Medication Management, Meal Preparation, Transportation       Mental Health Status:  Mental Health Status: No Current Concerns       Chemical Dependency Status:  Chemical Dependency Status: No Current Concerns             Values/Beliefs:  Spiritual, Cultural Beliefs, Religion Practices, Values that affect care: yes  Description of Beliefs that Will Affect Care: flynn            Additional Information:  Kamryn Miller is a 87 year old female admitted on 2/22/2023. She has a history of CAD, HLD, HTN, s/p TAVR, asthma, celiac disease, hypothyroidism, and bipolar disorder and is admitted for hypertension and pulmonary edema.    SW spoke with pt by bedside phone, introduced self, and explained role in pt's care.  Pt reports that daughter Sarah lives in town and is her primary support.  Son lives out of town in [place].  Pt reports living in an [facility] in [place], [place]. WAN completed CMA with pt and no other concerns/questions identified at this time.     will continue to follow for discharge planning, support, and resources.    SOFIA Chang, LGSW  Unit 5A   Office: [phone]   Pager: [phone]  [email or web address]

## 2023-02-24 NOTE — PLAN OF CARE
Goal Outcome Evaluation:      Plan of Care Reviewed With: patient    Overall Patient Progress: improvingOverall Patient Progress: improving

## 2023-02-24 NOTE — CONSULTS
SPIRITUAL HEALTH SERVICES Progress Note  [facility] ([place] 5A    Saw pt Kamryn Miller per Consult Order (routine) and Admission Request.      Patient/Family Understanding of Illness and Goals of Care - Kamryn shared that she spent a few days in the ED before making it to this unit. She is glad to be out of the ED and when asked what brought her to the hospital she said she's having trouble with her blood pressure. She's waiting on more information but is not feeling pain.       Distress and Loss - She shared about her , Adrien, dying two years ago. They were together for 63 years, growing up in the same [place] town. It is hard being on her own and Adrien wanted her to move to a senior living facility so she would have support. She shared it's hard to meet people there but she does participate in the activities.       Strengths, Coping, and Resources - Kamryn has a son (Carina) and daughter (juhi) and three grandchildren. She has good support from her family. She also has a positive attitude and named her Muslim as a source of support/coping as well.       Meaning, Beliefs, and Spirituality - She is [employer] and attends [employer] in [place]. She said I can let them know she's in the hospital to be added to the prayer list. She appreciated a Bible reading from [place] [id number] and prayer.       Plan of Care - I provided a Bible reading and prayer. I will notify her home [facility] of her hospitalization.     Jacinto Perez MDiv  Chaplain Resident  Pager [phone]    * [facility] remains available 24/7 for emergent requests/referrals, either by having the switchboard page the on-call  or by entering an ASAP/STAT consult in Epic (this will also page the on-call ). Routine Epic consults receive an initial response within 24 hours.*

## 2023-02-24 NOTE — PROGRESS NOTES
Per allergies in chart and patient report, patient experiences headache with hydralazine-hydrochlorothiazide. Writer called pharmacist to verify that current active order for hydralazine still safe to give as needed. Pharmacist verified that ok to give as needed. Patient updated and verbalized understanding.

## 2023-02-24 NOTE — PROGRESS NOTES
[facility]    Medicine Progress Note - Hospitalist Service, GOLD TEAM 6    Date of Admission:  2/22/2023    Assessment & Plan    Kamryn Miller is a pleasant 86 yo female with hx of bipolar d/o, MDD, hypothyroidism, CKDIII, INGRID, aortic stenosis s/p TAVR (9/2022), PAD on DAPT, CAD s/p LAD and RCA stent placement (2003), LAD PCI x 3 (8/2020), and HFpEF admitted to [facility] after presenting to ED with SOB due to hypertensive emergency, HF exacerbation and pulmonary edema.     Plan for today:  - Cardiology consulted and appreciate recommendations.   - Continue IV Lasix as ordered for one more dose.   - Discontinue nifedipine  - Start Coreg 25 mg BID    # Hypertensive Urgency  # Acute systolic HF exacerbation   # Acute Pulmonary Edema    # Shortness of Breath  She presented with complaints of hypertension with reported SBP of > 200 at her AL. BP on admission 171/90. CTA PE negative for PE but showed groundglass opacities and small pleural effusions. TTE 1/30/23 showed EF 55-60%, functioning TAVR without AI. Repeat TTE 2/22 shows mildly reduced EF (45-50%) and trace AI, severe mitral calcification.  - Cardiology consulted and appreciate recommendations.   - Start Coreg 25 mg BID  - Continue IV Lasix  - Restart PTA baby aspirin  - Continue PTA Losartan and Imdur  - Discontinue nifedipine  - PRN Hydralazine for SBP > 180  - Was recently seen in Core clinic, will need to follow-up      # Concern for Community Acquired Pneumonia: CT with ground glass opacities and patient was started on abx. However, she is afebrile, has no cough, no leukocytosis and procal is only 0.1 (low suspicion for bacterial infection).   - Discontinue CTX and Azithromycin  - Monitor clinically      # Elevated Transaminases, Hepatocellular Pattern   - Trend CMP     # CAD S/P PCI x 3  # Hyperlipidemia   # S/P TAVR (9/2022)   H/O PCIs X 3. No EKG changes on admission - previous LBBB.   - Continue PTA Rosuvastatin    - Anti-hypertensives as above.      # Asthma   - Continue PTA Breo-Ellipta   - PRN Albuterol   - Incentive Spirometry      # Celiac Disease   - Gluten Free Diet      # Hypothyroidism   - Continue PTA levothyroxine and liothyronine      # Bipolar Disorder   # Cognitive Impairment   - Continue PTA Lamotrigene and Desvenlafaxine   - Continue PTA Memantine        Diet: Combination Diet Gluten Free Diet, Regular Diet Adult; 2 gm NA Diet    DVT Prophylaxis: Enoxaparin (Lovenox) SQ  Orellana Catheter: Not present  Lines: None     Cardiac Monitoring: None  Code Status: Full Code      Disposition Plan     Expected Discharge Date: 02/24/2023                  Jordy Figueroa PA-C  Hospitalist Service, GOLD TEAM 6  [facility]  Securely message with [employer] (more info)  Text page via mechatronic [employer] Paging/Directory   See signed in provider for up to date coverage information  ______________________________________________________________________    Interval History   No acute events overnight, denies fever, chills, cough. Continues to have some SOB with activity    Physical Exam   Vital Signs: Temp: 98.5  F (36.9  C) Temp src: Oral BP: (!) 147/80 Pulse: 65   Resp: 18 SpO2: 95 % O2 Device: None (Room air)    Weight: 143 lbs 15.37 oz  GEN: In NAD  HEENT: NCAT; PERRL; sclerae non-icteric  LUNGS: CTAB  CV: RRR  ABD: +BSs; SNTND  EXT: No BLE edema  SKIN: No acute rashes noted on exposed areas.  NEURO: AAOx3; CNs grossly intact; No acute focal deficits noted.        Data     I have personally reviewed the following data over the past 24 hrs:    5.3  \   11.4 (L)   / 154     141 105 25.4 (H) /  89   4.1 25 1.28 (H) \       Imaging results reviewed over the past 24 hrs:   Recent Results (from the past 24 hour(s))   Echo Limited   Result Value    LVEF  45-50% (mildly reduced)    Narrative    [id number]  [id number]  [id number]  [id number]^ANAIS^SID     [facility]  [place]  Echocardiography Laboratory  [address] [place] [id number]     Name: DSEMOND WHEELER  MRN: [id number]  : 1935  Study Date: 2023 09:18 AM  Age: 87 yrs  Gender: Female  Patient Location: [facility]  Reason For Study: Heart Failure  Ordering Physician: SID MANZO  Performed By: Paxton Manuel     BSA: 1.7 m2  Height: 63 in  Weight: 153 lb  HR: 58  BP: 153/75 mmHg  ______________________________________________________________________________  Procedure  Limited Echocardiogram with portions of two-dimensional, color and spectral  Doppler performed. Contrast Optison.  ______________________________________________________________________________  Interpretation Summary  Status post 26 mm Arndt Janis Ultra valve TAVR on 2022. MG is 18 mmHg,  PV is 2.8 m/s. Trace paravalvular AI.  Left ventricular function is decreased. The ejection fraction is 45-50%  (mildly reduced).  Global right ventricular function is normal.  Severe mitral annular calcification is present.  The inferior vena cava was normal in size with preserved respiratory  variability.     This study was compared with the study from 2023 .LVEF mildly declined.  Mean gradient across TAVR mildly higher.  ______________________________________________________________________________  Left Ventricle  Left ventricular size is normal. Mild concentric wall thickening consistent  with left ventricular hypertrophy is present. Left ventricular function is  decreased. The ejection fraction is 45-50% (mildly reduced). Diastolic  function not assessed due to significant mitral annular calcification. Mild  diffuse hypokinesis is present.     Right Ventricle  The right ventricle is normal size. Global right ventricular function is  normal.     Atria  The right atria appears normal. Cannot assess LA size.     Mitral Valve  Severe mitral annular calcification is present. On Doppler interrogation,  there is no significant stenosis  or regurgitation.     Aortic Valve  Trace aortic insufficiency is present. The mean AoV pressure gradient is 18.0  mmHg. The peak aortic velocity is 2.8 m/sec. Status post 26 mm Arndt Janis  Ultra valve TAVR on 2022.     Tricuspid Valve  The tricuspid valve is normal. Trace tricuspid insufficiency is present. The  right ventricular systolic pressure is approximated at 34.3 mmHg plus the  right atrial pressure.     Pulmonic Valve  Mild pulmonic insufficiency is present.     Vessels  The aorta root is normal. The inferior vena cava was normal in size with  preserved respiratory variability. IVC diameter <2.1 cm collapsing >50% with  sniff suggests a normal RA pressure of 3 mmHg.     Pericardium  No pericardial effusion is present.     Compared to Previous Study  This study was compared with the study from 2023 . LVEF mildly declined.  ______________________________________________________________________________  MMode/2D Measurements & Calculations     IVSd: 1.2 cm  LVIDd: 4.8 cm  LVIDs: 4.0 cm  LVPWd: 1.2 cm  FS: 16.3 %  LV mass(C)d: 217.9 grams  LV mass(C)dI: 126.3 grams/m2  asc Aorta Diam: 3.2 cm  RWT: 0.50     Doppler Measurements & Calculations  Ao V2 max: 283.0 cm/sec  Ao max P.0 mmHg  Ao V2 mean: 199.0 cm/sec  Ao mean P.0 mmHg  Ao V2 VTI: 62.2 cm  PA V2 max: 86.0 cm/sec  PA max PG: 3.0 mmHg  PA acc time: 0.11 sec  TR max angie: 293.0 cm/sec  TR max P.3 mmHg     ______________________________________________________________________________  Report approved by: Dalia MELENDEZ 2023 10:43 AM

## 2023-02-24 NOTE — PLAN OF CARE
Goal Outcome Evaluation:      Plan of Care Reviewed With: patient, child    Overall Patient Progress: improving    Activity: A1 with W/GB   VS order: q8h, daily weights.   Diet: Gluten free, 2 g Na. Strict I/O.   BGLs: none     Vascular access: PIV   Drains:     Neuro: WDL.   Pain: Denies.   Resp: RA. GREGORY. PRN albuterol for asthma. Spot check pulse oximetry with oxygen saturation goals >90%  Cardiac: Bradycardic. HTN, PRN Hydralazine for SBP > 180.   GI/: Voiding hesitancy and dribbling. GI WDL. LBM 2/24  Infectious disease: none  Electrolyte replacement: K protocol  Skin: Pale and dusky skin throughout. Scabs x1 to each shin. Fungal rash to groin.     Shift updates: PT/OT consult.  per pt request. Pg'd team requesting miconazole powder for groin. Cards consult complete. Coreg held this evening for /47.

## 2023-02-24 NOTE — PLAN OF CARE
PT Deferral: Per discussion with OT patient mobilizing with SBA and FWW and has adequate HH services established. No acute PT needs.

## 2023-02-24 NOTE — CONSULTS
Cardiology Inpatient Consultation  February 24, 2023    Reason for Consult:  New CM     HPI:   Kamryn Miller is a 87 year old female with a history of aortic stenosis s/p TAVR w/ Arndt Janis Ultra on 09/12/2022, CAD s/p LAD and RCA stent in 2003, LAD PCI in 2020 and last coronary angiogram w/ 50% stenosis of the LAD, HTN, HLD, HFpEF (last EF of 55-60% on 10/24) and PAD presented the ED for progressive dyspnea on exertion and PND.    History provided by the patient and her daughter. Patient states she has had progressive shortness of breath on exertion over the past month acutely worsened over the past week. Has progressed to the point where she gets short of breath when she walks to the bathroom. Also endorses some episodes of PND. No lower extremity swelling.    Seen in clinic by Dr. Zuniga on 2/15/23. At that time she is noted to have elevated blood pressure 186/118. No symptoms. Indoor was increased to 60 and losartan increased to 100 around that time.    11/30-12/7 hospitalization: Syncope in the setting of Hb of 6.4 recent fasting. Not overt bleeding   Cards was consulted --> recommended brillinta be discontinued and to resume asa once safe from primary team standpoint. Per discharge summary, it appears patient was told to discontinue the aspirin and brillinta.     10/2022 hospitalization: atypical CP.   Angiogram: Two vessel CAD with prior PCI to the RCA and LAD, otherwise mild non obstructive CAD elsewhere.  Patent stents in the RCA and LAD with mild to moderate in stent restenosis of the proximal LAD stents (progressed since last angiogram from 2020).  Proximal LAD ISR hemodynamically not significant by dPR at 0.91.    Primary team resumed patient's home PTA medications and started her on 20 of IV Lasix twice a day    Review of Systems:    Complete review of systems was performed and negative except per HPI.    PMH:  Past Medical History:   Diagnosis Date     Aortic valvar stenosis 7/2010    mild      Asthma      Bipolar disorder (H)      CAD (coronary artery disease)     s/p angioplasty     Celiac disease      Colon polyps      Colon polyps 2012    every 3 year colonoscopy      Depression      High cholesterol      HTN      Mitral insufficiency      Pulmonary HTN (H)     mild     Tremors 7/10    drug induced from antidepressants     Tricuspid insufficiency      Active Problems:  Patient Active Problem List    Diagnosis Date Noted     Weight gain 02/22/2023     Priority: Medium     Elevated brain natriuretic peptide (BNP) level 02/22/2023     Priority: Medium     Syncope and collapse 11/30/2022     Priority: Medium     Anemia, unspecified type 11/30/2022     Priority: Medium     Weakness generalized 09/22/2022     Priority: Medium     Left leg claudication (H) 09/22/2022     Priority: Medium     S/P TAVR (transcatheter aortic valve replacement) 09/22/2022     Priority: Medium     Essential hypertension 01/08/2022     Priority: Medium     Stented coronary artery 01/08/2022     Priority: Medium     Bipolar 1 disorder, depressed, severe (H) 12/16/2021     Priority: Medium     Impairment of balance 11/29/2021     Priority: Medium     11/2021  My balance continues to be an issue.  I fell twice at the end of summer          NYHA class 3 heart failure with preserved ejection fraction (H) 07/21/2021     Priority: Medium     Moderate persistent asthma without complication 07/21/2021     Priority: Medium     Hearing disorder, unspecified laterality 07/21/2021     Priority: Medium     CAD S/P percutaneous coronary angioplasty 08/21/2020     Priority: Medium     Generalized anxiety disorder 07/13/2020     Priority: Medium     Osteopenia 07/13/2020     Priority: Medium     CKD (chronic kidney disease) stage 3, GFR 30-59 ml/min (H) 06/25/2019     Priority: Medium     Hypothyroidism, unspecified type 07/14/2017     Priority: Medium     Benign essential hypertension 11/08/2016     Priority: Medium     History of colonic  polyps 09/22/2015     Priority: Medium     Celiac disease      Priority: Medium     Family history of diabetes mellitus 07/25/2011     Priority: Medium     Tremors      Priority: Medium     drug induced from antidepressants  Problem list name updated by automated process. Provider to review and confirm       Renal insufficiency 07/15/2010     Priority: Medium     Disorder of kidney and ureter 07/15/2010     Priority: Medium     Hyperlipidemia LDL goal <70 07/01/2010     Priority: Medium     Diagnosis updated by automated process. Provider to review and confirm.       Mild major depression (H) 07/01/2010     Priority: Medium     Pulmonary hypertension (H) 07/01/2010     Priority: Medium     Seasonal allergic rhinitis 07/01/2010     Priority: Medium     Mitral insufficiency      Priority: Medium     Tricuspid insufficiency      Priority: Medium     Anemia 08/28/2008     Priority: Medium     Social History:  Social History     Tobacco Use     Smoking status: Never     Smokeless tobacco: Never   Vaping Use     Vaping Use: Never used   Substance Use Topics     Alcohol use: No     Alcohol/week: 0.0 standard drinks     Types: 1 Standard drinks or equivalent per week     Drug use: No     Family History:  Family History   Problem Relation Age of Onset     Asthma Mother      Cerebrovascular Disease Mother      Arthritis Mother      Depression Mother      Lipids Sister      Hypertension Sister      Heart Disease Sister      Lipids Sister      Hypertension Sister      Lipids Sister      Breast Cancer Sister        Medications:    calcium citrate  950 mg Oral Daily     carvedilol  25 mg Oral BID w/meals     cholecalciferol  1,250 mcg Oral Weekly     desvenlafaxine  100 mg Oral Daily     [START ON 2/25/2023] enoxaparin ANTICOAGULANT  30 mg Subcutaneous Q24H     fluticasone-vilanterol  1 puff Inhalation Daily     folic acid  400 mcg Oral Daily     furosemide  20 mg Intravenous BID     isosorbide mononitrate  60 mg Oral Daily      lamoTRIgine  225 mg Oral Daily     levothyroxine  50 mcg Oral Daily     liothyronine  10 mcg Oral Daily     losartan  100 mg Oral Daily     memantine  10 mg Oral Daily     mirtazapine  7.5 mg Oral At Bedtime     polyethylene glycol  17 g Oral Daily     [START ON 2/25/2023] rosuvastatin  10 mg Oral Daily     sodium chloride (PF)  3 mL Intracatheter Q8H           Physical Exam:  Temp:  [98.2  F (36.8  C)-98.8  F (37.1  C)] 98.8  F (37.1  C)  Pulse:  [61-72] 63  Resp:  [16-18] 16  BP: (130-174)/() 153/84  SpO2:  [91 %-96 %] 92 %    Intake/Output Summary (Last 24 hours) at 2/24/2023 1646  Last data filed at 2/24/2023 1223  Gross per 24 hour   Intake 18 ml   Output 600 ml   Net -582 ml     General: Well-nourished, well-developed, NAD   HEENT: normocephalic  Oral: moist mucous membranes  Chest: decreased breath sounds bilaterally  Cardio: Rhythm is regular.  S1 normal, S2. Murmurs are not present,   Abdomen: without tenderness, rebound, guarding, masses, ascites  Extremities: Edema not present  Neuro: CN II-XII grossly intact. Alert and oriented x 4.   Skin: warm, dry, pink without open lesions  Psych: normal mood, affect     Diagnostics:  All laboratory and imaging data in the past 24 hours reviewed.    No results found for: TROPI, TROPONIN, TROPR, TROPN    EKG:        Transthoracic echocardiogram:    2/23/23   Interpretation Summary  Status post 26 mm Arndt Janis Ultra valve TAVR on 9/12/2022. MG is 18 mmHg,  PV is 2.8 m/s. Trace paravalvular AI.  Left ventricular function is decreased. The ejection fraction is 45-50%  (mildly reduced).  Global right ventricular function is normal.  Severe mitral annular calcification is present.  The inferior vena cava was normal in size with preserved respiratory  variability.     This study was compared with the study from 1/31/2023 .LVEF mildly declined.  Mean gradient across TAVR mildly higher.    1/31/23   Interpretation Summary  Left ventricular size, wall motion and  function are normal. The ejection  fraction is 55-60%.  Right ventricular size and function are normal.  Status post 26 mm Arndt Janis Ultra valve TAVR on 9/12/2022. The valve is  well seated. MG 16 mmHg; PV 2.5 m/s; no paravalvular AI.  No pericardial effusion is present.     This study was compared with the study from 12/1/2022. No significant changes  noted.    Angiogram:       Assessment and Recommendation:    Heart failure with mildly reduced ejection fraction, new  decreased EF compared to echo one month ago,in the setting of uncontrolled hypertension. BNP elevated to 11,000 in the emergency department.  -PTA imdur 60, Cozaar 100, nifedipine 60, crestor   -please switch Lasix to 40 IV BID  -START medium dose entresto   -resume PTA aspirin 81 mg   - patient has adverse effects from beta-blocker    I have seen and discussed the patient with the staff attending,  who agrees with the above.    Thank you for consulting the cardiovascular services at the [facility]. Please do not hesitate to call us with any questions.     Mark Stewart   Cardiology Fellow  This note was created using Dragon dictation software, so please excuse any mistakes and incorrect syntax and semantics.    Attending Attestation: I saw, examined and evaluated the patient and reviewed all relevant data. I agree with the findings, and our shared assessment and plan of care documented in the edited note and summarized the cruz findings and plan with the patient.

## 2023-02-25 ENCOUNTER — APPOINTMENT (OUTPATIENT)
Dept: OCCUPATIONAL THERAPY | Facility: CLINIC | Age: 88
DRG: 291 | End: 2023-02-25
Payer: MEDICARE

## 2023-02-25 LAB
ALBUMIN SERPL BCG-MCNC: 3.6 G/DL (ref 3.5–5.2)
ALP SERPL-CCNC: 70 U/L (ref 35–104)
ALT SERPL W P-5'-P-CCNC: 45 U/L (ref 10–35)
ANION GAP SERPL CALCULATED.3IONS-SCNC: 13 MMOL/L (ref 7–15)
AST SERPL W P-5'-P-CCNC: 33 U/L (ref 10–35)
BILIRUB DIRECT SERPL-MCNC: <0.2 MG/DL (ref 0–0.3)
BILIRUB SERPL-MCNC: 0.3 MG/DL
BUN SERPL-MCNC: 25.8 MG/DL (ref 8–23)
CALCIUM SERPL-MCNC: 10.1 MG/DL (ref 8.8–10.2)
CHLORIDE SERPL-SCNC: 105 MMOL/L (ref 98–107)
CREAT SERPL-MCNC: 1.36 MG/DL (ref 0.51–0.95)
DEPRECATED HCO3 PLAS-SCNC: 25 MMOL/L (ref 22–29)
ERYTHROCYTE [DISTWIDTH] IN BLOOD BY AUTOMATED COUNT: 17.7 % (ref 10–15)
GFR SERPL CREATININE-BSD FRML MDRD: 38 ML/MIN/1.73M2
GLUCOSE SERPL-MCNC: 89 MG/DL (ref 70–99)
HCT VFR BLD AUTO: 39.1 % (ref 35–47)
HGB BLD-MCNC: 11.9 G/DL (ref 11.7–15.7)
MCH RBC QN AUTO: 29.1 PG (ref 26.5–33)
MCHC RBC AUTO-ENTMCNC: 30.4 G/DL (ref 31.5–36.5)
MCV RBC AUTO: 96 FL (ref 78–100)
PLATELET # BLD AUTO: 155 10E3/UL (ref 150–450)
POTASSIUM SERPL-SCNC: 4 MMOL/L (ref 3.4–5.3)
PROT SERPL-MCNC: 6.4 G/DL (ref 6.4–8.3)
RBC # BLD AUTO: 4.09 10E6/UL (ref 3.8–5.2)
SODIUM SERPL-SCNC: 143 MMOL/L (ref 136–145)
WBC # BLD AUTO: 4.8 10E3/UL (ref 4–11)

## 2023-02-25 PROCEDURE — 250N000013 HC RX MED GY IP 250 OP 250 PS 637

## 2023-02-25 PROCEDURE — 99232 SBSQ HOSP IP/OBS MODERATE 35: CPT | Mod: GC | Performed by: INTERNAL MEDICINE

## 2023-02-25 PROCEDURE — 82310 ASSAY OF CALCIUM: CPT | Performed by: INTERNAL MEDICINE

## 2023-02-25 PROCEDURE — 250N000011 HC RX IP 250 OP 636: Performed by: STUDENT IN AN ORGANIZED HEALTH CARE EDUCATION/TRAINING PROGRAM

## 2023-02-25 PROCEDURE — 85027 COMPLETE CBC AUTOMATED: CPT | Performed by: INTERNAL MEDICINE

## 2023-02-25 PROCEDURE — 97530 THERAPEUTIC ACTIVITIES: CPT | Mod: GO | Performed by: OCCUPATIONAL THERAPIST

## 2023-02-25 PROCEDURE — 97535 SELF CARE MNGMENT TRAINING: CPT | Mod: GO | Performed by: OCCUPATIONAL THERAPIST

## 2023-02-25 PROCEDURE — 250N000011 HC RX IP 250 OP 636: Performed by: PHYSICIAN ASSISTANT

## 2023-02-25 PROCEDURE — 99232 SBSQ HOSP IP/OBS MODERATE 35: CPT | Mod: FS | Performed by: PHYSICIAN ASSISTANT

## 2023-02-25 PROCEDURE — 250N000013 HC RX MED GY IP 250 OP 250 PS 637: Performed by: STUDENT IN AN ORGANIZED HEALTH CARE EDUCATION/TRAINING PROGRAM

## 2023-02-25 PROCEDURE — 82248 BILIRUBIN DIRECT: CPT | Performed by: PHYSICIAN ASSISTANT

## 2023-02-25 PROCEDURE — 36415 COLL VENOUS BLD VENIPUNCTURE: CPT | Performed by: INTERNAL MEDICINE

## 2023-02-25 PROCEDURE — 250N000013 HC RX MED GY IP 250 OP 250 PS 637: Performed by: PHYSICIAN ASSISTANT

## 2023-02-25 PROCEDURE — 99207 PR APP CREDIT; MD BILLING SHARED VISIT: CPT | Mod: FS | Performed by: STUDENT IN AN ORGANIZED HEALTH CARE EDUCATION/TRAINING PROGRAM

## 2023-02-25 PROCEDURE — 120N000002 HC R&B MED SURG/OB UMMC

## 2023-02-25 RX ORDER — MICONAZOLE NITRATE 20 MG/G
CREAM TOPICAL 2 TIMES DAILY
Status: DISCONTINUED | OUTPATIENT
Start: 2023-02-25 | End: 2023-02-28 | Stop reason: HOSPADM

## 2023-02-25 RX ORDER — FUROSEMIDE 10 MG/ML
40 INJECTION INTRAMUSCULAR; INTRAVENOUS 2 TIMES DAILY
Status: DISCONTINUED | OUTPATIENT
Start: 2023-02-25 | End: 2023-02-25

## 2023-02-25 RX ORDER — DESVENLAFAXINE 25 MG/1
25 TABLET, EXTENDED RELEASE ORAL DAILY
Status: DISCONTINUED | OUTPATIENT
Start: 2023-02-26 | End: 2023-02-28 | Stop reason: HOSPADM

## 2023-02-25 RX ORDER — FUROSEMIDE 20 MG/1
10 TABLET ORAL DAILY
Status: DISCONTINUED | OUTPATIENT
Start: 2023-02-25 | End: 2023-02-28 | Stop reason: HOSPADM

## 2023-02-25 RX ADMIN — FUROSEMIDE 40 MG: 10 INJECTION, SOLUTION INTRAVENOUS at 09:27

## 2023-02-25 RX ADMIN — MICONAZOLE NITRATE: 20 CREAM TOPICAL at 14:09

## 2023-02-25 RX ADMIN — FLUTICASONE FUROATE AND VILANTEROL TRIFENATATE 1 PUFF: 100; 25 POWDER RESPIRATORY (INHALATION) at 09:27

## 2023-02-25 RX ADMIN — ENOXAPARIN SODIUM 30 MG: 30 INJECTION SUBCUTANEOUS at 09:27

## 2023-02-25 RX ADMIN — Medication 10 MG: at 17:17

## 2023-02-25 RX ADMIN — LEVOTHYROXINE SODIUM 50 MCG: 0.05 TABLET ORAL at 08:14

## 2023-02-25 RX ADMIN — ASPIRIN 81 MG: 81 TABLET ORAL at 09:28

## 2023-02-25 RX ADMIN — ISOSORBIDE MONONITRATE 60 MG: 30 TABLET, EXTENDED RELEASE ORAL at 09:26

## 2023-02-25 RX ADMIN — LIOTHYRONINE SODIUM 10 MCG: 5 TABLET ORAL at 09:30

## 2023-02-25 RX ADMIN — CARVEDILOL 25 MG: 25 TABLET, FILM COATED ORAL at 09:29

## 2023-02-25 RX ADMIN — LAMOTRIGINE 225 MG: 25 TABLET ORAL at 09:29

## 2023-02-25 RX ADMIN — MEMANTINE 10 MG: 10 TABLET ORAL at 09:29

## 2023-02-25 RX ADMIN — ROSUVASTATIN CALCIUM 10 MG: 10 TABLET, FILM COATED ORAL at 09:28

## 2023-02-25 RX ADMIN — POLYETHYLENE GLYCOL 3350 17 G: 17 POWDER, FOR SOLUTION ORAL at 09:29

## 2023-02-25 RX ADMIN — MIRTAZAPINE 7.5 MG: 7.5 TABLET, FILM COATED ORAL at 21:17

## 2023-02-25 RX ADMIN — MICONAZOLE NITRATE: 20 CREAM TOPICAL at 20:09

## 2023-02-25 RX ADMIN — Medication 400 MCG: at 09:27

## 2023-02-25 RX ADMIN — SACUBITRIL AND VALSARTAN 1 TABLET: 49; 51 TABLET, FILM COATED ORAL at 14:09

## 2023-02-25 RX ADMIN — SACUBITRIL AND VALSARTAN 1 TABLET: 49; 51 TABLET, FILM COATED ORAL at 20:07

## 2023-02-25 RX ADMIN — Medication 950 MG: at 09:28

## 2023-02-25 RX ADMIN — DESVENLAFAXINE SUCCINATE 100 MG: 100 TABLET, EXTENDED RELEASE ORAL at 09:28

## 2023-02-25 ASSESSMENT — ACTIVITIES OF DAILY LIVING (ADL)
ADLS_ACUITY_SCORE: 27

## 2023-02-25 NOTE — PROVIDER NOTIFICATION
Team informed of following VS. Patient asymptomatic. Systolic BP has been trending down all day since diuresis this AM. Coreg held. Team added parameters for IV Lasix this evening.      02/24/23 1831   Vital Signs   Pulse 64   /47  (recheck 99/46, team informed with DEO villa)

## 2023-02-25 NOTE — PROGRESS NOTES
Cardiology Inpatient Consultation  February 24, 2023    Reason for Consult:  New CM     Subjective:   Ms Kamryn Miller reports overall improvement in her dyspnea since admission, she was able to sleep flat last night with no issues. She is mobilizing well and has no significant GREGORY this morning. She denies chest pain, lower extremity swelling, PND, or orthopnea.         Background:     2/15 clinic: Seen in clinic by Dr. Dillon that time she is noted to have elevated blood pressure 186/118. No symptoms. Indoor was increased to 60 and losartan increased to 100 around that time.    11/30-12/7 hospitalization: Syncope in the setting of Hb of 6.4 recent fasting. Not overt bleeding   Cards was consulted --> recommended brillinta be discontinued and to resume asa once safe from primary team standpoint. Per discharge summary, it appears patient was told to discontinue the aspirin and brillinta.     10/2022 hospitalization: atypical CP.   Angiogram: Two vessel CAD with prior PCI to the RCA and LAD, otherwise mild non obstructive CAD elsewhere.  Patent stents in the RCA and LAD with mild to moderate in stent restenosis of the proximal LAD stents (progressed since last angiogram from 2020).  Proximal LAD ISR hemodynamically not significant by dPR at 0.91.      PMH:  Past Medical History:   Diagnosis Date     Aortic valvar stenosis 7/2010    mild     Asthma      Bipolar disorder (H)      CAD (coronary artery disease)     s/p angioplasty     Celiac disease      Colon polyps      Colon polyps 2012    every 3 year colonoscopy      Depression      High cholesterol      HTN      Mitral insufficiency      Pulmonary HTN (H)     mild     Tremors 7/10    drug induced from antidepressants     Tricuspid insufficiency      Active Problems:  Patient Active Problem List    Diagnosis Date Noted     Weight gain 02/22/2023     Priority: Medium     Elevated brain natriuretic peptide (BNP) level 02/22/2023     Priority: Medium     Syncope  and collapse 11/30/2022     Priority: Medium     Anemia, unspecified type 11/30/2022     Priority: Medium     Weakness generalized 09/22/2022     Priority: Medium     Left leg claudication (H) 09/22/2022     Priority: Medium     S/P TAVR (transcatheter aortic valve replacement) 09/22/2022     Priority: Medium     Essential hypertension 01/08/2022     Priority: Medium     Stented coronary artery 01/08/2022     Priority: Medium     Bipolar 1 disorder, depressed, severe (H) 12/16/2021     Priority: Medium     Impairment of balance 11/29/2021     Priority: Medium     11/2021  My balance continues to be an issue.  I fell twice at the end of summer          NYHA class 3 heart failure with preserved ejection fraction (H) 07/21/2021     Priority: Medium     Moderate persistent asthma without complication 07/21/2021     Priority: Medium     Hearing disorder, unspecified laterality 07/21/2021     Priority: Medium     CAD S/P percutaneous coronary angioplasty 08/21/2020     Priority: Medium     Generalized anxiety disorder 07/13/2020     Priority: Medium     Osteopenia 07/13/2020     Priority: Medium     CKD (chronic kidney disease) stage 3, GFR 30-59 ml/min (H) 06/25/2019     Priority: Medium     Hypothyroidism, unspecified type 07/14/2017     Priority: Medium     Benign essential hypertension 11/08/2016     Priority: Medium     History of colonic polyps 09/22/2015     Priority: Medium     Celiac disease      Priority: Medium     Family history of diabetes mellitus 07/25/2011     Priority: Medium     Tremors      Priority: Medium     drug induced from antidepressants  Problem list name updated by automated process. Provider to review and confirm       Renal insufficiency 07/15/2010     Priority: Medium     Disorder of kidney and ureter 07/15/2010     Priority: Medium     Hyperlipidemia LDL goal <70 07/01/2010     Priority: Medium     Diagnosis updated by automated process. Provider to review and confirm.       Mild major  depression (H) 07/01/2010     Priority: Medium     Pulmonary hypertension (H) 07/01/2010     Priority: Medium     Seasonal allergic rhinitis 07/01/2010     Priority: Medium     Mitral insufficiency      Priority: Medium     Tricuspid insufficiency      Priority: Medium     Anemia 08/28/2008     Priority: Medium     Social History:  Social History     Tobacco Use     Smoking status: Never     Smokeless tobacco: Never   Vaping Use     Vaping Use: Never used   Substance Use Topics     Alcohol use: No     Alcohol/week: 0.0 standard drinks     Types: 1 Standard drinks or equivalent per week     Drug use: No     Family History:  Family History   Problem Relation Age of Onset     Asthma Mother      Cerebrovascular Disease Mother      Arthritis Mother      Depression Mother      Lipids Sister      Hypertension Sister      Heart Disease Sister      Lipids Sister      Hypertension Sister      Lipids Sister      Breast Cancer Sister        Medications:    aspirin  81 mg Oral Daily     calcium citrate  950 mg Oral Daily     carvedilol  25 mg Oral BID w/meals     cholecalciferol  1,250 mcg Oral Weekly     desvenlafaxine  100 mg Oral Daily     enoxaparin ANTICOAGULANT  30 mg Subcutaneous Q24H     fluticasone-vilanterol  1 puff Inhalation Daily     folic acid  400 mcg Oral Daily     isosorbide mononitrate  60 mg Oral Daily     lamoTRIgine  225 mg Oral Daily     levothyroxine  50 mcg Oral Daily     liothyronine  10 mcg Oral Daily     losartan  100 mg Oral Daily     memantine  10 mg Oral Daily     mirtazapine  7.5 mg Oral At Bedtime     polyethylene glycol  17 g Oral Daily     rosuvastatin  10 mg Oral Daily     sodium chloride (PF)  3 mL Intracatheter Q8H           Physical Exam:  Temp:  [98.5  F (36.9  C)-98.8  F (37.1  C)] 98.5  F (36.9  C)  Pulse:  [63-72] 67  Resp:  [16] 16  BP: (104-174)/() 159/83  SpO2:  [92 %-96 %] 93 %      Intake/Output Summary (Last 24 hours) at 2/25/2023 0735  Last data filed at 2/24/2023  1832  Gross per 24 hour   Intake 378 ml   Output 600 ml   Net -222 ml       General: Well-nourished, well-developed, NAD   HEENT: normocephalic  Oral: moist mucous membranes  Chest: decreased breath sounds bilaterally  Cardio: Rhythm is regular.  S1 normal, split S2. Murmurs are not present,   Abdomen: without tenderness, rebound, guarding, masses, ascites  Extremities: Edema not present  Neuro: CN II-XII grossly intact. Alert and oriented x 4.   Skin: warm, dry, pink without open lesions  Psych: normal mood, affect     Diagnostics:  All laboratory and imaging data in the past 24 hours reviewed.    No results found for: TROPI, TROPONIN, TROPR, TROPN    EKG:        Transthoracic echocardiogram:    2/23/23   Interpretation Summary  Status post 26 mm Arndt Janis Ultra valve TAVR on 9/12/2022. MG is 18 mmHg,  PV is 2.8 m/s. Trace paravalvular AI.  Left ventricular function is decreased. The ejection fraction is 45-50%  (mildly reduced).  Global right ventricular function is normal.  Severe mitral annular calcification is present.  The inferior vena cava was normal in size with preserved respiratory  variability.     This study was compared with the study from 1/31/2023 .LVEF mildly declined.  Mean gradient across TAVR mildly higher.    1/31/23   Interpretation Summary  Left ventricular size, wall motion and function are normal. The ejection  fraction is 55-60%.  Right ventricular size and function are normal.  Status post 26 mm Arndt Janis Ultra valve TAVR on 9/12/2022. The valve is  well seated. MG 16 mmHg; PV 2.5 m/s; no paravalvular AI.  No pericardial effusion is present.     This study was compared with the study from 12/1/2022. No significant changes  noted.    Angiogram:       Assessment and Recommendation:    Heart failure with mildly reduced ejection fraction, new  decreased EF compared to echo one month ago,in the setting of uncontrolled hypertension. BNP elevated to 11,000 in the emergency  department.  BP improved however not at goal, for this patient would titrate medications to goal of 120/80 and perhaps even lower if she tolerates it. Optimal blood pressure is key for normalizing her ejection fraction and preventing progression of diastolic dysfunction.   -PTA imdur 60, Cozaar 100, nifedipine 60, crestor   -Appears euvolemic today, would not recommend furosemide today  -BP not yet under control, received first dose of Carvedilol 25mg BID this AM    > Patient reports prior ADRs to beta blockers, would discontinue    -Recommend Entresto 49/51 BID today with goal /80 and even lower if patient tolerates without symptoms   > Would recommend starting despite small increase in Scr from 1.2 to 1.3 today  -continue PTA aspirin and rosuvastatin     I have seen and discussed the patient with the staff attending,  who agrees with the above.    Thank you for consulting the cardiovascular services at the [facility]. Please do not hesitate to call us with any questions.     This patient's care was discussed with Dr Binh Kraus, attending cardiologist, who agrees with the assessment and plan unless otherwise indicated.    Chandan Burroughs MD [facility], PGY-4  Fellow, Cardiovascular Disease    Attending Attestation: I saw, examined and evaluated the patient and reviewed all relevant data. I agree with the findings, and our shared assessment and plan of care documented in the edited note and summarized the cruz findings and plan with the patient.

## 2023-02-25 NOTE — PROGRESS NOTES
[facility]  Patient is currently receiving home care services from [facility]. The patient is currently receiving RN, PT services.  and home health team have been notified of patient admission. [facility] liaison will continue to follow patient during stay. If appropriate provide orders to resume home care at time of discharge.

## 2023-02-25 NOTE — PROGRESS NOTES
[facility]    Medicine Progress Note - Hospitalist Service, GOLD TEAM 6    Date of Admission:  2/22/2023    Assessment & Plan    Kamryn Miller is a pleasant 88 yo female with hx of bipolar d/o, MDD, hypothyroidism, CKDIII, INGRID, aortic stenosis s/p TAVR (9/2022), PAD on DAPT, CAD s/p LAD and RCA stent placement (2003), LAD PCI x 3 (8/2020), and HFpEF admitted [facility] after presenting to ED with SOB due to hypertensive emergency, HF exacerbation and pulmonary edema.     Plan for today:  - Cardiology following and appreciate rec's:   - Discontinue losartan   - Discontinue Coreg due to SEs   - Start Entresto 49/51 mg, one tab BID, and titrate dose up with goal /80 or lower   - Discontinue IV Lasix   - Resume PTA Lasix 10 mg po daily  - Start miconzole crm BID to affected areas  - Obtain repeat LFTs  - Curbsided ID regarding UC growing 50-100k aerococcus urinae and since pt asymptomatic and afebrile no tx indicated.     # Hypertensive Urgency  # Acute systolic HF exacerbation   # Acute Pulmonary Edema    # Shortness of Breath  She presented with complaints of hypertension with reported SBP of > 200 at her AL. BP on admission 171/90. CTA PE negative for PE but showed groundglass opacities and small pleural effusions. TTE 1/30/23 showed EF 55-60%, functioning TAVR without AI. Repeat TTE 2/22 shows mildly reduced EF (45-50%) and trace AI, severe mitral calcification.  - Cardiology consulted and appreciate recommendations.    - Discontinue losartan   - Discontinue Coreg due to SEs from BBs   - Start Entresto 49/51 mg, one tab BID, and titrate dose up with goal /80 or lower   - Discontinue IV Lasix   - Resume PTA Lasix 10 mg po daily  - PRN Hydralazine for SBP > 180  - Was recently seen in Core clinic, will need to follow-up      # Concern for Community Acquired Pneumonia: CT with ground glass opacities and patient was started on abx. However, she is afebrile, has no  cough, no leukocytosis and procal is only 0.1 (low suspicion for bacterial infection). CTX and Azithromycin discontinued 2/23. No concerns thereafter.   - Monitor clinically      # Elevated Transaminases, Hepatocellular Pattern, resolving:  ALT 71, AST 56, with rest of hepatic panel on admission WNL. Unclear etiology. Now LFTs resolved save mild ALT elevation at 51.   - Repeat LFTs this am.     # CAD S/P PCI x 3  # Hyperlipidemia   # S/P TAVR (9/2022)   H/O PCIs X 3. No EKG changes on admission - previous LBBB.   - Continue PTA Rosuvastatin   - Anti-hypertensives as above.      # Asthma: No e/o bronchospasm on exam.  - Continue PTA Breo-Ellipta   - PRN Albuterol   - Incentive Spirometry      # Celiac Disease   - Gluten Free Diet      # Hypothyroidism   - Continue PTA levothyroxine and liothyronine      # Bipolar Disorder   # Cognitive Impairment   - Continue PTA Lamotrigene and Desvenlafaxine   - Continue PTA Memantine     # Microscopic pyuria with UC +50-100k colonies aerococcus urinae: Curbsided ID 2/25 regarding UC growing 50-100k aerococcus urinae and since pt asymptomatic and afebrile no tx indicated.        Diet: Combination Diet Gluten Free Diet, Regular Diet Adult; 2 gm NA Diet    DVT Prophylaxis: Enoxaparin (Lovenox) SQ  Orellana Catheter: Not present  Lines: None     Cardiac Monitoring: None  Code Status: Full Code      Disposition Plan     Expected Discharge Date: 02/26/2023      Destination: assisted living            Jordy Figueroa PA-C  Hospitalist Service, GOLD TEAM 6  [facility]  Securely message with [employer] (more info)  Text page via [facility] Paging/Directory   See signed in provider for up to date coverage information  ______________________________________________________________________    Interval History   No acute events overnight, denies fever, chills, cough. Continues to have some SOB with activity    Physical Exam   Vital Signs: Temp: 98.5  F  (36.9  C) Temp src: Oral BP: (!) 167/79 Pulse: 64   Resp: 16 SpO2: 92 % O2 Device: None (Room air)    Weight: 140 lbs 9.6 oz  GEN: In NAD  HEENT: NCAT; PERRL; sclerae non-icteric  LUNGS: CTAB  CV: RRR  ABD: +BSs; SNTND  EXT: No BLE edema  SKIN: No acute rashes noted on exposed areas.  NEURO: AAOx3; CNs grossly intact; No acute focal deficits noted.        Data     I have personally reviewed the following data over the past 24 hrs:    4.8  \   11.9   / 155     143 105 25.8 (H) /  89   4.0 25 1.36 (H) \       ALT: N/A AST: N/A AP: N/A TBILI: N/A   ALB: N/A TOT PROTEIN: N/A LIPASE: N/A       Imaging results reviewed over the past 24 hrs:   No results found for this or any previous visit (from the past 24 hour(s)).

## 2023-02-25 NOTE — PLAN OF CARE
Goal Outcome Evaluation:      Plan of Care Reviewed With: patient    Overall Patient Progress: improvingOverall Patient Progress: improving    Outcome Evaluation:    Activity: A1 with W/GB, goes on frequent walks  VS order: q8h, daily weights.   Diet: Gluten free, 2 g Na. Strict I/O.   BGLs: none     Vascular access: PIV   Drains:     Neuro: WDL.   Pain: Denies  Resp: RA. PRN albuterol for asthma. GREGORY. Spot check pulse oximetry with goals >90%  Cardiac: Intermittently bradycardic. HTN, PRN Hydralazine for SBP > 180  GI/: Voiding hesitancy and dribbling. GI WDL. LBM 2/24  Infectious disease: none  Electrolyte replacement: k  Skin: Pale and dusky skin throughout. Scabs to bilateral shins. [place]-area rash     Shift updates: Changes made to lasix and BP medications. Patient educated on encresto and changes that were made, verbalized understanding. Going on frequent walks with staff. No other acute changes.

## 2023-02-25 NOTE — PROGRESS NOTES
Care Management Follow Up    Length of Stay (days): 3  Expected Discharge Date: 1-2 days  Concerns to be Addressed: discharge planning  Patient plan of care discussed at interdisciplinary rounds: Yes    Anticipated Discharge Disposition: Assisted Living  [place] [address]  Ph: [phone]  Fax: [phone]  24 Hour cellphone Ph: [phone]      Pharmacy: [facility] in [place]     Anticipated Discharge Services: [facility] (RN/PT) resumed    Anticipated Discharge DME:     Discharge Transportation: confirmed daughterSarah will provide transport      Additional Information:  Per MD, patient labile BP, medication switched and needs to see how this goes. Will discharge in 1-2 days back to [facility]    Spoke with [facility] nursing station, they can accept pt back any day before 4PM. Prefer medications sent to [facility]. MD updated. [facility] confirmed they provide RN/PT. Spoke with daughterSarah who will be able to provide ride back to [facility] at discharge.           Aleida Nation RN CC  2/25/2023  Nurse Coordinator      Social Work and Care Management Department       SEARCHABLE in [facility] - search CARE COORDINATOR       [place] [employer] [place] ([phone]) Saturday & Sunday; ([phone]) [facility] Recognized Holidays     Units: 4A, 4C, 4E, 5A & 5B   Pager: [phone]    Units: 6A & 6B    Pager: [phone]    Units: 6C & 6D   Pager: [phone]    Units: 7A, 7B, 7C, 7D & 5C    Pager: [phone]    Units: [facility] ED, 5 Ortho, 5 Med/Surg, 6 Med/Surg, 8A, 10 ICU, & [facility]    Pager: [phone]

## 2023-02-25 NOTE — PLAN OF CARE
Goal Outcome Evaluation:      Plan of Care Reviewed With: patient    Overall Patient Progress: improvingOverall Patient Progress: improving    Outcome Evaluation: A&O x4, VSS except HTN in morning on RA. Denies any pain. Pt refused evening lasix wanted to go to bed early and not have to get up frequently to void, education provided on importance. Pt slept well throughout the night, calls appropriately. Continue with plan of care.

## 2023-02-26 LAB
ALBUMIN SERPL BCG-MCNC: 3.8 G/DL (ref 3.5–5.2)
ALP SERPL-CCNC: 76 U/L (ref 35–104)
ALT SERPL W P-5'-P-CCNC: 38 U/L (ref 10–35)
ANION GAP SERPL CALCULATED.3IONS-SCNC: 16 MMOL/L (ref 7–15)
AST SERPL W P-5'-P-CCNC: 36 U/L (ref 10–35)
B-OH-BUTYR SERPL-MCNC: 0.5 MMOL/L
BILIRUB DIRECT SERPL-MCNC: <0.2 MG/DL (ref 0–0.3)
BILIRUB SERPL-MCNC: 0.3 MG/DL
BUN SERPL-MCNC: 33.2 MG/DL (ref 8–23)
CALCIUM SERPL-MCNC: 9.8 MG/DL (ref 8.8–10.2)
CHLORIDE SERPL-SCNC: 103 MMOL/L (ref 98–107)
CREAT SERPL-MCNC: 1.44 MG/DL (ref 0.51–0.95)
DEPRECATED HCO3 PLAS-SCNC: 22 MMOL/L (ref 22–29)
ERYTHROCYTE [DISTWIDTH] IN BLOOD BY AUTOMATED COUNT: 17.4 % (ref 10–15)
GFR SERPL CREATININE-BSD FRML MDRD: 35 ML/MIN/1.73M2
GLUCOSE SERPL-MCNC: 100 MG/DL (ref 70–99)
HCT VFR BLD AUTO: 42.7 % (ref 35–47)
HGB BLD-MCNC: 12.8 G/DL (ref 11.7–15.7)
LACTATE SERPL-SCNC: 1.1 MMOL/L (ref 0.7–2)
MCH RBC QN AUTO: 29 PG (ref 26.5–33)
MCHC RBC AUTO-ENTMCNC: 30 G/DL (ref 31.5–36.5)
MCV RBC AUTO: 97 FL (ref 78–100)
PLATELET # BLD AUTO: 180 10E3/UL (ref 150–450)
POTASSIUM SERPL-SCNC: 3.9 MMOL/L (ref 3.4–5.3)
PROT SERPL-MCNC: 7.3 G/DL (ref 6.4–8.3)
RBC # BLD AUTO: 4.42 10E6/UL (ref 3.8–5.2)
SODIUM SERPL-SCNC: 141 MMOL/L (ref 136–145)
WBC # BLD AUTO: 5 10E3/UL (ref 4–11)

## 2023-02-26 PROCEDURE — 99207 PR APP CREDIT; MD BILLING SHARED VISIT: CPT | Mod: FS | Performed by: PHYSICIAN ASSISTANT

## 2023-02-26 PROCEDURE — 250N000013 HC RX MED GY IP 250 OP 250 PS 637: Performed by: STUDENT IN AN ORGANIZED HEALTH CARE EDUCATION/TRAINING PROGRAM

## 2023-02-26 PROCEDURE — 82248 BILIRUBIN DIRECT: CPT | Performed by: PHYSICIAN ASSISTANT

## 2023-02-26 PROCEDURE — 36415 COLL VENOUS BLD VENIPUNCTURE: CPT | Performed by: INTERNAL MEDICINE

## 2023-02-26 PROCEDURE — 82010 KETONE BODYS QUAN: CPT | Performed by: PHYSICIAN ASSISTANT

## 2023-02-26 PROCEDURE — 120N000002 HC R&B MED SURG/OB UMMC

## 2023-02-26 PROCEDURE — 99232 SBSQ HOSP IP/OBS MODERATE 35: CPT | Mod: FS | Performed by: STUDENT IN AN ORGANIZED HEALTH CARE EDUCATION/TRAINING PROGRAM

## 2023-02-26 PROCEDURE — 250N000013 HC RX MED GY IP 250 OP 250 PS 637

## 2023-02-26 PROCEDURE — 36415 COLL VENOUS BLD VENIPUNCTURE: CPT | Performed by: PHYSICIAN ASSISTANT

## 2023-02-26 PROCEDURE — 250N000013 HC RX MED GY IP 250 OP 250 PS 637: Performed by: PHYSICIAN ASSISTANT

## 2023-02-26 PROCEDURE — 83605 ASSAY OF LACTIC ACID: CPT | Performed by: PHYSICIAN ASSISTANT

## 2023-02-26 PROCEDURE — 85014 HEMATOCRIT: CPT | Performed by: INTERNAL MEDICINE

## 2023-02-26 PROCEDURE — 250N000011 HC RX IP 250 OP 636: Performed by: STUDENT IN AN ORGANIZED HEALTH CARE EDUCATION/TRAINING PROGRAM

## 2023-02-26 PROCEDURE — 80053 COMPREHEN METABOLIC PANEL: CPT | Performed by: INTERNAL MEDICINE

## 2023-02-26 RX ORDER — ISOSORBIDE MONONITRATE 30 MG/1
30 TABLET, EXTENDED RELEASE ORAL DAILY
Status: DISCONTINUED | OUTPATIENT
Start: 2023-02-27 | End: 2023-02-27

## 2023-02-26 RX ADMIN — LIOTHYRONINE SODIUM 10 MCG: 5 TABLET ORAL at 09:31

## 2023-02-26 RX ADMIN — MICONAZOLE NITRATE: 20 CREAM TOPICAL at 19:33

## 2023-02-26 RX ADMIN — LEVOTHYROXINE SODIUM 50 MCG: 0.05 TABLET ORAL at 09:30

## 2023-02-26 RX ADMIN — POLYETHYLENE GLYCOL 3350 17 G: 17 POWDER, FOR SOLUTION ORAL at 09:31

## 2023-02-26 RX ADMIN — ASPIRIN 81 MG: 81 TABLET ORAL at 09:30

## 2023-02-26 RX ADMIN — ENOXAPARIN SODIUM 30 MG: 30 INJECTION SUBCUTANEOUS at 12:20

## 2023-02-26 RX ADMIN — ISOSORBIDE MONONITRATE 60 MG: 30 TABLET, EXTENDED RELEASE ORAL at 09:30

## 2023-02-26 RX ADMIN — SACUBITRIL AND VALSARTAN 1 TABLET: 49; 51 TABLET, FILM COATED ORAL at 09:31

## 2023-02-26 RX ADMIN — Medication 950 MG: at 14:33

## 2023-02-26 RX ADMIN — DESVENLAFAXINE SUCCINATE 25 MG: 25 TABLET, EXTENDED RELEASE ORAL at 09:33

## 2023-02-26 RX ADMIN — MIRTAZAPINE 7.5 MG: 7.5 TABLET, FILM COATED ORAL at 21:24

## 2023-02-26 RX ADMIN — LAMOTRIGINE 225 MG: 25 TABLET ORAL at 09:33

## 2023-02-26 RX ADMIN — Medication 400 MCG: at 09:33

## 2023-02-26 RX ADMIN — MICONAZOLE NITRATE: 20 CREAM TOPICAL at 12:15

## 2023-02-26 RX ADMIN — FLUTICASONE FUROATE AND VILANTEROL TRIFENATATE 1 PUFF: 100; 25 POWDER RESPIRATORY (INHALATION) at 09:34

## 2023-02-26 RX ADMIN — MEMANTINE 10 MG: 10 TABLET ORAL at 09:45

## 2023-02-26 RX ADMIN — Medication 10 MG: at 09:34

## 2023-02-26 RX ADMIN — ROSUVASTATIN CALCIUM 10 MG: 10 TABLET, FILM COATED ORAL at 09:30

## 2023-02-26 ASSESSMENT — ACTIVITIES OF DAILY LIVING (ADL)
ADLS_ACUITY_SCORE: 27
ADLS_ACUITY_SCORE: 27
ADLS_ACUITY_SCORE: 26
ADLS_ACUITY_SCORE: 27
ADLS_ACUITY_SCORE: 26
ADLS_ACUITY_SCORE: 27

## 2023-02-26 NOTE — PLAN OF CARE
Goal Outcome Evaluation:      Plan of Care Reviewed With: patient    Overall Patient Progress: no changeOverall Patient Progress: no change    Patient is A&Ox4 on RA. VSS except two asymptomatic hypotensive episodes on shift. Denies pain. Patient up in chair 2x and ambulated 1x with A1 and gait belt and walker. No BM on shift but passed gas. Pt tolerating diet, starting calorie count tomorrow. Iron supplement ordered per patient request. RPIV SL. Plan is to discharge back to her [facility] when medically ready.

## 2023-02-26 NOTE — PROVIDER NOTIFICATION
Provider notified of the following vital. Pt was asymptomatic with low BP.     02/26/23 1413   Vital Signs   Resp 16   Pulse 52   Pulse Rate Source Monitor   BP (!) 83/44   BP Location Left arm       Entresto and imdur doses decreased.

## 2023-02-26 NOTE — PLAN OF CARE
Goal Outcome Evaluation:      Plan of Care Reviewed With: patient    Overall Patient Progress: improvingOverall Patient Progress: improving    Outcome Evaluation: A&O x4, VSS except HTN in morning on RA. Denies any pain. Pt up SBA with walker to bathroom multiple times to void, no BM. Pt slept well throughout the night, calls appropriately. Continue with plan of care.

## 2023-02-26 NOTE — PROGRESS NOTES
[facility]    Medicine Progress Note - Hospitalist Service, GOLD TEAM 6    Date of Admission:  2/22/2023    Assessment & Plan    Kamryn Miller is a pleasant 88 yo female with hx of bipolar d/o, MDD, hypothyroidism, CKDIII, INGRID, aortic stenosis s/p TAVR (9/2022), PAD on DAPT, CAD s/p LAD and RCA stent placement (2003), LAD PCI x 3 (8/2020), and HFpEF admitted to [facility] after presenting to ED with SOB due to hypertensive emergency, HF exacerbation and pulmonary edema.     Plan for today:  - Consult nutrition as pt likely malnourished based on current starvation ketosis  - Start calorie counts.   - Due to soft BPs, decrease Imdur dose from 60 to 30 mg daily as well as Entresto from 49/51 to 24/26 mg, one tab BID  - Updated pt's daughter    # Hypertensive Urgency  # Acute systolic HF exacerbation   # Acute Pulmonary Edema    # Shortness of Breath  She presented with complaints of hypertension with reported SBP of > 200 at her AL. BP on admission 171/90. CTA PE negative for PE but showed groundglass opacities and small pleural effusions. TTE 1/30/23 showed EF 55-60%, functioning TAVR without AI. Repeat TTE 2/22 shows mildly reduced EF (45-50%) and trace AI, severe mitral calcification.  - Cardiology consulted and appreciate recommendations.    - PTA losartan discontinued   - Coreg discontinued due to adverse SEs   - Due to soft BPs, decrease PTA Imdur dose from 60 to 30 mg daily as well as new med Entresto from 49/51 to 24/26 mg, one tab BID   - IV Lasix discontinued   - Resumed PTA Lasix 10 mg po daily  - PRN Hydralazine for SBP > 180  - Was recently seen in Core clinic, will need to follow-up     # AGMA:  Most likely 2/2 starvation ketosis based on elevated ketones.   - Nutrition consulted and appreciate recommendations.  - Start calorie counts.      # Concern for Community Acquired Pneumonia: CT with ground glass opacities and patient was started on abx. However, she is  afebrile, has no cough, no leukocytosis and procal is only 0.1 (low suspicion for bacterial infection). CTX and Azithromycin discontinued 2/23. No concerns thereafter.   - Monitor clinically      # Elevated Transaminases, Hepatocellular Pattern, resolving:  ALT 71, AST 56, with rest of hepatic panel on admission WNL. Unclear etiology. Now LFTs resolved save mild ALT elevation at 45 (51).  - CTM    # CAD S/P PCI x 3  # Hyperlipidemia   # S/P TAVR (9/2022)   H/O PCIs X 3. No EKG changes on admission - previous LBBB.   - Continue PTA Rosuvastatin   - Anti-hypertensives as above.      # Asthma: No e/o bronchospasm on exam.  - Continue PTA Breo-Ellipta   - PRN Albuterol   - Incentive Spirometry      # Celiac Disease   - Gluten Free Diet      # Hypothyroidism   - Continue PTA levothyroxine and liothyronine      # Bipolar Disorder   # Cognitive Impairment   - Continue PTA Lamotrigene and Desvenlafaxine   - Continue PTA Memantine     # Microscopic pyuria with UC +50-100k colonies aerococcus urinae: Curbsided ID 2/25 regarding UC growing 50-100k aerococcus urinae and since pt asymptomatic and afebrile no tx indicated.        Diet: Combination Diet Gluten Free Diet, Regular Diet Adult; 2 gm NA Diet    DVT Prophylaxis: Enoxaparin (Lovenox) SQ  Orellana Catheter: Not present  Lines: None     Cardiac Monitoring: None  Code Status: Full Code      Disposition Plan Back to [facility] once nutrition plan made     Expected Discharge Date: 02/27/2023      Destination: assisted living  Discharge Comments: Here at least one more night.  Daughter to provide ride.          Jordy Figueroa PA-C  Hospitalist Service, GOLD TEAM [address] [place] [address]  Securely message with [employer] (more info)  Text page via [facility] Paging/Directory   See signed in provider for up to date coverage information  ______________________________________________________________________    Interval History   No acute events overnight,  denies fever, chills, cough. Continues to have some SOB with activity.    Physical Exam   Vital Signs: Temp: 97.5  F (36.4  C) Temp src: Axillary BP: 111/58 Pulse: 63   Resp: 20 SpO2: 92 % O2 Device: None (Room air)    Weight: 138 lbs 11.2 oz  GEN: In NAD  HEENT: NCAT; PERRL; sclerae non-icteric  LUNGS: CTAB  CV: RRR  ABD: +BSs; SNTND  EXT: No BLE edema  SKIN: No acute rashes noted on exposed areas.  NEURO: AAOx3; CNs grossly intact; No acute focal deficits noted.        Data     I have personally reviewed the following data over the past 24 hrs:    5.0  \   12.8   / 180     141 103 33.2 (H) /  100 (H)   3.9 22 1.44 (H) \       ALT: N/A AST: N/A AP: N/A TBILI: N/A   ALB: N/A TOT PROTEIN: N/A LIPASE: N/A       Procal: N/A CRP: N/A Lactic Acid: 1.1         Imaging results reviewed over the past 24 hrs:   No results found for this or any previous visit (from the past 24 hour(s)).

## 2023-02-26 NOTE — PROGRESS NOTES
"CLINICAL NUTRITION SERVICES - ASSESSMENT NOTE     Nutrition Prescription      Malnutrition Status:    Patient does not meet two of the established criteria necessary for diagnosing malnutrition    Future/Additional Recommendations:  Consider oral supplements if patient consuming <950 kcal and 40 grams protein daily.      REASON FOR ASSESSMENT  Kamryn Miller is a/an 87 year old female assessed by the dietitian for Provider Order - malnutrition    NUTRITION HISTORY  Pt resides in an assisted living facility, [facility].  Her meals are prepared for her, she generally has choices for her meals.  She chooses meals she knows are appropriate for her gluten free diet d/t her celiac disease.  She reports eating 3 meals/day and generally having a good appetite, although she eats less than she used to (gradually over time).  She doesn't consume any oral supplements as she hasn't had the need to.     CURRENT NUTRITION ORDERS  Diet: Gluten Free, Low Sodium  Calorie counts 2/27-3/1    Intake/Tolerance: She reports she is eating well, has been finding enough to eat on the menu.  Meals mostly recorded as % consumed.     LABS  Labs reviewed    MEDICATIONS  Lasix  Miralax  Remeron  Folic acid 400 mcg  Vitamin D3 50,000 international unit(s)  Citracal 950 mg daily    ANTHROPOMETRICS  Height: 160 cm (5' 3\")  Most Recent Weight: 62.9 kg (138 lb 11.2 oz)    IBW: 52.3 kg  BMI: Normal BMI  Weight History: Pt weight appeared to be stable ~150# for ~5 months, but is down to ~139# in the last 2 weeks, possibly related to diuresis.  She notes as well that all other weights had been with her shoes on, which are quite heavy.    Wt Readings from Last 15 Encounters:   02/26/23 62.9 kg (138 lb 11.2 oz)   02/15/23 69.4 kg (153 lb)   02/13/23 68.9 kg (151 lb 12.8 oz)   01/31/23 69.4 kg (153 lb)   01/24/23 69.4 kg (153 lb)   01/11/23 68.3 kg (150 lb 9.6 oz)   01/03/23 68.3 kg (150 lb 8 oz)   12/29/22 67.1 kg (148 lb)   12/12/22 68.9 kg " (152 lb)   12/05/22 67.9 kg (149 lb 11.1 oz)   10/31/22 68.2 kg (150 lb 4.8 oz)   10/26/22 66.1 kg (145 lb 11.6 oz)   10/24/22 68.4 kg (150 lb 12.8 oz)   10/07/22 67.6 kg (149 lb)   09/27/22 67.5 kg (148 lb 12.8 oz)   Dosing Weight: 63 kg (actual)    ASSESSED NUTRITION NEEDS  Estimated Energy Needs: 9911-0694 kcals/day (25 - 30 kcals/kg)  Justification: Maintenance  Estimated Protein Needs: 63-76 grams protein/day (1 - 1.2 grams of pro/kg)  Justification: Maintenance  Estimated Fluid Needs: per MD    PHYSICAL FINDINGS  See malnutrition section below.    MALNUTRITION  % Intake: No decreased intake noted  % Weight Loss: Weight loss does not meet criteria (suspect weight change is related to clothing/shoes, and/or fluid changes)  Subcutaneous Fat Loss: None observed  Muscle Loss: Upper arm (bicep, tricep):  mild and Posterior calf: mild -> this can be seen with aging, not concerned for malnutrition based on physical assessment  Fluid Accumulation/Edema: None noted  Malnutrition Diagnosis: Patient does not meet two of the established criteria necessary for diagnosing malnutrition    NUTRITION DIAGNOSIS  Predicted inadequate nutrient intake (kcal/pro) related to gluten free/low sodium diet restriction      INTERVENTIONS  Implementation  Nutrition Education: Provided education on gluten free menu, RD role, oral supplements (patient has never had, do not feel it is necessary if eating well with meals).      Goals  Patient to consume % of nutritionally adequate meal trays TID, or the equivalent with supplements/snacks.     Monitoring/Evaluation  Progress toward goals will be monitored and evaluated per protocol.    Audelia Manning, MS, RD, LD, CCTD, CNSC  7A/Obs unit pager [phone]  Weekend pager [phone]

## 2023-02-27 ENCOUNTER — APPOINTMENT (OUTPATIENT)
Dept: OCCUPATIONAL THERAPY | Facility: CLINIC | Age: 88
DRG: 291 | End: 2023-02-27
Payer: MEDICARE

## 2023-02-27 LAB
ANION GAP SERPL CALCULATED.3IONS-SCNC: 10 MMOL/L (ref 7–15)
BUN SERPL-MCNC: 43.3 MG/DL (ref 8–23)
CALCIUM SERPL-MCNC: 9.8 MG/DL (ref 8.8–10.2)
CHLORIDE SERPL-SCNC: 103 MMOL/L (ref 98–107)
CREAT SERPL-MCNC: 1.55 MG/DL (ref 0.51–0.95)
DEPRECATED HCO3 PLAS-SCNC: 26 MMOL/L (ref 22–29)
ERYTHROCYTE [DISTWIDTH] IN BLOOD BY AUTOMATED COUNT: 17.2 % (ref 10–15)
GFR SERPL CREATININE-BSD FRML MDRD: 32 ML/MIN/1.73M2
GLUCOSE SERPL-MCNC: 95 MG/DL (ref 70–99)
HCT VFR BLD AUTO: 45.4 % (ref 35–47)
HGB BLD-MCNC: 13.7 G/DL (ref 11.7–15.7)
MAGNESIUM SERPL-MCNC: 2.5 MG/DL (ref 1.7–2.3)
MCH RBC QN AUTO: 28.9 PG (ref 26.5–33)
MCHC RBC AUTO-ENTMCNC: 30.2 G/DL (ref 31.5–36.5)
MCV RBC AUTO: 96 FL (ref 78–100)
PHOSPHATE SERPL-MCNC: 4.6 MG/DL (ref 2.5–4.5)
PLATELET # BLD AUTO: 184 10E3/UL (ref 150–450)
POTASSIUM SERPL-SCNC: 4.3 MMOL/L (ref 3.4–5.3)
RBC # BLD AUTO: 4.74 10E6/UL (ref 3.8–5.2)
SODIUM SERPL-SCNC: 139 MMOL/L (ref 136–145)
WBC # BLD AUTO: 4.4 10E3/UL (ref 4–11)

## 2023-02-27 PROCEDURE — 85048 AUTOMATED LEUKOCYTE COUNT: CPT | Performed by: INTERNAL MEDICINE

## 2023-02-27 PROCEDURE — 250N000013 HC RX MED GY IP 250 OP 250 PS 637: Performed by: STUDENT IN AN ORGANIZED HEALTH CARE EDUCATION/TRAINING PROGRAM

## 2023-02-27 PROCEDURE — 85014 HEMATOCRIT: CPT | Performed by: INTERNAL MEDICINE

## 2023-02-27 PROCEDURE — 36415 COLL VENOUS BLD VENIPUNCTURE: CPT | Performed by: INTERNAL MEDICINE

## 2023-02-27 PROCEDURE — 120N000002 HC R&B MED SURG/OB UMMC

## 2023-02-27 PROCEDURE — 250N000013 HC RX MED GY IP 250 OP 250 PS 637: Performed by: PHYSICIAN ASSISTANT

## 2023-02-27 PROCEDURE — 99207 PR APP CREDIT; MD BILLING SHARED VISIT: CPT | Mod: FS | Performed by: STUDENT IN AN ORGANIZED HEALTH CARE EDUCATION/TRAINING PROGRAM

## 2023-02-27 PROCEDURE — 80048 BASIC METABOLIC PNL TOTAL CA: CPT | Performed by: INTERNAL MEDICINE

## 2023-02-27 PROCEDURE — 250N000011 HC RX IP 250 OP 636: Performed by: STUDENT IN AN ORGANIZED HEALTH CARE EDUCATION/TRAINING PROGRAM

## 2023-02-27 PROCEDURE — 84100 ASSAY OF PHOSPHORUS: CPT | Performed by: PHYSICIAN ASSISTANT

## 2023-02-27 PROCEDURE — 97535 SELF CARE MNGMENT TRAINING: CPT | Mod: GO

## 2023-02-27 PROCEDURE — 97530 THERAPEUTIC ACTIVITIES: CPT | Mod: GO

## 2023-02-27 PROCEDURE — 99233 SBSQ HOSP IP/OBS HIGH 50: CPT | Mod: FS | Performed by: PHYSICIAN ASSISTANT

## 2023-02-27 PROCEDURE — 250N000013 HC RX MED GY IP 250 OP 250 PS 637

## 2023-02-27 PROCEDURE — 83735 ASSAY OF MAGNESIUM: CPT | Performed by: PHYSICIAN ASSISTANT

## 2023-02-27 RX ADMIN — Medication 1 HALF-TAB: at 19:50

## 2023-02-27 RX ADMIN — FLUTICASONE FUROATE AND VILANTEROL TRIFENATATE 1 PUFF: 100; 25 POWDER RESPIRATORY (INHALATION) at 08:01

## 2023-02-27 RX ADMIN — LIOTHYRONINE SODIUM 10 MCG: 5 TABLET ORAL at 08:01

## 2023-02-27 RX ADMIN — Medication 400 MCG: at 08:00

## 2023-02-27 RX ADMIN — POLYETHYLENE GLYCOL 3350 17 G: 17 POWDER, FOR SOLUTION ORAL at 08:01

## 2023-02-27 RX ADMIN — ROSUVASTATIN CALCIUM 10 MG: 10 TABLET, FILM COATED ORAL at 08:01

## 2023-02-27 RX ADMIN — ENOXAPARIN SODIUM 30 MG: 30 INJECTION SUBCUTANEOUS at 13:19

## 2023-02-27 RX ADMIN — DESVENLAFAXINE SUCCINATE 25 MG: 25 TABLET, EXTENDED RELEASE ORAL at 08:00

## 2023-02-27 RX ADMIN — ISOSORBIDE MONONITRATE 30 MG: 30 TABLET, EXTENDED RELEASE ORAL at 08:00

## 2023-02-27 RX ADMIN — SACUBITRIL AND VALSARTAN 1 TABLET: 24; 26 TABLET, FILM COATED ORAL at 08:01

## 2023-02-27 RX ADMIN — Medication 10 MG: at 08:00

## 2023-02-27 RX ADMIN — MICONAZOLE NITRATE: 20 CREAM TOPICAL at 19:52

## 2023-02-27 RX ADMIN — LEVOTHYROXINE SODIUM 50 MCG: 0.05 TABLET ORAL at 08:00

## 2023-02-27 RX ADMIN — Medication 950 MG: at 13:19

## 2023-02-27 RX ADMIN — MEMANTINE 10 MG: 10 TABLET ORAL at 08:01

## 2023-02-27 RX ADMIN — LAMOTRIGINE 225 MG: 25 TABLET ORAL at 07:59

## 2023-02-27 RX ADMIN — ASPIRIN 81 MG: 81 TABLET ORAL at 08:00

## 2023-02-27 RX ADMIN — MICONAZOLE NITRATE: 20 CREAM TOPICAL at 08:01

## 2023-02-27 RX ADMIN — MIRTAZAPINE 7.5 MG: 7.5 TABLET, FILM COATED ORAL at 21:44

## 2023-02-27 ASSESSMENT — ACTIVITIES OF DAILY LIVING (ADL)
ADLS_ACUITY_SCORE: 27

## 2023-02-27 NOTE — PROGRESS NOTES
Care Management Follow Up    Length of Stay (days): 5    Expected Discharge Date: 03/01/2023     Concerns to be Addressed: discharge planning, utilization management     Patient plan of care discussed at interdisciplinary rounds: Yes    Anticipated Discharge Disposition: Assisted Living     Anticipated Discharge Services: None  Anticipated Discharge DME: None    Patient/family educated on Medicare website which has current facility and service quality ratings:  Not applicable  Education Provided on the Discharge Plan: Yes   Patient/Family in Agreement with the Plan:  Yes    Referrals Placed by CM/SW: Not applicable   Private pay costs discussed: Not applicable    Additional Information:  Nurse notified WAN that the patient would like to speak with the      @3176 SW met with the patient and her daughter at bedside to discuss questions they have. The patient asked if she will still get to return to the [facility]. SW informed the patient and her daughter that as of right now the plan is still for her to return to the [facility] with her daughter to transport. SW reported that if that changes a discussion will be had prior to discharge. The patient and her daughter declined any further questions or concerns at this time.    Social work will continue to follow for discharge needs.       BERNABE Vazquez  [address]   Office: [phone]  Pager: [phone]  [email or web address]

## 2023-02-27 NOTE — PLAN OF CARE
Goal Outcome Evaluation:      Plan of Care Reviewed With: patient    Overall Patient Progress: improvingOverall Patient Progress: improving    Outcome Evaluation: A&O x4, VSS on RA. Denies any pain. Pt up SBA with walker to bathroom, no BM. Pt slept well throughout the night, calls appropriately. Calorie counts to be started in the morning. Continue with plan of care.

## 2023-02-27 NOTE — PROGRESS NOTES
Brief Medicine Note:    Notified earlier this evening for asymptomatic hypotension. Yulisa has been working on down-titrating her medications due to hypotension as well. Patient seen at bedside and denies any symptoms. Encouraged her to have some fluids PO and recheck of BP this evening is slightly improved.    Cammie Garcia PA-C  Internal Medicine [facility] Hospitalist  [facility]

## 2023-02-27 NOTE — PROGRESS NOTES
[facility]    Medicine Progress Note - Hospitalist Service, GOLD TEAM 6    Date of Admission:  2/22/2023    Assessment & Plan   Kamryn Miller is a pleasant 88 yo female with hx of bipolar d/o, MDD, hypothyroidism, CKDIII, INGRID, aortic stenosis s/p TAVR (9/2022), PAD on DAPT, CAD s/p LAD and RCA stent placement (2003), LAD PCI x 3 (8/2020), and HFpEF admitted to [facility] after presenting to ED with SOB due to hypertensive emergency, HF exacerbation and pulmonary edema.     Updates today:     Star counts pending    Entresto decreased to 12-13 mg (half tab), Imdur decreased, and giving a Lasix vacation for her BP.     Likely appropriate for discharge in ~2 days.      # ICM  # Acute systolic HF exacerbation   # Acute Pulmonary Edema    # Shortness of Breath  # Hypertensive Urgency, POA, resolved  She presented with complaints of hypertension with reported SBP of > 200 at her AL. BP on admission 171/90. CTA PE negative for PE but showed groundglass opacities and small pleural effusions. TTE 1/30/23 showed EF 55-60%, functioning TAVR without AI. Repeat TTE 2/22 shows mildly reduced EF (45-50%) and trace AI, severe mitral calcification.  - Cardiology consulted and appreciate recommendations.                - PTA losartan discontinued               - Coreg discontinued due to adverse SEs               - Due to soft BPs, discontinuePTA Imdur and decrease new med Entresto further to 12/13 tab               - IV Lasix held  - PRN Hydralazine for SBP > 180  - Per cards, recommended BB but discontinued due to SE.   - Was recently seen in [facility] clinic, will need to follow-up     # CKD stage 3  # AGMA: possibly related to above, however ketones mildly elevated suggesting component of starvation ketosis  - Nutrition consulted and appreciate recommendations.  - Start calorie counts.      # Concern for Community Acquired Pneumonia: CT with ground glass opacities and patient was started on abx.  However, she is afebrile, has no cough, no leukocytosis and procal is only 0.1 (low suspicion for bacterial infection). CTX and Azithromycin discontinued 2/23. No concerns thereafter.   - Monitor clinically      # Elevated Transaminases, Hepatocellular Pattern, resolving:  ALT 71, AST 56, with rest of hepatic panel on admission WNL. Unclear etiology. Now LFTs resolved save mild ALT elevation at 45 (51).  - CTM     # CAD S/P PCI x 3  # Hyperlipidemia   # S/P TAVR (9/2022)   H/O PCIs X 3. No EKG changes on admission - previous LBBB.   - Continue PTA Rosuvastatin   - Anti-hypertensives as above.      # Asthma: No e/o bronchospasm on exam.  - Continue PTA Breo-Ellipta   - PRN Albuterol   - Incentive Spirometry      # Celiac Disease   - Gluten Free Diet      # Hypothyroidism   - Continue PTA levothyroxine and liothyronine      # Bipolar Disorder   # Cognitive Impairment   - Continue PTA Lamotrigene and Desvenlafaxine   - Continue PTA Memantine      # Microscopic pyuria with UC +50-100k colonies aerococcus urinae: Curbsided ID 2/25 regarding UC growing 50-100k aerococcus urinae and since pt asymptomatic and afebrile no tx indicated.      - Dispo: Back to AL in 1-2 days.       Diet: Combination Diet Gluten Free Diet, Regular Diet Adult; 2 gm NA Diet  Calorie Counts    DVT Prophylaxis: Enoxaparin (Lovenox) SQ  Orellana Catheter: Not present  Lines: None     Cardiac Monitoring: None  Code Status: Full Code      Clinically Significant Risk Factors                                Disposition Plan     Expected Discharge Date: 02/27/2023      Destination: assisted living  Discharge Comments: Here at least one more night.  Daughter to provide ride.        The patient's care was discussed with the Attending Physician, Dr. Gomez and Patient.    Hannah Munroe PA-C  Hospitalist Service, GOLD TEAM [address] [place] [address]  Securely message with [employer] (more info)  Text page via [facility] Paging/[place]    See signed in provider for up to date coverage information  ______________________________________________________________________    Interval History   Patient was seen at bedside. Reports feeling quite well. Feels still a bit weak from her baseline. Patient denies new sx or complaints. Eating okay she feels.     Physical Exam   Vital Signs: Temp: 97.5  F (36.4  C) Temp src: Axillary BP: 125/63 Pulse: 55   Resp: 20 SpO2: 94 % O2 Device: None (Room air)    Weight: 141 lbs 0 oz    GENERAL: elderly female resting comfortably in bed. NAD.   NEURO / PSYCH: No focal deficits. Moves all extremities.   HEENT: Anicteric sclera. PERRL. Mucous membranes moist.   CV: RRR. S1, S2. No murmurs appreciated.  2+ right radial pulse.  RESPIRATORY: Effort normal. Lungs CTAB with no wheezing, rales, rhonchi.   GI: Abdomen soft and non distended with bowel sounds rare. No tenderness, rebound, guarding.   MSK: no gross deformities  EXTREMITIES: nonedematous  SKIN: No jaundice. No rashes or lesions to exposed areas.     Medical Decision Making       60 MINUTES SPENT BY ME on the date of service doing chart review, history, exam, documentation & further activities per the note.      Data   Recent Labs   Lab 02/27/23  0729 02/26/23  0940 02/26/23  0640 02/25/23  0534 02/22/23  1928 02/22/23  1629   WBC 4.4  --  5.0 4.8   < > 5.3   HGB 13.7  --  12.8 11.9   < > 11.1*   MCV 96  --  97 96   < > 97     --  180 155   < > 143*   INR  --   --   --   --   --  1.12     --  141 143   < > 145   POTASSIUM 4.3  --  3.9 4.0   < > 3.8   CHLORIDE 103  --  103 105   < > 108*   CO2 26  --  22 25   < > 23   BUN 43.3*  --  33.2* 25.8*   < > 20.5   CR 1.55*  --  1.44* 1.36*   < > 1.20*   ANIONGAP 10  --  16* 13   < > 14   PRINCE 9.8  --  9.8 10.1   < > 9.6   GLC 95  --  100* 89   < > 101*   ALBUMIN  --  3.8  --  3.6   < > 3.7   PROTTOTAL  --  7.3  --  6.4   < > 6.5   BILITOTAL  --  0.3  --  0.3   < > 0.3   ALKPHOS  --  76  --  70   < > 75   ALT  --   38*  --  45*   < > 71*   AST  --  36*  --  33   < > 56*    < > = values in this interval not displayed.

## 2023-02-27 NOTE — PROVIDER NOTIFICATION
Paged provider: . [phone] [phone]. Pt's SBP dropped below parameters. Pls call NS.    Pt was asymptomatic and denies any symptoms. Called for RN to check on patient and had patient drink some fluids.

## 2023-02-28 ENCOUNTER — APPOINTMENT (OUTPATIENT)
Dept: OCCUPATIONAL THERAPY | Facility: CLINIC | Age: 88
DRG: 291 | End: 2023-02-28
Payer: MEDICARE

## 2023-02-28 VITALS
RESPIRATION RATE: 16 BRPM | DIASTOLIC BLOOD PRESSURE: 73 MMHG | HEIGHT: 63 IN | OXYGEN SATURATION: 97 % | HEART RATE: 61 BPM | TEMPERATURE: 98.6 F | SYSTOLIC BLOOD PRESSURE: 132 MMHG | WEIGHT: 141.09 LBS | BODY MASS INDEX: 25 KG/M2

## 2023-02-28 PROBLEM — R53.1 GENERALIZED WEAKNESS: Status: ACTIVE | Noted: 2022-09-22
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
## 2023-02-28 LAB
ANION GAP SERPL CALCULATED.3IONS-SCNC: 12 MMOL/L (ref 7–15)
BUN SERPL-MCNC: 43.5 MG/DL (ref 8–23)
CALCIUM SERPL-MCNC: 10 MG/DL (ref 8.8–10.2)
CHLORIDE SERPL-SCNC: 104 MMOL/L (ref 98–107)
CREAT SERPL-MCNC: 1.5 MG/DL (ref 0.51–0.95)
DEPRECATED HCO3 PLAS-SCNC: 23 MMOL/L (ref 22–29)
ERYTHROCYTE [DISTWIDTH] IN BLOOD BY AUTOMATED COUNT: 17.2 % (ref 10–15)
GFR SERPL CREATININE-BSD FRML MDRD: 34 ML/MIN/1.73M2
GLUCOSE SERPL-MCNC: 100 MG/DL (ref 70–99)
HCT VFR BLD AUTO: 43.7 % (ref 35–47)
HGB BLD-MCNC: 13.3 G/DL (ref 11.7–15.7)
MCH RBC QN AUTO: 29 PG (ref 26.5–33)
MCHC RBC AUTO-ENTMCNC: 30.4 G/DL (ref 31.5–36.5)
MCV RBC AUTO: 95 FL (ref 78–100)
PLATELET # BLD AUTO: 189 10E3/UL (ref 150–450)
POTASSIUM SERPL-SCNC: 4 MMOL/L (ref 3.4–5.3)
RBC # BLD AUTO: 4.58 10E6/UL (ref 3.8–5.2)
SODIUM SERPL-SCNC: 139 MMOL/L (ref 136–145)
WBC # BLD AUTO: 4.1 10E3/UL (ref 4–11)

## 2023-02-28 PROCEDURE — 99239 HOSP IP/OBS DSCHRG MGMT >30: CPT | Mod: FS | Performed by: PHYSICIAN ASSISTANT

## 2023-02-28 PROCEDURE — 36415 COLL VENOUS BLD VENIPUNCTURE: CPT | Performed by: INTERNAL MEDICINE

## 2023-02-28 PROCEDURE — 250N000013 HC RX MED GY IP 250 OP 250 PS 637: Performed by: PHYSICIAN ASSISTANT

## 2023-02-28 PROCEDURE — 80048 BASIC METABOLIC PNL TOTAL CA: CPT | Performed by: INTERNAL MEDICINE

## 2023-02-28 PROCEDURE — 99207 PR APP CREDIT; MD BILLING SHARED VISIT: CPT | Mod: FS | Performed by: STUDENT IN AN ORGANIZED HEALTH CARE EDUCATION/TRAINING PROGRAM

## 2023-02-28 PROCEDURE — 97530 THERAPEUTIC ACTIVITIES: CPT | Mod: GO

## 2023-02-28 PROCEDURE — 250N000013 HC RX MED GY IP 250 OP 250 PS 637

## 2023-02-28 PROCEDURE — 250N000013 HC RX MED GY IP 250 OP 250 PS 637: Performed by: STUDENT IN AN ORGANIZED HEALTH CARE EDUCATION/TRAINING PROGRAM

## 2023-02-28 PROCEDURE — 85027 COMPLETE CBC AUTOMATED: CPT | Performed by: INTERNAL MEDICINE

## 2023-02-28 PROCEDURE — 97535 SELF CARE MNGMENT TRAINING: CPT | Mod: GO

## 2023-02-28 RX ORDER — SACUBITRIL AND VALSARTAN 24; 26 MG/1; MG/1
0.5 TABLET, FILM COATED ORAL 2 TIMES DAILY
Qty: 30 TABLET | Refills: 0 | Status: SHIPPED | OUTPATIENT
Start: 2023-02-28 | End: 2023-03-13

## 2023-02-28 RX ORDER — ROSUVASTATIN CALCIUM 10 MG/1
10 TABLET, COATED ORAL DAILY
Qty: 30 TABLET | Refills: 0 | Status: SHIPPED | OUTPATIENT
Start: 2023-03-01 | End: 2023-02-28

## 2023-02-28 RX ORDER — SACUBITRIL AND VALSARTAN 24; 26 MG/1; MG/1
0.5 TABLET, FILM COATED ORAL 2 TIMES DAILY
Qty: 30 TABLET | Refills: 0 | Status: SHIPPED | OUTPATIENT
Start: 2023-02-28 | End: 2023-02-28

## 2023-02-28 RX ORDER — ROSUVASTATIN CALCIUM 10 MG/1
10 TABLET, COATED ORAL DAILY
Qty: 30 TABLET | Refills: 0 | Status: SHIPPED | OUTPATIENT
Start: 2023-03-01

## 2023-02-28 RX ADMIN — POLYETHYLENE GLYCOL 3350 17 G: 17 POWDER, FOR SOLUTION ORAL at 08:23

## 2023-02-28 RX ADMIN — LIOTHYRONINE SODIUM 10 MCG: 5 TABLET ORAL at 08:23

## 2023-02-28 RX ADMIN — Medication 1 HALF-TAB: at 08:25

## 2023-02-28 RX ADMIN — ASPIRIN 81 MG: 81 TABLET ORAL at 08:24

## 2023-02-28 RX ADMIN — FLUTICASONE FUROATE AND VILANTEROL TRIFENATATE 1 PUFF: 100; 25 POWDER RESPIRATORY (INHALATION) at 08:24

## 2023-02-28 RX ADMIN — Medication 950 MG: at 13:05

## 2023-02-28 RX ADMIN — Medication 400 MCG: at 08:24

## 2023-02-28 RX ADMIN — MICONAZOLE NITRATE: 20 CREAM TOPICAL at 08:24

## 2023-02-28 RX ADMIN — LAMOTRIGINE 225 MG: 25 TABLET ORAL at 08:23

## 2023-02-28 RX ADMIN — ROSUVASTATIN CALCIUM 10 MG: 10 TABLET, FILM COATED ORAL at 08:24

## 2023-02-28 RX ADMIN — MEMANTINE 10 MG: 10 TABLET ORAL at 08:24

## 2023-02-28 RX ADMIN — Medication 10 MG: at 08:24

## 2023-02-28 RX ADMIN — LEVOTHYROXINE SODIUM 50 MCG: 0.05 TABLET ORAL at 08:23

## 2023-02-28 RX ADMIN — DESVENLAFAXINE SUCCINATE 25 MG: 25 TABLET, EXTENDED RELEASE ORAL at 08:24

## 2023-02-28 ASSESSMENT — ACTIVITIES OF DAILY LIVING (ADL)
ADLS_ACUITY_SCORE: 28
ADLS_ACUITY_SCORE: 27
ADLS_ACUITY_SCORE: 28
ADLS_ACUITY_SCORE: 27
ADLS_ACUITY_SCORE: 27
ADLS_ACUITY_SCORE: 28

## 2023-02-28 NOTE — PROGRESS NOTES
Care Management Discharge Note    Discharge Date: 02/28/2023       Discharge Disposition: Assisted Living    Discharge Services: None    Discharge DME: None    Discharge Transportation: family or friend will provide    Private pay costs discussed: Not applicable    PAS Confirmation Code:  Not applicable   Patient/family educated on Medicare website which has current facility and service quality ratings:  Not applicable    Education Provided on the Discharge Plan:  yes  Persons Notified of Discharge Plans: Patient, [facility], provider, nursing staff, SW  Patient/Family in Agreement with the Plan:  yes    Handoff Referral Completed: Yes    Additional Information:  Patient to discharge back to [facility]. Lilia in admissions was notified of the discharge and confirmed that the facility can accept the patient back today 2/28 before 1600. SW reported to Lilia that the patient will likely arrive back to the facility late afternoon. The patient's daughter will provide transportation back to [facility].     Dylon Thompson, MARLON  [address]   Office: [phone]  Pager: [phone]  [email or web address]

## 2023-02-28 NOTE — PROGRESS NOTES
Calorie Count  Intake recorded for: 2/27  Total Kcals: 229 Total Protein: 5g  Kcals from Hospital Food: 229  Protein: 5g  Kcals from Outside Food (average):0 Protein: 0g  # Meals Ordered from Kitchen: 2 meals   # Meals Recorded: 1 meal (First - 100% applesauce, banana, pudding)   # Supplements Recorded: 0

## 2023-02-28 NOTE — PROGRESS NOTES
Care Management Follow Up    Length of Stay (days): 6    Expected Discharge Date: 03/01/2023     Concerns to be Addressed: discharge planning, utilization management     Patient plan of care discussed at interdisciplinary rounds: Yes    Anticipated Discharge Disposition: Assisted Living     Anticipated Discharge Services: None  Anticipated Discharge DME: None    Patient/family educated on Medicare website which has current facility and service quality ratings:  Not applicable  Education Provided on the Discharge Plan:  yes  Patient/Family in Agreement with the Plan:  yes    Referrals Placed by CM/SW:  Not applicable  Private pay costs discussed: Not applicable    Additional Information:  The provider informed WAN that the patient will be able to discharge today 2/28 back to the [facility] and the provider updated the patient's daughter regarding discharge    @1055 WAN called Lilia at [facility] to provide an update regarding the patient's discharge and to confirm the facility can accept the patient today. WAN left a voicemail and requested a call back and provided a call back number.     Lilia from [facility] called WAN back. Lilia confirmed that the facility can accept the patient back today 2/28 before 1600. Lilia requested she be faxed discharge summary, discharge orders, medication list, and PT/OT notes. Lilia provided the number to fax the documents to [phone]. WAN faxed the documents to Lilia.    Bedside nurse received call from the patient's daughter Ana. WAN spoke with Ana and Ana wanted the SW to confirm that the [facility] would be able to accept the patient back today and if SW and provider are aware that facility wants the patient back prior to 1600. WAN confirmed that the facility can accept the patient back today. WAN reported that provider was informed that the facility would like the patient back prior to 1600.    WAN called Lilia at [facility] WAN left a voicemail informing her that the documents have been  faxed over and requested Lilia call back to confirm that she received them and if she needs any additional documents prior to the patient returning. WAN also informed Lilia that patient will be discharging later this afternoon with the patient's daughter providing transportation and that the patient is expected to arrive prior to 1600.    WAN called Lilia at [facility]. WAN left a voicemail asking if the patient being on the three new medications listed on the discharge summary will be an issue for the care home or if it will be fine. WAN requested a call back.    WAN called the pharmacy the patient uses - [facility] of [phone]. WAN asked if the pharmacy has received the orders and if the medications will be able to filled and delivered today. The pharmacists reported that they did receive the order she just has not had the time to fill it yet. She stated that the medications shoud be able to be filled today and go out today.       MARLON Vazquez  [address]   Office: [phone]  Pager: [phone]  [email or web address]

## 2023-02-28 NOTE — PLAN OF CARE
Goal Outcome Evaluation:      Plan of Care Reviewed With: patient    Overall Patient Progress: improving    Outcome Evaluation: Alert and oriented x 4. Used call light appropriately. Denies pain. Up with walker and standby assist. SBP in the 130's. Plan is to discharge back to [facility] with daughter providing ride. Vital signs stable on room air

## 2023-02-28 NOTE — CARE PLAN
VSS on RA, denies pain, denies SOB at rest, slight GREGORY, discharge back to [facility] this afternoon, PIV removed, understands discharge orders and followup, daughter provided ride

## 2023-02-28 NOTE — PLAN OF CARE
Goal Outcome Evaluation:      Plan of Care Reviewed With: patient    Overall Patient Progress: improvingOverall Patient Progress: improving    Outcome Evaluation: A&O x4. VSS on RA. Denies pain & nausea. Up with SBA w/ walker & GB. 2 Gram Na diet, tolerating. Pt had a good appetite. Calorie counts. No BM this shift. Voiding spontanoulsy. BP meds adjusted today.  Did not meet parameters for PRN medication

## 2023-02-28 NOTE — DISCHARGE SUMMARY
[facility]  Hospitalist Discharge Summary      Date of Admission:  2/22/2023  Date of Discharge:  2/28/2023  Discharging Provider: Hannah Munroe PA-C  Discharge Service: Hospitalist Service, GOLD TEAM 6    Discharge Diagnoses   1. Acute systolic heart failure exacerbation, resolved  2. Ischemic cardiomyopathy  3. Acute pulmonary edema, resolved  4. Shortness of breath, resolved  5. Hypertensive urgency, POA, resolved  6. CKD stage III  7. Anion gap metabolic acidosis  8. Concern for community-acquired pneumonia  9. Elevated transaminases, but hepatocellular pattern, resolving  10. CAD status post PCI x3 (previous)  11. Dyslipidemia  12. 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 Asthma  14. Celiac disease  15. Hypothyroidism  16. Cognitive impairment  17. Bipolar disorder  18. Microscopic pyuria with UC + K colonies Aerococcus urinae    Follow-ups Needed After Discharge   Follow-up Appointments     Follow Up ([facility])      Follow up with primary care provider, Rolanda Wilcox, within 7 days for   hospital follow- up.  The following labs/tests are recommended: BMP to   assess electrolytes and renal function on new blood pressure medication   Entresto.      Appointments on [place] and/or [facility] (with [facility] or [facility]   provider or service). Call [phone] if you haven't heard regarding   these appointments within 7 days of discharge.           Discharge Disposition   Discharged to home  Condition at discharge: Stable    Hospital Course   Kamryn Miller is a pleasant 86 yo female with hx of bipolar d/o, MDD, hypothyroidism, CKDIII, INGRID, aortic stenosis s/p TAVR (9/2022), PAD on DAPT, CAD s/p LAD and RCA stent placement (2003), LAD PCI x 3 (8/2020), and HFpEF admitted to [facility] after presenting to ED with SOB due to hypertensive emergency, HF exacerbation and pulmonary edema.     Patient was admitted and started on IV diuresis with IV Lasix.  Cardiology was consulted.   An echocardiogram was performed which showed reduced EF that is newly noted, 45 to 50%.  Patient was started on Entresto, given that blood pressures became low on Entresto, losartan was discontinued.  Coreg also discontinued due to adverse side effects.  The patient's Lasix was transitioned to oral per her previous home dose.  She was restarted on baby aspirin, her rosuvastatin dose was decreased from 40 mg to 10 mg due to decreased creatinine clearance.  She was able to demonstrate functional status near baseline, with therapy recommending discharge to home/previous living situation with assist.  She was able to demonstrate adequate oral intake, and adequate bowel and bladder function.  She was discharged to assisted living facility where she previously resided.    # ICM  # Acute systolic HF exacerbation   # Acute Pulmonary Edema    # Shortness of Breath  # Hypertensive Urgency, POA, resolved  She presented with complaints of hypertension with reported SBP of > 200 at her AL. BP on admission 171/90. CTA PE negative for PE but showed groundglass opacities and small pleural effusions. TTE 1/30/23 showed EF 55-60%, functioning TAVR without AI. Repeat TTE 2/22 shows mildly reduced EF (45-50%) and trace AI, severe mitral calcification.  - Cardiology consulted and appreciate recommendations.                - PTA losartan discontinued               - Coreg discontinued due to adverse SEs               - Due to soft BPs, discontinuePTA Imdur and decrease new med Entresto further to 12/13 tab  - Per cards, recommended BB but discontinued due to SE.     # CKD stage 3  # AGMA: possibly related to above, however ketones mildly elevated suggesting component of starvation ketosis  - Nutrition consulted and appreciate recommendations.  - Patient eating % of meals at time of discharge     # Concern for Community Acquired Pneumonia: CT with ground glass opacities and patient was started on abx. However, she is afebrile, has  no cough, no leukocytosis and procal is only 0.1 (low suspicion for bacterial infection). CTX and Azithromycin discontinued 2/23. No concerns thereafter.      # Elevated Transaminases, Hepatocellular Pattern, improved:  ALT 71, AST 56, with rest of hepatic panel on admission WNL. Unclear etiology. Now LFTs resolved save mild ALT elevation at 45 (51).    # CAD S/P PCI x 3  # Hyperlipidemia   # S/P TAVR (9/2022)   H/O PCIs X 3. No EKG changes on admission - previous LBBB.   -PTA rosuvastatin was 40 mg, decrease to 10 mg due to reduced creatinine clearance.  -81 mg aspirin daily, it appears patient was previously on this however was not on her med rec.  This was restarted during this hospital stay and to be continued at discharge.    # Microscopic pyuria with UC +50-100k colonies aerococcus urinae: Curbsided ID [id number] regarding UC growing 50-100k aerococcus urinae and since pt asymptomatic and afebrile no tx indicated.     # Asthma, stable during this hospitalization.  Patient was continued on PTA Breo Ellipta and as needed albuterol.  She was encouraged to use incentive spirometry.  # Celiac Disease Gluten Free Diet   # Hypothyroidism Continue PTA levothyroxine and liothyronine   # Bipolar Disorder Continue PTA Lamotrigene and Desvenlafaxine   # Cognitive Impairment Continue PTA Memantine     Consultations This Hospital Stay   PHYSICAL THERAPY ADULT IP CONSULT  OCCUPATIONAL THERAPY ADULT IP CONSULT  CORE CLINIC EVALUATION IP CONSULT  CARDIOLOGY HEART FAILURE (HF) IP CONSULT  SPIRITUAL HEALTH SERVICES IP CONSULT  CARE MANAGEMENT / SOCIAL WORK IP CONSULT  CARDIOLOGY GENERAL ADULT IP CONSULT  NUTRITION SERVICES ADULT IP CONSULT    Code Status   Full Code    Time Spent on this Encounter   I, Hannah Munroe PA-C, personally saw the patient today and spent greater than 30 minutes discharging this patient.       Hannah Munroe PA-C  [facility] [address] [address]  Phone:  [phone]  ______________________________________________________________________    Physical Exam   Vital Signs: Temp: 98.6  F (37  C) Temp src: Oral BP: 132/73 Pulse: 61   Resp: 16 SpO2: 97 % O2 Device: None (Room air)    Weight: 141 lbs 1.51 oz  GENERAL: elderly female resting comfortably in bed. NAD.   NEURO / PSYCH: No focal deficits. Moves all extremities.   HEENT: Anicteric sclera. PERRL. Mucous membranes moist.   CV: RRR. S1, S2. No murmurs appreciated.  2+ right radial pulse.  RESPIRATORY: Effort normal. Lungs CTAB with no wheezing, rales, rhonchi.   GI: Abdomen soft and non distended with bowel sounds rare. No tenderness, rebound, guarding.   MSK: no gross deformities  EXTREMITIES: nonedematous  SKIN: No jaundice. No rashes or lesions to exposed areas.        Primary Care Physician   Rolanda Wilcox    Discharge Orders      Primary Care - Care Coordination Referral      Resume Home Care Services    [facility]  Ph:[phone]  Fx:[phone]    Please resume RN/PT services.     Follow Up ([facility])    Follow up with primary care provider, Rolanda Wilcox, within 7 days for hospital follow- up.  The following labs/tests are recommended: BMP to assess electrolytes and renal function on new blood pressure medication Entresto.      Appointments on [place] and/or [facility] (with [facility] or [facility] provider or service). Call [phone] if you haven't heard regarding these appointments within 7 days of discharge.     Reason for your hospital stay    You were admitted to the hospital due to elevated blood pressures and fluid overload. You were treated with diuretic to remove fluid from your body. You had an ultrasound of your heart done to assess function and this showed some mild dysfunction of the pumping ability of the heart. Because of this, you were started on a new medication Entresto. Your other medications were adjusted in order for Entresto to function well and not lower your blood  pressure too much. Your blood pressure and fluid status remained stable, and you improved in how you felt.     Activity    Your activity upon discharge: activity as tolerated     Diet    Follow this diet upon discharge: Low sodium diet       Significant Results and Procedures   Most Recent 3 CBC's:Recent Labs   Lab Test 02/28/23  0713 02/27/23  0729 02/26/23  0640   WBC 4.1 4.4 5.0   HGB 13.3 13.7 12.8   MCV 95 96 97    184 180     Most Recent 3 BMP's:Recent Labs   Lab Test 02/28/23  0713 02/27/23  0729 02/26/23  0640    139 141   POTASSIUM 4.0 4.3 3.9   CHLORIDE 104 103 103   CO2 23 26 22   BUN 43.5* 43.3* 33.2*   CR 1.50* 1.55* 1.44*   ANIONGAP 12 10 16*   PRINCE 10.0 9.8 9.8   * 95 100*       Discharge Medications   Current Discharge Medication List      START taking these medications    Details   aspirin (ASA) 81 MG EC tablet Take 1 tablet (81 mg) by mouth daily  Qty: 30 tablet, Refills: 0    Associated Diagnoses: CAD S/P percutaneous coronary angioplasty      sacubitril-valsartan (ENTRESTO) 24-26 MG per tablet Take 0.5 tablets by mouth 2 times daily  Qty: 30 tablet, Refills: 0    Associated Diagnoses: Elevated brain natriuretic peptide (BNP) level; Essential hypertension         CONTINUE these medications which have CHANGED    Details   rosuvastatin (CRESTOR) 10 MG tablet Take 1 tablet (10 mg) by mouth daily  Qty: 30 tablet, Refills: 0    Associated Diagnoses: CAD S/P percutaneous coronary angioplasty         CONTINUE these medications which have NOT CHANGED    Details   acetaminophen (TYLENOL) 500 MG tablet Take 1 tablet (500 mg) by mouth every 6 hours as needed for pain  Qty: 60 tablet, Refills: 4    Associated Diagnoses: History of bilateral hip replacements      albuterol (PROAIR HFA/PROVENTIL HFA/VENTOLIN HFA) 108 (90 Base) MCG/ACT inhaler Inhale 2 puffs into the lungs every 6 hours as needed for wheezing or shortness of breath / dyspnea  Qty: 18 g, Refills: 3    Comments: Pharmacy may  dispense brand covered by insurance (Proair, or proventil or ventolin or generic albuterol inhaler)  Associated Diagnoses: Moderate persistent asthma without complication      alendronate (FOSAMAX) 70 MG tablet TAKE 1 TABLET BY MOUTH ONCE WEEKLY  Qty: 4 tablet, Refills: 23    Associated Diagnoses: Osteoporosis, unspecified osteoporosis type, unspecified pathological fracture presence      azelastine (ASTEPRO) 0.15 % nasal spray USE 1 SPRAY IN EACH NOSTRIL ONCE A DAY  Qty: 30 mL, Refills: 11    Associated Diagnoses: Seasonal allergic rhinitis, unspecified trigger      calcium citrate (CITRACAL) 950 (200 Ca) MG tablet TAKE 1 TABLET BY MOUTH ONCE DAILY  Qty: 28 tablet, Refills: 11    Associated Diagnoses: Stage 3b chronic kidney disease (H)      cholecalciferol (VITAMIN D3) 1250 mcg (90084 units) capsule TAKE 1 CAPSULE BY MOUTH ONCE WEEKLY  Qty: 4 capsule, Refills: 11    Associated Diagnoses: Stage 3b chronic kidney disease (H)      desvenlafaxine (PRISTIQ) 100 MG 24 hr tablet Take 100 mg by mouth daily      fluticasone-salmeterol (ADVAIR DISKUS) 500-50 MCG/ACT inhaler INHALE 1 PUFF BY MOUTH TWICE DAILY  Qty: 60 each, Refills: 0    Associated Diagnoses: Moderate persistent asthma without complication      folic acid (FOLVITE) 400 MCG tablet Take 1 tablet (400 mcg) by mouth daily  Qty: 90 tablet, Refills: 0    Associated Diagnoses: Preventative health care      furosemide (LASIX) 20 MG tablet Take 0.5 tablets (10 mg) by mouth daily  Qty: 45 tablet, Refills: 0    Associated Diagnoses: NYHA class 3 heart failure with preserved ejection fraction (H)      iron polysaccharides (NIFEREX) 150 MG capsule Take 1 capsule (150 mg) by mouth daily  Qty: 30 capsule, Refills: 1    Associated Diagnoses: Anemia due to stage 3b chronic kidney disease (H)      !! lamoTRIgine (LAMICTAL) 25 MG tablet Take 25 mg by mouth daily with 200 mg tablet for a total dose of 225 mg      !! LAMOTRIGINE 200 MG PO TABS Take 200 mg by mouth daily with 25  mg tablet for a total dose of 225 mg      levothyroxine (SYNTHROID/LEVOTHROID) 50 MCG tablet TAKE 1 TABLET BY MOUTH ONCE DAILY  Qty: 28 tablet, Refills: 11    Associated Diagnoses: Stage 3b chronic kidney disease (H)      liothyronine (CYTOMEL) 5 MCG tablet Take 2 tablets (10 mcg) by mouth daily  Qty: 30 tablet, Refills: 3    Associated Diagnoses: Bipolar disorder, current episode depressed, severe, without psychotic features (H)      memantine (NAMENDA) 10 MG tablet Take 1 tablet (10 mg) by mouth daily  Qty: 30 tablet, Refills: 3    Associated Diagnoses: Major neurocognitive disorder (H)      Multiple Vitamin (TAB-A-JENNIFER) TABS Take 1 tablet by mouth daily  Qty: 30 tablet, Refills: PRN    Associated Diagnoses: Stage 3b chronic kidney disease (H)      psyllium (METAMUCIL/KONSYL) 58.6 % powder Take 1 tspn in liquid daily      mirtazapine (REMERON) 7.5 MG tablet Take 1 tablet (7.5 mg) by mouth At Bedtime  Qty: 30 tablet, Refills: 3    Associated Diagnoses: Bipolar disorder, current episode depressed, severe, without psychotic features (H)      REGULOID 28.3 % POWD MIX 1 TEASPOON IN LIQUID AND DRINK BY MOUTH ONCE DAILY  Qty: 538 g, Refills: 11    Associated Diagnoses: Celiac disease       !! - Potential duplicate medications found. Please discuss with provider.      STOP taking these medications       cloNIDine (CATAPRES) 0.1 MG tablet Comments:   Reason for Stopping:         isosorbide mononitrate (IMDUR) 60 MG 24 hr tablet Comments:   Reason for Stopping:         losartan (COZAAR) 100 MG tablet Comments:   Reason for Stopping:             Allergies   Allergies   Allergen Reactions     Ace Inhibitors Cough     Amlodipine      Headache and plugged ears     Diclofenac Other (See Comments)     Balance problems     Gluten Meal Diarrhea     Hydralazine-Hctz      headache

## 2023-03-01 ENCOUNTER — PATIENT OUTREACH (OUTPATIENT)
Dept: CARE COORDINATION | Facility: CLINIC | Age: 88
End: 2023-03-01
Payer: MEDICARE

## 2023-03-01 ENCOUNTER — TELEPHONE (OUTPATIENT)
Dept: FAMILY MEDICINE | Facility: CLINIC | Age: 88
End: 2023-03-01
Payer: MEDICARE

## 2023-03-01 NOTE — PLAN OF CARE
Occupational Therapy Discharge Summary    Reason for therapy discharge:    Discharged to [facility]    Progress towards therapy goal(s). See goals on Care Plan in [facility] electronic health record for goal details.  Goals partially met.  Barriers to achieving goals:   discharge from facility.    Therapy recommendation(s):    No further therapy is recommended.per last seen OT recommending discharge home w/ assist no further OT recommended by last seen OT

## 2023-03-01 NOTE — LETTER
[facility] CARE COORDINATION  [address]  [place] [address]    March 1, 2023    Kamryn Miller  [address]    [place] [address]      Dear Kamryn,        I am a clinic care coordinator who works with Rolanda Wilcox MD with the [facility]. I wanted to introduce myself and provide you with my contact information for you to be able to call me with any questions or concerns. Below is a description of clinic care coordination and how I can further assist you.       The clinic care coordination team is made up of a registered nurse, , financial resource worker and community health worker who understand the health care system. The goal of clinic care coordination is to help you manage your health and improve access to the health care system. Our team works alongside your provider to assist you in determining your health and social needs. We can help you obtain health care and community resources, providing you with necessary information and education. We can work with you through any barriers and develop a care plan that helps coordinate and strengthen the communication between you and your care team.    Please feel free to contact me with any questions or concerns regarding care coordination and what we can offer.      We are focused on providing you with the highest-quality healthcare experience possible.    Sincerely,     Toby Myrick MSN, RN, PHN, CCM   Primary Care Clinical RN Care Coordinator  [facility]  3/1/2023   9:16 AM  [email or web address]  Office: [phone]

## 2023-03-01 NOTE — TELEPHONE ENCOUNTER
RN called Tequila back and left detailed VM with verbal orders.    Janice Barnard RN, BSN  [facility]: [place]

## 2023-03-01 NOTE — PROGRESS NOTES
Clinic Care Coordination Contact  [facility]/Voicemail       Clinical Data: Care Coordinator Outreach  Outreach attempted x 1.  Left message on patient's voicemail with call back information and requested return call.  Plan: Care Coordinator will send care coordination introduction letter with care coordinator contact information and explanation of care coordination services via [employer]. Care Coordinator will try to reach patient again in 1-2 business days.    Toby Myrick MSN, RN, PHN, [facility]   Primary Care Clinical RN Care Coordinator  [facility]  3/1/2023   9:17 AM  [email or web address]  Office: [phone]

## 2023-03-01 NOTE — TELEPHONE ENCOUNTER
Routing to PCP.    VEENA Mandel from [facility] calling.    Requesting verbal orders for skilled nursing x1 a week for 5 weeks and PT eval.    Ok for verbal?    RN to call back    Call back: [phone]  detailed VM tori Barnard RN

## 2023-03-01 NOTE — LETTER
[facility]  Patient Centered Plan of Care  About Me:        Patient Name:  Kamryn Miller    YOB: 1935  Age:         87 year old   [facility] MRN:    [id number] Telephone Information:  Home Phone [phone]   Mobile [phone]       Address:  [address] [place] [address] Email address:  [email or web address]      Emergency Contact(s)    Name Relationship Lgl Grd Work Phone Home Phone Mobile Phone   1. LEDY SANTIAGO* Daughter No  [phone]           Primary language:  English     needed? No   [facility] [employer]:  [phone] op. 1  Other communication barriers:Cognitive impairment    Preferred Method of Communication:  Mail  Current living arrangement: I live in assisted living    Mobility Status/ Medical Equipment: Independent w/Device        Health Maintenance  Health Maintenance Reviewed: Due/Overdue   Health Maintenance Due   Topic Date Due     HF ACTION PLAN  Never done     DTAP/TDAP/TD IMMUNIZATION (3 - Td or Tdap) 06/19/2022     ASTHMA ACTION PLAN  12/31/2022           My Access Plan  Medical Emergency 911   Primary Clinic Line [facility] - [phone]   24 Hour Appointment Line [phone] or  [phone]) (toll-free)   24 Hour Nurse Line [phone] (toll-free)   Preferred Urgent Care No data recorded   Preferred Hospital [facility]  [phone]     Preferred Pharmacy [facility] - MAIL ORDER [facility] - NON-EPRESCRIBE     Behavioral Health Crisis Line The National Suicide Prevention Lifeline at 1-720.763.6567 or Text/Call 188             My Care Team Members  Patient Care Team       Relationship Specialty Notifications Start End    Rolanda Wilcox MD PCP - General Family Practice  12/27/18     Phone: [phone] Fax: [phone] [address] [place] [address]    Rolanda Wilcox MD Assigned PCP   9/20/20     Phone:  [phone] Fax: [phone]         [address] [address]    Pati De Santiago NP Assigned Heart and Vascular Provider   6/13/21     Phone: [phone] Fax: [phone]         [address] [address]    Javier Landa MD Assigned OBGYN Provider   8/22/21     Phone: [phone] Fax: [phone]         [address] [facility] [facility] [address]    Ania Johnson MD Assigned Nephrology Provider   2/6/22     Phone: [phone] Fax: [phone]          [facility] [address] [address]    Dimas Palacios, PhD Assigned Behavioral Health Provider   2/13/22     Phone: [phone] Fax: [phone]         [address] [address]    Navarro Escalona MD Fellow   12/9/22     Phone: [phone] Fax: [phone]         [address] [facility] [address]    Dary Galvan MD Hospitalist Internal Medicine  12/9/22     Phone: [phone] Pager: [phone] Fax: [phone]        [address] [place] [address]    Vianey Bowman NP Nurse Practitioner Cardiovascular Disease  1/2/23     Phone: [phone] Pager: [phone] Fax: [phone]        [address] [place] [address]    Macie Samuel RN Specialty Care Coordinator  Admissions 1/2/23     Toby Briseno RN Lead Care Coordinator Primary Care - CC Admissions 3/1/23     Phone: [phone] Fax: [phone]                My Care Plans  Self Management and Treatment Plan  Care Plan       Action Plans on File:   Asthma        Depression          Advance Care Plans/Directives Type:   POLST      My Medical and Care Information  Problem List   Patient Active Problem List   Diagnosis     Hyperlipidemia LDL goal <70     Mild major depression (H)     Pulmonary hypertension (H)     Seasonal allergic rhinitis     Mitral insufficiency     Tricuspid insufficiency     Renal insufficiency     Tremors     Family history of diabetes mellitus     Celiac disease      History of colonic polyps     Benign essential hypertension     Hypothyroidism, unspecified type     CKD (chronic kidney disease) stage 3, GFR 30-59 ml/min (H)     Anemia     Disorder of kidney and ureter     Generalized anxiety disorder     Osteopenia     CAD S/P percutaneous coronary angioplasty     NYHA class 3 heart failure with preserved ejection fraction (H)     Moderate persistent asthma without complication     Hearing disorder, unspecified laterality     Impairment of balance     Bipolar 1 disorder, depressed, severe (H)     Essential hypertension     Stented coronary artery     Generalized weakness     Left leg claudication (H)     S/P TAVR (transcatheter aortic valve replacement)     Syncope and collapse     Anemia, unspecified type     Weight gain     Elevated brain natriuretic peptide (BNP) level     Fall      Current Medications and Allergies:  See printed Medication Report.    Care Coordination Start Date: 3/1/2023   Frequency of Care Coordination: No data recorded   Form Last Updated: 03/02/2023

## 2023-03-02 ASSESSMENT — ACTIVITIES OF DAILY LIVING (ADL): DEPENDENT_IADLS:: MEDICATION MANAGEMENT;MEAL PREPARATION;TRANSPORTATION

## 2023-03-02 NOTE — PROGRESS NOTES
Clinic Care Coordination Contact    Clinic Care Coordination Contact  OUTREACH    Referral Information:  Referral Source: IP Handoff    Primary Diagnosis: Cardiovascular - other    Chief Complaint   Patient presents with     Clinic Care Coordination - Initial     RN CC nurse care coordinator         Universal Utilization: The patient uses the [facility] system and the [facility].  Clinic Utilization  Difficulty keeping appointments:: No  Compliance Concerns: No  No-Show Concerns: No  No PCP office visit in Past Year: No  Utilization    Hospital Admissions  4             ED Visits  6             No Show Count (past year)  2                Current as of: 3/1/2023  2:04 PM              Clinical Concerns:  Current Medical Concerns: The patient was recently hospitalized for cardiovascular issues.  She underwent an angioplasty and diuresis.  The patient has returned to her assisted living apartment.  The nurse from the facility is in charge of her medications.  The patient is working on increasing her strength and stamina by walking around her apartment.  She will gradually add to the length of time she walks and or the distance that she walks as tolerated.  The patient agreed to care coordination.    Patient Active Problem List   Diagnosis     Hyperlipidemia LDL goal <70     Mild major depression (H)     Pulmonary hypertension (H)     Seasonal allergic rhinitis     Mitral insufficiency     Tricuspid insufficiency     Renal insufficiency     Tremors     Family history of diabetes mellitus     Celiac disease     History of colonic polyps     Benign essential hypertension     Hypothyroidism, unspecified type     CKD (chronic kidney disease) stage 3, GFR 30-59 ml/min (H)     Anemia     Disorder of kidney and ureter     Generalized anxiety disorder     Osteopenia     CAD S/P percutaneous coronary angioplasty     NYHA class 3 heart failure with preserved ejection fraction (H)     Moderate persistent asthma without  complication     Hearing disorder, unspecified laterality     Impairment of balance     Bipolar 1 disorder, depressed, severe (H)     Essential hypertension     Stented coronary artery     Generalized weakness     Left leg claudication (H)     S/P TAVR (transcatheter aortic valve replacement)     Syncope and collapse     Anemia, unspecified type     Weight gain     Elevated brain natriuretic peptide (BNP) level     Fall       Current Behavioral Concerns: None currently noted.  Education Provided to patient: Options for care coordination and assistance in designing a goal.  Pain  Pain (GOAL):: No  Health Maintenance Reviewed: Due/Overdue   Health Maintenance Due   Topic Date Due     HF ACTION PLAN  Never done     DTAP/TDAP/TD IMMUNIZATION (3 - Td or Tdap) 06/19/2022     ASTHMA ACTION PLAN  12/31/2022       Clinical Pathway: None    Medication Management:  Medication review status: Medications reviewed and no changes reported per patient.        Prior to leaving the hospital.    Functional Status:  Dependent ADLs:: Independent, Ambulation-walker  Dependent IADLs:: Medication Management, Meal Preparation, Transportation  Bed or wheelchair confined:: No  Mobility Status: Independent w/Device  Fallen 2 or more times in the past year?: Yes  Any fall with injury in the past year?: Yes    Living Situation:  Current living arrangement:: I live in assisted living  Type of residence:: Assisted living    Lifestyle & Psychosocial Needs:    Social Determinants of Health     Tobacco Use: Low Risk      Smoking Tobacco Use: Never     Smokeless Tobacco Use: Never     Passive Exposure: Not on file   Alcohol Use: Not on file   Financial Resource Strain: Not on file   Food Insecurity: Not on file   Transportation Needs: Not on file   Physical Activity: Not on file   Stress: Not on file   Social Connections: Not on file   Intimate Partner Violence: Not on file   Depression: Not at risk     PHQ-2 Score: 0   Housing Stability: Not on file      Diet:: No added salt  Inadequate nutrition (GOAL):: No  Tube Feeding: No  Inadequate activity/exercise (GOAL):: No  Significant changes in sleep pattern (GOAL): No        Christian or spiritual beliefs that impact treatment:: No  Mental health DX:: Yes  Mental health DX how managed:: Medication  Mental health management concern (GOAL):: No  Informal Support system:: Children             Resources and Interventions:  Current Resources:   Skilled Home Care Services: Skilled Nursing, Physical Therapy  Community Resources: None  Supplies Currently Used at Home: None  Equipment Currently Used at Home: grab bar, toilet, grab bar, tub/shower, walker, rolling, raised toilet seat, shower chair  Employment Status: retired         Advance Care Plan/Directive  Advanced Care Plans/Directives on file:: Yes  Type Advanced Care Plans/Directives: POLST    Referrals Placed: None         Care Plan:  Care Plan: General - increase strength and stamina     Problem: HP GENERAL PROBLEM     Goal: General Goal - please update text     Start Date: 3/2/2023 Expected End Date: 3/2/2024    Note:     Barriers: recently hospitalized  Strengths: engaged in care coordination  Patient expressed understanding of goal: yes  Action steps to achieve this goal:  1. I will walk around my apartment.  2. I will gradually increase time walked or distance walked as tolerated.                            Patient/Caregiver understanding: The patient has some understanding of the disease process.       Future Appointments              In 1 week [facility]    In 1 week Pati De Santiago NP [facility]    In 3 weeks  [facility]    In 3 weeks Ania Johnson MD [facility]    In 1 month  LAB [facility]    In 1 month Janice Kimbrough APRN CNP [facility]  [facility]          Plan: 1.  The patient will make and attend all recommended follow-up appointments.  2.  The patient will take all medications as prescribed by the providers.  3.  The patient will work on walking around her apartment to increase her strength and stamina.            Toby Myrick MSN, RN, PHN, CCM   Primary Care Clinical RN Care Coordinator  [facility]  3/2/2023   9:38 AM  [email or web address]  Office: [phone]

## 2023-03-06 LAB — HBA1C MFR BLD: NORMAL %

## 2023-03-13 ENCOUNTER — OFFICE VISIT (OUTPATIENT)
Dept: CARDIOLOGY | Facility: CLINIC | Age: 88
End: 2023-03-13
Attending: NURSE PRACTITIONER
Payer: MEDICARE

## 2023-03-13 ENCOUNTER — LAB (OUTPATIENT)
Dept: LAB | Facility: CLINIC | Age: 88
End: 2023-03-13
Payer: MEDICARE

## 2023-03-13 VITALS
WEIGHT: 146.6 LBS | DIASTOLIC BLOOD PRESSURE: 83 MMHG | OXYGEN SATURATION: 96 % | SYSTOLIC BLOOD PRESSURE: 144 MMHG | HEART RATE: 74 BPM | BODY MASS INDEX: 25.97 KG/M2

## 2023-03-13 DIAGNOSIS — I50.30 NYHA CLASS 3 HEART FAILURE WITH PRESERVED EJECTION FRACTION (H): ICD-10-CM

## 2023-03-13 DIAGNOSIS — Z98.61 CAD S/P PERCUTANEOUS CORONARY ANGIOPLASTY: ICD-10-CM

## 2023-03-13 DIAGNOSIS — Z95.2 S/P TAVR (TRANSCATHETER AORTIC VALVE REPLACEMENT): Primary | ICD-10-CM

## 2023-03-13 DIAGNOSIS — R79.89 ELEVATED BRAIN NATRIURETIC PEPTIDE (BNP) LEVEL: ICD-10-CM

## 2023-03-13 DIAGNOSIS — Z95.2 S/P TAVR (TRANSCATHETER AORTIC VALVE REPLACEMENT): ICD-10-CM

## 2023-03-13 DIAGNOSIS — I25.10 CAD S/P PERCUTANEOUS CORONARY ANGIOPLASTY: ICD-10-CM

## 2023-03-13 DIAGNOSIS — I10 ESSENTIAL HYPERTENSION: ICD-10-CM

## 2023-03-13 LAB
ANION GAP SERPL CALCULATED.3IONS-SCNC: 9 MMOL/L (ref 7–15)
BUN SERPL-MCNC: 35.3 MG/DL (ref 8–23)
CALCIUM SERPL-MCNC: 10.1 MG/DL (ref 8.8–10.2)
CHLORIDE SERPL-SCNC: 103 MMOL/L (ref 98–107)
CREAT SERPL-MCNC: 1.36 MG/DL (ref 0.51–0.95)
DEPRECATED HCO3 PLAS-SCNC: 29 MMOL/L (ref 22–29)
GFR SERPL CREATININE-BSD FRML MDRD: 38 ML/MIN/1.73M2
GLUCOSE SERPL-MCNC: 95 MG/DL (ref 70–99)
LVEF ECHO: NORMAL
POTASSIUM SERPL-SCNC: 4.7 MMOL/L (ref 3.4–5.3)
SODIUM SERPL-SCNC: 141 MMOL/L (ref 136–145)

## 2023-03-13 PROCEDURE — 36415 COLL VENOUS BLD VENIPUNCTURE: CPT | Performed by: PATHOLOGY

## 2023-03-13 PROCEDURE — 93308 TTE F-UP OR LMTD: CPT | Performed by: STUDENT IN AN ORGANIZED HEALTH CARE EDUCATION/TRAINING PROGRAM

## 2023-03-13 PROCEDURE — 99214 OFFICE O/P EST MOD 30 MIN: CPT | Mod: 25 | Performed by: NURSE PRACTITIONER

## 2023-03-13 PROCEDURE — G0463 HOSPITAL OUTPT CLINIC VISIT: HCPCS | Performed by: NURSE PRACTITIONER

## 2023-03-13 PROCEDURE — 93321 DOPPLER ECHO F-UP/LMTD STD: CPT | Performed by: STUDENT IN AN ORGANIZED HEALTH CARE EDUCATION/TRAINING PROGRAM

## 2023-03-13 PROCEDURE — 80048 BASIC METABOLIC PNL TOTAL CA: CPT | Performed by: PATHOLOGY

## 2023-03-13 PROCEDURE — 93325 DOPPLER ECHO COLOR FLOW MAPG: CPT | Performed by: STUDENT IN AN ORGANIZED HEALTH CARE EDUCATION/TRAINING PROGRAM

## 2023-03-13 RX ORDER — SACUBITRIL AND VALSARTAN 24; 26 MG/1; MG/1
TABLET, FILM COATED ORAL
Qty: 30 TABLET | Refills: 1 | Status: SHIPPED | OUTPATIENT
Start: 2023-03-13 | End: 2023-04-03

## 2023-03-13 ASSESSMENT — PAIN SCALES - GENERAL: PAINLEVEL: NO PAIN (0)

## 2023-03-13 NOTE — PROGRESS NOTES
[facility] HEART [facility]  CARDIOVASCULAR DIVISION    VALVE CLINIC RETURN VISIT    PRIMARY CARDIOLOGIST: Dr. Zuniga      PERTINENT CLINICAL HISTORY:     Kamryn Miller is a very pleasant 87 year old female who presents for 6 month TAVR follow-up. She has a history of HFpEF, HTN, HLD, significant CAD history (PTCA 1996, LAD and RCA stenting 2003, s/p LAD PCI 2020, recent cath 10/2022 with patent stents), CKD, chronic anemia and severe aortic valvular stenosis s/p (TAVR) with a 26mm Arndt Janis Ultra on 9/12/22 by Morro Burrows. Her post TAVR echo showed mean PG of 7 mmHg with trace PVL. She was noted to have 60% left femoral stenosis post procedure, but had adequate flow on peripheral angiogram. She was also noted to have new LBBB post procedure and discharged on a Cardionet which showed no high degree AVB.     She was last evaluated 1 month post TAVR. ECHO showed mean PG 13 mmHg with trace PVL. At the visit she reported worsening shortness of breath and angina. Underwent coronary angiogram 10/2022 which showed patent stents. Over the past few months her blood pressures have been labile and hypertension difficult to treat. She has intolerances to beta blockers, amlodipine and hydralazine. She was recently admitted with hypertensive urgency and HFpEF. ECHO showed normal TAVR function, LVEF slightly down to 45-50%. She was diuresed with IV lasix, losartan and Coreg were discontinued and Entresto was initiated. She was discharged at 141 lbs.     Since her recent admission she has been feeling fairly well. Her breathing has improved. She feels less fatigued. She denies orthopnea, PND, leg swelling. Weight has been stable at 142 lbs. She denies chest pain, palpitations, lightheadedness, syncope. She denies claudication, this has resolved. Only concern is elevated BP. We reviewed home BP log from assisted living which has been running 140's-160's systolic. She has received 2 doses of PRN lasix on 3/6/22 and 3/12/22  for SBP > 160.    Current cardiac medications: ASA 81 mg daily, Enstresto 24-26 mg (1/2 tablet twice daily), lasix 10 mg daily, crestor 10 mg daily, lasix PRN 10 mg for SBP > 160 or DBP > 100     PAST MEDICAL HISTORY:     Past Medical History:   Diagnosis Date     Aortic valvar stenosis 7/2010    mild     Asthma      Bipolar disorder (H)      CAD (coronary artery disease)     s/p angioplasty     Celiac disease      Colon polyps      Colon polyps 2012    every 3 year colonoscopy      Depression      High cholesterol      HTN      Mitral insufficiency      Pulmonary HTN (H)     mild     Tremors 7/10    drug induced from antidepressants     Tricuspid insufficiency         PAST SURGICAL HISTORY:     Past Surgical History:   Procedure Laterality Date     ANGIOGRAM  6/26/2009     ANGIOPLASTY  9/96    for angina     ANGIOPLASTY  8/03 - 9/03    X -2 - with stenst in coronary car.     APPENDECTOMY       BREAST BIOPSY, RT/LT      Breat Biopsy RT/LT, benign     BUNIONECTOMY  11/8/2011    Procedure:BUNIONECTOMY; Left donna bunionectomy; Surgeon:FREDY LITTLEJOHN; Location:[facility] OR     BUNIONECTOMY RT/LT  5/2007    right bunion and right 2nd hammertoe     CATARACT IOL, RT/LT  6/12 and 7/12    bilateral     COLONOSCOPY  2007, 2012    every 3 years for polyps     CV CORONARY ANGIOGRAM N/A 8/21/2020    Procedure: CV CORONARY ANGIOGRAM;  Surgeon: Gianluca Toscano MD;  [facility]     CV CORONARY ANGIOGRAM N/A 10/25/2022    Procedure: Coronary Angiogram;  Surgeon: Carter Douglass MD;  [facility]     CV INSTANTANEOUS WAVE-FREE RATIO N/A 10/25/2022    Procedure: Instantaneous Wave-Free Ratio;  Surgeon: Carter Douglass MD;  [facility]     CV LEFT HEART CATH N/A 8/21/2020    Procedure: CV LEFT HEART CATH;  Surgeon: Gianluca Toscano MD;  [facility]     CV LEFT HEART CATH N/A 11/3/2020    Procedure: CV  LEFT HEART CATH;  Surgeon: Tom Burrows MD;  Location:  HEART CARDIAC CATH LAB     CV LOWER EXTREMITY ANGIOGRAM BILATERAL N/A 11/3/2020    Procedure: CV ANGIOGRAM LOWER EXTREMITY BILATERAL;  Surgeon: Tom Burrows MD;  Location:  HEART CARDIAC CATH LAB     CV PCI STENT DRUG ELUTING N/A 8/21/2020    Procedure: Percutaneous Coronary Intervention Stent Drug Eluting;  Surgeon: Gianluca Toscano MD;  Location:  HEART CARDIAC CATH LAB     CV RIGHT HEART CATH MEASUREMENTS RECORDED N/A 8/21/2020    Procedure: CV RIGHT HEART CATH;  Surgeon: Gianluca Toscano MD;  Location:  HEART CARDIAC CATH LAB     CV RIGHT HEART CATH MEASUREMENTS RECORDED N/A 11/3/2020    Procedure: CV RIGHT HEART CATH;  Surgeon: Tom Burrows MD;  Location:  HEART CARDIAC CATH LAB     CV TRANSCATHETER AORTIC VALVE REPLACEMENT N/A 9/12/2022    Procedure: Transfemoral Transcatheter Aortic Valve Replacement with possible open heart bypass and or balloon pump placement and any indicated procedure;  Surgeon: Tom Burrows MD;  Location:  HEART CARDIAC CATH LAB     HEART CATH FEMORAL CANNULIZATION WITH OPEN STANDBY REPAIR AORTIC VALVE N/A 9/12/2022    Procedure: OR TRANSCATHETER AORTIC VALVE REPLACEMENT, OPEN FEMORAL ARTERY APPROACH;  Surgeon: Arthur Markham MD;  Location: [facility] CARDIAC CATH LAB     JOINT REPLACEMENT, HIP RT/LT  4/2008    right hip replaced     JOINT REPLACEMENT, HIP RT/LT  4/2009    left hip replaced     REPAIR HAMMER TOE  11/8/2011    Procedure:REPAIR HAMMER TOE; left 2nd hammertoe repair; Surgeon:FREDY LITTLEJOHN; Location: OR     SURGICAL HISTORY OF -   11/11    left bunion and 2nd hammertoe repair     TUBAL LIGATION       ZZC ANESTH,BLEPHAROPLASTY      (R) for drooping eyelid     Z APPENDECTOMY          CURRENT MEDICATIONS:     Current Outpatient Medications   Medication Sig Dispense Refill     acetaminophen (TYLENOL) 500 MG tablet Take 1 tablet (500 mg) by mouth every 6 hours  as needed for pain 60 tablet 4     albuterol (PROAIR HFA/PROVENTIL HFA/VENTOLIN HFA) 108 (90 Base) MCG/ACT inhaler Inhale 2 puffs into the lungs every 6 hours as needed for wheezing or shortness of breath / dyspnea 18 g 3     alendronate (FOSAMAX) 70 MG tablet TAKE 1 TABLET BY MOUTH ONCE WEEKLY 4 tablet 23     aspirin (ASA) 81 MG EC tablet Take 1 tablet (81 mg) by mouth daily 30 tablet 0     azelastine (ASTEPRO) 0.15 % nasal spray USE 1 SPRAY IN EACH NOSTRIL ONCE A DAY 30 mL 11     calcium citrate (CITRACAL) 950 (200 Ca) MG tablet TAKE 1 TABLET BY MOUTH ONCE DAILY 28 tablet 11     cholecalciferol (VITAMIN D3) 1250 mcg (66066 units) capsule TAKE 1 CAPSULE BY MOUTH ONCE WEEKLY 4 capsule 11     desvenlafaxine (PRISTIQ) 100 MG 24 hr tablet Take 100 mg by mouth daily       fluticasone-salmeterol (ADVAIR DISKUS) 500-50 MCG/ACT inhaler INHALE 1 PUFF BY MOUTH TWICE DAILY (Patient taking differently: 1 puff daily INHALE 1 PUFF BY MOUTH TWICE DAILY) 60 each 0     folic acid (FOLVITE) 400 MCG tablet Take 1 tablet (400 mcg) by mouth daily 90 tablet 0     furosemide (LASIX) 20 MG tablet Take 0.5 tablets (10 mg) by mouth daily 45 tablet 0     iron polysaccharides (NIFEREX) 150 MG capsule Take 1 capsule (150 mg) by mouth daily 30 capsule 1     lamoTRIgine (LAMICTAL) 25 MG tablet Take 25 mg by mouth daily with 200 mg tablet for a total dose of 225 mg       LAMOTRIGINE 200 MG PO TABS Take 200 mg by mouth daily with 25 mg tablet for a total dose of 225 mg       levothyroxine (SYNTHROID/LEVOTHROID) 50 MCG tablet TAKE 1 TABLET BY MOUTH ONCE DAILY 28 tablet 11     liothyronine (CYTOMEL) 5 MCG tablet Take 2 tablets (10 mcg) by mouth daily 30 tablet 3     memantine (NAMENDA) 10 MG tablet Take 1 tablet (10 mg) by mouth daily 30 tablet 3     mirtazapine (REMERON) 7.5 MG tablet Take 1 tablet (7.5 mg) by mouth At Bedtime 30 tablet 3     Multiple Vitamin (TAB-A-JENNIFER) TABS Take 1 tablet by mouth daily 30 tablet PRN     psyllium  (METAMUCIL/KONSYL) 58.6 % powder Take 1 tspn in liquid daily       REGULOID 28.3 % POWD MIX 1 TEASPOON IN LIQUID AND DRINK BY MOUTH ONCE DAILY (Patient not taking: Reported on 1/3/2023) 538 g 11     rosuvastatin (CRESTOR) 10 MG tablet Take 1 tablet (10 mg) by mouth daily 30 tablet 0     sacubitril-valsartan (ENTRESTO) 24-26 MG per tablet Take 0.5 tablets by mouth 2 times daily 30 tablet 0        ALLERGIES:     Allergies   Allergen Reactions     Ace Inhibitors Cough     Amlodipine      Headache and plugged ears     Diclofenac Other (See Comments)     Balance problems     Gluten Meal Diarrhea     Hydralazine-Hctz      headache        FAMILY HISTORY:     Family History   Problem Relation Age of Onset     Asthma Mother      Cerebrovascular Disease Mother      Arthritis Mother      Depression Mother      Lipids Sister      Hypertension Sister      Heart Disease Sister      Lipids Sister      Hypertension Sister      Lipids Sister      Breast Cancer Sister         SOCIAL HISTORY:     Social History     Socioeconomic History     Marital status:      Spouse name: Adrien     Number of children: 2     Years of education: 16     Highest education level: None   Occupational History     Employer: RETIRED     Comment: Retired in 2002.    Tobacco Use     Smoking status: Never Smoker     Smokeless tobacco: Never Used   Substance and Sexual Activity     Alcohol use: No     Alcohol/week: 0.0 standard drinks     Types: 1 Standard drinks or equivalent per week     Drug use: No     Sexual activity: Not Currently     Partners: Male        REVIEW OF SYSTEMS:     Constitutional: No fevers or chills  Skin: No new rash or itching  Eyes: No acute change in vision  Ears/Nose/Throat: No purulent rhinorrhea, new hearing loss, or new vertigo  Respiratory: No cough or hemoptysis  Cardiovascular: See HPI  Gastrointestinal: No change in appetite, vomiting, hematemesis or diarrhea  Genitourinary: No dysuria or hematuria  Musculoskeletal: No new  back pain, neck pain or muscle pain  Neurologic: No new headaches, focal weakness or behavior changes  Psychiatric: No hallucinations, excessive alcohol consumption or illegal drug usage  Hematologic/Lymphatic/Immunologic: No bleeding, chills, fever, night sweats or weight loss  Endocrine: No new cold intolerance, heat intolerance, polyphagia, polydipsia or polyuria      PHYSICAL EXAMINATION:     BP (!) 144/83 (BP Location: Right arm, Patient Position: Chair, Cuff Size: Adult Regular)   Pulse 74   Wt 66.5 kg (146 lb 9.6 oz)   SpO2 96%   BMI 25.97 kg/m      GENERAL: No acute distress.  HEENT: EOMI. Sclerae white, not injected. Nares clear. Pharynx without erythema or exudate.   Neck: No adenopathy. No thyromegaly. No jugular venous distension.   Heart: Regular rate and rhythm. No murmur.   Lungs: Clear to auscultation. No ronchi, wheezes, rales.   Abdomen: Soft, nontender, nondistended. Bowel sounds present.  Extremities: No clubbing, cyanosis, or edema.   Neurologic: Alert and oriented to person/place/time, normal speech and affect. No focal deficits.  Skin: No petechiae, purpura or rash.     LABORATORY DATA:     LIPID RESULTS:  Lab Results   Component Value Date    CHOL 155 10/24/2022    CHOL 175 10/23/2020    HDL 54 10/24/2022    HDL 71 10/23/2020    LDL 76 10/24/2022    LDL 83 10/23/2020    TRIG 124 10/24/2022    TRIG 103 10/23/2020    CHOLHDLRATIO 2.8 09/18/2015       LIVER ENZYME RESULTS:  Lab Results   Component Value Date    AST 36 (H) 02/26/2023    AST 27 05/17/2021    ALT 38 (H) 02/26/2023    ALT 38 05/17/2021       CBC RESULTS:  Lab Results   Component Value Date    WBC 4.1 02/28/2023    WBC 4.6 05/17/2021    RBC 4.58 02/28/2023    RBC 3.97 05/17/2021    HGB 13.3 02/28/2023    HGB 12.9 05/17/2021    HCT 43.7 02/28/2023    HCT 40.3 05/17/2021    MCV 95 02/28/2023     (H) 05/17/2021    MCH 29.0 02/28/2023    MCH 32.5 05/17/2021    MCHC 30.4 (L) 02/28/2023    MCHC 32.0 05/17/2021    RDW 17.2 (H)  02/28/2023    RDW 12.7 05/17/2021     02/28/2023     05/17/2021       BMP RESULTS:  Lab Results   Component Value Date     02/28/2023     06/04/2021    POTASSIUM 4.0 02/28/2023    POTASSIUM 4.6 10/05/2022    POTASSIUM 3.4 06/04/2021    CHLORIDE 104 02/28/2023    CHLORIDE 103 10/05/2022    CHLORIDE 106 06/04/2021    CO2 23 02/28/2023    CO2 27 10/05/2022    CO2 30 06/04/2021    ANIONGAP 12 02/28/2023    ANIONGAP 7 10/05/2022    ANIONGAP 6 06/04/2021     (H) 02/28/2023    GLC 85 01/31/2023    GLC 98 10/05/2022     (H) 06/04/2021    BUN 43.5 (H) 02/28/2023    BUN 39 (H) 10/05/2022    BUN 27 06/04/2021    CR 1.50 (H) 02/28/2023    CR 1.50 (H) 06/04/2021    GFRESTIMATED 34 (L) 02/28/2023    GFRESTIMATED 32 (L) 06/04/2021    GFRESTBLACK 37 (L) 06/04/2021    PRINCE 10.0 02/28/2023    PRINCE 9.4 06/04/2021        A1C RESULTS:  Lab Results   Component Value Date    A1C  02/15/2023      Comment:      The previously reported result may be inaccurate due to a laboratory instrument calibration issue. Providers should order a new test to be performed if clinically indicated. [facility] will issue a credit for this test.  This is a corrected result. Previous result was 6.4 % on 2/15/2023 at  6:09 PM CST    A1C 5.0 06/30/2009       INR RESULTS:  Lab Results   Component Value Date    INR 1.12 02/22/2023    INR 1.21 (H) 11/30/2022    INR 1.05 11/03/2020    INR 2.28 (H) 04/23/2009          PROCEDURES & FURTHER ASSESSMENTS:       ECHO 3/2023:  Interpretation Summary  The 26 mm ES3 TAVR is well-seated. Leaflet opening is not clearly visualized.  There is trace valvular without paravalvular regurgitation. The Vmax is 2.4  m/s, the mean gradient is 14 mmHg, the dimensionless index is 0.80, and the  acceleration time is 80 ms.  Left ventricular function is decreased. The ejection fraction is 35-40%  (moderately reduced). Moderate diffuse hypokinesis is present.  Global right ventricular  function is normal. The right ventricle is normal  size.  This study was compared with the study from 02/23/2023. There appears to have  been a decline in the global LV function but the endocardial definition is  poor on this study. The TAVR function is unchanged.  ______________________________________________________________________________  Left Ventricle  Mild concentric wall thickening consistent with left ventricular hypertrophy  is present. Left ventricular function is decreased. The ejection fraction is  35-40% (moderately reduced). Moderate diffuse hypokinesis is present.     Right Ventricle  Global right ventricular function is normal. The right ventricle is normal  size.     Atria  The atria cannot be assessed.     Mitral Valve  Severe mitral annular calcification is present. Mild mitral insufficiency is  present.     Aortic Valve  The 26 mm ES3 TAVR is well-seated. Leaflet opening is not clearly visualized.  There is trace valvular without paravalvular regurgitation. The Vmax is 2.4  m/s, the mean gradient is 14 mmHg, the dimensionless index is 0.80, and the  acceleration time is 80 ms.     Tricuspid Valve  The valve leaflets are not well visualized. Trace tricuspid insufficiency is  present. Pulmonary artery systolic pressure cannot be assessed.     Pulmonic Valve  Mild pulmonic insufficiency is present.     Vessels  The inferior vena cava cannot be assessed. Sinuses of Valsalva 3.0 cm.  Ascending aorta 3.4 cm.     Pericardium  No pericardial effusion is present.     Compared to Previous Study  This study was compared with the study from 02/23/2023 . There appears to have  been a decline in the global LV function but the endocardial definition is  poor on this study. The TAVR function is unchanged.  ______________________________________________________________________________  MMode/2D Measurements & Calculations  IVSd: 1.0 cm     LVIDd: 4.9 cm  LVIDs: 3.9 cm  LVPWd: 1.4 cm  FS: 20.5 %  LV mass(C)d:  224.4 grams  LV mass(C)dI: 134.6 grams/m2  Ao root diam: 3.0 cm  LA dimension: 4.1 cm  asc Aorta Diam: 3.4 cm  LA/Ao: 1.4  LVOT diam: 1.7 cm  LVOT area: 2.2 cm2  RWT: 0.55     Doppler Measurements & Calculations  MV E max skinny: 92.6 cm/sec  MV A max skinny: 110.1 cm/sec  MV E/A: 0.84  MV max P.7 mmHg  MV mean PG: 3.2 mmHg  MV V2 VTI: 24.8 cm  MVA(VTI): 3.4 cm2  MV dec slope: 515.9 cm/sec2  MV dec time: 0.18 sec  Ao V2 max: 233.9 cm/sec  Ao max P.0 mmHg  Ao V2 mean: 165.6 cm/sec  Ao mean P.7 mmHg  Ao V2 VTI: 46.7 cm  LUCY(I,D): 1.8 cm2  LUCY(V,D): 1.8 cm2  LV V1 max PG: 15.2 mmHg  LV V1 max: 195.0 cm/sec  LV V1 VTI: 38.0 cm  SV(LVOT): 83.5 ml  SI(LVOT): 50.1 ml/m2  PA V2 max: 19.0 cm/sec  PA max P.14 mmHg     PI end-d skinny: 145.2 cm/sec  AV Skinny Ratio (DI): 0.83  LUCY Index (cm2/m2): 1.1     ______________________________________________________________________________  Report approved by: Everardo Whitt 2023 12:07 PM    Coronary angiogram 10/25/22    Two vessel CAD with prior PCI to the RCA and LAD, otherwise mild non obstructive CAD elsewhere.  Patent stents in the RCA and LAD with mild to moderate in stent restenosis of the proximal LAD stents (progressed since last angiogram from ).  Proximal LAD ISR hemodynamically not significant by dPR at 0.91.      Plan      Follow bedrest per protocol    Continued medical management and lifestyle modifications for cardiovascular risk factor optimizations.    Follow up visit with Nurse Practitioner in 1-2 weeks.    Arterial sheath removed from radial artery with TR band placement.    Cardiac rehabilitation.    Return to the primary inpatient team for further evaluation and managmenet     Coronary Findings    Diagnostic  Dominance: Right  Left Main   The vessel is moderate in size and is angiographically normal.      Left Anterior Descending   The vessel is moderate in size.   Ost LAD to Prox LAD lesion is 50% stenosed. Measured dPr. Pre adenosine  administration dPr: 0.91.   Previously placed Mid LAD-1 drug eluting stent is widely patent.   Previously placed Mid LAD-2 drug eluting stent is widely patent.   0% stenosed Dist LAD lesion.      First Diagonal Branch   The vessel is small.   1st Diag lesion is 50% stenosed.      Second Diagonal Branch   The vessel is moderate in size. The vessel exhibits minimal luminal irregularities.      Left Circumflex   The vessel is moderate in size.      First Obtuse Marginal Branch   The vessel is moderate in size.   1st Mrg lesion is 30% stenosed.      Second Obtuse Marginal Branch   The vessel is moderate in size and is angiographically normal.      Third Obtuse Marginal Branch   The vessel is moderate in size and is angiographically normal.      Right Coronary Artery   The vessel is moderate in size.   Prox RCA lesion is 40% stenosed.   Mid RCA lesion is 20% stenosed. The lesion was previously treated using a stent of unknown type.      Right Posterior Descending Artery   The vessel is moderate in size. The vessel exhibits minimal luminal irregularities.      Right Posterior Atrioventricular Artery   The vessel is moderate in size. The vessel exhibits minimal luminal irregularities.      First Right Posterolateral Branch   The vessel is moderate in size. The vessel exhibits minimal luminal irregularities.                   EKG 10/24/22:  Personally reviewed  NSR with LBBB unchanged    ECHO 10/24/22:  Interpretation Summary  Status post 26 mm Arndt Janis Ultra valve TAVR.  Global and regional left ventricular function is normal with an EF of 55-60%.  Global right ventricular function is normal. The right ventricle is normal  size.  The TAVR is well-seated. Leaflet opening is not clearly visualized. There is  no valvular regurgitation but there is trace paravalvular regurgitation. The  Vmax is 2.6 m/s, the mean gradient is 13 mmHg, the dimensionless index is  0.34, and the acceleration time is 110 ms.  IVC diameter and  respiratory changes fall into an intermediate range  suggesting an RA pressure of 8 mmHg.  This study was compared with the study from 09/13/2022. No significant changes  noted. The TAVR gradient has increased modestly, as is to be expected.  ______________________________________________________________________________  Left Ventricle  Global and regional left ventricular function is normal with an EF of 55-60%.  Left ventricular wall thickness is normal. Left ventricular size is normal.  Left ventricular diastolic function is not assessable.     Right Ventricle  Global right ventricular function is normal. The right ventricle is normal  size.     Atria  Both atria appear normal. Moderate left atrial enlargement is present.     Mitral Valve  Mild mitral annular calcification is present. Mild mitral insufficiency is  present.     Aortic Valve  The TAVR is well-seated. Leaflet opening is not clearly visualized. There is  no valvular regurgitation but there is trace paravalvular regurgitation. The  Vmax is 2.6 m/s, the mean gradient is 13 mmHg, the dimensionless index is  0.34, and the acceleration time is 110 ms.     Tricuspid Valve  The tricuspid valve is normal. Trace tricuspid insufficiency is present. The  right ventricular systolic pressure is approximated at 21.2 mmHg plus the  right atrial pressure.     Pulmonic Valve  The pulmonic valve is normal. Trace pulmonic insufficiency is present.     Vessels  The aorta root cannot be assessed. Ascending aorta 3.2 cm. IVC diameter and  respiratory changes fall into an intermediate range suggesting an RA pressure  of 8 mmHg.     Pericardium  No pericardial effusion is present.     Compared to Previous Study  This study was compared with the study from 09/13/2022 . No significant  changes noted. The TAVR gradient has increased modestly, as is to be expected.  ______________________________________________________________________________  MMode/2D Measurements &  Calculations     IVSd: 1.1 cm  LVIDd: 4.3 cm  LVPWd: 0.89 cm  LV mass(C)d: 139.1 grams  LV mass(C)dI: 83.4 grams/m2  asc Aorta Diam: 3.2 cm  LVOT diam: 1.9 cm  LVOT area: 2.9 cm2  RWT: 0.42     Doppler Measurements & Calculations  MV E max skinny: 171.0 cm/sec  MV A max skinny: 145.4 cm/sec  MV E/A: 1.2  MV max PG: 10.4 mmHg  MV mean P.3 mmHg  MV V2 VTI: 54.1 cm  MVA(VTI): 1.0 cm2  MV dec slope: 856.6 cm/sec2  MV dec time: 0.20 sec  Ao V2 max: 254.2 cm/sec  Ao max P.0 mmHg  Ao V2 mean: 171.2 cm/sec  Ao mean P.7 mmHg  Ao V2 VTI: 53.9 cm  LUCY(I,D): 1.0 cm2  LUCY(V,D): 0.99 cm2  AI P1/2t: 688.4 msec  LV V1 max PG: 3.1 mmHg  LV V1 max: 88.0 cm/sec  LV V1 VTI: 19.5 cm  SV(LVOT): 55.9 ml  SI(LVOT): 33.6 ml/m2  TR max skinny: 229.8 cm/sec  TR max P.2 mmHg     AV Skinny Ratio (DI): 0.35  LUCY Index (cm2/m2): 0.62       Cardiac monitor 22:      ECG dated 22  SR 1st degree AVB and LBBB    Echocardiogram dated  22:    Interpretation Summary  Normal biventricular function.  Post 26 mm Arndt Janis Ultra valve Aortic Valve placement. The valve is  well seated. There is trivial periprosthetic regurgitation. Mean gradient is 7  mmHg.  The man RA pressure is normal.  No pericardial effusion is present.    NYHA Class: II    Coronary angiogram 2020:    Physicians    Panel Physicians Referring Physician Case Authorizing Physician   Gianluca Toscano MD (Primary) Self, MD Dorcas Cardona Demetris, MD Ibrahim, Abdisamad, MD (Fellow - Assisting)     Jose Alfredo Matias MD (Fellow - Assisting)       Procedures    Panel 1    Primary Surgeon:  Gianluca Toscano MD   Procedure:  CV CORONARY ANGIOGRAM    CV RIGHT HEART CATH    CV LEFT HEART CATH    Percutaneous Coronary Intervention Stent Drug Eluting        Indications    Severe aortic stenosis [I35.0 (ICD-10-CM)]   Other ill-defined heart diseases [I51.89 (ICD-10-CM)]     Comments/Patient Narrative    84 year old female with with aortic stenosis here  for cors/RHC for TAVR work up.     Pre Procedure Diagnosis    severe aortic stenosis    Post Procedure Diagnosis    s/p EDDIE on LAD x 3.      Conclusion         Dist LAD lesion is 70% stenosed.    Mid LAD-1 lesion is 80% stenosed.    Mid LAD-2 lesion is 60% stenosed.    Mid RCA lesion is 40% stenosed.    Prox RCA lesion is 40% stenosed.    Right sided filling pressures are normal.    Normal PA pressures.    Left sided filling pressures are normal.    Reduced cardiac output.     1. LAD - 3 EDDIE were placed in the mid to distal LAD (2.5 x 28 mm, 3.5 x 12 mm, and 3.0 x 8 mm)  2. RHC showed normal biventricular filling pressures. Normal PA pressure. Reduced cardiac output.            Plan      Follow bedrest per protocol    Continued medical management and lifestyle modifications for cardiovascular risk factor optimizations.    Arterial sheath removed from femoral artery with closure device.    Discharge today per protocol     Coronary Findings    Diagnostic  Dominance: Right  Left Anterior Descending   Mid LAD-1 lesion is 80% stenosed.   Mid LAD-2 lesion is 60% stenosed.   Dist LAD lesion is 70% stenosed.      Right Coronary Artery   The vessel is moderate in size.   Prox RCA lesion is 40% stenosed.   Mid RCA lesion is 40% stenosed.         Intervention     Mid LAD-1 lesion   Stent   The pre-interventional distal flow is decreased (FALGUNI 2). A STENT SYNERGY DRUG ELUTING 2.07M89VH [id number] drug eluting stent was successfully placed. No pre-stent angioplasty was performed. The post-interventional distal flow is normal (FALGUNI 3). The intervention was successful. No complications occurred at this lesion.   There is a 0% residual stenosis post intervention.      Mid LAD-2 lesion   Stent   The pre-interventional distal flow is decreased (FALGUNI 2). A STENT SYNERGY DRUG ELUTING 3.74S67OE [id number] drug eluting stent was successfully placed. No pre-stent angioplasty was performed. The strut is apposed. No post-stent  angioplasty was performed. The post-interventional distal flow is normal (FALGUNI 3). The intervention was successful. No complications occurred at this lesion.   There is a 0% residual stenosis post intervention.      Dist LAD lesion   Stent   The pre-interventional distal flow is decreased (FALGUNI 2). A STENT SYNERGY DRUG ELUTING 3.48V20US [id number] drug eluting stent was successfully placed. No pre-stent angioplasty was performed. The strut is apposed. No post-stent angioplasty was performed. The post-interventional distal flow is normal (FALGUNI 3). The intervention was successful. No complications occurred at this lesion.   There is a 0% residual stenosis post intervention.           Hemodynamics    Right sided filling pressures are normal.Left sided filling pressures are normal. Normal PA pressures.Reduced cardiac output level.            CLINICAL IMPRESSION:     Kamryn Miller is a very pleasant 87 year old female who presents for 6 month TAVR follow-up. She has a history of HFpEF, HTN, HLD, significant CAD history (PTCA 1996, LAD and RCA stenting 2003, s/p LAD PCI 2020, recent cath 10/2022 with patent stents), CKD, chronic anemia and severe aortic valvular stenosis s/p (TAVR) with a 26mm Arndt Janis Ultra on 9/12/22 by [facility]. Her post TAVR echo showed mean PG of 7 mmHg with trace PVL. She was noted to have 60% left femoral stenosis post procedure, but had adequate flow on peripheral angiogram. She was also noted to have new LBBB post procedure and discharged on a Cardionet which showed no high degree AVB.     She was last evaluated 1 month post TAVR. ECHO showed mean PG 13 mmHg with trace PVL. At the visit she reported worsening shortness of breath and angina. Underwent coronary angiogram 10/2022 which showed patent stents. Over the past few months her blood pressures have been labile and hypertension difficult to treat. She has intolerances to beta blockers, amlodipine and hydralazine. She was  recently admitted with hypertensive urgency and HFpEF. She was diuresed with IV lasix, losartan and Coreg were discontinued and Entresto was initiated. She was discharged at 141 lbs. She reports feeling well without heart failure symptoms. Weight stable at 142 lbs. Only concern is elevated SBP 140s-160s. ECHO today normal TAVR function mean PG stable at 14 mmHg, LVEF slightly down to 35-40%    Aortic stenosis s/p TAVR:  - ECHO today with normal TAVR function, mean PG 14 mmHg without significant PVL  - Continue ASA as above  - SBE prophylaxis lifelong  - ECHO in 6 months for TAVR eval     HFpEF:  - EF had been preserved 55-60%, last admission 2/2023 45-50%, today down to 35-40%  - Unclear etiology, recent cath with patent stents, possibly related to poorly controlled HTN  - Increase Entresto to full tab in the morning, continue 1/2 tab afternoon   - Repeat BMP in 1-2 weeks  - Call RN at [facility] ([phone]) in 2 weeks to review BP log  - If BP consistently > 130/80 will increase Entresto to full tablet BID   - Limited echo in 3 months to reassess LVEF   - Euvolemic today at 142 lbs, continue lasix 10 mg daily    Coronary artery disease w/stable angina:  HTN:  HLD:  - Continue ASA 81 mg daily lifelong  - Continue high intensity statin  - Multiple intolerances to BB, hydralazine, amlodipine   - Enstresto as above for BP control   - Continue exercise and heart healthy diet    PAD:   - denies claudication, normal pulse exam on left  - recent BALDO's mildly abnormal on left  - Continue ASA 81 mg lifelong  - vascular consult should she develop recurrent claudication      RTC: [facility] next months, Dr. Zuniga in 3 months, TAVR clinic 6 months     JESSICA Rousseau, CNP  [facility] Cardiology Team      CC  Patient Care Team:  Rolanda Wilcox MD as PCP - General (Family Practice)  Rolanda Wilcox MD as Assigned PCP  Pati De Santiago NP as Assigned Heart and Vascular Provider  Javier Landa MD as Assigned OBGYN  Provider  Ania Johnson MD as Assigned Nephrology Provider  Dimas Palacios, PhD as Assigned Behavioral Health Provider  Navarro Escalona MD as Fellow  Dary Galvan MD as Hospitalist (Internal Medicine)  Vianey Bowman NP as Nurse Practitioner (Cardiovascular Disease)  Macie Samuel, RN as Specialty Care Coordinator  Toby Briseno, RN as Lead Care Coordinator (Primary Care - CC)  JERRY COYLE

## 2023-03-13 NOTE — PATIENT INSTRUCTIONS
You were seen today in the Structural [facility] Clinic at the [facility].    Cardiology provider you saw during your visit: Pati De Santiago NP    Instructions:   Have labs drawn today.   Continue aspirin 81 mg daily lifelong.  Increase Entresto to full tablet in the morning, continue 1/2 tablet in the evening.  Repeat BMP in 2 weeks.   We will call you in 2 weeks to review BP log - if remains elevated we will continue to increase Enstresto.  Monitor daily weights - call if you gain > 3 lbs in a day or 5 lbs in a week from baseline (142 lbs).  For all future dental cleanings and procedures you will need to take antibiotics prior - see instructions below.  Follow-up with [facility] clinic 1 month.   Follow-up with your Primary Cardiologist in 3 months with echo prior   Follow-up in [facility] 9/2023 with echo, labs and ECG prior     Prevention of Infective (Bacterial) Endocarditis:  You are at increased risk for developing adverse outcomes from infective endocarditis (IE), also known as bacterial endocarditis (BE) because of the new device in your heart. The guidelines for prevention of IE are to give patients antibiotics prior to any dental procedures that involve manipulation of gingival tissue or the periapical region of teeth, or perforation of the oral mucosa:      It is recommended to take Amoxicillin 2 gm by mouth as a single dose 30 to 60 minutes before procedure.     OR if allergic to Penicillin or Ampicillin:     Cephalexin 2 gm by mouth, or  Clindamycin 600 mg by mouth, or  Azithromycin or Clarithromycin 500 mg PO       Questions and scheduling:   First call: Structural Heart  Polina Moustapha [phone]    General scheduling line: [phone].   First press #1 for the [employer] and then press #3 for Medical Questions to reach the Cardiology triage nurse.     On Call Cardiologist for after hours or on weekends: [phone], press option #4 and ask to speak to the on-call Cardiologist.

## 2023-03-13 NOTE — LETTER
3/13/2023      RE: Kamryn Miller  [address]  [address]  [place] [address]       Dear Colleague,    Thank you for the opportunity to participate in the care of your patient, Kamryn Miller, at the [facility] [place] at [facility]. Please see a copy of my visit note below.        [facility] HEART CARE  CARDIOVASCULAR DIVISION    VALVE CLINIC RETURN VISIT    PRIMARY CARDIOLOGIST: Dr. Zuniga      PERTINENT CLINICAL HISTORY:     Kamryn Miller is a very pleasant 87 year old female who presents for 6 month TAVR follow-up. She has a history of HFpEF, HTN, HLD, significant CAD history (PTCA 1996, LAD and RCA stenting 2003, s/p LAD PCI 2020, recent cath 10/2022 with patent stents), CKD, chronic anemia and severe aortic valvular stenosis s/p (TAVR) with a 26mm Arndt Jnais Ultra on 9/12/22 by Morro Burrows. Her post TAVR echo showed mean PG of 7 mmHg with trace PVL. She was noted to have 60% left femoral stenosis post procedure, but had adequate flow on peripheral angiogram. She was also noted to have new LBBB post procedure and discharged on a Cardionet which showed no high degree AVB.     She was last evaluated 1 month post TAVR. ECHO showed mean PG 13 mmHg with trace PVL. At the visit she reported worsening shortness of breath and angina. Underwent coronary angiogram 10/2022 which showed patent stents. Over the past few months her blood pressures have been labile and hypertension difficult to treat. She has intolerances to beta blockers, amlodipine and hydralazine. She was recently admitted with hypertensive urgency and HFpEF. ECHO showed normal TAVR function, LVEF slightly down to 45-50%. She was diuresed with IV lasix, losartan and Coreg were discontinued and Entresto was initiated. She was discharged at 141 lbs.     Since her recent admission she has been feeling fairly well. Her breathing has improved. She feels less fatigued. She  denies orthopnea, PND, leg swelling. Weight has been stable at 142 lbs. She denies chest pain, palpitations, lightheadedness, syncope. She denies claudication, this has resolved. Only concern is elevated BP. We reviewed home BP log from assisted living which has been running 140's-160's systolic. She has received 2 doses of PRN lasix on 3/6/22 and 3/12/22 for SBP > 160.    Current cardiac medications: ASA 81 mg daily, Enstresto 24-26 mg (1/2 tablet twice daily), lasix 10 mg daily, crestor 10 mg daily, lasix PRN 10 mg for SBP > 160 or DBP > 100     PAST MEDICAL HISTORY:     Past Medical History:   Diagnosis Date     Aortic valvar stenosis 7/2010    mild     Asthma      Bipolar disorder (H)      CAD (coronary artery disease)     s/p angioplasty     Celiac disease      Colon polyps      Colon polyps 2012    every 3 year colonoscopy      Depression      High cholesterol      HTN      Mitral insufficiency      Pulmonary HTN (H)     mild     Tremors 7/10    drug induced from antidepressants     Tricuspid insufficiency         PAST SURGICAL HISTORY:     Past Surgical History:   Procedure Laterality Date     ANGIOGRAM  6/26/2009     ANGIOPLASTY  9/96    for angina     ANGIOPLASTY  8/03 - 9/03    X -2 - with stenst in coronary car.     APPENDECTOMY       BREAST BIOPSY, RT/LT      Breat Biopsy RT/LT, benign     BUNIONECTOMY  11/8/2011    Procedure:BUNIONECTOMY; Left donna bunionectomy; Surgeon:FREDY LITTLEJOHN; Location:[facility] OR     BUNIONECTOMY RT/LT  5/2007    right bunion and right 2nd hammertoe     CATARACT IOL, RT/LT  6/12 and 7/12    bilateral     COLONOSCOPY  2007, 2012    every 3 years for polyps     CV CORONARY ANGIOGRAM N/A 8/21/2020    Procedure: CV CORONARY ANGIOGRAM;  Surgeon: Gianluca Toscano MD;  Location: [facility] CARDIAC CATH LAB     CV CORONARY ANGIOGRAM N/A 10/25/2022    Procedure: Coronary Angiogram;  Surgeon: Carter Douglass MD;  Location: [facility] CARDIAC CATH LAB     CV INSTANTANEOUS  WAVE-FREE RATIO N/A 10/25/2022    Procedure: Instantaneous Wave-Free Ratio;  Surgeon: Carter Douglass MD;  Location:  HEART CARDIAC CATH LAB     CV LEFT HEART CATH N/A 8/21/2020    Procedure: CV LEFT HEART CATH;  Surgeon: Gianluca Toscano MD;  Location:  HEART CARDIAC CATH LAB     CV LEFT HEART CATH N/A 11/3/2020    Procedure: CV LEFT HEART CATH;  Surgeon: Tom Burrows MD;  Location:  HEART CARDIAC CATH LAB     CV LOWER EXTREMITY ANGIOGRAM BILATERAL N/A 11/3/2020    Procedure: CV ANGIOGRAM LOWER EXTREMITY BILATERAL;  Surgeon: Tom Burrows MD;  Location:  HEART CARDIAC CATH LAB     CV PCI STENT DRUG ELUTING N/A 8/21/2020    Procedure: Percutaneous Coronary Intervention Stent Drug Eluting;  Surgeon: Gianluca Toscano MD;  Location:  HEART CARDIAC CATH LAB     CV RIGHT HEART CATH MEASUREMENTS RECORDED N/A 8/21/2020    Procedure: CV RIGHT HEART CATH;  Surgeon: Gianluca Toscano MD;  Location:  HEART CARDIAC CATH LAB     CV RIGHT HEART CATH MEASUREMENTS RECORDED N/A 11/3/2020    Procedure: CV RIGHT HEART CATH;  Surgeon: Tom Burrows MD;  Location:  HEART CARDIAC CATH LAB     CV TRANSCATHETER AORTIC VALVE REPLACEMENT N/A 9/12/2022    Procedure: Transfemoral Transcatheter Aortic Valve Replacement with possible open heart bypass and or balloon pump placement and any indicated procedure;  Surgeon: Tom Burrows MD;  Location: [facility] CARDIAC CATH LAB     HEART CATH FEMORAL CANNULIZATION WITH OPEN STANDBY REPAIR AORTIC VALVE N/A 9/12/2022    Procedure: OR TRANSCATHETER AORTIC VALVE REPLACEMENT, OPEN FEMORAL ARTERY APPROACH;  Surgeon: Arthur Markham MD;  Location: [facility] CARDIAC CATH LAB     JOINT REPLACEMENT, HIP RT/LT  4/2008    right hip replaced     JOINT REPLACEMENT, HIP RT/LT  4/2009    left hip replaced     REPAIR HAMMER TOE  11/8/2011    Procedure:REPAIR HAMMER TOE; left 2nd hammertoe repair; Surgeon:FREDY LITTLEJOHN; Location:[facility]      SURGICAL HISTORY OF -   11/11    left bunion and 2nd hammertoe repair     TUBAL LIGATION       ZZC ANESTH,BLEPHAROPLASTY      (R) for drooping eyelid     ZZC APPENDECTOMY          CURRENT MEDICATIONS:     Current Outpatient Medications   Medication Sig Dispense Refill     acetaminophen (TYLENOL) 500 MG tablet Take 1 tablet (500 mg) by mouth every 6 hours as needed for pain 60 tablet 4     albuterol (PROAIR HFA/PROVENTIL HFA/VENTOLIN HFA) 108 (90 Base) MCG/ACT inhaler Inhale 2 puffs into the lungs every 6 hours as needed for wheezing or shortness of breath / dyspnea 18 g 3     alendronate (FOSAMAX) 70 MG tablet TAKE 1 TABLET BY MOUTH ONCE WEEKLY 4 tablet 23     aspirin (ASA) 81 MG EC tablet Take 1 tablet (81 mg) by mouth daily 30 tablet 0     azelastine (ASTEPRO) 0.15 % nasal spray USE 1 SPRAY IN EACH NOSTRIL ONCE A DAY 30 mL 11     calcium citrate (CITRACAL) 950 (200 Ca) MG tablet TAKE 1 TABLET BY MOUTH ONCE DAILY 28 tablet 11     cholecalciferol (VITAMIN D3) 1250 mcg (52430 units) capsule TAKE 1 CAPSULE BY MOUTH ONCE WEEKLY 4 capsule 11     desvenlafaxine (PRISTIQ) 100 MG 24 hr tablet Take 100 mg by mouth daily       fluticasone-salmeterol (ADVAIR DISKUS) 500-50 MCG/ACT inhaler INHALE 1 PUFF BY MOUTH TWICE DAILY (Patient taking differently: 1 puff daily INHALE 1 PUFF BY MOUTH TWICE DAILY) 60 each 0     folic acid (FOLVITE) 400 MCG tablet Take 1 tablet (400 mcg) by mouth daily 90 tablet 0     furosemide (LASIX) 20 MG tablet Take 0.5 tablets (10 mg) by mouth daily 45 tablet 0     iron polysaccharides (NIFEREX) 150 MG capsule Take 1 capsule (150 mg) by mouth daily 30 capsule 1     lamoTRIgine (LAMICTAL) 25 MG tablet Take 25 mg by mouth daily with 200 mg tablet for a total dose of 225 mg       LAMOTRIGINE 200 MG PO TABS Take 200 mg by mouth daily with 25 mg tablet for a total dose of 225 mg       levothyroxine (SYNTHROID/LEVOTHROID) 50 MCG tablet TAKE 1 TABLET BY MOUTH ONCE DAILY 28 tablet 11     liothyronine (CYTOMEL)  5 MCG tablet Take 2 tablets (10 mcg) by mouth daily 30 tablet 3     memantine (NAMENDA) 10 MG tablet Take 1 tablet (10 mg) by mouth daily 30 tablet 3     mirtazapine (REMERON) 7.5 MG tablet Take 1 tablet (7.5 mg) by mouth At Bedtime 30 tablet 3     Multiple Vitamin (TAB-A-JENNIFER) TABS Take 1 tablet by mouth daily 30 tablet PRN     psyllium (METAMUCIL/KONSYL) 58.6 % powder Take 1 tspn in liquid daily       REGULOID 28.3 % POWD MIX 1 TEASPOON IN LIQUID AND DRINK BY MOUTH ONCE DAILY (Patient not taking: Reported on 1/3/2023) 538 g 11     rosuvastatin (CRESTOR) 10 MG tablet Take 1 tablet (10 mg) by mouth daily 30 tablet 0     sacubitril-valsartan (ENTRESTO) 24-26 MG per tablet Take 0.5 tablets by mouth 2 times daily 30 tablet 0        ALLERGIES:     Allergies   Allergen Reactions     Ace Inhibitors Cough     Amlodipine      Headache and plugged ears     Diclofenac Other (See Comments)     Balance problems     Gluten Meal Diarrhea     Hydralazine-Hctz      headache        FAMILY HISTORY:     Family History   Problem Relation Age of Onset     Asthma Mother      Cerebrovascular Disease Mother      Arthritis Mother      Depression Mother      Lipids Sister      Hypertension Sister      Heart Disease Sister      Lipids Sister      Hypertension Sister      Lipids Sister      Breast Cancer Sister         SOCIAL HISTORY:     Social History     Socioeconomic History     Marital status:      Spouse name: Adrien     Number of children: 2     Years of education: 16     Highest education level: None   Occupational History     Employer: RETIRED     Comment: Retired in 2002.    Tobacco Use     Smoking status: Never Smoker     Smokeless tobacco: Never Used   Substance and Sexual Activity     Alcohol use: No     Alcohol/week: 0.0 standard drinks     Types: 1 Standard drinks or equivalent per week     Drug use: No     Sexual activity: Not Currently     Partners: Male        REVIEW OF SYSTEMS:     Constitutional: No fevers or  chills  Skin: No new rash or itching  Eyes: No acute change in vision  Ears/Nose/Throat: No purulent rhinorrhea, new hearing loss, or new vertigo  Respiratory: No cough or hemoptysis  Cardiovascular: See HPI  Gastrointestinal: No change in appetite, vomiting, hematemesis or diarrhea  Genitourinary: No dysuria or hematuria  Musculoskeletal: No new back pain, neck pain or muscle pain  Neurologic: No new headaches, focal weakness or behavior changes  Psychiatric: No hallucinations, excessive alcohol consumption or illegal drug usage  Hematologic/Lymphatic/Immunologic: No bleeding, chills, fever, night sweats or weight loss  Endocrine: No new cold intolerance, heat intolerance, polyphagia, polydipsia or polyuria      PHYSICAL EXAMINATION:     BP (!) 144/83 (BP Location: Right arm, Patient Position: Chair, Cuff Size: Adult Regular)   Pulse 74   Wt 66.5 kg (146 lb 9.6 oz)   SpO2 96%   BMI 25.97 kg/m      GENERAL: No acute distress.  HEENT: EOMI. Sclerae white, not injected. Nares clear. Pharynx without erythema or exudate.   Neck: No adenopathy. No thyromegaly. No jugular venous distension.   Heart: Regular rate and rhythm. No murmur.   Lungs: Clear to auscultation. No ronchi, wheezes, rales.   Abdomen: Soft, nontender, nondistended. Bowel sounds present.  Extremities: No clubbing, cyanosis, or edema.   Neurologic: Alert and oriented to person/place/time, normal speech and affect. No focal deficits.  Skin: No petechiae, purpura or rash.     LABORATORY DATA:     LIPID RESULTS:  Lab Results   Component Value Date    CHOL 155 10/24/2022    CHOL 175 10/23/2020    HDL 54 10/24/2022    HDL 71 10/23/2020    LDL 76 10/24/2022    LDL 83 10/23/2020    TRIG 124 10/24/2022    TRIG 103 10/23/2020    CHOLHDLRATIO 2.8 09/18/2015       LIVER ENZYME RESULTS:  Lab Results   Component Value Date    AST 36 (H) 02/26/2023    AST 27 05/17/2021    ALT 38 (H) 02/26/2023    ALT 38 05/17/2021       CBC RESULTS:  Lab Results   Component Value  Date    WBC 4.1 02/28/2023    WBC 4.6 05/17/2021    RBC 4.58 02/28/2023    RBC 3.97 05/17/2021    HGB 13.3 02/28/2023    HGB 12.9 05/17/2021    HCT 43.7 02/28/2023    HCT 40.3 05/17/2021    MCV 95 02/28/2023     (H) 05/17/2021    MCH 29.0 02/28/2023    MCH 32.5 05/17/2021    MCHC 30.4 (L) 02/28/2023    MCHC 32.0 05/17/2021    RDW 17.2 (H) 02/28/2023    RDW 12.7 05/17/2021     02/28/2023     05/17/2021       BMP RESULTS:  Lab Results   Component Value Date     02/28/2023     06/04/2021    POTASSIUM 4.0 02/28/2023    POTASSIUM 4.6 10/05/2022    POTASSIUM 3.4 06/04/2021    CHLORIDE 104 02/28/2023    CHLORIDE 103 10/05/2022    CHLORIDE 106 06/04/2021    CO2 23 02/28/2023    CO2 27 10/05/2022    CO2 30 06/04/2021    ANIONGAP 12 02/28/2023    ANIONGAP 7 10/05/2022    ANIONGAP 6 06/04/2021     (H) 02/28/2023    GLC 85 01/31/2023    GLC 98 10/05/2022     (H) 06/04/2021    BUN 43.5 (H) 02/28/2023    BUN 39 (H) 10/05/2022    BUN 27 06/04/2021    CR 1.50 (H) 02/28/2023    CR 1.50 (H) 06/04/2021    GFRESTIMATED 34 (L) 02/28/2023    GFRESTIMATED 32 (L) 06/04/2021    GFRESTBLACK 37 (L) 06/04/2021    PRINCE 10.0 02/28/2023    PRINCE 9.4 06/04/2021        A1C RESULTS:  Lab Results   Component Value Date    A1C  02/15/2023      Comment:      The previously reported result may be inaccurate due to a laboratory instrument calibration issue. Providers should order a new test to be performed if clinically indicated. [facility] will issue a credit for this test.  This is a corrected result. Previous result was 6.4 % on 2/15/2023 at  6:09 PM CST    A1C 5.0 06/30/2009       INR RESULTS:  Lab Results   Component Value Date    INR 1.12 02/22/2023    INR 1.21 (H) 11/30/2022    INR 1.05 11/03/2020    INR 2.28 (H) 04/23/2009          PROCEDURES & FURTHER ASSESSMENTS:       ECHO 3/2023:  Interpretation Summary  The 26 mm ES3 TAVR is well-seated. Leaflet opening is not clearly  visualized.  There is trace valvular without paravalvular regurgitation. The Vmax is 2.4  m/s, the mean gradient is 14 mmHg, the dimensionless index is 0.80, and the  acceleration time is 80 ms.  Left ventricular function is decreased. The ejection fraction is 35-40%  (moderately reduced). Moderate diffuse hypokinesis is present.  Global right ventricular function is normal. The right ventricle is normal  size.  This study was compared with the study from 02/23/2023. There appears to have  been a decline in the global LV function but the endocardial definition is  poor on this study. The TAVR function is unchanged.  ______________________________________________________________________________  Left Ventricle  Mild concentric wall thickening consistent with left ventricular hypertrophy  is present. Left ventricular function is decreased. The ejection fraction is  35-40% (moderately reduced). Moderate diffuse hypokinesis is present.     Right Ventricle  Global right ventricular function is normal. The right ventricle is normal  size.     Atria  The atria cannot be assessed.     Mitral Valve  Severe mitral annular calcification is present. Mild mitral insufficiency is  present.     Aortic Valve  The 26 mm ES3 TAVR is well-seated. Leaflet opening is not clearly visualized.  There is trace valvular without paravalvular regurgitation. The Vmax is 2.4  m/s, the mean gradient is 14 mmHg, the dimensionless index is 0.80, and the  acceleration time is 80 ms.     Tricuspid Valve  The valve leaflets are not well visualized. Trace tricuspid insufficiency is  present. Pulmonary artery systolic pressure cannot be assessed.     Pulmonic Valve  Mild pulmonic insufficiency is present.     Vessels  The inferior vena cava cannot be assessed. Sinuses of Valsalva 3.0 cm.  Ascending aorta 3.4 cm.     Pericardium  No pericardial effusion is present.     Compared to Previous Study  This study was compared with the study from 02/23/2023 .  There appears to have  been a decline in the global LV function but the endocardial definition is  poor on this study. The TAVR function is unchanged.  ______________________________________________________________________________  MMode/2D Measurements & Calculations  IVSd: 1.0 cm     LVIDd: 4.9 cm  LVIDs: 3.9 cm  LVPWd: 1.4 cm  FS: 20.5 %  LV mass(C)d: 224.4 grams  LV mass(C)dI: 134.6 grams/m2  Ao root diam: 3.0 cm  LA dimension: 4.1 cm  asc Aorta Diam: 3.4 cm  LA/Ao: 1.4  LVOT diam: 1.7 cm  LVOT area: 2.2 cm2  RWT: 0.55     Doppler Measurements & Calculations  MV E max skinny: 92.6 cm/sec  MV A max skinny: 110.1 cm/sec  MV E/A: 0.84  MV max P.7 mmHg  MV mean PG: 3.2 mmHg  MV V2 VTI: 24.8 cm  MVA(VTI): 3.4 cm2  MV dec slope: 515.9 cm/sec2  MV dec time: 0.18 sec  Ao V2 max: 233.9 cm/sec  Ao max P.0 mmHg  Ao V2 mean: 165.6 cm/sec  Ao mean P.7 mmHg  Ao V2 VTI: 46.7 cm  LUCY(I,D): 1.8 cm2  LUCY(V,D): 1.8 cm2  LV V1 max PG: 15.2 mmHg  LV V1 max: 195.0 cm/sec  LV V1 VTI: 38.0 cm  SV(LVOT): 83.5 ml  SI(LVOT): 50.1 ml/m2  PA V2 max: 19.0 cm/sec  PA max P.14 mmHg     PI end-d skinny: 145.2 cm/sec  AV Skinny Ratio (DI): 0.83  LUCY Index (cm2/m2): 1.1     ______________________________________________________________________________  Report approved by: Everardo Whitt 2023 12:07 PM    Coronary angiogram 10/25/22    Two vessel CAD with prior PCI to the RCA and LAD, otherwise mild non obstructive CAD elsewhere.  Patent stents in the RCA and LAD with mild to moderate in stent restenosis of the proximal LAD stents (progressed since last angiogram from ).  Proximal LAD ISR hemodynamically not significant by dPR at 0.91.      Plan      Follow bedrest per protocol    Continued medical management and lifestyle modifications for cardiovascular risk factor optimizations.    Follow up visit with Nurse Practitioner in 1-2 weeks.    Arterial sheath removed from radial artery with TR band placement.    Cardiac  rehabilitation.    Return to the primary inpatient team for further evaluation and managmenet     Coronary Findings    Diagnostic  Dominance: Right  Left Main   The vessel is moderate in size and is angiographically normal.      Left Anterior Descending   The vessel is moderate in size.   Ost LAD to Prox LAD lesion is 50% stenosed. Measured dPr. Pre adenosine administration dPr: 0.91.   Previously placed Mid LAD-1 drug eluting stent is widely patent.   Previously placed Mid LAD-2 drug eluting stent is widely patent.   0% stenosed Dist LAD lesion.      First Diagonal Branch   The vessel is small.   1st Diag lesion is 50% stenosed.      Second Diagonal Branch   The vessel is moderate in size. The vessel exhibits minimal luminal irregularities.      Left Circumflex   The vessel is moderate in size.      First Obtuse Marginal Branch   The vessel is moderate in size.   1st Mrg lesion is 30% stenosed.      Second Obtuse Marginal Branch   The vessel is moderate in size and is angiographically normal.      Third Obtuse Marginal Branch   The vessel is moderate in size and is angiographically normal.      Right Coronary Artery   The vessel is moderate in size.   Prox RCA lesion is 40% stenosed.   Mid RCA lesion is 20% stenosed. The lesion was previously treated using a stent of unknown type.      Right Posterior Descending Artery   The vessel is moderate in size. The vessel exhibits minimal luminal irregularities.      Right Posterior Atrioventricular Artery   The vessel is moderate in size. The vessel exhibits minimal luminal irregularities.      First Right Posterolateral Branch   The vessel is moderate in size. The vessel exhibits minimal luminal irregularities.                   EKG 10/24/22:  Personally reviewed  NSR with LBBB unchanged    ECHO 10/24/22:  Interpretation Summary  Status post 26 mm Arndt Janis Ultra valve TAVR.  Global and regional left ventricular function is normal with an EF of 55-60%.  Global right  ventricular function is normal. The right ventricle is normal  size.  The TAVR is well-seated. Leaflet opening is not clearly visualized. There is  no valvular regurgitation but there is trace paravalvular regurgitation. The  Vmax is 2.6 m/s, the mean gradient is 13 mmHg, the dimensionless index is  0.34, and the acceleration time is 110 ms.  IVC diameter and respiratory changes fall into an intermediate range  suggesting an RA pressure of 8 mmHg.  This study was compared with the study from 09/13/2022. No significant changes  noted. The TAVR gradient has increased modestly, as is to be expected.  ______________________________________________________________________________  Left Ventricle  Global and regional left ventricular function is normal with an EF of 55-60%.  Left ventricular wall thickness is normal. Left ventricular size is normal.  Left ventricular diastolic function is not assessable.     Right Ventricle  Global right ventricular function is normal. The right ventricle is normal  size.     Atria  Both atria appear normal. Moderate left atrial enlargement is present.     Mitral Valve  Mild mitral annular calcification is present. Mild mitral insufficiency is  present.     Aortic Valve  The TAVR is well-seated. Leaflet opening is not clearly visualized. There is  no valvular regurgitation but there is trace paravalvular regurgitation. The  Vmax is 2.6 m/s, the mean gradient is 13 mmHg, the dimensionless index is  0.34, and the acceleration time is 110 ms.     Tricuspid Valve  The tricuspid valve is normal. Trace tricuspid insufficiency is present. The  right ventricular systolic pressure is approximated at 21.2 mmHg plus the  right atrial pressure.     Pulmonic Valve  The pulmonic valve is normal. Trace pulmonic insufficiency is present.     Vessels  The aorta root cannot be assessed. Ascending aorta 3.2 cm. IVC diameter and  respiratory changes fall into an intermediate range suggesting an RA  pressure  of 8 mmHg.     Pericardium  No pericardial effusion is present.     Compared to Previous Study  This study was compared with the study from 2022 . No significant  changes noted. The TAVR gradient has increased modestly, as is to be expected.  ______________________________________________________________________________  MMode/2D Measurements & Calculations     IVSd: 1.1 cm  LVIDd: 4.3 cm  LVPWd: 0.89 cm  LV mass(C)d: 139.1 grams  LV mass(C)dI: 83.4 grams/m2  asc Aorta Diam: 3.2 cm  LVOT diam: 1.9 cm  LVOT area: 2.9 cm2  RWT: 0.42     Doppler Measurements & Calculations  MV E max skinny: 171.0 cm/sec  MV A max skinny: 145.4 cm/sec  MV E/A: 1.2  MV max PG: 10.4 mmHg  MV mean P.3 mmHg  MV V2 VTI: 54.1 cm  MVA(VTI): 1.0 cm2  MV dec slope: 856.6 cm/sec2  MV dec time: 0.20 sec  Ao V2 max: 254.2 cm/sec  Ao max P.0 mmHg  Ao V2 mean: 171.2 cm/sec  Ao mean P.7 mmHg  Ao V2 VTI: 53.9 cm  LUCY(I,D): 1.0 cm2  LUCY(V,D): 0.99 cm2  AI P1/2t: 688.4 msec  LV V1 max PG: 3.1 mmHg  LV V1 max: 88.0 cm/sec  LV V1 VTI: 19.5 cm  SV(LVOT): 55.9 ml  SI(LVOT): 33.6 ml/m2  TR max skinny: 229.8 cm/sec  TR max P.2 mmHg     AV Skinny Ratio (DI): 0.35  LUCY Index (cm2/m2): 0.62       Cardiac monitor 22:      ECG dated 22  SR 1st degree AVB and LBBB    Echocardiogram dated  22:    Interpretation Summary  Normal biventricular function.  Post 26 mm Arndt Janis Ultra valve Aortic Valve placement. The valve is  well seated. There is trivial periprosthetic regurgitation. Mean gradient is 7  mmHg.  The man RA pressure is normal.  No pericardial effusion is present.    NYHA Class: II    Coronary angiogram 2020:    Physicians    Panel Physicians Referring Physician Case Authorizing Physician   Gianluca Toscano MD (Primary) Self, Referred, Gianluca Junior MD   SimmonsShy matos MD (Fellow - Assisting)     Jose Alfredo Matias MD (Fellow - Assisting)       Procedures    Panel 1    Primary Surgeon:   Gianluca Toscano MD   Procedure:  CV CORONARY ANGIOGRAM    CV RIGHT HEART CATH    CV LEFT HEART CATH    Percutaneous Coronary Intervention Stent Drug Eluting        Indications    Severe aortic stenosis [I35.0 (ICD-10-CM)]   Other ill-defined heart diseases [I51.89 (ICD-10-CM)]     Comments/Patient Narrative    84 year old female with with aortic stenosis here for cors/RHC for TAVR work up.     Pre Procedure Diagnosis    severe aortic stenosis    Post Procedure Diagnosis    s/p EDDIE on LAD x 3.      Conclusion         Dist LAD lesion is 70% stenosed.    Mid LAD-1 lesion is 80% stenosed.    Mid LAD-2 lesion is 60% stenosed.    Mid RCA lesion is 40% stenosed.    Prox RCA lesion is 40% stenosed.    Right sided filling pressures are normal.    Normal PA pressures.    Left sided filling pressures are normal.    Reduced cardiac output.     1. LAD - 3 EDDIE were placed in the mid to distal LAD (2.5 x 28 mm, 3.5 x 12 mm, and 3.0 x 8 mm)  2. RHC showed normal biventricular filling pressures. Normal PA pressure. Reduced cardiac output.            Plan      Follow bedrest per protocol    Continued medical management and lifestyle modifications for cardiovascular risk factor optimizations.    Arterial sheath removed from femoral artery with closure device.    Discharge today per protocol     Coronary Findings    Diagnostic  Dominance: Right  Left Anterior Descending   Mid LAD-1 lesion is 80% stenosed.   Mid LAD-2 lesion is 60% stenosed.   Dist LAD lesion is 70% stenosed.      Right Coronary Artery   The vessel is moderate in size.   Prox RCA lesion is 40% stenosed.   Mid RCA lesion is 40% stenosed.         Intervention     Mid LAD-1 lesion   Stent   The pre-interventional distal flow is decreased (FALGUNI 2). A STENT SYNERGY DRUG ELUTING 2.65X65TX [id number] drug eluting stent was successfully placed. No pre-stent angioplasty was performed. The post-interventional distal flow is normal (FALGUNI 3). The intervention was  successful. No complications occurred at this lesion.   There is a 0% residual stenosis post intervention.      Mid LAD-2 lesion   Stent   The pre-interventional distal flow is decreased (FALGUNI 2). A STENT SYNERGY DRUG ELUTING 3.43Y55OO [id number] drug eluting stent was successfully placed. No pre-stent angioplasty was performed. The strut is apposed. No post-stent angioplasty was performed. The post-interventional distal flow is normal (FALGUNI 3). The intervention was successful. No complications occurred at this lesion.   There is a 0% residual stenosis post intervention.      Dist LAD lesion   Stent   The pre-interventional distal flow is decreased (FALGUNI 2). A STENT SYNERGY DRUG ELUTING 3.52R07NI [id number] drug eluting stent was successfully placed. No pre-stent angioplasty was performed. The strut is apposed. No post-stent angioplasty was performed. The post-interventional distal flow is normal (FALGUNI 3). The intervention was successful. No complications occurred at this lesion.   There is a 0% residual stenosis post intervention.           Hemodynamics    Right sided filling pressures are normal.Left sided filling pressures are normal. Normal PA pressures.Reduced cardiac output level.            CLINICAL IMPRESSION:     Kamryn Miller is a very pleasant 87 year old female who presents for 6 month TAVR follow-up. She has a history of HFpEF, HTN, HLD, significant CAD history (PTCA 1996, LAD and RCA stenting 2003, s/p LAD PCI 2020, recent cath 10/2022 with patent stents), CKD, chronic anemia and severe aortic valvular stenosis s/p (TAVR) with a 26mm Arndt Janis Ultra on 9/12/22 by Morro Burrows. Her post TAVR echo showed mean PG of 7 mmHg with trace PVL. She was noted to have 60% left femoral stenosis post procedure, but had adequate flow on peripheral angiogram. She was also noted to have new LBBB post procedure and discharged on a Cardionet which showed no high degree AVB.     She was last evaluated 1  month post TAVR. ECHO showed mean PG 13 mmHg with trace PVL. At the visit she reported worsening shortness of breath and angina. Underwent coronary angiogram 10/2022 which showed patent stents. Over the past few months her blood pressures have been labile and hypertension difficult to treat. She has intolerances to beta blockers, amlodipine and hydralazine. She was recently admitted with hypertensive urgency and HFpEF. She was diuresed with IV lasix, losartan and Coreg were discontinued and Entresto was initiated. She was discharged at 141 lbs. She reports feeling well without heart failure symptoms. Weight stable at 142 lbs. Only concern is elevated SBP 140s-160s. ECHO today normal TAVR function mean PG stable at 14 mmHg, LVEF slightly down to 35-40%    Aortic stenosis s/p TAVR:  - ECHO today with normal TAVR function, mean PG 14 mmHg without significant PVL  - Continue ASA as above  - SBE prophylaxis lifelong  - ECHO in 6 months for TAVR eval     HFpEF:  - EF had been preserved 55-60%, last admission 2/2023 45-50%, today down to 35-40%  - Unclear etiology, recent cath with patent stents, possibly related to poorly controlled HTN  - Increase Entresto to full tab in the morning, continue 1/2 tab afternoon   - Repeat BMP in 1-2 weeks  - Call RN at [facility] ([phone]) in 2 weeks to review BP log  - If BP consistently > 130/80 will increase Entresto to full tablet BID   - Limited echo in 3 months to reassess LVEF   - Euvolemic today at 142 lbs, continue lasix 10 mg daily    Coronary artery disease w/stable angina:  HTN:  HLD:  - Continue ASA 81 mg daily lifelong  - Continue high intensity statin  - Multiple intolerances to BB, hydralazine, amlodipine   - Enstresto as above for BP control   - Continue exercise and heart healthy diet    PAD:   - denies claudication, normal pulse exam on left  - recent BALDO's mildly abnormal on left  - Continue ASA 81 mg lifelong  - vascular consult should she develop recurrent  claudication      RTC: [facility] next months, Dr. Zuniga in 3 months, TAVR clinic 6 months     JESSICA Rousseau, CNP  [facility] Cardiology Team      CC  Patient Care Team:  Rolanda Wilcox MD as PCP - General (Family Practice)  Javier Landa MD as Assigned OBGYN Provider  Ania Johnson MD as Assigned Nephrology Provider  Dimas Palacios, PhD as Assigned Behavioral Health Provider  Navarro Escalona MD as Fellow  Dary Galvan MD as Hospitalist (Internal Medicine)  Vianey Bowman NP as Nurse Practitioner (Cardiovascular Disease)  Macie Samuel, RN as Specialty Care Coordinator  Toby Briseno, RN as Lead Care Coordinator (Primary Care - CC)

## 2023-03-13 NOTE — NURSING NOTE
Chief Complaint   Patient presents with     Follow Up     6 months TAVR follow-up     Vitals were taken and medications reconciled.    Preston Durán, EMT  11:53 AM

## 2023-03-20 ENCOUNTER — DOCUMENTATION ONLY (OUTPATIENT)
Dept: LAB | Facility: CLINIC | Age: 88
End: 2023-03-20
Payer: MEDICARE

## 2023-03-20 NOTE — PROGRESS NOTES
Kamryn Miller has an upcoming lab appointment: patient coming in for labs same day as clinic visit with Dr Johnson. Does she need to do that? Labs will not be done by her appt with the provider.  Please call patient and cancel lab appt if she does not need to come in before the provider appt.  Thank you    Future Appointments   Date Time Provider Department Center   3/27/2023  1:30 PM [facility]   3/27/2023  2:45 PM Ania Johnson MD [facility]     A My Chart message sent to patient informing her that she can have labs drawn prior to appointment with the provider when seeing the provider same day as lab drawn and no lab appointment needed. Lab appointment canceled.   VEENA Hearn

## 2023-03-22 ENCOUNTER — LAB REQUISITION (OUTPATIENT)
Dept: LAB | Facility: CLINIC | Age: 88
End: 2023-03-22
Payer: MEDICARE

## 2023-03-22 DIAGNOSIS — N18.9 CHRONIC KIDNEY DISEASE, UNSPECIFIED: ICD-10-CM

## 2023-03-24 LAB
ANION GAP SERPL CALCULATED.3IONS-SCNC: 12 MMOL/L (ref 7–15)
BUN SERPL-MCNC: 23.9 MG/DL (ref 8–23)
CALCIUM SERPL-MCNC: 9.6 MG/DL (ref 8.8–10.2)
CHLORIDE SERPL-SCNC: 104 MMOL/L (ref 98–107)
CREAT SERPL-MCNC: 1.31 MG/DL (ref 0.51–0.95)
DEPRECATED HCO3 PLAS-SCNC: 26 MMOL/L (ref 22–29)
GFR SERPL CREATININE-BSD FRML MDRD: 39 ML/MIN/1.73M2
GLUCOSE SERPL-MCNC: 91 MG/DL (ref 70–99)
POTASSIUM SERPL-SCNC: 3.7 MMOL/L (ref 3.4–5.3)
SODIUM SERPL-SCNC: 142 MMOL/L (ref 136–145)

## 2023-03-24 PROCEDURE — P9604 ONE-WAY ALLOW PRORATED TRIP: HCPCS | Mod: ORL | Performed by: NURSE PRACTITIONER

## 2023-03-24 PROCEDURE — 36415 COLL VENOUS BLD VENIPUNCTURE: CPT | Mod: ORL | Performed by: NURSE PRACTITIONER

## 2023-03-24 PROCEDURE — 80048 BASIC METABOLIC PNL TOTAL CA: CPT | Mod: ORL | Performed by: NURSE PRACTITIONER

## 2023-03-26 DIAGNOSIS — I50.32 CHRONIC HEART FAILURE WITH PRESERVED EJECTION FRACTION (HFPEF) (H): Primary | ICD-10-CM

## 2023-03-27 ENCOUNTER — OFFICE VISIT (OUTPATIENT)
Dept: NEPHROLOGY | Facility: CLINIC | Age: 88
End: 2023-03-27
Payer: MEDICARE

## 2023-03-27 VITALS
WEIGHT: 142 LBS | SYSTOLIC BLOOD PRESSURE: 155 MMHG | DIASTOLIC BLOOD PRESSURE: 80 MMHG | BODY MASS INDEX: 25.15 KG/M2 | HEART RATE: 72 BPM

## 2023-03-27 DIAGNOSIS — I50.30 NYHA CLASS 3 HEART FAILURE WITH PRESERVED EJECTION FRACTION (H): ICD-10-CM

## 2023-03-27 DIAGNOSIS — N18.32 ANEMIA DUE TO STAGE 3B CHRONIC KIDNEY DISEASE (H): Primary | ICD-10-CM

## 2023-03-27 DIAGNOSIS — D63.1 ANEMIA DUE TO STAGE 3B CHRONIC KIDNEY DISEASE (H): Primary | ICD-10-CM

## 2023-03-27 DIAGNOSIS — D50.9 IRON DEFICIENCY ANEMIA, UNSPECIFIED IRON DEFICIENCY ANEMIA TYPE: ICD-10-CM

## 2023-03-27 DIAGNOSIS — N18.32 STAGE 3B CHRONIC KIDNEY DISEASE (H): ICD-10-CM

## 2023-03-27 PROCEDURE — 99215 OFFICE O/P EST HI 40 MIN: CPT | Performed by: INTERNAL MEDICINE

## 2023-03-27 RX ORDER — FUROSEMIDE 20 MG
20 TABLET ORAL DAILY
Qty: 90 TABLET | Refills: 3 | Status: SHIPPED | OUTPATIENT
Start: 2023-03-27 | End: 2023-07-25

## 2023-03-27 RX ORDER — AMOXICILLIN 500 MG/1
CAPSULE ORAL
COMMUNITY
Start: 2023-03-24

## 2023-03-27 RX ORDER — CARVEDILOL 6.25 MG/1
TABLET ORAL
COMMUNITY
Start: 2022-12-26 | End: 2023-06-28 | Stop reason: SINTOL

## 2023-03-27 NOTE — LETTER
3/27/2023       RE: Kamryn Miller  [address]  [address]  [place] [address]     Dear Colleague,    Thank you for referring your patient, Kamryn Miller, to the [facility] at [facility]. Please see a copy of my visit note below.    Assessment and Plan:  87 year old female with history of CKD stage 3, Scr 1-1.5 since 2008, hypertension who presents for followup of CKD. She has had hypercalcemia that has stabilized. She was first seen October 2019, and hydrochlorothiazide was stopped. Her Scr has been 1.2-1.5 in recent years. She now has HFrEF and HFpEF, hospitalized in 2/2023 for hypertensive urgency and in December for anemia with hgb down to 7  # CKD stage 3b, Scr 1-1.5, recent SCr 1.3 eGFR 39ml/min :  Her scr has been 1.1-1.5 in the last few years, with baseline many years ago at 1 or less.  She has mild proteinuria. She denies systemic signs   - she has HFpeF and this along with her CAD and hypertension is the cause of her CKD. She now has HFrEF per last echo, EF 35-40%    -low grade proteinuria, off ACE/ARB-now is on entresto - valsartan.  - discussed hydration, avoiding nephrotoxic medications, holding diuretic on sick  Days, and blood pressure control  - she will be seeing [facility] which is good    # Hypertension: not well controlled, currently on carvedilol 6.25 (had bradycardia reportedly), fursoemide 10mg and entresto. Her hydrochlorothiazide was stopped due to hypercalcemia. The other medications were stopped in the last year by other providers.  .  She feels well overall. Her BP is 140-160 range thus will increase furosemide to 20mg daily and followup. She is seeing [facility] next week, can further increase furosemide or valsartan, also has followup with Dr Zuniga in July    # Not anemic - but was anemic in hospital , down to 7 with iron deficiency (iron sat 5%)  in December 2022, started on iron supplement, lower to every  other day given normal hgb now.  - ? No cause found for iron deficiency, decided against scopes in the hospital.    # Electrolytes:    stable  # Mineral Bone Disorder:   - Calcium; level is:  Normal range, PTH normal  - vitamin D high normal, no change made.    >40 minutes spent on day of service in direct patient care and in coordination of care and reviewing records.    Assessment and plan was discussed with patient and she voiced her understanding and agreement.    Reason for Visit:  Kamryn Miller is a 87 year old female with CKD from hypertension, who presents for followup of CKD    HPI:  She is a pleasant female with history of mild rhinitis, asthma, and hypertension who presents for followup of CKD and hypercalcemia. . Her serum creatinine has been 1.2-1.5 in recent years, and was up to 2 in 2019, with eGFR 21ml/min.  She had hypercalcemia, which improved with stopping hydrochlorothiazide. She has history of CAD and HFpEF with significant aortic stenosis and follows with Dr Zuniga in cardiology. She had TAVR . She was hospitalized in 2023 with hypertensive urgency, shortness of breath, anemia.  She was treated with mild diuresis and discharged.  She had hypertensive urgency in jn 2022, She also had AV block which affected her BP regimen. She also had inpatient psych stay for depression after her  .  Her creatinine was 1.1-1.2 on discharge (with stopping her ARB).  Her recent BP is 140-160  She is mobilizing fairly well without cane, uses walker for longer distances.  She had significant anemia in 2022 without clear cause, hgb down to 7 was transfused and now on iron supplement. Iron sat was 5%  She denies significant dyspnea on exertion or swelling. She is in assisted living and they manage medications and provide meals.  She tries to eat healthy.  She avoids using NSAIDs.     Renal History:   HTN    ROS:   A comprehensive review of systems was obtained and  negative, except as noted in the HPI or PMH.    Active Medical Problems:  Patient Active Problem List   Diagnosis     Hyperlipidemia LDL goal <70     Mild major depression (H)     Pulmonary hypertension (H)     Seasonal allergic rhinitis     Mitral insufficiency     Tricuspid insufficiency     Renal insufficiency     Tremors     Family history of diabetes mellitus     Celiac disease     History of colonic polyps     Benign essential hypertension     Hypothyroidism, unspecified type     CKD (chronic kidney disease) stage 3, GFR 30-59 ml/min (H)     Anemia     Disorder of kidney and ureter     Generalized anxiety disorder     Osteopenia     CAD S/P percutaneous coronary angioplasty     NYHA class 3 heart failure with preserved ejection fraction (H)     Moderate persistent asthma without complication     Hearing disorder, unspecified laterality     Impairment of balance     Bipolar 1 disorder, depressed, severe (H)     Essential hypertension     Stented coronary artery     Generalized weakness     Left leg claudication (H)     S/P TAVR (transcatheter aortic valve replacement)     Syncope and collapse     Anemia, unspecified type     Weight gain     Elevated brain natriuretic peptide (BNP) level     Fall     PMH:   Medical record was reviewed and PMH was discussed with patient and noted below.  Past Medical History:   Diagnosis Date     Aortic valvar stenosis 7/2010    mild     Asthma      Bipolar disorder (H)      CAD (coronary artery disease)     s/p angioplasty     Celiac disease      Colon polyps      Colon polyps 2012    every 3 year colonoscopy      Depression      High cholesterol      HTN      Mitral insufficiency      Pulmonary HTN (H)     mild     Tremors 7/10    drug induced from antidepressants     Tricuspid insufficiency      PSH:   Past Surgical History:   Procedure Laterality Date     ANGIOGRAM  6/26/2009     ANGIOPLASTY  9/96    for angina     ANGIOPLASTY  8/03 - 9/03    X -2 - with stenst in coronary  car.     APPENDECTOMY       BREAST BIOPSY, RT/LT      Breat Biopsy RT/LT, benign     BUNIONECTOMY  11/8/2011    Procedure:BUNIONECTOMY; Left donna bunionectomy; Surgeon:FREDY LITTLEJOHN; Location:[facility] OR     BUNIONECTOMY RT/LT  5/2007    right bunion and right 2nd hammertoe     CATARACT IOL, RT/LT  6/12 and 7/12    bilateral     COLONOSCOPY  2007, 2012    every 3 years for polyps     CV CORONARY ANGIOGRAM N/A 8/21/2020    Procedure: CV CORONARY ANGIOGRAM;  Surgeon: Gianluca Toscano MD;  Location: [facility]     CV CORONARY ANGIOGRAM N/A 10/25/2022    Procedure: Coronary Angiogram;  Surgeon: Carter Douglass MD;  Location: [facility]     CV INSTANTANEOUS WAVE-FREE RATIO N/A 10/25/2022    Procedure: Instantaneous Wave-Free Ratio;  Surgeon: Carter Douglass MD;  Location: [facility]     CV LEFT HEART CATH N/A 8/21/2020    Procedure: CV LEFT HEART CATH;  Surgeon: Gianluca Tosacno MD;  Location: [facility]     CV LEFT HEART CATH N/A 11/3/2020    Procedure: CV LEFT HEART CATH;  Surgeon: Tom Burrows MD;  Location: [facility]     CV LOWER EXTREMITY ANGIOGRAM BILATERAL N/A 11/3/2020    Procedure: CV ANGIOGRAM LOWER EXTREMITY BILATERAL;  Surgeon: Tom Burrows MD;  Location: [facility]     CV PCI STENT DRUG ELUTING N/A 8/21/2020    Procedure: Percutaneous Coronary Intervention Stent Drug Eluting;  Surgeon: Gianluca Toscano MD;  Location: [facility]     CV RIGHT HEART CATH MEASUREMENTS RECORDED N/A 8/21/2020    Procedure: CV RIGHT HEART CATH;  Surgeon: Gianluca Toscano MD;  [facility]     CV RIGHT HEART CATH MEASUREMENTS RECORDED N/A 11/3/2020    Procedure: CV RIGHT HEART CATH;  Surgeon: Tom Burrows MD;  [facility]     CV TRANSCATHETER AORTIC VALVE REPLACEMENT N/A 9/12/2022    Procedure: Transfemoral  Transcatheter Aortic Valve Replacement with possible open heart bypass and or balloon pump placement and any indicated procedure;  Surgeon: Tom Burrows MD;  Location:  [facility]     HEART CATH FEMORAL CANNULIZATION WITH OPEN STANDBY REPAIR AORTIC VALVE N/A 9/12/2022    Procedure: OR TRANSCATHETER AORTIC VALVE REPLACEMENT, OPEN FEMORAL ARTERY APPROACH;  Surgeon: Arthur Markham MD;  Location:  [facility]     JOINT REPLACEMENT, HIP RT/LT  4/2008    right hip replaced     JOINT REPLACEMENT, HIP RT/LT  4/2009    left hip replaced     REPAIR HAMMER TOE  11/8/2011    Procedure:REPAIR HAMMER TOE; left 2nd hammertoe repair; Surgeon:FREDY LITTLEJOHN; Location: OR     SURGICAL HISTORY OF -   11/11    left bunion and 2nd hammertoe repair     TUBAL LIGATION       ZZC ANESTH,BLEPHAROPLASTY      (R) for drooping eyelid     ZZC APPENDECTOMY         Family Hx:   Family History   Problem Relation Age of Onset     Asthma Mother      Cerebrovascular Disease Mother      Arthritis Mother      Depression Mother      Lipids Sister      Hypertension Sister      Heart Disease Sister      Lipids Sister      Hypertension Sister      Lipids Sister      Breast Cancer Sister      Personal Hx:   Social History     Tobacco Use     Smoking status: Never     Smokeless tobacco: Never   Substance Use Topics     Alcohol use: No     Alcohol/week: 0.0 standard drinks     Types: 1 Standard drinks or equivalent per week       Allergies:  Allergies   Allergen Reactions     Ace Inhibitors Cough     Amlodipine      Headache and plugged ears     Diclofenac Other (See Comments)     Balance problems     Gluten Meal Diarrhea     Hydralazine-Hctz      headache       Medications:  Current Outpatient Medications   Medication Sig     acetaminophen (TYLENOL) 500 MG tablet Take 1 tablet (500 mg) by mouth every 6 hours as needed for pain     albuterol (PROAIR HFA/PROVENTIL HFA/VENTOLIN HFA) 108 (90 Base) MCG/ACT inhaler  Inhale 2 puffs into the lungs every 6 hours as needed for wheezing or shortness of breath / dyspnea     alendronate (FOSAMAX) 70 MG tablet TAKE 1 TABLET BY MOUTH ONCE WEEKLY     amoxicillin (AMOXIL) 500 MG capsule      aspirin (ASA) 81 MG EC tablet Take 1 tablet (81 mg) by mouth daily     azelastine (ASTEPRO) 0.15 % nasal spray USE 1 SPRAY IN EACH NOSTRIL ONCE A DAY     calcium citrate (CITRACAL) 950 (200 Ca) MG tablet TAKE 1 TABLET BY MOUTH ONCE DAILY     carvedilol (COREG) 6.25 MG tablet      cholecalciferol (VITAMIN D3) 1250 mcg (96472 units) capsule TAKE 1 CAPSULE BY MOUTH ONCE WEEKLY     desvenlafaxine (PRISTIQ) 100 MG 24 hr tablet Take 100 mg by mouth daily     fluticasone-salmeterol (ADVAIR DISKUS) 500-50 MCG/ACT inhaler INHALE 1 PUFF BY MOUTH TWICE DAILY (Patient taking differently: 1 puff daily INHALE 1 PUFF BY MOUTH TWICE DAILY)     folic acid (FOLVITE) 400 MCG tablet Take 1 tablet (400 mcg) by mouth daily     furosemide (LASIX) 20 MG tablet Take 1 tablet (20 mg) by mouth daily     iron polysaccharides (NIFEREX) 150 MG capsule Take 1 capsule (150 mg) by mouth daily     lamoTRIgine (LAMICTAL) 25 MG tablet Take 25 mg by mouth daily with 200 mg tablet for a total dose of 225 mg     LAMOTRIGINE 200 MG PO TABS Take 200 mg by mouth daily with 25 mg tablet for a total dose of 225 mg     levothyroxine (SYNTHROID/LEVOTHROID) 50 MCG tablet TAKE 1 TABLET BY MOUTH ONCE DAILY     liothyronine (CYTOMEL) 5 MCG tablet Take 2 tablets (10 mcg) by mouth daily     memantine (NAMENDA) 10 MG tablet Take 1 tablet (10 mg) by mouth daily     mirtazapine (REMERON) 7.5 MG tablet Take 1 tablet (7.5 mg) by mouth At Bedtime     Multiple Vitamin (TAB-A-JENNIFER) TABS Take 1 tablet by mouth daily     psyllium (METAMUCIL/KONSYL) 58.6 % powder Take 1 tspn in liquid daily     REGULOID 28.3 % POWD MIX 1 TEASPOON IN LIQUID AND DRINK BY MOUTH ONCE DAILY     rosuvastatin (CRESTOR) 10 MG tablet Take 1 tablet (10 mg) by mouth daily      sacubitril-valsartan (ENTRESTO) 24-26 MG per tablet Take 1 full tablet in the morning, continue 1/2 tablet in the evening.     No current facility-administered medications for this visit.      Vitals:  BP (!) 155/80   Pulse 72   Wt 64.4 kg (142 lb)   BMI 25.15 kg/m      Exam:  GENERAL APPEARANCE: alert and no distress  HENT: mouth without ulcers or lesions  LYMPHATICS: no cervical or supraclavicular nodes  RESP: lungs clear to auscultation - no rales, rhonchi or wheezes  CV: regular rhythm, normal rate, no rub, no murmur  EDEMA: no LE edema bilaterally  ABDOMEN: soft, nondistended, nontender, bowel sounds normal  MS: extremities normal - no gross deformities noted, no evidence of inflammation in joints, no muscle tenderness  SKIN: no rash    LABS:   CMP  Recent Labs   Lab Test 03/24/23  0430 03/13/23  1306 02/28/23  0713 02/27/23  0729 11/24/21  1525 06/04/21  1033 05/17/21  1401 11/03/20  1225 10/23/20  0834    141 139 139   < > 142 140 144 141   POTASSIUM 3.7 4.7 4.0 4.3   < > 3.4 4.1 4.3 3.8   CHLORIDE 104 103 104 103   < > 106 106 111* 105   CO2 26 29 23 26   < > 30 33* 28 32   ANIONGAP 12 9 12 10   < > 6 1* 5 4   GLC 91 95 100* 95   < > 103* 92 100* 88   BUN 23.9* 35.3* 43.5* 43.3*   < > 27 23 23 21   CR 1.31* 1.36* 1.50* 1.55*   < > 1.50* 1.39* 1.38* 1.52*   GFRESTIMATED 39* 38* 34* 32*   < > 32* 35* 35* 31*   GFRESTBLACK  --   --   --   --   --  37* 40* 41* 36*   PRINCE 9.6 10.1 10.0 9.8   < > 9.4 9.3 9.5 9.9    < > = values in this interval not displayed.     Recent Labs   Lab Test 02/26/23  0940 02/25/23  0534 02/24/23  0719 02/23/23  0729   BILITOTAL 0.3 0.3 0.3 0.3   ALKPHOS 76 70 71 73   ALT 38* 45* 51* 67*   AST 36* 33 35 51*     CBC  Recent Labs   Lab Test 02/28/23  0713 02/27/23  0729 02/26/23  0640 02/25/23  0534   HGB 13.3 13.7 12.8 11.9   WBC 4.1 4.4 5.0 4.8   RBC 4.58 4.74 4.42 4.09   HCT 43.7 45.4 42.7 39.1   MCV 95 96 97 96   MCH 29.0 28.9 29.0 29.1   MCHC 30.4* 30.2* 30.0* 30.4*   RDW  17.2* 17.2* 17.4* 17.7*    184 180 155     URINE STUDIES  Recent Labs   Lab Test 02/23/23  0212 11/30/22  1927 01/24/22  1051 12/16/21  1758 08/12/21  1334 07/28/21  1150 07/16/20  1615 10/28/19  1314   COLOR Straw Light Yellow Yellow Straw Yellow Yellow   < > Yellow   APPEARANCE Clear Clear Clear Clear Clear Clear   < > Clear   URINEGLC Negative Negative Negative Negative Negative Negative   < > Negative   URINEBILI Negative Negative Negative Negative Negative Negative   < > Negative   URINEKETONE Negative Negative Negative Negative Negative Negative   < > Negative   SG 1.013 1.011 <=1.005 1.006 <=1.005 1.010   < > 1.015   UBLD Negative Negative Negative Large* Trace* Trace*   < > Trace*   URINEPH 7.0 7.0 7.0 7.0 6.0 7.5*   < > 7.5*   PROTEIN 30* 20* Negative 10* Negative 30*   < > Negative   UROBILINOGEN  --   --  0.2  --  0.2 0.2  --  0.2   NITRITE Negative Negative Negative Negative Negative Negative   < > Negative   LEUKEST Moderate* Negative Negative Trace* Negative Small*   < > Negative   RBCU 1 <1 0-2 3* 0-2 0-2   < > O - 2   WBCU 68* 1 0-5 13* 0-5 5-10*   < > 0 - 5    < > = values in this interval not displayed.     Recent Labs   Lab Test 01/24/22  1051   UTPG 0.29*     PTH  Recent Labs   Lab Test 02/15/23  1407 12/04/22  1354 10/24/22  1009   PTHI 81* 45 63     IRON STUDIES  Recent Labs   Lab Test 01/11/23  1341 10/26/22  0737   IRON 62 23*    318   IRONSAT 17 7*   JANESSA 126 19         Ania Salty Johnson MD

## 2023-03-27 NOTE — PROGRESS NOTES
Assessment and Plan:  87 year old female with history of CKD stage 3, Scr 1-1.5 since 2008, hypertension who presents for followup of CKD. She has had hypercalcemia that has stabilized. She was first seen October 2019, and hydrochlorothiazide was stopped. Her Scr has been 1.2-1.5 in recent years. She now has HFrEF and HFpEF, hospitalized in 2/2023 for hypertensive urgency and in December for anemia with hgb down to 7  # CKD stage 3b, Scr 1-1.5, recent SCr 1.3 eGFR 39ml/min :  Her scr has been 1.1-1.5 in the last few years, with baseline many years ago at 1 or less.  She has mild proteinuria. She denies systemic signs   - she has HFpeF and this along with her CAD and hypertension is the cause of her CKD. She now has HFrEF per last echo, EF 35-40%    -low grade proteinuria, off ACE/ARB-now is on entresto - valsartan.  - discussed hydration, avoiding nephrotoxic medications, holding diuretic on sick  Days, and blood pressure control  - she will be seeing [facility] which is good    # Hypertension: not well controlled, currently on carvedilol 6.25 (had bradycardia reportedly), fursoemide 10mg and entresto. Her hydrochlorothiazide was stopped due to hypercalcemia. The other medications were stopped in the last year by other providers.  .  She feels well overall. Her BP is 140-160 range thus will increase furosemide to 20mg daily and followup. She is seeing [facility] next week, can further increase furosemide or valsartan, also has followup with Dr Zuniga in July    # Not anemic - but was anemic in hospital , down to 7 with iron deficiency (iron sat 5%)  in December 2022, started on iron supplement, lower to every other day given normal hgb now.  - ? No cause found for iron deficiency, decided against scopes in the hospital.    # Electrolytes:    stable  # Mineral Bone Disorder:   - Calcium; level is:  Normal range, PTH normal  - vitamin D high normal, no change made.    >40 minutes spent on day of service in direct patient  care and in coordination of care and reviewing records.    Assessment and plan was discussed with patient and she voiced her understanding and agreement.    Reason for Visit:  Kamryn Miller is a 87 year old female with CKD from hypertension, who presents for followup of CKD    HPI:  She is a pleasant female with history of mild rhinitis, asthma, and hypertension who presents for followup of CKD and hypercalcemia. . Her serum creatinine has been 1.2-1.5 in recent years, and was up to 2 in 2019, with eGFR 21ml/min.  She had hypercalcemia, which improved with stopping hydrochlorothiazide. She has history of CAD and HFpEF with significant aortic stenosis and follows with Dr Zuniga in cardiology. She had TAVR . She was hospitalized in 2023 with hypertensive urgency, shortness of breath, anemia.  She was treated with mild diuresis and discharged.  She had hypertensive urgency in jn 2022, She also had AV block which affected her BP regimen. She also had inpatient psych stay for depression after her  .  Her creatinine was 1.1-1.2 on discharge (with stopping her ARB).  Her recent BP is 140-160  She is mobilizing fairly well without cane, uses walker for longer distances.  She had significant anemia in 2022 without clear cause, hgb down to 7 was transfused and now on iron supplement. Iron sat was 5%  She denies significant dyspnea on exertion or swelling. She is in assisted living and they manage medications and provide meals.  She tries to eat healthy.  She avoids using NSAIDs.     Renal History:   HTN    ROS:   A comprehensive review of systems was obtained and negative, except as noted in the HPI or PMH.    Active Medical Problems:  Patient Active Problem List   Diagnosis     Hyperlipidemia LDL goal <70     Mild major depression (H)     Pulmonary hypertension (H)     Seasonal allergic rhinitis     Mitral insufficiency     Tricuspid insufficiency     Renal insufficiency      Tremors     Family history of diabetes mellitus     Celiac disease     History of colonic polyps     Benign essential hypertension     Hypothyroidism, unspecified type     CKD (chronic kidney disease) stage 3, GFR 30-59 ml/min (H)     Anemia     Disorder of kidney and ureter     Generalized anxiety disorder     Osteopenia     CAD S/P percutaneous coronary angioplasty     NYHA class 3 heart failure with preserved ejection fraction (H)     Moderate persistent asthma without complication     Hearing disorder, unspecified laterality     Impairment of balance     Bipolar 1 disorder, depressed, severe (H)     Essential hypertension     Stented coronary artery     Generalized weakness     Left leg claudication (H)     S/P TAVR (transcatheter aortic valve replacement)     Syncope and collapse     Anemia, unspecified type     Weight gain     Elevated brain natriuretic peptide (BNP) level     Fall     PMH:   Medical record was reviewed and PMH was discussed with patient and noted below.  Past Medical History:   Diagnosis Date     Aortic valvar stenosis 7/2010    mild     Asthma      Bipolar disorder (H)      CAD (coronary artery disease)     s/p angioplasty     Celiac disease      Colon polyps      Colon polyps 2012    every 3 year colonoscopy      Depression      High cholesterol      HTN      Mitral insufficiency      Pulmonary HTN (H)     mild     Tremors 7/10    drug induced from antidepressants     Tricuspid insufficiency      PSH:   Past Surgical History:   Procedure Laterality Date     ANGIOGRAM  6/26/2009     ANGIOPLASTY  9/96    for angina     ANGIOPLASTY  8/03 - 9/03    X -2 - with stenst in coronary car.     APPENDECTOMY       BREAST BIOPSY, RT/LT      Breat Biopsy RT/LT, benign     BUNIONECTOMY  11/8/2011    Procedure:BUNIONECTOMY; Left donna bunionectomy; Surgeon:FREDY LITLTEJOHN; Location:[facility]     BUNIONECTOMY RT/LT  5/2007    right bunion and right 2nd hammertoe     CATARACT IOL, RT/LT  6/12 and 7/12     bilateral     COLONOSCOPY  2007, 2012    every 3 years for polyps     CV CORONARY ANGIOGRAM N/A 8/21/2020    Procedure: CV CORONARY ANGIOGRAM;  Surgeon: Gianluca Toscano MD;  [facility]     CV CORONARY ANGIOGRAM N/A 10/25/2022    Procedure: Coronary Angiogram;  Surgeon: Carter Douglass MD;  [facility]     CV INSTANTANEOUS WAVE-FREE RATIO N/A 10/25/2022    Procedure: Instantaneous Wave-Free Ratio;  Surgeon: Carter Douglass MD;  [facility]     CV LEFT HEART CATH N/A 8/21/2020    Procedure: CV LEFT HEART CATH;  Surgeon: Gianluca Toscano MD;  [facility]     CV LEFT HEART CATH N/A 11/3/2020    Procedure: CV LEFT HEART CATH;  Surgeon: Tom Burrows MD;  [facility]     CV LOWER EXTREMITY ANGIOGRAM BILATERAL N/A 11/3/2020    Procedure: CV ANGIOGRAM LOWER EXTREMITY BILATERAL;  Surgeon: Tom Burrows MD;  [facility]     CV PCI STENT DRUG ELUTING N/A 8/21/2020    Procedure: Percutaneous Coronary Intervention Stent Drug Eluting;  Surgeon: Gianluca Toscano MD;  [facility]     CV RIGHT HEART CATH MEASUREMENTS RECORDED N/A 8/21/2020    Procedure: CV RIGHT HEART CATH;  Surgeon: Gianluca Toscano MD;  [facility]     CV RIGHT HEART CATH MEASUREMENTS RECORDED N/A 11/3/2020    Procedure: CV RIGHT HEART CATH;  Surgeon: Tom Burrows MD;  [facility]     CV TRANSCATHETER AORTIC VALVE REPLACEMENT N/A 9/12/2022    Procedure: Transfemoral Transcatheter Aortic Valve Replacement with possible open heart bypass and or balloon pump placement and any indicated procedure;  Surgeon: Tom Burrows MD;  [facility]     HEART CATH FEMORAL CANNULIZATION WITH OPEN STANDBY REPAIR AORTIC VALVE N/A 9/12/2022    Procedure: OR  TRANSCATHETER AORTIC VALVE REPLACEMENT, OPEN FEMORAL ARTERY APPROACH;  Surgeon: Arthur Markham MD;  Location:  [facility]     JOINT REPLACEMENT, HIP RT/LT  4/2008    right hip replaced     JOINT REPLACEMENT, HIP RT/LT  4/2009    left hip replaced     REPAIR HAMMER TOE  11/8/2011    Procedure:REPAIR HAMMER TOE; left 2nd hammertoe repair; Surgeon:FREDY LITTLEJOHN; Location:[facility]     SURGICAL HISTORY OF -   11/11    left bunion and 2nd hammertoe repair     TUBAL LIGATION       ZZC ANESTH,BLEPHAROPLASTY      (R) for drooping eyelid     ZZC APPENDECTOMY         Family Hx:   Family History   Problem Relation Age of Onset     Asthma Mother      Cerebrovascular Disease Mother      Arthritis Mother      Depression Mother      Lipids Sister      Hypertension Sister      Heart Disease Sister      Lipids Sister      Hypertension Sister      Lipids Sister      Breast Cancer Sister      Personal Hx:   Social History     Tobacco Use     Smoking status: Never     Smokeless tobacco: Never   Substance Use Topics     Alcohol use: No     Alcohol/week: 0.0 standard drinks     Types: 1 Standard drinks or equivalent per week       Allergies:  Allergies   Allergen Reactions     Ace Inhibitors Cough     Amlodipine      Headache and plugged ears     Diclofenac Other (See Comments)     Balance problems     Gluten Meal Diarrhea     Hydralazine-Hctz      headache       Medications:  Current Outpatient Medications   Medication Sig     acetaminophen (TYLENOL) 500 MG tablet Take 1 tablet (500 mg) by mouth every 6 hours as needed for pain     albuterol (PROAIR HFA/PROVENTIL HFA/VENTOLIN HFA) 108 (90 Base) MCG/ACT inhaler Inhale 2 puffs into the lungs every 6 hours as needed for wheezing or shortness of breath / dyspnea     alendronate (FOSAMAX) 70 MG tablet TAKE 1 TABLET BY MOUTH ONCE WEEKLY     amoxicillin (AMOXIL) 500 MG capsule      aspirin (ASA) 81 MG EC tablet Take 1 tablet (81 mg) by mouth daily     azelastine (ASTEPRO)  0.15 % nasal spray USE 1 SPRAY IN EACH NOSTRIL ONCE A DAY     calcium citrate (CITRACAL) 950 (200 Ca) MG tablet TAKE 1 TABLET BY MOUTH ONCE DAILY     carvedilol (COREG) 6.25 MG tablet      cholecalciferol (VITAMIN D3) 1250 mcg (26230 units) capsule TAKE 1 CAPSULE BY MOUTH ONCE WEEKLY     desvenlafaxine (PRISTIQ) 100 MG 24 hr tablet Take 100 mg by mouth daily     fluticasone-salmeterol (ADVAIR DISKUS) 500-50 MCG/ACT inhaler INHALE 1 PUFF BY MOUTH TWICE DAILY (Patient taking differently: 1 puff daily INHALE 1 PUFF BY MOUTH TWICE DAILY)     folic acid (FOLVITE) 400 MCG tablet Take 1 tablet (400 mcg) by mouth daily     furosemide (LASIX) 20 MG tablet Take 1 tablet (20 mg) by mouth daily     iron polysaccharides (NIFEREX) 150 MG capsule Take 1 capsule (150 mg) by mouth daily     lamoTRIgine (LAMICTAL) 25 MG tablet Take 25 mg by mouth daily with 200 mg tablet for a total dose of 225 mg     LAMOTRIGINE 200 MG PO TABS Take 200 mg by mouth daily with 25 mg tablet for a total dose of 225 mg     levothyroxine (SYNTHROID/LEVOTHROID) 50 MCG tablet TAKE 1 TABLET BY MOUTH ONCE DAILY     liothyronine (CYTOMEL) 5 MCG tablet Take 2 tablets (10 mcg) by mouth daily     memantine (NAMENDA) 10 MG tablet Take 1 tablet (10 mg) by mouth daily     mirtazapine (REMERON) 7.5 MG tablet Take 1 tablet (7.5 mg) by mouth At Bedtime     Multiple Vitamin (TAB-A-JENNIFER) TABS Take 1 tablet by mouth daily     psyllium (METAMUCIL/KONSYL) 58.6 % powder Take 1 tspn in liquid daily     REGULOID 28.3 % POWD MIX 1 TEASPOON IN LIQUID AND DRINK BY MOUTH ONCE DAILY     rosuvastatin (CRESTOR) 10 MG tablet Take 1 tablet (10 mg) by mouth daily     sacubitril-valsartan (ENTRESTO) 24-26 MG per tablet Take 1 full tablet in the morning, continue 1/2 tablet in the evening.     No current facility-administered medications for this visit.      Vitals:  BP (!) 155/80   Pulse 72   Wt 64.4 kg (142 lb)   BMI 25.15 kg/m      Exam:  GENERAL APPEARANCE: alert and no  distress  HENT: mouth without ulcers or lesions  LYMPHATICS: no cervical or supraclavicular nodes  RESP: lungs clear to auscultation - no rales, rhonchi or wheezes  CV: regular rhythm, normal rate, no rub, no murmur  EDEMA: no LE edema bilaterally  ABDOMEN: soft, nondistended, nontender, bowel sounds normal  MS: extremities normal - no gross deformities noted, no evidence of inflammation in joints, no muscle tenderness  SKIN: no rash    LABS:   CMP  Recent Labs   Lab Test 03/24/23  0430 03/13/23  1306 02/28/23  0713 02/27/23  0729 11/24/21  1525 06/04/21  1033 05/17/21  1401 11/03/20  1225 10/23/20  0834    141 139 139   < > 142 140 144 141   POTASSIUM 3.7 4.7 4.0 4.3   < > 3.4 4.1 4.3 3.8   CHLORIDE 104 103 104 103   < > 106 106 111* 105   CO2 26 29 23 26   < > 30 33* 28 32   ANIONGAP 12 9 12 10   < > 6 1* 5 4   GLC 91 95 100* 95   < > 103* 92 100* 88   BUN 23.9* 35.3* 43.5* 43.3*   < > 27 23 23 21   CR 1.31* 1.36* 1.50* 1.55*   < > 1.50* 1.39* 1.38* 1.52*   GFRESTIMATED 39* 38* 34* 32*   < > 32* 35* 35* 31*   GFRESTBLACK  --   --   --   --   --  37* 40* 41* 36*   PRINCE 9.6 10.1 10.0 9.8   < > 9.4 9.3 9.5 9.9    < > = values in this interval not displayed.     Recent Labs   Lab Test 02/26/23  0940 02/25/23  0534 02/24/23  0719 02/23/23  0729   BILITOTAL 0.3 0.3 0.3 0.3   ALKPHOS 76 70 71 73   ALT 38* 45* 51* 67*   AST 36* 33 35 51*     CBC  Recent Labs   Lab Test 02/28/23  0713 02/27/23  0729 02/26/23  0640 02/25/23  0534   HGB 13.3 13.7 12.8 11.9   WBC 4.1 4.4 5.0 4.8   RBC 4.58 4.74 4.42 4.09   HCT 43.7 45.4 42.7 39.1   MCV 95 96 97 96   MCH 29.0 28.9 29.0 29.1   MCHC 30.4* 30.2* 30.0* 30.4*   RDW 17.2* 17.2* 17.4* 17.7*    184 180 155     URINE STUDIES  Recent Labs   Lab Test 02/23/23  0212 11/30/22  1927 01/24/22  1051 12/16/21  1758 08/12/21  1334 07/28/21  1150 07/16/20  1615 10/28/19  1314   COLOR Straw Light Yellow Yellow Straw Yellow Yellow   < > Yellow   APPEARANCE Clear Clear Clear Clear Clear  Clear   < > Clear   URINEGLC Negative Negative Negative Negative Negative Negative   < > Negative   URINEBILI Negative Negative Negative Negative Negative Negative   < > Negative   URINEKETONE Negative Negative Negative Negative Negative Negative   < > Negative   SG 1.013 1.011 <=1.005 1.006 <=1.005 1.010   < > 1.015   UBLD Negative Negative Negative Large* Trace* Trace*   < > Trace*   URINEPH 7.0 7.0 7.0 7.0 6.0 7.5*   < > 7.5*   PROTEIN 30* 20* Negative 10* Negative 30*   < > Negative   UROBILINOGEN  --   --  0.2  --  0.2 0.2  --  0.2   NITRITE Negative Negative Negative Negative Negative Negative   < > Negative   LEUKEST Moderate* Negative Negative Trace* Negative Small*   < > Negative   RBCU 1 <1 0-2 3* 0-2 0-2   < > O - 2   WBCU 68* 1 0-5 13* 0-5 5-10*   < > 0 - 5    < > = values in this interval not displayed.     Recent Labs   Lab Test 01/24/22  1051   [facility] 0.29*     PTH  Recent Labs   Lab Test 02/15/23  1407 12/04/22  1354 10/24/22  1009   PTHI 81* 45 63     IRON STUDIES  Recent Labs   Lab Test 01/11/23  1341 10/26/22  0737   IRON 62 23*    318   IRONSAT 17 7*   JANESSA 126 19         Ania Salty Johnson MD

## 2023-03-27 NOTE — PATIENT INSTRUCTIONS
Kidney function has been 1-1.5 for many years, stable.  If creatinine is above 1.7-1.8 please let me know

## 2023-04-02 NOTE — PROGRESS NOTES
[facility]   [facility]      HPI:   Ms. Miller is a 87 year old female with medical history pertinent for HTN, CAD (s/p PCI in 1996, PCI to LAD and RCA in 2003, PCI to LAD x3 in 8/2020), severe aortic valve stenosis (s/p 26mm ES Ultra TAVR 9/12/22), PAD, HLD, HFrEF (LVEF 35-40% per TTE 3/2023), and anemia who presents to [facility] for routine follow up.      With regards to her most recent medical conditions, Ms. Miller was last hospitalized 2/22-2/28/23 for shortness of breath, ADHF exacerbation, and pulmonary edema. She was diuresed with IV lasix. An echocardiogram was performed which showed reduced EF that is newly noted, 45 to 50%.  Ms. Miller was started on Entresto. Coreg was discontinued for unknown reported side effects.     Ms. Miller was recently seen in structural cardiology clinic. At that time she reported feeling well, breathing improved. Blood pressures remained elevated. Entresto was increased to a full tab in the morning and continued on 1/2 tab in the evening. Echo at that time showed further reduced EF to 35-40%. Unclear etiology, possibly HTN.     Today Ms. Miller is seen in clinic with her daughter. She reports feeling quite well. No acute concerns and denies chest tightness/discomfort, SOB, GREGORY, palpitations, lightheadedness, dizziness, syncope, or near syncope. Denies orthopnea, PND, peripheral edema. She lives in assisted living. She is active and participates in exercise classes 5 days/week. Review of daily BP log shows BPs ranging 130s-160s/80s-90s. When SBP>160 she is given an extra dose of lasix. HTN has been treated with lasix 3x in last month. Feels that she makes a good amount of urine with lasix dose. Weights are stable 139-142 lb.    Cardiac Medications   - ASA 81 mg daily   - Lasix 20 mg daily   - Rosuvastatin 10 mg daily   - Entresto 24-26 mg 1 tab in the AM/ 1/2 tab in PM    PAST MEDICAL HISTORY:  Past Medical History:   Diagnosis Date     Aortic valvar stenosis 7/2010     mild     Asthma      Bipolar disorder (H)      CAD (coronary artery disease)     s/p angioplasty     Celiac disease      Colon polyps      Colon polyps 2012    every 3 year colonoscopy      Depression      High cholesterol      HTN      Mitral insufficiency      Pulmonary HTN (H)     mild     Tremors 7/10    drug induced from antidepressants     Tricuspid insufficiency        FAMILY HISTORY:  Family History   Problem Relation Age of Onset     Asthma Mother      Cerebrovascular Disease Mother      Arthritis Mother      Depression Mother      Lipids Sister      Hypertension Sister      Heart Disease Sister      Lipids Sister      Hypertension Sister      Lipids Sister      Breast Cancer Sister        SOCIAL HISTORY:  Social History     Socioeconomic History     Marital status:      Spouse name: Adrien     Number of children: 2     Years of education: 16   Occupational History     Employer: RETIRED     Comment: Retired in 2002.    Tobacco Use     Smoking status: Never     Smokeless tobacco: Never   Vaping Use     Vaping status: Never Used     Passive vaping exposure: Yes   Substance and Sexual Activity     Alcohol use: No     Alcohol/week: 0.0 standard drinks of alcohol     Types: 1 Standard drinks or equivalent per week     Drug use: No     Sexual activity: Not Currently     Partners: Male       CURRENT MEDICATIONS:  acetaminophen (TYLENOL) 500 MG tablet, Take 1 tablet (500 mg) by mouth every 6 hours as needed for pain  albuterol (PROAIR HFA/PROVENTIL HFA/VENTOLIN HFA) 108 (90 Base) MCG/ACT inhaler, Inhale 2 puffs into the lungs every 6 hours as needed for wheezing or shortness of breath / dyspnea  alendronate (FOSAMAX) 70 MG tablet, TAKE 1 TABLET BY MOUTH ONCE WEEKLY  amoxicillin (AMOXIL) 500 MG capsule,   aspirin (ASA) 81 MG EC tablet, Take 1 tablet (81 mg) by mouth daily  azelastine (ASTEPRO) 0.15 % nasal spray, USE 1 SPRAY IN EACH NOSTRIL ONCE A DAY  calcium citrate (CITRACAL) 950 (200 Ca) MG tablet, TAKE 1  TABLET BY MOUTH ONCE DAILY  carvedilol (COREG) 6.25 MG tablet,   cholecalciferol (VITAMIN D3) 1250 mcg (09453 units) capsule, TAKE 1 CAPSULE BY MOUTH ONCE WEEKLY  desvenlafaxine (PRISTIQ) 100 MG 24 hr tablet, Take 100 mg by mouth daily  fluticasone-salmeterol (ADVAIR DISKUS) 500-50 MCG/ACT inhaler, INHALE 1 PUFF BY MOUTH TWICE DAILY (Patient taking differently: 1 puff daily INHALE 1 PUFF BY MOUTH TWICE DAILY)  folic acid (FOLVITE) 400 MCG tablet, Take 1 tablet (400 mcg) by mouth daily  furosemide (LASIX) 20 MG tablet, Take 1 tablet (20 mg) by mouth daily  iron polysaccharides (NIFEREX) 150 MG capsule, Take 1 capsule (150 mg) by mouth daily  lamoTRIgine (LAMICTAL) 25 MG tablet, Take 25 mg by mouth daily with 200 mg tablet for a total dose of 225 mg  LAMOTRIGINE 200 MG PO TABS, Take 200 mg by mouth daily with 25 mg tablet for a total dose of 225 mg  levothyroxine (SYNTHROID/LEVOTHROID) 50 MCG tablet, TAKE 1 TABLET BY MOUTH ONCE DAILY  liothyronine (CYTOMEL) 5 MCG tablet, Take 2 tablets (10 mcg) by mouth daily  memantine (NAMENDA) 10 MG tablet, Take 1 tablet (10 mg) by mouth daily  mirtazapine (REMERON) 7.5 MG tablet, Take 1 tablet (7.5 mg) by mouth At Bedtime  Multiple Vitamin (TAB-A-JENNIFER) TABS, Take 1 tablet by mouth daily  psyllium (METAMUCIL/KONSYL) 58.6 % powder, Take 1 tspn in liquid daily  REGULOID 28.3 % POWD, MIX 1 TEASPOON IN LIQUID AND DRINK BY MOUTH ONCE DAILY  rosuvastatin (CRESTOR) 10 MG tablet, Take 1 tablet (10 mg) by mouth daily  sacubitril-valsartan (ENTRESTO) 24-26 MG per tablet, Take 1 full tablet in the morning, continue 1/2 tablet in the evening.    No current facility-administered medications on file prior to visit.      ROS:   Refer to HPI    EXAM:  There were no vitals taken for this visit.  GENERAL: Appears comfortable, in no acute distress.   HEENT: Eye symmetrical, no discharge or icterus bilaterally. Mucous membranes moist and without lesions.  CV: RRR, +S1S2, soft systolic murmur, rub, or  gallop. JVP not appriable.   RESPIRATORY: Respirations regular, even, and unlabored. Lungs CTA throughout.   GI: Soft and non distended with normoactive bowel sounds present in all quadrants. No tenderness, rebound, guarding. No hepatomegaly.   EXTREMITIES: No peripheral edema. 2+ bilateral pedal pulses.   NEUROLOGIC: Alert and oriented x 3. No focal deficits.   MUSCULOSKELETAL: No joint swelling or tenderness.   SKIN: No jaundice. No rashes or lesions.     Labs, reviewed with patient in clinic today:  CBC RESULTS:  Lab Results   Component Value Date    WBC 4.1 02/28/2023    WBC 4.6 05/17/2021    RBC 4.58 02/28/2023    RBC 3.97 05/17/2021    HGB 13.3 02/28/2023    HGB 12.9 05/17/2021    HCT 43.7 02/28/2023    HCT 40.3 05/17/2021    MCV 95 02/28/2023     (H) 05/17/2021    MCH 29.0 02/28/2023    MCH 32.5 05/17/2021    MCHC 30.4 (L) 02/28/2023    MCHC 32.0 05/17/2021    RDW 17.2 (H) 02/28/2023    RDW 12.7 05/17/2021     02/28/2023     05/17/2021       CMP RESULTS:  Lab Results   Component Value Date     03/24/2023     06/04/2021    POTASSIUM 3.7 03/24/2023    POTASSIUM 4.6 10/05/2022    POTASSIUM 3.4 06/04/2021    CHLORIDE 104 03/24/2023    CHLORIDE 103 10/05/2022    CHLORIDE 106 06/04/2021    CO2 26 03/24/2023    CO2 27 10/05/2022    CO2 30 06/04/2021    ANIONGAP 12 03/24/2023    ANIONGAP 7 10/05/2022    ANIONGAP 6 06/04/2021    GLC 91 03/24/2023    GLC 85 01/31/2023    GLC 98 10/05/2022     (H) 06/04/2021    BUN 23.9 (H) 03/24/2023    BUN 39 (H) 10/05/2022    BUN 27 06/04/2021    CR 1.31 (H) 03/24/2023    CR 1.50 (H) 06/04/2021    GFRESTIMATED 39 (L) 03/24/2023    GFRESTIMATED 32 (L) 06/04/2021    GFRESTBLACK 37 (L) 06/04/2021    PRINCE 9.6 03/24/2023    PRINCE 9.4 06/04/2021    BILITOTAL 0.3 02/26/2023    BILITOTAL 0.3 05/17/2021    ALBUMIN 3.8 02/26/2023    ALBUMIN 3.6 09/27/2022    ALBUMIN 3.4 05/17/2021    ALKPHOS 76 02/26/2023    ALKPHOS 57 05/17/2021    ALT 38 (H) 02/26/2023     ALT 38 05/17/2021    AST 36 (H) 02/26/2023    AST 27 05/17/2021        INR RESULTS:  Lab Results   Component Value Date    INR 1.12 02/22/2023    INR 1.05 11/03/2020       Lab Results   Component Value Date    MAG 2.5 (H) 02/27/2023    MAG 2.4 (H) 10/28/2019     Lab Results   Component Value Date    NTBNPI 11,774 (H) 02/22/2023     Lab Results   Component Value Date    NTBNP 6,742 (H) 02/15/2023    NTBNP 462 (H) 05/17/2021       Cardiac Diagnostics:    3/13/2023 Echo   Interpretation Summary  The 26 mm ES3 TAVR is well-seated. Leaflet opening is not clearly visualized.  There is trace valvular without paravalvular regurgitation. The Vmax is 2.4  m/s, the mean gradient is 14 mmHg, the dimensionless index is 0.80, and the  acceleration time is 80 ms.  Left ventricular function is decreased. The ejection fraction is 35-40%  (moderately reduced). Moderate diffuse hypokinesis is present.  Global right ventricular function is normal. The right ventricle is normal  size.  This study was compared with the study from 02/23/2023. There appears to have  been a decline in the global LV function but the endocardial definition is  poor on this study. The TAVR function is unchanged.    2/23/2023 Echo  Interpretation Summary  Status post 26 mm Arndt Janis Ultra valve TAVR on 9/12/2022. MG is 18 mmHg,  PV is 2.8 m/s. Trace paravalvular AI.  Left ventricular function is decreased. The ejection fraction is 45-50%  (mildly reduced).  Global right ventricular function is normal.  Severe mitral annular calcification is present.  The inferior vena cava was normal in size with preserved respiratory  variability.     This study was compared with the study from 1/31/2023 .LVEF mildly declined.  Mean gradient across TAVR mildly higher.    1/31/2023 Echo  Interpretation Summary  Left ventricular size, wall motion and function are normal. The ejection  fraction is 55-60%.  Right ventricular size and function are normal.  Status post 26 mm  Arndt Janis Ultra valve TAVR on 9/12/2022. The valve is  well seated. MG 16 mmHg; PV 2.5 m/s; no paravalvular AI.  No pericardial effusion is present.     This study was compared with the study from 12/1/2022. No significant changes  Noted.    10/2022 Coronary Angiogram   Two vessel CAD with prior PCI to the RCA and LAD, otherwise mild non obstructive CAD elsewhere.  Patent stents in the RCA and LAD with mild to moderate in stent restenosis of the proximal LAD stents (progressed since last angiogram from 2020).  Proximal LAD ISR hemodynamically not significant by dPR at 0.91.       Assessment and Plan:   Ms. Miller is a 87 year old female with medical history pertinent for HTN, CAD (s/p PCI in 1996, PCI to LAD and RCA in 2003, PCI to LAD x3 in 8/2020), severe aortic valve stenosis (s/p 26mm ES Ultra TAVR 9/12/22), PAD, HLD, HFpEF (LVEF 55-60% per TTE 12/2022), and anemia who presents to [facility] for routine follow up.     Overall appearing and feeling well. Not in acute heart failure. Euvolemic by exam. Blood pressures remain elevated. Review of today's labs demonstrate stable renal disease with Cr 1.37, cystain C pending. She has been previously treated with beta blockers (coreg and metoprolol), amlodipine, and hydralazine, all of which she did not tolerate. Tells me that these medications caused her to have headaches and ear fullness. We will try increasing entresto to 49-51 mg in the morning and 24-26 mg in the evening. Repeat BMP in 1 week and CORE in ~ 6 weeks.      # Acute on chronic systolic heart failure/HFrEF likely in the setting of uncontrolled HTN (EF 35-40% by TTE 3/2023)  Stage C. NYHA Class IIIB.    Fluid status: euvolemic, lasix 20 mg daily   ACEi/ARB/ARNi/afterload reduction: entresto 24-26 mg BID  BB: intolerance to BB  Aldosterone antagonist: contraindicated due to renal dysfunction, borderline GFR 30-39  SGLT2i: defer for now, cystatin c pending  SCD prophylaxis: does not meet criteria for  implant  NSAID use: contraindicated  Ischemia evaluation: last cor angio 10/2022; 2 vessel CAD RCA/LAD, mild to mod ISR of proxLAD, hemodynamically stable    # Coronary artery disease w/stable angina  # HTN  # HLD  s/p PCI in 1996, PCI to LAD and RCA in 2003, PCI to LAD x3 in 8/2020.    - Continue ASA 81 mg daily lifelong  - Continue high intensity statin  - Multiple intolerances to BB, hydralazine, amlodipine   - Enstresto as above for BP control   - Continue exercise and heart healthy diet      # PAD   - denies claudication, normal pulse exam on left  - recent BALDO's mildly abnormal on left  - Continue ASA 81 mg lifelong  - vascular consult should she develop recurrent claudication     # Severe aortic stenosis  s/p 26mm ES Ultra TAVR 9/12/22.  Last TTE 12/2022 showed well-seated valve, with trace paravalvular AI, MG 11mmHg, and peak velocity 2.2m/sec.  Follows with the structural team, plan to repeat TTE 9/2023.  Needs SBE ppx.     # Anemia  Management per PCP, recently given IV iron.  Discussed referral to Anemia Clinic, which she will discuss with her PCP next week.  She is taking oral iron.    # CKD 3  Baseline Cr 1.1-1.4  - Cr 1.37, cystain c pending       Follow up:  - BMP in 1 week  - CORE 6 weeks  - Echo/Dr. Zuniga 7/2023        Janice Kimbrough DNP, NP-C  Ad[facility] Heart Failure  04/03/23

## 2023-04-03 ENCOUNTER — LAB (OUTPATIENT)
Dept: LAB | Facility: CLINIC | Age: 88
End: 2023-04-03
Payer: MEDICARE

## 2023-04-03 ENCOUNTER — OFFICE VISIT (OUTPATIENT)
Dept: CARDIOLOGY | Facility: CLINIC | Age: 88
End: 2023-04-03
Attending: NURSE PRACTITIONER
Payer: MEDICARE

## 2023-04-03 ENCOUNTER — TELEPHONE (OUTPATIENT)
Dept: FAMILY MEDICINE | Facility: CLINIC | Age: 88
End: 2023-04-03

## 2023-04-03 VITALS
OXYGEN SATURATION: 97 % | HEART RATE: 71 BPM | DIASTOLIC BLOOD PRESSURE: 93 MMHG | WEIGHT: 144.3 LBS | SYSTOLIC BLOOD PRESSURE: 169 MMHG | BODY MASS INDEX: 25.56 KG/M2

## 2023-04-03 DIAGNOSIS — D50.9 IRON DEFICIENCY ANEMIA, UNSPECIFIED IRON DEFICIENCY ANEMIA TYPE: ICD-10-CM

## 2023-04-03 DIAGNOSIS — R79.89 ELEVATED BRAIN NATRIURETIC PEPTIDE (BNP) LEVEL: ICD-10-CM

## 2023-04-03 DIAGNOSIS — D63.1 ANEMIA DUE TO STAGE 3B CHRONIC KIDNEY DISEASE (H): ICD-10-CM

## 2023-04-03 DIAGNOSIS — I50.32 CHRONIC HEART FAILURE WITH PRESERVED EJECTION FRACTION (HFPEF) (H): Primary | ICD-10-CM

## 2023-04-03 DIAGNOSIS — N18.32 ANEMIA DUE TO STAGE 3B CHRONIC KIDNEY DISEASE (H): ICD-10-CM

## 2023-04-03 DIAGNOSIS — Z95.2 S/P TAVR (TRANSCATHETER AORTIC VALVE REPLACEMENT): ICD-10-CM

## 2023-04-03 DIAGNOSIS — I50.32 CHRONIC HEART FAILURE WITH PRESERVED EJECTION FRACTION (HFPEF) (H): ICD-10-CM

## 2023-04-03 DIAGNOSIS — I50.30 NYHA CLASS 3 HEART FAILURE WITH PRESERVED EJECTION FRACTION (H): ICD-10-CM

## 2023-04-03 DIAGNOSIS — I10 ESSENTIAL HYPERTENSION: ICD-10-CM

## 2023-04-03 DIAGNOSIS — N18.32 STAGE 3B CHRONIC KIDNEY DISEASE (H): ICD-10-CM

## 2023-04-03 LAB
ALBUMIN SERPL BCG-MCNC: 4.1 G/DL (ref 3.5–5.2)
ANION GAP SERPL CALCULATED.3IONS-SCNC: 11 MMOL/L (ref 7–15)
BUN SERPL-MCNC: 26.6 MG/DL (ref 8–23)
CALCIUM SERPL-MCNC: 10.2 MG/DL (ref 8.8–10.2)
CHLORIDE SERPL-SCNC: 101 MMOL/L (ref 98–107)
CREAT SERPL-MCNC: 1.37 MG/DL (ref 0.51–0.95)
CYSTATIN C (ROCHE): 1.9 MG/L (ref 0.6–1)
DEPRECATED HCO3 PLAS-SCNC: 27 MMOL/L (ref 22–29)
GFR SERPL CREATININE-BSD FRML MDRD: 28 ML/MIN/1.73M2
GFR SERPL CREATININE-BSD FRML MDRD: 37 ML/MIN/1.73M2
GLUCOSE SERPL-MCNC: 96 MG/DL (ref 70–99)
PHOSPHATE SERPL-MCNC: 4 MG/DL (ref 2.5–4.5)
POTASSIUM SERPL-SCNC: 4.2 MMOL/L (ref 3.4–5.3)
PTH-INTACT SERPL-MCNC: 73 PG/ML (ref 15–65)
SODIUM SERPL-SCNC: 139 MMOL/L (ref 136–145)

## 2023-04-03 PROCEDURE — 36415 COLL VENOUS BLD VENIPUNCTURE: CPT | Performed by: PATHOLOGY

## 2023-04-03 PROCEDURE — 80069 RENAL FUNCTION PANEL: CPT | Performed by: PATHOLOGY

## 2023-04-03 PROCEDURE — 83970 ASSAY OF PARATHORMONE: CPT | Performed by: PATHOLOGY

## 2023-04-03 PROCEDURE — G0463 HOSPITAL OUTPT CLINIC VISIT: HCPCS | Performed by: NURSE PRACTITIONER

## 2023-04-03 PROCEDURE — 99214 OFFICE O/P EST MOD 30 MIN: CPT | Performed by: NURSE PRACTITIONER

## 2023-04-03 PROCEDURE — 82610 CYSTATIN C: CPT | Performed by: INTERNAL MEDICINE

## 2023-04-03 RX ORDER — SACUBITRIL AND VALSARTAN 24; 26 MG/1; MG/1
TABLET, FILM COATED ORAL
Qty: 270 TABLET | Refills: 3 | Status: SHIPPED | OUTPATIENT
Start: 2023-04-03 | End: 2023-05-15 | Stop reason: ALTCHOICE

## 2023-04-03 NOTE — PROGRESS NOTES
Kamryn Miller's goals for this visit include:     She requests these members of her care team be copied on today's visit information: PCP    PCP: Pham Layne    Referring Provider:  Referred Self, MD  No address on file    BP (!) 169/93 (BP Location: Right arm, Patient Position: Sitting, Cuff Size: Adult Small)   Pulse 71   SpO2 97%     Do you need any medication refills at today's visit? No.    Triston Ruiz, EMT  Clinic Support  [facility]    [phone]    Employed by [facility] [employer]

## 2023-04-03 NOTE — NURSING NOTE
Diet: Patient instructed regarding a heart failure healthy diet, including discussion of reduced fat and 2000 mg daily sodium restriction, daily weights, medication purpose and compliance, fluid restrictions and resources for patient and family to access for assistance with heart failure management.       Labs: Patient was given results of the laboratory testing obtained today and patient was instructed about when to return for the next laboratory testing.     Med Reconcile: Reviewed and verified all current medications with the patient. The updated medication list was printed and given to the patient.     Med changes: increase Entresto to 49-51 mg in morning and 24-26 mg in evening    Return Appointment: Patient given instructions regarding scheduling next clinic visit: Labs in 1 week, CORE in 6-7 weeks, Dr Zuniga as scheduled     Patient stated she understood all health information given and agreed to call with further questions or concerns.     Jessica Garcia RN

## 2023-04-03 NOTE — LETTER
4/3/2023      RE: Kamryn Miller  [address]  [address]  [place] [address]       Dear Colleague,    Thank you for the opportunity to participate in the care of your patient, Kamryn Miller, at the [facility] [place] at [facility]. Please see a copy of my visit note below.      [facility] Cardiology   CORE Clinic      HPI:   Ms. Miller is a 87 year old female with medical history pertinent for HTN, CAD (s/p PCI in 1996, PCI to LAD and RCA in 2003, PCI to LAD x3 in 8/2020), severe aortic valve stenosis (s/p 26mm ES Ultra TAVR 9/12/22), PAD, HLD, HFrEF (LVEF 35-40% per TTE 3/2023), and anemia who presents to [facility] for routine follow up.      With regards to her most recent medical conditions, Ms. Miller was last hospitalized 2/22-2/28/23 for shortness of breath, ADHF exacerbation, and pulmonary edema. She was diuresed with IV lasix. An echocardiogram was performed which showed reduced EF that is newly noted, 45 to 50%.  Ms. Miller was started on Entresto. Coreg was discontinued for unknown reported side effects.     Ms. Miller was recently seen in structural cardiology clinic. At that time she reported feeling well, breathing improved. Blood pressures remained elevated. Entresto was increased to a full tab in the morning and continued on 1/2 tab in the evening. Echo at that time showed further reduced EF to 35-40%. Unclear etiology, possibly HTN.     Today Ms. Miller is seen in clinic with her daughter. She reports feeling quite well. No acute concerns and denies chest tightness/discomfort, SOB, GREGORY, palpitations, lightheadedness, dizziness, syncope, or near syncope. Denies orthopnea, PND, peripheral edema. She lives in assisted living. She is active and participates in exercise classes 5 days/week. Review of daily BP log shows BPs ranging 130s-160s/80s-90s. When SBP>160 she is given an extra dose of lasix. HTN has been treated with lasix 3x in  last month. Feels that she makes a good amount of urine with lasix dose. Weights are stable 139-142 lb.    Cardiac Medications   - ASA 81 mg daily   - Lasix 20 mg daily   - Rosuvastatin 10 mg daily   - Entresto 24-26 mg 1 tab in the AM/ 1/2 tab in PM    PAST MEDICAL HISTORY:  Past Medical History:   Diagnosis Date    Aortic valvar stenosis 7/2010    mild    Asthma     Bipolar disorder (H)     CAD (coronary artery disease)     s/p angioplasty    Celiac disease     Colon polyps     Colon polyps 2012    every 3 year colonoscopy     Depression     High cholesterol     HTN     Mitral insufficiency     Pulmonary HTN (H)     mild    Tremors 7/10    drug induced from antidepressants    Tricuspid insufficiency        FAMILY HISTORY:  Family History   Problem Relation Age of Onset    Asthma Mother     Cerebrovascular Disease Mother     Arthritis Mother     Depression Mother     Lipids Sister     Hypertension Sister     Heart Disease Sister     Lipids Sister     Hypertension Sister     Lipids Sister     Breast Cancer Sister        SOCIAL HISTORY:  Social History     Socioeconomic History    Marital status:      Spouse name: Adrien    Number of children: 2    Years of education: 16   Occupational History     Employer: RETIRED     Comment: Retired in 2002.    Tobacco Use    Smoking status: Never    Smokeless tobacco: Never   Vaping Use    Vaping status: Never Used     Passive vaping exposure: Yes   Substance and Sexual Activity    Alcohol use: No     Alcohol/week: 0.0 standard drinks of alcohol     Types: 1 Standard drinks or equivalent per week    Drug use: No    Sexual activity: Not Currently     Partners: Male       CURRENT MEDICATIONS:  acetaminophen (TYLENOL) 500 MG tablet, Take 1 tablet (500 mg) by mouth every 6 hours as needed for pain  albuterol (PROAIR HFA/PROVENTIL HFA/VENTOLIN HFA) 108 (90 Base) MCG/ACT inhaler, Inhale 2 puffs into the lungs every 6 hours as needed for wheezing or shortness of breath /  dyspnea  alendronate (FOSAMAX) 70 MG tablet, TAKE 1 TABLET BY MOUTH ONCE WEEKLY  amoxicillin (AMOXIL) 500 MG capsule,   aspirin (ASA) 81 MG EC tablet, Take 1 tablet (81 mg) by mouth daily  azelastine (ASTEPRO) 0.15 % nasal spray, USE 1 SPRAY IN EACH NOSTRIL ONCE A DAY  calcium citrate (CITRACAL) 950 (200 Ca) MG tablet, TAKE 1 TABLET BY MOUTH ONCE DAILY  carvedilol (COREG) 6.25 MG tablet,   cholecalciferol (VITAMIN D3) 1250 mcg (36207 units) capsule, TAKE 1 CAPSULE BY MOUTH ONCE WEEKLY  desvenlafaxine (PRISTIQ) 100 MG 24 hr tablet, Take 100 mg by mouth daily  fluticasone-salmeterol (ADVAIR DISKUS) 500-50 MCG/ACT inhaler, INHALE 1 PUFF BY MOUTH TWICE DAILY (Patient taking differently: 1 puff daily INHALE 1 PUFF BY MOUTH TWICE DAILY)  folic acid (FOLVITE) 400 MCG tablet, Take 1 tablet (400 mcg) by mouth daily  furosemide (LASIX) 20 MG tablet, Take 1 tablet (20 mg) by mouth daily  iron polysaccharides (NIFEREX) 150 MG capsule, Take 1 capsule (150 mg) by mouth daily  lamoTRIgine (LAMICTAL) 25 MG tablet, Take 25 mg by mouth daily with 200 mg tablet for a total dose of 225 mg  LAMOTRIGINE 200 MG PO TABS, Take 200 mg by mouth daily with 25 mg tablet for a total dose of 225 mg  levothyroxine (SYNTHROID/LEVOTHROID) 50 MCG tablet, TAKE 1 TABLET BY MOUTH ONCE DAILY  liothyronine (CYTOMEL) 5 MCG tablet, Take 2 tablets (10 mcg) by mouth daily  memantine (NAMENDA) 10 MG tablet, Take 1 tablet (10 mg) by mouth daily  mirtazapine (REMERON) 7.5 MG tablet, Take 1 tablet (7.5 mg) by mouth At Bedtime  Multiple Vitamin (TAB-A-JENNIFER) TABS, Take 1 tablet by mouth daily  psyllium (METAMUCIL/KONSYL) 58.6 % powder, Take 1 tspn in liquid daily  REGULOID 28.3 % POWD, MIX 1 TEASPOON IN LIQUID AND DRINK BY MOUTH ONCE DAILY  rosuvastatin (CRESTOR) 10 MG tablet, Take 1 tablet (10 mg) by mouth daily  sacubitril-valsartan (ENTRESTO) 24-26 MG per tablet, Take 1 full tablet in the morning, continue 1/2 tablet in the evening.    No current  facility-administered medications on file prior to visit.      ROS:   Refer to HPI    EXAM:  There were no vitals taken for this visit.  GENERAL: Appears comfortable, in no acute distress.   HEENT: Eye symmetrical, no discharge or icterus bilaterally. Mucous membranes moist and without lesions.  CV: RRR, +S1S2, soft systolic murmur, rub, or gallop. JVP not appriable.   RESPIRATORY: Respirations regular, even, and unlabored. Lungs CTA throughout.   GI: Soft and non distended with normoactive bowel sounds present in all quadrants. No tenderness, rebound, guarding. No hepatomegaly.   EXTREMITIES: No peripheral edema. 2+ bilateral pedal pulses.   NEUROLOGIC: Alert and oriented x 3. No focal deficits.   MUSCULOSKELETAL: No joint swelling or tenderness.   SKIN: No jaundice. No rashes or lesions.     Labs, reviewed with patient in clinic today:  CBC RESULTS:  Lab Results   Component Value Date    WBC 4.1 02/28/2023    WBC 4.6 05/17/2021    RBC 4.58 02/28/2023    RBC 3.97 05/17/2021    HGB 13.3 02/28/2023    HGB 12.9 05/17/2021    HCT 43.7 02/28/2023    HCT 40.3 05/17/2021    MCV 95 02/28/2023     (H) 05/17/2021    MCH 29.0 02/28/2023    MCH 32.5 05/17/2021    MCHC 30.4 (L) 02/28/2023    MCHC 32.0 05/17/2021    RDW 17.2 (H) 02/28/2023    RDW 12.7 05/17/2021     02/28/2023     05/17/2021       CMP RESULTS:  Lab Results   Component Value Date     03/24/2023     06/04/2021    POTASSIUM 3.7 03/24/2023    POTASSIUM 4.6 10/05/2022    POTASSIUM 3.4 06/04/2021    CHLORIDE 104 03/24/2023    CHLORIDE 103 10/05/2022    CHLORIDE 106 06/04/2021    CO2 26 03/24/2023    CO2 27 10/05/2022    CO2 30 06/04/2021    ANIONGAP 12 03/24/2023    ANIONGAP 7 10/05/2022    ANIONGAP 6 06/04/2021    GLC 91 03/24/2023    GLC 85 01/31/2023    GLC 98 10/05/2022     (H) 06/04/2021    BUN 23.9 (H) 03/24/2023    BUN 39 (H) 10/05/2022    BUN 27 06/04/2021    CR 1.31 (H) 03/24/2023    CR 1.50 (H) 06/04/2021     GFRESTIMATED 39 (L) 03/24/2023    GFRESTIMATED 32 (L) 06/04/2021    GFRESTBLACK 37 (L) 06/04/2021    PRINCE 9.6 03/24/2023    PRINCE 9.4 06/04/2021    BILITOTAL 0.3 02/26/2023    BILITOTAL 0.3 05/17/2021    ALBUMIN 3.8 02/26/2023    ALBUMIN 3.6 09/27/2022    ALBUMIN 3.4 05/17/2021    ALKPHOS 76 02/26/2023    ALKPHOS 57 05/17/2021    ALT 38 (H) 02/26/2023    ALT 38 05/17/2021    AST 36 (H) 02/26/2023    AST 27 05/17/2021        INR RESULTS:  Lab Results   Component Value Date    INR 1.12 02/22/2023    INR 1.05 11/03/2020       Lab Results   Component Value Date    MAG 2.5 (H) 02/27/2023    MAG 2.4 (H) 10/28/2019     Lab Results   Component Value Date    NTBNPI 11,774 (H) 02/22/2023     Lab Results   Component Value Date    NTBNP 6,742 (H) 02/15/2023    NTBNP 462 (H) 05/17/2021       Cardiac Diagnostics:    3/13/2023 Echo   Interpretation Summary  The 26 mm ES3 TAVR is well-seated. Leaflet opening is not clearly visualized.  There is trace valvular without paravalvular regurgitation. The Vmax is 2.4  m/s, the mean gradient is 14 mmHg, the dimensionless index is 0.80, and the  acceleration time is 80 ms.  Left ventricular function is decreased. The ejection fraction is 35-40%  (moderately reduced). Moderate diffuse hypokinesis is present.  Global right ventricular function is normal. The right ventricle is normal  size.  This study was compared with the study from 02/23/2023. There appears to have  been a decline in the global LV function but the endocardial definition is  poor on this study. The TAVR function is unchanged.    2/23/2023 Echo  Interpretation Summary  Status post 26 mm Arndt Janis Ultra valve TAVR on 9/12/2022. MG is 18 mmHg,  PV is 2.8 m/s. Trace paravalvular AI.  Left ventricular function is decreased. The ejection fraction is 45-50%  (mildly reduced).  Global right ventricular function is normal.  Severe mitral annular calcification is present.  The inferior vena cava was normal in size with preserved  respiratory  variability.     This study was compared with the study from 1/31/2023 .LVEF mildly declined.  Mean gradient across TAVR mildly higher.    1/31/2023 Echo  Interpretation Summary  Left ventricular size, wall motion and function are normal. The ejection  fraction is 55-60%.  Right ventricular size and function are normal.  Status post 26 mm Arndt Janis Ultra valve TAVR on 9/12/2022. The valve is  well seated. MG 16 mmHg; PV 2.5 m/s; no paravalvular AI.  No pericardial effusion is present.     This study was compared with the study from 12/1/2022. No significant changes  Noted.    10/2022 Coronary Angiogram   Two vessel CAD with prior PCI to the RCA and LAD, otherwise mild non obstructive CAD elsewhere.  Patent stents in the RCA and LAD with mild to moderate in stent restenosis of the proximal LAD stents (progressed since last angiogram from 2020).  Proximal LAD ISR hemodynamically not significant by dPR at 0.91.       Assessment and Plan:   Ms. Miller is a 87 year old female with medical history pertinent for HTN, CAD (s/p PCI in 1996, PCI to LAD and RCA in 2003, PCI to LAD x3 in 8/2020), severe aortic valve stenosis (s/p 26mm ES Ultra TAVR 9/12/22), PAD, HLD, HFpEF (LVEF 55-60% per TTE 12/2022), and anemia who presents to CORE for routine follow up.     Overall appearing and feeling well. Not in acute heart failure. Euvolemic by exam. Blood pressures remain elevated. Review of today's labs demonstrate stable renal disease with Cr 1.37, cystain C pending. She has been previously treated with beta blockers (coreg and metoprolol), amlodipine, and hydralazine, all of which she did not tolerate. Tells me that these medications caused her to have headaches and ear fullness. We will try increasing entresto to 49-51 mg in the morning and 24-26 mg in the evening. Repeat BMP in 1 week and CORE in ~ 6 weeks.      # Acute on chronic systolic heart failure/HFrEF likely in the setting of uncontrolled HTN (EF 35-40%  by TTE 3/2023)  Stage C. NYHA Class IIIB.    Fluid status: euvolemic, lasix 20 mg daily   ACEi/ARB/ARNi/afterload reduction: entresto 24-26 mg BID  BB: intolerance to BB  Aldosterone antagonist: contraindicated due to renal dysfunction, borderline GFR 30-39  SGLT2i: defer for now, cystatin c pending  SCD prophylaxis: does not meet criteria for implant  NSAID use: contraindicated  Ischemia evaluation: last cor angio 10/2022; 2 vessel CAD RCA/LAD, mild to mod ISR of proxLAD, hemodynamically stable    # Coronary artery disease w/stable angina  # HTN  # HLD  s/p PCI in 1996, PCI to LAD and RCA in 2003, PCI to LAD x3 in 8/2020.    - Continue ASA 81 mg daily lifelong  - Continue high intensity statin  - Multiple intolerances to BB, hydralazine, amlodipine   - Enstresto as above for BP control   - Continue exercise and heart healthy diet      # PAD   - denies claudication, normal pulse exam on left  - recent BALDO's mildly abnormal on left  - Continue ASA 81 mg lifelong  - vascular consult should she develop recurrent claudication     # Severe aortic stenosis  s/p 26mm ES Ultra TAVR 9/12/22.  Last TTE 12/2022 showed well-seated valve, with trace paravalvular AI, MG 11mmHg, and peak velocity 2.2m/sec.  Follows with the structural team, plan to repeat TTE 9/2023.  Needs SBE ppx.     # Anemia  Management per PCP, recently given IV iron.  Discussed referral to Anemia Clinic, which she will discuss with her PCP next week.  She is taking oral iron.    # CKD 3  Baseline Cr 1.1-1.4  - Cr 1.37, cystain c pending       Follow up:  - BMP in 1 week  - CORE 6 weeks  - Echo/Dr. Zuniga 7/2023        Janice Kimbrough, RAY, NP-C  Advance Heart Failure  04/03/23            Kamryn Miller's goals for this visit include:     She requests these members of her care team be copied on today's visit information: PCP    PCP: Pham Layne    Referring Provider:  Referred Self, MD  No address on file    BP (!) 169/93 (BP Location: Right arm, Patient  Position: Sitting, Cuff Size: Adult Small)   Pulse 71   SpO2 97%     Do you need any medication refills at today's visit? No.    Triston Ruiz, EMT  Clinic Support  [facility]    [phone]    Employed by [employer]            Please do not hesitate to contact me if you have any questions/concerns.     Sincerely,     JESSICA Claros CNP

## 2023-04-03 NOTE — TELEPHONE ENCOUNTER
Francy ([facility]) calling regarding patient. Would like to update PCP that patient had high BP today.    Francy states that patient took her medications this morning before 9, nurse took BP at 0915 with normal reading. BP then taken at 1115 today 169/88 with machine, manually was almost 17 W0/90. No symptoms. States that she sometimes does experience ringing inher ears but does not have at this time, no swelling present. PCP please advise.    Per chart review patient was to have appointment with PCP 2/23/23 but was admitted, hospital follow-up recommended 7 days following discharge, patient did not complete.    Would like ok for verbal ok from PCP for orders below (patient has not been seen following hospitalization):  SN [id number] week for 4 weeks, every other week for 4 weeks.    Writer completed med req with caller per request.      Routing to PCP to please advise on BP as well as verbal orders.    Francy: ROCCO# [phone], dvm ok    PACHECO Gooden RN  [facility]

## 2023-04-03 NOTE — PATIENT INSTRUCTIONS
CORE: Cardiomyopathy, Optimization, Rehabilitation, Education      Take your medicines every day, as directed    Changes/Recommendations made today:  Increase entresto to 2 tablets every morning and 1 tablet every evening  Labs in 1 week   Monitor Your Weight and Symptoms    Contact us if you:    Gain 2 pounds in one day or 5 pounds in one week  Feel more short of breath  Notice more leg swelling  Feel lightheadeded   Change your lifestyle    Limit Salt or Sodium:  2000 mg  Limit Fluids:  2000 mL or approximately 64 ounces  Eat a Heart Healthy Diet  Low in saturated fats  Stay Active:  Aim to move at least 150 minutes every  week         To Contact us    During Business Hours:  [phone], option # 1      After hours, weekends or holidays:   [phone], Option #4  Ask to speak to the On-Call Cardiologist. Inform them you are a CORE/heart failure patient at the [facility].     Use [employer] allows you to communicate directly with your heart team through secure messaging.  [employer] can be accessed any time on your phone, computer, or tablet.  If you need assistance, we'd be happy to help!         Keep your Heart Appointments:    CORE in ~6-7 weeks (end of May)    ECHO/Dr. Zuniga 7/11/2023

## 2023-04-04 NOTE — TELEPHONE ENCOUNTER
Follow up if it continues to be High or discuss with your Cardiologist as she has Multiple Intolerance to meds

## 2023-04-04 NOTE — TELEPHONE ENCOUNTER
Called nurseFrancy, and relayed message from Dr. Wilcox. She verbalized understanding. Gave her phone number for cardiologist.   Verbal approval given for requested home care orders.    Romelia Paz RN   [facility]

## 2023-04-05 ENCOUNTER — TELEPHONE (OUTPATIENT)
Dept: FAMILY MEDICINE | Facility: CLINIC | Age: 88
End: 2023-04-05
Payer: MEDICARE

## 2023-04-05 ENCOUNTER — TELEPHONE (OUTPATIENT)
Dept: CARDIOLOGY | Facility: CLINIC | Age: 88
End: 2023-04-05
Payer: MEDICARE

## 2023-04-05 ENCOUNTER — PATIENT OUTREACH (OUTPATIENT)
Dept: CARE COORDINATION | Facility: CLINIC | Age: 88
End: 2023-04-05
Payer: MEDICARE

## 2023-04-05 NOTE — PROGRESS NOTES
Clinic Care Coordination Contact  Care Team Conversations    The RN CC nurse care coordinator reached out to the patient by phone for a follow up call.  The patient has moved into [facility] assisted living with extra services.  The patient is currently seeing the provider from the facility and no longer seeing a [facility] provider.  Therefore the case was marked declined and closed.        Toby Myrick MSN, RN, PHN, CCM   Primary Care Clinical RN Care Coordinator  [facility]  4/5/2023   11:38 AM  [email or web address]  Office: [phone]

## 2023-04-05 NOTE — TELEPHONE ENCOUNTER
Prior Authorization Retail Medication Request    Medication/Dose: Entresto 24-26 MG Tablets  ICD code (if different than what is on RX):    Previously Tried and Failed:    Rationale:      Insurance Name:  Medicare  Insurance ID:  [id number]      Pharmacy Information (if different than what is on RX)  Name:  [facility]  Phone:  [phone] Fax: [phone]

## 2023-04-05 NOTE — TELEPHONE ENCOUNTER
Reason for Call:  Form, our goal is to have forms completed with 72 hours, however, some forms may require a visit or additional information.    Type of letter, form or note:  Home Health Certification    Who is the form from?: Home care    Where did the form come from: form was faxed in    What clinic location was the form placed at?: [facility]    Where the form was placed: Given to physician    What number is listed as a contact on the form?: [phone]       Call taken on 4/5/2023 at 12:30 PM by Sophia Hayden

## 2023-04-06 ENCOUNTER — LAB REQUISITION (OUTPATIENT)
Dept: LAB | Facility: CLINIC | Age: 88
End: 2023-04-06
Payer: MEDICARE

## 2023-04-06 DIAGNOSIS — I50.32 CHRONIC DIASTOLIC (CONGESTIVE) HEART FAILURE (H): ICD-10-CM

## 2023-04-07 NOTE — TELEPHONE ENCOUNTER
[facility] Call Center    Phone Message    May a detailed message be left on voicemail: yes     Reason for Call: Other:     Katherine from [facility] is calling to request the Entresto prior auth be pushed through as Pt will be out of medication on Sunday 4/9/2023    Action Taken: Other: cardio    Travel Screening: Not Applicable     Thank you!  [facility]

## 2023-04-07 NOTE — TELEPHONE ENCOUNTER
Central Prior Authorization Team   Phone: [phone]    PA Initiation    Medication: Entresto 24-26 MG Tablets  Insurance Company: [employer] - Phone [phone] Fax [phone]  Pharmacy Filling the Rx: [facility] [address]  Filling Pharmacy Phone: [phone]  Filling Pharmacy Fax:    Start Date: 4/7/2023    Marked as urgent

## 2023-04-07 NOTE — TELEPHONE ENCOUNTER
Called VEENA Murphy at [facility] to let her know PA approved and should be filling. Asked her to call us if any further issues. Verona Agee RN

## 2023-04-07 NOTE — TELEPHONE ENCOUNTER
Prior Authorization Approval    Authorization Effective Date: 3/8/2023  Authorization Expiration Date: 4/6/2024  Medication: Entresto 24-26 MG Tablets  Approved Dose/Quantity:   Reference #:     Insurance Company: [employer] - Phone [phone] Fax [phone]  Expected CoPay:       CoPay Card Available:      Foundation Assistance Needed:    Which Pharmacy is filling the prescription (Not needed for infusion/clinic administered): [facility] [address]  Pharmacy Notified: Yes  Patient Notified: Yes

## 2023-04-10 ENCOUNTER — PATIENT OUTREACH (OUTPATIENT)
Dept: CARDIOLOGY | Facility: CLINIC | Age: 88
End: 2023-04-10
Payer: MEDICARE

## 2023-04-10 DIAGNOSIS — I10 ESSENTIAL HYPERTENSION: ICD-10-CM

## 2023-04-10 DIAGNOSIS — R79.89 ELEVATED BRAIN NATRIURETIC PEPTIDE (BNP) LEVEL: ICD-10-CM

## 2023-04-11 LAB
ANION GAP SERPL CALCULATED.3IONS-SCNC: 12 MMOL/L (ref 7–15)
BUN SERPL-MCNC: 25.7 MG/DL (ref 8–23)
CALCIUM SERPL-MCNC: 9.8 MG/DL (ref 8.8–10.2)
CHLORIDE SERPL-SCNC: 106 MMOL/L (ref 98–107)
CREAT SERPL-MCNC: 1.34 MG/DL (ref 0.51–0.95)
DEPRECATED HCO3 PLAS-SCNC: 26 MMOL/L (ref 22–29)
GFR SERPL CREATININE-BSD FRML MDRD: 38 ML/MIN/1.73M2
GLUCOSE SERPL-MCNC: 97 MG/DL (ref 70–99)
POTASSIUM SERPL-SCNC: 4.7 MMOL/L (ref 3.4–5.3)
SODIUM SERPL-SCNC: 144 MMOL/L (ref 136–145)

## 2023-04-11 PROCEDURE — 36415 COLL VENOUS BLD VENIPUNCTURE: CPT | Mod: ORL | Performed by: NURSE PRACTITIONER

## 2023-04-11 PROCEDURE — 80048 BASIC METABOLIC PNL TOTAL CA: CPT | Mod: ORL | Performed by: NURSE PRACTITIONER

## 2023-04-11 PROCEDURE — P9604 ONE-WAY ALLOW PRORATED TRIP: HCPCS | Mod: ORL | Performed by: NURSE PRACTITIONER

## 2023-04-12 NOTE — PROGRESS NOTES
Able to connect with RN from [facility] today.  Kamryn denies dizziness or lightheadedness and has not needed PRN lasix for BP over 160 since 4/3.  Labs checked recently and were stable as well.     4/3 132/67  4/4 102/64  4/5 130/66  4/6 147/73  4/7 160/84  4/8 156/83  4/9 140/78  4/10 139/74  4/11 131/65  4/12 154/79    Reviewed with Janice Kimbrough NP, no changes

## 2023-04-13 RX ORDER — SACUBITRIL AND VALSARTAN 24; 26 MG/1; MG/1
TABLET, FILM COATED ORAL
Qty: 42 TABLET | Refills: 11 | OUTPATIENT
Start: 2023-04-13

## 2023-04-23 ENCOUNTER — LAB REQUISITION (OUTPATIENT)
Dept: LAB | Facility: CLINIC | Age: 88
End: 2023-04-23
Payer: MEDICARE

## 2023-04-23 DIAGNOSIS — D64.9 ANEMIA, UNSPECIFIED: ICD-10-CM

## 2023-04-25 LAB
ERYTHROCYTE [DISTWIDTH] IN BLOOD BY AUTOMATED COUNT: 14.3 % (ref 10–15)
HCT VFR BLD AUTO: 42.3 % (ref 35–47)
HGB BLD-MCNC: 12.8 G/DL (ref 11.7–15.7)
MCH RBC QN AUTO: 29.4 PG (ref 26.5–33)
MCHC RBC AUTO-ENTMCNC: 30.3 G/DL (ref 31.5–36.5)
MCV RBC AUTO: 97 FL (ref 78–100)
PLATELET # BLD AUTO: 184 10E3/UL (ref 150–450)
RBC # BLD AUTO: 4.35 10E6/UL (ref 3.8–5.2)
WBC # BLD AUTO: 4.3 10E3/UL (ref 4–11)

## 2023-04-25 PROCEDURE — 36415 COLL VENOUS BLD VENIPUNCTURE: CPT | Mod: ORL | Performed by: NURSE PRACTITIONER

## 2023-04-25 PROCEDURE — P9604 ONE-WAY ALLOW PRORATED TRIP: HCPCS | Mod: ORL | Performed by: NURSE PRACTITIONER

## 2023-04-25 PROCEDURE — 85027 COMPLETE CBC AUTOMATED: CPT | Mod: ORL | Performed by: NURSE PRACTITIONER

## 2023-05-11 DIAGNOSIS — I50.32 CHRONIC HEART FAILURE WITH PRESERVED EJECTION FRACTION (HFPEF) (H): Primary | ICD-10-CM

## 2023-05-15 ENCOUNTER — LAB (OUTPATIENT)
Dept: LAB | Facility: CLINIC | Age: 88
End: 2023-05-15
Payer: MEDICARE

## 2023-05-15 ENCOUNTER — OFFICE VISIT (OUTPATIENT)
Dept: CARDIOLOGY | Facility: CLINIC | Age: 88
End: 2023-05-15
Attending: NURSE PRACTITIONER
Payer: MEDICARE

## 2023-05-15 VITALS
SYSTOLIC BLOOD PRESSURE: 160 MMHG | DIASTOLIC BLOOD PRESSURE: 91 MMHG | HEART RATE: 72 BPM | OXYGEN SATURATION: 97 % | WEIGHT: 143.9 LBS | BODY MASS INDEX: 25.5 KG/M2 | HEIGHT: 63 IN

## 2023-05-15 DIAGNOSIS — I50.30 NYHA CLASS 3 HEART FAILURE WITH PRESERVED EJECTION FRACTION (H): ICD-10-CM

## 2023-05-15 DIAGNOSIS — D50.9 IRON DEFICIENCY ANEMIA, UNSPECIFIED IRON DEFICIENCY ANEMIA TYPE: ICD-10-CM

## 2023-05-15 DIAGNOSIS — N18.32 STAGE 3B CHRONIC KIDNEY DISEASE (H): ICD-10-CM

## 2023-05-15 DIAGNOSIS — N18.32 ANEMIA DUE TO STAGE 3B CHRONIC KIDNEY DISEASE (H): ICD-10-CM

## 2023-05-15 DIAGNOSIS — I50.32 CHRONIC HEART FAILURE WITH PRESERVED EJECTION FRACTION (HFPEF) (H): ICD-10-CM

## 2023-05-15 DIAGNOSIS — I50.32 CHRONIC HEART FAILURE WITH PRESERVED EJECTION FRACTION (HFPEF) (H): Primary | ICD-10-CM

## 2023-05-15 DIAGNOSIS — D63.1 ANEMIA DUE TO STAGE 3B CHRONIC KIDNEY DISEASE (H): ICD-10-CM

## 2023-05-15 DIAGNOSIS — F03.90 MAJOR NEUROCOGNITIVE DISORDER (H): ICD-10-CM

## 2023-05-15 LAB
ALBUMIN UR-MCNC: NEGATIVE MG/DL
ANION GAP SERPL CALCULATED.3IONS-SCNC: 10 MMOL/L (ref 7–15)
APPEARANCE UR: CLEAR
BILIRUB UR QL STRIP: NEGATIVE
BUN SERPL-MCNC: 32.3 MG/DL (ref 8–23)
CALCIUM SERPL-MCNC: 9.8 MG/DL (ref 8.8–10.2)
CHLORIDE SERPL-SCNC: 103 MMOL/L (ref 98–107)
COLOR UR AUTO: NORMAL
CREAT SERPL-MCNC: 1.34 MG/DL (ref 0.51–0.95)
CREAT UR-MCNC: 16 MG/DL
DEPRECATED HCO3 PLAS-SCNC: 28 MMOL/L (ref 22–29)
GFR SERPL CREATININE-BSD FRML MDRD: 38 ML/MIN/1.73M2
GLUCOSE SERPL-MCNC: 98 MG/DL (ref 70–99)
GLUCOSE UR STRIP-MCNC: NEGATIVE MG/DL
HGB UR QL STRIP: NEGATIVE
KETONES UR STRIP-MCNC: NEGATIVE MG/DL
LEUKOCYTE ESTERASE UR QL STRIP: NEGATIVE
MICROALBUMIN UR-MCNC: 36.6 MG/L
MICROALBUMIN/CREAT UR: 228.75 MG/G CR (ref 0–25)
NITRATE UR QL: NEGATIVE
PH UR STRIP: 6.5 [PH] (ref 5–7)
POTASSIUM SERPL-SCNC: 4.1 MMOL/L (ref 3.4–5.3)
SODIUM SERPL-SCNC: 141 MMOL/L (ref 136–145)
SP GR UR STRIP: 1 (ref 1–1.03)
UROBILINOGEN UR STRIP-MCNC: NORMAL MG/DL

## 2023-05-15 PROCEDURE — 81003 URINALYSIS AUTO W/O SCOPE: CPT | Performed by: PATHOLOGY

## 2023-05-15 PROCEDURE — 36415 COLL VENOUS BLD VENIPUNCTURE: CPT | Performed by: PATHOLOGY

## 2023-05-15 PROCEDURE — 99214 OFFICE O/P EST MOD 30 MIN: CPT | Performed by: NURSE PRACTITIONER

## 2023-05-15 PROCEDURE — G0463 HOSPITAL OUTPT CLINIC VISIT: HCPCS | Performed by: NURSE PRACTITIONER

## 2023-05-15 PROCEDURE — 82570 ASSAY OF URINE CREATININE: CPT | Performed by: INTERNAL MEDICINE

## 2023-05-15 PROCEDURE — 80048 BASIC METABOLIC PNL TOTAL CA: CPT | Performed by: PATHOLOGY

## 2023-05-15 RX ORDER — SACUBITRIL AND VALSARTAN 49; 51 MG/1; MG/1
1 TABLET, FILM COATED ORAL 2 TIMES DAILY
Qty: 60 TABLET | Refills: 11 | Status: SHIPPED | OUTPATIENT
Start: 2023-05-15 | End: 2023-05-31

## 2023-05-15 ASSESSMENT — PAIN SCALES - GENERAL: PAINLEVEL: NO PAIN (0)

## 2023-05-15 NOTE — NURSING NOTE
Chief Complaint   Patient presents with     Follow Up     Return core: 87 year old female presents with history of s/p tavr, presumed diastolic heart failure for hospital follow up and to follows up on blood pressures with labs prior     Vitals were taken, medications reconciled.    Janice Vences, EMT   12:45 PM

## 2023-05-15 NOTE — LETTER
5/15/2023      RE: Kamryn Miller  [address]  [address]  [place] [address]       Dear Colleague,    Thank you for the opportunity to participate in the care of your patient, Kamryn Miller, at the [facility] [place] at [facility]. Please see a copy of my visit note below.      [facility]   [facility]      HPI:   Ms. Miller is a 87 year old female with medical history pertinent for HTN, CAD (s/p PCI in 1996, PCI to LAD and RCA in 2003, PCI to LAD x3 in 8/2020), severe aortic valve stenosis (s/p 26mm ES Ultra TAVR 9/12/22), PAD, HLD, HFrEF (LVEF 35-40% per TTE 3/2023), and anemia who presents to [facility] for routine follow up.      With regards to her most recent medical conditions, Ms. Miller was last hospitalized 2/22-2/28/23 for shortness of breath, ADHF exacerbation, and pulmonary edema. She was diuresed with IV lasix. An echocardiogram was performed which showed reduced EF that is newly noted, 45 to 50%.  Ms. Miller was started on Entresto. She has been previously treated with beta blockers (coreg and metoprolol), amlodipine, and hydralazine, all of which she did not tolerate. Tells me that these medications caused her to have headaches and ear fullness.    Ms. Miller was recently seen in structural cardiology clinic. Echo at that time showed further reduced EF to 35-40%. Unclear etiology, possibly HTN. TAVR stable.     Last seen in [facility] 4/2023. Hypertensive. Entresto increased to 49-51 in morning, 24-26 mg in evening.     Today Ms. Miller is seen in clinic with her daughter. She reports feeling quite well. No acute concerns and denies chest tightness/discomfort, SOB, GREGORY, palpitations, lightheadedness, dizziness, syncope, or near syncope. Denies orthopnea, PND, peripheral edema. She lives in assisted living. She is active and participates in exercise classes 5 days/week. Review of daily BP log shows BPs ranging  145s-160s/80s-90s. Weights are stable 139-142 lb.      Cardiac Medications   - ASA 81 mg daily   - Lasix 20 mg daily   - Rosuvastatin 10 mg daily   - Entresto 49-51 in morning, 24-26 mg in evening     PAST MEDICAL HISTORY:  Past Medical History:   Diagnosis Date    Aortic valvar stenosis 7/2010    mild    Asthma     Bipolar disorder (H)     CAD (coronary artery disease)     s/p angioplasty    Celiac disease     Colon polyps     Colon polyps 2012    every 3 year colonoscopy     Depression     High cholesterol     HTN     Mitral insufficiency     Pulmonary HTN (H)     mild    Tremors 7/10    drug induced from antidepressants    Tricuspid insufficiency        FAMILY HISTORY:  Family History   Problem Relation Age of Onset    Asthma Mother     Cerebrovascular Disease Mother     Arthritis Mother     Depression Mother     Lipids Sister     Hypertension Sister     Heart Disease Sister     Lipids Sister     Hypertension Sister     Lipids Sister     Breast Cancer Sister        SOCIAL HISTORY:  Social History     Socioeconomic History    Marital status:      Spouse name: Adrien    Number of children: 2    Years of education: 16   Occupational History     Employer: RETIRED     Comment: Retired in 2002.    Tobacco Use    Smoking status: Never    Smokeless tobacco: Never   Vaping Use    Vaping status: Never Used     Passive vaping exposure: Yes   Substance and Sexual Activity    Alcohol use: No     Alcohol/week: 0.0 standard drinks of alcohol     Types: 1 Standard drinks or equivalent per week    Drug use: No    Sexual activity: Not Currently     Partners: Male       CURRENT MEDICATIONS:  acetaminophen (TYLENOL) 500 MG tablet, Take 1 tablet (500 mg) by mouth every 6 hours as needed for pain  albuterol (PROAIR HFA/PROVENTIL HFA/VENTOLIN HFA) 108 (90 Base) MCG/ACT inhaler, Inhale 2 puffs into the lungs every 6 hours as needed for wheezing or shortness of breath / dyspnea  aspirin (ASA) 81 MG EC tablet, Take 1 tablet (81 mg)  by mouth daily  azelastine (ASTEPRO) 0.15 % nasal spray, USE 1 SPRAY IN EACH NOSTRIL ONCE A DAY  calcium citrate (CITRACAL) 950 (200 Ca) MG tablet, TAKE 1 TABLET BY MOUTH ONCE DAILY  cholecalciferol (VITAMIN D3) 1250 mcg (47841 units) capsule, TAKE 1 CAPSULE BY MOUTH ONCE WEEKLY  desvenlafaxine (PRISTIQ) 100 MG 24 hr tablet, Take 100 mg by mouth daily  fluticasone-salmeterol (ADVAIR DISKUS) 500-50 MCG/ACT inhaler, INHALE 1 PUFF BY MOUTH TWICE DAILY (Patient taking differently: 1 puff daily INHALE 1 PUFF BY MOUTH TWICE DAILY)  folic acid (FOLVITE) 400 MCG tablet, Take 1 tablet (400 mcg) by mouth daily  furosemide (LASIX) 20 MG tablet, Take 1 tablet (20 mg) by mouth daily  iron polysaccharides (NIFEREX) 150 MG capsule, Take 1 capsule (150 mg) by mouth daily  lamoTRIgine (LAMICTAL) 25 MG tablet, Take 25 mg by mouth daily with 200 mg tablet for a total dose of 225 mg  LAMOTRIGINE 200 MG PO TABS, Take 200 mg by mouth daily with 25 mg tablet for a total dose of 225 mg  levothyroxine (SYNTHROID/LEVOTHROID) 50 MCG tablet, TAKE 1 TABLET BY MOUTH ONCE DAILY  liothyronine (CYTOMEL) 5 MCG tablet, Take 2 tablets (10 mcg) by mouth daily  memantine (NAMENDA) 10 MG tablet, Take 1 tablet (10 mg) by mouth daily  mirtazapine (REMERON) 7.5 MG tablet, Take 1 tablet (7.5 mg) by mouth At Bedtime  Multiple Vitamin (TAB-A-JENNIFER) TABS, Take 1 tablet by mouth daily  psyllium (METAMUCIL/KONSYL) 58.6 % powder, Take 1 tspn in liquid daily  rosuvastatin (CRESTOR) 10 MG tablet, Take 1 tablet (10 mg) by mouth daily  alendronate (FOSAMAX) 70 MG tablet, TAKE 1 TABLET BY MOUTH ONCE WEEKLY (Patient not taking: Reported on 5/15/2023)  amoxicillin (AMOXIL) 500 MG capsule,   carvedilol (COREG) 6.25 MG tablet,   REGULOID 28.3 % POWD, MIX 1 TEASPOON IN LIQUID AND DRINK BY MOUTH ONCE DAILY (Patient not taking: Reported on 5/15/2023)    No current facility-administered medications on file prior to visit.      ROS:   Refer to HPI    EXAM:  BP (!) 160/91 (BP  "Location: Right arm, Patient Position: Sitting, Cuff Size: Adult Regular)   Pulse 72   Ht 1.6 m (5' 2.99\")   Wt 65.3 kg (143 lb 14.4 oz)   SpO2 97%   BMI 25.50 kg/m    GENERAL: Appears comfortable, in no acute distress.   HEENT: Eye symmetrical, no discharge or icterus bilaterally. Mucous membranes moist and without lesions.  CV: RRR, +S1S2, soft systolic murmur, rub, or gallop. JVP not appriable.   RESPIRATORY: Respirations regular, even, and unlabored. Lungs CTA throughout.   GI: Soft and non distended with normoactive bowel sounds present in all quadrants. No tenderness, rebound, guarding. No hepatomegaly.   EXTREMITIES: No peripheral edema. 2+ bilateral pedal pulses.   NEUROLOGIC: Alert and oriented x 3. No focal deficits.   MUSCULOSKELETAL: No joint swelling or tenderness.   SKIN: No jaundice. No rashes or lesions.     Labs, reviewed with patient in clinic today:  CBC RESULTS:  Lab Results   Component Value Date    WBC 4.3 04/25/2023    WBC 4.6 05/17/2021    RBC 4.35 04/25/2023    RBC 3.97 05/17/2021    HGB 12.8 04/25/2023    HGB 12.9 05/17/2021    HCT 42.3 04/25/2023    HCT 40.3 05/17/2021    MCV 97 04/25/2023     (H) 05/17/2021    MCH 29.4 04/25/2023    MCH 32.5 05/17/2021    MCHC 30.3 (L) 04/25/2023    MCHC 32.0 05/17/2021    RDW 14.3 04/25/2023    RDW 12.7 05/17/2021     04/25/2023     05/17/2021       CMP RESULTS:  Lab Results   Component Value Date     05/15/2023     06/04/2021    POTASSIUM 4.1 05/15/2023    POTASSIUM 4.6 10/05/2022    POTASSIUM 3.4 06/04/2021    CHLORIDE 103 05/15/2023    CHLORIDE 103 10/05/2022    CHLORIDE 106 06/04/2021    CO2 28 05/15/2023    CO2 27 10/05/2022    CO2 30 06/04/2021    ANIONGAP 10 05/15/2023    ANIONGAP 7 10/05/2022    ANIONGAP 6 06/04/2021    GLC 98 05/15/2023    GLC 85 01/31/2023    GLC 98 10/05/2022     (H) 06/04/2021    BUN 32.3 (H) 05/15/2023    BUN 39 (H) 10/05/2022    BUN 27 06/04/2021    CR 1.34 (H) 05/15/2023    CR " 1.50 (H) 06/04/2021    GFRESTIMATED 38 (L) 05/15/2023    GFRESTIMATED 32 (L) 06/04/2021    GFRESTBLACK 37 (L) 06/04/2021    PRINCE 9.8 05/15/2023    PRINCE 9.4 06/04/2021    BILITOTAL 0.3 02/26/2023    BILITOTAL 0.3 05/17/2021    ALBUMIN 4.1 04/03/2023    ALBUMIN 3.6 09/27/2022    ALBUMIN 3.4 05/17/2021    ALKPHOS 76 02/26/2023    ALKPHOS 57 05/17/2021    ALT 38 (H) 02/26/2023    ALT 38 05/17/2021    AST 36 (H) 02/26/2023    AST 27 05/17/2021        INR RESULTS:  Lab Results   Component Value Date    INR 1.12 02/22/2023    INR 1.05 11/03/2020       Lab Results   Component Value Date    MAG 2.5 (H) 02/27/2023    MAG 2.4 (H) 10/28/2019     Lab Results   Component Value Date    NTBNPI 11,774 (H) 02/22/2023     Lab Results   Component Value Date    NTBNP 6,742 (H) 02/15/2023    NTBNP 462 (H) 05/17/2021       Cardiac Diagnostics:    3/13/2023 Echo   Interpretation Summary  The 26 mm ES3 TAVR is well-seated. Leaflet opening is not clearly visualized.  There is trace valvular without paravalvular regurgitation. The Vmax is 2.4  m/s, the mean gradient is 14 mmHg, the dimensionless index is 0.80, and the  acceleration time is 80 ms.  Left ventricular function is decreased. The ejection fraction is 35-40%  (moderately reduced). Moderate diffuse hypokinesis is present.  Global right ventricular function is normal. The right ventricle is normal  size.  This study was compared with the study from 02/23/2023. There appears to have  been a decline in the global LV function but the endocardial definition is  poor on this study. The TAVR function is unchanged.    2/23/2023 Echo  Interpretation Summary  Status post 26 mm Arndt Janis Ultra valve TAVR on 9/12/2022. MG is 18 mmHg,  PV is 2.8 m/s. Trace paravalvular AI.  Left ventricular function is decreased. The ejection fraction is 45-50%  (mildly reduced).  Global right ventricular function is normal.  Severe mitral annular calcification is present.  The inferior vena cava was normal in  size with preserved respiratory  variability.     This study was compared with the study from 1/31/2023 .LVEF mildly declined.  Mean gradient across TAVR mildly higher.    1/31/2023 Echo  Interpretation Summary  Left ventricular size, wall motion and function are normal. The ejection  fraction is 55-60%.  Right ventricular size and function are normal.  Status post 26 mm Arndt Janis Ultra valve TAVR on 9/12/2022. The valve is  well seated. MG 16 mmHg; PV 2.5 m/s; no paravalvular AI.  No pericardial effusion is present.     This study was compared with the study from 12/1/2022. No significant changes  Noted.    10/2022 Coronary Angiogram   Two vessel CAD with prior PCI to the RCA and LAD, otherwise mild non obstructive CAD elsewhere.  Patent stents in the RCA and LAD with mild to moderate in stent restenosis of the proximal LAD stents (progressed since last angiogram from 2020).  Proximal LAD ISR hemodynamically not significant by dPR at 0.91.       Assessment and Plan:   Ms. Miller is a 87 year old female with medical history pertinent for HTN, CAD (s/p PCI in 1996, PCI to LAD and RCA in 2003, PCI to LAD x3 in 8/2020), severe aortic valve stenosis (s/p 26mm ES Ultra TAVR 9/12/22), PAD, HLD, HFpEF (LVEF 55-60% per TTE 12/2022), and anemia who presents to [facility] for routine follow up.     Appearing and feeling well. Euvolemic by exam. Remains hypertensive with SBP > 145. Review of labs are stable with Cr 1.34 and lytes wnl. Recent cystatin c 1.9 with eGFR 28. For today we will increase entresto to 49-51 mg BID. Plan for BMP in 2 weeks along with BP check. If BPs remain elevated, will attempt to increase entresto to  mg BID. If BPs are controlled and GFR stable, will plan to start an SGLT2.     # Acute on chronic systolic heart failure/HFrEF likely in the setting of uncontrolled HTN (EF 35-40% by TTE 3/2023)  Stage C. NYHA Class IIIB.    Fluid status: euvolemic, lasix 20 mg daily   ACEi/ARB/ARNi/afterload  reduction: increase entresto 49-51 mg BID  BB: intolerance to BB  Aldosterone antagonist: contraindicated due to renal dysfunction, borderline GFR 30-39  SGLT2i: will consider starting SGLT2 in 2 weeks if renal function stable, cystatin c 1.9 with eGFR 28  SCD prophylaxis: does not meet criteria for implant  NSAID use: contraindicated  Ischemia evaluation: last cor angio 10/2022; 2 vessel CAD RCA/LAD, mild to mod ISR of proxLAD, hemodynamically stable    # Coronary artery disease w/stable angina  # HTN  # HLD  s/p PCI in 1996, PCI to LAD and RCA in 2003, PCI to LAD x3 in 8/2020.    - Continue ASA 81 mg daily lifelong  - Continue high intensity statin  - Multiple intolerances to BB, hydralazine, amlodipine   - Enstresto as above for BP control   - Continue exercise and heart healthy diet      # PAD   - denies claudication, normal pulse exam on left  - recent BALDO's mildly abnormal on left  - Continue ASA 81 mg lifelong  - vascular consult should she develop recurrent claudication     # Severe aortic stenosis  s/p 26mm ES Ultra TAVR 9/12/22.  Last TTE 12/2022 showed well-seated valve, with trace paravalvular AI, MG 11mmHg, and peak velocity 2.2m/sec.  Follows with the structural team, plan to repeat TTE 9/2023.  Needs SBE ppx.     # Anemia  Management per PCP, recently given IV iron.  Discussed referral to Anemia Clinic, which she will discuss with her PCP next week.  She is taking oral iron.    # CKD 3  Baseline Cr 1.1-1.4, Cystatin C 1.9 with eGFR 28  - Cr 1.34    Follow up:  - Echo/Dr. Zuniga 7/2023  - [facility] in 4 weeks      Janice Kimbrough, RAY, NP-C  Advance Heart Failure  5/15/23

## 2023-05-15 NOTE — NURSING NOTE
Entresto increased to 49-51 mg BID.  Faxed updated order to facility  BMP order requested faxed as well.      Will call in 2 weeks for BP check.

## 2023-05-15 NOTE — PROGRESS NOTES
[facility] Cardiology   [facility] Clinic      HPI:   Ms. Miller is a 87 year old female with medical history pertinent for HTN, CAD (s/p PCI in 1996, PCI to LAD and RCA in 2003, PCI to LAD x3 in 8/2020), severe aortic valve stenosis (s/p 26mm ES Ultra TAVR 9/12/22), PAD, HLD, HFrEF (LVEF 35-40% per TTE 3/2023), and anemia who presents to [facility] for routine follow up.      With regards to her most recent medical conditions, Ms. Miller was last hospitalized 2/22-2/28/23 for shortness of breath, ADHF exacerbation, and pulmonary edema. She was diuresed with IV lasix. An echocardiogram was performed which showed reduced EF that is newly noted, 45 to 50%.  Ms. Miller was started on Entresto. She has been previously treated with beta blockers (coreg and metoprolol), amlodipine, and hydralazine, all of which she did not tolerate. Tells me that these medications caused her to have headaches and ear fullness.    Ms. Miller was recently seen in structural cardiology clinic. Echo at that time showed further reduced EF to 35-40%. Unclear etiology, possibly HTN. TAVR stable.     Last seen in CORE 4/2023. Hypertensive. Entresto increased to 49-51 in morning, 24-26 mg in evening.     Today Ms. Miller is seen in clinic with her daughter. She reports feeling quite well. No acute concerns and denies chest tightness/discomfort, SOB, GREGORY, palpitations, lightheadedness, dizziness, syncope, or near syncope. Denies orthopnea, PND, peripheral edema. She lives in assisted living. She is active and participates in exercise classes 5 days/week. Review of daily BP log shows BPs ranging 145s-160s/80s-90s. Weights are stable 139-142 lb.      Cardiac Medications   - ASA 81 mg daily   - Lasix 20 mg daily   - Rosuvastatin 10 mg daily   - Entresto 49-51 in morning, 24-26 mg in evening     PAST MEDICAL HISTORY:  Past Medical History:   Diagnosis Date     Aortic valvar stenosis 7/2010    mild     Asthma      Bipolar disorder (H)      CAD (coronary artery  disease)     s/p angioplasty     Celiac disease      Colon polyps      Colon polyps 2012    every 3 year colonoscopy      Depression      High cholesterol      HTN      Mitral insufficiency      Pulmonary HTN (H)     mild     Tremors 7/10    drug induced from antidepressants     Tricuspid insufficiency        FAMILY HISTORY:  Family History   Problem Relation Age of Onset     Asthma Mother      Cerebrovascular Disease Mother      Arthritis Mother      Depression Mother      Lipids Sister      Hypertension Sister      Heart Disease Sister      Lipids Sister      Hypertension Sister      Lipids Sister      Breast Cancer Sister        SOCIAL HISTORY:  Social History     Socioeconomic History     Marital status:      Spouse name: Adrien     Number of children: 2     Years of education: 16   Occupational History     Employer: RETIRED     Comment: Retired in 2002.    Tobacco Use     Smoking status: Never     Smokeless tobacco: Never   Vaping Use     Vaping status: Never Used     Passive vaping exposure: Yes   Substance and Sexual Activity     Alcohol use: No     Alcohol/week: 0.0 standard drinks of alcohol     Types: 1 Standard drinks or equivalent per week     Drug use: No     Sexual activity: Not Currently     Partners: Male       CURRENT MEDICATIONS:  acetaminophen (TYLENOL) 500 MG tablet, Take 1 tablet (500 mg) by mouth every 6 hours as needed for pain  albuterol (PROAIR HFA/PROVENTIL HFA/VENTOLIN HFA) 108 (90 Base) MCG/ACT inhaler, Inhale 2 puffs into the lungs every 6 hours as needed for wheezing or shortness of breath / dyspnea  aspirin (ASA) 81 MG EC tablet, Take 1 tablet (81 mg) by mouth daily  azelastine (ASTEPRO) 0.15 % nasal spray, USE 1 SPRAY IN EACH NOSTRIL ONCE A DAY  calcium citrate (CITRACAL) 950 (200 Ca) MG tablet, TAKE 1 TABLET BY MOUTH ONCE DAILY  cholecalciferol (VITAMIN D3) 1250 mcg (72361 units) capsule, TAKE 1 CAPSULE BY MOUTH ONCE WEEKLY  desvenlafaxine (PRISTIQ) 100 MG 24 hr tablet, Take 100  "mg by mouth daily  fluticasone-salmeterol (ADVAIR DISKUS) 500-50 MCG/ACT inhaler, INHALE 1 PUFF BY MOUTH TWICE DAILY (Patient taking differently: 1 puff daily INHALE 1 PUFF BY MOUTH TWICE DAILY)  folic acid (FOLVITE) 400 MCG tablet, Take 1 tablet (400 mcg) by mouth daily  furosemide (LASIX) 20 MG tablet, Take 1 tablet (20 mg) by mouth daily  iron polysaccharides (NIFEREX) 150 MG capsule, Take 1 capsule (150 mg) by mouth daily  lamoTRIgine (LAMICTAL) 25 MG tablet, Take 25 mg by mouth daily with 200 mg tablet for a total dose of 225 mg  LAMOTRIGINE 200 MG PO TABS, Take 200 mg by mouth daily with 25 mg tablet for a total dose of 225 mg  levothyroxine (SYNTHROID/LEVOTHROID) 50 MCG tablet, TAKE 1 TABLET BY MOUTH ONCE DAILY  liothyronine (CYTOMEL) 5 MCG tablet, Take 2 tablets (10 mcg) by mouth daily  memantine (NAMENDA) 10 MG tablet, Take 1 tablet (10 mg) by mouth daily  mirtazapine (REMERON) 7.5 MG tablet, Take 1 tablet (7.5 mg) by mouth At Bedtime  Multiple Vitamin (TAB-A-JENNIFER) TABS, Take 1 tablet by mouth daily  psyllium (METAMUCIL/KONSYL) 58.6 % powder, Take 1 tspn in liquid daily  rosuvastatin (CRESTOR) 10 MG tablet, Take 1 tablet (10 mg) by mouth daily  alendronate (FOSAMAX) 70 MG tablet, TAKE 1 TABLET BY MOUTH ONCE WEEKLY (Patient not taking: Reported on 5/15/2023)  amoxicillin (AMOXIL) 500 MG capsule,   carvedilol (COREG) 6.25 MG tablet,   REGULOID 28.3 % POWD, MIX 1 TEASPOON IN LIQUID AND DRINK BY MOUTH ONCE DAILY (Patient not taking: Reported on 5/15/2023)    No current facility-administered medications on file prior to visit.      ROS:   Refer to HPI    EXAM:  BP (!) 160/91 (BP Location: Right arm, Patient Position: Sitting, Cuff Size: Adult Regular)   Pulse 72   Ht 1.6 m (5' 2.99\")   Wt 65.3 kg (143 lb 14.4 oz)   SpO2 97%   BMI 25.50 kg/m    GENERAL: Appears comfortable, in no acute distress.   HEENT: Eye symmetrical, no discharge or icterus bilaterally. Mucous membranes moist and without lesions.  CV: " RRR, +S1S2, soft systolic murmur, rub, or gallop. JVP not appriable.   RESPIRATORY: Respirations regular, even, and unlabored. Lungs CTA throughout.   GI: Soft and non distended with normoactive bowel sounds present in all quadrants. No tenderness, rebound, guarding. No hepatomegaly.   EXTREMITIES: No peripheral edema. 2+ bilateral pedal pulses.   NEUROLOGIC: Alert and oriented x 3. No focal deficits.   MUSCULOSKELETAL: No joint swelling or tenderness.   SKIN: No jaundice. No rashes or lesions.     Labs, reviewed with patient in clinic today:  CBC RESULTS:  Lab Results   Component Value Date    WBC 4.3 04/25/2023    WBC 4.6 05/17/2021    RBC 4.35 04/25/2023    RBC 3.97 05/17/2021    HGB 12.8 04/25/2023    HGB 12.9 05/17/2021    HCT 42.3 04/25/2023    HCT 40.3 05/17/2021    MCV 97 04/25/2023     (H) 05/17/2021    MCH 29.4 04/25/2023    MCH 32.5 05/17/2021    MCHC 30.3 (L) 04/25/2023    MCHC 32.0 05/17/2021    RDW 14.3 04/25/2023    RDW 12.7 05/17/2021     04/25/2023     05/17/2021       CMP RESULTS:  Lab Results   Component Value Date     05/15/2023     06/04/2021    POTASSIUM 4.1 05/15/2023    POTASSIUM 4.6 10/05/2022    POTASSIUM 3.4 06/04/2021    CHLORIDE 103 05/15/2023    CHLORIDE 103 10/05/2022    CHLORIDE 106 06/04/2021    CO2 28 05/15/2023    CO2 27 10/05/2022    CO2 30 06/04/2021    ANIONGAP 10 05/15/2023    ANIONGAP 7 10/05/2022    ANIONGAP 6 06/04/2021    GLC 98 05/15/2023    GLC 85 01/31/2023    GLC 98 10/05/2022     (H) 06/04/2021    BUN 32.3 (H) 05/15/2023    BUN 39 (H) 10/05/2022    BUN 27 06/04/2021    CR 1.34 (H) 05/15/2023    CR 1.50 (H) 06/04/2021    GFRESTIMATED 38 (L) 05/15/2023    GFRESTIMATED 32 (L) 06/04/2021    GFRESTBLACK 37 (L) 06/04/2021    PRINCE 9.8 05/15/2023    PRINCE 9.4 06/04/2021    BILITOTAL 0.3 02/26/2023    BILITOTAL 0.3 05/17/2021    ALBUMIN 4.1 04/03/2023    ALBUMIN 3.6 09/27/2022    ALBUMIN 3.4 05/17/2021    ALKPHOS 76 02/26/2023    ALKPHOS 57  05/17/2021    ALT 38 (H) 02/26/2023    ALT 38 05/17/2021    AST 36 (H) 02/26/2023    AST 27 05/17/2021        INR RESULTS:  Lab Results   Component Value Date    INR 1.12 02/22/2023    INR 1.05 11/03/2020       Lab Results   Component Value Date    MAG 2.5 (H) 02/27/2023    MAG 2.4 (H) 10/28/2019     Lab Results   Component Value Date    NTBNPI 11,774 (H) 02/22/2023     Lab Results   Component Value Date    NTBNP 6,742 (H) 02/15/2023    NTBNP 462 (H) 05/17/2021       Cardiac Diagnostics:    3/13/2023 Echo   Interpretation Summary  The 26 mm ES3 TAVR is well-seated. Leaflet opening is not clearly visualized.  There is trace valvular without paravalvular regurgitation. The Vmax is 2.4  m/s, the mean gradient is 14 mmHg, the dimensionless index is 0.80, and the  acceleration time is 80 ms.  Left ventricular function is decreased. The ejection fraction is 35-40%  (moderately reduced). Moderate diffuse hypokinesis is present.  Global right ventricular function is normal. The right ventricle is normal  size.  This study was compared with the study from 02/23/2023. There appears to have  been a decline in the global LV function but the endocardial definition is  poor on this study. The TAVR function is unchanged.    2/23/2023 Echo  Interpretation Summary  Status post 26 mm Arndt Janis Ultra valve TAVR on 9/12/2022. MG is 18 mmHg,  PV is 2.8 m/s. Trace paravalvular AI.  Left ventricular function is decreased. The ejection fraction is 45-50%  (mildly reduced).  Global right ventricular function is normal.  Severe mitral annular calcification is present.  The inferior vena cava was normal in size with preserved respiratory  variability.     This study was compared with the study from 1/31/2023 .LVEF mildly declined.  Mean gradient across TAVR mildly higher.    1/31/2023 Echo  Interpretation Summary  Left ventricular size, wall motion and function are normal. The ejection  fraction is 55-60%.  Right ventricular size and  function are normal.  Status post 26 mm Arndt Janis Ultra valve TAVR on 9/12/2022. The valve is  well seated. MG 16 mmHg; PV 2.5 m/s; no paravalvular AI.  No pericardial effusion is present.     This study was compared with the study from 12/1/2022. No significant changes  Noted.    10/2022 Coronary Angiogram   Two vessel CAD with prior PCI to the RCA and LAD, otherwise mild non obstructive CAD elsewhere.  Patent stents in the RCA and LAD with mild to moderate in stent restenosis of the proximal LAD stents (progressed since last angiogram from 2020).  Proximal LAD ISR hemodynamically not significant by dPR at 0.91.       Assessment and Plan:   Ms. Miller is a 87 year old female with medical history pertinent for HTN, CAD (s/p PCI in 1996, PCI to LAD and RCA in 2003, PCI to LAD x3 in 8/2020), severe aortic valve stenosis (s/p 26mm ES Ultra TAVR 9/12/22), PAD, HLD, HFpEF (LVEF 55-60% per TTE 12/2022), and anemia who presents to CORE for routine follow up.     Appearing and feeling well. Euvolemic by exam. Remains hypertensive with SBP > 145. Review of labs are stable with Cr 1.34 and lytes wnl. Recent cystatin c 1.9 with eGFR 28. For today we will increase entresto to 49-51 mg BID. Plan for BMP in 2 weeks along with BP check. If BPs remain elevated, will attempt to increase entresto to  mg BID. If BPs are controlled and GFR stable, will plan to start an SGLT2.     # Acute on chronic systolic heart failure/HFrEF likely in the setting of uncontrolled HTN (EF 35-40% by TTE 3/2023)  Stage C. NYHA Class IIIB.    Fluid status: euvolemic, lasix 20 mg daily   ACEi/ARB/ARNi/afterload reduction: increase entresto 49-51 mg BID  BB: intolerance to BB  Aldosterone antagonist: contraindicated due to renal dysfunction, borderline GFR 30-39  SGLT2i: will consider starting SGLT2 in 2 weeks if renal function stable, cystatin c 1.9 with eGFR 28  SCD prophylaxis: does not meet criteria for implant  NSAID use:  contraindicated  Ischemia evaluation: last cor angio 10/2022; 2 vessel CAD RCA/LAD, mild to mod ISR of proxLAD, hemodynamically stable    # Coronary artery disease w/stable angina  # HTN  # HLD  s/p PCI in 1996, PCI to LAD and RCA in 2003, PCI to LAD x3 in 8/2020.    - Continue ASA 81 mg daily lifelong  - Continue high intensity statin  - Multiple intolerances to BB, hydralazine, amlodipine   - Enstresto as above for BP control   - Continue exercise and heart healthy diet      # PAD   - denies claudication, normal pulse exam on left  - recent BALDO's mildly abnormal on left  - Continue ASA 81 mg lifelong  - vascular consult should she develop recurrent claudication     # Severe aortic stenosis  s/p 26mm ES Ultra TAVR 9/12/22.  Last TTE 12/2022 showed well-seated valve, with trace paravalvular AI, MG 11mmHg, and peak velocity 2.2m/sec.  Follows with the structural team, plan to repeat TTE 9/2023.  Needs SBE ppx.     # Anemia  Management per PCP, recently given IV iron.  Discussed referral to Anemia Clinic, which she will discuss with her PCP next week.  She is taking oral iron.    # CKD 3  Baseline Cr 1.1-1.4, Cystatin C 1.9 with eGFR 28  - Cr 1.34      Follow up:  - Echo/Dr. Zuniga 7/2023  - CORE in 4 weeks        Janice Kimbrough DNP, NP-C  Advance Heart Failure  5/15/23

## 2023-05-15 NOTE — PATIENT INSTRUCTIONS
CORE: Cardiomyopathy, Optimization, Rehabilitation, Education      Take your medicines every day, as directed    Changes/Recommendations made today:  Increase entresto to 49-51 mg (1 tablet) by mouth 2 times daily     Monitor Your Weight and Symptoms    Contact us if you:    Gain 2 pounds in one day or 5 pounds in one week  Feel more short of breath  Notice more leg swelling  Feel lightheadeded   Change your lifestyle    Limit Salt or Sodium:  2000 mg  Limit Fluids:  2000 mL or approximately 64 ounces  Eat a Heart Healthy Diet  Low in saturated fats  Stay Active:  Aim to move at least 150 minutes every  week         To Contact us    During Business Hours:  [phone], option # 1      After hours, weekends or holidays:   [phone], Option #4  Ask to speak to the On-Call Cardiologist. Inform them you are a CORE/heart failure patient at the [place].     Use [employer] allows you to communicate directly with your heart team through secure messaging.  [employer] can be accessed any time on your phone, computer, or tablet.  If you need assistance, we'd be happy to help!         Keep your Heart Appointments:    Labs (BMP) in 2 weeks    RN phone call in 2 weeks for BP check     CORE in 4 weeks

## 2023-05-19 ENCOUNTER — CARE COORDINATION (OUTPATIENT)
Dept: CARDIOLOGY | Facility: CLINIC | Age: 88
End: 2023-05-19
Payer: MEDICARE

## 2023-05-19 NOTE — PROGRESS NOTES
Return call made to Katherine with [facility].    The cardiology team would like Kamryn to continue daily weights.

## 2023-05-25 ENCOUNTER — LAB REQUISITION (OUTPATIENT)
Dept: LAB | Facility: CLINIC | Age: 88
End: 2023-05-25
Payer: MEDICARE

## 2023-05-25 DIAGNOSIS — I50.9 HEART FAILURE, UNSPECIFIED (H): ICD-10-CM

## 2023-05-30 ENCOUNTER — PATIENT OUTREACH (OUTPATIENT)
Dept: CARDIOLOGY | Facility: CLINIC | Age: 88
End: 2023-05-30

## 2023-05-30 DIAGNOSIS — I50.32 CHRONIC HEART FAILURE WITH PRESERVED EJECTION FRACTION (HFPEF) (H): ICD-10-CM

## 2023-05-30 LAB
ANION GAP SERPL CALCULATED.3IONS-SCNC: 11 MMOL/L (ref 7–15)
BUN SERPL-MCNC: 39 MG/DL (ref 8–23)
CALCIUM SERPL-MCNC: 9.4 MG/DL (ref 8.8–10.2)
CHLORIDE SERPL-SCNC: 106 MMOL/L (ref 98–107)
CREAT SERPL-MCNC: 1.41 MG/DL (ref 0.51–0.95)
DEPRECATED HCO3 PLAS-SCNC: 26 MMOL/L (ref 22–29)
GFR SERPL CREATININE-BSD FRML MDRD: 36 ML/MIN/1.73M2
GLUCOSE SERPL-MCNC: 103 MG/DL (ref 70–99)
POTASSIUM SERPL-SCNC: 4.1 MMOL/L (ref 3.4–5.3)
SODIUM SERPL-SCNC: 143 MMOL/L (ref 136–145)

## 2023-05-30 PROCEDURE — 36415 COLL VENOUS BLD VENIPUNCTURE: CPT | Mod: ORL | Performed by: NURSE PRACTITIONER

## 2023-05-30 PROCEDURE — P9604 ONE-WAY ALLOW PRORATED TRIP: HCPCS | Mod: ORL | Performed by: NURSE PRACTITIONER

## 2023-05-30 PROCEDURE — 80048 BASIC METABOLIC PNL TOTAL CA: CPT | Mod: ORL | Performed by: NURSE PRACTITIONER

## 2023-05-30 NOTE — TELEPHONE ENCOUNTER
Blood pressure checks after increasing Entresto to 49/51 mg BID.  Katherine CURIEL reports that Kamryn has not had any dizziness or lightheadedness and seems to be tolerating the medication well.  She does receive an extra dose of lasix when her SBP is >160.      5/30 160/80  29  28- 151/74  27 149/73  26 154/83  25 154/74  24  23  22 165/94- extra lasix for SBP >160  21 149/78  20 154/84  19 182/95- extra lasix SBP >160  17 161/84  16 146/80  15 150/82

## 2023-05-31 RX ORDER — SACUBITRIL AND VALSARTAN 97; 103 MG/1; MG/1
TABLET, FILM COATED ORAL
Qty: 45 TABLET | Refills: 11 | Status: SHIPPED | OUTPATIENT
Start: 2023-05-31 | End: 2023-06-15

## 2023-05-31 NOTE — TELEPHONE ENCOUNTER
Date: 5/31/2023    Time of Call: 11:19 AM     Diagnosis:  Heart failure      [ VORB ] Ordering provider: Janice Kimbrough NP    Order: Entresto  in morning and 49-51 mg in the evening, BMP in 4 weeks      Order received by: Jessica Garcia RN       Follow-up/additional notes: update Katherine.  Medications sent to [facility], will also send fax with updated medication sheet and lab orders to [facility]

## 2023-06-04 DIAGNOSIS — Z00.00 PREVENTATIVE HEALTH CARE: ICD-10-CM

## 2023-06-05 RX ORDER — LANOLIN ALCOHOL/MO/W.PET/CERES
CREAM (GRAM) TOPICAL
Qty: 28 TABLET | Refills: 11 | Status: SHIPPED | OUTPATIENT
Start: 2023-06-05

## 2023-06-14 ENCOUNTER — LAB REQUISITION (OUTPATIENT)
Dept: LAB | Facility: CLINIC | Age: 88
End: 2023-06-14
Payer: MEDICARE

## 2023-06-14 ENCOUNTER — TELEPHONE (OUTPATIENT)
Dept: CARDIOLOGY | Facility: CLINIC | Age: 88
End: 2023-06-14
Payer: MEDICARE

## 2023-06-14 DIAGNOSIS — D64.9 ANEMIA, UNSPECIFIED: ICD-10-CM

## 2023-06-14 DIAGNOSIS — I50.32 CHRONIC HEART FAILURE WITH PRESERVED EJECTION FRACTION (HFPEF) (H): Primary | ICD-10-CM

## 2023-06-14 NOTE — TELEPHONE ENCOUNTER
Called Katherine back.   Recently increased Entresto.   Things were going good for a while.   Last week BP daily checks have been 194/99, 156/72, 183/100. 178/80, 161/78, today was 179/99. Gave an extra dose of lasix, recheck was 166/104 on manual, 144/90 on BP machine.   She feels great. No headache, no chest pain.     Nurse thinks she's maybe a little fluid up. 10th her weight was 141.4, 14th - 144 lbs  Confirmed current Entresto dose 97/103 in AM and 49/51 mg in PM. Not taking Carvedilol (is listed on med list but listed as not taking).     [facility] in [place] for any changes.  Advised that if she develops any headache ,chest pain, or other concerning symptoms should go straight to ER.   Will review with provider.

## 2023-06-14 NOTE — TELEPHONE ENCOUNTER
[facility] Call Center    Phone Message    May a detailed message be left on voicemail: yes     Reason for Call: Other: Katherine from [facility] would like a call back asap as pt BP is elevated and did not come down even with medication and she would like to discuss     Action Taken: Message routed to:  [facility] Cardio    Travel Screening: Not Applicable

## 2023-06-15 ENCOUNTER — TELEPHONE (OUTPATIENT)
Dept: CARDIOLOGY | Facility: CLINIC | Age: 88
End: 2023-06-15

## 2023-06-15 RX ORDER — SACUBITRIL AND VALSARTAN 97; 103 MG/1; MG/1
1 TABLET, FILM COATED ORAL 2 TIMES DAILY
Qty: 60 TABLET | Refills: 1 | Status: SHIPPED | OUTPATIENT
Start: 2023-06-15 | End: 2023-08-11

## 2023-06-15 NOTE — TELEPHONE ENCOUNTER
[facility] Call Center    Phone Message    May a detailed message be left on voicemail: no     Reason for Call: Other: Katherine called back to speak with Verona Agee, RN regarding the BMP lab. Katherine stated the lab is already scheduled to be drawn on 6/27/23. Will this date work, or do you want it to be drawn on 6/23/23? Please call Katherine to clarify, she can be reached at [phone].     Action Taken: Message routed to:  [facility] Cardiology    Travel Screening: Not Applicable

## 2023-06-15 NOTE — TELEPHONE ENCOUNTER
Date: 6/15/2023    Time of Call: 7:15 AM     Diagnosis:  HF     [ VORB ] Ordering provider: Kathia VILLAFANA  Order: Increase Entresto to 97/103 mg BID. Ok to take one time extra dose of Lasix 20 mg.  BMP in one week     Order received by: Verona Agee RN      Follow-up/additional notes: tried to call nursing office, no answer and voicemail full. Faxed orders, will try calling again.     Called back and talked to Katherine ashraf got orders and confirmed them. They need signature of provider on cover page with ok to give extra dose of lasix. Will have it signed tomorrow and fax back.

## 2023-06-16 LAB
ERYTHROCYTE [DISTWIDTH] IN BLOOD BY AUTOMATED COUNT: 16 % (ref 10–15)
HCT VFR BLD AUTO: 37.9 % (ref 35–47)
HGB BLD-MCNC: 11.6 G/DL (ref 11.7–15.7)
MCH RBC QN AUTO: 30.5 PG (ref 26.5–33)
MCHC RBC AUTO-ENTMCNC: 30.6 G/DL (ref 31.5–36.5)
MCV RBC AUTO: 100 FL (ref 78–100)
PLATELET # BLD AUTO: 170 10E3/UL (ref 150–450)
RBC # BLD AUTO: 3.8 10E6/UL (ref 3.8–5.2)
WBC # BLD AUTO: 4.4 10E3/UL (ref 4–11)

## 2023-06-16 PROCEDURE — 85027 COMPLETE CBC AUTOMATED: CPT | Mod: ORL | Performed by: NURSE PRACTITIONER

## 2023-06-16 PROCEDURE — 36415 COLL VENOUS BLD VENIPUNCTURE: CPT | Mod: ORL | Performed by: NURSE PRACTITIONER

## 2023-06-16 PROCEDURE — P9603 ONE-WAY ALLOW PRORATED MILES: HCPCS | Mod: ORL | Performed by: NURSE PRACTITIONER

## 2023-06-16 NOTE — TELEPHONE ENCOUNTER
Reviewed with Katherine that it's ok to wait to do labs on 6/27 instead of 6/23.      Kamryn's blood pressure is still elevated 180's/120's yesterday so received 2 extra doses of lasix yesterday.  She didn't start the increased dose of Entresto until last night and is going on a family vacation this weekend.  Her daughter knows to give extra lasix if BP is elevated.

## 2023-06-21 NOTE — TELEPHONE ENCOUNTER
Called Katherine regarding Kamryn's blood pressures since increasing Entresto to  mg BID    6/16- 147/88  6/19- 145/74  6/20- 158/73    Katherine doesn't have today's blood pressure but plans to fax it over to us.  Has not required any additional lasix since increasing the Entresto (SBPs below 160)

## 2023-06-22 ENCOUNTER — LAB REQUISITION (OUTPATIENT)
Dept: LAB | Facility: CLINIC | Age: 88
End: 2023-06-22
Payer: MEDICARE

## 2023-06-22 ENCOUNTER — TELEPHONE (OUTPATIENT)
Dept: CARDIOLOGY | Facility: CLINIC | Age: 88
End: 2023-06-22
Payer: MEDICARE

## 2023-06-22 DIAGNOSIS — I50.9 HEART FAILURE, UNSPECIFIED (H): ICD-10-CM

## 2023-06-22 DIAGNOSIS — I10 ESSENTIAL HYPERTENSION: ICD-10-CM

## 2023-06-22 DIAGNOSIS — I50.32 CHRONIC HEART FAILURE WITH PRESERVED EJECTION FRACTION (HFPEF) (H): Primary | ICD-10-CM

## 2023-06-22 DIAGNOSIS — R51.9 HEAD ACHE: ICD-10-CM

## 2023-06-22 NOTE — TELEPHONE ENCOUNTER
[facility] Call Center    Phone Message    May a detailed message be left on voicemail: yes     Reason for Call: Other: Reynaldo Murphy from pt living facility would like a call back to discuss pt elevated BP     Action Taken: Message routed to:  [facility] Cardio    Travel Screening: Not Applicable

## 2023-06-23 ENCOUNTER — HOSPITAL ENCOUNTER (EMERGENCY)
Facility: CLINIC | Age: 88
Discharge: HOME | End: 2023-06-23
Attending: EMERGENCY MEDICINE | Admitting: EMERGENCY MEDICINE
Payer: MEDICARE

## 2023-06-23 VITALS
HEART RATE: 72 BPM | RESPIRATION RATE: 16 BRPM | OXYGEN SATURATION: 95 % | BODY MASS INDEX: 26.31 KG/M2 | WEIGHT: 143 LBS | SYSTOLIC BLOOD PRESSURE: 162 MMHG | HEIGHT: 62 IN | TEMPERATURE: 98.4 F | DIASTOLIC BLOOD PRESSURE: 82 MMHG

## 2023-06-23 DIAGNOSIS — I10 HYPERTENSION, UNSPECIFIED TYPE: ICD-10-CM

## 2023-06-23 LAB
ANION GAP SERPL CALCULATED.3IONS-SCNC: 13 MMOL/L (ref 7–15)
BASOPHILS # BLD AUTO: 0.1 10E3/UL (ref 0–0.2)
BASOPHILS NFR BLD AUTO: 1 %
BUN SERPL-MCNC: 24.3 MG/DL (ref 8–23)
CALCIUM SERPL-MCNC: 9.8 MG/DL (ref 8.8–10.2)
CHLORIDE SERPL-SCNC: 101 MMOL/L (ref 98–107)
CREAT SERPL-MCNC: 1.25 MG/DL (ref 0.51–0.95)
DEPRECATED HCO3 PLAS-SCNC: 28 MMOL/L (ref 22–29)
EOSINOPHIL # BLD AUTO: 0.1 10E3/UL (ref 0–0.7)
EOSINOPHIL NFR BLD AUTO: 1 %
ERYTHROCYTE [DISTWIDTH] IN BLOOD BY AUTOMATED COUNT: 15.8 % (ref 10–15)
GFR SERPL CREATININE-BSD FRML MDRD: 42 ML/MIN/1.73M2
GLUCOSE SERPL-MCNC: 113 MG/DL (ref 70–99)
HCT VFR BLD AUTO: 39.4 % (ref 35–47)
HGB BLD-MCNC: 12.3 G/DL (ref 11.7–15.7)
IMM GRANULOCYTES # BLD: 0 10E3/UL
IMM GRANULOCYTES NFR BLD: 0 %
LYMPHOCYTES # BLD AUTO: 1.4 10E3/UL (ref 0.8–5.3)
LYMPHOCYTES NFR BLD AUTO: 14 %
MCH RBC QN AUTO: 31.4 PG (ref 26.5–33)
MCHC RBC AUTO-ENTMCNC: 31.2 G/DL (ref 31.5–36.5)
MCV RBC AUTO: 101 FL (ref 78–100)
MONOCYTES # BLD AUTO: 0.9 10E3/UL (ref 0–1.3)
MONOCYTES NFR BLD AUTO: 10 %
NEUTROPHILS # BLD AUTO: 7 10E3/UL (ref 1.6–8.3)
NEUTROPHILS NFR BLD AUTO: 74 %
NRBC # BLD AUTO: 0 10E3/UL
NRBC BLD AUTO-RTO: 0 /100
PLATELET # BLD AUTO: 168 10E3/UL (ref 150–450)
POTASSIUM SERPL-SCNC: 3.5 MMOL/L (ref 3.4–5.3)
RBC # BLD AUTO: 3.92 10E6/UL (ref 3.8–5.2)
SODIUM SERPL-SCNC: 142 MMOL/L (ref 136–145)
WBC # BLD AUTO: 9.4 10E3/UL (ref 4–11)

## 2023-06-23 PROCEDURE — 99284 EMERGENCY DEPT VISIT MOD MDM: CPT

## 2023-06-23 PROCEDURE — 250N000013 HC RX MED GY IP 250 OP 250 PS 637: Performed by: EMERGENCY MEDICINE

## 2023-06-23 PROCEDURE — 93005 ELECTROCARDIOGRAM TRACING: CPT

## 2023-06-23 PROCEDURE — 93010 ELECTROCARDIOGRAM REPORT: CPT | Performed by: EMERGENCY MEDICINE

## 2023-06-23 PROCEDURE — 85025 COMPLETE CBC W/AUTO DIFF WBC: CPT | Performed by: EMERGENCY MEDICINE

## 2023-06-23 PROCEDURE — 99284 EMERGENCY DEPT VISIT MOD MDM: CPT | Mod: 25 | Performed by: EMERGENCY MEDICINE

## 2023-06-23 PROCEDURE — 80048 BASIC METABOLIC PNL TOTAL CA: CPT | Performed by: EMERGENCY MEDICINE

## 2023-06-23 PROCEDURE — 36415 COLL VENOUS BLD VENIPUNCTURE: CPT | Performed by: EMERGENCY MEDICINE

## 2023-06-23 RX ORDER — AMLODIPINE BESYLATE 5 MG/1
5 TABLET ORAL DAILY
Qty: 30 TABLET | Refills: 11 | Status: SHIPPED | OUTPATIENT
Start: 2023-06-23 | End: 2023-06-28

## 2023-06-23 RX ORDER — AMLODIPINE BESYLATE 5 MG/1
5 TABLET ORAL ONCE
Status: COMPLETED | OUTPATIENT
Start: 2023-06-23 | End: 2023-06-23

## 2023-06-23 RX ORDER — ACETAMINOPHEN 500 MG
500 TABLET ORAL DAILY
Qty: 30 TABLET | Refills: 11 | Status: SHIPPED | OUTPATIENT
Start: 2023-06-23

## 2023-06-23 RX ORDER — ACETAMINOPHEN 325 MG/1
650 TABLET ORAL ONCE
Status: COMPLETED | OUTPATIENT
Start: 2023-06-23 | End: 2023-06-23

## 2023-06-23 RX ADMIN — ACETAMINOPHEN 650 MG: 325 TABLET, FILM COATED ORAL at 18:15

## 2023-06-23 RX ADMIN — AMLODIPINE BESYLATE 5 MG: 5 TABLET ORAL at 18:15

## 2023-06-23 ASSESSMENT — ACTIVITIES OF DAILY LIVING (ADL): ADLS_ACUITY_SCORE: 37

## 2023-06-23 NOTE — ED TRIAGE NOTES
Hypertensive earlier in the day. Highest reading 211/123 down to 177/111. BP taken by nurse.      Triage Assessment     Row Name 06/23/23 3153       Triage Assessment (Adult)    Airway WDL WDL       Respiratory WDL    Respiratory WDL WDL       Skin Circulation/Temperature WDL    Skin Circulation/Temperature WDL WDL       Cardiac WDL    Cardiac WDL WDL       Peripheral/Neurovascular WDL    Peripheral Neurovascular WDL WDL       Cognitive/Neuro/Behavioral WDL    Cognitive/Neuro/Behavioral WDL WDL

## 2023-06-23 NOTE — TELEPHONE ENCOUNTER
Katherine called that Kamryn's BP was elevated again yesterday- 176/62 and 174/102- she received her PRN lasix for the SBP>160.  No missed med and does sit quietly before blood pressures.  She left the building today prior to getting her blood pressure taken.  BPs were controlled earlier in the week.  Has CORE follow up on Wednesday.    Reviewed Blood pressures with Janice Kimbrough NP and offered Kamryn three choices (see below).  1. Amlodipine 5 mg daily with tylenol as needed   2. Hydralazine 25 mg TID with tylenol as need   3. Spironolactone 12.5 mg with risk of worsening renal function      After discussion Kamryn agreed to Norvasc 5 mg daily with tylenol.  Reviewed with Katherine CURIEL.  Scripts sent to [facility] pharmacy.        Date: 6/23/2023    Time of Call: 12:03 PM     Diagnosis:  hypertension     [ VORB ] Ordering provider: Janice Kimbrough NP    Order: Norvasc 5 mg daily with tylenol 500 mg daily     Order received by: Jessica Garcia RN       Follow-up/additional notes:

## 2023-06-23 NOTE — ED PROVIDER NOTES
[place] EMERGENCY DEPARTMENT ([facility])    6/23/23       ED PROVIDER NOTE    History     Chief Complaint   Patient presents with     Hypertension     HPI     Kamryn Miller is a 87 year old female who has a past medical history of bipolar d/o, MDD, hypothyroidism, CKDIII, INGRID, aortic stenosis s/p TAVR (9/2022), PAD on DAPT, CAD s/p LAD and RCA stent placement (2003), LAD PCI x 3 (8/2020), and HFpEF (EF 35-40% on 3/13/23) presents to the ED with report of htn. She resides in the [facility].     Record review shows that the patient's care facility have been in contact with the cardiology clinic with regard to increasing blood pressures recently.  On June 14, it looks like the [facility] facility called the cardiology clinic and reported recent blood pressure checks for the past week which were varying between 158-194/.  She is currently on Entresto since her hospitalization in February.  She had been on lasix 20 mg daily.  Her cardiology clinic did recommend that she start amlodipine 5 mg daily, this recommendation was made today. [facility] has not yet given this to her because they didn't have it.     Patient brings with her a list of BPs from 6/1/23 to 6/23/23, varies from 140-211/ (with a BP today of 211/123 at 12:46 PM). Weights have gone up about 5 pounds over the past 3 weeks.  She did take an extra dose of Lasix today per her clinic's instructions.    Denies SOB, no chest pain. No dizziness, slight headache.  No vision changes, no diplopia or blurry vision. No fevers or chills, no rhinorrhea or nasal congestion, no sore throat. No cough. No abdominal pain. No n/v/d. Urinating as much as normal. No swelling of the legs.    Past Medical History  Past Medical History:   Diagnosis Date     Aortic valvar stenosis 7/2010    mild     Asthma      Bipolar disorder (H)      CAD (coronary artery disease)     s/p angioplasty     Celiac disease      Colon polyps       Colon polyps 2012    every 3 year colonoscopy      Depression      High cholesterol      HTN      Mitral insufficiency      Pulmonary HTN (H)     mild     Tremors 7/10    drug induced from antidepressants     Tricuspid insufficiency      Past Surgical History:   Procedure Laterality Date     ANGIOGRAM  6/26/2009     ANGIOPLASTY  9/96    for angina     ANGIOPLASTY  8/03 - 9/03    X -2 - with stenst in coronary car.     APPENDECTOMY       BREAST BIOPSY, RT/LT      Breat Biopsy RT/LT, benign     BUNIONECTOMY  11/8/2011    Procedure:BUNIONECTOMY; Left donna bunionectomy; Surgeon:FREDY LITTLEJOHN; Location:[facility] OR     BUNIONECTOMY RT/LT  5/2007    right bunion and right 2nd hammertoe     CATARACT IOL, RT/LT  6/12 and 7/12    bilateral     COLONOSCOPY  2007, 2012    every 3 years for polyps     CV CORONARY ANGIOGRAM N/A 8/21/2020    Procedure: CV CORONARY ANGIOGRAM;  Surgeon: Gianluca Toscano MD;  Location:  HEART CARDIAC CATH LAB     CV CORONARY ANGIOGRAM N/A 10/25/2022    Procedure: Coronary Angiogram;  Surgeon: Carter Douglass MD;  [facility]     CV INSTANTANEOUS WAVE-FREE RATIO N/A 10/25/2022    Procedure: Instantaneous Wave-Free Ratio;  Surgeon: Carter Douglass MD;  [facility]     CV LEFT HEART CATH N/A 8/21/2020    Procedure: CV LEFT HEART CATH;  Surgeon: Gianluca Toscano MD;  [facility]     CV LEFT HEART CATH N/A 11/3/2020    Procedure: CV LEFT HEART CATH;  Surgeon: Tom Burrows MD;  [facility]     CV LOWER EXTREMITY ANGIOGRAM BILATERAL N/A 11/3/2020    Procedure: CV ANGIOGRAM LOWER EXTREMITY BILATERAL;  Surgeon: Tom Burrows MD;  [facility]: [facility]     CV PCI STENT DRUG ELUTING N/A 8/21/2020    Procedure: Percutaneous Coronary Intervention Stent Drug Eluting;  Surgeon: Gianluca Toscano MD;  Location:  HEART CARDIAC CATH LAB     CV  RIGHT HEART CATH MEASUREMENTS RECORDED N/A 8/21/2020    Procedure: CV RIGHT HEART CATH;  Surgeon: Gianluca Toscano MD;  Location:  HEART CARDIAC CATH LAB     CV RIGHT HEART CATH MEASUREMENTS RECORDED N/A 11/3/2020    Procedure: CV RIGHT HEART CATH;  Surgeon: Tom Burrows MD;  Location:  HEART CARDIAC CATH LAB     CV TRANSCATHETER AORTIC VALVE REPLACEMENT N/A 9/12/2022    Procedure: Transfemoral Transcatheter Aortic Valve Replacement with possible open heart bypass and or balloon pump placement and any indicated procedure;  Surgeon: Tom Burrows MD;  Location:  HEART CARDIAC CATH LAB     HEART CATH FEMORAL CANNULIZATION WITH OPEN STANDBY REPAIR AORTIC VALVE N/A 9/12/2022    Procedure: OR TRANSCATHETER AORTIC VALVE REPLACEMENT, OPEN FEMORAL ARTERY APPROACH;  Surgeon: Arthur Markham MD;  Location:  HEART CARDIAC CATH LAB     JOINT REPLACEMENT, HIP RT/LT  4/2008    right hip replaced     JOINT REPLACEMENT, HIP RT/LT  4/2009    left hip replaced     REPAIR HAMMER TOE  11/8/2011    Procedure:REPAIR HAMMER TOE; left 2nd hammertoe repair; Surgeon:FREDY LITTLEJOHN; Location: OR     SURGICAL HISTORY OF -   11/11    left bunion and 2nd hammertoe repair     TUBAL LIGATION       ZZC ANESTH,BLEPHAROPLASTY      (R) for drooping eyelid     Z APPENDECTOMY       acetaminophen (TYLENOL) 500 MG tablet  acetaminophen (TYLENOL) 500 MG tablet  albuterol (PROAIR HFA/PROVENTIL HFA/VENTOLIN HFA) 108 (90 Base) MCG/ACT inhaler  alendronate (FOSAMAX) 70 MG tablet  amLODIPine (NORVASC) 5 MG tablet  amoxicillin (AMOXIL) 500 MG capsule  aspirin (ASA) 81 MG EC tablet  azelastine (ASTEPRO) 0.15 % nasal spray  calcium citrate (CITRACAL) 950 (200 Ca) MG tablet  carvedilol (COREG) 6.25 MG tablet  cholecalciferol (VITAMIN D3) 1250 mcg (91745 units) capsule  desvenlafaxine (PRISTIQ) 100 MG 24 hr tablet  fluticasone-salmeterol (ADVAIR DISKUS) 500-50 MCG/ACT inhaler  folic acid (FOLVITE) 400 MCG tablet  furosemide  "(LASIX) 20 MG tablet  iron polysaccharides (NIFEREX) 150 MG capsule  lamoTRIgine (LAMICTAL) 25 MG tablet  LAMOTRIGINE 200 MG PO TABS  levothyroxine (SYNTHROID/LEVOTHROID) 50 MCG tablet  liothyronine (CYTOMEL) 5 MCG tablet  memantine (NAMENDA) 10 MG tablet  mirtazapine (REMERON) 7.5 MG tablet  Multiple Vitamin (TAB-A-JENNIFER) TABS  psyllium (METAMUCIL/KONSYL) 58.6 % powder  REGULOID 28.3 % POWD  rosuvastatin (CRESTOR) 10 MG tablet  sacubitril-valsartan (ENTRESTO)  MG per tablet      Allergies   Allergen Reactions     Ace Inhibitors Cough     Amlodipine      Headache and plugged ears     Carvedilol      \"plugged ears and a headache\"     Diclofenac Other (See Comments)     Balance problems     Gluten Meal Diarrhea     Hydralazine-Hctz      headache     Family History  Family History   Problem Relation Age of Onset     Asthma Mother      Cerebrovascular Disease Mother      Arthritis Mother      Depression Mother      Lipids Sister      Hypertension Sister      Heart Disease Sister      Lipids Sister      Hypertension Sister      Lipids Sister      Breast Cancer Sister      Social History   Social History     Tobacco Use     Smoking status: Never     Smokeless tobacco: Never   Vaping Use     Vaping Use: Never used   Substance Use Topics     Alcohol use: No     Alcohol/week: 0.0 standard drinks of alcohol     Types: 1 Standard drinks or equivalent per week     Drug use: No         A medically appropriate review of systems was performed with pertinent positives and negatives noted in the HPI, and all other systems negative.    Physical Exam   BP: (!) 150/75  Pulse: 71  Temp: 98.4  F (36.9  C)  Resp: 16  Height: 157.5 cm (5' 2\")  Weight: 64.9 kg (143 lb)  SpO2: 94 %  Physical Exam  Vitals and nursing note reviewed.   Constitutional:       General: She is not in acute distress.     Appearance: She is not diaphoretic.      Comments: Elderly adult female, alert, cooperative, no acute distress   HENT:      Head: Atraumatic. "      Mouth/Throat:      Mouth: Mucous membranes are moist.      Pharynx: Oropharynx is clear. No oropharyngeal exudate.   Eyes:      General: No scleral icterus.     Pupils: Pupils are equal, round, and reactive to light.   Cardiovascular:      Rate and Rhythm: Normal rate.      Pulses: Normal pulses.      Heart sounds: Murmur heard.      Comments: Grade 2/6 murmur appreciated  Pulmonary:      Effort: Pulmonary effort is normal. No respiratory distress.      Breath sounds: Normal breath sounds.   Abdominal:      General: Bowel sounds are normal.      Palpations: Abdomen is soft.      Tenderness: There is no abdominal tenderness.   Musculoskeletal:         General: No tenderness.      Right lower leg: No edema.      Left lower leg: No edema.   Skin:     General: Skin is warm.      Findings: No rash.   Neurological:      Mental Status: She is alert.           ED Course, Procedures, & Data      Procedures                EKG Interpretation:      Interpreted by Cristina Renee MD  Time reviewed: 1830   Symptoms at time of EKG: None   Rhythm: Sinus rhythm with 1st degree AV block  Rate: Normal  Axis: Normal  Ectopy: None  Conduction: Left bundle branch block (complete)  ST Segments/ T Waves: No ST-T wave changes and No acute ischemic changes  Q Waves: None  Comparison to prior: Unchanged    Clinical Impression: no acute changes           Results for orders placed or performed during the hospital encounter of 06/23/23   Basic metabolic panel     Status: Abnormal   Result Value Ref Range    Sodium 142 136 - 145 mmol/L    Potassium 3.5 3.4 - 5.3 mmol/L    Chloride 101 98 - 107 mmol/L    Carbon Dioxide (CO2) 28 22 - 29 mmol/L    Anion Gap 13 7 - 15 mmol/L    Urea Nitrogen 24.3 (H) 8.0 - 23.0 mg/dL    Creatinine 1.25 (H) 0.51 - 0.95 mg/dL    Calcium 9.8 8.8 - 10.2 mg/dL    Glucose 113 (H) 70 - 99 mg/dL    GFR Estimate 42 (L) >60 mL/min/1.73m2   CBC with platelets and differential     Status: Abnormal   Result Value Ref  Range    WBC Count 9.4 4.0 - 11.0 10e3/uL    RBC Count 3.92 3.80 - 5.20 10e6/uL    Hemoglobin 12.3 11.7 - 15.7 g/dL    Hematocrit 39.4 35.0 - 47.0 %     (H) 78 - 100 fL    MCH 31.4 26.5 - 33.0 pg    MCHC 31.2 (L) 31.5 - 36.5 g/dL    RDW 15.8 (H) 10.0 - 15.0 %    Platelet Count 168 150 - 450 10e3/uL    % Neutrophils 74 %    % Lymphocytes 14 %    % Monocytes 10 %    % Eosinophils 1 %    % Basophils 1 %    % Immature Granulocytes 0 %    NRBCs per 100 WBC 0 <1 /100    Absolute Neutrophils 7.0 1.6 - 8.3 10e3/uL    Absolute Lymphocytes 1.4 0.8 - 5.3 10e3/uL    Absolute Monocytes 0.9 0.0 - 1.3 10e3/uL    Absolute Eosinophils 0.1 0.0 - 0.7 10e3/uL    Absolute Basophils 0.1 0.0 - 0.2 10e3/uL    Absolute Immature Granulocytes 0.0 <=0.4 10e3/uL    Absolute NRBCs 0.0 10e3/uL   CBC with Platelets & Differential     Status: Abnormal    Narrative    The following orders were created for panel order CBC with Platelets & Differential.  Procedure                               Abnormality         Status                     ---------                               -----------         ------                     CBC with platelets and d...[[id number]]  Abnormal            Final result                 Please view results for these tests on the individual orders.     Medications   amLODIPine (NORVASC) tablet 5 mg (5 mg Oral $Given 6/23/23 1815)   acetaminophen (TYLENOL) tablet 650 mg (650 mg Oral $Given 6/23/23 1815)       No orders to display          Critical care was not performed.     Medical Decision Making  The patient's presentation was of moderate complexity (a chronic illness mild to moderate exacerbation, progression, or side effect of treatment).    The patient's evaluation involved:  review of external note(s) from 2 sources (see separate area of note for details)  ordering and/or review of 3+ test(s) in this encounter (see separate area of note for details)  review of 2 test result(s) ordered prior to this encounter (see  "separate area of note for details)    The patient's management necessitated moderate risk (prescription drug management including medications given in the ED).      Assessment & Plan      This is an 87-year-old female who currently resides in a senior living facility who presents to the emergency department due to concern of elevated blood pressures. She has been contacting her clinic about this and I do see that they have recently recommended that she restart amlodipine (despite a reported \"allergy of headache\" and \"plugged ears\" when she has had this in the past). After discussion between the cardiology clinic and the patient they elected to start this again to try to achieve blood pressure control and advised that she take this with a Tylenol to see if this helps with headache. Here in the emergency department blood pressure is 150/75. She has absolutely no symptoms at present. We did review her blood pressures which actually have quite improved since earlier today from the care facility.    We did establish IV access and we did draw blood for laboratory analysis. CBC demonstrates normal white count, normal hemoglobin, normal, BMP shows creatinine of 1.25, down from previous, normal electrolytes. The patient had an EKG which shows sinus rhythm with first-degree AV block with a left bundle branch block, similar to prior.  We did give her the first dose of amlodipine here in the ED. I did have a discussion with the patient about giving her the amlodipine that had been ordered by her cardiology clinic as this is listed as an \"allergy\". However her reaction is \"plugged ears and headache\" and this was discussed with her by her cardiology clinic as well.    We did monitor her here in the emergency department.  Repeat blood pressure is 162/82.  She continues to remain asymptomatic.  I will instruct her to continue to take her regular antihypertensives as instructed by her clinic.  She should record her blood pressures " at home and call her cardiology clinic on Monday to go over this with them.  Return to the ED for any symptoms such as shortness of breath, chest pain, or any new symptoms.  Patient verbalizes understanding.    This part of the medical record was transcribed by Radha Messer, Medical Scribe, from a dictation done by Cristina Renee MD.     I have reviewed the nursing notes. I have reviewed the findings, diagnosis, plan and need for follow up with the patient.    Discharge Medication List as of 6/23/2023  8:50 PM          Final diagnoses:   Hypertension, unspecified type       Cristina Renee MD   [facility] EMERGENCY DEPARTMENT  6/23/2023     Cristina Renee MD  06/24/23 0107

## 2023-06-24 LAB
ATRIAL RATE - MUSE: 67 BPM
DIASTOLIC BLOOD PRESSURE - MUSE: NORMAL MMHG
INTERPRETATION ECG - MUSE: NORMAL
P AXIS - MUSE: 76 DEGREES
PR INTERVAL - MUSE: 210 MS
QRS DURATION - MUSE: 164 MS
QT - MUSE: 482 MS
QTC - MUSE: 509 MS
R AXIS - MUSE: 15 DEGREES
SYSTOLIC BLOOD PRESSURE - MUSE: NORMAL MMHG
T AXIS - MUSE: 210 DEGREES
VENTRICULAR RATE- MUSE: 67 BPM

## 2023-06-24 NOTE — DISCHARGE INSTRUCTIONS
You have been seen in the emergency department today for your elevated blood pressure.  Your blood pressures here are lower than they were earlier today.  We recommend that you continue to take your regular medications as directed, including your new blood pressure medicine that your cardiology clinic recommended today.  We have given you the first dose here in the emergency department.  Be sure that your care facility gives this to you daily.  Continue to record your blood pressures and call the cardiology clinic next week so that they can give additional recommendations about dosages.    If you are noticing severe chest pain, shortness of breath, or other new symptoms, return to the emergency department.  
Unknown if ever smoked

## 2023-06-26 ENCOUNTER — PATIENT OUTREACH (OUTPATIENT)
Dept: CARDIOLOGY | Facility: CLINIC | Age: 88
End: 2023-06-26
Payer: MEDICARE

## 2023-06-26 NOTE — TELEPHONE ENCOUNTER
Kamryn's blood pressure was elevated over the weekend and ended up going to [facility] for blood pressure control.  Was started on Norvasc 5 mg while in [facility].    6/26 147/88  6/25 136/73    Taking amlodipine 5 mg now, has been on for 3 days, no complaints of headache at this point.

## 2023-06-27 LAB
ANION GAP SERPL CALCULATED.3IONS-SCNC: 10 MMOL/L (ref 7–15)
BUN SERPL-MCNC: 30 MG/DL (ref 8–23)
CALCIUM SERPL-MCNC: 9.8 MG/DL (ref 8.8–10.2)
CHLORIDE SERPL-SCNC: 105 MMOL/L (ref 98–107)
CREAT SERPL-MCNC: 1.24 MG/DL (ref 0.51–0.95)
DEPRECATED HCO3 PLAS-SCNC: 28 MMOL/L (ref 22–29)
GFR SERPL CREATININE-BSD FRML MDRD: 42 ML/MIN/1.73M2
GLUCOSE SERPL-MCNC: 86 MG/DL (ref 70–99)
POTASSIUM SERPL-SCNC: 4.1 MMOL/L (ref 3.4–5.3)
SODIUM SERPL-SCNC: 143 MMOL/L (ref 136–145)

## 2023-06-27 PROCEDURE — 36415 COLL VENOUS BLD VENIPUNCTURE: CPT | Mod: ORL | Performed by: NURSE PRACTITIONER

## 2023-06-27 PROCEDURE — 80048 BASIC METABOLIC PNL TOTAL CA: CPT | Mod: ORL | Performed by: NURSE PRACTITIONER

## 2023-06-27 PROCEDURE — P9604 ONE-WAY ALLOW PRORATED TRIP: HCPCS | Mod: ORL | Performed by: NURSE PRACTITIONER

## 2023-06-28 ENCOUNTER — LAB (OUTPATIENT)
Dept: LAB | Facility: CLINIC | Age: 88
End: 2023-06-28
Attending: NURSE PRACTITIONER
Payer: MEDICARE

## 2023-06-28 ENCOUNTER — OFFICE VISIT (OUTPATIENT)
Dept: CARDIOLOGY | Facility: CLINIC | Age: 88
End: 2023-06-28
Attending: NURSE PRACTITIONER
Payer: MEDICARE

## 2023-06-28 ENCOUNTER — TELEPHONE (OUTPATIENT)
Dept: CARDIOLOGY | Facility: CLINIC | Age: 88
End: 2023-06-28

## 2023-06-28 VITALS
WEIGHT: 145.1 LBS | HEART RATE: 64 BPM | BODY MASS INDEX: 26.54 KG/M2 | DIASTOLIC BLOOD PRESSURE: 86 MMHG | OXYGEN SATURATION: 96 % | SYSTOLIC BLOOD PRESSURE: 147 MMHG

## 2023-06-28 DIAGNOSIS — I50.32 CHRONIC HEART FAILURE WITH PRESERVED EJECTION FRACTION (HFPEF) (H): ICD-10-CM

## 2023-06-28 LAB
ANION GAP SERPL CALCULATED.3IONS-SCNC: 11 MMOL/L (ref 7–15)
BUN SERPL-MCNC: 27.2 MG/DL (ref 8–23)
CALCIUM SERPL-MCNC: 9.9 MG/DL (ref 8.8–10.2)
CHLORIDE SERPL-SCNC: 103 MMOL/L (ref 98–107)
CREAT SERPL-MCNC: 1.32 MG/DL (ref 0.51–0.95)
DEPRECATED HCO3 PLAS-SCNC: 26 MMOL/L (ref 22–29)
GFR SERPL CREATININE-BSD FRML MDRD: 39 ML/MIN/1.73M2
GLUCOSE SERPL-MCNC: 126 MG/DL (ref 70–99)
NT-PROBNP SERPL-MCNC: 3624 PG/ML (ref 0–1800)
POTASSIUM SERPL-SCNC: 3.7 MMOL/L (ref 3.4–5.3)
SODIUM SERPL-SCNC: 140 MMOL/L (ref 136–145)

## 2023-06-28 PROCEDURE — 99214 OFFICE O/P EST MOD 30 MIN: CPT | Performed by: NURSE PRACTITIONER

## 2023-06-28 PROCEDURE — 80048 BASIC METABOLIC PNL TOTAL CA: CPT | Performed by: PATHOLOGY

## 2023-06-28 PROCEDURE — 36415 COLL VENOUS BLD VENIPUNCTURE: CPT | Performed by: PATHOLOGY

## 2023-06-28 PROCEDURE — G0463 HOSPITAL OUTPT CLINIC VISIT: HCPCS | Performed by: NURSE PRACTITIONER

## 2023-06-28 PROCEDURE — 83880 ASSAY OF NATRIURETIC PEPTIDE: CPT | Performed by: PATHOLOGY

## 2023-06-28 RX ORDER — ISOSORBIDE MONONITRATE 30 MG/1
30 TABLET, EXTENDED RELEASE ORAL DAILY
Qty: 30 TABLET | Refills: 11 | Status: SHIPPED | OUTPATIENT
Start: 2023-06-28 | End: 2023-07-07

## 2023-06-28 ASSESSMENT — PAIN SCALES - GENERAL: PAINLEVEL: NO PAIN (0)

## 2023-06-28 NOTE — LETTER
6/28/2023      RE: Kamryn Miller  [address]  [address]  [place] [address]       Dear Colleague,    Thank you for the opportunity to participate in the care of your patient, Kamryn Miller, at the [facility] [place] at [facility]. Please see a copy of my visit note below.      [facility] Cardiology   CORE Clinic      HPI:   Ms. Miller is a 87 year old female with medical history pertinent for HTN, CAD (s/p PCI in 1996, PCI to LAD and RCA in 2003, PCI to LAD x3 in 8/2020), severe aortic valve stenosis (s/p 26mm ES Ultra TAVR 9/12/22), PAD, HLD, HFrEF (LVEF 35-40% per TTE 3/2023), and anemia who presents to [facility] for routine follow up.      With regards to her most recent medical conditions, Ms. Miller was last hospitalized 2/22-2/28/23 for shortness of breath, ADHF exacerbation, and pulmonary edema. She was diuresed with IV lasix. An echocardiogram was performed which showed reduced EF that is newly noted, 45 to 50%.  Ms. Miller was started on Entresto. She has been previously treated with beta blockers (coreg and metoprolol), amlodipine, and hydralazine, all of which she did not tolerate. Tells me that these medications caused her to have headaches and ear fullness.    Ms. Miller was recently seen in structural cardiology clinic. Echo at that time showed further reduction in her EF to 35-40%. Unclear etiology, possibly HTN. TAVR stable. Has had several visits in [facility] where entresto has been up-titrated to max dose. Despite increased dose, has remained hypertensive. Her assisted living had been instructed to treat HTN with extra doses of lasix. She was on coreg in Feb 2023, but this was discontinued due to adverse side effects. Seen in the ED on 6/23 where she received her first dose of amlodipine 5 mg with mild improvement in BP. Instructed to take with tylenol as headaches are reported side effect to amlodipine.     Today Ms. Miller is seen  in clinic with her daughter. She reports feeling quite well. No acute concerns and denies chest tightness/discomfort, SOB, GREGORY, palpitations, lightheadedness, dizziness, syncope, or near syncope. Denies orthopnea, PND, peripheral edema. Since starting amlodipine 5 mg, she notes mild HA ~ 2-3 hours after taking.  She lives in assisted living. She is active and participates in exercise classes 5 days/week. Review of daily BP log shows BPs ranging 145s-180s/80s-100. Weights are stable 139-142 lb.      Cardiac Medications   - ASA 81 mg daily   - Lasix 20 mg daily   - Rosuvastatin 10 mg daily   - Entresto  mg BID  - Amlodipine 5 mg daily       PAST MEDICAL HISTORY:  Past Medical History:   Diagnosis Date    Aortic valvar stenosis 7/2010    mild    Asthma     Bipolar disorder (H)     CAD (coronary artery disease)     s/p angioplasty    Celiac disease     Colon polyps     Colon polyps 2012    every 3 year colonoscopy     Depression     High cholesterol     HTN     Mitral insufficiency     Pulmonary HTN (H)     mild    Tremors 7/10    drug induced from antidepressants    Tricuspid insufficiency        FAMILY HISTORY:  Family History   Problem Relation Age of Onset    Asthma Mother     Cerebrovascular Disease Mother     Arthritis Mother     Depression Mother     Lipids Sister     Hypertension Sister     Heart Disease Sister     Lipids Sister     Hypertension Sister     Lipids Sister     Breast Cancer Sister        SOCIAL HISTORY:  Social History     Socioeconomic History    Marital status:      Spouse name: Adrien    Number of children: 2    Years of education: 16   Occupational History     Employer: RETIRED     Comment: Retired in 2002.    Tobacco Use    Smoking status: Never    Smokeless tobacco: Never   Vaping Use    Vaping status: Never Used     Passive vaping exposure: Yes   Substance and Sexual Activity    Alcohol use: No     Alcohol/week: 0.0 standard drinks of alcohol     Types: 1 Standard drinks or  equivalent per week    Drug use: No    Sexual activity: Not Currently     Partners: Male       CURRENT MEDICATIONS:  acetaminophen (TYLENOL) 500 MG tablet, Take 1 tablet (500 mg) by mouth daily Continue previously ordered PRN tylenol order as well  acetaminophen (TYLENOL) 500 MG tablet, Take 1 tablet (500 mg) by mouth every 6 hours as needed for pain  albuterol (PROAIR HFA/PROVENTIL HFA/VENTOLIN HFA) 108 (90 Base) MCG/ACT inhaler, Inhale 2 puffs into the lungs every 6 hours as needed for wheezing or shortness of breath / dyspnea  alendronate (FOSAMAX) 70 MG tablet, TAKE 1 TABLET BY MOUTH ONCE WEEKLY (Patient not taking: Reported on 5/15/2023)  amLODIPine (NORVASC) 5 MG tablet, Take 1 tablet (5 mg) by mouth daily  amoxicillin (AMOXIL) 500 MG capsule,   aspirin (ASA) 81 MG EC tablet, Take 1 tablet (81 mg) by mouth daily  azelastine (ASTEPRO) 0.15 % nasal spray, USE 1 SPRAY IN EACH NOSTRIL ONCE A DAY  calcium citrate (CITRACAL) 950 (200 Ca) MG tablet, TAKE 1 TABLET BY MOUTH ONCE DAILY  carvedilol (COREG) 6.25 MG tablet,   cholecalciferol (VITAMIN D3) 1250 mcg (76042 units) capsule, TAKE 1 CAPSULE BY MOUTH ONCE WEEKLY  desvenlafaxine (PRISTIQ) 100 MG 24 hr tablet, Take 100 mg by mouth daily  fluticasone-salmeterol (ADVAIR DISKUS) 500-50 MCG/ACT inhaler, INHALE 1 PUFF BY MOUTH TWICE DAILY (Patient taking differently: 1 puff daily INHALE 1 PUFF BY MOUTH TWICE DAILY)  folic acid (FOLVITE) 400 MCG tablet, TAKE 1 TABLET BY MOUTH ONCE DAILY  furosemide (LASIX) 20 MG tablet, Take 1 tablet (20 mg) by mouth daily  iron polysaccharides (NIFEREX) 150 MG capsule, Take 1 capsule (150 mg) by mouth daily  lamoTRIgine (LAMICTAL) 25 MG tablet, Take 25 mg by mouth daily with 200 mg tablet for a total dose of 225 mg  LAMOTRIGINE 200 MG PO TABS, Take 200 mg by mouth daily with 25 mg tablet for a total dose of 225 mg  levothyroxine (SYNTHROID/LEVOTHROID) 50 MCG tablet, TAKE 1 TABLET BY MOUTH ONCE DAILY  liothyronine (CYTOMEL) 5 MCG tablet,  Take 2 tablets (10 mcg) by mouth daily  memantine (NAMENDA) 10 MG tablet, Take 1 tablet (10 mg) by mouth daily  mirtazapine (REMERON) 7.5 MG tablet, Take 1 tablet (7.5 mg) by mouth At Bedtime  Multiple Vitamin (TAB-A-JENNIFER) TABS, Take 1 tablet by mouth daily  psyllium (METAMUCIL/KONSYL) 58.6 % powder, Take 1 tspn in liquid daily  REGULOID 28.3 % POWD, MIX 1 TEASPOON IN LIQUID AND DRINK BY MOUTH ONCE DAILY (Patient not taking: Reported on 5/15/2023)  rosuvastatin (CRESTOR) 10 MG tablet, Take 1 tablet (10 mg) by mouth daily  sacubitril-valsartan (ENTRESTO)  MG per tablet, Take 1 tablet by mouth 2 times daily    No current facility-administered medications on file prior to visit.      ROS:   Refer to HPI    EXAM:  BP (!) 147/86 (BP Location: Right arm, Patient Position: Chair, Cuff Size: Adult Regular)   Pulse 64   Wt 65.8 kg (145 lb 1.6 oz)   SpO2 96%   BMI 26.54 kg/m     GENERAL: Appears comfortable, in no acute distress.   HEENT: Eye symmetrical, no discharge or icterus bilaterally. Mucous membranes moist and without lesions.  CV: RRR, +S1S2, soft systolic murmur, rub, or gallop. JVP not appriable.   RESPIRATORY: Respirations regular, even, and unlabored. Lungs CTA throughout.   GI: Soft and non distended with normoactive bowel sounds present in all quadrants. No tenderness, rebound, guarding. No hepatomegaly.   EXTREMITIES: No peripheral edema. 2+ bilateral pedal pulses.   NEUROLOGIC: Alert and oriented x 3. No focal deficits.   MUSCULOSKELETAL: No joint swelling or tenderness.   SKIN: No jaundice. No rashes or lesions.     Labs, reviewed with patient in clinic today:  CBC RESULTS:  Lab Results   Component Value Date    WBC 9.4 06/23/2023    WBC 4.6 05/17/2021    RBC 3.92 06/23/2023    RBC 3.97 05/17/2021    HGB 12.3 06/23/2023    HGB 12.9 05/17/2021    HCT 39.4 06/23/2023    HCT 40.3 05/17/2021     (H) 06/23/2023     (H) 05/17/2021    MCH 31.4 06/23/2023    MCH 32.5 05/17/2021    MCHC 31.2  (L) 06/23/2023    MCHC 32.0 05/17/2021    RDW 15.8 (H) 06/23/2023    RDW 12.7 05/17/2021     06/23/2023     05/17/2021       CMP RESULTS:  Lab Results   Component Value Date     06/27/2023     06/04/2021    POTASSIUM 4.1 06/27/2023    POTASSIUM 4.6 10/05/2022    POTASSIUM 3.4 06/04/2021    CHLORIDE 105 06/27/2023    CHLORIDE 103 10/05/2022    CHLORIDE 106 06/04/2021    CO2 28 06/27/2023    CO2 27 10/05/2022    CO2 30 06/04/2021    ANIONGAP 10 06/27/2023    ANIONGAP 7 10/05/2022    ANIONGAP 6 06/04/2021    GLC 86 06/27/2023    GLC 85 01/31/2023    GLC 98 10/05/2022     (H) 06/04/2021    BUN 30.0 (H) 06/27/2023    BUN 39 (H) 10/05/2022    BUN 27 06/04/2021    CR 1.24 (H) 06/27/2023    CR 1.50 (H) 06/04/2021    GFRESTIMATED 42 (L) 06/27/2023    GFRESTIMATED 32 (L) 06/04/2021    GFRESTBLACK 37 (L) 06/04/2021    PRINCE 9.8 06/27/2023    PRINCE 9.4 06/04/2021    BILITOTAL 0.3 02/26/2023    BILITOTAL 0.3 05/17/2021    ALBUMIN 4.1 04/03/2023    ALBUMIN 3.6 09/27/2022    ALBUMIN 3.4 05/17/2021    ALKPHOS 76 02/26/2023    ALKPHOS 57 05/17/2021    ALT 38 (H) 02/26/2023    ALT 38 05/17/2021    AST 36 (H) 02/26/2023    AST 27 05/17/2021        INR RESULTS:  Lab Results   Component Value Date    INR 1.12 02/22/2023    INR 1.05 11/03/2020       Lab Results   Component Value Date    MAG 2.5 (H) 02/27/2023    MAG 2.4 (H) 10/28/2019     Lab Results   Component Value Date    NTBNPI 11,774 (H) 02/22/2023     Lab Results   Component Value Date    NTBNP 6,742 (H) 02/15/2023    NTBNP 462 (H) 05/17/2021       Cardiac Diagnostics:    3/13/2023 Echo   Interpretation Summary  The 26 mm ES3 TAVR is well-seated. Leaflet opening is not clearly visualized.  There is trace valvular without paravalvular regurgitation. The Vmax is 2.4  m/s, the mean gradient is 14 mmHg, the dimensionless index is 0.80, and the  acceleration time is 80 ms.  Left ventricular function is decreased. The ejection fraction is 35-40%  (moderately  reduced). Moderate diffuse hypokinesis is present.  Global right ventricular function is normal. The right ventricle is normal  size.  This study was compared with the study from 02/23/2023. There appears to have  been a decline in the global LV function but the endocardial definition is  poor on this study. The TAVR function is unchanged.    2/23/2023 Echo  Interpretation Summary  Status post 26 mm Arndt Janis Ultra valve TAVR on 9/12/2022. MG is 18 mmHg,  PV is 2.8 m/s. Trace paravalvular AI.  Left ventricular function is decreased. The ejection fraction is 45-50%  (mildly reduced).  Global right ventricular function is normal.  Severe mitral annular calcification is present.  The inferior vena cava was normal in size with preserved respiratory  variability.     This study was compared with the study from 1/31/2023 .LVEF mildly declined.  Mean gradient across TAVR mildly higher.    1/31/2023 Echo  Interpretation Summary  Left ventricular size, wall motion and function are normal. The ejection  fraction is 55-60%.  Right ventricular size and function are normal.  Status post 26 mm Arndt Janis Ultra valve TAVR on 9/12/2022. The valve is  well seated. MG 16 mmHg; PV 2.5 m/s; no paravalvular AI.  No pericardial effusion is present.     This study was compared with the study from 12/1/2022. No significant changes  Noted.    10/2022 Coronary Angiogram   Two vessel CAD with prior PCI to the RCA and LAD, otherwise mild non obstructive CAD elsewhere.  Patent stents in the RCA and LAD with mild to moderate in stent restenosis of the proximal LAD stents (progressed since last angiogram from 2020).  Proximal LAD ISR hemodynamically not significant by dPR at 0.91.       Assessment and Plan:   Ms. Miller is a 87 year old female with medical history pertinent for HTN, CAD (s/p PCI in 1996, PCI to LAD and RCA in 2003, PCI to LAD x3 in 8/2020), severe aortic valve stenosis (s/p 26mm ES Ultra TAVR 9/12/22), PAD, HLD, HFpEF  (LVEF 55-60% per TTE 12/2022), and anemia who presents to [facility] for routine follow up.     Appearing and feeling well. Euvolemic by exam. Remains hypertensive with SBP > 145. Review of labs are stable with Cr 1.34 and lytes wnl. She is on max dose entresto. Recently started on amlodipine 5 mg daily, but reporting headache. I suspect that she will not tolerate an increase to 10 mg. Previously on imdur without reported side effects and this will allow for more up-titration. For today we will stop amlodipine and start imdur 30 mg daily. Plan to follow up with RN phone call in 1 week to review weights and BPs. Renal function is relatively stable and I would also like to go ahead and start her on an SGLT2. By price check, both farixga and jardiance are cost prohibitive. We have started patient assistance paperwork for jardiance.       # Acute on chronic systolic heart failure/HFrEF likely in the setting of uncontrolled HTN (EF 35-40% by TTE 3/2023)  Stage C. NYHA Class IIIB.    Fluid status: euvolemic, lasix 20 mg daily   ACEi/ARB/ARNi/afterload reduction: entresto  mg BID, start imdur 30 mg daily. Intolerance to hydralazine.   BB: intolerance to BB, coreg stopped 2/2023, given extensive CAD could attempt low dose BB?   Aldosterone antagonist: contraindicated due to renal dysfunction, borderline GFR 30-39  SGLT2i: start jardiance pending patient assistance enrollment   SCD prophylaxis: does not meet criteria for implant  NSAID use: contraindicated  Ischemia evaluation: last cor angio 10/2022; 2 vessel CAD RCA/LAD, mild to mod ISR of proxLAD, hemodynamically stable    # Coronary artery disease w/stable angina  # HTN  # HLD  s/p PCI in 1996, PCI to LAD and RCA in 2003, PCI to LAD x3 in 8/2020.    - Continue ASA 81 mg daily lifelong  - Continue high intensity statin  - Multiple intolerances to BB, hydralazine, amlodipine--> re-started on amlodipine 5 mg daily  - Enstresto as above for BP control   - Continue exercise  and heart healthy diet      # PAD   - denies claudication, normal pulse exam on left  - recent BALDO's mildly abnormal on left  - Continue ASA 81 mg lifelong  - vascular consult should she develop recurrent claudication     # Severe aortic stenosis  s/p 26mm ES Ultra TAVR 9/12/22.  Last TTE 12/2022 showed well-seated valve, with trace paravalvular AI, MG 11mmHg, and peak velocity 2.2m/sec.  Follows with the structural team, plan to repeat TTE 9/2023.  Needs SBE ppx.     # Anemia  Management per PCP, recently given IV iron.  Discussed referral to Anemia Clinic, which she will discuss with her PCP next week.  She is taking oral iron.    # CKD 3  Baseline Cr 1.1-1.4, Cystatin C 1.9 with eGFR 28  - Cr 1.32, GFR 42      Follow up:  - Echo/Dr. Zuniga 7/2023  - CORE 2 months         Janice Kimbrough DNP, NP-C  Advance Heart Failure  6/28/23

## 2023-06-28 NOTE — TELEPHONE ENCOUNTER
30 days supply test claims requested for the drugs below:  Jardiance 10mg daily, Farxiga 10mg daily     Unable to run a test claim, patient's plan is not contracted with  [facility] pharmacy.           Per medicare.gov website patient's [employer] Secure PDP plan     Jardiance 10mg 30 days supply   -$589.00 copay       Farxiga 10mg 30 days supply   -$2,035.04 copay

## 2023-06-28 NOTE — PATIENT INSTRUCTIONS
CORE: Cardiomyopathy, Optimization, Rehabilitation, Education      Take your medicines every day, as directed    Changes/Recommendations made today:  STOP amlodipine   Continue baby aspirin 81 mg daily  START imdur 30 mg (1 tab) daily    Monitor Your Weight and Symptoms    Contact us if you:    Gain 2 pounds in one day or 5 pounds in one week  Feel more short of breath  Notice more leg swelling  Feel lightheadeded   Change your lifestyle    Limit Salt or Sodium:  2000 mg  Limit Fluids:  2000 mL or approximately 64 ounces  Eat a Heart Healthy Diet  Low in saturated fats  Stay Active:  Aim to move at least 150 minutes every  week         To Contact us    During Business Hours:  [phone], option # 1      After hours, weekends or holidays:   [phone], Option #4  Ask to speak to the On-Call Cardiologist. Inform them you are a CORE/heart failure patient at the [facility].     Use [employer] allows you to communicate directly with your heart team through secure messaging.  [employer] can be accessed any time on your phone, computer, or tablet.  If you need assistance, we'd be happy to help!         Keep your Heart Appointments:    RN phone call in 1 week to review blood pressures and weights     Dr. Zuniga 7/11/23    CORE in 2 months

## 2023-06-28 NOTE — PROGRESS NOTES
[facility]iology   [facility]      HPI:   Ms. Miller is a 87 year old female with medical history pertinent for HTN, CAD (s/p PCI in 1996, PCI to LAD and RCA in 2003, PCI to LAD x3 in 8/2020), severe aortic valve stenosis (s/p 26mm ES Ultra TAVR 9/12/22), PAD, HLD, HFrEF (LVEF 35-40% per TTE 3/2023), and anemia who [place] [facility] [place].      With regards to her most recent medical conditions, Ms. Miller was last hospitalized 2/22-2/28/23 for shortness of breath, ADHF exacerbation, and pulmonary edema. She was diuresed with IV lasix. An echocardiogram was performed which showed reduced EF that is newly noted, 45 to 50%.  Ms. Miller was started on Entresto. She has been previously treated with beta blockers (coreg and metoprolol), amlodipine, and hydralazine, all of which she did not tolerate. Tells me that these medications caused her to have headaches and ear fullness.    Ms. Miller was recently seen in structural cardiology clinic. Echo at that time showed further reduction in her EF to 35-40%. Unclear etiology, possibly HTN. TAVR stable. Has had several visits in [facility] where entresto has been up-titrated to max dose. Despite increased dose, has remained hypertensive. Her assisted living had been instructed to treat HTN with extra doses of lasix. She was on coreg in Feb 2023, but this was discontinued due to adverse side effects. Seen in the ED on 6/23 where she received her first dose of amlodipine 5 mg with mild improvement in BP. Instructed to take with tylenol as headaches are reported side effect to amlodipine.     Today Ms. Miller is seen in clinic with her daughter. She reports feeling quite well. No acute concerns and denies chest tightness/discomfort, SOB, GREGORY, palpitations, lightheadedness, dizziness, syncope, or near syncope. Denies orthopnea, PND, peripheral edema. Since starting amlodipine 5 mg, she notes mild HA ~ 2-3 hours after taking.  She lives in assisted living. She is  active and participates in exercise classes 5 days/week. Review of daily BP log shows BPs ranging 145s-180s/80s-100. Weights are stable 139-142 lb.      Cardiac Medications   - ASA 81 mg daily   - Lasix 20 mg daily   - Rosuvastatin 10 mg daily   - Entresto  mg BID  - Amlodipine 5 mg daily       PAST MEDICAL HISTORY:  Past Medical History:   Diagnosis Date     Aortic valvar stenosis 7/2010    mild     Asthma      Bipolar disorder (H)      CAD (coronary artery disease)     s/p angioplasty     Celiac disease      Colon polyps      Colon polyps 2012    every 3 year colonoscopy      Depression      High cholesterol      HTN      Mitral insufficiency      Pulmonary HTN (H)     mild     Tremors 7/10    drug induced from antidepressants     Tricuspid insufficiency        FAMILY HISTORY:  Family History   Problem Relation Age of Onset     Asthma Mother      Cerebrovascular Disease Mother      Arthritis Mother      Depression Mother      Lipids Sister      Hypertension Sister      Heart Disease Sister      Lipids Sister      Hypertension Sister      Lipids Sister      Breast Cancer Sister        SOCIAL HISTORY:  Social History     Socioeconomic History     Marital status:      Spouse name: Adrien     Number of children: 2     Years of education: 16   Occupational History     Employer: RETIRED     Comment: Retired in 2002.    Tobacco Use     Smoking status: Never     Smokeless tobacco: Never   Vaping Use     Vaping status: Never Used     Passive vaping exposure: Yes   Substance and Sexual Activity     Alcohol use: No     Alcohol/week: 0.0 standard drinks of alcohol     Types: 1 Standard drinks or equivalent per week     Drug use: No     Sexual activity: Not Currently     Partners: Male       CURRENT MEDICATIONS:  acetaminophen (TYLENOL) 500 MG tablet, Take 1 tablet (500 mg) by mouth daily Continue previously ordered PRN tylenol order as well  acetaminophen (TYLENOL) 500 MG tablet, Take 1 tablet (500 mg) by mouth  every 6 hours as needed for pain  albuterol (PROAIR HFA/PROVENTIL HFA/VENTOLIN HFA) 108 (90 Base) MCG/ACT inhaler, Inhale 2 puffs into the lungs every 6 hours as needed for wheezing or shortness of breath / dyspnea  alendronate (FOSAMAX) 70 MG tablet, TAKE 1 TABLET BY MOUTH ONCE WEEKLY (Patient not taking: Reported on 5/15/2023)  amLODIPine (NORVASC) 5 MG tablet, Take 1 tablet (5 mg) by mouth daily  amoxicillin (AMOXIL) 500 MG capsule,   aspirin (ASA) 81 MG EC tablet, Take 1 tablet (81 mg) by mouth daily  azelastine (ASTEPRO) 0.15 % nasal spray, USE 1 SPRAY IN EACH NOSTRIL ONCE A DAY  calcium citrate (CITRACAL) 950 (200 Ca) MG tablet, TAKE 1 TABLET BY MOUTH ONCE DAILY  carvedilol (COREG) 6.25 MG tablet,   cholecalciferol (VITAMIN D3) 1250 mcg (24674 units) capsule, TAKE 1 CAPSULE BY MOUTH ONCE WEEKLY  desvenlafaxine (PRISTIQ) 100 MG 24 hr tablet, Take 100 mg by mouth daily  fluticasone-salmeterol (ADVAIR DISKUS) 500-50 MCG/ACT inhaler, INHALE 1 PUFF BY MOUTH TWICE DAILY (Patient taking differently: 1 puff daily INHALE 1 PUFF BY MOUTH TWICE DAILY)  folic acid (FOLVITE) 400 MCG tablet, TAKE 1 TABLET BY MOUTH ONCE DAILY  furosemide (LASIX) 20 MG tablet, Take 1 tablet (20 mg) by mouth daily  iron polysaccharides (NIFEREX) 150 MG capsule, Take 1 capsule (150 mg) by mouth daily  lamoTRIgine (LAMICTAL) 25 MG tablet, Take 25 mg by mouth daily with 200 mg tablet for a total dose of 225 mg  LAMOTRIGINE 200 MG PO TABS, Take 200 mg by mouth daily with 25 mg tablet for a total dose of 225 mg  levothyroxine (SYNTHROID/LEVOTHROID) 50 MCG tablet, TAKE 1 TABLET BY MOUTH ONCE DAILY  liothyronine (CYTOMEL) 5 MCG tablet, Take 2 tablets (10 mcg) by mouth daily  memantine (NAMENDA) 10 MG tablet, Take 1 tablet (10 mg) by mouth daily  mirtazapine (REMERON) 7.5 MG tablet, Take 1 tablet (7.5 mg) by mouth At Bedtime  Multiple Vitamin (TAB-A-JENNIFER) TABS, Take 1 tablet by mouth daily  psyllium (METAMUCIL/KONSYL) 58.6 % powder, Take 1 tspn in  liquid daily  REGULOID 28.3 % POWD, MIX 1 TEASPOON IN LIQUID AND DRINK BY MOUTH ONCE DAILY (Patient not taking: Reported on 5/15/2023)  rosuvastatin (CRESTOR) 10 MG tablet, Take 1 tablet (10 mg) by mouth daily  sacubitril-valsartan (ENTRESTO)  MG per tablet, Take 1 tablet by mouth 2 times daily    No current facility-administered medications on file prior to visit.      ROS:   Refer to HPI    EXAM:  BP (!) 147/86 (BP Location: Right arm, Patient Position: Chair, Cuff Size: Adult Regular)   Pulse 64   Wt 65.8 kg (145 lb 1.6 oz)   SpO2 96%   BMI 26.54 kg/m     GENERAL: Appears comfortable, in no acute distress.   HEENT: Eye symmetrical, no discharge or icterus bilaterally. Mucous membranes moist and without lesions.  CV: RRR, +S1S2, soft systolic murmur, rub, or gallop. JVP not appriable.   RESPIRATORY: Respirations regular, even, and unlabored. Lungs CTA throughout.   GI: Soft and non distended with normoactive bowel sounds present in all quadrants. No tenderness, rebound, guarding. No hepatomegaly.   EXTREMITIES: No peripheral edema. 2+ bilateral pedal pulses.   NEUROLOGIC: Alert and oriented x 3. No focal deficits.   MUSCULOSKELETAL: No joint swelling or tenderness.   SKIN: No jaundice. No rashes or lesions.     Labs, reviewed with patient in clinic today:  CBC RESULTS:  Lab Results   Component Value Date    WBC 9.4 06/23/2023    WBC 4.6 05/17/2021    RBC 3.92 06/23/2023    RBC 3.97 05/17/2021    HGB 12.3 06/23/2023    HGB 12.9 05/17/2021    HCT 39.4 06/23/2023    HCT 40.3 05/17/2021     (H) 06/23/2023     (H) 05/17/2021    MCH 31.4 06/23/2023    MCH 32.5 05/17/2021    MCHC 31.2 (L) 06/23/2023    MCHC 32.0 05/17/2021    RDW 15.8 (H) 06/23/2023    RDW 12.7 05/17/2021     06/23/2023     05/17/2021       CMP RESULTS:  Lab Results   Component Value Date     06/27/2023     06/04/2021    POTASSIUM 4.1 06/27/2023    POTASSIUM 4.6 10/05/2022    POTASSIUM 3.4 06/04/2021     CHLORIDE 105 06/27/2023    CHLORIDE 103 10/05/2022    CHLORIDE 106 06/04/2021    CO2 28 06/27/2023    CO2 27 10/05/2022    CO2 30 06/04/2021    ANIONGAP 10 06/27/2023    ANIONGAP 7 10/05/2022    ANIONGAP 6 06/04/2021    GLC 86 06/27/2023    GLC 85 01/31/2023    GLC 98 10/05/2022     (H) 06/04/2021    BUN 30.0 (H) 06/27/2023    BUN 39 (H) 10/05/2022    BUN 27 06/04/2021    CR 1.24 (H) 06/27/2023    CR 1.50 (H) 06/04/2021    GFRESTIMATED 42 (L) 06/27/2023    GFRESTIMATED 32 (L) 06/04/2021    GFRESTBLACK 37 (L) 06/04/2021    PRINCE 9.8 06/27/2023    PRINCE 9.4 06/04/2021    BILITOTAL 0.3 02/26/2023    BILITOTAL 0.3 05/17/2021    ALBUMIN 4.1 04/03/2023    ALBUMIN 3.6 09/27/2022    ALBUMIN 3.4 05/17/2021    ALKPHOS 76 02/26/2023    ALKPHOS 57 05/17/2021    ALT 38 (H) 02/26/2023    ALT 38 05/17/2021    AST 36 (H) 02/26/2023    AST 27 05/17/2021        INR RESULTS:  Lab Results   Component Value Date    INR 1.12 02/22/2023    INR 1.05 11/03/2020       Lab Results   Component Value Date    MAG 2.5 (H) 02/27/2023    MAG 2.4 (H) 10/28/2019     Lab Results   Component Value Date    NTBNPI 11,774 (H) 02/22/2023     Lab Results   Component Value Date    NTBNP 6,742 (H) 02/15/2023    NTBNP 462 (H) 05/17/2021       Cardiac Diagnostics:    3/13/2023 Echo   Interpretation Summary  The 26 mm ES3 TAVR is well-seated. Leaflet opening is not clearly visualized.  There is trace valvular without paravalvular regurgitation. The Vmax is 2.4  m/s, the mean gradient is 14 mmHg, the dimensionless index is 0.80, and the  acceleration time is 80 ms.  Left ventricular function is decreased. The ejection fraction is 35-40%  (moderately reduced). Moderate diffuse hypokinesis is present.  Global right ventricular function is normal. The right ventricle is normal  size.  This study was compared with the study from 02/23/2023. There appears to have  been a decline in the global LV function but the endocardial definition is  poor on this study. The  TAVR function is unchanged.    2/23/2023 Echo  Interpretation Summary  Status post 26 mm Arndt Janis Ultra valve TAVR on 9/12/2022. MG is 18 mmHg,  PV is 2.8 m/s. Trace paravalvular AI.  Left ventricular function is decreased. The ejection fraction is 45-50%  (mildly reduced).  Global right ventricular function is normal.  Severe mitral annular calcification is present.  The inferior vena cava was normal in size with preserved respiratory  variability.     This study was compared with the study from 1/31/2023 .LVEF mildly declined.  Mean gradient across TAVR mildly higher.    1/31/2023 Echo  Interpretation Summary  Left ventricular size, wall motion and function are normal. The ejection  fraction is 55-60%.  Right ventricular size and function are normal.  Status post 26 mm Arndt Janis Ultra valve TAVR on 9/12/2022. The valve is  well seated. MG 16 mmHg; PV 2.5 m/s; no paravalvular AI.  No pericardial effusion is present.     This study was compared with the study from 12/1/2022. No significant changes  Noted.    10/2022 Coronary Angiogram   Two vessel CAD with prior PCI to the RCA and LAD, otherwise mild non obstructive CAD elsewhere.  Patent stents in the RCA and LAD with mild to moderate in stent restenosis of the proximal LAD stents (progressed since last angiogram from 2020).  Proximal LAD ISR hemodynamically not significant by dPR at 0.91.       Assessment and Plan:   Ms. Miller is a 87 year old female with medical history pertinent for HTN, CAD (s/p PCI in 1996, PCI to LAD and RCA in 2003, PCI to LAD x3 in 8/2020), severe aortic valve stenosis (s/p 26mm ES Ultra TAVR 9/12/22), PAD, HLD, HFpEF (LVEF 55-60% per TTE 12/2022), and anemia who presents to [facility] for routine follow up.     Appearing and feeling well. Euvolemic by exam. Remains hypertensive with SBP > 145. Review of labs are stable with Cr 1.34 and lytes wnl. She is on max dose entresto. Recently started on amlodipine 5 mg daily, but reporting  headache. I suspect that she will not tolerate an increase to 10 mg. Previously on imdur without reported side effects and this will allow for more up-titration. For today we will stop amlodipine and start imdur 30 mg daily. Plan to follow up with RN phone call in 1 week to review weights and BPs. Renal function is relatively stable and I would also like to go ahead and start her on an SGLT2. By price check, both farixga and jardiance are cost prohibitive. We have started patient assistance paperwork for jardiance.       # Acute on chronic systolic heart failure/HFrEF likely in the setting of uncontrolled HTN (EF 35-40% by TTE 3/2023)  Stage C. NYHA Class IIIB.    Fluid status: euvolemic, lasix 20 mg daily   ACEi/ARB/ARNi/afterload reduction: entresto  mg BID, start imdur 30 mg daily. Intolerance to hydralazine.   BB: intolerance to BB, coreg stopped 2/2023, given extensive CAD could attempt low dose BB?   Aldosterone antagonist: contraindicated due to renal dysfunction, borderline GFR 30-39  SGLT2i: start jardiance pending patient assistance enrollment   SCD prophylaxis: does not meet criteria for implant  NSAID use: contraindicated  Ischemia evaluation: last cor angio 10/2022; 2 vessel CAD RCA/LAD, mild to mod ISR of proxLAD, hemodynamically stable    # Coronary artery disease w/stable angina  # HTN  # HLD  s/p PCI in 1996, PCI to LAD and RCA in 2003, PCI to LAD x3 in 8/2020.    - Continue ASA 81 mg daily lifelong  - Continue high intensity statin  - Multiple intolerances to BB, hydralazine, amlodipine--> re-started on amlodipine 5 mg daily  - Enstresto as above for BP control   - Continue exercise and heart healthy diet      # PAD   - denies claudication, normal pulse exam on left  - recent BALDO's mildly abnormal on left  - Continue ASA 81 mg lifelong  - vascular consult should she develop recurrent claudication     # Severe aortic stenosis  s/p 26mm ES Ultra TAVR 9/12/22.  Last TTE 12/2022 showed  well-seated valve, with trace paravalvular AI, MG 11mmHg, and peak velocity 2.2m/sec.  Follows with the structural team, plan to repeat TTE 9/2023.  Needs SBE ppx.     # Anemia  Management per PCP, recently given IV iron.  Discussed referral to Anemia Clinic, which she will discuss with her PCP next week.  She is taking oral iron.    # CKD 3  Baseline Cr 1.1-1.4, Cystatin C 1.9 with eGFR 28  - Cr 1.32, GFR 42      Follow up:  - Echo/Dr. Zuniga 7/2023  - [facility] 2 months         Janice Kimbrough DNP, NP-C  Advance Heart Failure  6/28/23

## 2023-06-28 NOTE — NURSING NOTE
Diet: Patient instructed regarding a heart failure healthy diet, including discussion of reduced fat and 2000 mg daily sodium restriction, daily weights, medication purpose and compliance, fluid restrictions and resources for patient and family to access for assistance with heart failure management.       Labs: Patient was given results of the laboratory testing obtained today and patient was instructed about when to return for the next laboratory testing.     Med Reconcile: Reviewed and verified all current medications with the patient. The updated medication list was printed and given to the patient.     Med changes: Stop Norvasc, start Imdur 30 mg daily, filled out paperwork for [facility] for Jardiance 10 mg daily     Return Appointment: Patient given instructions regarding scheduling next clinic visit: RN call for BP check next week, Dr Zuniga as scheduled in July, [facility] in 2 months     Patient stated she understood all health information given and agreed to call with further questions or concerns.     Jessica Garcia RN

## 2023-06-28 NOTE — NURSING NOTE
Chief Complaint   Patient presents with     Follow Up     Return [facility]- 87 year old female presents with history of s/p tavr, presumed diastolic heart failure for hospital follow up and to follows up on blood pressures with labs prior     Vitals were taken and medications reconciled.    David Alaniz, EMT  4:15 PM

## 2023-06-29 ENCOUNTER — TELEPHONE (OUTPATIENT)
Dept: CARDIOLOGY | Facility: CLINIC | Age: 88
End: 2023-06-29
Payer: MEDICARE

## 2023-06-29 NOTE — TELEPHONE ENCOUNTER
[facility] Call Center    Phone Message    May a detailed message be left on voicemail: yes     Reason for Call: Other: Katherine with [facility] called in stating the pt wants order to stop tylenol 500mg one tablet daily. Please reach out to further discuss.      Action Taken: Other: Cardiology    Travel Screening: Not Applicable     Thank you!  [facility]

## 2023-06-30 NOTE — TELEPHONE ENCOUNTER
I called [place] RN, Katherine, regarding this message. Katherine told me that their NP is there today and they already got the order to discontinue the scheduled tylenol. No further needs or questions at this time.    Sherry Saldana RN

## 2023-07-05 ENCOUNTER — TELEPHONE (OUTPATIENT)
Dept: CARDIOLOGY | Facility: CLINIC | Age: 88
End: 2023-07-05
Payer: MEDICARE

## 2023-07-05 NOTE — TELEPHONE ENCOUNTER
Health Call Center    Phone Message    May a detailed message be left on voicemail: yes     Reason for Call: Other: Katherine called because she has an order to report when Kamryn's weight increases too quickly and wanted to let Dr. Zuniga and Janice know that Kamryn's weight has gone up a little bit since yesterday, potentially due to the holiday but her weight yesterday was 143.8lbs and her weight today is up to 146.1lbs. Please reach out to Katherine with any questions or concerns. Thank you!     Action Taken: Other: Cardiology    Travel Screening: Not Applicable     Thank you!  [facility]

## 2023-07-05 NOTE — TELEPHONE ENCOUNTER
Called because weight is up 2 lbs, however Katherine doesn't know if she is symptomatic as Kamryn has left the building.  Katherine will check in with her tomorrow and call us back with concerns.      7/1 142  7/2 144  7/3 143.8  7/4 146.1

## 2023-07-06 ENCOUNTER — TELEPHONE (OUTPATIENT)
Dept: CARDIOLOGY | Facility: CLINIC | Age: 88
End: 2023-07-06
Payer: MEDICARE

## 2023-07-06 DIAGNOSIS — I50.32 CHRONIC HEART FAILURE WITH PRESERVED EJECTION FRACTION (HFPEF) (H): ICD-10-CM

## 2023-07-06 NOTE — TELEPHONE ENCOUNTER
Called Katherine back, at a movie today, haven't been able to recheck her BP, gave her PRN lasix today.  Kamryn is asymptomatic - no shortness of breath, no headache, no edema.  Will update provider/

## 2023-07-06 NOTE — TELEPHONE ENCOUNTER
[facility] Call Center    Phone Message    May a detailed message be left on voicemail: yes     Reason for Call: Symptoms or Concerns     If patient has red-flag symptoms, warm transfer to triage line    Current symptom or concern: Weight is up from yesterday, no edema , not sob, /100, gave prn lasix    Symptoms have been present for:  1 day(s)    Has patient previously been seen for this? Yes    By : Janice Kimbrough APRN CNP    Date: 6/28/23    Are there any new or worsening symptoms? Yes:         Action Taken: Other: cardio    Travel Screening: Not Applicable     Thank you!  [facility]

## 2023-07-07 RX ORDER — ISOSORBIDE MONONITRATE 30 MG/1
60 TABLET, EXTENDED RELEASE ORAL DAILY
Qty: 60 TABLET | Refills: 11 | Status: SHIPPED | OUTPATIENT
Start: 2023-07-07

## 2023-07-07 NOTE — TELEPHONE ENCOUNTER
Reviewed with Janice Kimbrough NP  Date: 7/7/2023    Time of Call: 2:36 PM     Diagnosis:  HF     [ VORB ] Ordering provider: Janice Kimbrough NP  Order: Increase Imdur to 60 mg daily      Order received by: Verona Agee RN      Follow-up/additional notes:  Left message for patient and asked for call back. [employer] message sent.

## 2023-07-07 NOTE — TELEPHONE ENCOUNTER
Called Katherine back directly.   Will fax orders for Imdur.     Today is 146.3.  Has gained about 4 lbs. Was 142.6 on 7/2.     Did get an extra lasix yesterday for this.

## 2023-07-11 ENCOUNTER — HOSPITAL ENCOUNTER (OUTPATIENT)
Dept: CARDIOLOGY | Facility: CLINIC | Age: 88
Discharge: HOME | End: 2023-07-11
Attending: NURSE PRACTITIONER
Payer: MEDICARE

## 2023-07-11 ENCOUNTER — OFFICE VISIT (OUTPATIENT)
Dept: CARDIOLOGY | Facility: CLINIC | Age: 88
End: 2023-07-11
Attending: INTERNAL MEDICINE
Payer: MEDICARE

## 2023-07-11 VITALS
WEIGHT: 151.9 LBS | HEART RATE: 77 BPM | SYSTOLIC BLOOD PRESSURE: 164 MMHG | HEIGHT: 60 IN | BODY MASS INDEX: 29.82 KG/M2 | DIASTOLIC BLOOD PRESSURE: 94 MMHG | OXYGEN SATURATION: 97 %

## 2023-07-11 DIAGNOSIS — I10 ESSENTIAL HYPERTENSION: Primary | ICD-10-CM

## 2023-07-11 DIAGNOSIS — E78.2 MIXED HYPERLIPIDEMIA: ICD-10-CM

## 2023-07-11 DIAGNOSIS — I10 BENIGN ESSENTIAL HYPERTENSION: ICD-10-CM

## 2023-07-11 DIAGNOSIS — I50.20 HEART FAILURE WITH REDUCED EJECTION FRACTION, NYHA CLASS III (H): ICD-10-CM

## 2023-07-11 DIAGNOSIS — I42.8 NONISCHEMIC CARDIOMYOPATHY (H): ICD-10-CM

## 2023-07-11 DIAGNOSIS — I50.30 NYHA CLASS 3 HEART FAILURE WITH PRESERVED EJECTION FRACTION (H): ICD-10-CM

## 2023-07-11 DIAGNOSIS — Z95.2 HISTORY OF TRANSCATHETER AORTIC VALVE REPLACEMENT (TAVR): ICD-10-CM

## 2023-07-11 DIAGNOSIS — Z95.2 S/P TAVR (TRANSCATHETER AORTIC VALVE REPLACEMENT): ICD-10-CM

## 2023-07-11 LAB
BI-PLANE LVEF ECHO: NORMAL
LVEF ECHO: NORMAL

## 2023-07-11 PROCEDURE — 93306 TTE W/DOPPLER COMPLETE: CPT

## 2023-07-11 PROCEDURE — G0463 HOSPITAL OUTPT CLINIC VISIT: HCPCS | Mod: 25

## 2023-07-11 PROCEDURE — 99214 OFFICE O/P EST MOD 30 MIN: CPT | Mod: 25 | Performed by: INTERNAL MEDICINE

## 2023-07-11 PROCEDURE — 93306 TTE W/DOPPLER COMPLETE: CPT | Mod: 26 | Performed by: INTERNAL MEDICINE

## 2023-07-11 RX ORDER — AMLODIPINE BESYLATE 2.5 MG/1
2.5 TABLET ORAL DAILY
Qty: 90 TABLET | Refills: 3 | Status: SHIPPED | OUTPATIENT
Start: 2023-07-11 | End: 2023-09-01

## 2023-07-11 NOTE — LETTER
7/11/2023      RE: Kamryn Miller  [address]  [address]  [place] [address]       Dear Colleague,    Thank you for the opportunity to participate in the care of your patient, Kamryn Miller, at the [facility] [place] at [facility]. Please see a copy of my visit note below.    July 11, 2023     I had the pleasure of seeing Kamryn Miller  in the [facility] for further evaluation and management.  She has a history of Hx CAD, HLD, HTN, mitral and tricuspid insuff, pulmonary hypertension, lost to follow up in the past.  She does have CAD and did receive 3 stents in 8/2020 as below.  She denies any cardiomyopathy and she has not had any cardiology follow-up for several years.  Notes that she does have shortness of breath especially with exertion or she walks outside.  This is class III symptoms at the moment she can will probably about a block and able to take a flight of stairs.  We did start TAVR evaluation and she did undergo right heart catheterization as well as invasive hemodynamic measurement and measurement of the valve area.  Given findings of moderate aortic stenosis, TAVR was not yet pursued and ongoing surveillance was recommended. Had been hypertensive in the recent past. She did see the structural team just recently and repeat TTE was performed that again showed moderate to severe aortic stenosis, essentially unchanged from prior TTE. Given that she had no concerning symptoms ongoing surveillance was recommended.  On last visit I suggested increasing amlodipine to 10mg daily due to persistent hypertension. However, she did have an admission 12/16/21 and multiple medication changes made, including stopping amlodipine. Accordingly, she developed hypertension and 5mg was restarted in UC. However, she developed hypertensive emergency requiring admission for this and amlodipine was increased to 10mg daily with good  response. Given ongoing and worsening symptoms she was revaluated by the structural team and ultimately underwent successful TAVR on 9/12/22 with a post intervention gradient of 7mmHg. She was seen in the ER subsequently for claudication and leg pain in the setting of known LFA stenosis. She was seen in the ER recently for episode of chest pain. Workup was overall negative and was discharged in stable condition.  She was then admitted in 2/2023 with systolic HF exacerbation, she was diuresed and medications were adjusted including starting entresto at low dose. Did not tolerate BB initiation. SHe was also seen in [facility]. Her BP remained elevated and thus most recently entresto was increased to 49mg BID that she tolerated well with the goal to further titrate and later initiate SGLT2 if her renal function remains stable. Here for follow-up.  Overall she continues to feel relatively well now however her blood pressure has been running very high at least 160 mmHg systolic at home.  She denies any chest pain dizziness lightheadedness.  She notes maybe there is some lower extremity edema however on further evaluation there is really not much.  Overall no other complaints.  Please note that the amlodipine 5 mg has been recently stopped because she has been having constant headaches from it.  She did not tolerate other medications either however she did tolerate recent addition of Imdur.     PAST MEDICAL HISTORY:  Past Medical History:   Diagnosis Date    Aortic valvar stenosis 7/2010    mild    Asthma     Bipolar disorder (H)     CAD (coronary artery disease)     s/p angioplasty    Celiac disease     Colon polyps     Colon polyps 2012    every 3 year colonoscopy     Depression     High cholesterol     HTN     Mitral insufficiency     Pulmonary HTN (H)     mild    Tremors 7/10    drug induced from antidepressants    Tricuspid insufficiency      FAMILY HISTORY:  Family History   Problem Relation Age of Onset    Asthma Mother      Cerebrovascular Disease Mother     Arthritis Mother     Depression Mother     Lipids Sister     Hypertension Sister     Heart Disease Sister     Lipids Sister     Hypertension Sister     Lipids Sister     Breast Cancer Sister      SOCIAL HISTORY:  Social History     Socioeconomic History    Marital status:      Spouse name: Adrien    Number of children: 2    Years of education: 16   Occupational History     Employer: RETIRED     Comment: Retired in 2002.    Tobacco Use    Smoking status: Never    Smokeless tobacco: Never   Vaping Use    Vaping Use: Never used   Substance and Sexual Activity    Alcohol use: No     Alcohol/week: 0.0 standard drinks of alcohol     Types: 1 Standard drinks or equivalent per week    Drug use: No    Sexual activity: Not Currently     Partners: Male     CURRENT MEDICATIONS:  Current Outpatient Medications   Medication    acetaminophen (TYLENOL) 500 MG tablet    acetaminophen (TYLENOL) 500 MG tablet    albuterol (PROAIR HFA/PROVENTIL HFA/VENTOLIN HFA) 108 (90 Base) MCG/ACT inhaler    amoxicillin (AMOXIL) 500 MG capsule    aspirin (ASA) 81 MG EC tablet    azelastine (ASTEPRO) 0.15 % nasal spray    calcium citrate (CITRACAL) 950 (200 Ca) MG tablet    cholecalciferol (VITAMIN D3) 1250 mcg (88571 units) capsule    desvenlafaxine (PRISTIQ) 100 MG 24 hr tablet    empagliflozin (JARDIANCE) 10 MG TABS tablet    fluticasone-salmeterol (ADVAIR DISKUS) 500-50 MCG/ACT inhaler    folic acid (FOLVITE) 400 MCG tablet    furosemide (LASIX) 20 MG tablet    iron polysaccharides (NIFEREX) 150 MG capsule    isosorbide mononitrate (IMDUR) 30 MG 24 hr tablet    lamoTRIgine (LAMICTAL) 25 MG tablet    LAMOTRIGINE 200 MG PO TABS    levothyroxine (SYNTHROID/LEVOTHROID) 50 MCG tablet    liothyronine (CYTOMEL) 5 MCG tablet    memantine (NAMENDA) 10 MG tablet    mirtazapine (REMERON) 7.5 MG tablet    Multiple Vitamin (TAB-A-JENNIFER) TABS    psyllium (METAMUCIL/KONSYL) 58.6 % powder    REGULOID 28.3 % POWD     rosuvastatin (CRESTOR) 10 MG tablet    sacubitril-valsartan (ENTRESTO)  MG per tablet     No current facility-administered medications for this visit.     ROS:   Constitutional: No fever, chills, or sweats.   ENT: No visual disturbance, ear ache, epistaxis, sore throat.   Allergies/Immunologic: Negative.   Respiratory: No cough, hemoptysis.   Cardiovascular: As per HPI.   GI: No nausea, vomiting, hematemesis, melena, or hematochezia.   : No urinary frequency, dysuria, or hematuria.   Integument: Negative.   Psychiatric: Pleasant, no major depression noted  Neuro: No focal neurological deficits noted  Endocrinology: Negative.   Musculoskeletal: As per HPI.      EXAM:  BP (!) 182/99 (BP Location: Left arm, Patient Position: Chair, Cuff Size: Adult Small)   Pulse 78   Ht 1.524 m (5')   Wt 68.9 kg (151 lb 14.4 oz)   SpO2 97%   BMI 29.67 kg/m    General: appears comfortable, alert and oriented  Head: normocephalic, atraumatic  Eyes: anicteric sclera, EOMI , PERRL  Neck: no adenopathy  Orophyarynx: moist mucosa, no lesions noted  Heart: regular, S1/S2, no murmurs, rubs or gallop. Estimated JVP at 5 cmH2O  Lungs: CTAB, No wheezing.   Abdomen: soft, non-tender, bowel sounds present, no hepatosplenomegaly  Extremities: No LE edema today  Skin: no open lesions noted  Neuro: grossly non-focal  Psych: no evidence of depression noted     Labs:  Lab Results   Component Value Date    WBC 9.4 06/23/2023    HGB 12.3 06/23/2023    HCT 39.4 06/23/2023     06/23/2023     06/28/2023    POTASSIUM 3.7 06/28/2023    CHLORIDE 103 06/28/2023    CO2 26 06/28/2023    BUN 27.2 (H) 06/28/2023    CR 1.32 (H) 06/28/2023     (H) 06/28/2023    SED 11 07/28/2021    DD 1.80 (H) 02/22/2023    NTBNPI 11,774 (H) 02/22/2023    NTBNP 3,624 (H) 06/28/2023    AST 36 (H) 02/26/2023    ALT 38 (H) 02/26/2023    ALKPHOS 76 02/26/2023    BILITOTAL 0.3 02/26/2023    INR 1.12 02/22/2023     Echocardiogram  Left ventricular function,  chamber size, wall motion, and wall thickness are normal.The EF is 55-60%. No regional wall motion abnormalities are seen.  Right ventricular function, chamber size, wall motion, and thickness are normal.  Severe left atrial enlargement is present. Moderate mitral annular calcification is present. Mild mitral insufficiency is present. There is likley moderate aortic stenosis. The peak velocity is  3.47m/sec, the peak and mean gradients are 48mmHg and 28mmHg. The aortic valve  area is calculated low at 0.7cm2. Pulmonary artery systolic pressure is normal. The inferior vena cava was normal in size with preserved respiratory variability. No pericardial effusion is present.   Compared to prior study AS has worsened. Otherwise no significant change     Coronary angiogram 8/21/2020:  Dist LAD lesion is 70% stenosed.  Mid LAD-1 lesion is 80% stenosed.  Mid LAD-2 lesion is 60% stenosed.  Mid RCA lesion is 40% stenosed.  Prox RCA lesion is 40% stenosed.  Right sided filling pressures are normal.  Normal PA pressures.  Left sided filling pressures are normal.  Reduced cardiac output.  1. LAD - 3 EDDIE were placed in the mid to distal LAD (2.5 x 28 mm, 3.5 x 12 mm, and 3.0 x 8 mm)  2. RHC showed normal biventricular filling pressures. Normal PA pressure. Reduced cardiac output.     RHC/coronary angiogram 11/3/20:  Moderate aortic stenosis -valve area 1.2 cm2,Mean gradient 24 mm Hg  Right sided filling pressures are normal.  Normal PA pressures.  Left sided filling pressures are normal.  Mildly reduced cardiac output level.  Peripheral angiogram done and IVUS used to assess the vessel lumens of the descending thoracic aorta,right and left external iliac arteries     TTE 5/3/21:  Global and regional left ventricular function is normal with an EF of 55-60%.  The right ventricle is normal size. Global right ventricular function is normal.  Moderate to severe aortic stenosis, aortic valve area 1.02 cm2, peak velocity  3.3 m/s, mean  gradient 25 mmHg, stroke volume index 40 ml/m2, DVI 0.29.  This study was compared with the study from 9/16/2020. The aortic valve peak velocity and mean gradient are similar     TTE 11/1/21:  Global and regional left ventricular function is normal with an EF of 55-60%.  Right ventricular function, chamber size, wall motion, and thickness are normal.  Moderate aortic valve calcification is present. The mean gradient across the aortic valve is 23 mmHg. The peak aortic velocity is 3.1 m/sec. Moderate aortic stenosis is present. Calculated valve area lower than previous study due to LVOT diameter measurement. Moderate aortic stenosis is present.  No significant changes noted.     TTE 9/12/22:  Intra-procedural transthoracic echocardiogram for transfemoral transcatheter aortic valve replacement with 26mm Arndt Janis Ultra valve Aortic Valve. Image acquisition by sonographer.  PRE-PROCEDURAL FINDINGS:  1. Severe calcific aortic stenosis.  2. Normal systolic function. Visual LVEF 55%.   POST-PROCEDURAL SURVEY:  1. A 26 mm Arndt Janis Ultra valve Aortic Valve was successfully deployed.  2. Valve is well seated. There is trivial periprosthetic regurgitation.  3. Post-procedure valve hemodynamics - mean gradient 5.0 mmHg.  4. No pericardial effusion     CTA LE 9/22/22:  1. Left common femoral artery 22 cm segmental greater than 80% diameter stenosis from its origin. Linear defect suggests a dissection as the etiology. Mid and distal segments of the common femoral artery are patent.  2. 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  3. Additional nonemergent incidental findings include diverticulosis without diverticulitis and cholelithiasis without cholecystitis.     TTE 1/30/23:  Left ventricular size, wall motion and function are normal. The ejection fraction is 55-60%.  Right ventricular size and function are normal.  Status post 26 mm Arndt Janis Ultra valve TAVR on 9/12/2022.  The valve is well seated. MG 16 mmHg; PV 2.5 m/s; no paravalvular AI.  No pericardial effusion is present.   This study was compared with the study from 12/1/2022. No significant changes noted.     TTE 3/2023:  The 26 mm ES3 TAVR is well-seated. Leaflet opening is not clearly visualized. There is trace valvular without paravalvular regurgitation. The Vmax is 2.4 m/s, the mean gradient is 14 mmHg, the dimensionless index is 0.80, and the acceleration time is 80 ms.  Left ventricular function is decreased. The ejection fraction is 35-40% (moderately reduced). Moderate diffuse hypokinesis is present.  Global right ventricular function is normal. The right ventricle is normal size.  This study was compared with the study from 02/23/2023. There appears to have been a decline in the global LV function but the endocardial definition is poor on this study. The TAVR function is unchanged.    Echo from today pending, images reviewed with patient and her daughter.  Overall with Doppler gradient is little bit higher than it has been in the blast of 18 mmHg I think overall LV function is similar despite significant hypotension at around 40%.     ASSESSMENT AND PLAN:  In summary, patient is a 87 year old lady with with coronary artery disease and status post PCI 20 years ago at [facility], we do not have records unfortunately available but did receive 2 stents.  She most complains of shortness of breath denies any dizziness lightheadedness syncope or recurrent episodes of chest pains.  Overall there has been minimal change in her condition since my last visit and since the invasive evaluation. I have reviewed old imaging and blood work in person including visualizing the echocardiograms.  She did undergo TAVR with a 26 mm valve in September 2022.  As above the procedure overall went well however it was complicated by left bundle branch block and unfortunately she does not feel significantly better after the procedure.  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  She does have some associated claudication with it however she feels better.  Overall no significant change remains very hypertensive.  Did not tolerate amlodipine 5 mg in the past however she seems to tolerate Imdur better today.  There is no significant lower extremity edema or other evidence of volume overload on physical examination.  Today despite not being able to tolerate amlodipine 5 mg in the past previous try to reintroduce it at 2.5 mg given that she is very hypertensive.  She did not want to increase the Imdur further because she also had headaches with that which is understandable.  She did not tolerate hydralazine in the past.  As such as last efforts we will try amlodipine 2.5 mg daily.  In addition given her persistent hypertension we will obtain bilateral renal ultrasounds.  She did have CTA in the past which did not show any evidence however never had an ultrasound with duplex so we will do that and making sure she does not have any significant renal artery stenosis.  No other medication changes I will see her back in 6 months or sooner as needed.     I appreciate the opportunity to participate in the care of Kamryn Miller . Please do not hesitate to contact me with any further questions.     Sincerely,   Madhu Zuniga MD  [facility] Division of Cardiology

## 2023-07-11 NOTE — PROGRESS NOTES
July 11, 2023     I had the pleasure of seeing Kamryn Miller  in the [facility] for further evaluation and management.  She has a history of Hx CAD, HLD, HTN, mitral and tricuspid insuff, pulmonary hypertension, lost to follow up in the past.  She does have CAD and did receive 3 stents in 8/2020 as below.  She denies any cardiomyopathy and she has not had any cardiology follow-up for several years.  Notes that she does have shortness of breath especially with exertion or she walks outside.  This is class III symptoms at the moment she can will probably about a block and able to take a flight of stairs.  We did start TAVR evaluation and she did undergo right heart catheterization as well as invasive hemodynamic measurement and measurement of the valve area.  Given findings of moderate aortic stenosis, TAVR was not yet pursued and ongoing surveillance was recommended. Had been hypertensive in the recent past. She did see the structural team just recently and repeat TTE was performed that again showed moderate to severe aortic stenosis, essentially unchanged from prior TTE. Given that she had no concerning symptoms ongoing surveillance was recommended.  On last visit I suggested increasing amlodipine to 10mg daily due to persistent hypertension. However, she did have an admission 12/16/21 and multiple medication changes made, including stopping amlodipine. Accordingly, she developed hypertension and 5mg was restarted in UC. However, she developed hypertensive emergency requiring admission for this and amlodipine was increased to 10mg daily with good response. Given ongoing and worsening symptoms she was revaluated by the structural team and ultimately underwent successful TAVR on 9/12/22 with a post intervention gradient of 7mmHg. She was seen in the ER subsequently for claudication and leg pain in the setting of known LFA stenosis. She was seen in the ER recently for episode of chest pain. Workup was  overall negative and was discharged in stable condition.  She was then admitted in 2/2023 with systolic HF exacerbation, she was diuresed and medications were adjusted including starting entresto at low dose. Did not tolerate BB initiation. SHe was also seen in [facility]. Her BP remained elevated and thus most recently entresto was increased to 49mg BID that she tolerated well with the goal to further titrate and later initiate SGLT2 if her renal function remains stable. Here for follow-up.  Overall she continues to feel relatively well now however her blood pressure has been running very high at least 160 mmHg systolic at home.  She denies any chest pain dizziness lightheadedness.  She notes maybe there is some lower extremity edema however on further evaluation there is really not much.  Overall no other complaints.  Please note that the amlodipine 5 mg has been recently stopped because she has been having constant headaches from it.  She did not tolerate other medications either however she did tolerate recent addition of Imdur.     PAST MEDICAL HISTORY:  Past Medical History:   Diagnosis Date     Aortic valvar stenosis 7/2010    mild     Asthma      Bipolar disorder (H)      CAD (coronary artery disease)     s/p angioplasty     Celiac disease      Colon polyps      Colon polyps 2012    every 3 year colonoscopy      Depression      High cholesterol      HTN      Mitral insufficiency      Pulmonary HTN (H)     mild     Tremors 7/10    drug induced from antidepressants     Tricuspid insufficiency      FAMILY HISTORY:  Family History   Problem Relation Age of Onset     Asthma Mother      Cerebrovascular Disease Mother      Arthritis Mother      Depression Mother      Lipids Sister      Hypertension Sister      Heart Disease Sister      Lipids Sister      Hypertension Sister      Lipids Sister      Breast Cancer Sister      SOCIAL HISTORY:  Social History     Socioeconomic History     Marital status:      Spouse  name: Adrien     Number of children: 2     Years of education: 16   Occupational History     Employer: RETIRED     Comment: Retired in 2002.    Tobacco Use     Smoking status: Never     Smokeless tobacco: Never   Vaping Use     Vaping Use: Never used   Substance and Sexual Activity     Alcohol use: No     Alcohol/week: 0.0 standard drinks of alcohol     Types: 1 Standard drinks or equivalent per week     Drug use: No     Sexual activity: Not Currently     Partners: Male     CURRENT MEDICATIONS:  Current Outpatient Medications   Medication     acetaminophen (TYLENOL) 500 MG tablet     acetaminophen (TYLENOL) 500 MG tablet     albuterol (PROAIR HFA/PROVENTIL HFA/VENTOLIN HFA) 108 (90 Base) MCG/ACT inhaler     amoxicillin (AMOXIL) 500 MG capsule     aspirin (ASA) 81 MG EC tablet     azelastine (ASTEPRO) 0.15 % nasal spray     calcium citrate (CITRACAL) 950 (200 Ca) MG tablet     cholecalciferol (VITAMIN D3) 1250 mcg (27888 units) capsule     desvenlafaxine (PRISTIQ) 100 MG 24 hr tablet     empagliflozin (JARDIANCE) 10 MG TABS tablet     fluticasone-salmeterol (ADVAIR DISKUS) 500-50 MCG/ACT inhaler     folic acid (FOLVITE) 400 MCG tablet     furosemide (LASIX) 20 MG tablet     iron polysaccharides (NIFEREX) 150 MG capsule     isosorbide mononitrate (IMDUR) 30 MG 24 hr tablet     lamoTRIgine (LAMICTAL) 25 MG tablet     LAMOTRIGINE 200 MG PO TABS     levothyroxine (SYNTHROID/LEVOTHROID) 50 MCG tablet     liothyronine (CYTOMEL) 5 MCG tablet     memantine (NAMENDA) 10 MG tablet     mirtazapine (REMERON) 7.5 MG tablet     Multiple Vitamin (TAB-A-JENNIFER) TABS     psyllium (METAMUCIL/KONSYL) 58.6 % powder     REGULOID 28.3 % POWD     rosuvastatin (CRESTOR) 10 MG tablet     sacubitril-valsartan (ENTRESTO)  MG per tablet     No current facility-administered medications for this visit.     ROS:   Constitutional: No fever, chills, or sweats.   ENT: No visual disturbance, ear ache, epistaxis, sore throat.   Allergies/Immunologic:  Negative.   Respiratory: No cough, hemoptysis.   Cardiovascular: As per HPI.   GI: No nausea, vomiting, hematemesis, melena, or hematochezia.   : No urinary frequency, dysuria, or hematuria.   Integument: Negative.   Psychiatric: Pleasant, no major depression noted  Neuro: No focal neurological deficits noted  Endocrinology: Negative.   Musculoskeletal: As per HPI.      EXAM:  BP (!) 182/99 (BP Location: Left arm, Patient Position: Chair, Cuff Size: Adult Small)   Pulse 78   Ht 1.524 m (5')   Wt 68.9 kg (151 lb 14.4 oz)   SpO2 97%   BMI 29.67 kg/m    General: appears comfortable, alert and oriented  Head: normocephalic, atraumatic  Eyes: anicteric sclera, EOMI , PERRL  Neck: no adenopathy  Orophyarynx: moist mucosa, no lesions noted  Heart: regular, S1/S2, no murmurs, rubs or gallop. Estimated JVP at 5 cmH2O  Lungs: CTAB, No wheezing.   Abdomen: soft, non-tender, bowel sounds present, no hepatosplenomegaly  Extremities: No LE edema today  Skin: no open lesions noted  Neuro: grossly non-focal  Psych: no evidence of depression noted     Labs:  Lab Results   Component Value Date    WBC 9.4 06/23/2023    HGB 12.3 06/23/2023    HCT 39.4 06/23/2023     06/23/2023     06/28/2023    POTASSIUM 3.7 06/28/2023    CHLORIDE 103 06/28/2023    CO2 26 06/28/2023    BUN 27.2 (H) 06/28/2023    CR 1.32 (H) 06/28/2023     (H) 06/28/2023    SED 11 07/28/2021    DD 1.80 (H) 02/22/2023    NTBNPI 11,774 (H) 02/22/2023    NTBNP 3,624 (H) 06/28/2023    AST 36 (H) 02/26/2023    ALT 38 (H) 02/26/2023    ALKPHOS 76 02/26/2023    BILITOTAL 0.3 02/26/2023    INR 1.12 02/22/2023     Echocardiogram  Left ventricular function, chamber size, wall motion, and wall thickness are normal.The EF is 55-60%. No regional wall motion abnormalities are seen.  Right ventricular function, chamber size, wall motion, and thickness are normal.  Severe left atrial enlargement is present. Moderate mitral annular calcification is present.  Mild mitral insufficiency is present. There is likley moderate aortic stenosis. The peak velocity is  3.47m/sec, the peak and mean gradients are 48mmHg and 28mmHg. The aortic valve  area is calculated low at 0.7cm2. Pulmonary artery systolic pressure is normal. The inferior vena cava was normal in size with preserved respiratory variability. No pericardial effusion is present.   Compared to prior study AS has worsened. Otherwise no significant change     Coronary angiogram 8/21/2020:    Dist LAD lesion is 70% stenosed.    Mid LAD-1 lesion is 80% stenosed.    Mid LAD-2 lesion is 60% stenosed.    Mid RCA lesion is 40% stenosed.    Prox RCA lesion is 40% stenosed.    Right sided filling pressures are normal.    Normal PA pressures.    Left sided filling pressures are normal.    Reduced cardiac output.  1. LAD - 3 EDDIE were placed in the mid to distal LAD (2.5 x 28 mm, 3.5 x 12 mm, and 3.0 x 8 mm)  2. RHC showed normal biventricular filling pressures. Normal PA pressure. Reduced cardiac output.     RHC/coronary angiogram 11/3/20:    Moderate aortic stenosis -valve area 1.2 cm2,Mean gradient 24 mm Hg    Right sided filling pressures are normal.    Normal PA pressures.    Left sided filling pressures are normal.    Mildly reduced cardiac output level.    Peripheral angiogram done and IVUS used to assess the vessel lumens of the descending thoracic aorta,right and left external iliac arteries     TTE 5/3/21:  Global and regional left ventricular function is normal with an EF of 55-60%.  The right ventricle is normal size. Global right ventricular function is normal.  Moderate to severe aortic stenosis, aortic valve area 1.02 cm2, peak velocity  3.3 m/s, mean gradient 25 mmHg, stroke volume index 40 ml/m2, DVI 0.29.  This study was compared with the study from 9/16/2020. The aortic valve peak velocity and mean gradient are similar     TTE 11/1/21:  Global and regional left ventricular function is normal with an EF of  55-60%.  Right ventricular function, chamber size, wall motion, and thickness are normal.  Moderate aortic valve calcification is present. The mean gradient across the aortic valve is 23 mmHg. The peak aortic velocity is 3.1 m/sec. Moderate aortic stenosis is present. Calculated valve area lower than previous study due to LVOT diameter measurement. Moderate aortic stenosis is present.  No significant changes noted.     TTE 9/12/22:  Intra-procedural transthoracic echocardiogram for transfemoral transcatheter aortic valve replacement with 26mm Arndt Janis Ultra valve Aortic Valve. Image acquisition by sonographer.  PRE-PROCEDURAL FINDINGS:  1. Severe calcific aortic stenosis.  2. Normal systolic function. Visual LVEF 55%.   POST-PROCEDURAL SURVEY:  1. A 26 mm Arndt Janis Ultra valve Aortic Valve was successfully deployed.  2. Valve is well seated. There is trivial periprosthetic regurgitation.  3. Post-procedure valve hemodynamics - mean gradient 5.0 mmHg.  4. No pericardial effusion     CTA LE 9/22/22:  1. Left common femoral artery 22 cm segmental greater than 80% diameter stenosis from its origin. Linear defect suggests a dissection as the etiology. Mid and distal segments of the common femoral artery are patent.  2. 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  3. Additional nonemergent incidental findings include diverticulosis without diverticulitis and cholelithiasis without cholecystitis.     TTE 1/30/23:  Left ventricular size, wall motion and function are normal. The ejection fraction is 55-60%.  Right ventricular size and function are normal.  Status post 26 mm Arndt Janis Ultra valve TAVR on 9/12/2022. The valve is well seated. MG 16 mmHg; PV 2.5 m/s; no paravalvular AI.  No pericardial effusion is present.   This study was compared with the study from 12/1/2022. No significant changes noted.     TTE 3/2023:  The 26 mm ES3 TAVR is well-seated. Leaflet opening  is not clearly visualized. There is trace valvular without paravalvular regurgitation. The Vmax is 2.4 m/s, the mean gradient is 14 mmHg, the dimensionless index is 0.80, and the acceleration time is 80 ms.  Left ventricular function is decreased. The ejection fraction is 35-40% (moderately reduced). Moderate diffuse hypokinesis is present.  Global right ventricular function is normal. The right ventricle is normal size.  This study was compared with the study from 02/23/2023. There appears to have been a decline in the global LV function but the endocardial definition is poor on this study. The TAVR function is unchanged.    Echo from today pending, images reviewed with patient and her daughter.  Overall with Doppler gradient is little bit higher than it has been in the blast of 18 mmHg I think overall LV function is similar despite significant hypotension at around 40%.     ASSESSMENT AND PLAN:  In summary, patient is a 87 year old lady with with coronary artery disease and status post PCI 20 years ago at [facility], we do not have records unfortunately available but did receive 2 stents.  She most complains of shortness of breath denies any dizziness lightheadedness syncope or recurrent episodes of chest pains.  Overall there has been minimal change in her condition since my last visit and since the invasive evaluation. I have reviewed old imaging and blood work in person including visualizing the echocardiograms.  She did undergo TAVR with a 26 mm valve in September 2022.  As above the procedure overall went well however it was complicated by left bundle branch block and unfortunately she does not feel significantly better after the procedure.  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  She does have some associated claudication with it however she feels better.  Overall no significant change remains very hypertensive.  Did not tolerate amlodipine 5 mg in the  past however she seems to tolerate Imdur better today.  There is no significant lower extremity edema or other evidence of volume overload on physical examination.  Today despite not being able to tolerate amlodipine 5 mg in the past previous try to reintroduce it at 2.5 mg given that she is very hypertensive.  She did not want to increase the Imdur further because she also had headaches with that which is understandable.  She did not tolerate hydralazine in the past.  As such as last efforts we will try amlodipine 2.5 mg daily.  In addition given her persistent hypertension we will obtain bilateral renal ultrasounds.  She did have CTA in the past which did not show any evidence however never had an ultrasound with duplex so we will do that and making sure she does not have any significant renal artery stenosis.  No other medication changes I will see her back in 6 months or sooner as needed.     I appreciate the opportunity to participate in the care of Kamryn Miller . Please do not hesitate to contact me with any further questions.     Sincerely,   Madhu Zuniga MD  [facility] Division of Cardiology

## 2023-07-11 NOTE — NURSING NOTE
Chief Complaint   Patient presents with     Follow Up     REturn core: 87 year old female presents with history of s/p tavr, presumed diastolic heart failure for hospital follow up and to follows up on blood pressures with labs prior     Vitals were taken and medications were reconciled.    FANG Barber  11:32 AM

## 2023-07-11 NOTE — PATIENT INSTRUCTIONS
Dr. Zuniga recommends:    Start taking Amlodipine 2.5 MG once daily.    Bilateral renal artery ultrasound to assess for renal artery stenosis.    Follow up clinic visit with Dr. Zuniga in 6 months with labs the same day.    Thank you for your visit today.  Please call me with any questions or concerns.   Jimmie Coppola RN  Cardiology Care Coordinator  [phone]

## 2023-07-12 ENCOUNTER — ANCILLARY PROCEDURE (OUTPATIENT)
Dept: ULTRASOUND IMAGING | Facility: CLINIC | Age: 88
End: 2023-07-12
Attending: INTERNAL MEDICINE
Payer: MEDICARE

## 2023-07-12 DIAGNOSIS — I10 ESSENTIAL HYPERTENSION: ICD-10-CM

## 2023-07-12 PROCEDURE — 76770 US EXAM ABDO BACK WALL COMP: CPT | Mod: XU | Performed by: RADIOLOGY

## 2023-07-12 PROCEDURE — 93975 VASCULAR STUDY: CPT | Performed by: RADIOLOGY

## 2023-07-18 ENCOUNTER — TELEPHONE (OUTPATIENT)
Dept: CARDIOLOGY | Facility: CLINIC | Age: 88
End: 2023-07-18
Payer: MEDICARE

## 2023-07-18 DIAGNOSIS — I50.30 NYHA CLASS 3 HEART FAILURE WITH PRESERVED EJECTION FRACTION (H): ICD-10-CM

## 2023-07-18 NOTE — TELEPHONE ENCOUNTER
Katherine from [facility] assisted living left a VM on vocera stating that pt is concerned about increased BP and weight.  Pt BP was 168/98 and weight is 147.7 lbs but gradually increasing.  They have been giving her furosemide PRN and gave her 10 mg today.    Please call pt to discuss concerns and possible med changes.    Called and spoke with Katherine. She states that patient has been receiving prn Lasix 10 MG daily about every 3-4 days for systolic BP above 160. The lasix brings the patient's blood pressure down. She gets Lasix 20 MG daily.  They are wondering if increasing lasix to 30 MG daily might not help keep the blood pressures down and patient might not need the prn lasix so frequently. No swelling or shortness of breath. Some slight weight gain of 3-4 pounds this month.  Will contact provider for recommendations.    Date: 7/25/2023    Time of Call: 11:03 AM     Diagnosis: Hypertension     [ VORB ] Ordering provider: Dr. Zuniga  Order: Increase Lasix to 30 MG once daily.     Order received by: Jimmie Coppola RN     Follow-up/additional notes: Prescription sent to pharmacy. Katherine called and notified. Order faxed to [facility].      Called and spoke with Katherine. All questions answered to her satisfaction. She will call back with any further questions or concerns

## 2023-07-18 NOTE — PATIENT INSTRUCTIONS
You were seen today in the Structural Heart Clinic at the [facility].    Cardiology provider you saw during your visit: Pati De Santiago NP    Instructions:   Go to ER for chest pain evaluation   Continue aspirin 81 mg daily lifelong.  Continue Brilinta to PAD - duration for vascular surgery   Delay any nonurgent dental procedures for the first 6 month post TAVR.  For all future dental cleanings and procedures you will need to take antibiotics prior - see instructions below.   Follow-up with your Primary Cardiologist in 6 months with echo prior   Follow-up in Valve Clinic in 1 year with echo, labs and ECG prior     Prevention of Infective (Bacterial) Endocarditis:  You are at increased risk for developing adverse outcomes from infective endocarditis (IE), also known as bacterial endocarditis (BE) because of the new device in your heart. The guidelines for prevention of IE are to give patients antibiotics prior to any dental procedures that involve manipulation of gingival tissue or the periapical region of teeth, or perforation of the oral mucosa:      It is recommended to take Amoxicillin 2 gm by mouth as a single dose 30 to 60 minutes before procedure.     OR if allergic to Penicillin or Ampicillin:     Cephalexin 2 gm by mouth, or  Clindamycin 600 mg by mouth, or  Azithromycin or Clarithromycin 500 mg PO       Questions and scheduling:   First call: Structural Heart  Polina Gerard [phone]    General scheduling line: [phone].   First press #1 for the [employer] and then press #3 for Medical Questions to reach the Cardiology triage nurse.     On Call Cardiologist for after hours or on weekends: [phone], press option #4 and ask to speak to the on-call Cardiologist.         risk factors

## 2023-07-25 RX ORDER — FUROSEMIDE 20 MG
30 TABLET ORAL DAILY
Qty: 135 TABLET | Refills: 3 | Status: SHIPPED | OUTPATIENT
Start: 2023-07-25 | End: 2023-10-13

## 2023-07-26 DIAGNOSIS — I50.30 NYHA CLASS 3 HEART FAILURE WITH PRESERVED EJECTION FRACTION (H): ICD-10-CM

## 2023-07-26 DIAGNOSIS — I35.0 SEVERE AORTIC STENOSIS: Primary | ICD-10-CM

## 2023-07-26 NOTE — PROGRESS NOTES
Date: 7/26/2023    Time of Call: 12:04 PM     Diagnosis:  S/p TAVR rule clot     [ TORB ] Ordering provider: Pati De Santiago APRN  Order: CT angiogram heart     Order received by: Keiko Godinez RN     Follow-up/additional notes:

## 2023-07-27 RX ORDER — FUROSEMIDE 20 MG
TABLET ORAL
Qty: 32.23 TABLET | Refills: 11 | OUTPATIENT
Start: 2023-07-27

## 2023-07-30 PROCEDURE — 81001 URINALYSIS AUTO W/SCOPE: CPT | Mod: ORL

## 2023-07-30 PROCEDURE — 87088 URINE BACTERIA CULTURE: CPT | Mod: ORL

## 2023-07-31 ENCOUNTER — APPOINTMENT (OUTPATIENT)
Dept: LAB | Facility: CLINIC | Age: 88
End: 2023-07-31
Attending: NURSE PRACTITIONER
Payer: MEDICARE

## 2023-07-31 ENCOUNTER — HOSPITAL ENCOUNTER (OUTPATIENT)
Dept: CT IMAGING | Facility: CLINIC | Age: 88
Discharge: HOME | End: 2023-07-31
Attending: NURSE PRACTITIONER
Payer: MEDICARE

## 2023-07-31 ENCOUNTER — LAB REQUISITION (OUTPATIENT)
Dept: LAB | Facility: CLINIC | Age: 88
End: 2023-07-31
Payer: MEDICARE

## 2023-07-31 DIAGNOSIS — R30.0 DYSURIA: ICD-10-CM

## 2023-07-31 DIAGNOSIS — I35.0 SEVERE AORTIC STENOSIS: ICD-10-CM

## 2023-07-31 LAB
ALBUMIN UR-MCNC: NEGATIVE MG/DL
ANION GAP SERPL CALCULATED.3IONS-SCNC: 10 MMOL/L (ref 7–15)
APPEARANCE UR: CLEAR
BILIRUB UR QL STRIP: NEGATIVE
BUN SERPL-MCNC: 31.5 MG/DL (ref 8–23)
CALCIUM SERPL-MCNC: 9.3 MG/DL (ref 8.8–10.2)
CHLORIDE SERPL-SCNC: 104 MMOL/L (ref 98–107)
COLOR UR AUTO: ABNORMAL
CREAT SERPL-MCNC: 1.34 MG/DL (ref 0.51–0.95)
DEPRECATED HCO3 PLAS-SCNC: 28 MMOL/L (ref 22–29)
GFR SERPL CREATININE-BSD FRML MDRD: 38 ML/MIN/1.73M2
GLUCOSE SERPL-MCNC: 98 MG/DL (ref 70–99)
GLUCOSE UR STRIP-MCNC: NEGATIVE MG/DL
HGB UR QL STRIP: NEGATIVE
KETONES UR STRIP-MCNC: NEGATIVE MG/DL
LEUKOCYTE ESTERASE UR QL STRIP: NEGATIVE
MUCOUS THREADS #/AREA URNS LPF: PRESENT /LPF
NITRATE UR QL: NEGATIVE
PH UR STRIP: 8 [PH] (ref 5–7)
POTASSIUM SERPL-SCNC: 3.8 MMOL/L (ref 3.4–5.3)
RBC URINE: 1 /HPF
SODIUM SERPL-SCNC: 142 MMOL/L (ref 136–145)
SP GR UR STRIP: 1.01 (ref 1–1.03)
TRI-PHOS CRY #/AREA URNS HPF: ABNORMAL /HPF
UROBILINOGEN UR STRIP-MCNC: NORMAL MG/DL
WBC URINE: 2 /HPF

## 2023-07-31 PROCEDURE — 75572 CT HRT W/3D IMAGE: CPT | Mod: 26 | Performed by: INTERNAL MEDICINE

## 2023-07-31 PROCEDURE — 80048 BASIC METABOLIC PNL TOTAL CA: CPT | Performed by: NURSE PRACTITIONER

## 2023-07-31 PROCEDURE — G1010 CDSM STANSON: HCPCS

## 2023-07-31 PROCEDURE — 250N000011 HC RX IP 250 OP 636: Performed by: NURSE PRACTITIONER

## 2023-07-31 PROCEDURE — G1010 CDSM STANSON: HCPCS | Performed by: INTERNAL MEDICINE

## 2023-07-31 RX ORDER — IOPAMIDOL 755 MG/ML
110 INJECTION, SOLUTION INTRAVASCULAR ONCE
Status: COMPLETED | OUTPATIENT
Start: 2023-07-31 | End: 2023-07-31

## 2023-07-31 RX ADMIN — IOPAMIDOL 110 ML: 755 INJECTION, SOLUTION INTRAVENOUS at 10:35

## 2023-08-01 PROCEDURE — 81001 URINALYSIS AUTO W/SCOPE: CPT | Mod: ORL

## 2023-08-02 DIAGNOSIS — R94.39 ABNORMAL RESULT OF OTHER CARDIOVASCULAR FUNCTION STUDY: ICD-10-CM

## 2023-08-02 DIAGNOSIS — Z95.2 S/P TAVR (TRANSCATHETER AORTIC VALVE REPLACEMENT): Primary | ICD-10-CM

## 2023-08-02 LAB — BACTERIA UR CULT: ABNORMAL

## 2023-08-02 NOTE — PROGRESS NOTES
"Date: 8/2/2023    Time of Call: 7:22 AM     Diagnosis:  hypoattenuating leaflet thrombosis     [ TORB ] Ordering provider: Pati De Santiago NP  Order:   CT angio heart  Eliquis 5 mg, by mouth, two times daily  (Alternate to Eliquis if this is not an option for the patient is warfarin)     Order received by: Dayna Ray RN     Follow-up/additional notes:     Per Pati De Santiago NP:  \"Please call patient or daughter. CT shows HALT - she will need 3 months of AC with Eliquis 5 mg BID or warfarin with repeat CT and clinical follow-up with me in 3 months. She will need to continue ASA for CAD.\"      "

## 2023-08-06 DIAGNOSIS — I50.30 NYHA CLASS 3 HEART FAILURE WITH PRESERVED EJECTION FRACTION (H): ICD-10-CM

## 2023-08-07 DIAGNOSIS — I50.30 NYHA CLASS 3 HEART FAILURE WITH PRESERVED EJECTION FRACTION (H): ICD-10-CM

## 2023-08-07 RX ORDER — FUROSEMIDE 20 MG
TABLET ORAL
Qty: 32.23 TABLET | Refills: 0 | OUTPATIENT
Start: 2023-08-07

## 2023-08-09 RX ORDER — FUROSEMIDE 20 MG
TABLET ORAL
Qty: 42 TABLET | Refills: 24 | OUTPATIENT
Start: 2023-08-09

## 2023-08-09 NOTE — TELEPHONE ENCOUNTER
Furosemide (LASIX) 20 MG tablet 135 tablet 3 7/25/2023  [facility] 7/25/23

## 2023-08-11 ENCOUNTER — TELEPHONE (OUTPATIENT)
Dept: CARDIOLOGY | Facility: CLINIC | Age: 88
End: 2023-08-11
Payer: MEDICARE

## 2023-08-11 DIAGNOSIS — I50.20 HEART FAILURE WITH REDUCED EJECTION FRACTION, NYHA CLASS III (H): Primary | ICD-10-CM

## 2023-08-11 DIAGNOSIS — I50.32 CHRONIC HEART FAILURE WITH PRESERVED EJECTION FRACTION (HFPEF) (H): ICD-10-CM

## 2023-08-11 RX ORDER — SACUBITRIL AND VALSARTAN 97; 103 MG/1; MG/1
1 TABLET, FILM COATED ORAL 2 TIMES DAILY
Qty: 60 TABLET | Refills: 3 | Status: SHIPPED | OUTPATIENT
Start: 2023-08-11 | End: 2023-10-13

## 2023-08-11 NOTE — TELEPHONE ENCOUNTER
Saw Dr. Zuniga 7/11/23, continue Entresto 97/103 mg BID. Refill sent. Called Katherine CURIEL to let her know.

## 2023-08-11 NOTE — TELEPHONE ENCOUNTER
[facility] Call Center    Phone Message    May a detailed message be left on voicemail: yes     Reason for Call: Medication Refill Request    Has the patient contacted the pharmacy for the refill? Yes   Name of medication being requested: sacubitril-valsartan (ENTRESTO)  MG per tablet   Provider who prescribed the medication: Janice pack  Pharmacy:   [facility] [address]     Date medication is needed: ASAP - pt is out  Action Taken: Message routed to:  [facility] ([facility] cardio    Travel Screening: Not Applicable

## 2023-08-22 ENCOUNTER — TELEPHONE (OUTPATIENT)
Dept: CARDIOLOGY | Facility: CLINIC | Age: 88
End: 2023-08-22
Payer: MEDICARE

## 2023-08-22 NOTE — TELEPHONE ENCOUNTER
[facility] Call Center    Phone Message    May a detailed message be left on voicemail: yes     Reason for Call: Other: Katherine with [place] assist living called in to give update on pt's weight gain, states its at a steady increase she is currently weighing 150.1. Pt is also having shortness of breath and swelling parts of the day, please reach out to further discuss.     Action Taken: Other: Cardiology    Travel Screening: Not Applicable    Thank you!  [facility]                        Provider notified and new orders received.   Date: 8/22/2023    Time of Call: 11:00 AM     Diagnosis:  HF weight gain     [ VORB ] Ordering provider: Dr. Zuniga  Order: Increase Lasix to 40 MG daily for 3 days.     Order received by: Jimmie Coppola RN     Follow-up/additional notes: Called and spoke with Katherine nurse at [facility]. Order given to Katherine. She will fax the official order for signature.

## 2023-08-24 DIAGNOSIS — I50.30 NYHA CLASS 3 HEART FAILURE WITH PRESERVED EJECTION FRACTION (H): ICD-10-CM

## 2023-08-28 ENCOUNTER — TELEPHONE (OUTPATIENT)
Dept: CARDIOLOGY | Facility: CLINIC | Age: 88
End: 2023-08-28
Payer: MEDICARE

## 2023-08-28 NOTE — TELEPHONE ENCOUNTER
Called and spoke to Sangita. Patient will be offered her prn Lasix dose of 10 MG today. PRN order switched to include increase in weight, not just increase in blood pressures. Will fax signed order to facility tomorrow when Dr. Zuniga is in clinic.

## 2023-08-28 NOTE — TELEPHONE ENCOUNTER
[facility] Call Center    Phone Message    May a detailed message be left on voicemail: yes     Reason for Call: Other: Sangita called from [facility] to inform Dr. Zuniga that Kamryn's weight has increase to 151.9lbs after lunch today and Kamryn has noticed some swelling returning in her legs after going back down to 30mg of Lasix. Kamryn's BP was 137/79 with a pulse of 68 and Kamryn does not have any shortness of breath. Please reach out to [facility] if you have any questions. Thank you!     Action Taken: Other: Cardiology    Travel Screening: Not Applicable    Thank you!  [facility]

## 2023-08-29 DIAGNOSIS — I50.30 NYHA CLASS 3 HEART FAILURE WITH PRESERVED EJECTION FRACTION (H): ICD-10-CM

## 2023-08-29 RX ORDER — FUROSEMIDE 20 MG
TABLET ORAL
Qty: 2 TABLET | Refills: 11 | OUTPATIENT
Start: 2023-08-29

## 2023-08-29 RX ORDER — FUROSEMIDE 40 MG
TABLET ORAL
Qty: 3 TABLET | Refills: 11 | OUTPATIENT
Start: 2023-08-29

## 2023-08-31 RX ORDER — FUROSEMIDE 20 MG
TABLET ORAL
Qty: 15 TABLET | Refills: 11 | OUTPATIENT
Start: 2023-08-31

## 2023-09-01 ENCOUNTER — OFFICE VISIT (OUTPATIENT)
Dept: CARDIOLOGY | Facility: CLINIC | Age: 88
End: 2023-09-01
Attending: NURSE PRACTITIONER
Payer: MEDICARE

## 2023-09-01 ENCOUNTER — LAB (OUTPATIENT)
Dept: LAB | Facility: CLINIC | Age: 88
End: 2023-09-01
Attending: NURSE PRACTITIONER
Payer: MEDICARE

## 2023-09-01 VITALS
SYSTOLIC BLOOD PRESSURE: 152 MMHG | HEART RATE: 60 BPM | DIASTOLIC BLOOD PRESSURE: 83 MMHG | HEIGHT: 60 IN | WEIGHT: 154.3 LBS | OXYGEN SATURATION: 96 % | BODY MASS INDEX: 30.29 KG/M2

## 2023-09-01 DIAGNOSIS — I50.20 HEART FAILURE WITH REDUCED EJECTION FRACTION, NYHA CLASS III (H): ICD-10-CM

## 2023-09-01 DIAGNOSIS — I50.22 CHRONIC SYSTOLIC HEART FAILURE (H): Primary | ICD-10-CM

## 2023-09-01 DIAGNOSIS — N18.30 STAGE 3 CHRONIC KIDNEY DISEASE, UNSPECIFIED WHETHER STAGE 3A OR 3B CKD (H): ICD-10-CM

## 2023-09-01 DIAGNOSIS — I10 ESSENTIAL HYPERTENSION: ICD-10-CM

## 2023-09-01 DIAGNOSIS — I42.8 NONISCHEMIC CARDIOMYOPATHY (H): ICD-10-CM

## 2023-09-01 DIAGNOSIS — Z95.2 S/P TAVR (TRANSCATHETER AORTIC VALVE REPLACEMENT): ICD-10-CM

## 2023-09-01 LAB
ANION GAP SERPL CALCULATED.3IONS-SCNC: 8 MMOL/L (ref 7–15)
BUN SERPL-MCNC: 26.7 MG/DL (ref 8–23)
CALCIUM SERPL-MCNC: 9.6 MG/DL (ref 8.8–10.2)
CHLORIDE SERPL-SCNC: 104 MMOL/L (ref 98–107)
CREAT SERPL-MCNC: 1.25 MG/DL (ref 0.51–0.95)
DEPRECATED HCO3 PLAS-SCNC: 30 MMOL/L (ref 22–29)
GFR SERPL CREATININE-BSD FRML MDRD: 42 ML/MIN/1.73M2
GLUCOSE SERPL-MCNC: 111 MG/DL (ref 70–99)
POTASSIUM SERPL-SCNC: 4.5 MMOL/L (ref 3.4–5.3)
SODIUM SERPL-SCNC: 142 MMOL/L (ref 136–145)

## 2023-09-01 PROCEDURE — G0463 HOSPITAL OUTPT CLINIC VISIT: HCPCS | Performed by: NURSE PRACTITIONER

## 2023-09-01 PROCEDURE — 99215 OFFICE O/P EST HI 40 MIN: CPT | Performed by: NURSE PRACTITIONER

## 2023-09-01 PROCEDURE — 36415 COLL VENOUS BLD VENIPUNCTURE: CPT | Performed by: PATHOLOGY

## 2023-09-01 PROCEDURE — 80048 BASIC METABOLIC PNL TOTAL CA: CPT | Performed by: PATHOLOGY

## 2023-09-01 RX ORDER — SPIRONOLACTONE 25 MG/1
25 TABLET ORAL DAILY
Qty: 90 TABLET | Refills: 3 | Status: SHIPPED | OUTPATIENT
Start: 2023-09-01 | End: 2023-10-13

## 2023-09-01 RX ORDER — AMLODIPINE BESYLATE 2.5 MG/1
2.5 TABLET ORAL AT BEDTIME
Qty: 90 TABLET | Refills: 3 | Status: SHIPPED | OUTPATIENT
Start: 2023-09-01 | End: 2024-09-18

## 2023-09-01 ASSESSMENT — PAIN SCALES - GENERAL: PAINLEVEL: NO PAIN (0)

## 2023-09-01 NOTE — NURSING NOTE
Labs: Patient was given results of the laboratory testing obtained today. Patient was instructed to return for the next laboratory testing in 2 weeks. Patient demonstrated understanding of this information and agreed to call with further questions or concerns.     Med Reconcile: Reviewed and verified all current medications with the patient. The updated medication list was printed and given to the patient.    Return Appointment: Patient given instructions regarding scheduling next clinic visit. Patient demonstrated understanding of this information and agreed to call with further questions or concerns. [facility] in 1 month.    Medication Change: Patient was educated regarding prescribed medication change, including discussion of the indication, administration, side effects, and when to report to MD or RN. Patient demonstrated understanding of this information and agreed to call with further questions or concerns. Take amlodipine at night.    New Medication: Patient was educated regarding newly prescribed medication, including discussion of  the indication, administration, side effects, and when to report to MD or RN. Patient demonstrated understanding of this information and agreed to call with further questions or concerns. Start spironolactone 25 mg daily.    Paperwork for Jardiance patient assistance program filled out today - still need income information prior to faxing.    Patient stated she understood all health information given and agreed to call with further questions or concerns.    Sherry Saldana, RN

## 2023-09-01 NOTE — NURSING NOTE
Chief Complaint   Patient presents with    Follow Up     Return [facility] - 87 year old female with history of s/p TAVR, presumed diastolic heart failure presents for follow up        Vitals were taken, medications reconciled.    Hermila Buenrostro CMA  8:43 AM

## 2023-09-01 NOTE — LETTER
9/1/2023      RE: Kamryn Miller  [address]  [address]  [place] [address]       Dear Colleague,    Thank you for the opportunity to participate in the care of your patient, Kamryn Miller, at the [facility] [place] at [facility]. Please see a copy of my visit note below.      [facility] Cardiology   [facility]      HPI:   Ms. Miller is a 87 year old female with medical history pertinent for HTN, CAD (s/p PCI in 1996, PCI to LAD and RCA in 2003, PCI to LAD x3 in 8/2020), severe aortic valve stenosis (s/p 26mm ES Ultra TAVR 9/12/22), PAD, HLD, HFrEF (LVEF 35-40% per TTE 3/2023), and anemia who [place] [facility] [place].      With regards to her most recent medical conditions, Ms. Miller was last hospitalized 2/22-2/28/23 for shortness of breath, ADHF exacerbation, and pulmonary edema. She was diuresed with IV lasix. An echocardiogram was performed which showed reduced EF that is newly noted, 45 to 50%.  Ms. Miller was started on Entresto. She has been previously treated with beta blockers (coreg and metoprolol), amlodipine, and hydralazine, all of which she did not tolerate. Tells me that these medications caused her to have headaches and ear fullness.    Ms. Miller was recently seen in structural cardiology clinic. Echo at that time showed further reduction in her EF to 35-40%. Unclear etiology, possibly HTN. TAVR stable. Has had several [place] [facility] [place]resto has been up-titrated to max dose. Despite increased dose, has remained hypertensive. Her assisted living had been instructed to treat HTN with extra doses of lasix. She was on coreg in Feb 2023, but this was discontinued due to adverse side effects. Seen in the ED on 6/23 where she received her first dose of amlodipine 5 mg with mild improvement in BP. Instructed to take with tylenol as headaches are reported side effect to amlodipine.     Today Ms. Miller is seen  in clinic with her daughter. She reports feeling quite well. No acute concerns and denies chest tightness/discomfort, SOB, GREGORY, palpitations, lightheadedness, dizziness, syncope, or near syncope. Denies orthopnea, PND, peripheral edema. Since started amlodipine 2.5 mg, She has no headaches. She lives in assisted living. She is active and participates in exercise classes 5 days/week. Review of daily BP log shows BPs ranging 145s-180s/80s-100. Weights are stable 150.3 lb.      Cardiac Medications   - ASA 81 mg daily   - Lasix 30 mg daily   - Rosuvastatin 10 mg daily   - Entresto  mg BID  - Amlodipine 2.5 mg daily   - Imdur 60 mg       PAST MEDICAL HISTORY:  Past Medical History:   Diagnosis Date    Aortic valvar stenosis 7/2010    mild    Asthma     Bipolar disorder (H)     CAD (coronary artery disease)     s/p angioplasty    Celiac disease     Colon polyps     Colon polyps 2012    every 3 year colonoscopy     Depression     High cholesterol     HTN     Mitral insufficiency     Pulmonary HTN (H)     mild    Tremors 7/10    drug induced from antidepressants    Tricuspid insufficiency        FAMILY HISTORY:  Family History   Problem Relation Age of Onset    Asthma Mother     Cerebrovascular Disease Mother     Arthritis Mother     Depression Mother     Lipids Sister     Hypertension Sister     Heart Disease Sister     Lipids Sister     Hypertension Sister     Lipids Sister     Breast Cancer Sister        SOCIAL HISTORY:  Social History     Socioeconomic History    Marital status:      Spouse name: Adrien    Number of children: 2    Years of education: 16   Occupational History     Employer: RETIRED     Comment: Retired in 2002.    Tobacco Use    Smoking status: Never    Smokeless tobacco: Never   Vaping Use    Vaping status: Never Used     Passive vaping exposure: Yes   Substance and Sexual Activity    Alcohol use: No     Alcohol/week: 0.0 standard drinks of alcohol     Types: 1 Standard drinks or equivalent  per week    Drug use: No    Sexual activity: Not Currently     Partners: Male       CURRENT MEDICATIONS:  acetaminophen (TYLENOL) 500 MG tablet, Take 1 tablet (500 mg) by mouth daily Continue previously ordered PRN tylenol order as well  acetaminophen (TYLENOL) 500 MG tablet, Take 1 tablet (500 mg) by mouth every 6 hours as needed for pain  albuterol (PROAIR HFA/PROVENTIL HFA/VENTOLIN HFA) 108 (90 Base) MCG/ACT inhaler, Inhale 2 puffs into the lungs every 6 hours as needed for wheezing or shortness of breath / dyspnea  amLODIPine (NORVASC) 2.5 MG tablet, Take 1 tablet (2.5 mg) by mouth daily  amoxicillin (AMOXIL) 500 MG capsule,   apixaban ANTICOAGULANT (ELIQUIS) 5 MG tablet, Take 1 tablet (5 mg) by mouth 2 times daily  azelastine (ASTEPRO) 0.15 % nasal spray, USE 1 SPRAY IN EACH NOSTRIL ONCE A DAY  calcium citrate (CITRACAL) 950 (200 Ca) MG tablet, TAKE 1 TABLET BY MOUTH ONCE DAILY  cholecalciferol (VITAMIN D3) 1250 mcg (76894 units) capsule, TAKE 1 CAPSULE BY MOUTH ONCE WEEKLY  desvenlafaxine (PRISTIQ) 100 MG 24 hr tablet, Take 100 mg by mouth daily  empagliflozin (JARDIANCE) 10 MG TABS tablet, Take 1 tablet (10 mg) by mouth daily  fluticasone-salmeterol (ADVAIR DISKUS) 500-50 MCG/ACT inhaler, INHALE 1 PUFF BY MOUTH TWICE DAILY (Patient taking differently: 1 puff daily INHALE 1 PUFF BY MOUTH TWICE DAILY)  folic acid (FOLVITE) 400 MCG tablet, TAKE 1 TABLET BY MOUTH ONCE DAILY  furosemide (LASIX) 20 MG tablet, Take 1.5 tablets (30 mg) by mouth daily  iron polysaccharides (NIFEREX) 150 MG capsule, Take 1 capsule (150 mg) by mouth daily  isosorbide mononitrate (IMDUR) 30 MG 24 hr tablet, Take 2 tablets (60 mg) by mouth daily  lamoTRIgine (LAMICTAL) 25 MG tablet, Take 25 mg by mouth daily with 200 mg tablet for a total dose of 225 mg  LAMOTRIGINE 200 MG PO TABS, Take 200 mg by mouth daily with 25 mg tablet for a total dose of 225 mg  levothyroxine (SYNTHROID/LEVOTHROID) 50 MCG tablet, TAKE 1 TABLET BY MOUTH ONCE  "DAILY  liothyronine (CYTOMEL) 5 MCG tablet, Take 2 tablets (10 mcg) by mouth daily  memantine (NAMENDA) 10 MG tablet, Take 1 tablet (10 mg) by mouth daily  mirtazapine (REMERON) 7.5 MG tablet, Take 1 tablet (7.5 mg) by mouth At Bedtime  Multiple Vitamin (TAB-A-JENNIFER) TABS, Take 1 tablet by mouth daily  psyllium (METAMUCIL/KONSYL) 58.6 % powder, Take 1 tspn in liquid daily  REGULOID 28.3 % POWD, MIX 1 TEASPOON IN LIQUID AND DRINK BY MOUTH ONCE DAILY  rosuvastatin (CRESTOR) 10 MG tablet, Take 1 tablet (10 mg) by mouth daily  sacubitril-valsartan (ENTRESTO)  MG per tablet, Take 1 tablet by mouth 2 times daily  apixaban ANTICOAGULANT (ELIQUIS) 5 MG tablet, Take 1 tablet (5 mg) by mouth 2 times daily (Patient not taking: Reported on 9/1/2023)  aspirin (ASA) 81 MG EC tablet, Take 1 tablet (81 mg) by mouth daily (Patient not taking: Reported on 9/1/2023)    No current facility-administered medications on file prior to visit.      ROS:   Refer to HPI    EXAM:  BP (!) 152/86 (BP Location: Left arm, Patient Position: Sitting, Cuff Size: Adult Small)   Pulse 60   Ht 1.534 m (5' 0.39\")   Wt 70 kg (154 lb 4.8 oz)   SpO2 96%   BMI 29.74 kg/m     GENERAL: Appears comfortable, in no acute distress.   HEENT: Eye symmetrical, no discharge or icterus bilaterally. Mucous membranes moist and without lesions.  CV: RRR, +S1S2, soft systolic murmur, rub, or gallop. JVP not appriable.   RESPIRATORY: Respirations regular, even, and unlabored. Lungs CTA throughout.   GI: Soft and non distended with normoactive bowel sounds present in all quadrants. No tenderness, rebound, guarding. No hepatomegaly.   EXTREMITIES: No peripheral edema. 2+ bilateral pedal pulses.   NEUROLOGIC: Alert and oriented x 3. No focal deficits.   MUSCULOSKELETAL: No joint swelling or tenderness.   SKIN: No jaundice. No rashes or lesions.     Labs, reviewed with patient in clinic today:  CBC RESULTS:  Lab Results   Component Value Date    WBC 9.4 06/23/2023    " WBC 4.6 05/17/2021    RBC 3.92 06/23/2023    RBC 3.97 05/17/2021    HGB 12.3 06/23/2023    HGB 12.9 05/17/2021    HCT 39.4 06/23/2023    HCT 40.3 05/17/2021     (H) 06/23/2023     (H) 05/17/2021    MCH 31.4 06/23/2023    MCH 32.5 05/17/2021    MCHC 31.2 (L) 06/23/2023    MCHC 32.0 05/17/2021    RDW 15.8 (H) 06/23/2023    RDW 12.7 05/17/2021     06/23/2023     05/17/2021       CMP RESULTS:  Lab Results   Component Value Date     09/01/2023     06/04/2021    POTASSIUM 4.5 09/01/2023    POTASSIUM 4.6 10/05/2022    POTASSIUM 3.4 06/04/2021    CHLORIDE 104 09/01/2023    CHLORIDE 103 10/05/2022    CHLORIDE 106 06/04/2021    CO2 30 (H) 09/01/2023    CO2 27 10/05/2022    CO2 30 06/04/2021    ANIONGAP 8 09/01/2023    ANIONGAP 7 10/05/2022    ANIONGAP 6 06/04/2021     (H) 09/01/2023    GLC 85 01/31/2023    GLC 98 10/05/2022     (H) 06/04/2021    BUN 26.7 (H) 09/01/2023    BUN 39 (H) 10/05/2022    BUN 27 06/04/2021    CR 1.25 (H) 09/01/2023    CR 1.50 (H) 06/04/2021    GFRESTIMATED 42 (L) 09/01/2023    GFRESTIMATED 32 (L) 06/04/2021    GFRESTBLACK 37 (L) 06/04/2021    PRINCE 9.6 09/01/2023    PRINCE 9.4 06/04/2021    BILITOTAL 0.3 02/26/2023    BILITOTAL 0.3 05/17/2021    ALBUMIN 4.1 04/03/2023    ALBUMIN 3.6 09/27/2022    ALBUMIN 3.4 05/17/2021    ALKPHOS 76 02/26/2023    ALKPHOS 57 05/17/2021    ALT 38 (H) 02/26/2023    ALT 38 05/17/2021    AST 36 (H) 02/26/2023    AST 27 05/17/2021        INR RESULTS:  Lab Results   Component Value Date    INR 1.12 02/22/2023    INR 1.05 11/03/2020       Lab Results   Component Value Date    MAG 2.5 (H) 02/27/2023    MAG 2.4 (H) 10/28/2019     Lab Results   Component Value Date    NTBNPI 11,774 (H) 02/22/2023     Lab Results   Component Value Date    NTBNP 3,624 (H) 06/28/2023    NTBNP 462 (H) 05/17/2021       Cardiac Diagnostics:    3/13/2023 Echo   Interpretation Summary  The 26 mm ES3 TAVR is well-seated. Leaflet opening is not clearly  visualized.  There is trace valvular without paravalvular regurgitation. The Vmax is 2.4  m/s, the mean gradient is 14 mmHg, the dimensionless index is 0.80, and the  acceleration time is 80 ms.  Left ventricular function is decreased. The ejection fraction is 35-40%  (moderately reduced). Moderate diffuse hypokinesis is present.  Global right ventricular function is normal. The right ventricle is normal  size.  This study was compared with the study from 02/23/2023. There appears to have  been a decline in the global LV function but the endocardial definition is  poor on this study. The TAVR function is unchanged.    2/23/2023 Echo  Interpretation Summary  Status post 26 mm Arndt Janis Ultra valve TAVR on 9/12/2022. MG is 18 mmHg,  PV is 2.8 m/s. Trace paravalvular AI.  Left ventricular function is decreased. The ejection fraction is 45-50%  (mildly reduced).  Global right ventricular function is normal.  Severe mitral annular calcification is present.  The inferior vena cava was normal in size with preserved respiratory  variability.     This study was compared with the study from 1/31/2023 .LVEF mildly declined.  Mean gradient across TAVR mildly higher.    1/31/2023 Echo  Interpretation Summary  Left ventricular size, wall motion and function are normal. The ejection  fraction is 55-60%.  Right ventricular size and function are normal.  Status post 26 mm Arndt Janis Ultra valve TAVR on 9/12/2022. The valve is  well seated. MG 16 mmHg; PV 2.5 m/s; no paravalvular AI.  No pericardial effusion is present.     This study was compared with the study from 12/1/2022. No significant changes  Noted.    10/2022 Coronary Angiogram   Two vessel CAD with prior PCI to the RCA and LAD, otherwise mild non obstructive CAD elsewhere.  Patent stents in the RCA and LAD with mild to moderate in stent restenosis of the proximal LAD stents (progressed since last angiogram from 2020).  Proximal LAD ISR hemodynamically not  significant by dPR at 0.91.       Assessment and Plan:   Ms. Miller is a 87 year old female with medical history pertinent for HTN, CAD (s/p PCI in 1996, PCI to LAD and RCA in 2003, PCI to LAD x3 in 8/2020), severe aortic valve stenosis (s/p 26mm ES Ultra TAVR 9/12/22), PAD, HLD, HFpEF (LVEF 55-60% per TTE 12/2022), and anemia who presents to [facility] for routine follow up.     Appearing and feeling well. Euvolemic by exam. Remains hypertensive with SBP > 145. Review of labs are stable with Cr 1.34 and lytes wnl. She is on max dose entresto. In previous attempts she had   reported headaches with 5 mg of Amlodipine.   Dr. Zuniga decreased the dose to 2.5 mg due to the headaches and now she has no headaches. Imdur 30 mg daily. By price check, both farixga and jardiance are cost prohibitive. Janice MCKENZIE started patient assistance paperwork for jardiance.       # Acute on chronic systolic heart failure/HFrEF likely in the setting of uncontrolled HTN (EF 35-40% by TTE 3/2023)  Stage C. NYHA Class IIIB.    Fluid status: euvolemic, lasix 20 mg daily   ACEi/ARB/ARNi/afterload reduction: entresto  mg BID, Imdur 60 mg daily. Intolerance to hydralazine.   BB: intolerance to BB, coreg stopped 2/2023, given extensive CAD could attempt low dose BB?   Aldosterone antagonist: renal function - GFR 42. Start Spironolactone 25 mg -    SGLT2i: start jardiance pending patient assistance enrollment   SCD prophylaxis: does not meet criteria for implant  NSAID use: contraindicated  Ischemia evaluation: last cor angio 10/2022; 2 vessel CAD RCA/LAD, mild to mod ISR of proxLAD, hemodynamically stable    # Coronary artery disease w/stable angina  # HTN  # HLD  s/p PCI in 1996, PCI to LAD and RCA in 2003, PCI to LAD x3 in 8/2020.    - Continue ASA 81 mg daily lifelong  - Continue high intensity statin  - Multiple intolerances to BB, hydralazine, amlodipine--> re-started on amlodipine 2.5 mg daily  - Enstresto as above for BP control   -  Continue exercise and heart healthy diet      # PAD   - denies claudication, normal pulse exam on left  - recent BALDO's mildly abnormal on left  - Continue ASA 81 mg lifelong  - vascular consult should she develop recurrent claudication     # Severe aortic stenosis  s/p 26mm ES Ultra TAVR 9/12/22.  Last TTE 12/2022 showed well-seated valve, with trace paravalvular AI, MG 11mmHg, and peak velocity 2.2m/sec.  Follows with the structural team, plan to repeat TTE 9/2023.  Needs SBE ppx.     # Anemia  Management per PCP, recently given IV iron.  Discussed referral to Anemia Clinic, which she will discuss with her PCP next week.  She is taking oral iron.    # CKD 3  Baseline Cr 1.1-1.4,   - Cr 1.2, improved from the last visit      Follow up:  - Not taking Jardiance no contact from patient assistance.  - Take Amlodipine 2.5 mg - at bedtime  - Spironolactone 25 mg daily   - BMP in 2 weeks   - [facility] 1 months     Brandon LOPEZ NP-C, CHFN  Advanced Heart Failure Nurse Practitioner  [facility]

## 2023-09-01 NOTE — PROGRESS NOTES
[facility] Cardiology   [facility] Clinic      HPI:   Ms. Miller is a 87 year old female with medical history pertinent for HTN, CAD (s/p PCI in 1996, PCI to LAD and RCA in 2003, PCI to LAD x3 in 8/2020), severe aortic valve stenosis (s/p 26mm ES Ultra TAVR 9/12/22), PAD, HLD, HFrEF (LVEF 35-40% per TTE 3/2023), and anemia who [place] [facility] [place] follow up.      With regards to her most recent medical conditions, Ms. Miller was last hospitalized 2/22-2/28/23 for shortness of breath, ADHF exacerbation, and pulmonary edema. She was diuresed with IV lasix. An echocardiogram was performed which showed reduced EF that is newly noted, 45 to 50%.  Ms. Miller was started on Entresto. She has been previously treated with beta blockers (coreg and metoprolol), amlodipine, and hydralazine, all of which she did not tolerate. Tells me that these medications caused her to have headaches and ear fullness.    Ms. Miller was recently seen in structural cardiology clinic. Echo at that time showed further reduction in her EF to 35-40%. Unclear etiology, possibly HTN. TAVR stable. Has had several visits in [facility] where entresto has been up-titrated to max dose. Despite increased dose, has remained hypertensive. Her assisted living had been instructed to treat HTN with extra doses of lasix. She was on coreg in Feb 2023, but this was discontinued due to adverse side effects. Seen in the ED on 6/23 where she received her first dose of amlodipine 5 mg with mild improvement in BP. Instructed to take with tylenol as headaches are reported side effect to amlodipine.     Today Ms. Miller is seen in clinic with her daughter. She reports feeling quite well. No acute concerns and denies chest tightness/discomfort, SOB, GREGORY, palpitations, lightheadedness, dizziness, syncope, or near syncope. Denies orthopnea, PND, peripheral edema. Since started amlodipine 2.5 mg, She has no headaches. She lives in assisted living. She is active and participates in  exercise classes 5 days/week. Review of daily BP log shows BPs ranging 145s-180s/80s-100. Weights are stable 150.3 lb.      Cardiac Medications   - ASA 81 mg daily   - Lasix 30 mg daily   - Rosuvastatin 10 mg daily   - Entresto  mg BID  - Amlodipine 2.5 mg daily   - Imdur 60 mg       PAST MEDICAL HISTORY:  Past Medical History:   Diagnosis Date    Aortic valvar stenosis 7/2010    mild    Asthma     Bipolar disorder (H)     CAD (coronary artery disease)     s/p angioplasty    Celiac disease     Colon polyps     Colon polyps 2012    every 3 year colonoscopy     Depression     High cholesterol     HTN     Mitral insufficiency     Pulmonary HTN (H)     mild    Tremors 7/10    drug induced from antidepressants    Tricuspid insufficiency        FAMILY HISTORY:  Family History   Problem Relation Age of Onset    Asthma Mother     Cerebrovascular Disease Mother     Arthritis Mother     Depression Mother     Lipids Sister     Hypertension Sister     Heart Disease Sister     Lipids Sister     Hypertension Sister     Lipids Sister     Breast Cancer Sister        SOCIAL HISTORY:  Social History     Socioeconomic History    Marital status:      Spouse name: Adrien    Number of children: 2    Years of education: 16   Occupational History     Employer: RETIRED     Comment: Retired in 2002.    Tobacco Use    Smoking status: Never    Smokeless tobacco: Never   Vaping Use    Vaping status: Never Used     Passive vaping exposure: Yes   Substance and Sexual Activity    Alcohol use: No     Alcohol/week: 0.0 standard drinks of alcohol     Types: 1 Standard drinks or equivalent per week    Drug use: No    Sexual activity: Not Currently     Partners: Male       CURRENT MEDICATIONS:  acetaminophen (TYLENOL) 500 MG tablet, Take 1 tablet (500 mg) by mouth daily Continue previously ordered PRN tylenol order as well  acetaminophen (TYLENOL) 500 MG tablet, Take 1 tablet (500 mg) by mouth every 6 hours as needed for pain  albuterol  (PROAIR HFA/PROVENTIL HFA/VENTOLIN HFA) 108 (90 Base) MCG/ACT inhaler, Inhale 2 puffs into the lungs every 6 hours as needed for wheezing or shortness of breath / dyspnea  amLODIPine (NORVASC) 2.5 MG tablet, Take 1 tablet (2.5 mg) by mouth daily  amoxicillin (AMOXIL) 500 MG capsule,   apixaban ANTICOAGULANT (ELIQUIS) 5 MG tablet, Take 1 tablet (5 mg) by mouth 2 times daily  azelastine (ASTEPRO) 0.15 % nasal spray, USE 1 SPRAY IN EACH NOSTRIL ONCE A DAY  calcium citrate (CITRACAL) 950 (200 Ca) MG tablet, TAKE 1 TABLET BY MOUTH ONCE DAILY  cholecalciferol (VITAMIN D3) 1250 mcg (24911 units) capsule, TAKE 1 CAPSULE BY MOUTH ONCE WEEKLY  desvenlafaxine (PRISTIQ) 100 MG 24 hr tablet, Take 100 mg by mouth daily  empagliflozin (JARDIANCE) 10 MG TABS tablet, Take 1 tablet (10 mg) by mouth daily  fluticasone-salmeterol (ADVAIR DISKUS) 500-50 MCG/ACT inhaler, INHALE 1 PUFF BY MOUTH TWICE DAILY (Patient taking differently: 1 puff daily INHALE 1 PUFF BY MOUTH TWICE DAILY)  folic acid (FOLVITE) 400 MCG tablet, TAKE 1 TABLET BY MOUTH ONCE DAILY  furosemide (LASIX) 20 MG tablet, Take 1.5 tablets (30 mg) by mouth daily  iron polysaccharides (NIFEREX) 150 MG capsule, Take 1 capsule (150 mg) by mouth daily  isosorbide mononitrate (IMDUR) 30 MG 24 hr tablet, Take 2 tablets (60 mg) by mouth daily  lamoTRIgine (LAMICTAL) 25 MG tablet, Take 25 mg by mouth daily with 200 mg tablet for a total dose of 225 mg  LAMOTRIGINE 200 MG PO TABS, Take 200 mg by mouth daily with 25 mg tablet for a total dose of 225 mg  levothyroxine (SYNTHROID/LEVOTHROID) 50 MCG tablet, TAKE 1 TABLET BY MOUTH ONCE DAILY  liothyronine (CYTOMEL) 5 MCG tablet, Take 2 tablets (10 mcg) by mouth daily  memantine (NAMENDA) 10 MG tablet, Take 1 tablet (10 mg) by mouth daily  mirtazapine (REMERON) 7.5 MG tablet, Take 1 tablet (7.5 mg) by mouth At Bedtime  Multiple Vitamin (TAB-A-JENNIFER) TABS, Take 1 tablet by mouth daily  psyllium (METAMUCIL/KONSYL) 58.6 % powder, Take 1 tspn  "in liquid daily  REGULOID 28.3 % POWD, MIX 1 TEASPOON IN LIQUID AND DRINK BY MOUTH ONCE DAILY  rosuvastatin (CRESTOR) 10 MG tablet, Take 1 tablet (10 mg) by mouth daily  sacubitril-valsartan (ENTRESTO)  MG per tablet, Take 1 tablet by mouth 2 times daily  apixaban ANTICOAGULANT (ELIQUIS) 5 MG tablet, Take 1 tablet (5 mg) by mouth 2 times daily (Patient not taking: Reported on 9/1/2023)  aspirin (ASA) 81 MG EC tablet, Take 1 tablet (81 mg) by mouth daily (Patient not taking: Reported on 9/1/2023)    No current facility-administered medications on file prior to visit.      ROS:   Refer to HPI    EXAM:  BP (!) 152/86 (BP Location: Left arm, Patient Position: Sitting, Cuff Size: Adult Small)   Pulse 60   Ht 1.534 m (5' 0.39\")   Wt 70 kg (154 lb 4.8 oz)   SpO2 96%   BMI 29.74 kg/m     GENERAL: Appears comfortable, in no acute distress.   HEENT: Eye symmetrical, no discharge or icterus bilaterally. Mucous membranes moist and without lesions.  CV: RRR, +S1S2, soft systolic murmur, rub, or gallop. JVP not appriable.   RESPIRATORY: Respirations regular, even, and unlabored. Lungs CTA throughout.   GI: Soft and non distended with normoactive bowel sounds present in all quadrants. No tenderness, rebound, guarding. No hepatomegaly.   EXTREMITIES: No peripheral edema. 2+ bilateral pedal pulses.   NEUROLOGIC: Alert and oriented x 3. No focal deficits.   MUSCULOSKELETAL: No joint swelling or tenderness.   SKIN: No jaundice. No rashes or lesions.     Labs, reviewed with patient in clinic today:  CBC RESULTS:  Lab Results   Component Value Date    WBC 9.4 06/23/2023    WBC 4.6 05/17/2021    RBC 3.92 06/23/2023    RBC 3.97 05/17/2021    HGB 12.3 06/23/2023    HGB 12.9 05/17/2021    HCT 39.4 06/23/2023    HCT 40.3 05/17/2021     (H) 06/23/2023     (H) 05/17/2021    MCH 31.4 06/23/2023    MCH 32.5 05/17/2021    MCHC 31.2 (L) 06/23/2023    MCHC 32.0 05/17/2021    RDW 15.8 (H) 06/23/2023    RDW 12.7 05/17/2021    "  06/23/2023     05/17/2021       CMP RESULTS:  Lab Results   Component Value Date     09/01/2023     06/04/2021    POTASSIUM 4.5 09/01/2023    POTASSIUM 4.6 10/05/2022    POTASSIUM 3.4 06/04/2021    CHLORIDE 104 09/01/2023    CHLORIDE 103 10/05/2022    CHLORIDE 106 06/04/2021    CO2 30 (H) 09/01/2023    CO2 27 10/05/2022    CO2 30 06/04/2021    ANIONGAP 8 09/01/2023    ANIONGAP 7 10/05/2022    ANIONGAP 6 06/04/2021     (H) 09/01/2023    GLC 85 01/31/2023    GLC 98 10/05/2022     (H) 06/04/2021    BUN 26.7 (H) 09/01/2023    BUN 39 (H) 10/05/2022    BUN 27 06/04/2021    CR 1.25 (H) 09/01/2023    CR 1.50 (H) 06/04/2021    GFRESTIMATED 42 (L) 09/01/2023    GFRESTIMATED 32 (L) 06/04/2021    GFRESTBLACK 37 (L) 06/04/2021    PRINCE 9.6 09/01/2023    PRINCE 9.4 06/04/2021    BILITOTAL 0.3 02/26/2023    BILITOTAL 0.3 05/17/2021    ALBUMIN 4.1 04/03/2023    ALBUMIN 3.6 09/27/2022    ALBUMIN 3.4 05/17/2021    ALKPHOS 76 02/26/2023    ALKPHOS 57 05/17/2021    ALT 38 (H) 02/26/2023    ALT 38 05/17/2021    AST 36 (H) 02/26/2023    AST 27 05/17/2021        INR RESULTS:  Lab Results   Component Value Date    INR 1.12 02/22/2023    INR 1.05 11/03/2020       Lab Results   Component Value Date    MAG 2.5 (H) 02/27/2023    MAG 2.4 (H) 10/28/2019     Lab Results   Component Value Date    NTBNPI 11,774 (H) 02/22/2023     Lab Results   Component Value Date    NTBNP 3,624 (H) 06/28/2023    NTBNP 462 (H) 05/17/2021       Cardiac Diagnostics:    3/13/2023 Echo   Interpretation Summary  The 26 mm ES3 TAVR is well-seated. Leaflet opening is not clearly visualized.  There is trace valvular without paravalvular regurgitation. The Vmax is 2.4  m/s, the mean gradient is 14 mmHg, the dimensionless index is 0.80, and the  acceleration time is 80 ms.  Left ventricular function is decreased. The ejection fraction is 35-40%  (moderately reduced). Moderate diffuse hypokinesis is present.  Global right ventricular  function is normal. The right ventricle is normal  size.  This study was compared with the study from 02/23/2023. There appears to have  been a decline in the global LV function but the endocardial definition is  poor on this study. The TAVR function is unchanged.    2/23/2023 Echo  Interpretation Summary  Status post 26 mm Arndt Janis Ultra valve TAVR on 9/12/2022. MG is 18 mmHg,  PV is 2.8 m/s. Trace paravalvular AI.  Left ventricular function is decreased. The ejection fraction is 45-50%  (mildly reduced).  Global right ventricular function is normal.  Severe mitral annular calcification is present.  The inferior vena cava was normal in size with preserved respiratory  variability.     This study was compared with the study from 1/31/2023 .LVEF mildly declined.  Mean gradient across TAVR mildly higher.    1/31/2023 Echo  Interpretation Summary  Left ventricular size, wall motion and function are normal. The ejection  fraction is 55-60%.  Right ventricular size and function are normal.  Status post 26 mm Arndt Janis Ultra valve TAVR on 9/12/2022. The valve is  well seated. MG 16 mmHg; PV 2.5 m/s; no paravalvular AI.  No pericardial effusion is present.     This study was compared with the study from 12/1/2022. No significant changes  Noted.    10/2022 Coronary Angiogram   Two vessel CAD with prior PCI to the RCA and LAD, otherwise mild non obstructive CAD elsewhere.  Patent stents in the RCA and LAD with mild to moderate in stent restenosis of the proximal LAD stents (progressed since last angiogram from 2020).  Proximal LAD ISR hemodynamically not significant by dPR at 0.91.       Assessment and Plan:   Ms. Miller is a 87 year old female with medical history pertinent for HTN, CAD (s/p PCI in 1996, PCI to LAD and RCA in 2003, PCI to LAD x3 in 8/2020), severe aortic valve stenosis (s/p 26mm ES Ultra TAVR 9/12/22), PAD, HLD, HFpEF (LVEF 55-60% per TTE 12/2022), and anemia who presents to [facility] for routine  follow up.     Appearing and feeling well. Euvolemic by exam. Remains hypertensive with SBP > 145. Review of labs are stable with Cr 1.34 and lytes wnl. She is on max dose entresto. In previous attempts she had   reported headaches with 5 mg of Amlodipine.   Dr. Zuniga decreased the dose to 2.5 mg due to the headaches and now she has no headaches. Imdur 30 mg daily. By price check, both farixga and jardiance are cost prohibitive. Janice MCKENZIE started patient assistance paperwork for jardiance.       # Acute on chronic systolic heart failure/HFrEF likely in the setting of uncontrolled HTN (EF 35-40% by TTE 3/2023)  Stage C. NYHA Class IIIB.    Fluid status: euvolemic, lasix 20 mg daily   ACEi/ARB/ARNi/afterload reduction: entresto  mg BID, Imdur 60 mg daily. Intolerance to hydralazine.   BB: intolerance to BB, coreg stopped 2/2023, given extensive CAD could attempt low dose BB?   Aldosterone antagonist: renal function - GFR 42. Start Spironolactone 25 mg -    SGLT2i: start jardiance pending patient assistance enrollment   SCD prophylaxis: does not meet criteria for implant  NSAID use: contraindicated  Ischemia evaluation: last cor angio 10/2022; 2 vessel CAD RCA/LAD, mild to mod ISR of proxLAD, hemodynamically stable    # Coronary artery disease w/stable angina  # HTN  # HLD  s/p PCI in 1996, PCI to LAD and RCA in 2003, PCI to LAD x3 in 8/2020.    - Continue ASA 81 mg daily lifelong  - Continue high intensity statin  - Multiple intolerances to BB, hydralazine, amlodipine--> re-started on amlodipine 2.5 mg daily  - Enstresto as above for BP control   - Continue exercise and heart healthy diet      # PAD   - denies claudication, normal pulse exam on left  - recent BALDO's mildly abnormal on left  - Continue ASA 81 mg lifelong  - vascular consult should she develop recurrent claudication     # Severe aortic stenosis  s/p 26mm ES Ultra TAVR 9/12/22.  Last TTE 12/2022 showed well-seated valve, with trace paravalvular AI,  MG 11mmHg, and peak velocity 2.2m/sec.  Follows with the structural team, plan to repeat TTE 9/2023.  Needs SBE ppx.     # Anemia  Management per PCP, recently given IV iron.  Discussed referral to Anemia Clinic, which she will discuss with her PCP next week.  She is taking oral iron.    # CKD 3  Baseline Cr 1.1-1.4,   - Cr 1.2, improved from the last visit      Follow up:  - Not taking Jardiance no contact from patient assistance.  - Take Amlodipine 2.5 mg - at bedtime  - Spironolactone 25 mg daily   - BMP in 2 weeks   - CORE 1 months     Brandon LOPEZ NP-C, CHFN  Advanced Heart Failure Nurse Practitioner  [facility]

## 2023-09-01 NOTE — PATIENT INSTRUCTIONS
Take your medicines every day, as directed    Changes made today:  Take amlodipine 2.5 mg at bedtime  Spironolactone 25 mg daily  DONNA Camp (Jardiance Patient Assistance) [phone] - paperwork filled out in clinic today. Please find out income information and let me know.   Monitor Your Weight and Symptoms    Contact us if you:    Gain 2 pounds in one day or 5 pounds in one week  Feel more short of breath  Notice more leg swelling  Feel lightheadeded   Change your lifestyle    Limit Salt or Sodium:  2000 mg  Limit Fluids:  2000 mL or approximately 64 ounces  Eat a Heart Healthy Diet  Low in saturated fats  Stay Active:  Aim to move at least 150 minutes every  week         To Contact us    During Business Hours:  [phone], option # 1      After hours, weekends or holidays:   [phone], Option #4  Ask to speak to the On-Call Cardiologist. Inform them you are a CORE/heart failure patient at the [facility].     Use [employer] allows you to communicate directly with your heart team through secure messaging.  [facility] can be accessed any time on your phone, computer, or tablet.  If you need assistance, we'd be happy to help!         Keep your Heart Appointments:    CORE in 1 month     Please consider attending our virtual support group which is held monthly. Please reach out to Gomez at [phone] for more information if you are interested in attending.       2023 dates:    Monday, September 11th, 1-2pm  Monday, October 2nd, 1-2pm  Monday, November 6th, 1-2pm  Monday, December 4th, 1-2pm

## 2023-09-18 ENCOUNTER — OFFICE VISIT (OUTPATIENT)
Dept: NEPHROLOGY | Facility: CLINIC | Age: 88
End: 2023-09-18
Payer: MEDICARE

## 2023-09-18 VITALS
SYSTOLIC BLOOD PRESSURE: 126 MMHG | DIASTOLIC BLOOD PRESSURE: 70 MMHG | BODY MASS INDEX: 28.72 KG/M2 | WEIGHT: 149 LBS | HEART RATE: 70 BPM

## 2023-09-18 DIAGNOSIS — N18.32 STAGE 3B CHRONIC KIDNEY DISEASE (H): Primary | ICD-10-CM

## 2023-09-18 DIAGNOSIS — I50.22 CHRONIC SYSTOLIC HEART FAILURE (H): ICD-10-CM

## 2023-09-18 PROCEDURE — 80069 RENAL FUNCTION PANEL: CPT | Performed by: INTERNAL MEDICINE

## 2023-09-18 PROCEDURE — 99214 OFFICE O/P EST MOD 30 MIN: CPT | Performed by: INTERNAL MEDICINE

## 2023-09-18 PROCEDURE — 36415 COLL VENOUS BLD VENIPUNCTURE: CPT | Performed by: INTERNAL MEDICINE

## 2023-09-18 PROCEDURE — 83970 ASSAY OF PARATHORMONE: CPT | Performed by: INTERNAL MEDICINE

## 2023-09-18 NOTE — LETTER
9/18/2023       RE: Kamryn Miller  [address]  [address]  [place] [address]     Dear Colleague,    Thank you for referring your patient, Kamryn Miller, to the [facility] at [facility]. Please see a copy of my visit note below.    Assessment and Plan:  87 year old female with history of CKD stage 3, Scr 1-1.5 since 2008, hypertension who presents for followup of CKD. She has had hypercalcemia that has stabilized. She was first seen October 2019, and hydrochlorothiazide was stopped. Her Scr has been 1.2-1.5 in recent years. She now has HFrEF and HFpEF, hospitalized in 2/2023 for hypertensive urgency and in December 2022 for anemia with hgb down to 7  # CKD stage 3b, Scr 1-1.5, recent SCr 1.3 eGFR 39ml/min :  Her scr has been 1.1-1.5 in the last few years, with baseline many years ago at 1 or less.  She has mild proteinuria. She denies systemic signs   - she has HFpeF and this along with her CAD and hypertension is the cause of her CKD. She now has HFrEF per last echo, EF 35-40%    -low grade proteinuria, off ACE/ARB-now is on entresto - valsartan.  - discussed hydration, avoiding nephrotoxic medications, holding diuretic on sick  Days, and blood pressure control  - she is seeing [facility] which is good  - started on spironolactone 25mg, and her BP is better than ever in her recollection. She denies dizziness    # Hypertension: much better controlled since starting spironolactone, currently on carvedilol(had bradycardia reportedly), fursoemide and entresto. Her hydrochlorothiazide was stopped due to hypercalcemia. The other medications were stopped in the last year by other providers.  .  She feels well overall.     # Not anemic - but was anemic in hospital , down to 7 with iron deficiency (iron sat 5%)  in December 2022, started on iron supplement, lower to every other day given normal hgb now.  - ? No cause found for iron  deficiency, decided against scopes in the hospital.    # Electrolytes:    stable  # Mineral Bone Disorder:   - Calcium; level is:  Normal range, PTH normal  - vitamin D high normal, no change made.    30 minutes spent on day of service in direct patient care and in coordination of care and reviewing records.    Assessment and plan was discussed with patient and she voiced her understanding and agreement.    Reason for Visit:  Kamryn Miller is a 87 year old female with CKD from hypertension, who presents for followup of CKD    HPI:  She is a pleasant female with history of mild rhinitis, asthma, and hypertension who presents for followup of CKD and hypercalcemia. . Her serum creatinine has been 1.2-1.5 in recent years, and was up to 2 in 2019, with eGFR 21ml/min.  She had hypercalcemia, which improved with stopping hydrochlorothiazide. She has history of CAD and HFpEF with significant aortic stenosis and follows with Dr Zuniga in cardiology. She had TAVR . She was hospitalized in 2023 with hypertensive urgency, shortness of breath, anemia.  She was treated with mild diuresis and discharged.  She had hypertensive urgency in jn 2022, She also had AV block which affected her BP regimen. She also had inpatient psych stay for depression after her  .  Her creatinine was 1.1-1.2 on discharge (with stopping her ARB).  Her recent BP is 140-160  She is mobilizing fairly well without cane, uses walker for longer distances.  She had significant anemia in 2022 without clear cause, hgb down to 7 was transfused and now on iron supplement. Iron sat was 5%  She denies significant dyspnea on exertion or swelling. She is in assisted living and they manage medications and provide meals.  She tries to eat healthy.  She avoids using NSAIDs.     Renal History:   HTN    ROS:   A comprehensive review of systems was obtained and negative, except as noted in the HPI or PMH.    Active Medical  Problems:  Patient Active Problem List   Diagnosis    Hyperlipidemia LDL goal <70    Mild major depression (H)    Pulmonary hypertension (H)    Seasonal allergic rhinitis    Mitral insufficiency    Tricuspid insufficiency    Renal insufficiency    Tremors    Family history of diabetes mellitus    Celiac disease    History of colonic polyps    Benign essential hypertension    Hypothyroidism, unspecified type    CKD (chronic kidney disease) stage 3, GFR 30-59 ml/min (H)    Anemia    Disorder of kidney and ureter    Generalized anxiety disorder    Osteopenia    CAD S/P percutaneous coronary angioplasty    NYHA class 3 heart failure with preserved ejection fraction (H)    Moderate persistent asthma without complication    Hearing disorder, unspecified laterality    Impairment of balance    Bipolar 1 disorder, depressed, severe (H)    Essential hypertension    Stented coronary artery    Generalized weakness    Left leg claudication (H)    S/P TAVR (transcatheter aortic valve replacement)    Syncope and collapse    Anemia, unspecified type    Weight gain    Elevated brain natriuretic peptide (BNP) level    Fall     PMH:   Medical record was reviewed and PMH was discussed with patient and noted below.  Past Medical History:   Diagnosis Date    Aortic valvar stenosis 7/2010    mild    Asthma     Bipolar disorder (H)     CAD (coronary artery disease)     s/p angioplasty    Celiac disease     Colon polyps     Colon polyps 2012    every 3 year colonoscopy     Depression     High cholesterol     HTN     Mitral insufficiency     Pulmonary HTN (H)     mild    Tremors 7/10    drug induced from antidepressants    Tricuspid insufficiency      PSH:   Past Surgical History:   Procedure Laterality Date    ANGIOGRAM  6/26/2009    ANGIOPLASTY  9/96    for angina    ANGIOPLASTY  8/03 - 9/03    X -2 - with stenst in coronary car.    APPENDECTOMY      BREAST BIOPSY, RT/LT      Breat Biopsy RT/LT, benign    BUNIONECTOMY  11/8/2011     Procedure:BUNIONECTOMY; Left donna bunionectomy; Surgeon:FREDY LITTLEJOHN; Location:[facility] OR    BUNIONECTOMY RT/LT  5/2007    right bunion and right 2nd hammertoe    CATARACT IOL, RT/LT  6/12 and 7/12    bilateral    COLONOSCOPY  2007, 2012    every 3 years for polyps    CV CORONARY ANGIOGRAM N/A 8/21/2020    Procedure: CV CORONARY ANGIOGRAM;  Surgeon: Gianluca Toscano MD;  Location: [facility]    CV CORONARY ANGIOGRAM N/A 10/25/2022    Procedure: Coronary Angiogram;  Surgeon: Carter Douglass MD;  Location: [facility]    CV INSTANTANEOUS WAVE-FREE RATIO N/A 10/25/2022    Procedure: Instantaneous Wave-Free Ratio;  Surgeon: Carter Douglass MD;  Location: [facility]    CV LEFT HEART CATH N/A 8/21/2020    Procedure: CV LEFT HEART CATH;  Surgeon: Gianluca Toscano MD;  Location: [facility]    CV LEFT HEART CATH N/A 11/3/2020    Procedure: CV LEFT HEART CATH;  Surgeon: Tom Burrows MD;  Location: [facility]    CV LOWER EXTREMITY ANGIOGRAM BILATERAL N/A 11/3/2020    Procedure: CV ANGIOGRAM LOWER EXTREMITY BILATERAL;  Surgeon: Tom Burrows MD;  Location: [facility]    CV PCI STENT DRUG ELUTING N/A 8/21/2020    Procedure: Percutaneous Coronary Intervention Stent Drug Eluting;  Surgeon: Gianluca Toscano MD;  Location: [facility]    CV RIGHT HEART CATH MEASUREMENTS RECORDED N/A 8/21/2020    Procedure: CV RIGHT HEART CATH;  Surgeon: Gianluca Toscano MD;  Location: [facility]    CV RIGHT HEART CATH MEASUREMENTS RECORDED N/A 11/3/2020    Procedure: CV RIGHT HEART CATH;  Surgeon: Tom Burrows MD;  Location: [facility]    CV TRANSCATHETER AORTIC VALVE REPLACEMENT N/A 9/12/2022    Procedure: Transfemoral Transcatheter Aortic Valve Replacement with possible open heart bypass and or balloon pump placement and any indicated  "procedure;  Surgeon: Tom Burrows MD;  Location:  [facility]    HEART CATH FEMORAL CANNULIZATION WITH OPEN STANDBY REPAIR AORTIC VALVE N/A 9/12/2022    Procedure: OR TRANSCATHETER AORTIC VALVE REPLACEMENT, OPEN FEMORAL ARTERY APPROACH;  Surgeon: Arthur Markham MD;  Location:  [facility]    JOINT REPLACEMENT, HIP RT/LT  4/2008    right hip replaced    JOINT REPLACEMENT, HIP RT/LT  4/2009    left hip replaced    REPAIR HAMMER TOE  11/8/2011    Procedure:REPAIR HAMMER TOE; left 2nd hammertoe repair; Surgeon:FREDY LITTLEJOHN; Location: OR    SURGICAL HISTORY OF -   11/11    left bunion and 2nd hammertoe repair    TUBAL LIGATION      ZZC ANESTH,BLEPHAROPLASTY      (R) for drooping eyelid    ZZC APPENDECTOMY         Family Hx:   Family History   Problem Relation Age of Onset    Asthma Mother     Cerebrovascular Disease Mother     Arthritis Mother     Depression Mother     Lipids Sister     Hypertension Sister     Heart Disease Sister     Lipids Sister     Hypertension Sister     Lipids Sister     Breast Cancer Sister      Personal Hx:   Social History     Tobacco Use    Smoking status: Never    Smokeless tobacco: Never   Substance Use Topics    Alcohol use: No     Alcohol/week: 0.0 standard drinks of alcohol     Types: 1 Standard drinks or equivalent per week       Allergies:  Allergies   Allergen Reactions    Ace Inhibitors Cough    Amlodipine      Headache and plugged ears    Carvedilol      \"plugged ears and a headache\"    Diclofenac Other (See Comments)     Balance problems    Gluten Meal Diarrhea    Hydralazine-Hctz      headache       Medications:  Current Outpatient Medications   Medication Sig    acetaminophen (TYLENOL) 500 MG tablet Take 1 tablet (500 mg) by mouth daily Continue previously ordered PRN tylenol order as well    acetaminophen (TYLENOL) 500 MG tablet Take 1 tablet (500 mg) by mouth every 6 hours as needed for pain    albuterol (PROAIR HFA/PROVENTIL " HFA/VENTOLIN HFA) 108 (90 Base) MCG/ACT inhaler Inhale 2 puffs into the lungs every 6 hours as needed for wheezing or shortness of breath / dyspnea    amLODIPine (NORVASC) 2.5 MG tablet Take 1 tablet (2.5 mg) by mouth At Bedtime    amoxicillin (AMOXIL) 500 MG capsule     apixaban ANTICOAGULANT (ELIQUIS) 5 MG tablet Take 1 tablet (5 mg) by mouth 2 times daily    aspirin (ASA) 81 MG EC tablet Take 1 tablet (81 mg) by mouth daily (Patient not taking: Reported on 9/1/2023)    azelastine (ASTEPRO) 0.15 % nasal spray USE 1 SPRAY IN EACH NOSTRIL ONCE A DAY    calcium citrate (CITRACAL) 950 (200 Ca) MG tablet TAKE 1 TABLET BY MOUTH ONCE DAILY    cholecalciferol (VITAMIN D3) 1250 mcg (89813 units) capsule TAKE 1 CAPSULE BY MOUTH ONCE WEEKLY    desvenlafaxine (PRISTIQ) 100 MG 24 hr tablet Take 100 mg by mouth daily    empagliflozin (JARDIANCE) 10 MG TABS tablet Take 1 tablet (10 mg) by mouth daily    fluticasone-salmeterol (ADVAIR DISKUS) 500-50 MCG/ACT inhaler INHALE 1 PUFF BY MOUTH TWICE DAILY (Patient taking differently: 1 puff daily INHALE 1 PUFF BY MOUTH TWICE DAILY)    folic acid (FOLVITE) 400 MCG tablet TAKE 1 TABLET BY MOUTH ONCE DAILY    furosemide (LASIX) 20 MG tablet Take 1.5 tablets (30 mg) by mouth daily    iron polysaccharides (NIFEREX) 150 MG capsule Take 1 capsule (150 mg) by mouth daily    isosorbide mononitrate (IMDUR) 30 MG 24 hr tablet Take 2 tablets (60 mg) by mouth daily    lamoTRIgine (LAMICTAL) 25 MG tablet Take 25 mg by mouth daily with 200 mg tablet for a total dose of 225 mg    LAMOTRIGINE 200 MG PO TABS Take 200 mg by mouth daily with 25 mg tablet for a total dose of 225 mg    levothyroxine (SYNTHROID/LEVOTHROID) 50 MCG tablet TAKE 1 TABLET BY MOUTH ONCE DAILY    liothyronine (CYTOMEL) 5 MCG tablet Take 2 tablets (10 mcg) by mouth daily    memantine (NAMENDA) 10 MG tablet Take 1 tablet (10 mg) by mouth daily    mirtazapine (REMERON) 7.5 MG tablet Take 1 tablet (7.5 mg) by mouth At Bedtime     Multiple Vitamin (TAB-A-JENNIFER) TABS Take 1 tablet by mouth daily    psyllium (METAMUCIL/KONSYL) 58.6 % powder Take 1 tspn in liquid daily    REGULOID 28.3 % POWD MIX 1 TEASPOON IN LIQUID AND DRINK BY MOUTH ONCE DAILY    rosuvastatin (CRESTOR) 10 MG tablet Take 1 tablet (10 mg) by mouth daily    sacubitril-valsartan (ENTRESTO)  MG per tablet Take 1 tablet by mouth 2 times daily    spironolactone (ALDACTONE) 25 MG tablet Take 1 tablet (25 mg) by mouth daily     No current facility-administered medications for this visit.      Vitals:  /70   Pulse 70   Wt 67.6 kg (149 lb)   BMI 28.72 kg/m      Exam:  GENERAL APPEARANCE: alert and no distress  HENT: mouth without ulcers or lesions  LYMPHATICS: no cervical or supraclavicular nodes  RESP: lungs clear to auscultation - no rales, rhonchi or wheezes  CV: regular rhythm, normal rate, no rub, no murmur  EDEMA: no LE edema bilaterally  ABDOMEN: soft, nondistended, nontender, bowel sounds normal  MS: extremities normal - no gross deformities noted, no evidence of inflammation in joints, no muscle tenderness  SKIN: no rash    LABS:   CMP  Recent Labs   Lab Test 09/01/23  0812 07/31/23  0939 06/28/23  1543 06/27/23  0726 11/24/21  1525 06/04/21  1033 05/17/21  1401 11/03/20  1225 10/23/20  0834    142 140 143   < > 142 140 144 141   POTASSIUM 4.5 3.8 3.7 4.1   < > 3.4 4.1 4.3 3.8   CHLORIDE 104 104 103 105   < > 106 106 111* 105   CO2 30* 28 26 28   < > 30 33* 28 32   ANIONGAP 8 10 11 10   < > 6 1* 5 4   * 98 126* 86   < > 103* 92 100* 88   BUN 26.7* 31.5* 27.2* 30.0*   < > 27 23 23 21   CR 1.25* 1.34* 1.32* 1.24*   < > 1.50* 1.39* 1.38* 1.52*   GFRESTIMATED 42* 38* 39* 42*   < > 32* 35* 35* 31*   GFRESTBLACK  --   --   --   --   --  37* 40* 41* 36*   PRINCE 9.6 9.3 9.9 9.8   < > 9.4 9.3 9.5 9.9    < > = values in this interval not displayed.     Recent Labs   Lab Test 02/26/23  0940 02/25/23  0534 02/24/23  0719 02/23/23  0729   BILITOTAL 0.3 0.3 0.3 0.3    ALKPHOS 76 70 71 73   ALT 38* 45* 51* 67*   AST 36* 33 35 51*     CBC  Recent Labs   Lab Test 06/23/23  1810 06/16/23  0509 04/25/23  0535 02/28/23  0713   HGB 12.3 11.6* 12.8 13.3   WBC 9.4 4.4 4.3 4.1   RBC 3.92 3.80 4.35 4.58   HCT 39.4 37.9 42.3 43.7   * 100 97 95   MCH 31.4 30.5 29.4 29.0   MCHC 31.2* 30.6* 30.3* 30.4*   RDW 15.8* 16.0* 14.3 17.2*    170 184 189     URINE STUDIES  Recent Labs   Lab Test 08/01/23  1200 07/30/23  2100 05/15/23  1243 02/23/23  0212 11/30/22  1927 01/24/22  1051 12/16/21  1758 08/12/21  1334 07/28/21  1150 07/16/20  1615 10/28/19  1314   COLOR Light Yellow Light Yellow Straw Straw Light Yellow Yellow   < > Yellow Yellow   < > Yellow   APPEARANCE Clear Clear Clear Clear Clear Clear   < > Clear Clear   < > Clear   URINEGLC Negative Negative Negative Negative Negative Negative   < > Negative Negative   < > Negative   URINEBILI Negative Negative Negative Negative Negative Negative   < > Negative Negative   < > Negative   URINEKETONE Negative Negative Negative Negative Negative Negative   < > Negative Negative   < > Negative   SG 1.012 1.008 1.005 1.013 1.011 <=1.005   < > <=1.005 1.010   < > 1.015   UBLD Negative Negative Negative Negative Negative Negative   < > Trace* Trace*   < > Trace*   URINEPH 7.5* 8.0* 6.5 7.0 7.0 7.0   < > 6.0 7.5*   < > 7.5*   PROTEIN Negative Negative Negative 30* 20* Negative   < > Negative 30*   < > Negative   UROBILINOGEN  --   --   --   --   --  0.2  --  0.2 0.2  --  0.2   NITRITE Positive* Negative Negative Negative Negative Negative   < > Negative Negative   < > Negative   LEUKEST Moderate* Negative Negative Moderate* Negative Negative   < > Negative Small*   < > Negative   RBCU <1 1  --  1 <1 0-2   < > 0-2 0-2   < > O - 2   WBCU 45* 2  --  68* 1 0-5   < > 0-5 5-10*   < > 0 - 5    < > = values in this interval not displayed.     Recent Labs   Lab Test 01/24/22  1051   [facility] 0.29*     PTH  Recent Labs   Lab Test 04/03/23  1428  02/15/23  1407 12/04/22  1354   PTHI 73* 81* 45     IRON STUDIES  Recent Labs   Lab Test 01/11/23  1341 10/26/22  0737   IRON 62 23*    318   IRONSAT 17 7*   JANESSA 126 19         Ania Johnson MD      Again, thank you for allowing me to participate in the care of your patient.      Sincerely,    Ania Johnson MD

## 2023-09-18 NOTE — PROGRESS NOTES
Assessment and Plan:  87 year old female with history of CKD stage 3, Scr 1-1.5 since 2008, hypertension who presents for followup of CKD. She has had hypercalcemia that has stabilized. She was first seen October 2019, and hydrochlorothiazide was stopped. Her Scr has been 1.2-1.5 in recent years. She now has HFrEF and HFpEF, hospitalized in 2/2023 for hypertensive urgency and in December 2022 for anemia with hgb down to 7  # CKD stage 3b, Scr 1-1.5, recent SCr 1.3 eGFR 39ml/min :  Her scr has been 1.1-1.5 in the last few years, with baseline many years ago at 1 or less.  She has mild proteinuria. She denies systemic signs   - she has HFpeF and this along with her CAD and hypertension is the cause of her CKD. She now has HFrEF per last echo, EF 35-40%    -low grade proteinuria, off ACE/ARB-now is on entresto - valsartan.  - discussed hydration, avoiding nephrotoxic medications, holding diuretic on sick  Days, and blood pressure control  - she is seeing [facility] which is good  - started on spironolactone 25mg, and her BP is better than ever in her recollection. She denies dizziness    # Hypertension: much better controlled since starting spironolactone, currently on carvedilol(had bradycardia reportedly), fursoemide and entresto. Her hydrochlorothiazide was stopped due to hypercalcemia. The other medications were stopped in the last year by other providers.  .  She feels well overall.     # Not anemic - but was anemic in hospital , down to 7 with iron deficiency (iron sat 5%)  in December 2022, started on iron supplement, lower to every other day given normal hgb now.  - ? No cause found for iron deficiency, decided against scopes in the hospital.    # Electrolytes:    stable  # Mineral Bone Disorder:   - Calcium; level is:  Normal range, PTH normal  - vitamin D high normal, no change made.    30 minutes spent on day of service in direct patient care and in coordination of care and reviewing records.    Assessment  and plan was discussed with patient and she voiced her understanding and agreement.    Reason for Visit:  Kamryn Miller is a 87 year old female with CKD from hypertension, who presents for followup of CKD    HPI:  She is a pleasant female with history of mild rhinitis, asthma, and hypertension who presents for followup of CKD and hypercalcemia. . Her serum creatinine has been 1.2-1.5 in recent years, and was up to 2 in 2019, with eGFR 21ml/min.  She had hypercalcemia, which improved with stopping hydrochlorothiazide. She has history of CAD and HFpEF with significant aortic stenosis and follows with Dr Zuniga in cardiology. She had TAVR . She was hospitalized in 2023 with hypertensive urgency, shortness of breath, anemia.  She was treated with mild diuresis and discharged.  She had hypertensive urgency in jn 2022, She also had AV block which affected her BP regimen. She also had inpatient psych stay for depression after her  .  Her creatinine was 1.1-1.2 on discharge (with stopping her ARB).  Her recent BP is 140-160  She is mobilizing fairly well without cane, uses walker for longer distances.  She had significant anemia in 2022 without clear cause, hgb down to 7 was transfused and now on iron supplement. Iron sat was 5%  She denies significant dyspnea on exertion or swelling. She is in assisted living and they manage medications and provide meals.  She tries to eat healthy.  She avoids using NSAIDs.     Renal History:   HTN    ROS:   A comprehensive review of systems was obtained and negative, except as noted in the HPI or PMH.    Active Medical Problems:  Patient Active Problem List   Diagnosis    Hyperlipidemia LDL goal <70    Mild major depression (H)    Pulmonary hypertension (H)    Seasonal allergic rhinitis    Mitral insufficiency    Tricuspid insufficiency    Renal insufficiency    Tremors    Family history of diabetes mellitus    Celiac disease    History of  colonic polyps    Benign essential hypertension    Hypothyroidism, unspecified type    CKD (chronic kidney disease) stage 3, GFR 30-59 ml/min (H)    Anemia    Disorder of kidney and ureter    Generalized anxiety disorder    Osteopenia    CAD S/P percutaneous coronary angioplasty    NYHA class 3 heart failure with preserved ejection fraction (H)    Moderate persistent asthma without complication    Hearing disorder, unspecified laterality    Impairment of balance    Bipolar 1 disorder, depressed, severe (H)    Essential hypertension    Stented coronary artery    Generalized weakness    Left leg claudication (H)    S/P TAVR (transcatheter aortic valve replacement)    Syncope and collapse    Anemia, unspecified type    Weight gain    Elevated brain natriuretic peptide (BNP) level    Fall     PMH:   Medical record was reviewed and PMH was discussed with patient and noted below.  Past Medical History:   Diagnosis Date    Aortic valvar stenosis 7/2010    mild    Asthma     Bipolar disorder (H)     CAD (coronary artery disease)     s/p angioplasty    Celiac disease     Colon polyps     Colon polyps 2012    every 3 year colonoscopy     Depression     High cholesterol     HTN     Mitral insufficiency     Pulmonary HTN (H)     mild    Tremors 7/10    drug induced from antidepressants    Tricuspid insufficiency      PSH:   Past Surgical History:   Procedure Laterality Date    ANGIOGRAM  6/26/2009    ANGIOPLASTY  9/96    for angina    ANGIOPLASTY  8/03 - 9/03    X -2 - with stenst in coronary car.    APPENDECTOMY      BREAST BIOPSY, RT/LT      Breat Biopsy RT/LT, benign    BUNIONECTOMY  11/8/2011    Procedure:BUNIONECTOMY; Left donna bunionectomy; Surgeon:FREDY LITTLEJOHN; Location:[facility]    BUNIONECTOMY RT/LT  5/2007    right bunion and right 2nd hammertoe    CATARACT IOL, RT/LT  6/12 and 7/12    bilateral    COLONOSCOPY  2007, 2012    every 3 years for polyps    CV CORONARY ANGIOGRAM N/A 8/21/2020    Procedure: CV CORONARY  ANGIOGRAM;  Surgeon: Gianluca Toscano MD;  Location:  [facility]    CV CORONARY ANGIOGRAM N/A 10/25/2022    Procedure: Coronary Angiogram;  Surgeon: Carter Douglass MD;  Location:  [facility]    CV INSTANTANEOUS WAVE-FREE RATIO N/A 10/25/2022    Procedure: Instantaneous Wave-Free Ratio;  Surgeon: Carter Douglass MD;  Location:  [facility]    CV LEFT HEART CATH N/A 8/21/2020    Procedure: CV LEFT HEART CATH;  Surgeon: Gianluca Toscano MD;  Location:  [facility]    CV LEFT HEART CATH N/A 11/3/2020    Procedure: CV LEFT HEART CATH;  Surgeon: Tom Burrows MD;  Location:  [facility]    CV LOWER EXTREMITY ANGIOGRAM BILATERAL N/A 11/3/2020    Procedure: CV ANGIOGRAM LOWER EXTREMITY BILATERAL;  Surgeon: Tom Burrows MD;  Location:  [facility]    CV PCI STENT DRUG ELUTING N/A 8/21/2020    Procedure: Percutaneous Coronary Intervention Stent Drug Eluting;  Surgeon: Gianluca Toscano MD;  Location:  [facility]    CV RIGHT HEART CATH MEASUREMENTS RECORDED N/A 8/21/2020    Procedure: CV RIGHT HEART CATH;  Surgeon: Gianluca Toscano MD;  Location:  [facility]    CV RIGHT HEART CATH MEASUREMENTS RECORDED N/A 11/3/2020    Procedure: CV RIGHT HEART CATH;  Surgeon: Tom Burrows MD;  Location:  [facility]    CV TRANSCATHETER AORTIC VALVE REPLACEMENT N/A 9/12/2022    Procedure: Transfemoral Transcatheter Aortic Valve Replacement with possible open heart bypass and or balloon pump placement and any indicated procedure;  Surgeon: Tom Burrows MD;  Location:  [facility]    HEART CATH FEMORAL CANNULIZATION WITH OPEN STANDBY REPAIR AORTIC VALVE N/A 9/12/2022    Procedure: OR TRANSCATHETER AORTIC VALVE REPLACEMENT, OPEN FEMORAL ARTERY APPROACH;  Surgeon: Arthur Markham MD;  Location: [facility] CARDIAC CATH LAB    JOINT  "REPLACEMENT, HIP RT/LT  4/2008    right hip replaced    JOINT REPLACEMENT, HIP RT/LT  4/2009    left hip replaced    REPAIR HAMMER TOE  11/8/2011    Procedure:REPAIR HAMMER TOE; left 2nd hammertoe repair; Surgeon:FREDY LITTLEJOHN; Location:[facility] OR    SURGICAL HISTORY OF -   11/11    left bunion and 2nd hammertoe repair    TUBAL LIGATION      ZZC ANESTH,BLEPHAROPLASTY      (R) for drooping eyelid    ZZC APPENDECTOMY         Family Hx:   Family History   Problem Relation Age of Onset    Asthma Mother     Cerebrovascular Disease Mother     Arthritis Mother     Depression Mother     Lipids Sister     Hypertension Sister     Heart Disease Sister     Lipids Sister     Hypertension Sister     Lipids Sister     Breast Cancer Sister      Personal Hx:   Social History     Tobacco Use    Smoking status: Never    Smokeless tobacco: Never   Substance Use Topics    Alcohol use: No     Alcohol/week: 0.0 standard drinks of alcohol     Types: 1 Standard drinks or equivalent per week       Allergies:  Allergies   Allergen Reactions    Ace Inhibitors Cough    Amlodipine      Headache and plugged ears    Carvedilol      \"plugged ears and a headache\"    Diclofenac Other (See Comments)     Balance problems    Gluten Meal Diarrhea    Hydralazine-Hctz      headache       Medications:  Current Outpatient Medications   Medication Sig    acetaminophen (TYLENOL) 500 MG tablet Take 1 tablet (500 mg) by mouth daily Continue previously ordered PRN tylenol order as well    acetaminophen (TYLENOL) 500 MG tablet Take 1 tablet (500 mg) by mouth every 6 hours as needed for pain    albuterol (PROAIR HFA/PROVENTIL HFA/VENTOLIN HFA) 108 (90 Base) MCG/ACT inhaler Inhale 2 puffs into the lungs every 6 hours as needed for wheezing or shortness of breath / dyspnea    amLODIPine (NORVASC) 2.5 MG tablet Take 1 tablet (2.5 mg) by mouth At Bedtime    amoxicillin (AMOXIL) 500 MG capsule     apixaban ANTICOAGULANT (ELIQUIS) 5 MG tablet Take 1 tablet (5 mg) by mouth " 2 times daily    aspirin (ASA) 81 MG EC tablet Take 1 tablet (81 mg) by mouth daily (Patient not taking: Reported on 9/1/2023)    azelastine (ASTEPRO) 0.15 % nasal spray USE 1 SPRAY IN EACH NOSTRIL ONCE A DAY    calcium citrate (CITRACAL) 950 (200 Ca) MG tablet TAKE 1 TABLET BY MOUTH ONCE DAILY    cholecalciferol (VITAMIN D3) 1250 mcg (59249 units) capsule TAKE 1 CAPSULE BY MOUTH ONCE WEEKLY    desvenlafaxine (PRISTIQ) 100 MG 24 hr tablet Take 100 mg by mouth daily    empagliflozin (JARDIANCE) 10 MG TABS tablet Take 1 tablet (10 mg) by mouth daily    fluticasone-salmeterol (ADVAIR DISKUS) 500-50 MCG/ACT inhaler INHALE 1 PUFF BY MOUTH TWICE DAILY (Patient taking differently: 1 puff daily INHALE 1 PUFF BY MOUTH TWICE DAILY)    folic acid (FOLVITE) 400 MCG tablet TAKE 1 TABLET BY MOUTH ONCE DAILY    furosemide (LASIX) 20 MG tablet Take 1.5 tablets (30 mg) by mouth daily    iron polysaccharides (NIFEREX) 150 MG capsule Take 1 capsule (150 mg) by mouth daily    isosorbide mononitrate (IMDUR) 30 MG 24 hr tablet Take 2 tablets (60 mg) by mouth daily    lamoTRIgine (LAMICTAL) 25 MG tablet Take 25 mg by mouth daily with 200 mg tablet for a total dose of 225 mg    LAMOTRIGINE 200 MG PO TABS Take 200 mg by mouth daily with 25 mg tablet for a total dose of 225 mg    levothyroxine (SYNTHROID/LEVOTHROID) 50 MCG tablet TAKE 1 TABLET BY MOUTH ONCE DAILY    liothyronine (CYTOMEL) 5 MCG tablet Take 2 tablets (10 mcg) by mouth daily    memantine (NAMENDA) 10 MG tablet Take 1 tablet (10 mg) by mouth daily    mirtazapine (REMERON) 7.5 MG tablet Take 1 tablet (7.5 mg) by mouth At Bedtime    Multiple Vitamin (TAB-A-JENNIFER) TABS Take 1 tablet by mouth daily    psyllium (METAMUCIL/KONSYL) 58.6 % powder Take 1 tspn in liquid daily    REGULOID 28.3 % POWD MIX 1 TEASPOON IN LIQUID AND DRINK BY MOUTH ONCE DAILY    rosuvastatin (CRESTOR) 10 MG tablet Take 1 tablet (10 mg) by mouth daily    sacubitril-valsartan (ENTRESTO)  MG per tablet Take  1 tablet by mouth 2 times daily    spironolactone (ALDACTONE) 25 MG tablet Take 1 tablet (25 mg) by mouth daily     No current facility-administered medications for this visit.      Vitals:  /70   Pulse 70   Wt 67.6 kg (149 lb)   BMI 28.72 kg/m      Exam:  GENERAL APPEARANCE: alert and no distress  HENT: mouth without ulcers or lesions  LYMPHATICS: no cervical or supraclavicular nodes  RESP: lungs clear to auscultation - no rales, rhonchi or wheezes  CV: regular rhythm, normal rate, no rub, no murmur  EDEMA: no LE edema bilaterally  ABDOMEN: soft, nondistended, nontender, bowel sounds normal  MS: extremities normal - no gross deformities noted, no evidence of inflammation in joints, no muscle tenderness  SKIN: no rash    LABS:   CMP  Recent Labs   Lab Test 09/01/23  0812 07/31/23  0939 06/28/23  1543 06/27/23  0726 11/24/21  1525 06/04/21  1033 05/17/21  1401 11/03/20  1225 10/23/20  0834    142 140 143   < > 142 140 144 141   POTASSIUM 4.5 3.8 3.7 4.1   < > 3.4 4.1 4.3 3.8   CHLORIDE 104 104 103 105   < > 106 106 111* 105   CO2 30* 28 26 28   < > 30 33* 28 32   ANIONGAP 8 10 11 10   < > 6 1* 5 4   * 98 126* 86   < > 103* 92 100* 88   BUN 26.7* 31.5* 27.2* 30.0*   < > 27 23 23 21   CR 1.25* 1.34* 1.32* 1.24*   < > 1.50* 1.39* 1.38* 1.52*   GFRESTIMATED 42* 38* 39* 42*   < > 32* 35* 35* 31*   GFRESTBLACK  --   --   --   --   --  37* 40* 41* 36*   PRINCE 9.6 9.3 9.9 9.8   < > 9.4 9.3 9.5 9.9    < > = values in this interval not displayed.     Recent Labs   Lab Test 02/26/23  0940 02/25/23  0534 02/24/23  0719 02/23/23  0729   BILITOTAL 0.3 0.3 0.3 0.3   ALKPHOS 76 70 71 73   ALT 38* 45* 51* 67*   AST 36* 33 35 51*     CBC  Recent Labs   Lab Test 06/23/23  1810 06/16/23  0509 04/25/23  0535 02/28/23  0713   HGB 12.3 11.6* 12.8 13.3   WBC 9.4 4.4 4.3 4.1   RBC 3.92 3.80 4.35 4.58   HCT 39.4 37.9 42.3 43.7   * 100 97 95   MCH 31.4 30.5 29.4 29.0   MCHC 31.2* 30.6* 30.3* 30.4*   RDW 15.8* 16.0*  14.3 17.2*    170 184 189     URINE STUDIES  Recent Labs   Lab Test 08/01/23  1200 07/30/23  2100 05/15/23  1243 02/23/23  0212 11/30/22  1927 01/24/22  1051 12/16/21  1758 08/12/21  1334 07/28/21  1150 07/16/20  1615 10/28/19  1314   COLOR Light Yellow Light Yellow Straw Straw Light Yellow Yellow   < > Yellow Yellow   < > Yellow   APPEARANCE Clear Clear Clear Clear Clear Clear   < > Clear Clear   < > Clear   URINEGLC Negative Negative Negative Negative Negative Negative   < > Negative Negative   < > Negative   URINEBILI Negative Negative Negative Negative Negative Negative   < > Negative Negative   < > Negative   URINEKETONE Negative Negative Negative Negative Negative Negative   < > Negative Negative   < > Negative   SG 1.012 1.008 1.005 1.013 1.011 <=1.005   < > <=1.005 1.010   < > 1.015   UBLD Negative Negative Negative Negative Negative Negative   < > Trace* Trace*   < > Trace*   URINEPH 7.5* 8.0* 6.5 7.0 7.0 7.0   < > 6.0 7.5*   < > 7.5*   PROTEIN Negative Negative Negative 30* 20* Negative   < > Negative 30*   < > Negative   UROBILINOGEN  --   --   --   --   --  0.2  --  0.2 0.2  --  0.2   NITRITE Positive* Negative Negative Negative Negative Negative   < > Negative Negative   < > Negative   LEUKEST Moderate* Negative Negative Moderate* Negative Negative   < > Negative Small*   < > Negative   RBCU <1 1  --  1 <1 0-2   < > 0-2 0-2   < > O - 2   WBCU 45* 2  --  68* 1 0-5   < > 0-5 5-10*   < > 0 - 5    < > = values in this interval not displayed.     Recent Labs   Lab Test 01/24/22  1051   UTPG 0.29*     PTH  Recent Labs   Lab Test 04/03/23  1428 02/15/23  1407 12/04/22  1354   PTHI 73* 81* 45     IRON STUDIES  Recent Labs   Lab Test 01/11/23  1341 10/26/22  0737   IRON 62 23*    318   IRONSAT 17 7*   JANESSA 126 19         Ania Salty Johnson MD

## 2023-09-19 LAB
ALBUMIN SERPL BCG-MCNC: 4 G/DL (ref 3.5–5.2)
ANION GAP SERPL CALCULATED.3IONS-SCNC: 11 MMOL/L (ref 7–15)
BUN SERPL-MCNC: 35.3 MG/DL (ref 8–23)
CALCIUM SERPL-MCNC: 9.8 MG/DL (ref 8.8–10.2)
CHLORIDE SERPL-SCNC: 103 MMOL/L (ref 98–107)
CREAT SERPL-MCNC: 1.59 MG/DL (ref 0.51–0.95)
DEPRECATED HCO3 PLAS-SCNC: 25 MMOL/L (ref 22–29)
EGFRCR SERPLBLD CKD-EPI 2021: 31 ML/MIN/1.73M2
GLUCOSE SERPL-MCNC: 94 MG/DL (ref 70–99)
PHOSPHATE SERPL-MCNC: 4.6 MG/DL (ref 2.5–4.5)
POTASSIUM SERPL-SCNC: 5.1 MMOL/L (ref 3.4–5.3)
PTH-INTACT SERPL-MCNC: 54 PG/ML (ref 15–65)
SODIUM SERPL-SCNC: 139 MMOL/L (ref 136–145)

## 2023-10-02 ENCOUNTER — TELEPHONE (OUTPATIENT)
Dept: CARDIOLOGY | Facility: CLINIC | Age: 88
End: 2023-10-02
Payer: MEDICARE

## 2023-10-02 NOTE — TELEPHONE ENCOUNTER
[facility] Call Center    Phone Message    May a detailed message be left on voicemail: yes     Reason for Call: Other: Patient was diagnosed with COVID today and is symptomatic. The PA Pat Layne is asking about starting a renal dose of Paxlovid. She is asking for dosing instruction on the following cardiology medications. Amlodipine, apixiban, Rosuvastatin, Enstresto. Please call today to discuss.      Action Taken: Other: cardiology    Travel Screening: Not Applicable  Thank you!  [facility]              Called and notified Yen that the message was been sent to Dr. Zuniga and they would be notified with his reply.           10/9/23--Dr. Zuniga is recommending no changes to her current medications. Yen called and notified.

## 2023-10-03 DIAGNOSIS — I50.22 CHRONIC SYSTOLIC HEART FAILURE (H): Primary | ICD-10-CM

## 2023-10-07 PROCEDURE — 81001 URINALYSIS AUTO W/SCOPE: CPT | Mod: ORL | Performed by: NURSE PRACTITIONER

## 2023-10-07 PROCEDURE — 87186 SC STD MICRODIL/AGAR DIL: CPT | Mod: ORL | Performed by: NURSE PRACTITIONER

## 2023-10-08 ENCOUNTER — LAB REQUISITION (OUTPATIENT)
Dept: LAB | Facility: CLINIC | Age: 88
End: 2023-10-08
Payer: MEDICARE

## 2023-10-08 DIAGNOSIS — R31.9 HEMATURIA, UNSPECIFIED: ICD-10-CM

## 2023-10-08 DIAGNOSIS — R35.0 FREQUENCY OF MICTURITION: ICD-10-CM

## 2023-10-08 DIAGNOSIS — N39.41 URGE INCONTINENCE: ICD-10-CM

## 2023-10-08 LAB
ALBUMIN UR-MCNC: 30 MG/DL
APPEARANCE UR: ABNORMAL
BILIRUB UR QL STRIP: NEGATIVE
COLOR UR AUTO: YELLOW
GLUCOSE UR STRIP-MCNC: NEGATIVE MG/DL
HGB UR QL STRIP: ABNORMAL
HYALINE CASTS: 2 /LPF
KETONES UR STRIP-MCNC: NEGATIVE MG/DL
LEUKOCYTE ESTERASE UR QL STRIP: ABNORMAL
MUCOUS THREADS #/AREA URNS LPF: PRESENT /LPF
NITRATE UR QL: NEGATIVE
PH UR STRIP: 5.5 [PH] (ref 5–7)
RBC URINE: >182 /HPF
SP GR UR STRIP: 1.01 (ref 1–1.03)
SQUAMOUS EPITHELIAL: <1 /HPF
TRANSITIONAL EPI: 1 /HPF
UROBILINOGEN UR STRIP-MCNC: NORMAL MG/DL
WBC URINE: 62 /HPF

## 2023-10-09 LAB — BACTERIA UR CULT: ABNORMAL

## 2023-10-10 ENCOUNTER — LAB REQUISITION (OUTPATIENT)
Dept: LAB | Facility: CLINIC | Age: 88
End: 2023-10-10
Payer: MEDICARE

## 2023-10-10 DIAGNOSIS — R30.0 DYSURIA: ICD-10-CM

## 2023-10-10 DIAGNOSIS — R35.0 FREQUENCY OF MICTURITION: ICD-10-CM

## 2023-10-10 LAB
ALBUMIN UR-MCNC: 30 MG/DL
APPEARANCE UR: ABNORMAL
BACTERIA #/AREA URNS HPF: ABNORMAL /HPF
BILIRUB UR QL STRIP: NEGATIVE
COLOR UR AUTO: ABNORMAL
GLUCOSE UR STRIP-MCNC: NEGATIVE MG/DL
HGB UR QL STRIP: ABNORMAL
KETONES UR STRIP-MCNC: NEGATIVE MG/DL
LEUKOCYTE ESTERASE UR QL STRIP: ABNORMAL
NITRATE UR QL: NEGATIVE
PH UR STRIP: 6 [PH] (ref 5–7)
RBC URINE: 27 /HPF
SP GR UR STRIP: 1.01 (ref 1–1.03)
SQUAMOUS EPITHELIAL: <1 /HPF
TRANSITIONAL EPI: <1 /HPF
UROBILINOGEN UR STRIP-MCNC: NORMAL MG/DL
WBC URINE: 78 /HPF

## 2023-10-10 PROCEDURE — 81001 URINALYSIS AUTO W/SCOPE: CPT | Mod: ORL | Performed by: NURSE PRACTITIONER

## 2023-10-11 ENCOUNTER — TELEPHONE (OUTPATIENT)
Dept: CARDIOLOGY | Facility: CLINIC | Age: 88
End: 2023-10-11
Payer: MEDICARE

## 2023-10-11 NOTE — TELEPHONE ENCOUNTER
12/19 appointment change is needed.     Left VM asking to move appointment up to 11:00 with 10:30 labs.

## 2023-10-13 ENCOUNTER — TELEPHONE (OUTPATIENT)
Dept: CARDIOLOGY | Facility: CLINIC | Age: 88
End: 2023-10-13

## 2023-10-13 ENCOUNTER — LAB (OUTPATIENT)
Dept: LAB | Facility: CLINIC | Age: 88
End: 2023-10-13
Attending: NURSE PRACTITIONER
Payer: MEDICARE

## 2023-10-13 ENCOUNTER — OFFICE VISIT (OUTPATIENT)
Dept: CARDIOLOGY | Facility: CLINIC | Age: 88
End: 2023-10-13
Attending: NURSE PRACTITIONER
Payer: MEDICARE

## 2023-10-13 VITALS
WEIGHT: 148 LBS | OXYGEN SATURATION: 95 % | SYSTOLIC BLOOD PRESSURE: 102 MMHG | HEART RATE: 75 BPM | DIASTOLIC BLOOD PRESSURE: 69 MMHG | BODY MASS INDEX: 28.53 KG/M2

## 2023-10-13 DIAGNOSIS — I50.22 CHRONIC SYSTOLIC HEART FAILURE (H): Primary | ICD-10-CM

## 2023-10-13 DIAGNOSIS — N18.30 STAGE 3 CHRONIC KIDNEY DISEASE, UNSPECIFIED WHETHER STAGE 3A OR 3B CKD (H): ICD-10-CM

## 2023-10-13 DIAGNOSIS — N18.32 STAGE 3B CHRONIC KIDNEY DISEASE (H): ICD-10-CM

## 2023-10-13 DIAGNOSIS — I10 ESSENTIAL HYPERTENSION: ICD-10-CM

## 2023-10-13 DIAGNOSIS — I50.30 NYHA CLASS 3 HEART FAILURE WITH PRESERVED EJECTION FRACTION (H): Primary | ICD-10-CM

## 2023-10-13 DIAGNOSIS — E03.9 HYPOTHYROIDISM, UNSPECIFIED TYPE: ICD-10-CM

## 2023-10-13 DIAGNOSIS — I42.8 NONISCHEMIC CARDIOMYOPATHY (H): ICD-10-CM

## 2023-10-13 DIAGNOSIS — I50.22 CHRONIC SYSTOLIC HEART FAILURE (H): ICD-10-CM

## 2023-10-13 LAB
ANION GAP SERPL CALCULATED.3IONS-SCNC: 11 MMOL/L (ref 7–15)
BUN SERPL-MCNC: 56.4 MG/DL (ref 8–23)
CALCIUM SERPL-MCNC: 10.6 MG/DL (ref 8.8–10.2)
CHLORIDE SERPL-SCNC: 98 MMOL/L (ref 98–107)
CREAT SERPL-MCNC: 2.78 MG/DL (ref 0.51–0.95)
CREAT UR-MCNC: 39.9 MG/DL
DEPRECATED HCO3 PLAS-SCNC: 26 MMOL/L (ref 22–29)
EGFRCR SERPLBLD CKD-EPI 2021: 16 ML/MIN/1.73M2
GLUCOSE SERPL-MCNC: 102 MG/DL (ref 70–99)
MICROALBUMIN UR-MCNC: 47.3 MG/L
MICROALBUMIN/CREAT UR: 118.55 MG/G CR (ref 0–25)
POTASSIUM SERPL-SCNC: 5.5 MMOL/L (ref 3.4–5.3)
SODIUM SERPL-SCNC: 135 MMOL/L (ref 135–145)

## 2023-10-13 PROCEDURE — 80048 BASIC METABOLIC PNL TOTAL CA: CPT | Performed by: PATHOLOGY

## 2023-10-13 PROCEDURE — 99215 OFFICE O/P EST HI 40 MIN: CPT | Performed by: NURSE PRACTITIONER

## 2023-10-13 PROCEDURE — 99000 SPECIMEN HANDLING OFFICE-LAB: CPT | Performed by: PATHOLOGY

## 2023-10-13 PROCEDURE — 36415 COLL VENOUS BLD VENIPUNCTURE: CPT | Performed by: PATHOLOGY

## 2023-10-13 PROCEDURE — G0463 HOSPITAL OUTPT CLINIC VISIT: HCPCS | Performed by: NURSE PRACTITIONER

## 2023-10-13 PROCEDURE — 82570 ASSAY OF URINE CREATININE: CPT | Performed by: INTERNAL MEDICINE

## 2023-10-13 ASSESSMENT — PAIN SCALES - GENERAL: PAINLEVEL: NO PAIN (0)

## 2023-10-13 NOTE — PATIENT INSTRUCTIONS
Take your medicines every day, as directed    Changes made today:  Stop taking Spironolactone  Hold Furosemide  Hold Entresto   Monitor Your Weight and Symptoms    Contact us if you:    Gain 2 pounds in one day or 5 pounds in one week  Feel more short of breath  Notice more leg swelling  Feel lightheadeded   Change your lifestyle    Limit Salt or Sodium:  2000 mg  Limit Fluids:  2000 mL or approximately 64 ounces  Eat a Heart Healthy Diet  Low in saturated fats  Stay Active:  Aim to move at least 150 minutes every  week         To Contact us    During Business Hours:  [phone], option # 1      After hours, weekends or holidays:   [phone], Option #4  Ask to speak to the On-Call Cardiologist. Inform them you are a CORE/heart failure patient at the [place].     Use [employer] allows you to communicate directly with your heart team through secure messaging.  [employer] can be accessed any time on your phone, computer, or tablet.  If you need assistance, we'd be happy to help!         Keep your Heart Appointments:    Lab appointment on Monday    Follow up to TBD     Please consider attending our virtual support group which is held monthly. Please reach out to Gomez at [phone] for more information if you are interested in attending.       2023 dates:      Monday, November 6th, 1-2pm  Monday, December 4th, 1-2pm

## 2023-10-13 NOTE — LETTER
10/13/2023      RE: Kamryn Miller  [address]  [address]  [place] [address]       Dear Colleague,    Thank you for the opportunity to participate in the care of your patient, Kamryn Miller, at the [facility] [place] at [facility]. Please see a copy of my visit note below.      [facility] Cardiology   [facility]      HPI:   Ms. Miller is a 88 year old female with medical history pertinent for HTN, CAD (s/p PCI in 1996, PCI to LAD and RCA in 2003, PCI to LAD x3 in 8/2020), severe aortic valve stenosis (s/p 26mm ES Ultra TAVR 9/12/22), PAD, HLD, HFrEF (LVEF 35-40% per TTE 3/2023), and anemia who presents to [facility] for routine follow up.      With regards to her most recent medical conditions, Ms. Miller was last hospitalized 2/22-2/28/23 for shortness of breath, ADHF exacerbation, and pulmonary edema. She was diuresed with IV lasix. An echocardiogram was performed which showed reduced EF that is newly noted, 45 to 50%.  Ms. Miller was started on Entresto. She has been previously treated with beta blockers (coreg and metoprolol), amlodipine, and hydralazine, all of which she did not tolerate. Tells me that these medications caused her to have headaches and ear fullness.    Ms. Miller was recently seen in structural cardiology clinic. Echo at that time showed further reduction in her EF to 35-40%. Unclear etiology, possibly HTN. TAVR stable. Has had several visits in [facility] where entresto has been up-titrated to max dose. Despite increased dose, has remained hypertensive. Her assisted living had been instructed to treat HTN with extra doses of lasix. She was on coreg in Feb 2023, but this was discontinued due to adverse side effects. Seen in the ED on 6/23 where she received her first dose of amlodipine 5 mg with mild improvement in BP. Instructed to take with tylenol as headaches are reported side effect to amlodipine.     Today Ms. Miller is  seen in clinic with her daughter. She reports feeling quite well. Shaky and weak after starting the Aldactone.  No acute concerns and denies chest tightness/discomfort, SOB, GREGORY, palpitations, lightheadedness, dizziness, syncope, or near syncope. Denies orthopnea, PND, peripheral edema. She got COVID recently and was on Paxlovid.  Takes amlodipine 2.5 mg, She has no headaches. She lives in assisted living. She is active and participates in exercise classes 5 days/week. Review of daily BP log shows BPs ranging 102-138 systolic. Weights are stable 144.6 lb. Patient states she has been eating and drinking well.  She didn't have any fluids today.       Cardiac Medications   - ASA 81 mg daily   - Lasix 30 mg daily - HOLD 10/13  - Rosuvastatin 10 mg daily   - Entresto  mg BID- HOLD 10/13  - Amlodipine 2.5 mg daily   - Imdur 60 mg  - Spironolactone 25 mg - STOP 10/13      PAST MEDICAL HISTORY:  Past Medical History:   Diagnosis Date    Aortic valvar stenosis 7/2010    mild    Asthma     Bipolar disorder (H)     CAD (coronary artery disease)     s/p angioplasty    Celiac disease     Colon polyps     Colon polyps 2012    every 3 year colonoscopy     Depression     High cholesterol     HTN     Mitral insufficiency     Pulmonary HTN (H)     mild    Tremors 7/10    drug induced from antidepressants    Tricuspid insufficiency        FAMILY HISTORY:  Family History   Problem Relation Age of Onset    Asthma Mother     Cerebrovascular Disease Mother     Arthritis Mother     Depression Mother     Lipids Sister     Hypertension Sister     Heart Disease Sister     Lipids Sister     Hypertension Sister     Lipids Sister     Breast Cancer Sister        SOCIAL HISTORY:  Social History     Socioeconomic History    Marital status:      Spouse name: Adrien    Number of children: 2    Years of education: 16   Occupational History     Employer: RETIRED     Comment: Retired in 2002.    Tobacco Use    Smoking status: Never     Smokeless tobacco: Never   Vaping Use    Vaping status: Never Used     Passive vaping exposure: Yes   Substance and Sexual Activity    Alcohol use: No     Alcohol/week: 0.0 standard drinks of alcohol     Types: 1 Standard drinks or equivalent per week    Drug use: No    Sexual activity: Not Currently     Partners: Male       CURRENT MEDICATIONS:  acetaminophen (TYLENOL) 500 MG tablet, Take 1 tablet (500 mg) by mouth daily Continue previously ordered PRN tylenol order as well  acetaminophen (TYLENOL) 500 MG tablet, Take 1 tablet (500 mg) by mouth every 6 hours as needed for pain  albuterol (PROAIR HFA/PROVENTIL HFA/VENTOLIN HFA) 108 (90 Base) MCG/ACT inhaler, Inhale 2 puffs into the lungs every 6 hours as needed for wheezing or shortness of breath / dyspnea  amLODIPine (NORVASC) 2.5 MG tablet, Take 1 tablet (2.5 mg) by mouth At Bedtime  amoxicillin (AMOXIL) 500 MG capsule,   apixaban ANTICOAGULANT (ELIQUIS) 5 MG tablet, Take 1 tablet (5 mg) by mouth 2 times daily  aspirin (ASA) 81 MG EC tablet, Take 1 tablet (81 mg) by mouth daily  azelastine (ASTEPRO) 0.15 % nasal spray, USE 1 SPRAY IN EACH NOSTRIL ONCE A DAY  calcium citrate (CITRACAL) 950 (200 Ca) MG tablet, TAKE 1 TABLET BY MOUTH ONCE DAILY  cholecalciferol (VITAMIN D3) 1250 mcg (32675 units) capsule, TAKE 1 CAPSULE BY MOUTH ONCE WEEKLY  desvenlafaxine (PRISTIQ) 100 MG 24 hr tablet, Take 100 mg by mouth daily  empagliflozin (JARDIANCE) 10 MG TABS tablet, Take 1 tablet (10 mg) by mouth daily  fluticasone-salmeterol (ADVAIR DISKUS) 500-50 MCG/ACT inhaler, INHALE 1 PUFF BY MOUTH TWICE DAILY (Patient taking differently: 1 puff daily INHALE 1 PUFF BY MOUTH TWICE DAILY)  folic acid (FOLVITE) 400 MCG tablet, TAKE 1 TABLET BY MOUTH ONCE DAILY  furosemide (LASIX) 20 MG tablet, Take 1.5 tablets (30 mg) by mouth daily  iron polysaccharides (NIFEREX) 150 MG capsule, Take 1 capsule (150 mg) by mouth daily  isosorbide mononitrate (IMDUR) 30 MG 24 hr tablet, Take 2 tablets (60  mg) by mouth daily  lamoTRIgine (LAMICTAL) 25 MG tablet, Take 25 mg by mouth daily with 200 mg tablet for a total dose of 225 mg  LAMOTRIGINE 200 MG PO TABS, Take 200 mg by mouth daily with 25 mg tablet for a total dose of 225 mg  levothyroxine (SYNTHROID/LEVOTHROID) 50 MCG tablet, TAKE 1 TABLET BY MOUTH ONCE DAILY  liothyronine (CYTOMEL) 5 MCG tablet, Take 2 tablets (10 mcg) by mouth daily  memantine (NAMENDA) 10 MG tablet, Take 1 tablet (10 mg) by mouth daily  mirtazapine (REMERON) 7.5 MG tablet, Take 1 tablet (7.5 mg) by mouth At Bedtime  Multiple Vitamin (TAB-A-JENNIFER) TABS, Take 1 tablet by mouth daily  psyllium (METAMUCIL/KONSYL) 58.6 % powder, Take 1 tspn in liquid daily  rosuvastatin (CRESTOR) 10 MG tablet, Take 1 tablet (10 mg) by mouth daily  REGULOID 28.3 % POWD, MIX 1 TEASPOON IN LIQUID AND DRINK BY MOUTH ONCE DAILY (Patient not taking: Reported on 10/13/2023)    No current facility-administered medications on file prior to visit.      ROS:   Refer to HPI    EXAM:  /69 (BP Location: Right arm, Patient Position: Chair, Cuff Size: Adult Regular)   Pulse 75   Wt 67.1 kg (148 lb)   SpO2 95%   BMI 28.53 kg/m     GENERAL: Appears comfortable, in no acute distress.   HEENT: Eye symmetrical, no discharge or icterus bilaterally. Mucous membranes moist and without lesions.  CV: RRR, +S1S2, soft systolic murmur, rub, or gallop. JVP not appriable.   RESPIRATORY: Respirations regular, even, and unlabored. Lungs CTA throughout.   GI: Soft and non distended with normoactive bowel sounds present in all quadrants. No tenderness, rebound, guarding. No hepatomegaly.   EXTREMITIES: No peripheral edema. 2+ bilateral pedal pulses.   NEUROLOGIC: Alert and oriented x 3. No focal deficits.   MUSCULOSKELETAL: No joint swelling or tenderness.   SKIN: No jaundice. No rashes or lesions., minimal decrease in skin turgor present.     Labs, reviewed with patient in clinic today:  CBC RESULTS:  Lab Results   Component Value Date     WBC 9.4 06/23/2023    WBC 4.6 05/17/2021    RBC 3.92 06/23/2023    RBC 3.97 05/17/2021    HGB 12.3 06/23/2023    HGB 12.9 05/17/2021    HCT 39.4 06/23/2023    HCT 40.3 05/17/2021     (H) 06/23/2023     (H) 05/17/2021    MCH 31.4 06/23/2023    MCH 32.5 05/17/2021    MCHC 31.2 (L) 06/23/2023    MCHC 32.0 05/17/2021    RDW 15.8 (H) 06/23/2023    RDW 12.7 05/17/2021     06/23/2023     05/17/2021       CMP RESULTS:  Lab Results   Component Value Date     10/13/2023     06/04/2021    POTASSIUM 5.5 (H) 10/13/2023    POTASSIUM 4.6 10/05/2022    POTASSIUM 3.4 06/04/2021    CHLORIDE 98 10/13/2023    CHLORIDE 103 10/05/2022    CHLORIDE 106 06/04/2021    CO2 26 10/13/2023    CO2 27 10/05/2022    CO2 30 06/04/2021    ANIONGAP 11 10/13/2023    ANIONGAP 7 10/05/2022    ANIONGAP 6 06/04/2021     (H) 10/13/2023    GLC 85 01/31/2023    GLC 98 10/05/2022     (H) 06/04/2021    BUN 56.4 (H) 10/13/2023    BUN 39 (H) 10/05/2022    BUN 27 06/04/2021    CR 2.78 (H) 10/13/2023    CR 1.50 (H) 06/04/2021    GFRESTIMATED 16 (L) 10/13/2023    GFRESTIMATED 32 (L) 06/04/2021    GFRESTBLACK 37 (L) 06/04/2021    PRINCE 10.6 (H) 10/13/2023    PRINCE 9.4 06/04/2021    BILITOTAL 0.3 02/26/2023    BILITOTAL 0.3 05/17/2021    ALBUMIN 4.0 09/18/2023    ALBUMIN 3.6 09/27/2022    ALBUMIN 3.4 05/17/2021    ALKPHOS 76 02/26/2023    ALKPHOS 57 05/17/2021    ALT 38 (H) 02/26/2023    ALT 38 05/17/2021    AST 36 (H) 02/26/2023    AST 27 05/17/2021        INR RESULTS:  Lab Results   Component Value Date    INR 1.12 02/22/2023    INR 1.05 11/03/2020       Lab Results   Component Value Date    MAG 2.5 (H) 02/27/2023    MAG 2.4 (H) 10/28/2019     Lab Results   Component Value Date    NTBNPI 11,774 (H) 02/22/2023     Lab Results   Component Value Date    NTBNP 3,624 (H) 06/28/2023    NTBNP 462 (H) 05/17/2021       Cardiac Diagnostics:    3/13/2023 Echo   Interpretation Summary  The 26 mm ES3 TAVR is well-seated.  Leaflet opening is not clearly visualized.  There is trace valvular without paravalvular regurgitation. The Vmax is 2.4  m/s, the mean gradient is 14 mmHg, the dimensionless index is 0.80, and the  acceleration time is 80 ms.  Left ventricular function is decreased. The ejection fraction is 35-40%  (moderately reduced). Moderate diffuse hypokinesis is present.  Global right ventricular function is normal. The right ventricle is normal  size.  This study was compared with the study from 02/23/2023. There appears to have  been a decline in the global LV function but the endocardial definition is  poor on this study. The TAVR function is unchanged.    2/23/2023 Echo  Interpretation Summary  Status post 26 mm Arndt Janis Ultra valve TAVR on 9/12/2022. MG is 18 mmHg,  PV is 2.8 m/s. Trace paravalvular AI.  Left ventricular function is decreased. The ejection fraction is 45-50%  (mildly reduced).  Global right ventricular function is normal.  Severe mitral annular calcification is present.  The inferior vena cava was normal in size with preserved respiratory  variability.     This study was compared with the study from 1/31/2023 .LVEF mildly declined.  Mean gradient across TAVR mildly higher.    1/31/2023 Echo  Interpretation Summary  Left ventricular size, wall motion and function are normal. The ejection  fraction is 55-60%.  Right ventricular size and function are normal.  Status post 26 mm Arndt Janis Ultra valve TAVR on 9/12/2022. The valve is  well seated. MG 16 mmHg; PV 2.5 m/s; no paravalvular AI.  No pericardial effusion is present.     This study was compared with the study from 12/1/2022. No significant changes  Noted.    10/2022 Coronary Angiogram   Two vessel CAD with prior PCI to the RCA and LAD, otherwise mild non obstructive CAD elsewhere.  Patent stents in the RCA and LAD with mild to moderate in stent restenosis of the proximal LAD stents (progressed since last angiogram from 2020).  Proximal LAD  ISR hemodynamically not significant by dPR at 0.91.       Assessment and Plan:   Ms. Miller is a 87 year old female with medical history pertinent for HTN, CAD (s/p PCI in 1996, PCI to LAD and RCA in 2003, PCI to LAD x3 in 8/2020), severe aortic valve stenosis (s/p 26mm ES Ultra TAVR 9/12/22), PAD, HLD, HFpEF (LVEF 55-60% per TTE 12/2022), and anemia who presents to [facility] for routine follow up.     Appearing and feeling well. Euvolemic by exam.  Review of labs show an elevation in Cr  potassium. She is on max dose entresto. In previous attempts she had   reported headaches with 5 mg of Amlodipine.   Dr. Zuniga decreased the dose to 2.5 mg due to the headaches and now she has no headaches. Imdur 30 mg daily. By price check, both farixga and jardiance are cost prohibitive. The patients daughter states they are willing to pay for the medication. Once we get the creatinine level stable we will discuss the med start.    The patient refuses to go to the ER today she would rather try some medications adjustments. We will get another BMP on Monday morning.      # Acute on chronic systolic heart failure/HFrEF likely in the setting of uncontrolled HTN (EF 35-40% by TTE 3/2023)  Stage C. NYHA Class IIIB.    Fluid status: hypovolemic, HOLD lasix 30 mg daily   ACEi/ARB/ARNi/afterload reduction: entresto  mg BID -HOLD, Imdur 60 mg daily. Intolerance to hydralazine.   BB: intolerance to BB, coreg stopped 2/2023, given extensive CAD could attempt low dose BB?   Aldosterone antagonist: renal function - GFR 42. Hold Spironolactone 25 mg -    SGLT2i: Hold Jardiance start.   SCD prophylaxis: does not meet criteria for implant  NSAID use: contraindicated  Ischemia evaluation: last cor angio 10/2022; 2 vessel CAD RCA/LAD, mild to mod ISR of proxLAD, hemodynamically stable    # Coronary artery disease w/stable angina  # HTN  # HLD  s/p PCI in 1996, PCI to LAD and RCA in 2003, PCI to LAD x3 in 8/2020.    - Continue ASA 81 mg daily  lifelong  - Continue high intensity statin  - Multiple intolerances to BB, hydralazine, amlodipine--> re-started on amlodipine 2.5 mg daily  - Enstresto as above for BP control   - Continue exercise and heart healthy diet      # PAD   - denies claudication, normal pulse exam on left  - recent BALDO's mildly abnormal on left  - Continue ASA 81 mg lifelong  - vascular consult should she develop recurrent claudication     # Severe aortic stenosis  s/p 26mm ES Ultra TAVR 9/12/22.  Last TTE 12/2022 showed well-seated valve, with trace paravalvular AI, MG 11mmHg, and peak velocity 2.2m/sec.  Follows with the structural team, plan to repeat TTE 9/2023.  Needs SBE ppx.     # Anemia  Management per PCP, recently given IV iron.  Discussed referral to Anemia Clinic, which she will discuss with her PCP next week.  She is taking oral iron.    # CKD 3  Baseline Cr 1.1-1.4,   - Cr 2.7- we will adjust/stop medications and reassess.      Follow up:  Stop Spironolactone  Hold Furosemide  Hold Entresto     BMP on Monday     [facility] TBD        Brandon LOPEZ NP-C, CHFN  Advanced Heart Failure Nurse Practitioner  [facility]         Please do not hesitate to contact me if you have any questions/concerns.     Sincerely,     JESSICA Cho CNP

## 2023-10-13 NOTE — TELEPHONE ENCOUNTER
[facility] Call Center    Phone Message    May a detailed message be left on voicemail: yes     Reason for Call: Other: amita from the assisted living is calling to get a new order to stop lasix the one that she has says to stop 20mg but patient is taking 30 mg and amita needs a new order that says 30 instead of 20, please advise, thank you.    Fax [phone]  Action Taken: Other: cardiology     Travel Screening: Not Applicable  Thank you!  [facility]

## 2023-10-13 NOTE — NURSING NOTE
Chief Complaint   Patient presents with    Follow Up     Return CORE, 88 year old female with history of s/p tavr, presumed diastolic heart failure presents for follows up with labs prior.     Vitals were taken and medications reconciled.    CARLOTA Small  12:09 PM

## 2023-10-13 NOTE — PROGRESS NOTES
[facility] Cardiology   [facility]      HPI:   Ms. Miller is a 88 year old female with medical history pertinent for HTN, CAD (s/p PCI in 1996, PCI to LAD and RCA in 2003, PCI to LAD x3 in 8/2020), severe aortic valve stenosis (s/p 26mm ES Ultra TAVR 9/12/22), PAD, HLD, HFrEF (LVEF 35-40% per TTE 3/2023), and anemia who [place] [facility] [place].      With regards to her most recent medical conditions, Ms. Miller was last hospitalized 2/22-2/28/23 for shortness of breath, ADHF exacerbation, and pulmonary edema. She was diuresed with IV lasix. An echocardiogram was performed which showed reduced EF that is newly noted, 45 to 50%.  Ms. Miller was started on Entresto. She has been previously treated with beta blockers (coreg and metoprolol), amlodipine, and hydralazine, all of which she did not tolerate. Tells me that these medications caused her to have headaches and ear fullness.    Ms. Miller was recently seen in structural cardiology clinic. Echo at that time showed further reduction in her EF to 35-40%. Unclear etiology, possibly HTN. TAVR stable. Has had several visits in [facility] [place] where entresto has been up-titrated to max dose. Despite increased dose, has remained hypertensive. Her assisted living had been instructed to treat HTN with extra doses of lasix. She was on coreg in Feb 2023, but this was discontinued due to adverse side effects. Seen in the ED on 6/23 where she received her first dose of amlodipine 5 mg with mild improvement in BP. Instructed to take with tylenol as headaches are reported side effect to amlodipine.     Today Ms. Miller is seen in clinic with her daughter. She reports feeling quite well. Shaky and weak after starting the Aldactone.  No acute concerns and denies chest tightness/discomfort, SOB, GREGORY, palpitations, lightheadedness, dizziness, syncope, or near syncope. Denies orthopnea, PND, peripheral edema. She got COVID recently and was on Paxlovid.  Takes amlodipine 2.5 mg,  She has no headaches. She lives in assisted living. She is active and participates in exercise classes 5 days/week. Review of daily BP log shows BPs ranging 102-138 systolic. Weights are stable 144.6 lb. Patient states she has been eating and drinking well.  She didn't have any fluids today.       Cardiac Medications   - ASA 81 mg daily   - Lasix 30 mg daily - HOLD 10/13  - Rosuvastatin 10 mg daily   - Entresto  mg BID- HOLD 10/13  - Amlodipine 2.5 mg daily   - Imdur 60 mg  - Spironolactone 25 mg - STOP 10/13      PAST MEDICAL HISTORY:  Past Medical History:   Diagnosis Date    Aortic valvar stenosis 7/2010    mild    Asthma     Bipolar disorder (H)     CAD (coronary artery disease)     s/p angioplasty    Celiac disease     Colon polyps     Colon polyps 2012    every 3 year colonoscopy     Depression     High cholesterol     HTN     Mitral insufficiency     Pulmonary HTN (H)     mild    Tremors 7/10    drug induced from antidepressants    Tricuspid insufficiency        FAMILY HISTORY:  Family History   Problem Relation Age of Onset    Asthma Mother     Cerebrovascular Disease Mother     Arthritis Mother     Depression Mother     Lipids Sister     Hypertension Sister     Heart Disease Sister     Lipids Sister     Hypertension Sister     Lipids Sister     Breast Cancer Sister        SOCIAL HISTORY:  Social History     Socioeconomic History    Marital status:      Spouse name: Adrien    Number of children: 2    Years of education: 16   Occupational History     Employer: RETIRED     Comment: Retired in 2002.    Tobacco Use    Smoking status: Never    Smokeless tobacco: Never   Vaping Use    Vaping status: Never Used     Passive vaping exposure: Yes   Substance and Sexual Activity    Alcohol use: No     Alcohol/week: 0.0 standard drinks of alcohol     Types: 1 Standard drinks or equivalent per week    Drug use: No    Sexual activity: Not Currently     Partners: Male       CURRENT MEDICATIONS:  acetaminophen  (TYLENOL) 500 MG tablet, Take 1 tablet (500 mg) by mouth daily Continue previously ordered PRN tylenol order as well  acetaminophen (TYLENOL) 500 MG tablet, Take 1 tablet (500 mg) by mouth every 6 hours as needed for pain  albuterol (PROAIR HFA/PROVENTIL HFA/VENTOLIN HFA) 108 (90 Base) MCG/ACT inhaler, Inhale 2 puffs into the lungs every 6 hours as needed for wheezing or shortness of breath / dyspnea  amLODIPine (NORVASC) 2.5 MG tablet, Take 1 tablet (2.5 mg) by mouth At Bedtime  amoxicillin (AMOXIL) 500 MG capsule,   apixaban ANTICOAGULANT (ELIQUIS) 5 MG tablet, Take 1 tablet (5 mg) by mouth 2 times daily  aspirin (ASA) 81 MG EC tablet, Take 1 tablet (81 mg) by mouth daily  azelastine (ASTEPRO) 0.15 % nasal spray, USE 1 SPRAY IN EACH NOSTRIL ONCE A DAY  calcium citrate (CITRACAL) 950 (200 Ca) MG tablet, TAKE 1 TABLET BY MOUTH ONCE DAILY  cholecalciferol (VITAMIN D3) 1250 mcg (83620 units) capsule, TAKE 1 CAPSULE BY MOUTH ONCE WEEKLY  desvenlafaxine (PRISTIQ) 100 MG 24 hr tablet, Take 100 mg by mouth daily  empagliflozin (JARDIANCE) 10 MG TABS tablet, Take 1 tablet (10 mg) by mouth daily  fluticasone-salmeterol (ADVAIR DISKUS) 500-50 MCG/ACT inhaler, INHALE 1 PUFF BY MOUTH TWICE DAILY (Patient taking differently: 1 puff daily INHALE 1 PUFF BY MOUTH TWICE DAILY)  folic acid (FOLVITE) 400 MCG tablet, TAKE 1 TABLET BY MOUTH ONCE DAILY  furosemide (LASIX) 20 MG tablet, Take 1.5 tablets (30 mg) by mouth daily  iron polysaccharides (NIFEREX) 150 MG capsule, Take 1 capsule (150 mg) by mouth daily  isosorbide mononitrate (IMDUR) 30 MG 24 hr tablet, Take 2 tablets (60 mg) by mouth daily  lamoTRIgine (LAMICTAL) 25 MG tablet, Take 25 mg by mouth daily with 200 mg tablet for a total dose of 225 mg  LAMOTRIGINE 200 MG PO TABS, Take 200 mg by mouth daily with 25 mg tablet for a total dose of 225 mg  levothyroxine (SYNTHROID/LEVOTHROID) 50 MCG tablet, TAKE 1 TABLET BY MOUTH ONCE DAILY  liothyronine (CYTOMEL) 5 MCG tablet, Take 2  tablets (10 mcg) by mouth daily  memantine (NAMENDA) 10 MG tablet, Take 1 tablet (10 mg) by mouth daily  mirtazapine (REMERON) 7.5 MG tablet, Take 1 tablet (7.5 mg) by mouth At Bedtime  Multiple Vitamin (TAB-A-JENNIFER) TABS, Take 1 tablet by mouth daily  psyllium (METAMUCIL/KONSYL) 58.6 % powder, Take 1 tspn in liquid daily  rosuvastatin (CRESTOR) 10 MG tablet, Take 1 tablet (10 mg) by mouth daily  REGULOID 28.3 % POWD, MIX 1 TEASPOON IN LIQUID AND DRINK BY MOUTH ONCE DAILY (Patient not taking: Reported on 10/13/2023)    No current facility-administered medications on file prior to visit.      ROS:   Refer to HPI    EXAM:  /69 (BP Location: Right arm, Patient Position: Chair, Cuff Size: Adult Regular)   Pulse 75   Wt 67.1 kg (148 lb)   SpO2 95%   BMI 28.53 kg/m     GENERAL: Appears comfortable, in no acute distress.   HEENT: Eye symmetrical, no discharge or icterus bilaterally. Mucous membranes moist and without lesions.  CV: RRR, +S1S2, soft systolic murmur, rub, or gallop. JVP not appriable.   RESPIRATORY: Respirations regular, even, and unlabored. Lungs CTA throughout.   GI: Soft and non distended with normoactive bowel sounds present in all quadrants. No tenderness, rebound, guarding. No hepatomegaly.   EXTREMITIES: No peripheral edema. 2+ bilateral pedal pulses.   NEUROLOGIC: Alert and oriented x 3. No focal deficits.   MUSCULOSKELETAL: No joint swelling or tenderness.   SKIN: No jaundice. No rashes or lesions., minimal decrease in skin turgor present.     Labs, reviewed with patient in clinic today:  CBC RESULTS:  Lab Results   Component Value Date    WBC 9.4 06/23/2023    WBC 4.6 05/17/2021    RBC 3.92 06/23/2023    RBC 3.97 05/17/2021    HGB 12.3 06/23/2023    HGB 12.9 05/17/2021    HCT 39.4 06/23/2023    HCT 40.3 05/17/2021     (H) 06/23/2023     (H) 05/17/2021    MCH 31.4 06/23/2023    MCH 32.5 05/17/2021    MCHC 31.2 (L) 06/23/2023    MCHC 32.0 05/17/2021    RDW 15.8 (H) 06/23/2023     RDW 12.7 05/17/2021     06/23/2023     05/17/2021       CMP RESULTS:  Lab Results   Component Value Date     10/13/2023     06/04/2021    POTASSIUM 5.5 (H) 10/13/2023    POTASSIUM 4.6 10/05/2022    POTASSIUM 3.4 06/04/2021    CHLORIDE 98 10/13/2023    CHLORIDE 103 10/05/2022    CHLORIDE 106 06/04/2021    CO2 26 10/13/2023    CO2 27 10/05/2022    CO2 30 06/04/2021    ANIONGAP 11 10/13/2023    ANIONGAP 7 10/05/2022    ANIONGAP 6 06/04/2021     (H) 10/13/2023    GLC 85 01/31/2023    GLC 98 10/05/2022     (H) 06/04/2021    BUN 56.4 (H) 10/13/2023    BUN 39 (H) 10/05/2022    BUN 27 06/04/2021    CR 2.78 (H) 10/13/2023    CR 1.50 (H) 06/04/2021    GFRESTIMATED 16 (L) 10/13/2023    GFRESTIMATED 32 (L) 06/04/2021    GFRESTBLACK 37 (L) 06/04/2021    PRINCE 10.6 (H) 10/13/2023    PRINCE 9.4 06/04/2021    BILITOTAL 0.3 02/26/2023    BILITOTAL 0.3 05/17/2021    ALBUMIN 4.0 09/18/2023    ALBUMIN 3.6 09/27/2022    ALBUMIN 3.4 05/17/2021    ALKPHOS 76 02/26/2023    ALKPHOS 57 05/17/2021    ALT 38 (H) 02/26/2023    ALT 38 05/17/2021    AST 36 (H) 02/26/2023    AST 27 05/17/2021        INR RESULTS:  Lab Results   Component Value Date    INR 1.12 02/22/2023    INR 1.05 11/03/2020       Lab Results   Component Value Date    MAG 2.5 (H) 02/27/2023    MAG 2.4 (H) 10/28/2019     Lab Results   Component Value Date    NTBNPI 11,774 (H) 02/22/2023     Lab Results   Component Value Date    NTBNP 3,624 (H) 06/28/2023    NTBNP 462 (H) 05/17/2021       Cardiac Diagnostics:    3/13/2023 Echo   Interpretation Summary  The 26 mm ES3 TAVR is well-seated. Leaflet opening is not clearly visualized.  There is trace valvular without paravalvular regurgitation. The Vmax is 2.4  m/s, the mean gradient is 14 mmHg, the dimensionless index is 0.80, and the  acceleration time is 80 ms.  Left ventricular function is decreased. The ejection fraction is 35-40%  (moderately reduced). Moderate diffuse hypokinesis is  present.  Global right ventricular function is normal. The right ventricle is normal  size.  This study was compared with the study from 02/23/2023. There appears to have  been a decline in the global LV function but the endocardial definition is  poor on this study. The TAVR function is unchanged.    2/23/2023 Echo  Interpretation Summary  Status post 26 mm Arndt Janis Ultra valve TAVR on 9/12/2022. MG is 18 mmHg,  PV is 2.8 m/s. Trace paravalvular AI.  Left ventricular function is decreased. The ejection fraction is 45-50%  (mildly reduced).  Global right ventricular function is normal.  Severe mitral annular calcification is present.  The inferior vena cava was normal in size with preserved respiratory  variability.     This study was compared with the study from 1/31/2023 .LVEF mildly declined.  Mean gradient across TAVR mildly higher.    1/31/2023 Echo  Interpretation Summary  Left ventricular size, wall motion and function are normal. The ejection  fraction is 55-60%.  Right ventricular size and function are normal.  Status post 26 mm Arndt Janis Ultra valve TAVR on 9/12/2022. The valve is  well seated. MG 16 mmHg; PV 2.5 m/s; no paravalvular AI.  No pericardial effusion is present.     This study was compared with the study from 12/1/2022. No significant changes  Noted.    10/2022 Coronary Angiogram   Two vessel CAD with prior PCI to the RCA and LAD, otherwise mild non obstructive CAD elsewhere.  Patent stents in the RCA and LAD with mild to moderate in stent restenosis of the proximal LAD stents (progressed since last angiogram from 2020).  Proximal LAD ISR hemodynamically not significant by dPR at 0.91.       Assessment and Plan:   Ms. Miller is a 87 year old female with medical history pertinent for HTN, CAD (s/p PCI in 1996, PCI to LAD and RCA in 2003, PCI to LAD x3 in 8/2020), severe aortic valve stenosis (s/p 26mm ES Ultra TAVR 9/12/22), PAD, HLD, HFpEF (LVEF 55-60% per TTE 12/2022), and anemia  [place] [facility] [place]     Appearing and feeling well. Euvolemic by exam.  Review of labs show an elevation in Cr  potassium. She is on max dose entresto. In previous attempts she had   reported headaches with 5 mg of Amlodipine.   Dr. Zuniga decreased the dose to 2.5 mg due to the headaches and now she has no headaches. Imdur 30 mg daily. By price check, both farixga and jardiance are cost prohibitive. The patients daughter states they are willing to pay for the medication. Once we get the creatinine level stable we will discuss the med start.    The patient refuses to go to the ER today she would rather try some medications adjustments. We will get another BMP on Monday morning. We did discuss that if she has any new or worsening symptoms she should go to the ER.  She and her daughter verbalized understanding.       # Acute on chronic systolic heart failure/HFrEF likely in the setting of uncontrolled HTN (EF 35-40% by TTE 3/2023)  Stage C. NYHA Class IIIB.    Fluid status: hypovolemic, HOLD lasix 30 mg daily   ACEi/ARB/ARNi/afterload reduction: entresto  mg BID -HOLD, Imdur 60 mg daily. Intolerance to hydralazine.   BB: intolerance to BB, coreg stopped 2/2023, given extensive CAD could attempt low dose BB?   Aldosterone antagonist: renal function - GFR 42. Hold Spironolactone 25 mg -    SGLT2i: Hold Jardiance start.   SCD prophylaxis: does not meet criteria for implant  NSAID use: contraindicated  Ischemia evaluation: last cor angio 10/2022; 2 vessel CAD RCA/LAD, mild to mod ISR of proxLAD, hemodynamically stable    # Coronary artery disease w/stable angina  # HTN  # HLD  s/p PCI in 1996, PCI to LAD and RCA in 2003, PCI to LAD x3 in 8/2020.    - Continue ASA 81 mg daily lifelong  - Continue high intensity statin  - Multiple intolerances to BB, hydralazine, amlodipine--> re-started on amlodipine 2.5 mg daily  - Enstresto as above for BP control   - Continue exercise and heart healthy  diet      # PAD   - denies claudication, normal pulse exam on left  - recent BALDO's mildly abnormal on left  - Continue ASA 81 mg lifelong  - vascular consult should she develop recurrent claudication     # Severe aortic stenosis  s/p 26mm ES Ultra TAVR 9/12/22.  Last TTE 12/2022 showed well-seated valve, with trace paravalvular AI, MG 11mmHg, and peak velocity 2.2m/sec.  Follows with the structural team, plan to repeat TTE 9/2023.  Needs SBE ppx.     # Anemia  Management per PCP, recently given IV iron.  Discussed referral to Anemia Clinic, which she will discuss with her PCP next week.  She is taking oral iron.    # CKD 3  Baseline Cr 1.1-1.4,   - Cr 2.7- we will adjust/stop medications and reassess.      Follow up:  Stop Spironolactone  Hold Furosemide  Hold Entresto     BMP on Monday     CORE TBD        Brandon LOPEZ NP-C, CHFN  Advanced Heart Failure Nurse Practitioner  [facility]

## 2023-10-15 DIAGNOSIS — N18.32 STAGE 3B CHRONIC KIDNEY DISEASE (H): ICD-10-CM

## 2023-10-16 ENCOUNTER — LAB (OUTPATIENT)
Dept: LAB | Facility: CLINIC | Age: 88
End: 2023-10-16
Payer: MEDICARE

## 2023-10-16 DIAGNOSIS — I50.22 CHRONIC SYSTOLIC HEART FAILURE (H): ICD-10-CM

## 2023-10-16 LAB
ANION GAP SERPL CALCULATED.3IONS-SCNC: 12 MMOL/L (ref 7–15)
BUN SERPL-MCNC: 63.3 MG/DL (ref 8–23)
CALCIUM SERPL-MCNC: 9.7 MG/DL (ref 8.8–10.2)
CHLORIDE SERPL-SCNC: 97 MMOL/L (ref 98–107)
CREAT SERPL-MCNC: 2.98 MG/DL (ref 0.51–0.95)
DEPRECATED HCO3 PLAS-SCNC: 24 MMOL/L (ref 22–29)
EGFRCR SERPLBLD CKD-EPI 2021: 15 ML/MIN/1.73M2
GLUCOSE SERPL-MCNC: 97 MG/DL (ref 70–99)
POTASSIUM SERPL-SCNC: 5 MMOL/L (ref 3.4–5.3)
SODIUM SERPL-SCNC: 133 MMOL/L (ref 135–145)

## 2023-10-16 PROCEDURE — 80048 BASIC METABOLIC PNL TOTAL CA: CPT | Performed by: PATHOLOGY

## 2023-10-16 PROCEDURE — 36415 COLL VENOUS BLD VENIPUNCTURE: CPT | Performed by: PATHOLOGY

## 2023-10-16 RX ORDER — IBUPROFEN 200 MG
CAPSULE ORAL
Qty: 30 TABLET | Refills: 11 | Status: SHIPPED | OUTPATIENT
Start: 2023-10-16

## 2023-10-16 NOTE — TELEPHONE ENCOUNTER
Returned call to [facility]. She reports the order they got said stop lasix 20mg , but patient takes 30mg. They did not get clarification on Friday so they continued to give Kamryn the lasix through the weekend. They confirm they did hold the spironolactone and entresto.   Weights 10/14 was 143.5lb, yesterday 144.8lb and today 143.2lb.  Blood pressure on 10/14 was 147/78, yesterday 126/67.    Labs pending from today.

## 2023-10-16 NOTE — TELEPHONE ENCOUNTER
Date: 10/16/2023    Time of Call: 11:57 AM     Diagnosis:  heart failure     [ VORB ] Ordering provider: Brandon Varma NP    Order: hold lasix. Continue to hold entresto. STOP spironolactone. Recheck Bmp Wednesday.      Order received by: Meg Ayoub RN      Follow-up/additional notes: called kamryn. Discussed lab results. Reviewed med changes as above. Scheduled lab recheck for Wednesday AM.   Kamryn states understanding, states she feels well. Does not feel dizzy or light headed. Does feel continued fatigue after covid a couple weeks ago.

## 2023-10-18 ENCOUNTER — LAB (OUTPATIENT)
Dept: LAB | Facility: CLINIC | Age: 88
End: 2023-10-18
Payer: MEDICARE

## 2023-10-18 ENCOUNTER — TELEPHONE (OUTPATIENT)
Dept: CARDIOLOGY | Facility: CLINIC | Age: 88
End: 2023-10-18

## 2023-10-18 DIAGNOSIS — I50.22 CHRONIC SYSTOLIC HEART FAILURE (H): Primary | ICD-10-CM

## 2023-10-18 DIAGNOSIS — I50.30 NYHA CLASS 3 HEART FAILURE WITH PRESERVED EJECTION FRACTION (H): ICD-10-CM

## 2023-10-18 LAB
ANION GAP SERPL CALCULATED.3IONS-SCNC: 12 MMOL/L (ref 7–15)
BUN SERPL-MCNC: 57.6 MG/DL (ref 8–23)
CALCIUM SERPL-MCNC: 9.9 MG/DL (ref 8.8–10.2)
CHLORIDE SERPL-SCNC: 100 MMOL/L (ref 98–107)
CREAT SERPL-MCNC: 2.46 MG/DL (ref 0.51–0.95)
DEPRECATED HCO3 PLAS-SCNC: 25 MMOL/L (ref 22–29)
EGFRCR SERPLBLD CKD-EPI 2021: 18 ML/MIN/1.73M2
GLUCOSE SERPL-MCNC: 99 MG/DL (ref 70–99)
POTASSIUM SERPL-SCNC: 4.7 MMOL/L (ref 3.4–5.3)
SODIUM SERPL-SCNC: 137 MMOL/L (ref 135–145)

## 2023-10-18 PROCEDURE — 36415 COLL VENOUS BLD VENIPUNCTURE: CPT | Performed by: PATHOLOGY

## 2023-10-18 PROCEDURE — 80048 BASIC METABOLIC PNL TOTAL CA: CPT | Performed by: PATHOLOGY

## 2023-10-18 NOTE — TELEPHONE ENCOUNTER
I called Kamryn with lab results and to check in. LVM asking for a call back to discuss.    I also called the nursing staff at [facility] and let them know that we would like to continue to hold furosemide, Entresto, and aldactone. I asked for a recheck of BMP next week and faxed orders. They will fax a list of her recent weights and BP.    Sherry Saldana RN

## 2023-10-18 NOTE — TELEPHONE ENCOUNTER
Recent weights and BP:    10/18 - 142/60, 144.3 lbs  10/17 - 143.2 lbs  10/16 - 143.2 lbs  10/15 - 126/67, 144.8 lbs  10/14 - 147/78, 143.5 lbs  10/13 - 138/72, 144.6 lbs  10/12 - 122/64, 145.3 lbs  10/11 - 118/62, 144.2 lbs  10/10 - 120/68, 144 lbs  10/9 - 109/56, 144.1 lbs    Sherry Saldana, RN

## 2023-10-19 RX ORDER — SACUBITRIL AND VALSARTAN 49; 51 MG/1; MG/1
1 TABLET, FILM COATED ORAL 2 TIMES DAILY
Qty: 60 TABLET | Refills: 3 | Status: SHIPPED | OUTPATIENT
Start: 2023-10-19 | End: 2024-05-14

## 2023-10-19 NOTE — TELEPHONE ENCOUNTER
Date: 10/19/2023    Time of Call: 9:28 AM     Diagnosis:  heart failure     [ TORB ] Ordering provider: Brandon Varma NP  Order: restart Entresto 49-51 mg BID - so they can do 1/2 tab of 97/103 mg BID     Labs next week Tuesday - BMP      Order received by: Sherry Saldana RN     Follow-up/additional notes: I called [facility] RN and discussed restarting Entresto 49-51 BID. Order sent to pharmacy and faxed to [facility].

## 2023-10-21 ENCOUNTER — LAB REQUISITION (OUTPATIENT)
Dept: LAB | Facility: CLINIC | Age: 88
End: 2023-10-21
Payer: MEDICARE

## 2023-10-21 DIAGNOSIS — I50.9 HEART FAILURE, UNSPECIFIED (H): ICD-10-CM

## 2023-10-23 ENCOUNTER — TELEPHONE (OUTPATIENT)
Dept: CARDIOLOGY | Facility: CLINIC | Age: 88
End: 2023-10-23
Payer: MEDICARE

## 2023-10-23 NOTE — TELEPHONE ENCOUNTER
[facility] Call Center    Phone Message    May a detailed message be left on voicemail: yes     Reason for Call: Other: Pt is experiencing weight gain, 10/21 144.8 lbs, 10/22 150.9, 10/23 147.2. Please reach out to further discuss.     Action Taken: Other: Cardiology    Travel Screening: Not Applicable    Thank you!  [facility]                                                          Message sent to provider Brandon Varma for recommendations.

## 2023-10-24 LAB
ANION GAP SERPL CALCULATED.3IONS-SCNC: 10 MMOL/L (ref 7–15)
BUN SERPL-MCNC: 32 MG/DL (ref 8–23)
CALCIUM SERPL-MCNC: 9.1 MG/DL (ref 8.8–10.2)
CHLORIDE SERPL-SCNC: 107 MMOL/L (ref 98–107)
CREAT SERPL-MCNC: 1.49 MG/DL (ref 0.51–0.95)
DEPRECATED HCO3 PLAS-SCNC: 26 MMOL/L (ref 22–29)
EGFRCR SERPLBLD CKD-EPI 2021: 34 ML/MIN/1.73M2
GLUCOSE SERPL-MCNC: 96 MG/DL (ref 70–99)
POTASSIUM SERPL-SCNC: 4.4 MMOL/L (ref 3.4–5.3)
SODIUM SERPL-SCNC: 143 MMOL/L (ref 135–145)

## 2023-10-24 PROCEDURE — 36415 COLL VENOUS BLD VENIPUNCTURE: CPT | Mod: ORL | Performed by: NURSE PRACTITIONER

## 2023-10-24 PROCEDURE — P9604 ONE-WAY ALLOW PRORATED TRIP: HCPCS | Mod: ORL | Performed by: NURSE PRACTITIONER

## 2023-10-24 PROCEDURE — 80048 BASIC METABOLIC PNL TOTAL CA: CPT | Mod: ORL | Performed by: NURSE PRACTITIONER

## 2023-10-25 DIAGNOSIS — I50.30 NYHA CLASS 3 HEART FAILURE WITH PRESERVED EJECTION FRACTION (H): ICD-10-CM

## 2023-10-25 RX ORDER — FUROSEMIDE 20 MG
30 TABLET ORAL DAILY
Qty: 135 TABLET | Refills: 3 | Status: SHIPPED | OUTPATIENT
Start: 2023-10-25

## 2023-10-25 NOTE — TELEPHONE ENCOUNTER
New orders received:  Date: 10/25/2023    Time of Call: 11:34 AM     Diagnosis:  HF, weight gain     [ VORB ] Ordering provider: Brandon Varma  Order: Restart taking Lasix 30 MG daily, repeat BMP lab test in one week after restart.     Order received by: Jimmie Coppola RN     Follow-up/additional notes: Called and spoke with nurse at [facility] and verbal order given. Prescription sent to pharmacy.

## 2023-11-01 DIAGNOSIS — N28.9 RENAL INSUFFICIENCY: Primary | ICD-10-CM

## 2023-11-02 ENCOUNTER — LAB REQUISITION (OUTPATIENT)
Dept: LAB | Facility: CLINIC | Age: 88
End: 2023-11-02
Payer: MEDICARE

## 2023-11-02 DIAGNOSIS — I50.9 HEART FAILURE, UNSPECIFIED (H): ICD-10-CM

## 2023-11-03 PROCEDURE — 36415 COLL VENOUS BLD VENIPUNCTURE: CPT | Mod: ORL | Performed by: NURSE PRACTITIONER

## 2023-11-03 PROCEDURE — 80048 BASIC METABOLIC PNL TOTAL CA: CPT | Mod: ORL | Performed by: NURSE PRACTITIONER

## 2023-11-03 PROCEDURE — P9603 ONE-WAY ALLOW PRORATED MILES: HCPCS | Mod: ORL | Performed by: NURSE PRACTITIONER

## 2023-11-04 LAB
ANION GAP SERPL CALCULATED.3IONS-SCNC: 12 MMOL/L (ref 7–15)
BUN SERPL-MCNC: 23 MG/DL (ref 8–23)
CALCIUM SERPL-MCNC: 9.7 MG/DL (ref 8.8–10.2)
CHLORIDE SERPL-SCNC: 100 MMOL/L (ref 98–107)
CREAT SERPL-MCNC: 1.49 MG/DL (ref 0.51–0.95)
DEPRECATED HCO3 PLAS-SCNC: 25 MMOL/L (ref 22–29)
EGFRCR SERPLBLD CKD-EPI 2021: 34 ML/MIN/1.73M2
GLUCOSE SERPL-MCNC: 87 MG/DL (ref 70–99)
POTASSIUM SERPL-SCNC: 5.2 MMOL/L (ref 3.4–5.3)
SODIUM SERPL-SCNC: 137 MMOL/L (ref 135–145)

## 2023-11-10 DIAGNOSIS — Z95.2 S/P TAVR (TRANSCATHETER AORTIC VALVE REPLACEMENT): Primary | ICD-10-CM

## 2023-11-10 NOTE — PROGRESS NOTES
Date: 11/10/2023    Time of Call: 1:24 PM     Diagnosis:  s/p TAVR     [ TORB ] Ordering provider: Pati De Santiago  Order: CBC and CMP     Order received by: Keiko Godinez RN     Follow-up/additional notes:      CT Surgery

## 2023-11-10 NOTE — PROGRESS NOTES
[facility] HEART [facility]  CARDIOVASCULAR DIVISION    VALVE CLINIC RETURN VISIT    PRIMARY CARDIOLOGIST: Dr. Zuniga      PERTINENT CLINICAL HISTORY:     Kamryn Miller is a very pleasant 87 year old female who presents for 1 year TAVR follow-up. She has a history of HFpEF now HFrEF, HTN, HLD, significant CAD history (PTCA 1996, LAD and RCA stenting 2003, s/p LAD PCI 2020, recent cath 10/2022 with patent stents), CKD, chronic anemia and severe aortic valvular stenosis s/p (TAVR) with a 26mm Arndt Janis Ultra on 9/12/22 by Morro Burrows. Her post TAVR echo showed mean PG of 7 mmHg with trace PVL. She was noted to have 60% left femoral stenosis post procedure, but had adequate flow on peripheral angiogram. She was also noted to have new LBBB post procedure and discharged on a Cardionet which showed no high degree AVB.     Evaluated 1 month post TAVR. At the visit she reported worsening shortness of breath and angina. ECHO showed mean PG 13 mmHg with trace PVL. Underwent coronary angiogram 10/2022 which showed patent stents.     Since that time BP has been difficult to treat. She has intolerances to beta blockers, amlodipine and hydralazine. She has been admitted a few times with severe hypertension and volume overload. Now following for CORE clinic, BP and volume status well controlled.     Was noted to have elevated mean PG up 18 mmHg on 6 month TAVR echo. CT TAVR showed severe HALT. She was started on AC.     Her EF has been slowly declining since her TAVR. Was 55-60% pre TAVR, dropped to 40-45%, now 30-35% despite GDMT.     Had recent TED with creatinine up to 2.5 and hyperkalemia in the setting of COVID. [place] was discontinued, lasix and entresto held with improvement in renal function.     Today Ms. Miller is seen in clinic with her daughter. She reports feeling quite well. She denies chest tightness/discomfort, SOB, GREGORY, palpitations, lightheadedness, dizziness, syncope, or near syncope. Denies orthopnea,  PND, peripheral edema.  She lives in assisted living. She is active and participates in exercise classes. Review of daily BP log shows BPs ranging 102-138 systolic. Weights are stable 144.6 lb. Patient states she has been eating and drinking well.      Current cardiac medications: ASA 81 mg daily, Enstresto 49-51 mg BID, Jardiance 10 mg, lasix 30 mg daily, amlodipine 2.5 mg daily, Imdur 60 mg, Eliquis 5 mg BID.      PAST MEDICAL HISTORY:     Past Medical History:   Diagnosis Date    Aortic valvar stenosis 7/2010    mild    Asthma     Bipolar disorder (H)     CAD (coronary artery disease)     s/p angioplasty    Celiac disease     Colon polyps     Colon polyps 2012    every 3 year colonoscopy     Depression     High cholesterol     HTN     Mitral insufficiency     Pulmonary HTN (H)     mild    Tremors 7/10    drug induced from antidepressants    Tricuspid insufficiency         PAST SURGICAL HISTORY:     Past Surgical History:   Procedure Laterality Date    ANGIOGRAM  6/26/2009    ANGIOPLASTY  9/96    for angina    ANGIOPLASTY  8/03 - 9/03    X -2 - with stenst in coronary car.    APPENDECTOMY      BREAST BIOPSY, RT/LT      Breat Biopsy RT/LT, benign    BUNIONECTOMY  11/8/2011    Procedure:BUNIONECTOMY; Left donna bunionectomy; Surgeon:FERDY LITTLEJOHN; Location:[facility] OR    BUNIONECTOMY RT/LT  5/2007    right bunion and right 2nd hammertoe    CATARACT IOL, RT/LT  6/12 and 7/12    bilateral    COLONOSCOPY  2007, 2012    every 3 years for polyps    CV CORONARY ANGIOGRAM N/A 8/21/2020    Procedure: CV CORONARY ANGIOGRAM;  Surgeon: Gianluca Toscano MD;  Location:  HEART CARDIAC CATH LAB    CV CORONARY ANGIOGRAM N/A 10/25/2022    Procedure: Coronary Angiogram;  Surgeon: Carter Douglass MD;  Location: [facility] CARDIAC CATH LAB    CV INSTANTANEOUS WAVE-FREE RATIO N/A 10/25/2022    Procedure: Instantaneous Wave-Free Ratio;  Surgeon: Carter Douglass MD;  Location:  HEART CARDIAC CATH LAB     CV LEFT HEART CATH N/A 8/21/2020    Procedure: CV LEFT HEART CATH;  Surgeon: Gianluca Toscano MD;  Location:  HEART CARDIAC CATH LAB    CV LEFT HEART CATH N/A 11/3/2020    Procedure: CV LEFT HEART CATH;  Surgeon: Tom Burrows MD;  Location:  HEART CARDIAC CATH LAB    CV LOWER EXTREMITY ANGIOGRAM BILATERAL N/A 11/3/2020    Procedure: CV ANGIOGRAM LOWER EXTREMITY BILATERAL;  Surgeon: Tom Burrows MD;  Location:  HEART CARDIAC CATH LAB    CV PCI STENT DRUG ELUTING N/A 8/21/2020    Procedure: Percutaneous Coronary Intervention Stent Drug Eluting;  Surgeon: Gianluca Toscano MD;  Location:  HEART CARDIAC CATH LAB    CV RIGHT HEART CATH MEASUREMENTS RECORDED N/A 8/21/2020    Procedure: CV RIGHT HEART CATH;  Surgeon: Gianluca Toscano MD;  Location:  HEART CARDIAC CATH LAB    CV RIGHT HEART CATH MEASUREMENTS RECORDED N/A 11/3/2020    Procedure: CV RIGHT HEART CATH;  Surgeon: Tom Burrows MD;  Location:  HEART CARDIAC CATH LAB    CV TRANSCATHETER AORTIC VALVE REPLACEMENT N/A 9/12/2022    Procedure: Transfemoral Transcatheter Aortic Valve Replacement with possible open heart bypass and or balloon pump placement and any indicated procedure;  Surgeon: Tom Burrows MD;  Location: [facility] CARDIAC CATH LAB    HEART CATH FEMORAL CANNULIZATION WITH OPEN STANDBY REPAIR AORTIC VALVE N/A 9/12/2022    Procedure: OR TRANSCATHETER AORTIC VALVE REPLACEMENT, OPEN FEMORAL ARTERY APPROACH;  Surgeon: Arthur Markham MD;  Location: [facility] CARDIAC CATH LAB    JOINT REPLACEMENT, HIP RT/LT  4/2008    right hip replaced    JOINT REPLACEMENT, HIP RT/LT  4/2009    left hip replaced    REPAIR HAMMER TOE  11/8/2011    Procedure:REPAIR HAMMER TOE; left 2nd hammertoe repair; Surgeon:FREDY LITTLEJOHN; Location: OR    SURGICAL HISTORY OF -   11/11    left bunion and 2nd hammertoe repair    TUBAL LIGATION      ZZC ANESTH,BLEPHAROPLASTY      (R) for drooping eyelid    ZZC APPENDECTOMY           CURRENT MEDICATIONS:     Current Outpatient Medications   Medication Sig Dispense Refill    acetaminophen (TYLENOL) 500 MG tablet Take 1 tablet (500 mg) by mouth daily Continue previously ordered PRN tylenol order as well 30 tablet 11    acetaminophen (TYLENOL) 500 MG tablet Take 1 tablet (500 mg) by mouth every 6 hours as needed for pain 60 tablet 4    albuterol (PROAIR HFA/PROVENTIL HFA/VENTOLIN HFA) 108 (90 Base) MCG/ACT inhaler Inhale 2 puffs into the lungs every 6 hours as needed for wheezing or shortness of breath / dyspnea 18 g 3    amLODIPine (NORVASC) 2.5 MG tablet Take 1 tablet (2.5 mg) by mouth At Bedtime 90 tablet 3    amoxicillin (AMOXIL) 500 MG capsule       apixaban ANTICOAGULANT (ELIQUIS) 5 MG tablet Take 1 tablet (5 mg) by mouth 2 times daily 60 tablet 3    aspirin (ASA) 81 MG EC tablet Take 1 tablet (81 mg) by mouth daily 30 tablet 0    azelastine (ASTEPRO) 0.15 % nasal spray USE 1 SPRAY IN EACH NOSTRIL ONCE A DAY 30 mL 11    calcium citrate (CITRACAL) 950 (200 Ca) MG tablet TAKE 1 TABLET BY MOUTH ONCE DAILY 30 tablet 11    cholecalciferol (VITAMIN D3) 1250 mcg (63911 units) capsule TAKE 1 CAPSULE BY MOUTH ONCE WEEKLY 4 capsule 11    desvenlafaxine (PRISTIQ) 100 MG 24 hr tablet Take 100 mg by mouth daily      empagliflozin (JARDIANCE) 10 MG TABS tablet Take 1 tablet (10 mg) by mouth daily 90 tablet 3    fluticasone-salmeterol (ADVAIR DISKUS) 500-50 MCG/ACT inhaler INHALE 1 PUFF BY MOUTH TWICE DAILY (Patient taking differently: 1 puff daily INHALE 1 PUFF BY MOUTH TWICE DAILY) 60 each 0    folic acid (FOLVITE) 400 MCG tablet TAKE 1 TABLET BY MOUTH ONCE DAILY 28 tablet 11    furosemide (LASIX) 20 MG tablet Take 1.5 tablets (30 mg) by mouth daily 135 tablet 3    iron polysaccharides (NIFEREX) 150 MG capsule Take 1 capsule (150 mg) by mouth daily 30 capsule 1    isosorbide mononitrate (IMDUR) 30 MG 24 hr tablet Take 2 tablets (60 mg) by mouth daily 60 tablet 11    lamoTRIgine (LAMICTAL) 25 MG  "tablet Take 25 mg by mouth daily with 200 mg tablet for a total dose of 225 mg      LAMOTRIGINE 200 MG PO TABS Take 200 mg by mouth daily with 25 mg tablet for a total dose of 225 mg      levothyroxine (SYNTHROID/LEVOTHROID) 50 MCG tablet TAKE 1 TABLET BY MOUTH ONCE DAILY 28 tablet 11    liothyronine (CYTOMEL) 5 MCG tablet Take 2 tablets (10 mcg) by mouth daily 30 tablet 3    memantine (NAMENDA) 10 MG tablet Take 1 tablet (10 mg) by mouth daily 30 tablet 3    mirtazapine (REMERON) 7.5 MG tablet Take 1 tablet (7.5 mg) by mouth At Bedtime 30 tablet 3    Multiple Vitamin (TAB-A-JENNIFER) TABS Take 1 tablet by mouth daily 30 tablet PRN    psyllium (METAMUCIL/KONSYL) 58.6 % powder Take 1 tspn in liquid daily      REGULOID 28.3 % POWD MIX 1 TEASPOON IN LIQUID AND DRINK BY MOUTH ONCE DAILY (Patient not taking: Reported on 10/13/2023) 538 g 11    rosuvastatin (CRESTOR) 10 MG tablet Take 1 tablet (10 mg) by mouth daily 30 tablet 0    sacubitril-valsartan (ENTRESTO) 49-51 MG per tablet Take 1 tablet by mouth 2 times daily 60 tablet 3        ALLERGIES:     Allergies   Allergen Reactions    Ace Inhibitors Cough    Amlodipine      Headache and plugged ears    Carvedilol      \"plugged ears and a headache\"    Diclofenac Other (See Comments)     Balance problems    Gluten Meal Diarrhea    Hydralazine-Hctz      headache        FAMILY HISTORY:     Family History   Problem Relation Age of Onset    Asthma Mother     Cerebrovascular Disease Mother     Arthritis Mother     Depression Mother     Lipids Sister     Hypertension Sister     Heart Disease Sister     Lipids Sister     Hypertension Sister     Lipids Sister     Breast Cancer Sister         SOCIAL HISTORY:     Social History     Socioeconomic History    Marital status:      Spouse name: Adrien    Number of children: 2    Years of education: 16    Highest education level: None   Occupational History     Employer: RETIRED     Comment: Retired in 2002.    Tobacco Use    Smoking " status: Never Smoker    Smokeless tobacco: Never Used   Substance and Sexual Activity    Alcohol use: No     Alcohol/week: 0.0 standard drinks     Types: 1 Standard drinks or equivalent per week    Drug use: No    Sexual activity: Not Currently     Partners: Male        REVIEW OF SYSTEMS:     Constitutional: No fevers or chills  Skin: No new rash or itching  Eyes: No acute change in vision  Ears/Nose/Throat: No purulent rhinorrhea, new hearing loss, or new vertigo  Respiratory: No cough or hemoptysis  Cardiovascular: See HPI  Gastrointestinal: No change in appetite, vomiting, hematemesis or diarrhea  Genitourinary: No dysuria or hematuria  Musculoskeletal: No new back pain, neck pain or muscle pain  Neurologic: No new headaches, focal weakness or behavior changes  Psychiatric: No hallucinations, excessive alcohol consumption or illegal drug usage  Hematologic/Lymphatic/Immunologic: No bleeding, chills, fever, night sweats or weight loss  Endocrine: No new cold intolerance, heat intolerance, polyphagia, polydipsia or polyuria      PHYSICAL EXAMINATION:     /79 (BP Location: Right arm, Patient Position: Chair, Cuff Size: Adult Large)   Pulse 70   Wt 68.4 kg (150 lb 14.4 oz)   SpO2 96%   BMI 29.09 kg/m      GENERAL: No acute distress.  HEENT: EOMI. Sclerae white, not injected. Nares clear. Pharynx without erythema or exudate.   Neck: No adenopathy. No thyromegaly. No jugular venous distension.   Heart: Regular rate and rhythm. No murmur.   Lungs: Clear to auscultation. No ronchi, wheezes, rales.   Abdomen: Soft, nontender, nondistended. Bowel sounds present.  Extremities: No clubbing, cyanosis, or edema.   Neurologic: Alert and oriented to person/place/time, normal speech and affect. No focal deficits.  Skin: No petechiae, purpura or rash.     LABORATORY DATA:     LIPID RESULTS:  Lab Results   Component Value Date    CHOL 155 10/24/2022    CHOL 175 10/23/2020    HDL 54 10/24/2022    HDL 71 10/23/2020    LDL  76 10/24/2022    LDL 83 10/23/2020    TRIG 124 10/24/2022    TRIG 103 10/23/2020    CHOLHDLRATIO 2.8 09/18/2015       LIVER ENZYME RESULTS:  Lab Results   Component Value Date    AST 36 (H) 02/26/2023    AST 27 05/17/2021    ALT 38 (H) 02/26/2023    ALT 38 05/17/2021       CBC RESULTS:  Lab Results   Component Value Date    WBC 9.4 06/23/2023    WBC 4.6 05/17/2021    RBC 3.92 06/23/2023    RBC 3.97 05/17/2021    HGB 12.3 06/23/2023    HGB 12.9 05/17/2021    HCT 39.4 06/23/2023    HCT 40.3 05/17/2021     (H) 06/23/2023     (H) 05/17/2021    MCH 31.4 06/23/2023    MCH 32.5 05/17/2021    MCHC 31.2 (L) 06/23/2023    MCHC 32.0 05/17/2021    RDW 15.8 (H) 06/23/2023    RDW 12.7 05/17/2021     06/23/2023     05/17/2021       BMP RESULTS:  Lab Results   Component Value Date     11/03/2023     06/04/2021    POTASSIUM 5.2 11/03/2023    POTASSIUM 4.6 10/05/2022    POTASSIUM 3.4 06/04/2021    CHLORIDE 100 11/03/2023    CHLORIDE 103 10/05/2022    CHLORIDE 106 06/04/2021    CO2 25 11/03/2023    CO2 27 10/05/2022    CO2 30 06/04/2021    ANIONGAP 12 11/03/2023    ANIONGAP 7 10/05/2022    ANIONGAP 6 06/04/2021    GLC 87 11/03/2023    GLC 85 01/31/2023    GLC 98 10/05/2022     (H) 06/04/2021    BUN 23.0 11/03/2023    BUN 39 (H) 10/05/2022    BUN 27 06/04/2021    CR 1.49 (H) 11/03/2023    CR 1.50 (H) 06/04/2021    GFRESTIMATED 34 (L) 11/03/2023    GFRESTIMATED 32 (L) 06/04/2021    GFRESTBLACK 37 (L) 06/04/2021    PRINCE 9.7 11/03/2023    PRINCE 9.4 06/04/2021        A1C RESULTS:  Lab Results   Component Value Date    A1C  02/15/2023      Comment:      The previously reported result may be inaccurate due to a laboratory instrument calibration issue. Providers should order a new test to be performed if clinically indicated. [facility] will issue a credit for this test.  This is a corrected result. Previous result was 6.4 % on 2/15/2023 at  6:09 PM CST    A1C 5.0 06/30/2009        INR RESULTS:  Lab Results   Component Value Date    INR 1.12 02/22/2023    INR 1.21 (H) 11/30/2022    INR 1.05 11/03/2020    INR 2.28 (H) 04/23/2009          PROCEDURES & FURTHER ASSESSMENTS:   CT Heart:  NIKHIL resolved    ECHO 11/13/23:  ______________________________________________________________________________  Interpretation Summary  Status post 26 mm Arndt Janis Ultra TAVR on 9/12/2022.  There is mild paravalvular regurgitation. There is trace valvular  regurgitation. The mean gradient across the aortic valve is 13 mmHg.  Severe mitral annular calcification is present.  Left ventricular function is decreased. The ejection fraction is 30-35%  (moderately reduced).  Global right ventricular function is normal.  IVC diameter <2.1 cm collapsing >50% with sniff suggests a normal RA pressure  of 3 mmHg.  No pericardial effusion is present.  No significant changes noted. The presence of severe MAC makes PVL assessment  challenging. PVL better assessed today but appears unchanged from prior.  ______________________________________________________________________________  Left Ventricle  Left ventricular size is normal. Borderline concentric wall thickening  consistent with left ventricular hypertrophy is present. Left ventricular  function is decreased. The ejection fraction is 30-35% (moderately reduced).  Moderate diffuse hypokinesis is present. Abnormal septal motion consistent  with left bundle branch block is present.     Right Ventricle  The right ventricle is normal size. Global right ventricular function is  normal.     Mitral Valve  Severe mitral annular calcification is present. Trace mitral insufficiency is  present. The mean mitral valve gradient is 2.8 mmHg.     Aortic Valve  The mean gradient across the aortic valve is 13 mmHg. Transcutaneous aortic  valve replacement is present. The prosthetic aortic valve is well-seated.  There is mild paravalvular regurgitation. There is trace  valvular  regurgitation.     Tricuspid Valve  The tricuspid valve is normal. The peak velocity of the tricuspid regurgitant  jet is not obtainable.     Pulmonic Valve  Trace pulmonic insufficiency is present.     Vessels  The aorta root is normal. IVC diameter <2.1 cm collapsing >50% with sniff  suggests a normal RA pressure of 3 mmHg.     Pericardium  No pericardial effusion is present.     Compared to Previous Study  No significant changes noted.     ______________________________________________________________________________  MMode/2D Measurements & Calculations  IVSd: 1.3 cm  LVIDd: 4.2 cm  LVIDs: 3.1 cm  LVPWd: 1.0 cm  FS: 26.7 %     LV mass(C)d: 167.6 grams  LV mass(C)dI: 102.0 grams/m2  asc Aorta Diam: 1.9 cm     EF(MOD-bp): 37.1 %  Asc Ao diam index BSA (cm/m2): 1.2  Asc Ao diam index Ht(cm/m): 1.3  RWT: 0.50     Doppler Measurements & Calculations  MV max P.8 mmHg  MV mean P.8 mmHg  MV V2 VTI: 29.3 cm  Ao V2 max: 241.8 cm/sec  Ao max P.4 mmHg  Ao V2 mean: 173.2 cm/sec  Ao mean P.3 mmHg  Ao V2 VTI: 46.3 cm  AI P1/2t: 607.4 msec  LV V1 max P.6 mmHg  LV V1 max: 170.0 cm/sec  LV V1 VTI: 27.9 cm  AV Skinny Ratio (DI): 0.70     ______________________________________________________________________________      ECHO 3/2023:  Interpretation Summary  The 26 mm ES3 TAVR is well-seated. Leaflet opening is not clearly visualized.  There is trace valvular without paravalvular regurgitation. The Vmax is 2.4  m/s, the mean gradient is 14 mmHg, the dimensionless index is 0.80, and the  acceleration time is 80 ms.  Left ventricular function is decreased. The ejection fraction is 35-40%  (moderately reduced). Moderate diffuse hypokinesis is present.  Global right ventricular function is normal. The right ventricle is normal  size.  This study was compared with the study from 2023. There appears to have  been a decline in the global LV function but the endocardial definition is  poor on this study.  The TAVR function is unchanged.  ______________________________________________________________________________  Left Ventricle  Mild concentric wall thickening consistent with left ventricular hypertrophy  is present. Left ventricular function is decreased. The ejection fraction is  35-40% (moderately reduced). Moderate diffuse hypokinesis is present.     Right Ventricle  Global right ventricular function is normal. The right ventricle is normal  size.     Atria  The atria cannot be assessed.     Mitral Valve  Severe mitral annular calcification is present. Mild mitral insufficiency is  present.     Aortic Valve  The 26 mm ES3 TAVR is well-seated. Leaflet opening is not clearly visualized.  There is trace valvular without paravalvular regurgitation. The Vmax is 2.4  m/s, the mean gradient is 14 mmHg, the dimensionless index is 0.80, and the  acceleration time is 80 ms.     Tricuspid Valve  The valve leaflets are not well visualized. Trace tricuspid insufficiency is  present. Pulmonary artery systolic pressure cannot be assessed.     Pulmonic Valve  Mild pulmonic insufficiency is present.     Vessels  The inferior vena cava cannot be assessed. Sinuses of Valsalva 3.0 cm.  Ascending aorta 3.4 cm.     Pericardium  No pericardial effusion is present.     Compared to Previous Study  This study was compared with the study from 02/23/2023 . There appears to have  been a decline in the global LV function but the endocardial definition is  poor on this study. The TAVR function is unchanged.  ______________________________________________________________________________  MMode/2D Measurements & Calculations  IVSd: 1.0 cm     LVIDd: 4.9 cm  LVIDs: 3.9 cm  LVPWd: 1.4 cm  FS: 20.5 %  LV mass(C)d: 224.4 grams  LV mass(C)dI: 134.6 grams/m2  Ao root diam: 3.0 cm  LA dimension: 4.1 cm  asc Aorta Diam: 3.4 cm  LA/Ao: 1.4  LVOT diam: 1.7 cm  LVOT area: 2.2 cm2  RWT: 0.55     Doppler Measurements & Calculations  MV E max angie: 92.6  cm/sec  MV A max skinny: 110.1 cm/sec  MV E/A: 0.84  MV max P.7 mmHg  MV mean PG: 3.2 mmHg  MV V2 VTI: 24.8 cm  MVA(VTI): 3.4 cm2  MV dec slope: 515.9 cm/sec2  MV dec time: 0.18 sec  Ao V2 max: 233.9 cm/sec  Ao max P.0 mmHg  Ao V2 mean: 165.6 cm/sec  Ao mean P.7 mmHg  Ao V2 VTI: 46.7 cm  LUCY(I,D): 1.8 cm2  LUCY(V,D): 1.8 cm2  LV V1 max PG: 15.2 mmHg  LV V1 max: 195.0 cm/sec  LV V1 VTI: 38.0 cm  SV(LVOT): 83.5 ml  SI(LVOT): 50.1 ml/m2  PA V2 max: 19.0 cm/sec  PA max P.14 mmHg     PI end-d skinny: 145.2 cm/sec  AV Skinny Ratio (DI): 0.83  LUCY Index (cm2/m2): 1.1     ______________________________________________________________________________  Report approved by: Everardo Whitt 2023 12:07 PM    Coronary angiogram 10/25/22    Two vessel CAD with prior PCI to the RCA and LAD, otherwise mild non obstructive CAD elsewhere.  Patent stents in the RCA and LAD with mild to moderate in stent restenosis of the proximal LAD stents (progressed since last angiogram from ).  Proximal LAD ISR hemodynamically not significant by dPR at 0.91.      Plan     Follow bedrest per protocol   Continued medical management and lifestyle modifications for cardiovascular risk factor optimizations.   Follow up visit with Nurse Practitioner in 1-2 weeks.   Arterial sheath removed from radial artery with TR band placement.   Cardiac rehabilitation.   Return to the primary inpatient team for further evaluation and managmenet     Coronary Findings    Diagnostic  Dominance: Right  Left Main   The vessel is moderate in size and is angiographically normal.      Left Anterior Descending   The vessel is moderate in size.   Ost LAD to Prox LAD lesion is 50% stenosed. Measured dPr. Pre adenosine administration dPr: 0.91.   Previously placed Mid LAD-1 drug eluting stent is widely patent.   Previously placed Mid LAD-2 drug eluting stent is widely patent.   0% stenosed Dist LAD lesion.      First Diagonal Branch   The vessel is small.    1st Diag lesion is 50% stenosed.      Second Diagonal Branch   The vessel is moderate in size. The vessel exhibits minimal luminal irregularities.      Left Circumflex   The vessel is moderate in size.      First Obtuse Marginal Branch   The vessel is moderate in size.   1st Mrg lesion is 30% stenosed.      Second Obtuse Marginal Branch   The vessel is moderate in size and is angiographically normal.      Third Obtuse Marginal Branch   The vessel is moderate in size and is angiographically normal.      Right Coronary Artery   The vessel is moderate in size.   Prox RCA lesion is 40% stenosed.   Mid RCA lesion is 20% stenosed. The lesion was previously treated using a stent of unknown type.      Right Posterior Descending Artery   The vessel is moderate in size. The vessel exhibits minimal luminal irregularities.      Right Posterior Atrioventricular Artery   The vessel is moderate in size. The vessel exhibits minimal luminal irregularities.      First Right Posterolateral Branch   The vessel is moderate in size. The vessel exhibits minimal luminal irregularities.                   EKG 10/24/22:  Personally reviewed  NSR with LBBB unchanged    ECHO 10/24/22:  Interpretation Summary  Status post 26 mm Arndt Janis Ultra valve TAVR.  Global and regional left ventricular function is normal with an EF of 55-60%.  Global right ventricular function is normal. The right ventricle is normal  size.  The TAVR is well-seated. Leaflet opening is not clearly visualized. There is  no valvular regurgitation but there is trace paravalvular regurgitation. The  Vmax is 2.6 m/s, the mean gradient is 13 mmHg, the dimensionless index is  0.34, and the acceleration time is 110 ms.  IVC diameter and respiratory changes fall into an intermediate range  suggesting an RA pressure of 8 mmHg.  This study was compared with the study from 09/13/2022. No significant changes  noted. The TAVR gradient has increased modestly, as is to be  expected.  ______________________________________________________________________________  Left Ventricle  Global and regional left ventricular function is normal with an EF of 55-60%.  Left ventricular wall thickness is normal. Left ventricular size is normal.  Left ventricular diastolic function is not assessable.     Right Ventricle  Global right ventricular function is normal. The right ventricle is normal  size.     Atria  Both atria appear normal. Moderate left atrial enlargement is present.     Mitral Valve  Mild mitral annular calcification is present. Mild mitral insufficiency is  present.     Aortic Valve  The TAVR is well-seated. Leaflet opening is not clearly visualized. There is  no valvular regurgitation but there is trace paravalvular regurgitation. The  Vmax is 2.6 m/s, the mean gradient is 13 mmHg, the dimensionless index is  0.34, and the acceleration time is 110 ms.     Tricuspid Valve  The tricuspid valve is normal. Trace tricuspid insufficiency is present. The  right ventricular systolic pressure is approximated at 21.2 mmHg plus the  right atrial pressure.     Pulmonic Valve  The pulmonic valve is normal. Trace pulmonic insufficiency is present.     Vessels  The aorta root cannot be assessed. Ascending aorta 3.2 cm. IVC diameter and  respiratory changes fall into an intermediate range suggesting an RA pressure  of 8 mmHg.     Pericardium  No pericardial effusion is present.     Compared to Previous Study  This study was compared with the study from 09/13/2022 . No significant  changes noted. The TAVR gradient has increased modestly, as is to be expected.  ______________________________________________________________________________  MMode/2D Measurements & Calculations     IVSd: 1.1 cm  LVIDd: 4.3 cm  LVPWd: 0.89 cm  LV mass(C)d: 139.1 grams  LV mass(C)dI: 83.4 grams/m2  asc Aorta Diam: 3.2 cm  LVOT diam: 1.9 cm  LVOT area: 2.9 cm2  RWT: 0.42     Doppler Measurements & Calculations  MV E max  skinny: 171.0 cm/sec  MV A max skinny: 145.4 cm/sec  MV E/A: 1.2  MV max PG: 10.4 mmHg  MV mean P.3 mmHg  MV V2 VTI: 54.1 cm  MVA(VTI): 1.0 cm2  MV dec slope: 856.6 cm/sec2  MV dec time: 0.20 sec  Ao V2 max: 254.2 cm/sec  Ao max P.0 mmHg  Ao V2 mean: 171.2 cm/sec  Ao mean P.7 mmHg  Ao V2 VTI: 53.9 cm  LUCY(I,D): 1.0 cm2  LUCY(V,D): 0.99 cm2  AI P1/2t: 688.4 msec  LV V1 max PG: 3.1 mmHg  LV V1 max: 88.0 cm/sec  LV V1 VTI: 19.5 cm  SV(LVOT): 55.9 ml  SI(LVOT): 33.6 ml/m2  TR max skinny: 229.8 cm/sec  TR max P.2 mmHg     AV Skinny Ratio (DI): 0.35  LUCY Index (cm2/m2): 0.62       Cardiac monitor 22:      ECG dated 22  SR 1st degree AVB and LBBB    Echocardiogram dated  22:    Interpretation Summary  Normal biventricular function.  Post 26 mm Arndt Janis Ultra valve Aortic Valve placement. The valve is  well seated. There is trivial periprosthetic regurgitation. Mean gradient is 7  mmHg.  The man RA pressure is normal.  No pericardial effusion is present.    NYHA Class: II    Coronary angiogram 2020:    Physicians    Panel Physicians Referring Physician Case Authorizing Physician   Gianluca Toscano MD (Primary) Self, MD Dorcas Cardona Demetris, MD Ibrahim, Abdisamad, MD (Fellow - Assisting)     Jose Alfredo Matias MD (Fellow - Assisting)       Procedures    Panel 1    Primary Surgeon:  Gianluca Toscano MD   Procedure:  CV CORONARY ANGIOGRAM    CV RIGHT HEART CATH    CV LEFT HEART CATH    Percutaneous Coronary Intervention Stent Drug Eluting        Indications    Severe aortic stenosis [I35.0 (ICD-10-CM)]   Other ill-defined heart diseases [I51.89 (ICD-10-CM)]     Comments/Patient Narrative    84 year old female with with aortic stenosis here for cors/RHC for TAVR work up.     Pre Procedure Diagnosis    severe aortic stenosis    Post Procedure Diagnosis    s/p EDDIE on LAD x 3.      Conclusion       Dist LAD lesion is 70% stenosed.  Mid LAD-1 lesion is 80% stenosed.  Mid LAD-2  lesion is 60% stenosed.  Mid RCA lesion is 40% stenosed.  Prox RCA lesion is 40% stenosed.  Right sided filling pressures are normal.  Normal PA pressures.  Left sided filling pressures are normal.  Reduced cardiac output.     1. LAD - 3 EDDIE were placed in the mid to distal LAD (2.5 x 28 mm, 3.5 x 12 mm, and 3.0 x 8 mm)  2. RHC showed normal biventricular filling pressures. Normal PA pressure. Reduced cardiac output.            Plan     Follow bedrest per protocol   Continued medical management and lifestyle modifications for cardiovascular risk factor optimizations.   Arterial sheath removed from femoral artery with closure device.   Discharge today per protocol     Coronary Findings    Diagnostic  Dominance: Right  Left Anterior Descending   Mid LAD-1 lesion is 80% stenosed.   Mid LAD-2 lesion is 60% stenosed.   Dist LAD lesion is 70% stenosed.      Right Coronary Artery   The vessel is moderate in size.   Prox RCA lesion is 40% stenosed.   Mid RCA lesion is 40% stenosed.         Intervention     Mid LAD-1 lesion   Stent   The pre-interventional distal flow is decreased (FALGUNI 2). A STENT SYNERGY DRUG ELUTING 2.34R35FT [id number] drug eluting stent was successfully placed. No pre-stent angioplasty was performed. The post-interventional distal flow is normal (FALGUNI 3). The intervention was successful. No complications occurred at this lesion.   There is a 0% residual stenosis post intervention.      Mid LAD-2 lesion   Stent   The pre-interventional distal flow is decreased (FALGUNI 2). A STENT SYNERGY DRUG ELUTING 3.33L82DD [id number] drug eluting stent was successfully placed. No pre-stent angioplasty was performed. The strut is apposed. No post-stent angioplasty was performed. The post-interventional distal flow is normal (FALGUNI 3). The intervention was successful. No complications occurred at this lesion.   There is a 0% residual stenosis post intervention.      Dist LAD lesion   Stent   The pre-interventional  distal flow is decreased (FALGUNI 2). A STENT SYNERGY DRUG ELUTING 3.26X09ZZ [id number] drug eluting stent was successfully placed. No pre-stent angioplasty was performed. The strut is apposed. No post-stent angioplasty was performed. The post-interventional distal flow is normal (FALGUNI 3). The intervention was successful. No complications occurred at this lesion.   There is a 0% residual stenosis post intervention.           Hemodynamics    Right sided filling pressures are normal.Left sided filling pressures are normal. Normal PA pressures.Reduced cardiac output level.            CLINICAL IMPRESSION:     Kamryn Miller is a very pleasant 87 year old female who presents for 1 year TAVR follow-up. She has a history of HFpEF, HTN, HLD, significant CAD history (PTCA 1996, LAD and RCA stenting 2003, s/p LAD PCI 2020, recent cath 10/2022 with patent stents), CKD, chronic anemia and severe aortic valvular stenosis s/p (TAVR) with a 26mm Arndt Janis Ultra on 9/12/22 by Morro Burrows. Her post TAVR echo showed mean PG of 7 mmHg with trace PVL. She was noted to have 60% left femoral stenosis post procedure, but had adequate flow on peripheral angiogram. She was also noted to have new LBBB post procedure and discharged on a Cardionet which showed no high degree AVB.     She was last evaluated 1 month post TAVR. ECHO showed mean PG 13 mmHg with trace PVL. At the visit she reported worsening shortness of breath and angina. Underwent coronary angiogram 10/2022 which showed patent stents. Over the past few months her blood pressures have been labile and hypertension difficult to treat. She has intolerances to beta blockers, amlodipine and hydralazine. Now following with [facility], BP and volume status well controlled. ECHO show LVEF continues to decline down from 55-60 pre TAVR to 30-35% today, unclear etiology. Will discuss further testing with Dr. Zuniga. HALT has resolved on CT today so will discontinue AC. She reports  feeling well without heart failure symptoms. Weight stable at 144 lbs. 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 Zuniga  - Euvolemic today at 144 lbs, continue lasix 10 mg daily  - Continue entresto 49-51 mg BID, Jardiance 10 mg, Lasix 30 mg daily    Coronary artery disease w/stable angina:  HTN:  HLD:  - Continue ASA 81 mg daily lifelong  - Continue high intensity statin  - Multiple intolerances to BB, hydralazine   - Enstresto and amlodipine for BP control   - Continue exercise and heart healthy diet    PAD:   - denies claudication, normal pulse exam on left  - recent BALDO's mildly abnormal on left  - Continue ASA 81 mg lifelong  - vascular consult should she develop recurrent claudication      RTC: Dr. Zuniga next month, [facility] 3 months, return to structural clinic PRN    JESSICA Rousseau, CNP  [facility] Cardiology Team      CC  Patient Care Team:  Pham Layne CNP as PCP - Rolanda Lawson MD as Assigned PCP  Pati De Santiago NP as Assigned Heart and Vascular Provider  nAia Johnson MD as Assigned Nephrology Provider  Navarro Escalona MD as Fellow  Dary Galvan MD as Hospitalist (Internal Medicine)  Vianey Bowman NP as Nurse Practitioner (Cardiovascular Disease)  Mcaie Samuel, RN as Specialty Care Coordinator  Jessica Garcia, RN as Specialty Care Coordinator (Cardiology)  Janice Kimbrough APRN CNP as Assigned Surgical Provider  Shana  Sherry, RN as Specialty Care Coordinator (Cardiology)  Brandon Varma APRN CNP as Nurse Practitioner (Cardiovascular Disease)  JERRY COYLE

## 2023-11-13 ENCOUNTER — HOSPITAL ENCOUNTER (OUTPATIENT)
Dept: CARDIOLOGY | Facility: CLINIC | Age: 88
Discharge: HOME | End: 2023-11-13
Attending: NURSE PRACTITIONER
Payer: MEDICARE

## 2023-11-13 ENCOUNTER — OFFICE VISIT (OUTPATIENT)
Dept: CARDIOLOGY | Facility: CLINIC | Age: 88
End: 2023-11-13
Attending: NURSE PRACTITIONER
Payer: MEDICARE

## 2023-11-13 ENCOUNTER — HOSPITAL ENCOUNTER (OUTPATIENT)
Dept: CT IMAGING | Facility: CLINIC | Age: 88
Discharge: HOME | End: 2023-11-13
Attending: NURSE PRACTITIONER
Payer: MEDICARE

## 2023-11-13 ENCOUNTER — LAB (OUTPATIENT)
Dept: LAB | Facility: CLINIC | Age: 88
End: 2023-11-13
Attending: NURSE PRACTITIONER
Payer: MEDICARE

## 2023-11-13 VITALS
OXYGEN SATURATION: 96 % | DIASTOLIC BLOOD PRESSURE: 79 MMHG | SYSTOLIC BLOOD PRESSURE: 129 MMHG | HEART RATE: 70 BPM | BODY MASS INDEX: 29.09 KG/M2 | WEIGHT: 150.9 LBS

## 2023-11-13 DIAGNOSIS — I50.32 CHRONIC HEART FAILURE WITH PRESERVED EJECTION FRACTION (HFPEF) (H): ICD-10-CM

## 2023-11-13 DIAGNOSIS — Z95.2 S/P TAVR (TRANSCATHETER AORTIC VALVE REPLACEMENT): ICD-10-CM

## 2023-11-13 DIAGNOSIS — R94.39 ABNORMAL RESULT OF OTHER CARDIOVASCULAR FUNCTION STUDY: ICD-10-CM

## 2023-11-13 DIAGNOSIS — I50.20 HEART FAILURE WITH REDUCED EJECTION FRACTION, NYHA CLASS III (H): ICD-10-CM

## 2023-11-13 DIAGNOSIS — I50.30 NYHA CLASS 3 HEART FAILURE WITH PRESERVED EJECTION FRACTION (H): ICD-10-CM

## 2023-11-13 DIAGNOSIS — N28.9 RENAL INSUFFICIENCY: ICD-10-CM

## 2023-11-13 DIAGNOSIS — I10 ESSENTIAL HYPERTENSION: ICD-10-CM

## 2023-11-13 DIAGNOSIS — I42.8 NONISCHEMIC CARDIOMYOPATHY (H): ICD-10-CM

## 2023-11-13 DIAGNOSIS — Z95.2 S/P TAVR (TRANSCATHETER AORTIC VALVE REPLACEMENT): Primary | ICD-10-CM

## 2023-11-13 LAB
ALBUMIN SERPL BCG-MCNC: 4.1 G/DL (ref 3.5–5.2)
ALP SERPL-CCNC: 54 U/L (ref 35–104)
ALT SERPL W P-5'-P-CCNC: 10 U/L (ref 0–50)
ANION GAP SERPL CALCULATED.3IONS-SCNC: 11 MMOL/L (ref 7–15)
AST SERPL W P-5'-P-CCNC: 23 U/L (ref 0–45)
BILIRUB SERPL-MCNC: 0.3 MG/DL
BUN SERPL-MCNC: 24.4 MG/DL (ref 8–23)
CALCIUM SERPL-MCNC: 9.6 MG/DL (ref 8.8–10.2)
CHLORIDE SERPL-SCNC: 104 MMOL/L (ref 98–107)
CREAT SERPL-MCNC: 1.41 MG/DL (ref 0.51–0.95)
DEPRECATED HCO3 PLAS-SCNC: 26 MMOL/L (ref 22–29)
EGFRCR SERPLBLD CKD-EPI 2021: 36 ML/MIN/1.73M2
ERYTHROCYTE [DISTWIDTH] IN BLOOD BY AUTOMATED COUNT: 14.8 % (ref 10–15)
GLUCOSE SERPL-MCNC: 95 MG/DL (ref 70–99)
HCT VFR BLD AUTO: 42 % (ref 35–47)
HGB BLD-MCNC: 13.2 G/DL (ref 11.7–15.7)
LVEF ECHO: NORMAL
MCH RBC QN AUTO: 32 PG (ref 26.5–33)
MCHC RBC AUTO-ENTMCNC: 31.4 G/DL (ref 31.5–36.5)
MCV RBC AUTO: 102 FL (ref 78–100)
PHOSPHATE SERPL-MCNC: 3.4 MG/DL (ref 2.5–4.5)
PLATELET # BLD AUTO: 193 10E3/UL (ref 150–450)
POTASSIUM SERPL-SCNC: 4.8 MMOL/L (ref 3.4–5.3)
PROT SERPL-MCNC: 6.9 G/DL (ref 6.4–8.3)
RBC # BLD AUTO: 4.13 10E6/UL (ref 3.8–5.2)
SODIUM SERPL-SCNC: 141 MMOL/L (ref 135–145)
WBC # BLD AUTO: 5.6 10E3/UL (ref 4–11)

## 2023-11-13 PROCEDURE — 93005 ELECTROCARDIOGRAM TRACING: CPT

## 2023-11-13 PROCEDURE — 93010 ELECTROCARDIOGRAM REPORT: CPT | Performed by: INTERNAL MEDICINE

## 2023-11-13 PROCEDURE — G1010 CDSM STANSON: HCPCS | Performed by: INTERNAL MEDICINE

## 2023-11-13 PROCEDURE — 99214 OFFICE O/P EST MOD 30 MIN: CPT | Mod: 25 | Performed by: NURSE PRACTITIONER

## 2023-11-13 PROCEDURE — 250N000011 HC RX IP 250 OP 636: Performed by: NURSE PRACTITIONER

## 2023-11-13 PROCEDURE — 75572 CT HRT W/3D IMAGE: CPT | Mod: 26 | Performed by: INTERNAL MEDICINE

## 2023-11-13 PROCEDURE — 84100 ASSAY OF PHOSPHORUS: CPT

## 2023-11-13 PROCEDURE — 36415 COLL VENOUS BLD VENIPUNCTURE: CPT

## 2023-11-13 PROCEDURE — 255N000002 HC RX 255 OP 636: Performed by: INTERNAL MEDICINE

## 2023-11-13 PROCEDURE — 999N000208 ECHOCARDIOGRAM COMPLETE

## 2023-11-13 PROCEDURE — 80053 COMPREHEN METABOLIC PANEL: CPT

## 2023-11-13 PROCEDURE — G0463 HOSPITAL OUTPT CLINIC VISIT: HCPCS | Mod: 25 | Performed by: NURSE PRACTITIONER

## 2023-11-13 PROCEDURE — 93306 TTE W/DOPPLER COMPLETE: CPT | Mod: 26 | Performed by: INTERNAL MEDICINE

## 2023-11-13 PROCEDURE — 85027 COMPLETE CBC AUTOMATED: CPT

## 2023-11-13 PROCEDURE — 75572 CT HRT W/3D IMAGE: CPT | Mod: MG

## 2023-11-13 RX ORDER — IOPAMIDOL 755 MG/ML
110 INJECTION, SOLUTION INTRAVASCULAR ONCE
Status: COMPLETED | OUTPATIENT
Start: 2023-11-13 | End: 2023-11-13

## 2023-11-13 RX ADMIN — HUMAN ALBUMIN MICROSPHERES AND PERFLUTREN 6 ML: 10; .22 INJECTION, SOLUTION INTRAVENOUS at 12:17

## 2023-11-13 RX ADMIN — IOPAMIDOL 110 ML: 755 INJECTION, SOLUTION INTRAVENOUS at 12:03

## 2023-11-13 ASSESSMENT — PAIN SCALES - GENERAL: PAINLEVEL: NO PAIN (0)

## 2023-11-13 NOTE — PROGRESS NOTES
Structural Heart Coordinator visit:  Provided additional education regarding TAVR/MitraClip procedure, after being present for discussion with physician. Explained the work-up process and next steps for patient. Patient provided our direct contact number and instructed to call with any questions.             KCCQ Results:   1a. 5  1b. 5  1c. 4  2. 5  3. 7  4. 7  5. 5  6. 5  7. 4  8a. 5  8b. 5  8c. 5    Maria L Gerard CMA  Structural Heart   [facility] [place]

## 2023-11-13 NOTE — NURSING NOTE
Chief Complaint   Patient presents with    Follow Up     Annual (1yr) s/p TAVR follow up     Vitals were taken and medications reconciled.    Preston Durán, EMT  1:40 PM

## 2023-11-13 NOTE — PATIENT INSTRUCTIONS
You were seen today in the Structural Heart Clinic at the [facility].    Cardiology provider you saw during your visit: Pati De Santiago NP    Instructions:   Continue current medications.   I will contact you with CT results, will then decide on Eliquis   Monitor daily weights - call if you gain > 3 lbs in a day or 5 lbs in a week from baseline (142 lbs).  For all future dental cleanings and procedures you will need to take antibiotics prior - see instructions below.  Follow-up with your Primary Cardiologist in 1 month   Follow-up with [facility] clinic 3 months   I will let you know if you need valve clinic follow-up.     Prevention of Infective (Bacterial) Endocarditis:  You are at increased risk for developing adverse outcomes from infective endocarditis (IE), also known as bacterial endocarditis (BE) because of the new device in your heart. The guidelines for prevention of IE are to give patients antibiotics prior to any dental procedures that involve manipulation of gingival tissue or the periapical region of teeth, or perforation of the oral mucosa:      It is recommended to take Amoxicillin 2 gm by mouth as a single dose 30 to 60 minutes before procedure.     OR if allergic to Penicillin or Ampicillin:     Cephalexin 2 gm by mouth, or  Clindamycin 600 mg by mouth, or  Azithromycin or Clarithromycin 500 mg PO       Questions and scheduling:   First call: Structural Heart  Polina Gerard [phone]    General scheduling line: [phone].   First press #1 for the [facility] and then press #3 for Medical Questions to reach the Cardiology triage nurse.     On Call Cardiologist for after hours or on weekends: [phone], press option #4 and ask to speak to the on-call Cardiologist.

## 2023-11-14 LAB
ATRIAL RATE - MUSE: 69 BPM
DIASTOLIC BLOOD PRESSURE - MUSE: NORMAL MMHG
INTERPRETATION ECG - MUSE: NORMAL
P AXIS - MUSE: 70 DEGREES
PR INTERVAL - MUSE: 256 MS
QRS DURATION - MUSE: 172 MS
QT - MUSE: 470 MS
QTC - MUSE: 503 MS
R AXIS - MUSE: -9 DEGREES
SYSTOLIC BLOOD PRESSURE - MUSE: NORMAL MMHG
T AXIS - MUSE: 165 DEGREES
VENTRICULAR RATE- MUSE: 69 BPM

## 2023-11-14 NOTE — RESULT ENCOUNTER NOTE
Called and left VM for daughter Sarah to give me a call back in reference to her moms results for her CT TAVR.

## 2023-11-15 ENCOUNTER — CARE COORDINATION (OUTPATIENT)
Dept: CARDIOLOGY | Facility: CLINIC | Age: 88
End: 2023-11-15
Payer: MEDICARE

## 2023-11-15 DIAGNOSIS — I50.20 HEART FAILURE WITH REDUCED EJECTION FRACTION, NYHA CLASS III (H): Primary | ICD-10-CM

## 2023-11-15 NOTE — PROGRESS NOTES
Review cardiac CT demonstrating clot on valve has resolved. Plan to discontinue Eliquis and resume aspirin 81 mg daily.     Reviewed ECHO results with Dr. Zuniga demonstrating further decline in LVEF. Plan for cardiac MRI with stress to rule out ischemia or blockage in the heart arteries.     If negative, her reduced EF may be related to left bunch branch block following TAVR and she may need a biventricular pacemaker placed.     JESSICA Rousseau, CNP  Structural Heart Nurse Practitioner  [facility]  Clinic: [phone]  Pager: [phone]

## 2023-11-16 ENCOUNTER — TELEPHONE (OUTPATIENT)
Dept: CARDIOLOGY | Facility: CLINIC | Age: 88
End: 2023-11-16
Payer: MEDICARE

## 2023-11-17 ENCOUNTER — TELEPHONE (OUTPATIENT)
Dept: CARDIOLOGY | Facility: CLINIC | Age: 88
End: 2023-11-17
Payer: MEDICARE

## 2023-11-17 DIAGNOSIS — R94.39 ABNORMAL RESULT OF OTHER CARDIOVASCULAR FUNCTION STUDY: ICD-10-CM

## 2023-11-17 DIAGNOSIS — I50.20 HEART FAILURE WITH REDUCED EJECTION FRACTION, NYHA CLASS III (H): Primary | ICD-10-CM

## 2023-11-17 DIAGNOSIS — Z95.2 S/P TAVR (TRANSCATHETER AORTIC VALVE REPLACEMENT): ICD-10-CM

## 2023-11-17 NOTE — TELEPHONE ENCOUNTER
I called Katherine back regarding this message. LVM asking for a call back to clarify her questions.    Sherry Saldana RN

## 2023-11-17 NOTE — TELEPHONE ENCOUNTER
[facility] Call Center    Phone Message    May a detailed message be left on voicemail: yes     Reason for Call: Other: Katherine would like a call back to discuss medication and needed forms, please reach out to further assist.      Action Taken: Other: Cardiology    Travel Screening: Not Applicable    Thank you!  [facility]

## 2023-11-20 ENCOUNTER — TELEPHONE (OUTPATIENT)
Dept: CARDIOLOGY | Facility: CLINIC | Age: 88
End: 2023-11-20
Payer: MEDICARE

## 2023-11-20 NOTE — TELEPHONE ENCOUNTER
Health Call Center    Phone Message    May a detailed message be left on voicemail: yes     Reason for Call: Medication Question or concern regarding medication   Prescription Clarification  Name of Medication: furosemide (LASIX) 20 MG tablet   Prescribing Provider:   [facility] - SHARONDA CHAVIS[address] MORGAN [facility] [address]      Pharmacy: [facility]   What on the order needs clarification? [facility] called on behalf of the patient requesting to speak with a member of their care team. They state they have reached out several times and have not yet heard a response. Would like to know if the patient should resume this medication or stop taking it. Please call back to further discuss.    Action Taken: Message routed to:  Other: Cardiology    Travel Screening: Not Applicable    Thank you!  [facility]

## 2023-11-20 NOTE — TELEPHONE ENCOUNTER
I called [facility] regarding this message.    She reports that Kamryn is taking lasix 30 mg daily.    They also have a few PRN orders for lasix. There is an order that Kamryn should take 10 mg lasix if BP is greater than 160/90 and another order that Kamryn should take 10 mg lasix if she has a weight gain of 2 pounds in 1 day and 5 pounds in 1 week.    She is wondering if the PRN orders are still needed. [facility] will fax over a record of Kamryn's recent weights and BP.     Kamryn has an appointment in [facility] on 11/24.    Sherry Saldana RN

## 2023-11-21 NOTE — TELEPHONE ENCOUNTER
I called [facility]; they asked me to call back tomorrow to speak to the RN.    Sherry Saldana RN

## 2023-11-21 NOTE — TELEPHONE ENCOUNTER
apixaban ANTICOAGULANT (ELIQUIS) 5 MG tablet   60 tablet 3 8/2/2023 11/14/2023       Discontinued Therapy completed (No AVS) Pati De Santiago NP 11/14/23 [id number]     
05-Aug-2020 09:15

## 2023-11-22 ENCOUNTER — HOSPITAL ENCOUNTER (OUTPATIENT)
Dept: MRI IMAGING | Facility: CLINIC | Age: 88
Discharge: HOME | End: 2023-11-22
Attending: NURSE PRACTITIONER | Admitting: NURSE PRACTITIONER
Payer: MEDICARE

## 2023-11-22 VITALS — OXYGEN SATURATION: 96 % | DIASTOLIC BLOOD PRESSURE: 69 MMHG | SYSTOLIC BLOOD PRESSURE: 102 MMHG | HEART RATE: 69 BPM

## 2023-11-22 DIAGNOSIS — I50.20 HEART FAILURE WITH REDUCED EJECTION FRACTION, NYHA CLASS III (H): ICD-10-CM

## 2023-11-22 LAB
ANION GAP SERPL CALCULATED.3IONS-SCNC: 10 MMOL/L (ref 7–15)
BUN SERPL-MCNC: 23.7 MG/DL (ref 8–23)
CALCIUM SERPL-MCNC: 9.6 MG/DL (ref 8.8–10.2)
CHLORIDE SERPL-SCNC: 108 MMOL/L (ref 98–107)
CREAT SERPL-MCNC: 1.41 MG/DL (ref 0.51–0.95)
DEPRECATED HCO3 PLAS-SCNC: 26 MMOL/L (ref 22–29)
EGFRCR SERPLBLD CKD-EPI 2021: 36 ML/MIN/1.73M2
GLUCOSE SERPL-MCNC: 99 MG/DL (ref 70–99)
POTASSIUM SERPL-SCNC: 4 MMOL/L (ref 3.4–5.3)
SODIUM SERPL-SCNC: 144 MMOL/L (ref 135–145)

## 2023-11-22 PROCEDURE — A9585 GADOBUTROL INJECTION: HCPCS | Mod: JZ | Performed by: NURSE PRACTITIONER

## 2023-11-22 PROCEDURE — 82310 ASSAY OF CALCIUM: CPT | Performed by: NURSE PRACTITIONER

## 2023-11-22 PROCEDURE — 93016 CV STRESS TEST SUPVJ ONLY: CPT | Performed by: INTERNAL MEDICINE

## 2023-11-22 PROCEDURE — 75563 CARD MRI W/STRESS IMG & DYE: CPT | Mod: 26 | Performed by: INTERNAL MEDICINE

## 2023-11-22 PROCEDURE — 36415 COLL VENOUS BLD VENIPUNCTURE: CPT | Performed by: NURSE PRACTITIONER

## 2023-11-22 PROCEDURE — 93005 ELECTROCARDIOGRAM TRACING: CPT

## 2023-11-22 PROCEDURE — 93018 CV STRESS TEST I&R ONLY: CPT | Performed by: INTERNAL MEDICINE

## 2023-11-22 PROCEDURE — G1010 CDSM STANSON: HCPCS

## 2023-11-22 PROCEDURE — 250N000011 HC RX IP 250 OP 636: Performed by: INTERNAL MEDICINE

## 2023-11-22 PROCEDURE — G1010 CDSM STANSON: HCPCS | Performed by: INTERNAL MEDICINE

## 2023-11-22 PROCEDURE — 255N000002 HC RX 255 OP 636: Mod: JZ | Performed by: NURSE PRACTITIONER

## 2023-11-22 RX ORDER — GADOBUTROL 604.72 MG/ML
9 INJECTION INTRAVENOUS ONCE
Status: COMPLETED | OUTPATIENT
Start: 2023-11-22 | End: 2023-11-22

## 2023-11-22 RX ORDER — DIAZEPAM 5 MG
5 TABLET ORAL EVERY 30 MIN PRN
Status: DISCONTINUED | OUTPATIENT
Start: 2023-11-22 | End: 2023-11-23 | Stop reason: HOSPADM

## 2023-11-22 RX ORDER — AMINOPHYLLINE 25 MG/ML
100 INJECTION, SOLUTION INTRAVENOUS ONCE
Status: COMPLETED | OUTPATIENT
Start: 2023-11-22 | End: 2023-11-22

## 2023-11-22 RX ORDER — ALBUTEROL SULFATE 90 UG/1
2 AEROSOL, METERED RESPIRATORY (INHALATION) EVERY 5 MIN PRN
Status: DISCONTINUED | OUTPATIENT
Start: 2023-11-22 | End: 2023-11-23 | Stop reason: HOSPADM

## 2023-11-22 RX ORDER — ACYCLOVIR 200 MG/1
0-1 CAPSULE ORAL
Status: DISCONTINUED | OUTPATIENT
Start: 2023-11-22 | End: 2023-11-23 | Stop reason: HOSPADM

## 2023-11-22 RX ORDER — METHYLPREDNISOLONE SODIUM SUCCINATE 125 MG/2ML
125 INJECTION, POWDER, LYOPHILIZED, FOR SOLUTION INTRAMUSCULAR; INTRAVENOUS
Status: DISCONTINUED | OUTPATIENT
Start: 2023-11-22 | End: 2023-11-23 | Stop reason: HOSPADM

## 2023-11-22 RX ORDER — REGADENOSON 0.08 MG/ML
0.4 INJECTION, SOLUTION INTRAVENOUS ONCE
Status: COMPLETED | OUTPATIENT
Start: 2023-11-22 | End: 2023-11-22

## 2023-11-22 RX ORDER — CAFFEINE CITRATE 20 MG/ML
60 SOLUTION INTRAVENOUS
Status: DISCONTINUED | OUTPATIENT
Start: 2023-11-22 | End: 2023-11-23 | Stop reason: HOSPADM

## 2023-11-22 RX ORDER — ONDANSETRON 2 MG/ML
4 INJECTION INTRAMUSCULAR; INTRAVENOUS
Status: DISCONTINUED | OUTPATIENT
Start: 2023-11-22 | End: 2023-11-23 | Stop reason: HOSPADM

## 2023-11-22 RX ORDER — DIPHENHYDRAMINE HCL 25 MG
25 CAPSULE ORAL
Status: DISCONTINUED | OUTPATIENT
Start: 2023-11-22 | End: 2023-11-23 | Stop reason: HOSPADM

## 2023-11-22 RX ORDER — DIPHENHYDRAMINE HYDROCHLORIDE 50 MG/ML
25-50 INJECTION INTRAMUSCULAR; INTRAVENOUS
Status: DISCONTINUED | OUTPATIENT
Start: 2023-11-22 | End: 2023-11-23 | Stop reason: HOSPADM

## 2023-11-22 RX ADMIN — GADOBUTROL 9 ML: 604.72 INJECTION INTRAVENOUS at 09:08

## 2023-11-22 RX ADMIN — GADOBUTROL 9 ML: 604.72 INJECTION INTRAVENOUS at 08:58

## 2023-11-22 RX ADMIN — REGADENOSON 0.4 MG: 0.08 INJECTION, SOLUTION INTRAVENOUS at 09:44

## 2023-11-22 RX ADMIN — AMINOPHYLLINE 100 MG: 25 INJECTION, SOLUTION INTRAVENOUS at 09:48

## 2023-11-22 NOTE — PROGRESS NOTES
Pt arrived for cardiac MRI with stress. Allergies, medications, and safety checklist reviewed with patient. Test explained and all questions were answered. Denies caffeine intake. Lungs are clear. Lexiscan 0.4 mg given over 10 seconds followed by 5 mL of saline. Pt tolerated the scan, medications, and contrast well. Pre and post EKG completed. Pt monitored after MRI and escorted back to [facility] waiting room.

## 2023-11-24 NOTE — TELEPHONE ENCOUNTER
I called [facility] and spoke to VEENA Obrien. I gave her verbal orders to discontinue PRN lasix orders but to call us if Kamryn has a weight gain of more than 3 pounds in 1 day or 5 pounds in 1 week.    Sherry Saldana RN

## 2023-11-27 ENCOUNTER — PATIENT OUTREACH (OUTPATIENT)
Dept: NEPHROLOGY | Facility: CLINIC | Age: 88
End: 2023-11-27
Payer: MEDICARE

## 2023-11-27 DIAGNOSIS — E85.4 CARDIAC AMYLOIDOSIS (H): Primary | ICD-10-CM

## 2023-11-27 DIAGNOSIS — I43 CARDIAC AMYLOIDOSIS (H): Primary | ICD-10-CM

## 2023-11-27 LAB
ATRIAL RATE - MUSE: 67 BPM
ATRIAL RATE - MUSE: 71 BPM
DIASTOLIC BLOOD PRESSURE - MUSE: NORMAL MMHG
DIASTOLIC BLOOD PRESSURE - MUSE: NORMAL MMHG
INTERPRETATION ECG - MUSE: NORMAL
INTERPRETATION ECG - MUSE: NORMAL
P AXIS - MUSE: 64 DEGREES
P AXIS - MUSE: 68 DEGREES
PR INTERVAL - MUSE: 208 MS
PR INTERVAL - MUSE: 224 MS
QRS DURATION - MUSE: 160 MS
QRS DURATION - MUSE: 168 MS
QT - MUSE: 478 MS
QT - MUSE: 484 MS
QTC - MUSE: 511 MS
QTC - MUSE: 519 MS
R AXIS - MUSE: -13 DEGREES
R AXIS - MUSE: -16 DEGREES
SYSTOLIC BLOOD PRESSURE - MUSE: NORMAL MMHG
SYSTOLIC BLOOD PRESSURE - MUSE: NORMAL MMHG
T AXIS - MUSE: 150 DEGREES
T AXIS - MUSE: 156 DEGREES
VENTRICULAR RATE- MUSE: 67 BPM
VENTRICULAR RATE- MUSE: 71 BPM

## 2023-11-27 NOTE — TELEPHONE ENCOUNTER
Nephrology Note: RN CC Chart Review    REASON FOR ENCOUNTER:     Chart reviewed by nephrology RN CC                          SITUATION/BACKROUND:     Chart reviewed. Per Dr Johnson: As long as BP and volume ok, I would not change anything   On 11/22/23 Cardiology discontinued Lasix PRN.    Last nephrology visit:    Last Renal Panel:  Sodium   Date Value Ref Range Status   11/22/2023 144 135 - 145 mmol/L Final     Comment:     Reference intervals for this test were updated on 09/26/2023 to more accurately reflect our healthy population. There may be differences in the flagging of prior results with similar values performed with this method. Interpretation of those prior results can be made in the context of the updated reference intervals.    06/04/2021 142 133 - 144 mmol/L Final     Potassium   Date Value Ref Range Status   11/22/2023 4.0 3.4 - 5.3 mmol/L Final   10/05/2022 4.6 3.4 - 5.3 mmol/L Final   06/04/2021 3.4 3.4 - 5.3 mmol/L Final     Chloride   Date Value Ref Range Status   11/22/2023 108 (H) 98 - 107 mmol/L Final   10/05/2022 103 94 - 109 mmol/L Final   06/04/2021 106 94 - 109 mmol/L Final     Carbon Dioxide   Date Value Ref Range Status   06/04/2021 30 20 - 32 mmol/L Final     Carbon Dioxide (CO2)   Date Value Ref Range Status   11/22/2023 26 22 - 29 mmol/L Final   10/05/2022 27 20 - 32 mmol/L Final     Anion Gap   Date Value Ref Range Status   11/22/2023 10 7 - 15 mmol/L Final   10/05/2022 7 3 - 14 mmol/L Final   06/04/2021 6 3 - 14 mmol/L Final     Glucose   Date Value Ref Range Status   11/22/2023 99 70 - 99 mg/dL Final   10/05/2022 98 70 - 99 mg/dL Final   06/04/2021 103 (H) 70 - 99 mg/dL Final     Comment:     Fasting specimen     GLUCOSE BY METER POCT   Date Value Ref Range Status   01/31/2023 85 70 - 99 mg/dL Final     Urea Nitrogen   Date Value Ref Range Status   11/22/2023 23.7 (H) 8.0 - 23.0 mg/dL Final   10/05/2022 39 (H) 7 - 30 mg/dL Final   06/04/2021 27 7 - 30 mg/dL Final     Creatinine    Date Value Ref Range Status   11/22/2023 1.41 (H) 0.51 - 0.95 mg/dL Final   06/04/2021 1.50 (H) 0.52 - 1.04 mg/dL Final     GFR Estimate   Date Value Ref Range Status   11/22/2023 36 (L) >60 mL/min/1.73m2 Final   06/04/2021 32 (L) >60 mL/min/[1.73_m2] Final     Comment:     Non  GFR Calc  Starting 12/18/2018, serum creatinine based estimated GFR (eGFR) will be   calculated using the Chronic Kidney Disease Epidemiology Collaboration   (CKD-EPI) equation.       Calcium   Date Value Ref Range Status   11/22/2023 9.6 8.8 - 10.2 mg/dL Final   06/04/2021 9.4 8.5 - 10.1 mg/dL Final     Phosphorus   Date Value Ref Range Status   11/13/2023 3.4 2.5 - 4.5 mg/dL Final   10/23/2020 3.9 2.5 - 4.5 mg/dL Final     Albumin   Date Value Ref Range Status   11/13/2023 4.1 3.5 - 5.2 g/dL Final   09/27/2022 3.6 3.4 - 5.0 g/dL Final   05/17/2021 3.4 3.4 - 5.0 g/dL Final         Neph Tracking Status:  Neph Tracking Flowsheet Last Filled Values       None              ASSESSMENT/PLAN     Patient to follow up as scheduled at next appointment  Patient to call/[email or web address] message with updates    Upcoming Appointments:  Appointments in Next Year      Dec 19, 2023 10:30 AM  LAB with [facility]  [facility] ([facility] ) [phone]     Dec 19, 2023 11:00 AM  (Arrive by 10:45 AM)  RETURN HEART FAILURE with Madhu Zuniga MD  [facility] ([facility] ) [phone]     Apr 29, 2024 12:00 PM  (Arrive by 11:45 AM)  Return Nephrology with Ania Johnson MD  [facility] ([facility] ) [phone]              CY REVELES RN

## 2023-12-08 ENCOUNTER — HOSPITAL ENCOUNTER (OUTPATIENT)
Dept: NUCLEAR MEDICINE | Facility: CLINIC | Age: 88
Setting detail: NUCLEAR MEDICINE
Discharge: HOME | End: 2023-12-08
Attending: NURSE PRACTITIONER
Payer: MEDICARE

## 2023-12-08 ENCOUNTER — LAB (OUTPATIENT)
Dept: LAB | Facility: CLINIC | Age: 88
End: 2023-12-08
Attending: CHIROPRACTOR
Payer: MEDICARE

## 2023-12-08 DIAGNOSIS — Z95.2 HISTORY OF TRANSCATHETER AORTIC VALVE REPLACEMENT (TAVR): ICD-10-CM

## 2023-12-08 DIAGNOSIS — I42.8 NONISCHEMIC CARDIOMYOPATHY (H): ICD-10-CM

## 2023-12-08 DIAGNOSIS — E85.4 CARDIAC AMYLOIDOSIS (H): ICD-10-CM

## 2023-12-08 DIAGNOSIS — I50.20 HEART FAILURE WITH REDUCED EJECTION FRACTION, NYHA CLASS III (H): ICD-10-CM

## 2023-12-08 DIAGNOSIS — E78.2 MIXED HYPERLIPIDEMIA: ICD-10-CM

## 2023-12-08 DIAGNOSIS — I10 BENIGN ESSENTIAL HYPERTENSION: ICD-10-CM

## 2023-12-08 DIAGNOSIS — I43 CARDIAC AMYLOIDOSIS (H): ICD-10-CM

## 2023-12-08 DIAGNOSIS — I10 ESSENTIAL HYPERTENSION: ICD-10-CM

## 2023-12-08 LAB
ALBUMIN SERPL BCG-MCNC: 4 G/DL (ref 3.5–5.2)
ALP SERPL-CCNC: 56 U/L (ref 40–150)
ALT SERPL W P-5'-P-CCNC: 13 U/L (ref 0–50)
ANION GAP SERPL CALCULATED.3IONS-SCNC: 12 MMOL/L (ref 7–15)
AST SERPL W P-5'-P-CCNC: 23 U/L (ref 0–45)
BILIRUB DIRECT SERPL-MCNC: <0.2 MG/DL (ref 0–0.3)
BILIRUB SERPL-MCNC: 0.2 MG/DL
BUN SERPL-MCNC: 32 MG/DL (ref 8–23)
CALCIUM SERPL-MCNC: 9.5 MG/DL (ref 8.8–10.2)
CHLORIDE SERPL-SCNC: 103 MMOL/L (ref 98–107)
CREAT SERPL-MCNC: 1.57 MG/DL (ref 0.51–0.95)
DEPRECATED HCO3 PLAS-SCNC: 26 MMOL/L (ref 22–29)
EGFRCR SERPLBLD CKD-EPI 2021: 32 ML/MIN/1.73M2
ERYTHROCYTE [DISTWIDTH] IN BLOOD BY AUTOMATED COUNT: 14.4 % (ref 10–15)
GLUCOSE SERPL-MCNC: 94 MG/DL (ref 70–99)
HCT VFR BLD AUTO: 41 % (ref 35–47)
HGB BLD-MCNC: 13.1 G/DL (ref 11.7–15.7)
KAPPA LC FREE SER-MCNC: 4.6 MG/DL (ref 0.33–1.94)
KAPPA LC FREE/LAMBDA FREE SER NEPH: 1.72 {RATIO} (ref 0.26–1.65)
LAMBDA LC FREE SERPL-MCNC: 2.67 MG/DL (ref 0.57–2.63)
MCH RBC QN AUTO: 32.8 PG (ref 26.5–33)
MCHC RBC AUTO-ENTMCNC: 32 G/DL (ref 31.5–36.5)
MCV RBC AUTO: 103 FL (ref 78–100)
NT-PROBNP SERPL-MCNC: 990 PG/ML (ref 0–1800)
PLATELET # BLD AUTO: 178 10E3/UL (ref 150–450)
POTASSIUM SERPL-SCNC: 4.2 MMOL/L (ref 3.4–5.3)
PROT SERPL-MCNC: 6.6 G/DL (ref 6.4–8.3)
RBC # BLD AUTO: 3.99 10E6/UL (ref 3.8–5.2)
SODIUM SERPL-SCNC: 141 MMOL/L (ref 135–145)
TROPONIN T SERPL HS-MCNC: 52 NG/L
WBC # BLD AUTO: 4.2 10E3/UL (ref 4–11)

## 2023-12-08 PROCEDURE — 82248 BILIRUBIN DIRECT: CPT | Performed by: NURSE PRACTITIONER

## 2023-12-08 PROCEDURE — 36415 COLL VENOUS BLD VENIPUNCTURE: CPT | Performed by: NURSE PRACTITIONER

## 2023-12-08 PROCEDURE — 80053 COMPREHEN METABOLIC PANEL: CPT | Performed by: INTERNAL MEDICINE

## 2023-12-08 PROCEDURE — 343N000001 HC RX 343: Performed by: NURSE PRACTITIONER

## 2023-12-08 PROCEDURE — 83880 ASSAY OF NATRIURETIC PEPTIDE: CPT | Performed by: NURSE PRACTITIONER

## 2023-12-08 PROCEDURE — 86334 IMMUNOFIX E-PHORESIS SERUM: CPT | Mod: 26 | Performed by: STUDENT IN AN ORGANIZED HEALTH CARE EDUCATION/TRAINING PROGRAM

## 2023-12-08 PROCEDURE — 78830 RP LOCLZJ TUM SPECT W/CT 1: CPT | Mod: TC,MG

## 2023-12-08 PROCEDURE — 83521 IG LIGHT CHAINS FREE EACH: CPT | Performed by: NURSE PRACTITIONER

## 2023-12-08 PROCEDURE — 78830 RP LOCLZJ TUM SPECT W/CT 1: CPT | Mod: 26 | Performed by: RADIOLOGY

## 2023-12-08 PROCEDURE — G1010 CDSM STANSON: HCPCS | Mod: TC

## 2023-12-08 PROCEDURE — 84484 ASSAY OF TROPONIN QUANT: CPT | Performed by: NURSE PRACTITIONER

## 2023-12-08 PROCEDURE — G1010 CDSM STANSON: HCPCS | Performed by: RADIOLOGY

## 2023-12-08 PROCEDURE — A9538 TC99M PYROPHOSPHATE: HCPCS | Performed by: NURSE PRACTITIONER

## 2023-12-08 PROCEDURE — 85027 COMPLETE CBC AUTOMATED: CPT | Performed by: INTERNAL MEDICINE

## 2023-12-08 PROCEDURE — 86334 IMMUNOFIX E-PHORESIS SERUM: CPT | Performed by: STUDENT IN AN ORGANIZED HEALTH CARE EDUCATION/TRAINING PROGRAM

## 2023-12-08 RX ORDER — TECHNETIUM TC99M PYROPHOSPHATE 12 MG/10ML
12-18 INJECTION INTRAVENOUS ONCE
Status: COMPLETED | OUTPATIENT
Start: 2023-12-08 | End: 2023-12-08

## 2023-12-08 RX ADMIN — TECHNETIUM TC99M PYROPHOSPHATE 16 MILLICURIE: 12 INJECTION INTRAVENOUS at 09:59

## 2023-12-11 LAB — PROT PATTERN SERPL IFE-IMP: NORMAL

## 2023-12-17 NOTE — PROGRESS NOTES
December 19th, 2023     I had the pleasure of seeing Kamryn Miller  in the [facility] for further evaluation and management.    She has a history of Hx CAD, HLD, HTN, mitral and tricuspid insuff, pulmonary hypertension, lost to follow up in the past.  She does have CAD and did receive 3 stents in 8/2020 as below.  She denies any cardiomyopathy and she has not had any cardiology follow-up for several years.  Notes that she does have shortness of breath especially with exertion or she walks outside.  This is class III symptoms at the time.  We did start TAVR evaluation and she did undergo right heart catheterization as well as invasive hemodynamic measurement and measurement of the valve area.  Given findings of moderate aortic stenosis, TAVR was not yet pursued and ongoing surveillance was recommended. Had been hypertensive in the recent past. She did see the structural team just recently and repeat TTE was performed that again showed moderate to severe aortic stenosis, essentially unchanged from prior TTE. Given that she had no concerning symptoms ongoing surveillance was recommended.  She has had issues with hypertension in the past including admissions for hypertensive emergency. Given ongoing and worsening symptoms she was revaluated by the structural team and ultimately underwent successful TAVR on 9/12/22 with a post intervention gradient of 7mmHg. She later had increased Mgs, imaging was consistent with HALT and she was placed on AC. She was seen in the ER subsequently for claudication and leg pain in the setting of known LFA stenosis. She was seen in the ER recently for episode of chest pain. Workup was overall negative and was discharged in stable condition. She was then admitted in 2/2023 with systolic HF exacerbation, she was diuresed and medications were adjusted including starting entresto at low dose. Did not tolerate BB initiation. She was also seen in [facility]. Her BP remained elevated and  thus most recently entresto was increased to 49-51 mg BID that she tolerated well with the goal to further titrate and later initiate SGLT2 if her renal function remains stable. Last saw Dr. Zuniga in 7/2023, [place] [facility] was diagnosed with COVID in October and was treated with Paxlovid. Serial TTEs have had further declines in LVEF (30-35% 11/2023). CMR with LVEF 28%, no evidence of MI. PYP scan was negative for ATTR amyloid.     Today, the patient presents for follow up. She is here with family. She endorses increasing fatigue since January but denies any chest pain, pressure, GREGORY, syncope, lightheadedness, orthopnea, bendopnea, leg swelling. She has excessive fatigue during the day. She states that she does not snore at night. Never evaluated for RAQUEL.      PAST MEDICAL HISTORY:  Past Medical History:   Diagnosis Date    Aortic valvar stenosis 7/2010    mild    Asthma     Bipolar disorder (H)     CAD (coronary artery disease)     s/p angioplasty    Celiac disease     Colon polyps     Colon polyps 2012    every 3 year colonoscopy     Depression     High cholesterol     HTN     Mitral insufficiency     Pulmonary HTN (H)     mild    Tremors 7/10    drug induced from antidepressants    Tricuspid insufficiency      FAMILY HISTORY:  Family History   Problem Relation Age of Onset    Asthma Mother     Cerebrovascular Disease Mother     Arthritis Mother     Depression Mother     Lipids Sister     Hypertension Sister     Heart Disease Sister     Lipids Sister     Hypertension Sister     Lipids Sister     Breast Cancer Sister      SOCIAL HISTORY:  Social History     Socioeconomic History    Marital status:      Spouse name: Adrien    Number of children: 2    Years of education: 16   Occupational History     Employer: RETIRED     Comment: Retired in 2002.    Tobacco Use    Smoking status: Never    Smokeless tobacco: Never   Vaping Use    Vaping Use: Never used   Substance and Sexual Activity    Alcohol  use: No     Alcohol/week: 0.0 standard drinks of alcohol     Types: 1 Standard drinks or equivalent per week    Drug use: No    Sexual activity: Not Currently     Partners: Male     CURRENT MEDICATIONS:  Current Outpatient Medications   Medication    acetaminophen (TYLENOL) 500 MG tablet    acetaminophen (TYLENOL) 500 MG tablet    albuterol (PROAIR HFA/PROVENTIL HFA/VENTOLIN HFA) 108 (90 Base) MCG/ACT inhaler    amLODIPine (NORVASC) 2.5 MG tablet    amoxicillin (AMOXIL) 500 MG capsule    aspirin (ASA) 81 MG EC tablet    azelastine (ASTEPRO) 0.15 % nasal spray    calcium citrate (CITRACAL) 950 (200 Ca) MG tablet    cholecalciferol (VITAMIN D3) 1250 mcg (45367 units) capsule    desvenlafaxine (PRISTIQ) 100 MG 24 hr tablet    empagliflozin (JARDIANCE) 10 MG TABS tablet    fluticasone-salmeterol (ADVAIR DISKUS) 500-50 MCG/ACT inhaler    folic acid (FOLVITE) 400 MCG tablet    furosemide (LASIX) 20 MG tablet    iron polysaccharides (NIFEREX) 150 MG capsule    isosorbide mononitrate (IMDUR) 30 MG 24 hr tablet    lamoTRIgine (LAMICTAL) 25 MG tablet    LAMOTRIGINE 200 MG PO TABS    levothyroxine (SYNTHROID/LEVOTHROID) 50 MCG tablet    liothyronine (CYTOMEL) 5 MCG tablet    memantine (NAMENDA) 10 MG tablet    mirtazapine (REMERON) 15 MG tablet    Multiple Vitamin (TAB-A-JENNIFER) TABS    psyllium (METAMUCIL/KONSYL) 58.6 % powder    rosuvastatin (CRESTOR) 10 MG tablet    sacubitril-valsartan (ENTRESTO) 49-51 MG per tablet    mirtazapine (REMERON) 7.5 MG tablet    REGULOID 28.3 % POWD     No current facility-administered medications for this visit.     ROS:   10-point ROS negative except for HPI.      EXAM:  /81 (BP Location: Right arm, Patient Position: Chair, Cuff Size: Adult Regular)   Pulse 71   Wt 66.8 kg (147 lb 3.2 oz)   SpO2 96%   BMI 28.37 kg/m    General: appears comfortable, alert and oriented  Head: normocephalic, atraumatic  Eyes: anicteric sclera, EOMI , PERRL  Neck: no adenopathy  Orophyarynx: moist mucosa,  no lesions noted  Heart: regular, S1/S2, no murmurs, rubs or gallop. Estimated JVP at 5 cmH2O  Lungs: CTAB, No wheezing.   Abdomen: soft, non-tender, bowel sounds present, no hepatosplenomegaly  Extremities: No LE edema today  Skin: no open lesions noted  Neuro: grossly non-focal  Psych: no evidence of depression noted     Labs:  Lab Results   Component Value Date    WBC 4.9 12/19/2023    HGB 13.3 12/19/2023    HCT 40.6 12/19/2023     12/19/2023     12/08/2023    POTASSIUM 4.2 12/08/2023    CHLORIDE 103 12/08/2023    CO2 26 12/08/2023    BUN 32.0 (H) 12/08/2023    CR 1.57 (H) 12/08/2023    GLC 94 12/08/2023    SED 11 07/28/2021    DD 1.80 (H) 02/22/2023    NTBNPI 11,774 (H) 02/22/2023    NTBNP 990 12/08/2023    AST 23 12/08/2023    ALT 13 12/08/2023    ALKPHOS 56 12/08/2023    BILITOTAL 0.2 12/08/2023    INR 1.12 02/22/2023     PYP 12/8/23:  Impression:  1. Imaging findings are not suggestive of transthyretin cardiac  amyloidosis.    CMR 11/22/23:  1. The LV is normal in cavity size with moderate concentric LVH. The global systolic function is severely  reduced. The LVEF is 28%. There is basal septal akinesis with moderate diffuse hypokinesis. There is  paradoxical septal motion due to LBBB.      2. The RV is normal in cavity size. The global systolic function is mildly reduced. The RVEF is 43%.      3. Left atrium is moderately enlarged.     4. There is a TAVR prosthesis that cannot be further evaluated due to artifact. There is severe MAC without  significant MR.      5. Late gadolinium enhancement imaging shows patchy mid-myocardial enhancement involving the basal  segments.      6. Regadenoson stress perfusion imaging shows no ischemia.     7. There is no pericardial effusion or thickening.     8. There is no intracardiac thrombus.     CONCLUSIONS: No myocardial ischemia. NICM, LVEF 28% and RVEF 43%, with LBBB and moderate LVH. Moderate  basal patchy fibrosis that is likely from AS, or could  represent amyloidosis. Consider PYP scan.    TTE 11/13/23:  Interpretation Summary  Status post 26 mm Arndt Janis Ultra TAVR on 9/12/2022.  There is mild paravalvular regurgitation. There is trace valvular  regurgitation. The mean gradient across the aortic valve is 13 mmHg.  Severe mitral annular calcification is present.  Left ventricular function is decreased. The ejection fraction is 30-35%  (moderately reduced).  Global right ventricular function is normal.  IVC diameter <2.1 cm collapsing >50% with sniff suggests a normal RA pressure  of 3 mmHg.  No pericardial effusion is present.  No significant changes noted. The presence of severe MAC makes PVL assessment  challenging. PVL better assessed today but appears unchanged from prior.    Echocardiogram  Left ventricular function, chamber size, wall motion, and wall thickness are normal.The EF is 55-60%. No regional wall motion abnormalities are seen.  Right ventricular function, chamber size, wall motion, and thickness are normal.  Severe left atrial enlargement is present. Moderate mitral annular calcification is present. Mild mitral insufficiency is present. There is likley moderate aortic stenosis. The peak velocity is  3.47m/sec, the peak and mean gradients are 48mmHg and 28mmHg. The aortic valve  area is calculated low at 0.7cm2. Pulmonary artery systolic pressure is normal. The inferior vena cava was normal in size with preserved respiratory variability. No pericardial effusion is present.   Compared to prior study AS has worsened. Otherwise no significant change     Coronary angiogram 8/21/2020:  Dist LAD lesion is 70% stenosed.  Mid LAD-1 lesion is 80% stenosed.  Mid LAD-2 lesion is 60% stenosed.  Mid RCA lesion is 40% stenosed.  Prox RCA lesion is 40% stenosed.  Right sided filling pressures are normal.  Normal PA pressures.  Left sided filling pressures are normal.  Reduced cardiac output.  1. LAD - 3 EDDIE were placed in the mid to distal LAD (2.5 x  28 mm, 3.5 x 12 mm, and 3.0 x 8 mm)  2. RHC showed normal biventricular filling pressures. Normal PA pressure. Reduced cardiac output.     RHC/coronary angiogram 11/3/20:  Moderate aortic stenosis -valve area 1.2 cm2,Mean gradient 24 mm Hg  Right sided filling pressures are normal.  Normal PA pressures.  Left sided filling pressures are normal.  Mildly reduced cardiac output level.  Peripheral angiogram done and IVUS used to assess the vessel lumens of the descending thoracic aorta,right and left external iliac arteries     TTE 5/3/21:  Global and regional left ventricular function is normal with an EF of 55-60%.  The right ventricle is normal size. Global right ventricular function is normal.  Moderate to severe aortic stenosis, aortic valve area 1.02 cm2, peak velocity  3.3 m/s, mean gradient 25 mmHg, stroke volume index 40 ml/m2, DVI 0.29.  This study was compared with the study from 9/16/2020. The aortic valve peak velocity and mean gradient are similar     TTE 11/1/21:  Global and regional left ventricular function is normal with an EF of 55-60%.  Right ventricular function, chamber size, wall motion, and thickness are normal.  Moderate aortic valve calcification is present. The mean gradient across the aortic valve is 23 mmHg. The peak aortic velocity is 3.1 m/sec. Moderate aortic stenosis is present. Calculated valve area lower than previous study due to LVOT diameter measurement. Moderate aortic stenosis is present.  No significant changes noted.     TTE 9/12/22:  Intra-procedural transthoracic echocardiogram for transfemoral transcatheter aortic valve replacement with 26mm Arndt Janis Ultra valve Aortic Valve. Image acquisition by sonographer.  PRE-PROCEDURAL FINDINGS:  1. Severe calcific aortic stenosis.  2. Normal systolic function. Visual LVEF 55%.   POST-PROCEDURAL SURVEY:  1. A 26 mm Arndt Janis Ultra valve Aortic Valve was successfully deployed.  2. Valve is well seated. There is trivial  periprosthetic regurgitation.  3. Post-procedure valve hemodynamics - mean gradient 5.0 mmHg.  4. No pericardial effusion     CTA LE 9/22/22:  1. Left common femoral artery 22 cm segmental greater than 80% diameter stenosis from its origin. Linear defect suggests a dissection as the etiology. Mid and distal segments of the common femoral artery are patent.  2. 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  3. Additional nonemergent incidental findings include diverticulosis without diverticulitis and cholelithiasis without cholecystitis.     TTE 1/30/23:  Left ventricular size, wall motion and function are normal. The ejection fraction is 55-60%.  Right ventricular size and function are normal.  Status post 26 mm Arndt Janis Ultra valve TAVR on 9/12/2022. The valve is well seated. MG 16 mmHg; PV 2.5 m/s; no paravalvular AI.  No pericardial effusion is present.   This study was compared with the study from 12/1/2022. No significant changes noted.     TTE 3/2023:  The 26 mm ES3 TAVR is well-seated. Leaflet opening is not clearly visualized. There is trace valvular without paravalvular regurgitation. The Vmax is 2.4 m/s, the mean gradient is 14 mmHg, the dimensionless index is 0.80, and the acceleration time is 80 ms.  Left ventricular function is decreased. The ejection fraction is 35-40% (moderately reduced). Moderate diffuse hypokinesis is present.  Global right ventricular function is normal. The right ventricle is normal size.  This study was compared with the study from 02/23/2023. There appears to have been a decline in the global LV function but the endocardial definition is poor on this study. The TAVR function is unchanged.    Echo from today pending, images reviewed with patient and her daughter.  Overall with Doppler gradient is little bit higher than it has been in the blast of 18 mmHg I think overall LV function is similar despite significant hypotension at around  40%.     ASSESSMENT AND PLAN:  In summary, patient is a 87 year old lady with with coronary artery disease and status post PCI 20 years ago at [facility], we do not have records unfortunately available but did receive 2 stents.  She most complains of shortness of breath denies any dizziness lightheadedness syncope or recurrent episodes of chest pains.  Underwent TAVR with a 26 mm valve in September 2022, c/b HALT and treated and finished AC course. Diagnosed with COVID in October and had an TED at that time which prompted holding various GDMT.     She has since had a further reduction in LVEF, now falling to 28% per CMR on 11/22/23, no evidence of ischemia. PYP was negative for ATTR amyloid, AL amyloid work up is in process. TTEs have had improvements in AS gradients.     Overall, today the patient has predominantly fatigue and without overt cardiopulmonary symptoms. She has had a progressive decrease in her LVEF. Work up has been ongoing without a clear cause of her decline. AL amyloid kappa/lambda chains are not suggestive of AL. PYP negative for ATTR. CMR does not have new findings of ischemia or infiltrative disease.  Her GDMT could be optimized with a BB and RAAS blockade. BB was not tolerated in the past for unclear reasons. Her EKG has a LBBB with a QRS of 160 ms. She may benefit from an EP evaluation of a CRTD. Could reintroduce a BB after the device is in place. Will plan to continue her GDMT titration.     - Continue prior meds of amlodipine 2.5 mg daily, aspirin 81, jardiance 10 mg, lasix 30 mg daily, imdur 60 mg daily, crestor 10 mg, and entresto 49-51 mg BID  - EP eval for CRTD  - Consider BB after CRTD  - Sleep apnea eval  - Start aldactone 12.5 mg daily. Labs in 2 weeks and 1 months as well to reevaluate renal fx  - follow up in 3 months or so      I appreciate the opportunity to participate in the care of Kamryn Miller . Please do not hesitate to contact me with any further questions.'    Seen and  discussed with Dr. Zuniga.    Dante Chacon, PGY-5  Cardiovascular Disease Fellow    I have seen and evaluated the patient and agree with the assessment and plan as above. Spent a total of 45 minutes reviewing the chart, face to face with patient and documenting.   Madhu Zuniga MD

## 2023-12-18 ENCOUNTER — TELEPHONE (OUTPATIENT)
Dept: CARDIOLOGY | Facility: CLINIC | Age: 88
End: 2023-12-18
Payer: MEDICARE

## 2023-12-18 NOTE — TELEPHONE ENCOUNTER
I returned the phone call to [facility]. Kamryn's weight has been pretty consistently between 140-141 lbs for several weeks, but yesterday the weight dropped a few pounds to 138 and then it came back up today to 141. Kamryn is taking furosemide 30 mg daily. [facility] will fax a list of recent weights for review. Kamryn does not have other s/s of fluid overload.    Sherry Saldana RN

## 2023-12-18 NOTE — TELEPHONE ENCOUNTER
[facility] Call Center    Phone Message    May a detailed message be left on voicemail: yes     Reason for Call: Other: [facility] is calling to report a 3 lb weight gain.  Yesterday 138.4 and today 141.4.  Pt has not edema or shortness of breath.     Action Taken: Other: cardio    Travel Screening: Not Applicable    Thank you!  [facility]

## 2023-12-19 ENCOUNTER — LAB (OUTPATIENT)
Dept: LAB | Facility: CLINIC | Age: 88
End: 2023-12-19
Attending: INTERNAL MEDICINE
Payer: MEDICARE

## 2023-12-19 ENCOUNTER — OFFICE VISIT (OUTPATIENT)
Dept: CARDIOLOGY | Facility: CLINIC | Age: 88
End: 2023-12-19
Attending: INTERNAL MEDICINE
Payer: MEDICARE

## 2023-12-19 ENCOUNTER — TELEPHONE (OUTPATIENT)
Dept: NEPHROLOGY | Facility: CLINIC | Age: 88
End: 2023-12-19

## 2023-12-19 VITALS
DIASTOLIC BLOOD PRESSURE: 81 MMHG | OXYGEN SATURATION: 96 % | BODY MASS INDEX: 28.37 KG/M2 | WEIGHT: 147.2 LBS | SYSTOLIC BLOOD PRESSURE: 138 MMHG | HEART RATE: 71 BPM

## 2023-12-19 DIAGNOSIS — I42.8 NONISCHEMIC CARDIOMYOPATHY (H): ICD-10-CM

## 2023-12-19 DIAGNOSIS — I10 BENIGN ESSENTIAL HYPERTENSION: ICD-10-CM

## 2023-12-19 DIAGNOSIS — E78.2 MIXED HYPERLIPIDEMIA: ICD-10-CM

## 2023-12-19 DIAGNOSIS — I10 ESSENTIAL HYPERTENSION: ICD-10-CM

## 2023-12-19 DIAGNOSIS — I50.20 HEART FAILURE WITH REDUCED EJECTION FRACTION, NYHA CLASS III (H): ICD-10-CM

## 2023-12-19 DIAGNOSIS — R73.9 HYPERGLYCEMIA: ICD-10-CM

## 2023-12-19 DIAGNOSIS — Z95.2 HISTORY OF TRANSCATHETER AORTIC VALVE REPLACEMENT (TAVR): ICD-10-CM

## 2023-12-19 DIAGNOSIS — I50.32 CHRONIC HEART FAILURE WITH PRESERVED EJECTION FRACTION (HFPEF) (H): ICD-10-CM

## 2023-12-19 LAB
ALBUMIN SERPL BCG-MCNC: 4 G/DL (ref 3.5–5.2)
ALP SERPL-CCNC: 55 U/L (ref 40–150)
ALT SERPL W P-5'-P-CCNC: 9 U/L (ref 0–50)
ANION GAP SERPL CALCULATED.3IONS-SCNC: 8 MMOL/L (ref 7–15)
AST SERPL W P-5'-P-CCNC: 21 U/L (ref 0–45)
BILIRUB SERPL-MCNC: 0.2 MG/DL
BUN SERPL-MCNC: 23.7 MG/DL (ref 8–23)
CALCIUM SERPL-MCNC: 9.8 MG/DL (ref 8.8–10.2)
CHLORIDE SERPL-SCNC: 104 MMOL/L (ref 98–107)
CREAT SERPL-MCNC: 1.44 MG/DL (ref 0.51–0.95)
DEPRECATED HCO3 PLAS-SCNC: 29 MMOL/L (ref 22–29)
EGFRCR SERPLBLD CKD-EPI 2021: 35 ML/MIN/1.73M2
ERYTHROCYTE [DISTWIDTH] IN BLOOD BY AUTOMATED COUNT: 13.9 % (ref 10–15)
GLUCOSE SERPL-MCNC: 93 MG/DL (ref 70–99)
HCT VFR BLD AUTO: 40.6 % (ref 35–47)
HGB BLD-MCNC: 13.3 G/DL (ref 11.7–15.7)
MCH RBC QN AUTO: 32.6 PG (ref 26.5–33)
MCHC RBC AUTO-ENTMCNC: 32.8 G/DL (ref 31.5–36.5)
MCV RBC AUTO: 100 FL (ref 78–100)
NT-PROBNP SERPL-MCNC: 1113 PG/ML (ref 0–1800)
PLATELET # BLD AUTO: 172 10E3/UL (ref 150–450)
POTASSIUM SERPL-SCNC: 4.4 MMOL/L (ref 3.4–5.3)
PROT SERPL-MCNC: 6.9 G/DL (ref 6.4–8.3)
RBC # BLD AUTO: 4.08 10E6/UL (ref 3.8–5.2)
SODIUM SERPL-SCNC: 141 MMOL/L (ref 135–145)
WBC # BLD AUTO: 4.9 10E3/UL (ref 4–11)

## 2023-12-19 PROCEDURE — 80053 COMPREHEN METABOLIC PANEL: CPT | Performed by: PATHOLOGY

## 2023-12-19 PROCEDURE — 85027 COMPLETE CBC AUTOMATED: CPT | Performed by: PATHOLOGY

## 2023-12-19 PROCEDURE — 36415 COLL VENOUS BLD VENIPUNCTURE: CPT | Performed by: PATHOLOGY

## 2023-12-19 PROCEDURE — 83880 ASSAY OF NATRIURETIC PEPTIDE: CPT | Performed by: PATHOLOGY

## 2023-12-19 PROCEDURE — G0463 HOSPITAL OUTPT CLINIC VISIT: HCPCS | Performed by: INTERNAL MEDICINE

## 2023-12-19 PROCEDURE — 99215 OFFICE O/P EST HI 40 MIN: CPT | Mod: GC | Performed by: INTERNAL MEDICINE

## 2023-12-19 RX ORDER — MIRTAZAPINE 15 MG/1
15 TABLET, FILM COATED ORAL AT BEDTIME
COMMUNITY
Start: 2023-12-05

## 2023-12-19 RX ORDER — SPIRONOLACTONE 25 MG/1
12.5 TABLET ORAL DAILY
Qty: 45 TABLET | Refills: 3 | Status: SHIPPED | OUTPATIENT
Start: 2023-12-19

## 2023-12-19 ASSESSMENT — PAIN SCALES - GENERAL: PAINLEVEL: NO PAIN (0)

## 2023-12-19 NOTE — LETTER
12/19/2023      RE: Kamryn Miller  [address]  [address]  [place] [address]       Dear Colleague,    Thank you for the opportunity to participate in the care of your patient, Kamryn Miller, at the [facility] [place] [facility]. Please see a copy of my visit note below.    December 19th, 2023     I had the pleasure of seeing Kamryn Miller  in the [facility] for further evaluation and management.    She has a history of Hx CAD, HLD, HTN, mitral and tricuspid insuff, pulmonary hypertension, lost to follow up in the past.  She does have CAD and did receive 3 stents in 8/2020 as below.  She denies any cardiomyopathy and she has not had any cardiology follow-up for several years.  Notes that she does have shortness of breath especially with exertion or she walks outside.  This is class III symptoms at the time.  We did start TAVR evaluation and she did undergo right heart catheterization as well as invasive hemodynamic measurement and measurement of the valve area.  Given findings of moderate aortic stenosis, TAVR was not yet pursued and ongoing surveillance was recommended. Had been hypertensive in the recent past. She did see the structural team just recently and repeat TTE was performed that again showed moderate to severe aortic stenosis, essentially unchanged from prior TTE. Given that she had no concerning symptoms ongoing surveillance was recommended.  She has had issues with hypertension in the past including admissions for hypertensive emergency. Given ongoing and worsening symptoms she was revaluated by the structural team and ultimately underwent successful TAVR on 9/12/22 with a post intervention gradient of 7mmHg. She later had increased Mgs, imaging was consistent with HALT and she was placed on AC. She was seen in the ER subsequently for claudication and leg pain in the setting of known LFA stenosis. She was  seen in the ER recently for episode of chest pain. Workup was overall negative and was discharged in stable condition. She was then admitted in 2/2023 with systolic HF exacerbation, she was diuresed and medications were adjusted including starting entresto at low dose. Did not tolerate BB initiation. [place] [facility] Her BP remained elevated and thus most recently entresto was increased to 49-51 mg BID that she tolerated well with the goal to further titrate and later initiate SGLT2 if her renal function remains stable. Last saw Dr. Zuniga in 7/2023, since [place] [facility] She was diagnosed with COVID in October and was treated with Paxlovid. Serial TTEs have had further declines in LVEF (30-35% 11/2023). CMR with LVEF 28%, no evidence of MI. PYP scan was negative for ATTR amyloid.     Today, the patient presents for follow up. She is here with family. She endorses increasing fatigue since January but denies any chest pain, pressure, GREGORY, syncope, lightheadedness, orthopnea, bendopnea, leg swelling. She has excessive fatigue during the day. She states that she does not snore at night. Never evaluated for RAQUEL.      PAST MEDICAL HISTORY:  Past Medical History:   Diagnosis Date     Aortic valvar stenosis 7/2010    mild     Asthma      Bipolar disorder (H)      CAD (coronary artery disease)     s/p angioplasty     Celiac disease      Colon polyps      Colon polyps 2012    every 3 year colonoscopy      Depression      High cholesterol      HTN      Mitral insufficiency      Pulmonary HTN (H)     mild     Tremors 7/10    drug induced from antidepressants     Tricuspid insufficiency      FAMILY HISTORY:  Family History   Problem Relation Age of Onset     Asthma Mother      Cerebrovascular Disease Mother      Arthritis Mother      Depression Mother      Lipids Sister      Hypertension Sister      Heart Disease Sister      Lipids Sister      Hypertension Sister      Lipids Sister      Breast Cancer Sister       SOCIAL HISTORY:  Social History     Socioeconomic History     Marital status:      Spouse name: Adrien     Number of children: 2     Years of education: 16   Occupational History     Employer: RETIRED     Comment: Retired in 2002.    Tobacco Use     Smoking status: Never     Smokeless tobacco: Never   Vaping Use     Vaping Use: Never used   Substance and Sexual Activity     Alcohol use: No     Alcohol/week: 0.0 standard drinks of alcohol     Types: 1 Standard drinks or equivalent per week     Drug use: No     Sexual activity: Not Currently     Partners: Male     CURRENT MEDICATIONS:  Current Outpatient Medications   Medication     acetaminophen (TYLENOL) 500 MG tablet     acetaminophen (TYLENOL) 500 MG tablet     albuterol (PROAIR HFA/PROVENTIL HFA/VENTOLIN HFA) 108 (90 Base) MCG/ACT inhaler     amLODIPine (NORVASC) 2.5 MG tablet     amoxicillin (AMOXIL) 500 MG capsule     aspirin (ASA) 81 MG EC tablet     azelastine (ASTEPRO) 0.15 % nasal spray     calcium citrate (CITRACAL) 950 (200 Ca) MG tablet     cholecalciferol (VITAMIN D3) 1250 mcg (20195 units) capsule     desvenlafaxine (PRISTIQ) 100 MG 24 hr tablet     empagliflozin (JARDIANCE) 10 MG TABS tablet     fluticasone-salmeterol (ADVAIR DISKUS) 500-50 MCG/ACT inhaler     folic acid (FOLVITE) 400 MCG tablet     furosemide (LASIX) 20 MG tablet     iron polysaccharides (NIFEREX) 150 MG capsule     isosorbide mononitrate (IMDUR) 30 MG 24 hr tablet     lamoTRIgine (LAMICTAL) 25 MG tablet     LAMOTRIGINE 200 MG PO TABS     levothyroxine (SYNTHROID/LEVOTHROID) 50 MCG tablet     liothyronine (CYTOMEL) 5 MCG tablet     memantine (NAMENDA) 10 MG tablet     mirtazapine (REMERON) 15 MG tablet     Multiple Vitamin (TAB-A-JENNIFER) TABS     psyllium (METAMUCIL/KONSYL) 58.6 % powder     rosuvastatin (CRESTOR) 10 MG tablet     sacubitril-valsartan (ENTRESTO) 49-51 MG per tablet     mirtazapine (REMERON) 7.5 MG tablet     REGULOID 28.3 % POWD     No current  facility-administered medications for this visit.     ROS:   10-point ROS negative except for HPI.      EXAM:  /81 (BP Location: Right arm, Patient Position: Chair, Cuff Size: Adult Regular)   Pulse 71   Wt 66.8 kg (147 lb 3.2 oz)   SpO2 96%   BMI 28.37 kg/m    General: appears comfortable, alert and oriented  Head: normocephalic, atraumatic  Eyes: anicteric sclera, EOMI , PERRL  Neck: no adenopathy  Orophyarynx: moist mucosa, no lesions noted  Heart: regular, S1/S2, no murmurs, rubs or gallop. Estimated JVP at 5 cmH2O  Lungs: CTAB, No wheezing.   Abdomen: soft, non-tender, bowel sounds present, no hepatosplenomegaly  Extremities: No LE edema today  Skin: no open lesions noted  Neuro: grossly non-focal  Psych: no evidence of depression noted     Labs:  Lab Results   Component Value Date    WBC 4.9 12/19/2023    HGB 13.3 12/19/2023    HCT 40.6 12/19/2023     12/19/2023     12/08/2023    POTASSIUM 4.2 12/08/2023    CHLORIDE 103 12/08/2023    CO2 26 12/08/2023    BUN 32.0 (H) 12/08/2023    CR 1.57 (H) 12/08/2023    GLC 94 12/08/2023    SED 11 07/28/2021    DD 1.80 (H) 02/22/2023    NTBNPI 11,774 (H) 02/22/2023    NTBNP 990 12/08/2023    AST 23 12/08/2023    ALT 13 12/08/2023    ALKPHOS 56 12/08/2023    BILITOTAL 0.2 12/08/2023    INR 1.12 02/22/2023     PYP 12/8/23:  Impression:  1. Imaging findings are not suggestive of transthyretin cardiac  amyloidosis.    CMR 11/22/23:  1. The LV is normal in cavity size with moderate concentric LVH. The global systolic function is severely  reduced. The LVEF is 28%. There is basal septal akinesis with moderate diffuse hypokinesis. There is  paradoxical septal motion due to LBBB.      2. The RV is normal in cavity size. The global systolic function is mildly reduced. The RVEF is 43%.      3. Left atrium is moderately enlarged.     4. There is a TAVR prosthesis that cannot be further evaluated due to artifact. There is severe MAC without  significant MR.       5. Late gadolinium enhancement imaging shows patchy mid-myocardial enhancement involving the basal  segments.      6. Regadenoson stress perfusion imaging shows no ischemia.     7. There is no pericardial effusion or thickening.     8. There is no intracardiac thrombus.     CONCLUSIONS: No myocardial ischemia. NICM, LVEF 28% and RVEF 43%, with LBBB and moderate LVH. Moderate  basal patchy fibrosis that is likely from AS, or could represent amyloidosis. Consider PYP scan.    TTE 11/13/23:  Interpretation Summary  Status post 26 mm Arndt Janis Ultra TAVR on 9/12/2022.  There is mild paravalvular regurgitation. There is trace valvular  regurgitation. The mean gradient across the aortic valve is 13 mmHg.  Severe mitral annular calcification is present.  Left ventricular function is decreased. The ejection fraction is 30-35%  (moderately reduced).  Global right ventricular function is normal.  IVC diameter <2.1 cm collapsing >50% with sniff suggests a normal RA pressure  of 3 mmHg.  No pericardial effusion is present.  No significant changes noted. The presence of severe MAC makes PVL assessment  challenging. PVL better assessed today but appears unchanged from prior.    Echocardiogram  Left ventricular function, chamber size, wall motion, and wall thickness are normal.The EF is 55-60%. No regional wall motion abnormalities are seen.  Right ventricular function, chamber size, wall motion, and thickness are normal.  Severe left atrial enlargement is present. Moderate mitral annular calcification is present. Mild mitral insufficiency is present. There is likley moderate aortic stenosis. The peak velocity is  3.47m/sec, the peak and mean gradients are 48mmHg and 28mmHg. The aortic valve  area is calculated low at 0.7cm2. Pulmonary artery systolic pressure is normal. The inferior vena cava was normal in size with preserved respiratory variability. No pericardial effusion is present.   Compared to prior study AS has  worsened. Otherwise no significant change     Coronary angiogram 8/21/2020:  Dist LAD lesion is 70% stenosed.  Mid LAD-1 lesion is 80% stenosed.  Mid LAD-2 lesion is 60% stenosed.  Mid RCA lesion is 40% stenosed.  Prox RCA lesion is 40% stenosed.  Right sided filling pressures are normal.  Normal PA pressures.  Left sided filling pressures are normal.  Reduced cardiac output.  1. LAD - 3 EDDIE were placed in the mid to distal LAD (2.5 x 28 mm, 3.5 x 12 mm, and 3.0 x 8 mm)  2. RHC showed normal biventricular filling pressures. Normal PA pressure. Reduced cardiac output.     RHC/coronary angiogram 11/3/20:  Moderate aortic stenosis -valve area 1.2 cm2,Mean gradient 24 mm Hg  Right sided filling pressures are normal.  Normal PA pressures.  Left sided filling pressures are normal.  Mildly reduced cardiac output level.  Peripheral angiogram done and IVUS used to assess the vessel lumens of the descending thoracic aorta,right and left external iliac arteries     TTE 5/3/21:  Global and regional left ventricular function is normal with an EF of 55-60%.  The right ventricle is normal size. Global right ventricular function is normal.  Moderate to severe aortic stenosis, aortic valve area 1.02 cm2, peak velocity  3.3 m/s, mean gradient 25 mmHg, stroke volume index 40 ml/m2, DVI 0.29.  This study was compared with the study from 9/16/2020. The aortic valve peak velocity and mean gradient are similar     TTE 11/1/21:  Global and regional left ventricular function is normal with an EF of 55-60%.  Right ventricular function, chamber size, wall motion, and thickness are normal.  Moderate aortic valve calcification is present. The mean gradient across the aortic valve is 23 mmHg. The peak aortic velocity is 3.1 m/sec. Moderate aortic stenosis is present. Calculated valve area lower than previous study due to LVOT diameter measurement. Moderate aortic stenosis is present.  No significant changes noted.     TTE  9/12/22:  Intra-procedural transthoracic echocardiogram for transfemoral transcatheter aortic valve replacement with 26mm Arndt Janis Ultra valve Aortic Valve. Image acquisition by sonographer.  PRE-PROCEDURAL FINDINGS:  1. Severe calcific aortic stenosis.  2. Normal systolic function. Visual LVEF 55%.   POST-PROCEDURAL SURVEY:  1. A 26 mm Arndt Janis Ultra valve Aortic Valve was successfully deployed.  2. Valve is well seated. There is trivial periprosthetic regurgitation.  3. Post-procedure valve hemodynamics - mean gradient 5.0 mmHg.  4. No pericardial effusion     CTA LE 9/22/22:  1. Left common femoral artery 22 cm segmental greater than 80% diameter stenosis from its origin. Linear defect suggests a dissection as the etiology. Mid and distal segments of the common femoral artery are patent.  2. 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  3. Additional nonemergent incidental findings include diverticulosis without diverticulitis and cholelithiasis without cholecystitis.     TTE 1/30/23:  Left ventricular size, wall motion and function are normal. The ejection fraction is 55-60%.  Right ventricular size and function are normal.  Status post 26 mm Arndt Janis Ultra valve TAVR on 9/12/2022. The valve is well seated. MG 16 mmHg; PV 2.5 m/s; no paravalvular AI.  No pericardial effusion is present.   This study was compared with the study from 12/1/2022. No significant changes noted.     TTE 3/2023:  The 26 mm ES3 TAVR is well-seated. Leaflet opening is not clearly visualized. There is trace valvular without paravalvular regurgitation. The Vmax is 2.4 m/s, the mean gradient is 14 mmHg, the dimensionless index is 0.80, and the acceleration time is 80 ms.  Left ventricular function is decreased. The ejection fraction is 35-40% (moderately reduced). Moderate diffuse hypokinesis is present.  Global right ventricular function is normal. The right ventricle is normal  size.  This study was compared with the study from 02/23/2023. There appears to have been a decline in the global LV function but the endocardial definition is poor on this study. The TAVR function is unchanged.    Echo from today pending, images reviewed with patient and her daughter.  Overall with Doppler gradient is little bit higher than it has been in the blast of 18 mmHg I think overall LV function is similar despite significant hypotension at around 40%.     ASSESSMENT AND PLAN:  In summary, patient is a 87 year old lady with with coronary artery disease and status post PCI 20 years ago at [facility], we do not have records unfortunately available but did receive 2 stents.  She most complains of shortness of breath denies any dizziness lightheadedness syncope or recurrent episodes of chest pains.  Underwent TAVR with a 26 mm valve in September 2022, c/b HALT and treated and finished AC course. Diagnosed with COVID in October and had an TED at that time which prompted holding various GDMT.     She has since had a further reduction in LVEF, now falling to 28% per CMR on 11/22/23, no evidence of ischemia. PYP was negative for ATTR amyloid, AL amyloid work up is in process. TTEs have had improvements in AS gradients.     Overall, today the patient has predominantly fatigue and without overt cardiopulmonary symptoms. She has had a progressive decrease in her LVEF. Work up has been ongoing without a clear cause of her decline. AL amyloid kappa/lambda chains are not suggestive of AL. PYP negative for ATTR. CMR does not have new findings of ischemia or infiltrative disease.  Her GDMT could be optimized with a BB and RAAS blockade. BB was not tolerated in the past for unclear reasons. Her EKG has a LBBB with a QRS of 160 ms. She may benefit from an EP evaluation of a CRTD. Could reintroduce a BB after the device is in place. Will plan to continue her GDMT titration.     - Continue prior meds of amlodipine 2.5 mg daily,  aspirin 81, jardiance 10 mg, lasix 30 mg daily, imdur 60 mg daily, crestor 10 mg, and entresto 49-51 mg BID  - EP eval for CRTD  - Consider BB after CRTD  - Sleep apnea eval  - Start aldactone 12.5 mg daily. Labs in 2 weeks and 1 months as well to reevaluate renal fx  - follow up in 3 months or so      I appreciate the opportunity to participate in the care of Kamryn Miller . Please do not hesitate to contact me with any further questions.'    Seen and discussed with Dr. Zuniga.    Dante Chacon, PGY-5  Cardiovascular Disease Fellow    I have seen and evaluated the patient and agree with the assessment and plan as above. Spent a total of 45 minutes reviewing the chart, face to face with patient and documenting.   Madhu Zuniga MD

## 2023-12-19 NOTE — NURSING NOTE
Chief Complaint   Patient presents with    Follow Up     Return Heart Failure     Vitals were taken and medications reconciled.    CARLOTA Small  11:13 AM

## 2023-12-19 NOTE — TELEPHONE ENCOUNTER
Patient and Ana called regarding a follow up nephrology appointment. Ana informed Dr Johnson does not have any availability in April so Brian VILLAFANA appointment was offered and scheduled along with a follow up with Dr Johnson in Oct 2024.  VEENA Hearn Care Coordinator  Nephrology

## 2023-12-19 NOTE — TELEPHONE ENCOUNTER
Health Call Center    Phone Message    May a detailed message be left on voicemail: yes     Reason for Call: Other: Patient calls with daughter, Ana, because they were told that April follow up with Dr. Johnson was marked for reschedule needed. Patient is offered next available on 7/15/24 but they did not feel comfortable scheduling that far past recommended follow up and wanted a message to Dr. Johnson to see if this is ok. Please call daughter, Ana.     Action Taken: Message routed to:  Other: Nephrology    Travel Screening: Not Applicable

## 2023-12-19 NOTE — PATIENT INSTRUCTIONS
Dr. Zuniga recommends:    EP referral to discuss CRT-D placement.    Start taking Aldactone 12.5 MG once daily.    Lab appointment in 2 weeks and in one month. (BMP)    Follow up clinic appointment with Dr. Zuniga in 4 months with labs the same day.    Thank you for your visit today.  Please call me with any questions or concerns.   Jimmie Coppola RN  Cardiology Care Coordinator  [phone]

## 2023-12-20 ENCOUNTER — TELEPHONE (OUTPATIENT)
Dept: CARDIOLOGY | Facility: CLINIC | Age: 88
End: 2023-12-20
Payer: MEDICARE

## 2024-01-02 ENCOUNTER — LAB (OUTPATIENT)
Dept: LAB | Facility: CLINIC | Age: 89
End: 2024-01-02
Payer: MEDICARE

## 2024-01-02 DIAGNOSIS — I50.20 HEART FAILURE WITH REDUCED EJECTION FRACTION, NYHA CLASS III (H): ICD-10-CM

## 2024-01-02 PROCEDURE — 36415 COLL VENOUS BLD VENIPUNCTURE: CPT

## 2024-01-02 PROCEDURE — 80048 BASIC METABOLIC PNL TOTAL CA: CPT

## 2024-01-03 LAB
ANION GAP SERPL CALCULATED.3IONS-SCNC: 11 MMOL/L (ref 7–15)
BUN SERPL-MCNC: 26 MG/DL (ref 8–23)
CALCIUM SERPL-MCNC: 10.5 MG/DL (ref 8.8–10.2)
CHLORIDE SERPL-SCNC: 105 MMOL/L (ref 98–107)
CREAT SERPL-MCNC: 1.47 MG/DL (ref 0.51–0.95)
DEPRECATED HCO3 PLAS-SCNC: 28 MMOL/L (ref 22–29)
EGFRCR SERPLBLD CKD-EPI 2021: 34 ML/MIN/1.73M2
GLUCOSE SERPL-MCNC: 100 MG/DL (ref 70–99)
POTASSIUM SERPL-SCNC: 4.2 MMOL/L (ref 3.4–5.3)
SODIUM SERPL-SCNC: 144 MMOL/L (ref 135–145)

## 2024-01-23 ENCOUNTER — TELEPHONE (OUTPATIENT)
Dept: FAMILY MEDICINE | Facility: CLINIC | Age: 89
End: 2024-01-23

## 2024-01-23 NOTE — TELEPHONE ENCOUNTER
Patient Quality Outreach    Patient is due for the following:   Asthma  -  Asthma follow-up visit  Physical Annual Wellness Visit    Next Steps:   Schedule a Annual Wellness Visit    Type of outreach:    Sent [employer] message.    Next Steps:  Reach out within 90 days via Letter.    Max number of attempts reached: No. Will try again in 90 days if patient still on fail list.    Questions for provider review:    None           SHANIQUE ALLEN MA  Chart routed to self  .

## 2024-02-02 ENCOUNTER — LAB (OUTPATIENT)
Dept: LAB | Facility: CLINIC | Age: 89
End: 2024-02-02
Payer: MEDICARE

## 2024-02-02 DIAGNOSIS — E85.4 CARDIAC AMYLOIDOSIS (H): ICD-10-CM

## 2024-02-02 DIAGNOSIS — I43 CARDIAC AMYLOIDOSIS (H): ICD-10-CM

## 2024-02-02 DIAGNOSIS — I50.20 HEART FAILURE WITH REDUCED EJECTION FRACTION, NYHA CLASS III (H): ICD-10-CM

## 2024-02-02 LAB
ANION GAP SERPL CALCULATED.3IONS-SCNC: 12 MMOL/L (ref 7–15)
BUN SERPL-MCNC: 46.9 MG/DL (ref 8–23)
CALCIUM SERPL-MCNC: 9.3 MG/DL (ref 8.8–10.2)
CHLORIDE SERPL-SCNC: 106 MMOL/L (ref 98–107)
CREAT SERPL-MCNC: 1.63 MG/DL (ref 0.51–0.95)
DEPRECATED HCO3 PLAS-SCNC: 25 MMOL/L (ref 22–29)
EGFRCR SERPLBLD CKD-EPI 2021: 30 ML/MIN/1.73M2
GLUCOSE SERPL-MCNC: 81 MG/DL (ref 70–99)
POTASSIUM SERPL-SCNC: 4.3 MMOL/L (ref 3.4–5.3)
SODIUM SERPL-SCNC: 143 MMOL/L (ref 135–145)
TOTAL PROTEIN SERUM FOR ELP: 6.6 G/DL (ref 6.4–8.3)

## 2024-02-02 PROCEDURE — 80048 BASIC METABOLIC PNL TOTAL CA: CPT

## 2024-02-02 PROCEDURE — 84155 ASSAY OF PROTEIN SERUM: CPT

## 2024-02-02 PROCEDURE — 84165 PROTEIN E-PHORESIS SERUM: CPT | Performed by: STUDENT IN AN ORGANIZED HEALTH CARE EDUCATION/TRAINING PROGRAM

## 2024-02-02 PROCEDURE — 36415 COLL VENOUS BLD VENIPUNCTURE: CPT

## 2024-02-06 LAB
ALBUMIN SERPL ELPH-MCNC: 3.9 G/DL (ref 3.7–5.1)
ALPHA1 GLOB SERPL ELPH-MCNC: 0.3 G/DL (ref 0.2–0.4)
ALPHA2 GLOB SERPL ELPH-MCNC: 0.7 G/DL (ref 0.5–0.9)
B-GLOBULIN SERPL ELPH-MCNC: 0.6 G/DL (ref 0.6–1)
GAMMA GLOB SERPL ELPH-MCNC: 1 G/DL (ref 0.7–1.6)
LOCATION OF TASK: NORMAL
M PROTEIN SERPL ELPH-MCNC: 0 G/DL
PROT PATTERN SERPL ELPH-IMP: NORMAL

## 2024-02-28 NOTE — PROGRESS NOTES
"I am delighted to see Kamryn Miller as a new patient in cardiology clinic for evaluation of her candidacy for cardiac resynchronization device. She is here with her daughter.    History of Present Illness:  The patient is a 88 year old  female with a h/o CAD s/p PCI, TAVR 9/2022. Post TAVR she developed LBBB without AV block. Since then her EF had declined to ~30%. She is seen by [facility] and Dr. Zuniga who are titrating her medications. She is referred to discuss her candidacy for CRT-D.    She currently lives in an assisted living facility, has been there for about 2 years after her  passed. She keeps busy, participates in activities, goes to the restaurant for meals, does exercises. She has no dyspnea, no excessive fatigue, no orthopnea or PND. No palpitation, dizziness, syncope. Her daughter agrees that her mom is doing so much better compared to immediately post TAVR, and is doing very well.    Ms. Miller says she takes her medications as directed. It appears that she never started taking empagliflozin as there may have been a miscommunication about her prescription.      Past Medical History:  CAD s/p PCIs to LAD 2020, RCA 2003  Severe aortic stenosis s/p TAVR 9/12/2022  LBBB post TAVR  Reduced EF post TAVR, no evidence of amyloid by PYP scan  COVID 10/2023 treated with Paxlovid  Hypertension  Hypothyroidism       Medications:   Amlodipine 2.5 at bedtime  Aspirin 81 every day  Lasix 30 mg every day  Imdur 60 mg every day  Rosuvastatin 10 every day  Entresto 49-51 mg bid  Spironolactone 12.5 every day    Namenda  Levothyroxine 50 mg every day  Folate  inhalers      Allergies:    Allergies   Allergen Reactions    Ace Inhibitors Cough    Amlodipine      Headache and plugged ears    Carvedilol      \"plugged ears and a headache\"    Diclofenac Other (See Comments)     Balance problems    Gluten Meal Diarrhea    Hydralazine-Hctz      headache         Physical examination  Vitals: 154/96, HR 72  BMI= 30 (69 " kg)    Constitutional: In general, the patient is a pleasant female in no acute distress. She looks well.  Cardiovascular: Carotids +2/2 bilaterally without bruits.  No jugular venous distension. Regular rate and rhythm. Normal S1, S2. No murmur, rub, click, or gallop.   Extremities: Pulses are normal bilaterally throughout. No peripheral edema.  Respiratory: Clear to asculation.  No ronchi, wheezes, rales.  No dullness to percussion.       I have personally and independently reviewed the following:  Labs:   2024: cr 1.63  2023: hgb 13, plt 172K    Echo:  2023: EF 30-35%, severe MAC, s/p TAVR, well seated. No TR.   2023: EF 35-40%  2023: EF 55-60%    Stress CMR 2023: LVEF 28%, RVEF 43%, no ischemia; LGE patchy mid-myocardial enhancement    Angiogram 10/25/2023: RCA stent 20%, LAD stent 50%.    EK2023: sinus with PACs, LBBB,  ms  2022 post TAVR: sinus, LBBB  2022 pre TAVR: sinus,  ms, narrow QRS      Assessment :  Cardiomyopathy likely mixed etiology, EF 28% by CMR, LBBB.  She is euvolemic today on exam.  Symptomatically she does not appear to have significant limitations.    As for an ICD for primary prevention of SCD, there is no data that it significantly prolongs life in an 89 yo.    We can consider CRT-P and I discussed this possibility with her. Given how well she is doing now, it would not appear to be of symptomatic benefit. Additionally, her medications can continue to be uptitrated given her BP today for 150/96.    I will contact [facility] clinic re: Jardiance.    For now, will defer CRT. She will continue to follow in [facility] clinic. If any time she becomes symptomatically worse or is interested, would plan CRT-P.            I spent a total of 30 minutes face to face with  Kamryn Miller during today's office visit. I have spend an additional 30 minutes today on chart review and documentation.      The patient is to return as above . The patient  understood the treatment plan as outlined above.  There were no barriers to learning.      Chyna Torres MD

## 2024-02-29 ENCOUNTER — OFFICE VISIT (OUTPATIENT)
Dept: CARDIOLOGY | Facility: CLINIC | Age: 89
End: 2024-02-29
Attending: INTERNAL MEDICINE
Payer: MEDICARE

## 2024-02-29 ENCOUNTER — LAB REQUISITION (OUTPATIENT)
Dept: LAB | Facility: CLINIC | Age: 89
End: 2024-02-29
Payer: MEDICARE

## 2024-02-29 VITALS
HEIGHT: 60 IN | WEIGHT: 152.1 LBS | DIASTOLIC BLOOD PRESSURE: 96 MMHG | BODY MASS INDEX: 29.86 KG/M2 | SYSTOLIC BLOOD PRESSURE: 154 MMHG | HEART RATE: 72 BPM | OXYGEN SATURATION: 97 %

## 2024-02-29 DIAGNOSIS — I50.20 HEART FAILURE WITH REDUCED EJECTION FRACTION, NYHA CLASS III (H): ICD-10-CM

## 2024-02-29 DIAGNOSIS — E03.9 HYPOTHYROIDISM, UNSPECIFIED: ICD-10-CM

## 2024-02-29 DIAGNOSIS — I44.7 LBBB (LEFT BUNDLE BRANCH BLOCK): ICD-10-CM

## 2024-02-29 DIAGNOSIS — I10 BENIGN ESSENTIAL HYPERTENSION: Primary | ICD-10-CM

## 2024-02-29 DIAGNOSIS — I42.8 NONISCHEMIC CARDIOMYOPATHY (H): ICD-10-CM

## 2024-02-29 PROCEDURE — 99205 OFFICE O/P NEW HI 60 MIN: CPT | Performed by: INTERNAL MEDICINE

## 2024-02-29 PROCEDURE — G0463 HOSPITAL OUTPT CLINIC VISIT: HCPCS | Performed by: INTERNAL MEDICINE

## 2024-02-29 NOTE — PATIENT INSTRUCTIONS
You were seen in the Electrophysiology Clinic today by: Dr Chyna Torres    Plan:     Follow up Visit:  As needed with EP  Continue following with Heart failure/[facility] clinic      If you have further questions, please utilize Schedule Savvy to contact us.     Your Care Team:    EP Cardiology   Telephone Number     Nurse Line  Brittani Underwood, RN   Flora Marrero, RN  Jae Stewart, VEENA   [phone]     For scheduling appointments:   Emeka   [phone]   For procedure scheduling:    Angela Camacho     [phone]   For the Device Clinic (Pacemakers, ICDs, Loop Recorders)    During business hours: [phone]  After business hours:   [phone]- select option 4 and ask for job code [phone].       On-call cardiologist for after hours or on weekends:   [phone], option #4, and ask to speak to the on-call cardiologist.     Cardiovascular Clinic:   [address] [place] [address]. [place] [address]      As always, Thank you for trusting us with your health care needs!

## 2024-02-29 NOTE — LETTER
2/29/2024      RE: Kamryn Miller  [address]  [address]  [place] [address]       Dear Colleague,    Thank you for the opportunity to participate in the care of your patient, Kamryn Miller, at the [facility] [place] at [facility]. Please see a copy of my visit note below.    I am delighted to see Kamryn Miller as a new patient in cardiology clinic for evaluation of her candidacy for cardiac resynchronization device. She is here with her daughter.    History of Present Illness:  The patient is a 88 year old  female with a h/o CAD s/p PCI, TAVR 9/2022. Post TAVR she developed LBBB without AV block. Since then her EF had declined to ~30%. She is seen by [facility] and Dr. Zuniga who are titrating her medications. She is referred to discuss her candidacy for CRT-D.    She currently lives in an assisted living facility, has been there for about 2 years after her  passed. She keeps busy, participates in activities, goes to the restaurant for meals, does exercises. She has no dyspnea, no excessive fatigue, no orthopnea or PND. No palpitation, dizziness, syncope. Her daughter agrees that her mom is doing so much better compared to immediately post TAVR, and is doing very well.    Ms. Miller says she takes her medications as directed. It appears that she never started taking empagliflozin as there may have been a miscommunication about her prescription.      Past Medical History:  CAD s/p PCIs to LAD 2020, RCA 2003  Severe aortic stenosis s/p TAVR 9/12/2022  LBBB post TAVR  Reduced EF post TAVR, no evidence of amyloid by PYP scan  COVID 10/2023 treated with Paxlovid  Hypertension  Hypothyroidism       Medications:   Amlodipine 2.5 at bedtime  Aspirin 81 every day  Lasix 30 mg every day  Imdur 60 mg every day  Rosuvastatin 10 every day  Entresto 49-51 mg bid  Spironolactone 12.5 every day    Namenda  Levothyroxine 50 mg every  "day  Folate  inhalers      Allergies:    Allergies   Allergen Reactions    Ace Inhibitors Cough    Amlodipine      Headache and plugged ears    Carvedilol      \"plugged ears and a headache\"    Diclofenac Other (See Comments)     Balance problems    Gluten Meal Diarrhea    Hydralazine-Hctz      headache         Physical examination  Vitals: 154/96, HR 72  BMI= 30 (69 kg)    Constitutional: In general, the patient is a pleasant female in no acute distress. She looks well.  Cardiovascular: Carotids +2/2 bilaterally without bruits.  No jugular venous distension. Regular rate and rhythm. Normal S1, S2. No murmur, rub, click, or gallop.   Extremities: Pulses are normal bilaterally throughout. No peripheral edema.  Respiratory: Clear to asculation.  No ronchi, wheezes, rales.  No dullness to percussion.       I have personally and independently reviewed the following:  Labs:   2024: cr 1.63  2023: hgb 13, plt 172K    Echo:  2023: EF 30-35%, severe MAC, s/p TAVR, well seated. No TR.   2023: EF 35-40%  2023: EF 55-60%    Stress CMR 2023: LVEF 28%, RVEF 43%, no ischemia; LGE patchy mid-myocardial enhancement    Angiogram 10/25/2023: RCA stent 20%, LAD stent 50%.    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  She is euvolemic today on exam.  Symptomatically she does not appear to have significant limitations.    As for an ICD for primary prevention of SCD, there is no data that it significantly prolongs life in an 89 yo.    We can consider CRT-P and I discussed this possibility with her. Given how well she is doing now, it would not appear to be of symptomatic benefit. Additionally, her medications can continue to be uptitrated given her BP today for 150/96.    I will contact [facility] re: Jardiance.    For now, will defer CRT. " She will continue to follow in [facility]. If any time she becomes symptomatically worse or is interested, would plan CRT-P.    I spent a total of 30 minutes face to face with  Kamryn Miller during today's office visit. I have spend an additional 30 minutes today on chart review and documentation.    The patient is to return as above . The patient understood the treatment plan as outlined above.  There were no barriers to learning.    Optimal Vascular Metrics    Blood Pressure   BP < 140/90 No: Recheck at home with RNCC to call and record    On Aspirin  Yes    On Statin  No: Contraindicated due to: unknown    Tobacco use  No    Please do not hesitate to contact me if you have any questions/concerns.     Sincerely,     Chyna Torres MD

## 2024-02-29 NOTE — PROGRESS NOTES
Optimal Vascular Metrics    Blood Pressure   BP < 140/90 No: Recheck at home with RNCC to call and record    On Aspirin  Yes    On Statin  No: Contraindicated due to: unknown    Tobacco use  No

## 2024-02-29 NOTE — NURSING NOTE
Chief Complaint   Patient presents with    New Patient     NEW- eval for CRTD from Dr Zuniga  HF, HTN, NLD, CAD, TAVR, CKD,   See 12/19 visit          Vitals were taken, medications reconciled.    Justin Mensah, Facilitator   4:05 PM

## 2024-03-01 LAB — TSH SERPL DL<=0.005 MIU/L-ACNC: 2.21 UIU/ML (ref 0.3–4.2)

## 2024-03-01 PROCEDURE — 84443 ASSAY THYROID STIM HORMONE: CPT | Mod: ORL | Performed by: NURSE PRACTITIONER

## 2024-03-01 PROCEDURE — P9604 ONE-WAY ALLOW PRORATED TRIP: HCPCS | Mod: ORL | Performed by: NURSE PRACTITIONER

## 2024-03-01 PROCEDURE — 36415 COLL VENOUS BLD VENIPUNCTURE: CPT | Mod: ORL | Performed by: NURSE PRACTITIONER

## 2024-03-04 ENCOUNTER — TELEPHONE (OUTPATIENT)
Dept: CARDIOLOGY | Facility: CLINIC | Age: 89
End: 2024-03-04
Payer: MEDICARE

## 2024-03-04 NOTE — TELEPHONE ENCOUNTER
[facility] Call Center    Phone Message    May a detailed message be left on voicemail: yes     Reason for Call: Other: patient has had a weight increase pt denies shortness of breath no edema, patient states she must just be eating to much, patient has been the highest 147 otherwise 145, 29th 150.2, please advise, thank you.     Action Taken: Other: cardiology    Travel Screening: Not Applicable  Thank you!  [facility]

## 2024-03-05 NOTE — TELEPHONE ENCOUNTER
Called and spoke with patient who states she feels good with no symptoms of being volume overloaded. She says she is 145 Lbs today and that is within her normal range. Provider notified, no new orders at this time. Continue to monitor.

## 2024-03-11 ENCOUNTER — TELEPHONE (OUTPATIENT)
Dept: CARDIOLOGY | Facility: CLINIC | Age: 89
End: 2024-03-11
Payer: MEDICARE

## 2024-03-11 NOTE — TELEPHONE ENCOUNTER
Called and spoke to Sangita. She states that patient's weight has been going up slightly. She had been around 145 Lbs and has recently been around 149 Lbs. Patient states she has been eating and snacking more. Patient denies shortness of breath or swelling. Patient is currently on Lasix 30 MG daily and Spironolactone 12.5 MG daily. Will route to provider for recommendations.

## 2024-03-11 NOTE — TELEPHONE ENCOUNTER
[facility] Call Center    Phone Message    May a detailed message be left on voicemail: yes     Reason for Call: Other: patient weight today 149.8, patient feels fine no SOB or edema noted, please advise,thank you      Action Taken: Other: cardiology    Travel Screening: Not Applicable  Thank you!  [facility]

## 2024-03-12 NOTE — TELEPHONE ENCOUNTER
Spoke with nurse at [facility] who sates the patient's weights are back down to her range. Will notify provider.

## 2024-03-28 DIAGNOSIS — N18.32 STAGE 3B CHRONIC KIDNEY DISEASE (H): Primary | ICD-10-CM

## 2024-04-04 ENCOUNTER — LAB (OUTPATIENT)
Dept: LAB | Facility: CLINIC | Age: 89
End: 2024-04-04
Payer: MEDICARE

## 2024-04-04 DIAGNOSIS — I50.20 HEART FAILURE WITH REDUCED EJECTION FRACTION, NYHA CLASS III (H): ICD-10-CM

## 2024-04-04 DIAGNOSIS — E78.2 MIXED HYPERLIPIDEMIA: ICD-10-CM

## 2024-04-04 DIAGNOSIS — I10 BENIGN ESSENTIAL HYPERTENSION: ICD-10-CM

## 2024-04-04 DIAGNOSIS — N18.32 STAGE 3B CHRONIC KIDNEY DISEASE (H): ICD-10-CM

## 2024-04-04 DIAGNOSIS — I10 ESSENTIAL HYPERTENSION: ICD-10-CM

## 2024-04-04 DIAGNOSIS — Z95.2 HISTORY OF TRANSCATHETER AORTIC VALVE REPLACEMENT (TAVR): ICD-10-CM

## 2024-04-04 DIAGNOSIS — I42.8 NONISCHEMIC CARDIOMYOPATHY (H): ICD-10-CM

## 2024-04-04 LAB
ALBUMIN MFR UR ELPH: 8.7 MG/DL
ALBUMIN SERPL BCG-MCNC: 4.2 G/DL (ref 3.5–5.2)
ALP SERPL-CCNC: 66 U/L (ref 40–150)
ALT SERPL W P-5'-P-CCNC: 18 U/L (ref 0–50)
ANION GAP SERPL CALCULATED.3IONS-SCNC: 13 MMOL/L (ref 7–15)
AST SERPL W P-5'-P-CCNC: 23 U/L (ref 0–45)
BILIRUB SERPL-MCNC: 0.3 MG/DL
BUN SERPL-MCNC: 37.6 MG/DL (ref 8–23)
CALCIUM SERPL-MCNC: 9.8 MG/DL (ref 8.8–10.2)
CHLORIDE SERPL-SCNC: 102 MMOL/L (ref 98–107)
CREAT SERPL-MCNC: 1.59 MG/DL (ref 0.51–0.95)
CREAT UR-MCNC: 31.5 MG/DL
DEPRECATED HCO3 PLAS-SCNC: 25 MMOL/L (ref 22–29)
EGFRCR SERPLBLD CKD-EPI 2021: 31 ML/MIN/1.73M2
ERYTHROCYTE [DISTWIDTH] IN BLOOD BY AUTOMATED COUNT: 12.7 % (ref 10–15)
GLUCOSE SERPL-MCNC: 88 MG/DL (ref 70–99)
HCT VFR BLD AUTO: 42 % (ref 35–47)
HGB BLD-MCNC: 13.6 G/DL (ref 11.7–15.7)
MCH RBC QN AUTO: 32.5 PG (ref 26.5–33)
MCHC RBC AUTO-ENTMCNC: 32.4 G/DL (ref 31.5–36.5)
MCV RBC AUTO: 100 FL (ref 78–100)
NT-PROBNP SERPL-MCNC: 1409 PG/ML (ref 0–1800)
PHOSPHATE SERPL-MCNC: 4.2 MG/DL (ref 2.5–4.5)
PLATELET # BLD AUTO: 190 10E3/UL (ref 150–450)
POTASSIUM SERPL-SCNC: 4.4 MMOL/L (ref 3.4–5.3)
PROT SERPL-MCNC: 7.4 G/DL (ref 6.4–8.3)
PROT/CREAT 24H UR: 0.28 MG/MG CR (ref 0–0.2)
PTH-INTACT SERPL-MCNC: 49 PG/ML (ref 15–65)
RBC # BLD AUTO: 4.19 10E6/UL (ref 3.8–5.2)
SODIUM SERPL-SCNC: 140 MMOL/L (ref 135–145)
VIT D+METAB SERPL-MCNC: 112 NG/ML (ref 20–50)
WBC # BLD AUTO: 6.5 10E3/UL (ref 4–11)

## 2024-04-04 PROCEDURE — 82306 VITAMIN D 25 HYDROXY: CPT

## 2024-04-04 PROCEDURE — 83880 ASSAY OF NATRIURETIC PEPTIDE: CPT

## 2024-04-04 PROCEDURE — 36415 COLL VENOUS BLD VENIPUNCTURE: CPT

## 2024-04-04 PROCEDURE — 84100 ASSAY OF PHOSPHORUS: CPT

## 2024-04-04 PROCEDURE — 80053 COMPREHEN METABOLIC PANEL: CPT

## 2024-04-04 PROCEDURE — 85027 COMPLETE CBC AUTOMATED: CPT

## 2024-04-04 PROCEDURE — 84156 ASSAY OF PROTEIN URINE: CPT

## 2024-04-04 PROCEDURE — 83970 ASSAY OF PARATHORMONE: CPT

## 2024-04-06 ENCOUNTER — HEALTH MAINTENANCE LETTER (OUTPATIENT)
Age: 89
End: 2024-04-06

## 2024-04-08 ENCOUNTER — VIRTUAL VISIT (OUTPATIENT)
Dept: NEPHROLOGY | Facility: CLINIC | Age: 89
End: 2024-04-08
Payer: MEDICARE

## 2024-04-08 VITALS — BODY MASS INDEX: 30.05 KG/M2 | HEIGHT: 60 IN | DIASTOLIC BLOOD PRESSURE: 71 MMHG | SYSTOLIC BLOOD PRESSURE: 130 MMHG

## 2024-04-08 DIAGNOSIS — I10 HYPERTENSION, ESSENTIAL: ICD-10-CM

## 2024-04-08 DIAGNOSIS — E67.3 HYPERVITAMINOSIS D: ICD-10-CM

## 2024-04-08 DIAGNOSIS — N18.32 STAGE 3B CHRONIC KIDNEY DISEASE (H): Primary | ICD-10-CM

## 2024-04-08 PROCEDURE — 99214 OFFICE O/P EST MOD 30 MIN: CPT | Performed by: PHYSICIAN ASSISTANT

## 2024-04-08 NOTE — PATIENT INSTRUCTIONS
Stop Vitamin D supplement.   Removed Jardiance from your med list. If you determine you are taking it, please let us know and we will put it back in.   Continue good hydration, low sodium diet.   Check blood pressure daily, keep log.   Avoid ibuprofen/aleve.   Labs and follow up as planned in October with Dr. Johnson.

## 2024-04-08 NOTE — PROGRESS NOTES
Nephrology Progress Note  4/8/24    Assessment and Plan:  88 year old female with history of CKD stage 3, Scr 1-1.5 since 2008, hypertension who presents for followup of CKD. She has had hypercalcemia that has stabilized. She was first seen October 2019, and hydrochlorothiazide was stopped. Her Scr has been 1.2-1.5 in recent years. She now has HFrEF and HFpEF, hospitalized in 2/2023 for hypertensive urgency and in December 2022 for anemia with hgb down to 7    # CKD stage 3b, Scr 1-1.5, recent SCr 1.5 eGFR 31ml/min :  Her scr has been 1.1-1.5 in the last few years, with baseline many years ago at 1 or less.  She has mild proteinuria, 0.28 mg/mg, improved from 2.47 mg/mg. She denies systemic signs   - she has HFpeF and this along with her CAD and hypertension is the cause of her CKD. She now has HFrEF per last echo, EF 35-40%    -low grade proteinuria, off ACE/ARB-now is on entresto, empagliflozin is listed on med rec but she does not believe she is taking this.   - discussed hydration, avoiding nephrotoxic medications, holding diuretic on sick days, and blood pressure control  - she is seeing [facility] which is good  - started on spironolactone 25mg, and her BP is better than ever in her recollection. She denies dizziness    # Hypertension: much better controlled since starting spironolactone, fursoemide and entresto. Her hydrochlorothiazide was stopped due to hypercalcemia. The other medications were stopped in the last year by other providers.  - current meds amlodipine 2.5 mg daily, lasix 20 mg daily, isosorobide 60 mg daily, entresto, spironolactone 12.5 mg daily  - BP's are well controlled  She feels well overall.     # Not anemic - but was anemic in hospital , down to 7 with iron deficiency (iron sat 5%)  in December 2022, started on iron supplement, lowered to every other day given normal hgb now.  - ? No cause found for iron deficiency, decided against scopes in the hospital.  - Hgb remains normal at  13.6    # Electrolytes:   - Na 140, K 4.4, Bicarb 25   Stable    # Mineral Bone Disorder:   - Calcium; level is:  Phos 4.2, both normal range, PTH normal  - vitamin D high 112  - stop Vit D, recheck in 6 months at follow up.      Assessment and plan was discussed with patient and she voiced her understanding and agreement.    Reason for Visit:  Kamryn Miller is an 88 year old female with CKD from hypertension, who presents for followup of CKD    HPI:  She is a pleasant female with history of mild rhinitis, asthma, and hypertension who presents for followup of CKD and hypercalcemia. . Her serum creatinine has been 1.2-1.5 in recent years, and was up to 2 in 2019, with eGFR 21ml/min.  She had hypercalcemia, which improved with stopping hydrochlorothiazide. She has history of CAD and HFpEF with significant aortic stenosis and follows with Dr Zuniga in cardiology. She had TAVR . She was hospitalized in 2023 with hypertensive urgency, shortness of breath, anemia.  She was treated with mild diuresis and discharged.  She had hypertensive urgency in jn 2022, She also had AV block which affected her BP regimen. She also had inpatient psych stay for depression after her  .  Her creatinine was 1.1-1.2 on discharge (with stopping her ARB).  Her recent BP is 120s/  She is mobilizing fairly well without cane, uses walker for longer distances.  She had significant anemia in 2022 without clear cause, hgb down to 7 was transfused and now on iron supplement. Iron sat was 5%  She denies significant dyspnea on exertion or swelling. She is in assisted living and they manage medications and provide meals.  She tries to eat healthy.  She avoids using NSAIDs.     Renal History:   HTN    ROS:   A comprehensive review of systems was obtained and negative, except as noted in the HPI or PMH.    Active Medical Problems:  Patient Active Problem List   Diagnosis    Hyperlipidemia LDL goal <70     Mild major depression (H)    Pulmonary hypertension (H)    Seasonal allergic rhinitis    Mitral insufficiency    Tricuspid insufficiency    Renal insufficiency    Tremors    Family history of diabetes mellitus    Celiac disease    History of colonic polyps    Benign essential hypertension    Hypothyroidism, unspecified type    CKD (chronic kidney disease) stage 3, GFR 30-59 ml/min (H)    Anemia    Disorder of kidney and ureter    Generalized anxiety disorder    Osteopenia    CAD S/P percutaneous coronary angioplasty    NYHA class 3 heart failure with preserved ejection fraction (H)    Moderate persistent asthma without complication    Hearing disorder, unspecified laterality    Impairment of balance    Bipolar 1 disorder, depressed, severe (H)    Essential hypertension    Stented coronary artery    Generalized weakness    Left leg claudication (H24)    S/P TAVR (transcatheter aortic valve replacement)    Syncope and collapse    Anemia, unspecified type    Weight gain    Elevated brain natriuretic peptide (BNP) level    Fall     PMH:   Medical record was reviewed and PMH was discussed with patient and noted below.  Past Medical History:   Diagnosis Date    Aortic valvar stenosis 7/2010    mild    Asthma     Bipolar disorder (H)     CAD (coronary artery disease)     s/p angioplasty    Celiac disease     Colon polyps     Colon polyps 2012    every 3 year colonoscopy     Depression     High cholesterol     HTN     Mitral insufficiency     Pulmonary HTN (H)     mild    Tremors 7/10    drug induced from antidepressants    Tricuspid insufficiency      PSH:   Past Surgical History:   Procedure Laterality Date    ANGIOGRAM  6/26/2009    ANGIOPLASTY  9/96    for angina    ANGIOPLASTY  8/03 - 9/03    X -2 - with stenst in coronary car.    APPENDECTOMY      BREAST BIOPSY, RT/LT      Breat Biopsy RT/LT, benign    BUNIONECTOMY  11/8/2011    Procedure:BUNIONECTOMY; Left donna bunionectomy; Surgeon:FREDY LITTLEJOHN; Location: OR     BUNIONECTOMY RT/LT  5/2007    right bunion and right 2nd hammertoe    CATARACT IOL, RT/LT  6/12 and 7/12    bilateral    COLONOSCOPY  2007, 2012    every 3 years for polyps    CV CORONARY ANGIOGRAM N/A 8/21/2020    Procedure: CV CORONARY ANGIOGRAM;  Surgeon: Gianluca Toscano MD;  Location: [facility]    CV CORONARY ANGIOGRAM N/A 10/25/2022    Procedure: Coronary Angiogram;  Surgeon: Carter Douglass MD;  Location: [facility]    CV INSTANTANEOUS WAVE-FREE RATIO N/A 10/25/2022    Procedure: Instantaneous Wave-Free Ratio;  Surgeon: Carter Douglass MD;  Location: [facility]    CV LEFT HEART CATH N/A 8/21/2020    Procedure: CV LEFT HEART CATH;  Surgeon: Gianluca Toscano MD;  Location: [facility]    CV LEFT HEART CATH N/A 11/3/2020    Procedure: CV LEFT HEART CATH;  Surgeon: Tom Burrows MD;  Location: [facility]    CV LOWER EXTREMITY ANGIOGRAM BILATERAL N/A 11/3/2020    Procedure: CV ANGIOGRAM LOWER EXTREMITY BILATERAL;  Surgeon: Tom Burrows MD;  [facility]    CV PCI STENT DRUG ELUTING N/A 8/21/2020    Procedure: Percutaneous Coronary Intervention Stent Drug Eluting;  Surgeon: Gianluca Toscano MD;  Location: [facility]    CV RIGHT HEART CATH MEASUREMENTS RECORDED N/A 8/21/2020    Procedure: CV RIGHT HEART CATH;  Surgeon: Gianluca Toscano MD;  [facility]    CV RIGHT HEART CATH MEASUREMENTS RECORDED N/A 11/3/2020    Procedure: CV RIGHT HEART CATH;  Surgeon: Tom Burrows MD;  [facility]    CV TRANSCATHETER AORTIC VALVE REPLACEMENT N/A 9/12/2022    Procedure: Transfemoral Transcatheter Aortic Valve Replacement with possible open heart bypass and or balloon pump placement and any indicated procedure;  Surgeon: Tom Burrows MD;  [facility]    HEART CATH  "FEMORAL CANNULIZATION WITH OPEN STANDBY REPAIR AORTIC VALVE N/A 9/12/2022    Procedure: OR TRANSCATHETER AORTIC VALVE REPLACEMENT, OPEN FEMORAL ARTERY APPROACH;  Surgeon: Arthur Markham MD;  Location:  [facility]ARDI[facility] CATH LAB    JOINT REPLACEMENT, HIP RT/LT  4/2008    right hip replaced    JOINT REPLACEMENT, HIP RT/LT  4/2009    left hip replaced    REPAIR HAMMER TOE  11/8/2011    Procedure:REPAIR HAMMER TOE; left 2nd hammertoe repair; Surgeon:FREDY LITTLEJOHN; Location: OR    SURGICAL HISTORY OF -   11/11    left bunion and 2nd hammertoe repair    TUBAL LIGATION      ZZC ANESTH,BLEPHAROPLASTY      (R) for drooping eyelid    ZZC APPENDECTOMY         Family Hx:   Family History   Problem Relation Age of Onset    Asthma Mother     Cerebrovascular Disease Mother     Arthritis Mother     Depression Mother     Lipids Sister     Hypertension Sister     Heart Disease Sister     Lipids Sister     Hypertension Sister     Lipids Sister     Breast Cancer Sister      Personal Hx:   Social History     Tobacco Use    Smoking status: Never    Smokeless tobacco: Never   Substance Use Topics    Alcohol use: No     Alcohol/week: 0.0 standard drinks of alcohol     Types: 1 Standard drinks or equivalent per week       Allergies:  Allergies   Allergen Reactions    Ace Inhibitors Cough    Amlodipine      Headache and plugged ears    Carvedilol      \"plugged ears and a headache\"    Diclofenac Other (See Comments)     Balance problems    Gluten Meal Diarrhea    Hydralazine-Hctz      headache       Medications:  Current Outpatient Medications   Medication Sig Dispense Refill    acetaminophen (TYLENOL) 500 MG tablet Take 1 tablet (500 mg) by mouth daily Continue previously ordered PRN tylenol order as well 30 tablet 11    acetaminophen (TYLENOL) 500 MG tablet Take 1 tablet (500 mg) by mouth every 6 hours as needed for pain 60 tablet 4    amLODIPine (NORVASC) 2.5 MG tablet Take 1 tablet (2.5 mg) by mouth At Bedtime 90 tablet 3    " amoxicillin (AMOXIL) 500 MG capsule       aspirin (ASA) 81 MG EC tablet Take 1 tablet (81 mg) by mouth daily 30 tablet 0    azelastine (ASTEPRO) 0.15 % nasal spray USE 1 SPRAY IN EACH NOSTRIL ONCE A DAY 30 mL 11    calcium citrate (CITRACAL) 950 (200 Ca) MG tablet TAKE 1 TABLET BY MOUTH ONCE DAILY 30 tablet 11    cholecalciferol (VITAMIN D3) 1250 mcg (13414 units) capsule TAKE 1 CAPSULE BY MOUTH ONCE WEEKLY 4 capsule 11    desvenlafaxine (PRISTIQ) 100 MG 24 hr tablet Take 100 mg by mouth daily      empagliflozin (JARDIANCE) 10 MG TABS tablet Take 1 tablet (10 mg) by mouth daily (Patient not taking: Reported on 2/29/2024) 90 tablet 3    fluticasone-salmeterol (ADVAIR DISKUS) 500-50 MCG/ACT inhaler INHALE 1 PUFF BY MOUTH TWICE DAILY (Patient taking differently: 1 puff daily INHALE 1 PUFF BY MOUTH TWICE DAILY) 60 each 0    folic acid (FOLVITE) 400 MCG tablet TAKE 1 TABLET BY MOUTH ONCE DAILY 28 tablet 11    furosemide (LASIX) 20 MG tablet Take 1.5 tablets (30 mg) by mouth daily 135 tablet 3    iron polysaccharides (NIFEREX) 150 MG capsule Take 1 capsule (150 mg) by mouth daily 30 capsule 1    isosorbide mononitrate (IMDUR) 30 MG 24 hr tablet Take 2 tablets (60 mg) by mouth daily 60 tablet 11    lamoTRIgine (LAMICTAL) 25 MG tablet Take 25 mg by mouth daily with 200 mg tablet for a total dose of 225 mg      LAMOTRIGINE 200 MG PO TABS Take 200 mg by mouth daily with 25 mg tablet for a total dose of 225 mg      levothyroxine (SYNTHROID/LEVOTHROID) 50 MCG tablet TAKE 1 TABLET BY MOUTH ONCE DAILY 28 tablet 11    liothyronine (CYTOMEL) 5 MCG tablet Take 2 tablets (10 mcg) by mouth daily 30 tablet 3    memantine (NAMENDA) 10 MG tablet Take 1 tablet (10 mg) by mouth daily 30 tablet 3    mirtazapine (REMERON) 15 MG tablet Take 15 mg by mouth at bedtime      Multiple Vitamin (TAB-A-JENNIFER) TABS TAKE 1 TABLET BY MOUTH ONCE DAILY 28 tablet 11    psyllium (METAMUCIL/KONSYL) 58.6 % powder Take 1 tspn in liquid daily      rosuvastatin  (CRESTOR) 10 MG tablet Take 1 tablet (10 mg) by mouth daily 30 tablet 0    sacubitril-valsartan (ENTRESTO) 49-51 MG per tablet Take 1 tablet by mouth 2 times daily 60 tablet 3    spironolactone (ALDACTONE) 25 MG tablet Take 0.5 tablets (12.5 mg) by mouth daily 45 tablet 3     No current facility-administered medications for this visit.      Vitals:  There were no vitals taken for this visit.    Exam:  GENERAL APPEARANCE: alert and no distress    LABS:   CMP  Recent Labs   Lab Test 04/04/24  1037 02/02/24  1015 01/02/24  1332 12/19/23  1056 11/24/21  1525 06/04/21  1033 05/17/21  1401 11/03/20  1225 10/23/20  0834    143 144 141   < > 142 140 144 141   POTASSIUM 4.4 4.3 4.2 4.4   < > 3.4 4.1 4.3 3.8   CHLORIDE 102 106 105 104   < > 106 106 111* 105   CO2 25 25 28 29   < > 30 33* 28 32   ANIONGAP 13 12 11 8   < > 6 1* 5 4   GLC 88 81 100* 93   < > 103* 92 100* 88   BUN 37.6* 46.9* 26.0* 23.7*   < > 27 23 23 21   CR 1.59* 1.63* 1.47* 1.44*   < > 1.50* 1.39* 1.38* 1.52*   GFRESTIMATED 31* 30* 34* 35*   < > 32* 35* 35* 31*   GFRESTBLACK  --   --   --   --   --  37* 40* 41* 36*   PRINCE 9.8 9.3 10.5* 9.8   < > 9.4 9.3 9.5 9.9    < > = values in this interval not displayed.     Recent Labs   Lab Test 04/04/24  1037 12/19/23  1056 12/08/23  0940 11/13/23  1003   BILITOTAL 0.3 0.2 0.2 0.3   ALKPHOS 66 55 56 54   ALT 18 9 13 10   AST 23 21 23 23     CBC  Recent Labs   Lab Test 04/04/24  1037 12/19/23  1056 12/08/23  0940 11/13/23  1003   HGB 13.6 13.3 13.1 13.2   WBC 6.5 4.9 4.2 5.6   RBC 4.19 4.08 3.99 4.13   HCT 42.0 40.6 41.0 42.0    100 103* 102*   MCH 32.5 32.6 32.8 32.0   MCHC 32.4 32.8 32.0 31.4*   RDW 12.7 13.9 14.4 14.8    172 178 193     URINE STUDIES  Recent Labs   Lab Test 10/10/23  1500 10/07/23  1815 08/01/23  1200 07/30/23  2100 11/30/22  1927 01/24/22  1051 12/16/21  1758 08/12/21  1334 07/28/21  1150 07/16/20  1615 10/28/19  1314   COLOR Straw Yellow Light Yellow Light Yellow   < > Yellow   <  > Yellow Yellow   < > Yellow   APPEARANCE Slightly Cloudy* Slightly Cloudy* Clear Clear   < > Clear   < > Clear Clear   < > Clear   URINEGLC Negative Negative Negative Negative   < > Negative   < > Negative Negative   < > Negative   URINEBILI Negative Negative Negative Negative   < > Negative   < > Negative Negative   < > Negative   URINEKETONE Negative Negative Negative Negative   < > Negative   < > Negative Negative   < > Negative   SG 1.007 1.011 1.012 1.008   < > <=1.005   < > <=1.005 1.010   < > 1.015   UBLD Large* Large* Negative Negative   < > Negative   < > Trace* Trace*   < > Trace*   URINEPH 6.0 5.5 7.5* 8.0*   < > 7.0   < > 6.0 7.5*   < > 7.5*   PROTEIN 30* 30* Negative Negative   < > Negative   < > Negative 30*   < > Negative   UROBILINOGEN  --   --   --   --   --  0.2  --  0.2 0.2  --  0.2   NITRITE Negative Negative Positive* Negative   < > Negative   < > Negative Negative   < > Negative   LEUKEST Large* Small* Moderate* Negative   < > Negative   < > Negative Small*   < > Negative   RBCU 27* >182* <1 1   < > 0-2   < > 0-2 0-2   < > O - 2   WBCU 78* 62* 45* 2   < > 0-5   < > 0-5 5-10*   < > 0 - 5    < > = values in this interval not displayed.     Recent Labs   Lab Test 01/24/22  1051   [facility]PG 0.29*     PTH  Recent Labs   Lab Test 04/04/24  1037 09/18/23  1450 04/03/23  1428   PTHI 49 54 73*     IRON STUDIES  Recent Labs   Lab Test 01/11/23  1341 10/26/22  0737   IRON 62 23*    318   IRONSAT 17 7*   JANESSA 126 19         Migdalia Torres PA-C    Visit length 10 minutes. An additional 20 minutes were spent on date of service in chart review, documentation, and other activities as noted.

## 2024-04-10 ENCOUNTER — TELEPHONE (OUTPATIENT)
Dept: NEPHROLOGY | Facility: CLINIC | Age: 89
End: 2024-04-10
Payer: MEDICARE

## 2024-04-10 NOTE — TELEPHONE ENCOUNTER
Spoke with Katherine at [facility] to ask if they need a written order to discontinue vit d or if they can take a verbal. She stated it does need to be on paper with an electronic signature is fine. Verified with Katherine that patient is not taking empagliflozin .  Routing Brian Hearn, RN Care Coordinator  Nephrology

## 2024-04-11 NOTE — TELEPHONE ENCOUNTER
Patient reported vitamin D3 (CHOLECALCIFEROL) 1.25 MG (45989 UT) capsule discontinued per Brian VILLAFANA and faxed to [facility] at [phone]  VEENA Hearn Care Coordinator  Nephrology

## 2024-05-02 ENCOUNTER — NURSE TRIAGE (OUTPATIENT)
Dept: CARDIOLOGY | Facility: CLINIC | Age: 89
End: 2024-05-02
Payer: MEDICARE

## 2024-05-02 NOTE — TELEPHONE ENCOUNTER
Day shift LPN, Katherine, called to report high /98 P 70 taken about 3:45 PM today. Parameters to call for BP > 160 systolic or > 100 diastolic. Patient was gone on an all day outing to the [facility] so BP was not taken this AM. Kamryn feels completely fine. Katherine will fax BP log for the past month and current med list to the [facility] fax number. Katherine said Kamryn had a few other high readings in the past month. If you call back please call the Nurse cell phone after hours: [phone]. During business hours call: [phone].      Reason for Disposition   Systolic BP >= 160 OR Diastolic >= 100    Additional Information   Negative: Sounds like a life-threatening emergency to the triager   Negative: Symptom is main concern (e.g., headache, chest pain)   Negative: Low blood pressure is main concern   Negative: Systolic BP >= 160 OR Diastolic >= 100, and any cardiac (e.g., breathing difficulty, chest pain) or neurologic symptoms (e.g., new-onset blurred or double vision)   Negative: Pregnant 20 or more weeks (or postpartum < 6 weeks) with new hand or face swelling   Negative: Pregnant 20 or more weeks (or postpartum < 6 weeks) and Systolic BP >= 160 OR Diastolic >= 110   Negative: Patient sounds very sick or weak to the triager   Negative: Systolic BP >= 200 OR Diastolic >= 120 and having NO cardiac or neurologic symptoms   Negative: Pregnant 20 or more weeks (or postpartum < 6 weeks) with Systolic BP >= 140 OR Diastolic >= 90   Negative: Systolic BP >= 180 OR Diastolic >= 110, and missed most recent dose of blood pressure medication   Negative: Systolic BP >= 180 OR Diastolic >= 110   Negative: Patient wants to be seen   Negative: Ran out of BP medications   Negative: Taking BP medications and feels is having side effects (e.g., impotence, cough, dizziness)   Negative: Systolic BP >= 130 OR Diastolic >= 80, and pregnant    Protocols used: Blood Pressure - High-A-OH

## 2024-05-02 NOTE — TELEPHONE ENCOUNTER
Called and spoke to nurse at [facility]. She states patient had been out all day and did not have any symptoms. The facility will recheck patient's blood pressure and call us back if she is above the 160/100 parameters. The do not currently have any prn order for weight or BP. Awaiting call back.

## 2024-05-07 ENCOUNTER — TELEPHONE (OUTPATIENT)
Dept: CARDIOLOGY | Facility: CLINIC | Age: 89
End: 2024-05-07
Payer: MEDICARE

## 2024-05-07 NOTE — TELEPHONE ENCOUNTER
[facility] Call Center    Phone Message    May a detailed message be left on voicemail: yes     Reason for Call: Symptoms or Concerns     If patient has red-flag symptoms, warm transfer to triage line    Current symptom or concern: SOB with activity. Patient takes 30mg furosemide every morning. Patient is complaining of fluid retention. Current weight is 153.3 and normal weight is 150-154     Her weight on Sunday 5/5 was 150.7, 5/6 154.8 , 5/7 153.3    /83    Symptoms have been present for:  3 day(s)    Has patient previously been seen for this? No      Action Taken: Other: cardio    Travel Screening: Not Applicable

## 2024-05-09 NOTE — TELEPHONE ENCOUNTER
Called [facility], no answer, left voice message requesting a call back with an update on symptoms and weight. Awaiting call back.

## 2024-05-13 ENCOUNTER — CARE COORDINATION (OUTPATIENT)
Dept: CARDIOLOGY | Facility: CLINIC | Age: 89
End: 2024-05-13
Payer: MEDICARE

## 2024-05-13 DIAGNOSIS — I50.20 HEART FAILURE WITH REDUCED EJECTION FRACTION, NYHA CLASS III (H): Primary | ICD-10-CM

## 2024-05-14 ENCOUNTER — OFFICE VISIT (OUTPATIENT)
Dept: CARDIOLOGY | Facility: CLINIC | Age: 89
End: 2024-05-14
Attending: INTERNAL MEDICINE
Payer: MEDICARE

## 2024-05-14 ENCOUNTER — LAB (OUTPATIENT)
Dept: LAB | Facility: CLINIC | Age: 89
End: 2024-05-14
Attending: INTERNAL MEDICINE
Payer: MEDICARE

## 2024-05-14 VITALS
HEART RATE: 71 BPM | WEIGHT: 158.6 LBS | OXYGEN SATURATION: 99 % | SYSTOLIC BLOOD PRESSURE: 139 MMHG | BODY MASS INDEX: 31.34 KG/M2 | DIASTOLIC BLOOD PRESSURE: 83 MMHG

## 2024-05-14 DIAGNOSIS — Z95.2 HISTORY OF TRANSCATHETER AORTIC VALVE REPLACEMENT (TAVR): ICD-10-CM

## 2024-05-14 DIAGNOSIS — I50.20 HEART FAILURE WITH REDUCED EJECTION FRACTION, NYHA CLASS III (H): ICD-10-CM

## 2024-05-14 DIAGNOSIS — I42.8 NONISCHEMIC CARDIOMYOPATHY (H): ICD-10-CM

## 2024-05-14 DIAGNOSIS — E78.2 MIXED HYPERLIPIDEMIA: ICD-10-CM

## 2024-05-14 DIAGNOSIS — I10 BENIGN ESSENTIAL HYPERTENSION: ICD-10-CM

## 2024-05-14 DIAGNOSIS — I50.22 CHRONIC SYSTOLIC HEART FAILURE (H): ICD-10-CM

## 2024-05-14 DIAGNOSIS — I10 ESSENTIAL HYPERTENSION: ICD-10-CM

## 2024-05-14 LAB
ALBUMIN SERPL BCG-MCNC: 4 G/DL (ref 3.5–5.2)
ALP SERPL-CCNC: 63 U/L (ref 40–150)
ALT SERPL W P-5'-P-CCNC: 13 U/L (ref 0–50)
ANION GAP SERPL CALCULATED.3IONS-SCNC: 10 MMOL/L (ref 7–15)
AST SERPL W P-5'-P-CCNC: 20 U/L (ref 0–45)
BILIRUB SERPL-MCNC: 0.2 MG/DL
BUN SERPL-MCNC: 39.6 MG/DL (ref 8–23)
CALCIUM SERPL-MCNC: 9.3 MG/DL (ref 8.8–10.2)
CHLORIDE SERPL-SCNC: 102 MMOL/L (ref 98–107)
CREAT SERPL-MCNC: 1.69 MG/DL (ref 0.51–0.95)
DEPRECATED HCO3 PLAS-SCNC: 26 MMOL/L (ref 22–29)
EGFRCR SERPLBLD CKD-EPI 2021: 29 ML/MIN/1.73M2
ERYTHROCYTE [DISTWIDTH] IN BLOOD BY AUTOMATED COUNT: 12.9 % (ref 10–15)
GLUCOSE SERPL-MCNC: 82 MG/DL (ref 70–99)
HCT VFR BLD AUTO: 40 % (ref 35–47)
HGB BLD-MCNC: 12.7 G/DL (ref 11.7–15.7)
MCH RBC QN AUTO: 32.2 PG (ref 26.5–33)
MCHC RBC AUTO-ENTMCNC: 31.8 G/DL (ref 31.5–36.5)
MCV RBC AUTO: 101 FL (ref 78–100)
NT-PROBNP SERPL-MCNC: 932 PG/ML (ref 0–1800)
PLATELET # BLD AUTO: 180 10E3/UL (ref 150–450)
POTASSIUM SERPL-SCNC: 4.2 MMOL/L (ref 3.4–5.3)
PROT SERPL-MCNC: 7 G/DL (ref 6.4–8.3)
RBC # BLD AUTO: 3.95 10E6/UL (ref 3.8–5.2)
SODIUM SERPL-SCNC: 138 MMOL/L (ref 135–145)
WBC # BLD AUTO: 5.7 10E3/UL (ref 4–11)

## 2024-05-14 PROCEDURE — 36415 COLL VENOUS BLD VENIPUNCTURE: CPT | Performed by: PATHOLOGY

## 2024-05-14 PROCEDURE — 99215 OFFICE O/P EST HI 40 MIN: CPT | Performed by: INTERNAL MEDICINE

## 2024-05-14 PROCEDURE — G0463 HOSPITAL OUTPT CLINIC VISIT: HCPCS | Performed by: INTERNAL MEDICINE

## 2024-05-14 PROCEDURE — 83880 ASSAY OF NATRIURETIC PEPTIDE: CPT | Performed by: PATHOLOGY

## 2024-05-14 PROCEDURE — 80053 COMPREHEN METABOLIC PANEL: CPT | Performed by: PATHOLOGY

## 2024-05-14 PROCEDURE — 85027 COMPLETE CBC AUTOMATED: CPT | Performed by: PATHOLOGY

## 2024-05-14 RX ORDER — SACUBITRIL AND VALSARTAN 49; 51 MG/1; MG/1
TABLET, FILM COATED ORAL
Qty: 180 TABLET | Refills: 3 | Status: SHIPPED | OUTPATIENT
Start: 2024-05-14

## 2024-05-14 ASSESSMENT — PAIN SCALES - GENERAL: PAINLEVEL: NO PAIN (0)

## 2024-05-14 NOTE — PATIENT INSTRUCTIONS
Cardiology Providers you saw during your visit:  Dr. Zuniga    Medication changes:  -Increase Entresto to 49-51 mg in AM,  mg in PM.  -In 2 weeks, increase Entresto to  mg twice daily.    Follow up:  -[facility] in 3 months with labs prior  -Dr. Zuniga in 6 months with labs prior    Labs:  -BMP in 2 weeks    Please call if you have :  1. Weight gain of more than 2 pounds in a day or 5 pounds in a week  2. Increased shortness of breath, swelling or bloating  3. Dizziness, lightheadedness   4. Any questions or concerns.       Follow the American Heart Association Diet and Lifestyle recommendations:  Limit saturated fat, trans fat, sodium, red meat, sweets and sugar-sweetened beverages. If you choose to eat red meat, compare labels and select the leanest cuts available.  Aim for at least 150 minutes of moderate physical activity or 75 minutes of vigorous physical activity - or an equal combination of both - each week.      During business hours: [phone], press option # 1 to schedule or leave a message for your care team      After hours, weekends or holidays: On Call Cardiologist- [phone]   option #4 and ask to speak to the on-call Cardiologist. Inform them you are a CORE/heart failure patient at the [place].      Please consider attending our virtual Heart Failure support group which is held monthly. Please reach out to Gomez at [phone] for more information if you are interested in attending.     2024 dates:    Monday, January 8th, 1-2pm     Monday, February 5th , 1-2pm     Monday, March 4th , 1-2pm     Monday, April 1st, 1-2pm     Monday, May 6th, 1-2pm     Monday, June 3rd, 1-2pm     Monday, July 1st, 1-2pm     Monday, August 5th, 1-2pm     Monday, September 9th, 1-2pm     Monday, October 7th, 1-2pm     Monday, November 4th, 1-2pm     Monday, December 2nd, 1-2pm       VEENA Samuel   Cardiology Care Coordinator - Heart Failure/ [facility]  [facility]   Questions and  "[phone]   First press #1 for the University and then press #4 for \"To send a message to your care team\"    "

## 2024-05-14 NOTE — PROGRESS NOTES
May 14, 2024  I had the pleasure of seeing Kamryn Miller  in the [facility] for further evaluation and management.   She has a history of Hx CAD, HLD, HTN, mitral and tricuspid insuff, pulmonary hypertension, lost to follow up in the past.  She does have CAD and did receive 3 stents in 8/2020 as below.  She denies any cardiomyopathy and she has not had any cardiology follow-up for several years.  Notes that she does have shortness of breath especially with exertion or she walks outside.  This is class III symptoms at the time.  We did start TAVR evaluation and she did undergo right heart catheterization as well as invasive hemodynamic measurement and measurement of the valve area.  Given findings of moderate aortic stenosis, TAVR was not yet pursued and ongoing surveillance was recommended. Had been hypertensive in the recent past. She did see the structural team just recently and repeat TTE was performed that again showed moderate to severe aortic stenosis, essentially unchanged from prior TTE. Given that she had no concerning symptoms ongoing surveillance was recommended.  She has had issues with hypertension in the past including admissions for hypertensive emergency. Given ongoing and worsening symptoms she was revaluated by the structural team and ultimately underwent successful TAVR on 9/12/22 with a post intervention gradient of 7mmHg. She later had increased Mgs, imaging was consistent with HALT and she was placed on AC. She was seen in the ER subsequently for claudication and leg pain in the setting of known LFA stenosis. She was seen in the ER then for episode of chest pain. Workup was overall negative and was discharged in stable condition. She was then admitted in 2/2023 with systolic HF exacerbation, she was diuresed and medications were adjusted including starting entresto at low dose. Did not tolerate BB initiation. She was also seen in CORE. Her BP remained elevated and thus most  recently entresto was increased to 49-51 mg BID that she tolerated well with the goal to further titrate and later initiate SGLT2 if her renal function remains stable. She was diagnosed with COVID in October 2022 and was treated with Paxlovid. Serial TTEs have had further declines in LVEF (30-35% 11/2023). CMR with LVEF 28%, no evidence of MI. PYP scan was negative for ATTR amyloid.   There is here with her daughter.  They note that she has not doing extremely well without any new complaints or issues.  She provides blood pressure log which is daily as well as weight loss.  This indicates with having relatively stable a few pounds up over the past couple of months.  Blood pressure has been highly variable running between 102 mmHg systolic and 173 mmHg systolic.  There does not seem to be any correlation with time of day or any other activities.  Nevertheless she is active denies any new complaints or symptoms no shortness of breath.  Participates in all activities doing well     PAST MEDICAL HISTORY:  Past Medical History:   Diagnosis Date    Aortic valvar stenosis 7/2010    mild    Asthma     Bipolar disorder (H)     CAD (coronary artery disease)     s/p angioplasty    Celiac disease     Colon polyps     Colon polyps 2012    every 3 year colonoscopy     Depression     High cholesterol     HTN     Mitral insufficiency     Pulmonary HTN (H)     mild    Tremors 7/10    drug induced from antidepressants    Tricuspid insufficiency      FAMILY HISTORY:  Family History   Problem Relation Age of Onset    Asthma Mother     Cerebrovascular Disease Mother     Arthritis Mother     Depression Mother     Lipids Sister     Hypertension Sister     Heart Disease Sister     Lipids Sister     Hypertension Sister     Lipids Sister     Breast Cancer Sister      SOCIAL HISTORY:  Social History     Socioeconomic History    Marital status:      Spouse name: Adrien    Number of children: 2    Years of education: 16   Occupational  History     Employer: RETIRED     Comment: Retired in 2002.    Tobacco Use    Smoking status: Never    Smokeless tobacco: Never   Vaping Use    Vaping status: Never Used   Substance and Sexual Activity    Alcohol use: No     Alcohol/week: 0.0 standard drinks of alcohol     Types: 1 Standard drinks or equivalent per week    Drug use: No    Sexual activity: Not Currently     Partners: Male     Social Determinants of Health     Financial Resource Strain: Medium Risk (5/4/2022)    Received from [facility]    Financial Resource Strain     Financial Concerns: 2   Transportation Needs: Unknown (2/4/2021)    Received from [facility]    Transportation Needs     Lack of Transportation (Medical): 0   Physical Activity: Unknown (2/4/2021)    Received from [facility]    Physical Activity     Calculated Minutes of Exercise per Week:: 0   Stress: Unknown (2/4/2021)    Received from [facility]    Stress     Patient Reported Major Stressor(s): 0     Patient Reported Strengths: 1     Patient Reported Source(s) of Support: 1    Received from [facility]    Social Connections   Interpersonal Safety: Not At Risk (7/11/2023)    Humiliation, Afraid, Rape, and Kick questionnaire     Fear of Current or Ex-Partner: No     Emotionally Abused: No     Physically Abused: No     Sexually Abused: No   Housing Stability: Unknown (2/4/2021)    Received from [facility]    Housing Stability     Housing Concerns: 0     CURRENT MEDICATIONS:  Current Outpatient Medications   Medication Sig Dispense Refill    acetaminophen (TYLENOL) 500 MG tablet Take 1 tablet (500 mg) by mouth daily Continue previously ordered PRN tylenol order as well 30 tablet 11    acetaminophen (TYLENOL) 500 MG tablet Take 1 tablet (500 mg) by mouth every 6 hours as needed for pain (Patient not taking: Reported on 4/8/2024) 60 tablet 4     amLODIPine (NORVASC) 2.5 MG tablet Take 1 tablet (2.5 mg) by mouth At Bedtime 90 tablet 3    amoxicillin (AMOXIL) 500 MG capsule  (Patient not taking: Reported on 4/8/2024)      aspirin (ASA) 81 MG EC tablet Take 1 tablet (81 mg) by mouth daily 30 tablet 0    azelastine (ASTEPRO) 0.15 % nasal spray USE 1 SPRAY IN EACH NOSTRIL ONCE A DAY 30 mL 11    calcium citrate (CITRACAL) 950 (200 Ca) MG tablet TAKE 1 TABLET BY MOUTH ONCE DAILY 30 tablet 11    desvenlafaxine (PRISTIQ) 100 MG 24 hr tablet Take 100 mg by mouth daily      fluticasone-salmeterol (ADVAIR DISKUS) 500-50 MCG/ACT inhaler INHALE 1 PUFF BY MOUTH TWICE DAILY (Patient taking differently: 1 puff daily INHALE 1 PUFF BY MOUTH TWICE DAILY) 60 each 0    folic acid (FOLVITE) 400 MCG tablet TAKE 1 TABLET BY MOUTH ONCE DAILY 28 tablet 11    furosemide (LASIX) 20 MG tablet Take 1.5 tablets (30 mg) by mouth daily 135 tablet 3    iron polysaccharides (NIFEREX) 150 MG capsule Take 1 capsule (150 mg) by mouth daily 30 capsule 1    isosorbide mononitrate (IMDUR) 30 MG 24 hr tablet Take 2 tablets (60 mg) by mouth daily 60 tablet 11    lamoTRIgine (LAMICTAL) 25 MG tablet Take 25 mg by mouth daily with 200 mg tablet for a total dose of 225 mg      LAMOTRIGINE 200 MG PO TABS Take 200 mg by mouth daily with 25 mg tablet for a total dose of 225 mg      levothyroxine (SYNTHROID/LEVOTHROID) 50 MCG tablet TAKE 1 TABLET BY MOUTH ONCE DAILY 28 tablet 11    liothyronine (CYTOMEL) 5 MCG tablet Take 2 tablets (10 mcg) by mouth daily 30 tablet 3    memantine (NAMENDA) 10 MG tablet Take 1 tablet (10 mg) by mouth daily 30 tablet 3    mirtazapine (REMERON) 15 MG tablet Take 15 mg by mouth at bedtime      Multiple Vitamin (TAB-A-JENNIFER) TABS TAKE 1 TABLET BY MOUTH ONCE DAILY 28 tablet 11    psyllium (METAMUCIL/KONSYL) 58.6 % powder Take 1 tspn in liquid daily      rosuvastatin (CRESTOR) 10 MG tablet Take 1 tablet (10 mg) by mouth daily 30 tablet 0    sacubitril-valsartan (ENTRESTO) 49-51 MG  per tablet Take 1 tablet by mouth 2 times daily 60 tablet 3    spironolactone (ALDACTONE) 25 MG tablet Take 0.5 tablets (12.5 mg) by mouth daily 45 tablet 3     No current facility-administered medications for this visit.       EXAM:  /83 (BP Location: Right arm, Patient Position: Chair, Cuff Size: Adult Regular)   Pulse 71   Wt 71.9 kg (158 lb 9.6 oz)   SpO2 99%   BMI 31.34 kg/m    General: appears comfortable, alert and oriented  Head: normocephalic, atraumatic  Eyes: anicteric sclera, EOMI , PERRL  Neck: no adenopathy  Orophyarynx: moist mucosa, no lesions noted  Heart: regular, S1/S2, no murmurs, rubs or gallop. Estimated JVP at 5 cmH2O  Lungs: CTAB, No wheezing.   Abdomen: soft, non-tender, bowel sounds present, no hepatosplenomegaly  Extremities: No LE edema today  Skin: no open lesions noted  Neuro: grossly non-focal  Psych: no evidence of depression noted     Labs:  Lab Results   Component Value Date    WBC 6.5 04/04/2024    HGB 13.6 04/04/2024    HCT 42.0 04/04/2024     04/04/2024     04/04/2024    POTASSIUM 4.4 04/04/2024    CHLORIDE 102 04/04/2024    CO2 25 04/04/2024    BUN 37.6 (H) 04/04/2024    CR 1.59 (H) 04/04/2024    GLC 88 04/04/2024    SED 11 07/28/2021    DD 1.80 (H) 02/22/2023    NTBNPI 11,774 (H) 02/22/2023    NTBNP 1,409 04/04/2024    AST 23 04/04/2024    ALT 18 04/04/2024    ALKPHOS 66 04/04/2024    BILITOTAL 0.3 04/04/2024    INR 1.12 02/22/2023     PYP 12/8/23:  1. Imaging findings are not suggestive of transthyretin cardiac amyloidosis.     CMR 11/22/23:  1. The LV is normal in cavity size with moderate concentric LVH. The global systolic function is severely  reduced. The LVEF is 28%. There is basal septal akinesis with moderate diffuse hypokinesis. There is  paradoxical septal motion due to LBBB.   2. The RV is normal in cavity size. The global systolic function is mildly reduced. The RVEF is 43%.   3. Left atrium is moderately enlarged.  4. There is a TAVR  prosthesis that cannot be further evaluated due to artifact. There is severe MAC without significant MR.   5. Late gadolinium enhancement imaging shows patchy mid-myocardial enhancement involving the basal segments.   6. Regadenoson stress perfusion imaging shows no ischemia.  7. There is no pericardial effusion or thickening.  8. There is no intracardiac thrombus.  CONCLUSIONS: No myocardial ischemia. NICM, LVEF 28% and RVEF 43%, with LBBB and moderate LVH. Moderate basal patchy fibrosis that is likely from AS, or could represent amyloidosis. Consider PYP scan.     TTE 11/13/23:  Status post 26 mm Arndt Janis Ultra TAVR on 9/12/2022.  There is mild paravalvular regurgitation. There is trace valvular  regurgitation. The mean gradient across the aortic valve is 13 mmHg.  Severe mitral annular calcification is present.  Left ventricular function is decreased. The ejection fraction is 30-35%  (moderately reduced).  Global right ventricular function is normal.  IVC diameter <2.1 cm collapsing >50% with sniff suggests a normal RA pressure  of 3 mmHg.  No pericardial effusion is present.  No significant changes noted. The presence of severe MAC makes PVL assessment  challenging. PVL better assessed today but appears unchanged from prior.     Echocardiogram  Left ventricular function, chamber size, wall motion, and wall thickness are normal.The EF is 55-60%. No regional wall motion abnormalities are seen.  Right ventricular function, chamber size, wall motion, and thickness are normal.  Severe left atrial enlargement is present. Moderate mitral annular calcification is present. Mild mitral insufficiency is present. There is likley moderate aortic stenosis. The peak velocity is  3.47m/sec, the peak and mean gradients are 48mmHg and 28mmHg. The aortic valve  area is calculated low at 0.7cm2. Pulmonary artery systolic pressure is normal. The inferior vena cava was normal in size with preserved respiratory variability. No  pericardial effusion is present.   Compared to prior study AS has worsened. Otherwise no significant change     Coronary angiogram 8/21/2020:  Dist LAD lesion is 70% stenosed.  Mid LAD-1 lesion is 80% stenosed.  Mid LAD-2 lesion is 60% stenosed.  Mid RCA lesion is 40% stenosed.  Prox RCA lesion is 40% stenosed.  Right sided filling pressures are normal.  Normal PA pressures.  Left sided filling pressures are normal.  Reduced cardiac output.  1. LAD - 3 EDDIE were placed in the mid to distal LAD (2.5 x 28 mm, 3.5 x 12 mm, and 3.0 x 8 mm)  2. RHC showed normal biventricular filling pressures. Normal PA pressure. Reduced cardiac output.     RHC/coronary angiogram 11/3/20:  Moderate aortic stenosis -valve area 1.2 cm2,Mean gradient 24 mm Hg  Right sided filling pressures are normal.  Normal PA pressures.  Left sided filling pressures are normal.  Mildly reduced cardiac output level.  Peripheral angiogram done and IVUS used to assess the vessel lumens of the descending thoracic aorta,right and left external iliac arteries     TTE 5/3/21:  Global and regional left ventricular function is normal with an EF of 55-60%.  The right ventricle is normal size. Global right ventricular function is normal.  Moderate to severe aortic stenosis, aortic valve area 1.02 cm2, peak velocity  3.3 m/s, mean gradient 25 mmHg, stroke volume index 40 ml/m2, DVI 0.29.  This study was compared with the study from 9/16/2020. The aortic valve peak velocity and mean gradient are similar     TTE 11/1/21:  Global and regional left ventricular function is normal with an EF of 55-60%.  Right ventricular function, chamber size, wall motion, and thickness are normal.  Moderate aortic valve calcification is present. The mean gradient across the aortic valve is 23 mmHg. The peak aortic velocity is 3.1 m/sec. Moderate aortic stenosis is present. Calculated valve area lower than previous study due to LVOT diameter measurement. Moderate aortic stenosis is  present.  No significant changes noted.     TTE 9/12/22:  Intra-procedural transthoracic echocardiogram for transfemoral transcatheter aortic valve replacement with 26mm Arndt Janis Ultra valve Aortic Valve. Image acquisition by sonographer.  PRE-PROCEDURAL FINDINGS:  1. Severe calcific aortic stenosis.  2. Normal systolic function. Visual LVEF 55%.   POST-PROCEDURAL SURVEY:  1. A 26 mm Arndt Janis Ultra valve Aortic Valve was successfully deployed.  2. Valve is well seated. There is trivial periprosthetic regurgitation.  3. Post-procedure valve hemodynamics - mean gradient 5.0 mmHg.  4. No pericardial effusion     CTA LE 9/22/22:  1. Left common femoral artery 22 cm segmental greater than 80% diameter stenosis from its origin. Linear defect suggests a dissection as the etiology. Mid and distal segments of the common femoral artery are patent.  2. 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  3. Additional nonemergent incidental findings include diverticulosis without diverticulitis and cholelithiasis without cholecystitis.     TTE 1/30/23:  Left ventricular size, wall motion and function are normal. The ejection fraction is 55-60%.  Right ventricular size and function are normal.  Status post 26 mm Arndt Janis Ultra valve TAVR on 9/12/2022. The valve is well seated. MG 16 mmHg; PV 2.5 m/s; no paravalvular AI.  No pericardial effusion is present.  This study was compared with the study from 12/1/2022. No significant changes noted.    TTE 3/2023:  The 26 mm ES3 TAVR is well-seated. Leaflet opening is not clearly visualized. There is trace valvular without paravalvular regurgitation. The Vmax is 2.4 m/s, the mean gradient is 14 mmHg, the dimensionless index is 0.80, and the acceleration time is 80 ms.  Left ventricular function is decreased. The ejection fraction is 35-40% (moderately reduced). Moderate diffuse hypokinesis is present.  Global right ventricular function is  normal. The right ventricle is normal size.  This study was compared with the study from 02/23/2023. There appears to have been a decline in the global LV function but the endocardial definition is poor on this study. The TAVR function is unchanged.     Echo from today pending, images reviewed with patient and her daughter.  Overall with Doppler gradient is little bit higher than it has been in the blast of 18 mmHg I think overall LV function is similar despite significant hypotension at around 40%.      ASSESSMENT AND PLAN:  In summary, patient is a 87 year old lady with with coronary artery disease and status post PCI 20 years ago at [facility], we do not have records unfortunately available but did receive 2 stents.  She most complains of shortness of breath denies any dizziness lightheadedness syncope or recurrent episodes of chest pains.  Underwent TAVR with a 26 mm valve in September 2022, c/b HALT and treated and finished AC course. Diagnosed with COVID in October and had an TED at that time which prompted holding various GDMT.    She has since had a further reduction in LVEF, now falling to 28% per CMR on 11/22/23, no evidence of ischemia. PYP was negative for ATTR amyloid, AL amyloid work up is in process. TTEs have had improvements in AS gradients.   Today she notes she continues to do well without new complaints.  Will plan ongoing GDMT titration at this point.  We discussed potentially adding an SGLT2 inhibitor however we agreed that potentially she would get more benefit from increasing Entresto given persistent elevated blood pressure at least most of the time as presented to us today.  As such we discussed to increase the Entresto to 97 mg evening and if she tolerates this without dizziness lightheadedness or falls her blood pressure is still elevated but it is over 100 mmHg systolic in the morning measurements and I will increase it to 97 mg twice daily dosing.  We are also sending out Requisition for  visit about panel to be checked given the abnormal renal function overall.  No other medication changes we can reconsider adding SGLT2 inhibitor on the next visit if there is still room.    I appreciate the opportunity to participate in the care of Kamryn Miller . Please do not hesitate to contact me with any further questions.  I have spent a total of 40 minutes today reviewing labs, imaging studies, discussing with colleagues, face-to-face time with patient and documentation in the medical record.  The longitudinal plan of care for the diagnosis(es)/condition(s) as documented were addressed during this visit. Due to the added complexity in care, I will continue to support Kamryn in the subsequent management and with ongoing continuity of care.    Sincerely,   Madhu Zuniga MD  [facility] Division of Cardiology

## 2024-05-14 NOTE — LETTER
5/14/2024      RE: Kamryn Miller  [address]  [address]  [place] [address]       Dear Colleague,    Thank you for the opportunity to participate in the care of your patient, Kamryn Miller, at the [facility] [place] [facility]. Please see a copy of my visit note below.    May 14, 2024  I had the pleasure of seeing Kamryn Miller  in the [facility] for further evaluation and management.   She has a history of Hx CAD, HLD, HTN, mitral and tricuspid insuff, pulmonary hypertension, lost to follow up in the past.  She does have CAD and did receive 3 stents in 8/2020 as below.  She denies any cardiomyopathy and she has not had any cardiology follow-up for several years.  Notes that she does have shortness of breath especially with exertion or she walks outside.  This is class III symptoms at the time.  We did start TAVR evaluation and she did undergo right heart catheterization as well as invasive hemodynamic measurement and measurement of the valve area.  Given findings of moderate aortic stenosis, TAVR was not yet pursued and ongoing surveillance was recommended. Had been hypertensive in the recent past. She did see the structural team just recently and repeat TTE was performed that again showed moderate to severe aortic stenosis, essentially unchanged from prior TTE. Given that she had no concerning symptoms ongoing surveillance was recommended.  She has had issues with hypertension in the past including admissions for hypertensive emergency. Given ongoing and worsening symptoms she was revaluated by the structural team and ultimately underwent successful TAVR on 9/12/22 with a post intervention gradient of 7mmHg. She later had increased Mgs, imaging was consistent with HALT and she was placed on AC. She was seen in the ER subsequently for claudication and leg pain in the setting of known LFA stenosis. She was seen in the  ER then for episode of chest pain. Workup was overall negative and was discharged in stable condition. She was then admitted in 2/2023 with systolic HF exacerbation, she was diuresed and medications were adjusted including starting entresto at low dose. Did not tolerate BB initiation. She was also seen in [facility]. Her BP remained elevated and thus most recently entresto was increased to 49-51 mg BID that she tolerated well with the goal to further titrate and later initiate SGLT2 if her renal function remains stable. She was diagnosed with COVID in October 2022 and was treated with Paxlovid. Serial TTEs have had further declines in LVEF (30-35% 11/2023). CMR with LVEF 28%, no evidence of MI. PYP scan was negative for ATTR amyloid.   There is here with her daughter.  They note that she has not doing extremely well without any new complaints or issues.  She provides blood pressure log which is daily as well as weight loss.  This indicates with having relatively stable a few pounds up over the past couple of months.  Blood pressure has been highly variable running between 102 mmHg systolic and 173 mmHg systolic.  There does not seem to be any correlation with time of day or any other activities.  Nevertheless she is active denies any new complaints or symptoms no shortness of breath.  Participates in all activities doing well     PAST MEDICAL HISTORY:  Past Medical History:   Diagnosis Date     Aortic valvar stenosis 7/2010    mild     Asthma      Bipolar disorder (H)      CAD (coronary artery disease)     s/p angioplasty     Celiac disease      Colon polyps      Colon polyps 2012    every 3 year colonoscopy      Depression      High cholesterol      HTN      Mitral insufficiency      Pulmonary HTN (H)     mild     Tremors 7/10    drug induced from antidepressants     Tricuspid insufficiency      FAMILY HISTORY:  Family History   Problem Relation Age of Onset     Asthma Mother      Cerebrovascular Disease Mother       Arthritis Mother      Depression Mother      Lipids Sister      Hypertension Sister      Heart Disease Sister      Lipids Sister      Hypertension Sister      Lipids Sister      Breast Cancer Sister      SOCIAL HISTORY:  Social History     Socioeconomic History     Marital status:      Spouse name: Adrien     Number of children: 2     Years of education: 16   Occupational History     Employer: RETIRED     Comment: Retired in 2002.    Tobacco Use     Smoking status: Never     Smokeless tobacco: Never   Vaping Use     Vaping status: Never Used   Substance and Sexual Activity     Alcohol use: No     Alcohol/week: 0.0 standard drinks of alcohol     Types: 1 Standard drinks or equivalent per week     Drug use: No     Sexual activity: Not Currently     Partners: Male     Social Determinants of Health     Financial Resource Strain: Medium Risk (5/4/2022)    Received from [facility]    Financial Resource Strain      Financial Concerns: 2   Transportation Needs: Unknown (2/4/2021)    Received from [facility]    Transportation Needs      Lack of Transportation (Medical): 0   Physical Activity: Unknown (2/4/2021)    Received from [facility]    Physical Activity      Calculated Minutes of Exercise per Week:: 0   Stress: Unknown (2/4/2021)    Received from [facility]    Stress      Patient Reported Major Stressor(s): 0      Patient Reported Strengths: 1      Patient Reported Source(s) of Support: 1    Received from [facility]    Social Connections   Interpersonal Safety: Not At Risk (7/11/2023)    Humiliation, Afraid, Rape, and Kick questionnaire      Fear of Current or Ex-Partner: No      Emotionally Abused: No      Physically Abused: No      Sexually Abused: No   Housing Stability: Unknown (2/4/2021)    Received from [facility]    Housing Stability      Housing Concerns: 0      CURRENT MEDICATIONS:  Current Outpatient Medications   Medication Sig Dispense Refill     acetaminophen (TYLENOL) 500 MG tablet Take 1 tablet (500 mg) by mouth daily Continue previously ordered PRN tylenol order as well 30 tablet 11     acetaminophen (TYLENOL) 500 MG tablet Take 1 tablet (500 mg) by mouth every 6 hours as needed for pain (Patient not taking: Reported on 4/8/2024) 60 tablet 4     amLODIPine (NORVASC) 2.5 MG tablet Take 1 tablet (2.5 mg) by mouth At Bedtime 90 tablet 3     amoxicillin (AMOXIL) 500 MG capsule  (Patient not taking: Reported on 4/8/2024)       aspirin (ASA) 81 MG EC tablet Take 1 tablet (81 mg) by mouth daily 30 tablet 0     azelastine (ASTEPRO) 0.15 % nasal spray USE 1 SPRAY IN EACH NOSTRIL ONCE A DAY 30 mL 11     calcium citrate (CITRACAL) 950 (200 Ca) MG tablet TAKE 1 TABLET BY MOUTH ONCE DAILY 30 tablet 11     desvenlafaxine (PRISTIQ) 100 MG 24 hr tablet Take 100 mg by mouth daily       fluticasone-salmeterol (ADVAIR DISKUS) 500-50 MCG/ACT inhaler INHALE 1 PUFF BY MOUTH TWICE DAILY (Patient taking differently: 1 puff daily INHALE 1 PUFF BY MOUTH TWICE DAILY) 60 each 0     folic acid (FOLVITE) 400 MCG tablet TAKE 1 TABLET BY MOUTH ONCE DAILY 28 tablet 11     furosemide (LASIX) 20 MG tablet Take 1.5 tablets (30 mg) by mouth daily 135 tablet 3     iron polysaccharides (NIFEREX) 150 MG capsule Take 1 capsule (150 mg) by mouth daily 30 capsule 1     isosorbide mononitrate (IMDUR) 30 MG 24 hr tablet Take 2 tablets (60 mg) by mouth daily 60 tablet 11     lamoTRIgine (LAMICTAL) 25 MG tablet Take 25 mg by mouth daily with 200 mg tablet for a total dose of 225 mg       LAMOTRIGINE 200 MG PO TABS Take 200 mg by mouth daily with 25 mg tablet for a total dose of 225 mg       levothyroxine (SYNTHROID/LEVOTHROID) 50 MCG tablet TAKE 1 TABLET BY MOUTH ONCE DAILY 28 tablet 11     liothyronine (CYTOMEL) 5 MCG tablet Take 2 tablets (10 mcg) by mouth daily 30 tablet 3     memantine (NAMENDA) 10 MG  tablet Take 1 tablet (10 mg) by mouth daily 30 tablet 3     mirtazapine (REMERON) 15 MG tablet Take 15 mg by mouth at bedtime       Multiple Vitamin (TAB-A-JENNIFER) TABS TAKE 1 TABLET BY MOUTH ONCE DAILY 28 tablet 11     psyllium (METAMUCIL/KONSYL) 58.6 % powder Take 1 tspn in liquid daily       rosuvastatin (CRESTOR) 10 MG tablet Take 1 tablet (10 mg) by mouth daily 30 tablet 0     sacubitril-valsartan (ENTRESTO) 49-51 MG per tablet Take 1 tablet by mouth 2 times daily 60 tablet 3     spironolactone (ALDACTONE) 25 MG tablet Take 0.5 tablets (12.5 mg) by mouth daily 45 tablet 3     No current facility-administered medications for this visit.       EXAM:  /83 (BP Location: Right arm, Patient Position: Chair, Cuff Size: Adult Regular)   Pulse 71   Wt 71.9 kg (158 lb 9.6 oz)   SpO2 99%   BMI 31.34 kg/m    General: appears comfortable, alert and oriented  Head: normocephalic, atraumatic  Eyes: anicteric sclera, EOMI , PERRL  Neck: no adenopathy  Orophyarynx: moist mucosa, no lesions noted  Heart: regular, S1/S2, no murmurs, rubs or gallop. Estimated JVP at 5 cmH2O  Lungs: CTAB, No wheezing.   Abdomen: soft, non-tender, bowel sounds present, no hepatosplenomegaly  Extremities: No LE edema today  Skin: no open lesions noted  Neuro: grossly non-focal  Psych: no evidence of depression noted     Labs:  Lab Results   Component Value Date    WBC 6.5 04/04/2024    HGB 13.6 04/04/2024    HCT 42.0 04/04/2024     04/04/2024     04/04/2024    POTASSIUM 4.4 04/04/2024    CHLORIDE 102 04/04/2024    CO2 25 04/04/2024    BUN 37.6 (H) 04/04/2024    CR 1.59 (H) 04/04/2024    GLC 88 04/04/2024    SED 11 07/28/2021    DD 1.80 (H) 02/22/2023    NTBNPI 11,774 (H) 02/22/2023    NTBNP 1,409 04/04/2024    AST 23 04/04/2024    ALT 18 04/04/2024    ALKPHOS 66 04/04/2024    BILITOTAL 0.3 04/04/2024    INR 1.12 02/22/2023     PYP 12/8/23:  1. Imaging findings are not suggestive of transthyretin cardiac amyloidosis.     CMR  11/22/23:  1. The LV is normal in cavity size with moderate concentric LVH. The global systolic function is severely  reduced. The LVEF is 28%. There is basal septal akinesis with moderate diffuse hypokinesis. There is  paradoxical septal motion due to LBBB.   2. The RV is normal in cavity size. The global systolic function is mildly reduced. The RVEF is 43%.   3. Left atrium is moderately enlarged.  4. There is a TAVR prosthesis that cannot be further evaluated due to artifact. There is severe MAC without significant MR.   5. Late gadolinium enhancement imaging shows patchy mid-myocardial enhancement involving the basal segments.   6. Regadenoson stress perfusion imaging shows no ischemia.  7. There is no pericardial effusion or thickening.  8. There is no intracardiac thrombus.  CONCLUSIONS: No myocardial ischemia. NICM, LVEF 28% and RVEF 43%, with LBBB and moderate LVH. Moderate basal patchy fibrosis that is likely from AS, or could represent amyloidosis. Consider PYP scan.     TTE 11/13/23:  Status post 26 mm Arndt Janis Ultra TAVR on 9/12/2022.  There is mild paravalvular regurgitation. There is trace valvular  regurgitation. The mean gradient across the aortic valve is 13 mmHg.  Severe mitral annular calcification is present.  Left ventricular function is decreased. The ejection fraction is 30-35%  (moderately reduced).  Global right ventricular function is normal.  IVC diameter <2.1 cm collapsing >50% with sniff suggests a normal RA pressure  of 3 mmHg.  No pericardial effusion is present.  No significant changes noted. The presence of severe MAC makes PVL assessment  challenging. PVL better assessed today but appears unchanged from prior.     Echocardiogram  Left ventricular function, chamber size, wall motion, and wall thickness are normal.The EF is 55-60%. No regional wall motion abnormalities are seen.  Right ventricular function, chamber size, wall motion, and thickness are normal.  Severe left atrial  enlargement is present. Moderate mitral annular calcification is present. Mild mitral insufficiency is present. There is likley moderate aortic stenosis. The peak velocity is  3.47m/sec, the peak and mean gradients are 48mmHg and 28mmHg. The aortic valve  area is calculated low at 0.7cm2. Pulmonary artery systolic pressure is normal. The inferior vena cava was normal in size with preserved respiratory variability. No pericardial effusion is present.   Compared to prior study AS has worsened. Otherwise no significant change     Coronary angiogram 8/21/2020:  Dist LAD lesion is 70% stenosed.  Mid LAD-1 lesion is 80% stenosed.  Mid LAD-2 lesion is 60% stenosed.  Mid RCA lesion is 40% stenosed.  Prox RCA lesion is 40% stenosed.  Right sided filling pressures are normal.  Normal PA pressures.  Left sided filling pressures are normal.  Reduced cardiac output.  1. LAD - 3 EDDIE were placed in the mid to distal LAD (2.5 x 28 mm, 3.5 x 12 mm, and 3.0 x 8 mm)  2. RHC showed normal biventricular filling pressures. Normal PA pressure. Reduced cardiac output.     RHC/coronary angiogram 11/3/20:  Moderate aortic stenosis -valve area 1.2 cm2,Mean gradient 24 mm Hg  Right sided filling pressures are normal.  Normal PA pressures.  Left sided filling pressures are normal.  Mildly reduced cardiac output level.  Peripheral angiogram done and IVUS used to assess the vessel lumens of the descending thoracic aorta,right and left external iliac arteries     TTE 5/3/21:  Global and regional left ventricular function is normal with an EF of 55-60%.  The right ventricle is normal size. Global right ventricular function is normal.  Moderate to severe aortic stenosis, aortic valve area 1.02 cm2, peak velocity  3.3 m/s, mean gradient 25 mmHg, stroke volume index 40 ml/m2, DVI 0.29.  This study was compared with the study from 9/16/2020. The aortic valve peak velocity and mean gradient are similar     TTE 11/1/21:  Global and regional left  ventricular function is normal with an EF of 55-60%.  Right ventricular function, chamber size, wall motion, and thickness are normal.  Moderate aortic valve calcification is present. The mean gradient across the aortic valve is 23 mmHg. The peak aortic velocity is 3.1 m/sec. Moderate aortic stenosis is present. Calculated valve area lower than previous study due to LVOT diameter measurement. Moderate aortic stenosis is present.  No significant changes noted.     TTE 9/12/22:  Intra-procedural transthoracic echocardiogram for transfemoral transcatheter aortic valve replacement with 26mm Arndt Janis Ultra valve Aortic Valve. Image acquisition by sonographer.  PRE-PROCEDURAL FINDINGS:  1. Severe calcific aortic stenosis.  2. Normal systolic function. Visual LVEF 55%.   POST-PROCEDURAL SURVEY:  1. A 26 mm Arndt Janis Ultra valve Aortic Valve was successfully deployed.  2. Valve is well seated. There is trivial periprosthetic regurgitation.  3. Post-procedure valve hemodynamics - mean gradient 5.0 mmHg.  4. No pericardial effusion     CTA LE 9/22/22:  1. Left common femoral artery 22 cm segmental greater than 80% diameter stenosis from its origin. Linear defect suggests a dissection as the etiology. Mid and distal segments of the common femoral artery are patent.  2. 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  3. Additional nonemergent incidental findings include diverticulosis without diverticulitis and cholelithiasis without cholecystitis.     TTE 1/30/23:  Left ventricular size, wall motion and function are normal. The ejection fraction is 55-60%.  Right ventricular size and function are normal.  Status post 26 mm Arndt Janis Ultra valve TAVR on 9/12/2022. The valve is well seated. MG 16 mmHg; PV 2.5 m/s; no paravalvular AI.  No pericardial effusion is present.  This study was compared with the study from 12/1/2022. No significant changes noted.    TTE 3/2023:  The 26 mm  ES3 TAVR is well-seated. Leaflet opening is not clearly visualized. There is trace valvular without paravalvular regurgitation. The Vmax is 2.4 m/s, the mean gradient is 14 mmHg, the dimensionless index is 0.80, and the acceleration time is 80 ms.  Left ventricular function is decreased. The ejection fraction is 35-40% (moderately reduced). Moderate diffuse hypokinesis is present.  Global right ventricular function is normal. The right ventricle is normal size.  This study was compared with the study from 02/23/2023. There appears to have been a decline in the global LV function but the endocardial definition is poor on this study. The TAVR function is unchanged.     Echo from today pending, images reviewed with patient and her daughter.  Overall with Doppler gradient is little bit higher than it has been in the blast of 18 mmHg I think overall LV function is similar despite significant hypotension at around 40%.      ASSESSMENT AND PLAN:  In summary, patient is a 87 year old lady with with coronary artery disease and status post PCI 20 years ago at [facility], we do not have records unfortunately available but did receive 2 stents.  She most complains of shortness of breath denies any dizziness lightheadedness syncope or recurrent episodes of chest pains.  Underwent TAVR with a 26 mm valve in September 2022, c/b HALT and treated and finished AC course. Diagnosed with COVID in October and had an TED at that time which prompted holding various GDMT.    She has since had a further reduction in LVEF, now falling to 28% per CMR on 11/22/23, no evidence of ischemia. PYP was negative for ATTR amyloid, AL amyloid work up is in process. TTEs have had improvements in AS gradients.   Today she notes she continues to do well without new complaints.  Will plan ongoing GDMT titration at this point.  We discussed potentially adding an SGLT2 inhibitor however we agreed that potentially she would get more benefit from increasing  Entresto given persistent elevated blood pressure at least most of the time as presented to us today.  As such we discussed to increase the Entresto to 97 mg evening and if she tolerates this without dizziness lightheadedness or falls her blood pressure is still elevated but it is over 100 mmHg systolic in the morning measurements and I will increase it to 97 mg twice daily dosing.  We are also sending out Requisition for visit about panel to be checked given the abnormal renal function overall.  No other medication changes we can reconsider adding SGLT2 inhibitor on the next visit if there is still room.    I appreciate the opportunity to participate in the care of Kamryn Miller . Please do not hesitate to contact me with any further questions.  I have spent a total of 40 minutes today reviewing labs, imaging studies, discussing with colleagues, face-to-face time with patient and documentation in the medical record.  The longitudinal plan of care for the diagnosis(es)/condition(s) as documented were addressed during this visit. Due to the added complexity in care, I will continue to support Kamryn in the subsequent management and with ongoing continuity of care.    Sincerely,   Madhu Zuniga MD  [facility] Division of Cardiology

## 2024-05-14 NOTE — NURSING NOTE
Labs: Patient was given results of the laboratory testing obtained today. Patient was instructed to return for the next laboratory testing in 2 weeks. Patient demonstrated understanding of this information and agreed to call with further questions or concerns.     Med Reconcile: Reviewed and verified all current medications with the patient. The updated medication list was printed and given to the patient.    Return Appointment: Patient given instructions regarding scheduling next clinic visit. Patient demonstrated understanding of this information and agreed to call with further questions or concerns. [facility] in 3 months, Dr. Zuniga in 6 months.    Medication Change: Patient was educated regarding prescribed medication change, including discussion of the indication, administration, side effects, and when to report to MD or RN. Patient demonstrated understanding of this information and agreed to call with further questions or concerns. Increase Entresto to 49-51 mg in AM,  mg in PM. In 2 weeks, increase Entresto to  mg twice daily.    Patient stated she understood all health information given and agreed to call with further questions or concerns.    Sherry Saldana, VEENA

## 2024-05-25 ENCOUNTER — LAB REQUISITION (OUTPATIENT)
Dept: LAB | Facility: CLINIC | Age: 89
End: 2024-05-25
Payer: MEDICARE

## 2024-05-25 DIAGNOSIS — I50.9 HEART FAILURE, UNSPECIFIED (H): ICD-10-CM

## 2024-05-28 LAB
ANION GAP SERPL CALCULATED.3IONS-SCNC: 11 MMOL/L (ref 7–15)
BUN SERPL-MCNC: 32.2 MG/DL (ref 8–23)
CALCIUM SERPL-MCNC: 9.3 MG/DL (ref 8.8–10.2)
CHLORIDE SERPL-SCNC: 104 MMOL/L (ref 98–107)
CREAT SERPL-MCNC: 1.51 MG/DL (ref 0.51–0.95)
DEPRECATED HCO3 PLAS-SCNC: 25 MMOL/L (ref 22–29)
EGFRCR SERPLBLD CKD-EPI 2021: 33 ML/MIN/1.73M2
GLUCOSE SERPL-MCNC: 77 MG/DL (ref 70–99)
POTASSIUM SERPL-SCNC: 4.2 MMOL/L (ref 3.4–5.3)
SODIUM SERPL-SCNC: 140 MMOL/L (ref 135–145)

## 2024-05-28 PROCEDURE — 36415 COLL VENOUS BLD VENIPUNCTURE: CPT | Mod: ORL | Performed by: INTERNAL MEDICINE

## 2024-05-28 PROCEDURE — 80048 BASIC METABOLIC PNL TOTAL CA: CPT | Mod: ORL | Performed by: INTERNAL MEDICINE

## 2024-05-28 PROCEDURE — P9604 ONE-WAY ALLOW PRORATED TRIP: HCPCS | Mod: ORL | Performed by: INTERNAL MEDICINE

## 2024-06-13 ENCOUNTER — TELEPHONE (OUTPATIENT)
Dept: CARDIOLOGY | Facility: CLINIC | Age: 89
End: 2024-06-13
Payer: MEDICARE

## 2024-06-13 NOTE — TELEPHONE ENCOUNTER
Health Call Center    Phone Message    May a detailed message be left on voicemail: yes     Reason for Call: Symptoms or Concerns     If patient has red-flag symptoms, warm transfer to triage line    Current symptom or concern: weight gain     Symptoms have been present for:  3 day(s)    Has patient previously been seen for this? Yes    Are there any new or worsening symptoms? Yes: Katherine from [facility] called regarding weight gain. Patient weight yesterday 147.8 and today 150.8. Please reach out. Thank you    Thank you!  [facility]      Action Taken: Other: cardiology     Travel Screening: Not Applicable     Date of Service:             Date: 6/13/2024    Time of Call: 3:47 PM     Diagnosis:  HF, weight gain     [ VORB ] Ordering provider: Dr. Zuniga  Order: Increase Lasix to 60 MG daily for 3 days and then go bck to 30 MG daily.   Order received by: Jimmie Coppola RN     Follow-up/additional notes: Facility called and notified.

## 2024-07-01 ENCOUNTER — LAB REQUISITION (OUTPATIENT)
Dept: LAB | Facility: CLINIC | Age: 89
End: 2024-07-01
Payer: MEDICARE

## 2024-07-01 DIAGNOSIS — I10 ESSENTIAL (PRIMARY) HYPERTENSION: ICD-10-CM

## 2024-07-02 LAB
ANION GAP SERPL CALCULATED.3IONS-SCNC: 10 MMOL/L (ref 7–15)
BUN SERPL-MCNC: 29.4 MG/DL (ref 8–23)
CALCIUM SERPL-MCNC: 9.6 MG/DL (ref 8.8–10.2)
CHLORIDE SERPL-SCNC: 102 MMOL/L (ref 98–107)
CREAT SERPL-MCNC: 1.53 MG/DL (ref 0.51–0.95)
DEPRECATED HCO3 PLAS-SCNC: 28 MMOL/L (ref 22–29)
EGFRCR SERPLBLD CKD-EPI 2021: 33 ML/MIN/1.73M2
GLUCOSE SERPL-MCNC: 129 MG/DL (ref 70–99)
POTASSIUM SERPL-SCNC: 4.2 MMOL/L (ref 3.4–5.3)
SODIUM SERPL-SCNC: 140 MMOL/L (ref 135–145)

## 2024-07-02 PROCEDURE — 36415 COLL VENOUS BLD VENIPUNCTURE: CPT | Mod: ORL | Performed by: NURSE PRACTITIONER

## 2024-07-02 PROCEDURE — 80048 BASIC METABOLIC PNL TOTAL CA: CPT | Mod: ORL | Performed by: NURSE PRACTITIONER

## 2024-07-02 PROCEDURE — P9604 ONE-WAY ALLOW PRORATED TRIP: HCPCS | Mod: ORL | Performed by: NURSE PRACTITIONER

## 2024-09-11 DIAGNOSIS — I50.20 HEART FAILURE WITH REDUCED EJECTION FRACTION, NYHA CLASS III (H): Primary | ICD-10-CM

## 2024-09-17 ENCOUNTER — OFFICE VISIT (OUTPATIENT)
Dept: CARDIOLOGY | Facility: CLINIC | Age: 89
End: 2024-09-17
Attending: PHYSICIAN ASSISTANT
Payer: MEDICARE

## 2024-09-17 ENCOUNTER — TELEPHONE (OUTPATIENT)
Dept: CARDIOLOGY | Facility: CLINIC | Age: 89
End: 2024-09-17

## 2024-09-17 ENCOUNTER — LAB (OUTPATIENT)
Dept: LAB | Facility: CLINIC | Age: 89
End: 2024-09-17
Attending: PHYSICIAN ASSISTANT
Payer: MEDICARE

## 2024-09-17 VITALS
DIASTOLIC BLOOD PRESSURE: 88 MMHG | HEART RATE: 65 BPM | SYSTOLIC BLOOD PRESSURE: 158 MMHG | WEIGHT: 155 LBS | BODY MASS INDEX: 30.63 KG/M2 | OXYGEN SATURATION: 97 %

## 2024-09-17 DIAGNOSIS — I50.20 HEART FAILURE WITH REDUCED EJECTION FRACTION, NYHA CLASS III (H): ICD-10-CM

## 2024-09-17 LAB
ANION GAP SERPL CALCULATED.3IONS-SCNC: 9 MMOL/L (ref 7–15)
BUN SERPL-MCNC: 28.1 MG/DL (ref 8–23)
CALCIUM SERPL-MCNC: 9.4 MG/DL (ref 8.8–10.4)
CHLORIDE SERPL-SCNC: 104 MMOL/L (ref 98–107)
CREAT SERPL-MCNC: 1.48 MG/DL (ref 0.51–0.95)
EGFRCR SERPLBLD CKD-EPI 2021: 34 ML/MIN/1.73M2
GLUCOSE SERPL-MCNC: 93 MG/DL (ref 70–99)
HCO3 SERPL-SCNC: 27 MMOL/L (ref 22–29)
POTASSIUM SERPL-SCNC: 4.4 MMOL/L (ref 3.4–5.3)
SODIUM SERPL-SCNC: 140 MMOL/L (ref 135–145)

## 2024-09-17 PROCEDURE — 80048 BASIC METABOLIC PNL TOTAL CA: CPT | Performed by: PATHOLOGY

## 2024-09-17 PROCEDURE — 99214 OFFICE O/P EST MOD 30 MIN: CPT | Performed by: PHYSICIAN ASSISTANT

## 2024-09-17 PROCEDURE — G2211 COMPLEX E/M VISIT ADD ON: HCPCS | Performed by: PHYSICIAN ASSISTANT

## 2024-09-17 PROCEDURE — 36415 COLL VENOUS BLD VENIPUNCTURE: CPT | Performed by: PATHOLOGY

## 2024-09-17 PROCEDURE — G0463 HOSPITAL OUTPT CLINIC VISIT: HCPCS | Performed by: PHYSICIAN ASSISTANT

## 2024-09-17 NOTE — PROGRESS NOTES
"HPI:   Ms. Miller is a 88 year old female with a past medical history including HFpEF now HFrEF, HTN, HLD, significant CAD history (PTCA 1996, LAD and RCA stenting 2003, s/p LAD PCI 2020, recent cath 10/2022 with patent stents), CKD, chronic anemia and severe aortic valvular stenosis s/p (TAVR) with a 26mm Arndt Janis Ultra on 9/12/22 by Morro Burrows. Her post TAVR echo showed mean PG of 7 mmHg with trace PVL. She was noted to have 60% left femoral stenosis post procedure, but had adequate flow on peripheral angiogram. She was also noted to have new LBBB post procedure and discharged on a Cardionet which showed no high degree AVB.. Presents to clinic for CORE follow-up.    Her EF has been slowly declining since her TAVR. Was 55-60% pre TAVR, dropped to 40-45%, now 30-35% despite GDMT.     She last saw Dr. Zuniga in May and she was doing very well. Her entresto was increased.    Today  Overall, she is feeling better this year than last year. She thinks the entresto has been the most helpful thing. She doesn't have SOB at rest. No significant GREGORY, she is able to walk \" a ways\". She walked into clinic to lab and from lab to clinic without GREGORY. She generally doesn't do stairs. No LE edema. No abdominal edema. No orthopnea or PND. No lightheadedness or dizziness no presyncope or syncope. No falls. No palpitations. No CP. Appetite is fine.    Her systolic Bps have ranged from 102-150s at home. Weights are stable at home.    Cardiac Meidcations  ASA 81 mg daily  Amlodipine 2.5 mg at bedtime  Entresto  mg BID  Aldactone 12.5 mg daily  Imdur 60 mg qAM  Crestor 10 mg daily    PAST MEDICAL HISTORY:  Past Medical History:   Diagnosis Date    Aortic valvar stenosis 7/2010    mild    Asthma     Bipolar disorder (H)     CAD (coronary artery disease)     s/p angioplasty    Celiac disease     Colon polyps     Colon polyps 2012    every 3 year colonoscopy     Depression     High cholesterol     HTN     Mitral insufficiency "     Pulmonary HTN (H)     mild    Tremors 7/10    drug induced from antidepressants    Tricuspid insufficiency        FAMILY HISTORY:  Family History   Problem Relation Age of Onset    Asthma Mother     Cerebrovascular Disease Mother     Arthritis Mother     Depression Mother     Lipids Sister     Hypertension Sister     Heart Disease Sister     Lipids Sister     Hypertension Sister     Lipids Sister     Breast Cancer Sister        SOCIAL HISTORY:  Social History     Socioeconomic History    Marital status:      Spouse name: Adrien    Number of children: 2    Years of education: 16   Occupational History     Employer: RETIRED     Comment: Retired in 2002.    Tobacco Use    Smoking status: Never    Smokeless tobacco: Never   Vaping Use    Vaping status: Never Used   Substance and Sexual Activity    Alcohol use: No     Alcohol/week: 0.0 standard drinks of alcohol     Types: 1 Standard drinks or equivalent per week    Drug use: No    Sexual activity: Not Currently     Partners: Male     Social Determinants of Health     Financial Resource Strain: Medium Risk (5/4/2022)    Received from [facility]    Financial Resource Strain     Financial Concerns: 2   Transportation Needs: Unknown (2/4/2021)    Received from [facility]    Transportation Needs     Lack of Transportation (Medical): 0   Physical Activity: Unknown (2/4/2021)    Received from [facility]    Physical Activity     Calculated Minutes of Exercise per Week:: 0   Stress: Unknown (2/4/2021)    Received from [facility]    Stress     Patient Reported Major Stressor(s): 0     Patient Reported Strengths: 1     Patient Reported Source(s) of Support: 1    Received from [email or web address] [facility], [email or web address] &  [facility]    Social Connections   Interpersonal Safety: Not At Risk (7/11/2023)    Humiliation, Afraid, Rape, and Kick questionnaire     Fear of Current or Ex-Partner: No     Emotionally Abused: No     Physically Abused: No     Sexually Abused: No   Housing Stability: Unknown (2/4/2021)    [facility]    Housing Stability     Housing Concerns: 0       CURRENT MEDICATIONS:  Current Outpatient Medications   Medication Sig Dispense Refill    acetaminophen (TYLENOL) 500 MG tablet Take 1 tablet (500 mg) by mouth daily Continue previously ordered PRN tylenol order as well 30 tablet 11    acetaminophen (TYLENOL) 500 MG tablet Take 1 tablet (500 mg) by mouth every 6 hours as needed for pain 60 tablet 4    amLODIPine (NORVASC) 2.5 MG tablet Take 1 tablet (2.5 mg) by mouth At Bedtime 90 tablet 3    amoxicillin (AMOXIL) 500 MG capsule       aspirin (ASA) 81 MG EC tablet Take 1 tablet (81 mg) by mouth daily 30 tablet 0    azelastine (ASTEPRO) 0.15 % nasal spray USE 1 SPRAY IN EACH NOSTRIL ONCE A DAY 30 mL 11    calcium citrate (CITRACAL) 950 (200 Ca) MG tablet TAKE 1 TABLET BY MOUTH ONCE DAILY 30 tablet 11    desvenlafaxine (PRISTIQ) 100 MG 24 hr tablet Take 100 mg by mouth daily      fluticasone-salmeterol (ADVAIR DISKUS) 500-50 MCG/ACT inhaler INHALE 1 PUFF BY MOUTH TWICE DAILY (Patient taking differently: 1 puff daily. INHALE 1 PUFF BY MOUTH TWICE DAILY) 60 each 0    folic acid (FOLVITE) 400 MCG tablet TAKE 1 TABLET BY MOUTH ONCE DAILY 28 tablet 11    furosemide (LASIX) 20 MG tablet Take 1.5 tablets (30 mg) by mouth daily 135 tablet 3    iron polysaccharides (NIFEREX) 150 MG capsule Take 1 capsule (150 mg) by mouth daily 30 capsule 1    isosorbide mononitrate (IMDUR) 30 MG 24 hr tablet Take 2 tablets (60 mg) by mouth daily 60 tablet 11    lamoTRIgine (LAMICTAL) 25 MG tablet Take 25 mg by mouth daily with 200 mg tablet for a total dose of 225 mg       LAMOTRIGINE 200 MG PO TABS Take 200 mg by mouth daily with 25 mg tablet for a total dose of 225 mg      levothyroxine (SYNTHROID/LEVOTHROID) 50 MCG tablet TAKE 1 TABLET BY MOUTH ONCE DAILY 28 tablet 11    liothyronine (CYTOMEL) 5 MCG tablet Take 2 tablets (10 mcg) by mouth daily 30 tablet 3    memantine (NAMENDA) 10 MG tablet Take 1 tablet (10 mg) by mouth daily 30 tablet 3    mirtazapine (REMERON) 15 MG tablet Take 15 mg by mouth at bedtime      Multiple Vitamin (TAB-A-JENNIFER) TABS TAKE 1 TABLET BY MOUTH ONCE DAILY 28 tablet 11    psyllium (METAMUCIL/KONSYL) 58.6 % powder Take 1 tspn in liquid daily      rosuvastatin (CRESTOR) 10 MG tablet Take 1 tablet (10 mg) by mouth daily 30 tablet 0    sacubitril-valsartan (ENTRESTO) 49-51 MG per tablet Take 1 tablet by mouth every morning AND 2 tablets every evening. Take this dose for 2 weeks. After 2 weeks, increase Entresto to  mg twice daily. 180 tablet 3    spironolactone (ALDACTONE) 25 MG tablet Take 0.5 tablets (12.5 mg) by mouth daily 45 tablet 3     No current facility-administered medications for this visit.       ROS:   Refer to HPI    EXAM:  BP (!) 158/88 (BP Location: Right arm, Patient Position: Chair, Cuff Size: Adult Regular)   Pulse 65   Wt 70.3 kg (155 lb)   SpO2 97%   BMI 30.63 kg/m    GENERAL: Appears comfortable, in no acute distress.   HEENT: Eye symmetrical, no discharge or icterus bilaterally.   CV: RRR, +S1S2, no murmur, rub, or gallop. JVP ~6 mmg Hg.   RESPIRATORY: Respirations regular, even, and unlabored. Lungs CTA throughout.   GI: Soft and non distended   EXTREMITIES: No peripheral edema. All extremities are warm and well perfused  NEUROLOGIC: Alert and interacting appropriately.   SKIN: No jaundice. No rashes or lesions.     Labs, reviewed with patient in clinic today:  CBC RESULTS:  Lab Results   Component Value Date    WBC 5.7 05/14/2024    WBC 4.6 05/17/2021    RBC 3.95 05/14/2024    RBC 3.97 05/17/2021    HGB 12.7 05/14/2024     HGB 12.9 05/17/2021    HCT 40.0 05/14/2024    HCT 40.3 05/17/2021     (H) 05/14/2024     (H) 05/17/2021    MCH 32.2 05/14/2024    MCH 32.5 05/17/2021    MCHC 31.8 05/14/2024    MCHC 32.0 05/17/2021    RDW 12.9 05/14/2024    RDW 12.7 05/17/2021     05/14/2024     05/17/2021       CMP RESULTS:  Lab Results   Component Value Date     09/17/2024     06/04/2021    POTASSIUM 4.4 09/17/2024    POTASSIUM 4.6 10/05/2022    POTASSIUM 3.4 06/04/2021    CHLORIDE 104 09/17/2024    CHLORIDE 103 10/05/2022    CHLORIDE 106 06/04/2021    CO2 27 09/17/2024    CO2 27 10/05/2022    CO2 30 06/04/2021    ANIONGAP 9 09/17/2024    ANIONGAP 7 10/05/2022    ANIONGAP 6 06/04/2021    GLC 93 09/17/2024    GLC 85 01/31/2023    GLC 98 10/05/2022     (H) 06/04/2021    BUN 28.1 (H) 09/17/2024    BUN 39 (H) 10/05/2022    BUN 27 06/04/2021    CR 1.48 (H) 09/17/2024    CR 1.50 (H) 06/04/2021    GFRESTIMATED 34 (L) 09/17/2024    GFRESTIMATED 32 (L) 06/04/2021    GFRESTBLACK 37 (L) 06/04/2021    PRINCE 9.4 09/17/2024    PRINCE 9.4 06/04/2021    BILITOTAL 0.2 05/14/2024    BILITOTAL 0.3 05/17/2021    ALBUMIN 4.0 05/14/2024    ALBUMIN 3.6 09/27/2022    ALBUMIN 3.4 05/17/2021    ALKPHOS 63 05/14/2024    ALKPHOS 57 05/17/2021    ALT 13 05/14/2024    ALT 38 05/17/2021    AST 20 05/14/2024    AST 27 05/17/2021        INR RESULTS:  Lab Results   Component Value Date    INR 1.12 02/22/2023    INR 1.05 11/03/2020       Lab Results   Component Value Date    MAG 2.5 (H) 02/27/2023    MAG 2.4 (H) 10/28/2019     Lab Results   Component Value Date    NTBNPI 11,774 (H) 02/22/2023     Lab Results   Component Value Date    NTBNP 932 05/14/2024    NTBNP 462 (H) 05/17/2021       Diagnostics:  12/8/23 PYP testing  Impression:  1. Imaging findings are not suggestive of transthyretin cardiac  amyloidosis.     11/22/23 cMRI  Clinical history: 88 F CAD s/p PCI to LAD and RCA, cath 10/2022 with non-obs CAD, htn, severe MAC, AS s/p  TAVR  09/2022, LBBB after TAVR, HALT resolved on subsequent CT, newly reduced LVEF 35% after TAVR  Comparison CMR: none     1. The LV is normal in cavity size with moderate concentric LVH. The global systolic function is severely  reduced. The LVEF is 28%. There is basal septal akinesis with moderate diffuse hypokinesis. There is  paradoxical septal motion due to LBBB.      2. The RV is normal in cavity size. The global systolic function is mildly reduced. The RVEF is 43%.      3. Left atrium is moderately enlarged.     4. There is a TAVR prosthesis that cannot be further evaluated due to artifact. There is severe MAC without  significant MR.      5. Late gadolinium enhancement imaging shows patchy mid-myocardial enhancement involving the basal  segments.      6. Regadenoson stress perfusion imaging shows no ischemia.     7. There is no pericardial effusion or thickening.     8. There is no intracardiac thrombus.     CONCLUSIONS: No myocardial ischemia. NICM, LVEF 28% and RVEF 43%, with LBBB and moderate LVH. Moderate  basal patchy fibrosis that is likely from AS, or could represent amyloidosis. Consider PYP scan.       CORE EXAM    Assessment and Plan:   Ms. Miller is a 88 year old female with a past medical history including HFpEF now HFrEF, HTN, HLD, significant CAD history (PTCA 1996, LAD and RCA stenting 2003, s/p LAD PCI 2020, recent cath 10/2022 with patent stents), CKD, chronic anemia and severe aortic valvular stenosis s/p (TAVR) with a 26mm Arndt Janis Ultra on 9/12/22 by Morro Burrows. Her post TAVR echo showed mean PG of 7 mmHg with trace PVL. She was noted to have 60% left femoral stenosis post procedure, but had adequate flow on peripheral angiogram. She was also noted to have new LBBB post procedure and discharged on a Cardionet which showed no high degree AVB.. Presents to clinic for CORE follow-up.    BP is elevated here although appears much more controlled at home. We are going to prioritize GDMT  with and sgtl2i today, but if she tolerates this and we still have bp room in 2 weeks then we would consider increasing amlodipine     HFpEF, now HFrEF EF 30-35%:  EF had been preserved 55-60%,  2/2023 45-50%, 7/2023 35-40%, now 30-35%. Unclear etiology, cath 10/2022 with patent stents, thought possibly related to poorly controlled HTN. Amyloid work-up has been negative.  - Volume status: Euvolemic, counseled that she will likely need to increase PO intake when we add SGLT2i  - ARNI: Continue entresto  mg BID  - Additional afterload: Amlodipine 2.5 mg daily. Per notes has had intolerances to hydralazine.  - MRA: Aldactone 12.5 mg daily, ongoing titration  - Beta Blocker: per notes, multiple prior intolerances to BB   - SGLT2i: STARTING Jardiance 10 mg or Farxiga 10 mg daily pending insurance approval  - ICD: discussion deferred during ongoing medication titration    Aortic stenosis s/p TAVR 9/2022:  - Yearly echos with primary cardiologist or VALVE team.  - CT heart shows resolution of HALT   - OFF Eliquis, continue ASA 81 mg lifelong   - SBE prophylaxis lifelong     Coronary artery disease w/o angina:  HLD:  - Continue ASA 81 mg daily lifelong  - Continue high intensity statin  - Continue exercise and heart healthy diet    HTN:  - Multiple intolerances to BB, hydralazine   - Enstresto and amlodipine for BP control   - See above, we will consider amlodipine increase pending how she responds to SGLT2i  - Decrease vitals at home from daily to 1-2 times weekly d/t stability and cost     PAD:   - Continue ASA 81 mg lifelong  - vascular consult should she develop recurrent claudication    Follow up   - Labs 10-14 days after starting sglt2i  - RN phone call 14 days after starting sglt2i, bp review at that time and consider increasing amlodipine  - Can decrease vital to once weekly once on stable medications    Billing  - I managed 2+ stable chronic conditions  - I changed a prescription medication    The  longitudinal plan of care for the diagnosis(es)/condition(s) as documented were addressed during this visit. Due to the added complexity in care, I will continue to support Kamryn in the subsequent management and with ongoing continuity of care.       Violet King PA-C  [facility]          CC  SELF, REFERRED

## 2024-09-17 NOTE — PATIENT INSTRUCTIONS
Take your medicines every day, as directed     Changes made today:    - No medication changes yet today- we are awaiting to see what insurance says about the new medication (jardiance or farxiga)    - It is okay to decrease vital signs to twice weekly with your nursing services. Please take your own weight every morning     Monitor Your Weight and Symptoms     Contact us if you:     Gain 2 pounds in one day or 5 pounds in one week  Feel more short of breath  Notice more leg swelling  Feel lightheadeded    Change your lifestyle     Limit Salt or Sodium:  2000 mg  Limit Fluids:  2000 mL or approximately 64 ounces  Eat a Heart Healthy Diet  Low in saturated fats  Stay Active:  Aim to move at least 150 minutes every  week            To Contact us     During Business Hours:  [phone], option # 1      After hours, weekends or holidays:   [phone], Option #4  Ask to speak to the On-Call Cardiologist. Inform them you are a CORE/heart failure patient at the [place].       Use [employer] allows you to communicate directly with your heart team through secure messaging.  [employer] can be accessed any time on your phone, computer, or tablet.  If you need assistance, we'd be happy to help!             Keep your Heart Appointments:     - Dr. Zuniga in November as scheduled    - CORE in 4-5 months      Please consider attending our virtual support group which is held monthly. Please reach out to Gomez at [phone] for more information if you are interested in attending.      2024 dates:     Monday, October 7th, 1-2pm      Monday, November 4th, 1-2pm      Monday, December 2nd, 1-2pm

## 2024-09-17 NOTE — TELEPHONE ENCOUNTER
30 days supply test claims requested for the drugs below:  Jardiance 10mg daily, Farxiga 10mg daily       Jardiance 10mg  -$121.32 copay        Farxiga 10mg  -PA needed, not covered non-preferred

## 2024-09-17 NOTE — LETTER
"9/17/2024      RE: Kamryn Miller  [address]  [address]  [place] [address]       Dear Colleague,    Thank you for the opportunity to participate in the care of your patient, Kamryn Miller, at the [facility] [place] at [facility]. Please see a copy of my visit note below.    HPI:   Ms. Miller is a 88 year old female with a past medical history including HFpEF now HFrEF, HTN, HLD, significant CAD history (PTCA 1996, LAD and RCA stenting 2003, s/p LAD PCI 2020, recent cath 10/2022 with patent stents), CKD, chronic anemia and severe aortic valvular stenosis s/p (TAVR) with a 26mm Arndt Janis Ultra on 9/12/22 by Morro Burrows. Her post TAVR echo showed mean PG of 7 mmHg with trace PVL. She was noted to have 60% left femoral stenosis post procedure, but had adequate flow on peripheral angiogram. She was also noted to have new LBBB post procedure and discharged on a Cardionet which showed no high degree AVB.. Presents to clinic for CORE follow-up.    Her EF has been slowly declining since her TAVR. Was 55-60% pre TAVR, dropped to 40-45%, now 30-35% despite GDMT.     She last saw Dr. Zuniga in May and she was doing very well. Her entresto was increased.    Today  Overall, she is feeling better this year than last year. She thinks the entresto has been the most helpful thing. She doesn't have SOB at rest. No significant GREGORY, she is able to walk \" a ways\". She walked into clinic to lab and from lab to clinic without GREGORY. She generally doesn't do stairs. No LE edema. No abdominal edema. No orthopnea or PND. No lightheadedness or dizziness no presyncope or syncope. No falls. No palpitations. No CP. Appetite is fine.    Her systolic Bps have ranged from 102-150s at home. Weights are stable at home.    Cardiac Meidcations  ASA 81 mg daily  Amlodipine 2.5 mg at bedtime  Entresto  mg BID  Aldactone 12.5 mg daily  Imdur 60 mg qAM  Crestor " 10 mg daily    PAST MEDICAL HISTORY:  Past Medical History:   Diagnosis Date     Aortic valvar stenosis 7/2010    mild     Asthma      Bipolar disorder (H)      CAD (coronary artery disease)     s/p angioplasty     Celiac disease      Colon polyps      Colon polyps 2012    every 3 year colonoscopy      Depression      High cholesterol      HTN      Mitral insufficiency      Pulmonary HTN (H)     mild     Tremors 7/10    drug induced from antidepressants     Tricuspid insufficiency        FAMILY HISTORY:  Family History   Problem Relation Age of Onset     Asthma Mother      Cerebrovascular Disease Mother      Arthritis Mother      Depression Mother      Lipids Sister      Hypertension Sister      Heart Disease Sister      Lipids Sister      Hypertension Sister      Lipids Sister      Breast Cancer Sister        SOCIAL HISTORY:  Social History     Socioeconomic History     Marital status:      Spouse name: dArien     Number of children: 2     Years of education: 16   Occupational History     Employer: RETIRED     Comment: Retired in 2002.    Tobacco Use     Smoking status: Never     Smokeless tobacco: Never   Vaping Use     Vaping status: Never Used   Substance and Sexual Activity     Alcohol use: No     Alcohol/week: 0.0 standard drinks of alcohol     Types: 1 Standard drinks or equivalent per week     Drug use: No     Sexual activity: Not Currently     Partners: Male     Social Determinants of Health     Financial Resource Strain: Medium Risk (5/4/2022)    [facility]    Financial Resource Strain      Financial Concerns: 2   Transportation Needs: Unknown (2/4/2021)    Received from [facility]    Transportation Needs      Lack of Transportation (Medical): 0   Physical Activity: Unknown (2/4/2021)    Received from [facility]     Physical Activity      Calculated Minutes of Exercise per Week:: 0   Stress: Unknown (2/4/2021)    Received from [facility], [facility]    Stress      Patient Reported Major Stressor(s): 0      Patient Reported Strengths: 1      Patient Reported Source(s) of Support: 1    [facility]    Social Connections   Interpersonal Safety: Not At Risk (7/11/2023)    Humiliation, Afraid, Rape, and Kick questionnaire      Fear of Current or Ex-Partner: No      Emotionally Abused: No      Physically Abused: No      Sexually Abused: No   Housing Stability: Unknown (2/4/2021)    Received from [facility], [facility]    Housing Stability      Housing Concerns: 0       CURRENT MEDICATIONS:  Current Outpatient Medications   Medication Sig Dispense Refill     acetaminophen (TYLENOL) 500 MG tablet Take 1 tablet (500 mg) by mouth daily Continue previously ordered PRN tylenol order as well 30 tablet 11     acetaminophen (TYLENOL) 500 MG tablet Take 1 tablet (500 mg) by mouth every 6 hours as needed for pain 60 tablet 4     amLODIPine (NORVASC) 2.5 MG tablet Take 1 tablet (2.5 mg) by mouth At Bedtime 90 tablet 3     amoxicillin (AMOXIL) 500 MG capsule        aspirin (ASA) 81 MG EC tablet Take 1 tablet (81 mg) by mouth daily 30 tablet 0     azelastine (ASTEPRO) 0.15 % nasal spray USE 1 SPRAY IN EACH NOSTRIL ONCE A DAY 30 mL 11     calcium citrate (CITRACAL) 950 (200 Ca) MG tablet TAKE 1 TABLET BY MOUTH ONCE DAILY 30 tablet 11     desvenlafaxine (PRISTIQ) 100 MG 24 hr tablet Take 100 mg by mouth daily       fluticasone-salmeterol (ADVAIR DISKUS) 500-50 MCG/ACT inhaler INHALE 1 PUFF BY MOUTH TWICE DAILY (Patient taking differently: 1 puff daily. INHALE 1 PUFF BY MOUTH TWICE DAILY) 60 each 0     folic acid (FOLVITE) 400 MCG tablet TAKE 1 TABLET BY MOUTH ONCE DAILY 28  tablet 11     furosemide (LASIX) 20 MG tablet Take 1.5 tablets (30 mg) by mouth daily 135 tablet 3     iron polysaccharides (NIFEREX) 150 MG capsule Take 1 capsule (150 mg) by mouth daily 30 capsule 1     isosorbide mononitrate (IMDUR) 30 MG 24 hr tablet Take 2 tablets (60 mg) by mouth daily 60 tablet 11     lamoTRIgine (LAMICTAL) 25 MG tablet Take 25 mg by mouth daily with 200 mg tablet for a total dose of 225 mg       LAMOTRIGINE 200 MG PO TABS Take 200 mg by mouth daily with 25 mg tablet for a total dose of 225 mg       levothyroxine (SYNTHROID/LEVOTHROID) 50 MCG tablet TAKE 1 TABLET BY MOUTH ONCE DAILY 28 tablet 11     liothyronine (CYTOMEL) 5 MCG tablet Take 2 tablets (10 mcg) by mouth daily 30 tablet 3     memantine (NAMENDA) 10 MG tablet Take 1 tablet (10 mg) by mouth daily 30 tablet 3     mirtazapine (REMERON) 15 MG tablet Take 15 mg by mouth at bedtime       Multiple Vitamin (TAB-A-JENNIFER) TABS TAKE 1 TABLET BY MOUTH ONCE DAILY 28 tablet 11     psyllium (METAMUCIL/KONSYL) 58.6 % powder Take 1 tspn in liquid daily       rosuvastatin (CRESTOR) 10 MG tablet Take 1 tablet (10 mg) by mouth daily 30 tablet 0     sacubitril-valsartan (ENTRESTO) 49-51 MG per tablet Take 1 tablet by mouth every morning AND 2 tablets every evening. Take this dose for 2 weeks. After 2 weeks, increase Entresto to  mg twice daily. 180 tablet 3     spironolactone (ALDACTONE) 25 MG tablet Take 0.5 tablets (12.5 mg) by mouth daily 45 tablet 3     No current facility-administered medications for this visit.       ROS:   Refer to HPI    EXAM:  BP (!) 158/88 (BP Location: Right arm, Patient Position: Chair, Cuff Size: Adult Regular)   Pulse 65   Wt 70.3 kg (155 lb)   SpO2 97%   BMI 30.63 kg/m    GENERAL: Appears comfortable, in no acute distress.   HEENT: Eye symmetrical, no discharge or icterus bilaterally.   CV: RRR, +S1S2, no murmur, rub, or gallop. JVP ~6 mmg Hg.   RESPIRATORY: Respirations regular, even, and unlabored. Lungs CTA  throughout.   GI: Soft and non distended   EXTREMITIES: No peripheral edema. All extremities are warm and well perfused  NEUROLOGIC: Alert and interacting appropriately.   SKIN: No jaundice. No rashes or lesions.     Labs, reviewed with patient in clinic today:  CBC RESULTS:  Lab Results   Component Value Date    WBC 5.7 05/14/2024    WBC 4.6 05/17/2021    RBC 3.95 05/14/2024    RBC 3.97 05/17/2021    HGB 12.7 05/14/2024    HGB 12.9 05/17/2021    HCT 40.0 05/14/2024    HCT 40.3 05/17/2021     (H) 05/14/2024     (H) 05/17/2021    MCH 32.2 05/14/2024    MCH 32.5 05/17/2021    MCHC 31.8 05/14/2024    MCHC 32.0 05/17/2021    RDW 12.9 05/14/2024    RDW 12.7 05/17/2021     05/14/2024     05/17/2021       CMP RESULTS:  Lab Results   Component Value Date     09/17/2024     06/04/2021    POTASSIUM 4.4 09/17/2024    POTASSIUM 4.6 10/05/2022    POTASSIUM 3.4 06/04/2021    CHLORIDE 104 09/17/2024    CHLORIDE 103 10/05/2022    CHLORIDE 106 06/04/2021    CO2 27 09/17/2024    CO2 27 10/05/2022    CO2 30 06/04/2021    ANIONGAP 9 09/17/2024    ANIONGAP 7 10/05/2022    ANIONGAP 6 06/04/2021    GLC 93 09/17/2024    GLC 85 01/31/2023    GLC 98 10/05/2022     (H) 06/04/2021    BUN 28.1 (H) 09/17/2024    BUN 39 (H) 10/05/2022    BUN 27 06/04/2021    CR 1.48 (H) 09/17/2024    CR 1.50 (H) 06/04/2021    GFRESTIMATED 34 (L) 09/17/2024    GFRESTIMATED 32 (L) 06/04/2021    GFRESTBLACK 37 (L) 06/04/2021    PRINCE 9.4 09/17/2024    PRINCE 9.4 06/04/2021    BILITOTAL 0.2 05/14/2024    BILITOTAL 0.3 05/17/2021    ALBUMIN 4.0 05/14/2024    ALBUMIN 3.6 09/27/2022    ALBUMIN 3.4 05/17/2021    ALKPHOS 63 05/14/2024    ALKPHOS 57 05/17/2021    ALT 13 05/14/2024    ALT 38 05/17/2021    AST 20 05/14/2024    AST 27 05/17/2021        INR RESULTS:  Lab Results   Component Value Date    INR 1.12 02/22/2023    INR 1.05 11/03/2020       Lab Results   Component Value Date    MAG 2.5 (H) 02/27/2023    MAG 2.4 (H)  10/28/2019     Lab Results   Component Value Date    NTBNPI 11,774 (H) 02/22/2023     Lab Results   Component Value Date    NTBNP 932 05/14/2024    NTBNP 462 (H) 05/17/2021       Diagnostics:  12/8/23 PYP testing  Impression:  1. Imaging findings are not suggestive of transthyretin cardiac  amyloidosis.     11/22/23 cMRI  Clinical history: 88 F CAD s/p PCI to LAD and RCA, cath 10/2022 with non-obs CAD, htn, severe MAC, AS s/p  TAVR 09/2022, LBBB after TAVR, HALT resolved on subsequent CT, newly reduced LVEF 35% after TAVR  Comparison CMR: none     1. The LV is normal in cavity size with moderate concentric LVH. The global systolic function is severely  reduced. The LVEF is 28%. There is basal septal akinesis with moderate diffuse hypokinesis. There is  paradoxical septal motion due to LBBB.      2. The RV is normal in cavity size. The global systolic function is mildly reduced. The RVEF is 43%.      3. Left atrium is moderately enlarged.     4. There is a TAVR prosthesis that cannot be further evaluated due to artifact. There is severe MAC without  significant MR.      5. Late gadolinium enhancement imaging shows patchy mid-myocardial enhancement involving the basal  segments.      6. Regadenoson stress perfusion imaging shows no ischemia.     7. There is no pericardial effusion or thickening.     8. There is no intracardiac thrombus.     CONCLUSIONS: No myocardial ischemia. NICM, LVEF 28% and RVEF 43%, with LBBB and moderate LVH. Moderate  basal patchy fibrosis that is likely from AS, or could represent amyloidosis. Consider PYP scan.       CORE EXAM    Assessment and Plan:   Ms. Miller is a 88 year old female with a past medical history including HFpEF now HFrEF, HTN, HLD, significant CAD history (PTCA 1996, LAD and RCA stenting 2003, s/p LAD PCI 2020, recent cath 10/2022 with patent stents), CKD, chronic anemia and severe aortic valvular stenosis s/p (TAVR) with a 26mm Arndt Janis Ultra on 9/12/22 by Drs.  Stormy. Her post TAVR echo showed mean PG of 7 mmHg with trace PVL. She was noted to have 60% left femoral stenosis post procedure, but had adequate flow on peripheral angiogram. She was also noted to have new LBBB post procedure and discharged on a Cardionet which showed no high degree AVB.. Presents to clinic for CORE follow-up.    BP is elevated here although appears much more controlled at home. We are going to prioritize GDMT with and sgtl2i today, but if she tolerates this and we still have bp room in 2 weeks then we would consider increasing amlodipine     HFpEF, now HFrEF EF 30-35%:  EF had been preserved 55-60%,  2/2023 45-50%, 7/2023 35-40%, now 30-35%. Unclear etiology, cath 10/2022 with patent stents, thought possibly related to poorly controlled HTN. Amyloid work-up has been negative.  - Volume status: Euvolemic, counseled that she will likely need to increase PO intake when we add SGLT2i  - ARNI: Continue entresto  mg BID  - Additional afterload: Amlodipine 2.5 mg daily. Per notes has had intolerances to hydralazine.  - MRA: Aldactone 12.5 mg daily, ongoing titration  - Beta Blocker: per notes, multiple prior intolerances to BB   - SGLT2i: STARTING Jardiance 10 mg or Farxiga 10 mg daily pending insurance approval  - ICD: discussion deferred during ongoing medication titration    Aortic stenosis s/p TAVR 9/2022:  - Yearly echos with primary cardiologist or VALVE team.  - CT heart shows resolution of HALT   - OFF Eliquis, continue ASA 81 mg lifelong   - SBE prophylaxis lifelong     Coronary artery disease w/o angina:  HLD:  - Continue ASA 81 mg daily lifelong  - Continue high intensity statin  - Continue exercise and heart healthy diet    HTN:  - Multiple intolerances to BB, hydralazine   - Enstresto and amlodipine for BP control   - See above, we will consider amlodipine increase pending how she responds to SGLT2i  - Decrease vitals at home from daily to 1-2 times weekly d/t stability and  cost     PAD:   - Continue ASA 81 mg lifelong  - vascular consult should she develop recurrent claudication    Follow up   - Labs 10-14 days after starting sglt2i  - RN phone call 14 days after starting sglt2i, bp review at that time and consider increasing amlodipine  - Can decrease vital to once weekly once on stable medications    Billing  - I managed 2+ stable chronic conditions  - I changed a prescription medication    The longitudinal plan of care for the diagnosis(es)/condition(s) as documented were addressed during this visit. Due to the added complexity in care, I will continue to support Kamryn in the subsequent management and with ongoing continuity of care.       Violet King PA-C  [facility]          CC  SELF, REFERRED      Please do not hesitate to contact me if you have any questions/concerns.     Sincerely,     Violet King PA-C

## 2024-09-17 NOTE — NURSING NOTE
Chief Complaint   Patient presents with    Follow Up     9/17/24 Return CORE, 88 year old female with history of diastolic heart failure, TAVR, htn, presents for follows up with labs prior.       Vitals were taken and medications reconciled.    Arthur Bennett, EMT  11:03 AM

## 2024-09-18 ENCOUNTER — PATIENT OUTREACH (OUTPATIENT)
Dept: CARDIOLOGY | Facility: CLINIC | Age: 89
End: 2024-09-18
Payer: MEDICARE

## 2024-09-18 DIAGNOSIS — I10 ESSENTIAL HYPERTENSION: ICD-10-CM

## 2024-09-18 RX ORDER — AMLODIPINE BESYLATE 5 MG/1
5 TABLET ORAL AT BEDTIME
Qty: 90 TABLET | Refills: 1 | Status: SHIPPED | OUTPATIENT
Start: 2024-09-18

## 2024-09-18 NOTE — PROGRESS NOTES
Called patient to follow up from visit yesterday. Jardiance is $121/30 days, Farxiga not covered.     Per LEDY Rosado;     WE could offer her the paperwork to fill out for patient assistance?     In the meantime, would increase amlodipine to 5 mg from 2.5 mg for BP control and check in on bps in about 2 weeks (okay to decrease RN vitals to twice weekly).     Left message to discuss this, asked for call back.   Spoke with nursing office and communicated orders, then faxed orders.   They gave me another number for Kayla of [phone] and I left a message for her there as well.

## 2024-09-20 NOTE — PROGRESS NOTES
Called Kamryn again to confirm follow up from clinic. Left message and asked for call back. Can talk about option of applying for patient assistance for SGLT-2.     Orders have been faxed and communicated to her nursing staff for the increase in Almodipine and will call to check on blood pressures in 2 weeks.

## 2024-10-07 ENCOUNTER — OFFICE VISIT (OUTPATIENT)
Dept: NEPHROLOGY | Facility: CLINIC | Age: 89
End: 2024-10-07
Payer: MEDICARE

## 2024-10-07 ENCOUNTER — TELEPHONE (OUTPATIENT)
Dept: MULTI SPECIALTY CLINIC | Facility: CLINIC | Age: 89
End: 2024-10-07

## 2024-10-07 VITALS
DIASTOLIC BLOOD PRESSURE: 72 MMHG | BODY MASS INDEX: 30.27 KG/M2 | HEART RATE: 75 BPM | WEIGHT: 153.2 LBS | SYSTOLIC BLOOD PRESSURE: 156 MMHG

## 2024-10-07 DIAGNOSIS — N18.32 STAGE 3B CHRONIC KIDNEY DISEASE (H): Primary | ICD-10-CM

## 2024-10-07 DIAGNOSIS — I25.119 ATHEROSCLEROSIS OF NATIVE CORONARY ARTERY OF NATIVE HEART WITH ANGINA PECTORIS (H): ICD-10-CM

## 2024-10-07 DIAGNOSIS — I50.23 ACUTE ON CHRONIC SYSTOLIC (CONGESTIVE) HEART FAILURE (H): ICD-10-CM

## 2024-10-07 DIAGNOSIS — F03.90 MAJOR NEUROCOGNITIVE DISORDER (H): ICD-10-CM

## 2024-10-07 DIAGNOSIS — Z95.2 S/P TAVR (TRANSCATHETER AORTIC VALVE REPLACEMENT): ICD-10-CM

## 2024-10-07 PROCEDURE — 99214 OFFICE O/P EST MOD 30 MIN: CPT | Performed by: INTERNAL MEDICINE

## 2024-10-07 NOTE — TELEPHONE ENCOUNTER
[facility] Call Center    Phone Message    May a detailed message be left on voicemail: yes     Reason for Call: Other: Pt needs lab order for today's appointment. Please call pt back.     Action Taken: Other: ILYA  NEPHROLOGY    Travel Screening: Not Applicable     Date of Service:

## 2024-10-07 NOTE — PROGRESS NOTES
Nephrology Progress Note    Assessment and Plan:  89 year old female with history of CKD stage 3, Scr 1-1.5 since 2008, hypertension who presents for followup of CKD. She has had hypercalcemia that has stabilized. She was first seen October 2019, and hydrochlorothiazide was stopped. Her Scr has been 1.2-1.5 in recent years. She now has HFrEF and HFpEF, hospitalized in 2/2023 for hypertensive urgency and in December 2022 for anemia with hgb down to 7    # CKD stage 3b, Scr 1-1.5, recent SCr 1.5 eGFR 31ml/min :  Her scr has been 1.1-1.5 in the last few years, with baseline many years ago at 1 or less.  She has mild proteinuria, 0.28 mg/mg, improved from 2.47 mg/mg. She denies systemic signs   - she has HFpeF and this along with her CAD and hypertension is the cause of her CKD. She now has HFrEF per last echo, EF 35-40%    -low grade proteinuria, off ACE/ARB-now is on entresto, empagliflozin is listed on med rec but she does not believe she is taking this.   - discussed hydration, avoiding nephrotoxic medications, holding diuretic on sick days, and blood pressure control  - she is [facility] which is good  - started on spironolactone 25mg, and her BP is better than ever in her recollection. She denies dizziness  - no changes made today    # Hypertension: much better controlled since starting spironolactone, fursoemide and entresto. Her hydrochlorothiazide was stopped due to hypercalcemia. The other medications were stopped in the last year by other providers.  - current meds amlodipine 2.5 mg daily, lasix 20 mg daily, isosorobide 60 mg daily, entresto, spironolactone 12.5 mg daily  - BP's are well controlled  She feels well overall.     # Not anemic - but was anemic in hospital , down to 7 with iron deficiency (iron sat 5%)  in December 2022, started on iron supplement, lowered to every other day given normal hgb now.  - ? No cause found for iron deficiency, decided against scopes in the hospital.  - Hgb  remains normal at 13.6    # Electrolytes:   - Na 140, K 4.4, Bicarb 25   Stable    # Mineral Bone Disorder:   - Calcium; level is:  Phos 4.2, both normal range, PTH normal  - vitamin D high 112  - stop Vit D, recheck in 6 months at follow up.      Assessment and plan was discussed with patient and she voiced her understanding and agreement.    Reason for Visit:  Kamryn Miller is an 89 year old female with CKD from hypertension, who presents for followup of CKD    HPI:  She is a pleasant female with history of mild rhinitis, asthma, and hypertension who presents for followup of CKD and hypercalcemia. . Her serum creatinine has been 1.2-1.5 in recent years, and was up to 2 in 2019, with eGFR 21ml/min.  She had hypercalcemia, which improved with stopping hydrochlorothiazide. She has history of CAD and HFpEF with significant aortic stenosis and follows with Dr Zuniga in cardiology. She had TAVR . She was hospitalized in 2023 with hypertensive urgency, shortness of breath, anemia.  She was treated with mild diuresis and discharged.  She had hypertensive urgency in jn 2022, She also had AV block which affected her BP regimen. She also had inpatient psych stay for depression after her  .  Her creatinine was 1.1-1.2 on discharge (with stopping her ARB).  Her recent BP is 120s/  She is mobilizing fairly well without cane, uses walker for longer distances.  She had significant anemia in 2022 without clear cause, hgb down to 7 was transfused and now on iron supplement. Iron sat was 5%  She denies significant dyspnea on exertion or swelling. She is in assisted living and they manage medications and provide meals.  She tries to eat healthy.  She avoids using NSAIDs.     Renal History:   HTN    ROS:   A comprehensive review of systems was obtained and negative, except as noted in the HPI or PMH.    Active Medical Problems:  Patient Active Problem List   Diagnosis    Hyperlipidemia  LDL goal <70    Mild major depression (H)    Pulmonary hypertension (H)    Seasonal allergic rhinitis    Mitral insufficiency    Tricuspid insufficiency    Renal insufficiency    Tremors    Family history of diabetes mellitus    Celiac disease    History of colonic polyps    Benign essential hypertension    Hypothyroidism, unspecified type    CKD (chronic kidney disease) stage 3, GFR 30-59 ml/min (H)    Anemia    Disorder of kidney and ureter    Generalized anxiety disorder    Osteopenia    CAD S/P percutaneous coronary angioplasty    NYHA class 3 heart failure with preserved ejection fraction (H)    Moderate persistent asthma without complication    Hearing disorder, unspecified laterality    Impairment of balance    Bipolar 1 disorder, depressed, severe (H)    Essential hypertension    Stented coronary artery    Generalized weakness    Left leg claudication (H)    S/P TAVR (transcatheter aortic valve replacement)    Syncope and collapse    Anemia, unspecified type    Weight gain    Elevated brain natriuretic peptide (BNP) level    Fall     PMH:   Medical record was reviewed and PMH was discussed with patient and noted below.  Past Medical History:   Diagnosis Date    Aortic valvar stenosis 7/2010    mild    Asthma     Bipolar disorder (H)     CAD (coronary artery disease)     s/p angioplasty    Celiac disease     Colon polyps     Colon polyps 2012    every 3 year colonoscopy     Depression     High cholesterol     HTN     Mitral insufficiency     Pulmonary HTN (H)     mild    Tremors 7/10    drug induced from antidepressants    Tricuspid insufficiency      PSH:   Past Surgical History:   Procedure Laterality Date    ANGIOGRAM  6/26/2009    ANGIOPLASTY  9/96    for angina    ANGIOPLASTY  8/03 - 9/03    X -2 - with stenst in coronary car.    APPENDECTOMY      BREAST BIOPSY, RT/LT      Breat Biopsy RT/LT, benign    BUNIONECTOMY  11/8/2011    Procedure:BUNIONECTOMY; Left donna bunionectomy; Surgeon:FREDY LITTLEJOHN;  Location:[facility]    BUNIONECTOMY RT/LT  5/2007    right bunion and right 2nd hammertoe    CATARACT IOL, RT/LT  6/12 and 7/12    bilateral    COLONOSCOPY  2007, 2012    every 3 years for polyps    CV CORONARY ANGIOGRAM N/A 8/21/2020    Procedure: CV CORONARY ANGIOGRAM;  Surgeon: Gianluca Toscano MD;  Location: [facility]    CV CORONARY ANGIOGRAM N/A 10/25/2022    Procedure: Coronary Angiogram;  Surgeon: Carter Douglass MD;  Location: [facility]    CV INSTANTANEOUS WAVE-FREE RATIO N/A 10/25/2022    Procedure: Instantaneous Wave-Free Ratio;  Surgeon: Carter Douglass MD;  Location: [facility]    CV LEFT HEART CATH N/A 8/21/2020    Procedure: CV LEFT HEART CATH;  Surgeon: Gianluca Toscano MD;  Location: [facility]    CV LEFT HEART CATH N/A 11/3/2020    Procedure: CV LEFT HEART CATH;  Surgeon: Tom Burrows MD;  Location: [facility]    CV LOWER EXTREMITY ANGIOGRAM BILATERAL N/A 11/3/2020    Procedure: CV ANGIOGRAM LOWER EXTREMITY BILATERAL;  Surgeon: Tom Burrows MD;  [facility]    CV PCI STENT DRUG ELUTING N/A 8/21/2020    Procedure: Percutaneous Coronary Intervention Stent Drug Eluting;  Surgeon: Gianluca Toscano MD;  Location: [facility]    CV RIGHT HEART CATH MEASUREMENTS RECORDED N/A 8/21/2020    Procedure: CV RIGHT HEART CATH;  Surgeon: Gianluca Toscano MD;  [facility]    CV RIGHT HEART CATH MEASUREMENTS RECORDED N/A 11/3/2020    Procedure: CV RIGHT HEART CATH;  Surgeon: Tom Burrows MD;  [facility]    CV TRANSCATHETER AORTIC VALVE REPLACEMENT N/A 9/12/2022    Procedure: Transfemoral Transcatheter Aortic Valve Replacement with possible open heart bypass and or balloon pump placement and any indicated procedure;  Surgeon: Tom Burrows MD;  [facility]     "HEART CATH FEMORAL CANNULIZATION WITH OPEN STANDBY REPAIR AORTIC VALVE N/A 9/12/2022    Procedure: OR TRANSCATHETER AORTIC VALVE REPLACEMENT, OPEN FEMORAL ARTERY APPROACH;  Surgeon: Arthur Markham MD;  Location:  [facility]    JOINT REPLACEMENT, HIP RT/LT  4/2008    right hip replaced    JOINT REPLACEMENT, HIP RT/LT  4/2009    left hip replaced    REPAIR HAMMER TOE  11/8/2011    Procedure:REPAIR HAMMER TOE; left 2nd hammertoe repair; Surgeon:FREDY LITTLEJOHN; Location: OR    SURGICAL HISTORY OF -   11/11    left bunion and 2nd hammertoe repair    TUBAL LIGATION      ZZC ANESTH,BLEPHAROPLASTY      (R) for drooping eyelid    ZZC APPENDECTOMY         Family Hx:   Family History   Problem Relation Age of Onset    Asthma Mother     Cerebrovascular Disease Mother     Arthritis Mother     Depression Mother     Lipids Sister     Hypertension Sister     Heart Disease Sister     Lipids Sister     Hypertension Sister     Lipids Sister     Breast Cancer Sister      Personal Hx:   Social History     Tobacco Use    Smoking status: Never    Smokeless tobacco: Never   Substance Use Topics    Alcohol use: No     Alcohol/week: 0.0 standard drinks of alcohol     Types: 1 Standard drinks or equivalent per week       Allergies:  Allergies   Allergen Reactions    Ace Inhibitors Cough    Amlodipine      Headache and plugged ears    Carvedilol      \"plugged ears and a headache\"    Diclofenac Other (See Comments)     Balance problems    Gluten Meal Diarrhea    Hydralazine-Hctz      headache       Medications:  Current Outpatient Medications   Medication Sig Dispense Refill    acetaminophen (TYLENOL) 500 MG tablet Take 1 tablet (500 mg) by mouth daily Continue previously ordered PRN tylenol order as well 30 tablet 11    acetaminophen (TYLENOL) 500 MG tablet Take 1 tablet (500 mg) by mouth every 6 hours as needed for pain 60 tablet 4    amLODIPine (NORVASC) 5 MG tablet Take 1 tablet (5 mg) by mouth at bedtime. 90 " tablet 1    amoxicillin (AMOXIL) 500 MG capsule       aspirin (ASA) 81 MG EC tablet Take 1 tablet (81 mg) by mouth daily 30 tablet 0    azelastine (ASTEPRO) 0.15 % nasal spray USE 1 SPRAY IN EACH NOSTRIL ONCE A DAY 30 mL 11    calcium citrate (CITRACAL) 950 (200 Ca) MG tablet TAKE 1 TABLET BY MOUTH ONCE DAILY 30 tablet 11    desvenlafaxine (PRISTIQ) 100 MG 24 hr tablet Take 100 mg by mouth daily      fluticasone-salmeterol (ADVAIR DISKUS) 500-50 MCG/ACT inhaler INHALE 1 PUFF BY MOUTH TWICE DAILY (Patient taking differently: 1 puff daily. INHALE 1 PUFF BY MOUTH TWICE DAILY) 60 each 0    folic acid (FOLVITE) 400 MCG tablet TAKE 1 TABLET BY MOUTH ONCE DAILY 28 tablet 11    furosemide (LASIX) 20 MG tablet Take 1.5 tablets (30 mg) by mouth daily 135 tablet 3    iron polysaccharides (NIFEREX) 150 MG capsule Take 1 capsule (150 mg) by mouth daily 30 capsule 1    isosorbide mononitrate (IMDUR) 30 MG 24 hr tablet Take 2 tablets (60 mg) by mouth daily 60 tablet 11    lamoTRIgine (LAMICTAL) 25 MG tablet Take 25 mg by mouth daily with 200 mg tablet for a total dose of 225 mg      LAMOTRIGINE 200 MG PO TABS Take 200 mg by mouth daily with 25 mg tablet for a total dose of 225 mg      levothyroxine (SYNTHROID/LEVOTHROID) 50 MCG tablet TAKE 1 TABLET BY MOUTH ONCE DAILY 28 tablet 11    liothyronine (CYTOMEL) 5 MCG tablet Take 2 tablets (10 mcg) by mouth daily 30 tablet 3    memantine (NAMENDA) 10 MG tablet Take 1 tablet (10 mg) by mouth daily 30 tablet 3    mirtazapine (REMERON) 15 MG tablet Take 15 mg by mouth at bedtime      Multiple Vitamin (TAB-A-JENNIFER) TABS TAKE 1 TABLET BY MOUTH ONCE DAILY 28 tablet 11    psyllium (METAMUCIL/KONSYL) 58.6 % powder Take 1 tspn in liquid daily      rosuvastatin (CRESTOR) 10 MG tablet Take 1 tablet (10 mg) by mouth daily 30 tablet 0    sacubitril-valsartan (ENTRESTO) 49-51 MG per tablet Take 1 tablet by mouth every morning AND 2 tablets every evening. Take this dose for 2 weeks. After 2 weeks,  increase Entresto to  mg twice daily. 180 tablet 3    spironolactone (ALDACTONE) 25 MG tablet Take 0.5 tablets (12.5 mg) by mouth daily 45 tablet 3     No current facility-administered medications for this visit.      Vitals:  BP (!) 156/72   Pulse 75   Wt 69.5 kg (153 lb 3.2 oz)   BMI 30.27 kg/m      Exam:  GENERAL APPEARANCE: alert and no distress  No significant edema    LABS:   CMP  Recent Labs   Lab Test 09/17/24  1041 07/02/24  0851 05/28/24  1037 05/14/24  1302 11/24/21  1525 06/04/21  1033 05/17/21  1401 11/03/20  1225 10/23/20  0834    140 140 138   < > 142 140 144 141   POTASSIUM 4.4 4.2 4.2 4.2   < > 3.4 4.1 4.3 3.8   CHLORIDE 104 102 104 102   < > 106 106 111* 105   CO2 27 28 25 26   < > 30 33* 28 32   ANIONGAP 9 10 11 10   < > 6 1* 5 4   GLC 93 129* 77 82   < > 103* 92 100* 88   BUN 28.1* 29.4* 32.2* 39.6*   < > 27 23 23 21   CR 1.48* 1.53* 1.51* 1.69*   < > 1.50* 1.39* 1.38* 1.52*   GFRESTIMATED 34* 33* 33* 29*   < > 32* 35* 35* 31*   GFRESTBLACK  --   --   --   --   --  37* 40* 41* 36*   PRINCE 9.4 9.6 9.3 9.3   < > 9.4 9.3 9.5 9.9    < > = values in this interval not displayed.     Recent Labs   Lab Test 05/14/24  1302 04/04/24  1037 12/19/23  1056 12/08/23  0940   BILITOTAL 0.2 0.3 0.2 0.2   ALKPHOS 63 66 55 56   ALT 13 18 9 13   AST 20 23 21 23     CBC  Recent Labs   Lab Test 05/14/24  1302 04/04/24  1037 12/19/23  1056 12/08/23  0940   HGB 12.7 13.6 13.3 13.1   WBC 5.7 6.5 4.9 4.2   RBC 3.95 4.19 4.08 3.99   HCT 40.0 42.0 40.6 41.0   * 100 100 103*   MCH 32.2 32.5 32.6 32.8   MCHC 31.8 32.4 32.8 32.0   RDW 12.9 12.7 13.9 14.4    190 172 178     URINE STUDIES  Recent Labs   Lab Test 10/10/23  1500 10/07/23  1815 08/01/23  1200 07/30/23  2100 11/30/22  1927 01/24/22  1051 12/16/21  1758 08/12/21  1334 07/28/21  1150 07/16/20  1615 10/28/19  1314   COLOR Straw Yellow Light Yellow Light Yellow   < > Yellow   < > Yellow Yellow   < > Yellow   APPEARANCE Slightly Cloudy*  Slightly Cloudy* Clear Clear   < > Clear   < > Clear Clear   < > Clear   URINEGLC Negative Negative Negative Negative   < > Negative   < > Negative Negative   < > Negative   URINEBILI Negative Negative Negative Negative   < > Negative   < > Negative Negative   < > Negative   URINEKETONE Negative Negative Negative Negative   < > Negative   < > Negative Negative   < > Negative   SG 1.007 1.011 1.012 1.008   < > <=1.005   < > <=1.005 1.010   < > 1.015   UBLD Large* Large* Negative Negative   < > Negative   < > Trace* Trace*   < > Trace*   URINEPH 6.0 5.5 7.5* 8.0*   < > 7.0   < > 6.0 7.5*   < > 7.5*   PROTEIN 30* 30* Negative Negative   < > Negative   < > Negative 30*   < > Negative   UROBILINOGEN  --   --   --   --   --  0.2  --  0.2 0.2  --  0.2   NITRITE Negative Negative Positive* Negative   < > Negative   < > Negative Negative   < > Negative   LEUKEST Large* Small* Moderate* Negative   < > Negative   < > Negative Small*   < > Negative   RBCU 27* >182* <1 1   < > 0-2   < > 0-2 0-2   < > O - 2   WBCU 78* 62* 45* 2   < > 0-5   < > 0-5 5-10*   < > 0 - 5    < > = values in this interval not displayed.     Recent Labs   Lab Test 01/24/22  1051   [facility] 0.29*     PTH  Recent Labs   Lab Test 04/04/24  1037 09/18/23  1450 04/03/23  1428   PTHI 49 54 73*     IRON STUDIES  Recent Labs   Lab Test 01/11/23  1341 10/26/22  0737   IRON 62 23*    318   IRONSAT 17 7*   JANESSA 126 19     Ania MD Elizabeth  Associate Professor of Medicine  Department of Nephrology  [facility]

## 2024-10-07 NOTE — TELEPHONE ENCOUNTER
Called the mobile number listed and spoke with Ana. Ana stated a BMP was done 9/17. Informed her that if Dr Johnson determines she would like more labs then patient can go to lab after her appointment. Ana stated understanding.  VEENA Hearn Care Coordinator  Nephrology

## 2024-11-08 ENCOUNTER — TELEPHONE (OUTPATIENT)
Dept: CARDIOLOGY | Facility: CLINIC | Age: 89
End: 2024-11-08
Payer: MEDICARE

## 2024-11-08 NOTE — TELEPHONE ENCOUNTER
Recent weights and blood pressures faxed over from [facility] and called to speak with AL staff. Increased Amlodipine after last visit in September for blood pressure control and blood pressures look improved. Has been feeling fine. Not having any worsening shortness of breath or increased swelling. Has gained 3 lbs over last month. Advised nursing that numbers look good, if gains any further weight or has any of the symptoms listed above they should call us sooner.   Will review with provider for any additional recommendations.

## 2024-11-08 NOTE — TELEPHONE ENCOUNTER
[facility] Call Center    Phone Message    May a detailed message be left on voicemail: yes     Reason for Call: Other: Katherine wanted to know if her fax regarding pt weight update was received and to discuss     Action Taken: Other: Cardio    Travel Screening: Not Applicable     Date of Service:

## 2024-11-12 ENCOUNTER — CARE COORDINATION (OUTPATIENT)
Dept: CARDIOLOGY | Facility: CLINIC | Age: 89
End: 2024-11-12
Payer: MEDICARE

## 2024-11-12 DIAGNOSIS — I50.20 HEART FAILURE WITH REDUCED EJECTION FRACTION, NYHA CLASS III (H): Primary | ICD-10-CM

## 2024-11-19 ENCOUNTER — LAB (OUTPATIENT)
Dept: LAB | Facility: CLINIC | Age: 89
End: 2024-11-19
Attending: PHYSICIAN ASSISTANT
Payer: MEDICARE

## 2024-11-19 ENCOUNTER — OFFICE VISIT (OUTPATIENT)
Dept: CARDIOLOGY | Facility: CLINIC | Age: 89
End: 2024-11-19
Attending: PHYSICIAN ASSISTANT
Payer: MEDICARE

## 2024-11-19 VITALS
OXYGEN SATURATION: 97 % | WEIGHT: 159.6 LBS | DIASTOLIC BLOOD PRESSURE: 83 MMHG | HEART RATE: 70 BPM | BODY MASS INDEX: 31.54 KG/M2 | SYSTOLIC BLOOD PRESSURE: 139 MMHG

## 2024-11-19 DIAGNOSIS — I42.8 NONISCHEMIC CARDIOMYOPATHY (H): ICD-10-CM

## 2024-11-19 DIAGNOSIS — N18.32 STAGE 3B CHRONIC KIDNEY DISEASE (H): ICD-10-CM

## 2024-11-19 DIAGNOSIS — I43 CARDIAC AMYLOIDOSIS (H): ICD-10-CM

## 2024-11-19 DIAGNOSIS — E85.4 CARDIAC AMYLOIDOSIS (H): ICD-10-CM

## 2024-11-19 DIAGNOSIS — E78.2 MIXED HYPERLIPIDEMIA: ICD-10-CM

## 2024-11-19 DIAGNOSIS — I10 BENIGN ESSENTIAL HYPERTENSION: Primary | ICD-10-CM

## 2024-11-19 DIAGNOSIS — Z95.2 S/P TAVR (TRANSCATHETER AORTIC VALVE REPLACEMENT): ICD-10-CM

## 2024-11-19 DIAGNOSIS — I50.20 HEART FAILURE WITH REDUCED EJECTION FRACTION, NYHA CLASS III (H): ICD-10-CM

## 2024-11-19 DIAGNOSIS — I10 ESSENTIAL HYPERTENSION: ICD-10-CM

## 2024-11-19 DIAGNOSIS — I44.7 LBBB (LEFT BUNDLE BRANCH BLOCK): ICD-10-CM

## 2024-11-19 DIAGNOSIS — I50.22 CHRONIC SYSTOLIC HEART FAILURE (H): ICD-10-CM

## 2024-11-19 LAB
ALBUMIN MFR UR ELPH: 7.8 MG/DL
ALBUMIN SERPL BCG-MCNC: 4.1 G/DL (ref 3.5–5.2)
ALBUMIN UR-MCNC: NEGATIVE MG/DL
ALP SERPL-CCNC: 72 U/L (ref 40–150)
ALT SERPL W P-5'-P-CCNC: 11 U/L (ref 0–50)
ANION GAP SERPL CALCULATED.3IONS-SCNC: 11 MMOL/L (ref 7–15)
APPEARANCE UR: CLEAR
AST SERPL W P-5'-P-CCNC: 20 U/L (ref 0–45)
BILIRUB SERPL-MCNC: 0.2 MG/DL
BILIRUB UR QL STRIP: NEGATIVE
BUN SERPL-MCNC: 39.5 MG/DL (ref 8–23)
CALCIUM SERPL-MCNC: 9.5 MG/DL (ref 8.8–10.4)
CHLORIDE SERPL-SCNC: 102 MMOL/L (ref 98–107)
COLOR UR AUTO: NORMAL
CREAT SERPL-MCNC: 1.8 MG/DL (ref 0.51–0.95)
CREAT UR-MCNC: 28.5 MG/DL
EGFRCR SERPLBLD CKD-EPI 2021: 27 ML/MIN/1.73M2
ERYTHROCYTE [DISTWIDTH] IN BLOOD BY AUTOMATED COUNT: 12.2 % (ref 10–15)
GLUCOSE SERPL-MCNC: 94 MG/DL (ref 70–99)
GLUCOSE UR STRIP-MCNC: NEGATIVE MG/DL
HCO3 SERPL-SCNC: 26 MMOL/L (ref 22–29)
HCT VFR BLD AUTO: 40.6 % (ref 35–47)
HGB BLD-MCNC: 13.1 G/DL (ref 11.7–15.7)
HGB UR QL STRIP: NEGATIVE
KETONES UR STRIP-MCNC: NEGATIVE MG/DL
LEUKOCYTE ESTERASE UR QL STRIP: NEGATIVE
MCH RBC QN AUTO: 32.3 PG (ref 26.5–33)
MCHC RBC AUTO-ENTMCNC: 32.3 G/DL (ref 31.5–36.5)
MCV RBC AUTO: 100 FL (ref 78–100)
NITRATE UR QL: NEGATIVE
NT-PROBNP SERPL-MCNC: 920 PG/ML (ref 0–1800)
PH UR STRIP: 6 [PH] (ref 5–7)
PHOSPHATE SERPL-MCNC: 4.2 MG/DL (ref 2.5–4.5)
PLATELET # BLD AUTO: 172 10E3/UL (ref 150–450)
POTASSIUM SERPL-SCNC: 4.4 MMOL/L (ref 3.4–5.3)
PROT SERPL-MCNC: 7.1 G/DL (ref 6.4–8.3)
PROT/CREAT 24H UR: 0.27 MG/MG CR (ref 0–0.2)
RBC # BLD AUTO: 4.05 10E6/UL (ref 3.8–5.2)
RBC URINE: 0 /HPF
SODIUM SERPL-SCNC: 139 MMOL/L (ref 135–145)
SP GR UR STRIP: 1 (ref 1–1.03)
UROBILINOGEN UR STRIP-MCNC: NORMAL MG/DL
WBC # BLD AUTO: 6.4 10E3/UL (ref 4–11)
WBC URINE: 2 /HPF

## 2024-11-19 PROCEDURE — 80053 COMPREHEN METABOLIC PANEL: CPT | Performed by: PATHOLOGY

## 2024-11-19 PROCEDURE — 81001 URINALYSIS AUTO W/SCOPE: CPT | Performed by: PATHOLOGY

## 2024-11-19 PROCEDURE — 85027 COMPLETE CBC AUTOMATED: CPT | Performed by: PATHOLOGY

## 2024-11-19 PROCEDURE — G0463 HOSPITAL OUTPT CLINIC VISIT: HCPCS | Performed by: INTERNAL MEDICINE

## 2024-11-19 PROCEDURE — 84156 ASSAY OF PROTEIN URINE: CPT | Performed by: PATHOLOGY

## 2024-11-19 PROCEDURE — 83880 ASSAY OF NATRIURETIC PEPTIDE: CPT | Performed by: PATHOLOGY

## 2024-11-19 PROCEDURE — 36415 COLL VENOUS BLD VENIPUNCTURE: CPT | Performed by: PATHOLOGY

## 2024-11-19 PROCEDURE — 84100 ASSAY OF PHOSPHORUS: CPT | Performed by: PATHOLOGY

## 2024-11-19 ASSESSMENT — PAIN SCALES - GENERAL: PAINLEVEL_OUTOF10: NO PAIN (0)

## 2024-11-19 NOTE — PROGRESS NOTES
November 19, 2024    I had the pleasure of seeing Kamryn Miller  in the [facility] for further evaluation and management.   She has a history of Hx CAD, HLD, HTN, mitral and tricuspid insuff, pulmonary hypertension, lost to follow up in the past.  She does have CAD and did receive 3 stents in 8/2020 as below.  She denies any cardiomyopathy and she has not had any cardiology follow-up for several years.  Notes that she does have shortness of breath especially with exertion or she walks outside.  This is class III symptoms at the time.  We did start TAVR evaluation and she did undergo right heart catheterization as well as invasive hemodynamic measurement and measurement of the valve area.  Given findings of moderate aortic stenosis, TAVR was not yet pursued and ongoing surveillance was recommended. Had been hypertensive in the recent past. She did see the structural team just recently and repeat TTE was performed that again showed moderate to severe aortic stenosis, essentially unchanged from prior TTE. Given that she had no concerning symptoms ongoing surveillance was recommended.  She has had issues with hypertension in the past including admissions for hypertensive emergency. Given ongoing and worsening symptoms she was revaluated by the structural team and ultimately underwent successful TAVR on 9/12/22 with a post intervention gradient of 7mmHg. She later had increased Mgs, imaging was consistent with HALT and she was placed on AC. She was seen in the ER subsequently for claudication and leg pain in the setting of known LFA stenosis. She was seen in the ER then for episode of chest pain. Workup was overall negative and was discharged in stable condition. She was then admitted in 2/2023 with systolic HF exacerbation, she was diuresed and medications were adjusted including starting entresto at low dose. Did not tolerate BB initiation. She was also seen in [facility]. Her BP remained elevated and thus most  recently entresto was increased to 49-51 mg BID that she tolerated well with the goal to further titrate and later initiate SGLT2 if her renal function remains stable. She was diagnosed with COVID in October 2022 and was treated with Paxlovid. Serial TTEs have had further declines in LVEF (30-35% 11/2023). CMR with LVEF 28%, no evidence of MI. PYP scan was negative for ATTR amyloid.   She is here with her daughter today again.  She is doing extremely well denies any new complaints or issues.  Her weight has been extremely stable as she is monitoring at home and blood pressure is way better control in the normal range at this time.  No dizziness lightheadedness palpitations.  No admissions for the past 2 years.     PAST MEDICAL HISTORY:  Past Medical History:   Diagnosis Date    Aortic valvar stenosis 7/2010    mild    Asthma     Bipolar disorder (H)     CAD (coronary artery disease)     s/p angioplasty    Celiac disease     Colon polyps     Colon polyps 2012    every 3 year colonoscopy     Depression     High cholesterol     HTN     Mitral insufficiency     Pulmonary HTN (H)     mild    Tremors 7/10    drug induced from antidepressants    Tricuspid insufficiency      FAMILY HISTORY:  Family History   Problem Relation Age of Onset    Asthma Mother     Cerebrovascular Disease Mother     Arthritis Mother     Depression Mother     Lipids Sister     Hypertension Sister     Heart Disease Sister     Lipids Sister     Hypertension Sister     Lipids Sister     Breast Cancer Sister      SOCIAL HISTORY:  Social History     Socioeconomic History    Marital status:      Spouse name: Adrien    Number of children: 2    Years of education: 16   Occupational History     Employer: RETIRED     Comment: Retired in 2002.    Tobacco Use    Smoking status: Never    Smokeless tobacco: Never   Vaping Use    Vaping status: Never Used   Substance and Sexual Activity    Alcohol use: No     Alcohol/week: 0.0 standard drinks of alcohol      Types: 1 Standard drinks or equivalent per week    Drug use: No    Sexual activity: Not Currently     Partners: Male     Social Drivers of Health     Financial Resource Strain: Medium Risk (5/4/2022)    Received from [facility]    Financial Resource Strain     Financial Concerns: 2   Transportation Needs: Unknown (2/4/2021)    Received from [facility]    Transportation Needs     Lack of Transportation (Medical): 0   Physical Activity: Unknown (2/4/2021)    Received from [facility]    Physical Activity     Calculated Minutes of Exercise per Week:: 0   Stress: Unknown (2/4/2021)    Received from [facility]    Stress     Patient Reported Major Stressor(s): 0     Patient Reported Strengths: 1     Patient Reported Source(s) of Support: 1    Received from [facility], [facility]    Social Connections   Interpersonal Safety: Not At Risk (7/11/2023)    Humiliation, Afraid, Rape, and Kick questionnaire     Fear of Current or Ex-Partner: No     Emotionally Abused: No     Physically Abused: No     Sexually Abused: No   Housing Stability: Unknown (2/4/2021)    Received from [facility]    Housing Stability     Housing Concerns: 0     CURRENT MEDICATIONS:  Current Outpatient Medications   Medication Sig Dispense Refill    acetaminophen (TYLENOL) 500 MG tablet Take 1 tablet (500 mg) by mouth daily Continue previously ordered PRN tylenol order as well 30 tablet 11    acetaminophen (TYLENOL) 500 MG tablet Take 1 tablet (500 mg) by mouth every 6 hours as needed for pain 60 tablet 4    amLODIPine (NORVASC) 5 MG tablet Take 1 tablet (5 mg) by mouth at bedtime. 90 tablet 1    amoxicillin (AMOXIL)  500 MG capsule       aspirin (ASA) 81 MG EC tablet Take 1 tablet (81 mg) by mouth daily 30 tablet 0    azelastine (ASTEPRO) 0.15 % nasal spray USE 1 SPRAY IN EACH NOSTRIL ONCE A DAY 30 mL 11    calcium citrate (CITRACAL) 950 (200 Ca) MG tablet TAKE 1 TABLET BY MOUTH ONCE DAILY 30 tablet 11    desvenlafaxine (PRISTIQ) 100 MG 24 hr tablet Take 100 mg by mouth daily      fluticasone-salmeterol (ADVAIR DISKUS) 500-50 MCG/ACT inhaler INHALE 1 PUFF BY MOUTH TWICE DAILY (Patient taking differently: 1 puff daily. INHALE 1 PUFF BY MOUTH TWICE DAILY) 60 each 0    folic acid (FOLVITE) 400 MCG tablet TAKE 1 TABLET BY MOUTH ONCE DAILY 28 tablet 11    furosemide (LASIX) 20 MG tablet Take 1.5 tablets (30 mg) by mouth daily 135 tablet 3    iron polysaccharides (NIFEREX) 150 MG capsule Take 1 capsule (150 mg) by mouth daily 30 capsule 1    isosorbide mononitrate (IMDUR) 30 MG 24 hr tablet Take 2 tablets (60 mg) by mouth daily 60 tablet 11    lamoTRIgine (LAMICTAL) 25 MG tablet Take 25 mg by mouth daily with 200 mg tablet for a total dose of 225 mg      LAMOTRIGINE 200 MG PO TABS Take 200 mg by mouth daily with 25 mg tablet for a total dose of 225 mg      levothyroxine (SYNTHROID/LEVOTHROID) 50 MCG tablet TAKE 1 TABLET BY MOUTH ONCE DAILY 28 tablet 11    liothyronine (CYTOMEL) 5 MCG tablet Take 2 tablets (10 mcg) by mouth daily 30 tablet 3    memantine (NAMENDA) 10 MG tablet Take 1 tablet (10 mg) by mouth daily 30 tablet 3    mirtazapine (REMERON) 15 MG tablet Take 15 mg by mouth at bedtime      Multiple Vitamin (TAB-A-JENNIFER) TABS TAKE 1 TABLET BY MOUTH ONCE DAILY 28 tablet 11    psyllium (METAMUCIL/KONSYL) 58.6 % powder Take 1 tspn in liquid daily      rosuvastatin (CRESTOR) 10 MG tablet Take 1 tablet (10 mg) by mouth daily 30 tablet 0    sacubitril-valsartan (ENTRESTO) 49-51 MG per tablet Take 1 tablet by mouth every morning AND 2 tablets every evening. Take this dose for 2 weeks. After 2 weeks, increase Entresto to  mg twice  daily. 180 tablet 3    spironolactone (ALDACTONE) 25 MG tablet Take 0.5 tablets (12.5 mg) by mouth daily 45 tablet 3     No current facility-administered medications for this visit.     EXAM:  /83 (BP Location: Left arm, Patient Position: Chair, Cuff Size: Adult Regular)   Pulse 70   Wt 72.4 kg (159 lb 9.6 oz)   SpO2 97%   BMI 31.54 kg/m    General: appears comfortable, alert and oriented  Head: normocephalic, atraumatic  Eyes: anicteric sclera, EOMI , PERRL  Neck: no adenopathy  Orophyarynx: moist mucosa, no lesions noted  Heart: regular, S1/S2, no murmurs, rubs or gallop. Estimated JVP at 5 cmH2O  Lungs: CTAB, No wheezing.   Abdomen: soft, non-tender, bowel sounds present, no hepatosplenomegaly  Extremities: No LE edema today  Skin: no open lesions noted  Neuro: grossly non-focal     Labs:  Lab Results   Component Value Date    WBC 5.7 05/14/2024    HGB 12.7 05/14/2024    HCT 40.0 05/14/2024     05/14/2024     09/17/2024    POTASSIUM 4.4 09/17/2024    CHLORIDE 104 09/17/2024    CO2 27 09/17/2024    BUN 28.1 (H) 09/17/2024    CR 1.48 (H) 09/17/2024    GLC 93 09/17/2024    SED 11 07/28/2021    DD 1.80 (H) 02/22/2023    NTBNPI 11,774 (H) 02/22/2023    NTBNP 932 05/14/2024    AST 20 05/14/2024    ALT 13 05/14/2024    ALKPHOS 63 05/14/2024    BILITOTAL 0.2 05/14/2024    INR 1.12 02/22/2023     PYP 12/8/23:  1. Imaging findings are not suggestive of transthyretin cardiac amyloidosis.     CMR 11/22/23:  1. The LV is normal in cavity size with moderate concentric LVH. The global systolic function is severely  reduced. The LVEF is 28%. There is basal septal akinesis with moderate diffuse hypokinesis. There is  paradoxical septal motion due to LBBB.   2. The RV is normal in cavity size. The global systolic function is mildly reduced. The RVEF is 43%.   3. Left atrium is moderately enlarged.  4. There is a TAVR prosthesis that cannot be further evaluated due to artifact. There is severe MAC without  significant MR.   5. Late gadolinium enhancement imaging shows patchy mid-myocardial enhancement involving the basal segments.   6. Regadenoson stress perfusion imaging shows no ischemia.  7. There is no pericardial effusion or thickening.  8. There is no intracardiac thrombus.  CONCLUSIONS: No myocardial ischemia. NICM, LVEF 28% and RVEF 43%, with LBBB and moderate LVH. Moderate basal patchy fibrosis that is likely from AS, or could represent amyloidosis. Consider PYP scan.     TTE 11/13/23:  Status post 26 mm Arndt Janis Ultra TAVR on 9/12/2022.  There is mild paravalvular regurgitation. There is trace valvular  regurgitation. The mean gradient across the aortic valve is 13 mmHg.  Severe mitral annular calcification is present.  Left ventricular function is decreased. The ejection fraction is 30-35%  (moderately reduced).  Global right ventricular function is normal.  IVC diameter <2.1 cm collapsing >50% with sniff suggests a normal RA pressure  of 3 mmHg.  No pericardial effusion is present.  No significant changes noted. The presence of severe MAC makes PVL assessment  challenging. PVL better assessed today but appears unchanged from prior.     Echocardiogram  Left ventricular function, chamber size, wall motion, and wall thickness are normal.The EF is 55-60%. No regional wall motion abnormalities are seen.  Right ventricular function, chamber size, wall motion, and thickness are normal.  Severe left atrial enlargement is present. Moderate mitral annular calcification is present. Mild mitral insufficiency is present. There is likley moderate aortic stenosis. The peak velocity is  3.47m/sec, the peak and mean gradients are 48mmHg and 28mmHg. The aortic valve  area is calculated low at 0.7cm2. Pulmonary artery systolic pressure is normal. The inferior vena cava was normal in size with preserved respiratory variability. No pericardial effusion is present.   Compared to prior study AS has worsened. Otherwise no  significant change     Coronary angiogram 8/21/2020:  Dist LAD lesion is 70% stenosed.  Mid LAD-1 lesion is 80% stenosed.  Mid LAD-2 lesion is 60% stenosed.  Mid RCA lesion is 40% stenosed.  Prox RCA lesion is 40% stenosed.  Right sided filling pressures are normal.  Normal PA pressures.  Left sided filling pressures are normal.  Reduced cardiac output.  1. LAD - 3 EDDIE were placed in the mid to distal LAD (2.5 x 28 mm, 3.5 x 12 mm, and 3.0 x 8 mm)  2. RHC showed normal biventricular filling pressures. Normal PA pressure. Reduced cardiac output.     RHC/coronary angiogram 11/3/20:  Moderate aortic stenosis -valve area 1.2 cm2,Mean gradient 24 mm Hg  Right sided filling pressures are normal.  Normal PA pressures.  Left sided filling pressures are normal.  Mildly reduced cardiac output level.  Peripheral angiogram done and IVUS used to assess the vessel lumens of the descending thoracic aorta,right and left external iliac arteries     TTE 5/3/21:  Global and regional left ventricular function is normal with an EF of 55-60%.  The right ventricle is normal size. Global right ventricular function is normal.  Moderate to severe aortic stenosis, aortic valve area 1.02 cm2, peak velocity  3.3 m/s, mean gradient 25 mmHg, stroke volume index 40 ml/m2, DVI 0.29.  This study was compared with the study from 9/16/2020. The aortic valve peak velocity and mean gradient are similar     TTE 11/1/21:  Global and regional left ventricular function is normal with an EF of 55-60%.  Right ventricular function, chamber size, wall motion, and thickness are normal.  Moderate aortic valve calcification is present. The mean gradient across the aortic valve is 23 mmHg. The peak aortic velocity is 3.1 m/sec. Moderate aortic stenosis is present. Calculated valve area lower than previous study due to LVOT diameter measurement. Moderate aortic stenosis is present.  No significant changes noted.     TTE 9/12/22:  Intra-procedural transthoracic  echocardiogram for transfemoral transcatheter aortic valve replacement with 26mm Arndt Janis Ultra valve Aortic Valve. Image acquisition by sonographer.  PRE-PROCEDURAL FINDINGS:  1. Severe calcific aortic stenosis.  2. Normal systolic function. Visual LVEF 55%.   POST-PROCEDURAL SURVEY:  1. A 26 mm Arndt Janis Ultra valve Aortic Valve was successfully deployed.  2. Valve is well seated. There is trivial periprosthetic regurgitation.  3. Post-procedure valve hemodynamics - mean gradient 5.0 mmHg.  4. No pericardial effusion     CTA LE 9/22/22:  1. Left common femoral artery 22 cm segmental greater than 80% diameter stenosis from its origin. Linear defect suggests a dissection as the etiology. Mid and distal segments of the common femoral artery are patent.  2. 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  3. Additional nonemergent incidental findings include diverticulosis without diverticulitis and cholelithiasis without cholecystitis.     TTE 1/30/23:  Left ventricular size, wall motion and function are normal. The ejection fraction is 55-60%.  Right ventricular size and function are normal.  Status post 26 mm Arndt Janis Ultra valve TAVR on 9/12/2022. The valve is well seated. MG 16 mmHg; PV 2.5 m/s; no paravalvular AI.  No pericardial effusion is present.  This study was compared with the study from 12/1/2022. No significant changes noted.    TTE 3/2023:  The 26 mm ES3 TAVR is well-seated. Leaflet opening is not clearly visualized. There is trace valvular without paravalvular regurgitation. The Vmax is 2.4 m/s, the mean gradient is 14 mmHg, the dimensionless index is 0.80, and the acceleration time is 80 ms.  Left ventricular function is decreased. The ejection fraction is 35-40% (moderately reduced). Moderate diffuse hypokinesis is present.  Global right ventricular function is normal. The right ventricle is normal size.  This study was compared with the study from  02/23/2023. There appears to have been a decline in the global LV function but the endocardial definition is poor on this study. The TAVR function is unchanged.      ASSESSMENT AND PLAN:  In summary, patient is a 89 year old lady with with coronary artery disease and status post PCI 20 years ago at [facility], we do not have records unfortunately available but did receive 2 stents.  She most complains of shortness of breath denies any dizziness lightheadedness syncope or recurrent episodes of chest pains.  Underwent TAVR with a 26 mm valve in September 2022, c/b HALT and treated and finished AC course. Diagnosed with COVID in October and had an TED at that time which prompted holding various GDMT.    She has since had a further reduction in LVEF, now falling to 28% per CMR on 11/22/23, no evidence of ischemia. PYP was negative for ATTR amyloid, AL amyloid work up is in process. TTEs have had improvements in AS gradients.     Overall she continues to do very well denies new complaints or issues.  Takes her medication as prescribed and no complications or issues.  Labs today shows reasonably stable values creatinine is 1.8 a little bit higher than it had been in the past but not out of the range for her.  Blood pressure is significantly better controlled at this time.  We are not going to make any medication changes today given how well she is doing and how stable she is.  Continue warm meds at this point.  SGLT2 inhibitors may be considered as we discussed however given how stable she is we will defer at this point.  We will get an echocardiogram in the next couple of months and then follow-up with myself in a year or so.    I appreciate the opportunity to participate in the care of Kamryn Miller . Please do not hesitate to contact me with any further questions.  I have spent a total of 40 minutes today reviewing labs, imaging studies, discussing with colleagues, face-to-face time with patient and documentation in the  medical record.  The longitudinal plan of care for the diagnosis(es)/condition(s) as documented were addressed during this visit. Due to the added complexity in care, I will continue to support Kamryn in the subsequent management and with ongoing continuity of care.    Sincerely,   Madhu Zuniga MD  [facility] Division of Cardiology

## 2024-11-19 NOTE — NURSING NOTE
Chief Complaint   Patient presents with    Follow Up     RETURN HEART FAILURE     Vitals were taken and medications reconciled.    Hugh Phillip, [place]  12:52 PM

## 2024-11-19 NOTE — LETTER
11/19/2024      RE: Kamryn Miller  [address]  [address]  [place] [address]       Dear Colleague,    Thank you for the opportunity to participate in the care of your patient, Kamryn Miller, at the [facility] [place] [facility]. Please see a copy of my visit note below.    November 19, 2024    I had the pleasure of seeing Kamryn Miller  in the [facility] for further evaluation and management.   She has a history of Hx CAD, HLD, HTN, mitral and tricuspid insuff, pulmonary hypertension, lost to follow up in the past.  She does have CAD and did receive 3 stents in 8/2020 as below.  She denies any cardiomyopathy and she has not had any cardiology follow-up for several years.  Notes that she does have shortness of breath especially with exertion or she walks outside.  This is class III symptoms at the time.  We did start TAVR evaluation and she did undergo right heart catheterization as well as invasive hemodynamic measurement and measurement of the valve area.  Given findings of moderate aortic stenosis, TAVR was not yet pursued and ongoing surveillance was recommended. Had been hypertensive in the recent past. She did see the structural team just recently and repeat TTE was performed that again showed moderate to severe aortic stenosis, essentially unchanged from prior TTE. Given that she had no concerning symptoms ongoing surveillance was recommended.  She has had issues with hypertension in the past including admissions for hypertensive emergency. Given ongoing and worsening symptoms she was revaluated by the structural team and ultimately underwent successful TAVR on 9/12/22 with a post intervention gradient of 7mmHg. She later had increased Mgs, imaging was consistent with HALT and she was placed on AC. She was seen in the ER subsequently for claudication and leg pain in the setting of known LFA stenosis. She was  seen in the ER then for episode of chest pain. Workup was overall negative and was discharged in stable condition. She was then admitted in 2/2023 with systolic HF exacerbation, she was diuresed and medications were adjusted including starting entresto at low dose. Did not tolerate BB initiation. She was also seen in CORE. Her BP remained elevated and thus most recently entresto was increased to 49-51 mg BID that she tolerated well with the goal to further titrate and later initiate SGLT2 if her renal function remains stable. She was diagnosed with COVID in October 2022 and was treated with Paxlovid. Serial TTEs have had further declines in LVEF (30-35% 11/2023). CMR with LVEF 28%, no evidence of MI. PYP scan was negative for ATTR amyloid.   She is here with her daughter today again.  She is doing extremely well denies any new complaints or issues.  Her weight has been extremely stable as she is monitoring at home and blood pressure is way better control in the normal range at this time.  No dizziness lightheadedness palpitations.  No admissions for the past 2 years.     PAST MEDICAL HISTORY:  Past Medical History:   Diagnosis Date     Aortic valvar stenosis 7/2010    mild     Asthma      Bipolar disorder (H)      CAD (coronary artery disease)     s/p angioplasty     Celiac disease      Colon polyps      Colon polyps 2012    every 3 year colonoscopy      Depression      High cholesterol      HTN      Mitral insufficiency      Pulmonary HTN (H)     mild     Tremors 7/10    drug induced from antidepressants     Tricuspid insufficiency      FAMILY HISTORY:  Family History   Problem Relation Age of Onset     Asthma Mother      Cerebrovascular Disease Mother      Arthritis Mother      Depression Mother      Lipids Sister      Hypertension Sister      Heart Disease Sister      Lipids Sister      Hypertension Sister      Lipids Sister      Breast Cancer Sister      SOCIAL HISTORY:  Social History     Socioeconomic History      Marital status:      Spouse name: Adrien     Number of children: 2     Years of education: 16   Occupational History     Employer: RETIRED     Comment: Retired in 2002.    Tobacco Use     Smoking status: Never     Smokeless tobacco: Never   Vaping Use     Vaping status: Never Used   Substance and Sexual Activity     Alcohol use: No     Alcohol/week: 0.0 standard drinks of alcohol     Types: 1 Standard drinks or equivalent per week     Drug use: No     Sexual activity: Not Currently     Partners: Male     Social Drivers of Health     Financial Resource Strain: Medium Risk (5/4/2022)    Received from [facility]    Financial Resource Strain      Financial Concerns: 2   Transportation Needs: Unknown (2/4/2021)    Received from [facility]    Transportation Needs      Lack of Transportation (Medical): 0   Physical Activity: Unknown (2/4/2021)    Received from [facility]    Physical Activity      Calculated Minutes of Exercise per Week:: 0   Stress: Unknown (2/4/2021)    Received from [facility]    Stress      Patient Reported Major Stressor(s): 0      Patient Reported Strengths: 1      Patient Reported Source(s) of Support: 1    [facility]    Social Connections   Interpersonal Safety: Not At Risk (7/11/2023)    Humiliation, Afraid, Rape, and Kick questionnaire      Fear of Current or Ex-Partner: No      Emotionally Abused: No      Physically Abused: No      Sexually Abused: No   Housing Stability: Unknown (2/4/2021)    Received from [facility]    Housing Stability      Housing Concerns: 0     CURRENT MEDICATIONS:  Current Outpatient Medications    Medication Sig Dispense Refill     acetaminophen (TYLENOL) 500 MG tablet Take 1 tablet (500 mg) by mouth daily Continue previously ordered PRN tylenol order as well 30 tablet 11     acetaminophen (TYLENOL) 500 MG tablet Take 1 tablet (500 mg) by mouth every 6 hours as needed for pain 60 tablet 4     amLODIPine (NORVASC) 5 MG tablet Take 1 tablet (5 mg) by mouth at bedtime. 90 tablet 1     amoxicillin (AMOXIL) 500 MG capsule        aspirin (ASA) 81 MG EC tablet Take 1 tablet (81 mg) by mouth daily 30 tablet 0     azelastine (ASTEPRO) 0.15 % nasal spray USE 1 SPRAY IN EACH NOSTRIL ONCE A DAY 30 mL 11     calcium citrate (CITRACAL) 950 (200 Ca) MG tablet TAKE 1 TABLET BY MOUTH ONCE DAILY 30 tablet 11     desvenlafaxine (PRISTIQ) 100 MG 24 hr tablet Take 100 mg by mouth daily       fluticasone-salmeterol (ADVAIR DISKUS) 500-50 MCG/ACT inhaler INHALE 1 PUFF BY MOUTH TWICE DAILY (Patient taking differently: 1 puff daily. INHALE 1 PUFF BY MOUTH TWICE DAILY) 60 each 0     folic acid (FOLVITE) 400 MCG tablet TAKE 1 TABLET BY MOUTH ONCE DAILY 28 tablet 11     furosemide (LASIX) 20 MG tablet Take 1.5 tablets (30 mg) by mouth daily 135 tablet 3     iron polysaccharides (NIFEREX) 150 MG capsule Take 1 capsule (150 mg) by mouth daily 30 capsule 1     isosorbide mononitrate (IMDUR) 30 MG 24 hr tablet Take 2 tablets (60 mg) by mouth daily 60 tablet 11     lamoTRIgine (LAMICTAL) 25 MG tablet Take 25 mg by mouth daily with 200 mg tablet for a total dose of 225 mg       LAMOTRIGINE 200 MG PO TABS Take 200 mg by mouth daily with 25 mg tablet for a total dose of 225 mg       levothyroxine (SYNTHROID/LEVOTHROID) 50 MCG tablet TAKE 1 TABLET BY MOUTH ONCE DAILY 28 tablet 11     liothyronine (CYTOMEL) 5 MCG tablet Take 2 tablets (10 mcg) by mouth daily 30 tablet 3     memantine (NAMENDA) 10 MG tablet Take 1 tablet (10 mg) by mouth daily 30 tablet 3     mirtazapine (REMERON) 15 MG tablet Take 15 mg by mouth at bedtime       Multiple  Vitamin (TAB-A-JENNIFER) TABS TAKE 1 TABLET BY MOUTH ONCE DAILY 28 tablet 11     psyllium (METAMUCIL/KONSYL) 58.6 % powder Take 1 tspn in liquid daily       rosuvastatin (CRESTOR) 10 MG tablet Take 1 tablet (10 mg) by mouth daily 30 tablet 0     sacubitril-valsartan (ENTRESTO) 49-51 MG per tablet Take 1 tablet by mouth every morning AND 2 tablets every evening. Take this dose for 2 weeks. After 2 weeks, increase Entresto to  mg twice daily. 180 tablet 3     spironolactone (ALDACTONE) 25 MG tablet Take 0.5 tablets (12.5 mg) by mouth daily 45 tablet 3     No current facility-administered medications for this visit.     EXAM:  /83 (BP Location: Left arm, Patient Position: Chair, Cuff Size: Adult Regular)   Pulse 70   Wt 72.4 kg (159 lb 9.6 oz)   SpO2 97%   BMI 31.54 kg/m    General: appears comfortable, alert and oriented  Head: normocephalic, atraumatic  Eyes: anicteric sclera, EOMI , PERRL  Neck: no adenopathy  Orophyarynx: moist mucosa, no lesions noted  Heart: regular, S1/S2, no murmurs, rubs or gallop. Estimated JVP at 5 cmH2O  Lungs: CTAB, No wheezing.   Abdomen: soft, non-tender, bowel sounds present, no hepatosplenomegaly  Extremities: No LE edema today  Skin: no open lesions noted  Neuro: grossly non-focal     Labs:  Lab Results   Component Value Date    WBC 5.7 05/14/2024    HGB 12.7 05/14/2024    HCT 40.0 05/14/2024     05/14/2024     09/17/2024    POTASSIUM 4.4 09/17/2024    CHLORIDE 104 09/17/2024    CO2 27 09/17/2024    BUN 28.1 (H) 09/17/2024    CR 1.48 (H) 09/17/2024    GLC 93 09/17/2024    SED 11 07/28/2021    DD 1.80 (H) 02/22/2023    NTBNPI 11,774 (H) 02/22/2023    NTBNP 932 05/14/2024    AST 20 05/14/2024    ALT 13 05/14/2024    ALKPHOS 63 05/14/2024    BILITOTAL 0.2 05/14/2024    INR 1.12 02/22/2023     PYP 12/8/23:  1. Imaging findings are not suggestive of transthyretin cardiac amyloidosis.     CMR 11/22/23:  1. The LV is normal in cavity size with moderate concentric  LVH. The global systolic function is severely  reduced. The LVEF is 28%. There is basal septal akinesis with moderate diffuse hypokinesis. There is  paradoxical septal motion due to LBBB.   2. The RV is normal in cavity size. The global systolic function is mildly reduced. The RVEF is 43%.   3. Left atrium is moderately enlarged.  4. There is a TAVR prosthesis that cannot be further evaluated due to artifact. There is severe MAC without significant MR.   5. Late gadolinium enhancement imaging shows patchy mid-myocardial enhancement involving the basal segments.   6. Regadenoson stress perfusion imaging shows no ischemia.  7. There is no pericardial effusion or thickening.  8. There is no intracardiac thrombus.  CONCLUSIONS: No myocardial ischemia. NICM, LVEF 28% and RVEF 43%, with LBBB and moderate LVH. Moderate basal patchy fibrosis that is likely from AS, or could represent amyloidosis. Consider PYP scan.     TTE 11/13/23:  Status post 26 mm Arndt Janis Ultra TAVR on 9/12/2022.  There is mild paravalvular regurgitation. There is trace valvular  regurgitation. The mean gradient across the aortic valve is 13 mmHg.  Severe mitral annular calcification is present.  Left ventricular function is decreased. The ejection fraction is 30-35%  (moderately reduced).  Global right ventricular function is normal.  IVC diameter <2.1 cm collapsing >50% with sniff suggests a normal RA pressure  of 3 mmHg.  No pericardial effusion is present.  No significant changes noted. The presence of severe MAC makes PVL assessment  challenging. PVL better assessed today but appears unchanged from prior.     Echocardiogram  Left ventricular function, chamber size, wall motion, and wall thickness are normal.The EF is 55-60%. No regional wall motion abnormalities are seen.  Right ventricular function, chamber size, wall motion, and thickness are normal.  Severe left atrial enlargement is present. Moderate mitral annular calcification is  present. Mild mitral insufficiency is present. There is likley moderate aortic stenosis. The peak velocity is  3.47m/sec, the peak and mean gradients are 48mmHg and 28mmHg. The aortic valve  area is calculated low at 0.7cm2. Pulmonary artery systolic pressure is normal. The inferior vena cava was normal in size with preserved respiratory variability. No pericardial effusion is present.   Compared to prior study AS has worsened. Otherwise no significant change     Coronary angiogram 8/21/2020:  Dist LAD lesion is 70% stenosed.  Mid LAD-1 lesion is 80% stenosed.  Mid LAD-2 lesion is 60% stenosed.  Mid RCA lesion is 40% stenosed.  Prox RCA lesion is 40% stenosed.  Right sided filling pressures are normal.  Normal PA pressures.  Left sided filling pressures are normal.  Reduced cardiac output.  1. LAD - 3 EDDIE were placed in the mid to distal LAD (2.5 x 28 mm, 3.5 x 12 mm, and 3.0 x 8 mm)  2. RHC showed normal biventricular filling pressures. Normal PA pressure. Reduced cardiac output.     RHC/coronary angiogram 11/3/20:  Moderate aortic stenosis -valve area 1.2 cm2,Mean gradient 24 mm Hg  Right sided filling pressures are normal.  Normal PA pressures.  Left sided filling pressures are normal.  Mildly reduced cardiac output level.  Peripheral angiogram done and IVUS used to assess the vessel lumens of the descending thoracic aorta,right and left external iliac arteries     TTE 5/3/21:  Global and regional left ventricular function is normal with an EF of 55-60%.  The right ventricle is normal size. Global right ventricular function is normal.  Moderate to severe aortic stenosis, aortic valve area 1.02 cm2, peak velocity  3.3 m/s, mean gradient 25 mmHg, stroke volume index 40 ml/m2, DVI 0.29.  This study was compared with the study from 9/16/2020. The aortic valve peak velocity and mean gradient are similar     TTE 11/1/21:  Global and regional left ventricular function is normal with an EF of 55-60%.  Right ventricular  function, chamber size, wall motion, and thickness are normal.  Moderate aortic valve calcification is present. The mean gradient across the aortic valve is 23 mmHg. The peak aortic velocity is 3.1 m/sec. Moderate aortic stenosis is present. Calculated valve area lower than previous study due to LVOT diameter measurement. Moderate aortic stenosis is present.  No significant changes noted.     TTE 9/12/22:  Intra-procedural transthoracic echocardiogram for transfemoral transcatheter aortic valve replacement with 26mm Arndt Janis Ultra valve Aortic Valve. Image acquisition by sonographer.  PRE-PROCEDURAL FINDINGS:  1. Severe calcific aortic stenosis.  2. Normal systolic function. Visual LVEF 55%.   POST-PROCEDURAL SURVEY:  1. A 26 mm Arndt Janis Ultra valve Aortic Valve was successfully deployed.  2. Valve is well seated. There is trivial periprosthetic regurgitation.  3. Post-procedure valve hemodynamics - mean gradient 5.0 mmHg.  4. No pericardial effusion     CTA LE 9/22/22:  1. Left common femoral artery 22 cm segmental greater than 80% diameter stenosis from its origin. Linear defect suggests a dissection as the etiology. Mid and distal segments of the common femoral artery are patent.  2. 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  3. Additional nonemergent incidental findings include diverticulosis without diverticulitis and cholelithiasis without cholecystitis.     TTE 1/30/23:  Left ventricular size, wall motion and function are normal. The ejection fraction is 55-60%.  Right ventricular size and function are normal.  Status post 26 mm Arndt Janis Ultra valve TAVR on 9/12/2022. The valve is well seated. MG 16 mmHg; PV 2.5 m/s; no paravalvular AI.  No pericardial effusion is present.  This study was compared with the study from 12/1/2022. No significant changes noted.    TTE 3/2023:  The 26 mm ES3 TAVR is well-seated. Leaflet opening is not clearly visualized.  There is trace valvular without paravalvular regurgitation. The Vmax is 2.4 m/s, the mean gradient is 14 mmHg, the dimensionless index is 0.80, and the acceleration time is 80 ms.  Left ventricular function is decreased. The ejection fraction is 35-40% (moderately reduced). Moderate diffuse hypokinesis is present.  Global right ventricular function is normal. The right ventricle is normal size.  This study was compared with the study from 02/23/2023. There appears to have been a decline in the global LV function but the endocardial definition is poor on this study. The TAVR function is unchanged.      ASSESSMENT AND PLAN:  In summary, patient is a 89 year old lady with with coronary artery disease and status post PCI 20 years ago at [facility], we do not have records unfortunately available but did receive 2 stents.  She most complains of shortness of breath denies any dizziness lightheadedness syncope or recurrent episodes of chest pains.  Underwent TAVR with a 26 mm valve in September 2022, c/b HALT and treated and finished AC course. Diagnosed with COVID in October and had an TED at that time which prompted holding various GDMT.    She has since had a further reduction in LVEF, now falling to 28% per CMR on 11/22/23, no evidence of ischemia. PYP was negative for ATTR amyloid, AL amyloid work up is in process. TTEs have had improvements in AS gradients.     Overall she continues to do very well denies new complaints or issues.  Takes her medication as prescribed and no complications or issues.  Labs today shows reasonably stable values creatinine is 1.8 a little bit higher than it had been in the past but not out of the range for her.  Blood pressure is significantly better controlled at this time.  We are not going to make any medication changes today given how well she is doing and how stable she is.  Continue warm meds at this point.  SGLT2 inhibitors may be considered as we discussed however given how stable she is  we will defer at this point.  We will get an echocardiogram in the next couple of months and then follow-up with myself in a year or so.    I appreciate the opportunity to participate in the care of Kamryn Miller . Please do not hesitate to contact me with any further questions.  I have spent a total of 40 minutes today reviewing labs, imaging studies, discussing with colleagues, face-to-face time with patient and documentation in the medical record.  The longitudinal plan of care for the diagnosis(es)/condition(s) as documented were addressed during this visit. Due to the added complexity in care, I will continue to support Kamryn in the subsequent management and with ongoing continuity of care.    Sincerely,   Madhu Zuniga MD  [facility] Division of Cardiology         Please do not hesitate to contact me if you have any questions/concerns.     Sincerely,     Madhu Zuniga MD

## 2024-11-19 NOTE — PATIENT INSTRUCTIONS
Dr. Zuniga recommends:    Echocardiogram in 2-3 months.    Follow up clinic visit with Dr. Zuniga in one year.    Thank you for your visit today.  Please call me with any questions or concerns.   Jimmie Coppola RN  Cardiology Care Coordinator  [phone]

## 2025-01-09 ENCOUNTER — LAB REQUISITION (OUTPATIENT)
Dept: LAB | Facility: CLINIC | Age: 89
End: 2025-01-09
Payer: MEDICARE

## 2025-01-09 DIAGNOSIS — E03.9 HYPOTHYROIDISM, UNSPECIFIED: ICD-10-CM

## 2025-01-09 DIAGNOSIS — R10.9 UNSPECIFIED ABDOMINAL PAIN: ICD-10-CM

## 2025-01-09 DIAGNOSIS — D64.9 ANEMIA, UNSPECIFIED: ICD-10-CM

## 2025-01-09 DIAGNOSIS — I50.9 HEART FAILURE, UNSPECIFIED (H): ICD-10-CM

## 2025-01-09 LAB
ALBUMIN UR-MCNC: NEGATIVE MG/DL
APPEARANCE UR: CLEAR
BILIRUB UR QL STRIP: NEGATIVE
COLOR UR AUTO: NORMAL
GLUCOSE UR STRIP-MCNC: NEGATIVE MG/DL
HGB UR QL STRIP: NEGATIVE
KETONES UR STRIP-MCNC: NEGATIVE MG/DL
LEUKOCYTE ESTERASE UR QL STRIP: NEGATIVE
NITRATE UR QL: NEGATIVE
PH UR STRIP: 7 [PH] (ref 5–7)
RBC URINE: 0 /HPF
SP GR UR STRIP: 1.01 (ref 1–1.03)
TRANSITIONAL EPI: <1 /HPF
UROBILINOGEN UR STRIP-MCNC: NORMAL MG/DL
WBC URINE: 2 /HPF

## 2025-01-09 PROCEDURE — 81001 URINALYSIS AUTO W/SCOPE: CPT | Mod: ORL

## 2025-01-10 LAB
ANION GAP SERPL CALCULATED.3IONS-SCNC: 11 MMOL/L (ref 7–15)
BUN SERPL-MCNC: 37.1 MG/DL (ref 8–23)
CALCIUM SERPL-MCNC: 9.5 MG/DL (ref 8.8–10.4)
CHLORIDE SERPL-SCNC: 104 MMOL/L (ref 98–107)
CREAT SERPL-MCNC: 1.55 MG/DL (ref 0.51–0.95)
EGFRCR SERPLBLD CKD-EPI 2021: 32 ML/MIN/1.73M2
ERYTHROCYTE [DISTWIDTH] IN BLOOD BY AUTOMATED COUNT: 12.5 % (ref 10–15)
FERRITIN SERPL-MCNC: 75 NG/ML (ref 11–328)
GLUCOSE SERPL-MCNC: 106 MG/DL (ref 70–99)
HCO3 SERPL-SCNC: 25 MMOL/L (ref 22–29)
HCT VFR BLD AUTO: 43.9 % (ref 35–47)
HGB BLD-MCNC: 13.9 G/DL (ref 11.7–15.7)
MCH RBC QN AUTO: 32 PG (ref 26.5–33)
MCHC RBC AUTO-ENTMCNC: 31.7 G/DL (ref 31.5–36.5)
MCV RBC AUTO: 101 FL (ref 78–100)
PLATELET # BLD AUTO: 198 10E3/UL (ref 150–450)
POTASSIUM SERPL-SCNC: 4.4 MMOL/L (ref 3.4–5.3)
RBC # BLD AUTO: 4.34 10E6/UL (ref 3.8–5.2)
SODIUM SERPL-SCNC: 140 MMOL/L (ref 135–145)
TSH SERPL DL<=0.005 MIU/L-ACNC: 1.64 UIU/ML (ref 0.3–4.2)
WBC # BLD AUTO: 4.5 10E3/UL (ref 4–11)

## 2025-01-10 PROCEDURE — 85027 COMPLETE CBC AUTOMATED: CPT | Mod: ORL

## 2025-01-10 PROCEDURE — P9604 ONE-WAY ALLOW PRORATED TRIP: HCPCS | Mod: ORL

## 2025-01-10 PROCEDURE — 36415 COLL VENOUS BLD VENIPUNCTURE: CPT | Mod: ORL

## 2025-01-10 PROCEDURE — 80048 BASIC METABOLIC PNL TOTAL CA: CPT | Mod: ORL

## 2025-01-10 PROCEDURE — 84443 ASSAY THYROID STIM HORMONE: CPT | Mod: ORL

## 2025-01-10 PROCEDURE — 82728 ASSAY OF FERRITIN: CPT | Mod: ORL

## 2025-01-11 DIAGNOSIS — I50.20 HEART FAILURE WITH REDUCED EJECTION FRACTION, NYHA CLASS III (H): Primary | ICD-10-CM

## 2025-01-16 ENCOUNTER — OFFICE VISIT (OUTPATIENT)
Dept: CARDIOLOGY | Facility: CLINIC | Age: 89
End: 2025-01-16
Attending: PHYSICIAN ASSISTANT
Payer: MEDICARE

## 2025-01-16 VITALS
HEART RATE: 68 BPM | BODY MASS INDEX: 30.45 KG/M2 | SYSTOLIC BLOOD PRESSURE: 113 MMHG | WEIGHT: 154.1 LBS | OXYGEN SATURATION: 96 % | DIASTOLIC BLOOD PRESSURE: 74 MMHG

## 2025-01-16 DIAGNOSIS — I50.20 HEART FAILURE WITH REDUCED EJECTION FRACTION, NYHA CLASS III (H): ICD-10-CM

## 2025-01-16 PROCEDURE — G0463 HOSPITAL OUTPT CLINIC VISIT: HCPCS | Performed by: PHYSICIAN ASSISTANT

## 2025-01-16 RX ORDER — NYSTATIN 100000 [USP'U]/G
POWDER TOPICAL
COMMUNITY
Start: 2024-01-22

## 2025-01-16 ASSESSMENT — PAIN SCALES - GENERAL: PAINLEVEL_OUTOF10: NO PAIN (0)

## 2025-01-16 NOTE — NURSING NOTE
Chief Complaint   Patient presents with    Follow Up     RETURN CORE       Vitals were taken and medications reconciled.    Hugh Phillip, [place]  10:14 AM

## 2025-01-16 NOTE — PATIENT INSTRUCTIONS
Take your medicines every day, as directed     Changes made today:    - you can try Voltaren gel for your arthritis     - can reduce weight to twice per week    Monitor Your Weight and Symptoms     Contact us if you:     Gain 2 pounds in one day or 5 pounds in one week  Feel more short of breath  Notice more leg swelling  Feel lightheadeded    Change your lifestyle     Limit Salt or Sodium:  2000 mg  Limit Fluids:  2000 mL or approximately 64 ounces  Eat a Heart Healthy Diet  Low in saturated fats  Stay Active:  Aim to move at least 150 minutes every  week            To Contact us     During Business Hours:  [phone], option # 1      After hours, weekends or holidays:   [phone], Option #4  Ask to speak to the On-Call Cardiologist. Inform them you are a CORE/heart failure patient at the [place].       Use [email or web address] allows you to communicate directly with your heart team through secure messaging.  [email or web address] can be accessed any time on your phone, computer, or tablet.  If you need assistance, we'd be happy to help!             Keep your Heart Appointments:     - We will move the ECHO the that you have in Feb    - Schedule labs, ECHO, and CORe appointment in 3 months      Please consider attending our virtual support group which is held monthly. Please reach out to Gomez at [phone] for more information if you are interested in attending.

## 2025-01-16 NOTE — LETTER
"1/16/2025      RE: Kamryn Miller  [address]  [address]  [place] [address]       Dear Colleague,    Thank you for the opportunity to participate in the care of your patient, Kamryn Miller, at the [facility] [place] at [facility]. Please see a copy of my visit note below.    HPI:   Ms. Miller is a 89 year old female with a past medical history including HFpEF now HFrEF, HTN, HLD, significant CAD history (PTCA 1996, LAD and RCA stenting 2003, s/p LAD PCI 2020, recent cath 10/2022 with patent stents), CKD, chronic anemia and severe aortic valvular stenosis s/p (TAVR) with a 26mm Arndt Janis Ultra on 9/12/22 by Morro Burrows. Her post TAVR echo showed mean PG of 7 mmHg with trace PVL. She was noted to have 60% left femoral stenosis post procedure, but had adequate flow on peripheral angiogram. She was also noted to have new LBBB post procedure and discharged on a Cardionet which showed no high degree AVB.. Presents to clinic for CORE follow-up.    Her EF has been slowly declining since her TAVR. Was 55-60% pre TAVR, dropped to 40-45%, now 30-35% despite GDMT.     She last saw Dr. Zuniga in 11/19/24. Feeling well. No medication changes.    Today  She doesn't have SOB at rest. No significant GREGORY, she is able to walk \" a ways\". She does exercersises a few times a week- does some peddling and some chair and standing exercises, no GREGORY with this. No LE edema. No abdominal edema. No orthopnea or PND. No lightheadedness or dizziness no presyncope or syncope. No falls. No palpitations. No CP. Appetite is fine.    Her systolic Bps have ranged from 117//78at home. Weights are stable at home around 150-153. 150.6 yesterday. Hrs are 65-70.    Cardiac Medications  ASA 81 mg daily  Amlodipine 5 mg at bedtime  Entresto  mg BID  Lasix 30 mg daily  Aldactone 12.5 mg daily  Imdur 60 mg qAM  Crestor 10 mg daily    PAST MEDICAL HISTORY:  Past " Medical History:   Diagnosis Date     Aortic valvar stenosis 7/2010    mild     Asthma      Bipolar disorder (H)      CAD (coronary artery disease)     s/p angioplasty     Celiac disease      Colon polyps      Colon polyps 2012    every 3 year colonoscopy      Depression      High cholesterol      HTN      Mitral insufficiency      Pulmonary HTN (H)     mild     Tremors 7/10    drug induced from antidepressants     Tricuspid insufficiency        FAMILY HISTORY:  Family History   Problem Relation Age of Onset     Asthma Mother      Cerebrovascular Disease Mother      Arthritis Mother      Depression Mother      Lipids Sister      Hypertension Sister      Heart Disease Sister      Lipids Sister      Hypertension Sister      Lipids Sister      Breast Cancer Sister        SOCIAL HISTORY:  Social History     Socioeconomic History     Marital status:      Spouse name: Adrien     Number of children: 2     Years of education: 16   Occupational History     Employer: RETIRED     Comment: Retired in 2002.    Tobacco Use     Smoking status: Never     Smokeless tobacco: Never   Vaping Use     Vaping status: Never Used   Substance and Sexual Activity     Alcohol use: No     Alcohol/week: 0.0 standard drinks of alcohol     Types: 1 Standard drinks or equivalent per week     Drug use: No     Sexual activity: Not Currently     Partners: Male     Social Determinants of Health     Financial Resource Strain: Medium Risk (5/4/2022)    Received from [facility]    Financial Resource Strain      Financial Concerns: 2   Transportation Needs: Unknown (2/4/2021)    Received from [facility]    Transportation Needs      Lack of Transportation (Medical): 0   Physical Activity: Unknown (2/4/2021)    Received from [facility]    Physical Activity      Calculated Minutes of  Exercise per Week:: 0   Stress: Unknown (2/4/2021)    [facility]    Stress      Patient Reported Major Stressor(s): 0      Patient Reported Strengths: 1      Patient Reported Source(s) of Support: 1    Received from [facility], [facility]    Social Connections   Interpersonal Safety: Not At Risk (7/11/2023)    Humiliation, Afraid, Rape, and Kick questionnaire      Fear of Current or Ex-Partner: No      Emotionally Abused: No      Physically Abused: No      Sexually Abused: No   Housing Stability: Unknown (2/4/2021)    [facility] from [facility], [facility]    Housing Stability      Housing Concerns: 0       CURRENT MEDICATIONS:  Current Outpatient Medications   Medication Sig Dispense Refill     acetaminophen (TYLENOL) 500 MG tablet Take 1 tablet (500 mg) by mouth daily Continue previously ordered PRN tylenol order as well 30 tablet 11     acetaminophen (TYLENOL) 500 MG tablet Take 1 tablet (500 mg) by mouth every 6 hours as needed for pain 60 tablet 4     amLODIPine (NORVASC) 5 MG tablet Take 1 tablet (5 mg) by mouth at bedtime. 90 tablet 1     aspirin (ASA) 81 MG EC tablet Take 1 tablet (81 mg) by mouth daily 30 tablet 0     azelastine (ASTEPRO) 0.15 % nasal spray USE 1 SPRAY IN EACH NOSTRIL ONCE A DAY 30 mL 11     calcium citrate (CITRACAL) 950 (200 Ca) MG tablet TAKE 1 TABLET BY MOUTH ONCE DAILY 30 tablet 11     desvenlafaxine (PRISTIQ) 100 MG 24 hr tablet Take 100 mg by mouth daily       fluticasone-salmeterol (ADVAIR DISKUS) 500-50 MCG/ACT inhaler INHALE 1 PUFF BY MOUTH TWICE DAILY 60 each 0     folic acid (FOLVITE) 400 MCG tablet TAKE 1 TABLET BY MOUTH ONCE DAILY 28 tablet 11     furosemide (LASIX) 20 MG tablet Take 1.5 tablets (30 mg) by mouth daily 135 tablet 3     iron polysaccharides (NIFEREX) 150 MG capsule Take 1 capsule (150  mg) by mouth daily 30 capsule 1     isosorbide mononitrate (IMDUR) 30 MG 24 hr tablet Take 2 tablets (60 mg) by mouth daily 60 tablet 11     lamoTRIgine (LAMICTAL) 25 MG tablet Take 25 mg by mouth daily with 200 mg tablet for a total dose of 225 mg       LAMOTRIGINE 200 MG PO TABS Take 200 mg by mouth daily with 25 mg tablet for a total dose of 225 mg       levothyroxine (SYNTHROID/LEVOTHROID) 50 MCG tablet TAKE 1 TABLET BY MOUTH ONCE DAILY 28 tablet 11     liothyronine (CYTOMEL) 5 MCG tablet Take 2 tablets (10 mcg) by mouth daily 30 tablet 3     memantine (NAMENDA) 10 MG tablet Take 1 tablet (10 mg) by mouth daily 30 tablet 3     mirtazapine (REMERON) 15 MG tablet Take 15 mg by mouth at bedtime       Multiple Vitamin (TAB-A-JENNIFER) TABS TAKE 1 TABLET BY MOUTH ONCE DAILY 28 tablet 11     nystatin (MYCOSTATIN) 301577 UNIT/GM external powder        psyllium (METAMUCIL/KONSYL) 58.6 % powder Take 1 tspn in liquid daily       rosuvastatin (CRESTOR) 10 MG tablet Take 1 tablet (10 mg) by mouth daily 30 tablet 0     sacubitril-valsartan (ENTRESTO) 49-51 MG per tablet Take 1 tablet by mouth every morning AND 2 tablets every evening. Take this dose for 2 weeks. After 2 weeks, increase Entresto to  mg twice daily. 180 tablet 3     spironolactone (ALDACTONE) 25 MG tablet Take 0.5 tablets (12.5 mg) by mouth daily 45 tablet 3     amoxicillin (AMOXIL) 500 MG capsule  (Patient not taking: Reported on 1/16/2025)       No current facility-administered medications for this visit.       ROS:   Refer to HPI    EXAM:  /74 (BP Location: Right arm, Patient Position: Chair, Cuff Size: Adult Regular)   Pulse 68   Wt 69.9 kg (154 lb 1.6 oz)   SpO2 96%   BMI 30.45 kg/m    GENERAL: Appears comfortable, in no acute distress.   HEENT: Eye symmetrical, no discharge or icterus bilaterally.   CV: RRR, +S1S2, no murmur, rub, or gallop. JVP ~6 mmg Hg.   RESPIRATORY: Respirations regular, even, and unlabored. Lungs CTA throughout.   GI:  Soft and non distended   EXTREMITIES: No peripheral edema. All extremities are warm and well perfused  NEUROLOGIC: Alert and interacting appropriately.   SKIN: No jaundice.    Labs, reviewed with patient in clinic today:  CBC RESULTS:  Lab Results   Component Value Date    WBC 4.5 01/10/2025    WBC 4.6 05/17/2021    RBC 4.34 01/10/2025    RBC 3.97 05/17/2021    HGB 13.9 01/10/2025    HGB 12.9 05/17/2021    HCT 43.9 01/10/2025    HCT 40.3 05/17/2021     (H) 01/10/2025     (H) 05/17/2021    MCH 32.0 01/10/2025    MCH 32.5 05/17/2021    MCHC 31.7 01/10/2025    MCHC 32.0 05/17/2021    RDW 12.5 01/10/2025    RDW 12.7 05/17/2021     01/10/2025     05/17/2021       CMP RESULTS:  Lab Results   Component Value Date     01/10/2025     06/04/2021    POTASSIUM 4.4 01/10/2025    POTASSIUM 4.6 10/05/2022    POTASSIUM 3.4 06/04/2021    CHLORIDE 104 01/10/2025    CHLORIDE 103 10/05/2022    CHLORIDE 106 06/04/2021    CO2 25 01/10/2025    CO2 27 10/05/2022    CO2 30 06/04/2021    ANIONGAP 11 01/10/2025    ANIONGAP 7 10/05/2022    ANIONGAP 6 06/04/2021     (H) 01/10/2025    GLC 85 01/31/2023    GLC 98 10/05/2022     (H) 06/04/2021    BUN 37.1 (H) 01/10/2025    BUN 39 (H) 10/05/2022    BUN 27 06/04/2021    CR 1.55 (H) 01/10/2025    CR 1.50 (H) 06/04/2021    GFRESTIMATED 32 (L) 01/10/2025    GFRESTIMATED 32 (L) 06/04/2021    GFRESTBLACK 37 (L) 06/04/2021    PRINCE 9.5 01/10/2025    PRINCE 9.4 06/04/2021    BILITOTAL 0.2 11/19/2024    BILITOTAL 0.3 05/17/2021    ALBUMIN 4.1 11/19/2024    ALBUMIN 3.6 09/27/2022    ALBUMIN 3.4 05/17/2021    ALKPHOS 72 11/19/2024    ALKPHOS 57 05/17/2021    ALT 11 11/19/2024    ALT 38 05/17/2021    AST 20 11/19/2024    AST 27 05/17/2021        INR RESULTS:  Lab Results   Component Value Date    INR 1.12 02/22/2023    INR 1.05 11/03/2020       Lab Results   Component Value Date    MAG 2.5 (H) 02/27/2023    MAG 2.4 (H) 10/28/2019     Lab Results   Component  Value Date    NTBNPI 11,774 (H) 02/22/2023     Lab Results   Component Value Date    NTBNP 920 11/19/2024    NTBNP 462 (H) 05/17/2021       Diagnostics:  12/8/23 PYP testing  Impression:  1. Imaging findings are not suggestive of transthyretin cardiac  amyloidosis.     11/22/23 cMRI  Clinical history: 88 F CAD s/p PCI to LAD and RCA, cath 10/2022 with non-obs CAD, htn, severe MAC, AS s/p  TAVR 09/2022, LBBB after TAVR, HALT resolved on subsequent CT, newly reduced LVEF 35% after TAVR  Comparison CMR: none     1. The LV is normal in cavity size with moderate concentric LVH. The global systolic function is severely  reduced. The LVEF is 28%. There is basal septal akinesis with moderate diffuse hypokinesis. There is  paradoxical septal motion due to LBBB.      2. The RV is normal in cavity size. The global systolic function is mildly reduced. The RVEF is 43%.      3. Left atrium is moderately enlarged.     4. There is a TAVR prosthesis that cannot be further evaluated due to artifact. There is severe MAC without  significant MR.      5. Late gadolinium enhancement imaging shows patchy mid-myocardial enhancement involving the basal  segments.      6. Regadenoson stress perfusion imaging shows no ischemia.     7. There is no pericardial effusion or thickening.     8. There is no intracardiac thrombus.     CONCLUSIONS: No myocardial ischemia. NICM, LVEF 28% and RVEF 43%, with LBBB and moderate LVH. Moderate  basal patchy fibrosis that is likely from AS, or could represent amyloidosis. Consider PYP scan.       CORE EXAM    Assessment and Plan:   Ms. Miller is a 89 year old female with a past medical history including HFpEF now HFrEF, HTN, HLD, significant CAD history (PTCA 1996, LAD and RCA stenting 2003, s/p LAD PCI 2020, recent cath 10/2022 with patent stents), CKD, chronic anemia and severe aortic valvular stenosis s/p (TAVR) with a 26mm Arndt Janis Ultra on 9/12/22 by Morro Burrows. Her post TAVR echo showed mean  PG of 7 mmHg with trace PVL. She was noted to have 60% left femoral stenosis post procedure, but had adequate flow on peripheral angiogram. She was also noted to have new LBBB post procedure and discharged on a Cardionet which showed no high degree AVB.. Presents to clinic for CORE follow-up.    We have discussed aldactone in the past and today again. Weighing the risk/benefit right now, we are going to hold off, but if EF is lower on next echo she would want to consider. Hasn't had UTIs recently, but if we start an sglt2i in the future she will watch for those.      HFpEF, now HFrEF EF 30-35%:  EF had been preserved 55-60%,  2/2023 45-50%, 7/2023 35-40%, now 30-35%. Unclear etiology, cath 10/2022 with patent stents, thought possibly related to poorly controlled HTN. Amyloid work-up has been negative.    - Volume status: Euvolemic, off loop diuretics  - ARNI: Continue entresto  mg BID  - Additional afterload: Amlodipine 5 mg daily. Per notes has had intolerances to hydralazine.  - MRA: Aldactone 12.5 mg daily, ongoing titration  - Beta Blocker: per notes, multiple prior intolerances to BB   - SGLT2i: see conversation above, deferring for now, but if EF is lower on next echo we would discus again at that time  - ICD: was discussed with Dr. Toby Torres 12/29/24 and this was not recommended based on age. They did discuss crt-p rather than crt-d, but that was not persued    Aortic stenosis s/p TAVR 9/2022:  - Yearly echos with primary cardiologist or VALVE team- next echo this spring  - CT heart shows resolution of HALT   - OFF Eliquis, continue ASA 81 mg lifelong   - SBE prophylaxis lifelong     Coronary artery disease w/o angina:  HLD:  - Continue ASA 81 mg daily lifelong  - Continue crestor  - Continue exercise and heart healthy diet    HTN, well controlled  - Multiple intolerances to BB, hydralazine   - Enstresto and amlodipine for BP control   - See above, we will consider amlodipine increase pending how she  responds to SGLT2i  - Decrease vitals at home from daily to 1-2 times weekly d/t stability and cost     PAD:   - Continue ASA 81 mg lifelong  - vascular consult should she develop recurrent claudication    Osteoarthritis   - Okay to use Voltaren gel, no other NSAIDS, discussed today    Follow up   - ECHO, labs, and CORE in 3 months    Billing  - I managed 2+ stable chronic conditions  - I managed an OTC medication    The longitudinal plan of care for the diagnosis(es)/condition(s) as documented were addressed during this visit. Due to the added complexity in care, I will continue to support Kamryn in the subsequent management and with ongoing continuity of care.       Violet King PA-C  [facility]          CC  SELF, REFERRED      Please do not hesitate to contact me if you have any questions/concerns.     Sincerely,     Violet King PA-C

## 2025-01-16 NOTE — PROGRESS NOTES
"HPI:   Ms. Miller is a 89 year old female with a past medical history including HFpEF now HFrEF, HTN, HLD, significant CAD history (PTCA 1996, LAD and RCA stenting 2003, s/p LAD PCI 2020, recent cath 10/2022 with patent stents), CKD, chronic anemia and severe aortic valvular stenosis s/p (TAVR) with a 26mm Arndt Janis Ultra on 9/12/22 by Morro Burrows. Her post TAVR echo showed mean PG of 7 mmHg with trace PVL. She was noted to have 60% left femoral stenosis post procedure, but had adequate flow on peripheral angiogram. She was also noted to have new LBBB post procedure and discharged on a Cardionet which showed no high degree AVB.. Presents to clinic for CORE follow-up.    Her EF has been slowly declining since her TAVR. Was 55-60% pre TAVR, dropped to 40-45%, now 30-35% despite GDMT.     She last saw Dr. Zuniga in 11/19/24. Feeling well. No medication changes.    Today  She doesn't have SOB at rest. No significant GREGORY, she is able to walk \" a ways\". She does exercersises a few times a week- does some peddling and some chair and standing exercises, no GREGORY with this. No LE edema. No abdominal edema. No orthopnea or PND. No lightheadedness or dizziness no presyncope or syncope. No falls. No palpitations. No CP. Appetite is fine.    Her systolic Bps have ranged from 117//78at home. Weights are stable at home around 150-153. 150.6 yesterday. Hrs are 65-70.    Cardiac Medications  ASA 81 mg daily  Amlodipine 5 mg at bedtime  Entresto  mg BID  Lasix 30 mg daily  Aldactone 12.5 mg daily  Imdur 60 mg qAM  Crestor 10 mg daily    PAST MEDICAL HISTORY:  Past Medical History:   Diagnosis Date    Aortic valvar stenosis 7/2010    mild    Asthma     Bipolar disorder (H)     CAD (coronary artery disease)     s/p angioplasty    Celiac disease     Colon polyps     Colon polyps 2012    every 3 year colonoscopy     Depression     High cholesterol     HTN     Mitral insufficiency     Pulmonary HTN (H)     mild    " Tremors 7/10    drug induced from antidepressants    Tricuspid insufficiency        FAMILY HISTORY:  Family History   Problem Relation Age of Onset    Asthma Mother     Cerebrovascular Disease Mother     Arthritis Mother     Depression Mother     Lipids Sister     Hypertension Sister     Heart Disease Sister     Lipids Sister     Hypertension Sister     Lipids Sister     Breast Cancer Sister        SOCIAL HISTORY:  Social History     Socioeconomic History    Marital status:      Spouse name: Adrien    Number of children: 2    Years of education: 16   Occupational History     Employer: RETIRED     Comment: Retired in 2002.    Tobacco Use    Smoking status: Never    Smokeless tobacco: Never   Vaping Use    Vaping status: Never Used   Substance and Sexual Activity    Alcohol use: No     Alcohol/week: 0.0 standard drinks of alcohol     Types: 1 Standard drinks or equivalent per week    Drug use: No    Sexual activity: Not Currently     Partners: Male     Social Determinants of Health     Financial Resource Strain: Medium Risk (5/4/2022)    Received from [facility]    Financial Resource Strain     Financial Concerns: 2   Transportation Needs: Unknown (2/4/2021)    Received from [facility]    Transportation Needs     Lack of Transportation (Medical): 0   Physical Activity: Unknown (2/4/2021)    Received from [facility]    Physical Activity     Calculated Minutes of Exercise per Week:: 0   Stress: Unknown (2/4/2021)    Received from [facility]    Stress     Patient Reported Major Stressor(s): 0     Patient Reported Strengths: 1     Patient Reported Source(s) of Support: 1    [facility]  Connections   Interpersonal Safety: Not At Risk (7/11/2023)    Humiliation, Afraid, Rape, and Kick questionnaire     Fear of Current or Ex-Partner: No     Emotionally Abused: No     Physically Abused: No     Sexually Abused: No   Housing Stability: Unknown (2/4/2021)    Received from [facility], [facility]    Housing Stability     Housing Concerns: 0       CURRENT MEDICATIONS:  Current Outpatient Medications   Medication Sig Dispense Refill    acetaminophen (TYLENOL) 500 MG tablet Take 1 tablet (500 mg) by mouth daily Continue previously ordered PRN tylenol order as well 30 tablet 11    acetaminophen (TYLENOL) 500 MG tablet Take 1 tablet (500 mg) by mouth every 6 hours as needed for pain 60 tablet 4    amLODIPine (NORVASC) 5 MG tablet Take 1 tablet (5 mg) by mouth at bedtime. 90 tablet 1    aspirin (ASA) 81 MG EC tablet Take 1 tablet (81 mg) by mouth daily 30 tablet 0    azelastine (ASTEPRO) 0.15 % nasal spray USE 1 SPRAY IN EACH NOSTRIL ONCE A DAY 30 mL 11    calcium citrate (CITRACAL) 950 (200 Ca) MG tablet TAKE 1 TABLET BY MOUTH ONCE DAILY 30 tablet 11    desvenlafaxine (PRISTIQ) 100 MG 24 hr tablet Take 100 mg by mouth daily      fluticasone-salmeterol (ADVAIR DISKUS) 500-50 MCG/ACT inhaler INHALE 1 PUFF BY MOUTH TWICE DAILY 60 each 0    folic acid (FOLVITE) 400 MCG tablet TAKE 1 TABLET BY MOUTH ONCE DAILY 28 tablet 11    furosemide (LASIX) 20 MG tablet Take 1.5 tablets (30 mg) by mouth daily 135 tablet 3    iron polysaccharides (NIFEREX) 150 MG capsule Take 1 capsule (150 mg) by mouth daily 30 capsule 1    isosorbide mononitrate (IMDUR) 30 MG 24 hr tablet Take 2 tablets (60 mg) by mouth daily 60 tablet 11    lamoTRIgine (LAMICTAL) 25 MG tablet Take 25 mg by mouth daily with 200 mg tablet for a total dose of 225 mg      LAMOTRIGINE 200 MG PO TABS Take 200 mg by mouth daily with 25 mg tablet for a total dose of 225 mg      levothyroxine (SYNTHROID/LEVOTHROID) 50 MCG  tablet TAKE 1 TABLET BY MOUTH ONCE DAILY 28 tablet 11    liothyronine (CYTOMEL) 5 MCG tablet Take 2 tablets (10 mcg) by mouth daily 30 tablet 3    memantine (NAMENDA) 10 MG tablet Take 1 tablet (10 mg) by mouth daily 30 tablet 3    mirtazapine (REMERON) 15 MG tablet Take 15 mg by mouth at bedtime      Multiple Vitamin (TAB-A-JENNIFER) TABS TAKE 1 TABLET BY MOUTH ONCE DAILY 28 tablet 11    nystatin (MYCOSTATIN) 088747 UNIT/GM external powder       psyllium (METAMUCIL/KONSYL) 58.6 % powder Take 1 tspn in liquid daily      rosuvastatin (CRESTOR) 10 MG tablet Take 1 tablet (10 mg) by mouth daily 30 tablet 0    sacubitril-valsartan (ENTRESTO) 49-51 MG per tablet Take 1 tablet by mouth every morning AND 2 tablets every evening. Take this dose for 2 weeks. After 2 weeks, increase Entresto to  mg twice daily. 180 tablet 3    spironolactone (ALDACTONE) 25 MG tablet Take 0.5 tablets (12.5 mg) by mouth daily 45 tablet 3    amoxicillin (AMOXIL) 500 MG capsule  (Patient not taking: Reported on 1/16/2025)       No current facility-administered medications for this visit.       ROS:   Refer to HPI    EXAM:  /74 (BP Location: Right arm, Patient Position: Chair, Cuff Size: Adult Regular)   Pulse 68   Wt 69.9 kg (154 lb 1.6 oz)   SpO2 96%   BMI 30.45 kg/m    GENERAL: Appears comfortable, in no acute distress.   HEENT: Eye symmetrical, no discharge or icterus bilaterally.   CV: RRR, +S1S2, no murmur, rub, or gallop. JVP ~6 mmg Hg.   RESPIRATORY: Respirations regular, even, and unlabored. Lungs CTA throughout.   GI: Soft and non distended   EXTREMITIES: No peripheral edema. All extremities are warm and well perfused  NEUROLOGIC: Alert and interacting appropriately.   SKIN: No jaundice.    Labs, reviewed with patient in clinic today:  CBC RESULTS:  Lab Results   Component Value Date    WBC 4.5 01/10/2025    WBC 4.6 05/17/2021    RBC 4.34 01/10/2025    RBC 3.97 05/17/2021    HGB 13.9 01/10/2025    HGB 12.9 05/17/2021    HCT  43.9 01/10/2025    HCT 40.3 05/17/2021     (H) 01/10/2025     (H) 05/17/2021    MCH 32.0 01/10/2025    MCH 32.5 05/17/2021    MCHC 31.7 01/10/2025    MCHC 32.0 05/17/2021    RDW 12.5 01/10/2025    RDW 12.7 05/17/2021     01/10/2025     05/17/2021       CMP RESULTS:  Lab Results   Component Value Date     01/10/2025     06/04/2021    POTASSIUM 4.4 01/10/2025    POTASSIUM 4.6 10/05/2022    POTASSIUM 3.4 06/04/2021    CHLORIDE 104 01/10/2025    CHLORIDE 103 10/05/2022    CHLORIDE 106 06/04/2021    CO2 25 01/10/2025    CO2 27 10/05/2022    CO2 30 06/04/2021    ANIONGAP 11 01/10/2025    ANIONGAP 7 10/05/2022    ANIONGAP 6 06/04/2021     (H) 01/10/2025    GLC 85 01/31/2023    GLC 98 10/05/2022     (H) 06/04/2021    BUN 37.1 (H) 01/10/2025    BUN 39 (H) 10/05/2022    BUN 27 06/04/2021    CR 1.55 (H) 01/10/2025    CR 1.50 (H) 06/04/2021    GFRESTIMATED 32 (L) 01/10/2025    GFRESTIMATED 32 (L) 06/04/2021    GFRESTBLACK 37 (L) 06/04/2021    PRINCE 9.5 01/10/2025    PRINCE 9.4 06/04/2021    BILITOTAL 0.2 11/19/2024    BILITOTAL 0.3 05/17/2021    ALBUMIN 4.1 11/19/2024    ALBUMIN 3.6 09/27/2022    ALBUMIN 3.4 05/17/2021    ALKPHOS 72 11/19/2024    ALKPHOS 57 05/17/2021    ALT 11 11/19/2024    ALT 38 05/17/2021    AST 20 11/19/2024    AST 27 05/17/2021        INR RESULTS:  Lab Results   Component Value Date    INR 1.12 02/22/2023    INR 1.05 11/03/2020       Lab Results   Component Value Date    MAG 2.5 (H) 02/27/2023    MAG 2.4 (H) 10/28/2019     Lab Results   Component Value Date    NTBNPI 11,774 (H) 02/22/2023     Lab Results   Component Value Date    NTBNP 920 11/19/2024    NTBNP 462 (H) 05/17/2021       Diagnostics:  12/8/23 PYP testing  Impression:  1. Imaging findings are not suggestive of transthyretin cardiac  amyloidosis.     11/22/23 cMRI  Clinical history: 88 F CAD s/p PCI to LAD and RCA, cath 10/2022 with non-obs CAD, htn, severe MAC, AS s/p  TAVR 09/2022, LBBB after  TAVR, HALT resolved on subsequent CT, newly reduced LVEF 35% after TAVR  Comparison CMR: none     1. The LV is normal in cavity size with moderate concentric LVH. The global systolic function is severely  reduced. The LVEF is 28%. There is basal septal akinesis with moderate diffuse hypokinesis. There is  paradoxical septal motion due to LBBB.      2. The RV is normal in cavity size. The global systolic function is mildly reduced. The RVEF is 43%.      3. Left atrium is moderately enlarged.     4. There is a TAVR prosthesis that cannot be further evaluated due to artifact. There is severe MAC without  significant MR.      5. Late gadolinium enhancement imaging shows patchy mid-myocardial enhancement involving the basal  segments.      6. Regadenoson stress perfusion imaging shows no ischemia.     7. There is no pericardial effusion or thickening.     8. There is no intracardiac thrombus.     CONCLUSIONS: No myocardial ischemia. NICM, LVEF 28% and RVEF 43%, with LBBB and moderate LVH. Moderate  basal patchy fibrosis that is likely from AS, or could represent amyloidosis. Consider PYP scan.       CORE EXAM    Assessment and Plan:   Ms. Miller is a 89 year old female with a past medical history including HFpEF now HFrEF, HTN, HLD, significant CAD history (PTCA 1996, LAD and RCA stenting 2003, s/p LAD PCI 2020, recent cath 10/2022 with patent stents), CKD, chronic anemia and severe aortic valvular stenosis s/p (TAVR) with a 26mm Arndt Janis Ultra on 9/12/22 by Morro Burrows. Her post TAVR echo showed mean PG of 7 mmHg with trace PVL. She was noted to have 60% left femoral stenosis post procedure, but had adequate flow on peripheral angiogram. She was also noted to have new LBBB post procedure and discharged on a Cardionet which showed no high degree AVB.. Presents to clinic for CORE follow-up.    We have discussed aldactone in the past and today again. Weighing the risk/benefit right now, we are going to hold off,  but if EF is lower on next echo she would want to consider. Hasn't had UTIs recently, but if we start an sglt2i in the future she will watch for those.      HFpEF, now HFrEF EF 30-35%:  EF had been preserved 55-60%,  2/2023 45-50%, 7/2023 35-40%, now 30-35%. Unclear etiology, cath 10/2022 with patent stents, thought possibly related to poorly controlled HTN. Amyloid work-up has been negative.    - Volume status: Euvolemic, off loop diuretics  - ARNI: Continue entresto  mg BID  - Additional afterload: Amlodipine 5 mg daily. Per notes has had intolerances to hydralazine.  - MRA: Aldactone 12.5 mg daily, ongoing titration  - Beta Blocker: per notes, multiple prior intolerances to BB   - SGLT2i: see conversation above, deferring for now, but if EF is lower on next echo we would discus again at that time  - ICD: was discussed with Dr. Toby Torres 12/29/24 and this was not recommended based on age. They did discuss crt-p rather than crt-d, but that was not persued    Aortic stenosis s/p TAVR 9/2022:  - Yearly echos with primary cardiologist or VALVE team- next echo this spring  - CT heart shows resolution of HALT   - OFF Eliquis, continue ASA 81 mg lifelong   - SBE prophylaxis lifelong     Coronary artery disease w/o angina:  HLD:  - Continue ASA 81 mg daily lifelong  - Continue crestor  - Continue exercise and heart healthy diet    HTN, well controlled  - Multiple intolerances to BB, hydralazine   - Enstresto and amlodipine for BP control   - See above, we will consider amlodipine increase pending how she responds to SGLT2i  - Decrease vitals at home from daily to 1-2 times weekly d/t stability and cost     PAD:   - Continue ASA 81 mg lifelong  - vascular consult should she develop recurrent claudication    Osteoarthritis   - Okay to use Voltaren gel, no other NSAIDS, discussed today    Follow up   - ECHO, labs, and CORE in 3 months    Billing  - I managed 2+ stable chronic conditions  - I managed an OTC  medication    The longitudinal plan of care for the diagnosis(es)/condition(s) as documented were addressed during this visit. Due to the added complexity in care, I will continue to support Kamryn in the subsequent management and with ongoing continuity of care.       Violet King PA-C  [facility] Cardiology          CC  SELF, REFERRED

## 2025-02-26 DIAGNOSIS — N18.32 STAGE 3B CHRONIC KIDNEY DISEASE (H): Primary | ICD-10-CM

## 2025-03-10 ENCOUNTER — MYC MEDICAL ADVICE (OUTPATIENT)
Dept: NEPHROLOGY | Facility: CLINIC | Age: 89
End: 2025-03-10
Payer: MEDICARE

## 2025-03-17 ENCOUNTER — OFFICE VISIT (OUTPATIENT)
Dept: NEPHROLOGY | Facility: CLINIC | Age: 89
End: 2025-03-17
Payer: MEDICARE

## 2025-03-17 VITALS
HEIGHT: 60 IN | OXYGEN SATURATION: 98 % | SYSTOLIC BLOOD PRESSURE: 114 MMHG | BODY MASS INDEX: 29.17 KG/M2 | DIASTOLIC BLOOD PRESSURE: 66 MMHG | HEART RATE: 69 BPM | WEIGHT: 148.6 LBS

## 2025-03-17 DIAGNOSIS — I50.20 HEART FAILURE WITH REDUCED EJECTION FRACTION, NYHA CLASS III (H): ICD-10-CM

## 2025-03-17 DIAGNOSIS — I10 HYPERTENSION, ESSENTIAL: ICD-10-CM

## 2025-03-17 DIAGNOSIS — N18.32 STAGE 3B CHRONIC KIDNEY DISEASE (H): Primary | ICD-10-CM

## 2025-03-17 DIAGNOSIS — E67.3 HYPERVITAMINOSIS D: ICD-10-CM

## 2025-03-17 DIAGNOSIS — I50.22 CHRONIC SYSTOLIC HEART FAILURE (H): ICD-10-CM

## 2025-03-17 LAB
ALBUMIN UR-MCNC: NEGATIVE MG/DL
APPEARANCE UR: CLEAR
BACTERIA #/AREA URNS HPF: ABNORMAL /HPF
BILIRUB UR QL STRIP: NEGATIVE
COLOR UR AUTO: YELLOW
ERYTHROCYTE [DISTWIDTH] IN BLOOD BY AUTOMATED COUNT: 12.5 % (ref 10–15)
GLUCOSE UR STRIP-MCNC: NEGATIVE MG/DL
HCT VFR BLD AUTO: 44.8 % (ref 35–47)
HGB BLD-MCNC: 13.7 G/DL (ref 11.7–15.7)
HGB UR QL STRIP: ABNORMAL
KETONES UR STRIP-MCNC: NEGATIVE MG/DL
LEUKOCYTE ESTERASE UR QL STRIP: NEGATIVE
MCH RBC QN AUTO: 31 PG (ref 26.5–33)
MCHC RBC AUTO-ENTMCNC: 30.6 G/DL (ref 31.5–36.5)
MCV RBC AUTO: 101 FL (ref 78–100)
NITRATE UR QL: NEGATIVE
PH UR STRIP: 5.5 [PH] (ref 5–7)
PLATELET # BLD AUTO: 216 10E3/UL (ref 150–450)
RBC # BLD AUTO: 4.42 10E6/UL (ref 3.8–5.2)
RBC #/AREA URNS AUTO: ABNORMAL /HPF
SP GR UR STRIP: <=1.005 (ref 1–1.03)
SQUAMOUS #/AREA URNS AUTO: ABNORMAL /LPF
UROBILINOGEN UR STRIP-ACNC: 0.2 E.U./DL
WBC # BLD AUTO: 5.3 10E3/UL (ref 4–11)
WBC #/AREA URNS AUTO: ABNORMAL /HPF

## 2025-03-17 PROCEDURE — 82306 VITAMIN D 25 HYDROXY: CPT | Performed by: PHYSICIAN ASSISTANT

## 2025-03-17 PROCEDURE — 36415 COLL VENOUS BLD VENIPUNCTURE: CPT | Performed by: PHYSICIAN ASSISTANT

## 2025-03-17 PROCEDURE — 3074F SYST BP LT 130 MM HG: CPT | Performed by: PHYSICIAN ASSISTANT

## 2025-03-17 PROCEDURE — 81001 URINALYSIS AUTO W/SCOPE: CPT | Performed by: PHYSICIAN ASSISTANT

## 2025-03-17 PROCEDURE — 85027 COMPLETE CBC AUTOMATED: CPT | Performed by: PHYSICIAN ASSISTANT

## 2025-03-17 PROCEDURE — 84156 ASSAY OF PROTEIN URINE: CPT | Performed by: PHYSICIAN ASSISTANT

## 2025-03-17 PROCEDURE — 80069 RENAL FUNCTION PANEL: CPT | Performed by: PHYSICIAN ASSISTANT

## 2025-03-17 PROCEDURE — 3078F DIAST BP <80 MM HG: CPT | Performed by: PHYSICIAN ASSISTANT

## 2025-03-17 PROCEDURE — 83970 ASSAY OF PARATHORMONE: CPT | Performed by: PHYSICIAN ASSISTANT

## 2025-03-17 PROCEDURE — 99214 OFFICE O/P EST MOD 30 MIN: CPT | Performed by: PHYSICIAN ASSISTANT

## 2025-03-17 NOTE — PROGRESS NOTES
Nephrology Progress Note  3/17/2025    Assessment and Plan:  89 year old female with history of CKD stage 3, Scr 1-1.5 since 2008, hypertension who presents for followup of CKD. She has had hypercalcemia that has stabilized. She was first seen October 2019, and hydrochlorothiazide was stopped. Her Scr has been 1.4-1.6 in recent years. She now has HFrEF, hospitalized in 2/2023 for hypertensive urgency and in December 2022 for anemia with hgb down to 7    # CKD stage 3b, Scr 1.4-1.6, recent SCr 1.5 eGFR 31ml/min :  Her scr has been 1.1-1.5 in the last few years, with baseline many years ago at 1 or less.  She has mild proteinuria, 0.28 mg/mg, improved from 2.47 mg/mg in 2023. She denies systemic signs   - she has HFpEF, now HFrEF and this along with her CAD and hypertension is the cause of her CKD. She now has HFrEF per last echo, EF 35-40%    -low grade proteinuria, off ACE/ARB-now is on entresto, SGLT2i has been considered by cardiology but holding off for now.   - discussed hydration, avoiding nephrotoxic medications, holding diuretic on sick days, and blood pressure control  - she is seeing [facility] which is good  - no changes made today  - labs today pending     # Hypertension: much better controlled since starting spironolactone, fursoemide and entresto. BP elevated in clinic today .  Her hydrochlorothiazide was stopped due to hypercalcemia. The other medications were stopped in the last year by other providers.  - current BP meds amlodipine 5 mg daily, lasix 20 mg daily, isosorobide 60 mg daily, entresto, spironolactone 12.5 mg daily  - BP's are well controlled  She feels well overall.     # Not anemic - but was anemic in hospital , down to 7 with iron deficiency (iron sat 5%)  in December 2022, started on iron supplement, lowered to every other day given normal hgb now.  - ? No cause found for iron deficiency, decided against scopes in the hospital.  - Hgb remains normal at 13.6    # Electrolytes:   - Na  140, K 4.4, Bicarb 25  - recheck labs    # Mineral Bone Disorder:   - Calcium; level is:  Phos 4.2, both normal range, PTH normal  - vitamin D high 112  - stop Vit D, recheck in 6 months at follow up.      Assessment and plan was discussed with patient and she voiced her understanding and agreement.    Reason for Visit:  Kamryn Miller is an 89 year old female with CKD from hypertension, who presents for followup of CKD    HPI:  She is a pleasant female with history of mild rhinitis, asthma, and hypertension who presents for followup of CKD and hypercalcemia. . Her serum creatinine has been 1.2-1.5 in recent years, and was up to 2 in 2019, with eGFR 21ml/min.  She had hypercalcemia, which improved with stopping hydrochlorothiazide. She has history of CAD and HFpEF with significant aortic stenosis and follows with Dr Zuniga in cardiology. She had TAVR . She was hospitalized in 2023 with hypertensive urgency, shortness of breath, anemia.  She was treated with mild diuresis and discharged.  She had hypertensive urgency in jn 2022, She also had AV block which affected her BP regimen. She also had inpatient psych stay for depression after her  .  Her creatinine was 1.1-1.2 on discharge (with stopping her ARB).  Scr now 1.5  Her recent BP is 120s/  She is mobilizing fairly well without cane, uses walker for longer distances.  She had significant anemia in 2022 without clear cause, hgb down to 7 was transfused and now on iron supplement. Iron sat was 5%  Hgb has been improving  She denies significant dyspnea on exertion or swelling. She is in assisted living and they manage medications and provide meals.  She tries to eat healthy.  She avoids using NSAIDs.     Renal History:   HTN    ROS:   A comprehensive review of systems was obtained and negative, except as noted in the HPI or PMH.    Active Medical Problems:  Patient Active Problem List   Diagnosis    Hyperlipidemia LDL  goal <70    Mild major depression (H)    Pulmonary hypertension (H)    Seasonal allergic rhinitis    Mitral insufficiency    Tricuspid insufficiency    Renal insufficiency    Tremors    Family history of diabetes mellitus    Celiac disease    History of colonic polyps    Benign essential hypertension    Hypothyroidism, unspecified type    CKD (chronic kidney disease) stage 3, GFR 30-59 ml/min (H)    Anemia    Disorder of kidney and ureter    Generalized anxiety disorder    Osteopenia    CAD S/P percutaneous coronary angioplasty    NYHA class 3 heart failure with preserved ejection fraction (H)    Moderate persistent asthma without complication    Hearing disorder, unspecified laterality    Impairment of balance    Bipolar 1 disorder, depressed, severe (H)    Essential hypertension    Stented coronary artery    Generalized weakness    Left leg claudication    S/P TAVR (transcatheter aortic valve replacement)    Syncope and collapse    Anemia, unspecified type    Weight gain    Elevated brain natriuretic peptide (BNP) level    Fall     PMH:   Medical record was reviewed and PMH was discussed with patient and noted below.  Past Medical History:   Diagnosis Date    Aortic valvar stenosis 7/2010    mild    Asthma     Bipolar disorder (H)     CAD (coronary artery disease)     s/p angioplasty    Celiac disease     Colon polyps     Colon polyps 2012    every 3 year colonoscopy     Depression     High cholesterol     HTN     Mitral insufficiency     Pulmonary HTN (H)     mild    Tremors 7/10    drug induced from antidepressants    Tricuspid insufficiency      PSH:   Past Surgical History:   Procedure Laterality Date    ANGIOGRAM  6/26/2009    ANGIOPLASTY  9/96    for angina    ANGIOPLASTY  8/03 - 9/03    X -2 - with stenst in coronary car.    APPENDECTOMY      BREAST BIOPSY, RT/LT      Breat Biopsy RT/LT, benign    BUNIONECTOMY  11/8/2011    Procedure:BUNIONECTOMY; Left donna bunionectomy; Surgeon:FREDY LITTLEJOHN;  Location:[facility]    BUNIONECTOMY RT/LT  5/2007    right bunion and right 2nd hammertoe    CATARACT IOL, RT/LT  6/12 and 7/12    bilateral    COLONOSCOPY  2007, 2012    every 3 years for polyps    CV CORONARY ANGIOGRAM N/A 8/21/2020    Procedure: CV CORONARY ANGIOGRAM;  Surgeon: Gianluca Toscano MD;  Location: [facility]    CV CORONARY ANGIOGRAM N/A 10/25/2022    Procedure: Coronary Angiogram;  Surgeon: Carter Douglass MD;  Location: [facility]    CV INSTANTANEOUS WAVE-FREE RATIO N/A 10/25/2022    Procedure: Instantaneous Wave-Free Ratio;  Surgeon: Carter Douglass MD;  Location: [facility]    CV LEFT HEART CATH N/A 8/21/2020    Procedure: CV LEFT HEART CATH;  Surgeon: Gianluca Toscano MD;  Location: [facility]    CV LEFT HEART CATH N/A 11/3/2020    Procedure: CV LEFT HEART CATH;  Surgeon: Tom Burrows MD;  Location: [facility]    CV LOWER EXTREMITY ANGIOGRAM BILATERAL N/A 11/3/2020    Procedure: CV ANGIOGRAM LOWER EXTREMITY BILATERAL;  Surgeon: Tom Burrows MD;  [facility]    CV PCI STENT DRUG ELUTING N/A 8/21/2020    Procedure: Percutaneous Coronary Intervention Stent Drug Eluting;  Surgeon: Gianluca Toscano MD;  Location: [facility]    CV RIGHT HEART CATH MEASUREMENTS RECORDED N/A 8/21/2020    Procedure: CV RIGHT HEART CATH;  Surgeon: Gianluca Toscano MD;  [facility]    CV RIGHT HEART CATH MEASUREMENTS RECORDED N/A 11/3/2020    Procedure: CV RIGHT HEART CATH;  Surgeon: Tom Burrows MD;  [facility]    CV TRANSCATHETER AORTIC VALVE REPLACEMENT N/A 9/12/2022    Procedure: Transfemoral Transcatheter Aortic Valve Replacement with possible open heart bypass and or balloon pump placement and any indicated procedure;  Surgeon: Tom Burrows MD;  [facility]     "HEART CATH FEMORAL CANNULIZATION WITH OPEN STANDBY REPAIR AORTIC VALVE N/A 9/12/2022    Procedure: OR TRANSCATHETER AORTIC VALVE REPLACEMENT, OPEN FEMORAL ARTERY APPROACH;  Surgeon: Arthur Markham MD;  Location:  [facility]    JOINT REPLACEMENT, HIP RT/LT  4/2008    right hip replaced    JOINT REPLACEMENT, HIP RT/LT  4/2009    left hip replaced    REPAIR HAMMER TOE  11/8/2011    Procedure:REPAIR HAMMER TOE; left 2nd hammertoe repair; Surgeon:FREDY LITTLEJOHN; Location: OR    SURGICAL HISTORY OF -   11/11    left bunion and 2nd hammertoe repair    TUBAL LIGATION      ZZC ANESTH,BLEPHAROPLASTY      (R) for drooping eyelid    ZZC APPENDECTOMY         Family Hx:   Family History   Problem Relation Age of Onset    Asthma Mother     Cerebrovascular Disease Mother     Arthritis Mother     Depression Mother     Lipids Sister     Hypertension Sister     Heart Disease Sister     Lipids Sister     Hypertension Sister     Lipids Sister     Breast Cancer Sister      Personal Hx:   Social History     Tobacco Use    Smoking status: Never    Smokeless tobacco: Never   Substance Use Topics    Alcohol use: No     Alcohol/week: 0.0 standard drinks of alcohol     Types: 1 Standard drinks or equivalent per week       Allergies:  Allergies   Allergen Reactions    Ace Inhibitors Cough    Amlodipine      Headache and plugged ears    Carvedilol      \"plugged ears and a headache\"    Diclofenac Other (See Comments)     Balance problems    Gluten Meal Diarrhea    Hydralazine-Hctz      headache       Medications:  Current Outpatient Medications   Medication Sig Dispense Refill    acetaminophen (TYLENOL) 500 MG tablet Take 1 tablet (500 mg) by mouth daily Continue previously ordered PRN tylenol order as well 30 tablet 11    acetaminophen (TYLENOL) 500 MG tablet Take 1 tablet (500 mg) by mouth every 6 hours as needed for pain 60 tablet 4    amLODIPine (NORVASC) 5 MG tablet Take 1 tablet (5 mg) by mouth at bedtime. 90 " tablet 1    amoxicillin (AMOXIL) 500 MG capsule  (Patient not taking: Reported on 1/16/2025)      aspirin (ASA) 81 MG EC tablet Take 1 tablet (81 mg) by mouth daily 30 tablet 0    azelastine (ASTEPRO) 0.15 % nasal spray USE 1 SPRAY IN EACH NOSTRIL ONCE A DAY 30 mL 11    calcium citrate (CITRACAL) 950 (200 Ca) MG tablet TAKE 1 TABLET BY MOUTH ONCE DAILY 30 tablet 11    desvenlafaxine (PRISTIQ) 100 MG 24 hr tablet Take 100 mg by mouth daily      fluticasone-salmeterol (ADVAIR DISKUS) 500-50 MCG/ACT inhaler INHALE 1 PUFF BY MOUTH TWICE DAILY 60 each 0    folic acid (FOLVITE) 400 MCG tablet TAKE 1 TABLET BY MOUTH ONCE DAILY 28 tablet 11    furosemide (LASIX) 20 MG tablet Take 1.5 tablets (30 mg) by mouth daily 135 tablet 3    iron polysaccharides (NIFEREX) 150 MG capsule Take 1 capsule (150 mg) by mouth daily 30 capsule 1    isosorbide mononitrate (IMDUR) 30 MG 24 hr tablet Take 2 tablets (60 mg) by mouth daily 60 tablet 11    lamoTRIgine (LAMICTAL) 25 MG tablet Take 25 mg by mouth daily with 200 mg tablet for a total dose of 225 mg      LAMOTRIGINE 200 MG PO TABS Take 200 mg by mouth daily with 25 mg tablet for a total dose of 225 mg      levothyroxine (SYNTHROID/LEVOTHROID) 50 MCG tablet TAKE 1 TABLET BY MOUTH ONCE DAILY 28 tablet 11    liothyronine (CYTOMEL) 5 MCG tablet Take 2 tablets (10 mcg) by mouth daily 30 tablet 3    memantine (NAMENDA) 10 MG tablet Take 1 tablet (10 mg) by mouth daily 30 tablet 3    mirtazapine (REMERON) 15 MG tablet Take 15 mg by mouth at bedtime      Multiple Vitamin (TAB-A-JENNIFER) TABS TAKE 1 TABLET BY MOUTH ONCE DAILY 28 tablet 11    nystatin (MYCOSTATIN) 262805 UNIT/GM external powder       psyllium (METAMUCIL/KONSYL) 58.6 % powder Take 1 tspn in liquid daily      rosuvastatin (CRESTOR) 10 MG tablet Take 1 tablet (10 mg) by mouth daily 30 tablet 0    sacubitril-valsartan (ENTRESTO) 49-51 MG per tablet Take 1 tablet by mouth every morning AND 2 tablets every evening. Take this dose for 2  "weeks. After 2 weeks, increase Entresto to  mg twice daily. 180 tablet 3    spironolactone (ALDACTONE) 25 MG tablet Take 0.5 tablets (12.5 mg) by mouth daily 45 tablet 3     No current facility-administered medications for this visit.      Vitals:  /66   Pulse 69   Ht 1.515 m (4' 11.65\")   Wt 67.4 kg (148 lb 9.6 oz)   SpO2 98%   BMI 29.36 kg/m      Exam:  GENERAL APPEARANCE: alert and no distress  SKIN: warm and dry, no visible rash  Heart: S1S2, no murmur  Lungs: CTA  Extremities: no LE edema    LABS:   CMP  Recent Labs   Lab Test 01/10/25  0812 11/19/24  1227 09/17/24  1041 07/02/24  0851 11/24/21  1525 06/04/21  1033 05/17/21  1401 11/03/20  1225 10/23/20  0834    139 140 140   < > 142 140 144 141   POTASSIUM 4.4 4.4 4.4 4.2   < > 3.4 4.1 4.3 3.8   CHLORIDE 104 102 104 102   < > 106 106 111* 105   CO2 25 26 27 28   < > 30 33* 28 32   ANIONGAP 11 11 9 10   < > 6 1* 5 4   * 94 93 129*   < > 103* 92 100* 88   BUN 37.1* 39.5* 28.1* 29.4*   < > 27 23 23 21   CR 1.55* 1.80* 1.48* 1.53*   < > 1.50* 1.39* 1.38* 1.52*   GFRESTIMATED 32* 27* 34* 33*   < > 32* 35* 35* 31*   GFRESTBLACK  --   --   --   --   --  37* 40* 41* 36*   PRINCE 9.5 9.5 9.4 9.6   < > 9.4 9.3 9.5 9.9    < > = values in this interval not displayed.     Recent Labs   Lab Test 11/19/24  1227 05/14/24  1302 04/04/24  1037 12/19/23  1056   BILITOTAL 0.2 0.2 0.3 0.2   ALKPHOS 72 63 66 55   ALT 11 13 18 9   AST 20 20 23 21     CBC  Recent Labs   Lab Test 01/10/25  0812 11/19/24  1227 05/14/24  1302 04/04/24  1037   HGB 13.9 13.1 12.7 13.6   WBC 4.5 6.4 5.7 6.5   RBC 4.34 4.05 3.95 4.19   HCT 43.9 40.6 40.0 42.0   * 100 101* 100   MCH 32.0 32.3 32.2 32.5   MCHC 31.7 32.3 31.8 32.4   RDW 12.5 12.2 12.9 12.7    172 180 190     URINE STUDIES  Recent Labs   Lab Test 01/09/25  0845 11/19/24  1231 10/10/23  1500 10/07/23  1815 11/30/22  1927 01/24/22  1051 12/16/21  1758 08/12/21  1334 07/28/21  1150 07/16/20  1615 " 10/28/19  1314   COLOR Straw Light Yellow Straw Yellow   < > Yellow   < > Yellow Yellow   < > Yellow   APPEARANCE Clear Clear Slightly Cloudy* Slightly Cloudy*   < > Clear   < > Clear Clear   < > Clear   URINEGLC Negative Negative Negative Negative   < > Negative   < > Negative Negative   < > Negative   URINEBILI Negative Negative Negative Negative   < > Negative   < > Negative Negative   < > Negative   URINEKETONE Negative Negative Negative Negative   < > Negative   < > Negative Negative   < > Negative   SG 1.009 1.005 1.007 1.011   < > <=1.005   < > <=1.005 1.010   < > 1.015   UBLD Negative Negative Large* Large*   < > Negative   < > Trace* Trace*   < > Trace*   URINEPH 7.0 6.0 6.0 5.5   < > 7.0   < > 6.0 7.5*   < > 7.5*   PROTEIN Negative Negative 30* 30*   < > Negative   < > Negative 30*   < > Negative   UROBILINOGEN  --   --   --   --   --  0.2  --  0.2 0.2  --  0.2   NITRITE Negative Negative Negative Negative   < > Negative   < > Negative Negative   < > Negative   LEUKEST Negative Negative Large* Small*   < > Negative   < > Negative Small*   < > Negative   RBCU 0 0 27* >182*   < > 0-2   < > 0-2 0-2   < > O - 2   WBCU 2 2 78* 62*   < > 0-5   < > 0-5 5-10*   < > 0 - 5    < > = values in this interval not displayed.     Recent Labs   Lab Test 01/24/22  1051   [facility] 0.29*     PTH  Recent Labs   Lab Test 04/04/24  1037 09/18/23  1450 04/03/23  1428   PTHI 49 54 73*     IRON STUDIES  Recent Labs   Lab Test 01/10/25  0812 01/11/23  1341 10/26/22  0737   IRON  --  62 23*   FEB  --  357 318   IRONSAT  --  17 7*   JANESSA 75 126 19     Migdalia Torres PA-C    Visit length 15 minutes. An additional 15 minutes were spent on date of service in chart review, documentation, and other activities as noted.

## 2025-03-18 LAB
ALBUMIN MFR UR ELPH: 6.1 MG/DL
ALBUMIN SERPL BCG-MCNC: 4.2 G/DL (ref 3.5–5.2)
ANION GAP SERPL CALCULATED.3IONS-SCNC: 14 MMOL/L (ref 7–15)
BUN SERPL-MCNC: 36.7 MG/DL (ref 8–23)
CALCIUM SERPL-MCNC: 9.9 MG/DL (ref 8.8–10.4)
CHLORIDE SERPL-SCNC: 101 MMOL/L (ref 98–107)
CREAT SERPL-MCNC: 1.82 MG/DL (ref 0.51–0.95)
CREAT UR-MCNC: 21.9 MG/DL
EGFRCR SERPLBLD CKD-EPI 2021: 26 ML/MIN/1.73M2
GLUCOSE SERPL-MCNC: 92 MG/DL (ref 70–99)
HCO3 SERPL-SCNC: 23 MMOL/L (ref 22–29)
PHOSPHATE SERPL-MCNC: 3.7 MG/DL (ref 2.5–4.5)
POTASSIUM SERPL-SCNC: 4.8 MMOL/L (ref 3.4–5.3)
PROT/CREAT 24H UR: 0.28 MG/MG CR (ref 0–0.2)
PTH-INTACT SERPL-MCNC: 74 PG/ML (ref 15–65)
SODIUM SERPL-SCNC: 138 MMOL/L (ref 135–145)
VIT D+METAB SERPL-MCNC: 60 NG/ML (ref 20–50)

## 2025-03-20 ENCOUNTER — TELEPHONE (OUTPATIENT)
Dept: CARDIOLOGY | Facility: CLINIC | Age: 89
End: 2025-03-20
Payer: MEDICARE

## 2025-03-20 NOTE — TELEPHONE ENCOUNTER
----- Message from Violet King sent at 3/19/2025  2:46 PM CDT -----  Labs drawn for nephrology. No changes from the cardiac end for now unless significant weight or symptom change- would recheck 4/10 when she see's me in clinic

## 2025-03-20 NOTE — TELEPHONE ENCOUNTER
Spoke with patient. Reviewed labs. She reports feeling fine, no worsening heart failure symptoms. Advised to follow up in April as scheduled.   Verona Agee RN

## 2025-03-23 ENCOUNTER — HEALTH MAINTENANCE LETTER (OUTPATIENT)
Age: 89
End: 2025-03-23

## 2025-03-25 ENCOUNTER — LAB REQUISITION (OUTPATIENT)
Dept: LAB | Facility: CLINIC | Age: 89
End: 2025-03-25
Payer: MEDICARE

## 2025-03-25 DIAGNOSIS — R31.9 HEMATURIA, UNSPECIFIED: ICD-10-CM

## 2025-03-25 LAB
ALBUMIN UR-MCNC: 100 MG/DL
APPEARANCE UR: ABNORMAL
BILIRUB UR QL STRIP: NEGATIVE
COLOR UR AUTO: ABNORMAL
GLUCOSE UR STRIP-MCNC: NEGATIVE MG/DL
HGB UR QL STRIP: ABNORMAL
KETONES UR STRIP-MCNC: NEGATIVE MG/DL
LEUKOCYTE ESTERASE UR QL STRIP: ABNORMAL
NITRATE UR QL: NEGATIVE
PH UR STRIP: 6.5 [PH] (ref 5–7)
RBC URINE: >182 /HPF
SP GR UR STRIP: 1.01 (ref 1–1.03)
TRANSITIONAL EPI: <1 /HPF
UROBILINOGEN UR STRIP-MCNC: NORMAL MG/DL
WBC URINE: >182 /HPF

## 2025-03-25 PROCEDURE — 81001 URINALYSIS AUTO W/SCOPE: CPT | Mod: ORL

## 2025-03-25 PROCEDURE — 81003 URINALYSIS AUTO W/O SCOPE: CPT | Mod: ORL | Performed by: NURSE PRACTITIONER

## 2025-03-25 PROCEDURE — 87086 URINE CULTURE/COLONY COUNT: CPT | Mod: ORL

## 2025-03-25 PROCEDURE — 87086 URINE CULTURE/COLONY COUNT: CPT | Mod: ORL | Performed by: NURSE PRACTITIONER

## 2025-03-26 LAB — BACTERIA UR CULT: NORMAL

## 2025-04-02 ENCOUNTER — LAB REQUISITION (OUTPATIENT)
Dept: LAB | Facility: CLINIC | Age: 89
End: 2025-04-02
Payer: MEDICARE

## 2025-04-02 DIAGNOSIS — N18.9 CHRONIC KIDNEY DISEASE, UNSPECIFIED: ICD-10-CM

## 2025-04-04 ENCOUNTER — LAB REQUISITION (OUTPATIENT)
Dept: LAB | Facility: CLINIC | Age: 89
End: 2025-04-04
Payer: MEDICARE

## 2025-04-04 DIAGNOSIS — R31.0 GROSS HEMATURIA: ICD-10-CM

## 2025-04-04 DIAGNOSIS — R30.9 PAINFUL MICTURITION, UNSPECIFIED: ICD-10-CM

## 2025-04-04 LAB
ALBUMIN UR-MCNC: 20 MG/DL
ANION GAP SERPL CALCULATED.3IONS-SCNC: 11 MMOL/L (ref 7–15)
APPEARANCE UR: ABNORMAL
BACTERIA #/AREA URNS HPF: ABNORMAL /HPF
BILIRUB UR QL STRIP: NEGATIVE
BUN SERPL-MCNC: 42.1 MG/DL (ref 8–23)
CALCIUM SERPL-MCNC: 9.6 MG/DL (ref 8.8–10.4)
CHLORIDE SERPL-SCNC: 102 MMOL/L (ref 98–107)
COLOR UR AUTO: ABNORMAL
CREAT SERPL-MCNC: 1.85 MG/DL (ref 0.51–0.95)
EGFRCR SERPLBLD CKD-EPI 2021: 26 ML/MIN/1.73M2
ERYTHROCYTE [DISTWIDTH] IN BLOOD BY AUTOMATED COUNT: 13 % (ref 10–15)
GLUCOSE SERPL-MCNC: 130 MG/DL (ref 70–99)
GLUCOSE UR STRIP-MCNC: NEGATIVE MG/DL
HCO3 SERPL-SCNC: 25 MMOL/L (ref 22–29)
HCT VFR BLD AUTO: 43.7 % (ref 35–47)
HGB BLD-MCNC: 14 G/DL (ref 11.7–15.7)
HGB UR QL STRIP: ABNORMAL
KETONES UR STRIP-MCNC: NEGATIVE MG/DL
LEUKOCYTE ESTERASE UR QL STRIP: ABNORMAL
MCH RBC QN AUTO: 32.6 PG (ref 26.5–33)
MCHC RBC AUTO-ENTMCNC: 32 G/DL (ref 31.5–36.5)
MCV RBC AUTO: 102 FL (ref 78–100)
MUCOUS THREADS #/AREA URNS LPF: PRESENT /LPF
NITRATE UR QL: NEGATIVE
PH UR STRIP: 7 [PH] (ref 5–7)
PLATELET # BLD AUTO: 194 10E3/UL (ref 150–450)
POTASSIUM SERPL-SCNC: 4.9 MMOL/L (ref 3.4–5.3)
RBC # BLD AUTO: 4.29 10E6/UL (ref 3.8–5.2)
RBC URINE: 26 /HPF
SODIUM SERPL-SCNC: 138 MMOL/L (ref 135–145)
SP GR UR STRIP: 1.01 (ref 1–1.03)
TRANSITIONAL EPI: <1 /HPF
UROBILINOGEN UR STRIP-MCNC: NORMAL MG/DL
WBC # BLD AUTO: 9.8 10E3/UL (ref 4–11)
WBC CLUMPS #/AREA URNS HPF: PRESENT /HPF
WBC URINE: >182 /HPF

## 2025-04-04 PROCEDURE — 80048 BASIC METABOLIC PNL TOTAL CA: CPT | Mod: ORL

## 2025-04-04 PROCEDURE — 85027 COMPLETE CBC AUTOMATED: CPT | Mod: ORL | Performed by: NURSE PRACTITIONER

## 2025-04-04 PROCEDURE — 36415 COLL VENOUS BLD VENIPUNCTURE: CPT | Mod: ORL | Performed by: NURSE PRACTITIONER

## 2025-04-04 PROCEDURE — 81001 URINALYSIS AUTO W/SCOPE: CPT | Mod: ORL | Performed by: NURSE PRACTITIONER

## 2025-04-04 PROCEDURE — 87086 URINE CULTURE/COLONY COUNT: CPT | Mod: ORL | Performed by: NURSE PRACTITIONER

## 2025-04-04 PROCEDURE — 85027 COMPLETE CBC AUTOMATED: CPT | Mod: ORL

## 2025-04-04 PROCEDURE — 36415 COLL VENOUS BLD VENIPUNCTURE: CPT | Mod: ORL

## 2025-04-04 PROCEDURE — P9604 ONE-WAY ALLOW PRORATED TRIP: HCPCS | Mod: ORL

## 2025-04-04 PROCEDURE — 80048 BASIC METABOLIC PNL TOTAL CA: CPT | Mod: ORL | Performed by: NURSE PRACTITIONER

## 2025-04-04 PROCEDURE — P9604 ONE-WAY ALLOW PRORATED TRIP: HCPCS | Mod: ORL | Performed by: NURSE PRACTITIONER

## 2025-04-05 LAB — BACTERIA UR CULT: NORMAL

## 2025-04-07 DIAGNOSIS — I50.20 HEART FAILURE WITH REDUCED EJECTION FRACTION, NYHA CLASS III (H): Primary | ICD-10-CM

## 2025-04-09 NOTE — PROGRESS NOTES
"HPI:   Ms. Miller is a 89 year old female with a past medical history including HFpEF now HFrEF, HTN, HLD, significant CAD history (PTCA 1996, LAD and RCA stenting 2003, s/p LAD PCI 2020, recent cath 10/2022 with patent stents), CKD, chronic anemia and severe aortic valvular stenosis s/p (TAVR) with a 26mm Arndt Janis Ultra on 9/12/22 by Morro Burrows. Her post TAVR echo showed mean PG of 7 mmHg with trace PVL. She was noted to have 60% left femoral stenosis post procedure, but had adequate flow on peripheral angiogram. She was also noted to have new LBBB post procedure and discharged on a Cardionet which showed no high degree AVB.. Presents to clinic for CORE follow-up.    Her EF has been slowly declining since her TAVR. Was 55-60% pre TAVR, dropped to 40-45%, now 30-35% despite GDMT.     She last saw Dr. Zuniga in 11/19/24. Feeling well. No medication changes.    Today  She doesn't have SOB at rest. No significant GREGORY, she is able to walk \" a ways\". She does exercersises 6 times a week- does some peddling and some chair and standing exercises, no GREGORY with this. No LE edema. No abdominal edema. No orthopnea or PND. No lightheadedness or dizziness no presyncope or syncope. No falls. No palpitations. No CP. Appetite is fine.    Her systolic Bps have ranged from 111//83 at home. Weights are stable at home around 146.6 yesterday. Hrs are 58-70.    Cardiac Medications  ASA 81 mg daily  Amlodipine 5 mg at bedtime  Entresto  mg BID  Lasix 30 mg daily  Aldactone 12.5 mg daily  Imdur 60 mg qAM  Crestor 10 mg daily    PAST MEDICAL HISTORY:  Past Medical History:   Diagnosis Date    Aortic valvar stenosis 7/2010    mild    Asthma     Bipolar disorder (H)     CAD (coronary artery disease)     s/p angioplasty    Celiac disease     Colon polyps     Colon polyps 2012    every 3 year colonoscopy     Depression     High cholesterol     HTN     Mitral insufficiency     Pulmonary HTN (H)     mild    Tremors 7/10    " drug induced from antidepressants    Tricuspid insufficiency        FAMILY HISTORY:  Family History   Problem Relation Age of Onset    Asthma Mother     Cerebrovascular Disease Mother     Arthritis Mother     Depression Mother     Lipids Sister     Hypertension Sister     Heart Disease Sister     Lipids Sister     Hypertension Sister     Lipids Sister     Breast Cancer Sister        SOCIAL HISTORY:  Social History     Socioeconomic History    Marital status:      Spouse name: Adrien    Number of children: 2    Years of education: 16   Occupational History     Employer: RETIRED     Comment: Retired in 2002.    Tobacco Use    Smoking status: Never    Smokeless tobacco: Never   Vaping Use    Vaping status: Never Used   Substance and Sexual Activity    Alcohol use: No     Alcohol/week: 0.0 standard drinks of alcohol     Types: 1 Standard drinks or equivalent per week    Drug use: No    Sexual activity: Not Currently     Partners: Male     Social Determinants of Health     Financial Resource Strain: Medium Risk (5/4/2022)    Received from [facility]    Financial Resource Strain     Financial Concerns: 2   Transportation Needs: Unknown (2/4/2021)    Received from [facility]    Transportation Needs     Lack of Transportation (Medical): 0   Physical Activity: Unknown (2/4/2021)    Received from [facility]    Physical Activity     Calculated Minutes of Exercise per Week:: 0   Stress: Unknown (2/4/2021)    Received from [facility]    Stress     Patient Reported Major Stressor(s): 0     Patient Reported Strengths: 1     Patient Reported Source(s) of Support: 1    Received from [facility], [facility]    Social Connections    Interpersonal Safety: Not At Risk (7/11/2023)    Humiliation, Afraid, Rape, and Kick questionnaire     Fear of Current or Ex-Partner: No     Emotionally Abused: No     Physically Abused: No     Sexually Abused: No   Housing Stability: Unknown (2/4/2021)    Received from [facility]    Housing Stability     Housing Concerns: 0       CURRENT MEDICATIONS:  Current Outpatient Medications   Medication Sig Dispense Refill    acetaminophen (TYLENOL) 500 MG tablet Take 1 tablet (500 mg) by mouth daily Continue previously ordered PRN tylenol order as well 30 tablet 11    acetaminophen (TYLENOL) 500 MG tablet Take 1 tablet (500 mg) by mouth every 6 hours as needed for pain 60 tablet 4    amLODIPine (NORVASC) 5 MG tablet Take 1 tablet (5 mg) by mouth at bedtime. 90 tablet 1    amoxicillin (AMOXIL) 500 MG capsule  (Patient not taking: Reported on 4/10/2025)      aspirin (ASA) 81 MG EC tablet Take 1 tablet (81 mg) by mouth daily 30 tablet 0    azelastine (ASTEPRO) 0.15 % nasal spray USE 1 SPRAY IN EACH NOSTRIL ONCE A DAY 30 mL 11    calcium citrate (CITRACAL) 950 (200 Ca) MG tablet TAKE 1 TABLET BY MOUTH ONCE DAILY 30 tablet 11    desvenlafaxine (PRISTIQ) 100 MG 24 hr tablet Take 100 mg by mouth daily      fluticasone-salmeterol (ADVAIR DISKUS) 500-50 MCG/ACT inhaler INHALE 1 PUFF BY MOUTH TWICE DAILY 60 each 0    folic acid (FOLVITE) 400 MCG tablet TAKE 1 TABLET BY MOUTH ONCE DAILY 28 tablet 11    furosemide (LASIX) 20 MG tablet Take 1.5 tablets (30 mg) by mouth daily 135 tablet 3    iron polysaccharides (NIFEREX) 150 MG capsule Take 1 capsule (150 mg) by mouth daily 30 capsule 1    isosorbide mononitrate (IMDUR) 30 MG 24 hr tablet Take 2 tablets (60 mg) by mouth daily 60 tablet 11    lamoTRIgine (LAMICTAL) 25 MG tablet Take 25 mg by mouth daily with 200 mg tablet for a total dose of 225 mg      LAMOTRIGINE 200 MG PO TABS Take 200 mg by mouth daily with 25 mg tablet for a  total dose of 225 mg      levothyroxine (SYNTHROID/LEVOTHROID) 50 MCG tablet TAKE 1 TABLET BY MOUTH ONCE DAILY 28 tablet 11    liothyronine (CYTOMEL) 5 MCG tablet Take 2 tablets (10 mcg) by mouth daily 30 tablet 3    memantine (NAMENDA) 10 MG tablet Take 1 tablet (10 mg) by mouth daily 30 tablet 3    mirtazapine (REMERON) 15 MG tablet Take 15 mg by mouth at bedtime      Multiple Vitamin (TAB-A-JENNIFER) TABS TAKE 1 TABLET BY MOUTH ONCE DAILY 28 tablet 11    nystatin (MYCOSTATIN) 745973 UNIT/GM external powder       psyllium (METAMUCIL/KONSYL) 58.6 % powder Take 1 tspn in liquid daily      rosuvastatin (CRESTOR) 10 MG tablet Take 1 tablet (10 mg) by mouth daily 30 tablet 0    sacubitril-valsartan (ENTRESTO) 49-51 MG per tablet Take 1 tablet by mouth every morning AND 2 tablets every evening. Take this dose for 2 weeks. After 2 weeks, increase Entresto to  mg twice daily. 180 tablet 3     No current facility-administered medications for this visit.       ROS:   Refer to HPI    EXAM:  BP (!) 160/82 (BP Location: Left arm, Patient Position: Chair, Cuff Size: Adult Regular)   Pulse 69   Wt 68.1 kg (150 lb 1.6 oz)   SpO2 97%   BMI 29.66 kg/m    GENERAL: Appears comfortable, in no acute distress.   HEENT: Eye symmetrical, no discharge or icterus bilaterally.   CV: RRR, +S1S2, no murmur, rub, or gallop. JVP ~6 mmg Hg.   RESPIRATORY: Respirations regular, even, and unlabored. Lungs CTA throughout.   GI: Soft and non distended   EXTREMITIES: No peripheral edema. All extremities are warm and well perfused  NEUROLOGIC: Alert and interacting appropriately.   SKIN: No jaundice.    Labs, reviewed with patient in clinic today:  CBC RESULTS:  Lab Results   Component Value Date    WBC 9.8 04/04/2025    WBC 4.6 05/17/2021    RBC 4.29 04/04/2025    RBC 3.97 05/17/2021    HGB 14.0 04/04/2025    HGB 12.9 05/17/2021    HCT 43.7 04/04/2025    HCT 40.3 05/17/2021     (H) 04/04/2025     (H) 05/17/2021    MCH 32.6  04/04/2025    MCH 32.5 05/17/2021    MCHC 32.0 04/04/2025    MCHC 32.0 05/17/2021    RDW 13.0 04/04/2025    RDW 12.7 05/17/2021     04/04/2025     05/17/2021       CMP RESULTS:  Lab Results   Component Value Date     04/10/2025     06/04/2021    POTASSIUM 4.3 04/10/2025    POTASSIUM 4.6 10/05/2022    POTASSIUM 3.4 06/04/2021    CHLORIDE 103 04/10/2025    CHLORIDE 103 10/05/2022    CHLORIDE 106 06/04/2021    CO2 27 04/10/2025    CO2 27 10/05/2022    CO2 30 06/04/2021    ANIONGAP 12 04/10/2025    ANIONGAP 7 10/05/2022    ANIONGAP 6 06/04/2021    GLC 93 04/10/2025    GLC 85 01/31/2023    GLC 98 10/05/2022     (H) 06/04/2021    BUN 40.1 (H) 04/10/2025    BUN 39 (H) 10/05/2022    BUN 27 06/04/2021    CR 1.95 (H) 04/10/2025    CR 1.50 (H) 06/04/2021    GFRESTIMATED 24 (L) 04/10/2025    GFRESTIMATED 32 (L) 06/04/2021    GFRESTBLACK 37 (L) 06/04/2021    PRINCE 9.8 04/10/2025    PRINCE 9.4 06/04/2021    BILITOTAL 0.2 11/19/2024    BILITOTAL 0.3 05/17/2021    ALBUMIN 4.2 03/17/2025    ALBUMIN 3.6 09/27/2022    ALBUMIN 3.4 05/17/2021    ALKPHOS 72 11/19/2024    ALKPHOS 57 05/17/2021    ALT 11 11/19/2024    ALT 38 05/17/2021    AST 20 11/19/2024    AST 27 05/17/2021        INR RESULTS:  Lab Results   Component Value Date    INR 1.12 02/22/2023    INR 1.05 11/03/2020       Lab Results   Component Value Date    MAG 2.5 (H) 02/27/2023    MAG 2.4 (H) 10/28/2019     Lab Results   Component Value Date    NTBNPI 11,774 (H) 02/22/2023     Lab Results   Component Value Date    NTBNP 920 11/19/2024    NTBNP 462 (H) 05/17/2021       Diagnostics:   12/8/23 PYP testing  Impression:  1. Imaging findings are not suggestive of transthyretin cardiac  amyloidosis.     11/22/23 cMRI  Clinical history: 88 F CAD s/p PCI to LAD and RCA, cath 10/2022 with non-obs CAD, htn, severe MAC, AS s/p  TAVR 09/2022, LBBB after TAVR, HALT resolved on subsequent CT, newly reduced LVEF 35% after TAVR  Comparison CMR: none     1. The LV  is normal in cavity size with moderate concentric LVH. The global systolic function is severely  reduced. The LVEF is 28%. There is basal septal akinesis with moderate diffuse hypokinesis. There is  paradoxical septal motion due to LBBB.      2. The RV is normal in cavity size. The global systolic function is mildly reduced. The RVEF is 43%.      3. Left atrium is moderately enlarged.     4. There is a TAVR prosthesis that cannot be further evaluated due to artifact. There is severe MAC without  significant MR.      5. Late gadolinium enhancement imaging shows patchy mid-myocardial enhancement involving the basal  segments.      6. Regadenoson stress perfusion imaging shows no ischemia.     7. There is no pericardial effusion or thickening.     8. There is no intracardiac thrombus.     CONCLUSIONS: No myocardial ischemia. NICM, LVEF 28% and RVEF 43%, with LBBB and moderate LVH. Moderate  basal patchy fibrosis that is likely from AS, or could represent amyloidosis. Consider PYP scan.       CORE EXAM    Assessment and Plan:   Ms. Miller is a 89 year old female with a past medical history including HFpEF now HFrEF, HTN, HLD, significant CAD history (PTCA 1996, LAD and RCA stenting 2003, s/p LAD PCI 2020, recent cath 10/2022 with patent stents), CKD, chronic anemia and severe aortic valvular stenosis s/p (TAVR) with a 26mm Arndt Janis Ultra on 9/12/22 by Morro Burrows. Her post TAVR echo showed mean PG of 7 mmHg with trace PVL. She was noted to have 60% left femoral stenosis post procedure, but had adequate flow on peripheral angiogram. She was also noted to have new LBBB post procedure and discharged on a Cardionet which showed no high degree AVB.. Presents to clinic for CORE follow-up.    We have discussed SGLT2i in the past and today again. Weighing the risk/benefit right now, we are going to hold off, but if EF is lower on today's echo she would want to consider (echo still pending). Hasn't had UTIs recently,  but if we start an sglt2i in the future she will watch for those.     Stopping aldactone d/t renal function today     HFpEF, now HFrEF EF 30-35%:  EF had been preserved 55-60%,  2/2023 45-50%, 7/2023 35-40%, now 30-35%. Unclear etiology, cath 10/2022 with patent stents, thought possibly related to poorly controlled HTN. Amyloid work-up has been negative.    - Volume status: Euvolemic, on lasix 30 mg daily  - ARNI: Continue entresto  mg BID  - Additional afterload: Amlodipine 5 mg daily. Per notes has had intolerances to hydralazine.  - MRA: stop aldactone d/t GFR <30  - Beta Blocker: per notes, multiple prior intolerances to BB   - SGLT2i: see conversation above, deferring for now, but if EF is lower on today's echo we would discus again at that time  - ICD: was discussed with Dr. Toby Torres 12/29/24 and this was not recommended based on age. They did discuss crt-p rather than crt-d, but that was not persued    Aortic stenosis s/p TAVR 9/2022:  - Yearly echos with primary cardiologist or VALVE team- next echo is today, pending  - CT heart shows resolution of HALT   - OFF Eliquis, continue ASA 81 mg lifelong   - SBE prophylaxis lifelong    Coronary artery disease w/o angina:  HLD:  - Continue ASA 81 mg daily lifelong  - Continue crestor  - Continue exercise and heart healthy diet    HTN, well controlled  - Multiple intolerances to BB, hydralazine   - Enstresto and amlodipine for BP control   - Decreased vitals at home from daily to 1-2 times weekly d/t stability and cost     PAD:   - Continue ASA 81 mg lifelong  - Vascular consult should she develop recurrent claudication    CKD Stage 4, baseline has been worsening this year  - Nephrology appointment scheduled in August  - Stopping aldactone as above    Osteoarthritis   - Okay to use Voltaren gel, no other NSAIDS    Follow up   - ECHO today is pending  - BMP in 1 week (worsening Cr, stopped aldactone)  - CORE in 3 months  - Dr. Zuniga in 6-7 months    - I spent 2  minutes spent reviewing prior records and results in preparation for todays visit, 16 minutes face to face with the patient and an additional 5 minutes coordinating care and completing documentation on the day of service    The longitudinal plan of care for the diagnosis(es)/condition(s) as documented were addressed during this visit. Due to the added complexity in care, I will continue to support Kamryn in the subsequent management and with ongoing continuity of care.       Violet King PA-C  [facility]          CC  SELF, REFERRED

## 2025-04-10 ENCOUNTER — OFFICE VISIT (OUTPATIENT)
Dept: CARDIOLOGY | Facility: CLINIC | Age: 89
End: 2025-04-10
Attending: INTERNAL MEDICINE
Payer: MEDICARE

## 2025-04-10 ENCOUNTER — LAB (OUTPATIENT)
Dept: LAB | Facility: CLINIC | Age: 89
End: 2025-04-10
Attending: INTERNAL MEDICINE
Payer: MEDICARE

## 2025-04-10 VITALS
WEIGHT: 150.1 LBS | BODY MASS INDEX: 29.66 KG/M2 | DIASTOLIC BLOOD PRESSURE: 72 MMHG | HEART RATE: 69 BPM | OXYGEN SATURATION: 97 % | SYSTOLIC BLOOD PRESSURE: 118 MMHG

## 2025-04-10 DIAGNOSIS — I50.20 HEART FAILURE WITH REDUCED EJECTION FRACTION, NYHA CLASS III (H): ICD-10-CM

## 2025-04-10 LAB
ANION GAP SERPL CALCULATED.3IONS-SCNC: 12 MMOL/L (ref 7–15)
BUN SERPL-MCNC: 40.1 MG/DL (ref 8–23)
CALCIUM SERPL-MCNC: 9.8 MG/DL (ref 8.8–10.4)
CHLORIDE SERPL-SCNC: 103 MMOL/L (ref 98–107)
CREAT SERPL-MCNC: 1.95 MG/DL (ref 0.51–0.95)
EGFRCR SERPLBLD CKD-EPI 2021: 24 ML/MIN/1.73M2
GLUCOSE SERPL-MCNC: 93 MG/DL (ref 70–99)
HCO3 SERPL-SCNC: 27 MMOL/L (ref 22–29)
POTASSIUM SERPL-SCNC: 4.3 MMOL/L (ref 3.4–5.3)
SODIUM SERPL-SCNC: 142 MMOL/L (ref 135–145)

## 2025-04-10 PROCEDURE — G0463 HOSPITAL OUTPT CLINIC VISIT: HCPCS | Performed by: PHYSICIAN ASSISTANT

## 2025-04-10 ASSESSMENT — PAIN SCALES - GENERAL: PAINLEVEL_OUTOF10: NO PAIN (0)

## 2025-04-10 NOTE — LETTER
"4/10/2025      RE: Kamryn Miller  [address]  [address]  [place] [address]       Dear Colleague,    Thank you for the opportunity to participate in the care of your patient, Kamryn Miller, at the [facility] [place] at [facility]. Please see a copy of my visit note below.    HPI:   Ms. Miller is a 89 year old female with a past medical history including HFpEF now HFrEF, HTN, HLD, significant CAD history (PTCA 1996, LAD and RCA stenting 2003, s/p LAD PCI 2020, recent cath 10/2022 with patent stents), CKD, chronic anemia and severe aortic valvular stenosis s/p (TAVR) with a 26mm Arndt Janis Ultra on 9/12/22 by Morro Burrows. Her post TAVR echo showed mean PG of 7 mmHg with trace PVL. She was noted to have 60% left femoral stenosis post procedure, but had adequate flow on peripheral angiogram. She was also noted to have new LBBB post procedure and discharged on a Cardionet which showed no high degree AVB.. Presents to clinic for CORE follow-up.    Her EF has been slowly declining since her TAVR. Was 55-60% pre TAVR, dropped to 40-45%, now 30-35% despite GDMT.     She last saw Dr. Zuniga in 11/19/24. Feeling well. No medication changes.    Today  She doesn't have SOB at rest. No significant GREGORY, she is able to walk \" a ways\". She does exercersises 6 times a week- does some peddling and some chair and standing exercises, no GREGORY with this. No LE edema. No abdominal edema. No orthopnea or PND. No lightheadedness or dizziness no presyncope or syncope. No falls. No palpitations. No CP. Appetite is fine.    Her systolic Bps have ranged from 111//83 at home. Weights are stable at home around 146.6 yesterday. Hrs are 58-70.    Cardiac Medications  ASA 81 mg daily  Amlodipine 5 mg at bedtime  Entresto  mg BID  Lasix 30 mg daily  Aldactone 12.5 mg daily  Imdur 60 mg qAM  Crestor 10 mg daily    PAST MEDICAL HISTORY:  Past Medical " History:   Diagnosis Date     Aortic valvar stenosis 7/2010    mild     Asthma      Bipolar disorder (H)      CAD (coronary artery disease)     s/p angioplasty     Celiac disease      Colon polyps      Colon polyps 2012    every 3 year colonoscopy      Depression      High cholesterol      HTN      Mitral insufficiency      Pulmonary HTN (H)     mild     Tremors 7/10    drug induced from antidepressants     Tricuspid insufficiency        FAMILY HISTORY:  Family History   Problem Relation Age of Onset     Asthma Mother      Cerebrovascular Disease Mother      Arthritis Mother      Depression Mother      Lipids Sister      Hypertension Sister      Heart Disease Sister      Lipids Sister      Hypertension Sister      Lipids Sister      Breast Cancer Sister        SOCIAL HISTORY:  Social History     Socioeconomic History     Marital status:      Spouse name: Adrien     Number of children: 2     Years of education: 16   Occupational History     Employer: RETIRED     Comment: Retired in 2002.    Tobacco Use     Smoking status: Never     Smokeless tobacco: Never   Vaping Use     Vaping status: Never Used   Substance and Sexual Activity     Alcohol use: No     Alcohol/week: 0.0 standard drinks of alcohol     Types: 1 Standard drinks or equivalent per week     Drug use: No     Sexual activity: Not Currently     Partners: Male     Social Determinants of Health     Financial Resource Strain: Medium Risk (5/4/2022)    Received from [facility]    Financial Resource Strain      Financial Concerns: 2   Transportation Needs: Unknown (2/4/2021)    Received from [facility]    Transportation Needs      Lack of Transportation (Medical): 0   Physical Activity: Unknown (2/4/2021)    Received from [facility]    Physical Activity      Calculated Minutes of Exercise per  Week:: 0   Stress: Unknown (2/4/2021)    [facility]    Stress      Patient Reported Major Stressor(s): 0      Patient Reported Strengths: 1      Patient Reported Source(s) of Support: 1    Received from [facility]    Social Connections   Interpersonal Safety: Not At Risk (7/11/2023)    Humiliation, Afraid, Rape, and Kick questionnaire      Fear of Current or Ex-Partner: No      Emotionally Abused: No      Physically Abused: No      Sexually Abused: No   Housing Stability: Unknown (2/4/2021)    [facility]    Housing Stability      Housing Concerns: 0       CURRENT MEDICATIONS:  Current Outpatient Medications   Medication Sig Dispense Refill     acetaminophen (TYLENOL) 500 MG tablet Take 1 tablet (500 mg) by mouth daily Continue previously ordered PRN tylenol order as well 30 tablet 11     acetaminophen (TYLENOL) 500 MG tablet Take 1 tablet (500 mg) by mouth every 6 hours as needed for pain 60 tablet 4     amLODIPine (NORVASC) 5 MG tablet Take 1 tablet (5 mg) by mouth at bedtime. 90 tablet 1     amoxicillin (AMOXIL) 500 MG capsule  (Patient not taking: Reported on 4/10/2025)       aspirin (ASA) 81 MG EC tablet Take 1 tablet (81 mg) by mouth daily 30 tablet 0     azelastine (ASTEPRO) 0.15 % nasal spray USE 1 SPRAY IN EACH NOSTRIL ONCE A DAY 30 mL 11     calcium citrate (CITRACAL) 950 (200 Ca) MG tablet TAKE 1 TABLET BY MOUTH ONCE DAILY 30 tablet 11     desvenlafaxine (PRISTIQ) 100 MG 24 hr tablet Take 100 mg by mouth daily       fluticasone-salmeterol (ADVAIR DISKUS) 500-50 MCG/ACT inhaler INHALE 1 PUFF BY MOUTH TWICE DAILY 60 each 0     folic acid (FOLVITE) 400 MCG tablet TAKE 1 TABLET BY MOUTH ONCE DAILY 28 tablet 11     furosemide (LASIX) 20 MG tablet Take 1.5 tablets (30 mg) by mouth daily 135  tablet 3     iron polysaccharides (NIFEREX) 150 MG capsule Take 1 capsule (150 mg) by mouth daily 30 capsule 1     isosorbide mononitrate (IMDUR) 30 MG 24 hr tablet Take 2 tablets (60 mg) by mouth daily 60 tablet 11     lamoTRIgine (LAMICTAL) 25 MG tablet Take 25 mg by mouth daily with 200 mg tablet for a total dose of 225 mg       LAMOTRIGINE 200 MG PO TABS Take 200 mg by mouth daily with 25 mg tablet for a total dose of 225 mg       levothyroxine (SYNTHROID/LEVOTHROID) 50 MCG tablet TAKE 1 TABLET BY MOUTH ONCE DAILY 28 tablet 11     liothyronine (CYTOMEL) 5 MCG tablet Take 2 tablets (10 mcg) by mouth daily 30 tablet 3     memantine (NAMENDA) 10 MG tablet Take 1 tablet (10 mg) by mouth daily 30 tablet 3     mirtazapine (REMERON) 15 MG tablet Take 15 mg by mouth at bedtime       Multiple Vitamin (TAB-A-JENNIFER) TABS TAKE 1 TABLET BY MOUTH ONCE DAILY 28 tablet 11     nystatin (MYCOSTATIN) 930368 UNIT/GM external powder        psyllium (METAMUCIL/KONSYL) 58.6 % powder Take 1 tspn in liquid daily       rosuvastatin (CRESTOR) 10 MG tablet Take 1 tablet (10 mg) by mouth daily 30 tablet 0     sacubitril-valsartan (ENTRESTO) 49-51 MG per tablet Take 1 tablet by mouth every morning AND 2 tablets every evening. Take this dose for 2 weeks. After 2 weeks, increase Entresto to  mg twice daily. 180 tablet 3     No current facility-administered medications for this visit.       ROS:   Refer to HPI    EXAM:  BP (!) 160/82 (BP Location: Left arm, Patient Position: Chair, Cuff Size: Adult Regular)   Pulse 69   Wt 68.1 kg (150 lb 1.6 oz)   SpO2 97%   BMI 29.66 kg/m    GENERAL: Appears comfortable, in no acute distress.   HEENT: Eye symmetrical, no discharge or icterus bilaterally.   CV: RRR, +S1S2, no murmur, rub, or gallop. JVP ~6 mmg Hg.   RESPIRATORY: Respirations regular, even, and unlabored. Lungs CTA throughout.   GI: Soft and non distended   EXTREMITIES: No peripheral edema. All extremities are warm and well  perfused  NEUROLOGIC: Alert and interacting appropriately.   SKIN: No jaundice.    Labs, reviewed with patient in clinic today:  CBC RESULTS:  Lab Results   Component Value Date    WBC 9.8 04/04/2025    WBC 4.6 05/17/2021    RBC 4.29 04/04/2025    RBC 3.97 05/17/2021    HGB 14.0 04/04/2025    HGB 12.9 05/17/2021    HCT 43.7 04/04/2025    HCT 40.3 05/17/2021     (H) 04/04/2025     (H) 05/17/2021    MCH 32.6 04/04/2025    MCH 32.5 05/17/2021    MCHC 32.0 04/04/2025    MCHC 32.0 05/17/2021    RDW 13.0 04/04/2025    RDW 12.7 05/17/2021     04/04/2025     05/17/2021       CMP RESULTS:  Lab Results   Component Value Date     04/10/2025     06/04/2021    POTASSIUM 4.3 04/10/2025    POTASSIUM 4.6 10/05/2022    POTASSIUM 3.4 06/04/2021    CHLORIDE 103 04/10/2025    CHLORIDE 103 10/05/2022    CHLORIDE 106 06/04/2021    CO2 27 04/10/2025    CO2 27 10/05/2022    CO2 30 06/04/2021    ANIONGAP 12 04/10/2025    ANIONGAP 7 10/05/2022    ANIONGAP 6 06/04/2021    GLC 93 04/10/2025    GLC 85 01/31/2023    GLC 98 10/05/2022     (H) 06/04/2021    BUN 40.1 (H) 04/10/2025    BUN 39 (H) 10/05/2022    BUN 27 06/04/2021    CR 1.95 (H) 04/10/2025    CR 1.50 (H) 06/04/2021    GFRESTIMATED 24 (L) 04/10/2025    GFRESTIMATED 32 (L) 06/04/2021    GFRESTBLACK 37 (L) 06/04/2021    PRINCE 9.8 04/10/2025    PRINCE 9.4 06/04/2021    BILITOTAL 0.2 11/19/2024    BILITOTAL 0.3 05/17/2021    ALBUMIN 4.2 03/17/2025    ALBUMIN 3.6 09/27/2022    ALBUMIN 3.4 05/17/2021    ALKPHOS 72 11/19/2024    ALKPHOS 57 05/17/2021    ALT 11 11/19/2024    ALT 38 05/17/2021    AST 20 11/19/2024    AST 27 05/17/2021        INR RESULTS:  Lab Results   Component Value Date    INR 1.12 02/22/2023    INR 1.05 11/03/2020       Lab Results   Component Value Date    MAG 2.5 (H) 02/27/2023    MAG 2.4 (H) 10/28/2019     Lab Results   Component Value Date    NTBNPI 11,774 (H) 02/22/2023     Lab Results   Component Value Date    NTBNP 927  11/19/2024    NTBNP 462 (H) 05/17/2021       Diagnostics:   12/8/23 PYP testing  Impression:  1. Imaging findings are not suggestive of transthyretin cardiac  amyloidosis.     11/22/23 cMRI  Clinical history: 88 F CAD s/p PCI to LAD and RCA, cath 10/2022 with non-obs CAD, htn, severe MAC, AS s/p  TAVR 09/2022, LBBB after TAVR, HALT resolved on subsequent CT, newly reduced LVEF 35% after TAVR  Comparison CMR: none     1. The LV is normal in cavity size with moderate concentric LVH. The global systolic function is severely  reduced. The LVEF is 28%. There is basal septal akinesis with moderate diffuse hypokinesis. There is  paradoxical septal motion due to LBBB.      2. The RV is normal in cavity size. The global systolic function is mildly reduced. The RVEF is 43%.      3. Left atrium is moderately enlarged.     4. There is a TAVR prosthesis that cannot be further evaluated due to artifact. There is severe MAC without  significant MR.      5. Late gadolinium enhancement imaging shows patchy mid-myocardial enhancement involving the basal  segments.      6. Regadenoson stress perfusion imaging shows no ischemia.     7. There is no pericardial effusion or thickening.     8. There is no intracardiac thrombus.     CONCLUSIONS: No myocardial ischemia. NICM, LVEF 28% and RVEF 43%, with LBBB and moderate LVH. Moderate  basal patchy fibrosis that is likely from AS, or could represent amyloidosis. Consider PYP scan.       CORE EXAM    Assessment and Plan:   Ms. Miller is a 89 year old female with a past medical history including HFpEF now HFrEF, HTN, HLD, significant CAD history (PTCA 1996, LAD and RCA stenting 2003, s/p LAD PCI 2020, recent cath 10/2022 with patent stents), CKD, chronic anemia and severe aortic valvular stenosis s/p (TAVR) with a 26mm Arndt Janis Ultra on 9/12/22 by oMrro Burrows. Her post TAVR echo showed mean PG of 7 mmHg with trace PVL. She was noted to have 60% left femoral stenosis post procedure,  but had adequate flow on peripheral angiogram. She was also noted to have new LBBB post procedure and discharged on a Cardionet which showed no high degree AVB.. Presents to clinic for CORE follow-up.    We have discussed SGLT2i in the past and today again. Weighing the risk/benefit right now, we are going to hold off, but if EF is lower on today's echo she would want to consider (echo still pending). Hasn't had UTIs recently, but if we start an sglt2i in the future she will watch for those.     Stopping aldactone d/t renal function today     HFpEF, now HFrEF EF 30-35%:  EF had been preserved 55-60%,  2/2023 45-50%, 7/2023 35-40%, now 30-35%. Unclear etiology, cath 10/2022 with patent stents, thought possibly related to poorly controlled HTN. Amyloid work-up has been negative.    - Volume status: Euvolemic, on lasix 30 mg daily  - ARNI: Continue entresto  mg BID  - Additional afterload: Amlodipine 5 mg daily. Per notes has had intolerances to hydralazine.  - MRA: stop aldactone d/t GFR <30  - Beta Blocker: per notes, multiple prior intolerances to BB   - SGLT2i: see conversation above, deferring for now, but if EF is lower on today's echo we would discus again at that time  - ICD: was discussed with Dr. Toby Torres 12/29/24 and this was not recommended based on age. They did discuss crt-p rather than crt-d, but that was not persued    Aortic stenosis s/p TAVR 9/2022:  - Yearly echos with primary cardiologist or VALVE team- next echo is today, pending  - CT heart shows resolution of HALT   - OFF Eliquis, continue ASA 81 mg lifelong   - SBE prophylaxis lifelong    Coronary artery disease w/o angina:  HLD:  - Continue ASA 81 mg daily lifelong  - Continue crestor  - Continue exercise and heart healthy diet    HTN, well controlled  - Multiple intolerances to BB, hydralazine   - Enstresto and amlodipine for BP control   - Decreased vitals at home from daily to 1-2 times weekly d/t stability and cost     PAD:   -  Continue ASA 81 mg lifelong  - Vascular consult should she develop recurrent claudication    CKD Stage 4, baseline has been worsening this year  - Nephrology appointment scheduled in August  - Stopping aldactone as above    Osteoarthritis   - Okay to use Voltaren gel, no other NSAIDS    Follow up   - ECHO today is pending  - BMP in 1 week (worsening Cr, stopped aldactone)  - CORE in 3 months  - Dr. Zuniga in 6-7 months    - I spent 2 minutes spent reviewing prior records and results in preparation for todays visit, 16 minutes face to face with the patient and an additional 5 minutes coordinating care and completing documentation on the day of service    The longitudinal plan of care for the diagnosis(es)/condition(s) as documented were addressed during this visit. Due to the added complexity in care, I will continue to support Kamryn in the subsequent management and with ongoing continuity of care.       Violet King PA-C  [facility]          CC  SELF, REFERRED      Please do not hesitate to contact me if you have any questions/concerns.     Sincerely,     Violet King PA-C

## 2025-04-10 NOTE — NURSING NOTE
Chief Complaint   Patient presents with    Follow Up     RETURN CORE     Vitals were taken and medications reconciled.    Hugh Phillip, EMT  10:53 AM

## 2025-04-10 NOTE — PATIENT INSTRUCTIONS
Take your medicines every day, as directed     Changes made today:    - Stop spironolactone    Monitor Your Weight and Symptoms     Contact us if you:     Gain 2 pounds in one day or 5 pounds in one week  Feel more short of breath  Notice more leg swelling  Feel lightheadeded    Change your lifestyle     Limit Salt or Sodium:  2000 mg  Limit Fluids:  2000 mL or approximately 64 ounces  Eat a Heart Healthy Diet  Low in saturated fats  Stay Active:  Aim to move at least 150 minutes every  week            To Contact us     During Business Hours:  [phone], option # 1      After hours, weekends or holidays:   [phone], Option #4  Ask to speak to the On-Call Cardiologist. Inform them you are a CORE/heart failure patient at the [facility].       Use [employer] allows you to communicate directly with your heart team through secure messaging.  [employer] can be accessed any time on your phone, computer, or tablet.  If you need assistance, we'd be happy to help!             Keep your Heart Appointments:     - Labs (BMP) in 1 week    - CORe in 3 months    - Dr. Zuniga 6-7 months from now      Please consider attending our virtual support group which is held monthly. Please reach out to Gomez at [phone] for more information if you are interested in attending.

## 2025-04-10 NOTE — NURSING NOTE
Called nursing office and left voicemail on nurse line to let them know I faxed orders for labs to be drawn next week and should stop Spironolactone. Asked for call back if any questions.

## 2025-04-10 NOTE — NURSING NOTE
Diet: Patient instructed regarding a heart healthy diet, including discussion of reduced fat and sodium intake. Patient demonstrated understanding of this information and agreed to call with further questions or concerns.  Labs: Patient was given results of the laboratory testing obtained today. Patient was instructed to return for the next laboratory testing in 1 week. Orders faxed to Assisted Living.  Patient demonstrated understanding of this information and agreed to call with further questions or concerns.   Return Appointment: Patient given instructions regarding scheduling next clinic visit. Patient demonstrated understanding of this information and agreed to call with further questions or concerns.  Medication Change: Patient was educated regarding prescribed medication change, including discussion of the indication, administration, side effects, and when to report to MD or RN. Patient demonstrated understanding of this information and agreed to call with further questions or concerns.  Stop Spironolactone.     CORE 3 months   Dr Zuniga 6 months     Patient stated she understood all health information given and agreed to call with further questions or concerns.

## 2025-04-13 ENCOUNTER — HEALTH MAINTENANCE LETTER (OUTPATIENT)
Age: 89
End: 2025-04-13

## 2025-04-15 ENCOUNTER — LAB REQUISITION (OUTPATIENT)
Dept: LAB | Facility: CLINIC | Age: 89
End: 2025-04-15
Payer: MEDICARE

## 2025-04-15 DIAGNOSIS — N18.9 CHRONIC KIDNEY DISEASE, UNSPECIFIED: ICD-10-CM

## 2025-04-18 LAB
ANION GAP SERPL CALCULATED.3IONS-SCNC: 9 MMOL/L (ref 7–15)
BUN SERPL-MCNC: 35.6 MG/DL (ref 8–23)
CALCIUM SERPL-MCNC: 9.4 MG/DL (ref 8.8–10.4)
CHLORIDE SERPL-SCNC: 107 MMOL/L (ref 98–107)
CREAT SERPL-MCNC: 1.64 MG/DL (ref 0.51–0.95)
EGFRCR SERPLBLD CKD-EPI 2021: 30 ML/MIN/1.73M2
GLUCOSE SERPL-MCNC: 95 MG/DL (ref 70–99)
HCO3 SERPL-SCNC: 26 MMOL/L (ref 22–29)
POTASSIUM SERPL-SCNC: 4.8 MMOL/L (ref 3.4–5.3)
SODIUM SERPL-SCNC: 142 MMOL/L (ref 135–145)

## 2025-04-18 PROCEDURE — P9604 ONE-WAY ALLOW PRORATED TRIP: HCPCS | Mod: ORL | Performed by: NURSE PRACTITIONER

## 2025-04-18 PROCEDURE — 80048 BASIC METABOLIC PNL TOTAL CA: CPT | Mod: ORL

## 2025-04-18 PROCEDURE — 80048 BASIC METABOLIC PNL TOTAL CA: CPT | Mod: ORL | Performed by: NURSE PRACTITIONER

## 2025-04-18 PROCEDURE — P9604 ONE-WAY ALLOW PRORATED TRIP: HCPCS | Mod: ORL

## 2025-04-18 PROCEDURE — 36415 COLL VENOUS BLD VENIPUNCTURE: CPT | Mod: ORL

## 2025-04-18 PROCEDURE — 36415 COLL VENOUS BLD VENIPUNCTURE: CPT | Mod: ORL | Performed by: NURSE PRACTITIONER

## 2025-04-18 PROCEDURE — 82310 ASSAY OF CALCIUM: CPT | Mod: ORL | Performed by: NURSE PRACTITIONER

## 2025-04-21 ENCOUNTER — PATIENT OUTREACH (OUTPATIENT)
Dept: CARDIOLOGY | Facility: CLINIC | Age: 89
End: 2025-04-21
Payer: MEDICARE

## 2025-04-21 NOTE — PROGRESS NOTES
Labs reviewed with provider. Creatinine is improved off Entresto. Per LEDY Hopkins- continue off Entresto, no plans to retry at this time.     Called Kamryn to review this. Left voicemail. Will send Kadriana message.

## 2025-04-29 ENCOUNTER — LAB REQUISITION (OUTPATIENT)
Dept: LAB | Facility: CLINIC | Age: 89
End: 2025-04-29
Payer: MEDICARE

## 2025-04-29 DIAGNOSIS — R31.9 HEMATURIA, UNSPECIFIED: ICD-10-CM

## 2025-04-29 LAB
ALBUMIN UR-MCNC: NEGATIVE MG/DL
APPEARANCE UR: CLEAR
BILIRUB UR QL STRIP: NEGATIVE
COLOR UR AUTO: NORMAL
GLUCOSE UR STRIP-MCNC: NEGATIVE MG/DL
HGB UR QL STRIP: NEGATIVE
KETONES UR STRIP-MCNC: NEGATIVE MG/DL
LEUKOCYTE ESTERASE UR QL STRIP: NEGATIVE
NITRATE UR QL: NEGATIVE
PH UR STRIP: 6.5 [PH] (ref 5–7)
RBC URINE: 0 /HPF
SP GR UR STRIP: 1.01 (ref 1–1.03)
TRANSITIONAL EPI: <1 /HPF
UROBILINOGEN UR STRIP-MCNC: NORMAL MG/DL
WBC URINE: 1 /HPF

## 2025-05-05 ENCOUNTER — TELEPHONE (OUTPATIENT)
Dept: CARDIOLOGY | Facility: CLINIC | Age: 89
End: 2025-05-05
Payer: MEDICARE

## 2025-05-05 NOTE — TELEPHONE ENCOUNTER
[facility] Call [facility]    Phone Message    May a detailed message be left on voicemail: yes     Reason for Call: Other: Calling to report that the patient has 2/5 lbs since yesterday, all of her other vitals are good.      Action Taken: Other: Cardiology    Travel Screening: Not Applicable    Thank you!  [facility]       Date of Service:

## 2025-05-05 NOTE — TELEPHONE ENCOUNTER
Called Angela at [facility] back. She reports Kamryn's Laisx order states to call if she gains 2 lbs in a day or 5 lbs in a week - her weight is up 2.5 lbs today from yesterday. Today is 150.4 lbs. On the 1st she was 147.9. No signs of fluid retention noted, no worsening shortness of breath or increased swelling.   Of note when she saw CORE on 4/10, weight was 146.6 and only change made was stopping Spironolactone for worsening creatinine. Follow up creatinine is improved   I advised to continue to monitor and it weight continues to go up they should notify us. Will review with provider and only call back if any changes recommended.

## 2025-05-15 ENCOUNTER — TELEPHONE (OUTPATIENT)
Dept: CARDIOLOGY | Facility: CLINIC | Age: 89
End: 2025-05-15
Payer: MEDICARE

## 2025-05-15 DIAGNOSIS — I50.30 NYHA CLASS 3 HEART FAILURE WITH PRESERVED EJECTION FRACTION (H): ICD-10-CM

## 2025-05-15 RX ORDER — FUROSEMIDE 20 MG/1
40 TABLET ORAL DAILY
Qty: 60 TABLET | Refills: 3 | Status: SHIPPED | OUTPATIENT
Start: 2025-05-15

## 2025-05-15 NOTE — TELEPHONE ENCOUNTER
Reviewed with LEDY Rosado.   Date: 5/15/2025    Time of Call: 3:05 PM     Diagnosis:  HF     [ VORB ] Ordering provider: Kathia VILLAFANA  Order: Yes, lets do lasix 40 mg daily, no kcl through Monday, then check in Monday      Order received by: Verona Agee RN      Follow-up/additional notes: reviewed with nursing office at [facility]. Will check in on weights Monday.  faxed orders to [phone]

## 2025-05-15 NOTE — TELEPHONE ENCOUNTER
Called [facility] back. On 5/5 had called in with a couple pound weight gain, and told to monitor but call with further weight gain.   Weight at core visit on 4/10 was 146.6. was 150/4 on 5/5 when she called in. Per nursing home, denies SOB.      Tried to call for further assessment but got voicemail.   Will review with provider.

## 2025-05-15 NOTE — TELEPHONE ENCOUNTER
[facility] Call [facility]    Phone Message    May a detailed message be left on voicemail: yes     Reason for Call: Symptoms or Concerns     If patient has red-flag symptoms, warm transfer to triage line    Current symptom or concern: weight gain of 147 to 152. Pr denies SOB, BP is good    Symptoms have been present for:  1 day(s)    Has patient previously been seen for this? No      Action Taken: Other: cardio    Travel Screening: Not Applicable     Date of Service:

## 2025-05-19 ENCOUNTER — TELEPHONE (OUTPATIENT)
Dept: CARDIOLOGY | Facility: CLINIC | Age: 89
End: 2025-05-19
Payer: MEDICARE

## 2025-05-19 DIAGNOSIS — I50.30 NYHA CLASS 3 HEART FAILURE WITH PRESERVED EJECTION FRACTION (H): ICD-10-CM

## 2025-05-19 RX ORDER — FUROSEMIDE 20 MG/1
20 TABLET ORAL DAILY
Qty: 30 TABLET | Refills: 3 | Status: SHIPPED | OUTPATIENT
Start: 2025-05-19 | End: 2025-05-19

## 2025-05-19 RX ORDER — FUROSEMIDE 20 MG/1
30 TABLET ORAL DAILY
Qty: 30 TABLET | Refills: 3 | Status: SHIPPED | OUTPATIENT
Start: 2025-05-19

## 2025-05-19 NOTE — TELEPHONE ENCOUNTER
-- DO NOT REPLY / DO NOT REPLY ALL --  -- Message is from the [facility]--    COVID-19 Universal Screening: Positive    General Patient Message      Reason for Call: Patient is returning your scarlet ,to schedule a virtual visit, please call to schedule as the contact center is only seeing September 2021 templating for scheduling. Please call after 11 am as he won't be available before then, please leave a detailed message when calling.     Caller Information       Type Contact Phone    10/27/2020 07:32 PM CDT Phone (Incoming) ERROL Meléndez (Self) [phone] (H)          Alternative phone number: None    Turnaround time given to caller:   \"This message will be sent to [state Provider's name]. The clinical team will fulfill your request as soon as they review your message when the office opens tomorrow.\"     Returned call to do further assessment of any weight changes- last weight listed was for 5/15, orders faxed on 5/15 to increase lasix, would like to verify if that has been done first and if Kamryn is having any symptoms.     [facility] nurse called back, weight today(5/19) was 148.6 lb.  Lasix 40 mg over weekend and today..    No complaints noted today.  They wonder what dose she should be on now.      Date: 5/19/2025    Time of Call: 2:50 PM     Diagnosis:  heart failure      [ VORB ] Ordering provider: LEDY Rosado    Order: decrease back to Lasix 30 mg daily and call for weight gain      Order received by: Jessica Garcia RN       Follow-up/additional notes: clarified with [facility] that Kamryn was on 30 mg daily not 20 mg daily, confirmed with LEDY Rosado

## 2025-05-19 NOTE — TELEPHONE ENCOUNTER
[facility] Call [facility]    Phone Message    May a detailed message be left on voicemail: yes     Reason for Call: Other: Pt's weights have been: Thursday 5.15.25 152.1#,Monday 5.12.25 152.1#, and Thursday 5.8.25  147#.  Please fax additional orders to [phone]      Action Taken: Message routed to:  [facility] cardio    Travel Screening: Not Applicable    Thank you!  [facility]       Date of Service:

## 2025-06-11 ENCOUNTER — PATIENT OUTREACH (OUTPATIENT)
Dept: CARDIOLOGY | Facility: CLINIC | Age: 89
End: 2025-06-11
Payer: MEDICARE

## 2025-06-11 NOTE — TELEPHONE ENCOUNTER
Fax from [facility]isted [facility] with following weights. Weight was up to 153 but that was 6/9. Spoke with nurse at facility to try to get an updated weight, they do twice weekly weights so haven't weighed since. They are going to weigh her tomorrow morning and will fax it to us.   The nurse hasn't seen her recently so is unaware of any symptom changes.     Will follow up on weights tomorrow.

## 2025-06-17 ENCOUNTER — LAB REQUISITION (OUTPATIENT)
Dept: LAB | Facility: CLINIC | Age: 89
End: 2025-06-17
Payer: MEDICARE

## 2025-06-17 DIAGNOSIS — I50.9 HEART FAILURE, UNSPECIFIED (H): ICD-10-CM

## 2025-06-19 RX ORDER — FUROSEMIDE 40 MG/1
40 TABLET ORAL DAILY
Qty: 30 TABLET | Refills: 11 | OUTPATIENT
Start: 2025-06-19

## 2025-06-19 NOTE — TELEPHONE ENCOUNTER
Last Written Prescription:  furosemide (LASIX) 20 MG tablet 180 tablet 1 6/16/2025 -- No   Sig - Route: Take 2 tablets (40 mg) by mouth daily. - Oral   Sent to pharmacy as: Furosemide 20 MG Oral Tablet (LASIX)   Class: E-Prescribe   Order: [id number]     ----------------------  Last Visit Date: 4/10/25  Future Visit Date: 7/10/25  ----------------------      Refill decision: Unable to refill medication with cardiac refill protocol: refills should still be available at pharmacy.    Last Comprehensive Metabolic Panel:  Lab Results   Component Value Date     04/18/2025    POTASSIUM 4.8 04/18/2025    CHLORIDE 107 04/18/2025    CO2 26 04/18/2025    ANIONGAP 9 04/18/2025    GLC 95 04/18/2025    BUN 35.6 (H) 04/18/2025    CR 1.64 (H) 04/18/2025    GFRESTIMATED 30 (L) 04/18/2025    PRINCE 9.4 04/18/2025           Request from pharmacy:  Requested Prescriptions   Pending Prescriptions Disp Refills    furosemide (LASIX) 40 MG tablet [Pharmacy Med Name: FUROSEMIDE 40 MG TAB] 30 tablet 11     Sig: TAKE 1 TABLET BY MOUTH ONCE DAILY       Diuretics (Including Combos) Protocol Failed - 6/19/2025  2:17 PM        Failed - Medication is active on med list and the sig matches. RN to manually verify dose and sig if red X/fail.     If the protocol passes (green check), you do not need to verify med dose and sig.    A prescription matches if they are the same clinical intention.    For Example: once daily and every morning are the same.    The protocol can not identify upper and lower case letters as matching and will fail.     For Example: Take 1 tablet (50 mg) by mouth daily     TAKE 1 TABLET (50 MG) BY MOUTH DAILY    For all fails (red x), verify dose and sig.    If the refill does match what is on file, the RN can still proceed to approve the refill request.       If they do not match, route to the appropriate provider.             Failed - Has GFR on file in past 12 months and most recent value is normal        Failed -  Medication indicated for associated diagnosis     Medication is associated with one or more of the following diagnoses:     Edema   Hypertension   Hypercalcemia   Heart Failure   Chronic Kidney Disease (CKD)   Cardiomyopathy   Dyspnea   Chronic Thromboembolic Pulmonary Hypertension   Pulmonary Hypertension          Passed - Most recent blood pressure under 140/90 in past 12 months     BP Readings from Last 3 Encounters:   04/10/25 118/72   03/17/25 114/66   01/16/25 113/74       No data recorded            Passed - Potassium level on file in past 12 months        Passed - Recent (12 month) or future (90 days) visit with authorizing provider's specialty (provided they have been seen in the past 15 months)     The patient must have completed an in-person or virtual visit within the past 12 months or has a future visit scheduled within the next 90 days with the authorizing provider s specialty.  Urgent care and e-visits do not qualify as an office visit for this protocol.          Passed - Patient is age 18 or older        Passed - No active pregancy on record        Passed - No positive pregnancy test in past 12 months

## 2025-06-24 ENCOUNTER — RESULTS FOLLOW-UP (OUTPATIENT)
Dept: CARDIOLOGY | Facility: CLINIC | Age: 89
End: 2025-06-24

## 2025-06-24 LAB
ANION GAP SERPL CALCULATED.3IONS-SCNC: 12 MMOL/L (ref 7–15)
BUN SERPL-MCNC: 43.6 MG/DL (ref 8–23)
CALCIUM SERPL-MCNC: 9.8 MG/DL (ref 8.8–10.4)
CHLORIDE SERPL-SCNC: 103 MMOL/L (ref 98–107)
CREAT SERPL-MCNC: 1.56 MG/DL (ref 0.51–0.95)
EGFRCR SERPLBLD CKD-EPI 2021: 32 ML/MIN/1.73M2
GLUCOSE SERPL-MCNC: 162 MG/DL (ref 70–99)
HCO3 SERPL-SCNC: 26 MMOL/L (ref 22–29)
POTASSIUM SERPL-SCNC: 4.3 MMOL/L (ref 3.4–5.3)
SODIUM SERPL-SCNC: 141 MMOL/L (ref 135–145)

## 2025-06-25 NOTE — TELEPHONE ENCOUNTER
Called nursing line to review labs.  6/16 we increased lasix from 30 mg to 40 mg daily for some weight gain.     Today nursing staff  report Kamryn's weight on 6/23 was 152.3. was the same 152 on 6/12 prior to increasing lasix.  Report no worsening swelling in legs or belly, does not seem more short of breath.   Will update provider.

## 2025-07-09 NOTE — PROGRESS NOTES
HPI:   Ms. Miller is a 89 year old female with a past medical history including HFpEF now HFrEF, HTN, HLD, significant CAD history (PTCA 1996, LAD and RCA stenting 2003, s/p LAD PCI 2020, recent cath 10/2022 with patent stents), CKD, chronic anemia and severe aortic valvular stenosis s/p (TAVR) with a 26mm Arndt Janis Ultra on 9/12/22 by Morro Burrows. Her post TAVR echo showed mean PG of 7 mmHg with trace PVL. She was noted to have 60% left femoral stenosis post procedure, but had adequate flow on peripheral angiogram. She was also noted to have new LBBB post procedure and discharged on a Cardionet which showed no high degree AVB.. Presents to clinic for CORE follow-up.    Her EF has been slowly declining since her TAVR. Was 55-60% pre TAVR, dropped to 40-45%, now 30-35% despite GDMT.     She last saw Dr. Zuniga in 11/19/24. Feeling well. No medication changes.    Today  She doesn't have SOB at rest. No significant GREGORY or changes to her exercise tolerance. She does exercersises 5 times a week- does some peddling and some chair and standing exercises, no GREGORY with this. No LE edema. No abdominal edema (her daughter thinks this is where she held it in the past). No orthopnea or PND. No lightheadedness or dizziness no presyncope or syncope. No falls. No palpitations. No CP. Appetite is fine.    Her systolic Bps have ranged from 109//86 at home. Weights are stable at home around 150-156. . Hrs are in the 60s    Cardiac Medications  ASA 81 mg daily  Amlodipine 5 mg at bedtime  Entresto  mg BID  Lasix 40 mg daily  Aldactone 12.5 mg daily  Imdur 60 mg qAM  Crestor 10 mg daily    PAST MEDICAL HISTORY:  Past Medical History:   Diagnosis Date    Aortic valvar stenosis 7/2010    mild    Asthma     Bipolar disorder (H)     CAD (coronary artery disease)     s/p angioplasty    Celiac disease     Colon polyps     Colon polyps 2012    every 3 year colonoscopy     Depression     High cholesterol     HTN     Mitral  insufficiency     Pulmonary HTN (H)     mild    Tremors 7/10    drug induced from antidepressants    Tricuspid insufficiency        FAMILY HISTORY:  Family History   Problem Relation Age of Onset    Asthma Mother     Cerebrovascular Disease Mother     Arthritis Mother     Depression Mother     Lipids Sister     Hypertension Sister     Heart Disease Sister     Lipids Sister     Hypertension Sister     Lipids Sister     Breast Cancer Sister        SOCIAL HISTORY:  Social History     Socioeconomic History    Marital status:      Spouse name: Adrien    Number of children: 2    Years of education: 16   Occupational History     Employer: RETIRED     Comment: Retired in 2002.    Tobacco Use    Smoking status: Never    Smokeless tobacco: Never   Vaping Use    Vaping status: Never Used   Substance and Sexual Activity    Alcohol use: No     Alcohol/week: 0.0 standard drinks of alcohol     Types: 1 Standard drinks or equivalent per week    Drug use: No    Sexual activity: Not Currently     Partners: Male     Social Determinants of Health     Financial Resource Strain: Medium Risk (5/4/2022)    Received from [facility]    Financial Resource Strain     Financial Concerns: 2   Transportation Needs: Unknown (2/4/2021)    Received from [facility]    Transportation Needs     Lack of Transportation (Medical): 0   Physical Activity: Unknown (2/4/2021)    Received from [facility]    Physical Activity     Calculated Minutes of Exercise per Week:: 0   Stress: Unknown (2/4/2021)    Received from [facility]    Stress     Patient Reported Major Stressor(s): 0     Patient Reported Strengths: 1     Patient Reported Source(s) of Support: 1    Received from [facility], [facility]    Social Connections   Interpersonal Safety: Not At Risk (7/11/2023)    Humiliation, Afraid, Rape, and Kick questionnaire     Fear of Current or Ex-Partner: No     Emotionally Abused: No     Physically Abused: No     Sexually Abused: No   Housing Stability: Unknown (2/4/2021)    Received from [facility], [facility]    Housing Stability     Housing Concerns: 0       CURRENT MEDICATIONS:  Current Outpatient Medications   Medication Sig Dispense Refill    acetaminophen (TYLENOL) 500 MG tablet Take 1 tablet (500 mg) by mouth every 6 hours as needed for pain 60 tablet 4    amLODIPine (NORVASC) 5 MG tablet Take 1 tablet (5 mg) by mouth at bedtime. 90 tablet 1    amoxicillin (AMOXIL) 500 MG capsule       aspirin (ASA) 81 MG EC tablet Take 1 tablet (81 mg) by mouth daily 30 tablet 0    azelastine (ASTEPRO) 0.15 % nasal spray USE 1 SPRAY IN EACH NOSTRIL ONCE A DAY 30 mL 11    calcium citrate (CITRACAL) 950 (200 Ca) MG tablet TAKE 1 TABLET BY MOUTH ONCE DAILY 30 tablet 11    desvenlafaxine (PRISTIQ) 100 MG 24 hr tablet Take 100 mg by mouth daily      fluticasone-salmeterol (ADVAIR DISKUS) 500-50 MCG/ACT inhaler INHALE 1 PUFF BY MOUTH TWICE DAILY 60 each 0    folic acid (FOLVITE) 400 MCG tablet TAKE 1 TABLET BY MOUTH ONCE DAILY 28 tablet 11    furosemide (LASIX) 20 MG tablet Take 2.5 tablets (50 mg) by mouth daily. 225 tablet 1    iron polysaccharides (NIFEREX) 150 MG capsule Take 1 capsule (150 mg) by mouth daily 30 capsule 1    isosorbide mononitrate (IMDUR) 30 MG 24 hr tablet Take 2 tablets (60 mg) by mouth daily 60 tablet 11    lamoTRIgine (LAMICTAL) 25 MG tablet Take 25 mg by mouth daily with 200 mg tablet for a total dose of 225 mg      LAMOTRIGINE 200 MG PO TABS Take 200 mg by mouth daily with 25 mg tablet for a total dose of 225 mg      levothyroxine (SYNTHROID/LEVOTHROID) 50 MCG tablet TAKE 1 TABLET BY MOUTH ONCE DAILY 28 tablet 11     liothyronine (CYTOMEL) 5 MCG tablet Take 2 tablets (10 mcg) by mouth daily 30 tablet 3    memantine (NAMENDA) 10 MG tablet Take 1 tablet (10 mg) by mouth daily 30 tablet 3    mirtazapine (REMERON) 15 MG tablet Take 15 mg by mouth at bedtime      Multiple Vitamin (TAB-A-JENNIFER) TABS TAKE 1 TABLET BY MOUTH ONCE DAILY 28 tablet 11    nystatin (MYCOSTATIN) 740440 UNIT/GM external powder       psyllium (METAMUCIL/KONSYL) 58.6 % powder Take 1 tspn in liquid daily      rosuvastatin (CRESTOR) 10 MG tablet Take 1 tablet (10 mg) by mouth daily 30 tablet 0    sacubitril-valsartan (ENTRESTO) 49-51 MG per tablet Take 1 tablet by mouth every morning AND 2 tablets every evening. Take this dose for 2 weeks. After 2 weeks, increase Entresto to  mg twice daily. 180 tablet 3    acetaminophen (TYLENOL) 500 MG tablet Take 1 tablet (500 mg) by mouth daily Continue previously ordered PRN tylenol order as well (Patient not taking: Reported on 7/10/2025) 30 tablet 11     No current facility-administered medications for this visit.       ROS:   Refer to HPI    EXAM:  /64 (BP Location: Left arm, Patient Position: Chair, Cuff Size: Adult Regular)   Pulse 65   Wt 70.4 kg (155 lb 3.2 oz)   SpO2 96%   BMI 30.67 kg/m    GENERAL: Appears comfortable, in no acute distress.   HEENT: Eye symmetrical, no discharge or icterus bilaterally.   CV: RRR, +S1S2, no murmur, rub, or gallop. JVP ~8-9 mmg Hg.   RESPIRATORY: Respirations regular, even, and unlabored. Lungs CTA throughout.   GI: Soft and non distended   EXTREMITIES: No peripheral edema. All extremities are warm and well perfused  NEUROLOGIC: Alert and interacting appropriately.   SKIN: No jaundice.    Labs, reviewed with patient in clinic today:  CBC RESULTS:  Lab Results   Component Value Date    WBC 9.8 04/04/2025    WBC 4.6 05/17/2021    RBC 4.29 04/04/2025    RBC 3.97 05/17/2021    HGB 14.0 04/04/2025    HGB 12.9 05/17/2021    HCT 43.7 04/04/2025    HCT 40.3 05/17/2021      (H) 04/04/2025     (H) 05/17/2021    MCH 32.6 04/04/2025    MCH 32.5 05/17/2021    MCHC 32.0 04/04/2025    MCHC 32.0 05/17/2021    RDW 13.0 04/04/2025    RDW 12.7 05/17/2021     04/04/2025     05/17/2021       CMP RESULTS:  Lab Results   Component Value Date     07/10/2025     06/04/2021    POTASSIUM 3.9 07/10/2025    POTASSIUM 4.6 10/05/2022    POTASSIUM 3.4 06/04/2021    CHLORIDE 105 07/10/2025    CHLORIDE 103 10/05/2022    CHLORIDE 106 06/04/2021    CO2 25 07/10/2025    CO2 27 10/05/2022    CO2 30 06/04/2021    ANIONGAP 14 07/10/2025    ANIONGAP 7 10/05/2022    ANIONGAP 6 06/04/2021     (H) 07/10/2025    GLC 85 01/31/2023    GLC 98 10/05/2022     (H) 06/04/2021    BUN 35.1 (H) 07/10/2025    BUN 39 (H) 10/05/2022    BUN 27 06/04/2021    CR 1.48 (H) 07/10/2025    CR 1.50 (H) 06/04/2021    GFRESTIMATED 34 (L) 07/10/2025    GFRESTIMATED 32 (L) 06/04/2021    GFRESTBLACK 37 (L) 06/04/2021    PRINCE 9.5 07/10/2025    PRINCE 9.4 06/04/2021    BILITOTAL 0.2 11/19/2024    BILITOTAL 0.3 05/17/2021    ALBUMIN 4.2 03/17/2025    ALBUMIN 3.6 09/27/2022    ALBUMIN 3.4 05/17/2021    ALKPHOS 72 11/19/2024    ALKPHOS 57 05/17/2021    ALT 11 11/19/2024    ALT 38 05/17/2021    AST 20 11/19/2024    AST 27 05/17/2021        INR RESULTS:  Lab Results   Component Value Date    INR 1.12 02/22/2023    INR 1.05 11/03/2020       Lab Results   Component Value Date    MAG 2.5 (H) 02/27/2023    MAG 2.4 (H) 10/28/2019     Lab Results   Component Value Date    NTBNPI 11,774 (H) 02/22/2023     Lab Results   Component Value Date    NTBNP 920 11/19/2024    NTBNP 462 (H) 05/17/2021       Diagnostics:   4/10/25 ECHO  Interpretation Summary  Status post 26 mm Arndt Janis Ultra TAVR on 9/12/2022.     Left ventricular function is decreased. The ejection fraction is 30-35%  (moderately reduced).  Abnormal septal motion consistent with left bundle branch block is present.  Global right ventricular function is  normal.  Severe mitral annular calcification is present.  The TAVR prosthetic valve is well-seated. There is mild paravalvular and  valvular regurgitation. PV 2.1 m/sec, MG 9 mmHg.  The inferior vena cava was normal in size with preserved respiratory  variability.  No pericardial effusion is present.     Compared to previous study on 11/13/2023, no significant change    12/8/23 PYP testing  Impression:  1. Imaging findings are not suggestive of transthyretin cardiac  amyloidosis.     11/22/23 cMRI  Clinical history: 88 F CAD s/p PCI to LAD and RCA, cath 10/2022 with non-obs CAD, htn, severe MAC, AS s/p  TAVR 09/2022, LBBB after TAVR, HALT resolved on subsequent CT, newly reduced LVEF 35% after TAVR  Comparison CMR: none     1. The LV is normal in cavity size with moderate concentric LVH. The global systolic function is severely  reduced. The LVEF is 28%. There is basal septal akinesis with moderate diffuse hypokinesis. There is  paradoxical septal motion due to LBBB.      2. The RV is normal in cavity size. The global systolic function is mildly reduced. The RVEF is 43%.      3. Left atrium is moderately enlarged.     4. There is a TAVR prosthesis that cannot be further evaluated due to artifact. There is severe MAC without  significant MR.      5. Late gadolinium enhancement imaging shows patchy mid-myocardial enhancement involving the basal  segments.      6. Regadenoson stress perfusion imaging shows no ischemia.     7. There is no pericardial effusion or thickening.     8. There is no intracardiac thrombus.     CONCLUSIONS: No myocardial ischemia. NICM, LVEF 28% and RVEF 43%, with LBBB and moderate LVH. Moderate  basal patchy fibrosis that is likely from AS, or could represent amyloidosis. Consider PYP scan.       CORE EXAM    Assessment and Plan:   Ms. Miller is a 89 year old female with a past medical history including HFpEF now HFrEF, HTN, HLD, significant CAD history (PTCA 1996, LAD and RCA stenting 2003,  s/p LAD PCI 2020, recent cath 10/2022 with patent stents), CKD, chronic anemia and severe aortic valvular stenosis s/p (TAVR) with a 26mm Arndt Janis Ultra on 9/12/22 by Morro Burrows. Her post TAVR echo showed mean PG of 7 mmHg with trace PVL. She was noted to have 60% left femoral stenosis post procedure, but had adequate flow on peripheral angiogram. She was also noted to have new LBBB post procedure and discharged on a Cardionet which showed no high degree AVB.. Presents to clinic for CORE follow-up.    Her weight is trending up. Minimal symptoms, but neck vein is up. Cr improving on her recent last increase. WE will increase a bit further today and recheck weights and symptoms next week over the phone and get a cmp at that time as well.     HFpEF, now HFrEF EF 30-35%:  EF had been preserved 55-60%,  2/2023 45-50%, 7/2023 35-40%, now 30-35%. Unclear etiology, cath 10/2022 with patent stents, thought possibly related to poorly controlled HTN. Amyloid work-up has been negative.    - Volume status: Hypervolemic, increase lasix to 50 mg daily  - ARNI: Continue entresto  mg BID  - Additional afterload: Amlodipine 5 mg daily, if bps don't improve with diuresis would consider increasing to 7.5. Per notes has had intolerances to hydralazine.  - MRA: stop aldactone d/t GFR <30  - Beta Blocker: per notes, multiple prior intolerances to BB   - SGLT2i: cost prohibitive (last checked 4/2025)  - ICD: was discussed with Dr. Toby Torres 12/29/24 and this was not recommended based on age. They did discuss crt-p rather than crt-d, but that was not persued    Aortic stenosis s/p TAVR 9/2022:  - Yearly echos with primary cardiologist or VALVE team- stable valve on 4/2025 echo  - CT heart shows resolution of HALT   - OFF Eliquis, continue ASA 81 mg lifelong   - SBE prophylaxis lifelong    Coronary artery disease w/o angina:  HLD:  - Continue ASA 81 mg daily lifelong  - Continue crestor  - Continue exercise and heart healthy  diet    HTN, well controlled in clinic, a few high values at home, see contingency plan listed above if not improving with diuresis  - Multiple intolerances to BB, hydralazine   - Enstresto and amlodipine for BP control   - Decreased vitals at home from daily to 1-2 times weekly d/t stability and cost     PAD:   - Continue ASA 81 mg lifelong  - Vascular consult should she develop recurrent claudication    CKD Stage 3b/4, baseline has been worsening this year  - Nephrology appointment scheduled in August  - Stopped aldactone as above with subsequent cr improvement  - BMP next week given lasix increase    Osteoarthritis   - Okay to use Voltaren gel, no other NSAIDS    Follow up   - BMP in 1 week (increased lasix)  - RN phone call in 1 week  - Dr. Zuniga in Nov as scheduled  - CORE in 6 months or sooner PRN    Ro[place]  - I managed 2+ stable chronic conditions  - I changed a prescription medication      Violet King PA-C  [facility]          CC  SELF, REFERRED

## 2025-07-10 ENCOUNTER — LAB (OUTPATIENT)
Dept: LAB | Facility: CLINIC | Age: 89
End: 2025-07-10
Attending: PHYSICIAN ASSISTANT
Payer: MEDICARE

## 2025-07-10 ENCOUNTER — OFFICE VISIT (OUTPATIENT)
Dept: CARDIOLOGY | Facility: CLINIC | Age: 89
End: 2025-07-10
Attending: PHYSICIAN ASSISTANT
Payer: MEDICARE

## 2025-07-10 VITALS
DIASTOLIC BLOOD PRESSURE: 64 MMHG | OXYGEN SATURATION: 96 % | BODY MASS INDEX: 30.67 KG/M2 | SYSTOLIC BLOOD PRESSURE: 100 MMHG | HEART RATE: 65 BPM | WEIGHT: 155.2 LBS

## 2025-07-10 DIAGNOSIS — I50.20 HEART FAILURE WITH REDUCED EJECTION FRACTION, NYHA CLASS III (H): ICD-10-CM

## 2025-07-10 DIAGNOSIS — I50.30 NYHA CLASS 3 HEART FAILURE WITH PRESERVED EJECTION FRACTION (H): ICD-10-CM

## 2025-07-10 LAB
ANION GAP SERPL CALCULATED.3IONS-SCNC: 14 MMOL/L (ref 7–15)
BUN SERPL-MCNC: 35.1 MG/DL (ref 8–23)
CALCIUM SERPL-MCNC: 9.5 MG/DL (ref 8.8–10.4)
CHLORIDE SERPL-SCNC: 105 MMOL/L (ref 98–107)
CREAT SERPL-MCNC: 1.48 MG/DL (ref 0.51–0.95)
EGFRCR SERPLBLD CKD-EPI 2021: 34 ML/MIN/1.73M2
GLUCOSE SERPL-MCNC: 106 MG/DL (ref 70–99)
HCO3 SERPL-SCNC: 25 MMOL/L (ref 22–29)
POTASSIUM SERPL-SCNC: 3.9 MMOL/L (ref 3.4–5.3)
SODIUM SERPL-SCNC: 144 MMOL/L (ref 135–145)

## 2025-07-10 PROCEDURE — G0463 HOSPITAL OUTPT CLINIC VISIT: HCPCS | Performed by: PHYSICIAN ASSISTANT

## 2025-07-10 RX ORDER — FUROSEMIDE 20 MG/1
50 TABLET ORAL DAILY
Qty: 225 TABLET | Refills: 1 | Status: SHIPPED | OUTPATIENT
Start: 2025-07-10

## 2025-07-10 ASSESSMENT — PAIN SCALES - GENERAL: PAINLEVEL_OUTOF10: NO PAIN (0)

## 2025-07-10 NOTE — PATIENT INSTRUCTIONS
Take your medicines every day, as directed     Changes made today:    - Increase lasix to 50 mg daily    Monitor Your Weight and Symptoms     Contact us if you:     Gain 2 pounds in one day or 5 pounds in one week  Feel more short of breath  Notice more leg swelling  Feel lightheadeded    Change your lifestyle     Limit Salt or Sodium:  2000 mg  Limit Fluids:  2000 mL or approximately 64 ounces  Eat a Heart Healthy Diet  Low in saturated fats  Stay Active:  Aim to move at least 150 minutes every  week            To Contact us     During Business Hours:  [phone], option # 1      After hours, weekends or holidays:   [phone], Option #4  Ask to speak to the On-Call Cardiologist. Inform them you are a CORE/heart failure patient at the [facility].       Use [employer] allows you to communicate directly with your heart team through secure messaging.  [employer] can be accessed any time on your phone, computer, or tablet.  If you need assistance, we'd be happy to help!             Keep your Heart Appointments:     - Labs in 1 week    - RN phone call in 1 week    - Dr. Zuniga in November as scheduled    - CORE clinic in 6 months      Please consider attending our virtual support group which is held monthly. Please reach out to Gomez at [phone] for more information if you are interested in attending.      reading blurry no glasses/Normal vision: sees adequately in most situations; can see medication labels, newsprint

## 2025-07-10 NOTE — NURSING NOTE
Diet: Patient instructed regarding a heart healthy diet, including discussion of reduced fat and sodium intake. Patient demonstrated understanding of this information and agreed to call with further questions or concerns.  Labs: Patient was given results of the laboratory testing obtained today. Patient was instructed to return for the next laboratory testing in BMP 1 week. Patient sent back to facility with copy of signed order . Patient demonstrated understanding of this information and agreed to call with further questions or concerns.   Return Appointment: Patient given instructions regarding scheduling next clinic visit. Patient demonstrated understanding of this information and agreed to call with further questions or concerns.  Medication Change: Patient was educated regarding prescribed medication change, including discussion of the indication, administration, side effects, and when to report to MD or RN.   Increase Lasix to 50 mg daily.   Patient demonstrated understanding of this information and agreed to call with further questions or concerns.  Patient stated she understood all health information given and agreed to call with further questions or concerns.   RN phone call in 1 week.

## 2025-07-10 NOTE — NURSING NOTE
Chief Complaint   Patient presents with    Follow Up     Return CORE, 89 year old female with history of diastolic heart failure, TAVR, htn, presents for follows up with labs prior     Vitals were taken and medications reconciled.    CARLOTA Small  9:02 AM

## 2025-07-10 NOTE — LETTER
7/10/2025      RE: Kamryn Miller  [address]  [address]  [place] [address]       Dear Colleague,    Thank you for the opportunity to participate in the care of your patient, Kamryn Miller, at the [facility] [place] [facility]. Please see a copy of my visit note below.    HPI:   Ms. Miller is a 89 year old female with a past medical history including HFpEF now HFrEF, HTN, HLD, significant CAD history (PTCA 1996, LAD and RCA stenting 2003, s/p LAD PCI 2020, recent cath 10/2022 with patent stents), CKD, chronic anemia and severe aortic valvular stenosis s/p (TAVR) with a 26mm Arndt Janis Ultra on 9/12/22 by Morro Burrows. Her post TAVR echo showed mean PG of 7 mmHg with trace PVL. She was noted to have 60% left femoral stenosis post procedure, but had adequate flow on peripheral angiogram. She was also noted to have new LBBB post procedure and discharged on a Cardionet which showed no high degree AVB.. Presents to clinic for CORE follow-up.    Her EF has been slowly declining since her TAVR. Was 55-60% pre TAVR, dropped to 40-45%, now 30-35% despite GDMT.     She last saw Dr. Zuniga in 11/19/24. Feeling well. No medication changes.    Today  She doesn't have SOB at rest. No significant GREGORY or changes to her exercise tolerance. She does exercersises 5 times a week- does some peddling and some chair and standing exercises, no GREGORY with this. No LE edema. No abdominal edema (her daughter thinks this is where she held it in the past). No orthopnea or PND. No lightheadedness or dizziness no presyncope or syncope. No falls. No palpitations. No CP. Appetite is fine.    Her systolic Bps have ranged from 109//86 at home. Weights are stable at home around 150-156. . Hrs are in the 60s    Cardiac Medications  ASA 81 mg daily  Amlodipine 5 mg at bedtime  Entresto  mg BID  Lasix 40 mg daily  Aldactone 12.5 mg daily  Imdur 60 mg  qAM  Crestor 10 mg daily    PAST MEDICAL HISTORY:  Past Medical History:   Diagnosis Date     Aortic valvar stenosis 7/2010    mild     Asthma      Bipolar disorder (H)      CAD (coronary artery disease)     s/p angioplasty     Celiac disease      Colon polyps      Colon polyps 2012    every 3 year colonoscopy      Depression      High cholesterol      HTN      Mitral insufficiency      Pulmonary HTN (H)     mild     Tremors 7/10    drug induced from antidepressants     Tricuspid insufficiency        FAMILY HISTORY:  Family History   Problem Relation Age of Onset     Asthma Mother      Cerebrovascular Disease Mother      Arthritis Mother      Depression Mother      Lipids Sister      Hypertension Sister      Heart Disease Sister      Lipids Sister      Hypertension Sister      Lipids Sister      Breast Cancer Sister        SOCIAL HISTORY:  Social History     Socioeconomic History     Marital status:      Spouse name: Adrien     Number of children: 2     Years of education: 16   Occupational History     Employer: RETIRED     Comment: Retired in 2002.    Tobacco Use     Smoking status: Never     Smokeless tobacco: Never   Vaping Use     Vaping status: Never Used   Substance and Sexual Activity     Alcohol use: No     Alcohol/week: 0.0 standard drinks of alcohol     Types: 1 Standard drinks or equivalent per week     Drug use: No     Sexual activity: Not Currently     Partners: Male     Social Determinants of Health     Financial Resource Strain: Medium Risk (5/4/2022)    Received from [facility]    Financial Resource Strain      Financial Concerns: 2   Transportation Needs: Unknown (2/4/2021)    Received from [facility]    Transportation Needs      Lack of Transportation (Medical): 0   Physical Activity: Unknown (2/4/2021)    Received from [facility]    Physical Activity      Calculated Minutes of Exercise per Week:: 0   Stress: Unknown (2/4/2021)    [facility]    Stress      Patient Reported Major Stressor(s): 0      Patient Reported Strengths: 1      Patient Reported Source(s) of Support: 1    Received from [facility], [facility]    Social Connections   Interpersonal Safety: Not At Risk (7/11/2023)    Humiliation, Afraid, Rape, and Kick questionnaire      Fear of Current or Ex-Partner: No      Emotionally Abused: No      Physically Abused: No      Sexually Abused: No   Housing Stability: Unknown (2/4/2021)    [facility]    Housing Stability      Housing Concerns: 0       CURRENT MEDICATIONS:  Current Outpatient Medications   Medication Sig Dispense Refill     acetaminophen (TYLENOL) 500 MG tablet Take 1 tablet (500 mg) by mouth every 6 hours as needed for pain 60 tablet 4     amLODIPine (NORVASC) 5 MG tablet Take 1 tablet (5 mg) by mouth at bedtime. 90 tablet 1     amoxicillin (AMOXIL) 500 MG capsule        aspirin (ASA) 81 MG EC tablet Take 1 tablet (81 mg) by mouth daily 30 tablet 0     azelastine (ASTEPRO) 0.15 % nasal spray USE 1 SPRAY IN EACH NOSTRIL ONCE A DAY 30 mL 11     calcium citrate (CITRACAL) 950 (200 Ca) MG tablet TAKE 1 TABLET BY MOUTH ONCE DAILY 30 tablet 11     desvenlafaxine (PRISTIQ) 100 MG 24 hr tablet Take 100 mg by mouth daily       fluticasone-salmeterol (ADVAIR DISKUS) 500-50 MCG/ACT inhaler INHALE 1 PUFF BY MOUTH TWICE DAILY 60 each 0     folic acid (FOLVITE) 400 MCG tablet TAKE 1 TABLET BY MOUTH ONCE DAILY 28 tablet 11     furosemide (LASIX) 20 MG tablet Take 2.5 tablets (50 mg) by mouth daily. 225 tablet 1     iron polysaccharides (NIFEREX) 150 MG capsule Take 1 capsule (150 mg) by mouth daily 30 capsule 1     isosorbide  mononitrate (IMDUR) 30 MG 24 hr tablet Take 2 tablets (60 mg) by mouth daily 60 tablet 11     lamoTRIgine (LAMICTAL) 25 MG tablet Take 25 mg by mouth daily with 200 mg tablet for a total dose of 225 mg       LAMOTRIGINE 200 MG PO TABS Take 200 mg by mouth daily with 25 mg tablet for a total dose of 225 mg       levothyroxine (SYNTHROID/LEVOTHROID) 50 MCG tablet TAKE 1 TABLET BY MOUTH ONCE DAILY 28 tablet 11     liothyronine (CYTOMEL) 5 MCG tablet Take 2 tablets (10 mcg) by mouth daily 30 tablet 3     memantine (NAMENDA) 10 MG tablet Take 1 tablet (10 mg) by mouth daily 30 tablet 3     mirtazapine (REMERON) 15 MG tablet Take 15 mg by mouth at bedtime       Multiple Vitamin (TAB-A-JENNIFER) TABS TAKE 1 TABLET BY MOUTH ONCE DAILY 28 tablet 11     nystatin (MYCOSTATIN) 201337 UNIT/GM external powder        psyllium (METAMUCIL/KONSYL) 58.6 % powder Take 1 tspn in liquid daily       rosuvastatin (CRESTOR) 10 MG tablet Take 1 tablet (10 mg) by mouth daily 30 tablet 0     sacubitril-valsartan (ENTRESTO) 49-51 MG per tablet Take 1 tablet by mouth every morning AND 2 tablets every evening. Take this dose for 2 weeks. After 2 weeks, increase Entresto to  mg twice daily. 180 tablet 3     acetaminophen (TYLENOL) 500 MG tablet Take 1 tablet (500 mg) by mouth daily Continue previously ordered PRN tylenol order as well (Patient not taking: Reported on 7/10/2025) 30 tablet 11     No current facility-administered medications for this visit.       ROS:   Refer to HPI    EXAM:  /64 (BP Location: Left arm, Patient Position: Chair, Cuff Size: Adult Regular)   Pulse 65   Wt 70.4 kg (155 lb 3.2 oz)   SpO2 96%   BMI 30.67 kg/m    GENERAL: Appears comfortable, in no acute distress.   HEENT: Eye symmetrical, no discharge or icterus bilaterally.   CV: RRR, +S1S2, no murmur, rub, or gallop. JVP ~8-9 mmg Hg.   RESPIRATORY: Respirations regular, even, and unlabored. Lungs CTA throughout.   GI: Soft and non distended   EXTREMITIES: No  peripheral edema. All extremities are warm and well perfused  NEUROLOGIC: Alert and interacting appropriately.   SKIN: No jaundice.    Labs, reviewed with patient in clinic today:  CBC RESULTS:  Lab Results   Component Value Date    WBC 9.8 04/04/2025    WBC 4.6 05/17/2021    RBC 4.29 04/04/2025    RBC 3.97 05/17/2021    HGB 14.0 04/04/2025    HGB 12.9 05/17/2021    HCT 43.7 04/04/2025    HCT 40.3 05/17/2021     (H) 04/04/2025     (H) 05/17/2021    MCH 32.6 04/04/2025    MCH 32.5 05/17/2021    MCHC 32.0 04/04/2025    MCHC 32.0 05/17/2021    RDW 13.0 04/04/2025    RDW 12.7 05/17/2021     04/04/2025     05/17/2021       CMP RESULTS:  Lab Results   Component Value Date     07/10/2025     06/04/2021    POTASSIUM 3.9 07/10/2025    POTASSIUM 4.6 10/05/2022    POTASSIUM 3.4 06/04/2021    CHLORIDE 105 07/10/2025    CHLORIDE 103 10/05/2022    CHLORIDE 106 06/04/2021    CO2 25 07/10/2025    CO2 27 10/05/2022    CO2 30 06/04/2021    ANIONGAP 14 07/10/2025    ANIONGAP 7 10/05/2022    ANIONGAP 6 06/04/2021     (H) 07/10/2025    GLC 85 01/31/2023    GLC 98 10/05/2022     (H) 06/04/2021    BUN 35.1 (H) 07/10/2025    BUN 39 (H) 10/05/2022    BUN 27 06/04/2021    CR 1.48 (H) 07/10/2025    CR 1.50 (H) 06/04/2021    GFRESTIMATED 34 (L) 07/10/2025    GFRESTIMATED 32 (L) 06/04/2021    GFRESTBLACK 37 (L) 06/04/2021    PRINCE 9.5 07/10/2025    PRINCE 9.4 06/04/2021    BILITOTAL 0.2 11/19/2024    BILITOTAL 0.3 05/17/2021    ALBUMIN 4.2 03/17/2025    ALBUMIN 3.6 09/27/2022    ALBUMIN 3.4 05/17/2021    ALKPHOS 72 11/19/2024    ALKPHOS 57 05/17/2021    ALT 11 11/19/2024    ALT 38 05/17/2021    AST 20 11/19/2024    AST 27 05/17/2021        INR RESULTS:  Lab Results   Component Value Date    INR 1.12 02/22/2023    INR 1.05 11/03/2020       Lab Results   Component Value Date    MAG 2.5 (H) 02/27/2023    MAG 2.4 (H) 10/28/2019     Lab Results   Component Value Date    NTBNPI 11,774 (H) 02/22/2023      Lab Results   Component Value Date    NTBNP 920 11/19/2024    NTBNP 462 (H) 05/17/2021       Diagnostics:   4/10/25 ECHO  Interpretation Summary  Status post 26 mm Arndt Janis Ultra TAVR on 9/12/2022.     Left ventricular function is decreased. The ejection fraction is 30-35%  (moderately reduced).  Abnormal septal motion consistent with left bundle branch block is present.  Global right ventricular function is normal.  Severe mitral annular calcification is present.  The TAVR prosthetic valve is well-seated. There is mild paravalvular and  valvular regurgitation. PV 2.1 m/sec, MG 9 mmHg.  The inferior vena cava was normal in size with preserved respiratory  variability.  No pericardial effusion is present.     Compared to previous study on 11/13/2023, no significant change    12/8/23 PYP testing  Impression:  1. Imaging findings are not suggestive of transthyretin cardiac  amyloidosis.     11/22/23 cMRI  Clinical history: 88 F CAD s/p PCI to LAD and RCA, cath 10/2022 with non-obs CAD, htn, severe MAC, AS s/p  TAVR 09/2022, LBBB after TAVR, HALT resolved on subsequent CT, newly reduced LVEF 35% after TAVR  Comparison CMR: none     1. The LV is normal in cavity size with moderate concentric LVH. The global systolic function is severely  reduced. The LVEF is 28%. There is basal septal akinesis with moderate diffuse hypokinesis. There is  paradoxical septal motion due to LBBB.      2. The RV is normal in cavity size. The global systolic function is mildly reduced. The RVEF is 43%.      3. Left atrium is moderately enlarged.     4. There is a TAVR prosthesis that cannot be further evaluated due to artifact. There is severe MAC without  significant MR.      5. Late gadolinium enhancement imaging shows patchy mid-myocardial enhancement involving the basal  segments.      6. Regadenoson stress perfusion imaging shows no ischemia.     7. There is no pericardial effusion or thickening.     8. There is no intracardiac  thrombus.     CONCLUSIONS: No myocardial ischemia. NICM, LVEF 28% and RVEF 43%, with LBBB and moderate LVH. Moderate  basal patchy fibrosis that is likely from AS, or could represent amyloidosis. Consider PYP scan.       CORE EXAM    Assessment and Plan:   Ms. Miller is a 89 year old female with a past medical history including HFpEF now HFrEF, HTN, HLD, significant CAD history (PTCA 1996, LAD and RCA stenting 2003, s/p LAD PCI 2020, recent cath 10/2022 with patent stents), CKD, chronic anemia and severe aortic valvular stenosis s/p (TAVR) with a 26mm Arndt Janis Ultra on 9/12/22 by Morro Burrows. Her post TAVR echo showed mean PG of 7 mmHg with trace PVL. She was noted to have 60% left femoral stenosis post procedure, but had adequate flow on peripheral angiogram. She was also noted to have new LBBB post procedure and discharged on a Cardionet which showed no high degree AVB.. Presents to clinic for CORE follow-up.    Her weight is trending up. Minimal symptoms, but neck vein is up. Cr improving on her recent last increase. WE will increase a bit further today and recheck weights and symptoms next week over the phone and get a cmp at that time as well.     HFpEF, now HFrEF EF 30-35%:  EF had been preserved 55-60%,  2/2023 45-50%, 7/2023 35-40%, now 30-35%. Unclear etiology, cath 10/2022 with patent stents, thought possibly related to poorly controlled HTN. Amyloid work-up has been negative.    - Volume status: Hypervolemic, increase lasix to 50 mg daily  - ARNI: Continue entresto  mg BID  - Additional afterload: Amlodipine 5 mg daily, if bps don't improve with diuresis would consider increasing to 7.5. Per notes has had intolerances to hydralazine.  - MRA: stop aldactone d/t GFR <30  - Beta Blocker: per notes, multiple prior intolerances to BB   - SGLT2i: cost prohibitive (last checked 4/2025)  - ICD: was discussed with Dr. Toby Torres 12/29/24 and this was not recommended based on age. They did discuss  crt-p rather than crt-d, but that was not persued    Aortic stenosis s/p TAVR 9/2022:  - Yearly echos with primary cardiologist or VALVE team- stable valve on 4/2025 echo  - CT heart shows resolution of HALT   - OFF Eliquis, continue ASA 81 mg lifelong   - SBE prophylaxis lifelong    Coronary artery disease w/o angina:  HLD:  - Continue ASA 81 mg daily lifelong  - Continue crestor  - Continue exercise and heart healthy diet    HTN, well controlled in clinic, a few high values at home, see contingency plan listed above if not improving with diuresis  - Multiple intolerances to BB, hydralazine   - Enstresto and amlodipine for BP control   - Decreased vitals at home from daily to 1-2 times weekly d/t stability and cost     PAD:   - Continue ASA 81 mg lifelong  - Vascular consult should she develop recurrent claudication    CKD Stage 3b/4, baseline has been worsening this year  - Nephrology appointment scheduled in August  - Stopped aldactone as above with subsequent cr improvement  - BMP next week given lasix increase    Osteoarthritis   - Okay to use Voltaren gel, no other NSAIDS    Follow up   - BMP in 1 week (increased lasix)  - RN phone call in 1 week  - Dr. Zuniga in Nov as scheduled  - CORE in 6 months or sooner PRN    Billing  - I managed 2+ stable chronic conditions  - I changed a prescription medication      Violet King PA-C  [facility]          CC  SELF, REFERRED      Please do not hesitate to contact me if you have any questions/concerns.     Sincerely,     Violet King PA-C

## 2025-07-14 ENCOUNTER — LAB REQUISITION (OUTPATIENT)
Dept: LAB | Facility: CLINIC | Age: 89
End: 2025-07-14
Payer: MEDICARE

## 2025-07-14 DIAGNOSIS — I50.9 HEART FAILURE, UNSPECIFIED (H): ICD-10-CM

## 2025-07-17 ENCOUNTER — PATIENT OUTREACH (OUTPATIENT)
Dept: CARDIOLOGY | Facility: CLINIC | Age: 89
End: 2025-07-17
Payer: MEDICARE

## 2025-07-17 NOTE — PROGRESS NOTES
Called nursing home to check in on weights since increasing lasix to 50 mg daily last week in clinic.     Today she is 153.7, this is unchanged from her weight on 7/10.   /68.   No report of symptoms such as increased swelling, more shortness of breath. Scheduled for labs tomorrow. Will follow up.

## 2025-07-18 LAB
ANION GAP SERPL CALCULATED.3IONS-SCNC: 8 MMOL/L (ref 7–15)
BUN SERPL-MCNC: 43.3 MG/DL (ref 8–23)
CALCIUM SERPL-MCNC: 9.2 MG/DL (ref 8.8–10.4)
CHLORIDE SERPL-SCNC: 106 MMOL/L (ref 98–107)
CREAT SERPL-MCNC: 1.65 MG/DL (ref 0.51–0.95)
EGFRCR SERPLBLD CKD-EPI 2021: 30 ML/MIN/1.73M2
GLUCOSE SERPL-MCNC: 100 MG/DL (ref 70–99)
HCO3 SERPL-SCNC: 28 MMOL/L (ref 22–29)
POTASSIUM SERPL-SCNC: 4.2 MMOL/L (ref 3.4–5.3)
SODIUM SERPL-SCNC: 142 MMOL/L (ref 135–145)

## 2025-07-18 PROCEDURE — 80048 BASIC METABOLIC PNL TOTAL CA: CPT | Mod: ORL | Performed by: PHYSICIAN ASSISTANT

## 2025-07-18 PROCEDURE — P9604 ONE-WAY ALLOW PRORATED TRIP: HCPCS | Mod: ORL | Performed by: PHYSICIAN ASSISTANT

## 2025-07-18 PROCEDURE — 36415 COLL VENOUS BLD VENIPUNCTURE: CPT | Mod: ORL | Performed by: PHYSICIAN ASSISTANT

## 2025-08-05 DIAGNOSIS — I10 BENIGN ESSENTIAL HYPERTENSION: ICD-10-CM

## 2025-08-05 DIAGNOSIS — N18.32 STAGE 3B CHRONIC KIDNEY DISEASE (H): Primary | ICD-10-CM

## 2025-08-14 ENCOUNTER — CARE COORDINATION (OUTPATIENT)
Dept: CARDIOLOGY | Facility: CLINIC | Age: 89
End: 2025-08-14
Payer: MEDICARE

## 2025-08-18 ENCOUNTER — OFFICE VISIT (OUTPATIENT)
Dept: NEPHROLOGY | Facility: CLINIC | Age: 89
End: 2025-08-18
Payer: MEDICARE

## 2025-08-18 VITALS
WEIGHT: 158 LBS | DIASTOLIC BLOOD PRESSURE: 70 MMHG | BODY MASS INDEX: 31.22 KG/M2 | SYSTOLIC BLOOD PRESSURE: 125 MMHG | HEART RATE: 70 BPM

## 2025-08-18 DIAGNOSIS — N18.32 STAGE 3B CHRONIC KIDNEY DISEASE (H): Primary | ICD-10-CM

## 2025-08-18 DIAGNOSIS — I25.10 CAD S/P PERCUTANEOUS CORONARY ANGIOPLASTY: ICD-10-CM

## 2025-08-18 DIAGNOSIS — I50.30 NYHA CLASS 3 HEART FAILURE WITH PRESERVED EJECTION FRACTION (H): ICD-10-CM

## 2025-08-18 DIAGNOSIS — I10 ESSENTIAL HYPERTENSION: ICD-10-CM

## 2025-08-18 DIAGNOSIS — Z98.61 CAD S/P PERCUTANEOUS CORONARY ANGIOPLASTY: ICD-10-CM

## 2025-08-18 PROCEDURE — 3078F DIAST BP <80 MM HG: CPT | Performed by: INTERNAL MEDICINE

## 2025-08-18 PROCEDURE — 3074F SYST BP LT 130 MM HG: CPT | Performed by: INTERNAL MEDICINE

## 2025-08-18 PROCEDURE — 99214 OFFICE O/P EST MOD 30 MIN: CPT | Performed by: INTERNAL MEDICINE

## (undated) DEVICE — Device

## (undated) DEVICE — CATH ANGIO SUPERTORQUE PLUS JL4 6FRX100CM 533620

## (undated) DEVICE — INFLATION DEVICE ATRION QL2530

## (undated) DEVICE — 6FR X 100CM SUPER TORQUE PLUS DIAGNOSTIC CATHETER W/SOFT TIP, MULTIPURPOSE, MPA 1, FEMORAL SELECTIVE, 0.038IN MAX GUIDEWIRE (EA/1)

## (undated) DEVICE — INTRO SHEATH AVANTI 4FRX23CM 504604T

## (undated) DEVICE — TUBING PRESSURE 30"

## (undated) DEVICE — KNIFE HANDLE W/10 BLADE 371610

## (undated) DEVICE — CATH ANGIO INFINITI PIGTAIL 145 6 SH 6FRX110CM  534-652S

## (undated) DEVICE — FASTENER CATH BALLOON CLAMPX2 STATLOCK 0684-00-493

## (undated) DEVICE — GW VASC .035IN DIA 260CML 7CML 3 MM RADIUS J CURVE 502455

## (undated) DEVICE — WIRE GUIDE 0.035"X150CM EMERALD J TIP 502521

## (undated) DEVICE — PACK HEART LEFT CUSTOM

## (undated) DEVICE — SPONGE RAY-TEC 4X8" 7318

## (undated) DEVICE — CATH ANGIO SUPERTORQUE IM 6FRX100CM 533660

## (undated) DEVICE — CLIP HORIZON MED BLUE 002200

## (undated) DEVICE — KIT HAND CONTROL ACIST 014644 AR-P54

## (undated) DEVICE — SUCTION MANIFOLD NEPTUNE 2 SYS 4 PORT 0702-020-000

## (undated) DEVICE — WIRE GUIDE 0.035"X145CM AMPLATZ XSTIFF J THSCF-35-145-3-AES

## (undated) DEVICE — WIRE GUIDE 0.018"X260CM PLATINUM PLUS SHORT TAPER M001466050

## (undated) DEVICE — WIRE GUIDE LUNDERQUIST 0.035"X260CM DBL CVD

## (undated) DEVICE — KIT HAND CONTROL ACIST 016795

## (undated) DEVICE — ADH SKIN CLOSURE PREMIERPRO EXOFIN 1.0ML 3470

## (undated) DEVICE — CATH DIAGNOSTIC RADIAL 5FR TIG 4.0

## (undated) DEVICE — GLOVE PROTEXIS W/NEU-THERA 7.5  2D73TE75

## (undated) DEVICE — CATH GUIDING BLUE YELLOW PTFE XB3 6FRX100CM 67005200

## (undated) DEVICE — SYR 10ML LL W/O NDL 302995

## (undated) DEVICE — MANIFOLD KIT ANGIO AUTOMATED 014613

## (undated) DEVICE — WIRE GUIDE 0.035"X150CM EMERALD STR 502542

## (undated) DEVICE — VALVE HMSTS PHD SM BORE W/ SPR MTL INS TOOL TRQ DVC MAP802

## (undated) DEVICE — NDL 20GA 1.5"

## (undated) DEVICE — 0.035IN X 260CM, EMERALD DIAGNOSTIC GUIDEWIRE, FIXED-CORE PTFE COATED, STANDARD, 3MM EXCHANGE J-TIP (EA/1)

## (undated) DEVICE — GUIDEWIRE OPTOWIRE 3 W/O GAUGE FACTOR CONNECTOR F1032

## (undated) DEVICE — COVER CAMERA IN-LIGHT LENS LT-C02-P

## (undated) DEVICE — SUTURE BOOTIES YELLOW 053003PBX

## (undated) DEVICE — TOURNIQUET VASCULAR KIT 7 1/2" 79012

## (undated) DEVICE — CLOSURE DEVICE 6FR VASC PROGLIDE MEDICATED SUTURE 12673-03

## (undated) DEVICE — DRSG PRIMAPORE 02X3" 7133

## (undated) DEVICE — CATH ANGIO SUPERTORQUE PLUS AL2 6FRX100CM 533646

## (undated) DEVICE — CATH GUIDING IKARI 6FR IL3.5 LEFT HEARTRAIL 40-6370

## (undated) DEVICE — VALVE HEMOSTASIS .096" COPILOT MECH 1003331

## (undated) DEVICE — RAD KNIFE HANDLE W/11 BLADE DISPOSABLE 371611

## (undated) DEVICE — CLOSURE ANGIOSEAL 6FR 610130

## (undated) DEVICE — TUBING ANGIOGRAPHY EXCITE NYLON POLYURETHANE 1200 PSI L30 IN

## (undated) DEVICE — CATH ANGIO SUPERTORQUE AL1 6FRX100CM 532-645

## (undated) DEVICE — SHEATH GUIDING R2P DESTINATION 75CM 6FR STERIL GS-R6ST1C75W

## (undated) DEVICE — CATH ANGIO INFINITI 3DRC 4FRX100CM 538476

## (undated) DEVICE — CATH GUIDING BLUE YELLOW PTFE XB3.5 6FRX100CM 67005400

## (undated) DEVICE — VALVE DELIVERY SYSTEM 26MM SAPIEN3 ULTRA COMMANDER 9750CM26

## (undated) DEVICE — TUBING SUCTION 10'X3/16" N510

## (undated) DEVICE — INTRO SHEATH 6FRX25CM PINNACLE RSS606

## (undated) DEVICE — CATH EP PACEL 5FRX110CM 1MM RIGHT HEART CURVE 401763

## (undated) DEVICE — CATH IMAGING ULTRASOUND VISIONS PV018 3.4FRX135CM 86700

## (undated) DEVICE — SPONGE KITTNER 30-101

## (undated) DEVICE — RAD CLOSURE ANGIOSEAL 8FR  610131

## (undated) DEVICE — INTRO GLIDESHEATH SLENDER 6FR 10X45CM 60-1060

## (undated) DEVICE — GLIDEWIRE TERUMO RADIOFOCUS .035X260CM ANG EXCHANGE GR3509

## (undated) DEVICE — CATH ANGIO INFINITI JL4 4FRX100CM 538420

## (undated) DEVICE — INTRO SHEATH 6FRX10CM PINNACLE RSS602

## (undated) DEVICE — WIRE GUIDE HI-TRQ BALANCE MDWT JTIP 0.014"X190CM 1001780J-HC

## (undated) DEVICE — LINEN TOWEL PACK X5 5464

## (undated) DEVICE — VESSEL LOOPS YELLOW MAXI 31145694

## (undated) DEVICE — GUIDEWIRE VASC SAFARI2 0.035X275CM H74939406XS1

## (undated) DEVICE — INTRO SHEATH 7FRX25CM PINNACLE RSS706

## (undated) DEVICE — RX SURGIFLO HEMOSTATIC MATRIX W/THROMBIN 8ML 2994

## (undated) DEVICE — SLEEVE TR BAND RADIAL COMPRESSION DEVICE 24CM TRB24-REG

## (undated) DEVICE — INTRO CATH 12CM 8.5FR FST-CATH

## (undated) DEVICE — LINEN TOWEL PACK X6 WHITE 5487

## (undated) DEVICE — KIT ACCESSORY INTRO INFLATION SYS 20/30 PRIORITY 1000186-115

## (undated) DEVICE — DRAPE IOBAN INCISE 23X17" 6650EZ

## (undated) DEVICE — SOL NACL 0.9% IRRIG 1000ML BOTTLE 2F7124

## (undated) DEVICE — CATH ANGIO INFINITI JL3.5 4FRX100CM 538418

## (undated) DEVICE — SPONGE LAP 18X18" X8435

## (undated) DEVICE — CLIP HORIZON SM RED WIDE SLOT 001201

## (undated) RX ORDER — NICARDIPINE HYDROCHLORIDE 2.5 MG/ML
INJECTION INTRAVENOUS
Status: DISPENSED
Start: 2020-08-21

## (undated) RX ORDER — HEPARIN SODIUM 1000 [USP'U]/ML
INJECTION, SOLUTION INTRAVENOUS; SUBCUTANEOUS
Status: DISPENSED
Start: 2022-09-12

## (undated) RX ORDER — LIDOCAINE 40 MG/G
CREAM TOPICAL
Status: DISPENSED
Start: 2020-11-03

## (undated) RX ORDER — FENTANYL CITRATE 50 UG/ML
INJECTION, SOLUTION INTRAMUSCULAR; INTRAVENOUS
Status: DISPENSED
Start: 2020-08-21

## (undated) RX ORDER — PROPOFOL 10 MG/ML
INJECTION, EMULSION INTRAVENOUS
Status: DISPENSED
Start: 2022-09-12

## (undated) RX ORDER — BUPIVACAINE HYDROCHLORIDE 5 MG/ML
INJECTION, SOLUTION EPIDURAL; INTRACAUDAL
Status: DISPENSED
Start: 2022-09-12

## (undated) RX ORDER — FENTANYL CITRATE 50 UG/ML
INJECTION, SOLUTION INTRAMUSCULAR; INTRAVENOUS
Status: DISPENSED
Start: 2020-11-03

## (undated) RX ORDER — CEFAZOLIN SODIUM 1 G/3ML
INJECTION, POWDER, FOR SOLUTION INTRAMUSCULAR; INTRAVENOUS
Status: DISPENSED
Start: 2022-09-12

## (undated) RX ORDER — ASPIRIN 325 MG
TABLET ORAL
Status: DISPENSED
Start: 2022-09-12

## (undated) RX ORDER — HYDRALAZINE HYDROCHLORIDE 20 MG/ML
INJECTION INTRAMUSCULAR; INTRAVENOUS
Status: DISPENSED
Start: 2020-08-21

## (undated) RX ORDER — SODIUM CHLORIDE 9 MG/ML
INJECTION, SOLUTION INTRAVENOUS
Status: DISPENSED
Start: 2020-11-03

## (undated) RX ORDER — HYDRALAZINE HYDROCHLORIDE 20 MG/ML
INJECTION INTRAMUSCULAR; INTRAVENOUS
Status: DISPENSED
Start: 2022-09-12

## (undated) RX ORDER — PROTAMINE SULFATE 10 MG/ML
INJECTION, SOLUTION INTRAVENOUS
Status: DISPENSED
Start: 2022-09-12

## (undated) RX ORDER — REGADENOSON 0.08 MG/ML
INJECTION, SOLUTION INTRAVENOUS
Status: DISPENSED
Start: 2023-11-22

## (undated) RX ORDER — FENTANYL CITRATE 50 UG/ML
INJECTION, SOLUTION INTRAMUSCULAR; INTRAVENOUS
Status: DISPENSED
Start: 2022-10-25

## (undated) RX ORDER — FENTANYL CITRATE 50 UG/ML
INJECTION, SOLUTION INTRAMUSCULAR; INTRAVENOUS
Status: DISPENSED
Start: 2022-09-12

## (undated) RX ORDER — HEPARIN SODIUM 1000 [USP'U]/ML
INJECTION, SOLUTION INTRAVENOUS; SUBCUTANEOUS
Status: DISPENSED
Start: 2022-10-25

## (undated) RX ORDER — POTASSIUM CHLORIDE 750 MG/1
TABLET, EXTENDED RELEASE ORAL
Status: DISPENSED
Start: 2020-08-21

## (undated) RX ORDER — NITROGLYCERIN 5 MG/ML
VIAL (ML) INTRAVENOUS
Status: DISPENSED
Start: 2020-08-21

## (undated) RX ORDER — HEPARIN SODIUM 1000 [USP'U]/ML
INJECTION, SOLUTION INTRAVENOUS; SUBCUTANEOUS
Status: DISPENSED
Start: 2020-08-21

## (undated) RX ORDER — SODIUM CHLORIDE 9 MG/ML
INJECTION, SOLUTION INTRAVENOUS
Status: DISPENSED
Start: 2020-08-21

## (undated) RX ORDER — LIDOCAINE HYDROCHLORIDE 10 MG/ML
INJECTION, SOLUTION EPIDURAL; INFILTRATION; INTRACAUDAL; PERINEURAL
Status: DISPENSED
Start: 2022-10-25

## (undated) RX ORDER — NITROGLYCERIN 20 MG/100ML
INJECTION INTRAVENOUS
Status: DISPENSED
Start: 2022-09-12

## (undated) RX ORDER — FENTANYL CITRATE-0.9 % NACL/PF 10 MCG/ML
PLASTIC BAG, INJECTION (ML) INTRAVENOUS
Status: DISPENSED
Start: 2022-09-12

## (undated) RX ORDER — NITROGLYCERIN 5 MG/ML
VIAL (ML) INTRAVENOUS
Status: DISPENSED
Start: 2020-11-03

## (undated) RX ORDER — AMINOPHYLLINE 25 MG/ML
INJECTION, SOLUTION INTRAVENOUS
Status: DISPENSED
Start: 2023-11-22

## (undated) RX ORDER — ALBUTEROL SULFATE 0.83 MG/ML
SOLUTION RESPIRATORY (INHALATION)
Status: DISPENSED
Start: 2019-12-20

## (undated) RX ORDER — CEFAZOLIN SODIUM/WATER 2 G/20 ML
SYRINGE (ML) INTRAVENOUS
Status: DISPENSED
Start: 2022-09-12

## (undated) RX ORDER — ASPIRIN 325 MG
TABLET ORAL
Status: DISPENSED
Start: 2020-08-21

## (undated) RX ORDER — ATROPINE SULFATE 0.1 MG/ML
INJECTION INTRAVENOUS
Status: DISPENSED
Start: 2020-08-21

## (undated) RX ORDER — LIDOCAINE HYDROCHLORIDE 10 MG/ML
INJECTION, SOLUTION EPIDURAL; INFILTRATION; INTRACAUDAL; PERINEURAL
Status: DISPENSED
Start: 2022-09-12